# Patient Record
Sex: MALE | Race: WHITE | NOT HISPANIC OR LATINO | Employment: OTHER | URBAN - METROPOLITAN AREA
[De-identification: names, ages, dates, MRNs, and addresses within clinical notes are randomized per-mention and may not be internally consistent; named-entity substitution may affect disease eponyms.]

---

## 2017-02-07 ENCOUNTER — OFFICE VISIT (OUTPATIENT)
Dept: FAMILY MEDICINE | Facility: OTHER | Age: 77
End: 2017-02-07
Payer: COMMERCIAL

## 2017-02-07 VITALS
HEART RATE: 70 BPM | TEMPERATURE: 96.6 F | HEIGHT: 67 IN | WEIGHT: 193 LBS | RESPIRATION RATE: 16 BRPM | BODY MASS INDEX: 30.29 KG/M2 | SYSTOLIC BLOOD PRESSURE: 122 MMHG | DIASTOLIC BLOOD PRESSURE: 68 MMHG

## 2017-02-07 DIAGNOSIS — J06.9 VIRAL URI WITH COUGH: Primary | ICD-10-CM

## 2017-02-07 PROCEDURE — 99213 OFFICE O/P EST LOW 20 MIN: CPT | Performed by: FAMILY MEDICINE

## 2017-02-07 ASSESSMENT — PAIN SCALES - GENERAL: PAINLEVEL: NO PAIN (0)

## 2017-02-07 NOTE — MR AVS SNAPSHOT
After Visit Summary   2/7/2017    Medardo Gautam    MRN: [id number]           Patient Information     Date Of Birth          1940        Visit Information        Provider Department      2/7/2017 8:40 AM Verna Osborne MD [facility]         Follow-ups after your visit        Follow-up notes from your care team     Return if symptoms worsen or fail to improve.      Your next 10 appointments already scheduled     Mar 15, 2017 10:00 AM   (Arrive by 9:45 AM)   Return Prostate Cancer with Norma Goodwin MD   [facility] ([facility])    [address] [place] [address]  4th Floor  [facility] [address]   [phone]            Apr 24, 2017  9:45 AM   [facility] with  [facility]   [facility] ([facility])    [address] [place] [address]  2nd [facility] [id number] [phone]            Apr 24, 2017 10:30 AM   (Arrive by 10:15 AM)   Return Visit with Ramon Daily MD   [facility] ([facility])    [address] [place] [address]  2nd [facility] [id number] [phone]              Who to contact     If you have questions or need follow up information about today's clinic visit or your schedule please contact [facility] directly at [phone].  Normal or non-critical lab and imaging results will be communicated to you by MyChart, letter or phone within 4 business days after the clinic has received the results. If you do not hear from us within 7 days, please contact the clinic through MyChart or phone. If you have a critical or abnormal lab result, we will notify you by phone as soon as possible.  Submit refill requests through [employer] or call your pharmacy and they will forward the refill request to us. Please allow 3 business days for your refill to be completed.          Additional Information About  "Your Visit        MyChart Information     [employer] gives you secure access to your electronic health record. If you see a primary care provider, you can also send messages to your care team and make appointments. If you have questions, please call your primary care clinic.  If you do not have a primary care provider, please call [phone] and they will assist you.        Care EveryWhere ID     This is your Care EveryWhere ID. This could be used by other organizations to access your [facility] medical records  [id number]        Your Vitals Were     Pulse Temperature Respirations Height BMI (Body Mass Index)       70 96.6  F (35.9  C) (Temporal) 16 5' 6.77\" (1.696 m) 30.44 kg/m2        Blood Pressure from Last 3 Encounters:   02/07/17 122/68   12/12/16 138/70   12/07/16 163/82    Weight from Last 3 Encounters:   02/07/17 193 lb (87.544 kg)   12/12/16 198 lb (89.812 kg)   12/07/16 191 lb (86.637 kg)              Today, you had the following     No orders found for display       Primary Care Provider Office Phone # Fax #    Verna Osborne [phone]       [address]    [facility] [address]        Thank you!     Thank you for choosing [facility]  for your care. Our goal is always to provide you with excellent care. Hearing back from our patients is one way we can continue to improve our services. Please take a few minutes to complete the written survey that you may receive in the mail after your visit with us. Thank you!             Your Updated Medication List - Protect others around you: Learn how to safely use, store and throw away your medicines at www.disposemymeds.org.          This list is accurate as of: 2/7/17  9:00 AM.  Always use your most recent med list.                   Brand Name Dispense Instructions for use    ADVIL PO      Take  by mouth.       allopurinol 100 MG tablet    ZYLOPRIM    30 tablet    Take 1 tablet (100 mg) by mouth " daily       aspirin 81 MG tablet     30 tablet    Take 1 tablet (81 mg) by mouth daily       bicalutamide 50 MG tablet    CASODEX    30 tablet    Take 1 tablet (50 mg) by mouth daily       calcium-vitamin D 600-400 MG-UNIT per tablet    CALTRATE     Take 1 tablet by mouth daily       CALTRATE 600 PLUS-VIT D OR      Take  by mouth. 1 tablet daily       CLARITIN PO      Take  by mouth. As needed       clonazePAM 1 MG tablet    klonoPIN    30 tablet    Take 1 tablet (1 mg) by mouth 2 times daily as needed for anxiety       folic acid-vit B6-vit B12 0.8-10-0.115 MG Tabs per tablet    FOLGARD     Take 1 tablet by mouth daily       leuprolide 45 MG kit    LUPRON DEPOT    45 mg    Inject 45 mg into the muscle every 6 months       lisinopril 10 MG tablet    PRINIVIL/ZESTRIL    90 tablet    Take 1 tablet (10 mg) by mouth daily       methylPREDNISolone 4 MG tablet    MEDROL DOSEPAK    21 tablet    Follow package instructions       NAPROXEN PO      Take 500 mg by mouth 2 times daily as needed for moderate pain       OMEGA-3 FISH OIL PO      Take 500 mg by mouth daily       sildenafil 100 MG cap/tab    VIAGRA    10 tablet    1/2 tab three times a week       zolpidem 5 MG tablet    AMBIEN    30 tablet    TAKE ONE TABLET BY MOUTH IN THE EVENING AS NEEDED FOR SLEEP

## 2017-02-07 NOTE — PROGRESS NOTES
SUBJECTIVE:                                                    Medardo Gautam is a 77 year old male who presents to clinic today for the following health issues:      HPI    Acute Illness   Acute illness concerns: sore throat, horse voice   Onset: little over 2 weeks      Fever: no    Chills/Sweats: no     Headache (location?): no    Sinus Pressure:no    Conjunctivitis:  YES: both    Ear Pain: YES: right    Rhinorrhea: YES    Congestion: YES    Sore Throat: YES     Cough: YES-productive of clear sputum    Wheeze: no    Decreased Appetite: no    Nausea: no    Vomiting: no    Diarrhea:  no    Dysuria/Freq.: no    Fatigue/Achiness: no    Sick/Strep Exposure: no     Therapies Tried and outcome: recola drops, one advil at night      Problem list and histories reviewed & adjusted, as indicated.  Additional history: as documented      Patient Active Problem List   Diagnosis     Disturbance of skin sensation     Hemorrhoids     Gout     Advanced directives, counseling/discussion     Dupuytren's contracture of hand- left 5th finger, right 5th finger     Bunions- both feet     Skin lesion- R side of face and behind L ear     Insomnia     Prostatic nodule- posterior right edge with rectal exam     Prostate cancer- Jeancarlos 9 (5+4) dx Feb 2012     Elevated liver enzymes     Hyperbilirubinemia     Essential hypertension with goal blood pressure less than 140/90     Shoulder pain, left     Contracture of finger joint     Hyperlipidemia LDL goal <130     Shoulder pain, right     Malignant neoplasm of prostate (H)     Anxiety     Colon cancer (H)     Past Surgical History   Procedure Laterality Date     Colonoscopy  10/25/07     Snare polypectomy     Colonoscopy  6/25/2009     with snare polypectomy     Colonoscopy  9/30/2009     C lengthen,tendon,hand/finger  ca 2007     right fifth     Colonoscopy  1/5/2011     COLONOS[facility]EE at  [facility]     Appendectomy       Davinci prostatectomy  8/6/2012      Procedure: DAVINCI PROSTATECTOMY;  Davinci Assisted Radical Prostatectomy with Bilateral Lymphadenectomy ;  Surgeon: Norma Goodwin MD;  Location: [facility] OR     C hand/finger surgery unlisted       C stomach surgery procedure unlisted       Colonoscopy with co2 insufflation N/A 1/16/2015     Procedure: COLONOSCOPY WITH CO2 INSUFFLATION;  Surgeon: Sarah Beth Pisano MD;  Location: [facility] OR     Colonoscopy N/A 1/16/2015     Procedure: COMBINED COLONOSCOPY, SINGLE OR MULTIPLE BIOPSY/POLYPECTOMY BY BIOPSY;  Surgeon: Sarah Beth Pisano MD;  Location: [facility] OR     Biopsy  01/16/15     Colon surgery  2/11/2015     Lap assisted R hemicolectomy     Laparoscopic assisted colectomy N/A 2/11/2015     Procedure: LAPAROSCOPIC ASSISTED COLECTOMY;  Surgeon: Oren Breen MD;  Location: [facility] OR       Social History   Substance Use Topics     Smoking status: Former Smoker -- 1 years     Types: Cigarettes, Pipe, Cigars     Start date: 10/01/1961     Quit date: 01/01/1962     Smokeless tobacco: Never Used      Comment: No smokers in home     Alcohol Use: 0.0 oz/week     0 Standard drinks or equivalent per week      Comment: daily glass of wine and whiskey     Family History   Problem Relation Age of Onset     C.A.D. No family hx of      Breast Cancer No family hx of      Cancer - colorectal No family hx of      Prostate Cancer No family hx of      Alzheimer Disease No family hx of      Blood Disease No family hx of      Cardiovascular No family hx of      Circulatory No family hx of      Eye Disorder No family hx of      GASTROINTESTINAL DISEASE No family hx of      Genitourinary Problems No family hx of      Lipids No family hx of      Neurologic Disorder No family hx of      Respiratory No family hx of      Asthma No family hx of      HEART DISEASE No family hx of      DIABETES No family hx of      Hypertension No family hx of      CEREBROVASCULAR DISEASE Father      CANCER Mother 90     Patient believes vaginal cancer - hysterectomy,       CANCER Sister      Alzheimer Disease Mother      Arthritis No family hx of      Thyroid Disease No family hx of      Musculoskeletal Disorder No family hx of          Current Outpatient Prescriptions   Medication Sig Dispense Refill     bicalutamide (CASODEX) 50 MG tablet Take 1 tablet (50 mg) by mouth daily 30 tablet 3     allopurinol (ZYLOPRIM) 100 MG tablet Take 1 tablet (100 mg) by mouth daily 30 tablet 1     NAPROXEN PO Take 500 mg by mouth 2 times daily as needed for moderate pain       zolpidem (AMBIEN) 5 MG tablet TAKE ONE TABLET BY MOUTH IN THE EVENING AS NEEDED FOR SLEEP 30 tablet 2     lisinopril (PRINIVIL,ZESTRIL) 10 MG tablet Take 1 tablet (10 mg) by mouth daily 90 tablet 3     Omega-3 Fatty Acids (OMEGA-3 FISH OIL PO) Take 500 mg by mouth daily       calcium-vitamin D (CALTRATE) 600-400 MG-UNIT per tablet Take 1 tablet by mouth daily       folic acid-vit B6-vit B12 (FOLGARD) 0.8-10-0.115 MG TABS Take 1 tablet by mouth daily       aspirin 81 MG tablet Take 1 tablet (81 mg) by mouth daily 30 tablet      sildenafil (VIAGRA) 100 MG tablet 1/2 tab three times a week 10 tablet 12     Ibuprofen (ADVIL PO) Take  by mouth.       Loratadine (CLARITIN PO) Take  by mouth. As needed        Calcium-Vitamin D (CALTRATE 600 PLUS-VIT D OR) Take  by mouth. 1 tablet daily       clonazePAM (KLONOPIN) 1 MG tablet Take 1 tablet (1 mg) by mouth 2 times daily as needed for anxiety 30 tablet 2     methylPREDNISolone (MEDROL DOSEPAK) 4 MG tablet Follow package instructions 21 tablet 0     leuprolide (LUPRON DEPOT) 45 MG injection Inject 45 mg into the muscle every 6 months 45 mg 0     No Known Allergies  BP Readings from Last 3 Encounters:   17 122/68   16 138/70   16 163/82    Wt Readings from Last 3 Encounters:   17 193 lb (87.544 kg)   16 198 lb (89.812 kg)   16 191 lb (86.637 kg)                  Labs reviewed in EPIC  Problem list, Medication list, Allergies, and  "Medical/Social/Surgical histories reviewed in [facility] and updated as appropriate.    ROS:  Constitutional, HEENT, cardiovascular, pulmonary, gi and gu systems are negative, except as otherwise noted.    OBJECTIVE:                                                    /68 mmHg  Pulse 70  Temp(Src) 96.6  F (35.9  C) (Temporal)  Resp 16  Ht 5' 6.77\" (1.696 m)  Wt 193 lb (87.544 kg)  BMI 30.44 kg/m2  Body mass index is 30.44 kg/(m^2).  Physical Exam   Constitutional: He appears well-developed and well-nourished.   HENT:   Head: Normocephalic and atraumatic.   Right Ear: External ear normal.   Left Ear: External ear normal.   Mouth/Throat: Oropharynx is clear and moist.   Eyes: EOM are normal.   Neck: Neck supple.   Cardiovascular: Normal rate and regular rhythm.    Pulmonary/Chest: Effort normal and breath sounds normal.         Diagnostic Test Results:  none      ASSESSMENT/PLAN:                                                      Problem List Items Addressed This Visit     None      Visit Diagnoses     Viral URI with cough    -  Primary        advise conservative a=management  Discussed home care  Reportable signs and symptoms discussed  RTC if symptoms persist or fail to improve    Verna Osborne MD  [facility]  "

## 2017-02-07 NOTE — NURSING NOTE
"Chief Complaint   Patient presents with     URI       Initial /68 mmHg  Pulse 70  Temp(Src) 96.6  F (35.9  C) (Temporal)  Resp 16  Ht 5' 6.77\" (1.696 m)  Wt 193 lb (87.544 kg)  BMI 30.44 kg/m2 Estimated body mass index is 30.44 kg/(m^2) as calculated from the following:    Height as of this encounter: 5' 6.77\" (1.696 m).    Weight as of this encounter: 193 lb (87.544 kg).  Medication Reconciliation: complete  Brittany Lynn CMA    "

## 2017-02-10 DIAGNOSIS — M1A.9XX0 CHRONIC GOUT WITHOUT TOPHUS, UNSPECIFIED CAUSE, UNSPECIFIED SITE: Primary | ICD-10-CM

## 2017-02-10 RX ORDER — NAPROXEN 500 MG/1
500 TABLET ORAL 2 TIMES DAILY PRN
Qty: 60 TABLET | Refills: 0 | Status: SHIPPED | OUTPATIENT
Start: 2017-02-10 | End: 2017-03-15

## 2017-02-10 NOTE — TELEPHONE ENCOUNTER
Naproxen    Last Written Prescription Date:  6/5/15  Discont. 10/9/15  Last Fill Quantity: 90,   # refills: 3  Last Office Visit with [facility], [facility] or [facility] prescribing provider: 2/7/17  Future Office visit:       Routing refill request to provider for review/approval because:  Drug not active on patient's medication list

## 2017-02-10 NOTE — TELEPHONE ENCOUNTER
Reason for call:  Medication  Reason for Call:  Medication or medication refill:    Do you use a [facility]?  Name of the pharmacy and phone number for the current request:  [facility] - [phone]    Name of the medication requested: naproxen    Other request: will need refill. Requested a few days ago    Can we leave a detailed message on this number? YES    Phone number patient can be reached at: Home number on file [phone] (home)    Best Time: any    Call taken on 2/10/2017 at 10:21 AM by Sharmin Mckeon

## 2017-02-16 NOTE — TELEPHONE ENCOUNTER
Naproxen 500 mg pended for provider to review and advise on this medication request. Corrina Waldrop RN

## 2017-02-17 RX ORDER — NAPROXEN 500 MG/1
500 TABLET ORAL 2 TIMES DAILY PRN
Qty: 60 TABLET | Refills: 0 | Status: SHIPPED | OUTPATIENT
Start: 2017-02-17 | End: 2017-10-12

## 2017-02-17 NOTE — TELEPHONE ENCOUNTER
This is an [facility] patient that I had approved when she was out of office, she is now back and will refer it back to her.

## 2017-02-28 DIAGNOSIS — G47.00 PERSISTENT DISORDER OF INITIATING OR MAINTAINING SLEEP: ICD-10-CM

## 2017-02-28 RX ORDER — ZOLPIDEM TARTRATE 5 MG/1
TABLET ORAL
Qty: 30 TABLET | Refills: 0 | Status: CANCELLED | OUTPATIENT
Start: 2017-02-28

## 2017-03-01 DIAGNOSIS — G47.00 PERSISTENT DISORDER OF INITIATING OR MAINTAINING SLEEP: ICD-10-CM

## 2017-03-01 NOTE — TELEPHONE ENCOUNTER
Ambien      Last Written Prescription Date:  11/15/16  Last Fill Quantity: 30,   # refills: 2  Last Office Visit with [facility] provider: 02/07/2017  Future Office visit:       Routing refill request to provider for review/approval because:  Drug not on the [facility] refill protocol or controlled substance

## 2017-03-01 NOTE — TELEPHONE ENCOUNTER
[facility] phone call message- patient requests medication or medication refill:    If this is a refill request, has the caller requested the refill from the pharmacy already? No  Name of the pharmacy and phone number for the current request:  [facility] [phone]    Name of the medication requested: Zolpodem    Other request: patient is going out of town on Friday, can this be done today?    OK to leave the result message on voice mail or with a family member? NO    Call taken on 3/1/2017 at 10:51 AM by Iman Stephen

## 2017-03-02 RX ORDER — ZOLPIDEM TARTRATE 5 MG/1
TABLET ORAL
Qty: 30 TABLET | Refills: 5 | Status: SHIPPED | OUTPATIENT
Start: 2017-03-02 | End: 2017-08-27

## 2017-03-02 NOTE — PROGRESS NOTES
SUBJECTIVE:                                                    Medardo Gautam is a 77 year old male who presents to clinic today for the following health issues:      HPI    CHEST PAIN     Onset: 1 week ago    Description:   Location:  Lower middle chest  Character: sharp  Radiation: none  Duration: 2 hours and constant     Intensity: severe    Progression of Symptoms:  improving    Accompanying Signs & Symptoms:  Shortness of breath: no  Sweating: no  Nausea/vomiting: no  Lightheadedness: no  Palpitations: no  Fever/Chills: no  Cough: YES  Heartburn: no    History:   Family history of heart disease no  Tobacco use: no    Precipitating factors:   Worse with exertion: no  Worse with deep breaths :  no  Related to food: no    Alleviating factors:  none       Therapies Tried and outcome: none      Problem list and histories reviewed & adjusted, as indicated.  Additional history: as documented      Patient Active Problem List   Diagnosis     Disturbance of skin sensation     Hemorrhoids     Gout     Advanced directives, counseling/discussion     Dupuytren's contracture of hand- left 5th finger, right 5th finger     Bunions- both feet     Skin lesion- R side of face and behind L ear     Insomnia     Prostatic nodule- posterior right edge with rectal exam     Prostate cancer- Jeancarlos 9 (5+4) dx Feb 2012     Elevated liver enzymes     Hyperbilirubinemia     Essential hypertension with goal blood pressure less than 140/90     Shoulder pain, left     Contracture of finger joint     Hyperlipidemia LDL goal <130     Shoulder pain, right     Malignant neoplasm of prostate (H)     Anxiety     Colon cancer (H)     Past Surgical History   Procedure Laterality Date     Colonoscopy  10/25/07     Snare polypectomy     Colonoscopy  6/25/2009     with snare polypectomy     Colonoscopy  9/30/2009     C lengthen,tendon,hand/finger  ca 2007     right fifth     Colonoscopy  1/5/2011     [facility] at [facility]     Appendectomy       Davinci prostatectomy  2012     Procedure: DAVINCI PROSTATECTOMY;  Davinci Assisted Radical Prostatectomy with Bilateral Lymphadenectomy ;  Surgeon: Norma Goodwin MD;  Location: [facility] OR     C hand/finger surgery unlisted       C stomach surgery procedure unlisted       Colonoscopy with co2 insufflation N/A 2015     Procedure: COLONOSCOPY WITH CO2 INSUFFLATION;  Surgeon: Sarah Beth Pisano MD;  Location: [facility] OR     Colonoscopy N/A 2015     Procedure: COMBINED COLONOSCOPY, SINGLE OR MULTIPLE BIOPSY/POLYPECTOMY BY BIOPSY;  Surgeon: Sarah Beth Pisano MD;  Location: [facility] OR     Biopsy  01/16/15     Colon surgery  2015     Lap assisted R hemicolectomy     Laparoscopic assisted colectomy N/A 2015     Procedure: LAPAROSCOPIC ASSISTED COLECTOMY;  Surgeon: Oren Breen MD;  Location: [facility] OR       Social History   Substance Use Topics     Smoking status: Former Smoker     Years: 1.00     Types: Cigarettes, Pipe, Cigars     Start date: 10/1/1961     Quit date: 1962     Smokeless tobacco: Never Used      Comment: No smokers in home     Alcohol use 0.0 oz/week     0 Standard drinks or equivalent per week      Comment: daily glass of wine and whiskey     Family History   Problem Relation Age of Onset     CEREBROVASCULAR DISEASE Father      CANCER Mother 90     Patient believes vaginal cancer - hysterectomy,      Alzheimer Disease Mother      CANCER Sister      C.A.D. No family hx of      Breast Cancer No family hx of      Cancer - colorectal No family hx of      Prostate Cancer No family hx of      Blood Disease No family hx of      Cardiovascular No family hx of      Circulatory No family hx of      Eye Disorder No family hx of      GASTROINTESTINAL DISEASE No family hx of      Genitourinary Problems No family hx of      Lipids No family hx of      Neurologic Disorder No family hx of      Respiratory No family hx of      Asthma No family hx of      HEART DISEASE  No family hx of      DIABETES No family hx of      Hypertension No family hx of      Arthritis No family hx of      Thyroid Disease No family hx of      Musculoskeletal Disorder No family hx of          Current Outpatient Prescriptions   Medication Sig Dispense Refill     zolpidem (AMBIEN) 5 MG tablet TAKE ONE TABLET BY MOUTH IN THE EVENING AS NEEDED FOR SLEEP(1 month supply) 30 tablet 5     naproxen (NAPROSYN) 500 MG tablet Take 1 tablet (500 mg) by mouth 2 times daily as needed for moderate pain 60 tablet 0     naproxen 500 MG TBEC Take 500 mg by mouth 2 times daily as needed 60 tablet 0     bicalutamide (CASODEX) 50 MG tablet Take 1 tablet (50 mg) by mouth daily 30 tablet 3     clonazePAM (KLONOPIN) 1 MG tablet Take 1 tablet (1 mg) by mouth 2 times daily as needed for anxiety 30 tablet 2     lisinopril (PRINIVIL,ZESTRIL) 10 MG tablet Take 1 tablet (10 mg) by mouth daily 90 tablet 3     leuprolide (LUPRON DEPOT) 45 MG injection Inject 45 mg into the muscle every 6 months 45 mg 0     Omega-3 Fatty Acids (OMEGA-3 FISH OIL PO) Take 500 mg by mouth daily       calcium-vitamin D (CALTRATE) 600-400 MG-UNIT per tablet Take 1 tablet by mouth daily       folic acid-vit B6-vit B12 (FOLGARD) 0.8-10-0.115 MG TABS Take 1 tablet by mouth daily       aspirin 81 MG tablet Take 1 tablet (81 mg) by mouth daily 30 tablet      Loratadine (CLARITIN PO) Take  by mouth. As needed        Calcium-Vitamin D (CALTRATE 600 PLUS-VIT D OR) Take  by mouth. 1 tablet daily       allopurinol (ZYLOPRIM) 100 MG tablet Take 1 tablet (100 mg) by mouth daily (Patient not taking: Reported on 3/9/2017) 30 tablet 1     sildenafil (VIAGRA) 100 MG tablet 1/2 tab three times a week (Patient not taking: Reported on 3/9/2017) 10 tablet 12     Ibuprofen (ADVIL PO) Reported on 3/9/2017       Recent Labs   Lab Test  11/16/16   0851  10/25/16   1228  04/06/16   0854  10/26/15   0956   10/30/14   1043  07/28/14   0848   01/04/10   0924   A1C   --    --    --    --     --   6.0   --    --   5.3   LDL  134*   --    --   127   --    --   133*   < >   --    HDL  91   --    --   87   --    --   85   < >   --    TRIG  102   --    --   94   --    --   122   < >   --    ALT   --   42  82*  56   < >   --   57   < >   --    CR   --   0.83  0.86  0.77   < >   --   0.92   < >   --    GFRESTIMATED   --   >90  Non  GFR Calc    87  >90  Non  GFR Calc     < >   --   81   < >   --    GFRESTBLACK   --   >90   GFR Calc    >90   GFR Calc    >90   GFR Calc     < >   --   >90   < >   --    POTASSIUM   --   4.3  4.7  4.4   < >   --   4.1   < >   --     < > = values in this interval not displayed.      BP Readings from Last 3 Encounters:   03/09/17 120/64   02/07/17 122/68   12/12/16 138/70    Wt Readings from Last 3 Encounters:   03/09/17 194 lb (88 kg)   02/07/17 193 lb (87.5 kg)   12/12/16 198 lb (89.8 kg)                  Labs reviewed in EPIC    ROS:  Constitutional, HEENT, cardiovascular, pulmonary, gi and gu systems are negative, except as otherwise noted.    OBJECTIVE:                                                    /64  Pulse 64  Temp 96.4  F (35.8  C) (Temporal)  Resp 16  Wt 194 lb (88 kg)  BMI 30.59 kg/m2  Body mass index is 30.59 kg/(m^2).  Physical Exam   Constitutional: He is oriented to person, place, and time. He appears well-developed and well-nourished.   HENT:   Head: Normocephalic and atraumatic.   Cardiovascular: Normal rate and regular rhythm.    Pulmonary/Chest: Effort normal and breath sounds normal.   Neurological: He is alert and oriented to person, place, and time.   Psychiatric: He has a normal mood and affect.         Diagnostic Test Results:  none      ASSESSMENT/PLAN:                                                      Problem List Items Addressed This Visit     Hyperlipidemia LDL goal <130     The 10-year ASCVD risk score (Jorge L GOODWIN Jr., et al., 2013) is: 22.8%    Values used to  calculate the score:      Age: 77 years      Sex: Male      Is Non- : No      Diabetic: No      Tobacco smoker: No      Systolic Blood Pressure: 120 mmHg      Is Prescribed Antihypertensives: Yes      HDL Cholesterol: 91 mg/dL      Total Cholesterol: 245 mg/dL                1. Hyperlipidemia LDL goal <130      2. Atypical chest pain  Symptoms are likely consistent with anxiety attack vs esophageal spasm but based on his risk factors and ASCVD risk score of 22% , I did recommend gettting a stress test  Pt would like to think about it . If sympotms reccure he  Will consider stress test  Advise PPI for symptomatic relief  Advised limiting Use of NSAID  Continue low dose aspirin  Discussed home care  Reportable signs and symptoms discussed  RTC if symptoms persist or fail to improve        Verna Osborne MD  [facility]

## 2017-03-02 NOTE — TELEPHONE ENCOUNTER
Got a call from  pt is here and waiting in the waiting room until prescription it is filled. I will send a message to [facility] about this. Laura Collins CMA (MA)  Will rote to [facility] as well.

## 2017-03-08 DIAGNOSIS — C61 MALIGNANT NEOPLASM OF PROSTATE (H): ICD-10-CM

## 2017-03-08 LAB — PSA SERPL-MCNC: NORMAL UG/L (ref 0–4)

## 2017-03-08 PROCEDURE — 84403 ASSAY OF TOTAL TESTOSTERONE: CPT | Performed by: UROLOGY

## 2017-03-08 PROCEDURE — 84153 ASSAY OF PSA TOTAL: CPT | Performed by: UROLOGY

## 2017-03-08 PROCEDURE — 36415 COLL VENOUS BLD VENIPUNCTURE: CPT | Performed by: FAMILY MEDICINE

## 2017-03-09 ENCOUNTER — OFFICE VISIT (OUTPATIENT)
Dept: FAMILY MEDICINE | Facility: OTHER | Age: 77
End: 2017-03-09
Payer: COMMERCIAL

## 2017-03-09 VITALS
SYSTOLIC BLOOD PRESSURE: 120 MMHG | HEART RATE: 64 BPM | BODY MASS INDEX: 30.59 KG/M2 | DIASTOLIC BLOOD PRESSURE: 64 MMHG | WEIGHT: 194 LBS | TEMPERATURE: 96.4 F | RESPIRATION RATE: 16 BRPM

## 2017-03-09 DIAGNOSIS — R07.89 ATYPICAL CHEST PAIN: Primary | ICD-10-CM

## 2017-03-09 DIAGNOSIS — E78.5 HYPERLIPIDEMIA LDL GOAL <130: ICD-10-CM

## 2017-03-09 PROCEDURE — 99214 OFFICE O/P EST MOD 30 MIN: CPT | Performed by: FAMILY MEDICINE

## 2017-03-09 ASSESSMENT — PAIN SCALES - GENERAL: PAINLEVEL: NO PAIN (0)

## 2017-03-09 ASSESSMENT — ANXIETY QUESTIONNAIRES
GAD7 TOTAL SCORE: 6
GAD7 TOTAL SCORE: 6
7. FEELING AFRAID AS IF SOMETHING AWFUL MIGHT HAPPEN: 1 = SEVERAL DAYS

## 2017-03-09 NOTE — MR AVS SNAPSHOT
After Visit Summary   3/9/2017    Medardo Gautam    MRN: [id number]           Patient Information     Date Of Birth          1940        Visit Information        Provider Department      3/9/2017 9:40 AM Verna Osborne MD [facility]        Today's Diagnoses     Hyperlipidemia LDL goal <130           Follow-ups after your visit        Your next 10 appointments already scheduled     Mar 15, 2017 10:00 AM CDT   (Arrive by 9:45 AM)   Return Prostate Cancer with Norma Goodwin MD   [facility] for Prostate and Urologic Cancers ([facility])    [address] [place] [address]  4th Floor  [facility] [address]   [phone]            Apr 24, 2017  9:45 AM CDT   [facility] Lab Draw with  M[facility] LAB DRAW   [facility] Lab Draw ([facility])    [address] [place] [address]  2nd [facility] [address]   [phone]            Apr 24, 2017 10:30 AM CDT   (Arrive by 10:15 AM)   Return Visit with Ramon Daily MD   [facility] ([facility])    [address] [place] [address]   [phone]              Who to contact     If you have questions or need follow up information about today's clinic visit or your schedule please contact [facility] directly at [phone].  Normal or non-critical lab and imaging results will be communicated to you by MyChart, letter or phone within 4 business days after the clinic has received the results. If you do not hear from us within 7 days, please contact the clinic through MyChart or phone. If you have a critical or abnormal lab result, we will notify you by phone as soon as possible.  Submit refill requests through Circle or call your pharmacy and they will forward the refill request to us. Please allow 3 business days for your refill to be completed.          Additional Information About Your Visit         [employer] Information     [employer] gives you secure access to your electronic health record. If you see a primary care provider, you can also send messages to your care team and make appointments. If you have questions, please call your primary care clinic.  If you do not have a primary care provider, please call [phone] and they will assist you.        Care EveryWhere ID     This is your Care EveryWhere ID. This could be used by other organizations to access your [place] medical records  [id number]        Your Vitals Were     Pulse Temperature Respirations BMI (Body Mass Index)          64 96.4  F (35.8  C) (Temporal) 16 30.59 kg/m2         Blood Pressure from Last 3 Encounters:   03/09/17 120/64   02/07/17 122/68   12/12/16 138/70    Weight from Last 3 Encounters:   03/09/17 194 lb (88 kg)   02/07/17 193 lb (87.5 kg)   12/12/16 198 lb (89.8 kg)              Today, you had the following     No orders found for display       Primary Care Provider Office Phone # Fax #    Verna Osborne [phone]       [facility] [address] [facility] [address]    [facility] [address]        Thank you!     Thank you for choosing [facility]  for your care. Our goal is always to provide you with excellent care. Hearing back from our patients is one way we can continue to improve our services. Please take a few minutes to complete the written survey that you may receive in the mail after your visit with us. Thank you!             Your Updated Medication List - Protect others around you: Learn how to safely use, store and throw away your medicines at www.disposemymeds.org.          This list is accurate as of: 3/9/17 10:51 AM.  Always use your most recent med list.                   Brand Name Dispense Instructions for use    ADVIL PO      Reported on 3/9/2017       allopurinol 100 MG tablet    ZYLOPRIM    30 tablet    Take 1 tablet (100 mg) by mouth daily       aspirin 81 MG tablet     30  tablet    Take 1 tablet (81 mg) by mouth daily       bicalutamide 50 MG tablet    CASODEX    30 tablet    Take 1 tablet (50 mg) by mouth daily       calcium-vitamin D 600-400 MG-UNIT per tablet    CALTRATE     Take 1 tablet by mouth daily       CALTRATE 600 PLUS-VIT D OR      Take  by mouth. 1 tablet daily       CLARITIN PO      Take  by mouth. As needed       clonazePAM 1 MG tablet    klonoPIN    30 tablet    Take 1 tablet (1 mg) by mouth 2 times daily as needed for anxiety       folic acid-vit B6-vit B12 0.8-10-0.115 MG Tabs per tablet    FOLGARD     Take 1 tablet by mouth daily       leuprolide 45 MG kit    LUPRON DEPOT    45 mg    Inject 45 mg into the muscle every 6 months       lisinopril 10 MG tablet    PRINIVIL/ZESTRIL    90 tablet    Take 1 tablet (10 mg) by mouth daily       * naproxen 500 MG Tbec     60 tablet    Take 500 mg by mouth 2 times daily as needed       * naproxen 500 MG tablet    NAPROSYN    60 tablet    Take 1 tablet (500 mg) by mouth 2 times daily as needed for moderate pain       OMEGA-3 FISH OIL PO      Take 500 mg by mouth daily       sildenafil 100 MG cap/tab    VIAGRA    10 tablet    1/2 tab three times a week       zolpidem 5 MG tablet    AMBIEN    30 tablet    TAKE ONE TABLET BY MOUTH IN THE EVENING AS NEEDED FOR SLEEP(1 month supply)       * Notice:  This list has 2 medication(s) that are the same as other medications prescribed for you. Read the directions carefully, and ask your doctor or other care provider to review them with you.

## 2017-03-09 NOTE — ASSESSMENT & PLAN NOTE
The 10-year ASCVD risk score (Jorge L GOODWIN Jr., et al., 2013) is: 22.8%    Values used to calculate the score:      Age: 77 years      Sex: Male      Is Non- : No      Diabetic: No      Tobacco smoker: No      Systolic Blood Pressure: 120 mmHg      Is Prescribed Antihypertensives: Yes      HDL Cholesterol: 91 mg/dL      Total Cholesterol: 245 mg/dL

## 2017-03-10 ENCOUNTER — PRE VISIT (OUTPATIENT)
Dept: UROLOGY | Facility: CLINIC | Age: 77
End: 2017-03-10

## 2017-03-10 LAB — TESTOST SERPL-MCNC: 17 NG/DL (ref 240–950)

## 2017-03-10 ASSESSMENT — ANXIETY QUESTIONNAIRES: GAD7 TOTAL SCORE: 6

## 2017-03-10 NOTE — TELEPHONE ENCOUNTER
Patient with prostate cancer coming in for follow up. Chart reviewed and PSA and Testosterone have been processed.   Orders Only on 03/08/2017   Component Date Value Ref Range Status     PSA 03/08/2017   0 - 4 ug/L Final                    Value:<0.01  Assay Method:  Chemiluminescence using Siemens Vista analyzer       Testosterone Total 03/08/2017 17* 240 - 950 ng/dL Final    Comment: This test was developed and its performance characteristics determined by the   [facility],  Special Chemistry Laboratory. It has   not been cleared or approved by the FDA. The laboratory is regulated under   CLIA   as qualified to perform high-complexity testing. This test is used for   clinical   purposes. It should not be regarded as investigational or for research.       No need for a call.

## 2017-03-15 ENCOUNTER — OFFICE VISIT (OUTPATIENT)
Dept: UROLOGY | Facility: CLINIC | Age: 77
End: 2017-03-15

## 2017-03-15 VITALS
WEIGHT: 194.6 LBS | BODY MASS INDEX: 31.27 KG/M2 | DIASTOLIC BLOOD PRESSURE: 85 MMHG | HEIGHT: 66 IN | SYSTOLIC BLOOD PRESSURE: 136 MMHG | HEART RATE: 64 BPM

## 2017-03-15 DIAGNOSIS — C61 MALIGNANT NEOPLASM OF PROSTATE (H): Primary | ICD-10-CM

## 2017-03-15 RX ORDER — BICALUTAMIDE 50 MG/1
50 TABLET, FILM COATED ORAL DAILY
Qty: 90 TABLET | Refills: 3 | Status: SHIPPED | OUTPATIENT
Start: 2017-03-15 | End: 2018-01-16

## 2017-03-15 ASSESSMENT — PAIN SCALES - GENERAL: PAINLEVEL: NO PAIN (0)

## 2017-03-15 NOTE — NURSING NOTE
"Chief Complaint   Patient presents with     RECHECK     Prostate cancer follow up with PSA review       Blood pressure 136/85, pulse 64, height 1.676 m (5' 6\"), weight 88.3 kg (194 lb 9.6 oz). Body mass index is 31.41 kg/(m^2).    Patient Active Problem List   Diagnosis     Disturbance of skin sensation     Hemorrhoids     Gout     Advanced directives, counseling/discussion     Dupuytren's contracture of hand- left 5th finger, right 5th finger     Bunions- both feet     Skin lesion- R side of face and behind L ear     Insomnia     Prostatic nodule- posterior right edge with rectal exam     Prostate cancer- Jeancarlos 9 (5+4) dx Feb 2012     Elevated liver enzymes     Hyperbilirubinemia     Essential hypertension with goal blood pressure less than 140/90     Shoulder pain, left     Contracture of finger joint     Hyperlipidemia LDL goal <130     Shoulder pain, right     Malignant neoplasm of prostate (H)     Anxiety     Colon cancer (H)       No Known Allergies    Current Outpatient Prescriptions   Medication Sig Dispense Refill     zolpidem (AMBIEN) 5 MG tablet TAKE ONE TABLET BY MOUTH IN THE EVENING AS NEEDED FOR SLEEP(1 month supply) 30 tablet 5     naproxen (NAPROSYN) 500 MG tablet Take 1 tablet (500 mg) by mouth 2 times daily as needed for moderate pain 60 tablet 0     bicalutamide (CASODEX) 50 MG tablet Take 1 tablet (50 mg) by mouth daily 30 tablet 3     clonazePAM (KLONOPIN) 1 MG tablet Take 1 tablet (1 mg) by mouth 2 times daily as needed for anxiety 30 tablet 2     lisinopril (PRINIVIL,ZESTRIL) 10 MG tablet Take 1 tablet (10 mg) by mouth daily 90 tablet 3     leuprolide (LUPRON DEPOT) 45 MG injection Inject 45 mg into the muscle every 6 months 45 mg 0     Omega-3 Fatty Acids (OMEGA-3 FISH OIL PO) Take 500 mg by mouth daily       calcium-vitamin D (CALTRATE) 600-400 MG-UNIT per tablet Take 1 tablet by mouth daily       folic acid-vit B6-vit B12 (FOLGARD) 0.8-10-0.115 MG TABS Take 1 tablet by mouth daily       " aspirin 81 MG tablet Take 1 tablet (81 mg) by mouth daily 30 tablet      sildenafil (VIAGRA) 100 MG tablet 1/2 tab three times a week 10 tablet 12     Ibuprofen (ADVIL PO) Reported on 3/9/2017       Loratadine (CLARITIN PO) Take  by mouth. As needed          Social History   Substance Use Topics     Smoking status: Former Smoker     Years: 1.00     Types: Cigarettes, Pipe, Cigars     Start date: 10/1/1961     Quit date: 1/1/1962     Smokeless tobacco: Never Used      Comment: No smokers in home     Alcohol use 0.0 oz/week     0 Standard drinks or equivalent per week      Comment: daily glass of wine and whiskey       PEGGY Matute  3/15/2017  10:01 AM

## 2017-03-15 NOTE — PATIENT INSTRUCTIONS
Return in three months with a testosterone and a PSA drawn a few days in advance.    It was a pleasure meeting with you today.  Thank you for allowing me and my team the privilege of caring for you today.  YOU are the reason we are here, and I truly hope we provided you with the excellent service you deserve.  Please let us know if there is anything else we can do for you so that we can be sure you are leaving completely satisfied with your care experience.

## 2017-03-15 NOTE — MR AVS SNAPSHOT
After Visit Summary   3/15/2017    Medardo Gautam    MRN: [id number]           Patient Information     Date Of Birth          1940        Visit Information        Provider Department      3/15/2017 10:00 AM Norma Goodwin MD [facility] and [facility] for Prostate and Urologic Cancers        Care Instructions    Return in three months with a testosterone and a PSA drawn a few days in advance.    It was a pleasure meeting with you today.  Thank you for allowing me and my team the privilege of caring for you today.  YOU are the reason we are here, and I truly hope we provided you with the excellent service you deserve.  Please let us know if there is anything else we can do for you so that we can be sure you are leaving completely satisfied with your care experience.                Follow-ups after your visit        Your next 10 appointments already scheduled     Apr 24, 2017  9:45 AM CDT   [facility] Lab Draw with  MASONIC LAB DRAW   [facility] Lab Draw ([facility])    [address] [place] [address]  2nd [facility] [address] [phone]            Apr 24, 2017 10:30 AM CDT   (Arrive by 10:15 AM)   Return Visit with Ramon Daily MD   [facility] ([facility])    [address] [place] [address]  2nd [facility] [id number] [phone]            Jul 12, 2017 10:00 AM CDT   (Arrive by 9:45 AM)   Return Prostate Cancer with Norma Goodwin MD   [facility] and [facility] for Prostate and Urologic Cancers ([facility])    [address] [place] [address]  4th [facility] [id number] [phone]              Who to contact     Please call your clinic at [phone] to:    Ask questions about your health    Make or cancel appointments    Discuss your medicines    Learn about your test results    Speak to your doctor   If you have compliments or concerns about an experience at your clinic, or if you  "wish to file a complaint, please contact [facility] Patient Relations at [phone] or email us at [email or web address]         Additional Information About Your Visit        [employer] Information     [employer] gives you secure access to your electronic health record. If you see a primary care provider, you can also send messages to your care team and make appointments. If you have questions, please call your primary care clinic.  If you do not have a primary care provider, please call [phone] and they will assist you.      [employer] is an electronic gateway that provides easy, online access to your medical records. With [employer], you can request a clinic appointment, read your test results, renew a prescription or communicate with your care team.     To access your existing account, please contact your [facility] or call [phone] for assistance.        Care EveryWhere ID     This is your Care EveryWhere ID. This could be used by other organizations to access your [place] medical records  [id number]        Your Vitals Were     Pulse Height BMI (Body Mass Index)             64 1.676 m (5' 6\") 31.41 kg/m2          Blood Pressure from Last 3 Encounters:   03/15/17 136/85   03/09/17 120/64   02/07/17 122/68    Weight from Last 3 Encounters:   03/15/17 88.3 kg (194 lb 9.6 oz)   03/09/17 88 kg (194 lb)   02/07/17 87.5 kg (193 lb)              Today, you had the following     No orders found for display         Today's Medication Changes          These changes are accurate as of: 3/15/17 10:44 AM.  If you have any questions, ask your nurse or doctor.               Stop taking these medicines if you haven't already. Please contact your care team if you have questions.     allopurinol 100 MG tablet   Commonly known as:  ZYLOPRIM   Stopped by:  Norma Goodwin MD                    Primary Care Provider Office Phone # Fax #    Verna Osborne MD " [phone]       [facility] [address] [facility] [address]    [facility] [address]        Thank you!     Thank you for choosing [facility] AND [facility] FOR PROSTATE AND UROLOGIC CANCERS  for your care. Our goal is always to provide you with excellent care. Hearing back from our patients is one way we can continue to improve our services. Please take a few minutes to complete the written survey that you may receive in the mail after your visit with us. Thank you!             Your Updated Medication List - Protect others around you: Learn how to safely use, store and throw away your medicines at www.disposemymeds.org.          This list is accurate as of: 3/15/17 10:44 AM.  Always use your most recent med list.                   Brand Name Dispense Instructions for use    ADVIL PO      Reported on 3/9/2017       aspirin 81 MG tablet     30 tablet    Take 1 tablet (81 mg) by mouth daily       bicalutamide 50 MG tablet    CASODEX    30 tablet    Take 1 tablet (50 mg) by mouth daily       calcium-vitamin D 600-400 MG-UNIT per tablet    CALTRATE     Take 1 tablet by mouth daily       CLARITIN PO      Take  by mouth. As needed       clonazePAM 1 MG tablet    klonoPIN    30 tablet    Take 1 tablet (1 mg) by mouth 2 times daily as needed for anxiety       folic acid-vit B6-vit B12 0.8-10-0.115 MG Tabs per tablet    FOLGARD     Take 1 tablet by mouth daily       leuprolide 45 MG kit    LUPRON DEPOT    45 mg    Inject 45 mg into the muscle every 6 months       lisinopril 10 MG tablet    PRINIVIL/ZESTRIL    90 tablet    Take 1 tablet (10 mg) by mouth daily       naproxen 500 MG tablet    NAPROSYN    60 tablet    Take 1 tablet (500 mg) by mouth 2 times daily as needed for moderate pain       OMEGA-3 FISH OIL PO      Take 500 mg by mouth daily       sildenafil 100 MG cap/tab    VIAGRA    10 tablet    1/2 tab three times a week       zolpidem 5 MG tablet    AMBIEN    30 tablet    TAKE ONE TABLET BY MOUTH IN  THE EVENING AS NEEDED FOR SLEEP(1 month supply)

## 2017-03-15 NOTE — PROGRESS NOTES
"Urology Clinic Note    Date: 3/15/2017  Time: 10:03 AM  Patient: Medardo Gautam  MRN: [id number]    HPI/Subjective: Medardo Gautam is a 77 year old man with a history of prostate cancer (Gl 4+5, pT2cN0, Pre-tx PSA 5.2) s/p RALP and BLPLND (8/2012) with subsequent  BCR in 2013 managed with salvage XRT and ADT.  In the setting of undetectable PSA, intermittent therapy was initiated in November 2015.  While PSA remained undetectable, he was again started on Lupron in June 2016 due to rising testosterone (52).  Testosterone was found to have decreased to 11 at follow up, however PSA was again detectable (0.05) and patient was started on bicalutamide in December 2016.  Mr Gautam returns to clinic today for follow up.     Since starting bicalutamide he denies painful breast enlargement.     With respect to continence, patient is currently using zero pads per day.   Regarding erectile function, patient reports inadequate erections.  He previously trialed viagra with some increased erection function although erections remained inadequate for intercourse.  Today he notes that he would like to again trial.        Objective:  /85  Pulse 64  Ht 1.676 m (5' 6\")  Wt 88.3 kg (194 lb 9.6 oz)  BMI 31.41 kg/m2  Gen: In NAD, conversant  Resp: Breathing non-labored  Abd: Soft, non-distended, non-tender.  Ext: Warm and well perfused    PSA Ultra Sensitive:  <0.01    Testosterone  17    Assessment & Plan: 77 year old man with a history of prostate cancer s/p RALP with BCR s/p salvage XRT. He is currently on bicalutamide and Lupron with last Lupron injection in June 2016.  PSA remains undetectable today with stable testosterone.    #Prostate cancer   - RTC in 3 months with repeat PSA ultra-sensitive and testosterone   -Continue bicalutamide    #Urinary incontinence: Patient is currently using 0 pads per day.   -No further intervention indicated at this team      This patient was seen & discussed with Dr. Goodwin.    --  Patient seen " and examined with the resident.  Visit time 15 minutes and >50% spent in counseling.  I agree with the resident's note and plan of care.       Norma Goodwin MD  Urology Staff

## 2017-03-15 NOTE — LETTER
"3/15/2017       RE: Medardo Gautam  [address] [facility] [address]     Dear Colleague,    Thank you for referring your patient, Medardo Gautam, to the [facility] UROLOGY AND INST FOR PROSTATE AND [facility]OLOGIC CANC[facility]. Please see a copy of my visit note below.    Urology Clinic Note    Date: 3/15/2017  Time: 10:03 AM  Patient: Medardo Gautam  MRN: [id number]    HPI/Subjective: Medardo Gautam is a 77 year old man with a history of prostate cancer (Gl 4+5, pT2cN0, Pre-tx PSA 5.2) s/p RALP and BLPLND (8/2012) with subsequent  BCR in 2013 managed with salvage XRT and ADT.  In the setting of undetectable PSA, intermittent therapy was initiated in November 2015.  While PSA remained undetectable, he was again started on Lupron in June 2016 due to rising testosterone (52).  Testosterone was found to have decreased to 11 at follow up, however PSA was again detectable (0.05) and patient was started on bicalutamide in December 2016.  Mr Gautam returns to clinic today for follow up.     Since starting bicalutamide he denies painful breast enlargement.     With respect to continence, patient is currently using zero pads per day.   Regarding erectile function, patient reports inadequate erections.  He previously trialed viagra with some increased erection function although erections remained inadequate for intercourse.  Today he notes that he would like to again trial.        Objective:  /85  Pulse 64  Ht 1.676 m (5' 6\")  Wt 88.3 kg (194 lb 9.6 oz)  BMI 31.41 kg/m2  Gen: In NAD, conversant  Resp: Breathing non-labored  Abd: Soft, non-distended, non-tender.  Ext: Warm and well perfused    PSA Ultra Sensitive:  <0.01    Testosterone  17    Assessment & Plan: 77 year old man with a history of prostate cancer s/p RALP with BCR s/p salvage XRT. He is currently on bicalutamide and Lupron with last Lupron injection in June 2016.  PSA remains undetectable today with " stable testosterone.    #Prostate cancer   - RTC in 3 months with repeat PSA ultra-sensitive and testosterone   -Continue bicalutamide    #Urinary incontinence: Patient is currently using 0 pads per day.   -No further intervention indicated at this team      This patient was seen & discussed with Dr. Goodwin.    --      Again, thank you for allowing me to participate in the care of your patient.      Sincerely,    Norma Goodwin MD

## 2017-03-18 ENCOUNTER — HOSPITAL ENCOUNTER (EMERGENCY)
Facility: CLINIC | Age: 77
Discharge: HOME | End: 2017-03-18
Attending: EMERGENCY MEDICINE | Admitting: EMERGENCY MEDICINE
Payer: COMMERCIAL

## 2017-03-18 ENCOUNTER — APPOINTMENT (OUTPATIENT)
Dept: CT IMAGING | Facility: CLINIC | Age: 77
End: 2017-03-18
Attending: EMERGENCY MEDICINE
Payer: COMMERCIAL

## 2017-03-18 VITALS
RESPIRATION RATE: 18 BRPM | HEART RATE: 61 BPM | SYSTOLIC BLOOD PRESSURE: 164 MMHG | OXYGEN SATURATION: 99 % | DIASTOLIC BLOOD PRESSURE: 97 MMHG | TEMPERATURE: 96.2 F | HEIGHT: 68 IN

## 2017-03-18 DIAGNOSIS — R10.84 ABDOMINAL PAIN, GENERALIZED: ICD-10-CM

## 2017-03-18 LAB
ALBUMIN SERPL-MCNC: 4.1 G/DL (ref 3.4–5)
ALP SERPL-CCNC: 62 U/L (ref 40–150)
ALT SERPL W P-5'-P-CCNC: 53 U/L (ref 0–70)
ANION GAP SERPL CALCULATED.3IONS-SCNC: 8 MMOL/L (ref 3–14)
AST SERPL W P-5'-P-CCNC: 42 U/L (ref 0–45)
BASOPHILS # BLD AUTO: 0 10E9/L (ref 0–0.2)
BASOPHILS NFR BLD AUTO: 0.4 %
BILIRUB DIRECT SERPL-MCNC: 0.1 MG/DL (ref 0–0.2)
BILIRUB SERPL-MCNC: 1.3 MG/DL (ref 0.2–1.3)
BUN SERPL-MCNC: 15 MG/DL (ref 7–30)
CALCIUM SERPL-MCNC: 9.3 MG/DL (ref 8.5–10.1)
CHLORIDE SERPL-SCNC: 106 MMOL/L (ref 94–109)
CO2 SERPL-SCNC: 27 MMOL/L (ref 20–32)
CREAT SERPL-MCNC: 0.93 MG/DL (ref 0.66–1.25)
DIFFERENTIAL METHOD BLD: ABNORMAL
EOSINOPHIL # BLD AUTO: 0.1 10E9/L (ref 0–0.7)
EOSINOPHIL NFR BLD AUTO: 1.4 %
ERYTHROCYTE [DISTWIDTH] IN BLOOD BY AUTOMATED COUNT: 12.9 % (ref 10–15)
GFR SERPL CREATININE-BSD FRML MDRD: 80 ML/MIN/1.7M2
GLUCOSE SERPL-MCNC: 115 MG/DL (ref 70–99)
HCT VFR BLD AUTO: 41.5 % (ref 40–53)
HGB BLD-MCNC: 14.3 G/DL (ref 13.3–17.7)
IMM GRANULOCYTES # BLD: 0 10E9/L (ref 0–0.4)
IMM GRANULOCYTES NFR BLD: 0.2 %
LIPASE SERPL-CCNC: 331 U/L (ref 73–393)
LYMPHOCYTES # BLD AUTO: 1.2 10E9/L (ref 0.8–5.3)
LYMPHOCYTES NFR BLD AUTO: 23.8 %
MCH RBC QN AUTO: 33.4 PG (ref 26.5–33)
MCHC RBC AUTO-ENTMCNC: 34.5 G/DL (ref 31.5–36.5)
MCV RBC AUTO: 97 FL (ref 78–100)
MONOCYTES # BLD AUTO: 0.4 10E9/L (ref 0–1.3)
MONOCYTES NFR BLD AUTO: 8.3 %
NEUTROPHILS # BLD AUTO: 3.4 10E9/L (ref 1.6–8.3)
NEUTROPHILS NFR BLD AUTO: 65.9 %
NRBC # BLD AUTO: 0 10*3/UL
NRBC BLD AUTO-RTO: 0 /100
PLATELET # BLD AUTO: 143 10E9/L (ref 150–450)
POTASSIUM SERPL-SCNC: 4.2 MMOL/L (ref 3.4–5.3)
PROT SERPL-MCNC: 7.3 G/DL (ref 6.8–8.8)
RBC # BLD AUTO: 4.28 10E12/L (ref 4.4–5.9)
SODIUM SERPL-SCNC: 141 MMOL/L (ref 133–144)
TROPONIN I SERPL-MCNC: NORMAL UG/L (ref 0–0.04)
WBC # BLD AUTO: 5.2 10E9/L (ref 4–11)

## 2017-03-18 PROCEDURE — 93005 ELECTROCARDIOGRAM TRACING: CPT | Performed by: EMERGENCY MEDICINE

## 2017-03-18 PROCEDURE — 99285 EMERGENCY DEPT VISIT HI MDM: CPT | Mod: 25 | Performed by: EMERGENCY MEDICINE

## 2017-03-18 PROCEDURE — 93010 ELECTROCARDIOGRAM REPORT: CPT | Mod: Z6 | Performed by: EMERGENCY MEDICINE

## 2017-03-18 PROCEDURE — 85025 COMPLETE CBC W/AUTO DIFF WBC: CPT | Performed by: EMERGENCY MEDICINE

## 2017-03-18 PROCEDURE — 80076 HEPATIC FUNCTION PANEL: CPT | Performed by: EMERGENCY MEDICINE

## 2017-03-18 PROCEDURE — 74177 CT ABD & PELVIS W/CONTRAST: CPT

## 2017-03-18 PROCEDURE — 83690 ASSAY OF LIPASE: CPT | Performed by: EMERGENCY MEDICINE

## 2017-03-18 PROCEDURE — 25500064 ZZH RX 255 OP 636: Performed by: EMERGENCY MEDICINE

## 2017-03-18 PROCEDURE — 76705 ECHO EXAM OF ABDOMEN: CPT | Mod: 26 | Performed by: EMERGENCY MEDICINE

## 2017-03-18 PROCEDURE — 25000128 H RX IP 250 OP 636: Performed by: EMERGENCY MEDICINE

## 2017-03-18 PROCEDURE — 76705 ECHO EXAM OF ABDOMEN: CPT | Performed by: EMERGENCY MEDICINE

## 2017-03-18 PROCEDURE — 84484 ASSAY OF TROPONIN QUANT: CPT | Performed by: EMERGENCY MEDICINE

## 2017-03-18 PROCEDURE — 96360 HYDRATION IV INFUSION INIT: CPT | Performed by: EMERGENCY MEDICINE

## 2017-03-18 PROCEDURE — 96361 HYDRATE IV INFUSION ADD-ON: CPT | Performed by: EMERGENCY MEDICINE

## 2017-03-18 PROCEDURE — 80048 BASIC METABOLIC PNL TOTAL CA: CPT | Performed by: EMERGENCY MEDICINE

## 2017-03-18 RX ORDER — SODIUM CHLORIDE 9 MG/ML
1000 INJECTION, SOLUTION INTRAVENOUS CONTINUOUS
Status: DISCONTINUED | OUTPATIENT
Start: 2017-03-18 | End: 2017-03-18 | Stop reason: HOSPADM

## 2017-03-18 RX ORDER — IOPAMIDOL 755 MG/ML
119 INJECTION, SOLUTION INTRAVASCULAR ONCE
Status: COMPLETED | OUTPATIENT
Start: 2017-03-18 | End: 2017-03-18

## 2017-03-18 RX ADMIN — IOPAMIDOL: 755 INJECTION, SOLUTION INTRAVENOUS at 19:56

## 2017-03-18 RX ADMIN — SODIUM CHLORIDE 1000 ML: 9 INJECTION, SOLUTION INTRAVENOUS at 17:50

## 2017-03-18 ASSESSMENT — ENCOUNTER SYMPTOMS
ABDOMINAL DISTENTION: 1
NAUSEA: 0
CONSTIPATION: 0
COLOR CHANGE: 0
ABDOMINAL PAIN: 1
DIARRHEA: 0
EYE REDNESS: 0
SHORTNESS OF BREATH: 0
NECK STIFFNESS: 0
VOMITING: 0
ARTHRALGIAS: 0
DIFFICULTY URINATING: 0
HEADACHES: 0
APPETITE CHANGE: 1
FEVER: 0
CONFUSION: 0

## 2017-03-18 NOTE — ED PROVIDER NOTES
[place] EMERGENCY DEPARTMENT ([facility])  March 18, 2017  Yessica JUNG 5:04 PM   History     Chief Complaint   Patient presents with     Abdominal Injury     The history is provided by the patient and medical records.     Medardo Gautam is a 77 year old male who presents with abdominal pain that woke him from sleep last night. He has a history of colon cancer s/p laparoscopic right krzysztof colectomy in 2015, prostate cancer status post radiation and resection. He is on bicalutamide and Lupron with PSA monitoring. He was at usual state of health yesterday. He woke up today with abdominal discomfort and bloating,  like I m running out of room.  His abdomen is constantly sore though not exactly painful. The discomfort does not wax or wane, just stays at a consistent level. He has decreased appetite with this. He did eat 2 slices of toast and cup of coffee but states this was out of routine, not because he was actually hungry. He decided to come in because the pain wasn't improving, became concerned given his previous medical history. He has burped a few times this morning and had had 2 loose bowel movements today. He states that he has had loose stools after he had the partial colectomy. No cough, fevers, or any difficulty with stooling or urination. No active chemotherapy or radiation. No history of appendectomy or cholecystectomy.    I have reviewed the Medications, Allergies, Past Medical and Surgical History, and Social History in the [employer] system.  PAST MEDICAL HISTORY:   Past Medical History   Diagnosis Date     Anxiety      Colon cancer (H) 01/2015     Contracture of finger joint ca 2005     left and right fifth fingers     Dupuytren's contracture of left hand      Gout      HEMORRHOIDS NOS 6/4/2007     Hypertension      Insomnia      Personal history of colonic polyps 7 years ago?     Prostate cancer (H) Feb 2012     Seborrheic dermatitis      Skin lesion- R side of face and behind L ear 12/15/2011      SKIN SENSATION DISTURB 2006     allergic reaction to strawberries     SKIN SENSATION DISTURB 2006       PAST SURGICAL HISTORY:   Past Surgical History   Procedure Laterality Date     Colonoscopy  10/25/07     Snare polypectomy     Colonoscopy  2009     with snare polypectomy     Colonoscopy  2009     C lengthen,tendon,hand/finger  ca      right fifth     Colonoscopy  2011     COLONOSCOPY performed by JUD MARIEE at  GI     Appendectomy       Davinci prostatectomy  2012     Procedure: DAVINCI PROSTATECTOMY;  Davinci Assisted Radical Prostatectomy with Bilateral Lymphadenectomy ;  Surgeon: Norma Goodwin MD;  Location: [facility] OR     C hand/finger surgery unlisted       C stomach surgery procedure unlisted       Colonoscopy with co2 insufflation N/A 2015     Procedure: COLONOSCOPY WITH CO2 INSUFFLATION;  Surgeon: Sarah Beth Pisano MD;  Location: [facility] OR     Colonoscopy N/A 2015     Procedure: COMBINED COLONOSCOPY, SINGLE OR MULTIPLE BIOPSY/POLYPECTOMY BY BIOPSY;  Surgeon: Sarah Beth Pisano MD;  Location: [facility] OR     Biopsy  01/16/15     Colon surgery  2015     Lap assisted R hemicolectomy     Laparoscopic assisted colectomy N/A 2015     Procedure: LAPAROSCOPIC ASSISTED COLECTOMY;  Surgeon: Oren Breen MD;  Location: [facility] OR       FAMILY HISTORY:   Family History   Problem Relation Age of Onset     CEREBROVASCULAR DISEASE Father      CANCER Mother 90     Patient believes vaginal cancer - hysterectomy,      Alzheimer Disease Mother      CANCER Sister      C.A.D. No family hx of      Breast Cancer No family hx of      Cancer - colorectal No family hx of      Prostate Cancer No family hx of      Blood Disease No family hx of      Cardiovascular No family hx of      Circulatory No family hx of      Eye Disorder No family hx of      GASTROINTESTINAL DISEASE No family hx of      Genitourinary Problems No family hx of      Lipids No family hx of       Neurologic Disorder No family hx of      Respiratory No family hx of      Asthma No family hx of      HEART DISEASE No family hx of      DIABETES No family hx of      Hypertension No family hx of      Arthritis No family hx of      Thyroid Disease No family hx of      Musculoskeletal Disorder No family hx of        SOCIAL HISTORY:   Social History   Substance Use Topics     Smoking status: Former Smoker     Years: 1.00     Types: Cigarettes, Pipe, Cigars     Start date: 10/1/1961     Quit date: 1/1/1962     Smokeless tobacco: Never Used      Comment: No smokers in home     Alcohol use 0.0 oz/week     0 Standard drinks or equivalent per week      Comment: daily glass of wine and whiskey       Discharge Medication List as of 3/18/2017  9:15 PM      CONTINUE these medications which have NOT CHANGED    Details   !! bicalutamide (CASODEX) 50 MG tablet Take 1 tablet (50 mg) by mouth daily, Disp-90 tablet, R-3, E-Prescribe      zolpidem (AMBIEN) 5 MG tablet TAKE ONE TABLET BY MOUTH IN THE EVENING AS NEEDED FOR SLEEP(1 month supply), Disp-30 tablet, R-5, Local Print      naproxen (NAPROSYN) 500 MG tablet Take 1 tablet (500 mg) by mouth 2 times daily as needed for moderate pain, Disp-60 tablet, R-0, E-Prescribe      !! bicalutamide (CASODEX) 50 MG tablet Take 1 tablet (50 mg) by mouth daily, Disp-30 tablet, R-3, E-Prescribe      clonazePAM (KLONOPIN) 1 MG tablet Take 1 tablet (1 mg) by mouth 2 times daily as needed for anxiety, Disp-30 tablet, R-2, Local Print      lisinopril (PRINIVIL,ZESTRIL) 10 MG tablet Take 1 tablet (10 mg) by mouth daily, Disp-90 tablet, R-3, E-Prescribe      leuprolide (LUPRON DEPOT) 45 MG injection Inject 45 mg into the muscle every 6 months, Disp-45 mg, R-0, No Print Out      Omega-3 Fatty Acids (OMEGA-3 FISH OIL PO) Take 500 mg by mouth daily, Historical      calcium-vitamin D (CALTRATE) 600-400 MG-UNIT per tablet Take 1 tablet by mouth daily, Historical      folic acid-vit B6-vit B12 (FOLGARD)  "0.8-10-0.115 MG TABS Take 1 tablet by mouth daily, Historical      aspirin 81 MG tablet Take 1 tablet (81 mg) by mouth daily, Disp-30 tablet, No Print Out      sildenafil (VIAGRA) 100 MG tablet 1/2 tab three times a week, Disp-10 tablet, R-12, E-Prescribe      Ibuprofen (ADVIL PO) Reported on 3/9/2017, Historical      Loratadine (CLARITIN PO) Take  by mouth. As needed , Historical       !! - Potential duplicate medications found. Please discuss with provider.           No Known Allergies   Review of Systems   Constitutional: Positive for appetite change (poor). Negative for fever.   HENT: Negative for congestion.    Eyes: Negative for redness.   Respiratory: Negative for shortness of breath.    Cardiovascular: Negative for chest pain.   Gastrointestinal: Positive for abdominal distention (bloating) and abdominal pain. Negative for constipation, diarrhea, nausea and vomiting.   Genitourinary: Negative for difficulty urinating.   Musculoskeletal: Negative for arthralgias and neck stiffness.   Skin: Negative for color change.   Neurological: Negative for headaches.   Psychiatric/Behavioral: Negative for confusion.       Physical Exam   BP: 153/88  Pulse: 68  Heart Rate: 68  Temp: 97.8  F (36.6  C)  Resp: 18  Height: 172.7 cm (5' 8\")  SpO2: 99 %  Physical Exam   Constitutional: He is oriented to person, place, and time. He appears well-developed and well-nourished. No distress.   HENT:   Head: Normocephalic and atraumatic.   Mouth/Throat: Oropharynx is clear and moist. No oropharyngeal exudate.   Eyes: Conjunctivae and EOM are normal. Pupils are equal, round, and reactive to light.   Neck: Normal range of motion. Neck supple.   Cardiovascular: Normal rate, regular rhythm, normal heart sounds and intact distal pulses.    No murmur heard.  Pulmonary/Chest: Effort normal and breath sounds normal. No respiratory distress. He has no wheezes. He has no rales.   Abdominal: Soft. Bowel sounds are normal. He exhibits distension. " There is no tenderness. There is no rebound and no guarding.   Musculoskeletal: Normal range of motion. He exhibits no edema or tenderness.   Neurological: He is alert and oriented to person, place, and time. He exhibits normal muscle tone.   Skin: Skin is warm and dry. He is not diaphoretic.   Psychiatric: He has a normal mood and affect. His behavior is normal. Judgment and thought content normal.   Nursing note and vitals reviewed.      ED Course     ED Course     Procedures  Results for orders placed during the hospital encounter of 03/18/17   POC US ABDOMEN LIMITED    Impression Limited Bedside Gallbladder Ultrasound, performed and interpreted by me.  Indication: Abdominal Pain  The gall bladder (GB) was evaluated along the short and long axis in real time.   Findings  The gall bladder is not dilated  The anterior GB wall measures <4mm  GB stones are not seen and there is no GB sludge.  The common bile duct was visualized and measures less than 6 mm in luminal diameter   Sonographic queen sign is Absent    IMPRESSION: No evidence of cholesystitis, GB stones, or biliary colic                EKG Interpretation:      Interpreted by Kyrie Churchill  Time reviewed: 1715  Symptoms at time of EKG: abdominal discomfort  Rhythm: normal sinus   Rate: normal  Axis: normal  Ectopy: none  Conduction: normal  ST Segments/ T Waves: No ST-T wave changes  Q Waves: none  Comparison to prior: Unchanged from 2015    Clinical Impression: normal EKG          Critical Care time:             Labs Ordered and Resulted from Time of ED Arrival Up to the Time of Departure from the ED   CBC WITH PLATELETS DIFFERENTIAL - Abnormal; Notable for the following:        Result Value    RBC Count 4.28 (*)     MCH 33.4 (*)     Platelet Count 143 (*)     All other components within normal limits   BASIC METABOLIC PANEL - Abnormal; Notable for the following:     Glucose 115 (*)     All other components within normal limits   HEPATIC PANEL   LIPASE    TROPONIN I   PERIPHERAL IV CATHETER       Assessments & Plan (with Medical Decision Making)   77 year old male with history as noted above who presents to the Emergency Department with vague abdominal pain lasting all day. Abdomen is soft and nontender. Vital signs are within normal limits. Atypical acute coronary syndrome considered but I doubt this. He has had hours of constant symptoms of pain and discomfort with no ischemic EKG changes and normal troponin.  I doubt pancreatitis as his lipase is normal. I doubt any hepatobiliary pathology as well. His LFTs are within normal limits. Bedside ultrasound performed by  me shows normal appearing gallbladder with no gall stones.  CT scan was reviewed and preliminary read shows no evidence of bowel obstruction or bowel pathology, normal postsurgical changes, possible small stones vs sludge in his gallbladder. I went and spoke with patient. He says his discomfort is actually improved and getting better. I have a low suspicion for acute cholecystitis, his gallbladder normal on my ultrasound examination. He also has no fever or leukocytosis and his pain is improving.  Abd soft/NT on reassessment.  I discussed results with the patient and wife, who will follow up with his primary-care provider. He return in 24 hours if pain is not improved. He will return earlier if he has new or worsening symptoms.     This part of the document was transcribed by Maria Guadalupe Andersen, Medical Scribe.      ddx- as above, appendectomy, ACS, constipation    I have reviewed the nursing notes.    I have reviewed the findings, diagnosis, plan and need for follow up with the patient.    Discharge Medication List as of 3/18/2017  9:15 PM          Final diagnoses:   Abdominal pain, generalized     I, Maria Guadalupe Andersen, am serving as a trained medical scribe to document services personally performed by Kyrie Churchill MD, based on the provider's statements to me on March 18, 2017.  This document has been  checked and approved by the attending provider.    I, Kyrie Churchill MD, was physically present and have reviewed and verified the accuracy of this note documented by Maria Guadalupe Andersen, medical scribe.     3/18/2017   [facility], [place], EMERGENCY DEPARTMENT     Kyrie Churchill MD  03/19/17 7208

## 2017-03-18 NOTE — ED AVS SNAPSHOT
[facility], Emergency Department    [address] [address]    Phone:  [phone]                                       Medardo Gautam   MRN: [id number]    Department:  [facility], Emergency Department   Date of Visit:  3/18/2017           Patient Information     Date Of Birth          1940        Your diagnoses for this visit were:     Abdominal pain, generalized        You were seen by Kyrie Churchill MD.      Follow-up Information     Schedule an appointment as soon as possible for a visit with Verna Osborne MD.    Specialty:  Family Practice    Contact information:    [address] [address]  [phone]          Follow up with [facility], Emergency Department In 1 day.    Specialty:  EMERGENCY MEDICINE    Why:  If symptoms worsen    Contact information:    [address] [place] [id number] [phone]    Additional information:    The [facility] is located on the corner of [address] and [address] on the [facility]. It is easily accessible from virtually any point in the [facility] [place] area, via [employer] and [employer].        Discharge Instructions       Call your primary care doctor in 1 day to let them know you were seen in the ED.  See them soon in the office to follow up with this problem.       take acetaminophen (tylenol) every 6 hours as needed for pain.     Continue to take prilosec daily    Modify your diet over the next day.  Eat bland, simple foods over the next day until you feel better.      Return to the ED if your symptoms are not better after 24 hours.      Return to the ED if you have new or worsening pain, vomiting, or other symptoms.    Discharge References/Attachments     ABDOMINAL PAIN, ADULT (ENGLISH)      Future Appointments        Provider Department Dept Phone Center    4/24/2017 9:45 AM [facility]  [facility] [phone] [facility]     4/24/2017 10:30 AM Ramon Daily MD [facility] [phone] [facility]    7/12/2017 10:00 AM Norma Goodwin MD [facility] [phone] [facility]      24 Hour Appointment Hotline       To make an appointment at any [facility], call [phone] ([phone]). If you don't have a family doctor or clinic, we will help you find one. [facility] are conveniently located to serve the needs of you and your family.             Review of your medicines      Our records show that you are taking the medicines listed below. If these are incorrect, please call your family doctor or clinic.        Dose / Directions Last dose taken    ADVIL PO        Reported on 3/9/2017   Refills:  0        aspirin 81 MG tablet   Dose:  81 mg   Quantity:  30 tablet        Take 1 tablet (81 mg) by mouth daily   Refills:  0        * bicalutamide 50 MG tablet   Commonly known as:  CASODEX   Dose:  50 mg   Quantity:  30 tablet        Take 1 tablet (50 mg) by mouth daily   Refills:  3        * bicalutamide 50 MG tablet   Commonly known as:  CASODEX   Dose:  50 mg   Quantity:  90 tablet        Take 1 tablet (50 mg) by mouth daily   Refills:  3        calcium-vitamin D 600-400 MG-UNIT per tablet   Commonly known as:  CALTRATE   Dose:  1 tablet        Take 1 tablet by mouth daily   Refills:  0        CLARITIN PO        Take  by mouth. As needed   Refills:  0        clonazePAM 1 MG tablet   Commonly known as:  klonoPIN   Dose:  1 mg   Quantity:  30 tablet        Take 1 tablet (1 mg) by mouth 2 times daily as needed for anxiety   Refills:  2        folic acid-vit B6-vit B12 0.8-10-0.115 MG Tabs per tablet   Commonly known as:  FOLGARD   Dose:  1 tablet        Take 1 tablet by mouth daily   Refills:  0        leuprolide 45 MG kit   Commonly known as:  LUPRON DEPOT   Dose:  45 mg   Quantity:  45 mg        Inject 45 mg into the muscle every 6 months   Refills:  0        lisinopril  10 MG tablet   Commonly known as:  PRINIVIL/ZESTRIL   Dose:  10 mg   Quantity:  90 tablet        Take 1 tablet (10 mg) by mouth daily   Refills:  3        naproxen 500 MG tablet   Commonly known as:  NAPROSYN   Dose:  500 mg   Quantity:  60 tablet        Take 1 tablet (500 mg) by mouth 2 times daily as needed for moderate pain   Refills:  0        OMEGA-3 FISH OIL PO   Dose:  500 mg        Take 500 mg by mouth daily   Refills:  0        sildenafil 100 MG cap/tab   Commonly known as:  VIAGRA   Quantity:  10 tablet        1/2 tab three times a week   Refills:  12        zolpidem 5 MG tablet   Commonly known as:  AMBIEN   Quantity:  30 tablet        TAKE ONE TABLET BY MOUTH IN THE EVENING AS NEEDED FOR SLEEP(1 month supply)   Refills:  5        * Notice:  This list has 2 medication(s) that are the same as other medications prescribed for you. Read the directions carefully, and ask your doctor or other care provider to review them with you.            Procedures and tests performed during your visit     Basic metabolic panel    CBC with platelets differential    CT Abdomen Pelvis w Contrast    EKG 12-lead, tracing only    Hepatic panel    Lipase    POC US ABDOMEN LIMITED    Peripheral IV: Standard    Troponin I      Orders Needing Specimen Collection     None      Pending Results     Date and Time Order Name Status Description    3/18/2017 1720 CT Abdomen Pelvis w Contrast Preliminary     3/18/2017 1708 EKG 12-lead, tracing only Preliminary             Pending Culture Results     No orders found from 3/16/2017 to 3/19/2017.            Thank you for choosing [facility]       Thank you for choosing [facility] for your care. Our goal is always to provide you with excellent care. Hearing back from our patients is one way we can continue to improve our services. Please take a few minutes to complete the written survey that you may receive in the mail after you visit with us. Thank you!        MyChart Information     MyChart  gives you secure access to your electronic health record. If you see a primary care provider, you can also send messages to your care team and make appointments. If you have questions, please call your primary care clinic.  If you do not have a primary care provider, please call [phone] and they will assist you.        Care EveryWhere ID     This is your Care EveryWhere ID. This could be used by other organizations to access your [place] medical records  [id number]        After Visit Summary       This is your record. Keep this with you and show to your community pharmacist(s) and doctor(s) at your next visit.

## 2017-03-18 NOTE — ED NOTES
Pain in upper central abdomen, history of colon and prostate cancer. First noted pain this AM at 6am.

## 2017-03-18 NOTE — ED AVS SNAPSHOT
[facility], [place], Emergency Department    [address] [address]    Phone:  [phone]                                       Medardo Gautam   MRN: [id number]    Department:  [facility], Emergency Department   Date of Visit:  3/18/2017           After Visit Summary Signature Page     I have received my discharge instructions, and my questions have been answered. I have discussed any challenges I see with this plan with the nurse or doctor.    ..........................................................................................................................................  Patient/Patient Representative Signature      ..........................................................................................................................................  Patient Representative Print Name and Relationship to Patient    ..................................................               ................................................  Date                                            Time    ..........................................................................................................................................  Reviewed by Signature/Title    ...................................................              ..............................................  Date                                                            Time

## 2017-03-19 LAB — INTERPRETATION ECG - MUSE: NORMAL

## 2017-03-19 NOTE — DISCHARGE INSTRUCTIONS
Call your primary care doctor in 1 day to let them know you were seen in the ED.  See them soon in the office to follow up with this problem.       take acetaminophen (tylenol) every 6 hours as needed for pain.     Continue to take prilosec daily    Modify your diet over the next day.  Eat bland, simple foods over the next day until you feel better.      Return to the ED if your symptoms are not better after 24 hours.      Return to the ED if you have new or worsening pain, vomiting, or other symptoms.

## 2017-03-23 NOTE — PROGRESS NOTES
SUBJECTIVE:                                                    Medardo Gautam is a 77 year old male who presents to clinic today for the following health issues:      HPI    ED/UC Followup:    Facility:  [facility]  Date of visit: 3/18/17  Reason for visit: Abdominal Pain  Current Status: Pain went away       Problem list and histories reviewed & adjusted, as indicated.  Additional history: as documented      Patient Active Problem List   Diagnosis     Disturbance of skin sensation     Hemorrhoids     Gout     Advanced directives, counseling/discussion     Dupuytren's contracture of hand- left 5th finger, right 5th finger     Bunions- both feet     Skin lesion- R side of face and behind L ear     Insomnia     Prostatic nodule- posterior right edge with rectal exam     Prostate cancer- Jeancarlos 9 (5+4) dx Feb 2012     Elevated liver enzymes     Hyperbilirubinemia     Essential hypertension with goal blood pressure less than 140/90     Shoulder pain, left     Contracture of finger joint     Hyperlipidemia LDL goal <130     Shoulder pain, right     Malignant neoplasm of prostate (H)     Anxiety     Colon cancer (H)     Abdominal pain, epigastric     Past Surgical History:   Procedure Laterality Date     APPENDECTOMY       BIOPSY  01/16/15     C HAND/FINGER SURGERY UNLISTED       C LENGTHEN,TENDON,HAND/FINGER  ca 2007    right fifth     C STOMACH SURGERY PROCEDURE UNLISTED       COLON SURGERY  2/11/2015    Lap assisted R hemicolectomy     COLONOSCOPY  10/25/07    Snare polypectomy     COLONOSCOPY  6/25/2009    with snare polypectomy     COLONOSCOPY  9/30/2009     COLONOSCOPY  1/5/2011    COLONOSCOPY performed by JUD MARIEE at  GI     COLONOSCOPY N/A 1/16/2015    Procedure: COMBINED COLONOSCOPY, SINGLE OR MULTIPLE BIOPSY/POLYPECTOMY BY BIOPSY;  Surgeon: Sarah Beth Pisano MD;  Location: [facility] OR     COLONOSCOPY WITH CO2 INSUFFLATION N/A 1/16/2015    Procedure: COLONOSCOPY WITH CO2 INSUFFLATION;   Surgeon: Sarah Beth Pisano MD;  Location:  OR     DAVINCI PROSTATECTOMY  2012    Procedure: DAVINCI PROSTATECTOMY;  Davinci Assisted Radical Prostatectomy with Bilateral Lymphadenectomy ;  Surgeon: Norma Goodwin MD;  Location: [facility]     LAPAROSCOPIC ASSISTED COLECTOMY N/A 2015    Procedure: LAPAROSCOPIC ASSISTED COLECTOMY;  Surgeon: Oren Breen MD;  Location: [facility]       Social History   Substance Use Topics     Smoking status: Former Smoker     Years: 1.00     Types: Cigarettes, Pipe, Cigars     Start date: 10/1/1961     Quit date: 1962     Smokeless tobacco: Never Used      Comment: No smokers in home     Alcohol use 0.0 oz/week     0 Standard drinks or equivalent per week      Comment: daily glass of wine and whiskey     Family History   Problem Relation Age of Onset     CEREBROVASCULAR DISEASE Father      CANCER Mother 90     Patient believes vaginal cancer - hysterectomy,      Alzheimer Disease Mother      CANCER Sister      C.A.D. No family hx of      Breast Cancer No family hx of      Cancer - colorectal No family hx of      Prostate Cancer No family hx of      Blood Disease No family hx of      Cardiovascular No family hx of      Circulatory No family hx of      Eye Disorder No family hx of      GASTROINTESTINAL DISEASE No family hx of      Genitourinary Problems No family hx of      Lipids No family hx of      Neurologic Disorder No family hx of      Respiratory No family hx of      Asthma No family hx of      HEART DISEASE No family hx of      DIABETES No family hx of      Hypertension No family hx of      Arthritis No family hx of      Thyroid Disease No family hx of      Musculoskeletal Disorder No family hx of          Current Outpatient Prescriptions   Medication Sig Dispense Refill     bicalutamide (CASODEX) 50 MG tablet Take 1 tablet (50 mg) by mouth daily 90 tablet 3     zolpidem (AMBIEN) 5 MG tablet TAKE ONE TABLET BY MOUTH IN THE EVENING AS NEEDED FOR SLEEP(1  "month supply) 30 tablet 5     naproxen (NAPROSYN) 500 MG tablet Take 1 tablet (500 mg) by mouth 2 times daily as needed for moderate pain 60 tablet 0     bicalutamide (CASODEX) 50 MG tablet Take 1 tablet (50 mg) by mouth daily 30 tablet 3     clonazePAM (KLONOPIN) 1 MG tablet Take 1 tablet (1 mg) by mouth 2 times daily as needed for anxiety 30 tablet 2     lisinopril (PRINIVIL,ZESTRIL) 10 MG tablet Take 1 tablet (10 mg) by mouth daily 90 tablet 3     leuprolide (LUPRON DEPOT) 45 MG injection Inject 45 mg into the muscle every 6 months 45 mg 0     Omega-3 Fatty Acids (OMEGA-3 FISH OIL PO) Take 500 mg by mouth daily       calcium-vitamin D (CALTRATE) 600-400 MG-UNIT per tablet Take 1 tablet by mouth daily       folic acid-vit B6-vit B12 (FOLGARD) 0.8-10-0.115 MG TABS Take 1 tablet by mouth daily       aspirin 81 MG tablet Take 1 tablet (81 mg) by mouth daily 30 tablet      sildenafil (VIAGRA) 100 MG tablet 1/2 tab three times a week 10 tablet 12     Ibuprofen (ADVIL PO) Reported on 3/9/2017       Loratadine (CLARITIN PO) Take  by mouth. As needed        No Known Allergies  BP Readings from Last 3 Encounters:   03/24/17 130/82   03/18/17 (!) 164/97   03/15/17 136/85    Wt Readings from Last 3 Encounters:   03/24/17 193 lb (87.5 kg)   03/15/17 194 lb 9.6 oz (88.3 kg)   03/09/17 194 lb (88 kg)                  Labs reviewed in EPIC    ROS:  Constitutional, HEENT, cardiovascular, pulmonary, gi and gu systems are negative, except as otherwise noted.    OBJECTIVE:                                                    /82 (BP Location: Right arm, Patient Position: Chair, Cuff Size: Adult Regular)  Pulse 70  Temp 98.2  F (36.8  C) (Oral)  Resp 14  Ht 5' 8\" (1.727 m)  Wt 193 lb (87.5 kg)  SpO2 98%  BMI 29.35 kg/m2  Body mass index is 29.35 kg/(m^2).  Physical Exam   Constitutional: He is oriented to person, place, and time. He appears well-developed and well-nourished.   HENT:   Head: Normocephalic and atraumatic. "   Cardiovascular: Normal rate and regular rhythm.    Pulmonary/Chest: Effort normal and breath sounds normal.   Abdominal: Soft. Bowel sounds are normal.   Neurological: He is alert and oriented to person, place, and time.   Psychiatric: He has a normal mood and affect.         Diagnostic Test Results:  none      ASSESSMENT/PLAN:                                                      Problem List Items Addressed This Visit     Abdominal pain, epigastric - Primary     Medardo is a very pleasant 77-year-old with a history of anxiety, insomnia, colon cancer and prostate cancerwho is here for a post ED follow-up. He was seen in the ED a few days ago for acute onset epigastric pain which she was concerned might be a heart attack. I reviewedlabs and imaging Which were essentially normal. He was noted to have a small cyst on his left kidney which I will monitor with an ultrasound in 3 months given his history of Colon cancer. I also encouraged him to discuss this with his oncologist during his visit with him next month. He has had significant improvement in symptoms with using Prilosec. Symptoms could be secondary to esophageal spasm versus gallbladder pathology. He would like to wait before a surgical evaluation for his gallbladder. I discussed reportable signs and symptoms.  Continue Prilosec daily  Recheck in 3 months or sooner if symptomsrecur                Verna Osborne MD  [facility]

## 2017-03-24 ENCOUNTER — OFFICE VISIT (OUTPATIENT)
Dept: FAMILY MEDICINE | Facility: OTHER | Age: 77
End: 2017-03-24
Payer: COMMERCIAL

## 2017-03-24 VITALS
SYSTOLIC BLOOD PRESSURE: 130 MMHG | HEIGHT: 68 IN | OXYGEN SATURATION: 98 % | HEART RATE: 70 BPM | DIASTOLIC BLOOD PRESSURE: 82 MMHG | WEIGHT: 193 LBS | RESPIRATION RATE: 14 BRPM | BODY MASS INDEX: 29.25 KG/M2 | TEMPERATURE: 98.2 F

## 2017-03-24 DIAGNOSIS — R10.13 ABDOMINAL PAIN, EPIGASTRIC: Primary | ICD-10-CM

## 2017-03-24 PROCEDURE — 99214 OFFICE O/P EST MOD 30 MIN: CPT | Performed by: FAMILY MEDICINE

## 2017-03-24 ASSESSMENT — PAIN SCALES - GENERAL: PAINLEVEL: NO PAIN (0)

## 2017-03-24 NOTE — MR AVS SNAPSHOT
After Visit Summary   3/24/2017    Medardo Gautam    MRN: [id number]           Patient Information     Date Of Birth          1940        Visit Information        Provider Department      3/24/2017 10:20 AM Verna Osborne MD [facility]         Follow-ups after your visit        Your next 10 appointments already scheduled     Apr 24, 2017  9:45 AM CDT   [facility] with  [facility]   [facility] ([facility])    [address] [place] [id number]   [phone]            Apr 24, 2017 10:30 AM CDT   (Arrive by 10:15 AM)   Return Visit with Ramon Daily MD   [facility] ([facility]    [address] [place] [id number]   [phone]            Jul 12, 2017 10:00 AM CDT   (Arrive by 9:45 AM)   Return Prostate Cancer with Norma Goodwin MD   [facility] ([facility])    [address] [place] [id number]   [phone]              Who to contact     If you have questions or need follow up information about today's clinic visit or your schedule please contact [facility] directly at [phone].  Normal or non-critical lab and imaging results will be communicated to you by MyChart, letter or phone within 4 business days after the clinic has received the results. If you do not hear from us within 7 days, please contact the clinic through MyChart or phone. If you have a critical or abnormal lab result, we will notify you by phone as soon as possible.  Submit refill requests through [employer] or call your pharmacy and they will forward the refill request to us. Please allow 3 business days for your refill to be completed.          Additional Information About Your Visit        [employer] Information     [employer] gives you secure access  "to your electronic health record. If you see a primary care provider, you can also send messages to your care team and make appointments. If you have questions, please call your primary care clinic.  If you do not have a primary care provider, please call [phone] and they will assist you.        Care EveryWhere ID     This is your Care EveryWhere ID. This could be used by other organizations to access your [facility] medical records  [id number]        Your Vitals Were     Pulse Temperature Respirations Height Pulse Oximetry BMI (Body Mass Index)    70 98.2  F (36.8  C) (Oral) 14 5' 8\" (1.727 m) 98% 29.35 kg/m2       Blood Pressure from Last 3 Encounters:   03/24/17 130/82   03/18/17 (!) 164/97   03/15/17 136/85    Weight from Last 3 Encounters:   03/24/17 193 lb (87.5 kg)   03/15/17 194 lb 9.6 oz (88.3 kg)   03/09/17 194 lb (88 kg)              Today, you had the following     No orders found for display       Primary Care Provider Office Phone # Fax #    Verna Osborne [phone]       [facility] [address]    [facility] [address]        Thank you!     Thank you for choosing [facility]  for your care. Our goal is always to provide you with excellent care. Hearing back from our patients is one way we can continue to improve our services. Please take a few minutes to complete the written survey that you may receive in the mail after your visit with us. Thank you!             Your Updated Medication List - Protect others around you: Learn how to safely use, store and throw away your medicines at www.disposemymeds.org.          This list is accurate as of: 3/24/17 10:44 AM.  Always use your most recent med list.                   Brand Name Dispense Instructions for use    ADVIL PO      Reported on 3/9/2017       aspirin 81 MG tablet     30 tablet    Take 1 tablet (81 mg) by mouth daily       * bicalutamide 50 MG tablet    CASODEX    30 tablet    Take 1 " tablet (50 mg) by mouth daily       * bicalutamide 50 MG tablet    CASODEX    90 tablet    Take 1 tablet (50 mg) by mouth daily       calcium-vitamin D 600-400 MG-UNIT per tablet    CALTRATE     Take 1 tablet by mouth daily       CLARITIN PO      Take  by mouth. As needed       clonazePAM 1 MG tablet    klonoPIN    30 tablet    Take 1 tablet (1 mg) by mouth 2 times daily as needed for anxiety       folic acid-vit B6-vit B12 0.8-10-0.115 MG Tabs per tablet    FOLGARD     Take 1 tablet by mouth daily       leuprolide 45 MG kit    LUPRON DEPOT    45 mg    Inject 45 mg into the muscle every 6 months       lisinopril 10 MG tablet    PRINIVIL/ZESTRIL    90 tablet    Take 1 tablet (10 mg) by mouth daily       naproxen 500 MG tablet    NAPROSYN    60 tablet    Take 1 tablet (500 mg) by mouth 2 times daily as needed for moderate pain       OMEGA-3 FISH OIL PO      Take 500 mg by mouth daily       sildenafil 100 MG cap/tab    VIAGRA    10 tablet    1/2 tab three times a week       zolpidem 5 MG tablet    AMBIEN    30 tablet    TAKE ONE TABLET BY MOUTH IN THE EVENING AS NEEDED FOR SLEEP(1 month supply)       * Notice:  This list has 2 medication(s) that are the same as other medications prescribed for you. Read the directions carefully, and ask your doctor or other care provider to review them with you.

## 2017-03-24 NOTE — NURSING NOTE
"Chief Complaint   Patient presents with     Abdominal Pain     Panel Management     honoring choices       Initial /82 (BP Location: Right arm, Patient Position: Chair, Cuff Size: Adult Regular)  Pulse 70  Temp 98.2  F (36.8  C) (Oral)  Resp 14  Ht 5' 8\" (1.727 m)  Wt 193 lb (87.5 kg)  SpO2 98%  BMI 29.35 kg/m2 Estimated body mass index is 29.35 kg/(m^2) as calculated from the following:    Height as of this encounter: 5' 8\" (1.727 m).    Weight as of this encounter: 193 lb (87.5 kg).  Medication Reconciliation: complete   Sarah Beth Marie CMA (AAMA)      "

## 2017-03-24 NOTE — ASSESSMENT & PLAN NOTE
Medardo is a very pleasant 77-year-old with a history of anxiety, insomnia, colon cancer and prostate cancerwho is here for a post ED follow-up. He was seen in the ED a few days ago for acute onset epigastric pain which she was concerned might be a heart attack. I reviewedlabs and imaging Which were essentially normal. He was noted to have a small cyst on his left kidney which I will monitor with an ultrasound in 3 months given his history of Colon cancer. I also encouraged him to discuss this with his oncologist during his visit with him next month. He has had significant improvement in symptoms with using Prilosec. Symptoms could be secondary to esophageal spasm versus gallbladder pathology. He would like to wait before a surgical evaluation for his gallbladder. I discussed reportable signs and symptoms.  Continue Prilosec daily  Recheck in 3 months or sooner if symptomsrecur

## 2017-04-19 ASSESSMENT — ENCOUNTER SYMPTOMS
BLOATING: 0
CONSTIPATION: 0
BLOOD IN STOOL: 0
VOMITING: 0
RECTAL PAIN: 0
NAUSEA: 0
ABDOMINAL PAIN: 1
RECTAL BLEEDING: 0
DIARRHEA: 0
BOWEL INCONTINENCE: 0
JAUNDICE: 0
HEARTBURN: 0

## 2017-04-24 ENCOUNTER — APPOINTMENT (OUTPATIENT)
Dept: LAB | Facility: CLINIC | Age: 77
End: 2017-04-24
Attending: INTERNAL MEDICINE
Payer: COMMERCIAL

## 2017-04-24 ENCOUNTER — ONCOLOGY VISIT (OUTPATIENT)
Dept: ONCOLOGY | Facility: CLINIC | Age: 77
End: 2017-04-24
Attending: INTERNAL MEDICINE
Payer: COMMERCIAL

## 2017-04-24 VITALS
TEMPERATURE: 98.7 F | DIASTOLIC BLOOD PRESSURE: 75 MMHG | SYSTOLIC BLOOD PRESSURE: 134 MMHG | OXYGEN SATURATION: 96 % | BODY MASS INDEX: 29.63 KG/M2 | HEIGHT: 68 IN | WEIGHT: 195.5 LBS | HEART RATE: 67 BPM | RESPIRATION RATE: 18 BRPM

## 2017-04-24 DIAGNOSIS — C61 MALIGNANT NEOPLASM OF PROSTATE (H): ICD-10-CM

## 2017-04-24 DIAGNOSIS — C18.9 MALIGNANT NEOPLASM OF COLON, UNSPECIFIED PART OF COLON (H): Primary | ICD-10-CM

## 2017-04-24 LAB
BASOPHILS # BLD AUTO: 0 10E9/L (ref 0–0.2)
BASOPHILS NFR BLD AUTO: 0.9 %
DIFFERENTIAL METHOD BLD: ABNORMAL
EOSINOPHIL # BLD AUTO: 0.2 10E9/L (ref 0–0.7)
EOSINOPHIL NFR BLD AUTO: 3.5 %
ERYTHROCYTE [DISTWIDTH] IN BLOOD BY AUTOMATED COUNT: 12.3 % (ref 10–15)
HCT VFR BLD AUTO: 41.1 % (ref 40–53)
HGB BLD-MCNC: 14.2 G/DL (ref 13.3–17.7)
IMM GRANULOCYTES # BLD: 0 10E9/L (ref 0–0.4)
IMM GRANULOCYTES NFR BLD: 0.7 %
LYMPHOCYTES # BLD AUTO: 1.4 10E9/L (ref 0.8–5.3)
LYMPHOCYTES NFR BLD AUTO: 31.5 %
MCH RBC QN AUTO: 33.9 PG (ref 26.5–33)
MCHC RBC AUTO-ENTMCNC: 34.5 G/DL (ref 31.5–36.5)
MCV RBC AUTO: 98 FL (ref 78–100)
MONOCYTES # BLD AUTO: 0.5 10E9/L (ref 0–1.3)
MONOCYTES NFR BLD AUTO: 11 %
NEUTROPHILS # BLD AUTO: 2.3 10E9/L (ref 1.6–8.3)
NEUTROPHILS NFR BLD AUTO: 52.4 %
NRBC # BLD AUTO: 0 10*3/UL
NRBC BLD AUTO-RTO: 0 /100
PLATELET # BLD AUTO: 160 10E9/L (ref 150–450)
RBC # BLD AUTO: 4.19 10E12/L (ref 4.4–5.9)
WBC # BLD AUTO: 4.3 10E9/L (ref 4–11)

## 2017-04-24 PROCEDURE — 36415 COLL VENOUS BLD VENIPUNCTURE: CPT

## 2017-04-24 PROCEDURE — 85025 COMPLETE CBC W/AUTO DIFF WBC: CPT | Performed by: INTERNAL MEDICINE

## 2017-04-24 PROCEDURE — 99214 OFFICE O/P EST MOD 30 MIN: CPT | Mod: ZP | Performed by: INTERNAL MEDICINE

## 2017-04-24 PROCEDURE — 84153 ASSAY OF PSA TOTAL: CPT | Performed by: UROLOGY

## 2017-04-24 PROCEDURE — 99212 OFFICE O/P EST SF 10 MIN: CPT | Mod: ZF

## 2017-04-24 PROCEDURE — 82378 CARCINOEMBRYONIC ANTIGEN: CPT | Performed by: INTERNAL MEDICINE

## 2017-04-24 PROCEDURE — 84403 ASSAY OF TOTAL TESTOSTERONE: CPT | Performed by: UROLOGY

## 2017-04-24 PROCEDURE — 82378 CARCINOEMBRYONIC ANTIGEN: CPT | Performed by: UROLOGY

## 2017-04-24 ASSESSMENT — PAIN SCALES - GENERAL: PAINLEVEL: NO PAIN (0)

## 2017-04-24 NOTE — PROGRESS NOTES
MEDICAL ONCOLOGY FOLLOW-UP VISIT NOTE     2017    Medardo Gautam  MRN: [id number]  : 1940    cancer diagnosis: Pathologic T1 N0 M0 (Stage I) well-differentiated adenocarcinoma of the colon, surgically resected with right hemicolectomy 2015.      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     REFERRING PHYSICIAN: Dr. Oren Breen.     PRIMARY CARE PROVIDER:     MANI Chaudhari, at [facility], [address] [place] [address].      Other providers: Dr. Sarah Beth Pisano MD, at  [facility], [address] [place] [address].      PATIENT ID:  Pathologic T1 N0 M0 (Stage I) well-differentiated adenocarcinoma of the colon.  Also history of Jeancarlos 4+5 pT2 cN0 adenocarcinoma of the prostate.      HISTORY OF PRESENT ILLNESS/ONCOLOGY HISTORY:  Mr. Gautam is a 77-year-old gentleman of [place] background.  He has a history of Jeancarlos 4+5, pathologic T2 cN0 adenocarcinoma of the prostate.  He received neoadjuvant therapy with Lupron and Taxotere in a CALGB trial.  He is on currently every-3-month Lupron and followed by Dr. Goodwin from the Urology Team.      In regards to his colon health, he had previously had 4-5 prior colonoscopies for a history of polyps.  On 2015, he had a surveillance colonoscopy, and an ascending colon mass was detected.  On biopsy, this was moderately well-differentiated adenocarcinoma with intact mismatch repair proteins MLH1, MSH2 and 6, and PMS2; thus, no evidence of Mcguire syndrome.  A CEA was 1.8.      A CT scan of chest, abdomen and pelvis on 2015 showed no evidence of distant metastasis.  He was seen in consultation with Dr. Breen from our Surgical Service on 2015.  The recommendation was for a laparoscopic right hemicolectomy.  After preoperative evaluation and clearance, the patient was taken to the operating room by Dr. Breen on 2015 for laparoscopic radical  right hemicolectomy.  The surgery itself was unremarkable, and a full resection was performed with the carcinoma measuring 0.8 cm in greatest diameter.  It was invasive and well to moderately differentiated, considered low-grade.  There were no poor prognostic features, including 0 of 13 lymph node involvement, and no evidence of lymphovascular invasion.  Pathologically, this was staged as a pT1 pN0 M0 malignancy.      The patient followed up with Dr. Breen on 03/31, and was kindly referred here for further Medical Oncology evaluation and counseling.    4/9/15 - - medical oncology consultation for colon cancer, Dr. Daily. Recommendation: surveillance colonoscopy one year after surgery.   2/25/16 - - colonoscopy for surveillance: no concerning findings, recommendation to repeat colonoscopy in three years.  FINAL DIAGNOSIS: Colon, rectum, biopsy:    - Fragment of colonic mucosa with chronic inflammation    - Negative for dysplasia or malignancy   4/6/16 - - CT chest/abdomen/pelvis - - IMPRESSION: Postsurgical changes of right hemicolectomy without evidence of metastatic disease in the chest, abdomen, or pelvis as above.     4/25/16 - - oncology follow-up, Dr. Daily.  4/24/17 - - oncology follow-up, Dr. Daily.        Results for REESE GAUTAM (MRN [id number]) as of 4/24/2017 07:47   Ref. Range 1/21/2015 09:15 10/8/2015 13:56 4/6/2016 08:54 10/25/2016 12:28   CEA Latest Ref Range: 0 - 2.5 ug/L 1.8 2.4 3.0 (H) 3.0 (H)     INTERNAL HISTORY:  Mr. Reese Gautam presents alone today for followup of his stage I well differentiated adenocarcinoma of the colon.  He had a right hemicolectomy by Dr. Oren Breen on 02/11/2015.  He was last evaluated by me about a year ago.  He remains doing well.      He had an episode in mid-March in which he ate heavily spiced food including chicken dee, and had what ultimately sounds most like acid reflux-like symptoms.  He was seen in the Emergency Room to evaluate this epigastric pain, and  the ER team ordered CT scans of the abdomen and pelvis which showed no acute process.  I printed the radiology report and fully reviewed the results with Mr. Gautam today at his request.      In sum, there was no evidence of small bowel obstruction.  He thought there might be some issues with nephrolithiasis or gallstones.  Some gallbladder sludge was noted, but otherwise nothing acute was indicated on the CT scan.  He has followup also per his primary care physician at [facility].  His last colonoscopy for surveillance was done 2016 by Dr. Breen.  There was no evidence of polyps.  The recommendation was to follow up in 3 years for the next colonoscopy following that date.  He is otherwise doing well.  He denied any pain or any other issues at this time.             PAST MEDICAL HISTORY:     1.  Pathologic T1 N0 M0 ascending colon adenocarcinoma, per HPI.    2. A second malignancy with a Richmond 4+5 pT2 cN0 adenocarcinoma of the prostate.  Per written notes from Dr. Goodwin, the patient received neoadjuvant therapy with Lupron and Taxotere on a CALGB trial complicated by hepatic toxicity.  Preoperatively, PSA was 5.2, postoperatively 1.28.  There was salvage radiotherapy, and androgen deprivation therapy with Lupron began in 2013.  The patient continues on that every 3 months under Dr. Goodwin's care.   3.  History of gout.   4.  Hypertension.   5.  Hyperlipidemia.   6.  Hyperbilirubinemia of unclear significance or etiology.   7.  Dupuytren's contracture of the left fifth finger.   8.  Hemorrhoids.      PAST SURGICAL HISTORY:  See above.    1.  Laparoscopic right hemicolectomy by Dr. Oren Breen for stage I colon cancer 2015.   2.  Prior appendectomy in .     3.  Repair of Dupuytren's contractures.      FAMILY HISTORY:  Mother had possible uterine cancer at age 80.  She was treated with hysterectomy, but  at age 92 of unclear cause.  Sister, age 69, recently diagnosed with breast cancer  "in early 2015.      SOCIAL HISTORY:  The patient is a native of [place].  He lives currently in [place], [place].  He is very well traveled, having received education as an , and also traveled and work through [place], [employer], [facility], [place] and [place] throughout his life.  He has been  for 45 years.  He has a son who is living in [place], age 37.  A daughter in [place], age 39.  He has 4 grandchildren.      Occupation:  Retired since 2001 as a .  He has worked at several places in the world.      Tobacco:  Brief smoking during college education in the 1960s, but none since.  Alcohol:  Two kayla per day, 1 glass of scotch per day.  Drugs:  Denies illicit drug use.      MEDICATIONS:  Fully Reviewed in Epic.   Current Outpatient Prescriptions   Medication     bicalutamide (CASODEX) 50 MG tablet     zolpidem (AMBIEN) 5 MG tablet     naproxen (NAPROSYN) 500 MG tablet     bicalutamide (CASODEX) 50 MG tablet     clonazePAM (KLONOPIN) 1 MG tablet     lisinopril (PRINIVIL,ZESTRIL) 10 MG tablet     leuprolide (LUPRON DEPOT) 45 MG injection     Omega-3 Fatty Acids (OMEGA-3 FISH OIL PO)     calcium-vitamin D (CALTRATE) 600-400 MG-UNIT per tablet     folic acid-vit B6-vit B12 (FOLGARD) 0.8-10-0.115 MG TABS     aspirin 81 MG tablet     sildenafil (VIAGRA) 100 MG tablet     Ibuprofen (ADVIL PO)     Loratadine (CLARITIN PO)     No current facility-administered medications for this visit.           ALLERGIES:  No known drug allergies.      REVIEW OF SYSTEMS:  Full 10-point review of systems was performed.  Pertinent symptoms are reviewed above per HPI.      PHYSICAL EXAM:   /75 (BP Location: Right arm, Patient Position: Right side, Cuff Size: Adult Regular)  Pulse 67  Temp 98.7  F (37.1  C) (Oral)  Resp 18  Ht 1.727 m (5' 7.99\")  Wt 88.7 kg (195 lb 8 oz)  SpO2 96%  BMI 29.73 kg/m2    KPS:   90%.   GENERAL:  Very pleasant gentleman, appears younger than his stated age, " NAD, A and O x3.   HEENT:  PERRLA, oropharynx clear with no mucositis or thrush.   LYMPH NODES:  No palpable pre/post-auricular, cervical, axillary, or inguinal lymphadenopathy appreciated.   CV:  RRR, normal S1 S2.  No murmurs, gallops, or rubs.   LUNGS:  Clear to auscultation bilaterally.  No dullness to percussion.   ABDOMEN:  Obese, soft, nontender and nondistended. No apparent hepatosplenomegaly.  Well healed laparoscopic scars.    EXTREMITIES:  No clubbing, cyanosis, or edema.            Lab Results   Component Value Date    WBC 5.2 03/18/2017     Lab Results   Component Value Date    RBC 4.28 03/18/2017     Lab Results   Component Value Date    HGB 14.3 03/18/2017     Lab Results   Component Value Date    HCT 41.5 03/18/2017     No components found for: MCT  Lab Results   Component Value Date    MCV 97 03/18/2017     Lab Results   Component Value Date    MCH 33.4 03/18/2017     Lab Results   Component Value Date    MCHC 34.5 03/18/2017     Lab Results   Component Value Date    RDW 12.9 03/18/2017     Lab Results   Component Value Date     03/18/2017     Last Basic Metabolic Panel:  Lab Results   Component Value Date     03/18/2017      Lab Results   Component Value Date    POTASSIUM 4.2 03/18/2017     Lab Results   Component Value Date    CHLORIDE 106 03/18/2017     Lab Results   Component Value Date    GAVIN 9.3 03/18/2017     Lab Results   Component Value Date    CO2 27 03/18/2017     Lab Results   Component Value Date    BUN 15 03/18/2017     Lab Results   Component Value Date    CR 0.93 03/18/2017     Lab Results   Component Value Date     03/18/2017     Liver Function Studies -   Recent Labs   Lab Test  03/18/17   1639   PROTTOTAL  7.3   ALBUMIN  4.1   BILITOTAL  1.3   ALKPHOS  62   AST  42   ALT  53       Results for REESE MART (MRN [id number]) as of 4/24/2017 07:47   Ref. Range 1/21/2015 09:15 10/8/2015 13:56 4/6/2016 08:54 10/25/2016 12:28   CEA Latest Ref Range: 0 - 2.5 ug/L  1.8 2.4 3.0 (H) 3.0 (H)        RADIOLOGY:  CT scan from 3/18 (ordered by urology)    IMPRESSION:  1. No small bowel obstruction.  2. Postsurgical changes of a right hemicolectomy. Patent anastomosis.  3. Post surgical changes of a prostatectomy.  4. Small stones versus gallbladder sludge. No evidence of acute  cholecystitis.     IMPRESSION/PLAN:    A 77-year-old gentleman with a pT1 N0 M0 well to moderately differentiated adenocarcinoma of the ascending colon, unremarkable.  On histopathology, there was no evidence of poor prognostic features, and no evidence of distant metastatic disease seen on CT scans.      He is doing well 2 years + 3 months out from his curative right hemicolectomy, done to treat stage I colon adenocarcinoma.  At this point, having surpassed 2 years past his definitive surgery, and also being a 2-time cancer survivor (for both colon cancer and prostate cancer); I think he would be an excellent candidate for transition of care to the Cancer Survivorship Clinic. In 1 years' time, he will come back to see Magali Andrews from our Cancer Survivorship Clinic to establish care, and he will have labs including CBC, CMP, and CEA checked 1 hour in advance.      He will follow up with his urologist regarding prostate cancer surveillance, and his primary care physician at his outside [facility] for primary care issues.  His last colonoscopy for surveillance was done 02/25/2016. As it showed no evidence of polyps, the next endoscopy would not be indicated until approximately 02/2019.  No radiologic surveillance is required or recommended for stage I colon adenocarcinoma, so no further CT scans need to be ordered at this time.           I spent 30 minutes in consultation, including H&P and Discussion. >50% of time was spent in counseling and in coordination of care.    Ramon Daily MD, PhD

## 2017-04-24 NOTE — NURSING NOTE
"Medardo Gautam is a 77 year old male who presents for:  Chief Complaint   Patient presents with     Oncology Clinic Visit     Colon Ca F/U        Initial Vitals:  /75 (BP Location: Right arm, Patient Position: Right side, Cuff Size: Adult Regular)  Pulse 67  Temp 98.7  F (37.1  C) (Oral)  Resp 18  Ht 1.727 m (5' 7.99\")  Wt 88.7 kg (195 lb 8 oz)  SpO2 96%  BMI 29.73 kg/m2 Estimated body mass index is 29.73 kg/(m^2) as calculated from the following:    Height as of this encounter: 1.727 m (5' 7.99\").    Weight as of this encounter: 88.7 kg (195 lb 8 oz).. Body surface area is 2.06 meters squared. BP completed using cuff size: NA (Not Taken)  No Pain (0) No LMP for male patient. Allergies and medications reviewed.     Medications: Medication refills not needed today.  Pharmacy name entered into Synchrony:    [id number] - [place], [place] - [address] [facility] [place] [address] [facility]    Comments: Vitals completed in lab.    7 minutes for nursing intake (face to face time)   Jessenia Gibson LPN        "

## 2017-04-24 NOTE — MR AVS SNAPSHOT
After Visit Summary   4/24/2017    Medardo Gautam    MRN: [id number]           Patient Information     Date Of Birth          1940        Visit Information        Provider Department      4/24/2017 10:30 AM Ramon Daily MD [facility]        Today's Diagnoses     Malignant neoplasm of colon, unspecified part of colon (H)    -  1    Malignant neoplasm of prostate (H)           Follow-ups after your visit        Additional Services     ONC/HEME ADULT REFERRAL       Your provider has referred you to: [facility]: Cancer Survivor Program [phone]   [email or web address]    Please be aware that coverage of these services is subject to the terms and limitations of your health insurance plan.  Call member services at your health plan with any benefit or coverage questions.      Please bring the following with you to your appointment:    (1) Any X-Rays, CTs or MRIs which have been performed.  Contact the facility where they were done to arrange for  prior to your scheduled appointment.   (2) List of current medications  (3) This referral request   (4) Any documents/labs given to you for this referral                  Follow-up notes from your care team     Return in about 1 year (around 4/24/2018).      Your next 10 appointments already scheduled     Jul 26, 2017 12:00 PM CDT   (Arrive by 11:45 AM)   Return Prostate Cancer with Norma Goodwin MD   [facility] ([facility])    [address] [place] [id number] [phone]              Future tests that were ordered for you today     Open Future Orders        Priority Expected Expires Ordered    ONC/HEME ADULT REFERRAL Routine 3/24/2018 4/5/2019 4/24/2017    CEA Routine 4/3/2018 4/20/2018 4/24/2017            Who to contact     If you have questions or need follow up information about  "today's clinic visit or your schedule please contact [facility] directly at [phone].  Normal or non-critical lab and imaging results will be communicated to you by [employer], letter or phone within 4 business days after the clinic has received the results. If you do not hear from us within 7 days, please contact the clinic through [employer] or phone. If you have a critical or abnormal lab result, we will notify you by phone as soon as possible.  Submit refill requests through [email or web address] or call your pharmacy and they will forward the refill request to us. Please allow 3 business days for your refill to be completed.          Additional Information About Your Visit        [employer] Information     [email or web address] gives you secure access to your electronic health record. If you see a primary care provider, you can also send messages to your care team and make appointments. If you have questions, please call your primary care clinic.  If you do not have a primary care provider, please call [phone] and they will assist you.        Care EveryWhere ID     This is your Care EveryWhere ID. This could be used by other organizations to access your [place] medical records  [id number]        Your Vitals Were     Pulse Temperature Respirations Height Pulse Oximetry BMI (Body Mass Index)    67 98.7  F (37.1  C) (Oral) 18 1.727 m (5' 7.99\") 96% 29.73 kg/m2       Blood Pressure from Last 3 Encounters:   04/24/17 134/75   03/24/17 130/82   03/18/17 (!) 164/97    Weight from Last 3 Encounters:   04/24/17 88.7 kg (195 lb 8 oz)   03/24/17 87.5 kg (193 lb)   03/15/17 88.3 kg (194 lb 9.6 oz)              We Performed the Following     CBC with platelets differential     CEA     PSA ultra sensitive     Testosterone total          Today's Medication Changes          These changes are accurate as of: 4/24/17 11:26 AM.  If you have any questions, ask your nurse or doctor.               Stop taking these medicines if you " haven't already. Please contact your care team if you have questions.     leuprolide 45 MG kit   Commonly known as:  LUPRON DEPOT   Stopped by:  Ramon Daily MD                    Primary Care Provider Office Phone # Fax #    Verna Osborne [phone]       [place]  [facility] [address]    [facility] [address]        Thank you!     Thank you for choosing [facility]  for your care. Our goal is always to provide you with excellent care. Hearing back from our patients is one way we can continue to improve our services. Please take a few minutes to complete the written survey that you may receive in the mail after your visit with us. Thank you!             Your Updated Medication List - Protect others around you: Learn how to safely use, store and throw away your medicines at www.disposemymeds.org.          This list is accurate as of: 4/24/17 11:26 AM.  Always use your most recent med list.                   Brand Name Dispense Instructions for use    ADVIL PO      Take by mouth as needed Reported on 3/9/2017       aspirin 81 MG tablet     30 tablet    Take 1 tablet (81 mg) by mouth daily       * bicalutamide 50 MG tablet    CASODEX    30 tablet    Take 1 tablet (50 mg) by mouth daily       * bicalutamide 50 MG tablet    CASODEX    90 tablet    Take 1 tablet (50 mg) by mouth daily       calcium-vitamin D 600-400 MG-UNIT per tablet    CALTRATE     Take 1 tablet by mouth daily       CLARITIN PO      Take  by mouth. As needed       clonazePAM 1 MG tablet    klonoPIN    30 tablet    Take 1 tablet (1 mg) by mouth 2 times daily as needed for anxiety       folic acid-vit B6-vit B12 0.8-10-0.115 MG Tabs per tablet    FOLGARD     Take 1 tablet by mouth daily       lisinopril 10 MG tablet    PRINIVIL/ZESTRIL    90 tablet    Take 1 tablet (10 mg) by mouth daily       naproxen 500 MG tablet    NAPROSYN    60 tablet    Take 1 tablet (500 mg) by mouth 2 times daily as needed  for moderate pain       OMEGA-3 FISH OIL PO      Take 500 mg by mouth daily       sildenafil 100 MG cap/tab    VIAGRA    10 tablet    1/2 tab three times a week       zolpidem 5 MG tablet    AMBIEN    30 tablet    TAKE ONE TABLET BY MOUTH IN THE EVENING AS NEEDED FOR SLEEP(1 month supply)       * Notice:  This list has 2 medication(s) that are the same as other medications prescribed for you. Read the directions carefully, and ask your doctor or other care provider to review them with you.

## 2017-04-24 NOTE — LETTER
2017       RE: Medardo Gautam  [id number] [facility] [id number]     Dear Colleague,    Thank you for referring your patient, Medardo Gautam, to the [facility]. Please see a copy of my visit note below.    MEDICAL ONCOLOGY FOLLOW-UP VISIT NOTE     2017    Medardo Gautam  MRN: [id number]  : 1940    cancer diagnosis: Pathologic T1 N0 M0 (Stage I) well-differentiated adenocarcinoma of the colon, surgically resected with right hemicolectomy 2015.      Also history of Jeancarlos 4+5 pT2 cN0 adenocarcinoma of the prostate.     REFERRING PHYSICIAN: Dr. Oren Breen.     PRIMARY CARE PROVIDER:     MANI Chaudhari, at [facility], [address] [place] [address].      Other providers: Dr. Sarah Beth Pisano MD, at  [facility], [address] [place] [address].      PATIENT ID:  Pathologic T1 N0 M0 (Stage I) well-differentiated adenocarcinoma of the colon.  Also history of Jeancarlos 4+5 pT2 cN0 adenocarcinoma of the prostate.      HISTORY OF PRESENT ILLNESS/ONCOLOGY HISTORY:  Mr. Gautam is a 77-year-old gentleman of [place] background.  He has a history of [place] 4+5, pathologic T2 cN0 adenocarcinoma of the prostate.  He received neoadjuvant therapy with Lupron and Taxotere in a CALGB trial.  He is on currently every-3-month Lupron and followed by Dr. Goodwin from the Urology Team.      In regards to his colon health, he had previously had 4-5 prior colonoscopies for a history of polyps.  On 2015, he had a surveillance colonoscopy, and an ascending colon mass was detected.  On biopsy, this was moderately well-differentiated adenocarcinoma with intact mismatch repair proteins MLH1, MSH2 and 6, and PMS2; thus, no evidence of Mcguire syndrome.  A CEA was 1.8.      A CT scan of chest, abdomen and pelvis on 2015 showed no evidence of distant metastasis.  He was seen in consultation with Dr. Breen  from our Surgical Service on 01/27/2015.  The recommendation was for a laparoscopic right hemicolectomy.  After preoperative evaluation and clearance, the patient was taken to the operating room by Dr. Breen on 02/11/2015 for laparoscopic radical right hemicolectomy.  The surgery itself was unremarkable, and a full resection was performed with the carcinoma measuring 0.8 cm in greatest diameter.  It was invasive and well to moderately differentiated, considered low-grade.  There were no poor prognostic features, including 0 of 13 lymph node involvement, and no evidence of lymphovascular invasion.  Pathologically, this was staged as a pT1 pN0 M0 malignancy.      The patient followed up with Dr. Breen on 03/31, and was kindly referred here for further Medical Oncology evaluation and counseling.    4/9/15 - - medical oncology consultation for colon cancer, Dr. Daily. Recommendation: surveillance colonoscopy one year after surgery.   2/25/16 - - colonoscopy for surveillance: no concerning findings, recommendation to repeat colonoscopy in three years.  FINAL DIAGNOSIS: Colon, rectum, biopsy:    - Fragment of colonic mucosa with chronic inflammation    - Negative for dysplasia or malignancy   4/6/16 - - CT chest/abdomen/pelvis - - IMPRESSION: Postsurgical changes of right hemicolectomy without evidence of metastatic disease in the chest, abdomen, or pelvis as above.     4/25/16 - - oncology follow-up, Dr. Daily.  4/24/17 - - oncology follow-up, Dr. Daily.        Results for REESE GAUTAM (MRN [id number]) as of 4/24/2017 07:47   Ref. Range 1/21/2015 09:15 10/8/2015 13:56 4/6/2016 08:54 10/25/2016 12:28   CEA Latest Ref Range: 0 - 2.5 ug/L 1.8 2.4 3.0 (H) 3.0 (H)     INTERNAL HISTORY:  Mr. Reese Gautam presents alone today for followup of his stage I well differentiated adenocarcinoma of the colon.  He had a right hemicolectomy by Dr. Oren Breen on 02/11/2015.  He was last evaluated by me about a year ago.  He remains  doing well.      He had an episode in mid-March in which he ate heavily spiced food including chicken dee, and had what ultimately sounds most like acid reflux-like symptoms.  He was seen in the Emergency Room to evaluate this epigastric pain, and the ER team ordered CT scans of the abdomen and pelvis which showed no acute process.  I printed the radiology report and fully reviewed the results with Mr. Gautam today at his request.      In sum, there was no evidence of small bowel obstruction.  He thought there might be some issues with nephrolithiasis or gallstones.  Some gallbladder sludge was noted, but otherwise nothing acute was indicated on the CT scan.  He has followup also per his primary care physician at [facility].  His last colonoscopy for surveillance was done 02/25/2016 by Dr. Breen.  There was no evidence of polyps.  The recommendation was to follow up in 3 years for the next colonoscopy following that date.  He is otherwise doing well.  He denied any pain or any other issues at this time.             PAST MEDICAL HISTORY:     1.  Pathologic T1 N0 M0 ascending colon adenocarcinoma, per HPI.    2. 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  Per written notes from Dr. Goodwin, the patient received neoadjuvant therapy with Lupron and Taxotere on a CALGB trial complicated by hepatic toxicity.  Preoperatively, PSA was 5.2, postoperatively 1.28.  There was salvage radiotherapy, and androgen deprivation therapy with Lupron began in 06/2013.  The patient continues on that every 3 months under Dr. Goodwin's care.   3.  History of gout.   4.  Hypertension.   5.  Hyperlipidemia.   6.  Hyperbilirubinemia of unclear significance or etiology.   7.  Dupuytren's contracture of the left fifth finger.   8.  Hemorrhoids.      PAST SURGICAL HISTORY:  See above.    1.  Laparoscopic right hemicolectomy by Dr. Oren Breen for stage I colon cancer 02/11/2015.   2.  Prior appendectomy  in .     3.  Repair of Dupuytren's contractures.      FAMILY HISTORY:  Mother had possible uterine cancer at age 80.  She was treated with hysterectomy, but  at age 92 of unclear cause.  Sister, age 69, recently diagnosed with breast cancer in early 2015.      SOCIAL HISTORY:  The patient is a native of [place].  He lives currently in [place], [place].  He is very well traveled, having received education as an , and also traveled and work through [place], [employer] [facility], [facility], [employer] and [employer] throughout his life.  He has been  for 45 years.  He has a son who is living in [place], age 37.  A daughter in [place], age 39.  He has 4 grandchildren.      Occupation:  Retired since  as a .  He has worked at several places in the world.      Tobacco:  Brief smoking during college education in the 1960s, but none since.  Alcohol:  Two kayla per day, 1 glass of scotch per day.  Drugs:  Denies illicit drug use.      MEDICATIONS:  Fully Reviewed in Epic.   Current Outpatient Prescriptions   Medication     bicalutamide (CASODEX) 50 MG tablet     zolpidem (AMBIEN) 5 MG tablet     naproxen (NAPROSYN) 500 MG tablet     bicalutamide (CASODEX) 50 MG tablet     clonazePAM (KLONOPIN) 1 MG tablet     lisinopril (PRINIVIL,ZESTRIL) 10 MG tablet     leuprolide (LUPRON DEPOT) 45 MG injection     Omega-3 Fatty Acids (OMEGA-3 FISH OIL PO)     calcium-vitamin D (CALTRATE) 600-400 MG-UNIT per tablet     folic acid-vit B6-vit B12 (FOLGARD) 0.8-10-0.115 MG TABS     aspirin 81 MG tablet     sildenafil (VIAGRA) 100 MG tablet     Ibuprofen (ADVIL PO)     Loratadine (CLARITIN PO)     No current facility-administered medications for this visit.           ALLERGIES:  No known drug allergies.      REVIEW OF SYSTEMS:  Full 10-point review of systems was performed.  Pertinent symptoms are reviewed above per HPI.      PHYSICAL EXAM:   /75 (BP Location: Right arm, Patient Position: Right side,  "Cuff Size: Adult Regular)  Pulse 67  Temp 98.7  F (37.1  C) (Oral)  Resp 18  Ht 1.727 m (5' 7.99\")  Wt 88.7 kg (195 lb 8 oz)  SpO2 96%  BMI 29.73 kg/m2    KPS:   90%.   GENERAL:  Very pleasant gentleman, appears younger than his stated age, NAD, A and O x3.   HEENT:  PERRLA, oropharynx clear with no mucositis or thrush.   LYMPH NODES:  No palpable pre/post-auricular, cervical, axillary, or inguinal lymphadenopathy appreciated.   CV:  RRR, normal S1 S2.  No murmurs, gallops, or rubs.   LUNGS:  Clear to auscultation bilaterally.  No dullness to percussion.   ABDOMEN:  Obese, soft, nontender and nondistended. No apparent hepatosplenomegaly.  Well healed laparoscopic scars.    EXTREMITIES:  No clubbing, cyanosis, or edema.            Lab Results   Component Value Date    WBC 5.2 03/18/2017     Lab Results   Component Value Date    RBC 4.28 03/18/2017     Lab Results   Component Value Date    HGB 14.3 03/18/2017     Lab Results   Component Value Date    HCT 41.5 03/18/2017     No components found for: MCT  Lab Results   Component Value Date    MCV 97 03/18/2017     Lab Results   Component Value Date    MCH 33.4 03/18/2017     Lab Results   Component Value Date    MCHC 34.5 03/18/2017     Lab Results   Component Value Date    RDW 12.9 03/18/2017     Lab Results   Component Value Date     03/18/2017     Last Basic Metabolic Panel:  Lab Results   Component Value Date     03/18/2017      Lab Results   Component Value Date    POTASSIUM 4.2 03/18/2017     Lab Results   Component Value Date    CHLORIDE 106 03/18/2017     Lab Results   Component Value Date    GAVIN 9.3 03/18/2017     Lab Results   Component Value Date    CO2 27 03/18/2017     Lab Results   Component Value Date    BUN 15 03/18/2017     Lab Results   Component Value Date    CR 0.93 03/18/2017     Lab Results   Component Value Date     03/18/2017     Liver Function Studies -   Recent Labs   Lab Test  03/18/17   1639   PROTTOTAL  7.3 "   ALBUMIN  4.1   BILITOTAL  1.3   ALKPHOS  62   AST  42   ALT  53       Results for REESE MART (MRN [id number]) as of 4/24/2017 07:47   Ref. Range 1/21/2015 09:15 10/8/2015 13:56 4/6/2016 08:54 10/25/2016 12:28   CEA Latest Ref Range: 0 - 2.5 ug/L 1.8 2.4 3.0 (H) 3.0 (H)        RADIOLOGY:  CT scan from 3/18 (ordered by urology)    IMPRESSION:  1. No small bowel obstruction.  2. Postsurgical changes of a right hemicolectomy. Patent anastomosis.  3. Post surgical changes of a prostatectomy.  4. Small stones versus gallbladder sludge. No evidence of acute  cholecystitis.     IMPRESSION/PLAN:    A 77-year-old gentleman with a pT1 N0 M0 well to moderately differentiated adenocarcinoma of the ascending colon, unremarkable.  On histopathology, there was no evidence of poor prognostic features, and no evidence of distant metastatic disease seen on CT scans.      He is doing well 2 years + 3 months out from his curative right hemicolectomy, done to treat stage I colon adenocarcinoma.  At this point, having surpassed 2 years past his definitive surgery, and also being a 2-time cancer survivor (for both colon cancer and prostate cancer); I think he would be an excellent candidate for transition of care to the Cancer Survivorship Clinic. In 1 years' time, he will come back to see Magali Andrews from our Cancer Survivorship Clinic to establish care, and he will have labs including CBC, CMP, and CEA checked 1 hour in advance.      He will follow up with his urologist regarding prostate cancer surveillance, and his primary care physician at his outside [facility] for primary care issues.  His last colonoscopy for surveillance was done 02/25/2016. As it showed no evidence of polyps, the next endoscopy would not be indicated until approximately 02/2019.  No radiologic surveillance is required or recommended for stage I colon adenocarcinoma, so no further CT scans need to be ordered at this time.           I spent 30  minutes in consultation, including H&P and Discussion. >50% of time was spent in counseling and in coordination of care.    Ramon Daily MD, PhD

## 2017-04-25 LAB
CEA SERPL-MCNC: 3.4 UG/L (ref 0–2.5)
PSA SERPL DL<=0.01 NG/ML-MCNC: 0.01 UG/L
TESTOST SERPL-MCNC: 52 NG/DL (ref 240–950)

## 2017-05-02 ENCOUNTER — TELEPHONE (OUTPATIENT)
Dept: FAMILY MEDICINE | Facility: OTHER | Age: 77
End: 2017-05-02

## 2017-05-02 NOTE — TELEPHONE ENCOUNTER
Reason for call:  Form  Reason for Call:  Form, our goal is to have forms completed with 72 hours, however, some forms may require a visit or additional information.    Type of letter, form or note:  medical-medication    Who is the form from?: Patient    Where did the form come from: Patient or family brought in       What clinic location was the form placed at?: [facility] - [phone]    Where the form was placed: Dr's [facility]    What number is listed as a contact on the form?: NA       Additional comments: pt had came and dropped off a paper for an approval of a medication. Please advice.    Call taken on 5/2/2017 at 11:12 AM by Anju Fajardo

## 2017-05-08 NOTE — TELEPHONE ENCOUNTER
Prior authorization was not needed that this is available to you at the amount listed in your plan documents and your don't need an authorization.   Lm informing patient of this and that I will mail this letter to him so he has a copy.

## 2017-05-17 DIAGNOSIS — F41.9 ANXIETY: ICD-10-CM

## 2017-05-17 NOTE — TELEPHONE ENCOUNTER
Clonazepam      Last Written Prescription Date:  11/15/16  Last Fill Quantity: 30,   # refills: 2  Last Office Visit with [facility], [facility] or  Health prescribing provider: 3/24/17  Future Office visit:       Routing refill request to provider for review/approval because:  Drug not on the [facility], [facility] or [employer] refill protocol or controlled substance

## 2017-05-18 RX ORDER — CLONAZEPAM 1 MG/1
TABLET ORAL
Qty: 30 TABLET | Refills: 2 | Status: SHIPPED | OUTPATIENT
Start: 2017-05-18 | End: 2017-10-04

## 2017-05-19 NOTE — TELEPHONE ENCOUNTER
Called and spoke with patient regarding refill request.  Informed patient that [facility] refilled prescription and prescription could be picked up or faxed to a pharmacy of choice.  Patient would like to stop and pick it up today.  Informed patient I would leave at the  and he could stop by today when he gets a chance.  Patient verbalized understanding and would call back with any other concerns or questions.  Sarah Beth Marie CMA ([facility])

## 2017-06-16 NOTE — PROGRESS NOTES
SUBJECTIVE:                                                    Medardo Gautam is a 77 year old male who presents to clinic today for the following health issues:    HPI    Concern - Follow up for Gallstones     Onset: x 3 months    Description:   Monday 6/19/17 had pain in the lower chest that radiated to the mid back.    Intensity: moderate, severe    Progression of Symptoms:  same    Accompanying Signs & Symptoms:         Previous history of similar problem:   No    Precipitating factors:   Worsened by:     Alleviating factors:  Improved by:        Therapies Tried and outcome: Advil      Problem list and histories reviewed & adjusted, as indicated.  Additional history: as documented        Patient Active Problem List   Diagnosis     Disturbance of skin sensation     Hemorrhoids     Gout     Advanced directives, counseling/discussion     Dupuytren's contracture of hand- left 5th finger, right 5th finger     Bunions- both feet     Skin lesion- R side of face and behind L ear     Insomnia     Prostatic nodule- posterior right edge with rectal exam     Prostate cancer- [place] 9 (5+4) dx Feb 2012     Elevated liver enzymes     Hyperbilirubinemia     Essential hypertension with goal blood pressure less than 140/90     Shoulder pain, left     Contracture of finger joint     Hyperlipidemia LDL goal <130     Shoulder pain, right     Malignant neoplasm of prostate (H)     Anxiety     Colon cancer (H)     Abdominal pain, epigastric     Renal cyst     Past Surgical History:   Procedure Laterality Date     APPENDECTOMY       BIOPSY  01/16/15     C HAND/FINGER SURGERY UNLISTED       C LENGTHEN,TENDON,HAND/FINGER  ca 2007    right fifth     C STOMACH SURGERY PROCEDURE UNLISTED       COLON SURGERY  2/11/2015    Lap assisted R hemicolectomy     COLONOSCOPY  10/25/07    Snare polypectomy     COLONOSCOPY  6/25/2009    with snare polypectomy     COLONOSCOPY  9/30/2009     COLONOSCOPY  1/5/2011    COLONOSCOPY performed by  JUD MARIEE at  GI     COLONOSCOPY N/A 2015    Procedure: COMBINED COLONOSCOPY, SINGLE OR MULTIPLE BIOPSY/POLYPECTOMY BY BIOPSY;  Surgeon: Sarah Beth Pisano MD;  Location: [facility] OR     COLONOSCOPY WITH CO2 INSUFFLATION N/A 2015    Procedure: COLONOSCOPY WITH CO2 INSUFFLATION;  Surgeon: Sarah Beth Pisano MD;  Location: [facility] OR     DAVINCI PROSTATECTOMY  2012    Procedure: DAVINCI PROSTATECTOMY;  Davinci Assisted Radical Prostatectomy with Bilateral Lymphadenectomy ;  Surgeon: Norma Goodwin MD;  Location: [facility] OR     LAPAROSCOPIC ASSISTED COLECTOMY N/A 2015    Procedure: LAPAROSCOPIC ASSISTED COLECTOMY;  Surgeon: Oren Breen MD;  Location: [facility] OR       Social History   Substance Use Topics     Smoking status: Former Smoker     Years: 1.00     Types: Cigarettes, Pipe, Cigars     Start date: 10/1/1961     Quit date: 1962     Smokeless tobacco: Never Used      Comment: No smokers in home     Alcohol use 0.0 oz/week     0 Standard drinks or equivalent per week      Comment: daily glass of wine and whiskey     Family History   Problem Relation Age of Onset     CEREBROVASCULAR DISEASE Father      CANCER Mother 90     Patient believes vaginal cancer - hysterectomy,      Alzheimer Disease Mother      CANCER Sister      C.A.D. No family hx of      Breast Cancer No family hx of      Cancer - colorectal No family hx of      Prostate Cancer No family hx of      Blood Disease No family hx of      Cardiovascular No family hx of      Circulatory No family hx of      Eye Disorder No family hx of      GASTROINTESTINAL DISEASE No family hx of      Genitourinary Problems No family hx of      Lipids No family hx of      Neurologic Disorder No family hx of      Respiratory No family hx of      Asthma No family hx of      HEART DISEASE No family hx of      DIABETES No family hx of      Hypertension No family hx of      Arthritis No family hx of      Thyroid Disease No family hx of       Musculoskeletal Disorder No family hx of          Current Outpatient Prescriptions   Medication Sig Dispense Refill     clonazePAM (KLONOPIN) 1 MG tablet TAKE ONE TABLET BY MOUTH TWICE DAILY AS NEEDED FOR ANXIETY 30 tablet 2     bicalutamide (CASODEX) 50 MG tablet Take 1 tablet (50 mg) by mouth daily 90 tablet 3     zolpidem (AMBIEN) 5 MG tablet TAKE ONE TABLET BY MOUTH IN THE EVENING AS NEEDED FOR SLEEP(1 month supply) 30 tablet 5     naproxen (NAPROSYN) 500 MG tablet Take 1 tablet (500 mg) by mouth 2 times daily as needed for moderate pain 60 tablet 0     lisinopril (PRINIVIL,ZESTRIL) 10 MG tablet Take 1 tablet (10 mg) by mouth daily 90 tablet 3     Omega-3 Fatty Acids (OMEGA-3 FISH OIL PO) Take 500 mg by mouth daily       calcium-vitamin D (CALTRATE) 600-400 MG-UNIT per tablet Take 1 tablet by mouth daily       folic acid-vit B6-vit B12 (FOLGARD) 0.8-10-0.115 MG TABS Take 1 tablet by mouth daily       aspirin 81 MG tablet Take 1 tablet (81 mg) by mouth daily 30 tablet      sildenafil (VIAGRA) 100 MG tablet 1/2 tab three times a week 10 tablet 12     Ibuprofen (ADVIL PO) Take by mouth as needed Reported on 3/9/2017       Loratadine (CLARITIN PO) Take  by mouth. As needed        [DISCONTINUED] bicalutamide (CASODEX) 50 MG tablet Take 1 tablet (50 mg) by mouth daily 30 tablet 3     No Known Allergies  BP Readings from Last 3 Encounters:   06/22/17 130/80   04/24/17 134/75   03/24/17 130/82    Wt Readings from Last 3 Encounters:   06/22/17 195 lb (88.5 kg)   04/24/17 195 lb 8 oz (88.7 kg)   03/24/17 193 lb (87.5 kg)                  Labs reviewed in EPIC    ROS:  Constitutional, HEENT, cardiovascular, pulmonary, GI, , musculoskeletal, neuro, skin, endocrine and psych systems are negative, except as otherwise noted.    OBJECTIVE:                                                    /80 (BP Location: Left arm, Patient Position: Chair, Cuff Size: Adult Regular)  Pulse 65  Temp 97.5  F (36.4  C) (Oral)  Resp 16  " Ht 5' 7.9\" (1.725 m)  Wt 195 lb (88.5 kg)  SpO2 96%  BMI 29.74 kg/m2  Body mass index is 29.74 kg/(m^2).  Physical Exam   Constitutional: He is oriented to person, place, and time. He appears well-developed and well-nourished.   HENT:   Head: Normocephalic and atraumatic.   Eyes: EOM are normal.   Neck: Neck supple.   Cardiovascular: Normal rate and regular rhythm.    Pulmonary/Chest: Effort normal and breath sounds normal.   Abdominal: Soft. Bowel sounds are normal. He exhibits no distension and no mass. There is no tenderness. There is no rebound and no guarding.   Neurological: He is alert and oriented to person, place, and time.   Psychiatric: He has a normal mood and affect.         Diagnostic Test Results:  none      ASSESSMENT/PLAN:                                                      Problem List Items Addressed This Visit     Abdominal pain, epigastric     Patient with history of biliary colic and cholelithiasis presents with an episode of right upper quadrant abdominal pain that started this Monday after his father's day celebration. He agrees that he has not been eating right. Symptoms lasted for the in the completely resolved during the visit today. I gave him the option of a surgical consultation versus getting a hepatobiliary scan to evaluate ejection fraction of her gallbladder versus watchful waiting. He is very concerned about getting radiation in any form because of his history of cancer. He will get back to me if he wants to proceed with any given options.  Discussed dietary modifications  Discussed home care  Reportable signs and symptoms discussed  RTC if symptoms persist or fail to improve           Renal cyst     Patient has a history of prostate cancer and is being followed by urology. He was incidentally noted to have a renal cyst on his left kidney on a CT scan done in March 2017. The plan was to repeat an ultrasound in 3 months for follow-up. The patient however wants to discuss this " with his urologist before investigating it further. He does not have any new symptoms. He has an upcoming appointment with next month. He will get back to me on this.           Other Visit Diagnoses     Abdominal pain, right upper quadrant    -  Primary    Benign essential hypertension               Verna Osborne MD  [facility]

## 2017-06-22 ENCOUNTER — OFFICE VISIT (OUTPATIENT)
Dept: FAMILY MEDICINE | Facility: OTHER | Age: 77
End: 2017-06-22
Payer: COMMERCIAL

## 2017-06-22 VITALS
HEART RATE: 65 BPM | TEMPERATURE: 97.5 F | HEIGHT: 68 IN | BODY MASS INDEX: 29.55 KG/M2 | OXYGEN SATURATION: 96 % | WEIGHT: 195 LBS | DIASTOLIC BLOOD PRESSURE: 80 MMHG | RESPIRATION RATE: 16 BRPM | SYSTOLIC BLOOD PRESSURE: 130 MMHG

## 2017-06-22 DIAGNOSIS — R10.11 ABDOMINAL PAIN, RIGHT UPPER QUADRANT: Primary | ICD-10-CM

## 2017-06-22 DIAGNOSIS — R10.13 ABDOMINAL PAIN, EPIGASTRIC: ICD-10-CM

## 2017-06-22 DIAGNOSIS — I10 BENIGN ESSENTIAL HYPERTENSION: ICD-10-CM

## 2017-06-22 DIAGNOSIS — N28.1 RENAL CYST: ICD-10-CM

## 2017-06-22 PROCEDURE — 99214 OFFICE O/P EST MOD 30 MIN: CPT | Performed by: FAMILY MEDICINE

## 2017-06-22 ASSESSMENT — PAIN SCALES - GENERAL: PAINLEVEL: NO PAIN (0)

## 2017-06-22 NOTE — MR AVS SNAPSHOT
After Visit Summary   6/22/2017    Medardo Gautam    MRN: [id number]           Patient Information     Date Of Birth          1940        Visit Information        Provider Department      6/22/2017 8:40 AM Verna Osborne MD [facility]        Today's Diagnoses     Benign essential hypertension          Care Instructions      Cholecystectomy     Clips close off the duct connecting the gallbladder to the bile duct. The gallbladder is then removed.     You ve had painful attacks caused by gallstones. To treat the problem, your healthcare provider wants to remove your gallbladder. This surgery is called cholecystectomy. Removing the gallbladder can relieve pain. It will also prevent future attacks. You can live a healthy life without your gallbladder. You may also be able to go back to eating foods you enjoyed before your gallbladder problems started.  Before your surgery  Be prepared:    Tell your provider what medicines you take. Include those bought over the counter. Also include herbs or supplements. Be sure to mention if you take prescription blood thinners. This includes warfarin, clopidogrel, and aspirin.    Have any tests your provider asks for, such as blood tests.    Don t eat or drink after midnight, the night before your surgery. This includes water, coffee, and mints. However, you may need to take some medicine with sips of water--talk with your healthcare provider.  The day of surgery  When you arrive, you will prepare for surgery:    An IV line will be put into a vein in your arm or hand. This gives you fluids and medicine.    An anesthesiologist will talk with you about anesthesia. This is medicine used to prevent pain. You will receive general anesthesia. This puts you into a state like deep sleep through the procedure.  During surgery  There are 2 methods for removing the gallbladder. Your healthcare provider will choose which method is best for you:    Laparoscopic  cholecystectomy. This is most common. During surgery, 2 to 4 small incisions are made. A thin tube with a camera is used. This is called a laparoscope. The scope is put through one of the incisions. It sends images to a video screen. Surgical tools are put through other incisions. The gallbladder is removed using the scope and these tools.    Open cholecystectomy. One larger incision is made. The surgeon sees and works through this incision. Open surgery is most often used when scarring or other factors make it a better choice for you.  In some cases, safety requires a change from laparoscopic to open surgery during the procedure.  After surgery  You will be sent to a room to wake up from the anesthesia. You will likely go home the same day. In some cases, an overnight stay is needed. If you had open cholecystectomy, you may need to stay in the hospital for a few days. When you are released to go home, have a family member or friend ready to drive you.  Risks and possible complications of gallbladder surgery  All surgeries have risks. The risks of gallbladder surgery include:    Bleeding    Infection    Injury to the common bile duct or nearby organs    Blood clots in the legs    Bile leaks    Hernia at incision site    Pnemonia   Date Last Reviewed: 7/1/2016 2000-2017 [employer]. [address] [place] [address]. All rights reserved. This information is not intended as a substitute for professional medical care. Always follow your healthcare professional's instructions.        Having Laparoscopic Cholecystectomy  Small incisions are made in your belly (abdomen). The scope is put through one of the incisions. Surgical tools are put through other incisions.  Small clips are used to close off the connection between the gallbladder and the bile duct. The gallbladder is then detached from the liver.  The gallbladder is removed through one of the incisions. Bile still flows from the liver to  the small intestine.  When the surgery is done, all tools are removed. Incisions are closed with stitches (sutures) or staples. Sometimes, a laparoscopic surgery may need to be changed to an open surgery. This method uses one large incision. This change may happen because of scar tissue, unusual anatomy, or for some other reason.     Possible incision sites.   You ve had painful attacks caused by gallstones. Because of this, you are having surgery to remove your gallbladder. This is called cholecystectomy. A method called laparoscopy will be used. This allows surgery to be done through a few small cuts (incisions).  Before your surgery    Tell your provider what medicines you take. This includes prescription medicines, over-the-counter medicines, street drugs, herbs, vitamins, and other supplements. Be sure to mention if you take prescription blood thinners. This includes warfarin, clopidogrel, ibuprofen, and aspirin.    If you drink alcohol, tell your provider how much you drink. This is very important if you are a heavy drinker or have had alcohol withdrawal symptoms in the past. Alcohol withdrawal can be dangerous. But the symptoms can be safely managed if your healthcare provider knows your alcohol history.    Have any tests your provider asks for, such as blood tests.    Don t eat or drink after midnight the night before your surgery. This includes water, coffee, and mints.  The day of surgery    Your provider may have you take your normal medicine with a sip of water. Check with your provider.   When you arrive, you will prepare for surgery:    An IV (intravenous) line will be put into a vein in your arm or hand. This gives you fluids and medicine.    An anesthesiologist will talk with you about anesthesia. This is medicine used to prevent pain. You will receive general anesthesia. This puts you into a deep sleep-like state through the procedure.  During laparoscopic surgery  For this surgery, a thin tube with  a tiny camera is used. This is called a laparoscope. The scope sends images from inside your body to a video screen. This lets the surgeon view and work on your gallbladder:    Small incisions are made in your belly (abdomen). The scope is put through one of the incisions. Surgical tools are put through other incisions.    Small clips are used to close off the connection between the gallbladder and the bile duct. The gallbladder is then detached from the liver.    The gallbladder is removed through one of the incisions. Bile still flows from the liver to the small intestine.    When the surgery is done, all tools are removed. Incisions are closed with stitches (sutures) or staples. Sometimes, a laparoscopic surgery may need to be changed to an open surgery. This method uses one large incision. This change may happen because of scar tissue, unusual anatomy, or for some other reason.  After surgery  You will be sent to a room to wake up from the anesthesia. You will likely go home the same day. In some cases, an overnight stay is needed. When you are released to go home, have a family member or friend ready to drive you.  Risks and possible complications of gallbladder surgery  All surgeries have risks. The risks of gallbladder surgery include:    Bleeding    Infection    Injury to the common bile duct or nearby organs    Blood clots in the legs    Bile leaks    Hernia at incision site    Pneumonia   Date Last Reviewed: 7/1/2016 2000-2017 [employer]. [address] [place] [address]. All rights reserved. This information is not intended as a substitute for professional medical care. Always follow your healthcare professional's instructions.                Follow-ups after your visit        Your next 10 appointments already scheduled     Jul 26, 2017 12:00 PM CDT   (Arrive by 11:45 AM)   Return Prostate Cancer with Norma Goodwin MD   [facility] ([facility])    [address]  4th Floor  [facility] [address]   [phone]            Apr 24, 2018 11:00 AM CDT   [facility] with  [facility]   [facility] ([facility])    [address]  [address] [facility] [address]   [phone]            Apr 24, 2018 11:45 AM CDT   (Arrive by 11:30 AM)   LONG TERM RETURN with Magali Andrews PA-C   [facility] ([facility])    [address] [place] [address]  2nd Floor  [facility] [address]   [phone]              Who to contact     If you have questions or need follow up information about today's clinic visit or your schedule please contact [facility] directly at [phone].  Normal or non-critical lab and imaging results will be communicated to you by MyChart, letter or phone within 4 business days after the clinic has received the results. If you do not hear from us within 7 days, please contact the clinic through [employer] or phone. If you have a critical or abnormal lab result, we will notify you by phone as soon as possible.  Submit refill requests through [employer] or call your pharmacy and they will forward the refill request to us. Please allow 3 business days for your refill to be completed.          Additional Information About Your Visit        [employer] Information     [employer] gives you secure access to your electronic health record. If you see a primary care provider, you can also send messages to your care team and make appointments. If you have questions, please call your primary care clinic.  If you do not have a primary care provider, please call [phone] and they will assist you.        Care EveryWhere ID     This is your Care EveryWhere ID. This could be used by other organizations to access your [facility] medical records  [id number]        Your Vitals Were     Pulse Temperature  "Respirations Height Pulse Oximetry BMI (Body Mass Index)    65 97.5  F (36.4  C) (Oral) 16 5' 7.9\" (1.725 m) 96% 29.74 kg/m2       Blood Pressure from Last 3 Encounters:   06/22/17 130/80   04/24/17 134/75   03/24/17 130/82    Weight from Last 3 Encounters:   06/22/17 195 lb (88.5 kg)   04/24/17 195 lb 8 oz (88.7 kg)   03/24/17 193 lb (87.5 kg)              Today, you had the following     No orders found for display         Today's Medication Changes          These changes are accurate as of: 6/22/17  9:28 AM.  If you have any questions, ask your nurse or doctor.               These medicines have changed or have updated prescriptions.        Dose/Directions    bicalutamide 50 MG tablet   Commonly known as:  CASODEX   This may have changed:  Another medication with the same name was removed. Continue taking this medication, and follow the directions you see here.   Used for:  Malignant neoplasm of prostate (H)   Changed by:  Norma Goodwin MD        Dose:  50 mg   Take 1 tablet (50 mg) by mouth daily   Quantity:  90 tablet   Refills:  3                Primary Care Provider Office Phone # Fax #    Verna Osborne [phone]       [address] [address]        Equal Access to Services     RENEE CORONA AH: Sheba harding Soshamika, waaxda luqadaha, qaybta kaalmada evelia, randy spivey. So [facility] [phone].    ATENCIÓN: Si habla español, tiene a sanford disposición servicios gratuitos de asistencia lingüística. Lukas al [phone].    We comply with applicable federal civil rights laws and [place] laws. We do not discriminate on the basis of race, color, national origin, age, disability sex, sexual orientation or gender identity.            Thank you!     Thank you for choosing [facility]  for your care. Our goal is always to provide you with excellent care. Hearing back from our patients is one " way we can continue to improve our services. Please take a few minutes to complete the written survey that you may receive in the mail after your visit with us. Thank you!             Your Updated Medication List - Protect others around you: Learn how to safely use, store and throw away your medicines at www.disposemymeds.org.          This list is accurate as of: 6/22/17  9:28 AM.  Always use your most recent med list.                   Brand Name Dispense Instructions for use Diagnosis    ADVIL PO      Take by mouth as needed Reported on 3/9/2017        aspirin 81 MG tablet     30 tablet    Take 1 tablet (81 mg) by mouth daily    Hyperlipidemia LDL goal <130, Malignant neoplasm of colon, unspecified part of colon (H)       bicalutamide 50 MG tablet    CASODEX    90 tablet    Take 1 tablet (50 mg) by mouth daily    Malignant neoplasm of prostate (H)       calcium-vitamin D 600-400 MG-UNIT per tablet    CALTRATE     Take 1 tablet by mouth daily    Hyperlipidemia LDL goal <130, Malignant neoplasm of colon, unspecified part of colon (H)       CLARITIN PO      Take  by mouth. As needed        clonazePAM 1 MG tablet    klonoPIN    30 tablet    TAKE ONE TABLET BY MOUTH TWICE DAILY AS NEEDED FOR ANXIETY    Anxiety       folic acid-vit B6-vit B12 0.8-10-0.115 MG Tabs per tablet    FOLGARD     Take 1 tablet by mouth daily    Hyperlipidemia LDL goal <130, Malignant neoplasm of colon, unspecified part of colon (H)       lisinopril 10 MG tablet    PRINIVIL/ZESTRIL    90 tablet    Take 1 tablet (10 mg) by mouth daily    Benign essential hypertension       naproxen 500 MG tablet    NAPROSYN    60 tablet    Take 1 tablet (500 mg) by mouth 2 times daily as needed for moderate pain    Chronic gout without tophus, unspecified cause, unspecified site       OMEGA-3 FISH OIL PO      Take 500 mg by mouth daily    Hyperlipidemia LDL goal <130, Malignant neoplasm of colon, unspecified part of colon (H)       sildenafil 100 MG cap/tab     VIAGRA    10 tablet    1/2 tab three times a week    Malignant neoplasm of prostate (H)       zolpidem 5 MG tablet    AMBIEN    30 tablet    TAKE ONE TABLET BY MOUTH IN THE EVENING AS NEEDED FOR SLEEP(1 month supply)    Persistent disorder of initiating or maintaining sleep

## 2017-06-22 NOTE — ASSESSMENT & PLAN NOTE
Patient has a history of prostate cancer and is being followed by urology. He was incidentally noted to have a renal cyst on his left kidney on a CT scan done in March 2017. The plan was to repeat an ultrasound in 3 months for follow-up. The patient however wants to discuss this with his urologist before investigating it further. He does not have any new symptoms. He has an upcoming appointment with next month. He will get back to me on this.

## 2017-06-22 NOTE — ASSESSMENT & PLAN NOTE
Patient with history of biliary colic and cholelithiasis presents with an episode of right upper quadrant abdominal pain that started this Monday after his father's day celebration. He agrees that he has not been eating right. Symptoms lasted for the in the completely resolved during the visit today. I gave him the option of a surgical consultation versus getting a hepatobiliary scan to evaluate ejection fraction of her gallbladder versus watchful waiting. He is very concerned about getting radiation in any form because of his history of cancer. He will get back to me if he wants to proceed with any given options.  Discussed dietary modifications  Discussed home care  Reportable signs and symptoms discussed  RTC if symptoms persist or fail to improve

## 2017-06-22 NOTE — PATIENT INSTRUCTIONS
Cholecystectomy     Clips close off the duct connecting the gallbladder to the bile duct. The gallbladder is then removed.     You ve had painful attacks caused by gallstones. To treat the problem, your healthcare provider wants to remove your gallbladder. This surgery is called cholecystectomy. Removing the gallbladder can relieve pain. It will also prevent future attacks. You can live a healthy life without your gallbladder. You may also be able to go back to eating foods you enjoyed before your gallbladder problems started.  Before your surgery  Be prepared:    Tell your provider what medicines you take. Include those bought over the counter. Also include herbs or supplements. Be sure to mention if you take prescription blood thinners. This includes warfarin, clopidogrel, and aspirin.    Have any tests your provider asks for, such as blood tests.    Don t eat or drink after midnight, the night before your surgery. This includes water, coffee, and mints. However, you may need to take some medicine with sips of water--talk with your healthcare provider.  The day of surgery  When you arrive, you will prepare for surgery:    An IV line will be put into a vein in your arm or hand. This gives you fluids and medicine.    An anesthesiologist will talk with you about anesthesia. This is medicine used to prevent pain. You will receive general anesthesia. This puts you into a state like deep sleep through the procedure.  During surgery  There are 2 methods for removing the gallbladder. Your healthcare provider will choose which method is best for you:    Laparoscopic cholecystectomy. This is most common. During surgery, 2 to 4 small incisions are made. A thin tube with a camera is used. This is called a laparoscope. The scope is put through one of the incisions. It sends images to a video screen. Surgical tools are put through other incisions. The gallbladder is removed using the scope and these tools.    Open  cholecystectomy. One larger incision is made. The surgeon sees and works through this incision. Open surgery is most often used when scarring or other factors make it a better choice for you.  In some cases, safety requires a change from laparoscopic to open surgery during the procedure.  After surgery  You will be sent to a room to wake up from the anesthesia. You will likely go home the same day. In some cases, an overnight stay is needed. If you had open cholecystectomy, you may need to stay in the hospital for a few days. When you are released to go home, have a family member or friend ready to drive you.  Risks and possible complications of gallbladder surgery  All surgeries have risks. The risks of gallbladder surgery include:    Bleeding    Infection    Injury to the common bile duct or nearby organs    Blood clots in the legs    Bile leaks    Hernia at incision site    Pnemonia   Date Last Reviewed: 7/1/2016 2000-2017 [employer]. [address] [place] [address]. All rights reserved. This information is not intended as a substitute for professional medical care. Always follow your healthcare professional's instructions.        Having Laparoscopic Cholecystectomy  Small incisions are made in your belly (abdomen). The scope is put through one of the incisions. Surgical tools are put through other incisions.  Small clips are used to close off the connection between the gallbladder and the bile duct. The gallbladder is then detached from the liver.  The gallbladder is removed through one of the incisions. Bile still flows from the liver to the small intestine.  When the surgery is done, all tools are removed. Incisions are closed with stitches (sutures) or staples. Sometimes, a laparoscopic surgery may need to be changed to an open surgery. This method uses one large incision. This change may happen because of scar tissue, unusual anatomy, or for some other reason.     Possible  incision sites.   You ve had painful attacks caused by gallstones. Because of this, you are having surgery to remove your gallbladder. This is called cholecystectomy. A method called laparoscopy will be used. This allows surgery to be done through a few small cuts (incisions).  Before your surgery    Tell your provider what medicines you take. This includes prescription medicines, over-the-counter medicines, street drugs, herbs, vitamins, and other supplements. Be sure to mention if you take prescription blood thinners. This includes warfarin, clopidogrel, ibuprofen, and aspirin.    If you drink alcohol, tell your provider how much you drink. This is very important if you are a heavy drinker or have had alcohol withdrawal symptoms in the past. Alcohol withdrawal can be dangerous. But the symptoms can be safely managed if your healthcare provider knows your alcohol history.    Have any tests your provider asks for, such as blood tests.    Don t eat or drink after midnight the night before your surgery. This includes water, coffee, and mints.  The day of surgery    Your provider may have you take your normal medicine with a sip of water. Check with your provider.   When you arrive, you will prepare for surgery:    An IV (intravenous) line will be put into a vein in your arm or hand. This gives you fluids and medicine.    An anesthesiologist will talk with you about anesthesia. This is medicine used to prevent pain. You will receive general anesthesia. This puts you into a deep sleep-like state through the procedure.  During laparoscopic surgery  For this surgery, a thin tube with a tiny camera is used. This is called a laparoscope. The scope sends images from inside your body to a video screen. This lets the surgeon view and work on your gallbladder:    Small incisions are made in your belly (abdomen). The scope is put through one of the incisions. Surgical tools are put through other incisions.    Small clips are  used to close off the connection between the gallbladder and the bile duct. The gallbladder is then detached from the liver.    The gallbladder is removed through one of the incisions. Bile still flows from the liver to the small intestine.    When the surgery is done, all tools are removed. Incisions are closed with stitches (sutures) or staples. Sometimes, a laparoscopic surgery may need to be changed to an open surgery. This method uses one large incision. This change may happen because of scar tissue, unusual anatomy, or for some other reason.  After surgery  You will be sent to a room to wake up from the anesthesia. You will likely go home the same day. In some cases, an overnight stay is needed. When you are released to go home, have a family member or friend ready to drive you.  Risks and possible complications of gallbladder surgery  All surgeries have risks. The risks of gallbladder surgery include:    Bleeding    Infection    Injury to the common bile duct or nearby organs    Blood clots in the legs    Bile leaks    Hernia at incision site    Pneumonia   Date Last Reviewed: 7/1/2016 2000-2017 [employer]. [address] [place] [address]. All rights reserved. This information is not intended as a substitute for professional medical care. Always follow your healthcare professional's instructions.

## 2017-06-22 NOTE — NURSING NOTE
"Chief Complaint   Patient presents with     Abdominal Pain     folow gallbladder stones      Panel Management     Honoring SAEID tai       Initial /80 (BP Location: Left arm, Patient Position: Chair, Cuff Size: Adult Regular)  Pulse 65  Temp 97.5  F (36.4  C) (Oral)  Resp 16  Ht 5' 7.9\" (1.725 m)  Wt 195 lb (88.5 kg)  SpO2 96%  BMI 29.74 kg/m2 Estimated body mass index is 29.74 kg/(m^2) as calculated from the following:    Height as of this encounter: 5' 7.9\" (1.725 m).    Weight as of this encounter: 195 lb (88.5 kg).  Medication Reconciliation: complete   Sarah Beth Marie CMA (AAMA)      "

## 2017-07-19 DIAGNOSIS — C61 MALIGNANT NEOPLASM OF PROSTATE (H): ICD-10-CM

## 2017-07-19 LAB — PSA SERPL-MCNC: NORMAL UG/L (ref 0–4)

## 2017-07-19 PROCEDURE — 84153 ASSAY OF PSA TOTAL: CPT | Performed by: UROLOGY

## 2017-07-19 PROCEDURE — 36415 COLL VENOUS BLD VENIPUNCTURE: CPT | Performed by: UROLOGY

## 2017-07-19 PROCEDURE — 84403 ASSAY OF TOTAL TESTOSTERONE: CPT | Performed by: UROLOGY

## 2017-07-21 ENCOUNTER — PRE VISIT (OUTPATIENT)
Dept: UROLOGY | Facility: CLINIC | Age: 77
End: 2017-07-21

## 2017-07-21 LAB — TESTOST SERPL-MCNC: 75 NG/DL (ref 240–950)

## 2017-07-21 NOTE — TELEPHONE ENCOUNTER
Patient with a history of prostate cancer and prostatectomy coming in for follow up. Chart reviewed and all labs ordered. No need for a call.

## 2017-07-26 ENCOUNTER — OFFICE VISIT (OUTPATIENT)
Dept: UROLOGY | Facility: CLINIC | Age: 77
End: 2017-07-26

## 2017-07-26 VITALS
HEART RATE: 62 BPM | HEIGHT: 70 IN | SYSTOLIC BLOOD PRESSURE: 138 MMHG | WEIGHT: 195 LBS | DIASTOLIC BLOOD PRESSURE: 74 MMHG | BODY MASS INDEX: 27.92 KG/M2

## 2017-07-26 DIAGNOSIS — C61 MALIGNANT NEOPLASM OF PROSTATE (H): Primary | ICD-10-CM

## 2017-07-26 ASSESSMENT — PAIN SCALES - GENERAL: PAINLEVEL: NO PAIN (0)

## 2017-07-26 NOTE — PROGRESS NOTES
Patient with pT2cNo Mx Cap [place] 9 post RP followed by salvage XRT and ADT with biochemical recurrence  Now on intermittent ADT  T = 75  PSA <0.01    No C/o  AF Vitals normal  AUA syptom score = 3  Abdomen benign.  No hernias  Plan - continue Intermittent ADT.  Lupron today as T elevated 6 month depot             RTC 3 months for PSA and T

## 2017-07-26 NOTE — NURSING NOTE
The following medication was given:     MEDICATION: Lupron Depot 45 mg  ROUTE: IM  SITE: [facility]  DOSE: 45 mg  LOT #: [id number]  :  [employer]  EXPIRATION DATE:  9/6/2018  NDC#: 04-B831-R5     Pt tolerated the injection well and will be due for the next one anytime after January 10, 2018.

## 2017-07-26 NOTE — MR AVS SNAPSHOT
After Visit Summary   7/26/2017    Medardo Gautam    MRN: [id number]           Patient Information     Date Of Birth          1940        Visit Information        Provider Department      7/26/2017 12:00 PM Norma Goodwin MD [facility] and [facility]        Today's Diagnoses     Malignant neoplasm of prostate (H)    -  1      Care Instructions    Return in three months with PSA and testosterone.    It was a pleasure meeting with you today.  Thank you for allowing me and my team the privilege of caring for you today.  YOU are the reason we are here, and I truly hope we provided you with the excellent service you deserve.  Please let us know if there is anything else we can do for you so that we can be sure you are leaving completely satisfied with your care experience.                  Follow-ups after your visit        Follow-up notes from your care team     Return in about 4 months (around 11/26/2017).      Your next 10 appointments already scheduled     Nov 01, 2017 11:40 AM CDT   (Arrive by 11:25 AM)   Return Prostate Cancer with Norma Goodwin MD   [facility] and [facility] ([facility] [place])    [address] [place] [address]  4th Floor  [facility] [id number] [phone]            Apr 24, 2018 11:45 AM CDT   (Arrive by 11:30 AM)   LONG TERM RETURN with Magali Andrews PA-C   [facility] ([facility] [place])    [address] [place] [address]  [address] [facility] [id number] [phone]              Future tests that were ordered for you today     Open Future Orders        Priority Expected Expires Ordered    PSA tumor marker Routine 10/24/2017 7/26/2018 7/26/2017    Testosterone total Routine 10/24/2017 7/26/2018 7/26/2017            Who to contact     Please call your clinic at [phone] to:    Ask questions about your health    Make or cancel  "appointments    Discuss your medicines    Learn about your test results    Speak to your doctor   If you have compliments or concerns about an experience at your clinic, or if you wish to file a complaint, please contact [facility] Patient Relations at [phone] or email us at [email or web address]         Additional Information About Your Visit        MyChart Information     [employer] gives you secure access to your electronic health record. If you see a primary care provider, you can also send messages to your care team and make appointments. If you have questions, please call your primary care clinic.  If you do not have a primary care provider, please call [phone] and they will assist you.      Re[employer] is an electronic gateway that provides easy, online access to your medical records. With [employer], you can request a clinic appointment, read your test results, renew a prescription or communicate with your care team.     To access your existing account, please contact your [facility] Clinic or call [phone] for assistance.        Care EveryWhere ID     This is your Care EveryWhere ID. This could be used by other organizations to access your [facility] medical records  [id number]        Your Vitals Were     Pulse Height BMI (Body Mass Index)             62 1.778 m (5' 10\") 27.98 kg/m2          Blood Pressure from Last 3 Encounters:   07/26/17 138/74   06/22/17 130/80   04/24/17 134/75    Weight from Last 3 Encounters:   07/26/17 88.5 kg (195 lb)   06/22/17 88.5 kg (195 lb)   04/24/17 88.7 kg (195 lb 8 oz)               Primary Care Provider Office Phone # Fax #    Verna Osborne [phone]       [facility] [address] [facility] [address]    [facility] [id number]        Equal Access to Services     RENEE CORONA AH: Sheba Hitchcock, quinn tenorio, randy thompson " laelizabeth spivey. [facility] [phone].    ATENCIÓN: Si habla leyda, tiene a sanford disposición servicios gratuitos de asistencia lingüística. Lukas moran [phone].    We comply with applicable federal civil rights laws and [place] laws. We do not discriminate on the basis of race, color, national origin, age, disability sex, sexual orientation or gender identity.            Thank you!     Thank you for choosing [facility] UROLOGY AND [facility] FOR PROSTATE AND UROLOGIC CANCERS  for your care. Our goal is always to provide you with excellent care. Hearing back from our patients is one way we can continue to improve our services. Please take a few minutes to complete the written survey that you may receive in the mail after your visit with us. Thank you!             Your Updated Medication List - Protect others around you: Learn how to safely use, store and throw away your medicines at www.disposemymeds.org.          This list is accurate as of: 7/26/17 12:15 PM.  Always use your most recent med list.                   Brand Name Dispense Instructions for use Diagnosis    ADVIL PO      Take by mouth as needed Reported on 3/9/2017        aspirin 81 MG tablet     30 tablet    Take 1 tablet (81 mg) by mouth daily    Hyperlipidemia LDL goal <130, Malignant neoplasm of colon, unspecified part of colon (H)       bicalutamide 50 MG tablet    CASODEX    90 tablet    Take 1 tablet (50 mg) by mouth daily    Malignant neoplasm of prostate (H)       calcium-vitamin D 600-400 MG-UNIT per tablet    CALTRATE     Take 1 tablet by mouth daily    Hyperlipidemia LDL goal <130, Malignant neoplasm of colon, unspecified part of colon (H)       CLARITIN PO      Take  by mouth. As needed        clonazePAM 1 MG tablet    klonoPIN    30 tablet    TAKE ONE TABLET BY MOUTH TWICE DAILY AS NEEDED FOR ANXIETY    Anxiety       folic acid-vit B6-vit B12 0.8-10-0.115 MG Tabs per tablet    FOLGARD     Take 1 tablet by mouth daily    Hyperlipidemia LDL goal <130,  Malignant neoplasm of colon, unspecified part of colon (H)       lisinopril 10 MG tablet    PRINIVIL/ZESTRIL    90 tablet    Take 1 tablet (10 mg) by mouth daily    Benign essential hypertension       naproxen 500 MG tablet    NAPROSYN    60 tablet    Take 1 tablet (500 mg) by mouth 2 times daily as needed for moderate pain    Chronic gout without tophus, unspecified cause, unspecified site       OMEGA-3 FISH OIL PO      Take 500 mg by mouth daily    Hyperlipidemia LDL goal <130, Malignant neoplasm of colon, unspecified part of colon (H)       sildenafil 100 MG cap/tab    VIAGRA    10 tablet    1/2 tab three times a week    Malignant neoplasm of prostate (H)       zolpidem 5 MG tablet    AMBIEN    30 tablet    TAKE ONE TABLET BY MOUTH IN THE EVENING AS NEEDED FOR SLEEP(1 month supply)    Persistent disorder of initiating or maintaining sleep

## 2017-07-26 NOTE — PATIENT INSTRUCTIONS
Return in three months with PSA and testosterone.    It was a pleasure meeting with you today.  Thank you for allowing me and my team the privilege of caring for you today.  YOU are the reason we are here, and I truly hope we provided you with the excellent service you deserve.  Please let us know if there is anything else we can do for you so that we can be sure you are leaving completely satisfied with your care experience.

## 2017-07-26 NOTE — NURSING NOTE
Chief Complaint   Patient presents with     RECHECK     prostate cancer and prostatectomy coming in for follow up       Zoe Rhodes MA

## 2017-07-26 NOTE — LETTER
7/26/2017       RE: Medardo Gautam  [id number] [facility] [id number]     Dear Colleague,    Thank you for referring your patient, Medardo Gautam, to the [facility] UROLOGY AND INST FOR PROSTATE AND UROLOGIC CANCERS at [facility]. Please see a copy of my visit note below.    Patient with pT2cNo Mx Cap [place] 9 post RP followed by salvage XRT and ADT with biochemical recurrence  Now on intermittent ADT  T = 75  PSA <0.01    No C/o  AF Vitals normal  AUA syptom score = 3  Abdomen benign.  No hernias  Plan - continue Intermittent ADT.  Lupron today as T elevated 6 month depot             RTC 3 months for PSA and T    Again, thank you for allowing me to participate in the care of your patient.      Sincerely,    Norma Goodwin MD

## 2017-07-27 DIAGNOSIS — C61 MALIGNANT NEOPLASM OF PROSTATE (H): Primary | ICD-10-CM

## 2017-08-25 ENCOUNTER — TELEPHONE (OUTPATIENT)
Dept: FAMILY MEDICINE | Facility: OTHER | Age: 77
End: 2017-08-25

## 2017-08-25 NOTE — TELEPHONE ENCOUNTER
Medardo Gautam is a 77 year old male who calls with left hip pain.    NURSING ASSESSMENT:  Description:  Pt reports he has some pain in left hip that radiates into left leg.    Onset/duration:  yesterday  Precip. factors:  Driving in car for 4 hours.  Associated symptoms:  Left hip pain, left leg pain.  Denies weakness or numbness in left leg, loss of bowel or bladder.  Pt is able to walk without complications.  Improves/worsens symptoms:  none  Pain scale (0-10)   2/10    Allergies: No Known Allergies    RECOMMENDED DISPOSITION:  Home care advice - avoid prolonged sitting, apply ice to low back for 20 minutes a few times a day, light stretching and walking.  Will comply with recommendation: Yes  If further questions/concerns or if symptoms do not improve, worsen or new symptoms develop, call your PCP or [facility] as soon as possible.      Guideline used: Back pain  Telephone Triage Protocols for Nurses, Fifth Edition, Charlotte Gonsales RN

## 2017-08-27 DIAGNOSIS — G47.00 PERSISTENT DISORDER OF INITIATING OR MAINTAINING SLEEP: ICD-10-CM

## 2017-08-28 ENCOUNTER — TELEPHONE (OUTPATIENT)
Dept: FAMILY MEDICINE | Facility: OTHER | Age: 77
End: 2017-08-28

## 2017-08-28 DIAGNOSIS — I10 BENIGN ESSENTIAL HYPERTENSION: ICD-10-CM

## 2017-08-28 NOTE — TELEPHONE ENCOUNTER
lisinopril (PRINIVIL,ZESTRIL) 10 MG tablet      Last Written Prescription Date: 9/8/16  Last Fill Quantity: 90, # refills: 3  Last Office Visit with G, P or [facility] prescribing provider: 6/22/17 KALIDINDI       Potassium   Date Value Ref Range Status   03/18/2017 4.2 3.4 - 5.3 mmol/L Final     Creatinine   Date Value Ref Range Status   03/18/2017 0.93 0.66 - 1.25 mg/dL Final     BP Readings from Last 3 Encounters:   07/26/17 138/74   06/22/17 130/80   04/24/17 134/75

## 2017-08-28 NOTE — TELEPHONE ENCOUNTER
zolpidem (AMBIEN) 5 MG tablet      Last Written Prescription Date:  3/2/17  Last Fill Quantity: 3/2/17,   # refills: 5  Last Office Visit with [facility], [facility] or [facility] prescribing provider: 6/22/17  Future Office visit:       Routing refill request to provider for review/approval because:  Drug not on the [facility], [facility] or [facility] refill protocol or controlled substance

## 2017-08-29 NOTE — TELEPHONE ENCOUNTER
Pt called to check on the status of this request.  He asked that this be addressed today as he is going out of town tomorrow and needs this before he leaves.  He also needs the ambien filled as well.

## 2017-08-30 RX ORDER — ZOLPIDEM TARTRATE 5 MG/1
TABLET ORAL
Qty: 30 TABLET | Refills: 0 | Status: SHIPPED | OUTPATIENT
Start: 2017-08-30 | End: 2017-09-27

## 2017-08-30 RX ORDER — LISINOPRIL 10 MG/1
TABLET ORAL
Qty: 90 TABLET | Refills: 0 | Status: SHIPPED | OUTPATIENT
Start: 2017-08-30 | End: 2017-12-04

## 2017-08-30 NOTE — TELEPHONE ENCOUNTER
"Routing to RK to review and order the repeat US. Per LOV 06/22/2017, \"Patient has a history of prostate cancer and is being followed by urology. He was incidentally noted to have a renal cyst on his left kidney on a CT scan done in March 2017. The plan was to repeat an ultrasound in 3 months for follow-up. The patient however wants to discuss this with his urologist before investigating it further. He does not have any new symptoms. He has an upcoming appointment with next month. He will get back to me on this.\"    Corrina Waldrop, PETRA, BSN     "

## 2017-08-30 NOTE — TELEPHONE ENCOUNTER
I called and scheduled pt for his physical for 9/15/17.  There is no US order, can you order this so I can schedule him? Otherwise please forward to provider to order.

## 2017-08-30 NOTE — TELEPHONE ENCOUNTER
Next 5 appointments (look out 90 days)     Sep 07, 2017  9:00 AM CDT   [facility]   [facility]    [address] [id number] [phone]                LM for the patient, OKAY to discuss repeat scans at physical. Corrina Waldrop RN, BSN

## 2017-08-30 NOTE — TELEPHONE ENCOUNTER
Reason for call:  Pt had called back and we scheduled his PE for an earlier date ( 9/7/17 ) to discuss everything. He stated that he did speak with his urologist and they didn't think it was neccessary for a repeat ultrasound. Please advise.

## 2017-08-30 NOTE — TELEPHONE ENCOUNTER
Spoke with pt, he would like to  today, can someone approve for RK?  He is scheduled for a physical now for 9/15/17.

## 2017-08-30 NOTE — TELEPHONE ENCOUNTER
lisinopril (PRINIVIL,ZESTRIL) 10 MG tablet  Medication is being filled for 1 time refill only due to:  Patient needs to be seen because will be due for a repeat US around 09/2017 per LOV and due for physical around 09/08/2017.     Please assist with scheduling physical.   Corrina Waldrop RN, BSN

## 2017-09-01 NOTE — PROGRESS NOTES
SUBJECTIVE:   CC: Medardo Gautam is an 77 year old male who presents for preventative health visit.     Physical   Annual:     Getting at least 3 servings of Calcium per day::  Yes    Bi-annual eye exam::  NO    Dental care twice a year::  Yes    Sleep apnea or symptoms of sleep apnea::  None    Diet::  Regular (no restrictions)    Frequency of exercise::  None    Taking medications regularly::  Yes    Medication side effects::  Muscle aches    Additional concerns today::  YES          Order for PT- left leg pain     Today's PHQ-2 Score:   PHQ-2 ( 1999 Pf[employer]) 11/15/2016   Q1: Little interest or pleasure in doing things 0   Q2: Feeling down, depressed or hopeless 0   PHQ-2 Score 0       Abuse: Current or Past(Physical, Sexual or Emotional)- No  Do you feel safe in your environment - Yes    Social History   Substance Use Topics     Smoking status: Former Smoker     Years: 1.00     Types: Cigarettes, Pipe, Cigars     Start date: 10/1/1961     Quit date: 1/1/1962     Smokeless tobacco: Never Used      Comment: No smokers in home     Alcohol use 0.0 oz/week     0 Standard drinks or equivalent per week      Comment: daily glass of wine and whiskey     The patient does not drink >3 drinks per day nor >7 drinks per week.    Last PSA:   PSA   Date Value Ref Range Status   07/19/2017  0 - 4 ug/L Final    <0.01  Assay Method:  Chemiluminescence using Siemens Vista analyzer         Reviewed orders with patient. Reviewed health maintenance and updated orders accordingly - Yes  Labs reviewed in EPIC  BP Readings from Last 3 Encounters:   09/07/17 130/80   09/05/17 130/80   07/26/17 138/74    Wt Readings from Last 3 Encounters:   09/07/17 193 lb (87.5 kg)   09/05/17 196 lb (88.9 kg)   07/26/17 195 lb (88.5 kg)                  Patient Active Problem List   Diagnosis     Disturbance of skin sensation     Hemorrhoids     Gout     Advanced directives, counseling/discussion     Dupuytren's contracture of hand- left 5th finger, right  5th finger     Bunions- both feet     Skin lesion- R side of face and behind L ear     Insomnia     Prostatic nodule- posterior right edge with rectal exam     Prostate cancer- [place] 9 (5+4) dx Feb 2012     Elevated liver enzymes     Hyperbilirubinemia     Essential hypertension with goal blood pressure less than 140/90     Shoulder pain, left     Contracture of finger joint     Hyperlipidemia LDL goal <130     Shoulder pain, right     Malignant neoplasm of prostate (H)     Anxiety     Colon cancer (H)     Abdominal pain, epigastric     Renal cyst     Past Surgical History:   Procedure Laterality Date     APPENDECTOMY       BIOPSY  01/16/15     C HAND/FINGER SURGERY UNLISTED       C LENGTHEN,TENDON,HAND/FINGER  ca 2007    right fifth     C STOMACH SURGERY PROCEDURE UNLISTED       COLON SURGERY  2/11/2015    Lap assisted R hemicolectomy     COLONOSCOPY  10/25/07    Snare polypectomy     COLONOSCOPY  6/25/2009    with snare polypectomy     COLONOSCOPY  9/30/2009     COLONOSCOPY  1/5/2011    COL[facility]COPY performed by [facility] at [facility]     COLONOSCOPY N/A 1/16/2015    Procedure: COMBINED COLONOSCOPY, SINGLE OR MULTIPLE BIOPSY/POLYPECTOMY BY BIOPSY;  Surgeon: Sarah Beth Pisano MD;  Location: [facility] OR     COLONOSCOPY WITH CO2 INSUFFLATION N/A 1/16/2015    Procedure: COLONOSCOPY WITH CO2 INSUFFLATION;  Surgeon: Sarah Beth Pisano MD;  Location: [facility] OR     DAVINCI PROSTATECTOMY  8/6/2012    Procedure: DAVINCI PROSTATECTOMY;  Davinci Assisted Radical Prostatectomy with Bilateral Lymphadenectomy ;  Surgeon: Norma Goodwin MD;  [facility]     LAPAROSCOPIC ASSISTED COLECTOMY N/A 2/11/2015    Procedure: LAPAROSCOPIC ASSISTED COLECTOMY;  Surgeon: Oren Breen MD;  [facility]       Social History   Substance Use Topics     Smoking status: Former Smoker     Years: 1.00     Types: Cigarettes, Cigars, Pipe     Start date: 10/1/1961     Quit date: 1/1/1962     Smokeless tobacco: Never Used      Comment: No  smokers in home     Alcohol use 0.0 oz/week      Comment: daily glass of wine and whiskey     Family History   Problem Relation Age of Onset     CEREBROVASCULAR DISEASE Father      CANCER Mother 90     Patient believes vaginal cancer - hysterectomy,      Alzheimer Disease Mother      CANCER Sister      Breast Cancer Sister      C.A.D. No family hx of      Breast Cancer No family hx of      Cancer - colorectal No family hx of      Prostate Cancer No family hx of      Blood Disease No family hx of      Cardiovascular No family hx of      Circulatory No family hx of      Eye Disorder No family hx of      GASTROINTESTINAL DISEASE No family hx of      Genitourinary Problems No family hx of      Lipids No family hx of      Neurologic Disorder No family hx of      Respiratory No family hx of      Asthma No family hx of      HEART DISEASE No family hx of      DIABETES No family hx of      Hypertension No family hx of      Arthritis No family hx of      Thyroid Disease No family hx of      Musculoskeletal Disorder No family hx of          Current Outpatient Prescriptions   Medication Sig Dispense Refill     zolpidem (AMBIEN) 5 MG tablet TAKE ONE TABLET BY MOUTH ONCE DAILY IN THE EVENING AS NEEDED FOR SLEEP 30 tablet 0     lisinopril (PRINIVIL/ZESTRIL) 10 MG tablet TAKE ONE TABLET BY MOUTH ONCE DAILY 90 tablet 0     clonazePAM (KLONOPIN) 1 MG tablet TAKE ONE TABLET BY MOUTH TWICE DAILY AS NEEDED FOR ANXIETY 30 tablet 2     bicalutamide (CASODEX) 50 MG tablet Take 1 tablet (50 mg) by mouth daily 90 tablet 3     naproxen (NAPROSYN) 500 MG tablet Take 1 tablet (500 mg) by mouth 2 times daily as needed for moderate pain 60 tablet 0     Omega-3 Fatty Acids (OMEGA-3 FISH OIL PO) Take 500 mg by mouth daily       calcium-vitamin D (CALTRATE) 600-400 MG-UNIT per tablet Take 1 tablet by mouth daily       folic acid-vit B6-vit B12 (FOLGARD) 0.8-10-0.115 MG TABS Take 1 tablet by mouth daily       aspirin 81 MG tablet Take 1  tablet (81 mg) by mouth daily 30 tablet      sildenafil (VIAGRA) 100 MG tablet 1/2 tab three times a week 10 tablet 12     Loratadine (CLARITIN PO) Take  by mouth. As needed        HYDROcodone-acetaminophen (NORCO) 5-325 MG per tablet Take 1 tablet by mouth nightly as needed for moderate to severe pain maximum 1  tablet(s) per day (Patient not taking: Reported on 9/7/2017) 20 tablet 0     No Known Allergies        Reviewed and updated as needed this visit by clinical staff         Reviewed and updated as needed this visit by Provider          Past Medical History:   Diagnosis Date     Anxiety      Colon cancer (H) 01/2015     Contracture of finger joint ca 2005    left and right fifth fingers     Dupuytren's contracture of left hand      Gout      HEMORRHOIDS NOS 6/4/2007     Hypertension      Insomnia      Personal history of colonic polyps 7 years ago?     Prostate cancer (H) Feb 2012     Renal cyst 6/22/2017     Seborrheic dermatitis      Skin lesion- R side of face and behind L ear 12/15/2011     SKIN SENSATION DISTURB 12/29/2006    allergic reaction to strawberries     SKIN SENSATION DISTURB 12/29/2006      Past Surgical History:   Procedure Laterality Date     APPENDECTOMY       BIOPSY  01/16/15     C HAND/FINGER SURGERY UNLISTED       C LENGTHEN,TENDON,HAND/FINGER  ca 2007    right fifth     C STOMACH SURGERY PROCEDURE UNLISTED       COLON SURGERY  2/11/2015    Lap assisted R hemicolectomy     COLONOSCOPY  10/25/07    Snare polypectomy     COLONOSCOPY  6/25/2009    with snare polypectomy     COLONOSCOPY  9/30/2009     COLONOSCOPY  1/5/2011    [facility]     COLONOSCOPY N/A 1/16/2015    Procedure: COMBINED COLONOSCOPY, SINGLE OR MULTIPLE BIOPSY/POLYPECTOMY BY BIOPSY;  Surgeon: Sarah Beth Pisano MD;  [facility]: [facility]     COLONOSCOPY WITH CO2 INSUFFLATION N/A 1/16/2015    Procedure: COLONOSCOPY WITH CO2 INSUFFLATION;  Surgeon: Sarah Beth Pisano MD;  Location:  OR      "DAVINCI PROSTATECTOMY  8/6/2012    Procedure: DAVINCI PROSTATECTOMY;  Davinci Assisted Radical Prostatectomy with Bilateral Lymphadenectomy ;  Surgeon: Norma Goodwin MD;  [facility]     LAPAROSCOPIC ASSISTED COLECTOMY N/A 2/11/2015    Procedure: LAPAROSCOPIC ASSISTED COLECTOMY;  Surgeon: Oren Breen MD;  [facility]              ROS:  C: NEGATIVE for fever, chills, change in weight  I: NEGATIVE for worrisome rashes, moles or lesions  E: NEGATIVE for vision changes or irritation  ENT: NEGATIVE for ear, mouth and throat problems  R: NEGATIVE for significant cough or SOB  CV: NEGATIVE for chest pain, palpitations or peripheral edema  GI: NEGATIVE for nausea, abdominal pain, heartburn, or change in bowel habits   male: negative for dysuria, hematuria, decreased urinary stream, erectile dysfunction, urethral discharge  M: NEGATIVE for significant arthralgias or myalgia  N: NEGATIVE for weakness, dizziness or paresthesias  P: NEGATIVE for changes in mood or affect    OBJECTIVE:   /80 (BP Location: Left arm, Patient Position: Sitting, Cuff Size: Adult Regular)  Pulse 60  Temp 97.6  F (36.4  C) (Oral)  Ht 5' 7\" (1.702 m)  Wt 193 lb (87.5 kg)  SpO2 100%  BMI 30.23 kg/m2    EXAM:  Physical Exam   Constitutional: He is oriented to person, place, and time. He appears well-developed and well-nourished.   HENT:   Head: Normocephalic and atraumatic.   Right Ear: External ear normal.   Left Ear: External ear normal.   Mouth/Throat: Oropharynx is clear and moist.   Eyes: EOM are normal.   Neck: Neck supple.   Cardiovascular: Normal rate and regular rhythm.    Pulmonary/Chest: Effort normal and breath sounds normal.   Abdominal: Soft. Bowel sounds are normal.   Musculoskeletal: Normal range of motion.   Neurological: He is alert and oriented to person, place, and time.   Psychiatric: He has a normal mood and affect.         ASSESSMENT/PLAN:     Problem List Items Addressed This Visit     Elevated " "liver enzymes    Relevant Orders    Comprehensive metabolic panel (BMP + Alb, Alk Phos, ALT, AST, Total. Bili, TP) (Completed)    Colon cancer (H)     Last saw Oncologist Dr. Daily in 4/16. Due for follow up [facility] in 1 yr from last visit  Needs colonoscopy in 2/19             Other Visit Diagnoses     Need for prophylactic vaccination and inoculation against influenza    -  Primary    Chronic left-sided low back pain with left-sided sciatica        Relevant Orders    PHYSICAL THERAPY REFERRAL    Benign essential hypertension        Relevant Orders    Comprehensive metabolic panel (BMP + Alb, Alk Phos, ALT, AST, Total. Bili, TP) (Completed)    Hyperglycemia        Relevant Orders    Hemoglobin A1c (Completed)            COUNSELING:   Reviewed preventive health counseling, as reflected in patient instructions       Regular exercise       Healthy diet/nutrition       Vision screening       Hearing screening           reports that he quit smoking about 55 years ago. His smoking use included Cigarettes, Cigars, and Pipe. He started smoking about 55 years ago. He quit after 1.00 year of use. He has never used smokeless tobacco.      Estimated body mass index is 30.23 kg/(m^2) as calculated from the following:    Height as of this encounter: 5' 7\" (1.702 m).    Weight as of this encounter: 193 lb (87.5 kg).         Counseling Resources:  ATP IV Guidelines  Pooled Cohorts Equation Calculator  FRAX Risk Assessment  ICSI Preventive Guidelines  Dietary Guidelines for Americans, 2010  USDA's MyPlate  ASA Prophylaxis  Lung CA Screening    Verna Osborne MD  [facility] for HPI/ROS submitted by the patient on 9/4/2017   PHQ-2 Score: 0    "

## 2017-09-04 ENCOUNTER — MYC MEDICAL ADVICE (OUTPATIENT)
Dept: UROLOGY | Facility: CLINIC | Age: 77
End: 2017-09-04

## 2017-09-05 ENCOUNTER — OFFICE VISIT (OUTPATIENT)
Dept: FAMILY MEDICINE | Facility: OTHER | Age: 77
End: 2017-09-05
Payer: COMMERCIAL

## 2017-09-05 VITALS
HEART RATE: 54 BPM | RESPIRATION RATE: 16 BRPM | OXYGEN SATURATION: 99 % | DIASTOLIC BLOOD PRESSURE: 80 MMHG | TEMPERATURE: 97.4 F | SYSTOLIC BLOOD PRESSURE: 130 MMHG | WEIGHT: 196 LBS | BODY MASS INDEX: 28.12 KG/M2

## 2017-09-05 DIAGNOSIS — M25.552 HIP PAIN, LEFT: Primary | ICD-10-CM

## 2017-09-05 PROCEDURE — 99213 OFFICE O/P EST LOW 20 MIN: CPT | Performed by: FAMILY MEDICINE

## 2017-09-05 RX ORDER — HYDROCODONE BITARTRATE AND ACETAMINOPHEN 5; 325 MG/1; MG/1
1 TABLET ORAL
Qty: 20 TABLET | Refills: 0 | Status: SHIPPED | OUTPATIENT
Start: 2017-09-05 | End: 2017-11-29

## 2017-09-05 RX ORDER — HYDROCODONE BITARTRATE AND ACETAMINOPHEN 5; 325 MG/1; MG/1
1 TABLET ORAL
Qty: 20 TABLET | Refills: 0 | Status: SHIPPED | OUTPATIENT
Start: 2017-09-05 | End: 2017-09-05

## 2017-09-05 ASSESSMENT — PAIN SCALES - GENERAL: PAINLEVEL: SEVERE PAIN (6)

## 2017-09-05 NOTE — PROGRESS NOTES
SUBJECTIVE:                                                    Medardo Gautam is a 77 year old male who presents to clinic today for the following health issues:      HPI    Joint Pain    Onset: 2 weeks    Description:   Location: left leg from foot to hip  Character: Sharp    Intensity: moderate, severe    Progression of Symptoms: same    Accompanying Signs & Symptoms:  Other symptoms: none    History:   Previous similar pain: no       Precipitating factors:   Trauma or overuse: YES- Patient was driving from [place] to home when the pain started    Alleviating factors:  Improved by: rest/inactivity    Therapies Tried and outcome: Rest, ibuprofen          Problem list and histories reviewed & adjusted, as indicated.  Additional history: as documented        Patient Active Problem List   Diagnosis     Disturbance of skin sensation     Hemorrhoids     Gout     Advanced directives, counseling/discussion     Dupuytren's contracture of hand- left 5th finger, right 5th finger     Bunions- both feet     Skin lesion- R side of face and behind L ear     Insomnia     Prostatic nodule- posterior right edge with rectal exam     Prostate cancer- [place] 9 (5+4) dx Feb 2012     Elevated liver enzymes     Hyperbilirubinemia     Essential hypertension with goal blood pressure less than 140/90     Shoulder pain, left     Contracture of finger joint     Hyperlipidemia LDL goal <130     Shoulder pain, right     Malignant neoplasm of prostate (H)     Anxiety     Colon cancer (H)     Abdominal pain, epigastric     Renal cyst     Past Surgical History:   Procedure Laterality Date     APPENDECTOMY       BIOPSY  01/16/15     C HAND/FINGER SURGERY UNLISTED       C LENGTHEN,TENDON,HAND/FINGER  ca 2007    right fifth     C STOMACH SURGERY PROCEDURE UNLISTED       COLON SURGERY  2/11/2015    Lap assisted R hemicolectomy     COLONOSCOPY  10/25/07    Snare polypectomy     COLONOSCOPY  6/25/2009    with snare polypectomy     COLONOSCOPY   2009     COLONOSCOPY  2011    [facility]     COLONOSCOPY N/A 2015    Procedure: COMBINED COLONOSCOPY, SINGLE OR MULTIPLE BIOPSY/POLYPECTOMY BY BIOPSY;  Surgeon: Sarah Beth Pisano MD;  Location: [facility]     COLONOSCOPY WITH CO2 INSUFFLATION N/A 2015    Procedure: COLONOSCOPY WITH CO2 INSUFFLATION;  Surgeon: Sarah Beth Pisano MD;  [facility]: [facility]     DAVINCI PROSTATECTOMY  2012    Procedure: DAVINCI PROSTATECTOMY;  Davinci Assisted Radical Prostatectomy with Bilateral Lymphadenectomy ;  Surgeon: Norma Goodwin MD;  [facility]     LAPAROSCOPIC ASSISTED COLECTOMY N/A 2015    Procedure: LAPAROSCOPIC ASSISTED COLECTOMY;  Surgeon: Oren Breen MD;  Location:  OR       Social History   Substance Use Topics     Smoking status: Former Smoker     Years: 1.00     Types: Cigarettes, Cigars, Pipe     Start date: 10/1/1961     Quit date: 1962     Smokeless tobacco: Never Used      Comment: No smokers in home     Alcohol use 0.0 oz/week      Comment: daily glass of wine and whiskey     Family History   Problem Relation Age of Onset     CEREBROVASCULAR DISEASE Father      CANCER Mother 90     Patient believes vaginal cancer - hysterectomy,      Alzheimer Disease Mother      CANCER Sister      Breast Cancer Sister      C.A.D. No family hx of      Breast Cancer No family hx of      Cancer - colorectal No family hx of      Prostate Cancer No family hx of      Blood Disease No family hx of      Cardiovascular No family hx of      Circulatory No family hx of      Eye Disorder No family hx of      GASTROINTESTINAL DISEASE No family hx of      Genitourinary Problems No family hx of      Lipids No family hx of      Neurologic Disorder No family hx of      Respiratory No family hx of      Asthma No family hx of      HEART DISEASE No family hx of      DIABETES No family hx of      Hypertension No family hx of      Arthritis No family hx of       Thyroid Disease No family hx of      Musculoskeletal Disorder No family hx of          Current Outpatient Prescriptions   Medication Sig Dispense Refill     HYDROcodone-acetaminophen (NORCO) 5-325 MG per tablet Take 1 tablet by mouth nightly as needed for moderate to severe pain maximum 1  tablet(s) per day (Patient not taking: Reported on 9/7/2017) 20 tablet 0     naproxen (NAPROSYN) 500 MG tablet Take 1 tablet (500 mg) by mouth 2 times daily as needed for moderate pain 60 tablet 0     zolpidem (AMBIEN) 5 MG tablet TAKE ONE TABLET BY MOUTH ONCE DAILY IN THE EVENING AS NEEDED FOR SLEEP 30 tablet 0     lisinopril (PRINIVIL/ZESTRIL) 10 MG tablet TAKE ONE TABLET BY MOUTH ONCE DAILY 90 tablet 0     clonazePAM (KLONOPIN) 1 MG tablet TAKE ONE TABLET BY MOUTH TWICE DAILY AS NEEDED FOR ANXIETY 30 tablet 2     bicalutamide (CASODEX) 50 MG tablet Take 1 tablet (50 mg) by mouth daily 90 tablet 3     Omega-3 Fatty Acids (OMEGA-3 FISH OIL PO) Take 500 mg by mouth daily       calcium-vitamin D (CALTRATE) 600-400 MG-UNIT per tablet Take 1 tablet by mouth daily       folic acid-vit B6-vit B12 (FOLGARD) 0.8-10-0.115 MG TABS Take 1 tablet by mouth daily       aspirin 81 MG tablet Take 1 tablet (81 mg) by mouth daily 30 tablet      sildenafil (VIAGRA) 100 MG tablet 1/2 tab three times a week 10 tablet 12     Loratadine (CLARITIN PO) Take  by mouth. As needed        No Known Allergies  BP Readings from Last 3 Encounters:   09/07/17 130/80   09/05/17 130/80   07/26/17 138/74    Wt Readings from Last 3 Encounters:   09/07/17 193 lb (87.5 kg)   09/05/17 196 lb (88.9 kg)   07/26/17 195 lb (88.5 kg)                  Labs reviewed in EPIC        ROS:  Constitutional, HEENT, cardiovascular, pulmonary, GI, , musculoskeletal, neuro, skin, endocrine and psych systems are negative, except as otherwise noted.      OBJECTIVE:   /80 (Cuff Size: Adult Large)  Pulse 54  Temp 97.4  F (36.3  C) (Temporal)  Resp 16  Wt 196 lb (88.9 kg)  SpO2  99%  BMI 28.12 kg/m2  Body mass index is 28.12 kg/(m^2).   Physical Exam   Constitutional: He is oriented to person, place, and time. He appears well-developed and well-nourished.   HENT:   Head: Normocephalic and atraumatic.   Cardiovascular: Normal rate, regular rhythm and normal heart sounds.    Pulmonary/Chest: Effort normal and breath sounds normal.   Musculoskeletal:   Pain with internal and external rotation of the hip   Neurological: He is alert and oriented to person, place, and time.   Psychiatric: He has a normal mood and affect.         Diagnostic Test Results:  none     ASSESSMENT/PLAN:     Problem List Items Addressed This Visit     None      Visit Diagnoses     Hip pain, left    -  Primary    Relevant Medications    HYDROcodone-acetaminophen (NORCO) 5-325 MG per tablet         Discussed hip exercises  Hydrocodone prn fpr severe pain  Discussed home care  Reportable signs and symptoms discussed  RTC if symptoms persist or fail to improve    Verna Osborne MD  [facility]

## 2017-09-05 NOTE — MR AVS SNAPSHOT
After Visit Summary   9/5/2017    Medardo Gautam    MRN: [id number]           Patient Information     Date Of Birth          1940        Visit Information        Provider Department      9/5/2017 2:20 PM Verna Osborne MD [facility]        Today's Diagnoses     Hip pain, left    -  1       Follow-ups after your visit        Your next 10 appointments already scheduled     Sep 07, 2017  9:00 AM CDT   [facility]ICAL with Verna Osborne MD   [facility]    [address]  [facility] [address]   [phone]            Nov 01, 2017 11:40 AM CDT   (Arrive by 11:25 AM)   Return Prostate Cancer with Norma Goodwin MD   [facility] Prostate and Urologic Cancers ([facility])    [address] [place] [address]  4th Floor  [facility] [id number] [phone]            Apr 24, 2018 11:45 AM CDT   (Arrive by 11:30 AM)   LONG TERM RETURN with Magali Andrews PA-C   [facility] ([facility])    [address]  2nd Floor  [facility] [id number] [phone]              Who to contact     If you have questions or need follow up information about today's clinic visit or your schedule please contact [facility] directly at [phone].  Normal or non-critical lab and imaging results will be communicated to you by MyChart, letter or phone within 4 business days after the clinic has received the results. If you do not hear from us within 7 days, please contact the clinic through MyChart or phone. If you have a critical or abnormal lab result, we will notify you by phone as soon as possible.  Submit refill requests through [employer] or call your pharmacy and they will forward the refill request to us. Please allow 3 business days for your refill to be completed.          Additional Information About Your Visit        MyChart Information      [employer] gives you secure access to your electronic health record. If you see a primary care provider, you can also send messages to your care team and make appointments. If you have questions, please call your primary care clinic.  If you do not have a primary care provider, please call [phone] and they will assist you.        Care EveryWhere ID     This is your Care EveryWhere ID. This could be used by other organizations to access your [facility] medical records  [id number]        Your Vitals Were     Pulse Temperature Respirations Pulse Oximetry BMI (Body Mass Index)       54 97.4  F (36.3  C) (Temporal) 16 99% 28.12 kg/m2        Blood Pressure from Last 3 Encounters:   09/05/17 130/80   07/26/17 138/74   06/22/17 130/80    Weight from Last 3 Encounters:   09/05/17 196 lb (88.9 kg)   07/26/17 195 lb (88.5 kg)   06/22/17 195 lb (88.5 kg)              Today, you had the following     No orders found for display         Today's Medication Changes          These changes are accurate as of: 9/5/17  2:53 PM.  If you have any questions, ask your nurse or doctor.               Start taking these medicines.        Dose/Directions    HYDROcodone-acetaminophen 5-325 MG per tablet   Commonly known as:  NORCO   Used for:  Hip pain, left   Started by:  Verna Osborne MD        Dose:  1 tablet   Take 1 tablet by mouth nightly as needed for moderate to severe pain maximum 1  tablet(s) per day   Quantity:  20 tablet   Refills:  0         Stop taking these medicines if you haven't already. Please contact your care team if you have questions.     ADVIL PO   Stopped by:  Verna Osborne MD                Where to get your medicines      Some of these will need a paper prescription and others can be bought over the counter.  Ask your nurse if you have questions.     Bring a paper prescription for each of these medications     HYDROcodone-acetaminophen 5-325 MG per tablet                Primary Care Provider Office Phone  # Fax #    Verna Osborne -[phone]       [address] [place] [address] [address]  [facility] [address]        Equal Access to Services     RENEE CORONA : Sheba Hitchcock, quinn tenorio, stacinieves zabalasamantharaghavendra langleyletitiaraghavendra, randy lucasmitraen spivey. So [place] [phone].    ATENCIÓN: Si habla español, tiene a sanford disposición servicios gratuitos de asistencia lingüística. Llame al [phone].    We comply with applicable federal civil rights laws and [place] laws. We do not discriminate on the basis of race, color, national origin, age, disability sex, sexual orientation or gender identity.            Thank you!     Thank you for choosing [facility]  for your care. Our goal is always to provide you with excellent care. Hearing back from our patients is one way we can continue to improve our services. Please take a few minutes to complete the written survey that you may receive in the mail after your visit with us. Thank you!             Your Updated Medication List - Protect others around you: Learn how to safely use, store and throw away your medicines at www.disposemymeds.org.          This list is accurate as of: 9/5/17  2:53 PM.  Always use your most recent med list.                   Brand Name Dispense Instructions for use Diagnosis    aspirin 81 MG tablet     30 tablet    Take 1 tablet (81 mg) by mouth daily    Hyperlipidemia LDL goal <130, Malignant neoplasm of colon, unspecified part of colon (H)       bicalutamide 50 MG tablet    CASODEX    90 tablet    Take 1 tablet (50 mg) by mouth daily    Malignant neoplasm of prostate (H)       calcium-vitamin D 600-400 MG-UNIT per tablet    CALTRATE     Take 1 tablet by mouth daily    Hyperlipidemia LDL goal <130, Malignant neoplasm of colon, unspecified part of colon (H)       CLARITIN PO      Take  by mouth. As needed        clonazePAM 1 MG tablet    klonoPIN    30 tablet    TAKE ONE TABLET BY MOUTH TWICE DAILY AS NEEDED  FOR ANXIETY    Anxiety       folic acid-vit B6-vit B12 0.8-10-0.115 MG Tabs per tablet    FOLGARD     Take 1 tablet by mouth daily    Hyperlipidemia LDL goal <130, Malignant neoplasm of colon, unspecified part of colon (H)       HYDROcodone-acetaminophen 5-325 MG per tablet    NORCO    20 tablet    Take 1 tablet by mouth nightly as needed for moderate to severe pain maximum 1  tablet(s) per day    Hip pain, left       lisinopril 10 MG tablet    PRINIVIL/ZESTRIL    90 tablet    TAKE ONE TABLET BY MOUTH ONCE DAILY    Benign essential hypertension       naproxen 500 MG tablet    NAPROSYN    60 tablet    Take 1 tablet (500 mg) by mouth 2 times daily as needed for moderate pain    Chronic gout without tophus, unspecified cause, unspecified site       OMEGA-3 FISH OIL PO      Take 500 mg by mouth daily    Hyperlipidemia LDL goal <130, Malignant neoplasm of colon, unspecified part of colon (H)       sildenafil 100 MG cap/tab    VIAGRA    10 tablet    1/2 tab three times a week    Malignant neoplasm of prostate (H)       zolpidem 5 MG tablet    AMBIEN    30 tablet    TAKE ONE TABLET BY MOUTH ONCE DAILY IN THE EVENING AS NEEDED FOR SLEEP    Persistent disorder of initiating or maintaining sleep

## 2017-09-05 NOTE — NURSING NOTE
"Chief Complaint   Patient presents with     Musculoskeletal Problem     Panel Management     Honoring Choices, Flu       Initial /80 (Cuff Size: Adult Large)  Pulse 54  Temp 97.4  F (36.3  C) (Temporal)  Resp 16  Wt 196 lb (88.9 kg)  SpO2 99%  BMI 28.12 kg/m2 Estimated body mass index is 28.12 kg/(m^2) as calculated from the following:    Height as of 7/26/17: 5' 10\" (1.778 m).    Weight as of this encounter: 196 lb (88.9 kg).  Medication Reconciliation: complete   Marixa Mccord CMA (AAMA)    "

## 2017-09-07 ENCOUNTER — OFFICE VISIT (OUTPATIENT)
Dept: FAMILY MEDICINE | Facility: OTHER | Age: 77
End: 2017-09-07
Payer: COMMERCIAL

## 2017-09-07 VITALS
SYSTOLIC BLOOD PRESSURE: 130 MMHG | TEMPERATURE: 97.6 F | DIASTOLIC BLOOD PRESSURE: 80 MMHG | HEART RATE: 60 BPM | WEIGHT: 193 LBS | OXYGEN SATURATION: 100 % | BODY MASS INDEX: 30.29 KG/M2 | HEIGHT: 67 IN

## 2017-09-07 DIAGNOSIS — G89.29 CHRONIC LEFT-SIDED LOW BACK PAIN WITH LEFT-SIDED SCIATICA: ICD-10-CM

## 2017-09-07 DIAGNOSIS — I10 BENIGN ESSENTIAL HYPERTENSION: ICD-10-CM

## 2017-09-07 DIAGNOSIS — M54.42 CHRONIC LEFT-SIDED LOW BACK PAIN WITH LEFT-SIDED SCIATICA: ICD-10-CM

## 2017-09-07 DIAGNOSIS — R73.9 HYPERGLYCEMIA: ICD-10-CM

## 2017-09-07 DIAGNOSIS — Z23 NEED FOR PROPHYLACTIC VACCINATION AND INOCULATION AGAINST INFLUENZA: ICD-10-CM

## 2017-09-07 DIAGNOSIS — Z00.00 ENCOUNTER FOR ROUTINE ADULT HEALTH EXAMINATION WITHOUT ABNORMAL FINDINGS: Primary | ICD-10-CM

## 2017-09-07 DIAGNOSIS — R74.8 ELEVATED LIVER ENZYMES: ICD-10-CM

## 2017-09-07 DIAGNOSIS — C18.9 MALIGNANT NEOPLASM OF COLON, UNSPECIFIED PART OF COLON (H): ICD-10-CM

## 2017-09-07 LAB
ALBUMIN SERPL-MCNC: 4.2 G/DL (ref 3.4–5)
ALP SERPL-CCNC: 63 U/L (ref 40–150)
ALT SERPL W P-5'-P-CCNC: 60 U/L (ref 0–70)
ANION GAP SERPL CALCULATED.3IONS-SCNC: 7 MMOL/L (ref 3–14)
AST SERPL W P-5'-P-CCNC: 54 U/L (ref 0–45)
BILIRUB SERPL-MCNC: 0.8 MG/DL (ref 0.2–1.3)
BUN SERPL-MCNC: 15 MG/DL (ref 7–30)
CALCIUM SERPL-MCNC: 9.4 MG/DL (ref 8.5–10.1)
CHLORIDE SERPL-SCNC: 107 MMOL/L (ref 94–109)
CO2 SERPL-SCNC: 29 MMOL/L (ref 20–32)
CREAT SERPL-MCNC: 1.02 MG/DL (ref 0.66–1.25)
GFR SERPL CREATININE-BSD FRML MDRD: 71 ML/MIN/1.7M2
GLUCOSE SERPL-MCNC: 120 MG/DL (ref 70–99)
HBA1C MFR BLD: 5.7 % (ref 4.3–6)
POTASSIUM SERPL-SCNC: 4.6 MMOL/L (ref 3.4–5.3)
PROT SERPL-MCNC: 7.2 G/DL (ref 6.8–8.8)
SODIUM SERPL-SCNC: 143 MMOL/L (ref 133–144)

## 2017-09-07 PROCEDURE — 36415 COLL VENOUS BLD VENIPUNCTURE: CPT | Performed by: FAMILY MEDICINE

## 2017-09-07 PROCEDURE — 80053 COMPREHEN METABOLIC PANEL: CPT | Performed by: FAMILY MEDICINE

## 2017-09-07 PROCEDURE — 99397 PER PM REEVAL EST PAT 65+ YR: CPT | Performed by: FAMILY MEDICINE

## 2017-09-07 PROCEDURE — 83036 HEMOGLOBIN GLYCOSYLATED A1C: CPT | Performed by: FAMILY MEDICINE

## 2017-09-07 ASSESSMENT — PAIN SCALES - GENERAL: PAINLEVEL: MODERATE PAIN (4)

## 2017-09-07 NOTE — MR AVS SNAPSHOT
After Visit Summary   9/7/2017    Medardo Gautam    MRN: [id number]           Patient Information     Date Of Birth          1940        Visit Information        Provider Department      9/7/2017 9:00 AM Verna Osborne MD [facility]        Today's Diagnoses     Need for prophylactic vaccination and inoculation against influenza    -  1    Chronic left-sided low back pain with left-sided sciatica        Benign essential hypertension        Elevated liver enzymes        Hyperglycemia        Malignant neoplasm of colon, unspecified part of colon (H)          Care Instructions      Preventive Health Recommendations:   Male Ages 65 and over    Yearly exam:             See your health care provider every year in order to  o   Review health changes.   o   Discuss preventive care.    o   Review your medicines if your doctor has prescribed any.    Talk with your health care provider about whether you should have a test to screen for prostate cancer (PSA).    Every 3 years, have a diabetes test (fasting glucose). If you are at risk for diabetes, you should have this test more often.    Every 5 years, have a cholesterol test. Have this test more often if you are at risk for high cholesterol or heart disease.     Every 10 years, have a colonoscopy. Or, have a yearly FIT test (stool test). These exams will check for colon cancer.    Talk to with your health care provider about screening for Abdominal Aortic Aneurysm if you have a family history of AAA or have a history of smoking.    Shots:     Get a flu shot each year.     Get a tetanus shot every 10 years.     Talk to your doctor about your pneumonia vaccines. There are now two you should receive - Pneumovax (PPSV 23) and Prevnar (PCV 13).     Talk to your doctor about a shingles vaccine.     Talk to your doctor about the hepatitis B vaccine.  Nutrition:     Eat at least 5 servings of fruits and vegetables each day.     Eat whole-grain  bread, whole-wheat pasta and brown rice instead of white grains and rice.     Talk to your provider about Calcium and Vitamin D.   Lifestyle    Exercise for at least 150 minutes a week (30 minutes a day, 5 days a week). This will help you control your weight and prevent disease.     Limit alcohol to one drink per day.     No smoking.     Wear sunscreen to prevent skin cancer.     See your dentist every six months for an exam and cleaning.     See your eye doctor every 1 to 2 years to screen for conditions such as glaucoma, macular degeneration, cataracts, etc           Follow-ups after your visit        Additional Services     PHYSICAL THERAPY REFERRAL       Refer to chiropractic care in [facility]-                  Follow-up notes from your care team     Return in about 1 month (around 10/7/2017).      Your next 10 appointments already scheduled     Nov 01, 2017 11:40 AM CDT   (Arrive by 11:25 AM)   Return Prostate Cancer with Norma Goodwin MD   [facility] for Prostate and Urologic Cancers ([facility])    [address] [place] [address]  4th Floor  [facility] [id number] [phone]            Apr 24, 2018 11:45 AM CDT   (Arrive by 11:30 AM)   LONG TERM RETURN with Magali Andrews PA-C   [facility] ([facility])    [address] [place] [address]  [address] [facility] [id number] [phone]              Who to contact     If you have questions or need follow up information about today's clinic visit or your schedule please contact [facility] directly at [phone].  Normal or non-critical lab and imaging results will be communicated to you by MyChart, letter or phone within 4 business days after the clinic has received the results. If you do not hear from us within 7 days, please contact the clinic through MyChart or phone. If you have a critical or abnormal lab result, we will notify you by phone as  "soon as possible.  Submit refill requests through [employer] or call your pharmacy and they will forward the refill request to us. Please allow 3 business days for your refill to be completed.          Additional Information About Your Visit        [employer] Information     [employer] gives you secure access to your electronic health record. If you see a primary care provider, you can also send messages to your care team and make appointments. If you have questions, please call your primary care clinic.  If you do not have a primary care provider, please call [phone] and they will assist you.        Care EveryWhere ID     This is your Care EveryWhere ID. This could be used by other organizations to access your [facility] medical records  [id number]        Your Vitals Were     Pulse Temperature Height Pulse Oximetry BMI (Body Mass Index)       60 97.6  F (36.4  C) (Oral) 5' 7\" (1.702 m) 100% 30.23 kg/m2        Blood Pressure from Last 3 Encounters:   09/07/17 130/80   09/05/17 130/80   07/26/17 138/74    Weight from Last 3 Encounters:   09/07/17 193 lb (87.5 kg)   09/05/17 196 lb (88.9 kg)   07/26/17 195 lb (88.5 kg)              We Performed the Following     Comprehensive metabolic panel (BMP + Alb, Alk Phos, ALT, AST, Total. Bili, TP)     Hemoglobin A1c     PHYSICAL THERAPY REFERRAL        Primary Care Provider Office Phone # Fax #    Verna Osborne [phone]       [address]  [facility] [address]        Equal Access to Services     [facility] AH: Hadii aad ku hadasho Soomaali, waaxda luqadaha, qaybta kaalmada adeegyada, waxrosalee amada salamanca . So [facility] [phone].    ATENCIÓN: Si habla español, tiene a sanford disposición servicios gratuitos de asistencia lingüística. Llame al [phone].    We comply with applicable federal civil rights laws and [place] laws. We do not discriminate on the basis of race, color, national origin, age, disability sex, sexual orientation " or gender identity.            Thank you!     Thank you for choosing [facility]  for your care. Our goal is always to provide you with excellent care. Hearing back from our patients is one way we can continue to improve our services. Please take a few minutes to complete the written survey that you may receive in the mail after your visit with us. Thank you!             Your Updated Medication List - Protect others around you: Learn how to safely use, store and throw away your medicines at www.disposemymeds.org.          This list is accurate as of: 9/7/17  9:51 AM.  Always use your most recent med list.                   Brand Name Dispense Instructions for use Diagnosis    aspirin 81 MG tablet     30 tablet    Take 1 tablet (81 mg) by mouth daily    Hyperlipidemia LDL goal <130, Malignant neoplasm of colon, unspecified part of colon (H)       bicalutamide 50 MG tablet    CASODEX    90 tablet    Take 1 tablet (50 mg) by mouth daily    Malignant neoplasm of prostate (H)       calcium-vitamin D 600-400 MG-UNIT per tablet    CALTRATE     Take 1 tablet by mouth daily    Hyperlipidemia LDL goal <130, Malignant neoplasm of colon, unspecified part of colon (H)       CLARITIN PO      Take  by mouth. As needed        clonazePAM 1 MG tablet    klonoPIN    30 tablet    TAKE ONE TABLET BY MOUTH TWICE DAILY AS NEEDED FOR ANXIETY    Anxiety       folic acid-vit B6-vit B12 0.8-10-0.115 MG Tabs per tablet    FOLGARD     Take 1 tablet by mouth daily    Hyperlipidemia LDL goal <130, Malignant neoplasm of colon, unspecified part of colon (H)       HYDROcodone-acetaminophen 5-325 MG per tablet    NORCO    20 tablet    Take 1 tablet by mouth nightly as needed for moderate to severe pain maximum 1  tablet(s) per day    Hip pain, left       lisinopril 10 MG tablet    PRINIVIL/ZESTRIL    90 tablet    TAKE ONE TABLET BY MOUTH ONCE DAILY    Benign essential hypertension       naproxen 500 MG tablet    NAPROSYN    60 tablet     Take 1 tablet (500 mg) by mouth 2 times daily as needed for moderate pain    Chronic gout without tophus, unspecified cause, unspecified site       OMEGA-3 FISH OIL PO      Take 500 mg by mouth daily    Hyperlipidemia LDL goal <130, Malignant neoplasm of colon, unspecified part of colon (H)       sildenafil 100 MG cap/tab    VIAGRA    10 tablet    1/2 tab three times a week    Malignant neoplasm of prostate (H)       zolpidem 5 MG tablet    AMBIEN    30 tablet    TAKE ONE TABLET BY MOUTH ONCE DAILY IN THE EVENING AS NEEDED FOR SLEEP    Persistent disorder of initiating or maintaining sleep

## 2017-09-07 NOTE — ASSESSMENT & PLAN NOTE
Last saw Oncologist Dr. Daily in 4/16. [facility] up [facility] in 1 yr from last visit  Needs colonoscopy in 2/19

## 2017-09-07 NOTE — NURSING NOTE
"Chief Complaint   Patient presents with     Physical     Panel Management     flu, honoring choices       Initial There were no vitals taken for this visit. Estimated body mass index is 28.12 kg/(m^2) as calculated from the following:    Height as of 7/26/17: 5' 10\" (1.778 m).    Weight as of 9/5/17: 196 lb (88.9 kg).  Medication Reconciliation: complete   River Posadas MA  September 7, 2017      "

## 2017-09-20 NOTE — PROGRESS NOTES
SUBJECTIVE:                                                    Medardo Gautam is a 77 year old male who presents to clinic today for the following health issues:      HPI    Joint Pain    Onset: x3 weeks    Description:   Location: left hip down to ankle  Character: Sharp    Intensity: moderate, severe    Progression of Symptoms: same    Accompanying Signs & Symptoms:  Other symptoms: none    History:   Previous similar pain: no       Precipitating factors:   Trauma or overuse: no     Alleviating factors:  Improved by: nothing    Therapies Tried and outcome: chiropractor, Ibuprofen          Problem list and histories reviewed & adjusted, as indicated.  Additional history: as documented        Patient Active Problem List   Diagnosis     Disturbance of skin sensation     Hemorrhoids     Gout     Advanced directives, counseling/discussion     Dupuytren's contracture of hand- left 5th finger, right 5th finger     Bunions- both feet     Skin lesion- R side of face and behind L ear     Insomnia     Prostatic nodule- posterior right edge with rectal exam     Prostate cancer- [place] 9 (5+4) dx Feb 2012     Elevated liver enzymes     Hyperbilirubinemia     Essential hypertension with goal blood pressure less than 140/90     Shoulder pain, left     Contracture of finger joint     Hyperlipidemia LDL goal <130     Shoulder pain, right     Malignant neoplasm of prostate (H)     Anxiety     Colon cancer (H)     Abdominal pain, epigastric     Renal cyst     Past Surgical History:   Procedure Laterality Date     APPENDECTOMY       BIOPSY  01/16/15     C HAND/FINGER SURGERY UNLISTED       C LENGTHEN,TENDON,HAND/FINGER  ca 2007    right fifth     C STOMACH SURGERY PROCEDURE UNLISTED       COLON SURGERY  2/11/2015    Lap assisted R hemicolectomy     COLONOSCOPY  10/25/07    Snare polypectomy     COLONOSCOPY  6/25/2009    with snare polypectomy     COLONOSCOPY  9/30/2009     COLONOSCOPY  1/5/2011    COLONOSCOPY performed by  JUD MARIEE at  GI     COLONOSCOPY N/A 2015    Procedure: COMBINED COLONOSCOPY, SINGLE OR MULTIPLE BIOPSY/POLYPECTOMY BY BIOPSY;  Surgeon: Sarah Beth Pisano MD;  Location: [facility] OR     COLONOSCOPY WITH CO2 INSUFFLATION N/A 2015    Procedure: COLONOSCOPY WITH CO2 INSUFFLATION;  Surgeon: Sarah Beth Pisano MD;  Location: [facility] OR     DAVINCI PROSTATECTOMY  2012    Procedure: DAVINCI PROSTATECTOMY;  Davinci Assisted Radical Prostatectomy with Bilateral Lymphadenectomy ;  Surgeon: Norma Goodwin MD;  Location: [facility]     LAPAROSCOPIC ASSISTED COLECTOMY N/A 2015    Procedure: LAPAROSCOPIC ASSISTED COLECTOMY;  Surgeon: Oren Breen MD;  Location: [facility] OR       Social History   Substance Use Topics     Smoking status: Former Smoker     Years: 1.00     Types: Cigarettes, Cigars, Pipe     Start date: 10/1/1961     Quit date: 1962     Smokeless tobacco: Never Used      Comment: No smokers in home     Alcohol use 0.0 oz/week      Comment: daily glass of wine and whiskey     Family History   Problem Relation Age of Onset     CEREBROVASCULAR DISEASE Father      CANCER Mother 90     Patient believes vaginal cancer - hysterectomy,      Alzheimer Disease Mother      CANCER Sister      Breast Cancer Sister      C.A.D. No family hx of      Breast Cancer No family hx of      Cancer - colorectal No family hx of      Prostate Cancer No family hx of      Blood Disease No family hx of      Cardiovascular No family hx of      Circulatory No family hx of      Eye Disorder No family hx of      GASTROINTESTINAL DISEASE No family hx of      Genitourinary Problems No family hx of      Lipids No family hx of      Neurologic Disorder No family hx of      Respiratory No family hx of      Asthma No family hx of      HEART DISEASE No family hx of      DIABETES No family hx of      Hypertension No family hx of      Arthritis No family hx of      Thyroid Disease No family hx of      Musculoskeletal Disorder No  family hx of          Current Outpatient Prescriptions   Medication Sig Dispense Refill     HYDROcodone-acetaminophen (NORCO) 5-325 MG per tablet Take 1 tablet by mouth nightly as needed for moderate to severe pain maximum 1  tablet(s) per day 20 tablet 0     zolpidem (AMBIEN) 5 MG tablet TAKE ONE TABLET BY MOUTH ONCE DAILY IN THE EVENING AS NEEDED FOR SLEEP 30 tablet 0     lisinopril (PRINIVIL/ZESTRIL) 10 MG tablet TAKE ONE TABLET BY MOUTH ONCE DAILY 90 tablet 0     clonazePAM (KLONOPIN) 1 MG tablet TAKE ONE TABLET BY MOUTH TWICE DAILY AS NEEDED FOR ANXIETY 30 tablet 2     bicalutamide (CASODEX) 50 MG tablet Take 1 tablet (50 mg) by mouth daily 90 tablet 3     naproxen (NAPROSYN) 500 MG tablet Take 1 tablet (500 mg) by mouth 2 times daily as needed for moderate pain 60 tablet 0     Omega-3 Fatty Acids (OMEGA-3 FISH OIL PO) Take 500 mg by mouth daily       calcium-vitamin D (CALTRATE) 600-400 MG-UNIT per tablet Take 1 tablet by mouth daily       folic acid-vit B6-vit B12 (FOLGARD) 0.8-10-0.115 MG TABS Take 1 tablet by mouth daily       aspirin 81 MG tablet Take 1 tablet (81 mg) by mouth daily 30 tablet      sildenafil (VIAGRA) 100 MG tablet 1/2 tab three times a week 10 tablet 12     Loratadine (CLARITIN PO) Take  by mouth. As needed        No Known Allergies  Recent Labs   Lab Test  09/07/17   0951  03/18/17   1639  11/16/16   0851  10/25/16   1228   10/26/15   0956   10/30/14   1043  07/28/14   0848   01/04/10   0924   A1C  5.7   --    --    --    --    --    --   6.0   --    --   5.3   LDL   --    --   134*   --    --   127   --    --   133*   < >   --    HDL   --    --   91   --    --   87   --    --   85   < >   --    TRIG   --    --   102   --    --   94   --    --   122   < >   --    ALT  60  53   --   42   < >  56   < >   --   57   < >   --    CR  1.02  0.93   --   0.83   < >  0.77   < >   --   0.92   < >   --    GFRESTIMATED  71  80   --   >90  Non  GFR Calc     < >  >90  Non   American GFR Calc     < >   --   81   < >   --    GFRESTBLACK  86  >90   GFR Calc     --   >90   GFR Calc     < >  >90   GFR Calc     < >   --   >90   < >   --    POTASSIUM  4.6  4.2   --   4.3   < >  4.4   < >   --   4.1   < >   --     < > = values in this interval not displayed.      BP Readings from Last 3 Encounters:   09/21/17 134/90   09/07/17 130/80   09/05/17 130/80    Wt Readings from Last 3 Encounters:   09/21/17 196 lb (88.9 kg)   09/07/17 193 lb (87.5 kg)   09/05/17 196 lb (88.9 kg)                  Labs reviewed in EPIC          ROS:  Constitutional, HEENT, cardiovascular, pulmonary, GI, , musculoskeletal, neuro, skin, endocrine and psych systems are negative, except as otherwise noted.      OBJECTIVE:   /90  Pulse 64  Temp 97.4  F (36.3  C) (Oral)  Resp 16  Wt 196 lb (88.9 kg)  BMI 30.7 kg/m2  Body mass index is 30.7 kg/(m^2).   Physical Exam   Constitutional: He appears well-developed and well-nourished.   HENT:   Head: Normocephalic and atraumatic.   Cardiovascular: Normal rate, regular rhythm and normal heart sounds.    Pulmonary/Chest: Effort normal and breath sounds normal.   Musculoskeletal: Normal range of motion. He exhibits tenderness. He exhibits no edema or deformity.   TTP along the trochanteric bursa on the left . Radiculopathy -left         Diagnostic Test Results:  none     ASSESSMENT/PLAN:     Problem List Items Addressed This Visit     None      Visit Diagnoses     Need for prophylactic vaccination and inoculation against influenza    -  Primary    Relevant Orders    FLU VACCINE, INCREASED ANTIGEN, PRESV FREE, AGE 65+ [90616] (Completed)    Vaccine Administration, Initial [19161] (Completed)    Lumbar radiculopathy        Relevant Orders    MR Lumbar Spine w/o Contrast    Trochanteric bursitis of left hip        Relevant Orders    ORTHO  REFERRAL           1. Need for prophylactic vaccination and inoculation against  influenza  - FLU VACCINE, INCREASED ANTIGEN, PRESV FREE, AGE 65+ [95785]  - Vaccine Administration, Initial [98727]    2. Lumbar radiculopathy  Get MRI for further evaluation as pt has not been responsive to conservative management  Discussed home care  Reportable signs and symptoms discussed  RTC if symptoms persist or fail to improve    - MR Lumbar Spine w/o Contrast; Future    3. Trochanteric bursitis of left hip  Refer to sports medicine for steroid injection  - ORTHO  REFERRAL    Verna Osborne MD  [facility]

## 2017-09-21 ENCOUNTER — OFFICE VISIT (OUTPATIENT)
Dept: FAMILY MEDICINE | Facility: OTHER | Age: 77
End: 2017-09-21
Payer: COMMERCIAL

## 2017-09-21 VITALS
RESPIRATION RATE: 16 BRPM | WEIGHT: 196 LBS | HEART RATE: 64 BPM | TEMPERATURE: 97.4 F | DIASTOLIC BLOOD PRESSURE: 90 MMHG | SYSTOLIC BLOOD PRESSURE: 134 MMHG | BODY MASS INDEX: 30.7 KG/M2

## 2017-09-21 DIAGNOSIS — Z23 NEED FOR PROPHYLACTIC VACCINATION AND INOCULATION AGAINST INFLUENZA: Primary | ICD-10-CM

## 2017-09-21 DIAGNOSIS — M54.16 LUMBAR RADICULOPATHY: ICD-10-CM

## 2017-09-21 DIAGNOSIS — M70.62 TROCHANTERIC BURSITIS OF LEFT HIP: ICD-10-CM

## 2017-09-21 PROCEDURE — G0008 ADMIN INFLUENZA VIRUS VAC: HCPCS | Performed by: FAMILY MEDICINE

## 2017-09-21 PROCEDURE — 99213 OFFICE O/P EST LOW 20 MIN: CPT | Mod: 25 | Performed by: FAMILY MEDICINE

## 2017-09-21 PROCEDURE — 90662 IIV NO PRSV INCREASED AG IM: CPT | Performed by: FAMILY MEDICINE

## 2017-09-21 ASSESSMENT — PAIN SCALES - GENERAL: PAINLEVEL: SEVERE PAIN (6)

## 2017-09-21 NOTE — NURSING NOTE
"Chief Complaint   Patient presents with     Musculoskeletal Problem     recheck pain in leg.      Panel Management     flu, honoring choices       Initial /90  Pulse 64  Temp 97.4  F (36.3  C) (Oral)  Resp 16  Wt 196 lb (88.9 kg)  BMI 30.7 kg/m2 Estimated body mass index is 30.7 kg/(m^2) as calculated from the following:    Height as of 9/7/17: 5' 7\" (1.702 m).    Weight as of this encounter: 196 lb (88.9 kg).  Medication Reconciliation: complete  Yary Boyer MA    "

## 2017-09-21 NOTE — MR AVS SNAPSHOT
After Visit Summary   9/21/2017    Medardo Gautam    MRN: [id number]           Patient Information     Date Of Birth          1940        Visit Information        Provider Department      9/21/2017 11:20 AM Verna Osborne MD [facility]        Today's Diagnoses     Need for prophylactic vaccination and inoculation against influenza    -  1    Lumbar radiculopathy        Trochanteric bursitis of left hip           Follow-ups after your visit        Additional Services     ORTHO  REFERRAL       [facility] is referring you to the Orthopedic  Services at [facility].       The  Representative will assist you in the coordination of your Orthopedic and Musculoskeletal Care as prescribed by your physician.    The  Representative will call you within 1 business day to help schedule your appointment, or you may contact the  Representative at:    All areas ~ [phone]     Type of Referral : Non Surgical Sports medicine       Timeframe requested: 1 - 2 days    Coverage of these services is subject to the terms and limitations of your health insurance plan.  Please call member services at your health plan with any benefit or coverage questions.      If X-rays, CT or MRI's have been performed, please contact the facility where they were done to arrange for , prior to your scheduled appointment.  Please bring this referral request to your appointment and present it to your specialist.                  Your next 10 appointments already scheduled     Sep 24, 2017  4:45 PM CDT   (Arrive by 4:30 PM)   MR LUMBAR SPINE W/O CONTRAST with [facility] ([facility])    [address] [address] [phone]           Take your medicines as usual, unless your doctor tells you not to. Bring a list of your current medicines to your  exam (including vitamins, minerals and over-the-counter drugs). Also bring the results of similar scans you may have had.  Please remove any body piercings and hair extensions before you arrive.  Follow your doctor s orders. If you do not, we may have to postpone your exam.  You will not have contrast for this exam. You do not need to do anything special to prepare.  The MRI machine uses a strong magnet. Please wear clothes without metal (snaps, zippers). A sweatsuit works well, or we may give you a hospital gown.   **IMPORTANT** THE INSTRUCTIONS BELOW ARE ONLY FOR THOSE PATIENTS WHO HAVE BEEN TOLD THEY WILL RECEIVE SEDATION OR GENERAL ANESTHESIA DURING THEIR MRI PROCEDURE:  IF YOU WILL RECEIVE SEDATION (take medicine to help you relax during your exam):   You must get the medicine from your doctor before you arrive. Bring the medicine to the exam. Do not take it at home.   Arrive one hour early. Bring someone who can take you home after the test. Your medicine will make you sleepy. After the exam, you may not drive, take a bus or take a taxi by yourself.   No eating 8 hours before your exam. You may have clear liquids up until 4 hours before your exam. (Clear liquids include water, clear tea, black coffee and fruit juice without pulp.)  IF YOU WILL RECEIVE ANESTHESIA (be asleep for your exam):   Arrive 1 1/2 hours early. Bring someone who can take you home after the test. You may not drive, take a bus or take a taxi by yourself.   No eating 8 hours before your exam. You may have clear liquids up until 4 hours before your exam. (Clear liquids include water, clear tea, black coffee and fruit juice without pulp.)   You will spend four to five hours in the recovery room.  Please call the Imaging Department at your exam site with any questions.            Nov 01, 2017 11:40 AM CDT   (Arrive by 11:25 AM)   Return Prostate Cancer with Norma Goodwin MD   [facility] ([facility])    [address]  4th [address]  [facility] [address]   [phone]            Apr 24, 2018 11:45 AM CDT   (Arrive by 11:30 AM)   LONG TERM RETURN with IRENE Montemayor [facility] ([facility] [place])    [address]  [address]  [facility] [address]   [phone]              Future tests that were ordered for you today     Open Future Orders        Priority Expected Expires Ordered    MR Lumbar Spine w/o Contrast Routine  9/21/2018 9/21/2017            Who to contact     If you have questions or need follow up information about today's clinic visit or your schedule please contact [facility] [place] directly at [phone].  Normal or non-critical lab and imaging results will be communicated to you by MyChart, letter or phone within 4 business days after the clinic has received the results. If you do not hear from us within 7 days, please contact the clinic through [employer] or phone. If you have a critical or abnormal lab result, we will notify you by phone as soon as possible.  Submit refill requests through [employer] or call your pharmacy and they will forward the refill request to us. Please allow 3 business days for your refill to be completed.          Additional Information About Your Visit        [employer] Information     [employer] gives you secure access to your electronic health record. If you see a primary care provider, you can also send messages to your care team and make appointments. If you have questions, please call your primary care clinic.  If you do not have a primary care provider, please call [phone] and they will assist you.        Care EveryWhere ID     This is your Care EveryWhere ID. This could be used by other organizations to access your [place] medical records  [id number]        Your Vitals Were     Pulse Temperature Respirations BMI (Body Mass Index)          64 97.4  F (36.3   C) (Oral) 16 30.7 kg/m2         Blood Pressure from Last 3 Encounters:   09/21/17 134/90   09/07/17 130/80   09/05/17 130/80    Weight from Last 3 Encounters:   09/21/17 196 lb (88.9 kg)   09/07/17 193 lb (87.5 kg)   09/05/17 196 lb (88.9 kg)              We Performed the Following     ORTHO  REFERRAL        Primary Care Provider Office Phone # Fax #    Verna Osborne [phone]       [address]  [facility] [address]        Equal Access to Services     [facility]: Hadii torrey Hitchcock, wajose franciscoda jona, nhan kaalmada evelia, randy salamanca . So [facility] [phone].    ATENCIÓN: Si habla español, tiene a sanford disposición servicios gratuitos de asistencia lingüística. [facility] [phone].    We comply with applicable federal civil rights laws and [place] laws. We do not discriminate on the basis of race, color, national origin, age, disability sex, sexual orientation or gender identity.            Thank you!     Thank you for choosing [facility]  for your care. Our goal is always to provide you with excellent care. Hearing back from our patients is one way we can continue to improve our services. Please take a few minutes to complete the written survey that you may receive in the mail after your visit with us. Thank you!             Your Updated Medication List - Protect others around you: Learn how to safely use, store and throw away your medicines at www.disposemymeds.org.          This list is accurate as of: 9/21/17 12:04 PM.  Always use your most recent med list.                   Brand Name Dispense Instructions for use Diagnosis    aspirin 81 MG tablet     30 tablet    Take 1 tablet (81 mg) by mouth daily    Hyperlipidemia LDL goal <130, Malignant neoplasm of colon, unspecified part of colon (H)       bicalutamide 50 MG tablet    CASODEX    90 tablet    Take 1 tablet (50 mg) by mouth daily    Malignant neoplasm of  prostate (H)       calcium-vitamin D 600-400 MG-UNIT per tablet    CALTRATE     Take 1 tablet by mouth daily    Hyperlipidemia LDL goal <130, Malignant neoplasm of colon, unspecified part of colon (H)       CLARITIN PO      Take  by mouth. As needed        clonazePAM 1 MG tablet    klonoPIN    30 tablet    TAKE ONE TABLET BY MOUTH TWICE DAILY AS NEEDED FOR ANXIETY    Anxiety       folic acid-vit B6-vit B12 0.8-10-0.115 MG Tabs per tablet    FOLGARD     Take 1 tablet by mouth daily    Hyperlipidemia LDL goal <130, Malignant neoplasm of colon, unspecified part of colon (H)       HYDROcodone-acetaminophen 5-325 MG per tablet    NORCO    20 tablet    Take 1 tablet by mouth nightly as needed for moderate to severe pain maximum 1  tablet(s) per day    Hip pain, left       lisinopril 10 MG tablet    PRINIVIL/ZESTRIL    90 tablet    TAKE ONE TABLET BY MOUTH ONCE DAILY    Benign essential hypertension       naproxen 500 MG tablet    NAPROSYN    60 tablet    Take 1 tablet (500 mg) by mouth 2 times daily as needed for moderate pain    Chronic gout without tophus, unspecified cause, unspecified site       OMEGA-3 FISH OIL PO      Take 500 mg by mouth daily    Hyperlipidemia LDL goal <130, Malignant neoplasm of colon, unspecified part of colon (H)       sildenafil 100 MG tablet    VIAGRA    10 tablet    1/2 tab three times a week    Malignant neoplasm of prostate (H)       zolpidem 5 MG tablet    AMBIEN    30 tablet    TAKE ONE TABLET BY MOUTH ONCE DAILY IN THE EVENING AS NEEDED FOR SLEEP    Persistent disorder of initiating or maintaining sleep

## 2017-09-21 NOTE — PROGRESS NOTES
Injectable Influenza Immunization Documentation    1.  Is the person to be vaccinated sick today?   No    2. Does the person to be vaccinated have an allergy to a component   of the vaccine?   No    3. Has the person to be vaccinated ever had a serious reaction   to influenza vaccine in the past?   No    4. Has the person to be vaccinated ever had Guillain-Barré syndrome?   No    Form completed by Yary Boyer MA

## 2017-09-25 ENCOUNTER — TELEPHONE (OUTPATIENT)
Dept: FAMILY MEDICINE | Facility: OTHER | Age: 77
End: 2017-09-25

## 2017-09-25 ENCOUNTER — OFFICE VISIT (OUTPATIENT)
Dept: ORTHOPEDICS | Facility: OTHER | Age: 77
End: 2017-09-25
Payer: COMMERCIAL

## 2017-09-25 VITALS
BODY MASS INDEX: 30.76 KG/M2 | DIASTOLIC BLOOD PRESSURE: 88 MMHG | SYSTOLIC BLOOD PRESSURE: 124 MMHG | HEIGHT: 67 IN | WEIGHT: 196 LBS

## 2017-09-25 DIAGNOSIS — M51.369 LUMBAR DEGENERATIVE DISC DISEASE: ICD-10-CM

## 2017-09-25 DIAGNOSIS — M54.16 LEFT LUMBAR RADICULOPATHY: Primary | ICD-10-CM

## 2017-09-25 DIAGNOSIS — M51.26 PROTRUSION OF LUMBAR INTERVERTEBRAL DISC: ICD-10-CM

## 2017-09-25 PROCEDURE — 99203 OFFICE O/P NEW LOW 30 MIN: CPT | Performed by: PHYSICAL MEDICINE & REHABILITATION

## 2017-09-25 NOTE — PROGRESS NOTES
Sports Medicine Clinic Visit    PCP: Verna Osborne    CC: Patient presents with:  Hip left  Back Pain      HPI:  Medardo Gautam is a 77 year old male who is seen in consultation at the request of Dr Osborne.  He notes left posterior hip with some radiation to the lateral leg and ankle pain that began 3 weeks ago with insidious onset.   He rates the pain at a 7/10 at its worst and a 2/10 currently.  Symptoms are relieved with ibuprofen and  chiropractic slightly and worsened by walking for prolonged periods of time and at night. He endorses numbness in the toes and denies swelling, bruising, popping, grinding, catching, locking, instability, tingling, weakness, pain in other joints and fever, chills.  Other treatment has included chiropractic treatments (traction) with some relief.       Review of Systems:  Musculoskeletal: as above  Remainder of review of systems is negative including constitutional, eyes, ENT, CV, pulmonary, GI, , endocrine, skin, hematologic, and neurologic except as noted in HPI or medical history.    History reviewed. No pertinent past surgical/medical/family/social history other than as mentioned in HPI.    Past Medical History:   Diagnosis Date     Anxiety      Colon cancer (H) 01/2015     Contracture of finger joint ca 2005    left and right fifth fingers     Dupuytren's contracture of left hand      Gout      HEMORRHOIDS NOS 6/4/2007     Hypertension      Insomnia      Personal history of colonic polyps 7 years ago?     Prostate cancer (H) Feb 2012     Renal cyst 6/22/2017     Seborrheic dermatitis      Skin lesion- R side of face and behind L ear 12/15/2011     SKIN SENSATION DISTURB 12/29/2006    allergic reaction to strawberries     SKIN SENSATION DISTURB 12/29/2006     Past Surgical History:   Procedure Laterality Date     APPENDECTOMY       BIOPSY  01/16/15     C HAND/FINGER SURGERY UNLISTED       C LENGTHEN,TENDON,HAND/FINGER  ca 2007    right fifth     C STOMACH SURGERY  PROCEDURE UNLISTED       COLON SURGERY  2015    Lap assisted R hemicolectomy     COLONOSCOPY  10/25/07    Snare polypectomy     COLONOSCOPY  2009    with snare polypectomy     COLONOSCOPY  2009     COLONOSCOPY  2011    COLONOSCOPY performed by JUD MARIEE at  GI     COLONOSCOPY N/A 2015    Procedure: COMBINED COLONOSCOPY, SINGLE OR MULTIPLE BIOPSY/POLYPECTOMY BY BIOPSY;  Surgeon: Sarah Beth Pisano MD;  Location: [facility] OR     COLONOSCOPY WITH CO2 INSUFFLATION N/A 2015    Procedure: COLONOSCOPY WITH CO2 INSUFFLATION;  Surgeon: Sarah Beth Pisano MD;  Location: [facility] OR     DAVINCI PROSTATECTOMY  2012    Procedure: DAVINCI PROSTATECTOMY;  Davinci Assisted Radical Prostatectomy with Bilateral Lymphadenectomy ;  Surgeon: Norma Goodwin MD;  Location: [facility]     LAPAROSCOPIC ASSISTED COLECTOMY N/A 2015    Procedure: LAPAROSCOPIC ASSISTED COLECTOMY;  Surgeon: Oren Breen MD;  Location: [facility] OR     Family History   Problem Relation Age of Onset     CEREBROVASCULAR DISEASE Father      CANCER Mother 90     Patient believes vaginal cancer - hysterectomy,      Alzheimer Disease Mother      CANCER Sister      Breast Cancer Sister      C.A.D. No family hx of      Breast Cancer No family hx of      Cancer - colorectal No family hx of      Prostate Cancer No family hx of      Blood Disease No family hx of      Cardiovascular No family hx of      Circulatory No family hx of      Eye Disorder No family hx of      GASTROINTESTINAL DISEASE No family hx of      Genitourinary Problems No family hx of      Lipids No family hx of      Neurologic Disorder No family hx of      Respiratory No family hx of      Asthma No family hx of      HEART DISEASE No family hx of      DIABETES No family hx of      Hypertension No family hx of      Arthritis No family hx of      Thyroid Disease No family hx of      Musculoskeletal Disorder No family hx of      Social History     Social History      "Marital status:      Spouse name: Joyce     Number of children: 2     Years of education: N/A     Occupational History     retired       Retired     Social History Main Topics     Smoking status: Former Smoker     Years: 1.00     Types: Cigarettes, Cigars, Pipe     Start date: 10/1/1961     Quit date: 1/1/1962     Smokeless tobacco: Never Used      Comment: No smokers in home     Alcohol use 0.0 oz/week      Comment: daily glass of wine and whiskey     Drug use: No     Sexual activity: Not Currently     Partners: Female     Birth control/ protection: None      Comment: not currently active     Other Topics Concern      Service No     Blood Transfusions No     Caffeine Concern No     Occupational Exposure No     Hobby Hazards No     Sleep Concern Yes     Stress Concern No     Weight Concern Yes     Special Diet No     Back Care No     Exercise No     Bike Helmet No     N/A     Seat Belt Yes     Self-Exams Yes     Parent/Sibling W/ Cabg, Mi Or Angioplasty Before 65f 55m? Yes     Social History Narrative       He is retired.     Current Outpatient Prescriptions   Medication     HYDROcodone-acetaminophen (NORCO) 5-325 MG per tablet     zolpidem (AMBIEN) 5 MG tablet     lisinopril (PRINIVIL/ZESTRIL) 10 MG tablet     clonazePAM (KLONOPIN) 1 MG tablet     bicalutamide (CASODEX) 50 MG tablet     naproxen (NAPROSYN) 500 MG tablet     Omega-3 Fatty Acids (OMEGA-3 FISH OIL PO)     calcium-vitamin D (CALTRATE) 600-400 MG-UNIT per tablet     folic acid-vit B6-vit B12 (FOLGARD) 0.8-10-0.115 MG TABS     aspirin 81 MG tablet     sildenafil (VIAGRA) 100 MG tablet     Loratadine (CLARITIN PO)     No current facility-administered medications for this visit.      No Known Allergies      Objective:  /88  Ht 5' 7\" (1.702 m)  Wt 196 lb (88.9 kg)  BMI 30.7 kg/m2    General: Alert and in no distress    Head: Normocephalic, atraumatic  Eyes: no scleral icterus or conjunctival erythema   Oropharynx:  Mucous membranes " moist  Skin: no erythema, ecchymosis, petechiae, or jaundice  CV: regular rhythm by palpation, 2+ distal pulses  Resp: normal respiratory effort without conversational dyspnea   Psych: normal mood and affect    Gait: Non-antalgic, appropriate coordination and balance   Neuro: Motor strength and sensation as noted below    Musculoskeletal:    Low back exam:    Inspection:     no visible deformity in the low back       normal skin       normal vascular       normal lymphatic       no asymmetry    Palpation:  Mild tenderness over the left greater trochanter.    ROM: Lumbar flexion, extension, and left lateral rotation are painful.  Left hip internal rotation is decreased and painful.    Strength:  5/5 hip flexion/abduction/adduction, knee flexion/extension, ankle dorsiflexion/plantarflexion, great toe extension, toe flexion    Sensation:    grossly intact throughout lower extremities    Radiology:  Independent visualization of images performed.    Recent Results (from the past 744 hour(s))   MR Lumbar Spine w/o Contrast    Narrative    MR LUMBAR SPINE W/O CONTRAST 9/24/2017 4:43 PM    History: Moderate to severe sharp pain from left hip down to ankle  Comparison: None    Technique: Sagittal STIR, diffusion weighted, T1-weighted, T2-weighted  and axial reconstructed T2-weighted images of the lumbar spine were  obtained without intravenous contrast.     Findings: There are 5 lumbar-type vertebrae assumed for the purposes  of this dictation.  The tip of the conus medullaris is at  L1.  The  lumbar vertebral column appears normally aligned. Straightening of the  lumbar spine. Advanced disc height narrowing at L3-4 with associated  degenerative and Modic type II endplate changes in the opposing  endplates. Mild disc height narrowing at L4-5 with Modic type I  degeneration in the opposing endplates. Bone marrow signal intensity  is within normal limits and no abnormal signal is visible.     On a level by level  basis:    L1-2: Left facet joint hypertrophy with minimal increased joint fluid.  No significant spinal canal or foraminal narrowing.    L2-3: Disc bulge, bilateral mild to moderate degenerative facet joint  arthropathy with minimal increased fluid signal in the left facet.  Minimal thickening of the ligamentum flavum. Findings are resulting in  mild left foraminal narrowing. No spinal canal stenosis or right  foraminal narrowing.    L3-4: Advanced disc height loss. Disc bulge slightly asymmetric to the  right. Degenerative hypertrophic endplate changes and bilateral mild  degenerative facet joint arthropathy. Findings are resulting in  moderate stenosis of the right neural foramen and mild narrowing of  the left neural foramen. No spinal canal narrowing.    L4-5: Disc bulge and left subarticular disc extrusion with cranial  migration of the disc material along the left posterior margin of L4.  Bilateral moderate degenerative facet joint arthropathy with slightly  increased joint fluid signal in the left facet. Moderate to severe  narrowing of the left neural foramen. The extruded disc impinges the  left descending L4 right before it exits the neural foramen. Moderate  narrowing of the right neural foramen.  In addition, there is mild  prominent posterior epidural fat which contributes in mild spinal  canal narrowing.    L5-S1: Disc bulge and tiny central disc protrusion. In addition mild  degenerative facet joint arthropathy. No significant canal stenosis or  foraminal narrowing.      Impression    Impression:   1. Left subarticular disc extrusion at L4-L5, which migrates cranially  along the left posterior margin of L4, impinging the left descending  L4 just before the nerve exits through the neural foramen.  2. Moderate right foraminal stenosis at L3-4. Milder neural foraminal  narrowing at other lumbar levels.  3. 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    I have personally  reviewed the examination and initial interpretation  and I agree with the findings.    KRISTI LAM MD       Assessment:  1. Left lumbar radiculopathy    2. Lumbar degenerative disc disease    3. Protrusion of lumbar intervertebral disc        Plan:  Discussed the assessment with the patient and developed a plan together:  -Medardo has a disc extrusion at L4-L5 which impinges the left descending L4 nerve root.  This could be causing the pain that is radiating from his back to his left leg.  -Ibuprofen (Advil) maximum of 800mg every 4-6 hours with food as needed for pain. Or Naproxen (Aleve) maximum of 440mg two times a day with  -Acetaminophen (Tylenol) 1000mg every 4-6 hours as needed (Maximum of 3000mg/day)  -Continue chiropractic care as desired  -Physical therapy. Please do 5-6 days of exercises per week between formal sessions and the home exercises they provide.  -Norco as needed for severe pain. Do not take prior to driving  -We discussed that an epidural steroid injection may be helpful, but he would like to try the above interventions first.      Follow Up: 4-6 weeks or sooner if symptoms fail to improve or worsen. Call with any questions or concerns.     Ariana Devine MD, CAQ  [facility]

## 2017-09-25 NOTE — PATIENT INSTRUCTIONS
Today's Plan of Care:  -Ibuprofen (Advil) maximum of 800mg every 4-6 hours with food as needed for pain. Or Naproxen (Aleve) maximum of 440mg two times a day with  -Acetaminophen (Tylenol) 1000mg every 4-6 hours as needed (Maximum of 3000mg/day)  -Continue chiropractic care as desired  -Physical therapy. Please do 5-6 days of exercises per week between formal sessions and the home exercises they provide.  -Norco as needed for severe pain. Do not take prior to driving    -We also discussed other future treatment options:  Epidural steroid injection    Follow Up: 4-6 weeks or sooner if symptoms fail to improve or worsen. Call with any questions or concerns.

## 2017-09-25 NOTE — MR AVS SNAPSHOT
"              After Visit Summary   9/25/2017    Medardo Gautam    MRN: [id number]           Patient Information     Date Of Birth          1940        Visit Information        Provider Department      9/25/2017 2:40 PM Adriana Devine MD [facility]        Today's Diagnoses     Radiculopathy of lumbar region    -  1      Care Instructions    Today's Plan of Care:  -Ibuprofen (Advil) maximum of 800mg every 4-6 hours with food as needed for pain. Or Naproxen (Aleve) maximum of 440mg two times a day with  -Acetaminophen (Tylenol) 1000mg every 4-6 hours as needed (Maximum of 3000mg/day)  -Continue chiropractic care as desired  -Physical therapy. Please do 5-6 days of exercises per week between formal sessions and the home exercises they provide.  -Norco as needed for severe pain. Do not take prior to driving    -We also discussed other future treatment options:  Epidural steroid injection of spine    Follow Up: 4-6 weeks or sooner if symptoms fail to improve or worsen. Call with any questions or concerns.               Follow-ups after your visit        Additional Services     PHYSICAL THERAPY REFERRAL       Treatment: Evaluation & Treatment  Special Instructions/Modalities: Physical therapy as patients desired location  Special Programs: None    Please be aware that coverage of these services is subject to the terms and limitations of your health insurance plan.  Call member services at your health plan with any benefit or coverage questions.      **Note to Provider:  If you are referring outside of [place] for the therapy appointment, please list the name of the location in the \"special instructions\" above, print the referral and give to the patient to schedule the appointment.                  Your next 10 appointments already scheduled     Nov 01, 2017 11:40 AM CDT   (Arrive by 11:25 AM)   Return Prostate Cancer with Norma Goodwin MD   [facility] for Prostate and " "Urologic Cancers ([facility])    [address]  4th Floor  [place] [id number] [phone]            Apr 24, 2018 11:45 AM CDT   (Arrive by 11:30 AM)   LONG TERM RETURN with Magali Andrews PA-C   [facility] ([facility])    [address]  [address]nd Floor  [place] [id number] [phone]              Who to contact     If you have questions or need follow up information about today's clinic visit or your schedule please contact [facility] directly at [phone].  Normal or non-critical lab and imaging results will be communicated to you by MyChart, letter or phone within 4 business days after the clinic has received the results. If you do not hear from us within 7 days, please contact the clinic through [email or web address] or phone. If you have a critical or abnormal lab result, we will notify you by phone as soon as possible.  Submit refill requests through [employer] or call your pharmacy and they will forward the refill request to us. Please allow 3 business days for your refill to be completed.          Additional Information About Your Visit        MyChart Information     [employer]go gives you secure access to your electronic health record. If you see a primary care provider, you can also send messages to your care team and make appointments. If you have questions, please call your primary care clinic.  If you do not have a primary care provider, please call [phone] and they will assist you.        Care EveryWhere ID     This is your Care EveryWhere ID. This could be used by other organizations to access your [place] medical records  [id number]        Your Vitals Were     Height BMI (Body Mass Index)                5' 7\" (1.702 m) 30.7 kg/m2           Blood Pressure from Last 3 Encounters:   09/25/17 124/88   09/21/17 134/90   09/07/17 130/80    Weight from Last 3 Encounters:   09/25/17 196 lb (88.9 kg) "   09/21/17 196 lb (88.9 kg)   09/07/17 193 lb (87.5 kg)              We Performed the Following     PHYSICAL THERAPY REFERRAL        Primary Care Provider Office Phone # Fax #    Verna Osborne [phone]       [address]  [facility] [address]        Equal Access to Services     [facility] Hospital[facility]AN[facility] : Hadii aad ku hadasho Soomaali, waaxda luqadaha, qaybta kaalmada adeegyada, randy ybarra haysheban shivam lucasmitraen salamanca . So [facility] [phone].    ATENCIÓN: Si habla espjoaquin, tiene a sanford disposición servicios gratuitos de asistencia lingüística. Llame al [phone].    We comply with applicable federal civil rights laws and [place] laws. We do not discriminate on the basis of race, color, national origin, age, disability sex, sexual orientation or gender identity.            Thank you!     Thank you for choosing [facility]  for your care. Our goal is always to provide you with excellent care. Hearing back from our patients is one way we can continue to improve our services. Please take a few minutes to complete the written survey that you may receive in the mail after your visit with us. Thank you!             Your Updated Medication List - Protect others around you: Learn how to safely use, store and throw away your medicines at www.disposemymeds.org.          This list is accurate as of: 9/25/17  3:40 PM.  Always use your most recent med list.                   Brand Name Dispense Instructions for use Diagnosis    aspirin 81 MG tablet     30 tablet    Take 1 tablet (81 mg) by mouth daily    Hyperlipidemia LDL goal <130, Malignant neoplasm of colon, unspecified part of colon (H)       bicalutamide 50 MG tablet    CASODEX    90 tablet    Take 1 tablet (50 mg) by mouth daily    Malignant neoplasm of prostate (H)       calcium-vitamin D 600-400 MG-UNIT per tablet    CALTRATE     Take 1 tablet by mouth daily    Hyperlipidemia LDL goal <130, Malignant neoplasm of colon, unspecified part  of colon (H)       CLARITIN PO      Take  by mouth. As needed        clonazePAM 1 MG tablet    klonoPIN    30 tablet    TAKE ONE TABLET BY MOUTH TWICE DAILY AS NEEDED FOR ANXIETY    Anxiety       folic acid-vit B6-vit B12 0.8-10-0.115 MG Tabs per tablet    FOLGARD     Take 1 tablet by mouth daily    Hyperlipidemia LDL goal <130, Malignant neoplasm of colon, unspecified part of colon (H)       HYDROcodone-acetaminophen 5-325 MG per tablet    NORCO    20 tablet    Take 1 tablet by mouth nightly as needed for moderate to severe pain maximum 1  tablet(s) per day    Hip pain, left       lisinopril 10 MG tablet    PRINIVIL/ZESTRIL    90 tablet    TAKE ONE TABLET BY MOUTH ONCE DAILY    Benign essential hypertension       naproxen 500 MG tablet    NAPROSYN    60 tablet    Take 1 tablet (500 mg) by mouth 2 times daily as needed for moderate pain    Chronic gout without tophus, unspecified cause, unspecified site       OMEGA-3 FISH OIL PO      Take 500 mg by mouth daily    Hyperlipidemia LDL goal <130, Malignant neoplasm of colon, unspecified part of colon (H)       sildenafil 100 MG tablet    VIAGRA    10 tablet    1/2 tab three times a week    Malignant neoplasm of prostate (H)       zolpidem 5 MG tablet    AMBIEN    30 tablet    TAKE ONE TABLET BY MOUTH ONCE DAILY IN THE EVENING AS NEEDED FOR SLEEP    Persistent disorder of initiating or maintaining sleep

## 2017-09-25 NOTE — TELEPHONE ENCOUNTER
Reason for Call:  Request for results:    Name of test or procedure:  MRI    Date of test of procedure: 09-04-17    Location of the test or procedure: [facility]    OK to leave the result message on voice mail or with a family member? Not Applicable    Phone number Patient can be reached at:  Cell number on file:    Telephone Information:   Mobile [phone]       Additional comments: patient received his results from Dr Devine, but he would like them released to him thru [facility].  Thank you    Call taken on 9/25/2017 at 3:47 PM by Margaux Mott

## 2017-09-27 ENCOUNTER — TELEPHONE (OUTPATIENT)
Dept: FAMILY MEDICINE | Facility: OTHER | Age: 77
End: 2017-09-27

## 2017-09-27 DIAGNOSIS — G47.00 PERSISTENT DISORDER OF INITIATING OR MAINTAINING SLEEP: ICD-10-CM

## 2017-09-27 NOTE — TELEPHONE ENCOUNTER
Reason for Call: Request for an order or referral:    Order or referral being requested: referral to spine dr    Date needed: as soon as possible    Has the patient been seen by the PCP for this problem? YES    Additional comments: pt needs to see dr schwartz at the [facility].  [address].   [phone].  They will not schedule him until the referral is placed.  Pt would like this done asap please     Phone number Patient can be reached at:  Home number on file [phone] (home)or cell [phone]    Best Time:  asap    Can we leave a detailed message on this number?  YES    Call taken on 9/27/2017 at 3:18 PM by Iman Cohn

## 2017-09-27 NOTE — TELEPHONE ENCOUNTER
Pending Prescriptions:                       Disp   Refills    zolpidem (AMBIEN) 5 MG tablet [Pharmacy Me*30 tab*0        Sig: TAKE ONE TABLET BY MOUTH ONCE DAILY IN THE EVENING AS           NEEDED FOR SLEEP    Ambien      Last Written Prescription Date:  8/30/2017  Last Fill Quantity: 30,   # refills: 0  Last Office Visit with [facility], [facility] or [facility] prescribing provider: 9/21/2017  Future Office visit: none      Routing refill request to provider for review/approval because:  Drug not on the [facility], [facility] or [facility] refill protocol or controlled substance    Pao Jiang MA

## 2017-09-28 ENCOUNTER — MYC MEDICAL ADVICE (OUTPATIENT)
Dept: FAMILY MEDICINE | Facility: OTHER | Age: 77
End: 2017-09-28

## 2017-09-29 ENCOUNTER — OFFICE VISIT (OUTPATIENT)
Dept: FAMILY MEDICINE | Facility: OTHER | Age: 77
End: 2017-09-29
Payer: COMMERCIAL

## 2017-09-29 VITALS
TEMPERATURE: 97.2 F | BODY MASS INDEX: 29.98 KG/M2 | RESPIRATION RATE: 16 BRPM | WEIGHT: 191 LBS | HEART RATE: 60 BPM | SYSTOLIC BLOOD PRESSURE: 124 MMHG | HEIGHT: 67 IN | DIASTOLIC BLOOD PRESSURE: 82 MMHG

## 2017-09-29 DIAGNOSIS — Z23 NEED FOR PROPHYLACTIC VACCINATION AND INOCULATION AGAINST INFLUENZA: Primary | ICD-10-CM

## 2017-09-29 DIAGNOSIS — M54.50 LOW BACK PAIN POTENTIALLY ASSOCIATED WITH RADICULOPATHY: ICD-10-CM

## 2017-09-29 PROCEDURE — 99213 OFFICE O/P EST LOW 20 MIN: CPT | Performed by: FAMILY MEDICINE

## 2017-09-29 RX ORDER — ZOLPIDEM TARTRATE 5 MG/1
TABLET ORAL
Qty: 30 TABLET | Refills: 0 | Status: SHIPPED | OUTPATIENT
Start: 2017-09-29 | End: 2017-10-27

## 2017-09-29 RX ORDER — PREDNISONE 20 MG/1
40 TABLET ORAL DAILY
Qty: 10 TABLET | Refills: 0 | Status: SHIPPED | OUTPATIENT
Start: 2017-09-29 | End: 2017-10-04

## 2017-09-29 ASSESSMENT — PAIN SCALES - GENERAL: PAINLEVEL: MILD PAIN (2)

## 2017-09-29 NOTE — TELEPHONE ENCOUNTER
Rx printed/signed by VICENTE. Patient has an appointment today. Faxed to [facility].  Radha Bergman [facility]

## 2017-09-29 NOTE — PROGRESS NOTES
SUBJECTIVE:                                                    Medardo Gautam is a 77 year old male who presents to clinic today for the following health issues:    HPI    Back Pain follow up    Duration: x 3 weeks, Patient states he also wants a spine referral.        Specific cause: none    Description:   Location of pain: low back left  Character of pain: sharp, dull ache   Pain radiation:radiates into the left leg  New numbness or weakness in legs, not attributed to pain:  YES- tingling in toes     Intensity: Currently 2/10    History:   Pain interferes with job: Not applicable  History of back problems: no prior back problems  Any previous MRI or X-rays: Yes- at [facility].  Date 9/24/2017  Sees a specialist for back pain:  No  Therapies tried without relief: nothing    Alleviating factors:   Improved by: NSAIDs and opioids - these help for about 4 hours or so.    Precipitating factors:  Worsened by: Walking    Functional and Psychosocial Screen (Antonio STarT Back):      Not performed today          Accompanying Signs & Symptoms:  Risk of Fracture:  None  Risk of Cauda Equina:  None  Risk of Infection:  None  Risk of Cancer:  None  Risk of Ankylosing Spondylitis:  Onset at age <35, male, AND morning back stiffness. no     Problem list and histories reviewed & adjusted, as indicated.  Additional history: as documented        Patient Active Problem List   Diagnosis     Disturbance of skin sensation     Hemorrhoids     Gout     Advanced directives, counseling/discussion     Dupuytren's contracture of hand- left 5th finger, right 5th finger     Bunions- both feet     Skin lesion- R side of face and behind L ear     Insomnia     Prostatic nodule- posterior right edge with rectal exam     Prostate cancer- [address]arl[address] 9 (5+4) dx Feb 2012     Elevated liver enzymes     Hyperbilirubinemia     Essential hypertension with goal blood pressure less than 140/90     Shoulder pain, left     Contracture of finger joint      Hyperlipidemia LDL goal <130     Shoulder pain, right     Malignant neoplasm of prostate (H)     Anxiety     Colon cancer (H)     Abdominal pain, epigastric     Renal cyst     Past Surgical History:   Procedure Laterality Date     APPENDECTOMY       BIOPSY  01/16/15     C HAND/FINGER SURGERY UNLISTED       C LENGTHEN,TENDON,HAND/FINGER  ca     right fifth     C STOMACH SURGERY PROCEDURE UNLISTED       COLON SURGERY  2015    Lap assisted R hemicolectomy     COLONOSCOPY  10/25/07    Snare polypectomy     COLONOSCOPY  2009    with snare polypectomy     COLONOSCOPY  2009     COLONOSCOPY  2011    [facility]     COLONOSCOPY N/A 2015    Procedure: COMBINED COLONOSCOPY, SINGLE OR MULTIPLE BIOPSY/POLYPECTOMY BY BIOPSY;  Surgeon: Sarah Beth Pisano MD;  Location: [facility]     COLONOSCOPY WITH CO2 INSUFFLATION N/A 2015    Procedure: COLONOSCOPY WITH CO2 INSUFFLATION;  Surgeon: Sarah Beth Pisano MD;  Location: [facility]     DAVINCI PROSTATECTOMY  2012    Procedure: DAVINCI PROSTATECTOMY;  Davinci Assisted Radical Prostatectomy with Bilateral Lymphadenectomy ;  Surgeon: Norma Goodwin MD;  Location: [facility]     LAPAROSCOPIC ASSISTED COLECTOMY N/A 2015    Procedure: LAPAROSCOPIC ASSISTED COLECTOMY;  Surgeon: Oren Breen MD;  Location: [facility] OR       Social History   Substance Use Topics     Smoking status: Former Smoker     Years: 1.00     Types: Cigarettes, Cigars, Pipe     Start date: 10/1/1961     Quit date: 1962     Smokeless tobacco: Never Used      Comment: No smokers in home     Alcohol use 0.0 oz/week      Comment: daily glass of wine and whiskey     Family History   Problem Relation Age of Onset     CEREBROVASCULAR DISEASE Father      CANCER Mother 90     Patient believes vaginal cancer - hysterectomy,      Alzheimer Disease Mother      CANCER Sister      Breast Cancer Sister      C.A.D. No family hx of      Breast Cancer No  family hx of      Cancer - colorectal No family hx of      Prostate Cancer No family hx of      Blood Disease No family hx of      Cardiovascular No family hx of      Circulatory No family hx of      Eye Disorder No family hx of      GASTROINTESTINAL DISEASE No family hx of      Genitourinary Problems No family hx of      Lipids No family hx of      Neurologic Disorder No family hx of      Respiratory No family hx of      Asthma No family hx of      HEART DISEASE No family hx of      DIABETES No family hx of      Hypertension No family hx of      Arthritis No family hx of      Thyroid Disease No family hx of      Musculoskeletal Disorder No family hx of          Current Outpatient Prescriptions   Medication Sig Dispense Refill     zolpidem (AMBIEN) 5 MG tablet TAKE ONE TABLET BY MOUTH ONCE DAILY IN THE EVENING AS NEEDED FOR SLEEP 30 tablet 0     predniSONE (DELTASONE) 20 MG tablet Take 2 tablets (40 mg) by mouth daily for 5 days 10 tablet 0     HYDROcodone-acetaminophen (NORCO) 5-325 MG per tablet Take 1 tablet by mouth nightly as needed for moderate to severe pain maximum 1  tablet(s) per day 20 tablet 0     lisinopril (PRINIVIL/ZESTRIL) 10 MG tablet TAKE ONE TABLET BY MOUTH ONCE DAILY 90 tablet 0     clonazePAM (KLONOPIN) 1 MG tablet TAKE ONE TABLET BY MOUTH TWICE DAILY AS NEEDED FOR ANXIETY 30 tablet 2     bicalutamide (CASODEX) 50 MG tablet Take 1 tablet (50 mg) by mouth daily 90 tablet 3     naproxen (NAPROSYN) 500 MG tablet Take 1 tablet (500 mg) by mouth 2 times daily as needed for moderate pain 60 tablet 0     Omega-3 Fatty Acids (OMEGA-3 FISH OIL PO) Take 500 mg by mouth daily       calcium-vitamin D (CALTRATE) 600-400 MG-UNIT per tablet Take 1 tablet by mouth daily       folic acid-vit B6-vit B12 (FOLGARD) 0.8-10-0.115 MG TABS Take 1 tablet by mouth daily       aspirin 81 MG tablet Take 1 tablet (81 mg) by mouth daily 30 tablet      sildenafil (VIAGRA) 100 MG tablet 1/2 tab three times a week (Patient not  "taking: Reported on 9/29/2017) 10 tablet 12     Loratadine (CLARITIN PO) Take  by mouth. As needed        No Known Allergies  Recent Labs   Lab Test  09/07/17   0951  03/18/17   1639  11/16/16   0851  10/25/16   1228   10/26/15   0956   10/30/14   1043  07/28/14   0848   01/04/10   0924   A1C  5.7   --    --    --    --    --    --   6.0   --    --   5.3   LDL   --    --   134*   --    --   127   --    --   133*   < >   --    HDL   --    --   91   --    --   87   --    --   85   < >   --    TRIG   --    --   102   --    --   94   --    --   122   < >   --    ALT  60  53   --   42   < >  56   < >   --   57   < >   --    CR  1.02  0.93   --   0.83   < >  0.77   < >   --   0.92   < >   --    GFRESTIMATED  71  80   --   >90  Non  GFR Calc     < >  >90  Non  GFR Calc     < >   --   81   < >   --    GFRESTBLACK  86  >90   GFR Calc     --   >90   GFR Calc     < >  >90   GFR Calc     < >   --   >90   < >   --    POTASSIUM  4.6  4.2   --   4.3   < >  4.4   < >   --   4.1   < >   --     < > = values in this interval not displayed.      BP Readings from Last 3 Encounters:   09/29/17 124/82   09/25/17 124/88   09/21/17 134/90    Wt Readings from Last 3 Encounters:   09/29/17 191 lb (86.6 kg)   09/25/17 196 lb (88.9 kg)   09/21/17 196 lb (88.9 kg)                  Labs reviewed in EPIC          ROS:  Constitutional, HEENT, cardiovascular, pulmonary, GI, , musculoskeletal, neuro, skin, endocrine and psych systems are negative, except as otherwise noted.      OBJECTIVE:   /82  Pulse 60  Temp 97.2  F (36.2  C) (Temporal)  Resp 16  Ht 5' 6.75\" (1.695 m)  Wt 191 lb (86.6 kg)  BMI 30.14 kg/m2  Body mass index is 30.14 kg/(m^2).   Physical Exam   Constitutional: He is oriented to person, place, and time. He appears well-developed and well-nourished.   HENT:   Head: Normocephalic and atraumatic.   Musculoskeletal: He exhibits tenderness. He " exhibits no deformity.   Radiculopathy present   Neurological: He is alert and oriented to person, place, and time. He displays normal reflexes. No cranial nerve deficit. He exhibits normal muscle tone. Coordination normal.   Psychiatric: He has a normal mood and affect.         Diagnostic Test Results:  none     ASSESSMENT/PLAN:     Problem List Items Addressed This Visit     None      Visit Diagnoses     Need for prophylactic vaccination and inoculation against influenza    -  Primary    Low back pain potentially associated with radiculopathy        Relevant Medications    predniSONE (DELTASONE) 20 MG tablet    Other Relevant Orders    ORTHO  REFERRAL         Pt having minimal improvement with PT.   Would like to persue interventions   Referral placed to see ortho spine  Discussed home care  Reportable signs and symptoms discussed  RTC if symptoms persist or fail to improve      Verna Osborne MD  [facility]

## 2017-09-29 NOTE — NURSING NOTE
"Chief Complaint   Patient presents with     Musculoskeletal Problem     Panel Management     Advanced directive        Initial /82  Pulse 60  Temp 97.2  F (36.2  C) (Temporal)  Resp 16  Ht 5' 6.75\" (1.695 m)  Wt 191 lb (86.6 kg)  BMI 30.14 kg/m2 Estimated body mass index is 30.14 kg/(m^2) as calculated from the following:    Height as of this encounter: 5' 6.75\" (1.695 m).    Weight as of this encounter: 191 lb (86.6 kg).  Medication Reconciliation: complete    "

## 2017-10-02 ENCOUNTER — PRE VISIT (OUTPATIENT)
Dept: ORTHOPEDICS | Facility: CLINIC | Age: 77
End: 2017-10-02

## 2017-10-04 DIAGNOSIS — F41.9 ANXIETY: ICD-10-CM

## 2017-10-04 NOTE — TELEPHONE ENCOUNTER
Pending Prescriptions:                       Disp   Refills    clonazePAM (KLONOPIN) 1 MG tablet [Pharma*30 tab*2        Last Written Prescription Date:  5/18/2017      Sig: TAKE ONE TABLET BY MOUTH TWICE DAILY AS NEEDED           FOR ANXIETY  Last Office Visit with [facility] or [facility] prescribing provider: 9/29/2017  Future Office visit:       Routing refill request to provider for review/approval because:  Drug not on the [facility] or [facility] refill protocol or controlled substance

## 2017-10-05 RX ORDER — CLONAZEPAM 1 MG/1
TABLET ORAL
Qty: 30 TABLET | Refills: 2 | Status: SHIPPED | OUTPATIENT
Start: 2017-10-05 | End: 2018-01-04

## 2017-10-05 ASSESSMENT — ENCOUNTER SYMPTOMS
ARTHRALGIAS: 1
STIFFNESS: 0
MUSCLE WEAKNESS: 0
BACK PAIN: 1
MYALGIAS: 1
MUSCLE CRAMPS: 0
NECK PAIN: 0
JOINT SWELLING: 0

## 2017-10-06 DIAGNOSIS — M54.5 LOW BACK PAIN, UNSPECIFIED BACK PAIN LATERALITY, UNSPECIFIED CHRONICITY, WITH SCIATICA PRESENCE UNSPECIFIED: Primary | ICD-10-CM

## 2017-10-10 ENCOUNTER — OFFICE VISIT (OUTPATIENT)
Dept: ORTHOPEDICS | Facility: CLINIC | Age: 77
End: 2017-10-10

## 2017-10-10 VITALS — WEIGHT: 196.1 LBS | HEIGHT: 67 IN | BODY MASS INDEX: 30.78 KG/M2

## 2017-10-10 DIAGNOSIS — M54.16 LUMBAR RADICULOPATHY: Primary | ICD-10-CM

## 2017-10-10 NOTE — LETTER
"10/10/2017       RE: Medardo Gautam  [address] [address] [id number]     Dear Colleague,    Thank you for referring your patient, Medardo Gautam, to the [facility] at [facility]. Please see a copy of my visit note below.    Spine Surgery Consultation    REFERRING PHYSICIAN: Verna Osborne   PRIMARY CARE PHYSICIAN: Verna Osborne           Chief Complaint:   Back Pain (left lower extremity pain from hip radiating to foot)      History of Present Illness:  Symptom Profile Including: location of symptoms, onset, severity, exacerbating/alleviating factors, previous treatments:        Medardo Gautam is a 77 year old male who complains of left leg pain for the past 3 weeks. He was mowing his lawn and did not notice any sharp pain but woke up the next morning with his left leg symptoms which include shooting pain from his left buttock down to his lateral left foot with occasional tingling. He does not complain of any back pain or subjective weakness in his LLE. He says that hip flexion helps his symptoms. He is bothered by walking >5 minutes and needs to sit down to rest because of his leg pain. He tried chiropractic traction with no benefit. His PCP gave him 4 days of oral prednisone which he describes as a \"night and day\" difference. It lasted for about 10 days. He has not tried injections. He has been taking advil at night but avoiding oxycodone (prescribed by PCP) because he is worried about the addiction potential.          Past Medical History:     Past Medical History:   Diagnosis Date     Anxiety      Colon cancer (H) 01/2015     Contracture of finger joint ca 2005    left and right fifth fingers     Dupuytren's contracture of left hand      Gout      HEMORRHOIDS NOS 6/4/2007     Hypertension      Insomnia      Personal history of colonic polyps 7 years ago?     Prostate cancer (H) Feb 2012     Renal cyst 6/22/2017     Seborrheic dermatitis  "     Skin lesion- R side of face and behind L ear 12/15/2011     SKIN SENSATION DISTURB 2006    allergic reaction to strawberries     SKIN SENSATION DISTURB 2006            Past Surgical History:     Past Surgical History:   Procedure Laterality Date     APPENDECTOMY       BIOPSY  01/16/15     C HAND/FINGER SURGERY UNLISTED       C LENGTHEN,TENDON,HAND/FINGER  ca 2007    right fifth     C STOMACH SURGERY PROCEDURE UNLISTED       COLON SURGERY  2015    Lap assisted R hemicolectomy     COLONOSCOPY  10/25/07    Snare polypectomy     COLONOSCOPY  2009    with snare polypectomy     COLONOSCOPY  2009     COLONOSCOPY  2011    COLONOS[facility]EE at  [facility]     COLONOSCOPY N/A 2015    Procedure: COMBINED COLONOSCOPY, SINGLE OR MULTIPLE BIOPSY/POLYPECTOMY BY BIOPSY;  Surgeon: Sarah Beth Pisano MD;  Location: [facility]     COLONOSCOPY WITH CO2 INSUFFLATION N/A 2015    Procedure: COLONOSCOPY WITH CO2 INSUFFLATION;  Surgeon: Sarah Beth Pisano MD;  Location: [facility]     DAVINCI PROSTATECTOMY  2012    Procedure: DAVINCI PROSTATECTOMY;  Davinci Assisted Radical Prostatectomy with Bilateral Lymphadenectomy ;  Surgeon: Norma Goodwin MD;  Location: [facility]     LAPAROSCOPIC ASSISTED COLECTOMY N/A 2015    Procedure: LAPAROSCOPIC ASSISTED COLECTOMY;  Surgeon: Oren Breen MD;  Location: [facility] OR            Social History:     Social History   Substance Use Topics     Smoking status: Former Smoker     Years: 1.00     Types: Cigarettes, Cigars, Pipe     Start date: 10/1/1961     Quit date: 1962     Smokeless tobacco: Never Used      Comment: No smokers in home     Alcohol use 0.0 oz/week      Comment: daily glass of wine and whiskey            Family History:     Family History   Problem Relation Age of Onset     CEREBROVASCULAR DISEASE Father      CANCER Mother 90     Patient believes vaginal cancer - hysterectomy,      Alzheimer Disease Mother       "CANCER Sister      Breast Cancer Sister      C.A.D. No family hx of      Breast Cancer No family hx of      Cancer - colorectal No family hx of      Prostate Cancer No family hx of      Blood Disease No family hx of      Cardiovascular No family hx of      Circulatory No family hx of      Eye Disorder No family hx of      GASTROINTESTINAL DISEASE No family hx of      Genitourinary Problems No family hx of      Lipids No family hx of      Neurologic Disorder No family hx of      Respiratory No family hx of      Asthma No family hx of      HEART DISEASE No family hx of      DIABETES No family hx of      Hypertension No family hx of      Arthritis No family hx of      Thyroid Disease No family hx of      Musculoskeletal Disorder No family hx of             Allergies:   No Known Allergies         Medications:     Current Outpatient Prescriptions   Medication     IBUPROFEN PO     clonazePAM (KLONOPIN) 1 MG tablet     zolpidem (AMBIEN) 5 MG tablet     HYDROcodone-acetaminophen (NORCO) 5-325 MG per tablet     lisinopril (PRINIVIL/ZESTRIL) 10 MG tablet     bicalutamide (CASODEX) 50 MG tablet     naproxen (NAPROSYN) 500 MG tablet     Omega-3 Fatty Acids (OMEGA-3 FISH OIL PO)     calcium-vitamin D (CALTRATE) 600-400 MG-UNIT per tablet     folic acid-vit B6-vit B12 (FOLGARD) 0.8-10-0.115 MG TABS     aspirin 81 MG tablet     sildenafil (VIAGRA) 100 MG tablet     Loratadine (CLARITIN PO)     No current facility-administered medications for this visit.              Review of Systems:     A 10 point ROS was performed and reviewed. Specific responses to these questions are noted at the end of the document.         Physical Exam:   Vitals: Ht 1.702 m (5' 7\")  Wt 89 kg (196 lb 1.6 oz)  BMI 30.71 kg/m2  Constitutional: awake, alert, cooperative, no apparent distress, appears stated age.    Eyes: The sclera are white.  Ears, Nose, Throat: The trachea is midline.  Psychiatric: The patient has a normal affect.  Respiratory: breathing " non-labored  Cardiovascular: The extremities are warm and perfused.  Skin: no obvious rashes or lesions.  Musculoskeletal, Neurologic, and Spine:   LLE: 5/5 strength with hip flexion and knee extension. 4/5 TA and peroneal strength. 3/5 EHL. Sensation intact and normal in L3-S1 distributions.   RLE: 5/5 strength with hip flexion, knee extension, TA, peroneals. 4/5 strength EHL. Sensation intact and normal in L3-S1 distributions.          Imaging:   We ordered and independently reviewed new radiographs at this clinic visit. The results were discussed with the patient.  Findings include:    September 24, 2017 lumbar MRI shows diffuse spondylosis, with a left-sided disc herniation at L4 5 causing moderate to severe lateral recess stenosis and impacting the L4 root.    New AP lateral lumbar flexion and extension radiographs were ordered and reviewed today. These show diffuse spondylosis with relative loss of lumbar lordosis. Near autofusion of L3 on L4 with a large anterior osteophyte. No dynamic instability.             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 We discussed Mr. Gautam's MRI findings and diagnosis of lumbar disc herniation. Given his improvement with oral prednisone, he could benefit from an epidural steroid injection to control his symptoms temporarily. If his symptoms persist for over 4-6 weeks he could return to us to discuss surgical options which would be a discectomy. He should continue using anti-inflammatories and avoiding narcotics to help his pain.   2. Referral sent for pain clinic to offer Left L4-5 transforaminal epidural steroid injection  3. We would like to see him back in clinic 1 week after he receives his MAYKEL to see how it helped.       Respectfully,  Eddy Powell MD  Spine Surgery  [facility]

## 2017-10-10 NOTE — MR AVS SNAPSHOT
After Visit Summary   10/10/2017    Medardo Gautam    MRN: [id number]           Patient Information     Date Of Birth          1940        Visit Information        Provider Department      10/10/2017 2:00 PM Eddy Powell MD [facility]        Today's Diagnoses     Lumbar radiculopathy    -  1       Follow-ups after your visit        Additional Services     [facility]       Please call [phone] to make an appointment. Clinic is located: [facility] [address] [place] [address] [id number] [place] [address]      Please complete the following questions:    Procedure/Referral: Procedure Only -  Procedure or injection only - please call the [facility] at [phone] to schedule: Epidural Steroid (transforaminal approach): Lumbar Left L4-5     What is your diagnosis for the patient's pain? Herniated disk    What are your specific questions for the pain specialist? N/A    Are there any red flags that may impact the assessment or management of the patient? None                  Your next 10 appointments already scheduled     Nov 21, 2017  2:30 PM CST   (Arrive by 2:15 PM)   Return Prostate Cancer with MANI Ireland   [facility] ([facility])    [address] [place] [address]  [address] [facility] [id number] [phone]            Apr 24, 2018 11:45 AM CDT   (Arrive by 11:30 AM)   LONG TERM RETURN with Magali Andrews PA-C   [facility] ([facility])    [address] [place] [address]  [address] [facility] [id number] [phone]              Who to contact     Please call your clinic at [phone] to:    Ask questions about your health    Make or cancel appointments    Discuss your medicines    Learn about your test results    Speak to your doctor   If you have compliments or concerns about an  "experience at your clinic, or if you wish to file a complaint, please contact [facility] Patient Relations at [phone] or email us at [email or web address]         Additional Information About Your Visit        Echographhart Information     Linquet gives you secure access to your electronic health record. If you see a primary care provider, you can also send messages to your care team and make appointments. If you have questions, please call your primary care clinic.  If you do not have a primary care provider, please call [phone] and they will assist you.      Linquet is an electronic gateway that provides easy, online access to your medical records. With Linquet, you can request a clinic appointment, read your test results, renew a prescription or communicate with your care team.     To access your existing account, please contact your [facility] or call [phone] for assistance.        Care EveryWhere ID     This is your Care EveryWhere ID. This could be used by other organizations to access your [facility] medical records  [id number]        Your Vitals Were     Height BMI (Body Mass Index)                1.702 m (5' 7\") 30.71 kg/m2           Blood Pressure from Last 3 Encounters:   09/29/17 124/82   09/25/17 124/88   09/21/17 134/90    Weight from Last 3 Encounters:   10/10/17 89 kg (196 lb 1.6 oz)   09/29/17 86.6 kg (191 lb)   09/25/17 88.9 kg (196 lb)              We Performed the Following     [facility] REFERRAL        Primary Care Provider Office Phone # Fax #    Verna Osborne [phone]       [address] [facility] [address]  [facility] [address]        Equal Access to Services     RENEE CORONA : Sheba Hitchcock, qunin tenorio, randy thompson. So [facility] [phone].    ATENCIÓN: Si habla español, tiene a sanford disposición servicios " jeremy de asistencia lingüística. Lukas moran [phone].    We comply with applicable federal civil rights laws and [place] laws. We do not discriminate on the basis of race, color, national origin, age, disability, sex, sexual orientation, or gender identity.            Thank you!     Thank you for choosing [facility]  for your care. Our goal is always to provide you with excellent care. Hearing back from our patients is one way we can continue to improve our services. Please take a few minutes to complete the written survey that you may receive in the mail after your visit with us. Thank you!             Your Updated Medication List - Protect others around you: Learn how to safely use, store and throw away your medicines at www.disposemymeds.org.          This list is accurate as of: 10/10/17  3:13 PM.  Always use your most recent med list.                   Brand Name Dispense Instructions for use Diagnosis    aspirin 81 MG tablet     30 tablet    Take 1 tablet (81 mg) by mouth daily    Hyperlipidemia LDL goal <130, Malignant neoplasm of colon, unspecified part of colon (H)       bicalutamide 50 MG tablet    CASODEX    90 tablet    Take 1 tablet (50 mg) by mouth daily    Malignant neoplasm of prostate (H)       calcium-vitamin D 600-400 MG-UNIT per tablet    CALTRATE     Take 1 tablet by mouth daily    Hyperlipidemia LDL goal <130, Malignant neoplasm of colon, unspecified part of colon (H)       CLARITIN PO      Take  by mouth. As needed        clonazePAM 1 MG tablet    klonoPIN    30 tablet    TAKE ONE TABLET BY MOUTH TWICE DAILY AS NEEDED FOR ANXIETY    Anxiety       folic acid-vit B6-vit B12 0.8-10-0.115 MG Tabs per tablet    FOLGARD     Take 1 tablet by mouth daily    Hyperlipidemia LDL goal <130, Malignant neoplasm of colon, unspecified part of colon (H)       HYDROcodone-acetaminophen 5-325 MG per tablet    NORCO    20 tablet    Take 1 tablet by mouth nightly as needed for moderate to  severe pain maximum 1  tablet(s) per day    Hip pain, left       IBUPROFEN PO      Take 400 mg by mouth every 8 hours as needed for moderate pain        lisinopril 10 MG tablet    PRINIVIL/ZESTRIL    90 tablet    TAKE ONE TABLET BY MOUTH ONCE DAILY    Benign essential hypertension       naproxen 500 MG tablet    NAPROSYN    60 tablet    Take 1 tablet (500 mg) by mouth 2 times daily as needed for moderate pain    Chronic gout without tophus, unspecified cause, unspecified site       OMEGA-3 FISH OIL PO      Take 500 mg by mouth daily    Hyperlipidemia LDL goal <130, Malignant neoplasm of colon, unspecified part of colon (H)       sildenafil 100 MG tablet    VIAGRA    10 tablet    1/2 tab three times a week    Malignant neoplasm of prostate (H)       zolpidem 5 MG tablet    AMBIEN    30 tablet    TAKE ONE TABLET BY MOUTH ONCE DAILY IN THE EVENING AS NEEDED FOR SLEEP    Persistent disorder of initiating or maintaining sleep

## 2017-10-10 NOTE — NURSING NOTE
"Reason For Visit:   Chief Complaint   Patient presents with     Back Pain     left lower extremity pain from hip radiating to foot       Primary MD: Verna Osborne  Ref. MD: same    ?  No  Currently working? No.  Work status?  Retired.   Date of injury: none  Date of surgery: none  Smoker: No      Ht 1.702 m (5' 7\")  Wt 89 kg (196 lb 1.6 oz)  BMI 30.71 kg/m2    Pain Assessment  Patient Currently in Pain: Yes  0-10 Pain Scale: 4  Primary Pain Location: Leg  Pain Orientation: Left  Pain Descriptors: Sharp  Alleviating Factors: Rest, NSAIDS  Aggravating Factors: Standing, Walking    Oswestry (ELIUD) Questionnaire    OSWESTRY DISABILITY INDEX 10/5/2017   Section 1 - Pain intensity 0   Section 2 - Personal care (washing, dressing, etc.)  0   Section 3 - Lifting 1   Section 4 - Walking 1   Section 5 - Sitting 0   Section 6 - Standing 0   Section 7 - Sleeping 1   Section 8 - Sex life (if applicable) -1   Section 9 - Social life 0   Section 10 - Traveling 0   Count 9   Sum 3   Oswestry Score (%) 6.67   SECTION 1-PAIN INTENSITY I have no pain at the moment.   SECTION 2-PERSONAL CARE (WASHING,DRESSING,ETC.) I can look after myself normally without causing additional pain.   SECTION 3-LIFTING I can lift heavy weights but it gives me additional pain.   SECTION 4-WALKING Pain prevents me from walking more than one mile.   SECTION 5-SITTING I can sit in any chair as long as I like.   SECTION 6-STANDING I can stand as long as I want without additional pain.   SECTION 7-SLEEPING My sleep is occasionally interrupted by pain.   SECTION 8-SEX LIFE (IF APPLICABLE) Not answered/NA   SECTION 9-SOCIAL LIFE My social life is normal and causes me no additional pain.   SECTION 10-TRAVELING I can travel anywhere without pain.   Oswestry Disability Index: Count 9   ELIUD: Total Score = SUM (total for answered questions) 3   Computed Oswestry Score 6.66 (%)   Some recent data might be hidden          Visual Analog " Pain Scale  Back Pain Scale 0-10: 0  Right leg pain: 0  Left leg pain: 6.5    Promis 10 Assessment    PROMIS 10 10/5/2017   In general, would you say your health is: Fair   In general, would you say your quality of life is: Good   In general, how would you rate your physical health? Good   In general, how would you rate your mental health, including your mood and your ability to think? Good   In general, how would you rate your satisfaction with your social activities and relationships? Good   In general, please rate how well you carry out your usual social activities and roles Good   To what extent are you able to carry out your everyday physical activities such as walking, climbing stairs, carrying groceries, or moving a chair? Mostly   How often have you been bothered by emotional problems such as feeling anxious, depressed or irritable? Never   How would you rate your fatigue on average? Mild   How would you rate your pain on average?   0 = No Pain  to  10 = Worst Imaginable Pain 8   In general, would you say your health is: 2   In general, would you say your quality of life is: 3   In general, how would you rate your physical health? 3   In general, how would you rate your mental health, including your mood and your ability to think? 3   In general, how would you rate your satisfaction with your social activities and relationships? 3   In general, please rate how well you carry out your usual social activities and roles. (This includes activities at home, at work and in your community, and responsibilities as a parent, child, spouse, employee, friend, etc.) 3   To what extent are you able to carry out your everyday physical activities such as walking, climbing stairs, carrying groceries, or moving a chair? 4   In the past 7 days, how often have you been bothered by emotional problems such as feeling anxious, depressed, or irritable? 1   In the past 7 days, how would you rate your fatigue on average? 2   In the  past 7 days, how would you rate your pain on average, where 0 means no pain, and 10 means worst imaginable pain? 8   Global Mental Health Score 14   Global Physical Health Score 13   PROMIS TOTAL - SUBSCORES 27   Some recent data might be hidden          La Prabhakar LPN

## 2017-10-10 NOTE — PROGRESS NOTES
"Spine Surgery Consultation    REFERRING PHYSICIAN: Verna Osborne   PRIMARY CARE PHYSICIAN: Verna Osborne           Chief Complaint:   Back Pain (left lower extremity pain from hip radiating to foot)      History of Present Illness:  Symptom Profile Including: location of symptoms, onset, severity, exacerbating/alleviating factors, previous treatments:        Medardo Gautam is a 77 year old male who complains of left leg pain for the past 3 weeks. He was mowing his lawn and did not notice any sharp pain but woke up the next morning with his left leg symptoms which include shooting pain from his left buttock down to his lateral left foot with occasional tingling. He does not complain of any back pain or subjective weakness in his LLE. He says that hip flexion helps his symptoms. He is bothered by walking >5 minutes and needs to sit down to rest because of his leg pain. He tried chiropractic traction with no benefit. His PCP gave him 4 days of oral prednisone which he describes as a \"night and day\" difference. It lasted for about 10 days. He has not tried injections. He has been taking advil at night but avoiding oxycodone (prescribed by PCP) because he is worried about the addiction potential.          Past Medical History:     Past Medical History:   Diagnosis Date     Anxiety      Colon cancer (H) 01/2015     Contracture of finger joint ca 2005    left and right fifth fingers     Dupuytren's contracture of left hand      Gout      HEMORRHOIDS NOS 6/4/2007     Hypertension      Insomnia      Personal history of colonic polyps 7 years ago?     Prostate cancer (H) Feb 2012     Renal cyst 6/22/2017     Seborrheic dermatitis      Skin lesion- R side of face and behind L ear 12/15/2011     SKIN SENSATION DISTURB 12/29/2006    allergic reaction to strawberries     SKIN SENSATION DISTURB 12/29/2006            Past Surgical History:     Past Surgical History:   Procedure Laterality Date     APPENDECTOMY       BIOPSY  " 01/16/15     C HAND/FINGER SURGERY UNLISTED       C LENGTHEN,TENDON,HAND/FINGER  ca     right fifth     C STOMACH SURGERY PROCEDURE UNLISTED       COLON SURGERY  2015    Lap assisted R hemicolectomy     COLONOSCOPY  10/25/07    Snare polypectomy     COLONOSCOPY  2009    with snare polypectomy     COLONOSCOPY  2009     COLONOSCOPY  2011    COLONOSCOPY performed by JUD MARIEE at  GI     COLONOSCOPY N/A 2015    Procedure: COMBINED COLONOSCOPY, SINGLE OR MULTIPLE BIOPSY/POLYPECTOMY BY BIOPSY;  Surgeon: Sarah Beth Pisano MD;  Location: [facility] OR     COLONOSCOPY WITH CO2 INSUFFLATION N/A 2015    Procedure: COLONOSCOPY WITH CO2 INSUFFLATION;  Surgeon: Sarah Beth Pisano MD;  Location: [facility] OR     DAVINCI PROSTATECTOMY  2012    Procedure: DAVINCI PROSTATECTOMY;  Davinci Assisted Radical Prostatectomy with Bilateral Lymphadenectomy ;  Surgeon: Norma Goodwin MD;  Location: [facility] OR     LAPAROSCOPIC ASSISTED COLECTOMY N/A 2015    Procedure: LAPAROSCOPIC ASSISTED COLECTOMY;  Surgeon: Oren Breen MD;  Location: [facility] OR            Social History:     Social History   Substance Use Topics     Smoking status: Former Smoker     Years: 1.00     Types: Cigarettes, Cigars, Pipe     Start date: 10/1/1961     Quit date: 1962     Smokeless tobacco: Never Used      Comment: No smokers in home     Alcohol use 0.0 oz/week      Comment: daily glass of wine and whiskey            Family History:     Family History   Problem Relation Age of Onset     CEREBROVASCULAR DISEASE Father      CANCER Mother 90     Patient believes vaginal cancer - hysterectomy,      Alzheimer Disease Mother      CANCER Sister      Breast Cancer Sister      C.A.D. No family hx of      Breast Cancer No family hx of      Cancer - colorectal No family hx of      Prostate Cancer No family hx of      Blood Disease No family hx of      Cardiovascular No family hx of      Circulatory No family hx of      Eye  "Disorder No family hx of      GASTROINTESTINAL DISEASE No family hx of      Genitourinary Problems No family hx of      Lipids No family hx of      Neurologic Disorder No family hx of      Respiratory No family hx of      Asthma No family hx of      HEART DISEASE No family hx of      DIABETES No family hx of      Hypertension No family hx of      Arthritis No family hx of      Thyroid Disease No family hx of      Musculoskeletal Disorder No family hx of             Allergies:   No Known Allergies         Medications:     Current Outpatient Prescriptions   Medication     IBUPROFEN PO     clonazePAM (KLONOPIN) 1 MG tablet     zolpidem (AMBIEN) 5 MG tablet     HYDROcodone-acetaminophen (NORCO) 5-325 MG per tablet     lisinopril (PRINIVIL/ZESTRIL) 10 MG tablet     bicalutamide (CASODEX) 50 MG tablet     naproxen (NAPROSYN) 500 MG tablet     Omega-3 Fatty Acids (OMEGA-3 FISH OIL PO)     calcium-vitamin D (CALTRATE) 600-400 MG-UNIT per tablet     folic acid-vit B6-vit B12 (FOLGARD) 0.8-10-0.115 MG TABS     aspirin 81 MG tablet     sildenafil (VIAGRA) 100 MG tablet     Loratadine (CLARITIN PO)     No current facility-administered medications for this visit.              Review of Systems:     A 10 point ROS was performed and reviewed. Specific responses to these questions are noted at the end of the document.         Physical Exam:   Vitals: Ht 1.702 m (5' 7\")  Wt 89 kg (196 lb 1.6 oz)  BMI 30.71 kg/m2  Constitutional: awake, alert, cooperative, no apparent distress, appears stated age.    Eyes: The sclera are white.  Ears, Nose, Throat: The trachea is midline.  Psychiatric: The patient has a normal affect.  Respiratory: breathing non-labored  Cardiovascular: The extremities are warm and perfused.  Skin: no obvious rashes or lesions.  Musculoskeletal, Neurologic, and Spine:   LLE: 5/5 strength with hip flexion and knee extension. 4/5 TA and peroneal strength. 3/5 EHL. Sensation intact and normal in L3-S1 distributions. "   RLE: 5/5 strength with hip flexion, knee extension, TA, peroneals. 4/5 strength EHL. Sensation intact and normal in L3-S1 distributions.          Imaging:   We ordered and independently reviewed new radiographs at this clinic visit. The results were discussed with the patient.  Findings include:    September 24, 2017 lumbar MRI shows diffuse spondylosis, with a left-sided disc herniation at L4 5 causing moderate to severe lateral recess stenosis and impacting the L4 root.    New AP lateral lumbar flexion and extension radiographs were ordered and reviewed today. These show diffuse spondylosis with relative loss of lumbar lordosis. Near autofusion of L3 on L4 with a large anterior osteophyte. No dynamic instability.             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 We discussed Mr. Gautam's MRI findings and diagnosis of lumbar disc herniation. Given his improvement with oral prednisone, he could benefit from an epidural steroid injection to control his symptoms temporarily. If his symptoms persist for over 4-6 weeks he could return to us to discuss surgical options which would be a discectomy. He should continue using anti-inflammatories and avoiding narcotics to help his pain.   2. Referral sent for pain clinic to offer Left L4-5 transforaminal epidural steroid injection  3. We would like to see him back in clinic 1 week after he receives his MAYKEL to see how it helped.       Respectfully,  Eddy Powell MD  Spine Surgery  [facility]      Answers for HPI/ROS submitted by the patient on 10/5/2017   General Symptoms: No  Skin Symptoms: No  HENT Symptoms: No  EYE SYMPTOMS: No  HEART SYMPTOMS: No  LUNG SYMPTOMS: No  INTESTINAL SYMPTOMS: No  URINARY SYMPTOMS: No  REPRODUCTIVE SYMPTOMS: No  SKELETAL SYMPTOMS: Yes  BLOOD SYMPTOMS: No  NERVOUS SYSTEM SYMPTOMS: No  MENTAL HEALTH SYMPTOMS: No  Back pain: Yes  Muscle aches:  Yes  Neck pain: No  Swollen joints: No  Joint pain: Yes  Bone pain: Yes  Muscle cramps: No  Muscle weakness: No  Joint stiffness: No  Bone fracture: No

## 2017-10-11 ENCOUNTER — TELEPHONE (OUTPATIENT)
Dept: FAMILY MEDICINE | Facility: OTHER | Age: 77
End: 2017-10-11

## 2017-10-11 DIAGNOSIS — M54.50 LOW BACK PAIN POTENTIALLY ASSOCIATED WITH RADICULOPATHY: Primary | ICD-10-CM

## 2017-10-11 NOTE — TELEPHONE ENCOUNTER
Routing to  to review and advise: patient is requesting Prednisone refill as his left leg pain returned when he completed this medication. Patient saw Ortho 10/10/2017. RN reviewed allergies, medications, and pharmacy is selected. Please review and advise.    Medardo Gautam is a 77 year old male who calls with left leg pain.    NURSING ASSESSMENT:  Description:  Left leg pain has returned after stopped the Prednisone. Patient is requesting Prednisone refill as this decreased his pain in his left leg. Pain is 8-10/10 in the left leg and sitting down helps decrease the pain. Stated he still has Oxycodone but would like to avoid using this. Having leg pain from the hip down the leg and into the foot. Able to move around. Ibuprofen has not been very affective. Denies fevers, chills, swelling, deformity.   Onset/duration:  Since stopped prednisone   Pain scale (0-10)   8-10/10 left leg pain  Last exam/Treatment:  09/29/2017  and saw Ortho 10/10/2017  Allergies: No Known Allergies    NURSING PLAN: Routed to provider Yes    RECOMMENDED DISPOSITION:  TBD  Will comply with recommendation: Yes  If further questions/concerns or if symptoms do not improve, worsen or new symptoms develop, call your PCP or [facility] as soon as possible.    NOTES:  Disposition was determined by the first positive assessment question, therefore all previous assessment questions were negative    Guideline used:  Telephone Triage Protocols for Nurses, Fifth Edition, Charlotte Whitten  Leg Pain/Swelling  Nursing Judgment  Routing to   Corrina Waldrop RN, BSN

## 2017-10-11 NOTE — TELEPHONE ENCOUNTER
Reason for Call:  Medication or medication refill:    Do you use a [facility]?  Name of the pharmacy and phone number for the current request:  [facility] - [phone]    Name of the medication requested: Prednisone     Other request: pt states wondering if Dylon can refill Prednisone due to leg pain . Please advise    Can we leave a detailed message on this number? YES    Phone number patient can be reached at: Cell number on file:    Telephone Information:   Mobile [phone]       Best Time: ANY    Call taken on 10/11/2017 at 5:01 PM by Dali Hyde

## 2017-10-12 DIAGNOSIS — M1A.9XX0 CHRONIC GOUT WITHOUT TOPHUS, UNSPECIFIED CAUSE, UNSPECIFIED SITE: ICD-10-CM

## 2017-10-12 NOTE — TELEPHONE ENCOUNTER
naproxen (NAPROSYN) 500 MG tablet      Last Written Prescription Date: 2/17/2017  Last Quantity: 60, # refills: 0  Last Office Visit with G, P or [facility] prescribing provider: 9/29/2017       Creatinine   Date Value Ref Range Status   09/07/2017 1.02 0.66 - 1.25 mg/dL Final     Lab Results   Component Value Date    AST 54 09/07/2017     Lab Results   Component Value Date    ALT 60 09/07/2017     BP Readings from Last 3 Encounters:   09/29/17 124/82   09/25/17 124/88   09/21/17 134/90     Lisa Contreras CMA  October 12, 2017

## 2017-10-13 RX ORDER — NAPROXEN 500 MG/1
TABLET ORAL
Qty: 60 TABLET | Refills: 1 | Status: SHIPPED | OUTPATIENT
Start: 2017-10-13 | End: 2018-04-18

## 2017-10-13 RX ORDER — PREDNISONE 10 MG/1
TABLET ORAL
Qty: 18 TABLET | Refills: 0 | Status: SHIPPED | OUTPATIENT
Start: 2017-10-13 | End: 2017-11-29

## 2017-10-13 NOTE — TELEPHONE ENCOUNTER
"Spoke with pt. He is planning on getting the steroid injection but hasn't gotten it scheduled yet because no one had called him to finish setting it up. He plans to call them again this afternoon.     He is wondering if you would reconsider a little prednisone and naproxen to carry him over to the injection because \"it was like night and day\" once he started the medications  "

## 2017-10-13 NOTE — TELEPHONE ENCOUNTER
APPT INFORMATION    Date & Time: 10/16/17 9:20AM   Reason for Appt: Lumbar radiculopathy, discuss injection treatments   Referring Name/Clinic: [facility]     Yes / No COMMENT / NOTES DATE & ACTION   Patient Contacted? No Pt was referred.  10/13/17     RECORDS CLINIC NAME  (use N/A if no records ) DATE & ACTION RECEIVED RECS & IMG? Y/N   (may include other helpful notes)   External Clinics: N/A            Internal Clinics: [facility] (Referring)   Recs & referral in Epic / Images in PACS     [facility]   Recs are in Epic     [facility]   Recs are in Epic / Images in PACS

## 2017-10-13 NOTE — TELEPHONE ENCOUNTER
Prescription approved per [facility] Refill Protocol.  Laura Blevins, RN, BSN

## 2017-10-13 NOTE — TELEPHONE ENCOUNTER
Doing oral steroid for a prolonged period can have significant side effects. If he benefitted from oral steroids, he is definitely going to get better with the steroid shot that the spine surgeon ordered. I would have this scheduled at the earliest instead of another dose of oral steroid

## 2017-10-13 NOTE — TELEPHONE ENCOUNTER
I did send in a taper down dose of prednisone. Please help him schedule that appt for the steroid  Injection in his back

## 2017-10-14 ENCOUNTER — MYC MEDICAL ADVICE (OUTPATIENT)
Dept: FAMILY MEDICINE | Facility: OTHER | Age: 77
End: 2017-10-14

## 2017-10-15 ASSESSMENT — ENCOUNTER SYMPTOMS
BACK PAIN: 1
JOINT SWELLING: 0
MYALGIAS: 1
MUSCLE WEAKNESS: 0
ARTHRALGIAS: 1
NECK PAIN: 0
STIFFNESS: 0
MUSCLE CRAMPS: 0

## 2017-10-15 ASSESSMENT — ANXIETY QUESTIONNAIRES
4. TROUBLE RELAXING: NOT AT ALL
3. WORRYING TOO MUCH ABOUT DIFFERENT THINGS: NOT AT ALL
7. FEELING AFRAID AS IF SOMETHING AWFUL MIGHT HAPPEN: NOT AT ALL
GAD7 TOTAL SCORE: 0
7. FEELING AFRAID AS IF SOMETHING AWFUL MIGHT HAPPEN: NOT AT ALL
6. BECOMING EASILY ANNOYED OR IRRITABLE: NOT AT ALL
GAD7 TOTAL SCORE: 0
5. BEING SO RESTLESS THAT IT IS HARD TO SIT STILL: NOT AT ALL
1. FEELING NERVOUS, ANXIOUS, OR ON EDGE: NOT AT ALL
GAD7 TOTAL SCORE: 0
2. NOT BEING ABLE TO STOP OR CONTROL WORRYING: NOT AT ALL

## 2017-10-16 ENCOUNTER — OFFICE VISIT (OUTPATIENT)
Dept: ANESTHESIOLOGY | Facility: CLINIC | Age: 77
End: 2017-10-16

## 2017-10-16 ENCOUNTER — PRE VISIT (OUTPATIENT)
Dept: ANESTHESIOLOGY | Facility: CLINIC | Age: 77
End: 2017-10-16

## 2017-10-16 ENCOUNTER — CARE COORDINATION (OUTPATIENT)
Dept: ANESTHESIOLOGY | Facility: CLINIC | Age: 77
End: 2017-10-16

## 2017-10-16 VITALS
BODY MASS INDEX: 30.76 KG/M2 | HEART RATE: 61 BPM | OXYGEN SATURATION: 97 % | DIASTOLIC BLOOD PRESSURE: 89 MMHG | SYSTOLIC BLOOD PRESSURE: 150 MMHG | WEIGHT: 196 LBS | HEIGHT: 67 IN

## 2017-10-16 DIAGNOSIS — M54.16 LUMBAR RADICULOPATHY: Primary | ICD-10-CM

## 2017-10-16 ASSESSMENT — PAIN SCALES - GENERAL: PAINLEVEL: MODERATE PAIN (4)

## 2017-10-16 ASSESSMENT — ANXIETY QUESTIONNAIRES: GAD7 TOTAL SCORE: 0

## 2017-10-16 NOTE — NURSING NOTE
Procedure Consent signed.   LPN reviewed AVS with Pt includin.Schedule your procedure.     Please call Bhargavi at [phone] to schedule your procedure. She is available Monday - Friday from 9-5:30pm, if she is unavailable to take your call, please leave her a voice message with your name, birth date, and phone number and she will call you back.    Your procedure: Left Transforaminal Lumbar Epidural Steroid Injection      Pt verbalized an understanding of information, and was asked to contact clinic with questions.\  LPN read through the Pre-procedure instructions with pt.   Pt verbalized understanding to holding appropriate medication per protocol, and was agreeable to NPO policy and needing a .    Amy Rodriguez LPN

## 2017-10-16 NOTE — MR AVS SNAPSHOT
After Visit Summary   10/16/2017    Medardo Gautam    MRN: [id number]           Patient Information     Date Of Birth          1940        Visit Information        Provider Department      10/16/2017 9:20 AM Yazmin Gonzalez APRN [facility]        Today's Diagnoses     Lumbar radiculopathy    -  1      Care Instructions    1.Schedule your procedure.     Please call Bhargavi at [phone] to schedule your procedure. She is available Monday - Friday from 9-5:30pm, if she is unavailable to take your call, please leave her a voice message with your name, birth date, and phone number and she will call you back.    Your procedure: Left Transforaminal Lumbar Epidural Steroid Injection    On the day of the procedure  1. Arrive 1 hour earlier than your scheduled time, to the [facility]  Address: [address] [place] [id number]  2. Check in on the 5th floor for your procedure    A nurse will call you 2 weeks after your procedure to follow up.    If you must reschedule your procedure more than two times, you must follow up in clinic before rescheduling again.      Patient Pre-Procedure Instructions    CAUTION - FAILURE TO FOLLOW THESE PRE-PROCEDURE INSTRUCTIONS WILL RESULT IN YOUR PROCEDURE BEING RESCHEDULED.            You MUST have a  TO TAKE YOU HOME after your procedure. Transportation by Taxi or Para-transit must have a responsible adult accompany you home (other than the ). Travel by bus or light rail is not acceptable transportation. You must provide your 's full name and contact number at time of check in.   Fasting Protocol You may have NOTHING SOLID TO EAT FOR 8 HOURS prior to arrival at the procedure area.   Broth and candy are considered solid food and require an eight hour fast.   You may have CLEAR LIQUIDS UP TO 2 HOURS prior to arrival at the clinic.   Clear liquids include water, clear fruit  juice (no pulp) carbonated beverages, ice, black coffee, black tea (no milk or cream), chewing gum (un-swallowed), and/or clear jello (no fruit or milk). No alcohol containing beverages.   Medications If you take any medications,  DO NOT STOP. Take your medications as usual the morning of your procedure with a sip of water AT LEAST 2 HOURS PRIOR TO ARRIVAL.    Antibiotics If you are currently taking antibiotics, you must complete the entire dose 7 days prior to your scheduled procedure. You must be clear of any signs or symptoms of infection. If you begin antibiotics, please contact our clinic for instructions.   Fever, Chills, or Rash If you experience a fever of higher than 100 degrees, chills, rash, or open wounds during the one week prior to your procedure, please call the clinic.         Medication Hold List  **Patients under Cardiology/Neurology care should consult their provider prior to the pain procedure to verify pre-procedure medication instructions. The information below contains general guidelines.**    Blood Thinners If you are taking daily ASPIRIN, PLAVIX, OR OTHER BLOOD THINNERS SUCH AS COUMADIN/WARFARIN, we will need your prescribing doctor to sign a release permitting you to stop these medications. Once approved by your prescribing doctor - STOP ALL BLOOD THINNERS BASED ON THE TIME TABLE BELOW PRIOR TO YOUR PROCEDURE. If you have been instructed to stop WARFARIN(COUMADIN), you must have an INR lab drawn the day before your procedure. . Your INR must be within normal limits before we can perform your injection. MEDICATIONS CAN BE RESTARTED AFTER YOUR PROCEDURE.    14 DAY HOLD  Ticlid (ticlopidine)    10 DAY HOLD  Effient (Prasugel)    3 DAY HOLD  Xarelto (rivaroxaban) 7 DAY HOLD  Anacin, Bufferin, Ecotrin, Excedrin, Aggrenox (Aspirin)  Brilinta (ticagrelor)  Coumadin (Warfarin)  Pradexa (Dabigatran)  Elmiron (Pentosan)  Plavix (Clopidogrel Bisulfate)  Pletal (Cilostazol)    24 DAY HOLD  Lovenox  (enoxaparin)  Agrylin (Anagrelide)        Non-steroidal Anti-inflammatories (NSAIDs) DO NOT TAKE any non-steroidal anti-inflammatory medications (NSAIDs) listed on the table below. MEDICATIONS CAN BE RESTARTED AFTER YOUR PROCEDURE. Celebrex is OK to take and does not need to be discontinued.     Medications to stop:  3 DAY HOLD  Advil, Motrin (Ibuprofen)  Arthrotec (diciofenac sodium/misoprostol)  Clinoril (Sulindac)  Indocin (Indomethacin)  Lodine (Etodolac)  Toradol (Ketorolac)  Vicoprofen (Hydrocodone and Ibuprofen)  Voltaren (Diclotenac)    14 DAY HOLD  Daypro (Oxaprozin)  Feldene (Piroxicam)   7 DAY HOLD  Aleve (Naproxen sodium)  Darvon compound (contains aspirin)  Naprosyn (Naproxen)  Norgesic Forte (contains aspirin)  Mobic (Meloxicam)  Oruvall (Ketoprofen)  Percodan (contains aspirin)  Relafen (Nabumetone)  Salsalate  Trilisate  Vitamin E (more than 400 mg per day)  Any medication containing aspirin                To speak with a nurse, schedule/reschedule/cancel a clinic appointment, or request a medication refill call: [phone]     You can also reach us by [employer]: [email or web address]      For refills, please call on Monday, 1 week before your medication runs out. The doctors are not always in clinic, so this gives us time to get your prescriptions ready.  Please let us know the name of the medication you are requesting a refill of.                                     Follow-ups after your visit        Your next 10 appointments already scheduled     Nov 21, 2017  2:30 PM CST   (Arrive by 2:15 PM)   Return Prostate Cancer with MANI Ireland   [facility] ([facility])    [address] [place] [id number] [phone]            Apr 24, 2018 11:45 AM CDT   (Arrive by 11:30 AM)   LONG TERM RETURN with Magali Andrews PA-C   [facility] ([facility]    [address] [facility]  [address] [facility] [address] [phone]              Who to contact     Please call your clinic at [phone] to:    Ask questions about your health    Make or cancel appointments    Discuss your medicines    Learn about your test results    Speak to your doctor   If you have compliments or concerns about an experience at your clinic, or if you wish to file a complaint, please contact [facility] Patient Relations at [phone] or email us at [email or web address]         Additional Information About Your Visit        [employer] Information     [employer] gives you secure access to your electronic health record. If you see a primary care provider, you can also send messages to your care team and make appointments. If you have questions, please call your primary care clinic.  If you do not have a primary care provider, please call [phone] and they will assist you.      [employer] is an electronic gateway that provides easy, online access to your medical records. With [employer], you can request a clinic appointment, read your test results, renew a prescription or communicate with your care team.     To access your existing account, please contact your [facility] or call [phone] for assistance.        Care EveryWhere ID     This is your Care EveryWhere ID. This could be used by other organizations to access your [facility] medical records  [id number]        Your Vitals Were     Pulse Height Pulse Oximetry BMI (Body Mass Index)          61 1.702 m (5' 7\") 97% 30.7 kg/m2         Blood Pressure from Last 3 Encounters:   10/16/17 150/89   09/29/17 124/82   09/25/17 124/88    Weight from Last 3 Encounters:   10/16/17 88.9 kg (196 lb)   10/10/17 89 kg (196 lb 1.6 oz)   09/29/17 86.6 kg (191 lb)              We Performed the Following     [facility]-Operative Worksheet - Lumbar Epidural Steroid Injection        " Primary Care Provider Office Phone # Fax #    Verna Osborne [phone]       [address] [facility] [address]  [facility] [address]        Equal Access to Services     RENEE CORONA : Sheba vo mehdi Hitchcock, wajose franciscoda luqadaha, qaybta kasamanthada evelia, randy carroll lashawnvielka zamanen spivey. So [facility] [phone].    ATENCIÓN: Si habla español, tiene a sanford disposición servicios gratuitos de asistencia lingüística. Llame al [phone].    We comply with applicable federal civil rights laws and [place] laws. We do not discriminate on the basis of race, color, national origin, age, disability, sex, sexual orientation, or gender identity.            Thank you!     Thank you for choosing [facility]  for your care. Our goal is always to provide you with excellent care. Hearing back from our patients is one way we can continue to improve our services. Please take a few minutes to complete the written survey that you may receive in the mail after your visit with us. Thank you!             Your Updated Medication List - Protect others around you: Learn how to safely use, store and throw away your medicines at www.disposemymeds.org.          This list is accurate as of: 10/16/17 10:11 AM.  Always use your most recent med list.                   Brand Name Dispense Instructions for use Diagnosis    aspirin 81 MG tablet     30 tablet    Take 1 tablet (81 mg) by mouth daily    Hyperlipidemia LDL goal <130, Malignant neoplasm of colon, unspecified part of colon (H)       bicalutamide 50 MG tablet    CASODEX    90 tablet    Take 1 tablet (50 mg) by mouth daily    Malignant neoplasm of prostate (H)       calcium-vitamin D 600-400 MG-UNIT per tablet    CALTRATE     Take 1 tablet by mouth daily    Hyperlipidemia LDL goal <130, Malignant neoplasm of colon, unspecified part of colon (H)       CLARITIN PO      Take  by mouth. As needed        clonazePAM 1 MG tablet    klonoPIN     30 tablet    TAKE ONE TABLET BY MOUTH TWICE DAILY AS NEEDED FOR ANXIETY    Anxiety       folic acid-vit B6-vit B12 0.8-10-0.115 MG Tabs per tablet    FOLGARD     Take 1 tablet by mouth daily    Hyperlipidemia LDL goal <130, Malignant neoplasm of colon, unspecified part of colon (H)       HYDROcodone-acetaminophen 5-325 MG per tablet    NORCO    20 tablet    Take 1 tablet by mouth nightly as needed for moderate to severe pain maximum 1  tablet(s) per day    Hip pain, left       IBUPROFEN PO      Take 400 mg by mouth every 8 hours as needed for moderate pain        lisinopril 10 MG tablet    PRINIVIL/ZESTRIL    90 tablet    TAKE ONE TABLET BY MOUTH ONCE DAILY    Benign essential hypertension       naproxen 500 MG tablet    NAPROSYN    60 tablet    TAKE ONE TABLET BY MOUTH TWICE DAILY AS NEEDED FOR  MODERATE  PAIN    Chronic gout without tophus, unspecified cause, unspecified site       OMEGA-3 FISH OIL PO      Take 500 mg by mouth daily    Hyperlipidemia LDL goal <130, Malignant neoplasm of colon, unspecified part of colon (H)       predniSONE 10 MG tablet    DELTASONE    18 tablet    Take 3 pills x 3 days , take 2 pills for 3 days, then 1 pill for 3 days and stop    Low back pain potentially associated with radiculopathy       sildenafil 100 MG tablet    VIAGRA    10 tablet    1/2 tab three times a week    Malignant neoplasm of prostate (H)       zolpidem 5 MG tablet    AMBIEN    30 tablet    TAKE ONE TABLET BY MOUTH ONCE DAILY IN THE EVENING AS NEEDED FOR SLEEP    Persistent disorder of initiating or maintaining sleep

## 2017-10-16 NOTE — LETTER
"10/16/2017       RE: Medardo Gautam  [address] [facility] [address]     Dear Colleague,    Thank you for referring your patient, Medardo Gautam, to the [facility] at [facility]. Please see a copy of my visit note below.    I had the pleasure of meeting Mr. Medardo Gautam on 10/16/2017 in the outpatient pain clinic in consult for Dr. Powell with regards to his lumbar radiculopathy.   HPI:  Medardo Gautam is a 77 year old male who complains of left leg pain; onset approximately four weeks ago after mowing his lawn. He did not note any pain during activity but woke up the next morning with his left leg symptoms, described as shooting pain from his left buttock down to his lateral left foot with occasional tingling. He does not complain of any back pain or subjective weakness in his LLE. Denies incontinence of bowel/bladder. Pain is aggravated by walking >5 minutes and lying down flat on back; alleviated by sitting. He tried chiropractic traction with no benefit. His PCP gave him 4 days of oral prednisone which he describes as a \"night and day\" difference, lasting for approximately 10 days. He is currently on his second course of a prednisone taper. He has been utilizing naproxen, ibuprofen, and acetaminophen as needed with mild-to-moderate alleviation of symptoms. He has avoided prescribed oxycodone (by PCP) due to concern with addiction potential. No history of injections. He was referred today for consideration of left transforaminal L4-5 epidural steroid injection; if he does not obtain adequate dissipation of symptoms, he was offered to return to orthopedics for possible discectomy.          Location: Left buttocks, left lateral hip, left lateral thigh and calf with cessation at ankle  Quality: Shooting, ache  Severity: 8/10; reduction to 2-3/10 with prednisone   Timing: Constant  Duration: 4 weeks  Aggravating Factors: " Walking >5 minutes, lying flat on back  Relieving Factors: Sitting     He has tried the following therapies for his pain:  Medications:   Current:  -naproxen 500 mg, once PO twice daily prn   -ibuprofen 400 mg TID prn  Past:  -Prednisone   -Norco 5/325 mg, once daily prn  Physical Therapy:  No  Injections/Interventions:  No  Pain Psychology:No    Other Modalities:  Chiropractic care:Yes - not effective  Acupuncture:No  TENS:No  Water Based Therapy:No  Meditation:No  Yoga:No         ?  Past Medical History:   Diagnosis Date     Anxiety      Colon cancer (H) 01/2015     Contracture of finger joint ca 2005    left and right fifth fingers     Dupuytren's contracture of left hand      Gout      HEMORRHOIDS NOS 6/4/2007     Hypertension      Insomnia      Personal history of colonic polyps 7 years ago?     Prostate cancer (H) Feb 2012     Renal cyst 6/22/2017     Seborrheic dermatitis      Skin lesion- R side of face and behind L ear 12/15/2011     SKIN SENSATION DISTURB 12/29/2006    allergic reaction to strawberries     SKIN SENSATION DISTURB 12/29/2006    Past medical history reviewed with patient.   Past Surgical History:   Procedure Laterality Date     APPENDECTOMY       BIOPSY  01/16/15     C HAND/FINGER SURGERY UNLISTED       C LENGTHEN,TENDON,HAND/FINGER  ca 2007    right fifth     C STOMACH SURGERY PROCEDURE UNLISTED       COLON SURGERY  2/11/2015    Lap assisted R hemicolectomy     COLONOSCOPY  10/25/07    Snare polypectomy     COLONOSCOPY  6/25/2009    with snare polypectomy     COLONOSCOPY  9/30/2009     COLONOSCOPY  1/5/2011    COLONOSCOPY performed by JUD MARIEE at  GI     COLONOSCOPY N/A 1/16/2015    Procedure: COMBINED COLONOSCOPY, SINGLE OR MULTIPLE BIOPSY/POLYPECTOMY BY BIOPSY;  Surgeon: Sarah Beth Pisano MD;  Location:  OR     COLONOSCOPY WITH CO2 INSUFFLATION N/A 1/16/2015    Procedure: COLONOSCOPY WITH CO2 INSUFFLATION;  Surgeon: Sarah Beth Pisano MD;  Location:  OR     [facility]  PROSTATECTOMY  8/6/2012    Procedure: DAVINCI PROSTATECTOMY;  Davinci Assisted Radical Prostatectomy with Bilateral Lymphadenectomy ;  Surgeon: Norma Goodwin MD;  Location: [facility] OR     LAPAROSCOPIC ASSISTED COLECTOMY N/A 2/11/2015    Procedure: LAPAROSCOPIC ASSISTED COLECTOMY;  Surgeon: Oren Breen MD;  Location: [facility] OR   Past surgical history reviewed with patient.   Medications:  Current Outpatient Prescriptions   Medication     naproxen (NAPROSYN) 500 MG tablet     predniSONE (DELTASONE) 10 MG tablet     IBUPROFEN PO     clonazePAM (KLONOPIN) 1 MG tablet     zolpidem (AMBIEN) 5 MG tablet     HYDROcodone-acetaminophen (NORCO) 5-325 MG per tablet     lisinopril (PRINIVIL/ZESTRIL) 10 MG tablet     bicalutamide (CASODEX) 50 MG tablet     Omega-3 Fatty Acids (OMEGA-3 FISH OIL PO)     calcium-vitamin D (CALTRATE) 600-400 MG-UNIT per tablet     folic acid-vit B6-vit B12 (FOLGARD) 0.8-10-0.115 MG TABS     aspirin 81 MG tablet     sildenafil (VIAGRA) 100 MG tablet     Loratadine (CLARITIN PO)     No current facility-administered medications for this visit.      A multi-state Prescription Monitoring Program reviewed and no aberrancies noted.     Allergies:   No Known Allergies  Family History:  family history includes Alzheimer Disease in his mother; Breast Cancer in his sister; CANCER in his sister; CANCER (age of onset: 90) in his mother; CEREBROVASCULAR DISEASE in his father. There is no history of C.A.D., Breast Cancer, Cancer - colorectal, Prostate Cancer, Blood Disease, Cardiovascular, Circulatory, Eye Disorder, GASTROINTESTINAL DISEASE, Genitourinary Problems, Lipids, Neurologic Disorder, Respiratory, Asthma, HEART DISEASE, DIABETES, Hypertension, Arthritis, Thyroid Disease, or Musculoskeletal Disorder.  Social history: he lives in [place] with his wife. He is currently retired as a . He was born in [place], but grew up in [place].    Smoking: Former smoker x one year; ceased in 1962.  "Alcohol: Daily, wine and whiskey. Street drugs: Denies.     ROS:  A 14 point review of systems was completed; results are listed at end of note.     Objective:   /89  Pulse 61  Ht 1.702 m (5' 7\")  Wt 88.9 kg (196 lb)  SpO2 97%  BMI 30.7 kg/m2  Body mass index is 30.7 kg/(m^2).  General: In no apparent distress, well-developed; well-groomed  Mental status: Normal mood and affect, pleasant. Appropriate insigth.   Neuro: Alert, oriented. No sensory deficits.   Head: Atraumatic, normocephalic  Eyes: Extra-ocular movements grossly intact, no scleral icterus  Neck: Supple, Range of motion full  Respiratory: Non-labored breathing; No respiratory distress  Skin: No rashes or lesions noted on exposed areas of skin  Msk:   Lumbar Spine:    No spinous process tenderness with palpation of L3, L4, L5 vertebrae. No facet joint line or paraspinal muscle tenderness. No SI joint or trochanteric bursa tenderness.  Able to complete heel/toe walk without difficulty.   FROM with flexion, extension, and lateral bending without pain.  Strength 5/5 bilaterally: dorsiflexion, plantarflexion, hamstrings, quadriceps.  +2 dorsalis pedis and posterior tibial pulses; CMS intact.   Patellar and Achilles reflexes 2+ bilaterally.   Negative straight leg raise bilaterally.   Gait is slow; antalgic, but symmetrical.    Imaging: (per radiology report)  MR LUMBAR SPINE W/O CONTRAST 9/24/2017 4:43 PM     History: Moderate to severe sharp pain from left hip down to ankle  Comparison: None       Impression:   1. Left subarticular disc extrusion at L4-L5, which migrates cranially  along the left posterior margin of L4, impinging the left descending  L4 just before the nerve exits through the neural foramen.  2. Moderate right foraminal stenosis at L3-4. Milder neural foraminal  narrowing at other lumbar levels.  3. 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    Assessment:  1. Lumbar spondylosis with radiculopathy, " likely L4 dermatomal involvement     Plan:   1. Patient education: I went over the above diagnoses and treatment plan with him and answered all of his questions. A spine model was utilized as visual aide.   2. Lumbar MR Imaging review: I reviewed the radiology imaging report with him, in conjunction with the concordance of his pain.   3. Interventions:   -Although patient's physical exam was relatively benign, based on his symptom  presentation, as well as imaging studies, I am in agreement with recommendation for a  left L4-5 transforaminal epidural steroid injection for his L4 radicular symptoms.  Procedure was reviewed, inclusive of potential risk; all questions and concerns were  addressed and patient was consented today. He was advised to hold his Asprin 81 mg  for five days prior to procedure.   4. Medications  -None prescribed. Advised patient to continue with prednisone taper as prescribed by PCP. Alternate ibuprofen OR naproxen with acetaminophen to remain 'on-top-of' the pain.  5. Follow up: Return to clinic as needed.    Total time spent was  25  minutes, and more than 50% of face to face time was spent in counseling and/or coordination of care regarding the above plan.    Thank you for the consult.   JAISON Bang, [facility]  Department of Anesthesiology

## 2017-10-16 NOTE — PROGRESS NOTES
"I had the pleasure of meeting Mr. Medardo Gautam on 10/16/2017 in the outpatient pain clinic in consult for Dr. Powell with regards to his lumbar radiculopathy.   HPI:  Medardo Gautam is a 77 year old male who complains of left leg pain; onset approximately four weeks ago after mowing his lawn. He did not note any pain during activity but woke up the next morning with his left leg symptoms, described as shooting pain from his left buttock down to his lateral left foot with occasional tingling. He does not complain of any back pain or subjective weakness in his LLE. Denies incontinence of bowel/bladder. Pain is aggravated by walking >5 minutes and lying down flat on back; alleviated by sitting. He tried chiropractic traction with no benefit. His PCP gave him 4 days of oral prednisone which he describes as a \"night and day\" difference, lasting for approximately 10 days. He is currently on his second course of a prednisone taper. He has been utilizing naproxen, ibuprofen, and acetaminophen as needed with mild-to-moderate alleviation of symptoms. He has avoided prescribed oxycodone (by PCP) due to concern with addiction potential. No history of injections. He was referred today for consideration of left transforaminal L4-5 epidural steroid injection; if he does not obtain adequate dissipation of symptoms, he was offered to return to orthopedics for possible discectomy.          Location: Left buttocks, left lateral hip, left lateral thigh and calf with cessation at ankle  Quality: Shooting, ache  Severity: 8/10; reduction to 2-3/10 with prednisone   Timing: Constant  Duration: 4 weeks  Aggravating Factors: Walking >5 minutes, lying flat on back  Relieving Factors: Sitting     He has tried the following therapies for his pain:  Medications:   Current:  -naproxen 500 mg, once PO twice daily prn   -ibuprofen 400 mg TID prn  Past:  -Prednisone   -Norco 5/325 mg, once daily prn  Physical " Therapy:  No  Injections/Interventions:  No  Pain Psychology:No    Other Modalities:  Chiropractic care:Yes - not effective  Acupuncture:No  TENS:No  Water Based Therapy:No  Meditation:No  Yoga:No         ?  Past Medical History:   Diagnosis Date     Anxiety      Colon cancer (H) 01/2015     Contracture of finger joint ca 2005    left and right fifth fingers     Dupuytren's contracture of left hand      Gout      HEMORRHOIDS NOS 6/4/2007     Hypertension      Insomnia      Personal history of colonic polyps 7 years ago?     Prostate cancer (H) Feb 2012     Renal cyst 6/22/2017     Seborrheic dermatitis      Skin lesion- R side of face and behind L ear 12/15/2011     SKIN SENSATION DISTURB 12/29/2006    allergic reaction to strawberries     SKIN SENSATION DISTURB 12/29/2006    Past medical history reviewed with patient.   Past Surgical History:   Procedure Laterality Date     APPENDECTOMY       BIOPSY  01/16/15     C HAND/FINGER SURGERY UNLISTED       C LENGTHEN,TENDON,HAND/FINGER  ca 2007    right fifth     C STOMACH SURGERY PROCEDURE UNLISTED       COLON SURGERY  2/11/2015    Lap assisted R hemicolectomy     COLONOSCOPY  10/25/07    Snare polypectomy     COLONOSCOPY  6/25/2009    with snare polypectomy     COLONOSCOPY  9/30/2009     COLONOSCOPY  1/5/2011    COLONOSCOPY performed by JUD MARIEE at  GI     COLONOSCOPY N/A 1/16/2015    Procedure: COMBINED COLONOSCOPY, SINGLE OR MULTIPLE BIOPSY/POLYPECTOMY BY BIOPSY;  Surgeon: Sarah Beth Pisano MD;  Location: [facility] OR     COLONOSCOPY WITH CO2 INSUFFLATION N/A 1/16/2015    Procedure: COLONOSCOPY WITH CO2 INSUFFLATION;  Surgeon: Sarah Beth Pisano MD;  Location: [facility] OR     DAVINCI PROSTATECTOMY  8/6/2012    Procedure: DAVINCI PROSTATECTOMY;  Davinci Assisted Radical Prostatectomy with Bilateral Lymphadenectomy ;  Surgeon: Norma Goodwin MD;  Location: [facility]     LAPAROSCOPIC ASSISTED COLECTOMY N/A 2/11/2015    Procedure: LAPAROSCOPIC ASSISTED COLECTOMY;   "Surgeon: Oren Breen MD;  [facility]   Past surgical history reviewed with patient.   Medications:  Current Outpatient Prescriptions   Medication     naproxen (NAPROSYN) 500 MG tablet     predniSONE (DELTASONE) 10 MG tablet     IBUPROFEN PO     clonazePAM (KLONOPIN) 1 MG tablet     zolpidem (AMBIEN) 5 MG tablet     HYDROcodone-acetaminophen (NORCO) 5-325 MG per tablet     lisinopril (PRINIVIL/ZESTRIL) 10 MG tablet     bicalutamide (CASODEX) 50 MG tablet     Omega-3 Fatty Acids (OMEGA-3 FISH OIL PO)     calcium-vitamin D (CALTRATE) 600-400 MG-UNIT per tablet     folic acid-vit B6-vit B12 (FOLGARD) 0.8-10-0.115 MG TABS     aspirin 81 MG tablet     sildenafil (VIAGRA) 100 MG tablet     Loratadine (CLARITIN PO)     No current facility-administered medications for this visit.      A multi-state Prescription Monitoring Program reviewed and no aberrancies noted.     Allergies:   No Known Allergies  Family History:  family history includes Alzheimer Disease in his mother; Breast Cancer in his sister; CANCER in his sister; CANCER (age of onset: 90) in his mother; CEREBROVASCULAR DISEASE in his father. There is no history of C.A.D., Breast Cancer, Cancer - colorectal, Prostate Cancer, Blood Disease, Cardiovascular, Circulatory, Eye Disorder, GASTROINTESTINAL DISEASE, Genitourinary Problems, Lipids, Neurologic Disorder, Respiratory, Asthma, HEART DISEASE, DIABETES, Hypertension, Arthritis, Thyroid Disease, or Musculoskeletal Disorder.  Social history: he lives in [place] with his wife. He is currently retired as a . He was born in [place], but grew up in [place].    Smoking: Former smoker x one year; ceased in 1962. Alcohol: Daily, wine and whiskey. Street drugs: Denies.     ROS:  A 14 point review of systems was completed; results are listed at end of note.     Objective:   /89  Pulse 61  Ht 1.702 m (5' 7\")  Wt 88.9 kg (196 lb)  SpO2 97%  BMI 30.7 kg/m2  Body mass index is 30.7 " kg/(m^2).  General: In no apparent distress, well-developed; well-groomed  Mental status: Normal mood and affect, pleasant. Appropriate insigth.   Neuro: Alert, oriented. No sensory deficits.   Head: Atraumatic, normocephalic  Eyes: Extra-ocular movements grossly intact, no scleral icterus  Neck: Supple, Range of motion full  Respiratory: Non-labored breathing; No respiratory distress  Skin: No rashes or lesions noted on exposed areas of skin  Msk:   Lumbar Spine:    No spinous process tenderness with palpation of L3, L4, L5 vertebrae. No facet joint line or paraspinal muscle tenderness. No SI joint or trochanteric bursa tenderness.  Able to complete heel/toe walk without difficulty.   FROM with flexion, extension, and lateral bending without pain.  Strength 5/5 bilaterally: dorsiflexion, plantarflexion, hamstrings, quadriceps.  +2 dorsalis pedis and posterior tibial pulses; CMS intact.   Patellar and Achilles reflexes 2+ bilaterally.   Negative straight leg raise bilaterally.   Gait is slow; antalgic, but symmetrical.    Imaging: (per radiology report)  MR LUMBAR SPINE W/O CONTRAST 9/24/2017 4:43 PM     History: Moderate to severe sharp pain from left hip down to ankle  Comparison: None       Impression:   1. Left subarticular disc extrusion at L4-L5, which migrates cranially  along the left posterior margin of L4, impinging the left descending  L4 just before the nerve exits through the neural foramen.  2. Moderate right foraminal stenosis at L3-4. Milder neural foraminal  narrowing at other lumbar levels.  3. 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    Assessment:  1. Lumbar spondylosis with radiculopathy, likely L4 dermatomal involvement     Plan:   1. Patient education: I went over the above diagnoses and treatment plan with him and answered all of his questions. A spine model was utilized as visual aide.   2. Lumbar MR Imaging review: I reviewed the radiology imaging report  with him, in conjunction with the concordance of his pain.   3. Interventions:   -Although patient's physical exam was relatively benign, based on his symptom  presentation, as well as imaging studies, I am in agreement with recommendation for a  left L4-5 transforaminal epidural steroid injection for his L4 radicular symptoms.  Procedure was reviewed, inclusive of potential risk; all questions and concerns were  addressed and patient was consented today. He was advised to hold his Asprin 81 mg  for five days prior to procedure.   4. Medications  -None prescribed. Advised patient to continue with prednisone taper as prescribed by PCP. Alternate ibuprofen OR naproxen with acetaminophen to remain 'on-top-of' the pain.  5. Follow up: Return to clinic as needed.    Total time spent was  25  minutes, and more than 50% of face to face time was spent in counseling and/or coordination of care regarding the above plan.    Thank you for the consult.   JAISON Bang, GREGG  [facility]  Department of Anesthesiology           Answers for HPI/ROS submitted by the patient on 10/15/2017   SAEID 7 TOTAL SCORE: 0  General Symptoms: No  Skin Symptoms: No  HENT Symptoms: No  EYE SYMPTOMS: No  HEART SYMPTOMS: No  LUNG SYMPTOMS: No  INTESTINAL SYMPTOMS: No  URINARY SYMPTOMS: No  REPRODUCTIVE SYMPTOMS: No  SKELETAL SYMPTOMS: Yes  BLOOD SYMPTOMS: No  NERVOUS SYSTEM SYMPTOMS: No  MENTAL HEALTH SYMPTOMS: No  Back pain: Yes  Muscle aches: Yes  Neck pain: No  Swollen joints: No  Joint pain: Yes  Bone pain: Yes  Muscle cramps: No  Muscle weakness: No  Joint stiffness: No  Bone fracture: No  PHQ-2 Score: 0

## 2017-10-16 NOTE — PATIENT INSTRUCTIONS
1.Schedule your procedure.     Please call Bhargavi at [phone] to schedule your procedure. She is available Monday - Friday from 9-5:30pm, if she is unavailable to take your call, please leave her a voice message with your name, birth date, and phone number and she will call you back.    Your procedure: Left Transforaminal Lumbar Epidural Steroid Injection    On the day of the procedure  1. Arrive 1 hour earlier than your scheduled time, to the [facility]  Address: [address] [place] [id number]  2. Check in on the 5th floor for your procedure    A nurse will call you 2 weeks after your procedure to follow up.    If you must reschedule your procedure more than two times, you must follow up in clinic before rescheduling again.      Patient Pre-Procedure Instructions    CAUTION - FAILURE TO FOLLOW THESE PRE-PROCEDURE INSTRUCTIONS WILL RESULT IN YOUR PROCEDURE BEING RESCHEDULED.            You MUST have a  TO TAKE YOU HOME after your procedure. Transportation by Taxi or Para-transit must have a responsible adult accompany you home (other than the ). Travel by bus or light rail is not acceptable transportation. You must provide your 's full name and contact number at time of check in.   Fasting Protocol You may have NOTHING SOLID TO EAT FOR 8 HOURS prior to arrival at the procedure area.   Broth and candy are considered solid food and require an eight hour fast.   You may have CLEAR LIQUIDS UP TO 2 HOURS prior to arrival at the clinic.   Clear liquids include water, clear fruit juice (no pulp) carbonated beverages, ice, black coffee, black tea (no milk or cream), chewing gum (un-swallowed), and/or clear jello (no fruit or milk). No alcohol containing beverages.   Medications If you take any medications,  DO NOT STOP. Take your medications as usual the morning of your procedure with a sip of water AT LEAST 2 HOURS PRIOR TO ARRIVAL.    Antibiotics If you are  currently taking antibiotics, you must complete the entire dose 7 days prior to your scheduled procedure. You must be clear of any signs or symptoms of infection. If you begin antibiotics, please contact our clinic for instructions.   Fever, Chills, or Rash If you experience a fever of higher than 100 degrees, chills, rash, or open wounds during the one week prior to your procedure, please call the clinic.         Medication Hold List  **Patients under Cardiology/Neurology care should consult their provider prior to the pain procedure to verify pre-procedure medication instructions. The information below contains general guidelines.**    Blood Thinners If you are taking daily ASPIRIN, PLAVIX, OR OTHER BLOOD THINNERS SUCH AS COUMADIN/WARFARIN, we will need your prescribing doctor to sign a release permitting you to stop these medications. Once approved by your prescribing doctor - STOP ALL BLOOD THINNERS BASED ON THE TIME TABLE BELOW PRIOR TO YOUR PROCEDURE. If you have been instructed to stop WARFARIN(COUMADIN), you must have an INR lab drawn the day before your procedure. . Your INR must be within normal limits before we can perform your injection. MEDICATIONS CAN BE RESTARTED AFTER YOUR PROCEDURE.    14 DAY HOLD  Ticlid (ticlopidine)    10 DAY HOLD  Effient (Prasugel)    3 DAY HOLD  Xarelto (rivaroxaban) 7 DAY HOLD  Anacin, Bufferin, Ecotrin, Excedrin, Aggrenox (Aspirin)  Brilinta (ticagrelor)  Coumadin (Warfarin)  Pradexa (Dabigatran)  Elmiron (Pentosan)  Plavix (Clopidogrel Bisulfate)  Pletal (Cilostazol)    24 DAY HOLD  Lovenox (enoxaparin)  Agrylin (Anagrelide)        Non-steroidal Anti-inflammatories (NSAIDs) DO NOT TAKE any non-steroidal anti-inflammatory medications (NSAIDs) listed on the table below. MEDICATIONS CAN BE RESTARTED AFTER YOUR PROCEDURE. Celebrex is OK to take and does not need to be discontinued.     Medications to stop:  3 DAY HOLD  Advil, Motrin (Ibuprofen)  Arthrotec (diciofenac  sodium/misoprostol)  Clinoril (Sulindac)  Indocin (Indomethacin)  Lodine (Etodolac)  Toradol (Ketorolac)  Vicoprofen (Hydrocodone and Ibuprofen)  Voltaren (Diclotenac)    14 DAY HOLD  Daypro (Oxaprozin)  Feldene (Piroxicam)   7 DAY HOLD  Aleve (Naproxen sodium)  Darvon compound (contains aspirin)  Naprosyn (Naproxen)  Norgesic Forte (contains aspirin)  Mobic (Meloxicam)  Oruvall (Ketoprofen)  Percodan (contains aspirin)  Relafen (Nabumetone)  Salsalate  Trilisate  Vitamin E (more than 400 mg per day)  Any medication containing aspirin                To speak with a nurse, schedule/reschedule/cancel a clinic appointment, or request a medication refill call: [phone]     You can also reach us by Towi: [email or web address]      For refills, please call on Monday, 1 week before your medication runs out. The doctors are not always in clinic, so this gives us time to get your prescriptions ready.  Please let us know the name of the medication you are requesting a refill of.

## 2017-10-24 ENCOUNTER — TELEPHONE (OUTPATIENT)
Dept: ANESTHESIOLOGY | Facility: CLINIC | Age: 77
End: 2017-10-24

## 2017-10-24 NOTE — TELEPHONE ENCOUNTER
Call back to pt.  Pt asking if he needs to hold tylenol before procedure.  Pt advised that he may continue to take tylenol as directed.  Pt encouraged to call back clinic if needed.     Estlea Tafoya RN, BSN

## 2017-10-24 NOTE — TELEPHONE ENCOUNTER
----- Message from Estela Tafoya RN sent at 10/23/2017 10:42 AM CDT -----  Regarding: FW: Returning Estela's Call Re: Procedure 10/30/2017  Contact: [phone]      ----- Message -----     From: Jayjay Gould     Sent: 10/23/2017  10:14 AM       To: Adult Chronic Pain Nurs[facility]  Subject: Returning Estela's Call Re: Procedure 10/30/20#    Mr. Gautam was returning your call in regard to his procedure scheduled next week.  He would prefer that you call him on his home line today after noon: [phone]        Thank you

## 2017-10-27 ENCOUNTER — TELEPHONE (OUTPATIENT)
Dept: ANESTHESIOLOGY | Facility: CLINIC | Age: 77
End: 2017-10-27

## 2017-10-27 DIAGNOSIS — G47.00 PERSISTENT DISORDER OF INITIATING OR MAINTAINING SLEEP: ICD-10-CM

## 2017-10-27 NOTE — TELEPHONE ENCOUNTER
Call back to pt.  Pt reported that he spoke with his insurance and his procedure PA is approved.  Pt requested procedure 10/30/17 not be cancelled.  RNCC verified pt still on procedure schedule.     Estela Tafoya RN, BSN

## 2017-10-27 NOTE — TELEPHONE ENCOUNTER
Ambien      Last Written Prescription Date:  9/29/2017  Last Fill Quantity: 30,   # refills: 0  Future Office visit: none       Routing refill request to provider for review/approval because:  Drug not on the [facility], [facility] or [facility] refill protocol or controlled substance    Pao Jinag MA

## 2017-10-27 NOTE — TELEPHONE ENCOUNTER
Phone call to pt regarding PA status for procedure scheduled 10/30/17.  Pt informed that insurance requires 3-4 days to process PA.  Pt advised that procedure can be rescheduled or he can follow through with it on 10/30/17 with the understanding that he may assume financial responsibility.  Pt agreeable to reschedule.  RNCC will have  contact pt to reschedule.     Estela Tafoya RN, BSN

## 2017-10-27 NOTE — TELEPHONE ENCOUNTER
----- Message from Estela Tafoya RN sent at 10/27/2017  9:55 AM CDT -----  Regarding: FW: Pre Auth for Surgery  Contact: [phone]      ----- Message -----     From: Celina Bedolla     Sent: 10/27/2017   9:41 AM       To: Estela Tafoya RN, Adult [facility]  Subject: Pre Auth for Surgery                             PT talked to a rep at [facility] radha Arizmendi about getting his Pre-auth done today and will be calling him and/or you guys back to give the OK.  Please call PT at [phone] with an update as he doesn't want to lose his place for surgery on Monday.  Also, if there is anything else he can do to push this through, please let him know.    Thank You,  Celina

## 2017-10-30 ENCOUNTER — HOSPITAL ENCOUNTER (OUTPATIENT)
Facility: AMBULATORY SURGERY CENTER | Age: 77
End: 2017-10-30

## 2017-10-30 ENCOUNTER — SURGERY (OUTPATIENT)
Age: 77
End: 2017-10-30

## 2017-10-30 VITALS
TEMPERATURE: 97.8 F | WEIGHT: 195 LBS | HEIGHT: 67 IN | SYSTOLIC BLOOD PRESSURE: 146 MMHG | DIASTOLIC BLOOD PRESSURE: 83 MMHG | OXYGEN SATURATION: 98 % | BODY MASS INDEX: 30.61 KG/M2 | RESPIRATION RATE: 15 BRPM

## 2017-10-30 RX ORDER — ZOLPIDEM TARTRATE 5 MG/1
TABLET ORAL
Qty: 30 TABLET | Refills: 0 | Status: SHIPPED | OUTPATIENT
Start: 2017-10-30 | End: 2017-11-26

## 2017-10-30 RX ORDER — LIDOCAINE HYDROCHLORIDE 10 MG/ML
INJECTION, SOLUTION EPIDURAL; INFILTRATION; INTRACAUDAL; PERINEURAL PRN
Status: DISCONTINUED | OUTPATIENT
Start: 2017-10-30 | End: 2017-10-30 | Stop reason: HOSPADM

## 2017-10-30 RX ORDER — DEXAMETHASONE SODIUM PHOSPHATE 4 MG/ML
INJECTION, SOLUTION INTRA-ARTICULAR; INTRALESIONAL; INTRAMUSCULAR; INTRAVENOUS; SOFT TISSUE PRN
Status: DISCONTINUED | OUTPATIENT
Start: 2017-10-30 | End: 2017-10-30 | Stop reason: HOSPADM

## 2017-10-30 RX ORDER — IOPAMIDOL 408 MG/ML
INJECTION, SOLUTION INTRATHECAL PRN
Status: DISCONTINUED | OUTPATIENT
Start: 2017-10-30 | End: 2017-10-30 | Stop reason: HOSPADM

## 2017-10-30 RX ADMIN — IOPAMIDOL 1 ML: 408 INJECTION, SOLUTION INTRATHECAL at 12:41

## 2017-10-30 RX ADMIN — LIDOCAINE HYDROCHLORIDE 4 ML: 10 INJECTION, SOLUTION EPIDURAL; INFILTRATION; INTRACAUDAL; PERINEURAL at 12:39

## 2017-10-30 RX ADMIN — DEXAMETHASONE SODIUM PHOSPHATE 8 MG: 4 INJECTION, SOLUTION INTRA-ARTICULAR; INTRALESIONAL; INTRAMUSCULAR; INTRAVENOUS; SOFT TISSUE at 12:42

## 2017-10-30 NOTE — OP NOTE
Patient: Medardo Gautam Age: 77 year old   MRN: [id number] Attending: Dr. Reina     Date of Visit: October 30, 2017      PAIN MEDICINE CLINIC PROCEDURE NOTE      PREPROCEDURE DIAGNOSES:  1.  Lumbosacral radiculopathy      POSTPROCEDURE DIAGNOSES:  1.  Lumbosacral radiculopathy      PROCEDURE(S) PERFORMED:  1. Left L4-L5 transforaminal epidural steroid injection.  2.  Fluoroscopic guidance for the above-named procedure(s).      ANESTHESIA:  Local. See nursing notes for pre and post procedure vitals    BLOOD LOSS:  Minimal.    DRAINS AND SPECIMENS:  None.    COMPLICATIONS:  None.    INDICATIONS:  Medardo Gautam is a 77 year old male with a history of low back pain with radicular symptoms to the lower extremity.  The patient stated that the patient was in their usual state of health and denied recent anticoagulant use or recent infections.  Therefore, the plan is to perform above mentioned procedure.     Procedure Details:  The patient was met in the procedure room, where the patient was identified by name, medical record number and date of birth.  All of the patient s last minute questions were answered. Written informed consent was obtained and saved in the electronic medical record, after the risks, benefits, and alternatives were discussed with the patient.      A formal time-out procedure was performed, as per protocol, including patient name, title of procedure, and site of procedure, and all in the room concurred.  Routine monitors were applied.      The patient was placed in the prone position on the procedure room table.  All pressure points were checked and comfortably padded.  Routine monitors were placed.  Vital signs were stable.    A chlorhexidine prep was completed followed by sterile draping per standard procedure.     The AP fluoroscopic view was optimized for approach at left L4-5 neural foramina. Targeting the 6 o'clock position of the pedicle, skin, and subcutaneous tissue overlying the region  was anesthetized with 1% lidocaine using a 25-gauge 1-1/2 inch needle.  Then a 22-gauge 3-1/2 inch spinal needle was advanced through the anesthetized tract. Under serial fluoroscopic images, the needle are intermittently advanced until osseous contact was made at the 6 o'clock position of the pedicle.  The needle was then walked off in an inferior direction.  Using lateral fluoroscopic imaging, needle tip position was confirmed at the superior portion of the neural foramen.  Then after negative aspiration, 0.5 mL of the Omnipaque contrast was injected, confirming appropriate epidural and nerve root spread.  After negative aspiration, 2 mL of treatment solution containing 5 mL of 1 mL dexamethasone 10mg  per mL and 4 mg normal saline preservative-free was injected.  Needles were removed.    Light pressure was held at the puncture site(s) to prevent ecchymosis and oozing.  The patient's skin was cleansed, and hemostasis was confirmed.  Band-aids were applied to the needle injection site(s).      Condition:    The patient remained awake and alert throughout the procedure.  The patient tolerated the procedure well and was monitored for approximately 15 minutes afterward in the post procedure area.  There were no immediate post procedure complications noted.  The patient was then discharged to home as per protocol.  The patient will follow up in the outpatient clinic in 4 week(s), unless otherwise clinically indicated.

## 2017-10-30 NOTE — DISCHARGE INSTRUCTIONS
Home Care Instructions after an Epidural Steroid Pain Injection    A lumbar epidural steroid injection delivers steroid medication directly into the area that may be causing your lower back pain and/or leg pain. A cervical or thoracic epidural steroid injection delivers steroids into the epidural space surrounding spinal nerve roots to help alleviate pain in the upper spine/neck.    Activity  -Rest today  -Do not work today  -Resume normal activity tomorrow  -DO NOT shower for 24 hours  -DO NOT remove bandaid for 24 hours    Pain  -You may experience soreness at the injection site for one or two days  -You may use an ice pack for 20 minutes every 2 hours for the first 24 hours  -You may use a heating pad after the first 24 hours  -You may use Tylenol (acetaminophen) every 4 hours or other pain medicines as     directed by your physician    You may experience numbness radiating into your legs or arms (depending on the procedure location). This numbness may last several hours. Until sensation returns to normal; please use caution in walking, climbing stairs, and stepping out of your vehicle, etc.    DID YOU RECEIVE SEDATION TODAY?  No    Safety  Sedation medicine, if given, may remain active for many hours. It is important for the next 24 hours that you do not:  -Drive a car  -Operate machines or power tools  -Consume alcohol, including beer  -Sign any important papers or legal documents      Common side effects of steroids:  Not everyone will experience corticosteroid side effects. If side effects are experienced, they will gradually subside in the 7-10 day period following an injection. Most common side effects include:  -Flushed face and/or chest  -Feeling of warmth, particularly in the face but could be an overall feeling of warmth  -Increased blood sugar in diabetic patients  -Menstrual irregularities my occur. If taking hormone-based birth control an alternate method of birth control is recommended  -Sleep  disturbances and/or mood swings are possible  -Leg cramps    Please contact us if you have:  -Severe pain  -Fever more than 101.5 degrees Fahrenheit  -Signs of infection at the injection site (redness, swelling, or drainage)    If you have questions, please contact our office at [phone] between the hours of 7:00 am and 3:00 pm Monday through Friday. After office hours you can contact the on call provider by dialing [phone]. If you need immediate attention, we recommend that you go to a hospital emergency room or dial [phone].

## 2017-10-30 NOTE — IP AVS SNAPSHOT
[facility]    [address] [place] [id number]    Phone:  [phone]    Fax:  [phone]                                       After Visit Summary   10/30/2017    Medardo Gautam    MRN: [id number]           After Visit Summary Signature Page     I have received my discharge instructions, and my questions have been answered. I have discussed any challenges I see with this plan with the nurse or doctor.    ..........................................................................................................................................  Patient/Patient Representative Signature      ..........................................................................................................................................  Patient Representative Print Name and Relationship to Patient    ..................................................               ................................................  Date                                            Time    ..........................................................................................................................................  Reviewed by Signature/Title    ...................................................              ..............................................  Date                                                            Time

## 2017-10-30 NOTE — IP AVS SNAPSHOT
MRN:[id number]                      After Visit Summary   10/30/2017    Medardo Gautam    MRN: [id number]           Thank you!     Thank you for choosing [facility] for your care. Our goal is always to provide you with excellent care. Hearing back from our patients is one way we can continue to improve our services. Please take a few minutes to complete the written survey that you may receive in the mail after you visit with us. Thank you!        Patient Information     Date Of Birth          1940        About your hospital stay     You were admitted on:  October 30, 2017 You last received care in the:  [facility]    You were discharged on:  October 30, 2017       Who to Call     For medical emergencies, please call 911.  For non-urgent questions about your medical care, please call your primary care provider or clinic, [phone]  For questions related to your surgery, please call your surgery clinic         Primary Care Provider Office Phone # Fax #    Verna Osborne [phone]      Your next 10 appointments already scheduled     Nov 21, 2017  2:30 PM CST   (Arrive by 2:15 PM)   Return Prostate Cancer with MANI Ireland   [facility] ([facility] [place])    [address] [place] [address]  4th Floor  [facility] [id number] [phone]            Apr 24, 2018 11:45 AM CDT   (Arrive by 11:30 AM)   LONG TERM RETURN with Magali Andrews PA-C   [facility] ([facility] [place])    [address] [place] [address]  2nd Floor  [facility] [id number] [phone]              Further instructions from your care team       Home Care Instructions after an Epidural Steroid Pain Injection    A lumbar epidural steroid injection delivers steroid medication directly into the area that may be causing your lower back pain and/or leg pain. A cervical or thoracic  epidural steroid injection delivers steroids into the epidural space surrounding spinal nerve roots to help alleviate pain in the upper spine/neck.    Activity  -Rest today  -Do not work today  -Resume normal activity tomorrow  -DO NOT shower for 24 hours  -DO NOT remove bandaid for 24 hours    Pain  -You may experience soreness at the injection site for one or two days  -You may use an ice pack for 20 minutes every 2 hours for the first 24 hours  -You may use a heating pad after the first 24 hours  -You may use Tylenol (acetaminophen) every 4 hours or other pain medicines as     directed by your physician    You may experience numbness radiating into your legs or arms (depending on the procedure location). This numbness may last several hours. Until sensation returns to normal; please use caution in walking, climbing stairs, and stepping out of your vehicle, etc.    DID YOU RECEIVE SEDATION TODAY?  No    Safety  Sedation medicine, if given, may remain active for many hours. It is important for the next 24 hours that you do not:  -Drive a car  -Operate machines or power tools  -Consume alcohol, including beer  -Sign any important papers or legal documents      Common side effects of steroids:  Not everyone will experience corticosteroid side effects. If side effects are experienced, they will gradually subside in the 7-10 day period following an injection. Most common side effects include:  -Flushed face and/or chest  -Feeling of warmth, particularly in the face but could be an overall feeling of warmth  -Increased blood sugar in diabetic patients  -Menstrual irregularities my occur. If taking hormone-based birth control an alternate method of birth control is recommended  -Sleep disturbances and/or mood swings are possible  -Leg cramps    Please contact us if you have:  -Severe pain  -Fever more than 101.5 degrees Fahrenheit  -Signs of infection at the injection site (redness, swelling, or drainage)    If you have  "questions, please contact our office at [phone] between the hours of 7:00 am and 3:00 pm Monday through Friday. After office hours you can contact the on call provider by dialing [phone]. If you need immediate attention, we recommend that you go to a hospital emergency room or dial 911.      Pending Results     Date and Time Order Name Status Description    10/30/2017 1220 XR SURGERY MARCO FLUORO LESS THAN 5 MIN W STILLS In process             Admission Information     Date & Time Department Dept. Phone    10/30/2017 [facility] [phone]      Your Vitals Were     Blood Pressure Temperature Respirations Height Weight Pulse Oximetry    153/85 96.9  F (36.1  C) (Temporal) 18 1.702 m (5' 7\") 88.5 kg (195 lb) 96%    BMI (Body Mass Index)                   30.54 kg/m2           Lima Information     Lima gives you secure access to your electronic health record. If you see a primary care provider, you can also send messages to your care team and make appointments. If you have questions, please call your primary care clinic.  If you do not have a primary care provider, please call [phone] and they will assist you.      [facility]ima is an electronic gateway that provides easy, online access to your medical records. With [facility], you can request a clinic appointment, read your test results, renew a prescription or communicate with your care team.     To access your existing account, please contact your [facility] or call [phone] for assistance.        Care EveryWhere ID     This is your Care EveryWhere ID. This could be used by other organizations to access your [place] medical records  [id number]        Equal Access to Services     RENEE CORONA : Sheba Hitchcock, quinn tenorio, randy thompson. So [facility] [phone].    ATENCIÓN: Si habla español, tiene a sanford disposición " servicios gratuitos de asistencia lingüística. Lukas moran [phone].    We comply with applicable federal civil rights laws and [place] laws. We do not discriminate on the basis of race, color, national origin, age, disability, sex, sexual orientation, or gender identity.               Review of your medicines      UNREVIEWED medicines. Ask your doctor about these medicines        Dose / Directions    aspirin 81 MG tablet   Used for:  Hyperlipidemia LDL goal <130, Malignant neoplasm of colon, unspecified part of colon (H)        Dose:  81 mg   Take 1 tablet (81 mg) by mouth daily   Quantity:  30 tablet   Refills:  0       bicalutamide 50 MG tablet   Commonly known as:  CASODEX   Used for:  Malignant neoplasm of prostate (H)        Dose:  50 mg   Take 1 tablet (50 mg) by mouth daily   Quantity:  90 tablet   Refills:  3       calcium-vitamin D 600-400 MG-UNIT per tablet   Commonly known as:  CALTRATE   Used for:  Hyperlipidemia LDL goal <130, Malignant neoplasm of colon, unspecified part of colon (H)        Dose:  1 tablet   Take 1 tablet by mouth daily   Refills:  0       CLARITIN PO        Take  by mouth. As needed   Refills:  0       clonazePAM 1 MG tablet   Commonly known as:  klonoPIN   Used for:  Anxiety        TAKE ONE TABLET BY MOUTH TWICE DAILY AS NEEDED FOR ANXIETY   Quantity:  30 tablet   Refills:  2       folic acid-vit B6-vit B12 0.8-10-0.115 MG Tabs per tablet   Commonly known as:  FOLGARD   Used for:  Hyperlipidemia LDL goal <130, Malignant neoplasm of colon, unspecified part of colon (H)        Dose:  1 tablet   Take 1 tablet by mouth daily   Refills:  0       HYDROcodone-acetaminophen 5-325 MG per tablet   Commonly known as:  NORCO   Used for:  Hip pain, left        Dose:  1 tablet   Take 1 tablet by mouth nightly as needed for moderate to severe pain maximum 1  tablet(s) per day   Quantity:  20 tablet   Refills:  0       IBUPROFEN PO        Dose:  400 mg   Take 400 mg by mouth every 8 hours as  needed for moderate pain   Refills:  0       lisinopril 10 MG tablet   Commonly known as:  PRINIVIL/ZESTRIL   Used for:  Benign essential hypertension        TAKE ONE TABLET BY MOUTH ONCE DAILY   Quantity:  90 tablet   Refills:  0       naproxen 500 MG tablet   Commonly known as:  NAPROSYN   Used for:  Chronic gout without tophus, unspecified cause, unspecified site        TAKE ONE TABLET BY MOUTH TWICE DAILY AS NEEDED FOR  MODERATE  PAIN   Quantity:  60 tablet   Refills:  1       OMEGA-3 FISH OIL PO   Used for:  Hyperlipidemia LDL goal <130, Malignant neoplasm of colon, unspecified part of colon (H)        Dose:  500 mg   Take 500 mg by mouth daily   Refills:  0       predniSONE 10 MG tablet   Commonly known as:  DELTASONE   Used for:  Low back pain potentially associated with radiculopathy        Take 3 pills x 3 days , take 2 pills for 3 days, then 1 pill for 3 days and stop   Quantity:  18 tablet   Refills:  0       sildenafil 100 MG tablet   Commonly known as:  VIAGRA   Used for:  Malignant neoplasm of prostate (H)        1/2 tab three times a week   Quantity:  10 tablet   Refills:  12       zolpidem 5 MG tablet   Commonly known as:  AMBIEN   Used for:  Persistent disorder of initiating or maintaining sleep        TAKE ONE TABLET BY MOUTH ONCE DAILY IN THE EVENING AS NEEDED FOR SLEEP   Quantity:  30 tablet   Refills:  0                Protect others around you: Learn how to safely use, store and throw away your medicines at www.disposemymeds.org.             Medication List: This is a list of all your medications and when to take them. Check marks below indicate your daily home schedule. Keep this list as a reference.      Medications           Morning Afternoon Evening Bedtime As Needed    aspirin 81 MG tablet   Take 1 tablet (81 mg) by mouth daily                                bicalutamide 50 MG tablet   Commonly known as:  CASODEX   Take 1 tablet (50 mg) by mouth daily                                 calcium-vitamin D 600-400 MG-UNIT per tablet   Commonly known as:  CALTRATE   Take 1 tablet by mouth daily                                CLARITIN PO   Take  by mouth. As needed                                clonazePAM 1 MG tablet   Commonly known as:  klonoPIN   TAKE ONE TABLET BY MOUTH TWICE DAILY AS NEEDED FOR ANXIETY                                folic acid-vit B6-vit B12 0.8-10-0.115 MG Tabs per tablet   Commonly known as:  FOLGARD   Take 1 tablet by mouth daily                                HYDROcodone-acetaminophen 5-325 MG per tablet   Commonly known as:  NORCO   Take 1 tablet by mouth nightly as needed for moderate to severe pain maximum 1  tablet(s) per day                                IBUPROFEN PO   Take 400 mg by mouth every 8 hours as needed for moderate pain                                lisinopril 10 MG tablet   Commonly known as:  PRINIVIL/ZESTRIL   TAKE ONE TABLET BY MOUTH ONCE DAILY                                naproxen 500 MG tablet   Commonly known as:  NAPROSYN   TAKE ONE TABLET BY MOUTH TWICE DAILY AS NEEDED FOR  MODERATE  PAIN                                OMEGA-3 FISH OIL PO   Take 500 mg by mouth daily                                predniSONE 10 MG tablet   Commonly known as:  DELTASONE   Take 3 pills x 3 days , take 2 pills for 3 days, then 1 pill for 3 days and stop                                sildenafil 100 MG tablet   Commonly known as:  VIAGRA   1/2 tab three times a week                                zolpidem 5 MG tablet   Commonly known as:  AMBIEN   TAKE ONE TABLET BY MOUTH ONCE DAILY IN THE EVENING AS NEEDED FOR SLEEP

## 2017-10-30 NOTE — TELEPHONE ENCOUNTER
Agree with refill approve script. It should have printed at my desk. Please have a colleague sign for me .

## 2017-11-13 ENCOUNTER — PRE VISIT (OUTPATIENT)
Dept: UROLOGY | Facility: CLINIC | Age: 77
End: 2017-11-13

## 2017-11-14 DIAGNOSIS — C61 MALIGNANT NEOPLASM OF PROSTATE (H): ICD-10-CM

## 2017-11-14 LAB — PSA SERPL-MCNC: 0.01 UG/L (ref 0–4)

## 2017-11-14 PROCEDURE — 36415 COLL VENOUS BLD VENIPUNCTURE: CPT | Performed by: UROLOGY

## 2017-11-14 PROCEDURE — 84153 ASSAY OF PSA TOTAL: CPT | Performed by: UROLOGY

## 2017-11-14 PROCEDURE — 84403 ASSAY OF TOTAL TESTOSTERONE: CPT | Performed by: UROLOGY

## 2017-11-16 LAB — TESTOST SERPL-MCNC: 11 NG/DL (ref 240–950)

## 2017-11-20 ENCOUNTER — OFFICE VISIT (OUTPATIENT)
Dept: FAMILY MEDICINE | Facility: OTHER | Age: 77
End: 2017-11-20
Payer: COMMERCIAL

## 2017-11-20 VITALS
DIASTOLIC BLOOD PRESSURE: 78 MMHG | HEIGHT: 67 IN | BODY MASS INDEX: 31.08 KG/M2 | HEART RATE: 55 BPM | OXYGEN SATURATION: 96 % | RESPIRATION RATE: 16 BRPM | SYSTOLIC BLOOD PRESSURE: 142 MMHG | WEIGHT: 198 LBS | TEMPERATURE: 98 F

## 2017-11-20 DIAGNOSIS — R07.89 CHEST WALL TENDERNESS: Primary | ICD-10-CM

## 2017-11-20 DIAGNOSIS — M25.562 LEFT KNEE PAIN, UNSPECIFIED CHRONICITY: ICD-10-CM

## 2017-11-20 PROCEDURE — 99213 OFFICE O/P EST LOW 20 MIN: CPT | Performed by: FAMILY MEDICINE

## 2017-11-20 ASSESSMENT — PAIN SCALES - GENERAL: PAINLEVEL: NO PAIN (0)

## 2017-11-20 NOTE — PROGRESS NOTES
SUBJECTIVE:                                                    Medardo Guatam is a 77 year old male who presents to clinic today for the following health issues:      HPI    Joint Pain    Onset: x4 days    Description:   Location: left rib cage/back of left knee  Character: Sharp- ping and then goes away    Intensity: mild    Progression of Symptoms: better    Accompanying Signs & Symptoms:  Other symptoms: none    History:   Previous similar pain: no       Precipitating factors:   Trauma or overuse: no     Alleviating factors:  Improved by: Advil    Therapies Tried and outcome: Advil          Problem list and histories reviewed & adjusted, as indicated.  Additional history: as documented        Patient Active Problem List   Diagnosis     Disturbance of skin sensation     Hemorrhoids     Gout     Advanced directives, counseling/discussion     Dupuytren's contracture of hand- left 5th finger, right 5th finger     Bunions- both feet     Skin lesion- R side of face and behind L ear     Insomnia     Prostatic nodule- posterior right edge with rectal exam     Prostate cancer- [place] 9 (5+4) dx Feb 2012     Elevated liver enzymes     Hyperbilirubinemia     Essential hypertension with goal blood pressure less than 140/90     Shoulder pain, left     Contracture of finger joint     Hyperlipidemia LDL goal <130     Shoulder pain, right     Malignant neoplasm of prostate (H)     Anxiety     Colon cancer (H)     Abdominal pain, epigastric     Renal cyst     Lumbar radiculopathy     Past Surgical History:   Procedure Laterality Date     APPENDECTOMY       BIOPSY  01/16/15     C HAND/FINGER SURGERY UNLISTED       C LENGTHEN,TENDON,HAND/FINGER  ca 2007    right fifth     C STOMACH SURGERY PROCEDURE UNLISTED       COLON SURGERY  2/11/2015    Lap assisted R hemicolectomy     COLONOSCOPY  10/25/07    Snare polypectomy     COLONOSCOPY  6/25/2009    with snare polypectomy     COLONOSCOPY  9/30/2009     COLONOSCOPY  1/5/2011     COLONOSCOPY performed by JUD MARIEE at  GI     COLONOSCOPY N/A 2015    Procedure: COMBINED COLONOSCOPY, SINGLE OR MULTIPLE BIOPSY/POLYPECTOMY BY BIOPSY;  Surgeon: Sarah Beth Pisano MD;  Location: [facility] OR     COLONOSCOPY WITH CO2 INSUFFLATION N/A 2015    Procedure: COLONOSCOPY WITH CO2 INSUFFLATION;  Surgeon: Sarah Beth Pisano MD;  Location: [facility] OR     DAVINCI PROSTATECTOMY  2012    Procedure: DAVINCI PROSTATECTOMY;  Davinci Assisted Radical Prostatectomy with Bilateral Lymphadenectomy ;  Surgeon: Norma Goodwin MD;  Location: [facility] OR     INJECT EPIDURAL LUMBAR / SACRAL SINGLE Left 10/30/2017    Procedure: INJECT EPIDURAL LUMBAR / SACRAL SINGLE;  Left Transforaminal Lumbar 4-Lumbar 5 Epidural Steroid Injection;  Surgeon: Nuvia Reina MD;  Location: [facility] OR     LAPAROSCOPIC ASSISTED COLECTOMY N/A 2015    Procedure: LAPAROSCOPIC ASSISTED COLECTOMY;  Surgeon: Oren Breen MD;  Location: [facility] OR       Social History   Substance Use Topics     Smoking status: Former Smoker     Years: 1.00     Types: Cigarettes, Cigars, Pipe     Start date: 10/1/1961     Quit date: 1962     Smokeless tobacco: Never Used      Comment: No smokers in home     Alcohol use 0.0 oz/week      Comment: daily glass of wine and whiskey     Family History   Problem Relation Age of Onset     CEREBROVASCULAR DISEASE Father      CANCER Mother 90     Patient believes vaginal cancer - hysterectomy,      Alzheimer Disease Mother      CANCER Sister      Breast Cancer Sister      C.A.D. No family hx of      Breast Cancer No family hx of      Cancer - colorectal No family hx of      Prostate Cancer No family hx of      Blood Disease No family hx of      Cardiovascular No family hx of      Circulatory No family hx of      Eye Disorder No family hx of      GASTROINTESTINAL DISEASE No family hx of      Genitourinary Problems No family hx of      Lipids No family hx of      Neurologic Disorder No family hx of       Respiratory No family hx of      Asthma No family hx of      HEART DISEASE No family hx of      DIABETES No family hx of      Hypertension No family hx of      Arthritis No family hx of      Thyroid Disease No family hx of      Musculoskeletal Disorder No family hx of          Current Outpatient Prescriptions   Medication Sig Dispense Refill     zolpidem (AMBIEN) 5 MG tablet TAKE ONE TABLET BY MOUTH ONCE DAILY IN THE EVENING AS NEEDED FOR SLEEP 30 tablet 0     naproxen (NAPROSYN) 500 MG tablet TAKE ONE TABLET BY MOUTH TWICE DAILY AS NEEDED FOR  MODERATE  PAIN 60 tablet 1     clonazePAM (KLONOPIN) 1 MG tablet TAKE ONE TABLET BY MOUTH TWICE DAILY AS NEEDED FOR ANXIETY 30 tablet 2     lisinopril (PRINIVIL/ZESTRIL) 10 MG tablet TAKE ONE TABLET BY MOUTH ONCE DAILY 90 tablet 0     bicalutamide (CASODEX) 50 MG tablet Take 1 tablet (50 mg) by mouth daily 90 tablet 3     Omega-3 Fatty Acids (OMEGA-3 FISH OIL PO) Take 500 mg by mouth daily       calcium-vitamin D (CALTRATE) 600-400 MG-UNIT per tablet Take 1 tablet by mouth daily       folic acid-vit B6-vit B12 (FOLGARD) 0.8-10-0.115 MG TABS Take 1 tablet by mouth daily       aspirin 81 MG tablet Take 1 tablet (81 mg) by mouth daily 30 tablet      sildenafil (VIAGRA) 100 MG tablet 1/2 tab three times a week 10 tablet 12     Loratadine (CLARITIN PO) Take  by mouth. As needed        predniSONE (DELTASONE) 10 MG tablet Take 3 pills x 3 days , take 2 pills for 3 days, then 1 pill for 3 days and stop (Patient not taking: Reported on 11/20/2017) 18 tablet 0     IBUPROFEN PO Take 400 mg by mouth every 8 hours as needed for moderate pain       HYDROcodone-acetaminophen (NORCO) 5-325 MG per tablet Take 1 tablet by mouth nightly as needed for moderate to severe pain maximum 1  tablet(s) per day (Patient not taking: Reported on 11/20/2017) 20 tablet 0     No Known Allergies  Recent Labs   Lab Test  09/07/17   0951  03/18/17   1639  11/16/16   0851  10/25/16   1228   10/26/15   0956   " 10/30/14   1043  07/28/14   0848   01/04/10   0924   A1C  5.7   --    --    --    --    --    --   6.0   --    --   5.3   LDL   --    --   134*   --    --   127   --    --   133*   < >   --    HDL   --    --   91   --    --   87   --    --   85   < >   --    TRIG   --    --   102   --    --   94   --    --   122   < >   --    ALT  60  53   --   42   < >  56   < >   --   57   < >   --    CR  1.02  0.93   --   0.83   < >  0.77   < >   --   0.92   < >   --    GFRESTIMATED  71  80   --   >90  Non  GFR Calc     < >  >90  Non  GFR Calc     < >   --   81   < >   --    GFRESTBLACK  86  >90   GFR Calc     --   >90   GFR Calc     < >  >90   GFR Calc     < >   --   >90   < >   --    POTASSIUM  4.6  4.2   --   4.3   < >  4.4   < >   --   4.1   < >   --     < > = values in this interval not displayed.      BP Readings from Last 3 Encounters:   11/20/17 142/78   10/30/17 146/83   10/16/17 150/89    Wt Readings from Last 3 Encounters:   11/20/17 198 lb (89.8 kg)   10/30/17 195 lb (88.5 kg)   10/16/17 196 lb (88.9 kg)                  Labs reviewed in EPIC          ROS:  Constitutional, HEENT, cardiovascular, pulmonary, GI, , musculoskeletal, neuro, skin, endocrine and psych systems are negative, except as otherwise noted.      OBJECTIVE:   /78 (BP Location: Left arm, Patient Position: Chair, Cuff Size: Adult Large)  Pulse 55  Temp 98  F (36.7  C) (Oral)  Resp 16  Ht 5' 7\" (1.702 m)  Wt 198 lb (89.8 kg)  SpO2 96%  BMI 31.01 kg/m2  Body mass index is 31.01 kg/(m^2).   Physical Exam   Constitutional: He is oriented to person, place, and time. He appears well-developed and well-nourished.   HENT:   Head: Normocephalic and atraumatic.   Pulmonary/Chest: Effort normal and breath sounds normal. No respiratory distress. He has no wheezes. He has no rales. He exhibits tenderness.   Tenderness to palpation along the 10th rib on the left posterior " chest wall.   Musculoskeletal: He exhibits no edema, tenderness or deformity.   Left knee exam is completely normal.   Neurological: He is alert and oriented to person, place, and time.   Psychiatric: He has a normal mood and affect.         Diagnostic Test Results:  none     ASSESSMENT/PLAN:     Problem List Items Addressed This Visit     None      Visit Diagnoses     Chest wall tenderness    -  Primary    Left knee pain, unspecified chronicity             Symptoms likely secondary to musculoskeletal pathology(strain)  No concern for chest pain that is cardiac in nature.  Reassured patient   Discussed home care  Reportable signs and symptoms discussed  RTC if symptoms persist or fail to improve    Verna Osborne MD  [facility]

## 2017-11-20 NOTE — MR AVS SNAPSHOT
After Visit Summary   11/20/2017    Medardo Gautam    MRN: [id number]           Patient Information     Date Of Birth          1940        Visit Information        Provider Department      11/20/2017 1:40 PM Verna Osborne MD [facility]        Today's Diagnoses     At risk for falling    -  1       Follow-ups after your visit        Your next 10 appointments already scheduled     Nov 28, 2017 10:30 AM CST   (Arrive by 10:15 AM)   Return Visit with  Prostate Cancer Ctr Nurse   [facility] ([facility] [place])    [address]  4th Floor  [facility] [id number] [phone]            Nov 29, 2017  1:40 PM CST   New Visit with Shawn Venegas OD   [facility] ([facility])    [address] [place] [id number] [phone]            Apr 24, 2018 11:45 AM CDT   (Arrive by 11:30 AM)   LONG TERM RETURN with Magali Andrews PA-C   [facility] ([facility] [place])    [address]  [address] [facility] [id number] [phone]              Who to contact     If you have questions or need follow up information about today's clinic visit or your schedule please contact [facility] directly at [phone].  Normal or non-critical lab and imaging results will be communicated to you by MyChart, letter or phone within 4 business days after the clinic has received the results. If you do not hear from us within 7 days, please contact the clinic through MyChart or phone. If you have a critical or abnormal lab result, we will notify you by phone as soon as possible.  Submit refill requests through [employer] or call your pharmacy and they will forward the refill request to us. Please allow 3 business days for your refill to be completed.          Additional Information About Your Visit        [facility]  "Information     [facility] gives you secure access to your electronic health record. If you see a primary care provider, you can also send messages to your care team and make appointments. If you have questions, please call your primary care clinic.  If you do not have a primary care provider, please call [phone] and they will assist you.        Care EveryWhere ID     This is your Care EveryWhere ID. This could be used by other organizations to access your [facility] medical records  [id number]        Your Vitals Were     Pulse Temperature Respirations Height Pulse Oximetry BMI (Body Mass Index)    55 98  F (36.7  C) (Oral) 16 5' 7\" (1.702 m) 96% 31.01 kg/m2       Blood Pressure from Last 3 Encounters:   11/20/17 142/78   10/30/17 146/83   10/16/17 150/89    Weight from Last 3 Encounters:   11/20/17 198 lb (89.8 kg)   10/30/17 195 lb (88.5 kg)   10/16/17 196 lb (88.9 kg)              Today, you had the following     No orders found for display       Primary Care Provider Office Phone # Fax #    Verna Osborne [phone]       [address]  [facility] [address]        Equal Access to Services     VALERIA CORONA : Hadii aad ku hadasho Soomaali, waaxda luqadaha, qaybta kaalmada adeegyada, waxay lyricin carly salamanca . So [facility] [phone].    ATENCIÓN: Si habla español, tiene a sanford disposición servicios gratuitos de asistencia lingüística. Llame al [phone].    We comply with applicable federal civil rights laws and [place] laws. We do not discriminate on the basis of race, color, national origin, age, disability, sex, sexual orientation, or gender identity.            Thank you!     Thank you for choosing [facility]  for your care. Our goal is always to provide you with excellent care. Hearing back from our patients is one way we can continue to improve our services. Please take a few minutes to complete the written survey that you may receive in the mail " after your visit with us. Thank you!             Your Updated Medication List - Protect others around you: Learn how to safely use, store and throw away your medicines at www.disposemymeds.org.          This list is accurate as of: 11/20/17  2:06 PM.  Always use your most recent med list.                   Brand Name Dispense Instructions for use Diagnosis    aspirin 81 MG tablet     30 tablet    Take 1 tablet (81 mg) by mouth daily    Hyperlipidemia LDL goal <130, Malignant neoplasm of colon, unspecified part of colon (H)       bicalutamide 50 MG tablet    CASODEX    90 tablet    Take 1 tablet (50 mg) by mouth daily    Malignant neoplasm of prostate (H)       calcium-vitamin D 600-400 MG-UNIT per tablet    CALTRATE     Take 1 tablet by mouth daily    Hyperlipidemia LDL goal <130, Malignant neoplasm of colon, unspecified part of colon (H)       CLARITIN PO      Take  by mouth. As needed        clonazePAM 1 MG tablet    klonoPIN    30 tablet    TAKE ONE TABLET BY MOUTH TWICE DAILY AS NEEDED FOR ANXIETY    Anxiety       folic acid-vit B6-vit B12 0.8-10-0.115 MG Tabs per tablet    FOLGARD     Take 1 tablet by mouth daily    Hyperlipidemia LDL goal <130, Malignant neoplasm of colon, unspecified part of colon (H)       HYDROcodone-acetaminophen 5-325 MG per tablet    NORCO    20 tablet    Take 1 tablet by mouth nightly as needed for moderate to severe pain maximum 1  tablet(s) per day    Hip pain, left       IBUPROFEN PO      Take 400 mg by mouth every 8 hours as needed for moderate pain        lisinopril 10 MG tablet    PRINIVIL/ZESTRIL    90 tablet    TAKE ONE TABLET BY MOUTH ONCE DAILY    Benign essential hypertension       naproxen 500 MG tablet    NAPROSYN    60 tablet    TAKE ONE TABLET BY MOUTH TWICE DAILY AS NEEDED FOR  MODERATE  PAIN    Chronic gout without tophus, unspecified cause, unspecified site       OMEGA-3 FISH OIL PO      Take 500 mg by mouth daily    Hyperlipidemia LDL goal <130, Malignant neoplasm of  colon, unspecified part of colon (H)       predniSONE 10 MG tablet    DELTASONE    18 tablet    Take 3 pills x 3 days , take 2 pills for 3 days, then 1 pill for 3 days and stop    Low back pain potentially associated with radiculopathy       sildenafil 100 MG tablet    VIAGRA    10 tablet    1/2 tab three times a week    Malignant neoplasm of prostate (H)       zolpidem 5 MG tablet    AMBIEN    30 tablet    TAKE ONE TABLET BY MOUTH ONCE DAILY IN THE EVENING AS NEEDED FOR SLEEP    Persistent disorder of initiating or maintaining sleep

## 2017-11-20 NOTE — NURSING NOTE
"Chief Complaint   Patient presents with     Knee Pain     Health Maintenance       Initial /78 (BP Location: Left arm, Patient Position: Chair, Cuff Size: Adult Large)  Pulse 55  Temp 98  F (36.7  C) (Oral)  Resp 16  Ht 5' 7\" (1.702 m)  Wt 198 lb (89.8 kg)  SpO2 96%  BMI 31.01 kg/m2 Estimated body mass index is 31.01 kg/(m^2) as calculated from the following:    Height as of this encounter: 5' 7\" (1.702 m).    Weight as of this encounter: 198 lb (89.8 kg).  Medication Reconciliation: complete   Sarah Beth Marie CMA (AAMA)      "

## 2017-11-26 DIAGNOSIS — G47.00 PERSISTENT DISORDER OF INITIATING OR MAINTAINING SLEEP: ICD-10-CM

## 2017-11-27 RX ORDER — ZOLPIDEM TARTRATE 5 MG/1
TABLET ORAL
Qty: 30 TABLET | Refills: 0 | Status: SHIPPED | OUTPATIENT
Start: 2017-11-27 | End: 2017-12-26

## 2017-11-27 ASSESSMENT — ENCOUNTER SYMPTOMS
EXERCISE INTOLERANCE: 0
SLEEP DISTURBANCES DUE TO BREATHING: 0
PALPITATIONS: 0
SYNCOPE: 0
ORTHOPNEA: 0
HYPERTENSION: 1
HYPOTENSION: 0
LEG PAIN: 1
LIGHT-HEADEDNESS: 0

## 2017-11-27 NOTE — TELEPHONE ENCOUNTER
Routing refill request to provider for review/approval because:  Drug not on the [facility] refill protocol     Iman Gonsales RN

## 2017-11-28 ENCOUNTER — OFFICE VISIT (OUTPATIENT)
Dept: ORTHOPEDICS | Facility: CLINIC | Age: 77
End: 2017-11-28

## 2017-11-28 VITALS — WEIGHT: 197.6 LBS | RESPIRATION RATE: 16 BRPM | HEIGHT: 67 IN | BODY MASS INDEX: 31.01 KG/M2

## 2017-11-28 DIAGNOSIS — M54.16 LUMBAR RADICULOPATHY: Primary | ICD-10-CM

## 2017-11-28 NOTE — PROGRESS NOTES
Spine Surgery Return Clinic Visit      Chief Complaint:   No chief complaint on file.      Interval HPI:  Symptom Profile Including: location of symptoms, onset, severity, exacerbating/alleviating factors, previous treatments:        Medardo Gautam is a 77 year old male who I last saw October 10, 2017 with left leg radiculopathy. His imaging showed an L4 5 disc herniation. At the time I saw him he had only had symptoms for 3 weeks. I recommended oral anti-inflammatories and I sent him for an epidural steroid injection. He was also started on a home exercise program. His primary care doctor had given him oral prednisone and he has also tried chiropractic care and bracing. Lastly, his primary care doctor had given him some oxycodone but he has not been taking very much of it.    He received an MAYKEL on 10/30/17, which provided nearly complete relief of his left lower extremity pain. He continues to have significant relief and is satisfied with the outcomes of the injection. Denies numbness, tingling or weakness of the BLE. He is off all pain medicines and doing well. He has tried PT in the past with limited benefit and is not interested in pursuing this further.          Past Medical History:     Past Medical History:   Diagnosis Date     Anxiety      Colon cancer (H) 01/2015     Contracture of finger joint ca 2005    left and right fifth fingers     Dupuytren's contracture of left hand      Gout      HEMORRHOIDS NOS 6/4/2007     Hypertension      Insomnia      Personal history of colonic polyps 7 years ago?     Prostate cancer (H) Feb 2012     Renal cyst 6/22/2017     Seborrheic dermatitis      Skin lesion- R side of face and behind L ear 12/15/2011     SKIN SENSATION DISTURB 12/29/2006    allergic reaction to strawberries     SKIN SENSATION DISTURB 12/29/2006            Past Surgical History:     Past Surgical History:   Procedure Laterality Date     APPENDECTOMY       BIOPSY  01/16/15     C HAND/FINGER SURGERY  UNLISTED       C LENGTHEN,TENDON,HAND/FINGER  ca     right fifth     C STOMACH SURGERY PROCEDURE UNLISTED       COLON SURGERY  2015    Lap assisted R hemicolectomy     COLONOSCOPY  10/25/07    Snare polypectomy     COLONOSCOPY  2009    with snare polypectomy     COLONOSCOPY  2009     COLONOSCOPY  2011    COLONOSCOPY performed by JUD MARIEE at  [facility]     COLONOSCOPY N/A 2015    Procedure: COMBINED COLONOSCOPY, SINGLE OR MULTIPLE BIOPSY/POLYPECTOMY BY BIOPSY;  Surgeon: Sarah Beth Pisano MD;  Location: [facility] OR     COLONOSCOPY WITH CO2 INSUFFLATION N/A 2015    Procedure: COLONOSCOPY WITH CO2 INSUFFLATION;  Surgeon: Sarah Beth Pisano MD;  Location: [facility] OR     DAVINCI PROSTATECTOMY  2012    Procedure: DAVINCI PROSTATECTOMY;  Davinci Assisted Radical Prostatectomy with Bilateral Lymphadenectomy ;  Surgeon: Norma Goodwin MD;  Location: [facility] OR     INJECT EPIDURAL LUMBAR / SACRAL SINGLE Left 10/30/2017    Procedure: INJECT EPIDURAL LUMBAR / SACRAL SINGLE;  Left Transforaminal Lumbar 4-Lumbar 5 Epidural Steroid Injection;  Surgeon: Nuvia Reina MD;  Location: [facility] OR     LAPAROSCOPIC ASSISTED COLECTOMY N/A 2015    Procedure: LAPAROSCOPIC ASSISTED COLECTOMY;  Surgeon: Oren Breen MD;  Location: [facility] OR            Social History:     Social History   Substance Use Topics     Smoking status: Former Smoker     Years: 1.00     Types: Cigarettes, Cigars, Pipe     Start date: 10/1/1961     Quit date: 1962     Smokeless tobacco: Never Used      Comment: No smokers in home     Alcohol use 0.0 oz/week      Comment: daily glass of wine and whiskey            Family History:     Family History   Problem Relation Age of Onset     CEREBROVASCULAR DISEASE Father      CANCER Mother 90     Patient believes vaginal cancer - hysterectomy,      Alzheimer Disease Mother      CANCER Sister      Breast Cancer Sister      C.A.D. No family hx of      Breast Cancer No family  hx of      Cancer - colorectal No family hx of      Prostate Cancer No family hx of      Blood Disease No family hx of      Cardiovascular No family hx of      Circulatory No family hx of      Eye Disorder No family hx of      GASTROINTESTINAL DISEASE No family hx of      Genitourinary Problems No family hx of      Lipids No family hx of      Neurologic Disorder No family hx of      Respiratory No family hx of      Asthma No family hx of      HEART DISEASE No family hx of      DIABETES No family hx of      Hypertension No family hx of      Arthritis No family hx of      Thyroid Disease No family hx of      Musculoskeletal Disorder No family hx of             Allergies:   No Known Allergies         Medications:     Current Outpatient Prescriptions   Medication     zolpidem (AMBIEN) 5 MG tablet     naproxen (NAPROSYN) 500 MG tablet     predniSONE (DELTASONE) 10 MG tablet     IBUPROFEN PO     clonazePAM (KLONOPIN) 1 MG tablet     HYDROcodone-acetaminophen (NORCO) 5-325 MG per tablet     lisinopril (PRINIVIL/ZESTRIL) 10 MG tablet     bicalutamide (CASODEX) 50 MG tablet     Omega-3 Fatty Acids (OMEGA-3 FISH OIL PO)     calcium-vitamin D (CALTRATE) 600-400 MG-UNIT per tablet     folic acid-vit B6-vit B12 (FOLGARD) 0.8-10-0.115 MG TABS     aspirin 81 MG tablet     sildenafil (VIAGRA) 100 MG tablet     Loratadine (CLARITIN PO)     No current facility-administered medications for this visit.              Review of Systems:   A focused musculoskeletal and neurologic ROS was performed with pertinent positives and negatives noted in the HPI.  Additional systems were also reviewed and are documented at the bottom of the note.         Physical Exam:   Vitals: There were no vitals taken for this visit.  Musculoskeletal, Neurologic, and Spine:   BLE: SILT L3-S1 dermatomes. 4/5 strength EHL, 5/5 strength ankle dorsiflexion/plantarflexion, peroneals, knee flexion/extension, hip flexion. 1+ patella, achilles reflexes. Feet wwp.          Imaging:   We ordered and independently reviewed new radiographs at this clinic visit. The results were discussed with the patient. Findings include:     September 24, 2017 lumbar MRI shows diffuse spondylosis, with a left-sided disc herniation at L4 5 causing moderate to severe lateral recess stenosis and impacting the L4 root.     10/10/17 AP lateral lumbar flexion and extension radiographs were reviewed today. These show diffus spondylosis with relative loss of lumbar lordosis. Near autofusion of L3 on L4 with a large anterior osteophyte. No dynamic instability.       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 His MRI shows an extruded disc herniation which tracks along the left side at the L4 pedicle. He received nearly full relief with MAYKEL on 10/30/17. We discussed continuing conservative management with medication, physical therapy, and possibly repeat steroid injection every 3-4 months at most. He is satisfied monitoring his symptoms at this point given his significant symptom relief with MAYKEL. He is not interested in physical therapy at this time since he previously did not do well with it.    We discussed a possible central decompression at L4 5 with a left-sided L4 5 foraminotomies. I think we would have to do a complete hemilaminectomy on the left side in order to make sure we got all the way up to the L4 pedicle and were able to remove all of that disc. He is satisfied to continue to watch symptoms, but was happy to know that there is a surgical option for him should his symptoms return or worsen.    We discussed repeat injection if his symptoms return.. He does not need to return to clinic for this, he can simply call and we can provide a script for him. He will follow up in 4-6 weeks for repeat evaluation without new imaging.      Neptali Campos MD  Orthopaedic Surgery, PGY-1    Attending MD (Dr. Eddy Powell) :  I reviewed and verified the  history and physical exam of the patient and discussed the patient's management with the other clinical providers involved in this patient's care including any involved residents or physicians assistants. I reviewed the above note and agree with the documented findings and plan of care, which were communicated to the patient.      Eddy Powell MD      Respectfully,  Eddy Powell MD  Spine Surgery  [facility]    Answers for HPI/ROS submitted by the patient on 11/27/2017   General Symptoms: No  Skin Symptoms: No  HENT Symptoms: No  EYE SYMPTOMS: Yes  HEART SYMPTOMS: Yes  LUNG SYMPTOMS: No  INTESTINAL SYMPTOMS: No  URINARY SYMPTOMS: No  REPRODUCTIVE SYMPTOMS: Yes  SKELETAL SYMPTOMS: No  BLOOD SYMPTOMS: No  NERVOUS SYSTEM SYMPTOMS: No  MENTAL HEALTH SYMPTOMS: No  Chest pain or pressure: No  Fast or irregular heartbeat: No  Pain in legs with walking: Yes  Trouble breathing while lying down: No  Fingers or toes appear blue: No  High blood pressure: Yes  Low blood pressure: No  Fainting: No  Murmurs: No  Pacemaker: No  Varicose veins: No  Edema or swelling: No  Wake up at night with shortness of breath: No  Light-headedness: No  Exercise intolerance: No  Scrotal pain or swelling: No  Erectile dysfunction: Yes  Penile discharge: No  Genital ulcers: No  Reduced libido: Yes

## 2017-11-28 NOTE — LETTER
11/28/2017       RE: Medardo Gautam  [address] [facility] [id number]     Dear Colleague,    Thank you for referring your patient, Medardo Gautam, to the [facility] at [facility] Please see a copy of my visit note below.    Spine Surgery Return Clinic Visit      Chief Complaint:   No chief complaint on file.      Interval HPI:  Symptom Profile Including: location of symptoms, onset, severity, exacerbating/alleviating factors, previous treatments:        Meadrdo Gautam is a 77 year old male who I last saw October 10, 2017 with left leg radiculopathy. His imaging showed an L4 5 disc herniation. At the time I saw him he had only had symptoms for 3 weeks. I recommended oral anti-inflammatories and I sent him for an epidural steroid injection. He was also started on a home exercise program. His primary care doctor had given him oral prednisone and he has also tried chiropractic care and bracing. Lastly, his primary care doctor had given him some oxycodone but he has not been taking very much of it.    He received an MAYKEL on 10/30/17, which provided nearly complete relief of his left lower extremity pain. He continues to have significant relief and is satisfied with the outcomes of the injection. Denies numbness, tingling or weakness of the BLE. He is off all pain medicines and doing well. He has tried PT in the past with limited benefit and is not interested in pursuing this further.          Past Medical History:     Past Medical History:   Diagnosis Date     Anxiety      Colon cancer (H) 01/2015     Contracture of finger joint ca 2005    left and right fifth fingers     Dupuytren's contracture of left hand      Gout      HEMORRHOIDS NOS 6/4/2007     Hypertension      Insomnia      Personal history of colonic polyps 7 years ago?     Prostate cancer (H) Feb 2012     Renal cyst 6/22/2017     Seborrheic dermatitis      Skin lesion- R side of face and  behind L ear 12/15/2011     SKIN SENSATION DISTURB 12/29/2006    allergic reaction to strawberries     SKIN SENSATION DISTURB 12/29/2006            Past Surgical History:     Past Surgical History:   Procedure Laterality Date     APPENDECTOMY       BIOPSY  01/16/15     C HAND/FINGER SURGERY UNLISTED       C LENGTHEN,TENDON,HAND/FINGER  ca 2007    right fifth     C STOMACH SURGERY PROCEDURE UNLISTED       COLON SURGERY  2/11/2015    Lap assisted R hemicolectomy     COLONOSCOPY  10/25/07    Snare polypectomy     COLONOSCOPY  6/25/2009    with snare polypectomy     COLONOSCOPY  9/30/2009     COLONOSCOPY  1/5/2011    COLONOSCOPY performed by JUD MARIEE at  [facility]     COLONOSCOPY N/A 1/16/2015    Procedure: COMBINED COLONOSCOPY, SINGLE OR MULTIPLE BIOPSY/POLYPECTOMY BY BIOPSY;  Surgeon: Sarah Beth Pisano MD;  Location: [facility] OR     COLONOSCOPY WITH CO2 INSUFFLATION N/A 1/16/2015    Procedure: COLONOSCOPY WITH CO2 INSUFFLATION;  Surgeon: Sarah Beth Pisano MD;  Location: [facility] OR     DAVINCI PROSTATECTOMY  8/6/2012    Procedure: DAVINCI PROSTATECTOMY;  Davinci Assisted Radical Prostatectomy with Bilateral Lymphadenectomy ;  Surgeon: Norma Goodwin MD;  Location: [facility] OR     INJECT EPIDURAL LUMBAR / SACRAL SINGLE Left 10/30/2017    Procedure: INJECT EPIDURAL LUMBAR / SACRAL SINGLE;  Left Transforaminal Lumbar 4-Lumbar 5 Epidural Steroid Injection;  Surgeon: Nuvia Reina MD;  Location: [facility] OR     LAPAROSCOPIC ASSISTED COLECTOMY N/A 2/11/2015    Procedure: LAPAROSCOPIC ASSISTED COLECTOMY;  Surgeon: Oren Breen MD;  Location: [facility] OR            Social History:     Social History   Substance Use Topics     Smoking status: Former Smoker     Years: 1.00     Types: Cigarettes, Cigars, Pipe     Start date: 10/1/1961     Quit date: 1/1/1962     Smokeless tobacco: Never Used      Comment: No smokers in home     Alcohol use 0.0 oz/week      Comment: daily glass of wine and whiskey            Family History:      Family History   Problem Relation Age of Onset     CEREBROVASCULAR DISEASE Father      CANCER Mother 90     Patient believes vaginal cancer - hysterectomy,      Alzheimer Disease Mother      CANCER Sister      Breast Cancer Sister      C.A.D. No family hx of      Breast Cancer No family hx of      Cancer - colorectal No family hx of      Prostate Cancer No family hx of      Blood Disease No family hx of      Cardiovascular No family hx of      Circulatory No family hx of      Eye Disorder No family hx of      GASTROINTESTINAL DISEASE No family hx of      Genitourinary Problems No family hx of      Lipids No family hx of      Neurologic Disorder No family hx of      Respiratory No family hx of      Asthma No family hx of      HEART DISEASE No family hx of      DIABETES No family hx of      Hypertension No family hx of      Arthritis No family hx of      Thyroid Disease No family hx of      Musculoskeletal Disorder No family hx of             Allergies:   No Known Allergies         Medications:     Current Outpatient Prescriptions   Medication     zolpidem (AMBIEN) 5 MG tablet     naproxen (NAPROSYN) 500 MG tablet     predniSONE (DELTASONE) 10 MG tablet     IBUPROFEN PO     clonazePAM (KLONOPIN) 1 MG tablet     HYDROcodone-acetaminophen (NORCO) 5-325 MG per tablet     lisinopril (PRINIVIL/ZESTRIL) 10 MG tablet     bicalutamide (CASODEX) 50 MG tablet     Omega-3 Fatty Acids (OMEGA-3 FISH OIL PO)     calcium-vitamin D (CALTRATE) 600-400 MG-UNIT per tablet     folic acid-vit B6-vit B12 (FOLGARD) 0.8-10-0.115 MG TABS     aspirin 81 MG tablet     sildenafil (VIAGRA) 100 MG tablet     Loratadine (CLARITIN PO)     No current facility-administered medications for this visit.              Review of Systems:   A focused musculoskeletal and neurologic ROS was performed with pertinent positives and negatives noted in the HPI.  Additional systems were also reviewed and are documented at the bottom of the note.          Physical Exam:   Vitals: There were no vitals taken for this visit.  Musculoskeletal, Neurologic, and Spine:   BLE: SILT L3-S1 dermatomes. 4/5 strength EHL, 5/5 strength ankle dorsiflexion/plantarflexion, peroneals, knee flexion/extension, hip flexion. 1+ patella, achilles reflexes. Feet wwp.         Imaging:   We ordered and independently reviewed new radiographs at this clinic visit. The results were discussed with the patient. Findings include:     September 24, 2017 lumbar MRI shows diffuse spondylosis, with a left-sided disc herniation at L4 5 causing moderate to severe lateral recess stenosis and impacting the L4 root.     10/10/17 AP lateral lumbar flexion and extension radiographs were reviewed today. These show diffus spondylosis with relative loss of lumbar lordosis. Near autofusion of L3 on L4 with a large anterior osteophyte. No dynamic instability.       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 His MRI shows an extruded disc herniation which tracks along the left side at the L4 pedicle. He received nearly full relief with MAYKEL on 10/30/17. We discussed continuing conservative management with medication, physical therapy, and possibly repeat steroid injection every 3-4 months at most. He is satisfied monitoring his symptoms at this point given his significant symptom relief with MAYKEL. He is not interested in physical therapy at this time since he previously did not do well with it.    We discussed a possible central decompression at L4 5 with a left-sided L4 5 foraminotomies. I think we would have to do a complete hemilaminectomy on the left side in order to make sure we got all the way up to the L4 pedicle and were able to remove all of that disc. He is satisfied to continue to watch symptoms, but was happy to know that there is a surgical option for him should his symptoms return or worsen.    We discussed repeat injection if his symptoms return.. He  does not need to return to clinic for this, he can simply call and we can provide a script for him. He will follow up in 4-6 weeks for repeat evaluation without new imaging.      Neptali Campos MD  Orthopaedic Surgery, PGY-1    Attending MD (Dr. Eddy Powell) :  I reviewed and verified the history and physical exam of the patient and discussed the patient's management with the other clinical providers involved in this patient's care including any involved residents or physicians assistants. I reviewed the above note and agree with the documented findings and plan of care, which were communicated to the patient.    Respectfully,  Eddy Powell MD  Spine Surgery  [facility]

## 2017-11-28 NOTE — MR AVS SNAPSHOT
After Visit Summary   11/28/2017    Medardo Gautam    MRN: [id number]           Patient Information     Date Of Birth          1940        Visit Information        Provider Department      11/28/2017 9:50 AM Eddy Powell MD [facility]         Follow-ups after your visit        Your next 10 appointments already scheduled     Nov 29, 2017  1:40 PM CST   New Visit with Shawn Venegas OD   [facility]    [address] [place] [id number] [phone]            Jan 12, 2018 10:50 AM CST   (Arrive by 10:20 AM)   RETURN SPINE with Eddy Powell MD   [facility] ([facility])    [address] [place] [id number] [phone]            Jan 16, 2018 12:30 PM CST   (Arrive by 12:15 PM)   Return Visit with MANI Ireland   [facility] ([facility])    [address] [place] [id number] [phone]            Apr 24, 2018 11:45 AM CDT   (Arrive by 11:30 AM)   LONG TERM RETURN with Magali Andrews PA-C   [facility] ([facility])    [address] [place] [id number] [phone]              Who to contact     Please call your clinic at [phone] to:    Ask questions about your health    Make or cancel appointments    Discuss your medicines    Learn about your test results    Speak to your doctor   If you have compliments or concerns about an experience at your clinic, or if you wish to file a complaint, please contact [facility] Patient Relations at [phone] or email us at [email or web address]         Additional Information About Your Visit        MyChart Information     MyChart gives you secure access to your  "electronic health record. If you see a primary care provider, you can also send messages to your care team and make appointments. If you have questions, please call your primary care clinic.  If you do not have a primary care provider, please call [phone] and they will assist you.      [employer] is an electronic gateway that provides easy, online access to your medical records. With [employer], you can request a clinic appointment, read your test results, renew a prescription or communicate with your care team.     To access your existing account, please contact your [facility] or call [phone] for assistance.        Care EveryWhere ID     This is your Care EveryWhere ID. This could be used by other organizations to access your [facility] medical records  [id number]        Your Vitals Were     Respirations Height BMI (Body Mass Index)             16 1.702 m (5' 7\") 30.95 kg/m2          Blood Pressure from Last 3 Encounters:   11/20/17 142/78   10/30/17 146/83   10/16/17 150/89    Weight from Last 3 Encounters:   11/28/17 89.6 kg (197 lb 9.6 oz)   11/20/17 89.8 kg (198 lb)   10/30/17 88.5 kg (195 lb)              Today, you had the following     No orders found for display       Primary Care Provider Office Phone # Fax #    Verna Osborne [phone]       [address] [facility] [address]  [facility] [address]        Equal Access to Services     [facility] HospitalJAN[facility] : Hadii torrey hano Naldo, waaxda luqadaha, qaybta kaalmada evelia, randy spivey. So [facility] [phone].    ATENCIÓN: Si habla español, tiene a sanford disposición servicios gratuitos de asistencia lingüística. Llame al [phone].    We comply with applicable federal civil rights laws and [place] laws. We do not discriminate on the basis of race, color, national origin, age, disability, sex, sexual orientation, or gender identity.            Thank you!     Thank you for choosing [facility] " [facility]  for your care. Our goal is always to provide you with excellent care. Hearing back from our patients is one way we can continue to improve our services. Please take a few minutes to complete the written survey that you may receive in the mail after your visit with us. Thank you!             Your Updated Medication List - Protect others around you: Learn how to safely use, store and throw away your medicines at www.disposemymeds.org.          This list is accurate as of: 11/28/17 10:00 AM.  Always use your most recent med list.                   Brand Name Dispense Instructions for use Diagnosis    aspirin 81 MG tablet     30 tablet    Take 1 tablet (81 mg) by mouth daily    Hyperlipidemia LDL goal <130, Malignant neoplasm of colon, unspecified part of colon (H)       bicalutamide 50 MG tablet    CASODEX    90 tablet    Take 1 tablet (50 mg) by mouth daily    Malignant neoplasm of prostate (H)       calcium-vitamin D 600-400 MG-UNIT per tablet    CALTRATE     Take 1 tablet by mouth daily    Hyperlipidemia LDL goal <130, Malignant neoplasm of colon, unspecified part of colon (H)       CLARITIN PO      Take  by mouth. As needed        clonazePAM 1 MG tablet    klonoPIN    30 tablet    TAKE ONE TABLET BY MOUTH TWICE DAILY AS NEEDED FOR ANXIETY    Anxiety       folic acid-vit B6-vit B12 0.8-10-0.115 MG Tabs per tablet    FOLGARD     Take 1 tablet by mouth daily    Hyperlipidemia LDL goal <130, Malignant neoplasm of colon, unspecified part of colon (H)       HYDROcodone-acetaminophen 5-325 MG per tablet    NORCO    20 tablet    Take 1 tablet by mouth nightly as needed for moderate to severe pain maximum 1  tablet(s) per day    Hip pain, left       IBUPROFEN PO      Take 400 mg by mouth every 8 hours as needed for moderate pain        lisinopril 10 MG tablet    PRINIVIL/ZESTRIL    90 tablet    TAKE ONE TABLET BY MOUTH ONCE DAILY    Benign essential hypertension       naproxen 500 MG tablet     NAPROSYN    60 tablet    TAKE ONE TABLET BY MOUTH TWICE DAILY AS NEEDED FOR  MODERATE  PAIN    Chronic gout without tophus, unspecified cause, unspecified site       OMEGA-3 FISH OIL PO      Take 500 mg by mouth daily    Hyperlipidemia LDL goal <130, Malignant neoplasm of colon, unspecified part of colon (H)       predniSONE 10 MG tablet    DELTASONE    18 tablet    Take 3 pills x 3 days , take 2 pills for 3 days, then 1 pill for 3 days and stop    Low back pain potentially associated with radiculopathy       sildenafil 100 MG tablet    VIAGRA    10 tablet    1/2 tab three times a week    Malignant neoplasm of prostate (H)       zolpidem 5 MG tablet    AMBIEN    30 tablet    TAKE ONE TABLET BY MOUTH AT BEDTIME AS NEEDED FOR SLEEP    Persistent disorder of initiating or maintaining sleep

## 2017-11-29 ENCOUNTER — OFFICE VISIT (OUTPATIENT)
Dept: OPTOMETRY | Facility: CLINIC | Age: 77
End: 2017-11-29
Payer: COMMERCIAL

## 2017-11-29 DIAGNOSIS — H25.813 COMBINED FORMS OF AGE-RELATED CATARACT OF BOTH EYES: Primary | ICD-10-CM

## 2017-11-29 DIAGNOSIS — H52.223 REGULAR ASTIGMATISM OF BOTH EYES: ICD-10-CM

## 2017-11-29 DIAGNOSIS — H02.839 DERMATOCHALASIS OF EYELID: ICD-10-CM

## 2017-11-29 DIAGNOSIS — H02.889 MEIBOMIAN GLAND DYSFUNCTION: ICD-10-CM

## 2017-11-29 DIAGNOSIS — H52.03 HYPERMETROPIA OF BOTH EYES: ICD-10-CM

## 2017-11-29 DIAGNOSIS — H10.13 ALLERGIC CONJUNCTIVITIS, BILATERAL: ICD-10-CM

## 2017-11-29 DIAGNOSIS — H35.30 MACULAR DEGENERATION: ICD-10-CM

## 2017-11-29 DIAGNOSIS — H52.4 PRESBYOPIA: ICD-10-CM

## 2017-11-29 PROCEDURE — 92004 COMPRE OPH EXAM NEW PT 1/>: CPT | Performed by: OPTOMETRIST

## 2017-11-29 PROCEDURE — 92015 DETERMINE REFRACTIVE STATE: CPT | Performed by: OPTOMETRIST

## 2017-11-29 RX ORDER — AZELASTINE HYDROCHLORIDE 0.5 MG/ML
1 SOLUTION/ DROPS OPHTHALMIC 2 TIMES DAILY
Qty: 1 BOTTLE | Refills: 6 | Status: SHIPPED | OUTPATIENT
Start: 2017-11-29 | End: 2019-09-23

## 2017-11-29 ASSESSMENT — REFRACTION_WEARINGRX
OD_ADD: +2.50
OS_CYLINDER: +2.50
OD_AXIS: 001
OS_AXIS: 170
SPECS_TYPE: AUTO/PAL
OS_SPHERE: PLANO
OS_ADD: +2.50
OD_CYLINDER: +2.00
OD_SPHERE: PLANO

## 2017-11-29 ASSESSMENT — REFRACTION_MANIFEST
OD_AXIS: 001
OS_ADD: +2.50
OS_SPHERE: -0.75
OS_AXIS: 173
OS_CYLINDER: +2.75
OD_ADD: +2.50
OD_CYLINDER: +3.00
OD_SPHERE: -1.75

## 2017-11-29 ASSESSMENT — TONOMETRY
OS_IOP_MMHG: 17
IOP_METHOD: TONOPEN
OD_IOP_MMHG: 21

## 2017-11-29 ASSESSMENT — VISUAL ACUITY
CORRECTION_TYPE: GLASSES
OS_SC: 20/150
OD_CC: 20/100
OD_CC: 20/50
OS_CC: 20/40
OS_CC+: -1
METHOD: SNELLEN - LINEAR
OD_CC+: -2
OS_CC: 20/200
OD_SC: 20/60

## 2017-11-29 ASSESSMENT — CUP TO DISC RATIO
OS_RATIO: 0.2
OD_RATIO: 0.2

## 2017-11-29 ASSESSMENT — CONF VISUAL FIELD
OD_NORMAL: 1
OS_NORMAL: 1

## 2017-11-29 ASSESSMENT — SLIT LAMP EXAM - LIDS
COMMENTS: UPPER LID DERMATOCHALASIS, MEIBOMIAN GLAND DYSFUNCTION
COMMENTS: UPPER LID DERMATOCHALASIS, MEIBOMIAN GLAND DYSFUNCTION

## 2017-11-29 ASSESSMENT — EXTERNAL EXAM - LEFT EYE: OS_EXAM: NORMAL

## 2017-11-29 ASSESSMENT — EXTERNAL EXAM - RIGHT EYE: OD_EXAM: NORMAL

## 2017-11-29 NOTE — PROGRESS NOTES
Chief Complaint   Patient presents with     COMPREHENSIVE EYE EXAM         Last Eye Exam: 3-4 years ago  Dilated Previously: Yes    What are you currently using to see?  glasses       Distance Vision Acuity: Satisfied with vision    Near Vision Acuity: Not satisfied     Eye Comfort: good and dry sometimes in am,sometimes itchy with hay fever season  Do you use eye drops? : Yes: naproxen A  Occupation or Hobbies: retired    [employer]          Medical, surgical and family histories reviewed and updated 11/29/2017.       OBJECTIVE: See Ophthalmology exam    ASSESSMENT:    ICD-10-CM    1. Combined forms of age-related cataract of both eyes H25.813 EYE EXAM (SIMPLE-NONBILLABLE)     OPHTHALMOLOGY ADULT REFERRAL   2. Allergic conjunctivitis, bilateral H10.13 EYE EXAM (SIMPLE-NONBILLABLE)     azelastine (OPTIVAR) 0.05 % SOLN ophthalmic solution   3. Meibomian gland dysfunction H02.89 EYE EXAM (SIMPLE-NONBILLABLE)   4. Macular degeneration H35.30    5. Hypermetropia of both eyes H52.03 REFRACTION   6. Regular astigmatism of both eyes H52.223 REFRACTION   7. Presbyopia H52.4 REFRACTION      PLAN:     Patient Instructions   Referral to Dr. Orellana at [facility] for cataract evaluation and baseline OCT of macula.    Optivar- 1 drop both eyes 2 x day as needed for itchy eyes.  Warm packs.  Systane Balance- 1 drop both eyes 4 x day.    You have mild macular degeneration.  I recommend taking supplements for the eyes, PreserVision AREDS 2 formula daily as recommended dosage on the bottle.  Check with your pharmacist first to make sure there are not any interactions with your medications.    Return in 1 year for a complete eye exam or sooner if needed.    Shawn Venegas, OD

## 2017-11-29 NOTE — PATIENT INSTRUCTIONS
Referral to Dr. Orellana at [facility] for cataract evaluation and baseline OCT of macula.    Optivar- 1 drop both eyes 2 x day as needed for itchy eyes.  Warm packs.  Systane Balance- 1 drop both eyes 4 x day.    You have mild macular degeneration.  I recommend taking supplements for the eyes, PreserVision AREDS 2 formula daily as recommended dosage on the bottle.  Check with your pharmacist first to make sure there are not any interactions with your medications.    Return in 1 year for a complete eye exam or sooner if needed.    Shawn Venegas, OD    The affects of the dilating drops last for 4- 6 hours.  You will be more sensitive to light and vision will be blurry up close.  Mydriatic sunglasses were given if needed.      Optometry Providers       Clinic Locations                                 Telephone Number   Dr. Yamini Venegas   [facility]  [phone]     [facility] Hours:                [facility] Hours:       [facility] Hours:  [address]   [address] [facility]     [address] [place] [address]   [place] [address]    [place] [address]  Phone: [phone]                    Phone [phone]                      Phone: [phone]                          Monday 8:00-7:00                          Monday 8:00-7:00                          Monday 8:00-7:00              Tuesday 8:00-6:00                          Tuesday 8:00-7:00                          Tuesday 8:00-7:00              Wednesday 8:00-6:00                  Wednesday 8:00-7:00                   Wednesday 8:00-7:00      Thursday 8:00-6:00                        Thursday 8:00-7:00                         Thursday 8:00-7:00            Friday 8:00-5:00                              Friday 8:00-5:00                              Friday 8:00-5:00    Please log on to [email or web address] to  order your contact lenses.  The link is found on the [facility] page.  As always, Thank you for trusting us with your health care needs!      Meibomian Gland Blockage  Small glands are located inside the upper and lower eyelids. These are called meibomian glands. They secrete oils that work with tears. The oils keep the tears from evaporating too quickly and prevent dry eyes.  If your meibomian glands become blocked by thickened oils, your eyes will become dry. Your eyes may feel irritated.  A blocked oil gland is prone to infection, which causes redness and swelling of the eyelid. This condition is called blepharitis. Blepharitis may take 6 to 12 months to clear up completely. Use the following home treatments to improve your condition.  Home care    Apply warm water on a washcloth (a warm compress) to the eyelids for 10 to 20 minutes at least twice a day. This softens the oils in the glands and may relieve the blockage. After this treatment, wipe away scales from the lids and apply any prescribed medicine.    Use lubricant eye drops (available without a prescription) if your eyes feel dry or burn.    If the eyelids are swollen or red (inflamed), don t wear eye makeup until the redness and swelling goes away. Use only hypoallergenic makeup in the future.    Unless told otherwise, stop wearing contact lenses until your condition improves.    Wash your hands regularly, to reduce the chance of dirt and bacteria coming in contact with your eyelid.  Follow-up care  Follow up with your healthcare provider, or as advised.  When to seek medical advice  Call your healthcare provider right away if any of the following occur:    Redness of the white part of the eye    Red or swollen eyelids    Eye pain or discharge    Increased light sensitivity    Change in your vision    Fever of 100.4 F (38 C) or higher, or as directed by your healthcare provider  Date Last Reviewed: 8/1/2017 2000-2017 [employer] [facility]  "[employer]. [address] [place] [address]. All rights reserved. This information is not intended as a substitute for professional medical care. Always follow your healthcare professional's instructions.        What Is Age-Related Macular Degeneration (AMD)?    Age-related macular degeneration (AMD) is an eye disease. AMD is the leading cause of vision loss in adults over age 50. One or both eyes may be affected. The macula (the part of the eye that controls your central, detailed vision) becomes damaged. Central vision becomes limited. However, side vision remains clear. There are two types of macular degeneration: \"dry\" and \"wet.\"     Dry macular degeneration       Wet macular degeneration      Dry macular degeneration  Dry is the most common type of macular degeneration. In the early stages, changes in vision may be hard to notice. Over time, your central vision may slowly worsen. You may notice wavy lines and blank spots in the center of your vision. Colors may look dim. There is no way to restore vision lost from dry macular degeneration. But you need to monitor it because it can turn into wet macular degeneration.  Wet macular degeneration  Wet macular degeneration is less common but more serious. Vision loss may be faster and more noticeable. You may suddenly see dark spots, blank spots, wavy lines, and dim colors in the center of your vision. If wet macular degeneration is caught early, certain treatments, such as injections, photodynamic therapy, or laser surgery, may help to slow further vision loss.  Date Last Reviewed: 6/8/2015 2000-2017 [employer]. [address] [place], [place] [id number]. All rights reserved. This information is not intended as a substitute for professional medical care. Always follow your healthcare professional's instructions.        Using the Amsler Grid  If you are at risk for vision loss, you may be told to check your eyesight regularly using the " Amsler grid. Below is the grid and instructions for using it.        The Amsler grid helps you track changes in your central vision.   How to use the Amsler grid  1. Use the grid in a well-lighted area.  2. Wear glasses or contact lenses if you usually wear them.  3. Hold the grid at your normal reading distance (about 16 inches).  4. Cover your left eye.  5. With your right eye, look at the dot in the center of the grid.  6. While looking at the dot, notice if any of the lines look wavy, if any lines disappear, or if the boxes change shape.  7. Write down on a piece of paper any vision changes from the last time you used the grid.  8. Now repeat the exercise, this time covering your right eye.  9. Call your healthcare provider right away if you notice any vision changes.  How often should I check my vision?  Use the Amsler grid as often as your eye healthcare provider suggests. Keep the grid where you ll remember to use it. Call your eye healthcare provider right away if you notice any changes with your eyesight. This includes if your vision improves.  Date Last Reviewed: 6/1/2016 2000-2017 [employer]. [address] [place] [address]. All rights reserved. This information is not intended as a substitute for professional medical care. Always follow your healthcare professional's instructions.        What Are Cataracts?    A clear lens in the eye focuses light. This lets the eye see images sharply. With age, the lens slowly becomes cloudy. The cloudy lens is a cataract. A cataract scatters light and makes it hard for the eye to focus. Cataracts often form in both eyes. But one lens may cloud faster than the other.  The aging of your lens  Your lens may cloud so slowly that you don`t notice any vision changes at first. But as the cataract gets worse, the eye has a harder time focusing. In early stages, glasses may help you see better. As the lens gets more cloudy, your doctor may recommend  surgery to restore your vision.  A clear lens allows your eye to bring objects sharply into focus.  A mild cataract may slightly blur your vision.  A dense cataract can severely blur your vision.  Date Last Reviewed: 6/14/2015 2000-2017 [employer]. [address] [place] [id number]. All rights reserved. This information is not intended as a substitute for professional medical care. Always follow your healthcare professional's instructions.

## 2017-11-29 NOTE — MR AVS SNAPSHOT
After Visit Summary   11/29/2017    Medardo Gautam    MRN: [id number]           Patient Information     Date Of Birth          1940        Visit Information        Provider Department      11/29/2017 1:40 PM Shawn Venegas, ALEXEY [facility]        Today's Diagnoses     Combined forms of age-related cataract of both eyes    -  1    Allergic conjunctivitis, bilateral        Meibomian gland dysfunction        Macular degeneration        Hypermetropia of both eyes        Regular astigmatism of both eyes        Presbyopia        Dermatochalasis of eyelid          Care Instructions    Referral to Dr. Orellana at [facility] for cataract evaluation and baseline OCT of macula.    Optivar- 1 drop both eyes 2 x day as needed for itchy eyes.  Warm packs.  Systane Balance- 1 drop both eyes 4 x day.    You have mild macular degeneration.  I recommend taking supplements for the eyes, PreserVision AREDS 2 formula daily as recommended dosage on the bottle.  Check with your pharmacist first to make sure there are not any interactions with your medications.    Return in 1 year for a complete eye exam or sooner if needed.    Shawn Venegas, ALEXEY    The affects of the dilating drops last for 4- 6 hours.  You will be more sensitive to light and vision will be blurry up close.  Mydriatic sunglasses were given if needed.      Optometry Providers       Clinic Locations                                 Telephone Number   Dr. Yamini Venegas   [facility]  [phone]     [facility] Hours:                [facility] Hours:       [facility] Hours:  [address]   [address]     [address] [place] [address]   [place] [address]    [place] [address]  Phone: [phone]                    Phone [phone]                      Phone: [phone]                           Monday 8:00-7:00                          Monday 8:00-7:00                          Monday 8:00-7:00              Tuesday 8:00-6:00                          Tuesday 8:00-7:00                          Tuesday 8:00-7:00              Wednesday 8:00-6:00                  Wednesday 8:00-7:00                   Wednesday 8:00-7:00      Thursday 8:00-6:00                        Thursday 8:00-7:00                         Thursday 8:00-7:00 Friday 8:00-5:00                              Friday 8:00-5:00 Friday 8:00-5:00    Please log on to [facility] to order your contact lenses.  The link is found on the Eye Care and Vision Services page.  As always, Thank you for trusting us with your health care needs!      Meibomian Gland Blockage  Small glands are located inside the upper and lower eyelids. These are called meibomian glands. They secrete oils that work with tears. The oils keep the tears from evaporating too quickly and prevent dry eyes.  If your meibomian glands become blocked by thickened oils, your eyes will become dry. Your eyes may feel irritated.  A blocked oil gland is prone to infection, which causes redness and swelling of the eyelid. This condition is called blepharitis. Blepharitis may take 6 to 12 months to clear up completely. Use the following home treatments to improve your condition.  Home care    Apply warm water on a washcloth (a warm compress) to the eyelids for 10 to 20 minutes at least twice a day. This softens the oils in the glands and may relieve the blockage. After this treatment, wipe away scales from the lids and apply any prescribed medicine.    Use lubricant eye drops (available without a prescription) if your eyes feel dry or burn.    If the eyelids are swollen or red (inflamed), don t wear eye makeup until the redness and swelling goes away. Use only hypoallergenic makeup in the future.    Unless told otherwise, stop wearing  "contact lenses until your condition improves.    Wash your hands regularly, to reduce the chance of dirt and bacteria coming in contact with your eyelid.  Follow-up care  Follow up with your healthcare provider, or as advised.  When to seek medical advice  Call your healthcare provider right away if any of the following occur:    Redness of the white part of the eye    Red or swollen eyelids    Eye pain or discharge    Increased light sensitivity    Change in your vision    Fever of 100.4 F (38 C) or higher, or as directed by your healthcare provider  Date Last Reviewed: 8/1/2017 2000-2017 [employer]. [address] [place] [address]. All rights reserved. This information is not intended as a substitute for professional medical care. Always follow your healthcare professional's instructions.        What Is Age-Related Macular Degeneration (AMD)?    Age-related macular degeneration (AMD) is an eye disease. AMD is the leading cause of vision loss in adults over age 50. One or both eyes may be affected. The macula (the part of the eye that controls your central, detailed vision) becomes damaged. Central vision becomes limited. However, side vision remains clear. There are two types of macular degeneration: \"dry\" and \"wet.\"     Dry macular degeneration       Wet macular degeneration      Dry macular degeneration  Dry is the most common type of macular degeneration. In the early stages, changes in vision may be hard to notice. Over time, your central vision may slowly worsen. You may notice wavy lines and blank spots in the center of your vision. Colors may look dim. There is no way to restore vision lost from dry macular degeneration. But you need to monitor it because it can turn into wet macular degeneration.  Wet macular degeneration  Wet macular degeneration is less common but more serious. Vision loss may be faster and more noticeable. You may suddenly see dark spots, blank spots, wavy " lines, and dim colors in the center of your vision. If wet macular degeneration is caught early, certain treatments, such as injections, photodynamic therapy, or laser surgery, may help to slow further vision loss.  Date Last Reviewed: 6/8/2015 2000-2017 [employer]. [address] [place] [address]. All rights reserved. This information is not intended as a substitute for professional medical care. Always follow your healthcare professional's instructions.        Using the Amsler Grid  If you are at risk for vision loss, you may be told to check your eyesight regularly using the Amsler grid. Below is the grid and instructions for using it.        The Amsler grid helps you track changes in your central vision.   How to use the Amsler grid  1. Use the grid in a well-lighted area.  2. Wear glasses or contact lenses if you usually wear them.  3. Hold the grid at your normal reading distance (about 16 inches).  4. Cover your left eye.  5. With your right eye, look at the dot in the center of the grid.  6. While looking at the dot, notice if any of the lines look wavy, if any lines disappear, or if the boxes change shape.  7. Write down on a piece of paper any vision changes from the last time you used the grid.  8. Now repeat the exercise, this time covering your right eye.  9. Call your healthcare provider right away if you notice any vision changes.  How often should I check my vision?  Use the Amsler grid as often as your eye healthcare provider suggests. Keep the grid where you ll remember to use it. Call your eye healthcare provider right away if you notice any changes with your eyesight. This includes if your vision improves.  Date Last Reviewed: 6/1/2016 2000-2017 [employer]. [address] [place] [address]. All rights reserved. This information is not intended as a substitute for professional medical care. Always follow your healthcare professional's  instructions.        What Are Cataracts?    A clear lens in the eye focuses light. This lets the eye see images sharply. With age, the lens slowly becomes cloudy. The cloudy lens is a cataract. A cataract scatters light and makes it hard for the eye to focus. Cataracts often form in both eyes. But one lens may cloud faster than the other.  The aging of your lens  Your lens may cloud so slowly that you don`t notice any vision changes at first. But as the cataract gets worse, the eye has a harder time focusing. In early stages, glasses may help you see better. As the lens gets more cloudy, your doctor may recommend surgery to restore your vision.  A clear lens allows your eye to bring objects sharply into focus.  A mild cataract may slightly blur your vision.  A dense cataract can severely blur your vision.  Date Last Reviewed: 6/14/2015 2000-2017 [employer] [address] [place] [address]. All rights reserved. This information is not intended as a substitute for professional medical care. Always follow your healthcare professional's instructions.                Follow-ups after your visit        Additional Services     OPHTHALMOLOGY ADULT REFERRAL       Your provider has referred you to:  [facility]: [facility] [place] [phone]   [email or web address]      Please be aware that coverage of these services is subject to the terms and limitations of your health insurance plan.  Call member services at your health plan with any benefit or coverage questions.      Please bring the following to your appointment:  >>   Any x-rays, CTs or MRIs which have been performed.  Contact the facility where they were done to arrange for  prior to your scheduled appointment.  Any new CT, MRI or other procedures ordered by your specialist must be performed at a [facility] facility or coordinated by your clinic's referral office.     >>   List of current medications   >>   This referral request   >>   Any documents/labs given to you for this referral                  Follow-up notes from your care team     Return in about 1 year (around 11/29/2018) for Annual Visit.      Your next 10 appointments already scheduled     Jan 12, 2018 10:50 AM CST   (Arrive by 10:20 AM)   RETURN SPINE with Eddy Powell MD   [facility] ([facility])    [address] [place] [address]  [facility] [id number] [phone]            Jan 16, 2018 12:30 PM CST   (Arrive by 12:15 PM)   Return Visit with MANI Ireland   [facility] ([facility])    [address] [place] [id number] [phone]            Apr 24, 2018 11:45 AM CDT   (Arrive by 11:30 AM)   LONG TERM RETURN with Magali Andrews PA-C   [facility] ([facility])    [address] [place] [id number] [phone]              Who to contact     If you have questions or need follow up information about today's clinic visit or your schedule please contact [facility] directly at [phone].  Normal or non-critical lab and imaging results will be communicated to you by MyChart, letter or phone within 4 business days after the clinic has received the results. If you do not hear from us within 7 days, please contact the clinic through MyChart or phone. If you have a critical or abnormal lab result, we will notify you by phone as soon as possible.  Submit refill requests through [employer] or call your pharmacy and they will forward the refill request to us. Please allow 3 business days for your refill to be completed.          Additional Information About Your Visit        [employer] Information     [employer] gives you secure access to your electronic health record. If  you see a primary care provider, you can also send messages to your care team and make appointments. If you have questions, please call your primary care clinic.  If you do not have a primary care provider, please call [phone] and they will assist you.      [employer] is an electronic gateway that provides easy, online access to your medical records. With [employer], you can request a clinic appointment, read your test results, renew a prescription or communicate with your care team.     To access your existing account, please contact your [facility] or call [phone] for assistance.        Care EveryWhere ID     This is your Care EveryWhere ID. This could be used by other organizations to access your [facility] medical records  [id number]         Blood Pressure from Last 3 Encounters:   11/20/17 142/78   10/30/17 146/83   10/16/17 150/89    Weight from Last 3 Encounters:   11/28/17 89.6 kg (197 lb 9.6 oz)   11/20/17 89.8 kg (198 lb)   10/30/17 88.5 kg (195 lb)              We Performed the Following     EYE EXAM (SIMPLE-NONBILLABLE)     OPHTHALMOLOGY ADULT REFERRAL     REFRACTION          Today's Medication Changes          These changes are accurate as of: 11/29/17  3:16 PM.  If you have any questions, ask your nurse or doctor.               Start taking these medicines.        Dose/Directions    azelastine 0.05 % Soln ophthalmic solution   Commonly known as:  OPTIVAR   Used for:  Allergic conjunctivitis, bilateral   Started by:  Shawn Venegas, OD        Dose:  1 drop   Apply 1 drop to eye 2 times daily   Quantity:  1 Bottle   Refills:  6            Where to get your medicines      These medications were sent to [facility] [address] [place] [address] [facility]  [address] [facility] [address]     Phone:  [phone]     azelastine 0.05 % Soln ophthalmic solution                Primary Care Provider Office Phone # Fax #    Verna Osborne [phone]  [phone]       [address] [place] [address]  [facility] [address]        Equal Access to Services     RENEE CORONA : Hadii aad ku hadgeorgebarak Soshamika, waaxda luqadaha, qaybta kaalmada lashawnjaclyn, randy ybarra enrriquecasey hardinvielka shayymitraen spivey. So [facility] [phone].    ATENCIÓN: Si habla español, tiene a sanford disposición servicios gratuitos de asistencia lingüística. Jamaame al [phone].    We comply with applicable federal civil rights laws and [place] laws. We do not discriminate on the basis of race, color, national origin, age, disability, sex, sexual orientation, or gender identity.            Thank you!     Thank you for choosing [facility]  for your care. Our goal is always to provide you with excellent care. Hearing back from our patients is one way we can continue to improve our services. Please take a few minutes to complete the written survey that you may receive in the mail after your visit with us. Thank you!             Your Updated Medication List - Protect others around you: Learn how to safely use, store and throw away your medicines at www.disposemymeds.org.          This list is accurate as of: 11/29/17  3:16 PM.  Always use your most recent med list.                   Brand Name Dispense Instructions for use Diagnosis    aspirin 81 MG tablet     30 tablet    Take 1 tablet (81 mg) by mouth daily    Hyperlipidemia LDL goal <130, Malignant neoplasm of colon, unspecified part of colon (H)       azelastine 0.05 % Soln ophthalmic solution    OPTIVAR    1 Bottle    Apply 1 drop to eye 2 times daily    Allergic conjunctivitis, bilateral       bicalutamide 50 MG tablet    CASODEX    90 tablet    Take 1 tablet (50 mg) by mouth daily    Malignant neoplasm of prostate (H)       calcium-vitamin D 600-400 MG-UNIT per tablet    CALTRATE     Take 1 tablet by mouth daily    Hyperlipidemia LDL goal <130, Malignant neoplasm of colon, unspecified part of colon (H)       CLARITIN PO      Take  by mouth. As  needed        clonazePAM 1 MG tablet    klonoPIN    30 tablet    TAKE ONE TABLET BY MOUTH TWICE DAILY AS NEEDED FOR ANXIETY    Anxiety       folic acid-vit B6-vit B12 0.8-10-0.115 MG Tabs per tablet    FOLGARD     Take 1 tablet by mouth daily    Hyperlipidemia LDL goal <130, Malignant neoplasm of colon, unspecified part of colon (H)       IBUPROFEN PO      Take 400 mg by mouth every 8 hours as needed for moderate pain        lisinopril 10 MG tablet    PRINIVIL/ZESTRIL    90 tablet    TAKE ONE TABLET BY MOUTH ONCE DAILY    Benign essential hypertension       naproxen 500 MG tablet    NAPROSYN    60 tablet    TAKE ONE TABLET BY MOUTH TWICE DAILY AS NEEDED FOR  MODERATE  PAIN    Chronic gout without tophus, unspecified cause, unspecified site       OMEGA-3 FISH OIL PO      Take 500 mg by mouth daily    Hyperlipidemia LDL goal <130, Malignant neoplasm of colon, unspecified part of colon (H)       sildenafil 100 MG tablet    VIAGRA    10 tablet    1/2 tab three times a week    Malignant neoplasm of prostate (H)       zolpidem 5 MG tablet    AMBIEN    30 tablet    TAKE ONE TABLET BY MOUTH AT BEDTIME AS NEEDED FOR SLEEP    Persistent disorder of initiating or maintaining sleep

## 2017-12-04 DIAGNOSIS — I10 BENIGN ESSENTIAL HYPERTENSION: ICD-10-CM

## 2017-12-06 RX ORDER — LISINOPRIL 10 MG/1
10 TABLET ORAL DAILY
Qty: 30 TABLET | Refills: 0 | Status: SHIPPED | OUTPATIENT
Start: 2017-12-06 | End: 2018-01-02

## 2017-12-06 NOTE — TELEPHONE ENCOUNTER
Patient called to check on the status of the medication from below. Can this be filled ASAP  [facility] in [place]

## 2017-12-06 NOTE — TELEPHONE ENCOUNTER
Requested Prescriptions   Pending Prescriptions Disp Refills     lisinopril (PRINIVIL/ZESTRIL) 10 MG tablet 90 tablet 0     Sig: Take 1 tablet (10 mg) by mouth daily    ACE Inhibitors (Including Combos) Protocol Failed    12/5/2017  4:43 PM       Failed - Blood pressure under 140/90    BP Readings from Last 3 Encounters:   11/20/17 142/78   10/30/17 146/83   10/16/17 150/89                Passed - Recent or future visit with authorizing provider's specialty    Patient had office visit in the last year or has a visit in the next 30 days with authorizing provider.  See chart review.              Passed - Patient is age 18 or older       Passed - Normal serum creatinine on file in past 12 months    Recent Labs   Lab Test  09/07/17   0951   CR  1.02            Passed - Normal serum potassium on file in past 12 months    Recent Labs   Lab Test  09/07/17   0951   POTASSIUM  4.6             ]  Routing refill request to provider for review/approval because:  Labs out of range:  Blood pressure.    Iman Gonsales RN

## 2017-12-06 NOTE — TELEPHONE ENCOUNTER
BP has been uncontrolled per goal on problem list and no notes on chart to be clear of plan.  Refilled 1 month.  Advised f/u.  FYI to PCP in case she had other plans.  Lisa Zuniga MD

## 2017-12-11 ENCOUNTER — TELEPHONE (OUTPATIENT)
Dept: OPHTHALMOLOGY | Facility: CLINIC | Age: 77
End: 2017-12-11

## 2017-12-11 ENCOUNTER — OFFICE VISIT (OUTPATIENT)
Dept: OPHTHALMOLOGY | Facility: CLINIC | Age: 77
End: 2017-12-11
Payer: COMMERCIAL

## 2017-12-11 DIAGNOSIS — H25.13 AGE-RELATED NUCLEAR CATARACT OF BOTH EYES: ICD-10-CM

## 2017-12-11 DIAGNOSIS — H35.30 MACULAR DEGENERATION OF BOTH EYES: Primary | ICD-10-CM

## 2017-12-11 PROCEDURE — 92134 CPTRZ OPH DX IMG PST SGM RTA: CPT | Performed by: OPHTHALMOLOGY

## 2017-12-11 PROCEDURE — 92002 INTRM OPH EXAM NEW PATIENT: CPT | Performed by: OPHTHALMOLOGY

## 2017-12-11 PROCEDURE — 92136 OPHTHALMIC BIOMETRY: CPT | Performed by: OPHTHALMOLOGY

## 2017-12-11 ASSESSMENT — CUP TO DISC RATIO
OD_RATIO: 0.2
OS_RATIO: 0.2

## 2017-12-11 ASSESSMENT — TONOMETRY
OD_IOP_MMHG: 24
OS_IOP_MMHG: 23
IOP_METHOD: TONOPEN
OS_IOP_MMHG: 23
IOP_METHOD: TONOPEN
OD_IOP_MMHG: 23

## 2017-12-11 ASSESSMENT — VISUAL ACUITY
OS_CC+: -1
METHOD: SNELLEN - LINEAR
OD_CC: 20/40
CORRECTION_TYPE: GLASSES
OS_CC: 20/40
OD_CC+: -1

## 2017-12-11 ASSESSMENT — REFRACTION_WEARINGRX
OS_SPHERE: PLANO
OD_ADD: +2.50
OD_SPHERE: PLANO
OD_CYLINDER: +2.00
OS_ADD: +2.50
OS_AXIS: 170
OS_CYLINDER: +2.50
SPECS_TYPE: AUTO/PAL
OD_AXIS: 001

## 2017-12-11 ASSESSMENT — EXTERNAL EXAM - LEFT EYE: OS_EXAM: NORMAL

## 2017-12-11 ASSESSMENT — EXTERNAL EXAM - RIGHT EYE: OD_EXAM: NORMAL

## 2017-12-11 NOTE — TELEPHONE ENCOUNTER
"Procedure: left Cataract  Facility: [facility]  Length: 0.5 hour(s)  Surgeon:Donato Orellana MD  Anesthesia: Local with MAC  Diagnosis: Cataract  Out Patient or AM admit:  (Same day)  BMI:Estimated body mass index is 30.95 kg/(m^2) as calculated from the following:    Height as of 11/28/17: 1.702 m (5' 7\").    Weight as of 11/28/17: 89.6 kg (197 lb 9.6 oz). (If over 43 for general or 45 for MAC cannot be scheduled at [facility])   Pre-op Appointments needed: History & Physical within 30 days of surgery  Post-op appointments needed: Cataract 1 day 1 week   needed:No   Surgery packet/instructions given to patient?:  No  Pre op teaching done:  No  Lens Orders Needed: Yes: ZCB00 20.5, with incision at  90    Referring provider: Dr. Venegas  Special Considerations:     Procedure: right Cataract  Facility: [facility]  Length: 0.5 hour(s)  Surgeon:Donato Orellana MD  Anesthesia: Local with MAC  Diagnosis: Cataract  Out Patient or AM admit:  (Same day)  BMI:Estimated body mass index is 30.95 kg/(m^2) as calculated from the following:    Height as of 11/28/17: 1.702 m (5' 7\").    Weight as of 11/28/17: 89.6 kg (197 lb 9.6 oz). (If over 43 for general or 45 for MAC cannot be scheduled at [facility])   Pre-op Appointments needed: History & Physical within 30 days of surgery  Post-op appointments needed: Cataract 1 day 1 week 4 week   needed:No   Surgery packet/instructions given to patient?:  No  Pre op teaching done:  No  Lens Orders Needed: Yes: ZCB00 20.5, with incision at  90    Referring provider: Dr. Venegas  Special Considerations:               NO longer wants to have surgery the same day as the wife  "

## 2017-12-11 NOTE — TELEPHONE ENCOUNTER
Surgery orders sent to Erika in Dr. Orellana's [place] office.  Sera Salgado, Surgery Scheduling Coordinator

## 2017-12-11 NOTE — NURSING NOTE
Patient presents with:  Cataract Evaluation: Referred by Dr. Venegas      [place]  [address] [place] [address]    HPI    Last Eye Exam:  11/29/17   Additional Referring Providers:  Dr. Venegas   Informant(s):  EMR   Affected eye(s):  Both   Symptoms:     Decreased vision   Difficulty with reading      Duration:  1 year   Frequency:  Daily       Do you have eye pain now?:  No

## 2017-12-11 NOTE — PROGRESS NOTES
Assessment & Plan   Medardo Gautam is a 77 year old male who presents with:   Review of systems for the eyes was negative other than the pertinent positives and negatives noted in the HPI.  History is obtained from the patient and wife.     Macular degeneration of both eyes  - Recommend daily AREDS and yearly exams  - SD-OCT Macula Optovue OU (both eyes)    Age-related nuclear cataract of both eyes  - visually significant left eye greater than right eye.  - A's and K's today - results reviewed and appropriate lenses chosen, see scanned documentation.  - Schedule surgery - at [facility] or [facility]   Discussed lens options. Standard lens monofocal vs monovision. Multifocal vs Toric     Risks, benefits, an alternatives of intended procedure discussed. He wishes to proceed.    Pt would like to proceed with surgery starting with the left eye using a standard lens setting the  both eyes for distance.    He understands that He may still need to wear glasses after surgery.    - OPHTHALMIC BIOMETY W IOL POWER CALC    Discussed TORIC IOL's. Not interested. Plan standard IOL with distance target both eyes. Left eye 1st. He understands that he will have significant residual astigmatism that will be corrected with glasses.    Return for surgery    Documentation for today's encounter was performed by Janet Martins COA. OSC. Acting as a scribe in my presence. I have reviewed and verified that it is an accurate recording of today's encounter.    Attending Physician Attestation:  Complete documentation of historical and exam elements from today's encounter can be found in the full encounter summary report (not reduplicated in this progress note).  I personally obtained the chief complaint(s) and history of present illness.  I confirmed and edited as necessary the review of systems, past medical/surgical history, family history, social history, and examination findings as documented by others; and I  examined the patient myself.  I personally reviewed the relevant tests, images, and reports as documented above.  I formulated and edited as necessary the assessment and plan and discussed the findings and management plan with the patient and family. - Brandon Orellana MD

## 2017-12-11 NOTE — MR AVS SNAPSHOT
After Visit Summary   12/11/2017    Medardo Gautam    MRN: [id number]           Patient Information     Date Of Birth          1940        Visit Information        Provider Department      12/11/2017 10:00 AM Brandon Orellana MD [facility]        Today's Diagnoses     Macular degeneration of both eyes    -  1    Age-related nuclear cataract of both eyes           Follow-ups after your visit        Follow-up notes from your care team     Return for Surgery.      Your next 10 appointments already scheduled     Jan 12, 2018 10:50 AM CST   (Arrive by 10:20 AM)   RETURN SPINE with Eddy Powell MD   [facility] ([facility] [place])    [address]  4th Floor  [facility] [address] [phone]            Jan 16, 2018 12:30 PM CST   (Arrive by 12:15 PM)   Return Visit with MANI Ireland   [facility] ([facility])    [address] [place] [address]  4th Floor  [facility] [address] [phone]            Apr 24, 2018 11:45 AM CDT   (Arrive by 11:30 AM)   LONG TERM RETURN with Magali Andrews PA-C   [facility] ([facility] [place])    [address]  2nd Floor  [facility] [address] [phone]              Who to contact     If you have questions or need follow up information about today's clinic visit or your schedule please contact [facility] directly at [phone].  Normal or non-critical lab and imaging results will be communicated to you by MyChart, letter or phone within 4 business days after the clinic has received the results. If you do not hear from us within 7 days, please contact the clinic through MyChart or phone. If you have a critical or abnormal lab result, we will notify you by phone as soon as possible.  Submit refill requests through TapDoghart or call your  pharmacy and they will forward the refill request to us. Please allow 3 business days for your refill to be completed.          Additional Information About Your Visit        Dragon Armyhar[employer] Squid Information     TAKO gives you secure access to your electronic health record. If you see a primary care provider, you can also send messages to your care team and make appointments. If you have questions, please call your primary care clinic.  If you do not have a primary care provider, please call [phone] and they will assist you.      TAKO is an electronic gateway that provides easy, online access to your medical records. With TAKO, you can request a clinic appointment, read your test results, renew a prescription or communicate with your care team.     To access your existing account, please contact your [facility] or call [phone] for assistance.        Care EveryWhere ID     This is your Care EveryWhere ID. This could be used by other organizations to access your [place] medical records  [id number]         Blood Pressure from Last 3 Encounters:   11/20/17 142/78   10/30/17 146/83   10/16/17 150/89    Weight from Last 3 Encounters:   11/28/17 89.6 kg (197 lb 9.6 oz)   11/20/17 89.8 kg (198 lb)   10/30/17 88.5 kg (195 lb)              We Performed the Following     EYE EXAM, NEW PATIENT,COMPREHESV     OPHTHALMIC BIOMETY W IOL POWER CALC     SD-OCT Macula Optovue OU (both eyes)        Primary Care Provider Office Phone # Fax #    Verna Osborne [phone]       [address] [place] [address]        Equal Access to Services     [facility]: Hadii aad ku hadasho Soomaali, waaxda luqadaha, qaybta kaalmada evelia, randy salamanca . So [facility] [phone].    ATENCIÓN: Si habla español, tiene a sanford disposición servicios gratuitos de asistencia lingüística. Llame al [phone].    We comply with applicable federal civil rights  laws and [place] laws. We do not discriminate on the basis of race, color, national origin, age, disability, sex, sexual orientation, or gender identity.            Thank you!     Thank you for choosing [facility]  for your care. Our goal is always to provide you with excellent care. Hearing back from our patients is one way we can continue to improve our services. Please take a few minutes to complete the written survey that you may receive in the mail after your visit with us. Thank you!             Your Updated Medication List - Protect others around you: Learn how to safely use, store and throw away your medicines at www.disposemymeds.org.          This list is accurate as of: 12/11/17 10:43 AM.  Always use your most recent med list.                   Brand Name Dispense Instructions for use Diagnosis    aspirin 81 MG tablet     30 tablet    Take 1 tablet (81 mg) by mouth daily    Hyperlipidemia LDL goal <130, Malignant neoplasm of colon, unspecified part of colon (H)       azelastine 0.05 % Soln ophthalmic solution    OPTIVAR    1 Bottle    Apply 1 drop to eye 2 times daily    Allergic conjunctivitis, bilateral       bicalutamide 50 MG tablet    CASODEX    90 tablet    Take 1 tablet (50 mg) by mouth daily    Malignant neoplasm of prostate (H)       calcium-vitamin D 600-400 MG-UNIT per tablet    CALTRATE     Take 1 tablet by mouth daily    Hyperlipidemia LDL goal <130, Malignant neoplasm of colon, unspecified part of colon (H)       CLARITIN PO      Take  by mouth. As needed        clonazePAM 1 MG tablet    klonoPIN    30 tablet    TAKE ONE TABLET BY MOUTH TWICE DAILY AS NEEDED FOR ANXIETY    Anxiety       folic acid-vit B6-vit B12 0.8-10-0.115 MG Tabs per tablet    FOLGARD     Take 1 tablet by mouth daily    Hyperlipidemia LDL goal <130, Malignant neoplasm of colon, unspecified part of colon (H)       IBUPROFEN PO      Take 400 mg by mouth every 8 hours as needed for moderate pain         lisinopril 10 MG tablet    PRINIVIL/ZESTRIL    30 tablet    Take 1 tablet (10 mg) by mouth daily DUE for followup    Benign essential hypertension       naproxen 500 MG tablet    NAPROSYN    60 tablet    TAKE ONE TABLET BY MOUTH TWICE DAILY AS NEEDED FOR  MODERATE  PAIN    Chronic gout without tophus, unspecified cause, unspecified site       OMEGA-3 FISH OIL PO      Take 500 mg by mouth daily    Hyperlipidemia LDL goal <130, Malignant neoplasm of colon, unspecified part of colon (H)       sildenafil 100 MG tablet    VIAGRA    10 tablet    1/2 tab three times a week    Malignant neoplasm of prostate (H)       zolpidem 5 MG tablet    AMBIEN    30 tablet    TAKE ONE TABLET BY MOUTH AT BEDTIME AS NEEDED FOR SLEEP    Persistent disorder of initiating or maintaining sleep

## 2017-12-18 NOTE — TELEPHONE ENCOUNTER
Date Scheduled: OD 4/19/18 OS 3/15/18  Facility: Other [facility]  Surgeon: Reyna   On google calendar?: Not Applicable  Post-op appointment scheduled:      scheduled?: Not Applicable  Surgery packet/instructions confirmed received?  mailed by [place] office  Special Considerations:   Janet GAMA OSC

## 2017-12-26 DIAGNOSIS — G47.00 PERSISTENT DISORDER OF INITIATING OR MAINTAINING SLEEP: ICD-10-CM

## 2017-12-26 RX ORDER — ZOLPIDEM TARTRATE 5 MG/1
TABLET ORAL
Qty: 30 TABLET | Refills: 0 | Status: SHIPPED | OUTPATIENT
Start: 2017-12-26 | End: 2018-01-09

## 2018-01-01 NOTE — NURSING NOTE
"Chief Complaint   Patient presents with     Chest Pain     Panel Management     honoring choices, keshav,       Initial /64  Pulse 64  Temp 96.4  F (35.8  C) (Temporal)  Resp 16  Wt 194 lb (88 kg)  BMI 30.59 kg/m2 Estimated body mass index is 30.59 kg/(m^2) as calculated from the following:    Height as of 2/7/17: 5' 6.77\" (1.696 m).    Weight as of this encounter: 194 lb (88 kg).  Medication Reconciliation: complete   Ankita Jimenez CMA    " within normal limits

## 2018-01-02 ENCOUNTER — DOCUMENTATION ONLY (OUTPATIENT)
Dept: ANESTHESIOLOGY | Facility: CLINIC | Age: 78
End: 2018-01-02

## 2018-01-02 DIAGNOSIS — I10 BENIGN ESSENTIAL HYPERTENSION: ICD-10-CM

## 2018-01-02 NOTE — PROGRESS NOTES
Purpose of call: Follow up after L lumbar TFESI   Date of service: 10/30/2017  Spoke with: Medardo    Location of pain: low back  Percentage of pain relief after procedure: 90%    Follow up: Pt states satisfaction with treatment. Will call clinic if needing further treatment.

## 2018-01-03 NOTE — TELEPHONE ENCOUNTER
Routing refill request to provider for review/approval because:  Ling given x1 and patient did not follow up, please advise.  Per 12/4 medication refill note from Dr. uZniga: BP has been uncontrolled per goal on problem list and no notes on chart to be clear of plan.  Refilled 1 month.  Advised f/u.  FYI to PCP in case she had other plans.    Iman Gonsales RN

## 2018-01-04 DIAGNOSIS — F41.9 ANXIETY: ICD-10-CM

## 2018-01-04 RX ORDER — LISINOPRIL 10 MG/1
TABLET ORAL
Qty: 30 TABLET | Refills: 0 | Status: SHIPPED | OUTPATIENT
Start: 2018-01-04 | End: 2018-01-09

## 2018-01-04 RX ORDER — CLONAZEPAM 1 MG/1
TABLET ORAL
Qty: 30 TABLET | Refills: 2 | Status: SHIPPED | OUTPATIENT
Start: 2018-01-04 | End: 2018-01-09

## 2018-01-04 NOTE — TELEPHONE ENCOUNTER
clonazepam      Last Written Prescription Date: 10/5/17  Last Fill Quantity: 30,  # refills: 2   Last Office Visit with G, P or [facility] prescribing provider: 11/20/17                                         Next 5 appointments (look out 90 days)     Jan 09, 2018  2:00 PM CST   Office Visit with Verna Osborne MD   [facility]    [address] [place] [address]   [phone]            Mar 16, 2018 11:20 AM CDT   Return Visit with Shawn Venegas OD   [facility]    [address] [place] [address] [phone]            Mar 21, 2018 11:20 AM CDT   Return Visit with Shawn Venegas OD   [facility] ([facility])    [address] [place] [address] [phone]

## 2018-01-04 NOTE — PROGRESS NOTES
SUBJECTIVE:                                                    Medardo Gautam is a 78 year old male who presents to clinic today for the following health issues:      History of Present Illness     Hypertension:     Outpatient blood pressures:  Are being checked    Blood pressures checked at:  Home    Dietary sodium intake::  No added salt diet          Problem list and histories reviewed & adjusted, as indicated.  Additional history: as documented        Patient Active Problem List   Diagnosis     Disturbance of skin sensation     Hemorrhoids     Gout     Advanced directives, counseling/discussion     Dupuytren's contracture of hand- left 5th finger, right 5th finger     Bunions- both feet     Skin lesion- R side of face and behind L ear     Insomnia     Prostatic nodule- posterior right edge with rectal exam     Prostate cancer- [place] 9 (5+4) dx Feb 2012     Elevated liver enzymes     Hyperbilirubinemia     Essential hypertension with goal blood pressure less than 140/90     Shoulder pain, left     Contracture of finger joint     Hyperlipidemia LDL goal <130     Shoulder pain, right     Malignant neoplasm of prostate (H)     Anxiety     Colon cancer (H)     Abdominal pain, epigastric     Renal cyst     Lumbar radiculopathy     Meibomian gland dysfunction     Past Surgical History:   Procedure Laterality Date     APPENDECTOMY       BIOPSY  01/16/15     C HAND/FINGER SURGERY UNLISTED       C LENGTHEN,TENDON,HAND/FINGER  ca 2007    right fifth     C STOMACH SURGERY PROCEDURE UNLISTED       COLON SURGERY  2/11/2015    Lap assisted R hemicolectomy     COLONOSCOPY  10/25/07    Snare polypectomy     COLONOSCOPY  6/25/2009    with snare polypectomy     COLONOSCOPY  9/30/2009     COLONOSCOPY  1/5/2011    COLONOSCOPY performed by JUD MARIEE at  GI     COLONOSCOPY N/A 1/16/2015    Procedure: COMBINED COLONOSCOPY, SINGLE OR MULTIPLE BIOPSY/POLYPECTOMY BY BIOPSY;  Surgeon: Sarah Beth Pisano MD;  Location:   OR     COLONOSCOPY WITH CO2 INSUFFLATION N/A 2015    Procedure: COLONOSCOPY WITH CO2 INSUFFLATION;  Surgeon: Sarah Beth Pisano MD;  Location: [facility] OR     DAVINCI PROSTATECTOMY  2012    Procedure: DAVINCI PROSTATECTOMY;  Davinci Assisted Radical Prostatectomy with Bilateral Lymphadenectomy ;  Surgeon: Norma Goodwin MD;  Location: [facility] OR     INJECT EPIDURAL LUMBAR / SACRAL SINGLE Left 10/30/2017    Procedure: INJECT EPIDURAL LUMBAR / SACRAL SINGLE;  Left Transforaminal Lumbar 4-Lumbar 5 Epidural Steroid Injection;  Surgeon: Nuvia Reina MD;  Location: [facility] OR     LAPAROSCOPIC ASSISTED COLECTOMY N/A 2015    Procedure: LAPAROSCOPIC ASSISTED COLECTOMY;  Surgeon: Oren Breen MD;  Location: [facility] OR       Social History   Substance Use Topics     Smoking status: Former Smoker     Years: 1.00     Types: Cigarettes, Cigars, Pipe     Start date: 10/1/1961     Quit date: 1962     Smokeless tobacco: Never Used      Comment: No smokers in home     Alcohol use 0.0 oz/week      Comment: daily glass of wine and whiskey     Family History   Problem Relation Age of Onset     CEREBROVASCULAR DISEASE Father      CANCER Mother 90     Patient believes vaginal cancer - hysterectomy,      Alzheimer Disease Mother      CANCER Sister      Breast Cancer Sister      C.A.D. No family hx of      Breast Cancer No family hx of      Cancer - colorectal No family hx of      Prostate Cancer No family hx of      Blood Disease No family hx of      Cardiovascular No family hx of      Circulatory No family hx of      Eye Disorder No family hx of      GASTROINTESTINAL DISEASE No family hx of      Genitourinary Problems No family hx of      Lipids No family hx of      Neurologic Disorder No family hx of      Respiratory No family hx of      Asthma No family hx of      HEART DISEASE No family hx of      DIABETES No family hx of      Hypertension No family hx of      Arthritis No family hx of      Thyroid Disease No  family hx of      Musculoskeletal Disorder No family hx of          Current Outpatient Prescriptions   Medication Sig Dispense Refill     lisinopril (PRINIVIL/ZESTRIL) 10 MG tablet Take 1 tablet (10 mg) by mouth daily 90 tablet 3     [START ON 2/9/2018] clonazePAM (KLONOPIN) 1 MG tablet Take 1 tablet (1 mg) by mouth nightly as needed for anxiety 30 tablet 5     [START ON 2/9/2018] zolpidem (AMBIEN) 5 MG tablet Take 1 tablet (5 mg) by mouth nightly as needed for sleep 30 tablet 5     azelastine (OPTIVAR) 0.05 % SOLN ophthalmic solution Apply 1 drop to eye 2 times daily 1 Bottle 6     naproxen (NAPROSYN) 500 MG tablet TAKE ONE TABLET BY MOUTH TWICE DAILY AS NEEDED FOR  MODERATE  PAIN 60 tablet 1     IBUPROFEN PO Take 400 mg by mouth every 8 hours as needed for moderate pain       bicalutamide (CASODEX) 50 MG tablet Take 1 tablet (50 mg) by mouth daily 90 tablet 3     Omega-3 Fatty Acids (OMEGA-3 FISH OIL PO) Take 500 mg by mouth daily       calcium-vitamin D (CALTRATE) 600-400 MG-UNIT per tablet Take 1 tablet by mouth daily       folic acid-vit B6-vit B12 (FOLGARD) 0.8-10-0.115 MG TABS Take 1 tablet by mouth daily       aspirin 81 MG tablet Take 1 tablet (81 mg) by mouth daily 30 tablet      sildenafil (VIAGRA) 100 MG tablet 1/2 tab three times a week 10 tablet 12     Loratadine (CLARITIN PO) Take  by mouth. As needed        triamcinolone (KENALOG) 0.1 % cream        [DISCONTINUED] lisinopril (PRINIVIL/ZESTRIL) 10 MG tablet TAKE ONE TABLET BY MOUTH ONCE DAILY **DUE  FOR  FOLLOW  UP 30 tablet 0     [DISCONTINUED] clonazePAM (KLONOPIN) 1 MG tablet TAKE ONE TABLET BY MOUTH TWICE DAILY AS NEEDED FOR ANXIETY 30 tablet 2     No Known Allergies  BP Readings from Last 3 Encounters:   01/09/18 122/80   11/20/17 142/78   10/30/17 146/83    Wt Readings from Last 3 Encounters:   01/09/18 199 lb (90.3 kg)   11/28/17 197 lb 9.6 oz (89.6 kg)   11/20/17 198 lb (89.8 kg)                  Labs reviewed in  "EPIC        ROS:  Constitutional, HEENT, cardiovascular, pulmonary, GI, , musculoskeletal, neuro, skin, endocrine and psych systems are negative, except as otherwise noted.      OBJECTIVE:   /80 (BP Location: Right arm, Patient Position: Chair, Cuff Size: Adult Large)  Pulse 62  Temp 97.6  F (36.4  C) (Oral)  Resp 14  Ht 5' 7.25\" (1.708 m)  Wt 199 lb (90.3 kg)  SpO2 96%  BMI 30.94 kg/m2  Body mass index is 30.94 kg/(m^2).   Physical Exam   Constitutional: He is oriented to person, place, and time. He appears well-developed and well-nourished.   HENT:   Head: Normocephalic and atraumatic.   Right Ear: External ear normal.   Left Ear: External ear normal.   Eyes: EOM are normal.   Neck: Neck supple.   Cardiovascular: Normal rate, regular rhythm, normal heart sounds and intact distal pulses.  Exam reveals no gallop.    No murmur heard.  Pulmonary/Chest: Effort normal and breath sounds normal.   Abdominal: Soft. Bowel sounds are normal.   Musculoskeletal: Normal range of motion.   Neurological: He is alert and oriented to person, place, and time.   Psychiatric: He has a normal mood and affect.           Diagnostic Test Results:  none     ASSESSMENT/PLAN:     Problem List Items Addressed This Visit     Essential hypertension with goal blood pressure less than 140/90     Well-controlled blood pressures on lisinopril  Update lipid panel and chemistries.  Refill medication. Needs recheck in a year.         Relevant Medications    lisinopril (PRINIVIL/ZESTRIL) 10 MG tablet    Anxiety     Patient has had issues with anxiety and insomnia for which she was using Valium and Ambien before he establish care with me at which point I changed his Valium to clonazepam to help his anxiety better. He's been on the stable dose of Klonopin ever since  I did discuss about the new guidelines and concerns about dependence and risks of increased somnolence and falls with increasing age. He is aware of this but states that this " regimen has worked for him and would like to continue it without any changes.  I did refill the medications and advise caution           Relevant Medications    clonazePAM (KLONOPIN) 1 MG tablet (Start on 2/9/2018)      Other Visit Diagnoses     At risk for falling    -  Primary    Benign essential hypertension        Relevant Medications    lisinopril (PRINIVIL/ZESTRIL) 10 MG tablet    Other Relevant Orders    Lipid panel reflex to direct LDL Fasting    Comprehensive metabolic panel (BMP + Alb, Alk Phos, ALT, AST, Total. Bili, TP)    Persistent disorder of initiating or maintaining sleep        Relevant Medications    zolpidem (AMBIEN) 5 MG tablet (Start on 2/9/2018)         Verna Osborne MD  [facility]

## 2018-01-04 NOTE — TELEPHONE ENCOUNTER
Patient checking on status of refill, I informed him he is due for follow up. Patient is scheduled for follow up on Tues 01-09-18, he is going out of town tomorrow and needs meds to take with him.  Please advise.  Thank you

## 2018-01-04 NOTE — TELEPHONE ENCOUNTER
Patient checking on status of refill.  He is going out of town tomorrow.  Please advise.  Thank you

## 2018-01-08 ENCOUNTER — TELEPHONE (OUTPATIENT)
Dept: FAMILY MEDICINE | Facility: OTHER | Age: 78
End: 2018-01-08

## 2018-01-08 DIAGNOSIS — F41.9 ANXIETY: ICD-10-CM

## 2018-01-08 RX ORDER — CLONAZEPAM 1 MG/1
TABLET ORAL
Qty: 30 TABLET | Refills: 2 | Status: CANCELLED | OUTPATIENT
Start: 2018-01-08

## 2018-01-08 NOTE — TELEPHONE ENCOUNTER
Pt states [facility] did not have the refilled called there1/4.  Please resend to the [facility] [place] asap.  thanks

## 2018-01-08 NOTE — TELEPHONE ENCOUNTER
clonazePAM (KLONOPIN) 1 MG tablet  Rx was sent 01/04/2018 for 30 tabs and 2 refills. This was locally printed. RN called it into the pharmacy today. Patient was at the pharmacy and informed. Corrina Waldrop RN, BSN

## 2018-01-09 ENCOUNTER — MYC MEDICAL ADVICE (OUTPATIENT)
Dept: UROLOGY | Facility: CLINIC | Age: 78
End: 2018-01-09

## 2018-01-09 ENCOUNTER — HOSPITAL ENCOUNTER (OUTPATIENT)
Dept: LAB | Facility: CLINIC | Age: 78
Discharge: HOME | End: 2018-01-09
Attending: FAMILY MEDICINE | Admitting: FAMILY MEDICINE
Payer: COMMERCIAL

## 2018-01-09 ENCOUNTER — OFFICE VISIT (OUTPATIENT)
Dept: FAMILY MEDICINE | Facility: OTHER | Age: 78
End: 2018-01-09
Payer: COMMERCIAL

## 2018-01-09 ENCOUNTER — TELEPHONE (OUTPATIENT)
Dept: LAB | Facility: OTHER | Age: 78
End: 2018-01-09

## 2018-01-09 VITALS
OXYGEN SATURATION: 96 % | RESPIRATION RATE: 14 BRPM | SYSTOLIC BLOOD PRESSURE: 122 MMHG | HEIGHT: 67 IN | DIASTOLIC BLOOD PRESSURE: 80 MMHG | BODY MASS INDEX: 31.23 KG/M2 | WEIGHT: 199 LBS | HEART RATE: 62 BPM | TEMPERATURE: 97.6 F

## 2018-01-09 DIAGNOSIS — Z91.81 AT RISK FOR FALLING: Primary | ICD-10-CM

## 2018-01-09 DIAGNOSIS — I10 BENIGN ESSENTIAL HYPERTENSION: ICD-10-CM

## 2018-01-09 DIAGNOSIS — F41.9 ANXIETY: ICD-10-CM

## 2018-01-09 DIAGNOSIS — C61 MALIGNANT NEOPLASM OF PROSTATE (H): ICD-10-CM

## 2018-01-09 DIAGNOSIS — I10 ESSENTIAL HYPERTENSION WITH GOAL BLOOD PRESSURE LESS THAN 140/90: ICD-10-CM

## 2018-01-09 DIAGNOSIS — G47.00 PERSISTENT DISORDER OF INITIATING OR MAINTAINING SLEEP: ICD-10-CM

## 2018-01-09 PROCEDURE — 84153 ASSAY OF PSA TOTAL: CPT | Performed by: FAMILY MEDICINE

## 2018-01-09 PROCEDURE — 80053 COMPREHEN METABOLIC PANEL: CPT | Performed by: FAMILY MEDICINE

## 2018-01-09 PROCEDURE — 99214 OFFICE O/P EST MOD 30 MIN: CPT | Performed by: FAMILY MEDICINE

## 2018-01-09 PROCEDURE — 80061 LIPID PANEL: CPT | Performed by: FAMILY MEDICINE

## 2018-01-09 PROCEDURE — 36415 COLL VENOUS BLD VENIPUNCTURE: CPT | Performed by: FAMILY MEDICINE

## 2018-01-09 PROCEDURE — 84403 ASSAY OF TOTAL TESTOSTERONE: CPT | Performed by: FAMILY MEDICINE

## 2018-01-09 RX ORDER — ZOLPIDEM TARTRATE 5 MG/1
5 TABLET ORAL
Qty: 30 TABLET | Refills: 5 | Status: SHIPPED | OUTPATIENT
Start: 2018-02-09 | End: 2018-07-25

## 2018-01-09 RX ORDER — LISINOPRIL 10 MG/1
10 TABLET ORAL DAILY
Qty: 90 TABLET | Refills: 3 | Status: SHIPPED | OUTPATIENT
Start: 2018-01-09 | End: 2018-10-09

## 2018-01-09 RX ORDER — TRIAMCINOLONE ACETONIDE 1 MG/G
CREAM TOPICAL
COMMUNITY
Start: 2017-12-26 | End: 2023-12-12

## 2018-01-09 RX ORDER — CLONAZEPAM 1 MG/1
1 TABLET ORAL
Qty: 30 TABLET | Refills: 5 | Status: SHIPPED | OUTPATIENT
Start: 2018-02-09 | End: 2018-04-06

## 2018-01-09 NOTE — NURSING NOTE
"Chief Complaint   Patient presents with     Hypertension     Panel Management     height, fall risk, honoring choices, lipid       Initial /80 (BP Location: Right arm, Patient Position: Chair, Cuff Size: Adult Large)  Pulse 62  Temp 97.6  F (36.4  C) (Oral)  Resp 14  Ht 5' 7.25\" (1.708 m)  Wt 199 lb (90.3 kg)  SpO2 96%  BMI 30.94 kg/m2 Estimated body mass index is 30.94 kg/(m^2) as calculated from the following:    Height as of this encounter: 5' 7.25\" (1.708 m).    Weight as of this encounter: 199 lb (90.3 kg).  Medication Reconciliation: complete  Jessica Huerta, LEXII    "

## 2018-01-09 NOTE — MR AVS SNAPSHOT
After Visit Summary   1/9/2018    Medardo Gautam    MRN: [id number]           Patient Information     Date Of Birth          1940        Visit Information        Provider Department      1/9/2018 2:00 PM Verna Osborne MD [facility]        Today's Diagnoses     At risk for falling    -  1    Benign essential hypertension        Anxiety        Persistent disorder of initiating or maintaining sleep           Follow-ups after your visit        Follow-up notes from your care team     Return in about 6 months (around 7/9/2018).      Your next 10 appointments already scheduled     Jan 12, 2018 11:00 AM CST   (Arrive by 10:30 AM)   RETURN SPINE with Eddy Powell MD   [facility] ([facility])    [address] [place] [address]   [phone]            Jan 16, 2018 12:30 PM CST   (Arrive by 12:15 PM)   Return Visit with MANI Ireland   [facility] ([facility])    [address] [place] [address]   [phone]            Mar 15, 2018   Procedure with Brandon Orellana MD   [facility] (--)    [address]  [facility] [address]   [phone]            Mar 16, 2018 11:20 AM CDT   Return Visit with Shawn Venegas OD   [facility]    [address] [place] [address]   [phone]            Mar 21, 2018 11:20 AM CDT   Return Visit with Shawn Veneags OD   [facility] ([facility]    [address] [facility] [address]   [phone]            Apr 19, 2018   Procedure with Brandon Orellana MD   [facility] (--)    [address]  [facility] [address]   [phone]            Apr 20, 2018 11:00 AM CDT   Return  Visit with Shawn Venegas OD   [facility]    [address] [place] [address]   [phone]            Apr 25, 2018 11:00 AM CDT   Return Visit with Shawn Venegas OD   [facility] ([facility])    [address] [place] [address]   [phone]            Apr 30, 2018 11:00 AM CDT   (Arrive by 10:45 AM)   Survivorship Visit with Magali Andrews PA-C   [facility] ([facility])    [address] [place] [address]  [address] [place] [address] [phone]            May 11, 2018 12:20 PM CDT   Return Visit with Shawn Venegas OD   [facility] ([facility])    [address] [place] [address] [phone]              Future tests that were ordered for you today     Open Future Orders        Priority Expected Expires Ordered    Comprehensive metabolic panel (BMP + Alb, Alk Phos, ALT, AST, Total. Bili, TP) Routine  1/9/2019 1/9/2018    Lipid panel reflex to direct LDL Fasting Routine  1/9/2019 1/9/2018            Who to contact     If you have questions or need follow up information about today's clinic visit or your schedule please contact [facility] directly at [phone].  Normal or non-critical lab and imaging results will be communicated to you by MyChart, letter or phone within 4 business days after the clinic has received the results. If you do not hear from us within 7 days, please contact the clinic through [employer] or phone. If you have a critical or abnormal lab result, we will notify you by phone as soon as possible.  Submit refill requests through [employer] or call your pharmacy and they will forward the refill request to us. Please allow 3 business days for your refill to be completed.          Additional Information About Your Visit        [employer] Information     [employer] gives you secure  "access to your electronic health record. If you see a primary care provider, you can also send messages to your care team and make appointments. If you have questions, please call your primary care clinic.  If you do not have a primary care provider, please call [phone] and they will assist you.        Care EveryWhere ID     This is your Care EveryWhere ID. This could be used by other organizations to access your [facility] medical records  [id number]        Your Vitals Were     Pulse Temperature Respirations Height Pulse Oximetry BMI (Body Mass Index)    62 97.6  F (36.4  C) (Oral) 14 5' 7.25\" (1.708 m) 96% 30.94 kg/m2       Blood Pressure from Last 3 Encounters:   01/09/18 122/80   11/20/17 142/78   10/30/17 146/83    Weight from Last 3 Encounters:   01/09/18 199 lb (90.3 kg)   11/28/17 197 lb 9.6 oz (89.6 kg)   11/20/17 198 lb (89.8 kg)                 Today's Medication Changes          These changes are accurate as of: 1/9/18  2:56 PM.  If you have any questions, ask your nurse or doctor.               These medicines have changed or have updated prescriptions.        Dose/Directions    clonazePAM 1 MG tablet   Commonly known as:  klonoPIN   This may have changed:  See the new instructions.   Used for:  Anxiety   Changed by:  Verna Osborne MD        Dose:  1 mg   Start taking on:  2/9/2018   Take 1 tablet (1 mg) by mouth nightly as needed for anxiety   Quantity:  30 tablet   Refills:  5       lisinopril 10 MG tablet   Commonly known as:  PRINIVIL/ZESTRIL   This may have changed:  See the new instructions.   Used for:  Benign essential hypertension   Changed by:  Verna Osborne MD        Dose:  10 mg   Take 1 tablet (10 mg) by mouth daily   Quantity:  90 tablet   Refills:  3       zolpidem 5 MG tablet   Commonly known as:  AMBIEN   This may have changed:  See the new instructions.   Used for:  Persistent disorder of initiating or maintaining sleep   Changed by:  Verna Osborne MD        Dose:  5 " mg   Start taking on:  2/9/2018   Take 1 tablet (5 mg) by mouth nightly as needed for sleep   Quantity:  30 tablet   Refills:  5            Where to get your medicines      These medications were sent to [facility] [address] [place], [place] - [address] [facility]  [address] [facility] [address]     Phone:  [phone]     lisinopril 10 MG tablet         Some of these will need a paper prescription and others can be bought over the counter.  Ask your nurse if you have questions.     Bring a paper prescription for each of these medications     clonazePAM 1 MG tablet    zolpidem 5 MG tablet                Primary Care Provider Office Phone # Fax #    Verna Osborne [phone]       [address] [facility] [address]  [facility] [address]        Equal Access to Services     RENEE CORONA : Sheba harding Soshamika, waaxda luqadaha, qaybta kaalmada shivamyaraghavendra, randy salamanca . So [facility] [phone].    ATENCIÓN: Si habla español, tiene a sanford disposición servicios gratuitos de asistencia lingüística. Llame al [phone].    We comply with applicable federal civil rights laws and [place] laws. We do not discriminate on the basis of race, color, national origin, age, disability, sex, sexual orientation, or gender identity.            Thank you!     Thank you for choosing [facility]  for your care. Our goal is always to provide you with excellent care. Hearing back from our patients is one way we can continue to improve our services. Please take a few minutes to complete the written survey that you may receive in the mail after your visit with us. Thank you!             Your Updated Medication List - Protect others around you: Learn how to safely use, store and throw away your medicines at www.disposemymeds.org.          This list is accurate as of: 1/9/18  2:56 PM.  Always use your most recent med list.                   Brand Name Dispense Instructions for use  Diagnosis    aspirin 81 MG tablet     30 tablet    Take 1 tablet (81 mg) by mouth daily    Hyperlipidemia LDL goal <130, Malignant neoplasm of colon, unspecified part of colon (H)       azelastine 0.05 % Soln ophthalmic solution    OPTIVAR    1 Bottle    Apply 1 drop to eye 2 times daily    Allergic conjunctivitis, bilateral       bicalutamide 50 MG tablet    CASODEX    90 tablet    Take 1 tablet (50 mg) by mouth daily    Malignant neoplasm of prostate (H)       calcium-vitamin D 600-400 MG-UNIT per tablet    CALTRATE     Take 1 tablet by mouth daily    Hyperlipidemia LDL goal <130, Malignant neoplasm of colon, unspecified part of colon (H)       CLARITIN PO      Take  by mouth. As needed        clonazePAM 1 MG tablet   Start taking on:  2/9/2018    klonoPIN    30 tablet    Take 1 tablet (1 mg) by mouth nightly as needed for anxiety    Anxiety       folic acid-vit B6-vit B12 0.8-10-0.115 MG Tabs per tablet    FOLGARD     Take 1 tablet by mouth daily    Hyperlipidemia LDL goal <130, Malignant neoplasm of colon, unspecified part of colon (H)       IBUPROFEN PO      Take 400 mg by mouth every 8 hours as needed for moderate pain        lisinopril 10 MG tablet    PRINIVIL/ZESTRIL    90 tablet    Take 1 tablet (10 mg) by mouth daily    Benign essential hypertension       naproxen 500 MG tablet    NAPROSYN    60 tablet    TAKE ONE TABLET BY MOUTH TWICE DAILY AS NEEDED FOR  MODERATE  PAIN    Chronic gout without tophus, unspecified cause, unspecified site       OMEGA-3 FISH OIL PO      Take 500 mg by mouth daily    Hyperlipidemia LDL goal <130, Malignant neoplasm of colon, unspecified part of colon (H)       sildenafil 100 MG tablet    VIAGRA    10 tablet    1/2 tab three times a week    Malignant neoplasm of prostate (H)       triamcinolone 0.1 % cream    KENALOG          zolpidem 5 MG tablet   Start taking on:  2/9/2018    AMBIEN    30 tablet    Take 1 tablet (5 mg) by mouth nightly as needed for sleep    Persistent  disorder of initiating or maintaining sleep

## 2018-01-10 DIAGNOSIS — C61 MALIGNANT NEOPLASM OF PROSTATE (H): Primary | ICD-10-CM

## 2018-01-10 LAB
ALBUMIN SERPL-MCNC: 3.8 G/DL (ref 3.4–5)
ALP SERPL-CCNC: 76 U/L (ref 40–150)
ALT SERPL W P-5'-P-CCNC: 81 U/L (ref 0–70)
ANION GAP SERPL CALCULATED.3IONS-SCNC: 9 MMOL/L (ref 3–14)
AST SERPL W P-5'-P-CCNC: 60 U/L (ref 0–45)
BILIRUB SERPL-MCNC: 0.5 MG/DL (ref 0.2–1.3)
BUN SERPL-MCNC: 14 MG/DL (ref 7–30)
CALCIUM SERPL-MCNC: 9 MG/DL (ref 8.5–10.1)
CHLORIDE SERPL-SCNC: 109 MMOL/L (ref 94–109)
CHOLEST SERPL-MCNC: 233 MG/DL
CO2 SERPL-SCNC: 21 MMOL/L (ref 20–32)
CREAT SERPL-MCNC: 0.78 MG/DL (ref 0.66–1.25)
GFR SERPL CREATININE-BSD FRML MDRD: >90 ML/MIN/1.7M2
GLUCOSE SERPL-MCNC: 135 MG/DL (ref 70–99)
HDLC SERPL-MCNC: 83 MG/DL
LDLC SERPL CALC-MCNC: 115 MG/DL
NONHDLC SERPL-MCNC: 150 MG/DL
POTASSIUM SERPL-SCNC: 5.2 MMOL/L (ref 3.4–5.3)
PROT SERPL-MCNC: 6.7 G/DL (ref 6.8–8.8)
PSA SERPL-MCNC: <0.01 UG/L (ref 0–4)
SODIUM SERPL-SCNC: 139 MMOL/L (ref 133–144)
TRIGL SERPL-MCNC: 176 MG/DL

## 2018-01-10 NOTE — ASSESSMENT & PLAN NOTE
Well-controlled blood pressures on lisinopril  Update lipid panel and chemistries.  Refill medication. Needs recheck in a year.

## 2018-01-10 NOTE — ASSESSMENT & PLAN NOTE
Patient has had issues with anxiety and insomnia for which she was using Valium and Ambien before he establish care with me at which point I changed his Valium to clonazepam to help his anxiety better. He's been on the stable dose of Klonopin ever since  I did discuss about the new guidelines and concerns about dependence and risks of increased somnolence and falls with increasing age. He is aware of this but states that this regimen has worked for him and would like to continue it without any changes.  I did refill the medications and advise caution

## 2018-01-11 ENCOUNTER — PRE VISIT (OUTPATIENT)
Dept: UROLOGY | Facility: CLINIC | Age: 78
End: 2018-01-11

## 2018-01-11 ENCOUNTER — TELEPHONE (OUTPATIENT)
Dept: FAMILY MEDICINE | Facility: OTHER | Age: 78
End: 2018-01-11

## 2018-01-11 NOTE — TELEPHONE ENCOUNTER
Prostate cancer follow up with PSA/Lupron.  Records available in [facility].

## 2018-01-11 NOTE — TELEPHONE ENCOUNTER
----- Message from Verna Osborne MD sent at 1/11/2018  4:28 PM CST -----  Please inform patient that the cholesterol levels are slightly improved compared to last year.  His blood chemistries however does show mildly elevated liver enzyme the cause of this is not clear..  I would recommend rechecking in 3 months for close follow-up.  If they continue to go up I will discuss further evaluation then

## 2018-01-11 NOTE — TELEPHONE ENCOUNTER
Patient informed - he was confused because he said he only went in for labs for Dr. Goodwin. Informed patient the labs were in from Dr. Osborne so they cara for both doctors.    He did say that he wasn't fasting - will forward to RK to review.  Radha Bergman, CMA

## 2018-01-12 ENCOUNTER — OFFICE VISIT (OUTPATIENT)
Dept: ORTHOPEDICS | Facility: CLINIC | Age: 78
End: 2018-01-12
Payer: COMMERCIAL

## 2018-01-12 VITALS — WEIGHT: 200.4 LBS | BODY MASS INDEX: 31.45 KG/M2 | HEIGHT: 67 IN

## 2018-01-12 DIAGNOSIS — M54.16 LUMBAR RADICULOPATHY: Primary | ICD-10-CM

## 2018-01-12 LAB — TESTOST SERPL-MCNC: 9 NG/DL (ref 240–950)

## 2018-01-12 NOTE — NURSING NOTE
"Reason For Visit:   Chief Complaint   Patient presents with     Back Pain     pt states that doctor wanted him to come in pt states that he is doing well.       Primary MD: Verna Osborne  Ref. MD:     ?  No  Occupation None.  Currently working? No.  Work status?  Retired.  Smoker: No  Request smoking cessation information: No    Ht 1.702 m (5' 7\")  Wt 90.9 kg (200 lb 6.4 oz)  BMI 31.39 kg/m2    Pain Assessment  Patient Currently in Pain: No    Oswestry (ELIUD) Questionnaire    OSWESTRY DISABILITY INDEX 1/6/2018   Section 1 - Pain intensity 0   Section 2 - Personal care (washing, dressing, etc.)  0   Section 3 - Lifting 0   Section 4 - Walking -1   Section 5 - Sitting 0   Section 6 - Standing -1   Section 7 - Sleeping 0   Section 8 - Sex life (if applicable) -1   Section 9 - Social life 0   Section 10 - Traveling 0   Count 7   Sum 0   Oswestry Score (%) 0   SECTION 1-PAIN INTENSITY I have no pain at the moment.   SECTION 2-PERSONAL CARE (WASHING,DRESSING,ETC.) I can look after myself normally without causing additional pain.   SECTION 3-LIFTING I can lift heavy weights without additional pain.   SECTION 4-WALKING Not answered   SECTION 5-SITTING I can sit in any chair as long as I like.   SECTION 6-STANDING Not answered   SECTION 7-SLEEPING My sleep is never interrupted by pain.   SECTION 8-SEX LIFE (IF APPLICABLE) Not answered/NA   SECTION 9-SOCIAL LIFE My social life is normal and causes me no additional pain.   SECTION 10-TRAVELING I can travel anywhere without pain.   Oswestry Disability Index: Count 7   ELIUD: Total Score = SUM (total for answered questions) 0   Computed Oswestry Score 0 (%)   Some recent data might be hidden            Visual Analog Pain Scale  Back Pain Scale 0-10: 0  Right leg pain: 0  Left leg pain: 0    Promis 10 Assessment    PROMIS 10 1/6/2018   In general, would you say your health is: Fair   In general, would you say your quality of life is: Very good   In " general, how would you rate your physical health? Very good   In general, how would you rate your mental health, including your mood and your ability to think? Excellent   In general, how would you rate your satisfaction with your social activities and relationships? Excellent   In general, please rate how well you carry out your usual social activities and roles Excellent   To what extent are you able to carry out your everyday physical activities such as walking, climbing stairs, carrying groceries, or moving a chair? Completely   How often have you been bothered by emotional problems such as feeling anxious, depressed or irritable? Sometimes   How would you rate your fatigue on average? None   How would you rate your pain on average?   0 = No Pain  to  10 = Worst Imaginable Pain 0   In general, would you say your health is: 2   In general, would you say your quality of life is: 4   In general, how would you rate your physical health? 4   In general, how would you rate your mental health, including your mood and your ability to think? 5   In general, how would you rate your satisfaction with your social activities and relationships? 5   In general, please rate how well you carry out your usual social activities and roles. (This includes activities at home, at work and in your community, and responsibilities as a parent, child, spouse, employee, friend, etc.) 5   To what extent are you able to carry out your everyday physical activities such as walking, climbing stairs, carrying groceries, or moving a chair? 5   In the past 7 days, how often have you been bothered by emotional problems such as feeling anxious, depressed, or irritable? 3   In the past 7 days, how would you rate your fatigue on average? 1   In the past 7 days, how would you rate your pain on average, where 0 means no pain, and 10 means worst imaginable pain? 0   Global Mental Health Score 17   Global Physical Health Score 19   PROMIS TOTAL -  SUBSCORES 36   Some recent data might be hidden                Oj Ga, LEXII

## 2018-01-12 NOTE — PROGRESS NOTES
"Chief Complaint   Patient presents with     Back Pain     pt states that doctor wanted him to come in pt states that he is doing well.         Summary of Clinic Encounter:       Mr. Gautam  was last seen on 10-. At that time he was to L4 radiculopathy due to an L4 5 disc herniation.  It was recommended that he undergo L4 5 left-sided transforaminal epidural steroid injection. This was done on 10- at [facility]. Within 2 days of the injection he had complete relief of his left-sided leg pain. He has not had any pain since. He denies numbness or tingling. No weakness. No bowel or bladder changes.    He is retired and just works around the house at home. He has been active and shoveling snow.    Objective:  Ht 1.702 m (5' 7\")  Wt 90.9 kg (200 lb 6.4 oz)  BMI 31.39 kg/m2  General - well developed and groomed. No acute distress  CV- regular rate  Pulm- No audible stridor or wheazing, nonlabored  MSK -     Examination spine shows no obvious deformity. He is nontender. He has 5 out of 5 strength in hip flexion and knee extension dorsi and plantarflexion of the ankles as well as EHL and FHL. Sensation is intact L2 through S1 dermatomes. His reflexes are 2+ and symmetric at the patella and Achilles. Toes are warm well perfused.    Imaging:     No new imaging        Patient Active Problem List   Diagnosis     Disturbance of skin sensation     Hemorrhoids     Gout     Advanced directives, counseling/discussion     Dupuytren's contracture of hand- left 5th finger, right 5th finger     Bunions- both feet     Skin lesion- R side of face and behind L ear     Insomnia     Prostatic nodule- posterior right edge with rectal exam     Prostate cancer- [place] 9 (5+4) dx Feb 2012     Elevated liver enzymes     Hyperbilirubinemia     Essential hypertension with goal blood pressure less than 140/90     Shoulder pain, left     Contracture of finger joint     Hyperlipidemia LDL goal <130     Shoulder pain, right     Malignant " neoplasm of prostate (H)     Anxiety     Colon cancer (H)     Abdominal pain, epigastric     Renal cyst     Lumbar radiculopathy     Meibomian gland dysfunction         Assessment:  78, male with resolved L4 radiculopathy status post post L4 5 epidural steroid injection. Discussed that he may have long-term relief of his pain or the pain may recur.     Plan:  -  Follow up p.r.n.    Seen and discussed with Dr. Powell.      Dagoberto Vargas  1/12/2018  11:34 AM  Dagoberto Rush  PGY4  [phone]    Answers for HPI/ROS submitted by the patient on 1/6/2018   General Symptoms: No  Skin Symptoms: No  HENT Symptoms: No  EYE SYMPTOMS: No  HEART SYMPTOMS: No  LUNG SYMPTOMS: No  INTESTINAL SYMPTOMS: No  URINARY SYMPTOMS: No  REPRODUCTIVE SYMPTOMS: No  SKELETAL SYMPTOMS: No  BLOOD SYMPTOMS: No  NERVOUS SYSTEM SYMPTOMS: No  MENTAL HEALTH SYMPTOMS: No

## 2018-01-12 NOTE — PROGRESS NOTES
Spine Surgery Return Clinic Visit      Chief Complaint:   Back Pain (pt states that doctor wanted him to come in pt states that he is doing well.)      Interval HPI:  Symptom Profile Including: location of symptoms, onset, severity, exacerbating/alleviating factors, previous treatments:        Medardo Gautam is a 77 year old male who I last saw 11/28/17  with left leg radiculopathy. His imaging showed an L4 5 disc herniation. At the time I saw him he had only had symptoms for 3 weeks. I recommended oral anti-inflammatories and I sent him for an epidural steroid injection. He was also started on a home exercise program. His primary care doctor had given him oral prednisone and he has also tried chiropractic care and bracing. Lastly, his primary care doctor had given him some oxycodone but he has not been taking very much of it.     He received an MAYKEL on 10/30/17, which provided nearly complete relief of his left lower extremity pain.  Since that injection he has had complete relief and it has not returned.  He has been out working in the snow.  He is not having any symptoms currently.  He is off all pain medicines.  He is done physical therapy in the past but it was not much benefit.              Past Medical History:     Past Medical History:   Diagnosis Date     Anxiety      Colon cancer (H) 01/2015     Contracture of finger joint ca 2005    left and right fifth fingers     Dupuytren's contracture of left hand      Gout      HEMORRHOIDS NOS 6/4/2007     Hypertension      Insomnia      Nonsenile cataract      Personal history of colonic polyps 7 years ago?     Prostate cancer (H) Feb 2012     Renal cyst 6/22/2017     Seborrheic dermatitis      Skin lesion- R side of face and behind L ear 12/15/2011     SKIN SENSATION DISTURB 12/29/2006    allergic reaction to strawberries     SKIN SENSATION DISTURB 12/29/2006            Past Surgical History:     Past Surgical History:   Procedure Laterality Date     APPENDECTOMY        BIOPSY  01/16/15     C HAND/FINGER SURGERY UNLISTED       C LENGTHEN,TENDON,HAND/FINGER  ca 2007    right fifth     C STOMACH SURGERY PROCEDURE UNLISTED       COLON SURGERY  2015    Lap assisted R hemicolectomy     COLONOSCOPY  10/25/07    Snare polypectomy     COLONOSCOPY  2009    with snare polypectomy     COLONOSCOPY  2009     COLONOSCOPY  2011    [facility]     COLONOSCOPY N/A 2015    Procedure: COMBINED COLONOSCOPY, SINGLE OR MULTIPLE BIOPSY/POLYPECTOMY BY BIOPSY;  Surgeon: Sarah Beth Pisano MD;  Location: [facility]     COLONOSCOPY WITH CO2 INSUFFLATION N/A 2015    Procedure: COLONOSCOPY WITH CO2 INSUFFLATION;  Surgeon: Sarah Beth Pisano MD;  Location: [facility]     DAVINCI PROSTATECTOMY  2012    Procedure: DAVINCI PROSTATECTOMY;  Davinci Assisted Radical Prostatectomy with Bilateral Lymphadenectomy ;  Surgeon: Norma Goodwin MD;  Location: [facility]     INJECT EPIDURAL LUMBAR / SACRAL SINGLE Left 10/30/2017    Procedure: INJECT EPIDURAL LUMBAR / SACRAL SINGLE;  Left Transforaminal Lumbar 4-Lumbar 5 Epidural Steroid Injection;  Surgeon: Nuvia Reina MD;  Location: [facility]     LAPAROSCOPIC ASSISTED COLECTOMY N/A 2015    Procedure: LAPAROSCOPIC ASSISTED COLECTOMY;  Surgeon: Oren Breen MD;  Location: [facility]            Social History:     Social History   Substance Use Topics     Smoking status: Former Smoker     Years: 1.00     Types: Cigarettes, Cigars, Pipe     Start date: 10/1/1961     Quit date: 1962     Smokeless tobacco: Never Used      Comment: No smokers in home     Alcohol use 0.0 oz/week      Comment: daily glass of wine and whiskey            Family History:     Family History   Problem Relation Age of Onset     CEREBROVASCULAR DISEASE Father      CANCER Mother 90     Patient believes vaginal cancer - hysterectomy,      Alzheimer Disease Mother      CANCER Sister      Breast Cancer Sister       "C.A.D. No family hx of      Breast Cancer No family hx of      Cancer - colorectal No family hx of      Prostate Cancer No family hx of      Blood Disease No family hx of      Cardiovascular No family hx of      Circulatory No family hx of      Eye Disorder No family hx of      GASTROINTESTINAL DISEASE No family hx of      Genitourinary Problems No family hx of      Lipids No family hx of      Neurologic Disorder No family hx of      Respiratory No family hx of      Asthma No family hx of      HEART DISEASE No family hx of      DIABETES No family hx of      Hypertension No family hx of      Arthritis No family hx of      Thyroid Disease No family hx of      Musculoskeletal Disorder No family hx of             Allergies:   No Known Allergies         Medications:     Current Outpatient Prescriptions   Medication     triamcinolone (KENALOG) 0.1 % cream     lisinopril (PRINIVIL/ZESTRIL) 10 MG tablet     [START ON 2/9/2018] clonazePAM (KLONOPIN) 1 MG tablet     [START ON 2/9/2018] zolpidem (AMBIEN) 5 MG tablet     azelastine (OPTIVAR) 0.05 % SOLN ophthalmic solution     naproxen (NAPROSYN) 500 MG tablet     IBUPROFEN PO     bicalutamide (CASODEX) 50 MG tablet     Omega-3 Fatty Acids (OMEGA-3 FISH OIL PO)     calcium-vitamin D (CALTRATE) 600-400 MG-UNIT per tablet     folic acid-vit B6-vit B12 (FOLGARD) 0.8-10-0.115 MG TABS     aspirin 81 MG tablet     sildenafil (VIAGRA) 100 MG tablet     Loratadine (CLARITIN PO)     No current facility-administered medications for this visit.              Review of Systems:   A focused musculoskeletal and neurologic ROS was performed with pertinent positives and negatives noted in the HPI.  Additional systems were also reviewed and are documented at the bottom of the note.         Physical Exam:   Vitals: Ht 1.702 m (5' 7\")  Wt 90.9 kg (200 lb 6.4 oz)  BMI 31.39 kg/m2  Musculoskeletal, Neurologic, and Spine:   BLE: SILT L3-S1 dermatomes. 4/5 strength EHL, 5/5 strength ankle " dorsiflexion/plantarflexion, peroneals, knee flexion/extension, hip flexion. 1+ patella, achilles reflexes. Feet wwp.         Imaging:   We ordered and independently reviewed new radiographs at this clinic visit. The results were discussed with the patient. Findings include:     September 24, 2017 lumbar MRI shows diffuse spondylosis, with a left-sided disc herniation at L4 5 causing moderate to severe lateral recess stenosis and impacting the L4 root.      10/10/17 AP lateral lumbar flexion and extension radiographs were reviewed today. These show diffus spondylosis with relative loss of lumbar lordosis. Near autofusion of L3 on L4 with a large anterior osteophyte. No dynamic instability.       Assessment and Plan:     78 year old male with L4-5 disc herniation and left leg radiculopathy which resolved after an epidural steroid injection.  He has had sustained relief for 3 months.    At this point he is doing very well.  I think he can continue gradually advancing his activities.  I did ask him to be careful with his back and observe normal back precautions.  He can follow-up on an as-needed basis.  If his symptoms were to return I told him we could consider a repeat injection or possibly surgery and he will call our office with any concerns.  I am happy to see him back any time.     Respectfully,  Eddy Powell MD  Spine Surgery  [facility]    Answers for HPI/ROS submitted by the patient on 1/6/2018   General Symptoms: No  Skin Symptoms: No  HENT Symptoms: No  EYE SYMPTOMS: No  HEART SYMPTOMS: No  LUNG SYMPTOMS: No  INTESTINAL SYMPTOMS: No  URINARY SYMPTOMS: No  REPRODUCTIVE SYMPTOMS: No  SKELETAL SYMPTOMS: No  BLOOD SYMPTOMS: No  NERVOUS SYSTEM SYMPTOMS: No  MENTAL HEALTH SYMPTOMS: No

## 2018-01-12 NOTE — TELEPHONE ENCOUNTER
The liver functions tests do not alter on whether it was a fasting sample or not.  So still recommend testing them in 3 months

## 2018-01-12 NOTE — LETTER
1/12/2018       RE: Medardo Gautam  [address] [address] [facility] [id number]     Dear Colleague,    Thank you for referring your patient, Medardo Gautam, to the [facility] at [facility]. Please see a copy of my visit note below.    Spine Surgery Return Clinic Visit      Chief Complaint:   Back Pain (pt states that doctor wanted him to come in pt states that he is doing well.)      Interval HPI:  Symptom Profile Including: location of symptoms, onset, severity, exacerbating/alleviating factors, previous treatments:        Medardo Gautam is a 77 year old male who I last saw 11/28/17  with left leg radiculopathy. His imaging showed an L4 5 disc herniation. At the time I saw him he had only had symptoms for 3 weeks. I recommended oral anti-inflammatories and I sent him for an epidural steroid injection. He was also started on a home exercise program. His primary care doctor had given him oral prednisone and he has also tried chiropractic care and bracing. Lastly, his primary care doctor had given him some oxycodone but he has not been taking very much of it.     He received an MAYKEL on 10/30/17, which provided nearly complete relief of his left lower extremity pain.  Since that injection he has had complete relief and it has not returned.  He has been out working in the snow.  He is not having any symptoms currently.  He is off all pain medicines.  He is done physical therapy in the past but it was not much benefit.              Past Medical History:     Past Medical History:   Diagnosis Date     Anxiety      Colon cancer (H) 01/2015     Contracture of finger joint ca 2005    left and right fifth fingers     Dupuytren's contracture of left hand      Gout      HEMORRHOIDS NOS 6/4/2007     Hypertension      Insomnia      Nonsenile cataract      Personal history of colonic polyps 7 years ago?     Prostate cancer (H) Feb 2012     Renal cyst 6/22/2017      Seborrheic dermatitis      Skin lesion- R side of face and behind L ear 12/15/2011     SKIN SENSATION DISTURB 12/29/2006    allergic reaction to strawberries     SKIN SENSATION DISTURB 12/29/2006            Past Surgical History:     Past Surgical History:   Procedure Laterality Date     APPENDECTOMY       BIOPSY  01/16/15     C HAND/FINGER SURGERY UNLISTED       C LENGTHEN,TENDON,HAND/FINGER  ca 2007    right fifth     C STOMACH SURGERY PROCEDURE UNLISTED       COLON SURGERY  2/11/2015    Lap assisted R hemicolectomy     COLONOSCOPY  10/25/07    Snare polypectomy     COLONOSCOPY  6/25/2009    with snare polypectomy     COLONOSCOPY  9/30/2009     COLONOSCOPY  1/5/2011    [facility]     COLONOSCOPY N/A 1/16/2015    Procedure: COMBINED COLONOSCOPY, SINGLE OR MULTIPLE BIOPSY/POLYPECTOMY BY BIOPSY;  Surgeon: Sarah Beth Pisano MD;  Location: [facility]     COLONOSCOPY WITH CO2 INSUFFLATION N/A 1/16/2015    Procedure: COLONOSCOPY WITH CO2 INSUFFLATION;  Surgeon: Sarah Beth Pisano MD;  Location: [facility]     DAVINCI PROSTATECTOMY  8/6/2012    Procedure: DAVINCI PROSTATECTOMY;  Davinci Assisted Radical Prostatectomy with Bilateral Lymphadenectomy ;  Surgeon: Norma Goodwin MD;  Location: [facility]     INJECT EPIDURAL LUMBAR / SACRAL SINGLE Left 10/30/2017    Procedure: INJECT EPIDURAL LUMBAR / SACRAL SINGLE;  Left Transforaminal Lumbar 4-Lumbar 5 Epidural Steroid Injection;  Surgeon: Nuvia Reina MD;  [facility]     LAPAROSCOPIC ASSISTED COLECTOMY N/A 2/11/2015    Procedure: LAPAROSCOPIC ASSISTED COLECTOMY;  Surgeon: Oren Breen MD;  Location: [facility]            Social History:     Social History   Substance Use Topics     Smoking status: Former Smoker     Years: 1.00     Types: Cigarettes, Cigars, Pipe     Start date: 10/1/1961     Quit date: 1/1/1962     Smokeless tobacco: Never Used      Comment: No smokers in home     Alcohol use 0.0 oz/week      Comment: daily  glass of wine and whiskey            Family History:     Family History   Problem Relation Age of Onset     CEREBROVASCULAR DISEASE Father      CANCER Mother 90     Patient believes vaginal cancer - hysterectomy,      Alzheimer Disease Mother      CANCER Sister      Breast Cancer Sister      C.A.D. No family hx of      Breast Cancer No family hx of      Cancer - colorectal No family hx of      Prostate Cancer No family hx of      Blood Disease No family hx of      Cardiovascular No family hx of      Circulatory No family hx of      Eye Disorder No family hx of      GASTROINTESTINAL DISEASE No family hx of      Genitourinary Problems No family hx of      Lipids No family hx of      Neurologic Disorder No family hx of      Respiratory No family hx of      Asthma No family hx of      HEART DISEASE No family hx of      DIABETES No family hx of      Hypertension No family hx of      Arthritis No family hx of      Thyroid Disease No family hx of      Musculoskeletal Disorder No family hx of             Allergies:   No Known Allergies         Medications:     Current Outpatient Prescriptions   Medication     triamcinolone (KENALOG) 0.1 % cream     lisinopril (PRINIVIL/ZESTRIL) 10 MG tablet     [START ON 2018] clonazePAM (KLONOPIN) 1 MG tablet     [START ON 2018] zolpidem (AMBIEN) 5 MG tablet     azelastine (OPTIVAR) 0.05 % SOLN ophthalmic solution     naproxen (NAPROSYN) 500 MG tablet     IBUPROFEN PO     bicalutamide (CASODEX) 50 MG tablet     Omega-3 Fatty Acids (OMEGA-3 FISH OIL PO)     calcium-vitamin D (CALTRATE) 600-400 MG-UNIT per tablet     folic acid-vit B6-vit B12 (FOLGARD) 0.8-10-0.115 MG TABS     aspirin 81 MG tablet     sildenafil (VIAGRA) 100 MG tablet     Loratadine (CLARITIN PO)     No current facility-administered medications for this visit.              Review of Systems:   A focused musculoskeletal and neurologic ROS was performed with pertinent positives and negatives noted in the HPI.   "Additional systems were also reviewed and are documented at the bottom of the note.         Physical Exam:   Vitals: Ht 1.702 m (5' 7\")  Wt 90.9 kg (200 lb 6.4 oz)  BMI 31.39 kg/m2  Musculoskeletal, Neurologic, and Spine:   BLE: SILT L3-S1 dermatomes. 4/5 strength EHL, 5/5 strength ankle dorsiflexion/plantarflexion, peroneals, knee flexion/extension, hip flexion. 1+ patella, achilles reflexes. Feet wwp.         Imaging:   We ordered and independently reviewed new radiographs at this clinic visit. The results were discussed with the patient. Findings include:     September 24, 2017 lumbar MRI shows diffuse spondylosis, with a left-sided disc herniation at L4 5 causing moderate to severe lateral recess stenosis and impacting the L4 root.      10/10/17 AP lateral lumbar flexion and extension radiographs were reviewed today. These show diffus spondylosis with relative loss of lumbar lordosis. Near autofusion of L3 on L4 with a large anterior osteophyte. No dynamic instability.       Assessment and Plan:     78 year old male with L4-5 disc herniation and left leg radiculopathy which resolved after an epidural steroid injection.  He has had sustained relief for 3 months.    At this point he is doing very well.  I think he can continue gradually advancing his activities.  I did ask him to be careful with his back and observe normal back precautions.  He can follow-up on an as-needed basis.  If his symptoms were to return I told him we could consider a repeat injection or possibly surgery and he will call our office with any concerns.  I am happy to see him back any time.    Again, thank you for allowing me to participate in the care of your patient.      Sincerely,    Eddy Powell MD      "

## 2018-01-16 ENCOUNTER — OFFICE VISIT (OUTPATIENT)
Dept: UROLOGY | Facility: CLINIC | Age: 78
End: 2018-01-16
Payer: COMMERCIAL

## 2018-01-16 VITALS
BODY MASS INDEX: 31.17 KG/M2 | DIASTOLIC BLOOD PRESSURE: 91 MMHG | HEIGHT: 67 IN | WEIGHT: 198.6 LBS | SYSTOLIC BLOOD PRESSURE: 146 MMHG | HEART RATE: 57 BPM

## 2018-01-16 DIAGNOSIS — C61 MALIGNANT NEOPLASM OF PROSTATE (H): ICD-10-CM

## 2018-01-16 RX ORDER — BICALUTAMIDE 50 MG/1
50 TABLET, FILM COATED ORAL DAILY
Qty: 90 TABLET | Refills: 3 | Status: SHIPPED | OUTPATIENT
Start: 2018-01-16 | End: 2019-03-16

## 2018-01-16 ASSESSMENT — PAIN SCALES - GENERAL: PAINLEVEL: NO PAIN (0)

## 2018-01-16 NOTE — NURSING NOTE
"Chief Complaint   Patient presents with     RECHECK     Prostate cancer follow up       Initial BP (!) 146/91  Pulse 57  Ht 1.702 m (5' 7\")  Wt 90.1 kg (198 lb 9.6 oz)  BMI 31.11 kg/m2 Estimated body mass index is 31.11 kg/(m^2) as calculated from the following:    Height as of this encounter: 1.702 m (5' 7\").    Weight as of this encounter: 90.1 kg (198 lb 9.6 oz).  Medication Reconciliation: complete     PEGGY Valdes    "

## 2018-01-16 NOTE — PROGRESS NOTES
HPI: Mr. Medardo Gautam is a 78 year old year old male presenting today for evaluation of chief complaint(s): RECHECK (Prostate cancer follow up)    Mr. Gautam is a 78M with PMH significant for HTN and pT2cN0 prostate cancer, Jeancarlos 9 post radical prostatectomy followed by salvage XRT and ADT with biochemical recurrence.  He is currently on intermittent ADT and was last seen on 7/26/17 by Dr. Goodwin with an undetectable PSA <0.01ng/dL but an elevated testosterone (75), thus Lupron 45mg was administered.  He is also on daily casodex.  Today, PSA remains undetectable and testosterone is 9.  Has been getting injections every 6 months.  Has hot flashes but is tolerating these okay. A little weight gain after the holidays.  No weight loss. No swelling.  Did have some back pain radiating to left lateral thigh for which he got a recent epidural - pain has resolved. A lumbar MRI on 9/24/17 showed left subarticular disc extrusion at L4-5 which impinged upon the left descending L4. There was also moderate foraminal stenosis at L3-4.  No malignancy.     Today he presents for Lupron (due 1/10/18) but insurance approval has not be obtained yet. He will be here on the 24th for his wife's appointment, so could return for Lupron at that time.   Urinating normally.  No hematuria, dysuria.     REVIEW OF DIAGNOSTICS:  1/9/18 - PSA <0.01ng/dL  1/9/18 - Testosterone 9  11/14/17 - PSA 0.01ng/dL  11/14/17 - Testosterone 11  7/19/17 - PSA <0.01ng/dL  7/19/17 - Testosterone 75    Current Outpatient Prescriptions   Medication Sig Dispense Refill     triamcinolone (KENALOG) 0.1 % cream        lisinopril (PRINIVIL/ZESTRIL) 10 MG tablet Take 1 tablet (10 mg) by mouth daily 90 tablet 3     [START ON 2/9/2018] clonazePAM (KLONOPIN) 1 MG tablet Take 1 tablet (1 mg) by mouth nightly as needed for anxiety 30 tablet 5     [START ON 2/9/2018] zolpidem (AMBIEN) 5 MG tablet Take 1 tablet (5 mg) by mouth nightly as needed for sleep 30 tablet 5      "azelastine (OPTIVAR) 0.05 % SOLN ophthalmic solution Apply 1 drop to eye 2 times daily 1 Bottle 6     naproxen (NAPROSYN) 500 MG tablet TAKE ONE TABLET BY MOUTH TWICE DAILY AS NEEDED FOR  MODERATE  PAIN 60 tablet 1     IBUPROFEN PO Take 400 mg by mouth every 8 hours as needed for moderate pain       bicalutamide (CASODEX) 50 MG tablet Take 1 tablet (50 mg) by mouth daily 90 tablet 3     Omega-3 Fatty Acids (OMEGA-3 FISH OIL PO) Take 500 mg by mouth daily       calcium-vitamin D (CALTRATE) 600-400 MG-UNIT per tablet Take 1 tablet by mouth daily       folic acid-vit B6-vit B12 (FOLGARD) 0.8-10-0.115 MG TABS Take 1 tablet by mouth daily       aspirin 81 MG tablet Take 1 tablet (81 mg) by mouth daily 30 tablet      Loratadine (CLARITIN PO) Take  by mouth. As needed        sildenafil (VIAGRA) 100 MG tablet 1/2 tab three times a week (Patient not taking: Reported on 1/16/2018) 10 tablet 12       ALLERGIES: Review of patient's allergies indicates no known allergies.      REVIEW OF SYSTEMS:  As above in HPI    GENERAL PHYSICAL EXAM:   Vitals: BP (!) 146/91  Pulse 57  Ht 1.702 m (5' 7\")  Wt 90.1 kg (198 lb 9.6 oz)  BMI 31.11 kg/m2  Body mass index is 31.11 kg/(m^2).    GENERAL: Well groomed, well developed, well nourished male in NAD.  NECK: Neck supple. No adenopathy.   GI: Soft, NT, ND, no palpable masses. + midline incision well healed (previous colon Sx) and multiple lap incisions well healed.  No hernias  No bony spinal tenderness or CVAT bilaterally.  MS: Gait normal, normal muscle tone  SKIN: Warm to touch, dry.  No visible rashes or lesions on examined areas.  HEMATOLOGIC/LYMPHATIC/IMMUNOLOGIC: normal ant/post cervical, supraclavicular and inguinal nodes. No LE edema.  NEURO: Alert and oriented x 3.  PSYCH: Normal mood and affect, pleasant and agreeable during interview and exam.    LABS: The last test results for Mr. Medardo Gautam were reviewed:  PSA -   Lab Results   Component Value Date    PSA <0.01 " 01/09/2018    PSA 0.01 11/14/2017    PSA  07/19/2017     <0.01  Assay Method:  Chemiluminescence using Siemens Vista analyzer      PSA  03/08/2017     <0.01  Assay Method:  Chemiluminescence using Siemens Vista analyzer      PSA <0.01 05/23/2016    PSA <0.01 08/19/2015    PSA <0.07 07/28/2014    PSA <0.07 01/10/2014    PSA 0.12 09/25/2013    PSA 1.28 06/19/2013     BMP -   Recent Labs   Lab Test  01/09/18   1345  09/07/17   0951  03/18/17   1639   02/21/15   0745  02/19/15   1106  02/18/15   0748  02/17/15   0848   02/15/15   1226   02/12/15   0655   NA  139  143  141   < >   --   137   --   138   < >  135   --   137   POTASSIUM  5.2  4.6  4.2   < >  3.9  3.4  4.2  4.2   < >  3.8   < >  4.0   CHLORIDE  109  107  106   < >   --   105   --   109   < >  108   --   108   CO2  21  29  27   < >   --   24   --   25   < >  20   --   25   BUN  14  15  15   < >   --   31*   --   22   < >  21   --   16   CR  0.78  1.02  0.93   < >   --   0.93   --   0.93   < >  0.82   --   0.84   GLC  135*  120*  115*   < >   --   165*   --   128*   < >  132*   --   113*   GAVIN  9.0  9.4  9.3   < >   --   9.0   --   8.8   < >  8.8   --   8.3*   MAG   --    --    --    --   1.7  2.2  2.1  2.2   < >  1.7   < >  1.6   PHOS   --    --    --    --    --    --    --   3.2   --   2.4*   --   2.7    < > = values in this interval not displayed.       CBC -   Recent Labs   Lab Test  04/24/17   1041  03/18/17   1639  10/25/16   1228   WBC  4.3  5.2  4.3   HGB  14.2  14.3  14.1   PLT  160  143*  173       ASSESSMENT:   1) prostate cancer -biochemical recurrence.     PLAN:   - We will ask Jasmina Barroso RN ([phone]) to call you when the Lupron is approved through insurance. Hopefully you can get that injection on the 24th.    - Continue casodex - refilled today x 1 year  - I will speak with Dr. Goodwin about your test results and followup schedule    Myah Kaplan PA-C  Department of Urologic Surgery

## 2018-01-16 NOTE — LETTER
1/16/2018      RE: Medardo Gautam  [address] [facility] [address]       HPI: Mr. Medardo Gautam is a 78 year old year old male presenting today for evaluation of chief complaint(s): RECHECK (Prostate cancer follow up)    Mr. Gautam is a 78M with PMH significant for HTN and pT2cN0 prostate cancer, Tamiment 9 post radical prostatectomy followed by salvage XRT and ADT with biochemical recurrence.  He is currently on intermittent ADT and was last seen on 7/26/17 by Dr. Goodwin with an undetectable PSA <0.01ng/dL but an elevated testosterone (75), thus Lupron 45mg was administered.  He is also on daily casodex.  Today, PSA remains undetectable and testosterone is 9.  Has been getting injections every 6 months.  Has hot flashes but is tolerating these okay. A little weight gain after the holidays.  No weight loss. No swelling.  Did have some back pain radiating to left lateral thigh for which he got a recent epidural - pain has resolved. A lumbar MRI on 9/24/17 showed left subarticular disc extrusion at L4-5 which impinged upon the left descending L4. There was also moderate foraminal stenosis at L3-4.  No malignancy.     Today he presents for Lupron (due 1/10/18) but insurance approval has not be obtained yet. He will be here on the 24th for his wife's appointment, so could return for Lupron at that time.   Urinating normally.  No hematuria, dysuria.     REVIEW OF DIAGNOSTICS:  1/9/18 - PSA <0.01ng/dL  1/9/18 - Testosterone 9  11/14/17 - PSA 0.01ng/dL  11/14/17 - Testosterone 11  7/19/17 - PSA <0.01ng/dL  7/19/17 - Testosterone 75    Current Outpatient Prescriptions   Medication Sig Dispense Refill     triamcinolone (KENALOG) 0.1 % cream        lisinopril (PRINIVIL/ZESTRIL) 10 MG tablet Take 1 tablet (10 mg) by mouth daily 90 tablet 3     [START ON 2/9/2018] clonazePAM (KLONOPIN) 1 MG tablet Take 1 tablet (1 mg) by mouth nightly as needed for anxiety 30 tablet 5     [START ON 2/9/2018] zolpidem (AMBIEN) 5  "MG tablet Take 1 tablet (5 mg) by mouth nightly as needed for sleep 30 tablet 5     azelastine (OPTIVAR) 0.05 % SOLN ophthalmic solution Apply 1 drop to eye 2 times daily 1 Bottle 6     naproxen (NAPROSYN) 500 MG tablet TAKE ONE TABLET BY MOUTH TWICE DAILY AS NEEDED FOR  MODERATE  PAIN 60 tablet 1     IBUPROFEN PO Take 400 mg by mouth every 8 hours as needed for moderate pain       bicalutamide (CASODEX) 50 MG tablet Take 1 tablet (50 mg) by mouth daily 90 tablet 3     Omega-3 Fatty Acids (OMEGA-3 FISH OIL PO) Take 500 mg by mouth daily       calcium-vitamin D (CALTRATE) 600-400 MG-UNIT per tablet Take 1 tablet by mouth daily       folic acid-vit B6-vit B12 (FOLGARD) 0.8-10-0.115 MG TABS Take 1 tablet by mouth daily       aspirin 81 MG tablet Take 1 tablet (81 mg) by mouth daily 30 tablet      Loratadine (CLARITIN PO) Take  by mouth. As needed        sildenafil (VIAGRA) 100 MG tablet 1/2 tab three times a week (Patient not taking: Reported on 1/16/2018) 10 tablet 12       ALLERGIES: Review of patient's allergies indicates no known allergies.      REVIEW OF SYSTEMS:  As above in HPI    GENERAL PHYSICAL EXAM:   Vitals: BP (!) 146/91  Pulse 57  Ht 1.702 m (5' 7\")  Wt 90.1 kg (198 lb 9.6 oz)  BMI 31.11 kg/m2  Body mass index is 31.11 kg/(m^2).    GENERAL: Well groomed, well developed, well nourished male in NAD.  NECK: Neck supple. No adenopathy.   GI: Soft, NT, ND, no palpable masses. + midline incision well healed (previous colon Sx) and multiple lap incisions well healed.  No hernias  No bony spinal tenderness or CVAT bilaterally.  MS: Gait normal, normal muscle tone  SKIN: Warm to touch, dry.  No visible rashes or lesions on examined areas.  HEMATOLOGIC/LYMPHATIC/IMMUNOLOGIC: normal ant/post cervical, supraclavicular and inguinal nodes. No LE edema.  NEURO: Alert and oriented x 3.  PSYCH: Normal mood and affect, pleasant and agreeable during interview and exam.    LABS: The last test results for Mr. Medardo OLIVAS" Lotus were reviewed:  PSA -   Lab Results   Component Value Date    PSA <0.01 01/09/2018    PSA 0.01 11/14/2017    PSA  07/19/2017     <0.01  Assay Method:  Chemiluminescence using Siemens Vista analyzer      PSA  03/08/2017     <0.01  Assay Method:  Chemiluminescence using Siemens Vista analyzer      PSA <0.01 05/23/2016    PSA <0.01 08/19/2015    PSA <0.07 07/28/2014    PSA <0.07 01/10/2014    PSA 0.12 09/25/2013    PSA 1.28 06/19/2013     BMP -   Recent Labs   Lab Test  01/09/18   1345  09/07/17   0951  03/18/17   1639   02/21/15   0745  02/19/15   1106  02/18/15   0748  02/17/15   0848   02/15/15   1226   02/12/15   0655   NA  139  143  141   < >   --   137   --   138   < >  135   --   137   POTASSIUM  5.2  4.6  4.2   < >  3.9  3.4  4.2  4.2   < >  3.8   < >  4.0   CHLORIDE  109  107  106   < >   --   105   --   109   < >  108   --   108   CO2  21  29  27   < >   --   24   --   25   < >  20   --   25   BUN  14  15  15   < >   --   31*   --   22   < >  21   --   16   CR  0.78  1.02  0.93   < >   --   0.93   --   0.93   < >  0.82   --   0.84   GLC  135*  120*  115*   < >   --   165*   --   128*   < >  132*   --   113*   GAVIN  9.0  9.4  9.3   < >   --   9.0   --   8.8   < >  8.8   --   8.3*   MAG   --    --    --    --   1.7  2.2  2.1  2.2   < >  1.7   < >  1.6   PHOS   --    --    --    --    --    --    --   3.2   --   2.4*   --   2.7    < > = values in this interval not displayed.       CBC -   Recent Labs   Lab Test  04/24/17   1041  03/18/17   1639  10/25/16   1228   WBC  4.3  5.2  4.3   HGB  14.2  14.3  14.1   PLT  160  143*  173       ASSESSMENT:   1) prostate cancer -biochemical recurrence.     PLAN:   - We will ask Jasmina Barroso RN ([phone]) to call you when the Lupron is approved through insurance. Hopefully you can get that injection on the 24th.    - Continue casodex - refilled today x 1 year  - I will speak with Dr. Goodwin about your test results and followup schedule    Myah Kaplan,  IRENE  Department of Urologic Surgery

## 2018-01-16 NOTE — PATIENT INSTRUCTIONS
- We will ask Jasmina Barroso RN ([phone]) to call you when the Lupron is approved through insurance. Hopefully you can get that injection on the 24th.    - Continue casodex  - I will speak with Dr. Goodwin about your test results and followup schedule    Myah Kaplan PA-C

## 2018-01-16 NOTE — MR AVS SNAPSHOT
After Visit Summary   1/16/2018    Medardo Gautam    MRN: [id number]           Patient Information     Date Of Birth          1940        Visit Information        Provider Department      1/16/2018 12:30 PM Bev Kaplan PA [facility] Urology and [facility]        Today's Diagnoses     Malignant neoplasm of prostate (H)          Care Instructions    - We will ask Jasmina Barroso RN ([phone]) to call you when the Lupron is approved through insurance. Hopefully you can get that injection on the 24th.    - Continue casodex  - I will speak with Dr. Goodiwn about your test results and followup schedule    Myah Kaplan PA-C          Follow-ups after your visit        Your next 10 appointments already scheduled     Mar 15, 2018   Procedure with Brandon Orellana MD   [facility] PeriOP Services (--)    [address]  [facility] [address]   [phone]            Mar 16, 2018 11:20 AM CDT   Return Visit with Shawn Venegas OD   [facility])    [address] [facility] [address]   [phone]            Mar 21, 2018 11:20 AM CDT   Return Visit with Shawn Venegas OD   [facility])    [address] [facility] [address]   [phone]            Apr 19, 2018   Procedure with Brandon Orellana MD   [facility] PeriOP Services (--)    [address]  [facility] [address]   [phone]            Apr 20, 2018 11:00 AM CDT   Return Visit with Shawn Venegas OD   [facility])    [address] [facility] [address]   [phone]            Apr 25, 2018 11:00 AM CDT   Return Visit with Shawn Venegas OD   [facility])    [address] [facility] [address]   [phone]            Apr 30, 2018  "11:00 AM CDT   (Arrive by 10:45 AM)   Survivorship Visit with Magali Andrews PA-C   [facility] ([facility])    [address] [facility]  [address] [place] [address] [phone]            May 11, 2018 12:20 PM CDT   Return Visit with Shawn Venegas OD   [facility] ([facility])    [address] [place] [address] [phone]              Who to contact     Please call your clinic at [phone] to:    Ask questions about your health    Make or cancel appointments    Discuss your medicines    Learn about your test results    Speak to your doctor   If you have compliments or concerns about an experience at your clinic, or if you wish to file a complaint, please contact [facility] Patient Relations at [phone] or email us at [email or web address]         Additional Information About Your Visit        [employer] Information     [employer] gives you secure access to your electronic health record. If you see a primary care provider, you can also send messages to your care team and make appointments. If you have questions, please call your primary care clinic.  If you do not have a primary care provider, please call [phone] and they will assist you.      [employer] is an electronic gateway that provides easy, online access to your medical records. With [employer], you can request a clinic appointment, read your test results, renew a prescription or communicate with your care team.     To access your existing account, please contact your [facility] or call [phone] for assistance.        Care EveryWhere ID     This is your Care EveryWhere ID. This could be used by other organizations to access your [place] medical records  [id number]        Your Vitals Were     Pulse Height BMI (Body Mass Index)             57 1.702 m (5' 7\") 31.11 kg/m2          " Blood Pressure from Last 3 Encounters:   01/16/18 (!) 146/91   01/09/18 122/80   11/20/17 142/78    Weight from Last 3 Encounters:   01/16/18 90.1 kg (198 lb 9.6 oz)   01/12/18 90.9 kg (200 lb 6.4 oz)   01/09/18 90.3 kg (199 lb)              Today, you had the following     No orders found for display         Where to get your medicines      These medications were sent to [facility] [address] [facility]  [address] [facility] [address]     Phone:  [phone]     bicalutamide 50 MG tablet          Primary Care Provider Office Phone # Fax #    Verna Osborne [phone]       [address] [place] [address]  [facility] [address]        Equal Access to Services     [facility]: Hadii aad ku hadasho Soshamika, waaxda luqadaha, qaybta kaalmada adevielkayaraghavendra, randy salamanca . So [facility] [phone].    ATENCIÓN: Si habla español, tiene a sanford disposición servicios gratuitos de asistencia lingüística. Llame al [phone].    We comply with applicable federal civil rights laws and [place] laws. We do not discriminate on the basis of race, color, national origin, age, disability, sex, sexual orientation, or gender identity.            Thank you!     Thank you for choosing [facility] UROLOGY AND [facility] FOR PROSTATE AND UROLOGIC CANCERS  for your care. Our goal is always to provide you with excellent care. Hearing back from our patients is one way we can continue to improve our services. Please take a few minutes to complete the written survey that you may receive in the mail after your visit with us. Thank you!             Your Updated Medication List - Protect others around you: Learn how to safely use, store and throw away your medicines at www.disposemymeds.org.          This list is accurate as of: 1/16/18  1:03 PM.  Always use your most recent med list.                   Brand Name Dispense Instructions for use Diagnosis    aspirin 81 MG tablet     30 tablet     Take 1 tablet (81 mg) by mouth daily    Hyperlipidemia LDL goal <130, Malignant neoplasm of colon, unspecified part of colon (H)       azelastine 0.05 % Soln ophthalmic solution    OPTIVAR    1 Bottle    Apply 1 drop to eye 2 times daily    Allergic conjunctivitis, bilateral       bicalutamide 50 MG tablet    CASODEX    90 tablet    Take 1 tablet (50 mg) by mouth daily    Malignant neoplasm of prostate (H)       calcium-vitamin D 600-400 MG-UNIT per tablet    CALTRATE     Take 1 tablet by mouth daily    Hyperlipidemia LDL goal <130, Malignant neoplasm of colon, unspecified part of colon (H)       CLARITIN PO      Take  by mouth. As needed        clonazePAM 1 MG tablet   Start taking on:  2/9/2018    klonoPIN    30 tablet    Take 1 tablet (1 mg) by mouth nightly as needed for anxiety    Anxiety       folic acid-vit B6-vit B12 0.8-10-0.115 MG Tabs per tablet    FOLGARD     Take 1 tablet by mouth daily    Hyperlipidemia LDL goal <130, Malignant neoplasm of colon, unspecified part of colon (H)       IBUPROFEN PO      Take 400 mg by mouth every 8 hours as needed for moderate pain        lisinopril 10 MG tablet    PRINIVIL/ZESTRIL    90 tablet    Take 1 tablet (10 mg) by mouth daily    Benign essential hypertension       naproxen 500 MG tablet    NAPROSYN    60 tablet    TAKE ONE TABLET BY MOUTH TWICE DAILY AS NEEDED FOR  MODERATE  PAIN    Chronic gout without tophus, unspecified cause, unspecified site       OMEGA-3 FISH OIL PO      Take 500 mg by mouth daily    Hyperlipidemia LDL goal <130, Malignant neoplasm of colon, unspecified part of colon (H)       sildenafil 100 MG tablet    VIAGRA    10 tablet    1/2 tab three times a week    Malignant neoplasm of prostate (H)       triamcinolone 0.1 % cream    KENALOG          zolpidem 5 MG tablet   Start taking on:  2/9/2018    AMBIEN    30 tablet    Take 1 tablet (5 mg) by mouth nightly as needed for sleep    Persistent disorder of initiating or maintaining sleep

## 2018-01-16 NOTE — NURSING NOTE
"No chief complaint on file.      Initial Ht 1.702 m (5' 7\")  Wt 90.1 kg (198 lb 9.6 oz)  BMI 31.11 kg/m2 Estimated body mass index is 31.11 kg/(m^2) as calculated from the following:    Height as of this encounter: 1.702 m (5' 7\").    Weight as of this encounter: 90.1 kg (198 lb 9.6 oz).  Medication Reconciliation: complete     PEGGY Valdes    "

## 2018-01-26 ENCOUNTER — ALLIED HEALTH/NURSE VISIT (OUTPATIENT)
Dept: UROLOGY | Facility: CLINIC | Age: 78
End: 2018-01-26
Payer: COMMERCIAL

## 2018-01-26 DIAGNOSIS — C61 MALIGNANT NEOPLASM OF PROSTATE (H): Primary | ICD-10-CM

## 2018-01-26 NOTE — MR AVS SNAPSHOT
After Visit Summary   1/26/2018    Medardo Gautam    MRN: [id number]           Patient Information     Date Of Birth          1940        Visit Information        Provider Department      1/26/2018 10:20 AM Nurse, [facility] Urology and Inst for Prostate and Urologic Cancers        Today's Diagnoses     Malignant neoplasm of prostate (H)    -  1       Follow-ups after your visit        Your next 10 appointments already scheduled     Feb 15, 2018   Procedure with Brandon Orellana MD   [facility] PeriOP Services (--)    [address]  [facility] [address]   [phone]            Mar 01, 2018   Procedure with Brandon Orellana MD   [facility] PeriOP Services (--)    [address]  [facility] [address]   [phone]            Mar 16, 2018 11:20 AM CDT   Return Visit with Shawn Venegas OD   [facility]    [address] [place] [address]   [phone]            Mar 21, 2018 11:20 AM CDT   Return Visit with Shawn Venegas OD   [facility])    [address] [facility] [address]   [phone]            Apr 20, 2018 11:00 AM CDT   Return Visit with Shawn Venegas OD   [facility])    [address] [facility] [address]   [phone]            Apr 25, 2018 11:00 AM CDT   Return Visit with Shawn Venegas OD   [facility] ([facility])    [address] [facility] [address]   [phone]            Apr 30, 2018 11:00 AM CDT   (Arrive by 10:45 AM)   Survivorship Visit with Magali Andrews PA-C   [facility] ([facility])    [address] [place] [address]  [address] [place] [address] [phone]            May 11, 2018 12:20 PM CDT   Return Visit with Shawn MONTES  ALEXEY Venegas   [facility])    [address] [place] [address] [phone]              Who to contact     Please call your clinic at [phone] to:    Ask questions about your health    Make or cancel appointments    Discuss your medicines    Learn about your test results    Speak to your doctor   If you have compliments or concerns about an experience at your clinic, or if you wish to file a complaint, please contact [facility] Patient Relations at [phone] or email us at [email or web address]         Additional Information About Your Visit        [email or web address] Information     [employer] gives you secure access to your electronic health record. If you see a primary care provider, you can also send messages to your care team and make appointments. If you have questions, please call your primary care clinic.  If you do not have a primary care provider, please call [phone] and they will assist you.      [employer] is an electronic gateway that provides easy, online access to your medical records. With [employer], you can request a clinic appointment, read your test results, renew a prescription or communicate with your care team.     To access your existing account, please contact your [facility] or call [phone] for assistance.        Care EveryWhere ID     This is your Care EveryWhere ID. This could be used by other organizations to access your [place] medical records  [id number]         Blood Pressure from Last 3 Encounters:   01/16/18 (!) 146/91   01/09/18 122/80   11/20/17 142/78    Weight from Last 3 Encounters:   01/16/18 90.1 kg (198 lb 9.6 oz)   01/12/18 90.9 kg (200 lb 6.4 oz)   01/09/18 90.3 kg (199 lb)              Today, you had the following     No orders found for display       Primary Care Provider Office Phone # Fax #    Verna Osborne [phone]        [address] [facility] [address]  [facility] [address]        Equal Access to Services     RENEE CORONA : Hadii aad ku hadgeorgebarak Socarolinaali, wajose franciscoda luqadaha, qaybta kasamanthada shivamletitiaraghavendra, randy lucasmitraen spivey. So [facility] [phone].    ATENCIÓN: Si habla español, tiene a sanford disposición servicios gratuitos de asistencia lingüística. [facility] [phone].    We comply with applicable federal civil rights laws and [place] laws. We do not discriminate on the basis of race, color, national origin, age, disability, sex, sexual orientation, or gender identity.            Thank you!     Thank you for choosing [facility] UROLOGY AND [facility] FOR PROSTATE AND UROLOGIC CANCERS  for your care. Our goal is always to provide you with excellent care. Hearing back from our patients is one way we can continue to improve our services. Please take a few minutes to complete the written survey that you may receive in the mail after your visit with us. Thank you!             Your Updated Medication List - Protect others around you: Learn how to safely use, store and throw away your medicines at www.disposemymeds.org.          This list is accurate as of 1/26/18 10:26 AM.  Always use your most recent med list.                   Brand Name Dispense Instructions for use Diagnosis    aspirin 81 MG tablet     30 tablet    Take 1 tablet (81 mg) by mouth daily    Hyperlipidemia LDL goal <130, Malignant neoplasm of colon, unspecified part of colon (H)       azelastine 0.05 % Soln ophthalmic solution    OPTIVAR    1 Bottle    Apply 1 drop to eye 2 times daily    Allergic conjunctivitis, bilateral       bicalutamide 50 MG tablet    CASODEX    90 tablet    Take 1 tablet (50 mg) by mouth daily    Malignant neoplasm of prostate (H)       calcium-vitamin D 600-400 MG-UNIT per tablet    CALTRATE     Take 1 tablet by mouth daily    Hyperlipidemia LDL goal <130, Malignant neoplasm of colon, unspecified part of colon (H)       CLARITIN PO      Take  by  mouth. As needed        clonazePAM 1 MG tablet   Start taking on:  2/9/2018    klonoPIN    30 tablet    Take 1 tablet (1 mg) by mouth nightly as needed for anxiety    Anxiety       folic acid-vit B6-vit B12 0.8-10-0.115 MG Tabs per tablet    FOLGARD     Take 1 tablet by mouth daily    Hyperlipidemia LDL goal <130, Malignant neoplasm of colon, unspecified part of colon (H)       IBUPROFEN PO      Take 400 mg by mouth every 8 hours as needed for moderate pain        lisinopril 10 MG tablet    PRINIVIL/ZESTRIL    90 tablet    Take 1 tablet (10 mg) by mouth daily    Benign essential hypertension       naproxen 500 MG tablet    NAPROSYN    60 tablet    TAKE ONE TABLET BY MOUTH TWICE DAILY AS NEEDED FOR  MODERATE  PAIN    Chronic gout without tophus, unspecified cause, unspecified site       OMEGA-3 FISH OIL PO      Take 500 mg by mouth daily    Hyperlipidemia LDL goal <130, Malignant neoplasm of colon, unspecified part of colon (H)       sildenafil 100 MG tablet    VIAGRA    10 tablet    1/2 tab three times a week    Malignant neoplasm of prostate (H)       triamcinolone 0.1 % cream    KENALOG          zolpidem 5 MG tablet   Start taking on:  2/9/2018    AMBIEN    30 tablet    Take 1 tablet (5 mg) by mouth nightly as needed for sleep    Persistent disorder of initiating or maintaining sleep

## 2018-01-26 NOTE — NURSING NOTE
The following medication was given:     MEDICATION: Lupron Depot 45 mg for 6-month  ROUTE: IM  SITE: [facility]  DOSE: 45 mg  LOT #: [id number]  :  [employer]  EXPIRATION DATE:  2/17/2019  NDC#: 2324-4662-76    Patient tolerated well.

## 2018-02-02 NOTE — PROGRESS NOTES
[address]  [facility] [address]  [phone]  Dept: [phone]    PRE-OP EVALUATION:  Today's date: 2018    Medardo Gautam (: 1940) presents for pre-operative evaluation assessment as requested by Dr. Orellana.  He requires evaluation and anesthesia risk assessment prior to undergoing surgery/procedure for treatment of Cataracts .  Proposed procedure: LEFT EYE CATARACT EXTRACTION WITH STANDARD INTRAOCULAR LENS IMPLANT (MACTOP)    Date of Surgery/ Procedure: 2/15/18- left and 3/1/18- right  Time of Surgery/ Procedure: 9:30AM  Hospital/Surgical Facility: [facility]  [id number]  Primary Physician: Verna Osborne  Type of Anesthesia Anticipated: Topical and Mac    Patient has a Health Care Directive or Living Will:  YES     Preop Questions 2018   1.  Do you have a history of heart attack, stroke, stent, bypass or surgery on an artery in the head, neck, heart or legs? No   2.  Do you ever have any pain or discomfort in your chest? No   3.  Do you have a history of  Heart Failure? No   4.   Are you troubled by shortness of breath when:  walking on a level surface, or up a slight hill, or at night? No   5.  Do you currently have a cold, bronchitis or other respiratory infection? No   6.  Do you have a cough, shortness of breath, or wheezing? No   7.  Do you sometimes get pains in the calves of your legs when you walk? No   8. Do you or anyone in your family have previous history of blood clots? No   9.  Do you or does anyone in your family have a serious bleeding problem such as prolonged bleeding following surgeries or cuts? No   10. Have you ever had problems with anemia or been told to take iron pills? No   11. Have you had any abnormal blood loss such as black, tarry or bloody stools? No   12. Have you ever had a blood transfusion? No   13. Have you or any of your relatives ever had problems with anesthesia? No   14. Do you have sleep apnea,  excessive snoring or daytime drowsiness? No   15. Do you have any prosthetic heart valves? No   16. Do you have prosthetic joints? No         HPI:                                                      Brief HPI related to upcoming procedure: Patient is a very pleasant 78-year-old with history of hypertension, hyperlipidemia, gout, prostate cancer who is here for a preop evaluation for his scheduled cataract surgery.      See problem list for active medical problems.  Problems all longstanding and stable, except as noted/documented.  See ROS for pertinent symptoms related to these conditions.                                                                                                  .  HYPERTENSION - Patient has longstanding history of mod-severe HTN , currently denies any symptoms referable to elevated blood pressure. Specifically denies chest pain, palpitations, dyspnea, orthopnea, PND or peripheral edema. Blood pressure readings have been in normal range. Current medication regimen is as listed below. Patient denies any side effects of medication.                                                                                                                                                                                          .  HYPERLIPIDEMIA - Patient has a long history of significant Hyperlipidemia requiring medication for treatment with recent good control. Patient reports no problems or side effects with the medication.                                                                                                                                                       .    MEDICAL HISTORY:                                                      Patient Active Problem List    Diagnosis Date Noted     Meibomian gland dysfunction 11/29/2017     Priority: Medium     Lumbar radiculopathy 10/10/2017     Priority: Medium     Renal cyst 06/22/2017     Priority: Medium     Abdominal pain, epigastric 03/24/2017      Priority: Medium     Colon cancer (H) 02/09/2015     Priority: Medium     Anxiety 07/29/2014     Priority: Medium     Patient is followed by KIKA ZAVALA for ongoing prescription of benzodiazepines.  All refills should be approved by this provider, or covering partner.    Medication(s): Klonopin  .   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     Priority: Medium     Hyperlipidemia LDL goal <130 12/07/2012     Priority: Medium     Shoulder pain, right 12/07/2012     Priority: Medium     Essential hypertension with goal blood pressure less than 140/90 07/31/2012     Priority: Medium     Elevated liver enzymes 05/01/2012     Priority: Medium     Hyperbilirubinemia 05/01/2012     Priority: Medium     Prostate cancer- Jeancarlos 9 (5+4) dx Feb 2012 03/03/2012     Priority: Medium     Advanced directives, counseling/discussion 12/15/2011     Priority: Medium     Discussed advance care planning with patient; information given to patient to review.   Health care directive given to patient.  Recommended that he makes a copy for his medical record.        Dupuytren's contracture of hand- left 5th finger, right 5th finger 12/15/2011     Priority: Medium     Bunions- both feet 12/15/2011     Priority: Medium     Problem list name updated by automated process. Provider to review and confirm       Skin lesion- R side of face and behind L ear 12/15/2011     Priority: Medium     Insomnia 12/15/2011     Priority: Medium     Prostatic nodule- posterior right edge with rectal exam 12/15/2011     Priority: Medium     Gout 12/17/2007      Priority: Medium     Problem list name updated by automated process. Provider to review       Hemorrhoids 06/04/2007     Priority: Medium     Problem list name updated by automated process. Provider to review       Disturbance of skin sensation 12/29/2006     Priority: Medium      Past Medical History:   Diagnosis Date     Anxiety      Colon cancer (H) 01/2015     Contracture of finger joint ca 2005    left and right fifth fingers     Dupuytren's contracture of left hand      Gout      HEMORRHOIDS NOS 6/4/2007     Hypertension      Insomnia      Nonsenile cataract      Personal history of colonic polyps 7 years ago?     Prostate cancer (H) Feb 2012     Renal cyst 6/22/2017     Seborrheic dermatitis      Skin lesion- R side of face and behind L ear 12/15/2011     SKIN SENSATION DISTURB 12/29/2006    allergic reaction to strawberries     SKIN SENSATION DISTURB 12/29/2006     Past Surgical History:   Procedure Laterality Date     APPENDECTOMY       BIOPSY  01/16/15     C HAND/FINGER SURGERY UNLISTED       C LENGTHEN,TENDON,HAND/FINGER  ca 2007    right fifth     C STOMACH SURGERY PROCEDURE UNLISTED       COLON SURGERY  2/11/2015    Lap assisted R hemicolectomy     COLONOSCOPY  10/25/07    Snare polypectomy     COLONOSCOPY  6/25/2009    with snare polypectomy     COLONOSCOPY  9/30/2009     COLONOSCOPY  1/5/2011    COLONOSCOPY performed by JUD MARIEE at  GI     COLONOSCOPY N/A 1/16/2015    Procedure: COMBINED COLONOSCOPY, SINGLE OR MULTIPLE BIOPSY/POLYPECTOMY BY BIOPSY;  Surgeon: Sarah Beth Pisano MD;  Location: [facility] OR     COLONOSCOPY WITH CO2 INSUFFLATION N/A 1/16/2015    Procedure: COLONOSCOPY WITH CO2 INSUFFLATION;  Surgeon: Sarah Beth Pisano MD;  Location: [facility] OR     DAVINCI PROSTATECTOMY  8/6/2012    Procedure: DAVINCI PROSTATECTOMY;  Davinci Assisted Radical Prostatectomy with Bilateral Lymphadenectomy ;  Surgeon: Norma Goodwin MD;  Location: [facility] OR     INJECT EPIDURAL LUMBAR / SACRAL SINGLE Left  10/30/2017    Procedure: INJECT EPIDURAL LUMBAR / SACRAL SINGLE;  Left Transforaminal Lumbar 4-Lumbar 5 Epidural Steroid Injection;  Surgeon: Nuvia Reina MD;  Location: [facility] OR     LAPAROSCOPIC ASSISTED COLECTOMY N/A 2/11/2015    Procedure: LAPAROSCOPIC ASSISTED COLECTOMY;  Surgeon: Oren Breen MD;  Location: [facility] OR     Current Outpatient Prescriptions   Medication Sig Dispense Refill     bicalutamide (CASODEX) 50 MG tablet Take 1 tablet (50 mg) by mouth daily 90 tablet 3     triamcinolone (KENALOG) 0.1 % cream        lisinopril (PRINIVIL/ZESTRIL) 10 MG tablet Take 1 tablet (10 mg) by mouth daily 90 tablet 3     clonazePAM (KLONOPIN) 1 MG tablet Take 1 tablet (1 mg) by mouth nightly as needed for anxiety 30 tablet 5     zolpidem (AMBIEN) 5 MG tablet Take 1 tablet (5 mg) by mouth nightly as needed for sleep 30 tablet 5     azelastine (OPTIVAR) 0.05 % SOLN ophthalmic solution Apply 1 drop to eye 2 times daily 1 Bottle 6     naproxen (NAPROSYN) 500 MG tablet TAKE ONE TABLET BY MOUTH TWICE DAILY AS NEEDED FOR  MODERATE  PAIN 60 tablet 1     IBUPROFEN PO Take 400 mg by mouth every 8 hours as needed for moderate pain       Omega-3 Fatty Acids (OMEGA-3 FISH OIL PO) Take 500 mg by mouth daily       calcium-vitamin D (CALTRATE) 600-400 MG-UNIT per tablet Take 1 tablet by mouth daily       folic acid-vit B6-vit B12 (FOLGARD) 0.8-10-0.115 MG TABS Take 1 tablet by mouth daily       aspirin 81 MG tablet Take 1 tablet (81 mg) by mouth daily 30 tablet      Loratadine (CLARITIN PO) Take  by mouth. As needed        sildenafil (VIAGRA) 100 MG tablet 1/2 tab three times a week (Patient not taking: Reported on 2/12/2018) 10 tablet 12     OTC products: None, except as noted above    No Known Allergies   Latex Allergy: NO    Social History   Substance Use Topics     Smoking status: Former Smoker     Years: 1.00     Types: Cigarettes, Cigars, Pipe     Start date: 10/1/1961     Quit date: 1/1/1962     Smokeless tobacco: Never  "Used      Comment: No smokers in home     Alcohol use 0.0 oz/week      Comment: daily glass of wine and whiskey     History   Drug Use No       REVIEW OF SYSTEMS:                                                    Constitutional, neuro, ENT, endocrine, pulmonary, cardiac, gastrointestinal, genitourinary, musculoskeletal, integument and psychiatric systems are negative, except as otherwise noted.    EXAM:                                                    /82 (BP Location: Right arm, Patient Position: Chair, Cuff Size: Adult Regular)  Pulse 68  Temp 97.8  F (36.6  C) (Oral)  Resp 16  Ht 5' 7\" (1.702 m)  Wt 200 lb (90.7 kg)  SpO2 98%  BMI 31.32 kg/m2    GENERAL APPEARANCE: healthy, alert and no distress     EYES: EOMI,  PERRL     HENT: ear canals and TM's normal and nose and mouth without ulcers or lesions     NECK: no adenopathy, no asymmetry, masses, or scars and thyroid normal to palpation     RESP: lungs clear to auscultation - no rales, rhonchi or wheezes     CV: regular rates and rhythm, normal S1 S2, no S3 or S4 and no murmur, click or rub     ABDOMEN:  soft, nontender, no HSM or masses and bowel sounds normal     MS: extremities normal- no gross deformities noted, no evidence of inflammation in joints, FROM in all extremities.     SKIN: no suspicious lesions or rashes     NEURO: Normal strength and tone, sensory exam grossly normal, mentation intact and speech normal     PSYCH: mentation appears normal. and affect normal/bright     LYMPHATICS: No axillary, cervical, or supraclavicular nodes    DIAGNOSTICS:                                                    No labs or EKG required for low risk surgery (cataract, skin procedure, breast biopsy, etc)    Recent Labs   Lab Test  01/09/18   1345  09/07/17   0951  04/24/17   1041  03/18/17   1639   01/27/15   1514   10/30/14   1043   05/04/12   1139   HGB   --    --   14.2  14.3   < >   --    < >   --    < >  12.9*   PLT   --    --   160  143*   < >   --  "   < >   --    < >  402   INR   --    --    --    --    --   1.02   --    --    --   0.95   NA  139  143   --   141   < >   --    < >   --    < >   --    POTASSIUM  5.2  4.6   --   4.2   < >   --    < >   --    < >   --    CR  0.78  1.02   --   0.93   < >   --    < >   --    < >   --    A1C   --   5.7   --    --    --    --    --   6.0   --    --     < > = values in this interval not displayed.        IMPRESSION:                                                    Reason for surgery/procedure: Cataract   Diagnosis/reason for consult: Preop evaluation    The proposed surgical procedure is considered LOW risk.    REVISED CARDIAC RISK INDEX  The patient has the following serious cardiovascular risks for perioperative complications such as (MI, PE, VFib and 3  AV Block):  No serious cardiac risks  INTERPRETATION: 0 risks: Class I (very low risk - 0.4% complication rate)    The patient has the following additional risks for perioperative complications:  The 10-year ASCVD risk score (Porter CHRISTA Jr, et al., 2013) is: 26.6%    Values used to calculate the score:      Age: 78 years      Sex: Male      Is Non- : No      Diabetic: No      Tobacco smoker: No      Systolic Blood Pressure: 126 mmHg      Is BP treated: Yes      HDL Cholesterol: 83 mg/dL      Total Cholesterol: 233 mg/dL      ICD-10-CM    1. Preop general physical exam Z01.818        RECOMMENDATIONS:                                                          --Patient is to take all scheduled medications on the day of surgery EXCEPT for modifications listed below.    Anticoagulant or Antiplatelet Medication Use  ASPIRIN: Discontinue ASA 7-10 days prior to procedure to reduce bleeding risk.  It should be resumed post-operatively.  NSAIDS: 1 week prior to the procedure        ACE Inhibitor or Angiotensin Receptor Blocker (ARB) Use  Ace inhibitor or Angiotensin Receptor Blocker (ARB) and should HOLD this medication for the 24 hours prior to  surgery.      APPROVAL GIVEN to proceed with proposed procedure, without further diagnostic evaluation       Signed Electronically by: Verna Osborne MD    Copy of this evaluation report is provided to requesting physician.    [place] Preop Guidelines

## 2018-02-06 ENCOUNTER — TELEPHONE (OUTPATIENT)
Dept: UROLOGY | Facility: CLINIC | Age: 78
End: 2018-02-06

## 2018-02-06 DIAGNOSIS — C61 MALIGNANT NEOPLASM OF PROSTATE (H): Primary | ICD-10-CM

## 2018-02-06 NOTE — TELEPHONE ENCOUNTER
Spoke with Dr. Goodwin regarding Mr. Gautam's treatment plan.  The PSA in January remains undetectable on Casodex and Lupron.  Patient received a 6 month Lupron 45mg on 1/26/18.  Due again in 6 months. Will have him follow up with Dr. Goodwin at the end of July with a PSA and testosterone.     Myah Kaplan

## 2018-02-12 ENCOUNTER — OFFICE VISIT (OUTPATIENT)
Dept: FAMILY MEDICINE | Facility: OTHER | Age: 78
End: 2018-02-12
Payer: COMMERCIAL

## 2018-02-12 VITALS
DIASTOLIC BLOOD PRESSURE: 82 MMHG | HEIGHT: 67 IN | SYSTOLIC BLOOD PRESSURE: 126 MMHG | HEART RATE: 68 BPM | WEIGHT: 200 LBS | OXYGEN SATURATION: 98 % | TEMPERATURE: 97.8 F | BODY MASS INDEX: 31.39 KG/M2 | RESPIRATION RATE: 16 BRPM

## 2018-02-12 DIAGNOSIS — Z01.818 PREOP GENERAL PHYSICAL EXAM: Primary | ICD-10-CM

## 2018-02-12 PROCEDURE — 99214 OFFICE O/P EST MOD 30 MIN: CPT | Performed by: FAMILY MEDICINE

## 2018-02-12 ASSESSMENT — PAIN SCALES - GENERAL: PAINLEVEL: NO PAIN (0)

## 2018-02-12 NOTE — MR AVS SNAPSHOT
After Visit Summary   2/12/2018    Medardo Gautam    MRN: [id number]           Patient Information     Date Of Birth          1940        Visit Information        Provider Department      2/12/2018 3:00 PM Verna Osborne MD [facility]        Today's Diagnoses     Preop general physical exam    -  1      Care Instructions      Before Your Surgery      Call your surgeon if there is any change in your health. This includes signs of a cold or flu (such as a sore throat, runny nose, cough, rash or fever).    Do not smoke, drink alcohol or take over the counter medicine (unless your surgeon or primary care doctor tells you to) for the 24 hours before and after surgery.    If you take prescribed drugs: Follow your doctor s orders about which medicines to take and which to stop until after surgery.    Eating and drinking prior to surgery: follow the instructions from your surgeon    Take a shower or bath the night before surgery. Use the soap your surgeon gave you to gently clean your skin. If you do not have soap from your surgeon, use your regular soap. Do not shave or scrub the surgery site.  Wear clean pajamas and have clean sheets on your bed.           Follow-ups after your visit        Your next 10 appointments already scheduled     Feb 15, 2018   Procedure with Brandon Orellana MD   [facility] (--)    [address]  [facility] [address]   [phone]            Feb 21, 2018 10:20 AM CST   Return Visit with Shawn Venegas OD   [facility])    [address] [place] [address] [phone]            Mar 01, 2018   Procedure with Brandon Orellana MD   [facility] (--)    [address]  [facility] [address]   [phone]            Mar 02, 2018 10:20 AM CST   Return Visit with Shawn Venegas OD   [facility]    [address] [place] [address]   [phone]            Mar 07, 2018 11:00 AM CST   [facility] with [facility]as OD   [facility]    [address] [place] [address]   [phone]            Apr 30, 2018 11:00 AM CDT   (Arrive by 10:45 AM)   Survivorship Visit with Magali Andrews PA-C   [facility] ([facility])    [address] [place] [address]  [address]  [facility] [address]   [phone]            May 11, 2018 12:20 PM CDT   Return Visit with Shawn Venegas OD   [facility]    [address] [place] [address]   [phone]            Jul 18, 2018 12:00 PM CDT   (Arrive by 11:45 AM)   Return Prostate Cancer with Norma Goodwin MD   [facility] ([facility])    [address] [place] [address]  4th Floor  [facility] [address] [phone]              Who to contact     If you have questions or need follow up information about today's clinic visit or your schedule please contact [facility] directly at [phone].  Normal or non-critical lab and imaging results will be communicated to you by MyChart, letter or phone within 4 business days after the clinic has received the results. If you do not hear from us within 7 days, please contact the clinic through MyChart or phone. If you have a critical or abnormal lab result, we will notify you by phone as soon as possible.  Submit refill requests through [employer] or call your pharmacy and they will forward the refill request to us. Please allow 3 business days for your refill to be completed.          Additional Information About Your Visit        MyChart Information     [employer] gives you secure access to your electronic health record. If you see a primary care provider, you  "can also send messages to your care team and make appointments. If you have questions, please call your primary care clinic.  If you do not have a primary care provider, please call [phone] and they will assist you.        Care EveryWhere ID     This is your Care EveryWhere ID. This could be used by other organizations to access your [facility] medical records  [id number]        Your Vitals Were     Pulse Temperature Respirations Height Pulse Oximetry BMI (Body Mass Index)    68 97.8  F (36.6  C) (Oral) 16 5' 7\" (1.702 m) 98% 31.32 kg/m2       Blood Pressure from Last 3 Encounters:   02/12/18 126/82   01/16/18 (!) 146/91   01/09/18 122/80    Weight from Last 3 Encounters:   02/12/18 200 lb (90.7 kg)   01/16/18 198 lb 9.6 oz (90.1 kg)   01/12/18 200 lb 6.4 oz (90.9 kg)              Today, you had the following     No orders found for display       Primary Care Provider Office Phone # Fax #    Verna Osborne [phone]       [address] [facility] [address]  [facility] [address]        Equal Access to Services     VALERIA CORONA : Hadrufus harding Soshamika, wajose franciscoda lugreggadaha, qaybta kaalmada adevielkayada, randy salamanca . So [facility] [phone].    ATENCIÓN: Si habla español, tiene a sanford disposición servicios gratuitos de asistencia lingüística. Llame al [phone].    We comply with applicable federal civil rights laws and [place] laws. We do not discriminate on the basis of race, color, national origin, age, disability, sex, sexual orientation, or gender identity.            Thank you!     Thank you for choosing [facility]  for your care. Our goal is always to provide you with excellent care. Hearing back from our patients is one way we can continue to improve our services. Please take a few minutes to complete the written survey that you may receive in the mail after your visit with us. Thank you!             Your Updated Medication List - Protect others around " you: Learn how to safely use, store and throw away your medicines at www.disposemymeds.org.          This list is accurate as of 2/12/18  3:30 PM.  Always use your most recent med list.                   Brand Name Dispense Instructions for use Diagnosis    aspirin 81 MG tablet     30 tablet    Take 1 tablet (81 mg) by mouth daily    Hyperlipidemia LDL goal <130, Malignant neoplasm of colon, unspecified part of colon (H)       azelastine 0.05 % Soln ophthalmic solution    OPTIVAR    1 Bottle    Apply 1 drop to eye 2 times daily    Allergic conjunctivitis, bilateral       bicalutamide 50 MG tablet    CASODEX    90 tablet    Take 1 tablet (50 mg) by mouth daily    Malignant neoplasm of prostate (H)       calcium-vitamin D 600-400 MG-UNIT per tablet    CALTRATE     Take 1 tablet by mouth daily    Hyperlipidemia LDL goal <130, Malignant neoplasm of colon, unspecified part of colon (H)       CLARITIN PO      Take  by mouth. As needed        clonazePAM 1 MG tablet    klonoPIN    30 tablet    Take 1 tablet (1 mg) by mouth nightly as needed for anxiety    Anxiety       folic acid-vit B6-vit B12 0.8-10-0.115 MG Tabs per tablet    FOLGARD     Take 1 tablet by mouth daily    Hyperlipidemia LDL goal <130, Malignant neoplasm of colon, unspecified part of colon (H)       IBUPROFEN PO      Take 400 mg by mouth every 8 hours as needed for moderate pain        lisinopril 10 MG tablet    PRINIVIL/ZESTRIL    90 tablet    Take 1 tablet (10 mg) by mouth daily    Benign essential hypertension       naproxen 500 MG tablet    NAPROSYN    60 tablet    TAKE ONE TABLET BY MOUTH TWICE DAILY AS NEEDED FOR  MODERATE  PAIN    Chronic gout without tophus, unspecified cause, unspecified site       OMEGA-3 FISH OIL PO      Take 500 mg by mouth daily    Hyperlipidemia LDL goal <130, Malignant neoplasm of colon, unspecified part of colon (H)       sildenafil 100 MG tablet    VIAGRA    10 tablet    1/2 tab three times a week    Malignant neoplasm of  prostate (H)       triamcinolone 0.1 % cream    KENALOG          zolpidem 5 MG tablet    AMBIEN    30 tablet    Take 1 tablet (5 mg) by mouth nightly as needed for sleep    Persistent disorder of initiating or maintaining sleep

## 2018-02-12 NOTE — NURSING NOTE
"Chief Complaint   Patient presents with     Pre-Op Exam     Panel Management     honoring choices       Initial /82 (BP Location: Right arm, Patient Position: Chair, Cuff Size: Adult Regular)  Pulse 68  Temp 97.8  F (36.6  C) (Oral)  Resp 16  Ht 5' 7\" (1.702 m)  Wt 200 lb (90.7 kg)  SpO2 98%  BMI 31.32 kg/m2 Estimated body mass index is 31.32 kg/(m^2) as calculated from the following:    Height as of this encounter: 5' 7\" (1.702 m).    Weight as of this encounter: 200 lb (90.7 kg).  Medication Reconciliation: complete   Sarah Beth Marie CMA (AAMA)      "

## 2018-02-13 NOTE — H&P (VIEW-ONLY)
[address]  [facility] [address]  [phone]  Dept: [phone]    PRE-OP EVALUATION:  Today's date: 2018    Medardo Gautam (: 1940) presents for pre-operative evaluation assessment as requested by Dr. Orellana.  He requires evaluation and anesthesia risk assessment prior to undergoing surgery/procedure for treatment of Cataracts .  Proposed procedure: LEFT EYE CATARACT EXTRACTION WITH STANDARD INTRAOCULAR LENS IMPLANT (MACTOP)    Date of Surgery/ Procedure: 2/15/18- left and 3/1/18- right  Time of Surgery/ Procedure: 9:30AM  Hospital/Surgical Facility: [facility]  [id number]  Primary Physician: Verna Osborne  Type of Anesthesia Anticipated: Topical and Mac    Patient has a Health Care Directive or Living Will:  YES     Preop Questions 2018   1.  Do you have a history of heart attack, stroke, stent, bypass or surgery on an artery in the head, neck, heart or legs? No   2.  Do you ever have any pain or discomfort in your chest? No   3.  Do you have a history of  Heart Failure? No   4.   Are you troubled by shortness of breath when:  walking on a level surface, or up a slight hill, or at night? No   5.  Do you currently have a cold, bronchitis or other respiratory infection? No   6.  Do you have a cough, shortness of breath, or wheezing? No   7.  Do you sometimes get pains in the calves of your legs when you walk? No   8. Do you or anyone in your family have previous history of blood clots? No   9.  Do you or does anyone in your family have a serious bleeding problem such as prolonged bleeding following surgeries or cuts? No   10. Have you ever had problems with anemia or been told to take iron pills? No   11. Have you had any abnormal blood loss such as black, tarry or bloody stools? No   12. Have you ever had a blood transfusion? No   13. Have you or any of your relatives ever had problems with anesthesia? No   14. Do you have sleep apnea,  excessive snoring or daytime drowsiness? No   15. Do you have any prosthetic heart valves? No   16. Do you have prosthetic joints? No         HPI:                                                      Brief HPI related to upcoming procedure: Patient is a very pleasant 78-year-old with history of hypertension, hyperlipidemia, gout, prostate cancer who is here for a preop evaluation for his scheduled cataract surgery.      See problem list for active medical problems.  Problems all longstanding and stable, except as noted/documented.  See ROS for pertinent symptoms related to these conditions.                                                                                                  .  HYPERTENSION - Patient has longstanding history of mod-severe HTN , currently denies any symptoms referable to elevated blood pressure. Specifically denies chest pain, palpitations, dyspnea, orthopnea, PND or peripheral edema. Blood pressure readings have been in normal range. Current medication regimen is as listed below. Patient denies any side effects of medication.                                                                                                                                                                                          .  HYPERLIPIDEMIA - Patient has a long history of significant Hyperlipidemia requiring medication for treatment with recent good control. Patient reports no problems or side effects with the medication.                                                                                                                                                       .    MEDICAL HISTORY:                                                      Patient Active Problem List    Diagnosis Date Noted     Meibomian gland dysfunction 11/29/2017     Priority: Medium     Lumbar radiculopathy 10/10/2017     Priority: Medium     Renal cyst 06/22/2017     Priority: Medium     Abdominal pain, epigastric 03/24/2017      Priority: Medium     Colon cancer (H) 02/09/2015     Priority: Medium     Anxiety 07/29/2014     Priority: Medium     Patient is followed by KIKA ZAVALA for ongoing prescription of benzodiazepines.  All refills should be approved by this provider, or covering partner.    Medication(s): Klonopin  .   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     Priority: Medium     Hyperlipidemia LDL goal <130 12/07/2012     Priority: Medium     Shoulder pain, right 12/07/2012     Priority: Medium     Essential hypertension with goal blood pressure less than 140/90 07/31/2012     Priority: Medium     Elevated liver enzymes 05/01/2012     Priority: Medium     Hyperbilirubinemia 05/01/2012     Priority: Medium     Prostate cancer- Jeancarlos 9 (5+4) dx Feb 2012 03/03/2012     Priority: Medium     Advanced directives, counseling/discussion 12/15/2011     Priority: Medium     Discussed advance care planning with patient; information given to patient to review.   Health care directive given to patient.  Recommended that he makes a copy for his medical record.        Dupuytren's contracture of hand- left 5th finger, right 5th finger 12/15/2011     Priority: Medium     Bunions- both feet 12/15/2011     Priority: Medium     Problem list name updated by automated process. Provider to review and confirm       Skin lesion- R side of face and behind L ear 12/15/2011     Priority: Medium     Insomnia 12/15/2011     Priority: Medium     Prostatic nodule- posterior right edge with rectal exam 12/15/2011     Priority: Medium     Gout 12/17/2007      Priority: Medium     Problem list name updated by automated process. Provider to review       Hemorrhoids 06/04/2007     Priority: Medium     Problem list name updated by automated process. Provider to review       Disturbance of skin sensation 12/29/2006     Priority: Medium      Past Medical History:   Diagnosis Date     Anxiety      Colon cancer (H) 01/2015     Contracture of finger joint ca 2005    left and right fifth fingers     Dupuytren's contracture of left hand      Gout      HEMORRHOIDS NOS 6/4/2007     Hypertension      Insomnia      Nonsenile cataract      Personal history of colonic polyps 7 years ago?     Prostate cancer (H) Feb 2012     Renal cyst 6/22/2017     Seborrheic dermatitis      Skin lesion- R side of face and behind L ear 12/15/2011     SKIN SENSATION DISTURB 12/29/2006    allergic reaction to strawberries     SKIN SENSATION DISTURB 12/29/2006     Past Surgical History:   Procedure Laterality Date     APPENDECTOMY       BIOPSY  01/16/15     C HAND/FINGER SURGERY UNLISTED       C LENGTHEN,TENDON,HAND/FINGER  ca 2007    right fifth     C STOMACH SURGERY PROCEDURE UNLISTED       COLON SURGERY  2/11/2015    Lap assisted R hemicolectomy     COLONOSCOPY  10/25/07    Snare polypectomy     COLONOSCOPY  6/25/2009    with snare polypectomy     COLONOSCOPY  9/30/2009     COLONOSCOPY  1/5/2011    COLONOSCOPY performed by JUD MARIEE at  GI     COLONOSCOPY N/A 1/16/2015    Procedure: COMBINED COLONOSCOPY, SINGLE OR MULTIPLE BIOPSY/POLYPECTOMY BY BIOPSY;  Surgeon: Sarah Beth Pisano MD;  Location: [facility] OR     COLONOSCOPY WITH CO2 INSUFFLATION N/A 1/16/2015    Procedure: COLONOSCOPY WITH CO2 INSUFFLATION;  Surgeon: Sarah Beth Pisano MD;  Location: [facility] OR     DAVINCI PROSTATECTOMY  8/6/2012    Procedure: DAVINCI PROSTATECTOMY;  Davinci Assisted Radical Prostatectomy with Bilateral Lymphadenectomy ;  Surgeon: Norma Goodwin MD;  Location: [facility] OR     INJECT EPIDURAL LUMBAR / SACRAL SINGLE Left  10/30/2017    Procedure: INJECT EPIDURAL LUMBAR / SACRAL SINGLE;  Left Transforaminal Lumbar 4-Lumbar 5 Epidural Steroid Injection;  Surgeon: Nuvia Reina MD;  Location: [facility] OR     LAPAROSCOPIC ASSISTED COLECTOMY N/A 2/11/2015    Procedure: LAPAROSCOPIC ASSISTED COLECTOMY;  Surgeon: Oren Breen MD;  Location: [facility] OR     Current Outpatient Prescriptions   Medication Sig Dispense Refill     bicalutamide (CASODEX) 50 MG tablet Take 1 tablet (50 mg) by mouth daily 90 tablet 3     triamcinolone (KENALOG) 0.1 % cream        lisinopril (PRINIVIL/ZESTRIL) 10 MG tablet Take 1 tablet (10 mg) by mouth daily 90 tablet 3     clonazePAM (KLONOPIN) 1 MG tablet Take 1 tablet (1 mg) by mouth nightly as needed for anxiety 30 tablet 5     zolpidem (AMBIEN) 5 MG tablet Take 1 tablet (5 mg) by mouth nightly as needed for sleep 30 tablet 5     azelastine (OPTIVAR) 0.05 % SOLN ophthalmic solution Apply 1 drop to eye 2 times daily 1 Bottle 6     naproxen (NAPROSYN) 500 MG tablet TAKE ONE TABLET BY MOUTH TWICE DAILY AS NEEDED FOR  MODERATE  PAIN 60 tablet 1     IBUPROFEN PO Take 400 mg by mouth every 8 hours as needed for moderate pain       Omega-3 Fatty Acids (OMEGA-3 FISH OIL PO) Take 500 mg by mouth daily       calcium-vitamin D (CALTRATE) 600-400 MG-UNIT per tablet Take 1 tablet by mouth daily       folic acid-vit B6-vit B12 (FOLGARD) 0.8-10-0.115 MG TABS Take 1 tablet by mouth daily       aspirin 81 MG tablet Take 1 tablet (81 mg) by mouth daily 30 tablet      Loratadine (CLARITIN PO) Take  by mouth. As needed        sildenafil (VIAGRA) 100 MG tablet 1/2 tab three times a week (Patient not taking: Reported on 2/12/2018) 10 tablet 12     OTC products: None, except as noted above    No Known Allergies   Latex Allergy: NO    Social History   Substance Use Topics     Smoking status: Former Smoker     Years: 1.00     Types: Cigarettes, Cigars, Pipe     Start date: 10/1/1961     Quit date: 1/1/1962     Smokeless tobacco: Never  "Used      Comment: No smokers in home     Alcohol use 0.0 oz/week      Comment: daily glass of wine and whiskey     History   Drug Use No       REVIEW OF SYSTEMS:                                                    Constitutional, neuro, ENT, endocrine, pulmonary, cardiac, gastrointestinal, genitourinary, musculoskeletal, integument and psychiatric systems are negative, except as otherwise noted.    EXAM:                                                    /82 (BP Location: Right arm, Patient Position: Chair, Cuff Size: Adult Regular)  Pulse 68  Temp 97.8  F (36.6  C) (Oral)  Resp 16  Ht 5' 7\" (1.702 m)  Wt 200 lb (90.7 kg)  SpO2 98%  BMI 31.32 kg/m2    GENERAL APPEARANCE: healthy, alert and no distress     EYES: EOMI,  PERRL     HENT: ear canals and TM's normal and nose and mouth without ulcers or lesions     NECK: no adenopathy, no asymmetry, masses, or scars and thyroid normal to palpation     RESP: lungs clear to auscultation - no rales, rhonchi or wheezes     CV: regular rates and rhythm, normal S1 S2, no S3 or S4 and no murmur, click or rub     ABDOMEN:  soft, nontender, no HSM or masses and bowel sounds normal     MS: extremities normal- no gross deformities noted, no evidence of inflammation in joints, FROM in all extremities.     SKIN: no suspicious lesions or rashes     NEURO: Normal strength and tone, sensory exam grossly normal, mentation intact and speech normal     PSYCH: mentation appears normal. and affect normal/bright     LYMPHATICS: No axillary, cervical, or supraclavicular nodes    DIAGNOSTICS:                                                    No labs or EKG required for low risk surgery (cataract, skin procedure, breast biopsy, etc)    Recent Labs   Lab Test  01/09/18   1345  09/07/17   0951  04/24/17   1041  03/18/17   1639   01/27/15   1514   10/30/14   1043   05/04/12   1139   HGB   --    --   14.2  14.3   < >   --    < >   --    < >  12.9*   PLT   --    --   160  143*   < >   --  "   < >   --    < >  402   INR   --    --    --    --    --   1.02   --    --    --   0.95   NA  139  143   --   141   < >   --    < >   --    < >   --    POTASSIUM  5.2  4.6   --   4.2   < >   --    < >   --    < >   --    CR  0.78  1.02   --   0.93   < >   --    < >   --    < >   --    A1C   --   5.7   --    --    --    --    --   6.0   --    --     < > = values in this interval not displayed.        IMPRESSION:                                                    Reason for surgery/procedure: Cataract   Diagnosis/reason for consult: Preop evaluation    The proposed surgical procedure is considered LOW risk.    REVISED CARDIAC RISK INDEX  The patient has the following serious cardiovascular risks for perioperative complications such as (MI, PE, VFib and 3  AV Block):  No serious cardiac risks  INTERPRETATION: 0 risks: Class I (very low risk - 0.4% complication rate)    The patient has the following additional risks for perioperative complications:  The 10-year ASCVD risk score (Waldron CHRISTA Jr, et al., 2013) is: 26.6%    Values used to calculate the score:      Age: 78 years      Sex: Male      Is Non- : No      Diabetic: No      Tobacco smoker: No      Systolic Blood Pressure: 126 mmHg      Is BP treated: Yes      HDL Cholesterol: 83 mg/dL      Total Cholesterol: 233 mg/dL      ICD-10-CM    1. Preop general physical exam Z01.818        RECOMMENDATIONS:                                                          --Patient is to take all scheduled medications on the day of surgery EXCEPT for modifications listed below.    Anticoagulant or Antiplatelet Medication Use  ASPIRIN: Discontinue ASA 7-10 days prior to procedure to reduce bleeding risk.  It should be resumed post-operatively.  NSAIDS: 1 week prior to the procedure        ACE Inhibitor or Angiotensin Receptor Blocker (ARB) Use  Ace inhibitor or Angiotensin Receptor Blocker (ARB) and should HOLD this medication for the 24 hours prior to  surgery.      APPROVAL GIVEN to proceed with proposed procedure, without further diagnostic evaluation       Signed Electronically by: Verna Osborne MD    Copy of this evaluation report is provided to requesting physician.    [facility] Preop Guidelines

## 2018-02-15 ENCOUNTER — ANESTHESIA EVENT (OUTPATIENT)
Dept: SURGERY | Facility: CLINIC | Age: 78
End: 2018-02-15
Payer: COMMERCIAL

## 2018-02-15 ENCOUNTER — ANESTHESIA (OUTPATIENT)
Dept: SURGERY | Facility: CLINIC | Age: 78
End: 2018-02-15
Payer: COMMERCIAL

## 2018-02-15 ENCOUNTER — HOSPITAL ENCOUNTER (OUTPATIENT)
Facility: CLINIC | Age: 78
Discharge: HOME | End: 2018-02-15
Attending: OPHTHALMOLOGY | Admitting: OPHTHALMOLOGY
Payer: COMMERCIAL

## 2018-02-15 VITALS
WEIGHT: 200 LBS | SYSTOLIC BLOOD PRESSURE: 131 MMHG | BODY MASS INDEX: 31.39 KG/M2 | DIASTOLIC BLOOD PRESSURE: 80 MMHG | TEMPERATURE: 96.9 F | OXYGEN SATURATION: 97 % | RESPIRATION RATE: 16 BRPM | HEIGHT: 67 IN

## 2018-02-15 PROCEDURE — 36000101 ZZH EYE SURGERY LEVEL 3 1ST 30 MIN: Performed by: OPHTHALMOLOGY

## 2018-02-15 PROCEDURE — 27210794 ZZH OR GENERAL SUPPLY STERILE: Performed by: OPHTHALMOLOGY

## 2018-02-15 PROCEDURE — 40000170 ZZH STATISTIC PRE-PROCEDURE ASSESSMENT II: Performed by: OPHTHALMOLOGY

## 2018-02-15 PROCEDURE — 25000125 ZZHC RX 250: Performed by: SURGERY

## 2018-02-15 PROCEDURE — 25000125 ZZHC RX 250: Performed by: OPHTHALMOLOGY

## 2018-02-15 PROCEDURE — 37000008 ZZH ANESTHESIA TECHNICAL FEE, 1ST 30 MIN: Performed by: OPHTHALMOLOGY

## 2018-02-15 PROCEDURE — 25000128 H RX IP 250 OP 636: Performed by: SURGERY

## 2018-02-15 PROCEDURE — 71000028 ZZH EYE RECOVERY PHASE 2 EACH 15 MINS: Performed by: OPHTHALMOLOGY

## 2018-02-15 PROCEDURE — 25000125 ZZHC RX 250: Performed by: NURSE ANESTHETIST, CERTIFIED REGISTERED

## 2018-02-15 PROCEDURE — V2632 POST CHMBR INTRAOCULAR LENS: HCPCS | Performed by: OPHTHALMOLOGY

## 2018-02-15 PROCEDURE — 25000128 H RX IP 250 OP 636: Performed by: NURSE ANESTHETIST, CERTIFIED REGISTERED

## 2018-02-15 PROCEDURE — 25000128 H RX IP 250 OP 636: Performed by: OPHTHALMOLOGY

## 2018-02-15 DEVICE — EYE IMP IOL AMO PCL TECNIS ZCB00 20.5: Type: IMPLANTABLE DEVICE | Site: EYE | Status: FUNCTIONAL

## 2018-02-15 RX ORDER — TROPICAMIDE 10 MG/ML
1 SOLUTION/ DROPS OPHTHALMIC
Status: COMPLETED | OUTPATIENT
Start: 2018-02-15 | End: 2018-02-15

## 2018-02-15 RX ORDER — PHENYLEPHRINE HYDROCHLORIDE 25 MG/ML
1 SOLUTION/ DROPS OPHTHALMIC
Status: COMPLETED | OUTPATIENT
Start: 2018-02-15 | End: 2018-02-15

## 2018-02-15 RX ORDER — ONDANSETRON 2 MG/ML
4 INJECTION INTRAMUSCULAR; INTRAVENOUS EVERY 30 MIN PRN
Status: DISCONTINUED | OUTPATIENT
Start: 2018-02-15 | End: 2018-02-15 | Stop reason: HOSPADM

## 2018-02-15 RX ORDER — PROPARACAINE HYDROCHLORIDE 5 MG/ML
1 SOLUTION/ DROPS OPHTHALMIC ONCE
Status: COMPLETED | OUTPATIENT
Start: 2018-02-15 | End: 2018-02-15

## 2018-02-15 RX ORDER — LIDOCAINE HYDROCHLORIDE 10 MG/ML
INJECTION, SOLUTION EPIDURAL; INFILTRATION; INTRACAUDAL; PERINEURAL PRN
Status: DISCONTINUED | OUTPATIENT
Start: 2018-02-15 | End: 2018-02-15 | Stop reason: HOSPADM

## 2018-02-15 RX ORDER — SODIUM CHLORIDE, SODIUM LACTATE, POTASSIUM CHLORIDE, CALCIUM CHLORIDE 600; 310; 30; 20 MG/100ML; MG/100ML; MG/100ML; MG/100ML
INJECTION, SOLUTION INTRAVENOUS CONTINUOUS
Status: DISCONTINUED | OUTPATIENT
Start: 2018-02-15 | End: 2018-02-15 | Stop reason: HOSPADM

## 2018-02-15 RX ORDER — MEPERIDINE HYDROCHLORIDE 25 MG/ML
12.5 INJECTION INTRAMUSCULAR; INTRAVENOUS; SUBCUTANEOUS
Status: DISCONTINUED | OUTPATIENT
Start: 2018-02-15 | End: 2018-02-15 | Stop reason: HOSPADM

## 2018-02-15 RX ORDER — ONDANSETRON 2 MG/ML
INJECTION INTRAMUSCULAR; INTRAVENOUS PRN
Status: DISCONTINUED | OUTPATIENT
Start: 2018-02-15 | End: 2018-02-15

## 2018-02-15 RX ORDER — ONDANSETRON 4 MG/1
4 TABLET, ORALLY DISINTEGRATING ORAL EVERY 30 MIN PRN
Status: DISCONTINUED | OUTPATIENT
Start: 2018-02-15 | End: 2018-02-15 | Stop reason: HOSPADM

## 2018-02-15 RX ORDER — SODIUM CHLORIDE, SODIUM LACTATE, POTASSIUM CHLORIDE, CALCIUM CHLORIDE 600; 310; 30; 20 MG/100ML; MG/100ML; MG/100ML; MG/100ML
500 INJECTION, SOLUTION INTRAVENOUS CONTINUOUS
Status: DISCONTINUED | OUTPATIENT
Start: 2018-02-15 | End: 2018-02-15 | Stop reason: HOSPADM

## 2018-02-15 RX ORDER — BALANCED SALT SOLUTION 6.4; .75; .48; .3; 3.9; 1.7 MG/ML; MG/ML; MG/ML; MG/ML; MG/ML; MG/ML
SOLUTION OPHTHALMIC PRN
Status: DISCONTINUED | OUTPATIENT
Start: 2018-02-15 | End: 2018-02-15 | Stop reason: HOSPADM

## 2018-02-15 RX ORDER — NALOXONE HYDROCHLORIDE 0.4 MG/ML
.1-.4 INJECTION, SOLUTION INTRAMUSCULAR; INTRAVENOUS; SUBCUTANEOUS
Status: DISCONTINUED | OUTPATIENT
Start: 2018-02-15 | End: 2018-02-15 | Stop reason: HOSPADM

## 2018-02-15 RX ORDER — TETRACAINE HYDROCHLORIDE 5 MG/ML
SOLUTION OPHTHALMIC PRN
Status: DISCONTINUED | OUTPATIENT
Start: 2018-02-15 | End: 2018-02-15 | Stop reason: HOSPADM

## 2018-02-15 RX ORDER — MOXIFLOXACIN 5 MG/ML
1 SOLUTION/ DROPS OPHTHALMIC
Status: COMPLETED | OUTPATIENT
Start: 2018-02-15 | End: 2018-02-15

## 2018-02-15 RX ORDER — DICLOFENAC SODIUM 1 MG/ML
1 SOLUTION/ DROPS OPHTHALMIC
Status: COMPLETED | OUTPATIENT
Start: 2018-02-15 | End: 2018-02-15

## 2018-02-15 RX ADMIN — TROPICAMIDE 1 DROP: 10 SOLUTION/ DROPS OPHTHALMIC at 08:30

## 2018-02-15 RX ADMIN — SODIUM CHLORIDE, POTASSIUM CHLORIDE, SODIUM LACTATE AND CALCIUM CHLORIDE 500 ML: 600; 310; 30; 20 INJECTION, SOLUTION INTRAVENOUS at 08:41

## 2018-02-15 RX ADMIN — LIDOCAINE HYDROCHLORIDE 0.5 ML: 10 INJECTION, SOLUTION EPIDURAL; INFILTRATION; INTRACAUDAL; PERINEURAL at 08:41

## 2018-02-15 RX ADMIN — MOXIFLOXACIN 1 DROP: 5 SOLUTION/ DROPS OPHTHALMIC at 08:23

## 2018-02-15 RX ADMIN — DEXMEDETOMIDINE HYDROCHLORIDE 8 MCG: 100 INJECTION, SOLUTION INTRAVENOUS at 09:15

## 2018-02-15 RX ADMIN — MOXIFLOXACIN 1 DROP: 5 SOLUTION/ DROPS OPHTHALMIC at 08:35

## 2018-02-15 RX ADMIN — MIDAZOLAM 1 MG: 1 INJECTION INTRAMUSCULAR; INTRAVENOUS at 09:14

## 2018-02-15 RX ADMIN — DICLOFENAC SODIUM 1 DROP: 1 SOLUTION/ DROPS OPHTHALMIC at 08:23

## 2018-02-15 RX ADMIN — MIDAZOLAM 1 MG: 1 INJECTION INTRAMUSCULAR; INTRAVENOUS at 09:10

## 2018-02-15 RX ADMIN — DICLOFENAC SODIUM 1 DROP: 1 SOLUTION/ DROPS OPHTHALMIC at 08:35

## 2018-02-15 RX ADMIN — PHENYLEPHRINE HYDROCHLORIDE 1 DROP: 2.5 SOLUTION/ DROPS OPHTHALMIC at 08:23

## 2018-02-15 RX ADMIN — ONDANSETRON 4 MG: 2 INJECTION INTRAMUSCULAR; INTRAVENOUS at 09:18

## 2018-02-15 RX ADMIN — DEXMEDETOMIDINE HYDROCHLORIDE 8 MCG: 100 INJECTION, SOLUTION INTRAVENOUS at 09:12

## 2018-02-15 RX ADMIN — PHENYLEPHRINE HYDROCHLORIDE 1 DROP: 2.5 SOLUTION/ DROPS OPHTHALMIC at 08:30

## 2018-02-15 RX ADMIN — DICLOFENAC SODIUM 1 DROP: 1 SOLUTION/ DROPS OPHTHALMIC at 08:30

## 2018-02-15 RX ADMIN — TROPICAMIDE 1 DROP: 10 SOLUTION/ DROPS OPHTHALMIC at 08:35

## 2018-02-15 RX ADMIN — PHENYLEPHRINE HYDROCHLORIDE 1 DROP: 2.5 SOLUTION/ DROPS OPHTHALMIC at 08:35

## 2018-02-15 RX ADMIN — MOXIFLOXACIN 1 DROP: 5 SOLUTION/ DROPS OPHTHALMIC at 08:30

## 2018-02-15 RX ADMIN — TROPICAMIDE 1 DROP: 10 SOLUTION/ DROPS OPHTHALMIC at 08:23

## 2018-02-15 RX ADMIN — PROPARACAINE HYDROCHLORIDE 1 DROP: 5 SOLUTION/ DROPS OPHTHALMIC at 08:23

## 2018-02-15 NOTE — ANESTHESIA CARE TRANSFER NOTE
Patient: Medardo Gautam    Procedure(s):  LEFT EYE CATARACT EXTRACTION WITH STANDARD INTRAOCULAR LENS IMPLANT  - Wound Class: I-Clean    Diagnosis: LEFT EYE CATARACT  Diagnosis Additional Information: No value filed.    Anesthesia Type:   MAC     Note:  Airway :Room Air  Patient transferred to:Phase II  Comments: [facility] in recliner with armrests up, spontaneous respirations, O2 saturation maintained greater than 95% with oxygen via room air. All monitors and alarms on and functioning, clinically stable vital signs. Report given to recovery RN and questions answered. Patient alert and following verbal directions.Handoff Report: Identifed the Patient, Identified the Reponsible Provider, Reviewed the pertinent medical history, Discussed the surgical course, Reviewed Intra-OP anesthesia mangement and issues during anesthesia, Set expectations for post-procedure period and Allowed opportunity for questions and acknowledgement of understanding      Vitals: (Last set prior to Anesthesia Care Transfer)    CRNA VITALS  2/15/2018 0900 - 2/15/2018 0933      2/15/2018             NIBP: 137/70    NIBP Mean: 93                Electronically Signed By: JAISON Mclaughlin CRNA  February 15, 2018  9:33 AM

## 2018-02-15 NOTE — ANESTHESIA POSTPROCEDURE EVALUATION
Patient: Medardo Gautam    Procedure(s):  LEFT EYE CATARACT EXTRACTION WITH STANDARD INTRAOCULAR LENS IMPLANT  - Wound Class: I-Clean    Diagnosis:LEFT EYE CATARACT  Diagnosis Additional Information: No value filed.    Anesthesia Type:  MAC    Note:  Anesthesia Post Evaluation    Patient location during evaluation: PACU  Patient participation: Able to fully participate in evaluation  Level of consciousness: awake and alert  Pain management: adequate  Airway patency: patent  Cardiovascular status: acceptable and hemodynamically stable  Respiratory status: acceptable and unassisted  Hydration status: acceptable  PONV: none     Anesthetic complications: None          Last vitals:  Vitals:    02/15/18 0831 02/15/18 0935 02/15/18 0940   BP: 143/84 119/88 131/80   Resp: 16 16 16   Temp: 36.1  C (96.9  F)     SpO2: 96% 97% 97%         Electronically Signed By: Stevo Syed MD  February 15, 2018  3:41 PM

## 2018-02-15 NOTE — DISCHARGE INSTRUCTIONS
"[facility] Discharge  Instructions  Anesthesia ([facility])   Adult Discharge Instructions    For 24 hours after surgery    1. Get plenty of rest.  Make arrangements to have a responsible adult stay with you for at least 6 hours after you leave the hospital.  2. Do not drive or use heavy equipment for 24 hours.    3. Do not drink alcohol for 24 hours.  4. Do not sign legal documents or make important decisions for 24 hours.  5. Avoid strenuous or risky activities. You may feel lightheaded.  If so, sit for a few minutes before standing.  Have someone help you get up.   6. Conscious sedation patients may resume a regular diet..  7. Any questions of medical nature, call your physician.    Cataract Surgery Postoperative Instructions  Dr. Brandon Orellana      Postoperative Medications: After surgery, you will use eye drop medications. In most cases, you will start these eye drops 2 days before the day of surgery.   Follow the directions provided to you from the pharmacy or from the doctor's office.    The drops might sting a little when they are instilled, and that is normal.    It doesn't matter what order you put the drops into your eyes, but you should wait at least one minute between drops.    Recently we started using a compounded drop that contains all three prescriptions in one bottle. If you have this drop you only need to use one drop 4 time per day. Many people choose to do the drops at breakfast, lunch, dinner, and bedtime.    Artificial Tears- Are lubricating drops used to moisturize the eye.  You can use these as much as you want.  Particularly if your eyes feel watery, gritty, or uncomfortable.  Chilling these drops in the refrigerator results in a more soothing feeling.  There are several brands of artificial tears available including, but not limited to, Optive, Refresh, Systane, Blink, Genteal, Soothe, and others.  You should not use drops that are \"gets the red " "out.\"  You do not need a prescription for these medications.     Please continue any glaucoma, dry eye, or other medications you were using prior to the surgery.      Please allow 24 to 48 hours when requesting refills, and call BEFORE you run out of drops.    Restriction on Activities-  It is extremely important that you DO NOT RUB THE TREATED EYE.    - You will be given a clear plastic shield to wear as protection over your eye the night after surgery.    - Refrain from many activities that may put your eye at risk of injury, as well as areas containing a high volume of chemicals, dust, and debris.    - Do Not wear any eye makeup or moisturizer around the eye for 1 week after surgery.    - Do Not swim or go into a hot-tub, jacuzzi, or sauna for 1 week following surgery.  You can take showers as normal, but avoid getting shampoo or soap into your eyes.     - Avoid strenuous activity, including lifting more than 10 pounds, for 1 week after surgery.       - It is fine to bathe, read, watch TV, and use the computer.    Symptoms requiring medical attention:     - Sudden onset of increased discharge from the eye.  - Persistent or increasing pain in the eye.  - Sudden decrease in vision.  - Persistent nausea or vomiting.      If you have any questions or concerns before or after your surgery, please contact Dr. Orellana's office at [phone].         Revised 3/27/2017  "

## 2018-02-15 NOTE — IP AVS SNAPSHOT
[facility]    [address]    [place] [address]    Phone:  [phone]    Fax:  [phone]                                       After Visit Summary   2/15/2018    Medardo Gautam    MRN: [id number]           After Visit Summary Signature Page     I have received my discharge instructions, and my questions have been answered. I have discussed any challenges I see with this plan with the nurse or doctor.    ..........................................................................................................................................  Patient/Patient Representative Signature      ..........................................................................................................................................  Patient Representative Print Name and Relationship to Patient    ..................................................               ................................................  Date                                            Time    ..........................................................................................................................................  Reviewed by Signature/Title    ...................................................              ..............................................  Date                                                            Time

## 2018-02-15 NOTE — IP AVS SNAPSHOT
MRN:[id number]                      After Visit Summary   2/15/2018    Medardo Gautam    MRN: [id number]           Thank you!     Thank you for choosing [facility] for your care. Our goal is always to provide you with excellent care. Hearing back from our patients is one way we can continue to improve our services. Please take a few minutes to complete the written survey that you may receive in the mail after you visit with us. Thank you!        Patient Information     Date Of Birth          1940        About your hospital stay     You were admitted on:  February 15, 2018 You last received care in the:  [facility]    You were discharged on:  February 15, 2018       Who to Call     For medical emergencies, please call 911.  For non-urgent questions about your medical care, please call your primary care provider or clinic, [phone]  For questions related to your surgery, please call your surgery clinic        Attending Provider     Provider Specialty    Brandon Orellana MD Ophthalmology       Primary Care Provider Office Phone # Fax #    Verna Osborne [phone]      Your next 10 appointments already scheduled     Feb 16, 2018  3:20 PM CST   Return Visit with Yamini Ruffin OD   [facility]    [address] [place] [id number] [phone]            Feb 21, 2018 10:20 AM CST   [facility] Visit with Shawn Venegas OD   [facility]    [address] [place] [id number] [phone]            Mar 01, 2018   Procedure with Brandon Orellana MD   [facility] (--)    [address]  [facility] [address]   [phone]            Mar 02, 2018 10:20 AM CST   Return Visit with Shawn Venegas OD   [facility]    [address] [place] [address]  [facility] [address]   [phone]            Mar 07, 2018 11:00 AM CST   Return Visit with Shawn Venegas OD   [facility]    [address] [place] [address]   [phone]            Apr 30, 2018 11:00 AM CDT   (Arrive by 10:45 AM)   Survivorship Visit with Magali Andrews PA-C   [facility] ([facility])    [address] [facility]  [address]  [facility] [address]   [phone]            May 11, 2018 12:20 PM CDT   Return Visit with Shawn Venegas OD   [facility] ([facility])    [address] [place] [address]   [phone]            Jul 18, 2018 12:00 PM CDT   (Arrive by 11:45 AM)   Return Prostate Cancer with Norma Goodwin MD   [facility] ([facility])    [address] [place] [address]  [facility] [address]   [phone]              Further instructions from your care team       [facility] Discharge  Instructions  Anesthesia ([facility])   Adult Discharge Instructions    For 24 hours after surgery    1. Get plenty of rest.  Make arrangements to have a responsible adult stay with you for at least 6 hours after you leave the hospital.  2. Do not drive or use heavy equipment for 24 hours.    3. Do not drink alcohol for 24 hours.  4. Do not sign legal documents or make important decisions for 24 hours.  5. Avoid strenuous or risky activities. You may feel lightheaded.  If so, sit for a few minutes before standing.  Have someone help you get up.   6. Conscious sedation patients may resume a regular diet..  7. Any questions of medical nature, call your physician.    Cataract Surgery Postoperative Instructions  Dr. Brandon Orellana      Postoperative Medications: After surgery, you will use eye drop medications. In most  "cases, you will start these eye drops 2 days before the day of surgery.   Follow the directions provided to you from the pharmacy or from the doctor's office.    The drops might sting a little when they are instilled, and that is normal.    It doesn't matter what order you put the drops into your eyes, but you should wait at least one minute between drops.    Recently we started using a compounded drop that contains all three prescriptions in one bottle. If you have this drop you only need to use one drop 4 time per day. Many people choose to do the drops at breakfast, lunch, dinner, and bedtime.    Artificial Tears- Are lubricating drops used to moisturize the eye.  You can use these as much as you want.  Particularly if your eyes feel watery, gritty, or uncomfortable.  Chilling these drops in the refrigerator results in a more soothing feeling.  There are several brands of artificial tears available including, but not limited to, Optive, Refresh, Systane, Blink, Genteal, Soothe, and others.  You should not use drops that are \"gets the red out.\"  You do not need a prescription for these medications.     Please continue any glaucoma, dry eye, or other medications you were using prior to the surgery.      Please allow 24 to 48 hours when requesting refills, and call BEFORE you run out of drops.    Restriction on Activities-  It is extremely important that you DO NOT RUB THE TREATED EYE.    - You will be given a clear plastic shield to wear as protection over your eye the night after surgery.    - Refrain from many activities that may put your eye at risk of injury, as well as areas containing a high volume of chemicals, dust, and debris.    - Do Not wear any eye makeup or moisturizer around the eye for 1 week after surgery.    - Do Not swim or go into a hot-tub, jacuzzi, or sauna for 1 week following surgery.  You can take showers as normal, but avoid getting shampoo or soap into your eyes.     - Avoid strenuous " "activity, including lifting more than 10 pounds, for 1 week after surgery.       - It is fine to bathe, read, watch TV, and use the computer.    Symptoms requiring medical attention:     - Sudden onset of increased discharge from the eye.  - Persistent or increasing pain in the eye.  - Sudden decrease in vision.  - Persistent nausea or vomiting.      If you have any questions or concerns before or after your surgery, please contact Dr. Orellana's office at [phone].         Revised 3/27/2017    Pending Results     No orders found from 2/13/2018 to 2/16/2018.            Admission Information     Date & Time Provider Department Dept. Phone    2/15/2018 Brandon Orellana MD [facility] [phone]      Your Vitals Were     Blood Pressure Temperature Respirations Height Weight Pulse Oximetry    131/80 96.9  F (36.1  C) (Temporal) 16 1.702 m (5' 7\") 90.7 kg (200 lb) 97%    BMI (Body Mass Index)                   31.32 kg/m2           [employer] Information     [employer] gives you secure access to your electronic health record. If you see a primary care provider, you can also send messages to your care team and make appointments. If you have questions, please call your primary care clinic.  If you do not have a primary care provider, please call [phone] and they will assist you.        Care EveryWhere ID     This is your Care EveryWhere ID. This could be used by other organizations to access your [facility] medical records  [id number]        Equal Access to Services     RENEE CORONA : Hadii torrey hano Socarolinaali, waaxda luqadaha, qaybta kaalmada evelia, randy salamanca . So [facility] [phone].    ATENCIÓN: Si habla español, tiene a sanford disposición servicios gratuitos de asistencia lingüística. Llame al [phone].    We comply with applicable federal civil rights laws and [place] laws. We do not discriminate on the basis of race, color, national origin, age, " disability, sex, sexual orientation, or gender identity.               Review of your medicines      UNREVIEWED medicines. Ask your doctor about these medicines        Dose / Directions    aspirin 81 MG tablet   Used for:  Hyperlipidemia LDL goal <130, Malignant neoplasm of colon, unspecified part of colon (H)        Dose:  81 mg   Take 1 tablet (81 mg) by mouth daily   Quantity:  30 tablet   Refills:  0       azelastine 0.05 % Soln ophthalmic solution   Commonly known as:  OPTIVAR   Used for:  Allergic conjunctivitis, bilateral        Dose:  1 drop   Apply 1 drop to eye 2 times daily   Quantity:  1 Bottle   Refills:  6       bicalutamide 50 MG tablet   Commonly known as:  CASODEX   Used for:  Malignant neoplasm of prostate (H)        Dose:  50 mg   Take 1 tablet (50 mg) by mouth daily   Quantity:  90 tablet   Refills:  3       calcium-vitamin D 600-400 MG-UNIT per tablet   Commonly known as:  CALTRATE   Used for:  Hyperlipidemia LDL goal <130, Malignant neoplasm of colon, unspecified part of colon (H)        Dose:  1 tablet   Take 1 tablet by mouth daily   Refills:  0       CLARITIN PO        Take  by mouth. As needed   Refills:  0       clonazePAM 1 MG tablet   Commonly known as:  klonoPIN   Used for:  Anxiety        Dose:  1 mg   Take 1 tablet (1 mg) by mouth nightly as needed for anxiety   Quantity:  30 tablet   Refills:  5       folic acid-vit B6-vit B12 0.8-10-0.115 MG Tabs per tablet   Commonly known as:  FOLGARD   Used for:  Hyperlipidemia LDL goal <130, Malignant neoplasm of colon, unspecified part of colon (H)        Dose:  1 tablet   Take 1 tablet by mouth daily   Refills:  0       IBUPROFEN PO        Dose:  400 mg   Take 400 mg by mouth every 8 hours as needed for moderate pain   Refills:  0       lisinopril 10 MG tablet   Commonly known as:  PRINIVIL/ZESTRIL   Used for:  Benign essential hypertension        Dose:  10 mg   Take 1 tablet (10 mg) by mouth daily   Quantity:  90 tablet   Refills:  3        naproxen 500 MG tablet   Commonly known as:  NAPROSYN   Used for:  Chronic gout without tophus, unspecified cause, unspecified site        TAKE ONE TABLET BY MOUTH TWICE DAILY AS NEEDED FOR  MODERATE  PAIN   Quantity:  60 tablet   Refills:  1       OMEGA-3 FISH OIL PO   Used for:  Hyperlipidemia LDL goal <130, Malignant neoplasm of colon, unspecified part of colon (H)        Dose:  500 mg   Take 500 mg by mouth daily   Refills:  0       sildenafil 100 MG tablet   Commonly known as:  VIAGRA   Used for:  Malignant neoplasm of prostate (H)        1/2 tab three times a week   Quantity:  10 tablet   Refills:  12       triamcinolone 0.1 % cream   Commonly known as:  KENALOG        Refills:  0       zolpidem 5 MG tablet   Commonly known as:  AMBIEN   Used for:  Persistent disorder of initiating or maintaining sleep        Dose:  5 mg   Take 1 tablet (5 mg) by mouth nightly as needed for sleep   Quantity:  30 tablet   Refills:  5                Protect others around you: Learn how to safely use, store and throw away your medicines at www.disposemymeds.org.             Medication List: This is a list of all your medications and when to take them. Check marks below indicate your daily home schedule. Keep this list as a reference.      Medications           Morning Afternoon Evening Bedtime As Needed    aspirin 81 MG tablet   Take 1 tablet (81 mg) by mouth daily                                azelastine 0.05 % Soln ophthalmic solution   Commonly known as:  OPTIVAR   Apply 1 drop to eye 2 times daily                                bicalutamide 50 MG tablet   Commonly known as:  CASODEX   Take 1 tablet (50 mg) by mouth daily                                calcium-vitamin D 600-400 MG-UNIT per tablet   Commonly known as:  CALTRATE   Take 1 tablet by mouth daily                                CLARITIN PO   Take  by mouth. As needed                                clonazePAM 1 MG tablet   Commonly known as:  klonoPIN   Take 1 tablet  (1 mg) by mouth nightly as needed for anxiety                                folic acid-vit B6-vit B12 0.8-10-0.115 MG Tabs per tablet   Commonly known as:  FOLGARD   Take 1 tablet by mouth daily                                IBUPROFEN PO   Take 400 mg by mouth every 8 hours as needed for moderate pain                                lisinopril 10 MG tablet   Commonly known as:  PRINIVIL/ZESTRIL   Take 1 tablet (10 mg) by mouth daily                                naproxen 500 MG tablet   Commonly known as:  NAPROSYN   TAKE ONE TABLET BY MOUTH TWICE DAILY AS NEEDED FOR  MODERATE  PAIN                                OMEGA-3 FISH OIL PO   Take 500 mg by mouth daily                                sildenafil 100 MG tablet   Commonly known as:  VIAGRA   1/2 tab three times a week                                triamcinolone 0.1 % cream   Commonly known as:  KENALOG                                zolpidem 5 MG tablet   Commonly known as:  AMBIEN   Take 1 tablet (5 mg) by mouth nightly as needed for sleep

## 2018-02-15 NOTE — OP NOTE
PATIENT NAME:  Medardo Gautam    :  1940    PATIENT NUMBER:  [id number]    DATE OF SURGERY:  2/15/2018    SURGEON:  Brandon Orellana M.D.    PREOPERATIVE DIAGNOSIS: Cataract left eye.    POSTOPERATIVE DIAGNOSIS:  Same    PROCEDURE PERFORMED:    1. Phacoemulsification with posterior chamber intraocular lens left eye.    ANESTHESIA:  Topical/MAC    COMPLICATIONS:  None    PROCEDURE: Following adequate preoperative dilation the patient was given topical anesthesia consisting of Proparacaine.  The patient was brought to the operative suite where the eye was prepped and draped in the usual sterile fashion.  A lid speculum was applied. A super sharp blade was used to create a paracentesis, through which 1% preservative free Lidocaine was injected.  Visoelastic was then used to inflate the anterior chamber.  A biplanar incision at the clear cornea limbus was created with a keratome.  A continuous curvilinear capsulorrhexis was started with a cystitome and completed using Utrata forceps.  The lens was hydrodissected and hydro delineated using BSS on a cannula.  The lens nucleus was removed using phacoemulsification.  Remaining cortex was removed using irrigation and aspiration.  Viscoelastic was injected to inflate the capsular bag and a 20.5 D ZCB00 IOL was inserted into the capsular bag without difficulty.  Residual viscoelastic and provisc material was removed with irrigation and aspiration.  BSS was used to hydrate the corneal incision and paracentesis sites which were checked and noted to be watertight.  A drop of Vigamox was applied to the eye and a clear plastic shield was placed.  The patient tolerated the procedure well and left the operative suite in stable condition.    Brandon Orellana M.D.

## 2018-02-15 NOTE — ANESTHESIA PREPROCEDURE EVALUATION
Procedure: Procedure(s):  PHACOEMULSIFICATION CLEAR CORNEA WITH STANDARD INTRAOCULAR LENS IMPLANT  Preop diagnosis: LEFT EYE CATARACT    No Known Allergies  Past Medical History:   Diagnosis Date     Anxiety      Colon cancer (H) 01/2015     Contracture of finger joint ca 2005    left and right fifth fingers     Dupuytren's contracture of left hand      Gout      HEMORRHOIDS NOS 6/4/2007     Hypertension      Insomnia      Nonsenile cataract      Personal history of colonic polyps 7 years ago?     Prostate cancer (H) Feb 2012     Renal cyst 6/22/2017     Seborrheic dermatitis      Skin lesion- R side of face and behind L ear 12/15/2011     SKIN SENSATION DISTURB 12/29/2006    allergic reaction to strawberries     SKIN SENSATION DISTURB 12/29/2006     Past Surgical History:   Procedure Laterality Date     APPENDECTOMY       BIOPSY  01/16/15     C HAND/FINGER SURGERY UNLISTED       C LENGTHEN,TENDON,HAND/FINGER  ca 2007    right fifth     C STOMACH SURGERY PROCEDURE UNLISTED       COLON SURGERY  2/11/2015    Lap assisted R hemicolectomy     COLONOSCOPY  10/25/07    Snare polypectomy     COLONOSCOPY  6/25/2009    with snare polypectomy     COLONOSCOPY  9/30/2009     COLONOSCOPY  1/5/2011    COLONOSCOPY performed by JUD MARIEE at  GI     COLONOSCOPY N/A 1/16/2015    Procedure: COMBINED COLONOSCOPY, SINGLE OR MULTIPLE BIOPSY/POLYPECTOMY BY BIOPSY;  Surgeon: Sarah Beth Pisano MD;  Location:  OR     COLONOSCOPY WITH CO2 INSUFFLATION N/A 1/16/2015    Procedure: COLONOSCOPY WITH CO2 INSUFFLATION;  Surgeon: Sarah Beth Pisano MD;  Location:  OR     DAVINCI PROSTATECTOMY  8/6/2012    Procedure: DAVINCI PROSTATECTOMY;  Davinci Assisted Radical Prostatectomy with Bilateral Lymphadenectomy ;  Surgeon: Norma Goodwin MD;  Location: [facility] OR     GENITOURINARY SURGERY      prostate surgery     INJECT EPIDURAL LUMBAR / SACRAL SINGLE Left 10/30/2017    Procedure: INJECT EPIDURAL LUMBAR / SACRAL SINGLE;  Left  Transforaminal Lumbar 4-Lumbar 5 Epidural Steroid Injection;  Surgeon: Nuvia Reina MD;  Location: [facility] OR     LAPAROSCOPIC ASSISTED COLECTOMY N/A 2/11/2015    Procedure: LAPAROSCOPIC ASSISTED COLECTOMY;  Surgeon: Oren Breen MD;  Location:  OR     Social History   Substance Use Topics     Smoking status: Former Smoker     Years: 1.00     Types: Cigarettes, Cigars, Pipe     Start date: 10/1/1961     Quit date: 1/1/1962     Smokeless tobacco: Never Used      Comment: No smokers in home     Alcohol use 0.0 oz/week      Comment: daily glass of wine and whiskey     Prior to Admission medications    Medication Sig Start Date End Date Taking? Authorizing Provider   bicalutamide (CASODEX) 50 MG tablet Take 1 tablet (50 mg) by mouth daily 1/16/18  Yes Bev Kaplan PA   triamcinolone (KENALOG) 0.1 % cream  12/26/17  Yes Reported, Patient   lisinopril (PRINIVIL/ZESTRIL) 10 MG tablet Take 1 tablet (10 mg) by mouth daily 1/9/18  Yes Verna Osborne MD   clonazePAM (KLONOPIN) 1 MG tablet Take 1 tablet (1 mg) by mouth nightly as needed for anxiety 2/9/18  Yes Verna Osborne MD   zolpidem (AMBIEN) 5 MG tablet Take 1 tablet (5 mg) by mouth nightly as needed for sleep 2/9/18  Yes Verna Osborne MD   azelastine (OPTIVAR) 0.05 % SOLN ophthalmic solution Apply 1 drop to eye 2 times daily 11/29/17  Yes Shawn Venegas, ALEXEY   Omega-3 Fatty Acids (OMEGA-3 FISH OIL PO) Take 500 mg by mouth daily   Yes Reported, Patient   naproxen (NAPROSYN) 500 MG tablet TAKE ONE TABLET BY MOUTH TWICE DAILY AS NEEDED FOR  MODERATE  PAIN 10/13/17   Verna Osborne MD   IBUPROFEN PO Take 400 mg by mouth every 8 hours as needed for moderate pain    Reported, Patient   calcium-vitamin D (CALTRATE) 600-400 MG-UNIT per tablet Take 1 tablet by mouth daily    Reported, Patient   folic acid-vit B6-vit B12 (FOLGARD) 0.8-10-0.115 MG TABS Take 1 tablet by mouth daily    Reported, Patient   aspirin 81 MG tablet Take 1 tablet (81 mg) by  mouth daily 4/25/16   Job Hermosillo MD   sildenafil (VIAGRA) 100 MG tablet 1/2 tab three times a week  Patient not taking: Reported on 2/12/2018 10/23/13   Norma Goodwin MD   Loratadine (CLARITIN PO) Take  by mouth. As needed     Reported, Patient     Current Facility-Administered Medications Ordered in Epic   Medication Dose Route Frequency Last Rate Last Dose     phenylephrine (MYDFRIN /SARTHAK-SYNEPHRINE) 2.5 % ophthalmic solution 1 drop  1 drop Ophthalmic q5 Min Prior to Surgery   1 drop at 02/15/18 0823     tropicamide (MYDRIACYL) 1 % ophthalmic solution 1 drop  1 drop Ophthalmic q5 Min Prior to Surgery   1 drop at 02/15/18 0823     moxifloxacin (VIGAMOX) 0.5 % ophthalmic solution 1 drop  1 drop Ophthalmic q5 Min Prior to Surgery   1 drop at 02/15/18 0823     diclofenac (VOLTAREN) 0.1 % ophthalmic solution 1 drop  1 drop Ophthalmic q5 Min Prior to Surgery   1 drop at 02/15/18 0823     lidocaine 1 % 1 mL  1 mL Other Q1H PRN         lactated ringers infusion  500 mL Intravenous Continuous         No current [facility]-ordered outpatient prescriptions on file.       lactated ringers       Wt Readings from Last 1 Encounters:   02/12/18 90.7 kg (200 lb)     Temp Readings from Last 1 Encounters:   02/12/18 36.6  C (97.8  F) (Oral)     BP Readings from Last 6 Encounters:   02/12/18 126/82   01/16/18 (!) 146/91   01/09/18 122/80   11/20/17 142/78   10/30/17 146/83   10/16/17 150/89     Pulse Readings from Last 4 Encounters:   02/12/18 68   01/16/18 57   01/09/18 62   11/20/17 55     Resp Readings from Last 1 Encounters:   02/12/18 16     SpO2 Readings from Last 1 Encounters:   02/12/18 98%     Recent Labs   Lab Test  01/09/18   1345  09/07/17   0951   NA  139  143   POTASSIUM  5.2  4.6   CHLORIDE  109  107   CO2  21  29   ANIONGAP  9  7   GLC  135*  120*   BUN  14  15   CR  0.78  1.02   GAVIN  9.0  9.4     Recent Labs   Lab Test  01/09/18   1345  09/07/17   0951  03/18/17   1639   AST  60*  54*  42   ALT  81*  60  53   ALKPHOS   76  63  62   BILITOTAL  0.5  0.8  1.3   LIPASE   --    --   331     Recent Labs   Lab Test  04/24/17   1041  03/18/17   1639   WBC  4.3  5.2   HGB  14.2  14.3   PLT  160  143*     Recent Labs   Lab Test  02/04/15   1029   ABO  A   RH   Neg     Recent Labs   Lab Test  01/27/15   1514  05/04/12   1139   01/04/10   0924   INR  1.02  0.95   < >  1.04   PTT   --    --    --   27    < > = values in this interval not displayed.      Recent Labs   Lab Test  03/18/17   1639   TROPI  <0.015  The 99th percentile for upper reference range is 0.045 ug/L.  Troponin values in   the range of 0.045 - 0.120 ug/L may be associated with risks of adverse   clinical events.       No results for input(s): PH, PCO2, PO2, HCO3 in the last 87998 hours.  No results for input(s): HCG in the last 12118 hours.  No results found for this or any previous visit (from the past 744 hour(s)).    RECENT LABS:   ECG:   ECHO:       Anesthesia Evaluation     .             ROS/MED HX    ENT/Pulmonary:       Neurologic:     (+)other neuro lumbar DDD, radiculopathy    Cardiovascular:     (+) Dyslipidemia, hypertension--CAD, --. : . . . :. .       METS/Exercise Tolerance:     Hematologic:         Musculoskeletal:   (+) arthritis, , , other musculoskeletal- gout      GI/Hepatic:     (+) Other GI/Hepatic colon CA      Renal/Genitourinary:     (+) chronic renal disease, Other Renal/ Genitourinary, renal cyst, prostate CA      Endo:         Psychiatric:     (+) psychiatric history anxiety and other (comment) (insomnia)      Infectious Disease:         Malignancy:         Other:                     Physical Exam  Normal systems: dental    Airway   Mallampati: II  TM distance: >3 FB  Neck ROM: full    Dental     Cardiovascular   Rhythm and rate: regular and normal      Pulmonary    breath sounds clear to auscultation                    Anesthesia Plan      History & Physical Review  History and physical reviewed and following examination; no interval change.    ASA  Status:  3 .    NPO Status:  > 8 hours    Plan for MAC Reason for MAC:  Deep or markedly invasive procedure (G8)  PONV prophylaxis:  Ondansetron (or other 5HT-3)       Postoperative Care  Postoperative pain management:  IV analgesics.      Consents  Anesthetic plan, risks, benefits and alternatives discussed with:  Patient..                          .

## 2018-02-16 ENCOUNTER — OFFICE VISIT (OUTPATIENT)
Dept: OPTOMETRY | Facility: CLINIC | Age: 78
End: 2018-02-16
Payer: COMMERCIAL

## 2018-02-16 DIAGNOSIS — Z96.1 PSEUDOPHAKIA: Primary | ICD-10-CM

## 2018-02-16 PROCEDURE — 99024 POSTOP FOLLOW-UP VISIT: CPT | Performed by: OPTOMETRIST

## 2018-02-16 ASSESSMENT — VISUAL ACUITY
OS_PH_SC: 20/50
OS_SC: 20/150
OD_SC+: +2
METHOD: SNELLEN - LINEAR
OD_SC: 20/70

## 2018-02-16 ASSESSMENT — SLIT LAMP EXAM - LIDS
COMMENTS: NORMAL
COMMENTS: NORMAL

## 2018-02-16 ASSESSMENT — EXTERNAL EXAM - LEFT EYE: OS_EXAM: NORMAL

## 2018-02-16 ASSESSMENT — EXTERNAL EXAM - RIGHT EYE: OD_EXAM: NORMAL

## 2018-02-16 ASSESSMENT — TONOMETRY: OS_IOP_MMHG: 17

## 2018-02-16 NOTE — PATIENT INSTRUCTIONS
Continue prescription drops as directed and keep all follow up eye appointments. Follow the directions on your bottles regarding how many drops to use daily.      Continue to wear eye shield every night for the first week after surgery.      Yamini Ruffin O.D.  [facility]  [address] [place] [address]  Tel: [phone]

## 2018-02-16 NOTE — MR AVS SNAPSHOT
After Visit Summary   2/16/2018    Medardo Gautam    MRN: [id number]           Patient Information     Date Of Birth          1940        Visit Information        Provider Department      2/16/2018 3:20 PM Yamini Ruffin OD [facility]        Today's Diagnoses     Pseudophakia    -  1      Care Instructions    Continue prescription drops as directed and keep all follow up eye appointments. Follow the directions on your bottles regarding how many drops to use daily.      Continue to wear eye shield every night for the first week after surgery.      Yamini Ruffin O.D.  [facility]  [address] [place] [address]  Tel: [phone]            Follow-ups after your visit        Your next 10 appointments already scheduled     Feb 21, 2018 10:20 AM CST   Return Visit with Shawn Venegas OD   [facility])    [address] [place] [address]   [phone]            Mar 01, 2018   Procedure with Brandon Orellana MD   [facility] (--)    [address] [address]   [phone]            Mar 02, 2018 10:20 AM CST   Return Visit with Shawn Venegas OD   [facility])    [address] [place] [address]   [phone]            Mar 07, 2018 11:00 AM CST   Return Visit with Shawn Venegas OD   [email or web address] ([email or web address]    [address] [place] [address]   [phone]            Apr 30, 2018 11:00 AM CDT   (Arrive by 10:45 AM)   Survivorship Visit with Magali Andrews PA-C   [facility] ([facility])    [address] [place] [address]  [address]  [facility] [address] [phone]            May 11, 2018 12:20 PM CDT   Return Visit with Shawn Venegas OD   [facility]  [facility])    [address] [place] [address] [phone]            Jul 18, 2018 12:00 PM CDT   (Arrive by 11:45 AM)   Return Prostate Cancer with Norma Goodwin MD   [facility]rolog[facility] ([facility])    [address] [facility]  4th [facility] [address] [phone]              Who to contact     If you have questions or need follow up information about today's clinic visit or your schedule please contact [facility] directly at [phone].  Normal or non-critical lab and imaging results will be communicated to you by MyChart, letter or phone within 4 business days after the clinic has received the results. If you do not hear from us within 7 days, please contact the clinic through [employer] or phone. If you have a critical or abnormal lab result, we will notify you by phone as soon as possible.  Submit refill requests through [employer] or call your pharmacy and they will forward the refill request to us. Please allow 3 business days for your refill to be completed.          Additional Information About Your Visit        MyChart Information     [employer] gives you secure access to your electronic health record. If you see a primary care provider, you can also send messages to your care team and make appointments. If you have questions, please call your primary care clinic.  If you do not have a primary care provider, please call [phone] and they will assist you.        Care EveryWhere ID     This is your Care EveryWhere ID. This could be used by other organizations to access your [place] medical records  [id number]         Blood Pressure from Last 3 Encounters:   02/15/18 131/80   02/12/18 126/82   01/16/18 (!) 146/91    Weight from Last 3 Encounters:   02/15/18 90.7 kg (200 lb)   02/12/18 90.7 kg (200 lb)   01/16/18 90.1 kg (198 lb 9.6 oz)              Today, you had the following      No orders found for display       Primary Care Provider Office Phone # Fax #    Verna Osborne [phone]       [address] [facility] [address]  [facility] [address]        Equal Access to Services     RENEE COORNA : Hadii aad ku hadgeorgebarak Soshamika, waaxda luqadaha, qaybta kaalmada adevielkayada, randy lyricin hayaacasey lucasmitraen spivey. So [facility] [phone].    ATENCIÓN: Si habla español, tiene a sanford disposición servicios gratuitos de asistencia lingüística. [facility] [phone].    We comply with applicable federal civil rights laws and [place] laws. We do not discriminate on the basis of race, color, national origin, age, disability, sex, sexual orientation, or gender identity.            Thank you!     Thank you for choosing [facility]  for your care. Our goal is always to provide you with excellent care. Hearing back from our patients is one way we can continue to improve our services. Please take a few minutes to complete the written survey that you may receive in the mail after your visit with us. Thank you!             Your Updated Medication List - Protect others around you: Learn how to safely use, store and throw away your medicines at www.disposemymeds.org.          This list is accurate as of 2/16/18  3:35 PM.  Always use your most recent med list.                   Brand Name Dispense Instructions for use Diagnosis    aspirin 81 MG tablet     30 tablet    Take 1 tablet (81 mg) by mouth daily    Hyperlipidemia LDL goal <130, Malignant neoplasm of colon, unspecified part of colon (H)       azelastine 0.05 % Soln ophthalmic solution    OPTIVAR    1 Bottle    Apply 1 drop to eye 2 times daily    Allergic conjunctivitis, bilateral       bicalutamide 50 MG tablet    CASODEX    90 tablet    Take 1 tablet (50 mg) by mouth daily    Malignant neoplasm of prostate (H)       calcium-vitamin D 600-400 MG-UNIT per tablet    CALTRATE     Take 1 tablet by mouth daily    Hyperlipidemia LDL  goal <130, Malignant neoplasm of colon, unspecified part of colon (H)       CLARITIN PO      Take  by mouth. As needed        clonazePAM 1 MG tablet    klonoPIN    30 tablet    Take 1 tablet (1 mg) by mouth nightly as needed for anxiety    Anxiety       folic acid-vit B6-vit B12 0.8-10-0.115 MG Tabs per tablet    FOLGARD     Take 1 tablet by mouth daily    Hyperlipidemia LDL goal <130, Malignant neoplasm of colon, unspecified part of colon (H)       IBUPROFEN PO      Take 400 mg by mouth every 8 hours as needed for moderate pain        lisinopril 10 MG tablet    PRINIVIL/ZESTRIL    90 tablet    Take 1 tablet (10 mg) by mouth daily    Benign essential hypertension       naproxen 500 MG tablet    NAPROSYN    60 tablet    TAKE ONE TABLET BY MOUTH TWICE DAILY AS NEEDED FOR  MODERATE  PAIN    Chronic gout without tophus, unspecified cause, unspecified site       OMEGA-3 FISH OIL PO      Take 500 mg by mouth daily    Hyperlipidemia LDL goal <130, Malignant neoplasm of colon, unspecified part of colon (H)       sildenafil 100 MG tablet    VIAGRA    10 tablet    1/2 tab three times a week    Malignant neoplasm of prostate (H)       triamcinolone 0.1 % cream    KENALOG          zolpidem 5 MG tablet    AMBIEN    30 tablet    Take 1 tablet (5 mg) by mouth nightly as needed for sleep    Persistent disorder of initiating or maintaining sleep

## 2018-02-16 NOTE — LETTER
2/16/2018         RE: Medardo Gautam  [address] [facility] [address]        Dear Colleague,    Thank you for referring your patient, Medardo Gautam, to the [facility]. Please see a copy of my visit note below.    CHIEF COMPLAINT:   Chief Complaint   Patient presents with     Surgical Followup     1 day OS CAT     Type of Surgery:  LEFT EYE CATARACT EXTRACTION WITH STANDARD INTRAOCULAR LENS IMPLANT   Date of Surgery:  OS - 2/15/2018; OD scheduled for 3/1/2018    Pt is using drops.  Burns a little after he uses his drops.  No concerns.        Drops reviewed.    OBJECTIVE:     See ophthalmology exam    ASSESSMENT:         ICD-10-CM    1. Pseudophakia Z96.1      PLAN:      Patient Instructions   Continue prescription drops as directed and keep all follow up eye appointments. Follow the directions on your bottles regarding how many drops to use daily.      Continue to wear eye shield every night for the first week after surgery.      Yamini Ruffin O.D.  [facility]  [address] [place] [address]  Tel: [phone]        Again, thank you for allowing me to participate in the care of your patient.        Sincerely,        Yamini Ruffin, OD

## 2018-02-16 NOTE — PROGRESS NOTES
CHIEF COMPLAINT:   Chief Complaint   Patient presents with     Surgical Followup     1 day OS CAT     Type of Surgery:  LEFT EYE CATARACT EXTRACTION WITH STANDARD INTRAOCULAR LENS IMPLANT   Date of Surgery:  OS - 2/15/2018; OD scheduled for 3/1/2018    Pt is using drops.  Burns a little after he uses his drops.  No concerns.        Drops reviewed.    OBJECTIVE:     See ophthalmology exam    ASSESSMENT:         ICD-10-CM    1. Pseudophakia Z96.1      PLAN:      Patient Instructions   Continue prescription drops as directed and keep all follow up eye appointments. Follow the directions on your bottles regarding how many drops to use daily.      Continue to wear eye shield every night for the first week after surgery.      Yamini Ruffin O.D.  [facility]  [address] [place] [address]  Tel: [phone]

## 2018-02-21 ENCOUNTER — OFFICE VISIT (OUTPATIENT)
Dept: OPTOMETRY | Facility: CLINIC | Age: 78
End: 2018-02-21
Payer: COMMERCIAL

## 2018-02-21 DIAGNOSIS — Z96.1 PSEUDOPHAKIA OF LEFT EYE: Primary | ICD-10-CM

## 2018-02-21 PROCEDURE — 99024 POSTOP FOLLOW-UP VISIT: CPT | Performed by: OPTOMETRIST

## 2018-02-21 ASSESSMENT — SLIT LAMP EXAM - LIDS
COMMENTS: NORMAL
COMMENTS: NORMAL

## 2018-02-21 ASSESSMENT — VISUAL ACUITY
OS_SC: 20/150
OS_PH_SC: 20/40-2
OD_PH_SC: 20/40
OD_SC: 20/150
METHOD: SNELLEN - LINEAR

## 2018-02-21 ASSESSMENT — TONOMETRY
OS_IOP_MMHG: 21
IOP_METHOD: TONOPEN

## 2018-02-21 ASSESSMENT — EXTERNAL EXAM - LEFT EYE: OS_EXAM: NORMAL

## 2018-02-21 ASSESSMENT — EXTERNAL EXAM - RIGHT EYE: OD_EXAM: NORMAL

## 2018-02-21 NOTE — LETTER
2/21/2018         RE: Medardo Gautam  [address] [facility] [address]        Dear Colleague,    Thank you for referring your patient, Medardo Gautam, to the [facility]. Please see a copy of my visit note below.    CHIEF COMPLAINT:   Chief Complaint   Patient presents with     Surgical Followup     1 week postop cataract os eye       Type of surgery cataract    Date of surgery 2- os eye    Taylor Teran, Optometric Assistant, A.B.O.C.     Drops reviewed.    OBJECTIVE:     See ophthalmology exam    ASSESSMENT:         ICD-10-CM    1. Pseudophakia of left eye Z96.1 POST-OP FOLLOW-UP VISIT     PLAN:      Patient Instructions   Continue drops as directed.    Ok to discontinue shield while sleeping.  All restrictions are lifted.    Vision will be blurry due to uncorrected astigmatism.    Keep follow up appointments as scheduled.       Please continue any glaucoma, dry eye, or other medications you were using prior to the surgery.        Shawn Venegas OD  [facility]  [address] [place] [address]  Tel- [phone]        Again, thank you for allowing me to participate in the care of your patient.        Sincerely,        Shawn Venegas OD

## 2018-02-21 NOTE — MR AVS SNAPSHOT
After Visit Summary   2/21/2018    Medardo Gautam    MRN: [id number]           Patient Information     Date Of Birth          1940        Visit Information        Provider Department      2/21/2018 10:20 AM Shawn Venegas OD [facility]        Today's Diagnoses     Pseudophakia of left eye    -  1      Care Instructions    Continue drops as directed.    Ok to discontinue shield while sleeping.  All restrictions are lifted.    Keep follow up appointments as scheduled.       Please continue any glaucoma, dry eye, or other medications you were using prior to the surgery.        Shawn Venegas OD  [facility]  [address]  [place] [address]  Tel- [phone]            Follow-ups after your visit        Your next 10 appointments already scheduled     Mar 01, 2018   Procedure with Brandon Orellana MD   [facility] (--)    [address] [address]   [phone]            Mar 02, 2018 10:20 AM CST   Return Visit with Shanw Venegas OD   [facility])    [address] [place] [address]   [phone]            Mar 07, 2018 11:00 AM CST   Return Visit with Shawn Venegas OD   [facility] ([facility])    [address] [facility] [address] [phone]            Apr 30, 2018 11:00 AM CDT   (Arrive by 10:45 AM)   Survivorship Visit with Magali Andrews PA-C   [facility] ([facility])    [address] [place] [address]  [address] [place] [address]   [phone]            May 11, 2018 12:20 PM CDT   Return Visit with Shawn Venegas OD   [facility])    [address] [place] [address]   [phone]            Jul 18, 2018 12:00 PM CDT   (Arrive by 11:45 AM)   Return Prostate Cancer with Norma GONZALES  MD Christa   [facility] for Prostate and Urologic Cancers ([facility])    [address] [facility]  [address] [facility] [address] [phone]              Who to contact     If you have questions or need follow up information about today's clinic visit or your schedule please contact [facility] directly at [phone].  Normal or non-critical lab and imaging results will be communicated to you by MyChart, letter or phone within 4 business days after the clinic has received the results. If you do not hear from us within 7 days, please contact the clinic through [employer] or phone. If you have a critical or abnormal lab result, we will notify you by phone as soon as possible.  Submit refill requests through [employer] or call your pharmacy and they will forward the refill request to us. Please allow 3 business days for your refill to be completed.          Additional Information About Your Visit        [employer] Information     [employer] gives you secure access to your electronic health record. If you see a primary care provider, you can also send messages to your care team and make appointments. If you have questions, please call your primary care clinic.  If you do not have a primary care provider, please call [phone] and they will assist you.      S[employer]full is an electronic gateway that provides easy, online access to your medical records. With [employer], you can request a clinic appointment, read your test results, renew a prescription or communicate with your care team.     To access your existing account, please contact your [facility] or call [phone] for assistance.        Care EveryWhere ID     This is your Care EveryWhere ID. This could be used by other organizations to access your [place] medical records  [id number]         Blood Pressure from Last 3 Encounters:   02/15/18 131/80   02/12/18 126/82   01/16/18 (!)  146/91    Weight from Last 3 Encounters:   02/15/18 90.7 kg (200 lb)   02/12/18 90.7 kg (200 lb)   01/16/18 90.1 kg (198 lb 9.6 oz)              We Performed the Following     POST-OP FOLLOW-UP VISIT        Primary Care Provider Office Phone # Fax #    Verna Osborne [phone]       [address] [facility] [address]  [facility] [address]        Equal Access to Services     RENEE CORONA : Hadii aad ku hadasho Soomaali, waaxda luqadaha, qaybta kaalmada adeegyada, waxay idiin hayaan shivam salamanca . So [place] [facility] [phone].    ATENCIÓN: Si habla español, tiene a sanford disposición servicios gratuitos de asistencia lingüística. [facility] [phone].    We comply with applicable federal civil rights laws and [place] laws. We do not discriminate on the basis of race, color, national origin, age, disability, sex, sexual orientation, or gender identity.            Thank you!     Thank you for choosing [facility]  for your care. Our goal is always to provide you with excellent care. Hearing back from our patients is one way we can continue to improve our services. Please take a few minutes to complete the written survey that you may receive in the mail after your visit with us. Thank you!             Your Updated Medication List - Protect others around you: Learn how to safely use, store and throw away your medicines at www.disposemymeds.org.          This list is accurate as of 2/21/18 10:24 AM.  Always use your most recent med list.                   Brand Name Dispense Instructions for use Diagnosis    aspirin 81 MG tablet     30 tablet    Take 1 tablet (81 mg) by mouth daily    Hyperlipidemia LDL goal <130, Malignant neoplasm of colon, unspecified part of colon (H)       azelastine 0.05 % Soln ophthalmic solution    OPTIVAR    1 Bottle    Apply 1 drop to eye 2 times daily    Allergic conjunctivitis, bilateral       bicalutamide 50 MG tablet    CASODEX    90 tablet    Take 1 tablet (50 mg) by  mouth daily    Malignant neoplasm of prostate (H)       calcium-vitamin D 600-400 MG-UNIT per tablet    CALTRATE     Take 1 tablet by mouth daily    Hyperlipidemia LDL goal <130, Malignant neoplasm of colon, unspecified part of colon (H)       CLARITIN PO      Take  by mouth. As needed        clonazePAM 1 MG tablet    klonoPIN    30 tablet    Take 1 tablet (1 mg) by mouth nightly as needed for anxiety    Anxiety       folic acid-vit B6-vit B12 0.8-10-0.115 MG Tabs per tablet    FOLGARD     Take 1 tablet by mouth daily    Hyperlipidemia LDL goal <130, Malignant neoplasm of colon, unspecified part of colon (H)       IBUPROFEN PO      Take 400 mg by mouth every 8 hours as needed for moderate pain        lisinopril 10 MG tablet    PRINIVIL/ZESTRIL    90 tablet    Take 1 tablet (10 mg) by mouth daily    Benign essential hypertension       naproxen 500 MG tablet    NAPROSYN    60 tablet    TAKE ONE TABLET BY MOUTH TWICE DAILY AS NEEDED FOR  MODERATE  PAIN    Chronic gout without tophus, unspecified cause, unspecified site       OMEGA-3 FISH OIL PO      Take 500 mg by mouth daily    Hyperlipidemia LDL goal <130, Malignant neoplasm of colon, unspecified part of colon (H)       sildenafil 100 MG tablet    VIAGRA    10 tablet    1/2 tab three times a week    Malignant neoplasm of prostate (H)       triamcinolone 0.1 % cream    KENALOG          zolpidem 5 MG tablet    AMBIEN    30 tablet    Take 1 tablet (5 mg) by mouth nightly as needed for sleep    Persistent disorder of initiating or maintaining sleep

## 2018-02-21 NOTE — PATIENT INSTRUCTIONS
Continue drops as directed.    Ok to discontinue shield while sleeping.  All restrictions are lifted.    Vision will be blurry due to uncorrected astigmatism.    Keep follow up appointments as scheduled.       Please continue any glaucoma, dry eye, or other medications you were using prior to the surgery.        Shawn Venegas, OD  [facility]  [address] [place] [address]  Tel- [phone]

## 2018-02-21 NOTE — PROGRESS NOTES
CHIEF COMPLAINT:   Chief Complaint   Patient presents with     Surgical Followup     1 week postop cataract os eye       Type of surgery cataract    Date of surgery 2- os eye    Taylor Teran, Optometric Assistant, A.B.O.C.     Drops reviewed.    OBJECTIVE:     See ophthalmology exam    ASSESSMENT:         ICD-10-CM    1. Pseudophakia of left eye Z96.1 POST-OP FOLLOW-UP VISIT     PLAN:      Patient Instructions   Continue drops as directed.    Ok to discontinue shield while sleeping.  All restrictions are lifted.    Vision will be blurry due to uncorrected astigmatism.    Keep follow up appointments as scheduled.       Please continue any glaucoma, dry eye, or other medications you were using prior to the surgery.        Shawn Venegas, OD  [facility]  [address] [place] [address]  Tel- [phone]

## 2018-02-28 NOTE — H&P (VIEW-ONLY)
[address]  [facility] [address]  [phone]  Dept: [phone]    PRE-OP EVALUATION:  Today's date: 2018    Medardo Gautam (: 1940) presents for pre-operative evaluation assessment as requested by Dr. Orellana.  He requires evaluation and anesthesia risk assessment prior to undergoing surgery/procedure for treatment of Cataracts .  Proposed procedure: LEFT EYE CATARACT EXTRACTION WITH STANDARD INTRAOCULAR LENS IMPLANT (MACTOP)    Date of Surgery/ Procedure: 2/15/18- left and 3/1/18- right  Time of Surgery/ Procedure: 9:30AM  Hospital/Surgical Facility: [facility]  [id number]  Primary Physician: Verna Osborne  Type of Anesthesia Anticipated: Topical and Mac    Patient has a Health Care Directive or Living Will:  YES     Preop Questions 2018   1.  Do you have a history of heart attack, stroke, stent, bypass or surgery on an artery in the head, neck, heart or legs? No   2.  Do you ever have any pain or discomfort in your chest? No   3.  Do you have a history of  Heart Failure? No   4.   Are you troubled by shortness of breath when:  walking on a level surface, or up a slight hill, or at night? No   5.  Do you currently have a cold, bronchitis or other respiratory infection? No   6.  Do you have a cough, shortness of breath, or wheezing? No   7.  Do you sometimes get pains in the calves of your legs when you walk? No   8. Do you or anyone in your family have previous history of blood clots? No   9.  Do you or does anyone in your family have a serious bleeding problem such as prolonged bleeding following surgeries or cuts? No   10. Have you ever had problems with anemia or been told to take iron pills? No   11. Have you had any abnormal blood loss such as black, tarry or bloody stools? No   12. Have you ever had a blood transfusion? No   13. Have you or any of your relatives ever had problems with anesthesia? No   14. Do you have sleep apnea,  excessive snoring or daytime drowsiness? No   15. Do you have any prosthetic heart valves? No   16. Do you have prosthetic joints? No         HPI:                                                      Brief HPI related to upcoming procedure: Patient is a very pleasant 78-year-old with history of hypertension, hyperlipidemia, gout, prostate cancer who is here for a preop evaluation for his scheduled cataract surgery.      See problem list for active medical problems.  Problems all longstanding and stable, except as noted/documented.  See ROS for pertinent symptoms related to these conditions.                                                                                                  .  HYPERTENSION - Patient has longstanding history of mod-severe HTN , currently denies any symptoms referable to elevated blood pressure. Specifically denies chest pain, palpitations, dyspnea, orthopnea, PND or peripheral edema. Blood pressure readings have been in normal range. Current medication regimen is as listed below. Patient denies any side effects of medication.                                                                                                                                                                                          .  HYPERLIPIDEMIA - Patient has a long history of significant Hyperlipidemia requiring medication for treatment with recent good control. Patient reports no problems or side effects with the medication.                                                                                                                                                       .    MEDICAL HISTORY:                                                      Patient Active Problem List    Diagnosis Date Noted     Meibomian gland dysfunction 11/29/2017     Priority: Medium     Lumbar radiculopathy 10/10/2017     Priority: Medium     Renal cyst 06/22/2017     Priority: Medium     Abdominal pain, epigastric 03/24/2017      Priority: Medium     Colon cancer (H) 02/09/2015     Priority: Medium     Anxiety 07/29/2014     Priority: Medium     Patient is followed by KIKA ZAVALA for ongoing prescription of benzodiazepines.  All refills should be approved by this provider, or covering partner.    Medication(s): Klonopin  .   Maximum quantity per month: 30  Clinic visit frequency required: Q 3 months     Controlled substance agreement on file: Yes       Date(s): 9/8/2016  Benzodiazepine use reviewed by psychiatry:  No    Last [facility] website verification:  none   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     Priority: Medium     Hyperlipidemia LDL goal <130 12/07/2012     Priority: Medium     Shoulder pain, right 12/07/2012     Priority: Medium     Essential hypertension with goal blood pressure less than 140/90 07/31/2012     Priority: Medium     Elevated liver enzymes 05/01/2012     Priority: Medium     Hyperbilirubinemia 05/01/2012     Priority: Medium     Prostate cancer- Jeancarlos 9 (5+4) dx Feb 2012 03/03/2012     Priority: Medium     Advanced directives, counseling/discussion 12/15/2011     Priority: Medium     Discussed advance care planning with patient; information given to patient to review.   Health care directive given to patient.  Recommended that he makes a copy for his medical record.        Dupuytren's contracture of hand- left 5th finger, right 5th finger 12/15/2011     Priority: Medium     Bunions- both feet 12/15/2011     Priority: Medium     Problem list name updated by automated process. Provider to review and confirm       Skin lesion- R side of face and behind L ear 12/15/2011     Priority: Medium     Insomnia 12/15/2011     Priority: Medium     Prostatic nodule- posterior right edge with rectal exam 12/15/2011     Priority: Medium     Gout 12/17/2007      Priority: Medium     Problem list name updated by automated process. Provider to review       Hemorrhoids 06/04/2007     Priority: Medium     Problem list name updated by automated process. Provider to review       Disturbance of skin sensation 12/29/2006     Priority: Medium      Past Medical History:   Diagnosis Date     Anxiety      Colon cancer (H) 01/2015     Contracture of finger joint ca 2005    left and right fifth fingers     Dupuytren's contracture of left hand      Gout      HEMORRHOIDS NOS 6/4/2007     Hypertension      Insomnia      Nonsenile cataract      Personal history of colonic polyps 7 years ago?     Prostate cancer (H) Feb 2012     Renal cyst 6/22/2017     Seborrheic dermatitis      Skin lesion- R side of face and behind L ear 12/15/2011     SKIN SENSATION DISTURB 12/29/2006    allergic reaction to strawberries     SKIN SENSATION DISTURB 12/29/2006     Past Surgical History:   Procedure Laterality Date     APPENDECTOMY       BIOPSY  01/16/15     C HAND/FINGER SURGERY UNLISTED       C LENGTHEN,TENDON,HAND/FINGER  ca 2007    right fifth     C STOMACH SURGERY PROCEDURE UNLISTED       COLON SURGERY  2/11/2015    Lap assisted R hemicolectomy     COLONOSCOPY  10/25/07    Snare polypectomy     COLONOSCOPY  6/25/2009    with snare polypectomy     COLONOSCOPY  9/30/2009     COLONOSCOPY  1/5/2011    COLONOSCOPY performed by JUD MARIEE at  GI     COLONOSCOPY N/A 1/16/2015    Procedure: COMBINED COLONOSCOPY, SINGLE OR MULTIPLE BIOPSY/POLYPECTOMY BY BIOPSY;  Surgeon: Sarah Beth Pisano MD;  Location: [facility] OR     COLONOSCOPY WITH CO2 INSUFFLATION N/A 1/16/2015    Procedure: COLONOSCOPY WITH CO2 INSUFFLATION;  Surgeon: Sarah Beth Pisano MD;  Location: [facility] OR     DAVINCI PROSTATECTOMY  8/6/2012    Procedure: DAVINCI PROSTATECTOMY;  Davinci Assisted Radical Prostatectomy with Bilateral Lymphadenectomy ;  Surgeon: Norma Goodwin MD;  Location: [facility] OR     INJECT EPIDURAL LUMBAR / SACRAL SINGLE Left  10/30/2017    Procedure: INJECT EPIDURAL LUMBAR / SACRAL SINGLE;  Left Transforaminal Lumbar 4-Lumbar 5 Epidural Steroid Injection;  Surgeon: Nuvia Reina MD;  Location: [facility] OR     LAPAROSCOPIC ASSISTED COLECTOMY N/A 2/11/2015    Procedure: LAPAROSCOPIC ASSISTED COLECTOMY;  Surgeon: Oren Breen MD;  Location: [facility] OR     Current Outpatient Prescriptions   Medication Sig Dispense Refill     bicalutamide (CASODEX) 50 MG tablet Take 1 tablet (50 mg) by mouth daily 90 tablet 3     triamcinolone (KENALOG) 0.1 % cream        lisinopril (PRINIVIL/ZESTRIL) 10 MG tablet Take 1 tablet (10 mg) by mouth daily 90 tablet 3     clonazePAM (KLONOPIN) 1 MG tablet Take 1 tablet (1 mg) by mouth nightly as needed for anxiety 30 tablet 5     zolpidem (AMBIEN) 5 MG tablet Take 1 tablet (5 mg) by mouth nightly as needed for sleep 30 tablet 5     azelastine (OPTIVAR) 0.05 % SOLN ophthalmic solution Apply 1 drop to eye 2 times daily 1 Bottle 6     naproxen (NAPROSYN) 500 MG tablet TAKE ONE TABLET BY MOUTH TWICE DAILY AS NEEDED FOR  MODERATE  PAIN 60 tablet 1     IBUPROFEN PO Take 400 mg by mouth every 8 hours as needed for moderate pain       Omega-3 Fatty Acids (OMEGA-3 FISH OIL PO) Take 500 mg by mouth daily       calcium-vitamin D (CALTRATE) 600-400 MG-UNIT per tablet Take 1 tablet by mouth daily       folic acid-vit B6-vit B12 (FOLGARD) 0.8-10-0.115 MG TABS Take 1 tablet by mouth daily       aspirin 81 MG tablet Take 1 tablet (81 mg) by mouth daily 30 tablet      Loratadine (CLARITIN PO) Take  by mouth. As needed        sildenafil (VIAGRA) 100 MG tablet 1/2 tab three times a week (Patient not taking: Reported on 2/12/2018) 10 tablet 12     OTC products: None, except as noted above    No Known Allergies   Latex Allergy: NO    Social History   Substance Use Topics     Smoking status: Former Smoker     Years: 1.00     Types: Cigarettes, Cigars, Pipe     Start date: 10/1/1961     Quit date: 1/1/1962     Smokeless tobacco: Never  "Used      Comment: No smokers in home     Alcohol use 0.0 oz/week      Comment: daily glass of wine and whiskey     History   Drug Use No       REVIEW OF SYSTEMS:                                                    Constitutional, neuro, ENT, endocrine, pulmonary, cardiac, gastrointestinal, genitourinary, musculoskeletal, integument and psychiatric systems are negative, except as otherwise noted.    EXAM:                                                    /82 (BP Location: Right arm, Patient Position: Chair, Cuff Size: Adult Regular)  Pulse 68  Temp 97.8  F (36.6  C) (Oral)  Resp 16  Ht 5' 7\" (1.702 m)  Wt 200 lb (90.7 kg)  SpO2 98%  BMI 31.32 kg/m2    GENERAL APPEARANCE: healthy, alert and no distress     EYES: EOMI,  PERRL     HENT: ear canals and TM's normal and nose and mouth without ulcers or lesions     NECK: no adenopathy, no asymmetry, masses, or scars and thyroid normal to palpation     RESP: lungs clear to auscultation - no rales, rhonchi or wheezes     CV: regular rates and rhythm, normal S1 S2, no S3 or S4 and no murmur, click or rub     ABDOMEN:  soft, nontender, no HSM or masses and bowel sounds normal     MS: extremities normal- no gross deformities noted, no evidence of inflammation in joints, FROM in all extremities.     SKIN: no suspicious lesions or rashes     NEURO: Normal strength and tone, sensory exam grossly normal, mentation intact and speech normal     PSYCH: mentation appears normal. and affect normal/bright     LYMPHATICS: No axillary, cervical, or supraclavicular nodes    DIAGNOSTICS:                                                    No labs or EKG required for low risk surgery (cataract, skin procedure, breast biopsy, etc)    Recent Labs   Lab Test  01/09/18   1345  09/07/17   0951  04/24/17   1041  03/18/17   1639   01/27/15   1514   10/30/14   1043   05/04/12   1139   HGB   --    --   14.2  14.3   < >   --    < >   --    < >  12.9*   PLT   --    --   160  143*   < >   --  "   < >   --    < >  402   INR   --    --    --    --    --   1.02   --    --    --   0.95   NA  139  143   --   141   < >   --    < >   --    < >   --    POTASSIUM  5.2  4.6   --   4.2   < >   --    < >   --    < >   --    CR  0.78  1.02   --   0.93   < >   --    < >   --    < >   --    A1C   --   5.7   --    --    --    --    --   6.0   --    --     < > = values in this interval not displayed.        IMPRESSION:                                                    Reason for surgery/procedure: Cataract   Diagnosis/reason for consult: Preop evaluation    The proposed surgical procedure is considered LOW risk.    REVISED CARDIAC RISK INDEX  The patient has the following serious cardiovascular risks for perioperative complications such as (MI, PE, VFib and 3  AV Block):  No serious cardiac risks  INTERPRETATION: 0 risks: Class I (very low risk - 0.4% complication rate)    The patient has the following additional risks for perioperative complications:  The 10-year ASCVD risk score ([place] CHRISTA Jr, et al., 2013) is: 26.6%    Values used to calculate the score:      Age: 78 years      Sex: Male      Is Non- : No      Diabetic: No      Tobacco smoker: No      Systolic Blood Pressure: 126 mmHg      Is BP treated: Yes      HDL Cholesterol: 83 mg/dL      Total Cholesterol: 233 mg/dL      ICD-10-CM    1. Preop general physical exam Z01.818        RECOMMENDATIONS:                                                          --Patient is to take all scheduled medications on the day of surgery EXCEPT for modifications listed below.    Anticoagulant or Antiplatelet Medication Use  ASPIRIN: Discontinue ASA 7-10 days prior to procedure to reduce bleeding risk.  It should be resumed post-operatively.  NSAIDS: 1 week prior to the procedure        ACE Inhibitor or Angiotensin Receptor Blocker (ARB) Use  Ace inhibitor or Angiotensin Receptor Blocker (ARB) and should HOLD this medication for the 24 hours prior to  surgery.      APPROVAL GIVEN to proceed with proposed procedure, without further diagnostic evaluation       Signed Electronically by: Verna Osborne MD    Copy of this evaluation report is provided to requesting physician.    [facility] Preop Guidelines

## 2018-03-01 ENCOUNTER — ANESTHESIA EVENT (OUTPATIENT)
Dept: SURGERY | Facility: CLINIC | Age: 78
End: 2018-03-01
Payer: COMMERCIAL

## 2018-03-01 ENCOUNTER — HOSPITAL ENCOUNTER (OUTPATIENT)
Facility: CLINIC | Age: 78
Discharge: HOME | End: 2018-03-01
Attending: OPHTHALMOLOGY | Admitting: OPHTHALMOLOGY
Payer: COMMERCIAL

## 2018-03-01 ENCOUNTER — ANESTHESIA (OUTPATIENT)
Dept: SURGERY | Facility: CLINIC | Age: 78
End: 2018-03-01
Payer: COMMERCIAL

## 2018-03-01 VITALS
OXYGEN SATURATION: 97 % | RESPIRATION RATE: 14 BRPM | DIASTOLIC BLOOD PRESSURE: 82 MMHG | SYSTOLIC BLOOD PRESSURE: 141 MMHG | TEMPERATURE: 96.7 F

## 2018-03-01 PROCEDURE — 25000128 H RX IP 250 OP 636: Performed by: OPHTHALMOLOGY

## 2018-03-01 PROCEDURE — 71000028 ZZH EYE RECOVERY PHASE 2 EACH 15 MINS: Performed by: OPHTHALMOLOGY

## 2018-03-01 PROCEDURE — 25000125 ZZHC RX 250: Performed by: ANESTHESIOLOGY

## 2018-03-01 PROCEDURE — V2632 POST CHMBR INTRAOCULAR LENS: HCPCS | Performed by: OPHTHALMOLOGY

## 2018-03-01 PROCEDURE — 37000008 ZZH ANESTHESIA TECHNICAL FEE, 1ST 30 MIN: Performed by: OPHTHALMOLOGY

## 2018-03-01 PROCEDURE — 25000125 ZZHC RX 250: Performed by: OPHTHALMOLOGY

## 2018-03-01 PROCEDURE — 36000101 ZZH EYE SURGERY LEVEL 3 1ST 30 MIN: Performed by: OPHTHALMOLOGY

## 2018-03-01 PROCEDURE — 40000170 ZZH STATISTIC PRE-PROCEDURE ASSESSMENT II: Performed by: OPHTHALMOLOGY

## 2018-03-01 PROCEDURE — 27210794 ZZH OR GENERAL SUPPLY STERILE: Performed by: OPHTHALMOLOGY

## 2018-03-01 PROCEDURE — 25000128 H RX IP 250 OP 636: Performed by: NURSE ANESTHETIST, CERTIFIED REGISTERED

## 2018-03-01 PROCEDURE — 25000128 H RX IP 250 OP 636: Performed by: ANESTHESIOLOGY

## 2018-03-01 DEVICE — EYE IMP IOL AMO PCL TECNIS ZCB00 20.5: Type: IMPLANTABLE DEVICE | Site: EYE | Status: FUNCTIONAL

## 2018-03-01 RX ORDER — LIDOCAINE HYDROCHLORIDE 10 MG/ML
INJECTION, SOLUTION EPIDURAL; INFILTRATION; INTRACAUDAL; PERINEURAL PRN
Status: DISCONTINUED | OUTPATIENT
Start: 2018-03-01 | End: 2018-03-01 | Stop reason: HOSPADM

## 2018-03-01 RX ORDER — ONDANSETRON 2 MG/ML
INJECTION INTRAMUSCULAR; INTRAVENOUS PRN
Status: DISCONTINUED | OUTPATIENT
Start: 2018-03-01 | End: 2018-03-01

## 2018-03-01 RX ORDER — DICLOFENAC SODIUM 1 MG/ML
1 SOLUTION/ DROPS OPHTHALMIC
Status: COMPLETED | OUTPATIENT
Start: 2018-03-01 | End: 2018-03-01

## 2018-03-01 RX ORDER — PHENYLEPHRINE HYDROCHLORIDE 25 MG/ML
1 SOLUTION/ DROPS OPHTHALMIC
Status: COMPLETED | OUTPATIENT
Start: 2018-03-01 | End: 2018-03-01

## 2018-03-01 RX ORDER — MOXIFLOXACIN 5 MG/ML
1 SOLUTION/ DROPS OPHTHALMIC
Status: COMPLETED | OUTPATIENT
Start: 2018-03-01 | End: 2018-03-01

## 2018-03-01 RX ORDER — BALANCED SALT SOLUTION 6.4; .75; .48; .3; 3.9; 1.7 MG/ML; MG/ML; MG/ML; MG/ML; MG/ML; MG/ML
SOLUTION OPHTHALMIC PRN
Status: DISCONTINUED | OUTPATIENT
Start: 2018-03-01 | End: 2018-03-01 | Stop reason: HOSPADM

## 2018-03-01 RX ORDER — PROPARACAINE HYDROCHLORIDE 5 MG/ML
1 SOLUTION/ DROPS OPHTHALMIC ONCE
Status: COMPLETED | OUTPATIENT
Start: 2018-03-01 | End: 2018-03-01

## 2018-03-01 RX ORDER — TETRACAINE HYDROCHLORIDE 5 MG/ML
SOLUTION OPHTHALMIC PRN
Status: DISCONTINUED | OUTPATIENT
Start: 2018-03-01 | End: 2018-03-01 | Stop reason: HOSPADM

## 2018-03-01 RX ORDER — SODIUM CHLORIDE, SODIUM LACTATE, POTASSIUM CHLORIDE, CALCIUM CHLORIDE 600; 310; 30; 20 MG/100ML; MG/100ML; MG/100ML; MG/100ML
INJECTION, SOLUTION INTRAVENOUS CONTINUOUS
Status: DISCONTINUED | OUTPATIENT
Start: 2018-03-01 | End: 2018-03-01 | Stop reason: HOSPADM

## 2018-03-01 RX ORDER — TROPICAMIDE 10 MG/ML
1 SOLUTION/ DROPS OPHTHALMIC
Status: COMPLETED | OUTPATIENT
Start: 2018-03-01 | End: 2018-03-01

## 2018-03-01 RX ADMIN — PHENYLEPHRINE HYDROCHLORIDE 1 DROP: 2.5 SOLUTION/ DROPS OPHTHALMIC at 11:22

## 2018-03-01 RX ADMIN — DICLOFENAC SODIUM 1 DROP: 1 SOLUTION/ DROPS OPHTHALMIC at 11:23

## 2018-03-01 RX ADMIN — MOXIFLOXACIN 1 DROP: 5 SOLUTION/ DROPS OPHTHALMIC at 11:13

## 2018-03-01 RX ADMIN — SODIUM CHLORIDE, POTASSIUM CHLORIDE, SODIUM LACTATE AND CALCIUM CHLORIDE: 600; 310; 30; 20 INJECTION, SOLUTION INTRAVENOUS at 11:36

## 2018-03-01 RX ADMIN — MIDAZOLAM 1 MG: 1 INJECTION INTRAMUSCULAR; INTRAVENOUS at 12:46

## 2018-03-01 RX ADMIN — MOXIFLOXACIN 1 DROP: 5 SOLUTION/ DROPS OPHTHALMIC at 11:31

## 2018-03-01 RX ADMIN — TROPICAMIDE 1 DROP: 10 SOLUTION/ DROPS OPHTHALMIC at 11:13

## 2018-03-01 RX ADMIN — ONDANSETRON 4 MG: 2 INJECTION INTRAMUSCULAR; INTRAVENOUS at 12:42

## 2018-03-01 RX ADMIN — PHENYLEPHRINE HYDROCHLORIDE 1 DROP: 2.5 SOLUTION/ DROPS OPHTHALMIC at 11:13

## 2018-03-01 RX ADMIN — MIDAZOLAM 2 MG: 1 INJECTION INTRAMUSCULAR; INTRAVENOUS at 12:39

## 2018-03-01 RX ADMIN — DICLOFENAC SODIUM 1 DROP: 1 SOLUTION/ DROPS OPHTHALMIC at 11:34

## 2018-03-01 RX ADMIN — LIDOCAINE HYDROCHLORIDE 1 ML: 10 INJECTION, SOLUTION EPIDURAL; INFILTRATION; INTRACAUDAL; PERINEURAL at 11:35

## 2018-03-01 RX ADMIN — DICLOFENAC SODIUM 1 DROP: 1 SOLUTION/ DROPS OPHTHALMIC at 11:14

## 2018-03-01 RX ADMIN — MOXIFLOXACIN 1 DROP: 5 SOLUTION/ DROPS OPHTHALMIC at 11:22

## 2018-03-01 RX ADMIN — TROPICAMIDE 1 DROP: 10 SOLUTION/ DROPS OPHTHALMIC at 11:22

## 2018-03-01 RX ADMIN — PHENYLEPHRINE HYDROCHLORIDE 1 DROP: 2.5 SOLUTION/ DROPS OPHTHALMIC at 11:31

## 2018-03-01 RX ADMIN — PROPARACAINE HYDROCHLORIDE 1 DROP: 5 SOLUTION/ DROPS OPHTHALMIC at 11:14

## 2018-03-01 RX ADMIN — TROPICAMIDE 1 DROP: 10 SOLUTION/ DROPS OPHTHALMIC at 11:31

## 2018-03-01 NOTE — ANESTHESIA POSTPROCEDURE EVALUATION
Patient: Medardo Gautam    Procedure(s):  RIGHT EYE CATARACT EXTRACTION WITH STANDARD INTRAOCULAR LENS IMPLANT  - Wound Class: I-Clean    Diagnosis:RIGHT EYE CATARACT  Diagnosis Additional Information: No value filed.    Anesthesia Type:  MAC    Note:  Anesthesia Post Evaluation    Patient location during evaluation: PACU  Patient participation: Able to fully participate in evaluation  Level of consciousness: awake and awake and alert  Pain management: adequate  Airway patency: patent  Cardiovascular status: acceptable  Respiratory status: acceptable  Hydration status: acceptable  PONV: none     Anesthetic complications: None          Last vitals:  Vitals:    03/01/18 1120 03/01/18 1300 03/01/18 1307   BP: 130/82 141/84 141/82   Resp: 16 14 14   Temp: 35.9  C (96.7  F)     SpO2: 97% 98% 97%         Electronically Signed By: Danae Parekh  March 1, 2018  3:04 PM

## 2018-03-01 NOTE — DISCHARGE INSTRUCTIONS
"[facility] Discharge  Instructions  Anesthesia ([facility])   Adult Discharge Instructions    For 24 hours after surgery    1. Get plenty of rest.  Make arrangements to have a responsible adult stay with you for at least 6 hours after you leave the hospital.  2. Do not drive or use heavy equipment for 24 hours.    3. Do not drink alcohol for 24 hours.  4. Do not sign legal documents or make important decisions for 24 hours.  5. Avoid strenuous or risky activities. You may feel lightheaded.  If so, sit for a few minutes before standing.  Have someone help you get up.   6. Conscious sedation patients may resume a regular diet..  7. Any questions of medical nature, call your physician.    Cataract Surgery Postoperative Instructions  Dr. Brandon Orellana      Postoperative Medications: After surgery, you will use eye drop medications. In most cases, you will start these eye drops 2 days before the day of surgery.   Follow the directions provided to you from the pharmacy or from the doctor's office.    The drops might sting a little when they are instilled, and that is normal.    It doesn't matter what order you put the drops into your eyes, but you should wait at least one minute between drops.    Recently we started using a compounded drop that contains all three prescriptions in one bottle. If you have this drop you only need to use one drop 4 time per day. Many people choose to do the drops at breakfast, lunch, dinner, and bedtime.    Artificial Tears- Are lubricating drops used to moisturize the eye.  You can use these as much as you want.  Particularly if your eyes feel watery, gritty, or uncomfortable.  Chilling these drops in the refrigerator results in a more soothing feeling.  There are several brands of artificial tears available including, but not limited to, Optive, Refresh, Systane, Blink, Genteal, Soothe, and others.  You should not use drops that are \"gets the red " "out.\"  You do not need a prescription for these medications.     Please continue any glaucoma, dry eye, or other medications you were using prior to the surgery.      Please allow 24 to 48 hours when requesting refills, and call BEFORE you run out of drops.    Restriction on Activities-  It is extremely important that you DO NOT RUB THE TREATED EYE.    - You will be given a clear plastic shield to wear as protection over your eye the night after surgery.    - Refrain from many activities that may put your eye at risk of injury, as well as areas containing a high volume of chemicals, dust, and debris.    - Do Not wear any eye makeup or moisturizer around the eye for 1 week after surgery.    - Do Not swim or go into a hot-tub, jacuzzi, or sauna for 1 week following surgery.  You can take showers as normal, but avoid getting shampoo or soap into your eyes.     - Avoid strenuous activity, including lifting more than 10 pounds, for 1 week after surgery.       - It is fine to bathe, read, watch TV, and use the computer.    Symptoms requiring medical attention:     - Sudden onset of increased discharge from the eye.  - Persistent or increasing pain in the eye.  - Sudden decrease in vision.  - Persistent nausea or vomiting.      If you have any questions or concerns before or after your surgery, please contact Dr. Orellana's office at [phone].         Revised 3/27/2017  "

## 2018-03-01 NOTE — IP AVS SNAPSHOT
[facility]    [address]    [place] [address]    Phone:  [phone]    Fax:  [phone]                                       After Visit Summary   3/1/2018    Medardo Gautam    MRN: [id number]           After Visit Summary Signature Page     I have received my discharge instructions, and my questions have been answered. I have discussed any challenges I see with this plan with the nurse or doctor.    ..........................................................................................................................................  Patient/Patient Representative Signature      ..........................................................................................................................................  Patient Representative Print Name and Relationship to Patient    ..................................................               ................................................  Date                                            Time    ..........................................................................................................................................  Reviewed by Signature/Title    ...................................................              ..............................................  Date                                                            Time

## 2018-03-01 NOTE — IP AVS SNAPSHOT
MRN:[id number]                      After Visit Summary   3/1/2018    Medardo Gautam    MRN: [id number]           Thank you!     Thank you for choosing [facility] for your care. Our goal is always to provide you with excellent care. Hearing back from our patients is one way we can continue to improve our services. Please take a few minutes to complete the written survey that you may receive in the mail after you visit with us. Thank you!        Patient Information     Date Of Birth          1940        About your hospital stay     You were admitted on:  March 1, 2018 You last received care in the:  [facility]    You were discharged on:  March 1, 2018       Who to Call     For medical emergencies, please call 911.  For non-urgent questions about your medical care, please call your primary care provider or clinic, [phone]  For questions related to your surgery, please call your surgery clinic        Attending Provider     Provider Specialty    Brandon Orellana MD Ophthalmology       Primary Care Provider Office Phone # Fax #    Verna Osborne [phone]      Your next 10 appointments already scheduled     Mar 02, 2018 10:20 AM CST   [facility] Shawn [facility]as OD   [facility]    [address] [place] [address]   [phone]            Mar 07, 2018 11:00 AM CST   Return Visit with Shawn Venegas OD   [facility] ([facility]    [address] [place] [address]   [phone]            Apr 30, 2018 11:00 AM CDT   (Arrive by 10:45 AM)   Survivorship Visit with Magali Andrews PA-C   [facility] ([facility])    [address]  [address]  [facility] [address] [phone]            May 11, 2018 12:20 PM CDT   Return Visit with Shawn Venegas OD   [facility] (  [facility])    [address] [place] [address]   [phone]            Jul 18, 2018 12:00 PM CDT   (Arrive by 11:45 AM)   Return Prostate Cancer with Norma Goodwin MD   [facility] ([facility])    [address]  [address] [facility] [place] [address] [phone]              Further instructions from your care team       [facility] Discharge  Instructions  Anesthesia ([facility])   Adult Discharge Instructions    For 24 hours after surgery    1. Get plenty of rest.  Make arrangements to have a responsible adult stay with you for at least 6 hours after you leave the hospital.  2. Do not drive or use heavy equipment for 24 hours.    3. Do not drink alcohol for 24 hours.  4. Do not sign legal documents or make important decisions for 24 hours.  5. Avoid strenuous or risky activities. You may feel lightheaded.  If so, sit for a few minutes before standing.  Have someone help you get up.   6. Conscious sedation patients may resume a regular diet..  7. Any questions of medical nature, call your physician.    Cataract Surgery Postoperative Instructions  Dr. Brandon Orellana      Postoperative Medications: After surgery, you will use eye drop medications. In most cases, you will start these eye drops 2 days before the day of surgery.   Follow the directions provided to you from the pharmacy or from the doctor's office.    The drops might sting a little when they are instilled, and that is normal.    It doesn't matter what order you put the drops into your eyes, but you should wait at least one minute between drops.    Recently we started using a compounded drop that contains all three prescriptions in one bottle. If you have this drop you only need to use one drop 4 time per day. Many people choose to do the drops at breakfast, lunch, dinner, and  "bedtime.    Artificial Tears- Are lubricating drops used to moisturize the eye.  You can use these as much as you want.  Particularly if your eyes feel watery, gritty, or uncomfortable.  Chilling these drops in the refrigerator results in a more soothing feeling.  There are several brands of artificial tears available including, but not limited to, Optive, Refresh, Systane, Blink, Genteal, Soothe, and others.  You should not use drops that are \"gets the red out.\"  You do not need a prescription for these medications.     Please continue any glaucoma, dry eye, or other medications you were using prior to the surgery.      Please allow 24 to 48 hours when requesting refills, and call BEFORE you run out of drops.    Restriction on Activities-  It is extremely important that you DO NOT RUB THE TREATED EYE.    - You will be given a clear plastic shield to wear as protection over your eye the night after surgery.    - Refrain from many activities that may put your eye at risk of injury, as well as areas containing a high volume of chemicals, dust, and debris.    - Do Not wear any eye makeup or moisturizer around the eye for 1 week after surgery.    - Do Not swim or go into a hot-tub, jacuzzi, or sauna for 1 week following surgery.  You can take showers as normal, but avoid getting shampoo or soap into your eyes.     - Avoid strenuous activity, including lifting more than 10 pounds, for 1 week after surgery.       - It is fine to bathe, read, watch TV, and use the computer.    Symptoms requiring medical attention:     - Sudden onset of increased discharge from the eye.  - Persistent or increasing pain in the eye.  - Sudden decrease in vision.  - Persistent nausea or vomiting.      If you have any questions or concerns before or after your surgery, please contact Dr. Orellana's office at [phone].         Revised 3/27/2017    Pending Results     No orders found from 2/27/2018 to 3/2/2018.            Admission " Information     Date & Time Provider Department Dept. Phone    3/1/2018 Brandon Orellana MD [facility] [place] [phone]      Your Vitals Were     Blood Pressure Temperature Respirations Pulse Oximetry          141/82 96.7  F (35.9  C) (Temporal) 14 97%        MyChart Information     [facility] gives you secure access to your electronic health record. If you see a primary care provider, you can also send messages to your care team and make appointments. If you have questions, please call your primary care clinic.  If you do not have a primary care provider, please call [phone] and they will assist you.        Care EveryWhere ID     This is your Care EveryWhere ID. This could be used by other organizations to access your [facility] medical records  [id number]        Equal Access to Services     RENEE CORONA : Sheba Hitchcock, wajose armando etnorio, nhan kaalmey altamirano, randy spivey. So [facility] [phone].    ATENCIÓN: Si habla español, tiene a sanford disposición servicios gratuitos de asistencia lingüística. Llame al [phone].    We comply with applicable federal civil rights laws and [place] laws. We do not discriminate on the basis of race, color, national origin, age, disability, sex, sexual orientation, or gender identity.               Review of your medicines      UNREVIEWED medicines. Ask your doctor about these medicines        Dose / Directions    aspirin 81 MG tablet   Used for:  Hyperlipidemia LDL goal <130, Malignant neoplasm of colon, unspecified part of colon (H)        Dose:  81 mg   Take 1 tablet (81 mg) by mouth daily   Quantity:  30 tablet   Refills:  0       azelastine 0.05 % Soln ophthalmic solution   Commonly known as:  OPTIVAR   Used for:  Allergic conjunctivitis, bilateral        Dose:  1 drop   Apply 1 drop to eye 2 times daily   Quantity:  1 Bottle   Refills:  6       bicalutamide 50 MG tablet   Commonly known as:  CASODEX    Used for:  Malignant neoplasm of prostate (H)        Dose:  50 mg   Take 1 tablet (50 mg) by mouth daily   Quantity:  90 tablet   Refills:  3       calcium-vitamin D 600-400 MG-UNIT per tablet   Commonly known as:  CALTRATE   Used for:  Hyperlipidemia LDL goal <130, Malignant neoplasm of colon, unspecified part of colon (H)        Dose:  1 tablet   Take 1 tablet by mouth daily   Refills:  0       CLARITIN PO        Take  by mouth. As needed   Refills:  0       clonazePAM 1 MG tablet   Commonly known as:  klonoPIN   Used for:  Anxiety        Dose:  1 mg   Take 1 tablet (1 mg) by mouth nightly as needed for anxiety   Quantity:  30 tablet   Refills:  5       folic acid-vit B6-vit B12 0.8-10-0.115 MG Tabs per tablet   Commonly known as:  FOLGARD   Used for:  Hyperlipidemia LDL goal <130, Malignant neoplasm of colon, unspecified part of colon (H)        Dose:  1 tablet   Take 1 tablet by mouth daily   Refills:  0       IBUPROFEN PO        Dose:  400 mg   Take 400 mg by mouth every 8 hours as needed for moderate pain   Refills:  0       lisinopril 10 MG tablet   Commonly known as:  PRINIVIL/ZESTRIL   Used for:  Benign essential hypertension        Dose:  10 mg   Take 1 tablet (10 mg) by mouth daily   Quantity:  90 tablet   Refills:  3       naproxen 500 MG tablet   Commonly known as:  NAPROSYN   Used for:  Chronic gout without tophus, unspecified cause, unspecified site        TAKE ONE TABLET BY MOUTH TWICE DAILY AS NEEDED FOR  MODERATE  PAIN   Quantity:  60 tablet   Refills:  1       OMEGA-3 FISH OIL PO   Used for:  Hyperlipidemia LDL goal <130, Malignant neoplasm of colon, unspecified part of colon (H)        Dose:  500 mg   Take 500 mg by mouth daily   Refills:  0       sildenafil 100 MG tablet   Commonly known as:  VIAGRA   Used for:  Malignant neoplasm of prostate (H)        1/2 tab three times a week   Quantity:  10 tablet   Refills:  12       triamcinolone 0.1 % cream   Commonly known as:  KENALOG        Refills:   0       zolpidem 5 MG tablet   Commonly known as:  AMBIEN   Used for:  Persistent disorder of initiating or maintaining sleep        Dose:  5 mg   Take 1 tablet (5 mg) by mouth nightly as needed for sleep   Quantity:  30 tablet   Refills:  5                Protect others around you: Learn how to safely use, store and throw away your medicines at www.disposemymeds.org.             Medication List: This is a list of all your medications and when to take them. Check marks below indicate your daily home schedule. Keep this list as a reference.      Medications           Morning Afternoon Evening Bedtime As Needed    aspirin 81 MG tablet   Take 1 tablet (81 mg) by mouth daily                                azelastine 0.05 % Soln ophthalmic solution   Commonly known as:  OPTIVAR   Apply 1 drop to eye 2 times daily                                bicalutamide 50 MG tablet   Commonly known as:  CASODEX   Take 1 tablet (50 mg) by mouth daily                                calcium-vitamin D 600-400 MG-UNIT per tablet   Commonly known as:  CALTRATE   Take 1 tablet by mouth daily                                CLARITIN PO   Take  by mouth. As needed                                clonazePAM 1 MG tablet   Commonly known as:  klonoPIN   Take 1 tablet (1 mg) by mouth nightly as needed for anxiety                                folic acid-vit B6-vit B12 0.8-10-0.115 MG Tabs per tablet   Commonly known as:  FOLGARD   Take 1 tablet by mouth daily                                IBUPROFEN PO   Take 400 mg by mouth every 8 hours as needed for moderate pain                                lisinopril 10 MG tablet   Commonly known as:  PRINIVIL/ZESTRIL   Take 1 tablet (10 mg) by mouth daily                                naproxen 500 MG tablet   Commonly known as:  NAPROSYN   TAKE ONE TABLET BY MOUTH TWICE DAILY AS NEEDED FOR  MODERATE  PAIN                                OMEGA-3 FISH OIL PO   Take 500 mg by mouth daily                                 sildenafil 100 MG tablet   Commonly known as:  VIAGRA   1/2 tab three times a week                                triamcinolone 0.1 % cream   Commonly known as:  KENALOG                                zolpidem 5 MG tablet   Commonly known as:  AMBIEN   Take 1 tablet (5 mg) by mouth nightly as needed for sleep

## 2018-03-01 NOTE — OP NOTE
PATIENT NAME:  Medardo Gautam    :  1940    PATIENT NUMBER:  [id number]    DATE OF SURGERY:  3/1/2018    SURGEON:  Brandon Orellana M.D.    PREOPERATIVE DIAGNOSIS: Cataract right eye.    POSTOPERATIVE DIAGNOSIS:  Same    PROCEDURE PERFORMED:    1. Phacoemulsification with posterior chamber intraocular lens right eye.    ANESTHESIA:  Topical/MAC    COMPLICATIONS:  None    PROCEDURE: Following adequate preoperative dilation the patient was given topical anesthesia consisting of Proparacaine.  The patient was brought to the operative suite where the eye was prepped and draped in the usual sterile fashion.  A lid speculum was applied. A super sharp blade was used to create a paracentesis, through which 1% preservative free Lidocaine was injected.  Visoelastic was then used to inflate the anterior chamber.  A biplanar incision at the clear cornea limbus was created with a keratome.  A continuous curvilinear capsulorrhexis was started with a cystitome and completed using Utrata forceps.  The lens was hydrodissected and hydro delineated using BSS on a cannula.  The lens nucleus was removed using phacoemulsification.  Remaining cortex was removed using irrigation and aspiration.  Viscoelastic was injected to inflate the capsular bag and a 20.5 D ZCB00 IOL was inserted into the capsular bag without difficulty.  Residual viscoelastic and provisc material was removed with irrigation and aspiration.  BSS was used to hydrate the corneal incision and paracentesis sites which were checked and noted to be watertight.  A drop of Vigamox was applied to the eye and a clear plastic shield was placed.  The patient tolerated the procedure well and left the operative suite in stable condition.    Brandon Orellana M.D.

## 2018-03-01 NOTE — ANESTHESIA CARE TRANSFER NOTE
Patient: Medardo Gautam    Procedure(s):  RIGHT EYE CATARACT EXTRACTION WITH STANDARD INTRAOCULAR LENS IMPLANT  - Wound Class: I-Clean    Diagnosis: RIGHT EYE CATARACT  Diagnosis Additional Information: No value filed.    Anesthesia Type:   MAC     Note:  Airway :Room Air  Patient transferred to:Phase II  Comments: [facility] to [facility] room in recliner with armrests up, spontaneous respirations, O2 saturation maintained greater than 95% with oxygen via room air. All monitors and alarms on and functioning, clinically stable vital signs. Report given to recovery RN and questions answered. Patient alert and following verbal directions.Handoff Report: Identifed the Patient, Identified the Reponsible Provider, Reviewed the pertinent medical history, Discussed the surgical course, Reviewed Intra-OP anesthesia mangement and issues during anesthesia, Set expectations for post-procedure period and Allowed opportunity for questions and acknowledgement of understanding      Vitals: (Last set prior to Anesthesia Care Transfer)    CRNA VITALS  3/1/2018 1226 - 3/1/2018 1300      3/1/2018             Pulse: 57    Ht Rate: 56    SpO2: 97 %    Resp Rate (set):                 Electronically Signed By: JAISON Mclaughlin CRNA  March 1, 2018  1:00 PM

## 2018-03-01 NOTE — ANESTHESIA PREPROCEDURE EVALUATION
Procedure: Procedure(s):  PHACOEMULSIFICATION CLEAR CORNEA WITH STANDARD INTRAOCULAR LENS IMPLANT  Preop diagnosis: RIGHT EYE CATARACT    No Known Allergies  Past Medical History:   Diagnosis Date     Anxiety      Colon cancer (H) 01/2015     Contracture of finger joint ca 2005    left and right fifth fingers     Dupuytren's contracture of left hand      Gout      HEMORRHOIDS NOS 6/4/2007     Hypertension      Insomnia      Nonsenile cataract      Personal history of colonic polyps 7 years ago?     Prostate cancer (H) Feb 2012     Renal cyst 6/22/2017     Seborrheic dermatitis      Skin lesion- R side of face and behind L ear 12/15/2011     SKIN SENSATION DISTURB 12/29/2006    allergic reaction to strawberries     SKIN SENSATION DISTURB 12/29/2006     Past Surgical History:   Procedure Laterality Date     APPENDECTOMY       BIOPSY  01/16/15     C HAND/FINGER SURGERY UNLISTED       C LENGTHEN,TENDON,HAND/FINGER  ca 2007    right fifth     C STOMACH SURGERY PROCEDURE UNLISTED       CATARACT IOL, RT/LT Left 02/15/2018     COLON SURGERY  2/11/2015    Lap assisted R hemicolectomy     COLONOSCOPY  10/25/07    Snare polypectomy     COLONOSCOPY  6/25/2009    with snare polypectomy     COLONOSCOPY  9/30/2009     COLONOSCOPY  1/5/2011    COLONOSCOPY performed by JUD MARIEE at  GI     COLONOSCOPY N/A 1/16/2015    Procedure: COMBINED COLONOSCOPY, SINGLE OR MULTIPLE BIOPSY/POLYPECTOMY BY BIOPSY;  Surgeon: Sarah Beth Pisano MD;  Location:  OR     COLONOSCOPY WITH CO2 INSUFFLATION N/A 1/16/2015    Procedure: COLONOSCOPY WITH CO2 INSUFFLATION;  Surgeon: Sarah Beth Pisano MD;  Location:  OR     DAVINCI PROSTATECTOMY  8/6/2012    Procedure: DAVINCI PROSTATECTOMY;  Davinci Assisted Radical Prostatectomy with Bilateral Lymphadenectomy ;  Surgeon: Norma Goodwin MD;  Location: [facility] OR     GENITOURINARY SURGERY      prostate surgery     INJECT EPIDURAL LUMBAR / SACRAL SINGLE Left 10/30/2017    Procedure: INJECT  EPIDURAL LUMBAR / SACRAL SINGLE;  Left Transforaminal Lumbar 4-Lumbar 5 Epidural Steroid Injection;  Surgeon: Nuvia Reina MD;  Location: [facility] OR     LAPAROSCOPIC ASSISTED COLECTOMY N/A 2/11/2015    Procedure: LAPAROSCOPIC ASSISTED COLECTOMY;  Surgeon: Oren Breen MD;  Location: [facility] OR     PHACOEMULSIFICATION CLEAR CORNEA WITH STANDARD INTRAOCULAR LENS IMPLANT Left 2/15/2018    Procedure: PHACOEMULSIFICATION CLEAR CORNEA WITH STANDARD INTRAOCULAR LENS IMPLANT;  LEFT EYE CATARACT EXTRACTION WITH STANDARD INTRAOCULAR LENS IMPLANT ;  Surgeon: Brandon Orellana MD;  Location: [facility]     Social History   Substance Use Topics     Smoking status: Former Smoker     Years: 1.00     Types: Cigarettes, Cigars, Pipe     Start date: 10/1/1961     Quit date: 1/1/1962     Smokeless tobacco: Never Used      Comment: No smokers in home     Alcohol use 0.0 oz/week      Comment: daily glass of wine and whiskey     Prior to Admission medications    Medication Sig Start Date End Date Taking? Authorizing Provider   bicalutamide (CASODEX) 50 MG tablet Take 1 tablet (50 mg) by mouth daily 1/16/18   Bev Kaplan PA   triamcinolone (KENALOG) 0.1 % cream  12/26/17   Reported, Patient   lisinopril (PRINIVIL/ZESTRIL) 10 MG tablet Take 1 tablet (10 mg) by mouth daily 1/9/18   Verna Osborne MD   clonazePAM (KLONOPIN) 1 MG tablet Take 1 tablet (1 mg) by mouth nightly as needed for anxiety 2/9/18   Verna Osborne MD   zolpidem (AMBIEN) 5 MG tablet Take 1 tablet (5 mg) by mouth nightly as needed for sleep 2/9/18   Verna Osborne MD   azelastine (OPTIVAR) 0.05 % SOLN ophthalmic solution Apply 1 drop to eye 2 times daily 11/29/17   Shawn Venegas, OD   naproxen (NAPROSYN) 500 MG tablet TAKE ONE TABLET BY MOUTH TWICE DAILY AS NEEDED FOR  MODERATE  PAIN 10/13/17   Verna Osborne MD   IBUPROFEN PO Take 400 mg by mouth every 8 hours as needed for moderate pain    Reported, Patient   Omega-3 Fatty Acids (OMEGA-3 FISH  OIL PO) Take 500 mg by mouth daily    Reported, Patient   calcium-vitamin D (CALTRATE) 600-400 MG-UNIT per tablet Take 1 tablet by mouth daily    Reported, Patient   folic acid-vit B6-vit B12 (FOLGARD) 0.8-10-0.115 MG TABS Take 1 tablet by mouth daily    Reported, Patient   aspirin 81 MG tablet Take 1 tablet (81 mg) by mouth daily 4/25/16   Job Hermosillo MD   sildenafil (VIAGRA) 100 MG tablet 1/2 tab three times a week 10/23/13   Norma Goodwin MD   Loratadine (CLARITIN PO) Take  by mouth. As needed     Reported, Patient     Current Facility-Administered Medications Ordered in Epic   Medication Dose Route Frequency Last Rate Last Dose     phenylephrine (MYDFRIN /SARTHAK-SYNEPHRINE) 2.5 % ophthalmic solution 1 drop  1 drop Ophthalmic q5 Min Prior to Surgery   1 drop at 03/01/18 1113     tropicamide (MYDRIACYL) 1 % ophthalmic solution 1 drop  1 drop Ophthalmic q5 Min Prior to Surgery   1 drop at 03/01/18 1113     moxifloxacin (VIGAMOX) 0.5 % ophthalmic solution 1 drop  1 drop Ophthalmic q5 Min Prior to Surgery   1 drop at 03/01/18 1113     diclofenac (VOLTAREN) 0.1 % ophthalmic solution 1 drop  1 drop Ophthalmic q5 Min Prior to Surgery   1 drop at 03/01/18 1114     lidocaine 1 % 1 mL  1 mL Other Q1H PRN         lactated ringers infusion   Intravenous Continuous         No current [facility]-ordered outpatient prescriptions on file.       lactated ringers       Wt Readings from Last 1 Encounters:   02/15/18 90.7 kg (200 lb)     Temp Readings from Last 1 Encounters:   02/15/18 36.1  C (96.9  F) (Temporal)     BP Readings from Last 6 Encounters:   02/15/18 131/80   02/12/18 126/82   01/16/18 (!) 146/91   01/09/18 122/80   11/20/17 142/78   10/30/17 146/83     Pulse Readings from Last 4 Encounters:   02/12/18 68   01/16/18 57   01/09/18 62   11/20/17 55     Resp Readings from Last 1 Encounters:   02/15/18 16     SpO2 Readings from Last 1 Encounters:   02/15/18 97%     Recent Labs   Lab Test  01/09/18   1345  09/07/17   0951   NA   139  143   POTASSIUM  5.2  4.6   CHLORIDE  109  107   CO2  21  29   ANIONGAP  9  7   GLC  135*  120*   BUN  14  15   CR  0.78  1.02   GAVIN  9.0  9.4     Recent Labs   Lab Test  01/09/18   1345  09/07/17   0951  03/18/17   1639   AST  60*  54*  42   ALT  81*  60  53   ALKPHOS  76  63  62   BILITOTAL  0.5  0.8  1.3   LIPASE   --    --   331     Recent Labs   Lab Test  04/24/17   1041  03/18/17   1639   WBC  4.3  5.2   HGB  14.2  14.3   PLT  160  143*     Recent Labs   Lab Test  02/04/15   1029   ABO  A   RH   Neg     Recent Labs   Lab Test  01/27/15   1514  05/04/12   1139   01/04/10   0924   INR  1.02  0.95   < >  1.04   PTT   --    --    --   27    < > = values in this interval not displayed.      Recent Labs   Lab Test  03/18/17   1639   TROPI  <0.015  The 99th percentile for upper reference range is 0.045 ug/L.  Troponin values in   the range of 0.045 - 0.120 ug/L may be associated with risks of adverse   clinical events.             Anesthesia Evaluation     .             ROS/MED HX    ENT/Pulmonary:       Neurologic:     (+)other neuro lumbar DDD, radiculopathy    Cardiovascular:     (+) Dyslipidemia, hypertension--CAD, --. : . . . :. .       METS/Exercise Tolerance:     Hematologic:         Musculoskeletal:   (+) arthritis, , , other musculoskeletal- gout      GI/Hepatic:     (+) Other GI/Hepatic colon CA      Renal/Genitourinary:     (+) chronic renal disease, Other Renal/ Genitourinary, renal cyst, prostate CA      Endo:         Psychiatric:     (+) psychiatric history anxiety and other (comment) (insomnia)      Infectious Disease:         Malignancy:         Other:                     Physical Exam  Normal systems: dental    Airway   Mallampati: II  TM distance: >3 FB  Neck ROM: full    Dental     Cardiovascular   Rhythm and rate: regular and normal      Pulmonary    breath sounds clear to auscultation                        Anesthesia Plan      History & Physical Review  History and physical reviewed and  following examination; no interval change.    ASA Status:  3 .    NPO Status:  > 8 hours    Plan for MAC Reason for MAC:  Deep or markedly invasive procedure (G8)  PONV prophylaxis:  Ondansetron (or other 5HT-3)  Did well with first cataract       Postoperative Care  Postoperative pain management:  IV analgesics.      Consents  Anesthetic plan, risks, benefits and alternatives discussed with:  Patient..                          .

## 2018-03-02 ENCOUNTER — OFFICE VISIT (OUTPATIENT)
Dept: OPTOMETRY | Facility: CLINIC | Age: 78
End: 2018-03-02
Payer: COMMERCIAL

## 2018-03-02 DIAGNOSIS — Z96.1 PSEUDOPHAKIA OF RIGHT EYE: Primary | ICD-10-CM

## 2018-03-02 PROCEDURE — 99024 POSTOP FOLLOW-UP VISIT: CPT | Performed by: OPTOMETRIST

## 2018-03-02 ASSESSMENT — REFRACTION_WEARINGRX
OD_SPHERE: -1.75
OS_CYLINDER: +2.75
OS_AXIS: 173
OS_SPHERE: -0.75
OD_CYLINDER: +3.00
OD_AXIS: 001
OD_ADD: +2.50
OS_ADD: +2.50

## 2018-03-02 ASSESSMENT — EXTERNAL EXAM - RIGHT EYE: OD_EXAM: NORMAL

## 2018-03-02 ASSESSMENT — VISUAL ACUITY
OS_SC: 20/60
OS_SC+: -2
OS_PH_SC: 20/40-1
OD_SC: 20/60
OD_PH_SC: 20/40
OD_SC+: -2
METHOD: SNELLEN - LINEAR

## 2018-03-02 ASSESSMENT — TONOMETRY
IOP_METHOD: TONOPEN
OD_IOP_MMHG: 22

## 2018-03-02 ASSESSMENT — SLIT LAMP EXAM - LIDS: COMMENTS: NORMAL

## 2018-03-02 NOTE — LETTER
3/2/2018         RE: Medardo Gautam  [address] [address]        Dear Colleague,    Thank you for referring your patient, Medardo Gautam, to the [facility]. Please see a copy of my visit note below.    CHIEF COMPLAINT:   Chief Complaint   Patient presents with     Surgical Followup     1 day postop cataract od eye       Type of surgery cataract   Date of surgery 3-1-2018 od eye                            2- os eye    Taylor Teran, Optometric Assistant, A.B.O.C.     Drops reviewed.    OBJECTIVE:     See ophthalmology exam    ASSESSMENT:         ICD-10-CM    1. Pseudophakia of right eye Z96.1 POST-OP FOLLOW-UP VISIT     PLAN:      Patient Instructions   Continue with drops and scheduled follow up appointments.  Follow directions on your bottles as far as how many drops to use daily.    Wear shield while sleeping.       Please continue any glaucoma, dry eye, or other medications you were using prior to the surgery.        Shawn Venegas OD  [facility]  [address] [place] [address]  Tel- [phone]  [phone]        Again, thank you for allowing me to participate in the care of your patient.        Sincerely,        Shawn Venegas OD

## 2018-03-02 NOTE — PATIENT INSTRUCTIONS
Continue with drops and scheduled follow up appointments.  Follow directions on your bottles as far as how many drops to use daily.    Wear shield while sleeping.       Please continue any glaucoma, dry eye, or other medications you were using prior to the surgery.        Shawn Venegas, OD  [facility]  [address] [place] [address]  Tel- [phone]  Qtl-[phone]

## 2018-03-02 NOTE — PROGRESS NOTES
CHIEF COMPLAINT:   Chief Complaint   Patient presents with     Surgical Followup     1 day postop cataract od eye       Type of surgery cataract   Date of surgery 3-1-2018 od eye                            2- os eye    Taylor Teran, Optometric Assistant, A.B.O.C.     Drops reviewed.    OBJECTIVE:     See ophthalmology exam    ASSESSMENT:         ICD-10-CM    1. Pseudophakia of right eye Z96.1 POST-OP FOLLOW-UP VISIT     PLAN:      Patient Instructions   Continue with drops and scheduled follow up appointments.  Follow directions on your bottles as far as how many drops to use daily.    Wear shield while sleeping.       Please continue any glaucoma, dry eye, or other medications you were using prior to the surgery.        Shawn Venegas, OD  [facility]  [address] [place] [address]  Tel- [phone]  Bmc-[phone]

## 2018-03-02 NOTE — MR AVS SNAPSHOT
After Visit Summary   3/2/2018    Medardo Gautam    MRN: [id number]           Patient Information     Date Of Birth          1940        Visit Information        Provider Department      3/2/2018 10:20 AM Shawn Venegas OD [facility]        Care Instructions    Continue with drops and scheduled follow up appointments.  Follow directions on your bottles as far as how many drops to use daily.    Wear shield while sleeping.       Please continue any glaucoma, dry eye, or other medications you were using prior to the surgery.        Shawn Venegas OD  [facility]  [address] [place] [address]  Tel- [phone]  Chr-[phone]            Follow-ups after your visit        Your next 10 appointments already scheduled     Mar 07, 2018 11:00 AM CST   Return Visit with Shawn Venegas OD   [facility]    [address] [place] [id number] [phone]            Mar 28, 2018 11:00 AM CDT   Return Visit with Shawn Venegas OD   [facility] ([facility])    [address] [place] [phone]            Apr 30, 2018 11:00 AM CDT   (Arrive by 10:45 AM)   Survivorship Visit with Magali Andrews PA-C   [facility] ([facility])    [address]  [address] [place] [phone] [phone]            Jul 18, 2018 12:00 PM CDT   (Arrive by 11:45 AM)   Return Prostate Cancer with Norma Goodwin MD   [facility] Prostate and Urologic Cancers ([facility])    [address]  4th Floor  [facility] [id number] [phone]              Who to contact     If you have questions or need follow up information about today's clinic visit or your schedule please contact [facility] directly at [phone].  Normal or  non-critical lab and imaging results will be communicated to you by [employer], letter or phone within 4 business days after the clinic has received the results. If you do not hear from us within 7 days, please contact the clinic through [employer] or phone. If you have a critical or abnormal lab result, we will notify you by phone as soon as possible.  Submit refill requests through [employer] or call your pharmacy and they will forward the refill request to us. Please allow 3 business days for your refill to be completed.          Additional Information About Your Visit        [employer] Information     [employer] gives you secure access to your electronic health record. If you see a primary care provider, you can also send messages to your care team and make appointments. If you have questions, please call your primary care clinic.  If you do not have a primary care provider, please call [phone] and they will assist you.      [employer] is an electronic gateway that provides easy, online access to your medical records. With [employer], you can request a clinic appointment, read your test results, renew a prescription or communicate with your care team.     To access your existing account, please contact your [facility] or call [phone] for assistance.        Care EveryWhere ID     This is your Care EveryWhere ID. This could be used by other organizations to access your [place] medical records  [id number]         Blood Pressure from Last 3 Encounters:   03/01/18 141/82   02/15/18 131/80   02/12/18 126/82    Weight from Last 3 Encounters:   02/15/18 90.7 kg (200 lb)   02/12/18 90.7 kg (200 lb)   01/16/18 90.1 kg (198 lb 9.6 oz)              Today, you had the following     No orders found for display       Primary Care Provider Office Phone # Fax #    Verna Osborne [phone]       [address] [facility] [address]  [facility] [address]        Equal Access to Services     RENEE CORONA  AH: Hadii torrey natarajangeorgebarak Socarolinaali, waaxda luqadaha, qaybta kaalmada evelia, randy amada enrriquecasey lucasmitraen spivey. So [facility] [phone].    ATENCIÓN: Si josuela leyda, tiene a sanford disposición servicios gratuitos de asistencia lingüística. Llame al [phone].    We comply with applicable federal civil rights laws and [place] laws. We do not discriminate on the basis of race, color, national origin, age, disability, sex, sexual orientation, or gender identity.            Thank you!     Thank you for choosing [facility]  for your care. Our goal is always to provide you with excellent care. Hearing back from our patients is one way we can continue to improve our services. Please take a few minutes to complete the written survey that you may receive in the mail after your visit with us. Thank you!             Your Updated Medication List - Protect others around you: Learn how to safely use, store and throw away your medicines at www.disposemymeds.org.          This list is accurate as of 3/2/18 10:55 AM.  Always use your most recent med list.                   Brand Name Dispense Instructions for use Diagnosis    aspirin 81 MG tablet     30 tablet    Take 1 tablet (81 mg) by mouth daily    Hyperlipidemia LDL goal <130, Malignant neoplasm of colon, unspecified part of colon (H)       azelastine 0.05 % Soln ophthalmic solution    OPTIVAR    1 Bottle    Apply 1 drop to eye 2 times daily    Allergic conjunctivitis, bilateral       bicalutamide 50 MG tablet    CASODEX    90 tablet    Take 1 tablet (50 mg) by mouth daily    Malignant neoplasm of prostate (H)       calcium-vitamin D 600-400 MG-UNIT per tablet    CALTRATE     Take 1 tablet by mouth daily    Hyperlipidemia LDL goal <130, Malignant neoplasm of colon, unspecified part of colon (H)       CLARITIN PO      Take  by mouth. As needed        clonazePAM 1 MG tablet    klonoPIN    30 tablet    Take 1 tablet (1 mg) by mouth nightly as needed for  anxiety    Anxiety       folic acid-vit B6-vit B12 0.8-10-0.115 MG Tabs per tablet    FOLGARD     Take 1 tablet by mouth daily    Hyperlipidemia LDL goal <130, Malignant neoplasm of colon, unspecified part of colon (H)       IBUPROFEN PO      Take 400 mg by mouth every 8 hours as needed for moderate pain        lisinopril 10 MG tablet    PRINIVIL/ZESTRIL    90 tablet    Take 1 tablet (10 mg) by mouth daily    Benign essential hypertension       naproxen 500 MG tablet    NAPROSYN    60 tablet    TAKE ONE TABLET BY MOUTH TWICE DAILY AS NEEDED FOR  MODERATE  PAIN    Chronic gout without tophus, unspecified cause, unspecified site       OMEGA-3 FISH OIL PO      Take 500 mg by mouth daily    Hyperlipidemia LDL goal <130, Malignant neoplasm of colon, unspecified part of colon (H)       sildenafil 100 MG tablet    VIAGRA    10 tablet    1/2 tab three times a week    Malignant neoplasm of prostate (H)       triamcinolone 0.1 % cream    KENALOG          zolpidem 5 MG tablet    AMBIEN    30 tablet    Take 1 tablet (5 mg) by mouth nightly as needed for sleep    Persistent disorder of initiating or maintaining sleep

## 2018-03-07 ENCOUNTER — OFFICE VISIT (OUTPATIENT)
Dept: OPTOMETRY | Facility: CLINIC | Age: 78
End: 2018-03-07
Payer: COMMERCIAL

## 2018-03-07 DIAGNOSIS — Z96.1 PSEUDOPHAKIA OF RIGHT EYE: Primary | ICD-10-CM

## 2018-03-07 PROCEDURE — 99024 POSTOP FOLLOW-UP VISIT: CPT | Performed by: OPTOMETRIST

## 2018-03-07 ASSESSMENT — VISUAL ACUITY
OD_PH_SC: 20/25-3
OD_SC: 20/70 FUZZY
METHOD: SNELLEN - LINEAR
OS_PH_SC: 20/30-1

## 2018-03-07 ASSESSMENT — REFRACTION_WEARINGRX
OD_SPHERE: -1.75
OD_ADD: +2.50
OD_AXIS: 001
OD_CYLINDER: +3.00
OS_ADD: +2.50
OS_SPHERE: -0.75
OS_AXIS: 173
OS_CYLINDER: +2.75

## 2018-03-07 ASSESSMENT — TONOMETRY
OD_IOP_MMHG: 18
IOP_METHOD: TONOPEN

## 2018-03-07 ASSESSMENT — SLIT LAMP EXAM - LIDS: COMMENTS: NORMAL

## 2018-03-07 ASSESSMENT — EXTERNAL EXAM - RIGHT EYE: OD_EXAM: NORMAL

## 2018-03-07 NOTE — MR AVS SNAPSHOT
After Visit Summary   3/7/2018    Medardo Gautam    MRN: [id number]           Patient Information     Date Of Birth          1940        Visit Information        Provider Department      3/7/2018 11:00 AM Shawn Venegas, ALEXEY [facility]        Today's Diagnoses     Pseudophakia of both eyes    -  1      Care Instructions    Discontinue ofloxacin.  Continue prednisolone acetate and ketorolac for the full 21 days.    Ok to discontinue shield while sleeping.  All restrictions are lifted.    Keep follow up appointments as scheduled.       Please continue any glaucoma, dry eye, or other medications you were using prior to the surgery.        Shawn Venegas OD  [facility]  [address] [place] [address]  Tel- [phone]            Follow-ups after your visit        Your next 10 appointments already scheduled     Mar 28, 2018 11:00 AM CDT   Return Visit with Shawn Venegas OD   [facility] ([facility])    [address] [facility] [address] [phone]            Apr 30, 2018 11:00 AM CDT   (Arrive by 10:45 AM)   Survivorship Visit with Magali Andrews PA-C   [facility] ([facility])    [address] [place] [address] [phone]            Jul 18, 2018 12:00 PM CDT   (Arrive by 11:45 AM)   Return Prostate Cancer with Norma Goodwin MD   [facility] ([facility])    [address] [place] [address]  4th Floor  [facility] [address] [phone]              Who to contact     If you have questions or need follow up information about today's clinic visit or your schedule please contact [facility] directly at [phone].  Normal or non-critical lab and imaging results will be communicated to you by MyChart, letter or phone within 4 business days after  the clinic has received the results. If you do not hear from us within 7 days, please contact the clinic through [employer] or phone. If you have a critical or abnormal lab result, we will notify you by phone as soon as possible.  Submit refill requests through [employer] or call your pharmacy and they will forward the refill request to us. Please allow 3 business days for your refill to be completed.          Additional Information About Your Visit        [employer] Information     [employer] gives you secure access to your electronic health record. If you see a primary care provider, you can also send messages to your care team and make appointments. If you have questions, please call your primary care clinic.  If you do not have a primary care provider, please call [phone] and they will assist you.      [employer] is an electronic gateway that provides easy, online access to your medical records. With [employer], you can request a clinic appointment, read your test results, renew a prescription or communicate with your care team.     To access your existing account, please contact your [facility] or call [phone] for assistance.        Care EveryWhere ID     This is your Care EveryWhere ID. This could be used by other organizations to access your [place] medical records  [id number]         Blood Pressure from Last 3 Encounters:   03/01/18 141/82   02/15/18 131/80   02/12/18 126/82    Weight from Last 3 Encounters:   02/15/18 90.7 kg (200 lb)   02/12/18 90.7 kg (200 lb)   01/16/18 90.1 kg (198 lb 9.6 oz)              We Performed the Following     POST-OP FOLLOW-UP VISIT        Primary Care Provider Office Phone # Fax #    Verna Osborne [phone]       [address] [facility] [address]  [facility] [address]        Equal Access to Services     RENEE CORONA : Sheba Hitchcock, quinn tenorio, randy thompson. So [facility]  [phone].    ATENCIÓN: Si gricel crabtree, tiene a sanford disposición servicios gratuitos de asistencia lingüística. Lukas moran [phone].    We comply with applicable federal civil rights laws and [place] laws. We do not discriminate on the basis of race, color, national origin, age, disability, sex, sexual orientation, or gender identity.            Thank you!     Thank you for choosing [facility]  for your care. Our goal is always to provide you with excellent care. Hearing back from our patients is one way we can continue to improve our services. Please take a few minutes to complete the written survey that you may receive in the mail after your visit with us. Thank you!             Your Updated Medication List - Protect others around you: Learn how to safely use, store and throw away your medicines at www.disposemymeds.org.          This list is accurate as of 3/7/18 11:22 AM.  Always use your most recent med list.                   Brand Name Dispense Instructions for use Diagnosis    aspirin 81 MG tablet     30 tablet    Take 1 tablet (81 mg) by mouth daily    Hyperlipidemia LDL goal <130, Malignant neoplasm of colon, unspecified part of colon (H)       azelastine 0.05 % Soln ophthalmic solution    OPTIVAR    1 Bottle    Apply 1 drop to eye 2 times daily    Allergic conjunctivitis, bilateral       bicalutamide 50 MG tablet    CASODEX    90 tablet    Take 1 tablet (50 mg) by mouth daily    Malignant neoplasm of prostate (H)       calcium-vitamin D 600-400 MG-UNIT per tablet    CALTRATE     Take 1 tablet by mouth daily    Hyperlipidemia LDL goal <130, Malignant neoplasm of colon, unspecified part of colon (H)       CLARITIN PO      Take  by mouth. As needed        clonazePAM 1 MG tablet    klonoPIN    30 tablet    Take 1 tablet (1 mg) by mouth nightly as needed for anxiety    Anxiety       folic acid-vit B6-vit B12 0.8-10-0.115 MG Tabs per tablet    FOLGARD     Take 1 tablet by mouth daily     Hyperlipidemia LDL goal <130, Malignant neoplasm of colon, unspecified part of colon (H)       IBUPROFEN PO      Take 400 mg by mouth every 8 hours as needed for moderate pain        lisinopril 10 MG tablet    PRINIVIL/ZESTRIL    90 tablet    Take 1 tablet (10 mg) by mouth daily    Benign essential hypertension       naproxen 500 MG tablet    NAPROSYN    60 tablet    TAKE ONE TABLET BY MOUTH TWICE DAILY AS NEEDED FOR  MODERATE  PAIN    Chronic gout without tophus, unspecified cause, unspecified site       OMEGA-3 FISH OIL PO      Take 500 mg by mouth daily    Hyperlipidemia LDL goal <130, Malignant neoplasm of colon, unspecified part of colon (H)       sildenafil 100 MG tablet    VIAGRA    10 tablet    1/2 tab three times a week    Malignant neoplasm of prostate (H)       triamcinolone 0.1 % cream    KENALOG          zolpidem 5 MG tablet    AMBIEN    30 tablet    Take 1 tablet (5 mg) by mouth nightly as needed for sleep    Persistent disorder of initiating or maintaining sleep

## 2018-03-07 NOTE — PATIENT INSTRUCTIONS
Discontinue ofloxacin.  Continue prednisolone acetate and ketorolac for the full 21 days.    Ok to discontinue shield while sleeping.  All restrictions are lifted.    Keep follow up appointments as scheduled.       Please continue any glaucoma, dry eye, or other medications you were using prior to the surgery.        Shawn Venegas, OD  [facility]  [address] [place] [address]  Tel- [phone]

## 2018-03-07 NOTE — PROGRESS NOTES
CHIEF COMPLAINT:   Chief Complaint   Patient presents with     Surgical Followup     1 week post cataract od eye       Type of surgery cataract   Date of surgery 3-1-2018 od eye                            2- os eye    Taylor Teran, Optometric Assistant, A.B.O.C.     Drops reviewed.    OBJECTIVE:     See ophthalmology exam    ASSESSMENT:         ICD-10-CM    1. Pseudophakia of right eye Z96.1      PLAN:      Patient Instructions   Discontinue ofloxacin.  Continue prednisolone acetate and ketorolac for the full 21 days.    Ok to discontinue shield while sleeping.  All restrictions are lifted.    Keep follow up appointments as scheduled.       Please continue any glaucoma, dry eye, or other medications you were using prior to the surgery.        Shawn Venegas, OD  [facility]  [address] [place] [address]  Tel- [phone]

## 2018-03-07 NOTE — LETTER
3/7/2018         RE: Medardo Gautam  [address] [address] [address]        Dear Colleague,    Thank you for referring your patient, Medardo Gautam, to the [facility]. Please see a copy of my visit note below.    CHIEF COMPLAINT:   Chief Complaint   Patient presents with     Surgical Followup     1 week post cataract od eye       Type of surgery cataract   Date of surgery 3-1-2018 od eye                            2- os eye    Taylor Teran, Optometric Assistant, A.B.O.C.     Drops reviewed.    OBJECTIVE:     See ophthalmology exam    ASSESSMENT:         ICD-10-CM    1. Pseudophakia of right eye Z96.1      PLAN:      Patient Instructions   Discontinue ofloxacin.  Continue prednisolone acetate and ketorolac for the full 21 days.    Ok to discontinue shield while sleeping.  All restrictions are lifted.    Keep follow up appointments as scheduled.       Please continue any glaucoma, dry eye, or other medications you were using prior to the surgery.        Shawn Venegas, ALEXEY  [facility]  [address] [place] [address]  Tel- [phone]        Again, thank you for allowing me to participate in the care of your patient.        Sincerely,        Shawn Venegas, OD

## 2018-03-28 ENCOUNTER — OFFICE VISIT (OUTPATIENT)
Dept: OPTOMETRY | Facility: CLINIC | Age: 78
End: 2018-03-28
Payer: COMMERCIAL

## 2018-03-28 DIAGNOSIS — H02.839 DERMATOCHALASIS OF EYELID: ICD-10-CM

## 2018-03-28 DIAGNOSIS — H35.30 MACULAR DEGENERATION: ICD-10-CM

## 2018-03-28 DIAGNOSIS — Z96.1 PSEUDOPHAKIA OF BOTH EYES: Primary | ICD-10-CM

## 2018-03-28 PROCEDURE — 99024 POSTOP FOLLOW-UP VISIT: CPT | Performed by: OPTOMETRIST

## 2018-03-28 ASSESSMENT — REFRACTION_WEARINGRX
OS_AXIS: 173
OD_SPHERE: -1.75
OS_ADD: +2.50
OD_AXIS: 001
OD_CYLINDER: +3.00
OS_SPHERE: -0.75
OD_ADD: +2.50
OS_CYLINDER: +2.75

## 2018-03-28 ASSESSMENT — TONOMETRY
IOP_METHOD: TONOPEN
OS_IOP_MMHG: 18
OD_IOP_MMHG: 18

## 2018-03-28 ASSESSMENT — REFRACTION_MANIFEST
OD_CYLINDER: +2.50
OS_ADD: +2.50
OD_AXIS: 180
OS_CYLINDER: +2.50
OD_SPHERE: -1.25
OS_SPHERE: -1.75
OS_AXIS: 175
OD_ADD: +2.50

## 2018-03-28 ASSESSMENT — CUP TO DISC RATIO
OS_RATIO: 0.2
OD_RATIO: 0.2

## 2018-03-28 ASSESSMENT — VISUAL ACUITY
METHOD: SNELLEN - LINEAR
OS_SC: 20/100
OS_PH_SC: 20/30-2
OD_SC+: -2
OD_SC: 20/30
OS_SC+: -1

## 2018-03-28 ASSESSMENT — EXTERNAL EXAM - LEFT EYE: OS_EXAM: NORMAL

## 2018-03-28 ASSESSMENT — SLIT LAMP EXAM - LIDS
COMMENTS: NORMAL
COMMENTS: NORMAL

## 2018-03-28 ASSESSMENT — EXTERNAL EXAM - RIGHT EYE: OD_EXAM: NORMAL

## 2018-03-28 NOTE — PATIENT INSTRUCTIONS
Eyeglass prescription given.    Your vision is limited by macular degeneration, left eye > right eye.    You have mild macular degeneration.  I recommend taking supplements for the eyes, PreserVision AREDS 2 formula daily as recommended dosage on the bottle.  Check with your pharmacist first to make sure there are not any interactions with your medications.    Check amsler grid regularly.  Return if any changes.    Referral to Dr. Cornelio Chavira at the [facility] for lid evaluation if interested- [phone].    Return in 1 year for a complete eye exam or sooner if needed.    Shawn Venegas, OD    The affects of the dilating drops last for 4- 6 hours.  You will be more sensitive to light and vision will be blurry up close.  Mydriatic sunglasses were given if needed.      Optometry Providers       Clinic Locations                                 Telephone Number   Dr. Yamini Charles   [facility]   [facility] [place]   [place] [phone]     [place] [facility] Hours:                [facility] Hours:       [facility] Hours:   [address] [address]   [address] [facility] [address]     [address] [place] [address]   [place] [address]    [place] [address]  Phone: [phone]                    Phone: [phone]     Phone: [phone]                      Monday 8:00-7:00                          Monday 8:00-7:00                          Monday 8:00-7:00              Tuesday 8:00-6:00                          Tuesday 8:00-7:00                          Tuesday 8:00-7:00              Wednesday 8:00-6:00                  Wednesday 8:00-7:00                   Wednesday 8:00-7:00      Thursday 8:00-6:00                        Thursday 8:00-7:00                         Thursday 8:00-7:00            Friday 8:00-5:00                              Friday 8:00-5:00                              Friday  8:00-5:00    [facility] Hours:   [address] [place] [address]  [phone]    Monday 8:00-7:00  Tuesday 8:00-7:00  Wednesday 8:00-7:00  Thursday 8:00-7:00  Friday 8:00-5:00  Please log on to [email or web address] to order your contact lenses.  The link is found on the [facility] page.  As always, Thank you for trusting us with your health care needs!

## 2018-03-28 NOTE — PROGRESS NOTES
CHIEF COMPLAINT:   Chief Complaint   Patient presents with     Surgical Followup     final refraction       Type of surgery cataract   Date of surgery 3-1-2018 od eye                             2- os eye    Taylor Teran, Optometric Assistant, A.B.O.C.     Drops reviewed.    OBJECTIVE:     See ophthalmology exam    ASSESSMENT:         ICD-10-CM    1. Pseudophakia of both eyes Z96.1 POST-OP FOLLOW-UP VISIT   2. Macular degeneration H35.30    3. 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    Your vision is limited by macular degeneration, left eye > right eye.    You have mild macular degeneration.  I recommend taking supplements for the eyes, PreserVision AREDS 2 formula daily as recommended dosage on the bottle.  Check with your pharmacist first to make sure there are not any interactions with your medications.    Check amsler grid regularly.  Return if any changes.    Referral to Dr. Cornelio Chaviar at the [facility] for lid evaluation if interested- [phone].    Return in 1 year for a complete eye exam or sooner if needed.    Shawn Venegas, OD

## 2018-03-28 NOTE — MR AVS SNAPSHOT
After Visit Summary   3/28/2018    Medardo Gautam    MRN: [id number]           Patient Information     Date Of Birth          1940        Visit Information        Provider Department      3/28/2018 11:00 AM Shawn Venegas, ALEXEY [facility]        Today's Diagnoses     Pseudophakia of both eyes    -  1    Macular degeneration        Dermatochalasis of eyelid          Care Instructions    Eyeglass prescription given.    Your vision is limited by macular degeneration, left eye > right eye.    You have mild macular degeneration.  I recommend taking supplements for the eyes, PreserVision AREDS 2 formula daily as recommended dosage on the bottle.  Check with your pharmacist first to make sure there are not any interactions with your medications.    Check amsler grid regularly.  Return if any changes.    Referral to Dr. Cornelio Chavira at the [facility] for lid evaluation if interested- [phone].    Return in 1 year for a complete eye exam or sooner if needed.    Shawn Venegas, ALEXEY    The affects of the dilating drops last for 4- 6 hours.  You will be more sensitive to light and vision will be blurry up close.  Mydriatic sunglasses were given if needed.      Optometry Providers       Clinic Locations                                 Telephone Number   Dr. Yamini Lynn [place]   [place]    [place]   [place]   [place] [phone]     [place] [facility] Hours:                [facility] Hours:       [place] [facility] Hours:   [address]   [address]     [address] [place] [address]   [place] [address]    [place] [address]  Phone: [phone]                    Phone: [phone]     Phone: [phone]                      Monday 8:00-7:00                          Monday 8:00-7:00                          Monday 8:00-7:00              Tuesday 8:00-6:00                           Tuesday 8:00-7:00                          Tuesday 8:00-7:00              Wednesday 8:00-6:00                  Wednesday 8:00-7:00                   Wednesday 8:00-7:00      Thursday 8:00-6:00                        Thursday 8:00-7:00                         Thursday 8:00-7:00            Friday 8:00-5:00                              Friday 8:00-5:00                              Friday 8:00-5:00    [place] [facility] Hours:   [address] [place] [address]  [phone]    Monday 8:00-7:00  Tuesday 8:00-7:00  Wednesday 8:00-7:00  Thursday 8:00-7:00  Friday 8:00-5:00  Please log on to [email or web address] to order your contact lenses.  The link is found on the [facility] page.  As always, Thank you for trusting us with your health care needs!                        Follow-ups after your visit        Follow-up notes from your care team     Return in about 1 year (around 3/28/2019) for Annual Visit.      Your next 10 appointments already scheduled     Apr 30, 2018 11:00 AM CDT   (Arrive by 10:45 AM)   Survivorship Visit with Magali Andrews PA-C   [facility] ([facility])    [address]  [address]  [facility] [address] [phone]            Jul 18, 2018 12:00 PM CDT   (Arrive by 11:45 AM)   Return Prostate Cancer with Norma Goodwin MD   [facility] for Prostate and Urologic Cancers ([facility])    [address] [place] [address]  4th Floor  [facility] [address] [phone]              Who to contact     If you have questions or need follow up information about today's clinic visit or your schedule please contact [facility] directly at [phone].  Normal or non-critical lab and imaging results will be communicated to you by MyChart, letter or phone within 4 business days after the clinic has received the results. If you do not hear from us within 7  days, please contact the clinic through [employer] or phone. If you have a critical or abnormal lab result, we will notify you by phone as soon as possible.  Submit refill requests through [employer] or call your pharmacy and they will forward the refill request to us. Please allow 3 business days for your refill to be completed.          Additional Information About Your Visit        Ex[employer] Information     [employer] gives you secure access to your electronic health record. If you see a primary care provider, you can also send messages to your care team and make appointments. If you have questions, please call your primary care clinic.  If you do not have a primary care provider, please call [phone] and they will assist you.      [employer]ly is an electronic gateway that provides easy, online access to your medical records. With [employer], you can request a clinic appointment, read your test results, renew a prescription or communicate with your care team.     To access your existing account, please contact your [facility] or call [phone] for assistance.        Care EveryWhere ID     This is your Care EveryWhere ID. This could be used by other organizations to access your [place] medical records  [id number]         Blood Pressure from Last 3 Encounters:   03/01/18 141/82   02/15/18 131/80   02/12/18 126/82    Weight from Last 3 Encounters:   02/15/18 90.7 kg (200 lb)   02/12/18 90.7 kg (200 lb)   01/16/18 90.1 kg (198 lb 9.6 oz)              We Performed the Following     POST-OP FOLLOW-UP VISIT        Primary Care Provider Office Phone # Fax #    Verna Osborne [phone]       [address]  [facility] [address]        Equal Access to Services     [facility]: Hadii torrey Hitchcock, quinn tenorio, qarandy yeh . So [facility] [phone].    ATENCIÓN: Si habla español, tiene a sanford disposición  servicios gratuitos de asistencia lingüística. Lukas moran [phone].    We comply with applicable federal civil rights laws and [place] laws. We do not discriminate on the basis of race, color, national origin, age, disability, sex, sexual orientation, or gender identity.            Thank you!     Thank you for choosing [facility]  for your care. Our goal is always to provide you with excellent care. Hearing back from our patients is one way we can continue to improve our services. Please take a few minutes to complete the written survey that you may receive in the mail after your visit with us. Thank you!             Your Updated Medication List - Protect others around you: Learn how to safely use, store and throw away your medicines at www.disposemymeds.org.          This list is accurate as of 3/28/18 11:46 AM.  Always use your most recent med list.                   Brand Name Dispense Instructions for use Diagnosis    aspirin 81 MG tablet     30 tablet    Take 1 tablet (81 mg) by mouth daily    Hyperlipidemia LDL goal <130, Malignant neoplasm of colon, unspecified part of colon (H)       azelastine 0.05 % Soln ophthalmic solution    OPTIVAR    1 Bottle    Apply 1 drop to eye 2 times daily    Allergic conjunctivitis, bilateral       bicalutamide 50 MG tablet    CASODEX    90 tablet    Take 1 tablet (50 mg) by mouth daily    Malignant neoplasm of prostate (H)       calcium-vitamin D 600-400 MG-UNIT per tablet    CALTRATE     Take 1 tablet by mouth daily    Hyperlipidemia LDL goal <130, Malignant neoplasm of colon, unspecified part of colon (H)       CLARITIN PO      Take  by mouth. As needed        clonazePAM 1 MG tablet    klonoPIN    30 tablet    Take 1 tablet (1 mg) by mouth nightly as needed for anxiety    Anxiety       folic acid-vit B6-vit B12 0.8-10-0.115 MG Tabs per tablet    FOLGARD     Take 1 tablet by mouth daily    Hyperlipidemia LDL goal <130, Malignant neoplasm of colon,  unspecified part of colon (H)       IBUPROFEN PO      Take 400 mg by mouth every 8 hours as needed for moderate pain        lisinopril 10 MG tablet    PRINIVIL/ZESTRIL    90 tablet    Take 1 tablet (10 mg) by mouth daily    Benign essential hypertension       naproxen 500 MG tablet    NAPROSYN    60 tablet    TAKE ONE TABLET BY MOUTH TWICE DAILY AS NEEDED FOR  MODERATE  PAIN    Chronic gout without tophus, unspecified cause, unspecified site       OMEGA-3 FISH OIL PO      Take 500 mg by mouth daily    Hyperlipidemia LDL goal <130, Malignant neoplasm of colon, unspecified part of colon (H)       sildenafil 100 MG tablet    VIAGRA    10 tablet    1/2 tab three times a week    Malignant neoplasm of prostate (H)       triamcinolone 0.1 % cream    KENALOG          zolpidem 5 MG tablet    AMBIEN    30 tablet    Take 1 tablet (5 mg) by mouth nightly as needed for sleep    Persistent disorder of initiating or maintaining sleep

## 2018-03-28 NOTE — LETTER
3/28/2018         RE: Medardo Gautam  [address] [address] [facility] [address]        Dear Colleague,    Thank you for referring your patient, Medardo Gautam, to the [facility]. Please see a copy of my visit note below.    CHIEF COMPLAINT:   Chief Complaint   Patient presents with     Surgical Followup     final refraction       Type of surgery cataract   Date of surgery 3-1-2018 od eye                             2- os eye    Taylor Teran, Optometric Assistant, A.B.O.C.     Drops reviewed.    OBJECTIVE:     See ophthalmology exam    ASSESSMENT:         ICD-10-CM    1. Pseudophakia of both eyes Z96.1 POST-OP FOLLOW-UP VISIT   2. Macular degeneration H35.30    3. 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    Your vision is limited by macular degeneration, left eye > right eye.    You have mild macular degeneration.  I recommend taking supplements for the eyes, PreserVision AREDS 2 formula daily as recommended dosage on the bottle.  Check with your pharmacist first to make sure there are not any interactions with your medications.    Check amsler grid regularly.  Return if any changes.    Referral to Dr. Cornelio Chavira at the [facility] for lid evaluation if interested- [phone].    Return in 1 year for a complete eye exam or sooner if needed.    Shawn Venegas, ALEXEY                  Again, thank you for allowing me to participate in the care of your patient.        Sincerely,        Shawn Venegas OD

## 2018-04-03 DIAGNOSIS — F41.9 ANXIETY: ICD-10-CM

## 2018-04-05 RX ORDER — CLONAZEPAM 1 MG/1
TABLET ORAL
Qty: 30 TABLET | Refills: 2 | OUTPATIENT
Start: 2018-04-05

## 2018-04-05 NOTE — TELEPHONE ENCOUNTER
Clonazepam:  Sent 2/9/18 with 6 month supply. Refill not appropriate at this time.     Laura Blevins, RN, BSN

## 2018-04-06 RX ORDER — CLONAZEPAM 1 MG/1
1 TABLET ORAL
Qty: 30 TABLET | Refills: 5 | Status: SHIPPED | OUTPATIENT
Start: 2018-04-06 | End: 2018-10-09

## 2018-04-06 NOTE — TELEPHONE ENCOUNTER
Last rx was was a print from 2/09/18, called patient to see if he brought the printed rx to pharmacy. He states he never recieved a printed rx.   Called pharmacy, they state the last time they filled was in Jan and it was a verbal order. They did not receive the rx in Feb. Please advise.  Ela Ochoa, CMA

## 2018-04-06 NOTE — TELEPHONE ENCOUNTER
Patient said he just checked with [facility] and they do not have anything. Can you please help him take care of this?

## 2018-04-09 NOTE — TELEPHONE ENCOUNTER
Unable to locate Rx.  Called [facility] and they said they did receive this on 4/6/18 already so they are good.

## 2018-04-11 ENCOUNTER — OFFICE VISIT (OUTPATIENT)
Dept: OPTOMETRY | Facility: CLINIC | Age: 78
End: 2018-04-11
Payer: COMMERCIAL

## 2018-04-11 DIAGNOSIS — H10.11 ALLERGIC CONJUNCTIVITIS OF RIGHT EYE: Primary | ICD-10-CM

## 2018-04-11 PROCEDURE — 99213 OFFICE O/P EST LOW 20 MIN: CPT | Performed by: OPTOMETRIST

## 2018-04-11 RX ORDER — AZELASTINE HYDROCHLORIDE 0.5 MG/ML
1 SOLUTION/ DROPS OPHTHALMIC 2 TIMES DAILY
Qty: 1 BOTTLE | Refills: 11 | Status: SHIPPED | OUTPATIENT
Start: 2018-04-11 | End: 2018-04-30

## 2018-04-11 ASSESSMENT — REFRACTION_WEARINGRX
OS_AXIS: 175
OS_CYLINDER: +2.50
OD_SPHERE: -1.25
OS_SPHERE: -1.75
OD_CYLINDER: +2.50
OD_AXIS: 180
OS_ADD: +2.50
OD_ADD: +2.50

## 2018-04-11 ASSESSMENT — TONOMETRY
OD_IOP_MMHG: 15
OS_IOP_MMHG: 18
IOP_METHOD: TONOPEN

## 2018-04-11 ASSESSMENT — EXTERNAL EXAM - RIGHT EYE: OD_EXAM: NORMAL

## 2018-04-11 ASSESSMENT — VISUAL ACUITY
OD_SC+: -1
OD_SC: 20/20
METHOD: SNELLEN - LINEAR
CORRECTION_TYPE: GLASSES
OS_SC: 20/150
OS_PH_SC: 20/30-2

## 2018-04-11 ASSESSMENT — SLIT LAMP EXAM - LIDS
COMMENTS: NORMAL
COMMENTS: MILD SWELLING

## 2018-04-11 ASSESSMENT — EXTERNAL EXAM - LEFT EYE: OS_EXAM: NORMAL

## 2018-04-11 NOTE — LETTER
4/11/2018         RE: Medardo Gautam  [address] [address] [facility] [address]        Dear Colleague,    Thank you for referring your patient, Medardo Gautam, to the [facility]. Please see a copy of my visit note below.    Chief Complaint   Patient presents with     Eye Problem     od eye red and burning x 1 week       HPI    Affected eye(s):  Right   Symptoms:     Redness   Itching   Burning      Duration:  1 week   Frequency:  Constant       Do you have eye pain now?:  No      Comments:  od eye is burning and radha right now           History of recent cataract surgery  Date of surgery 3-1-2018 od eye                             2- os eye    No longer using any drops    Medical, surgical and family histories reviewed and updated 4/11/2018.       OBJECTIVE: See Ophthalmology exam    ASSESSMENT:    ICD-10-CM    1. Allergic conjunctivitis of right eye H10.11 azelastine (OPTIVAR) 0.05 % SOLN ophthalmic solution      PLAN:     Patient Instructions   Optivar- 1 drop both eyes 2 x day for 1 week then as needed.    Cold compresses.    Oral antihistamine daily for 1 week.    Systane or Refresh- 1 drop both eyes 4 x day.    Return if signs or symptoms worsen.    Shawn Venegas, ALEXEY             Again, thank you for allowing me to participate in the care of your patient.        Sincerely,        Shawn Venegas, OD

## 2018-04-11 NOTE — PROGRESS NOTES
Chief Complaint   Patient presents with     Eye Problem     od eye red and burning x 1 week       HPI    Affected eye(s):  Right   Symptoms:     Redness   Itching   Burning      Duration:  1 week   Frequency:  Constant       Do you have eye pain now?:  No      Comments:  od eye is burning and radha right now           History of recent cataract surgery  Date of surgery 3-1-2018 od eye                             2- os eye    No longer using any drops    Medical, surgical and family histories reviewed and updated 4/11/2018.       OBJECTIVE: See Ophthalmology exam    ASSESSMENT:    ICD-10-CM    1. Allergic conjunctivitis of right eye H10.11 azelastine (OPTIVAR) 0.05 % SOLN ophthalmic solution      PLAN:     Patient Instructions   Optivar- 1 drop both eyes 2 x day for 1 week then as needed.    Cold compresses.    Oral antihistamine daily for 1 week.    Systane or Refresh- 1 drop both eyes 4 x day.    Return if signs or symptoms worsen.    Shawn Venegas, OD

## 2018-04-11 NOTE — PATIENT INSTRUCTIONS
Optivar- 1 drop both eyes 2 x day for 1 week then as needed.    Cold compresses.    Oral antihistamine daily for 1 week.    Systane or Refresh- 1 drop both eyes 4 x day.    Return if signs or symptoms worsen.    Shawn Venegas, OD

## 2018-04-11 NOTE — MR AVS SNAPSHOT
After Visit Summary   4/11/2018    Medardo Gautam    MRN: [id number]           Patient Information     Date Of Birth          1940        Visit Information        Provider Department      4/11/2018 3:20 PM Shawn Venegas OD [facility]        Today's Diagnoses     Allergic conjunctivitis of right eye    -  1      Care Instructions    Optivar- 1 drop both eyes 2 x day for 1 week then as needed.    Cold compresses.    Oral antihistamine daily for 1 week.    Systane or Refresh- 1 drop both eyes 4 x day.    Return if signs or symptoms worsen.    Shawn Venegas OD              Follow-ups after your visit        Follow-up notes from your care team     Return if symptoms worsen or fail to improve.      Your next 10 appointments already scheduled     Apr 30, 2018 11:00 AM CDT   (Arrive by 10:45 AM)   Survivorship Visit with Magali Andrews PA-C   [facility] ([email or web address] [employer])    [address]  [address]  [facility] [address] [phone]            Jul 18, 2018 12:00 PM CDT   (Arrive by 11:45 AM)   Return Prostate Cancer with Norma Goodwin MD   [facility] for Prostate and Urologic Cancers ([facility])    [address]  4th Floor  [facility] [address] [phone]              Who to contact     If you have questions or need follow up information about today's clinic visit or your schedule please contact [facility] directly at [phone].  Normal or non-critical lab and imaging results will be communicated to you by MyChart, letter or phone within 4 business days after the clinic has received the results. If you do not hear from us within 7 days, please contact the clinic through MyChart or phone. If you have a critical or abnormal lab result, we will notify you by phone as soon as possible.  Submit refill requests through wunderloop or call your pharmacy and  they will forward the refill request to us. Please allow 3 business days for your refill to be completed.          Additional Information About Your Visit        Customer [employer] Information     SetJam gives you secure access to your electronic health record. If you see a primary care provider, you can also send messages to your care team and make appointments. If you have questions, please call your primary care clinic.  If you do not have a primary care provider, please call [phone] and they will assist you.      SetJam is an electronic gateway that provides easy, online access to your medical records. With SetJam, you can request a clinic appointment, read your test results, renew a prescription or communicate with your care team.     To access your existing account, please contact your [facility] or call [phone] for assistance.        Care EveryWhere ID     This is your Care EveryWhere ID. This could be used by other organizations to access your [place] medical records  [id number]         Blood Pressure from Last 3 Encounters:   03/01/18 141/82   02/15/18 131/80   02/12/18 126/82    Weight from Last 3 Encounters:   02/15/18 90.7 kg (200 lb)   02/12/18 90.7 kg (200 lb)   01/16/18 90.1 kg (198 lb 9.6 oz)              Today, you had the following     No orders found for display         Today's Medication Changes          These changes are accurate as of 4/11/18  5:10 PM.  If you have any questions, ask your nurse or doctor.               These medicines have changed or have updated prescriptions.        Dose/Directions    * azelastine 0.05 % Soln ophthalmic solution   Commonly known as:  OPTIVAR   This may have changed:  Another medication with the same name was added. Make sure you understand how and when to take each.   Used for:  Allergic conjunctivitis, bilateral        Dose:  1 drop   Apply 1 drop to eye 2 times daily   Quantity:  1 Bottle   Refills:  6       * azelastine  0.05 % Soln ophthalmic solution   Commonly known as:  OPTIVAR   This may have changed:  You were already taking a medication with the same name, and this prescription was added. Make sure you understand how and when to take each.   Used for:  Allergic conjunctivitis of right eye        Dose:  1 drop   Apply 1 drop to eye 2 times daily   Quantity:  1 Bottle   Refills:  11       * Notice:  This list has 2 medication(s) that are the same as other medications prescribed for you. Read the directions carefully, and ask your doctor or other care provider to review them with you.         Where to get your medicines      These medications were sent to [facility] [address]  [address] [facility] [address]     Phone:  [phone]     azelastine 0.05 % Soln ophthalmic solution                Primary Care Provider Office Phone # Fax #    Verna Osborne [phone]       [address]  [facility] [address]        Equal Access to Services     [facility]: Hadii torrey vo hadasho Soomaali, waaxda luqadaha, qaybta kaalmada adeegyada, waxay lyricin carly salamanca . So [facility] [phone].    ATENCIÓN: Si habla español, tiene a sanford disposición servicios gratuitos de asistencia lingüística. [facility] [phone].    We comply with applicable federal civil rights laws and [place] laws. We do not discriminate on the basis of race, color, national origin, age, disability, sex, sexual orientation, or gender identity.            Thank you!     Thank you for choosing [facility]  for your care. Our goal is always to provide you with excellent care. Hearing back from our patients is one way we can continue to improve our services. Please take a few minutes to complete the written survey that you may receive in the mail after your visit with us. Thank you!             Your Updated Medication List - Protect others around you: Learn how to safely  use, store and throw away your medicines at www.disposemymeds.org.          This list is accurate as of 4/11/18  5:10 PM.  Always use your most recent med list.                   Brand Name Dispense Instructions for use Diagnosis    aspirin 81 MG tablet     30 tablet    Take 1 tablet (81 mg) by mouth daily    Hyperlipidemia LDL goal <130, Malignant neoplasm of colon, unspecified part of colon (H)       * azelastine 0.05 % Soln ophthalmic solution    OPTIVAR    1 Bottle    Apply 1 drop to eye 2 times daily    Allergic conjunctivitis, bilateral       * azelastine 0.05 % Soln ophthalmic solution    OPTIVAR    1 Bottle    Apply 1 drop to eye 2 times daily    Allergic conjunctivitis of right eye       bicalutamide 50 MG tablet    CASODEX    90 tablet    Take 1 tablet (50 mg) by mouth daily    Malignant neoplasm of prostate (H)       calcium-vitamin D 600-400 MG-UNIT per tablet    CALTRATE     Take 1 tablet by mouth daily    Hyperlipidemia LDL goal <130, Malignant neoplasm of colon, unspecified part of colon (H)       CLARITIN PO      Take  by mouth. As needed        clonazePAM 1 MG tablet    klonoPIN    30 tablet    Take 1 tablet (1 mg) by mouth nightly as needed for anxiety    Anxiety       folic acid-vit B6-vit B12 0.8-10-0.115 MG Tabs per tablet    FOLGARD     Take 1 tablet by mouth daily    Hyperlipidemia LDL goal <130, Malignant neoplasm of colon, unspecified part of colon (H)       IBUPROFEN PO      Take 400 mg by mouth every 8 hours as needed for moderate pain        lisinopril 10 MG tablet    PRINIVIL/ZESTRIL    90 tablet    Take 1 tablet (10 mg) by mouth daily    Benign essential hypertension       naproxen 500 MG tablet    NAPROSYN    60 tablet    TAKE ONE TABLET BY MOUTH TWICE DAILY AS NEEDED FOR  MODERATE  PAIN    Chronic gout without tophus, unspecified cause, unspecified site       OMEGA-3 FISH OIL PO      Take 500 mg by mouth daily    Hyperlipidemia LDL goal <130, Malignant neoplasm of colon, unspecified  part of colon (H)       sildenafil 100 MG tablet    VIAGRA    10 tablet    1/2 tab three times a week    Malignant neoplasm of prostate (H)       triamcinolone 0.1 % cream    KENALOG          zolpidem 5 MG tablet    AMBIEN    30 tablet    Take 1 tablet (5 mg) by mouth nightly as needed for sleep    Persistent disorder of initiating or maintaining sleep       * Notice:  This list has 2 medication(s) that are the same as other medications prescribed for you. Read the directions carefully, and ask your doctor or other care provider to review them with you.

## 2018-04-17 ENCOUNTER — OFFICE VISIT (OUTPATIENT)
Dept: FAMILY MEDICINE | Facility: OTHER | Age: 78
End: 2018-04-17
Payer: COMMERCIAL

## 2018-04-17 ENCOUNTER — RADIANT APPOINTMENT (OUTPATIENT)
Dept: GENERAL RADIOLOGY | Facility: OTHER | Age: 78
End: 2018-04-17
Attending: NURSE PRACTITIONER
Payer: COMMERCIAL

## 2018-04-17 VITALS
RESPIRATION RATE: 16 BRPM | TEMPERATURE: 97.6 F | HEART RATE: 78 BPM | DIASTOLIC BLOOD PRESSURE: 80 MMHG | SYSTOLIC BLOOD PRESSURE: 132 MMHG

## 2018-04-17 DIAGNOSIS — M25.531 RIGHT WRIST PAIN: Primary | ICD-10-CM

## 2018-04-17 DIAGNOSIS — M25.531 RIGHT WRIST PAIN: ICD-10-CM

## 2018-04-17 PROCEDURE — 99214 OFFICE O/P EST MOD 30 MIN: CPT | Performed by: NURSE PRACTITIONER

## 2018-04-17 PROCEDURE — 73110 X-RAY EXAM OF WRIST: CPT | Mod: RT

## 2018-04-17 ASSESSMENT — PAIN SCALES - GENERAL: PAINLEVEL: EXTREME PAIN (8)

## 2018-04-17 NOTE — NURSING NOTE
"Chief Complaint   Patient presents with     Fall       Initial /80 (BP Location: Left arm, Patient Position: Chair, Cuff Size: Adult Large)  Pulse 78  Temp 97.6  F (36.4  C) (Oral)  Resp 16 Estimated body mass index is 31.32 kg/(m^2) as calculated from the following:    Height as of 2/15/18: 5' 7\" (1.702 m).    Weight as of 2/15/18: 200 lb (90.7 kg).  Medication Reconciliation: complete    "

## 2018-04-17 NOTE — PROGRESS NOTES
SUBJECTIVE:   Medardo Gautam is a 78 year old male who presents to clinic today for the following health issues:      HPI     Concern - Wrist pain  Onset: yesterday     Description:   Fell shoveling yesterday and is having right wrist pain. Pain, lack of strength, swelling and some bruising     Intensity: moderate    Progression of Symptoms:  worsening    Therapies Tried and outcome: ice packs , ibuprofen, naproxen     Fracture long history with ligament injury possible scaphoid fracture   History of trigger finger repair right hand      Problem list and histories reviewed & adjusted, as indicated.  Additional history: as documented    Patient Active Problem List   Diagnosis     Disturbance of skin sensation     Hemorrhoids     Gout     Advanced directives, counseling/discussion     Dupuytren's contracture of hand- left 5th finger, right 5th finger     Bunions- both feet     Skin lesion- R side of face and behind L ear     Insomnia     Prostatic nodule- posterior right edge with rectal exam     Prostate cancer- Jeancarlos 9 (5+4) dx Feb 2012     Elevated liver enzymes     Hyperbilirubinemia     Essential hypertension with goal blood pressure less than 140/90     Shoulder pain, left     Contracture of finger joint     Hyperlipidemia LDL goal <130     Shoulder pain, right     Malignant neoplasm of prostate (H)     Anxiety     Colon cancer (H)     Abdominal pain, epigastric     Renal cyst     Lumbar radiculopathy     Meibomian gland dysfunction     Pseudophakia of right eye     Macular degeneration     Dermatochalasis of eyelid     Past Surgical History:   Procedure Laterality Date     APPENDECTOMY       BIOPSY  01/16/15     C HAND/FINGER SURGERY UNLISTED       C LENGTHEN,TENDON,HAND/FINGER  ca 2007    right fifth     C STOMACH SURGERY PROCEDURE UNLISTED       CATARACT IOL, RT/LT Left 02/15/2018     COLON SURGERY  2/11/2015    Lap assisted R hemicolectomy     COLONOSCOPY  10/25/07    Snare polypectomy     COLONOSCOPY   6/25/2009    with snare polypectomy     COLONOSCOPY  9/30/2009     COLONOSCOPY  1/5/2011    COLONOSCOPY performed by JUD MARIEE at  [facility]     COLONOSCOPY N/A 1/16/2015    Procedure: COMBINED COLONOSCOPY, SINGLE OR MULTIPLE BIOPSY/POLYPECTOMY BY BIOPSY;  Surgeon: Sarah Beth Pisano MD;  Location: [facility] OR     COLONOSCOPY WITH CO2 INSUFFLATION N/A 1/16/2015    Procedure: COLONOSCOPY WITH CO2 INSUFFLATION;  Surgeon: Sarah Beth Pisano MD;  Location: [facility] OR     DAVINCI PROSTATECTOMY  8/6/2012    Procedure: DAVINCI PROSTATECTOMY;  Davinci Assisted Radical Prostatectomy with Bilateral Lymphadenectomy ;  Surgeon: Norma Goodwin MD;  Location: [facility] OR     GENITOURINARY SURGERY      prostate surgery     INJECT EPIDURAL LUMBAR / SACRAL SINGLE Left 10/30/2017    Procedure: INJECT EPIDURAL LUMBAR / SACRAL SINGLE;  Left Transforaminal Lumbar 4-Lumbar 5 Epidural Steroid Injection;  Surgeon: Nuvia Reina MD;  Location: [facility]     LAPAROSCOPIC ASSISTED COLECTOMY N/A 2/11/2015    Procedure: LAPAROSCOPIC ASSISTED COLECTOMY;  Surgeon: Oren Breen MD;  Location: [facility] OR     PHACOEMULSIFICATION CLEAR CORNEA WITH STANDARD INTRAOCULAR LENS IMPLANT Left 2/15/2018    Procedure: PHACOEMULSIFICATION CLEAR CORNEA WITH STANDARD INTRAOCULAR LENS IMPLANT;  LEFT EYE CATARACT EXTRACTION WITH STANDARD INTRAOCULAR LENS IMPLANT ;  Surgeon: Brandon Orellana MD;  Location: [facility]     PHACOEMULSIFICATION CLEAR CORNEA WITH STANDARD INTRAOCULAR LENS IMPLANT Right 3/1/2018    Procedure: PHACOEMULSIFICATION CLEAR CORNEA WITH STANDARD INTRAOCULAR LENS IMPLANT;  RIGHT EYE CATARACT EXTRACTION WITH STANDARD INTRAOCULAR LENS IMPLANT ;  Surgeon: Brandon Orellana MD;  Location: [facility]       Social History   Substance Use Topics     Smoking status: Former Smoker     Years: 1.00     Types: Cigarettes, Cigars, Pipe     Start date: 10/1/1961     Quit date: 1/1/1962     Smokeless tobacco: Never Used      Comment: No smokers in home      Alcohol use 0.0 oz/week      Comment: daily glass of wine and whiskey     Family History   Problem Relation Age of Onset     CEREBROVASCULAR DISEASE Father      CANCER Mother 90     Patient believes vaginal cancer - hysterectomy,      Alzheimer Disease Mother      CANCER Sister      Breast Cancer Sister      C.A.D. No family hx of      Cancer - colorectal No family hx of      Prostate Cancer No family hx of      Blood Disease No family hx of      Cardiovascular No family hx of      Circulatory No family hx of      Eye Disorder No family hx of      GASTROINTESTINAL DISEASE No family hx of      Genitourinary Problems No family hx of      Lipids No family hx of      Neurologic Disorder No family hx of      Respiratory No family hx of      Asthma No family hx of      HEART DISEASE No family hx of      DIABETES No family hx of      Hypertension No family hx of      Arthritis No family hx of      Thyroid Disease No family hx of      Musculoskeletal Disorder No family hx of      Glaucoma No family hx of      Macular Degeneration No family hx of          Current Outpatient Prescriptions   Medication Sig Dispense Refill     azelastine (OPTIVAR) 0.05 % SOLN ophthalmic solution Apply 1 drop to eye 2 times daily 1 Bottle 11     clonazePAM (KLONOPIN) 1 MG tablet Take 1 tablet (1 mg) by mouth nightly as needed for anxiety 30 tablet 5     bicalutamide (CASODEX) 50 MG tablet Take 1 tablet (50 mg) by mouth daily 90 tablet 3     triamcinolone (KENALOG) 0.1 % cream        lisinopril (PRINIVIL/ZESTRIL) 10 MG tablet Take 1 tablet (10 mg) by mouth daily 90 tablet 3     zolpidem (AMBIEN) 5 MG tablet Take 1 tablet (5 mg) by mouth nightly as needed for sleep 30 tablet 5     azelastine (OPTIVAR) 0.05 % SOLN ophthalmic solution Apply 1 drop to eye 2 times daily 1 Bottle 6     naproxen (NAPROSYN) 500 MG tablet TAKE ONE TABLET BY MOUTH TWICE DAILY AS NEEDED FOR  MODERATE  PAIN 60 tablet 1     IBUPROFEN PO Take 400 mg by mouth every 8  hours as needed for moderate pain       Omega-3 Fatty Acids (OMEGA-3 FISH OIL PO) Take 500 mg by mouth daily       calcium-vitamin D (CALTRATE) 600-400 MG-UNIT per tablet Take 1 tablet by mouth daily       folic acid-vit B6-vit B12 (FOLGARD) 0.8-10-0.115 MG TABS Take 1 tablet by mouth daily       aspirin 81 MG tablet Take 1 tablet (81 mg) by mouth daily 30 tablet      sildenafil (VIAGRA) 100 MG tablet 1/2 tab three times a week 10 tablet 12     Loratadine (CLARITIN PO) Take  by mouth. As needed        No Known Allergies  BP Readings from Last 3 Encounters:   04/17/18 132/80   03/01/18 141/82   02/15/18 131/80    Wt Readings from Last 3 Encounters:   02/15/18 200 lb (90.7 kg)   02/12/18 200 lb (90.7 kg)   01/16/18 198 lb 9.6 oz (90.1 kg)                  Labs reviewed in EPIC    ROS:  Constitutional, HEENT, cardiovascular, pulmonary, gi and gu systems are negative, except as otherwise noted.    OBJECTIVE:     /80 (BP Location: Left arm, Patient Position: Chair, Cuff Size: Adult Large)  Pulse 78  Temp 97.6  F (36.4  C) (Oral)  Resp 16  There is no height or weight on file to calculate BMI.  GENERAL: healthy, alert and no distress  RESP: lungs clear to auscultation - no rales, rhonchi or wheezes  CV: regular rate and rhythm, normal S1 S2, no S3 or S4, no murmur, click or rub, no peripheral edema and peripheral pulses strong  MS: right anterior hand inflammation, tenderness to palpation over first digit and wrist near scaphoid. Pain with grasp and digit extension. CMS intact.         ASSESSMENT/PLAN:     1. Right wrist pain  Discussed result of xray with radiologist can not rule out scaphoid fx also additional fracture seen of unknown age. Recommend soft splint, ice, elevation and nsaids. Follow up with ortho hand specialty for further clinical evaluation and treatment plan.   - XR Wrist Right G/E 3 Views; Future  - ORTHO  REFERRAL    Patient Instructions   Please wear wrist splint on right levate with  ice 15-20 minutes 3 times per day. May use tylenol xs 2 tabs 3 times daily with naproxen take both with food.   Possible Wrist Fracture  Follow up with your healthcare provider in one week, or as advised. This is to be sure the bone is healing properly.  If X-rays were taken, you will be told of any new findings that may affect your care.  You are very sore over a bone in your wrist called the navicular, or scaphoid, bone. This could be a sign of a hairline fracture, or break, even though no fracture was seen on the X-ray. Therefore, a splint or cast will be applied until repeat X-rays are taken in about 1 to 2 weeks. If you have a hairline fracture, it will show up on the second X-ray and you will have to keep wearing a cast for about 6 to 20 weeks, depending on the location of the fracture. If no fracture is seen on the second X-ray, this means you only have a wrist sprain. The splint or cast can be removed.     Home care    Keep your arm raised to reduce pain and swelling. When sitting or lying down, raise your arm above the level of your heart. You can do this by placing your arm on a pillow that rests on your chest or on a pillow at your side. This is most important during the first 48 hours after injury.    Apply an ice pack over the injured area for no more than 15 to 20 minutes. Do this every 1 to 2 hours for the first 24 to 48 hours. To make an ice pack, put ice cubes in a plastic bag that seals at the top. Wrap the bag in a clean, thin towel or cloth. Never put ice or an ice pack directly on the skin. As the ice melts, be careful that the cast or splint doesn t get wet. You can place the ice pack inside the sling and directly over the splint or cast. Keep using ice packs as needed to ease pain and swelling.    Keep the cast or splint completely dry at all times. Bathe with your cast or splint out of the water. Protect it with 2 large plastic bags. Place 1 bag around the other. Tape each bag with duct  tape at the top end. If a fiberglass cast or splint gets wet, you can dry it with a hair dryer on a cool setting.    You may use over-the-counter pain medicine to control pain, unless another pain medicine was prescribed. If you have chronic liver or kidney disease or ever had a stomach ulcer or GI (gastrointestinal) bleeding, talk with your provider before using these medicines.    If you smoke, try to quit. Tobacco use can interfere with the healing of this fracture. It can also increase the risk of a complication needing surgery.  Follow-up care  Follow up with your healthcare provider in 1 week, or as advised. This is to be sure the bone is healing properly.  If X-rays were taken, you will be told of any new findings that may affect your care.  When to seek medical advice  Call your healthcare provider right away if any of the following occur:    The plaster cast or splint becomes wet or soft    The plaster cast or splint becomes loose    The fiberglass cast or splint remains wet for more than 24 hours    Increased tightness or pain occurs under the cast or splint    Fingers become swollen, cold, blue, numb, or tingly  Date Last Reviewed: 12/3/2015    3479-4113 [employer]. [address] [place] [address]. All rights reserved. This information is not intended as a substitute for professional medical care. Always follow your healthcare professional's instructions.      Thank you  Gayle Garner [facility] (formerly [facility])[3]

## 2018-04-17 NOTE — PROGRESS NOTES
Medardo Gautam is a 78 year old male who is seen in consultation at the request of Gayle Garner, APRN CNP  .  History of Present illness:  Medardo presents for evaluation of:  1.) rt wrist pain   Onset: 4/16/18  Symptoms brought on by fall shoveling snow.   Character:  sharp, dull ache and swelling.    Progression of symptoms:  worse.    Previous similar pain: no .   Pain Level:  4/10.   Previous treatments:  ice, support wrap and NSAID - naproxen advil .  Currently on Blood thinners? no  Diagnosis of Diabetes? no

## 2018-04-17 NOTE — MR AVS SNAPSHOT
After Visit Summary   4/17/2018    Medardo Gautam    MRN: [id number]           Patient Information     Date Of Birth          1940        Visit Information        Provider Department      4/17/2018 1:20 PM Gayle Garner APRN [facility]        Today's Diagnoses     Right wrist pain    -  1      Care Instructions    Please wear wrist splint on right levate with ice 15-20 minutes 3 times per day. May use tylenol xs 2 tabs 3 times daily with naproxen take both with food.   Possible Wrist Fracture  Follow up with your healthcare provider in one week, or as advised. This is to be sure the bone is healing properly.  If X-rays were taken, you will be told of any new findings that may affect your care.  You are very sore over a bone in your wrist called the navicular, or scaphoid, bone. This could be a sign of a hairline fracture, or break, even though no fracture was seen on the X-ray. Therefore, a splint or cast will be applied until repeat X-rays are taken in about 1 to 2 weeks. If you have a hairline fracture, it will show up on the second X-ray and you will have to keep wearing a cast for about 6 to 20 weeks, depending on the location of the fracture. If no fracture is seen on the second X-ray, this means you only have a wrist sprain. The splint or cast can be removed.     Home care    Keep your arm raised to reduce pain and swelling. When sitting or lying down, raise your arm above the level of your heart. You can do this by placing your arm on a pillow that rests on your chest or on a pillow at your side. This is most important during the first 48 hours after injury.    Apply an ice pack over the injured area for no more than 15 to 20 minutes. Do this every 1 to 2 hours for the first 24 to 48 hours. To make an ice pack, put ice cubes in a plastic bag that seals at the top. Wrap the bag in a clean, thin towel or cloth. Never put ice or an ice pack directly on the  skin. As the ice melts, be careful that the cast or splint doesn t get wet. You can place the ice pack inside the sling and directly over the splint or cast. Keep using ice packs as needed to ease pain and swelling.    Keep the cast or splint completely dry at all times. Bathe with your cast or splint out of the water. Protect it with 2 large plastic bags. Place 1 bag around the other. Tape each bag with duct tape at the top end. If a fiberglass cast or splint gets wet, you can dry it with a hair dryer on a cool setting.    You may use over-the-counter pain medicine to control pain, unless another pain medicine was prescribed. If you have chronic liver or kidney disease or ever had a stomach ulcer or GI (gastrointestinal) bleeding, talk with your provider before using these medicines.    If you smoke, try to quit. Tobacco use can interfere with the healing of this fracture. It can also increase the risk of a complication needing surgery.  Follow-up care  Follow up with your healthcare provider in 1 week, or as advised. This is to be sure the bone is healing properly.  If X-rays were taken, you will be told of any new findings that may affect your care.  When to seek medical advice  Call your healthcare provider right away if any of the following occur:    The plaster cast or splint becomes wet or soft    The plaster cast or splint becomes loose    The fiberglass cast or splint remains wet for more than 24 hours    Increased tightness or pain occurs under the cast or splint    Fingers become swollen, cold, blue, numb, or tingly  Date Last Reviewed: 12/3/2015    6184-0765 [employer]. [address] [place] [address], [place] [address]. All rights reserved. This information is not intended as a substitute for professional medical care. Always follow your healthcare professional's instructions.      Thank you  Gayle Garner CNP                Follow-ups after your visit        Additional Services     ORTHO   REFERRAL       [facility] is referring you to the Orthopedic  Services at [facility].       The [facility] Representative will assist you in the coordination of your Orthopedic and Musculoskeletal Care as prescribed by your physician.    The  Representative will call you within 1 business day to help schedule your appointment, or you may contact the  Representative at:    All areas ~ [phone]     Type of Referral : Surgical / Specialist       Timeframe requested: 1 - 2 days    Coverage of these services is subject to the terms and limitations of your health insurance plan.  Please call member services at your health plan with any benefit or coverage questions.      If X-rays, CT or MRI's have been performed, please contact the facility where they were done to arrange for , prior to your scheduled appointment.  Please bring this referral request to your appointment and present it to your specialist.    HAND SPECIALTY                  Your next 10 appointments already scheduled     Apr 17, 2018  1:20 PM CDT   Office Visit with JAISON Barreto CNP   [facility] ([facility]    [address] [place] [id number]   [phone]           Bring a current list of meds and any records pertaining to this visit. For Physicals, please bring immunization records and any forms needing to be filled out. Please arrive 10 minutes early to complete paperwork.            Apr 30, 2018 11:00 AM CDT   (Arrive by 10:45 AM)   Survivorship Visit with Magali Andrews PA-C   [facility] ([facility])    [address]  [address]  [facility] [address] [phone]            Jul 18, 2018 12:00 PM CDT   (Arrive by 11:45 AM)   Return Prostate Cancer with Norma Goodwin MD   [facility] ([facility])    [address]  4th Floor  [facility] [address] [phone]              Who to contact     If you have questions or need follow up information about today's clinic visit or your schedule please contact [facility] directly at [phone].  Normal or non-critical lab and imaging results will be communicated to you by MyChart, letter or phone within 4 business days after the clinic has received the results. If you do not hear from us within 7 days, please contact the clinic through MyChart or phone. If you have a critical or abnormal lab result, we will notify you by phone as soon as possible.  Submit refill requests through [employer] or call your pharmacy and they will forward the refill request to us. Please allow 3 business days for your refill to be completed.          Additional Information About Your Visit        MyChart Information     [employer] gives you secure access to your electronic health record. If you see a primary care provider, you can also send messages to your care team and make appointments. If you have questions, please call your primary care clinic.  If you do not have a primary care provider, please call [phone] and they will assist you.        Care EveryWhere ID     This is your Care EveryWhere ID. This could be used by other organizations to access your [facility] medical records  [id number]        Your Vitals Were     Pulse Temperature Respirations             78 97.6  F (36.4  C) (Oral) 16          Blood Pressure from Last 3 Encounters:   04/17/18 132/80   03/01/18 141/82   02/15/18 131/80    Weight from Last 3 Encounters:   02/15/18 200 lb (90.7 kg)   02/12/18 200 lb (90.7 kg)   01/16/18 198 lb 9.6 oz (90.1 kg)              We Performed the Following     ORTHO  REFERRAL        Primary Care Provider Office Phone # Fax #    Verna Osborne [phone]       [address]  [facility]  [address]        Equal Access to Services     [facility]: Hadii torrey vo mehdi Hitchcock, waaxda luqadaha, qaybta kaalmada evelia, randy spivey. So [facility] [phone].    ATENCIÓN: Si habla español, tiene a sanford disposición servicios gratuitos de asistencia lingüística. Jamaame al [phone].    We comply with applicable federal civil rights laws and [place] laws. We do not discriminate on the basis of race, color, national origin, age, disability, sex, sexual orientation, or gender identity.            Thank you!     Thank you for choosing [facility]  for your care. Our goal is always to provide you with excellent care. Hearing back from our patients is one way we can continue to improve our services. Please take a few minutes to complete the written survey that you may receive in the mail after your visit with us. Thank you!             Your Updated Medication List - Protect others around you: Learn how to safely use, store and throw away your medicines at www.disposemymeds.org.          This list is accurate as of 4/17/18 11:18 AM.  Always use your most recent med list.                   Brand Name Dispense Instructions for use Diagnosis    aspirin 81 MG tablet     30 tablet    Take 1 tablet (81 mg) by mouth daily    Hyperlipidemia LDL goal <130, Malignant neoplasm of colon, unspecified part of colon (H)       * azelastine 0.05 % Soln ophthalmic solution    OPTIVAR    1 Bottle    Apply 1 drop to eye 2 times daily    Allergic conjunctivitis, bilateral       * azelastine 0.05 % Soln ophthalmic solution    OPTIVAR    1 Bottle    Apply 1 drop to eye 2 times daily    Allergic conjunctivitis of right eye       bicalutamide 50 MG tablet    CASODEX    90 tablet    Take 1 tablet (50 mg) by mouth daily    Malignant neoplasm of prostate (H)       calcium-vitamin D 600-400 MG-UNIT per tablet    CALTRATE     Take 1 tablet by mouth daily    Hyperlipidemia LDL goal <130, Malignant  neoplasm of colon, unspecified part of colon (H)       CLARITIN PO      Take  by mouth. As needed        clonazePAM 1 MG tablet    klonoPIN    30 tablet    Take 1 tablet (1 mg) by mouth nightly as needed for anxiety    Anxiety       folic acid-vit B6-vit B12 0.8-10-0.115 MG Tabs per tablet    FOLGARD     Take 1 tablet by mouth daily    Hyperlipidemia LDL goal <130, Malignant neoplasm of colon, unspecified part of colon (H)       IBUPROFEN PO      Take 400 mg by mouth every 8 hours as needed for moderate pain        lisinopril 10 MG tablet    PRINIVIL/ZESTRIL    90 tablet    Take 1 tablet (10 mg) by mouth daily    Benign essential hypertension       naproxen 500 MG tablet    NAPROSYN    60 tablet    TAKE ONE TABLET BY MOUTH TWICE DAILY AS NEEDED FOR  MODERATE  PAIN    Chronic gout without tophus, unspecified cause, unspecified site       OMEGA-3 FISH OIL PO      Take 500 mg by mouth daily    Hyperlipidemia LDL goal <130, Malignant neoplasm of colon, unspecified part of colon (H)       sildenafil 100 MG tablet    VIAGRA    10 tablet    1/2 tab three times a week    Malignant neoplasm of prostate (H)       triamcinolone 0.1 % cream    KENALOG          zolpidem 5 MG tablet    AMBIEN    30 tablet    Take 1 tablet (5 mg) by mouth nightly as needed for sleep    Persistent disorder of initiating or maintaining sleep       * Notice:  This list has 2 medication(s) that are the same as other medications prescribed for you. Read the directions carefully, and ask your doctor or other care provider to review them with you.

## 2018-04-17 NOTE — PROGRESS NOTES
Results discussed with patient in clinic. States understanding of these results.    Gayle Garner CNP

## 2018-04-18 ENCOUNTER — OFFICE VISIT (OUTPATIENT)
Dept: ORTHOPEDICS | Facility: OTHER | Age: 78
End: 2018-04-18
Payer: COMMERCIAL

## 2018-04-18 VITALS
DIASTOLIC BLOOD PRESSURE: 82 MMHG | SYSTOLIC BLOOD PRESSURE: 126 MMHG | HEART RATE: 77 BPM | WEIGHT: 202 LBS | BODY MASS INDEX: 31.64 KG/M2

## 2018-04-18 DIAGNOSIS — S63.501A WRIST SPRAIN, RIGHT, INITIAL ENCOUNTER: Primary | ICD-10-CM

## 2018-04-18 DIAGNOSIS — M1A.9XX0 CHRONIC GOUT WITHOUT TOPHUS, UNSPECIFIED CAUSE, UNSPECIFIED SITE: ICD-10-CM

## 2018-04-18 PROCEDURE — 99214 OFFICE O/P EST MOD 30 MIN: CPT | Performed by: ORTHOPAEDIC SURGERY

## 2018-04-18 RX ORDER — NAPROXEN 500 MG/1
TABLET ORAL
Qty: 60 TABLET | Refills: 1 | Status: SHIPPED | OUTPATIENT
Start: 2018-04-18 | End: 2019-01-09

## 2018-04-18 NOTE — MR AVS SNAPSHOT
After Visit Summary   4/18/2018    Medardo Gautam    MRN: [id number]           Patient Information     Date Of Birth          1940        Visit Information        Provider Department      4/18/2018 8:20 AM Amy Ramírez MD [facility]        Today's Diagnoses     Wrist sprain, right, initial encounter    -  1      Care Instructions    Encounter Diagnosis   Name Primary?     Wrist sprain, right, initial encounter Yes     Rest, ice and elevate above heart level as needed for pain control  1.  On your x-ray we do feel there is nothing new or  an acute fracture.  The radiologist had read some possibilities of fracturing or ligament injury but was not able to examine you like we have.  2.  You have some wrist stiffness but that is most likely secondary to the injury.  3.  We would recommend wearing the wrist brace for the next 1-2 weeks and then gradually wean out of it.  4.  I have exercises below for you to start.  You can do this 2-3 times per day if you can.  5. Listen to your body and let the pain guide your activity, as in slow down if you are having a lot of pain, but increase activity gradually as the pain decreases.   6.  Gradual increase of weightbearing as you can tolerate.  Do not push it too fast.  7. Medication: continue with the naproxen you are taking.  8. Might take 4-6 months to get fully better.  9. Follow up with Amy Ramírez MD and/or Cem Aragon PA-C in 4 weeks, or if you are doing well, you can cancel the appointment and follow up as needed.   [employer] and [employer] may offer reliable information regarding your diagnosis and treatment plan.    THANK YOU for coming in today. If you receive a survey via [facility] or mail please let us know if there was anything you especially appreciated today or if there is any way we can improve our clinic. We appreciate your input.    GENERAL INFORMATION:  Our hours are:  Monday :     Clinic 7:30 AM-430 PM  ([facility])  Tuesday:      Operating Room All Day ([facility])  Wednesday: Clinic 7:30 AM - 11:15 AM ([facility])             Clinic 1:00 PM - 4:00PM ([facility])  Thursday:     Administrative Day  Friday:          Clinic 7:30 AM - 11:15 AM ([facility])            Clinic 1:00 PM - 4:00 PM ([facility])      [facility] for any issues or concerns: [phone]      We are not in the office Thursdays. Therefore non- urgent calls and medical messages received on Thursday will be addressed when we are back in the office on Wednesday. Urgent matters will be reviewed and addressed by one of our partners in the office as needed.    If lab work was done today as part of your evaluation you will generally be contacted via [employer], mail, or phone with the results within 1-5 days. If there is an alarming result we will contact you by phone. Lab results come back at varying times, I generally wait until all labs are resulted before making comments on results. Please note labs are automatically released to [employer] (if you have signed up for it) once available-at times you may see these prior to my having a chance to review them as well.    If you need refills please contact your pharmacist. They will send a refill request to me to review. Please allow 3 business days for us to process all refill requests. All narcotic refills should be handled in the clinic at the time of your visit.     Hand and Wrist Exercises: Wrist Flexion    This exercise is designed to stretch your hands and wrists. Before beginning, read through all the instructions. While exercising, breathe normally. If you feel any pain, stop the exercise. If pain persists, inform your healthcare provider.    Hold your _____ hand in front of you with your palm down and elbow bent.    Grasp the back of that hand with your other hand. Pull back so your  fingers point down as you straighten your arm. Feel a stretch in your forearm and wrist. Hold for _____ seconds. Then relax.    Repeat _____ times. Do _____ sets a day.  Date Last Reviewed: 8/16/2015 2000-2017 [employer]. [address] [place] [address]. All rights reserved. This information is not intended as a substitute for professional medical care. Always follow your healthcare professional's instructions.        Wrist Extension (Flexibility)    1. Stand or sit and hold your arms straight out in front of you.  2. Bend your right hand back at the wrist. Gently pull back on your right hand with your left hand. Don t bend your fingertips backward. Feel the stretch in your right wrist.  3. Hold for 30 to 60 seconds, then relax. Repeat 2 times.  4. Switch arms and repeat.  Date Last Reviewed: 3/10/2016    3153-3619 [employer]. [address] [place] [address]. All rights reserved. This information is not intended as a substitute for professional medical care. Always follow your healthcare professional's instructions.          Wrist Supination (Flexibility)    This exercise is for your right hand and wrist. Switch sides for your left hand and wrist.  5. Sit with your right arm against your body and elbow bent. Support your right elbow with your left hand.  6. Hold your hand straight ahead, thumb up. Turn your hand to the right so your palm is up. Hold for 5 seconds. Then turn your hand back to thumb up and relax.  7. Repeat 10 times, or as instructed.  Date Last Reviewed: 3/10/2016    4933-3384 [employer]. [address] [place] [address]. All rights reserved. This information is not intended as a substitute for professional medical care. Always follow your healthcare professional's instructions.        Wrist Pronation (Flexibility)    8. Sit with your right arm against your body and elbow bent. Support your right elbow with your left  hand.  9. Hold your hand straight ahead, thumb up. Turn your hand to the left so your palm is down. Hold for 5 seconds. Then turn your hand back to thumb up and relax.  10. Repeat 10 times, or as instructed.  Date Last Reviewed: 3/10/2016    5919-0618 [employer]. [address] [place] [address], [place] [address]. All rights reserved. This information is not intended as a substitute for professional medical care. Always follow your healthcare professional's instructions.      Wrist Sprain  A sprain is an injury to the ligaments or capsule that holds a joint together. There are no broken bones. Most sprains take about 3 to 6 weeks to heal. If it a severe sprain where the ligament is completely torn, it can take months to recover.    Most wrist sprains are treated with a splint, wrist brace, or elastic wrap for support. Severe sprains may require surgery.  Home care    Keep your arm elevated to reduce pain and swelling. This is very important during the first 48 hours.    Apply an ice pack over the injured area for 15 to 20 minutes every 3 to 6 hours. You should do this for the first 24 to 48 hours. You can make an ice pack by filling a plastic bag that seals at the top with ice cubes and then wrapping it with a thin towel. Continue to use ice packs for relief of pain and swelling as needed. As the ice melts, be careful to avoid getting your wrap, splint, or cast wet. After 48 hours, apply heat (warm shower or warm bath) for 15 to 20 minutes several times a day, or alternate ice and heat.     You may use over-the-counter pain medicine to control pain, unless another pain medicine was prescribed. If you have chronic liver or kidney disease or ever had a stomach ulcer or GI bleeding, talk with your doctor before using these medicines.    If you were given a splint or brace, wear it for the time advised by your doctor.  Follow-up care  Follow up with your healthcare provider as advised. Any X-rays you had today  don t show any broken bones, breaks, or fractures. Sometimes fractures don t show up on the first X-ray. Bruises and sprains can sometimes hurt as much as a fracture. These injuries can take time to heal completely. If your symptoms don t improve or they get worse, talk with your doctor. You may need a repeat X-ray. If X-rays were taken, you will be told of any new findings that may affect your care.  When to seek medical advice  Call your healthcare provider right away if any of these occur:    Pain or swelling increases    Fingers or hand becomes cold, blue, numb, or tingly    0304-0395 [employer]. [address] [place] [address]. All rights reserved. This information is not intended as a substitute for professional medical care. Always follow your healthcare professional's instructions.            Follow-ups after your visit        Follow-up notes from your care team     Return in about 4 weeks (around 5/16/2018), or if symptoms worsen or fail to improve, for Routine Visit.      Your next 10 appointments already scheduled     Apr 30, 2018 11:00 AM CDT   (Arrive by 10:45 AM)   Survivorship Visit with Magali Andrews PA-C   [facility] ([facility])    [address]  [address]  [facility] [address] [phone]            Jul 18, 2018 12:00 PM CDT   (Arrive by 11:45 AM)   Return Prostate Cancer with Norma Goodwin MD   [facility] ([facility])    [address]  [address]th [address]  [facility] [id number] [phone]              Who to contact     If you have questions or need follow up information about today's clinic visit or your schedule please contact [facility] directly at [phone].  Normal or non-critical lab and imaging results will be communicated to you by MyChart, letter or phone within 4 business days after the clinic has  received the results. If you do not hear from us within 7 days, please contact the clinic through [employer]Page or phone. If you have a critical or abnormal lab result, we will notify you by phone as soon as possible.  Submit refill requests through NextPage or call your pharmacy and they will forward the refill request to us. Please allow 3 business days for your refill to be completed.          Additional Information About Your Visit        [employer] Information     NextPage gives you secure access to your electronic health record. If you see a primary care provider, you can also send messages to your care team and make appointments. If you have questions, please call your primary care clinic.  If you do not have a primary care provider, please call [phone] and they will assist you.        Care EveryWhere ID     This is your Care EveryWhere ID. This could be used by other organizations to access your [facility] medical records  [id number]        Your Vitals Were     Pulse BMI (Body Mass Index)                77 31.64 kg/m2           Blood Pressure from Last 3 Encounters:   04/18/18 126/82   04/17/18 132/80   03/01/18 141/82    Weight from Last 3 Encounters:   04/18/18 91.6 kg (202 lb)   02/15/18 90.7 kg (200 lb)   02/12/18 90.7 kg (200 lb)              Today, you had the following     No orders found for display       Primary Care Provider Office Phone # Fax #    Verna Osbrone [phone]       [address]  [facility] [address]        Equal Access to Services     [facility] Hospital[facility] : Hadii aad ku hadasho Soshamika, waaxda luqadaha, qaybta kaalmada evelia, randy salamanca . So [facility] [phone].    ATENCIÓN: Si habla español, tiene a sanford disposición servicios gratuitos de asistencia lingüística. Llame al [phone].    We comply with applicable federal civil rights laws and [place] laws. We do not discriminate on the basis of race, color, national origin, age,  disability, sex, sexual orientation, or gender identity.            Thank you!     Thank you for choosing [facility] [place]  for your care. Our goal is always to provide you with excellent care. Hearing back from our patients is one way we can continue to improve our services. Please take a few minutes to complete the written survey that you may receive in the mail after your visit with us. Thank you!             Your Updated Medication List - Protect others around you: Learn how to safely use, store and throw away your medicines at www.disposemymeds.org.          This list is accurate as of 4/18/18  9:08 AM.  Always use your most recent med list.                   Brand Name Dispense Instructions for use Diagnosis    aspirin 81 MG tablet     30 tablet    Take 1 tablet (81 mg) by mouth daily    Hyperlipidemia LDL goal <130, Malignant neoplasm of colon, unspecified part of colon (H)       * azelastine 0.05 % Soln ophthalmic solution    OPTIVAR    1 Bottle    Apply 1 drop to eye 2 times daily    Allergic conjunctivitis, bilateral       * azelastine 0.05 % Soln ophthalmic solution    OPTIVAR    1 Bottle    Apply 1 drop to eye 2 times daily    Allergic conjunctivitis of right eye       bicalutamide 50 MG tablet    CASODEX    90 tablet    Take 1 tablet (50 mg) by mouth daily    Malignant neoplasm of prostate (H)       calcium-vitamin D 600-400 MG-UNIT per tablet    CALTRATE     Take 1 tablet by mouth daily    Hyperlipidemia LDL goal <130, Malignant neoplasm of colon, unspecified part of colon (H)       CLARITIN PO      Take  by mouth. As needed        clonazePAM 1 MG tablet    klonoPIN    30 tablet    Take 1 tablet (1 mg) by mouth nightly as needed for anxiety    Anxiety       folic acid-vit B6-vit B12 0.8-10-0.115 MG Tabs per tablet    FOLGARD     Take 1 tablet by mouth daily    Hyperlipidemia LDL goal <130, Malignant neoplasm of colon, unspecified part of colon (H)       IBUPROFEN PO      Take 400 mg by mouth  every 8 hours as needed for moderate pain        lisinopril 10 MG tablet    PRINIVIL/ZESTRIL    90 tablet    Take 1 tablet (10 mg) by mouth daily    Benign essential hypertension       naproxen 500 MG tablet    NAPROSYN    60 tablet    TAKE ONE TABLET BY MOUTH TWICE DAILY AS NEEDED FOR  MODERATE  PAIN    Chronic gout without tophus, unspecified cause, unspecified site       OMEGA-3 FISH OIL PO      Take 500 mg by mouth daily    Hyperlipidemia LDL goal <130, Malignant neoplasm of colon, unspecified part of colon (H)       sildenafil 100 MG tablet    VIAGRA    10 tablet    1/2 tab three times a week    Malignant neoplasm of prostate (H)       triamcinolone 0.1 % cream    KENALOG          zolpidem 5 MG tablet    AMBIEN    30 tablet    Take 1 tablet (5 mg) by mouth nightly as needed for sleep    Persistent disorder of initiating or maintaining sleep       * Notice:  This list has 2 medication(s) that are the same as other medications prescribed for you. Read the directions carefully, and ask your doctor or other care provider to review them with you.

## 2018-04-18 NOTE — NURSING NOTE
"Chief Complaint   Patient presents with     Consult     rt wrist pain per Gayle Garner APRN CNP       Initial /82  Pulse 77  Wt 91.6 kg (202 lb)  BMI 31.64 kg/m2 Estimated body mass index is 31.64 kg/(m^2) as calculated from the following:    Height as of 2/15/18: 1.702 m (5' 7\").    Weight as of this encounter: 91.6 kg (202 lb).  Medication Reconciliation: complete    BP completed using cuff size: conchis Gonzalez MA      "

## 2018-04-18 NOTE — LETTER
4/18/2018         RE: Medardo Gautam  [address] [facility] [address]        Dear Colleague,    Thank you for referring your patient, Medardo Gautam, to the [facility]. Please see a copy of my visit note below.    Medardo Gautam is a 78 year old male who is seen in consultation at the request of Gayle Garner APRN CNP  .  History of Present illness:  Medardo presents for evaluation of:  1.) rt wrist pain   Onset: 4/16/18  Symptoms brought on by fall shoveling snow.   Character:  sharp, dull ache and swelling.    Progression of symptoms:  worse.    Previous similar pain: no .   Pain Level:  4/10.   Previous treatments:  ice, support wrap and NSAID - naproxen advil .  Currently on Blood thinners? no  Diagnosis of Diabetes? no            ORTHOPEDIC CONSULT      Chief Complaint: Medardo Gautam is a 78 year old ambidextrous male who is retired but used to work as a  at [employer] and traveled to [place] etc.  Enjoys playing tennis and golf.      He is being seen for   Chief Complaints and History of Present Illnesses   Patient presents with     Consult     rt wrist pain per Gayle Garner APRN CNP         History of Present Illness:   Medardo Gautam is a 78 year old male who is seen in consultation at the request of Gayle Garner APRN CNP  .  History of Present illness:  Medardo presents for evaluation of:  1.) rt wrist pain   Onset: 4/16/18  Symptoms brought on by fall shoveling snow.   Character:  sharp, dull ache and swelling.    Progression of symptoms:  getting better  Previous similar pain: no,  He denies any surgery or major injury to his wrist other than may be in his childhood.  Patient did have surgery on his hand for Dupuytren's on his small digit approximately 8 years ago.  Pain Level:  4/10.   Previous treatments:  ice, support wrap and NSAID - naproxen advil.  All these treatments have helped slightly.  Patient has not had any  injections or any formal therapy.  Patient does have a removable brace.  Currently on Blood thinners? no  Diagnosis of Diabetes? no  Additional History: Patient denies any numbness or tingling.  Patient also states there is no pain unless he moves his wrist.  He has some pain with wrist flexion.    Patient's past medical, surgical, social and family histories reviewed.     Past Medical History:   Diagnosis Date     Anxiety      Colon cancer (H) 01/2015     Contracture of finger joint ca 2005    left and right fifth fingers     Dupuytren's contracture of left hand      Gout      HEMORRHOIDS NOS 6/4/2007     Hypertension      Insomnia      Nonsenile cataract      Personal history of colonic polyps 7 years ago?     Prostate cancer (H) Feb 2012     Renal cyst 6/22/2017     Seborrheic dermatitis      Skin lesion- R side of face and behind L ear 12/15/2011     SKIN SENSATION DISTURB 12/29/2006    allergic reaction to strawberries     SKIN SENSATION DISTURB 12/29/2006         Past Surgical History:   Procedure Laterality Date     APPENDECTOMY       BIOPSY  01/16/15     C HAND/FINGER SURGERY UNLISTED       C LENGTHEN,TENDON,HAND/FINGER  ca 2007    right fifth     C STOMACH SURGERY PROCEDURE UNLISTED       CATARACT IOL, RT/LT Left 02/15/2018     COLON SURGERY  2/11/2015    Lap assisted R hemicolectomy     COLONOSCOPY  10/25/07    Snare polypectomy     COLONOSCOPY  6/25/2009    with snare polypectomy     COLONOSCOPY  9/30/2009     COLONOSCOPY  1/5/2011    COLONOSCOPY performed by JUD MARIEE at  GI     COLONOSCOPY N/A 1/16/2015    Procedure: COMBINED COLONOSCOPY, SINGLE OR MULTIPLE BIOPSY/POLYPECTOMY BY BIOPSY;  Surgeon: Sarah Beth Pisano MD;  Location: [facility] OR     COLONOSCOPY WITH CO2 INSUFFLATION N/A 1/16/2015    Procedure: COLONOSCOPY WITH CO2 INSUFFLATION;  Surgeon: Sarah Beth Pisano MD;  Location: [facility] OR     DAVINCI PROSTATECTOMY  8/6/2012    Procedure: DAVINCI PROSTATECTOMY;  Davinci Assisted Radical  Prostatectomy with Bilateral Lymphadenectomy ;  Surgeon: Norma Goodwin MD;  Location: [facility] OR     GENITOURINARY SURGERY      prostate surgery     INJECT EPIDURAL LUMBAR / SACRAL SINGLE Left 10/30/2017    Procedure: INJECT EPIDURAL LUMBAR / SACRAL SINGLE;  Left Transforaminal Lumbar 4-Lumbar 5 Epidural Steroid Injection;  Surgeon: Nuvia Reina MD;  Location: [facility] OR     LAPAROSCOPIC ASSISTED COLECTOMY N/A 2/11/2015    Procedure: LAPAROSCOPIC ASSISTED COLECTOMY;  Surgeon: Oren Breen MD;  Location: [facility] OR     PHACOEMULSIFICATION CLEAR CORNEA WITH STANDARD INTRAOCULAR LENS IMPLANT Left 2/15/2018    Procedure: PHACOEMULSIFICATION CLEAR CORNEA WITH STANDARD INTRAOCULAR LENS IMPLANT;  LEFT EYE CATARACT EXTRACTION WITH STANDARD INTRAOCULAR LENS IMPLANT ;  Surgeon: Brandon Orellana MD;  Location: [facility]     PHACOEMULSIFICATION CLEAR CORNEA WITH STANDARD INTRAOCULAR LENS IMPLANT Right 3/1/2018    Procedure: PHACOEMULSIFICATION CLEAR CORNEA WITH STANDARD INTRAOCULAR LENS IMPLANT;  RIGHT EYE CATARACT EXTRACTION WITH STANDARD INTRAOCULAR LENS IMPLANT ;  Surgeon: Brandon Orellana MD;  Location: [facility]       Medications:    Current Outpatient Prescriptions on File Prior to Visit:  aspirin 81 MG tablet Take 1 tablet (81 mg) by mouth daily   azelastine (OPTIVAR) 0.05 % SOLN ophthalmic solution Apply 1 drop to eye 2 times daily   azelastine (OPTIVAR) 0.05 % SOLN ophthalmic solution Apply 1 drop to eye 2 times daily   bicalutamide (CASODEX) 50 MG tablet Take 1 tablet (50 mg) by mouth daily   calcium-vitamin D (CALTRATE) 600-400 MG-UNIT per tablet Take 1 tablet by mouth daily   clonazePAM (KLONOPIN) 1 MG tablet Take 1 tablet (1 mg) by mouth nightly as needed for anxiety   folic acid-vit B6-vit B12 (FOLGARD) 0.8-10-0.115 MG TABS Take 1 tablet by mouth daily   IBUPROFEN PO Take 400 mg by mouth every 8 hours as needed for moderate pain   lisinopril (PRINIVIL/ZESTRIL) 10 MG tablet Take 1 tablet (10 mg) by  mouth daily   Loratadine (CLARITIN PO) Take  by mouth. As needed    naproxen (NAPROSYN) 500 MG tablet TAKE ONE TABLET BY MOUTH TWICE DAILY AS NEEDED FOR  MODERATE  PAIN   Omega-3 Fatty Acids (OMEGA-3 FISH OIL PO) Take 500 mg by mouth daily   sildenafil (VIAGRA) 100 MG tablet 1/2 tab three times a week   triamcinolone (KENALOG) 0.1 % cream    zolpidem (AMBIEN) 5 MG tablet Take 1 tablet (5 mg) by mouth nightly as needed for sleep     No current facility-administered medications on file prior to visit.     No Known Allergies    Social History     Occupational History     retired       Retired     Social History Main Topics     Smoking status: Former Smoker     Years: 1.00     Types: Cigarettes, Cigars, Pipe     Start date: 10/1/1961     Quit date: 1962     Smokeless tobacco: Never Used      Comment: No smokers in home     Alcohol use 0.0 oz/week      Comment: daily glass of wine and whiskey     Drug use: Yes     Special: Benzodiazepines     Sexual activity: Not Currently     Partners: Female     Birth control/ protection: None      Comment: not currently active       Family History   Problem Relation Age of Onset     CEREBROVASCULAR DISEASE Father      CANCER Mother 90     Patient believes vaginal cancer - hysterectomy,      Alzheimer Disease Mother      CANCER Sister      Breast Cancer Sister      C.A.D. No family hx of      Cancer - colorectal No family hx of      Prostate Cancer No family hx of      Blood Disease No family hx of      Cardiovascular No family hx of      Circulatory No family hx of      Eye Disorder No family hx of      GASTROINTESTINAL DISEASE No family hx of      Genitourinary Problems No family hx of      Lipids No family hx of      Neurologic Disorder No family hx of      Respiratory No family hx of      Asthma No family hx of      HEART DISEASE No family hx of      DIABETES No family hx of      Hypertension No family hx of      Arthritis No family hx of      Thyroid Disease No family  hx of      Musculoskeletal Disorder No family hx of      Glaucoma No family hx of      Macular Degeneration No family hx of      REVIEW OF SYSTEMS  10 point review systems performed otherwise negative as noted as per history of present illness.    Physical Exam:  Vitals: /82  Pulse 77  Wt 91.6 kg (202 lb)  BMI 31.64 kg/m2  BMI= Body mass index is 31.64 kg/(m^2).    Constitutional: healthy, alert and no acute distress   Psychiatric: mentation appears normal and affect normal/bright  NEURO: no focal deficits, CMS intact right upper extremity  RESP: Normal with easy respirations and no use of accessory muscles to breathe, no audible wheezing or retractions  CV: +2 radial pulse and his hand is warm to plapation.   SKIN: No erythema, rashes, excoriation, or breakdown. No evidence of infection.   MUSCULOSKELETAL:    INSPECTION of right wrist: Very slight swelling when compared to the contralateral side.  No gross deformities, erythema, edema, ecchymosis, atrophy or fasciculations.     PALPATION: Very slight amount of tenderness to palpation on the volar side of the distal radius.  No other tenderness to palpation of the radius or ulna or hand digits or forearm or elbow.  No increased warmth.  ROM: flexion approximately 45  compared to the contralateral side that flexes to approximately 65 . , extension approximately 45  compared to the contralateral side that extends to approximately 90 . , supination approximately 90  , pronation approximately 90  . The range of motion is without catching locking or pain.        STRENGTH: 5 out of 5  and thumb strength and interosseous strength without pain.    SPECIAL TEST: Negative snuffbox tenderness.  GAIT: non-antalgic  Lymph: no palpable lymph nodes    Diagnostic Modalities:  Recent Results (from the past 744 hour(s))   XR Wrist Right G/E 3 Views    Narrative    WRIST RIGHT THREE OR MORE VIEWS April 17, 2018 10:47 AM     HISTORY: Right wrist pain.    COMPARISON: None.       FINDINGS: There is irregularity of the ulnar aspect of the distal  radius including the articular surface most consistent with  intra-articular distal radial epiphyseal and metaphyseal fracture.  This appears subacute or chronic but is not completely healed. There  is buckling of the radial and ulnar cortices of the scaphoid waist  which could represent a not significantly displaced scaphoid fracture  in the appropriate clinical setting although this could also be  secondary to chronic changes. There is widening of the scapholunate  space indicating scapholunate ligament tear. No other fractures are  identified. Joint space loss is seen at the second and third  metacarpophalangeal joints with associated mild osteophytosis. Mild  STT joint space loss of the wrist is noted. Remaining joint spaces  appear grossly maintained.      Impression    IMPRESSION:    1. Intra-articular, epiphyseal and metaphyseal fracture of the ulnar  aspect of the distal radius of indeterminate age but may be subacute  or chronic. An acute extension is not excluded.  2. Scapholunate ligament injury given the widening of scapholunate  space.  3. Irregularity of the cortices of the scaphoid waist could represent  a subtle not significantly displaced fracture in the appropriate  setting.  4. Diffuse osteopenia.  5. Degenerative changes of the STT and second and third  metacarpophalangeal joints.    I discussed these findings with Gayle Garner on 4/17/2018 at 11:09  AM.    SHELLEY GARCIA MD     We feel that the reading above is good however on exam the patient does not seem to have the tenderness that we would expect with the injuries above.  Independent visualization of the images was performed.    Impression: 1.  2 days status post Right wrist sprain     Plan:  All of the above pertinent physical exam and imaging modalities findings was reviewed with Medardo.                                          CONSERVATIVE CARE:    Patient  Instructions:  1.  On your x-ray we do feel there is nothing new or  an acute fracture.  The radiologist had read some possibilities of fracturing or ligament injury but was not able to examine you like we have.  2.  You have some wrist stiffness but that is most likely secondary to the injury.  3.  We would recommend wearing the wrist brace for the next 1-2 weeks and then gradually wean out of it.  4.  I have exercises below for you to start.  You can do this 2-3 times per day if you can.  5. Listen to your body and let the pain guide your activity, as in slow down if you are having a lot of pain, but increase activity gradually as the pain decreases.   6.  Gradual increase of weightbearing as you can tolerate.  Do not push it too fast.  7. Medication: continue with the naproxen you are taking.  8. Might take 4-6 months to get fully better.  9. Follow up with Amy Ramírez MD and/or Cem Aragon PA-C in 4 weeks, or if you are doing well, you can cancel the appointment and follow up as needed.   Re-x-ray on return: Yes, if the patient has more pain when he comes back then we would get x-rays of the right wrist AP lateral and oblique outside of the splint.    BP Readings from Last 1 Encounters:   04/18/18 126/82       BP noted to be well controlled today in office.      Patient does not use Tobacco products.    Scribed by Cem Aragon PA-C on 4/18/2018 at 9:07 AM, based on Dr. Amy Ramírez's statements to me.    This note was dictated with [employer].    IRENE Doss MD      Again, thank you for allowing me to participate in the care of your patient.        Sincerely,        Amy Ramírez MD

## 2018-04-18 NOTE — PATIENT INSTRUCTIONS
Encounter Diagnosis   Name Primary?     Wrist sprain, right, initial encounter Yes     Rest, ice and elevate above heart level as needed for pain control  1.  On your x-ray we do feel there is nothing new or  an acute fracture.  The radiologist had read some possibilities of fracturing or ligament injury but was not able to examine you like we have.  2.  You have some wrist stiffness but that is most likely secondary to the injury.  3.  We would recommend wearing the wrist brace for the next 1-2 weeks and then gradually wean out of it.  4.  I have exercises below for you to start.  You can do this 2-3 times per day if you can.  5. Listen to your body and let the pain guide your activity, as in slow down if you are having a lot of pain, but increase activity gradually as the pain decreases.   6.  Gradual increase of weightbearing as you can tolerate.  Do not push it too fast.  7. Medication: continue with the naproxen you are taking.  8. Might take 4-6 months to get fully better.  9. Follow up with Amy Ramírez MD and/or Cem Aragon PA-C in 4 weeks, or if you are doing well, you can cancel the appointment and follow up as needed.   [employer] and [employer] may offer reliable information regarding your diagnosis and treatment plan.    THANK YOU for coming in today. If you receive a survey via [employer] or mail please let us know if there was anything you especially appreciated today or if there is any way we can improve our clinic. We appreciate your input.    GENERAL INFORMATION:  Our hours are:  Monday :     Clinic 7:30 AM-430 PM ([facility])  Tuesday:      Operating Room All Day ([facility])  Wednesday: Clinic 7:30 AM - 11:15 AM ([facility])             Clinic 1:00 PM - 4:00PM ([facility])  Thursday:     Administrative Day  Friday:          Clinic 7:30 AM - 11:15 AM ([facility])            Clinic 1:00 PM - 4:00 PM ([place]  [facility])      [facility] for any issues or concerns: [phone]      We are not in the office Thursdays. Therefore non- urgent calls and medical messages received on Thursday will be addressed when we are back in the office on Wednesday. Urgent matters will be reviewed and addressed by one of our partners in the office as needed.    If lab work was done today as part of your evaluation you will generally be contacted via [employer], mail, or phone with the results within 1-5 days. If there is an alarming result we will contact you by phone. Lab results come back at varying times, I generally wait until all labs are resulted before making comments on results. Please note labs are automatically released to [employer] (if you have signed up for it) once available-at times you may see these prior to my having a chance to review them as well.    If you need refills please contact your pharmacist. They will send a refill request to me to review. Please allow 3 business days for us to process all refill requests. All narcotic refills should be handled in the clinic at the time of your visit.     Hand and Wrist Exercises: Wrist Flexion    This exercise is designed to stretch your hands and wrists. Before beginning, read through all the instructions. While exercising, breathe normally. If you feel any pain, stop the exercise. If pain persists, inform your healthcare provider.    Hold your _____ hand in front of you with your palm down and elbow bent.    Grasp the back of that hand with your other hand. Pull back so your fingers point down as you straighten your arm. Feel a stretch in your forearm and wrist. Hold for _____ seconds. Then relax.    Repeat _____ times. Do _____ sets a day.  Date Last Reviewed: 8/16/2015 2000-2017 [employer]. [address] [place] [address], [place] [address]. All rights reserved. This information is not intended as a substitute for professional medical care.  Always follow your healthcare professional's instructions.        Wrist Extension (Flexibility)    1. Stand or sit and hold your arms straight out in front of you.  2. Bend your right hand back at the wrist. Gently pull back on your right hand with your left hand. Don t bend your fingertips backward. Feel the stretch in your right wrist.  3. Hold for 30 to 60 seconds, then relax. Repeat 2 times.  4. Switch arms and repeat.  Date Last Reviewed: 3/10/2016    4128-0854 [employer]. [address] [place] [address]. All rights reserved. This information is not intended as a substitute for professional medical care. Always follow your healthcare professional's instructions.          Wrist Supination (Flexibility)    This exercise is for your right hand and wrist. Switch sides for your left hand and wrist.  5. Sit with your right arm against your body and elbow bent. Support your right elbow with your left hand.  6. Hold your hand straight ahead, thumb up. Turn your hand to the right so your palm is up. Hold for 5 seconds. Then turn your hand back to thumb up and relax.  7. Repeat 10 times, or as instructed.  Date Last Reviewed: 3/10/2016    8177-8461 [employer]. [address] [place] [address]. All rights reserved. This information is not intended as a substitute for professional medical care. Always follow your healthcare professional's instructions.        Wrist Pronation (Flexibility)    8. Sit with your right arm against your body and elbow bent. Support your right elbow with your left hand.  9. Hold your hand straight ahead, thumb up. Turn your hand to the left so your palm is down. Hold for 5 seconds. Then turn your hand back to thumb up and relax.  10. Repeat 10 times, or as instructed.  Date Last Reviewed: 3/10/2016    6205-0904 [employer]. [address] [place] [address]. All rights reserved. This information is not intended as a substitute  for professional medical care. Always follow your healthcare professional's instructions.      Wrist Sprain  A sprain is an injury to the ligaments or capsule that holds a joint together. There are no broken bones. Most sprains take about 3 to 6 weeks to heal. If it a severe sprain where the ligament is completely torn, it can take months to recover.    Most wrist sprains are treated with a splint, wrist brace, or elastic wrap for support. Severe sprains may require surgery.  Home care    Keep your arm elevated to reduce pain and swelling. This is very important during the first 48 hours.    Apply an ice pack over the injured area for 15 to 20 minutes every 3 to 6 hours. You should do this for the first 24 to 48 hours. You can make an ice pack by filling a plastic bag that seals at the top with ice cubes and then wrapping it with a thin towel. Continue to use ice packs for relief of pain and swelling as needed. As the ice melts, be careful to avoid getting your wrap, splint, or cast wet. After 48 hours, apply heat (warm shower or warm bath) for 15 to 20 minutes several times a day, or alternate ice and heat.     You may use over-the-counter pain medicine to control pain, unless another pain medicine was prescribed. If you have chronic liver or kidney disease or ever had a stomach ulcer or GI bleeding, talk with your doctor before using these medicines.    If you were given a splint or brace, wear it for the time advised by your doctor.  Follow-up care  Follow up with your healthcare provider as advised. Any X-rays you had today don t show any broken bones, breaks, or fractures. Sometimes fractures don t show up on the first X-ray. Bruises and sprains can sometimes hurt as much as a fracture. These injuries can take time to heal completely. If your symptoms don t improve or they get worse, talk with your doctor. You may need a repeat X-ray. If X-rays were taken, you will be told of any new findings that may affect  your care.  When to seek medical advice  Call your healthcare provider right away if any of these occur:    Pain or swelling increases    Fingers or hand becomes cold, blue, numb, or tingly    2415-8831 [employer]. [address] [place] [address], [place] [address]. All rights reserved. This information is not intended as a substitute for professional medical care. Always follow your healthcare professional's instructions.

## 2018-04-30 ENCOUNTER — TELEPHONE (OUTPATIENT)
Dept: GASTROENTEROLOGY | Facility: CLINIC | Age: 78
End: 2018-04-30

## 2018-04-30 ENCOUNTER — ONCOLOGY SURVIVORSHIP VISIT (OUTPATIENT)
Dept: ONCOLOGY | Facility: CLINIC | Age: 78
End: 2018-04-30
Attending: PHYSICIAN ASSISTANT
Payer: COMMERCIAL

## 2018-04-30 VITALS
HEART RATE: 71 BPM | TEMPERATURE: 97.8 F | OXYGEN SATURATION: 96 % | RESPIRATION RATE: 16 BRPM | DIASTOLIC BLOOD PRESSURE: 90 MMHG | WEIGHT: 202.2 LBS | BODY MASS INDEX: 31.74 KG/M2 | HEIGHT: 67 IN | SYSTOLIC BLOOD PRESSURE: 147 MMHG

## 2018-04-30 DIAGNOSIS — C61 PROSTATE CANCER (H): Primary | ICD-10-CM

## 2018-04-30 DIAGNOSIS — C18.9 MALIGNANT NEOPLASM OF COLON, UNSPECIFIED PART OF COLON (H): ICD-10-CM

## 2018-04-30 PROCEDURE — 99215 OFFICE O/P EST HI 40 MIN: CPT | Mod: ZP | Performed by: PHYSICIAN ASSISTANT

## 2018-04-30 PROCEDURE — G0463 HOSPITAL OUTPT CLINIC VISIT: HCPCS | Mod: ZF

## 2018-04-30 ASSESSMENT — PAIN SCALES - GENERAL: PAINLEVEL: NO PAIN (0)

## 2018-04-30 NOTE — NURSING NOTE
"Oncology Rooming Note    April 30, 2018 11:01 AM   Medardo Gautam is a 78 year old male who presents for:    Chief Complaint   Patient presents with     Oncology Clinic Visit     Return: History of  Colon ca      Initial Vitals: /90 (BP Location: Right arm, Patient Position: Chair, Cuff Size: Adult Regular)  Pulse 71  Temp 97.8  F (36.6  C) (Oral)  Resp 16  Ht 1.702 m (5' 7.01\")  Wt 91.7 kg (202 lb 3.2 oz)  SpO2 96%  BMI 31.66 kg/m2 Estimated body mass index is 31.66 kg/(m^2) as calculated from the following:    Height as of this encounter: 1.702 m (5' 7.01\").    Weight as of this encounter: 91.7 kg (202 lb 3.2 oz). Body surface area is 2.08 meters squared.  No Pain (0) Comment: Data Unavailable   No LMP for male patient.  Allergies reviewed: YES  Medications reviewed: YES    Medications: Medication refills not needed today.  Pharmacy name entered into [facility]:    [facility] [place], [place] - [address] [facility] [place], [place] - [address] [facility]    Clinical concerns: no new concerns.  Pt received flu shot elsewhere. See Immunizations     6 minutes for nursing intake (face to face time)     Jodie Griffith CMA                "

## 2018-04-30 NOTE — LETTER
4/30/2018      RE: Medardo Gautam  [address] [facility] [address]       REASON FOR VISIT:  I am seeing Mr. Gautam in the Cancer Survivorship Program for his diagnosis of prostate cancer and colon cancer.      Mr. Gautam had an increased PSA and prostate nodule in 2012.  Biopsy of the prostate on 02/09/2012 showed an adenocarcinoma, Jeancarlos 9 (5+4).  He was diagnosed with a T2 (high-risk) prostate cancer.  He began CALGB 78017 and was randomized to arm 1.  He received one dose of Taxotere along with Lupron on 03/27/2012.  He developed liver toxicity and was taken off the clinical trial.  He has remained on the Lupron under the direction of Urology.  He underwent a robotic radical prostatectomy with bilateral pelvic lymph node removal on 08/06/2012.  This revealed adenocarcinoma, [place] 4+5 with a total score of 9.  Zero out of 16 lymph nodes were positive.  He was noted to have a rising PSA and completed pelvic radiation with 4500 cGy to the pelvis and the prostate bed received 7020 cGy, completed on 10/18/2013.  He began Casodex in 12/2016 and remains on intermittent Lupron.  He is followed by Urology.       Mr. Gautam had a colonoscopy on 01/16/2015.  Colon biopsy in the mid ascending colon revealed a moderately differentiated adenocarcinoma.  Laparoscopic right hemicolectomy on 02/11/2015 showed moderately differentiated adenocarcinoma, low grade, 0.8 cm.  Zero out of 13 lymph nodes were positive.  He was diagnosed with a stage I (T1 N0 M0) colon cancer.       Mr. Gautam continues to do well at this time.  He has noted some bilateral nipple tenderness since starting the Casodex.  He does self-breast exams and has not noted any masses.  He will discuss this with Urology.  He is otherwise eating and drinking okay.  He denies any chest pain, shortness of breath, cough, nausea, vomiting, abdominal pain, change in bowel habits, blood in his stool or new bone pains.     CANCER TREATMENT SUMMARY:  Prostate  cancer  *Increased PSA and prostate nodule in 2012.    *Biopsy of the prostate on 02/09/2012 showed an adenocarcinoma, Jeancarlos 9 (5+4).    *CALGB 14119 and was randomized to arm 1.  He received one dose of Taxotere along with Lupron on 03/27/2012.  He developed liver toxicity and was taken off the clinical trial.  He has remained on the Lupron under the direction of Urology.    *Robotic radical prostatectomy with bilateral pelvic lymph node removal on 08/06/2012.  This revealed adenocarcinoma, Jeancarlos 4+5 with a total score of 9.  Zero out of 16 lymph nodes were positive.    *He was noted to have a rising PSA and completed pelvic radiation with 4500 cGy to the pelvis and the prostate bed received 7020 cGy, completed on 10/18/2013.    *He began Casodex in 12/2016 and remains on intermittent Lupron.    Colon cancer  *Colonoscopy on 01/16/2015.  Colon biopsy in the mid ascending colon revealed a moderately differentiated adenocarcinoma.    *Laparoscopic right hemicolectomy on 02/11/2015 showed moderately differentiated adenocarcinoma, low grade, 0.8 cm.  Zero out of 13 lymph nodes were positive.    MEDICATIONS:   Current Outpatient Prescriptions   Medication Sig Dispense Refill     aspirin 81 MG tablet Take 1 tablet (81 mg) by mouth daily 30 tablet      azelastine (OPTIVAR) 0.05 % SOLN ophthalmic solution Apply 1 drop to eye 2 times daily 1 Bottle 6     bicalutamide (CASODEX) 50 MG tablet Take 1 tablet (50 mg) by mouth daily 90 tablet 3     calcium-vitamin D (CALTRATE) 600-400 MG-UNIT per tablet Take 1 tablet by mouth daily       clonazePAM (KLONOPIN) 1 MG tablet Take 1 tablet (1 mg) by mouth nightly as needed for anxiety 30 tablet 5     folic acid-vit B6-vit B12 (FOLGARD) 0.8-10-0.115 MG TABS Take 1 tablet by mouth daily       IBUPROFEN PO Take 400 mg by mouth every 8 hours as needed for moderate pain       lisinopril (PRINIVIL/ZESTRIL) 10 MG tablet Take 1 tablet (10 mg) by mouth daily 90 tablet 3     Loratadine  "(CLARITIN PO) Take  by mouth. As needed        naproxen (NAPROSYN) 500 MG tablet TAKE ONE TABLET BY MOUTH TWICE DAILY AS NEEDED FOR  MODERATE  PAIN 60 tablet 1     Omega-3 Fatty Acids (OMEGA-3 FISH OIL PO) Take 500 mg by mouth daily       sildenafil (VIAGRA) 100 MG tablet 1/2 tab three times a week 10 tablet 12     zolpidem (AMBIEN) 5 MG tablet Take 1 tablet (5 mg) by mouth nightly as needed for sleep 30 tablet 5     triamcinolone (KENALOG) 0.1 % cream          ALLERGIES: No Known Allergies    FAMILY HISTORY:  His mother is  at age 93 from natural causes.  His father is  at age 91 having had a stroke.  Cancer history includes a sister with breast cancer.     PHYSICAL EXAM:  Vitals: /90 (BP Location: Right arm, Patient Position: Chair, Cuff Size: Adult Regular)  Pulse 71  Temp 97.8  F (36.6  C) (Oral)  Resp 16  Ht 1.702 m (5' 7.01\")  Wt 91.7 kg (202 lb 3.2 oz)  SpO2 96%  BMI 31.66 kg/m2  GENERAL:  A pleasant person in no acute distress.   HEENT:  Sclerae are nonicteric.    NECK:  Supple.   LYMPH NODES:  No peripheral lymphadenopathy noted in the axillary, supraclavicular, or cervical regions.   LUNGS:  Clear to auscultation bilaterally.   HEART:  Regular rate and rhythm, with no murmur appreciated.   ABDOMEN:  Bowel sounds are active.  Soft and nontender.  No hepatosplenomegaly or other masses appreciated.  LOWER EXTREMITIES:  Without pitting edema to the knees bilaterally.   NEUROLOGICAL:  Alert/orientated/able to answer all questions.  CN grossly intact.  PSYCH:  Normal affect.  SKIN:  No suspicious lesions on areas of exposed skin.    ASSESSMENT/PLAN:  I had the pleasure of seeing Mr. Gautam in the Cancer Survivorship Program for his diagnosis of prostate cancer and colon cancer.  Given his diagnosis and treatment, I gave him the following recommendations.    1.  Original T2 (high-risk) prostate cancer, treated as above with chemotherapy, surgery, radiation and ADT.  Mr. Gautam will " continue to follow with Urology and has a visit with Dr. Goodwin in 07/2018.  Labs and visits are per Urology.    2.  Stage I (T1 N0 M0) colon carcinoma, treated with surgery.  Mr. Gautam continues to do well at this time.  He is not having any signs or symptoms that would suggest recurrence of his colon carcinoma.  I discussed further followup in Medical Oncology.  He is now 3 years out from surgery.  He feels comfortable seeing me once a year until he is 5 years out, at which time he will be released to his primary care provider.  He does need to continue to undergo colonoscopies, next due in 02/2019.  I sent a referral.  He wishes to have the colonoscopy in [place].  We will plan to obtain one more CEA and then no labs or imaging will be obtained unless he is symptomatic.   3.  Coping.  Overall, Mr. Gautam is coping well with the cancer diagnosis without any concerns.   4.  Energy level.  He is at baseline.  He is very active but does not have a formal exercise program.  I recommend 150 minutes of cardiovascular exercise per week.  He is retired from work.    5.  Lymphedema.  Given the pelvic radiation and lymph node dissection, he is at risk for lymphedema.  Should he note any lymphedema in his lower extremities, he should contact the clinic and we will discuss a referral to the lymphedema specialists.   6.  Surgical side effects.  He is at risk for bowel obstructions, adhesions and hernias.  He knows symptoms for which to seek medical  attention if there is any concern for bowel obstruction.  Given the prostate cancer surgery, he continues to have problems with erectile dysfunction.  He had Viagra and a vacuum in the past.  He will discuss whether he wants to restart this with Urology.  He denies any difficulty with urinary incontinence.    7.  Bone health.  Given the Casodex and Lupron, he is at risk for loss of bone mass.  He had a DEXA scan in 07/2014 which was considered normal bone density.  I will leave  further bone densities up to Urology.  He should consume 3631-5103 mg of calcium along with vitamin D daily.  He should remain active with weightbearing exercises 2-3 days a week.    8.  Peripheral neuropathy.  He only had one dose of the Taxotere and denied any peripheral neuropathy and should not see this as a late effect.    9.  Cancer screening.  He does need to continue to undergo routine cancer screening for men of his age group.  He should limit his sun exposure and use sunscreens.  He should continue to see Dermatology every 6 months.  He will report any testicular masses.    10.  Healthy lifestyle.  He should refrain from tobacco.  He should maintain a healthy weight with a BMI between 20 and 25.  He should have an exercise program of 150 minutes of cardiovascular exercise per week.  Diet should include low fats.  He will need to continue to see his primary care provider for his general health maintenance.  He should see his eye doctor and dentist routinely.  He should receive the annual influenza vaccine.  He has already received the pneumococcal vaccine.   11.  Radiation.  Bones are at risk for fractures in the area of the radiation.  Ms. Gautam will watch for new skin lesions in the area of radiation and if present, will have them evaluated.     If there are no interval concerns, the patient will continue to follow with Urology for the prostate cancer.  I will see him in a year for the colon cancer.       I spent 40 minutes with this patient, over 50% in counseling.        Magali Andrews PA-C

## 2018-04-30 NOTE — LETTER
May 2, 2018      TO: Medardo Gautam  [address] [facility] [address]         Dear Mr. Medardo Gautam,    It was my pleasure seeing you in the Cancer Survivorship Clinic.   Due to your cancer treatment, here are considerations/recommendations based on your treatment:    Follow up for your cancer.  You will continue to see Urology for your prostate cancer.  You will be seen in the Cancer Survivor Clinic yearly for the colon cancer.    Lymphedema risk. With any lymph node removal and radiation, you are at risk for lymphedema. Please contact the clinic if you notice edema (swelling) for a referral to the lymphedema specialists.    Peripheral neuropathy.  You did not have numbness and/or tingling in your hands and/or feet from the chemotherapy and you will not see this as a late effect.    Bone health.  I recommend 1200 mg of calcium daily along with Vitamin D and weight bearing exercises 2-3 days a week.  You may need a bone density scan, please discuss this with your Urology team.    You are at risk for adhesions, hernias, and bowel obstructions.  Please talk to Urology about the difficulty with erections.    Radiation effects.  If there are new skin lesions in the area of radiation, please have them evaluated.  Bones in the area of radiation are at risk for fractures.     Cancer screening.  You should undergo routine screening for your age group.  You should have testicular masses evaluated.  You should limit your sun exposure and use sunscreens.      Healthy Lifestyle.  Do not use tobacco in any form. Maintain a healthy weight with a BMI 20-25.  Diet should include lots of fruits and vegetables, low fat.  Your exercise program should be 150 minutes of cardiovascular exercise per week.  You should have regular check-ups with your primary care provider for general health maintenance.  You should receive the annual influenza vaccine, unless contraindicated.  You have received the pneumococcal vaccine.  You should see your eye doctor and dentist routinely.  If you have any questions, please feel free to contact me at [phone].      Sincerely,            Magali Andrews PA-C

## 2018-04-30 NOTE — MR AVS SNAPSHOT
After Visit Summary   4/30/2018    Medardo Gautam    MRN: [id number]           Patient Information     Date Of Birth          1940        Visit Information        Provider Department      4/30/2018 11:00 AM Magali Andrews PA-C [facility]        Today's Diagnoses     Prostate cancer- Jeancarlos 9 (5+4) dx Feb 2012 - 1    Malignant neoplasm of colon, unspecified part of colon (H)           Follow-ups after your visit        Additional Services     GASTROENTEROLOGY ADULT REF PROCEDURE ONLY [facility] [phone]; Dr. VITALIY Pisano (colonoscopy only) (He is due in February 2019.)       Last Lab Result: Creatinine (mg/dL)       Date                     Value                 01/09/2018               0.78             ----------  Body mass index is 31.66 kg/(m^2).     Needed:  No  Language:  English    Patient will be contacted to schedule procedure.     Please be aware that coverage of these services is subject to the terms and limitations of your health insurance plan.  Call member services at your health plan with any benefit or coverage questions.  Any procedures must be performed at a [place] facility OR coordinated by your clinic's referral office.    Please bring the following with you to your appointment:    (1) Any X-Rays, CTs or MRIs which have been performed.  Contact the facility where they were done to arrange for  prior to your scheduled appointment.    (2) List of current medications   (3) This referral request   (4) Any documents/labs given to you for this referral                  Your next 10 appointments already scheduled     Jul 18, 2018 12:00 PM CDT   (Arrive by 11:45 AM)   Return Prostate Cancer with Norma Goodwin MD   [facility] ([facility])    [address] [place] [address]  4th [facility] [address] [phone]            Apr 29, 2019 10:15 AM CDT  "  (Arrive by 10:00 AM)   Survivorship Visit with Magali Andrews PA-C   [facility] ([facility])    [address]  [address]  [facility] [address] [phone]              Who to contact     If you have questions or need follow up information about today's clinic visit or your schedule please contact [facility] directly at [phone].  Normal or non-critical lab and imaging results will be communicated to you by [email or web address], letter or phone within 4 business days after the clinic has received the results. If you do not hear from us within 7 days, please contact the clinic through [employer] or phone. If you have a critical or abnormal lab result, we will notify you by phone as soon as possible.  Submit refill requests through [employer] or call your pharmacy and they will forward the refill request to us. Please allow 3 business days for your refill to be completed.          Additional Information About Your Visit        [employer] Information     [employer] gives you secure access to your electronic health record. If you see a primary care provider, you can also send messages to your care team and make appointments. If you have questions, please call your primary care clinic.  If you do not have a primary care provider, please call [phone] and they will assist you.        Care EveryWhere ID     This is your Care EveryWhere ID. This could be used by other organizations to access your [place] medical records  [id number]        Your Vitals Were     Pulse Temperature Respirations Height Pulse Oximetry BMI (Body Mass Index)    71 97.8  F (36.6  C) (Oral) 16 1.702 m (5' 7.01\") 96% 31.66 kg/m2       Blood Pressure from Last 3 Encounters:   04/30/18 147/90   04/18/18 126/82   04/17/18 132/80    Weight from Last 3 Encounters:   04/30/18 91.7 kg (202 lb 3.2 oz)   04/18/18 91.6 kg (202 lb)   02/15/18 90.7 kg (200 lb)              We Performed the " Following     GASTROENTEROLOGY ADULT REF PROCEDURE ONLY [facility] [phone]; Dr. VITALIY Pisano (colonoscopy only) (He is due in February 2019.)        Primary Care Provider Office Phone # Fax #    Verna Osborne [phone]       [address]  [facility] [address]        Equal Access to Services     [facility]: Hadii aad ku hadasho Soomaali, waaxda luqadaha, qaybta kaalmada adeegyada, waxay idiin hayaan adeeg kharash la'aan ah. So [facility] [phone].    ATENCIÓN: Si habla español, tiene a sanford disposición servicios gratuitos de asistencia lingüística. [facility] [phone].    We comply with applicable federal civil rights laws and [place] laws. We do not discriminate on the basis of race, color, national origin, age, disability, sex, sexual orientation, or gender identity.            Thank you!     Thank you for choosing [facility]  for your care. Our goal is always to provide you with excellent care. Hearing back from our patients is one way we can continue to improve our services. Please take a few minutes to complete the written survey that you may receive in the mail after your visit with us. Thank you!             Your Updated Medication List - Protect others around you: Learn how to safely use, store and throw away your medicines at www.disposemymeds.org.          This list is accurate as of 4/30/18 11:59 PM.  Always use your most recent med list.                   Brand Name Dispense Instructions for use Diagnosis    aspirin 81 MG tablet     30 tablet    Take 1 tablet (81 mg) by mouth daily    Hyperlipidemia LDL goal <130, Malignant neoplasm of colon, unspecified part of colon (H)       azelastine 0.05 % Soln ophthalmic solution    OPTIVAR    1 Bottle    Apply 1 drop to eye 2 times daily    Allergic conjunctivitis, bilateral       bicalutamide 50 MG tablet    CASODEX    90 tablet    Take 1 tablet (50 mg) by mouth daily    Malignant neoplasm of  prostate (H)       calcium-vitamin D 600-400 MG-UNIT per tablet    CALTRATE     Take 1 tablet by mouth daily    Hyperlipidemia LDL goal <130, Malignant neoplasm of colon, unspecified part of colon (H)       CLARITIN PO      Take  by mouth. As needed        clonazePAM 1 MG tablet    klonoPIN    30 tablet    Take 1 tablet (1 mg) by mouth nightly as needed for anxiety    Anxiety       folic acid-vit B6-vit B12 0.8-10-0.115 MG Tabs per tablet    FOLGARD     Take 1 tablet by mouth daily    Hyperlipidemia LDL goal <130, Malignant neoplasm of colon, unspecified part of colon (H)       IBUPROFEN PO      Take 400 mg by mouth every 8 hours as needed for moderate pain        lisinopril 10 MG tablet    PRINIVIL/ZESTRIL    90 tablet    Take 1 tablet (10 mg) by mouth daily    Benign essential hypertension       naproxen 500 MG tablet    NAPROSYN    60 tablet    TAKE ONE TABLET BY MOUTH TWICE DAILY AS NEEDED FOR  MODERATE  PAIN    Wrist sprain, right, initial encounter       OMEGA-3 FISH OIL PO      Take 500 mg by mouth daily    Hyperlipidemia LDL goal <130, Malignant neoplasm of colon, unspecified part of colon (H)       sildenafil 100 MG tablet    VIAGRA    10 tablet    1/2 tab three times a week    Malignant neoplasm of prostate (H)       triamcinolone 0.1 % cream    KENALOG          zolpidem 5 MG tablet    AMBIEN    30 tablet    Take 1 tablet (5 mg) by mouth nightly as needed for sleep    Persistent disorder of initiating or maintaining sleep

## 2018-04-30 NOTE — TELEPHONE ENCOUNTER
Pt not due until February. Will place into Recall and also provided pt with our scheduling number in case.  He will schedule when it is closer.    Kaycee Troy RN

## 2018-04-30 NOTE — PROGRESS NOTES
REASON FOR VISIT:  I am seeing Mr. Gautam in the Cancer Survivorship Program for his diagnosis of prostate cancer and colon cancer.      Mr. Gautam had an increased PSA and prostate nodule in 2012.  Biopsy of the prostate on 02/09/2012 showed an adenocarcinoma, [place] 9 (5+4).  He was diagnosed with a T2 (high-risk) prostate cancer.  He began CALGB 18852 and was randomized to arm 1.  He received one dose of Taxotere along with Lupron on 03/27/2012.  He developed liver toxicity and was taken off the clinical trial.  He has remained on the Lupron under the direction of Urology.  He underwent a robotic radical prostatectomy with bilateral pelvic lymph node removal on 08/06/2012.  This revealed adenocarcinoma, [place] 4+5 with a total score of 9.  Zero out of 16 lymph nodes were positive.  He was noted to have a rising PSA and completed pelvic radiation with 4500 cGy to the pelvis and the prostate bed received 7020 cGy, completed on 10/18/2013.  He began Casodex in 12/2016 and remains on intermittent Lupron.  He is followed by Urology.       Mr. Gautam had a colonoscopy on 01/16/2015.  Colon biopsy in the mid ascending colon revealed a moderately differentiated adenocarcinoma.  Laparoscopic right hemicolectomy on 02/11/2015 showed moderately differentiated adenocarcinoma, low grade, 0.8 cm.  Zero out of 13 lymph nodes were positive.  He was diagnosed with a stage I (T1 N0 M0) colon cancer.       Mr. Gautam continues to do well at this time.  He has noted some bilateral nipple tenderness since starting the Casodex.  He does self-breast exams and has not noted any masses.  He will discuss this with Urology.  He is otherwise eating and drinking okay.  He denies any chest pain, shortness of breath, cough, nausea, vomiting, abdominal pain, change in bowel habits, blood in his stool or new bone pains.     CANCER TREATMENT SUMMARY:  Prostate cancer  *Increased PSA and prostate nodule in 2012.    *Biopsy of the prostate on  02/09/2012 showed an adenocarcinoma, [place] 9 (5+4).    *CALGB 42433 and was randomized to arm 1.  He received one dose of Taxotere along with Lupron on 03/27/2012.  He developed liver toxicity and was taken off the clinical trial.  He has remained on the Lupron under the direction of Urology.    *Robotic radical prostatectomy with bilateral pelvic lymph node removal on 08/06/2012.  This revealed adenocarcinoma, [place] 4+5 with a total score of 9.  Zero out of 16 lymph nodes were positive.    *He was noted to have a rising PSA and completed pelvic radiation with 4500 cGy to the pelvis and the prostate bed received 7020 cGy, completed on 10/18/2013.    *He began Casodex in 12/2016 and remains on intermittent Lupron.    Colon cancer  *Colonoscopy on 01/16/2015.  Colon biopsy in the mid ascending colon revealed a moderately differentiated adenocarcinoma.    *Laparoscopic right hemicolectomy on 02/11/2015 showed moderately differentiated adenocarcinoma, low grade, 0.8 cm.  Zero out of 13 lymph nodes were positive.    MEDICATIONS:   Current Outpatient Prescriptions   Medication Sig Dispense Refill     aspirin 81 MG tablet Take 1 tablet (81 mg) by mouth daily 30 tablet      azelastine (OPTIVAR) 0.05 % SOLN ophthalmic solution Apply 1 drop to eye 2 times daily 1 Bottle 6     bicalutamide (CASODEX) 50 MG tablet Take 1 tablet (50 mg) by mouth daily 90 tablet 3     calcium-vitamin D (CALTRATE) 600-400 MG-UNIT per tablet Take 1 tablet by mouth daily       clonazePAM (KLONOPIN) 1 MG tablet Take 1 tablet (1 mg) by mouth nightly as needed for anxiety 30 tablet 5     folic acid-vit B6-vit B12 (FOLGARD) 0.8-10-0.115 MG TABS Take 1 tablet by mouth daily       IBUPROFEN PO Take 400 mg by mouth every 8 hours as needed for moderate pain       lisinopril (PRINIVIL/ZESTRIL) 10 MG tablet Take 1 tablet (10 mg) by mouth daily 90 tablet 3     Loratadine (CLARITIN PO) Take  by mouth. As needed        naproxen (NAPROSYN) 500 MG tablet TAKE  "ONE TABLET BY MOUTH TWICE DAILY AS NEEDED FOR  MODERATE  PAIN 60 tablet 1     Omega-3 Fatty Acids (OMEGA-3 FISH OIL PO) Take 500 mg by mouth daily       sildenafil (VIAGRA) 100 MG tablet 1/2 tab three times a week 10 tablet 12     zolpidem (AMBIEN) 5 MG tablet Take 1 tablet (5 mg) by mouth nightly as needed for sleep 30 tablet 5     triamcinolone (KENALOG) 0.1 % cream          ALLERGIES: No Known Allergies    FAMILY HISTORY:  His mother is  at age 93 from natural causes.  His father is  at age 91 having had a stroke.  Cancer history includes a sister with breast cancer.     PHYSICAL EXAM:  Vitals: /90 (BP Location: Right arm, Patient Position: Chair, Cuff Size: Adult Regular)  Pulse 71  Temp 97.8  F (36.6  C) (Oral)  Resp 16  Ht 1.702 m (5' 7.01\")  Wt 91.7 kg (202 lb 3.2 oz)  SpO2 96%  BMI 31.66 kg/m2  GENERAL:  A pleasant person in no acute distress.   HEENT:  Sclerae are nonicteric.    NECK:  Supple.   LYMPH NODES:  No peripheral lymphadenopathy noted in the axillary, supraclavicular, or cervical regions.   LUNGS:  Clear to auscultation bilaterally.   HEART:  Regular rate and rhythm, with no murmur appreciated.   ABDOMEN:  Bowel sounds are active.  Soft and nontender.  No hepatosplenomegaly or other masses appreciated.  LOWER EXTREMITIES:  Without pitting edema to the knees bilaterally.   NEUROLOGICAL:  Alert/orientated/able to answer all questions.  CN grossly intact.  PSYCH:  Normal affect.  SKIN:  No suspicious lesions on areas of exposed skin.    ASSESSMENT/PLAN:  I had the pleasure of seeing Mr. Gautam in the Cancer Survivorship Program for his diagnosis of prostate cancer and colon cancer.  Given his diagnosis and treatment, I gave him the following recommendations.    1.  Original T2 (high-risk) prostate cancer, treated as above with chemotherapy, surgery, radiation and ADT.  Mr. Gautam will continue to follow with Urology and has a visit with Dr. Goodwin in 2018.  Labs and " visits are per Urology.    2.  Stage I (T1 N0 M0) colon carcinoma, treated with surgery.  Mr. Gautam continues to do well at this time.  He is not having any signs or symptoms that would suggest recurrence of his colon carcinoma.  I discussed further followup in Medical Oncology.  He is now 3 years out from surgery.  He feels comfortable seeing me once a year until he is 5 years out, at which time he will be released to his primary care provider.  He does need to continue to undergo colonoscopies, next due in 02/2019.  I sent a referral.  He wishes to have the colonoscopy in [place].  We will plan to obtain one more CEA and then no labs or imaging will be obtained unless he is symptomatic.   3.  Coping.  Overall, Mr. Gautam is coping well with the cancer diagnosis without any concerns.   4.  Energy level.  He is at baseline.  He is very active but does not have a formal exercise program.  I recommend 150 minutes of cardiovascular exercise per week.  He is retired from work.    5.  Lymphedema.  Given the pelvic radiation and lymph node dissection, he is at risk for lymphedema.  Should he note any lymphedema in his lower extremities, he should contact the clinic and we will discuss a referral to the lymphedema specialists.   6.  Surgical side effects.  He is at risk for bowel obstructions, adhesions and hernias.  He knows symptoms for which to seek medical  attention if there is any concern for bowel obstruction.  Given the prostate cancer surgery, he continues to have problems with erectile dysfunction.  He had Viagra and a vacuum in the past.  He will discuss whether he wants to restart this with Urology.  He denies any difficulty with urinary incontinence.    7.  Bone health.  Given the Casodex and Lupron, he is at risk for loss of bone mass.  He had a DEXA scan in 07/2014 which was considered normal bone density.  I will leave further bone densities up to Urology.  He should consume 6837-7979 mg of calcium along  with vitamin D daily.  He should remain active with weightbearing exercises 2-3 days a week.    8.  Peripheral neuropathy.  He only had one dose of the Taxotere and denied any peripheral neuropathy and should not see this as a late effect.    9.  Cancer screening.  He does need to continue to undergo routine cancer screening for men of his age group.  He should limit his sun exposure and use sunscreens.  He should continue to see Dermatology every 6 months.  He will report any testicular masses.    10.  Healthy lifestyle.  He should refrain from tobacco.  He should maintain a healthy weight with a BMI between 20 and 25.  He should have an exercise program of 150 minutes of cardiovascular exercise per week.  Diet should include low fats.  He will need to continue to see his primary care provider for his general health maintenance.  He should see his eye doctor and dentist routinely.  He should receive the annual influenza vaccine.  He has already received the pneumococcal vaccine.   11.  Radiation.  Bones are at risk for fractures in the area of the radiation.  Ms. Gautam will watch for new skin lesions in the area of radiation and if present, will have them evaluated.     If there are no interval concerns, the patient will continue to follow with Urology for the prostate cancer.  I will see him in a year for the colon cancer.       I spent 40 minutes with this patient, over 50% in counseling.

## 2018-05-03 ENCOUNTER — TELEPHONE (OUTPATIENT)
Dept: GASTROENTEROLOGY | Facility: CLINIC | Age: 78
End: 2018-05-03

## 2018-05-21 NOTE — PROGRESS NOTES
SUBJECTIVE:   Medardo Gautam is a 78 year old male who presents to clinic today for the following health issues:      HPI     Concern - Lump on right Elbow  Onset: February 2018- had a fall    Description:   Large lump on right elbow    Intensity: mild    Progression of Symptoms:  Improving- appears to getting smaller    Accompanying Signs & Symptoms:  None    Previous history of similar problem:   No  Therapies Tried and outcome:   Problem list and histories reviewed & adjusted, as indicated.  Additional history: as documented        Patient Active Problem List   Diagnosis     Disturbance of skin sensation     Hemorrhoids     Gout     Advanced directives, counseling/discussion     Dupuytren's contracture of hand- left 5th finger, right 5th finger     Bunions- both feet     Skin lesion- R side of face and behind L ear     Insomnia     Prostatic nodule- posterior right edge with rectal exam     Prostate cancer- [place] 9 (5+4) dx Feb 2012     Elevated liver enzymes     Hyperbilirubinemia     Essential hypertension with goal blood pressure less than 140/90     Shoulder pain, left     Contracture of finger joint     Hyperlipidemia LDL goal <130     Shoulder pain, right     Malignant neoplasm of prostate (H)     Anxiety     Colon cancer (H)     Abdominal pain, epigastric     Renal cyst     Lumbar radiculopathy     Meibomian gland dysfunction     Pseudophakia of right eye     Macular degeneration     Dermatochalasis of eyelid     Olecranon bursitis of right elbow     Past Surgical History:   Procedure Laterality Date     APPENDECTOMY       BIOPSY  01/16/15     C HAND/FINGER SURGERY UNLISTED       C LENGTHEN,TENDON,HAND/FINGER  ca 2007    right fifth     C STOMACH SURGERY PROCEDURE UNLISTED       CATARACT IOL, RT/LT Left 02/15/2018     COLON SURGERY  2/11/2015    Lap assisted R hemicolectomy     COLONOSCOPY  10/25/07    Snare polypectomy     COLONOSCOPY  6/25/2009    with snare polypectomy     COLONOSCOPY  9/30/2009      COLONOSCOPY  1/5/2011    COLONOSCOPY performed by JUD MARIEE at  [facility]     COLONOSCOPY N/A 1/16/2015    Procedure: COMBINED COLONOSCOPY, SINGLE OR MULTIPLE BIOPSY/POLYPECTOMY BY BIOPSY;  Surgeon: Sarah Beth Pisano MD;  Location: [facility] OR     COLONOSCOPY WITH CO2 INSUFFLATION N/A 1/16/2015    Procedure: COLONOSCOPY WITH CO2 INSUFFLATION;  Surgeon: Sarah Beth Pisano MD;  Location: [facility] OR     DAVINCI PROSTATECTOMY  8/6/2012    Procedure: DAVINCI PROSTATECTOMY;  Davinci Assisted Radical Prostatectomy with Bilateral Lymphadenectomy ;  Surgeon: Norma Goodwin MD;  Location: [facility] OR     GENITOURINARY SURGERY      prostate surgery     INJECT EPIDURAL LUMBAR / SACRAL SINGLE Left 10/30/2017    Procedure: INJECT EPIDURAL LUMBAR / SACRAL SINGLE;  Left Transforaminal Lumbar 4-Lumbar 5 Epidural Steroid Injection;  Surgeon: Nuvia Reina MD;  Location: [facility] OR     LAPAROSCOPIC ASSISTED COLECTOMY N/A 2/11/2015    Procedure: LAPAROSCOPIC ASSISTED COLECTOMY;  Surgeon: Oren Breen MD;  Location: [facility] OR     PHACOEMULSIFICATION CLEAR CORNEA WITH STANDARD INTRAOCULAR LENS IMPLANT Left 2/15/2018    Procedure: PHACOEMULSIFICATION CLEAR CORNEA WITH STANDARD INTRAOCULAR LENS IMPLANT;  LEFT EYE CATARACT EXTRACTION WITH STANDARD INTRAOCULAR LENS IMPLANT ;  Surgeon: Brandon Orellana MD;  Location: [facility]     PHACOEMULSIFICATION CLEAR CORNEA WITH STANDARD INTRAOCULAR LENS IMPLANT Right 3/1/2018    Procedure: PHACOEMULSIFICATION CLEAR CORNEA WITH STANDARD INTRAOCULAR LENS IMPLANT;  RIGHT EYE CATARACT EXTRACTION WITH STANDARD INTRAOCULAR LENS IMPLANT ;  Surgeon: Brandon Orellana MD;  Location: [facility]       Social History   Substance Use Topics     Smoking status: Former Smoker     Years: 1.00     Types: Cigarettes, Cigars, Pipe     Start date: 10/1/1961     Quit date: 1/1/1962     Smokeless tobacco: Never Used      Comment: No smokers in home     Alcohol use 0.0 oz/week      Comment: daily glass of wine and  elif     Family History   Problem Relation Age of Onset     CEREBROVASCULAR DISEASE Father      CANCER Mother 90     Patient believes vaginal cancer - hysterectomy,      Alzheimer Disease Mother      CANCER Sister      Breast Cancer Sister      C.A.D. No family hx of      Cancer - colorectal No family hx of      Prostate Cancer No family hx of      Blood Disease No family hx of      Cardiovascular No family hx of      Circulatory No family hx of      Eye Disorder No family hx of      GASTROINTESTINAL DISEASE No family hx of      Genitourinary Problems No family hx of      Lipids No family hx of      Neurologic Disorder No family hx of      Respiratory No family hx of      Asthma No family hx of      HEART DISEASE No family hx of      DIABETES No family hx of      Hypertension No family hx of      Arthritis No family hx of      Thyroid Disease No family hx of      Musculoskeletal Disorder No family hx of      Glaucoma No family hx of      Macular Degeneration No family hx of          Current Outpatient Prescriptions   Medication Sig Dispense Refill     aspirin 81 MG tablet Take 1 tablet (81 mg) by mouth daily 30 tablet      azelastine (OPTIVAR) 0.05 % SOLN ophthalmic solution Apply 1 drop to eye 2 times daily 1 Bottle 6     bicalutamide (CASODEX) 50 MG tablet Take 1 tablet (50 mg) by mouth daily 90 tablet 3     calcium-vitamin D (CALTRATE) 600-400 MG-UNIT per tablet Take 1 tablet by mouth daily       clonazePAM (KLONOPIN) 1 MG tablet Take 1 tablet (1 mg) by mouth nightly as needed for anxiety 30 tablet 5     folic acid-vit B6-vit B12 (FOLGARD) 0.8-10-0.115 MG TABS Take 1 tablet by mouth daily       IBUPROFEN PO Take 400 mg by mouth every 8 hours as needed for moderate pain       lisinopril (PRINIVIL/ZESTRIL) 10 MG tablet Take 1 tablet (10 mg) by mouth daily 90 tablet 3     Loratadine (CLARITIN PO) Take  by mouth. As needed        naproxen (NAPROSYN) 500 MG tablet TAKE ONE TABLET BY MOUTH TWICE DAILY AS  "NEEDED FOR  MODERATE  PAIN 60 tablet 1     Omega-3 Fatty Acids (OMEGA-3 FISH OIL PO) Take 500 mg by mouth daily       sildenafil (VIAGRA) 100 MG tablet 1/2 tab three times a week 10 tablet 12     triamcinolone (KENALOG) 0.1 % cream        zolpidem (AMBIEN) 5 MG tablet Take 1 tablet (5 mg) by mouth nightly as needed for sleep 30 tablet 5     No Known Allergies  Recent Labs   Lab Test  01/09/18   1345  09/07/17   0951  03/18/17   1639  11/16/16   0851   10/26/15   0956   10/30/14   1043   A1C   --   5.7   --    --    --    --    --   6.0   LDL  115*   --    --   134*   --   127   --    --    HDL  83   --    --   91   --   87   --    --    TRIG  176*   --    --   102   --   94   --    --    ALT  81*  60  53   --    < >  56   < >   --    CR  0.78  1.02  0.93   --    < >  0.77   < >   --    GFRESTIMATED  >90  71  80   --    < >  >90  Non  GFR Calc     < >   --    GFRESTBLACK  >90  86  >90   GFR Calc     --    < >  >90   GFR Calc     < >   --    POTASSIUM  5.2  4.6  4.2   --    < >  4.4   < >   --     < > = values in this interval not displayed.      BP Readings from Last 3 Encounters:   05/25/18 130/82   04/30/18 147/90   04/18/18 126/82    Wt Readings from Last 3 Encounters:   05/25/18 199 lb (90.3 kg)   04/30/18 202 lb 3.2 oz (91.7 kg)   04/18/18 202 lb (91.6 kg)                  Labs reviewed in EPIC    ROS:  Constitutional, HEENT, cardiovascular, pulmonary, GI, , musculoskeletal, neuro, skin, endocrine and psych systems are negative, except as otherwise noted.    OBJECTIVE:     /82 (BP Location: Left arm, Patient Position: Chair, Cuff Size: Adult Regular)  Pulse 71  Temp 97.1  F (36.2  C) (Temporal)  Resp 16  Ht 5' 7\" (1.702 m)  Wt 199 lb (90.3 kg)  SpO2 98%  BMI 31.17 kg/m2  Body mass index is 31.17 kg/(m^2).   Physical Exam   Constitutional: He is oriented to person, place, and time. He appears well-developed and well-nourished.   HENT:   Head: " Normocephalic and atraumatic.   Cardiovascular: Normal rate, regular rhythm and normal heart sounds.    Musculoskeletal: He exhibits no edema, tenderness or deformity.   Bursitis   Neurological: He is alert and oriented to person, place, and time.   Psychiatric: He has a normal mood and affect.         Diagnostic Test Results:  none     ASSESSMENT/PLAN:     Problem List Items Addressed This Visit     Olecranon bursitis of right elbow - Primary     No signs of active inflammation or infection.  Discussed ice, NSAID use, rest, avoiding kneeling on the elbow.  Discussed home care  Reportable signs and symptoms discussed  RTC if symptoms persist or fail to improve                    Verna Osborne MD  [facility]

## 2018-05-25 ENCOUNTER — OFFICE VISIT (OUTPATIENT)
Dept: FAMILY MEDICINE | Facility: OTHER | Age: 78
End: 2018-05-25
Payer: COMMERCIAL

## 2018-05-25 VITALS
BODY MASS INDEX: 31.23 KG/M2 | SYSTOLIC BLOOD PRESSURE: 130 MMHG | HEIGHT: 67 IN | OXYGEN SATURATION: 98 % | HEART RATE: 71 BPM | WEIGHT: 199 LBS | RESPIRATION RATE: 16 BRPM | TEMPERATURE: 97.1 F | DIASTOLIC BLOOD PRESSURE: 82 MMHG

## 2018-05-25 DIAGNOSIS — M70.21 OLECRANON BURSITIS OF RIGHT ELBOW: Primary | ICD-10-CM

## 2018-05-25 PROCEDURE — 99213 OFFICE O/P EST LOW 20 MIN: CPT | Performed by: FAMILY MEDICINE

## 2018-05-25 ASSESSMENT — PAIN SCALES - GENERAL: PAINLEVEL: NO PAIN (0)

## 2018-05-25 NOTE — PATIENT INSTRUCTIONS
Bursitis of the Elbow (Olecranon)  Your elbow joint contains a small fluid-filled sac called a bursa. The bursa helps the muscles and tendons move smoothly over the bone. It also cushions and protects your elbow. Bursitis is when the bursa is inflamed or swollen. This is most often due to overuse of or injury to the elbow. Symptoms include swelling and pain. If the elbow is red and feels warm to the touch, the bursa itself may be infected.  In most cases, elbow bursitis resolves with medicine and self-care at home. It may take several weeks for the bursa to heal and the swelling to go away. In some cases, your healthcare provider may drain excess fluid from the bursa. Or, he or she may inject medicine directly into the bursa to help relieve symptoms. In severe cases, you may need surgery to remove the bursa may. If there is concern that the bursa is infected, your healthcare provider may prescribe antibiotics to treat the infection.    Home care  Your healthcare provider may prescribe medicine to help relieve pain and swelling. This may be an over-the-counter pain reliever or prescription pain medicine. Take all medicines as directed. To help treat or prevent infection, your provider may prescribe antibiotics. If these are prescribed, take them as directed until they are gone.  The following are general care guidelines:    Apply an ice pack or bag of frozen peas wrapped in a thin towel to your elbow for 15 to 20 minutes at a time. Do this 3 to 4 times a day until pain and swelling improve.    Keep your elbow raised above the level of your heart whenever possible. This helps reduce swelling. When sitting or lying down, place your arm on a pillow that rests on your chest or on a pillow at your side.    Use an elastic wrap around the elbow joint to compress the area while it is healing. Make the wrap snug but not tight to the point of causing pain.    Rest your elbow to give it time to heal. You may need to wear an  elbow pad to help protect and limit the movement of your elbow. During and after healing, avoid leaning on your elbows.  Follow-up care  Follow up with your healthcare provider, or as advised. If you have been referred to a specialist, make that appointment promptly.  When to seek medical advice  Call your healthcare provider right away if any of these occur:    Fever of 100.4 F (38 C) or higher, or as advised    Chills    Increased pain, swelling, warmth, redness, or drainage from the joint    Trouble moving the elbow joint    Numbness or tingling in the hand    Severe pain or swelling in forearm or hand    Loss of pink color and slow return of color after squeezing fingertip or hand  Date Last Reviewed: 6/1/2016 2000-2017 [employer]. [address] [place] [address], [place] [address]. All rights reserved. This information is not intended as a substitute for professional medical care. Always follow your healthcare professional's instructions.

## 2018-05-25 NOTE — MR AVS SNAPSHOT
After Visit Summary   5/25/2018    Medardo Gautam    MRN: [id number]           Patient Information     Date Of Birth          1940        Visit Information        Provider Department      5/25/2018 10:20 AM Verna Osborne MD [facility] Instructions      Bursitis of the Elbow (Olecranon)  Your elbow joint contains a small fluid-filled sac called a bursa. The bursa helps the muscles and tendons move smoothly over the bone. It also cushions and protects your elbow. Bursitis is when the bursa is inflamed or swollen. This is most often due to overuse of or injury to the elbow. Symptoms include swelling and pain. If the elbow is red and feels warm to the touch, the bursa itself may be infected.  In most cases, elbow bursitis resolves with medicine and self-care at home. It may take several weeks for the bursa to heal and the swelling to go away. In some cases, your healthcare provider may drain excess fluid from the bursa. Or, he or she may inject medicine directly into the bursa to help relieve symptoms. In severe cases, you may need surgery to remove the bursa may. If there is concern that the bursa is infected, your healthcare provider may prescribe antibiotics to treat the infection.    Home care  Your healthcare provider may prescribe medicine to help relieve pain and swelling. This may be an over-the-counter pain reliever or prescription pain medicine. Take all medicines as directed. To help treat or prevent infection, your provider may prescribe antibiotics. If these are prescribed, take them as directed until they are gone.  The following are general care guidelines:    Apply an ice pack or bag of frozen peas wrapped in a thin towel to your elbow for 15 to 20 minutes at a time. Do this 3 to 4 times a day until pain and swelling improve.    Keep your elbow raised above the level of your heart whenever possible. This helps reduce swelling. When sitting or lying down,  place your arm on a pillow that rests on your chest or on a pillow at your side.    Use an elastic wrap around the elbow joint to compress the area while it is healing. Make the wrap snug but not tight to the point of causing pain.    Rest your elbow to give it time to heal. You may need to wear an elbow pad to help protect and limit the movement of your elbow. During and after healing, avoid leaning on your elbows.  Follow-up care  Follow up with your healthcare provider, or as advised. If you have been referred to a specialist, make that appointment promptly.  When to seek medical advice  Call your healthcare provider right away if any of these occur:    Fever of 100.4 F (38 C) or higher, or as advised    Chills    Increased pain, swelling, warmth, redness, or drainage from the joint    Trouble moving the elbow joint    Numbness or tingling in the hand    Severe pain or swelling in forearm or hand    Loss of pink color and slow return of color after squeezing fingertip or hand  Date Last Reviewed: 6/1/2016 2000-2017 [employer]. [address] [place] [address]. All rights reserved. This information is not intended as a substitute for professional medical care. Always follow your healthcare professional's instructions.                Follow-ups after your visit        Your next 10 appointments already scheduled     Jul 18, 2018 12:00 PM CDT   (Arrive by 11:45 AM)   Return Prostate Cancer with Norma Goodwin MD   [facility]rology and [facility] for Prostate and Urologic Cancers ([facility])    [address]  4th Floor  [facility] [id number] [phone]            Apr 29, 2019 10:15 AM CDT   (Arrive by 10:00 AM)   Survivorship Visit with Magali Andrews PA-C   [facility] ([facility] [place])    [address]  [address]  [facility] [id number] [phone]              Who to contact     If you have  "questions or need follow up information about today's clinic visit or your schedule please contact [facility] directly at [phone].  Normal or non-critical lab and imaging results will be communicated to you by MyChart, letter or phone within 4 business days after the clinic has received the results. If you do not hear from us within 7 days, please contact the clinic through [employer] or phone. If you have a critical or abnormal lab result, we will notify you by phone as soon as possible.  Submit refill requests through [employer] or call your pharmacy and they will forward the refill request to us. Please allow 3 business days for your refill to be completed.          Additional Information About Your Visit        [employer] Information     [employer] gives you secure access to your electronic health record. If you see a primary care provider, you can also send messages to your care team and make appointments. If you have questions, please call your primary care clinic.  If you do not have a primary care provider, please call [phone] and they will assist you.        Care EveryWhere ID     This is your Care EveryWhere ID. This could be used by other organizations to access your [place] medical records  [id number]        Your Vitals Were     Pulse Temperature Respirations Height Pulse Oximetry BMI (Body Mass Index)    71 97.1  F (36.2  C) (Temporal) 16 5' 7\" (1.702 m) 98% 31.17 kg/m2       Blood Pressure from Last 3 Encounters:   05/25/18 130/82   04/30/18 147/90   04/18/18 126/82    Weight from Last 3 Encounters:   05/25/18 199 lb (90.3 kg)   04/30/18 202 lb 3.2 oz (91.7 kg)   04/18/18 202 lb (91.6 kg)              Today, you had the following     No orders found for display       Primary Care Provider Office Phone # Fax #    Verna Osborne [phone]       [address]  [facility] [address]        Equal Access to Services     RENEE CORONA AH: Sheba Hitchcock, " waaxda luqadaha, qaybta kaalmada evelia, randy langley shayytaj laLindaaacasey ah. So [facility] [phone].    ATENCIÓN: Si gricel crabtree, tiene a sanford disposición servicios gratuitos de asistencia lingüística. Lukas al [phone].    We comply with applicable federal civil rights laws and [place] laws. We do not discriminate on the basis of race, color, national origin, age, disability, sex, sexual orientation, or gender identity.            Thank you!     Thank you for choosing [facility]  for your care. Our goal is always to provide you with excellent care. Hearing back from our patients is one way we can continue to improve our services. Please take a few minutes to complete the written survey that you may receive in the mail after your visit with us. Thank you!             Your Updated Medication List - Protect others around you: Learn how to safely use, store and throw away your medicines at www.disposemymeds.org.          This list is accurate as of 5/25/18 10:50 AM.  Always use your most recent med list.                   Brand Name Dispense Instructions for use Diagnosis    aspirin 81 MG tablet     30 tablet    Take 1 tablet (81 mg) by mouth daily    Hyperlipidemia LDL goal <130, Malignant neoplasm of colon, unspecified part of colon (H)       azelastine 0.05 % Soln ophthalmic solution    OPTIVAR    1 Bottle    Apply 1 drop to eye 2 times daily    Allergic conjunctivitis, bilateral       bicalutamide 50 MG tablet    CASODEX    90 tablet    Take 1 tablet (50 mg) by mouth daily    Malignant neoplasm of prostate (H)       calcium-vitamin D 600-400 MG-UNIT per tablet    CALTRATE     Take 1 tablet by mouth daily    Hyperlipidemia LDL goal <130, Malignant neoplasm of colon, unspecified part of colon (H)       CLARITIN PO      Take  by mouth. As needed        clonazePAM 1 MG tablet    klonoPIN    30 tablet    Take 1 tablet (1 mg) by mouth nightly as needed for anxiety    Anxiety       folic  acid-vit B6-vit B12 0.8-10-0.115 MG Tabs per tablet    FOLGARD     Take 1 tablet by mouth daily    Hyperlipidemia LDL goal <130, Malignant neoplasm of colon, unspecified part of colon (H)       IBUPROFEN PO      Take 400 mg by mouth every 8 hours as needed for moderate pain        lisinopril 10 MG tablet    PRINIVIL/ZESTRIL    90 tablet    Take 1 tablet (10 mg) by mouth daily    Benign essential hypertension       naproxen 500 MG tablet    NAPROSYN    60 tablet    TAKE ONE TABLET BY MOUTH TWICE DAILY AS NEEDED FOR  MODERATE  PAIN    Wrist sprain, right, initial encounter       OMEGA-3 FISH OIL PO      Take 500 mg by mouth daily    Hyperlipidemia LDL goal <130, Malignant neoplasm of colon, unspecified part of colon (H)       sildenafil 100 MG tablet    VIAGRA    10 tablet    1/2 tab three times a week    Malignant neoplasm of prostate (H)       triamcinolone 0.1 % cream    KENALOG          zolpidem 5 MG tablet    AMBIEN    30 tablet    Take 1 tablet (5 mg) by mouth nightly as needed for sleep    Persistent disorder of initiating or maintaining sleep

## 2018-05-25 NOTE — ASSESSMENT & PLAN NOTE
No signs of active inflammation or infection.  Discussed ice, NSAID use, rest, avoiding kneeling on the elbow.  Discussed home care  Reportable signs and symptoms discussed  RTC if symptoms persist or fail to improve

## 2018-07-10 DIAGNOSIS — C61 MALIGNANT NEOPLASM OF PROSTATE (H): ICD-10-CM

## 2018-07-10 PROCEDURE — 84153 ASSAY OF PSA TOTAL: CPT | Mod: 90 | Performed by: PHYSICIAN ASSISTANT

## 2018-07-10 PROCEDURE — 36415 COLL VENOUS BLD VENIPUNCTURE: CPT | Performed by: PHYSICIAN ASSISTANT

## 2018-07-10 PROCEDURE — 84403 ASSAY OF TOTAL TESTOSTERONE: CPT | Performed by: PHYSICIAN ASSISTANT

## 2018-07-10 PROCEDURE — 99000 SPECIMEN HANDLING OFFICE-LAB: CPT | Performed by: PHYSICIAN ASSISTANT

## 2018-07-11 LAB — PSA SERPL DL<=0.01 NG/ML-MCNC: 0.01 NG/ML (ref 0–4)

## 2018-07-12 LAB — TESTOST SERPL-MCNC: 14 NG/DL (ref 240–950)

## 2018-07-16 ENCOUNTER — PRE VISIT (OUTPATIENT)
Dept: UROLOGY | Facility: CLINIC | Age: 78
End: 2018-07-16

## 2018-07-16 NOTE — TELEPHONE ENCOUNTER
Reason for visit: Prostate cancer follow up     Relevant information: Pt's last Lupron injection was 1/26/18, prior auth for Lupron is needed.    Records/imaging/labs: PSA and testosterone available    Pt called: No need for a call    Rooming: Regular

## 2018-07-18 ENCOUNTER — OFFICE VISIT (OUTPATIENT)
Dept: UROLOGY | Facility: CLINIC | Age: 78
End: 2018-07-18
Payer: COMMERCIAL

## 2018-07-18 VITALS
DIASTOLIC BLOOD PRESSURE: 89 MMHG | HEART RATE: 65 BPM | BODY MASS INDEX: 28.56 KG/M2 | HEIGHT: 70 IN | SYSTOLIC BLOOD PRESSURE: 150 MMHG | WEIGHT: 199.5 LBS

## 2018-07-18 DIAGNOSIS — C61 MALIGNANT NEOPLASM OF PROSTATE (H): Primary | ICD-10-CM

## 2018-07-18 ASSESSMENT — PAIN SCALES - GENERAL: PAINLEVEL: NO PAIN (0)

## 2018-07-18 NOTE — PROGRESS NOTES
"Urology Clinic Note    Date: 7/18/2018  Time: 11:40 AM  Patient: Medardo Gautam  MRN: [id number]    HPI/Subjective: Medardo Gautam is a 78 year old MALE with urologic hx of [place] 9 prostate cancer s/p RP with salvage XRT and ADT with biochemical recurrence. Now on ADT, getting lupron about every 6 months. PSA has been undetectable for years. Most recent PSA 0.01 (7/10/2018), most recent testosterone 14. Feels well. Denies fever, chills, weight loss, bone pain.    Objective:  /89 (BP Location: Right arm, Patient Position: Sitting, Cuff Size: Adult Regular)  Pulse 65  Ht 1.778 m (5' 10\")  Wt 90.5 kg (199 lb 8 oz)  BMI 28.63 kg/m2  Gen: In NAD, conversant.  Resp: Breathing non-labored  Abd: Soft, non-distended, non-tender.  Ext: Warm and well perfused    PSA Ultra Sensitive:  7/10 0.01    Assessment & Plan: Medardo Gautam is a 78 year old MALE with history of [place] 9 cancer s/p RP, salvage XRT, ADT with BCR. On lupron.     - lupron today  - RTC 6 months with PSA and T prior    This patient was seen & discussed with Dr. Goodwin.    --    Lizzy Bonilla MD  Urology Resident        Patient seen and examined with the resident.  Visit time 15 minutes and >50% spent in counseling.  I agree with the resident's note and plan of care.       Norma Goodwin MD  Urology Staff    "

## 2018-07-18 NOTE — MR AVS SNAPSHOT
After Visit Summary   7/18/2018    Medardo Gautam    MRN: [id number]           Patient Information     Date Of Birth          1940        Visit Information        Provider Department      7/18/2018 12:00 PM Norma Goodwin MD [facility] Urology and [facility] for Prostate and Urologic Cancers        Today's Diagnoses     Malignant neoplasm of prostate (H)    -  1      Care Instructions    Schedule nurse in 1-2 weeks for Lupron injection.      Schedule 6 month follow up with PSA and testosterone prior to appointment      It was a pleasure meeting with you today.  Thank you for allowing me and my team the privilege of caring for you today.  YOU are the reason we are here, and I truly hope we provided you with the excellent service you deserve.  Please let us know if there is anything else we can do for you so that we can be sure you are leaving completely satisfied with your care experience.      Chase Beck MA           Follow-ups after your visit        Your next 10 appointments already scheduled     Jul 18, 2018 12:00 PM CDT   (Arrive by 11:45 AM)   Return Prostate Cancer with Norma Goodwin MD   [facility] Urology and [facility] for Prostate and Urologic Cancers ([facility])    [address]  4th Floor  [facility] [id number] [phone]            Apr 29, 2019 10:15 AM CDT   (Arrive by 10:00 AM)   Survivorship Visit with Magali Andrews PA-C   [facility] ([facility])    [address]  [address]  [facility] [id number] [phone]              Future tests that were ordered for you today     Open Future Orders        Priority Expected Expires Ordered    PSA tumor marker Routine  7/18/2019 7/18/2018    Testosterone total Routine  7/18/2019 7/18/2018            Who to contact     Please call your clinic at [phone] to:    Ask questions about your health    Make or cancel  "appointments    Discuss your medicines    Learn about your test results    Speak to your doctor            Additional Information About Your Visit        [employer] Information     [employer] gives you secure access to your electronic health record. If you see a primary care provider, you can also send messages to your care team and make appointments. If you have questions, please call your primary care clinic.  If you do not have a primary care provider, please call [phone] and they will assist you.      [employer] is an electronic gateway that provides easy, online access to your medical records. With [employer], you can request a clinic appointment, read your test results, renew a prescription or communicate with your care team.     To access your existing account, please contact your [facility] or call [phone] for assistance.        Care EveryWhere ID     This is your Care EveryWhere ID. This could be used by other organizations to access your [place] medical records  [id number]        Your Vitals Were     Pulse Height BMI (Body Mass Index)             65 1.778 m (5' 10\") 28.63 kg/m2          Blood Pressure from Last 3 Encounters:   07/18/18 150/89   05/25/18 130/82   04/30/18 147/90    Weight from Last 3 Encounters:   07/18/18 90.5 kg (199 lb 8 oz)   05/25/18 90.3 kg (199 lb)   04/30/18 91.7 kg (202 lb 3.2 oz)                 Today's Medication Changes          These changes are accurate as of 7/18/18 11:40 AM.  If you have any questions, ask your nurse or doctor.               Start taking these medicines.        Dose/Directions    leuprolide 45 MG kit   Commonly known as:  LUPRON DEPOT   Used for:  Malignant neoplasm of prostate (H)   Started by:  Norma Goodwin MD        Dose:  45 mg   Inject 45 mg into the muscle every 6 months   Quantity:  1 each   Refills:  0            Where to get your medicines      These medications were sent to [id number] - [place], " [place] - [address] [address]  [address] [facility] [address]     Phone:  [phone]     leuprolide 45 MG kit                Primary Care Provider Office Phone # Fax #    Verna Osborne [phone]       [address] [place] [address]  [facility] [address]        Equal Access to Services     RENEE CORONA : Hadii aad ku hadasho Soomaali, waaxda luqadaha, qaybta kaalmada adeegyada, waxrosalee gunterin hayaan adevielka lucasmitraen salamanca . So [facility] [phone].    ATENCIÓN: Si habla español, tiene a sanford disposición servicios gratuitos de asistencia lingüística. Llame al [phone].    We comply with applicable federal civil rights laws and [place] laws. We do not discriminate on the basis of race, color, national origin, age, disability, sex, sexual orientation, or gender identity.            Thank you!     Thank you for choosing [facility] UROLOGY AND [facility] FOR PROSTATE AND UROLOGIC CANCERS  for your care. Our goal is always to provide you with excellent care. Hearing back from our patients is one way we can continue to improve our services. Please take a few minutes to complete the written survey that you may receive in the mail after your visit with us. Thank you!             Your Updated Medication List - Protect others around you: Learn how to safely use, store and throw away your medicines at www.disposemymeds.org.          This list is accurate as of 7/18/18 11:40 AM.  Always use your most recent med list.                   Brand Name Dispense Instructions for use Diagnosis    aspirin 81 MG tablet     30 tablet    Take 1 tablet (81 mg) by mouth daily    Hyperlipidemia LDL goal <130, Malignant neoplasm of colon, unspecified part of colon (H)       azelastine 0.05 % Soln ophthalmic solution    OPTIVAR    1 Bottle    Apply 1 drop to eye 2 times daily    Allergic conjunctivitis, bilateral       bicalutamide 50 MG tablet    CASODEX    90 tablet    Take 1 tablet (50 mg) by mouth daily    Malignant neoplasm of prostate (H)        calcium-vitamin D 600-400 MG-UNIT per tablet    CALTRATE     Take 1 tablet by mouth daily    Hyperlipidemia LDL goal <130, Malignant neoplasm of colon, unspecified part of colon (H)       CLARITIN PO      Take  by mouth. As needed        clonazePAM 1 MG tablet    klonoPIN    30 tablet    Take 1 tablet (1 mg) by mouth nightly as needed for anxiety    Anxiety       folic acid-vit B6-vit B12 0.8-10-0.115 MG Tabs per tablet    FOLGARD     Take 1 tablet by mouth daily    Hyperlipidemia LDL goal <130, Malignant neoplasm of colon, unspecified part of colon (H)       IBUPROFEN PO      Take 400 mg by mouth every 8 hours as needed for moderate pain        leuprolide 45 MG kit    LUPRON DEPOT    1 each    Inject 45 mg into the muscle every 6 months    Malignant neoplasm of prostate (H)       lisinopril 10 MG tablet    PRINIVIL/ZESTRIL    90 tablet    Take 1 tablet (10 mg) by mouth daily    Benign essential hypertension       naproxen 500 MG tablet    NAPROSYN    60 tablet    TAKE ONE TABLET BY MOUTH TWICE DAILY AS NEEDED FOR  MODERATE  PAIN    Wrist sprain, right, initial encounter       OMEGA-3 FISH OIL PO      Take 500 mg by mouth daily    Hyperlipidemia LDL goal <130, Malignant neoplasm of colon, unspecified part of colon (H)       sildenafil 100 MG tablet    VIAGRA    10 tablet    1/2 tab three times a week    Malignant neoplasm of prostate (H)       triamcinolone 0.1 % cream    KENALOG          zolpidem 5 MG tablet    AMBIEN    30 tablet    Take 1 tablet (5 mg) by mouth nightly as needed for sleep    Persistent disorder of initiating or maintaining sleep

## 2018-07-18 NOTE — PATIENT INSTRUCTIONS
Schedule nurse in 1-2 weeks for Lupron injection.      Schedule 6 month follow up with PSA and testosterone prior to appointment      It was a pleasure meeting with you today.  Thank you for allowing me and my team the privilege of caring for you today.  YOU are the reason we are here, and I truly hope we provided you with the excellent service you deserve.  Please let us know if there is anything else we can do for you so that we can be sure you are leaving completely satisfied with your care experience.      Chase Beck MA

## 2018-07-18 NOTE — LETTER
"7/18/2018       RE: Medardo Gautam  [address] [address] [address]     Dear Colleague,    Thank you for referring your patient, Medardo Gautam, to the [facility] UROLOGY AND INST FOR PROSTATE AND UROLOGIC CANCERS at [facility]. Please see a copy of my visit note below.    Urology Clinic Note    Date: 7/18/2018  Time: 11:40 AM  Patient: Medardo Gautam  MRN: [id number]    HPI/Subjective: Medardo Gautam is a 78 year old MALE with urologic hx of [place] 9 prostate cancer s/p RP with salvage XRT and ADT with biochemical recurrence. Now on ADT, getting lupron about every 6 months. PSA has been undetectable for years. Most recent PSA 0.01 (7/10/2018), most recent testosterone 14. Feels well. Denies fever, chills, weight loss, bone pain.    Objective:  /89 (BP Location: Right arm, Patient Position: Sitting, Cuff Size: Adult Regular)  Pulse 65  Ht 1.778 m (5' 10\")  Wt 90.5 kg (199 lb 8 oz)  BMI 28.63 kg/m2  Gen: In NAD, conversant.  Resp: Breathing non-labored  Abd: Soft, non-distended, non-tender.  Ext: Warm and well perfused    PSA Ultra Sensitive:  7/10 0.01    Assessment & Plan: Medardo Gautam is a 78 year old MALE with history of [place] 9 cancer s/p RP, salvage XRT, ADT with BCR. On lupron.     - lupron today  - RTC 6 months with PSA and T prior    This patient was seen & discussed with Dr. Goodwin.    --    Lizzy Bonilla MD  Urology Resident    Patient seen and examined with the resident.  Visit time 15 minutes and >50% spent in counseling.  I agree with the resident's note and plan of care.         Again, thank you for allowing me to participate in the care of your patient.      Sincerely,    Norma Goodwin MD      "

## 2018-07-25 DIAGNOSIS — G47.00 PERSISTENT DISORDER OF INITIATING OR MAINTAINING SLEEP: ICD-10-CM

## 2018-07-25 RX ORDER — ZOLPIDEM TARTRATE 5 MG/1
TABLET ORAL
Qty: 30 TABLET | Refills: 0 | Status: SHIPPED | OUTPATIENT
Start: 2018-07-25 | End: 2018-08-23

## 2018-07-25 NOTE — TELEPHONE ENCOUNTER
Last script was actually written 1/9/18.     [place]:  reviewed- no concerns. Last filled #30 6/24/18, 5/24/18, 4/26/18 by RK.     LOV 5/25/18 with RK.     Patient is going out of town and would like to  today - routing to provider pool.     Laura Blevins, RN, BSN

## 2018-07-25 NOTE — TELEPHONE ENCOUNTER
Ambien      Last Written Prescription Date:  2/9/2018  Last Fill Quantity: 30,   # refills: 5  Last Office Visit: 4/17/2018  Future Office visit:       Routing refill request to provider for review/approval because:  Drug not on the [facility], UMP or  Health refill protocol or controlled substance    Kristy PETRA Hidalgo, BSN

## 2018-07-31 ENCOUNTER — ALLIED HEALTH/NURSE VISIT (OUTPATIENT)
Dept: UROLOGY | Facility: CLINIC | Age: 78
End: 2018-07-31
Payer: COMMERCIAL

## 2018-07-31 DIAGNOSIS — C61 PROSTATE CANCER (H): ICD-10-CM

## 2018-07-31 DIAGNOSIS — C61 MALIGNANT NEOPLASM OF PROSTATE (H): Primary | ICD-10-CM

## 2018-07-31 NOTE — MR AVS SNAPSHOT
After Visit Summary   7/31/2018    Medardo Gautam    MRN: [id number]           Patient Information     Date Of Birth          1940        Visit Information        Provider Department      7/31/2018 10:20 AM Nurse, [facility] for Prostate and Urologic Cancers        Today's Diagnoses     Malignant neoplasm of prostate (H)    -  1    Prostate cancer- [place] 9 (5+4) dx Feb 2012          Care Instructions    You can have your next Lupron 45 mg injection anytime after 1/15/19.              Follow-ups after your visit        Your next 10 appointments already scheduled     Jan 16, 2019 10:20 AM CST   (Arrive by 10:05 AM)   Return Prostate Cancer with Norma Goodwin MD   [facility] Prostate and Urologic Cancers ([facility])    [address] [place] [address]  4th Floor  [facility] [id number] [phone]            Apr 29, 2019 10:15 AM CDT   (Arrive by 10:00 AM)   Survivorship Visit with Magali Andrews PA-C   [facility] ([facility])    [address] [place] [address]  [address]  [facility] [address] [phone]              Who to contact     Please call your clinic at [phone] to:    Ask questions about your health    Make or cancel appointments    Discuss your medicines    Learn about your test results    Speak to your doctor            Additional Information About Your Visit        MyChart Information     [employer] gives you secure access to your electronic health record. If you see a primary care provider, you can also send messages to your care team and make appointments. If you have questions, please call your primary care clinic.  If you do not have a primary care provider, please call [phone] and they will assist you.      [employer] is an electronic gateway that provides easy, online access to your medical records. With [employer], you can request a clinic appointment,  read your test results, renew a prescription or communicate with your care team.     To access your existing account, please contact your [facility] or call [phone] for assistance.        Care EveryWhere ID     This is your Care EveryWhere ID. This could be used by other organizations to access your [place] medical records  [id number]         Blood Pressure from Last 3 Encounters:   07/18/18 150/89   05/25/18 130/82   04/30/18 147/90    Weight from Last 3 Encounters:   07/18/18 90.5 kg (199 lb 8 oz)   05/25/18 90.3 kg (199 lb)   04/30/18 91.7 kg (202 lb 3.2 oz)              We Performed the Following     CHEMO ADMIN SQ/IM        Primary Care Provider Office Phone # Fax #    Verna Osborne [phone]       [address] [facility] [address]  [facility] [address]        Equal Access to Services     VALERIA [facility] : Hadii aad ku hadasho Soshamika, waaxda luqadaha, qaybta kaalmada adevielkayada, randy salamanca . So [facility] [phone].    ATENCIÓN: Si habla español, tiene a sanford disposición servicios gratuitos de asistencia lingüística. Lukas al [phone].    We comply with applicable federal civil rights laws and [place] laws. We do not discriminate on the basis of race, color, national origin, age, disability, sex, sexual orientation, or gender identity.            Thank you!     Thank you for choosing [facility] AND [facility] FOR PROSTATE AND UROLOGIC CANCERS  for your care. Our goal is always to provide you with excellent care. Hearing back from our patients is one way we can continue to improve our services. Please take a few minutes to complete the written survey that you may receive in the mail after your visit with us. Thank you!             Your Updated Medication List - Protect others around you: Learn how to safely use, store and throw away your medicines at www.disposemymeds.org.          This list is accurate as of 7/31/18 11:30 AM.  Always  use your most recent med list.                   Brand Name Dispense Instructions for use Diagnosis    aspirin 81 MG tablet     30 tablet    Take 1 tablet (81 mg) by mouth daily    Hyperlipidemia LDL goal <130, Malignant neoplasm of colon, unspecified part of colon (H)       azelastine 0.05 % Soln ophthalmic solution    OPTIVAR    1 Bottle    Apply 1 drop to eye 2 times daily    Allergic conjunctivitis, bilateral       bicalutamide 50 MG tablet    CASODEX    90 tablet    Take 1 tablet (50 mg) by mouth daily    Malignant neoplasm of prostate (H)       calcium-vitamin D 600-400 MG-UNIT per tablet    CALTRATE     Take 1 tablet by mouth daily    Hyperlipidemia LDL goal <130, Malignant neoplasm of colon, unspecified part of colon (H)       CLARITIN PO      Take  by mouth. As needed        clonazePAM 1 MG tablet    klonoPIN    30 tablet    Take 1 tablet (1 mg) by mouth nightly as needed for anxiety    Anxiety       folic acid-vit B6-vit B12 0.8-10-0.115 MG Tabs per tablet    FOLGARD     Take 1 tablet by mouth daily    Hyperlipidemia LDL goal <130, Malignant neoplasm of colon, unspecified part of colon (H)       IBUPROFEN PO      Take 400 mg by mouth every 8 hours as needed for moderate pain        leuprolide 45 MG kit    LUPRON DEPOT    1 each    Inject 45 mg into the muscle every 6 months    Malignant neoplasm of prostate (H)       lisinopril 10 MG tablet    PRINIVIL/ZESTRIL    90 tablet    Take 1 tablet (10 mg) by mouth daily    Benign essential hypertension       naproxen 500 MG tablet    NAPROSYN    60 tablet    TAKE ONE TABLET BY MOUTH TWICE DAILY AS NEEDED FOR  MODERATE  PAIN    Wrist sprain, right, initial encounter       OMEGA-3 FISH OIL PO      Take 500 mg by mouth daily    Hyperlipidemia LDL goal <130, Malignant neoplasm of colon, unspecified part of colon (H)       sildenafil 100 MG tablet    VIAGRA    10 tablet    1/2 tab three times a week    Malignant neoplasm of prostate (H)       triamcinolone 0.1 % cream     KENALOG          zolpidem 5 MG tablet    AMBIEN    30 tablet    TAKE ONE TABLET BY MOUTH AT BEDTIME AS NEEDED FOR SLEEP    Persistent disorder of initiating or maintaining sleep

## 2018-07-31 NOTE — PROGRESS NOTES
Medardo Gautam comes into clinic today at the request of Dr. Goodwin Ordering Provider for a 6 month Lupron injeciton.    The following medication was given:     MEDICATION: Lupron Depot 45 mg  ROUTE: IM  SITE: LUQ - Gluteus  DOSE: 45 mg  LOT #:   :  [employer]  EXPIRATION DATE:    NDC#:      This service provided today was under the supervising provider of the day Dr. Casanova, who was available if needed.    Packaging was disposed of before I was able to document Lot#, NDC, and Exp.     Chioma Ortiz

## 2018-08-23 ENCOUNTER — TELEPHONE (OUTPATIENT)
Dept: FAMILY MEDICINE | Facility: OTHER | Age: 78
End: 2018-08-23

## 2018-08-23 DIAGNOSIS — G47.00 PERSISTENT DISORDER OF INITIATING OR MAINTAINING SLEEP: ICD-10-CM

## 2018-08-23 RX ORDER — ZOLPIDEM TARTRATE 5 MG/1
5 TABLET ORAL
Qty: 30 TABLET | Refills: 3 | Status: SHIPPED | OUTPATIENT
Start: 2018-08-23 | End: 2019-01-09 | Stop reason: DRUGHIGH

## 2018-08-23 NOTE — TELEPHONE ENCOUNTER
Reason for Call:  Other prescription    Detailed comments: Patient is looking at having his Ambien refilled a little sooner only due to he will be leaving out of town ( unknown when) due to he will be having a grand baby born. He is hoping for real soon since his  daughter is 40 wks now     Phone Number Patient can be reached at: Cell number on file:    Telephone Information:   Mobile [phone]       Best Time: any    Can we leave a detailed message on this number? YES    Call taken on 8/23/2018 at 10:27 AM by Dora Patel

## 2018-08-23 NOTE — TELEPHONE ENCOUNTER
Ambien  Routing refill request to provider for review/approval because:  Drug not on the [facility] refill protocol     Laura Blevins, RN, BSN

## 2018-09-07 ENCOUNTER — SURGERY (OUTPATIENT)
Age: 78
End: 2018-09-07

## 2018-09-07 ENCOUNTER — HOSPITAL ENCOUNTER (OUTPATIENT)
Facility: AMBULATORY SURGERY CENTER | Age: 78
Discharge: HOME | End: 2018-09-07
Attending: FAMILY MEDICINE | Admitting: FAMILY MEDICINE
Payer: COMMERCIAL

## 2018-09-07 VITALS
SYSTOLIC BLOOD PRESSURE: 147 MMHG | BODY MASS INDEX: 28.56 KG/M2 | TEMPERATURE: 96.6 F | DIASTOLIC BLOOD PRESSURE: 66 MMHG | WEIGHT: 199.52 LBS | HEIGHT: 70 IN | OXYGEN SATURATION: 96 % | RESPIRATION RATE: 18 BRPM

## 2018-09-07 LAB — COLONOSCOPY: NORMAL

## 2018-09-07 PROCEDURE — G0121 COLON CA SCRN NOT HI RSK IND: HCPCS

## 2018-09-07 PROCEDURE — G8907 PT DOC NO EVENTS ON DISCHARG: HCPCS

## 2018-09-07 PROCEDURE — G0105 COLORECTAL SCRN; HI RISK IND: HCPCS | Performed by: FAMILY MEDICINE

## 2018-09-07 PROCEDURE — 99152 MOD SED SAME PHYS/QHP 5/>YRS: CPT | Mod: 59 | Performed by: FAMILY MEDICINE

## 2018-09-07 PROCEDURE — G8918 PT W/O PREOP ORDER IV AB PRO: HCPCS

## 2018-09-07 RX ORDER — FENTANYL CITRATE 50 UG/ML
INJECTION, SOLUTION INTRAMUSCULAR; INTRAVENOUS PRN
Status: DISCONTINUED | OUTPATIENT
Start: 2018-09-07 | End: 2018-09-07 | Stop reason: HOSPADM

## 2018-09-07 RX ORDER — LIDOCAINE 40 MG/G
CREAM TOPICAL
Status: DISCONTINUED | OUTPATIENT
Start: 2018-09-07 | End: 2018-09-08 | Stop reason: HOSPADM

## 2018-09-07 RX ORDER — ONDANSETRON 2 MG/ML
4 INJECTION INTRAMUSCULAR; INTRAVENOUS
Status: DISCONTINUED | OUTPATIENT
Start: 2018-09-07 | End: 2018-09-08 | Stop reason: HOSPADM

## 2018-09-07 RX ADMIN — FENTANYL CITRATE 100 MCG: 50 INJECTION, SOLUTION INTRAMUSCULAR; INTRAVENOUS at 09:36

## 2018-10-08 NOTE — PROGRESS NOTES
SUBJECTIVE:   Medardo Gautam is a 78 year old male who presents for Preventive Visit.      Are you in the first 12 months of your Medicare coverage?  No    Physical   Annual:     Getting at least 3 servings of Calcium per day:  Yes    Bi-annual eye exam:  Yes    Dental care twice a year:  Yes    Sleep apnea or symptoms of sleep apnea:  None    Diet:  Regular (no restrictions)    Frequency of exercise:  None    Taking medications regularly:  Yes    Medication side effects:  Not applicable    Additional concerns today:  No    Ability to successfully perform activities of daily living: no assistance needed    Home Safety:  No safety concerns identified    Hearing Impairment: no hearing concerns         COGNITIVE SCREEN  1) Repeat 3 items (Leader, Season, Table)    2) Clock draw: NORMAL  3) 3 item recall: Recalls 3 objects  Results: 3 items recalled: COGNITIVE IMPAIRMENT LESS LIKELY    Mini-CogTM Copyright S [employer]. Licensed by the author for use in [facility]; reprinted with permission (charlie@[facility]). All rights reserved.        Reviewed and updated as needed this visit by clinical staff         Reviewed and updated as needed this visit by Provider        Social History   Substance Use Topics     Smoking status: Former Smoker     Years: 1.00     Types: Cigarettes, Cigars, Pipe     Start date: 10/1/1961     Quit date: 1/1/1962     Smokeless tobacco: Never Used      Comment: No smokers in home     Alcohol use 0.0 oz/week      Comment: daily glass of wine and whiskey       No flowsheet data found.            Today's PHQ-2 Score:   PHQ-2 ( 1999 [employer]) 4/30/2018   Q1: Little interest or pleasure in doing things 0   Q2: Feeling down, depressed or hopeless 0   PHQ-2 Score 0   Q1: Little interest or pleasure in doing things -   Q2: Feeling down, depressed or hopeless -   PHQ-2 Score -       Do you feel safe in your environment - Yes    Do you have a Health Care Directive?: Yes: Patient states has Advance  Directive and will bring in a copy to clinic.    Current providers sharing in care for this patient include:   Patient Care Team:  Verna Osborne MD as PCP - General (Family Practice)  Charlotte Castellanos MD as MD (Internal Medicine)  Melba Rousseau, RN as Clinic Care Coordinator (Colon and Rectal Surgery)  Ramon Daily MD as MD (Oncology)  Iesha Keller, RN as Nurse Coordinator (Oncology)    The following health maintenance items are reviewed in Epic and correct as of today:  Health Maintenance   Topic Date Due     ADVANCE DIRECTIVE PLANNING Q5 YRS  12/15/2016     INFLUENZA VACCINE (1) 09/01/2018     BMP Q1 YR  01/09/2019     LIPID MONITORING Q1 YEAR  01/09/2019     FALL RISK ASSESSMENT  04/30/2019     PHQ-2 Q1 YR  04/30/2019     TETANUS IMMUNIZATION (SYSTEM ASSIGNED)  10/27/2019     COLONOSCOPY Q3 YR  09/07/2021     PNEUMOCOCCAL  Completed     Labs reviewed in EPIC  BP Readings from Last 3 Encounters:   10/09/18 144/72   09/07/18 147/66   07/18/18 150/89    Wt Readings from Last 3 Encounters:   10/09/18 203 lb (92.1 kg)   08/31/18 199 lb 8.3 oz (90.5 kg)   07/18/18 199 lb 8 oz (90.5 kg)                  Patient Active Problem List   Diagnosis     Disturbance of skin sensation     Hemorrhoids     Gout     Advanced directives, counseling/discussion     Dupuytren's contracture of hand- left 5th finger, right 5th finger     Bunions- both feet     Skin lesion- R side of face and behind L ear     Insomnia     Prostatic nodule- posterior right edge with rectal exam     Prostate cancer- [place] 9 (5+4) dx Feb 2012     Elevated liver enzymes     Hyperbilirubinemia     Essential hypertension with goal blood pressure less than 140/90     Contracture of finger joint     Hyperlipidemia LDL goal <130     Malignant neoplasm of prostate (H)     Anxiety     Colon cancer (H)     Abdominal pain, epigastric     Renal cyst     Lumbar radiculopathy     Meibomian gland dysfunction     Pseudophakia of right eye     Macular  degeneration     Dermatochalasis of eyelid     Olecranon bursitis of right elbow     Past Surgical History:   Procedure Laterality Date     APPENDECTOMY       BIOPSY  01/16/15     C HAND/FINGER SURGERY UNLISTED       C LENGTHEN,TENDON,HAND/FINGER  ca 2007    right fifth     C STOMACH SURGERY PROCEDURE UNLISTED       CATARACT IOL, RT/LT Left 02/15/2018     COLON SURGERY  2/11/2015    Lap assisted R hemicolectomy     COLONOSCOPY  10/25/07    Snare polypectomy     COLONOSCOPY  6/25/2009    with snare polypectomy     COLONOSCOPY  9/30/2009     COLONOSCOPY  1/5/2011    COLONOSCOPY performed by JUD MARIEE at  [facility]     COLONOSCOPY N/A 1/16/2015    Procedure: COMBINED COLONOSCOPY, SINGLE OR MULTIPLE BIOPSY/POLYPECTOMY BY BIOPSY;  Surgeon: Sarah Beth Pisano MD;  Location: [facility] OR     COLONOSCOPY WITH CO2 INSUFFLATION N/A 1/16/2015    Procedure: COLONOSCOPY WITH CO2 INSUFFLATION;  Surgeon: Sarah Beth Pisano MD;  Location: [facility] OR     COLONOSCOPY WITH CO2 INSUFFLATION N/A 9/7/2018    Procedure: COLONOSCOPY WITH CO2 INSUFFLATION;  C18.9 (ICD-10-CM) - Malignant neoplasm of colon, unspecified part of colon (H)  [facility] fax# [phone]  BMI 26.29  [employer]  Referred by dr thomas;  Surgeon: Sarah Beth Pisano MD;  Location: [facility] OR     DAVINCI PROSTATECTOMY  8/6/2012    Procedure: DAVINCI PROSTATECTOMY;  Davinci Assisted Radical Prostatectomy with Bilateral Lymphadenectomy ;  Surgeon: Norma Goodwin MD;  Location: [facility] OR     GENITOURINARY SURGERY      prostate surgery     INJECT EPIDURAL LUMBAR / SACRAL SINGLE Left 10/30/2017    Procedure: INJECT EPIDURAL LUMBAR / SACRAL SINGLE;  Left Transforaminal Lumbar 4-Lumbar 5 Epidural Steroid Injection;  Surgeon: Nuvia Reina MD;  [facility]: [facility] OR     LAPAROSCOPIC ASSISTED COLECTOMY N/A 2/11/2015    Procedure: LAPAROSCOPIC ASSISTED COLECTOMY;  Surgeon: Oren Breen MD;  Location: [facility] OR     PHACOEMULSIFICATION CLEAR CORNEA WITH STANDARD INTRAOCULAR  LENS IMPLANT Left 2/15/2018    Procedure: PHACOEMULSIFICATION CLEAR CORNEA WITH STANDARD INTRAOCULAR LENS IMPLANT;  LEFT EYE CATARACT EXTRACTION WITH STANDARD INTRAOCULAR LENS IMPLANT ;  Surgeon: Brandon Orellana MD;  Location: [facility]     PHACOEMULSIFICATION CLEAR CORNEA WITH STANDARD INTRAOCULAR LENS IMPLANT Right 3/1/2018    Procedure: PHACOEMULSIFICATION CLEAR CORNEA WITH STANDARD INTRAOCULAR LENS IMPLANT;  RIGHT EYE CATARACT EXTRACTION WITH STANDARD INTRAOCULAR LENS IMPLANT ;  Surgeon: Brandon Orellana MD;  Location: [facility]       Social History   Substance Use Topics     Smoking status: Former Smoker     Years: 1.00     Types: Cigarettes, Cigars, Pipe     Start date: 10/1/1961     Quit date: 1962     Smokeless tobacco: Never Used      Comment: No smokers in home     Alcohol use 0.0 oz/week      Comment: daily glass of wine and whiskey     Family History   Problem Relation Age of Onset     Cerebrovascular Disease Father      Cancer Mother 90     Patient believes vaginal cancer - hysterectomy,      Alzheimer Disease Mother      Cancer Sister      Breast Cancer Sister      C.A.D. No family hx of      Cancer - colorectal No family hx of      Prostate Cancer No family hx of      Blood Disease No family hx of      Cardiovascular No family hx of      Circulatory No family hx of      Eye Disorder No family hx of      GASTROINTESTINAL DISEASE No family hx of      Genitourinary Problems No family hx of      Lipids No family hx of      Neurologic Disorder No family hx of      Respiratory No family hx of      Asthma No family hx of      HEART DISEASE No family hx of      Diabetes No family hx of      Hypertension No family hx of      Arthritis No family hx of      Thyroid Disease No family hx of      Musculoskeletal Disorder No family hx of      Glaucoma No family hx of      Macular Degeneration No family hx of          Current Outpatient Prescriptions   Medication Sig Dispense Refill     aspirin  81 MG tablet Take 1 tablet (81 mg) by mouth daily 30 tablet      azelastine (OPTIVAR) 0.05 % SOLN ophthalmic solution Apply 1 drop to eye 2 times daily 1 Bottle 6     bicalutamide (CASODEX) 50 MG tablet Take 1 tablet (50 mg) by mouth daily 90 tablet 3     calcium-vitamin D (CALTRATE) 600-400 MG-UNIT per tablet Take 1 tablet by mouth daily       clonazePAM (KLONOPIN) 1 MG tablet Take 1 tablet (1 mg) by mouth nightly as needed for anxiety 30 tablet 5     folic acid-vit B6-vit B12 (FOLGARD) 0.8-10-0.115 MG TABS Take 1 tablet by mouth daily       IBUPROFEN PO Take 400 mg by mouth every 8 hours as needed for moderate pain       leuprolide (LUPRON DEPOT) 45 MG kit Inject 45 mg into the muscle every 6 months 1 each 0     lisinopril (PRINIVIL,ZESTRIL) 30 MG tablet Take 1 tablet (30 mg) by mouth daily 90 tablet 0     Loratadine (CLARITIN PO) Take  by mouth. As needed        naproxen (NAPROSYN) 500 MG tablet TAKE ONE TABLET BY MOUTH TWICE DAILY AS NEEDED FOR  MODERATE  PAIN 60 tablet 1     Omega-3 Fatty Acids (OMEGA-3 FISH OIL PO) Take 500 mg by mouth daily       sildenafil (VIAGRA) 100 MG tablet 1/2 tab three times a week 10 tablet 12     triamcinolone (KENALOG) 0.1 % cream        zolpidem (AMBIEN) 5 MG tablet Take 1 tablet (5 mg) by mouth nightly as needed for sleep 30 tablet 3     No Known Allergies  Recent Labs   Lab Test  01/09/18   1345  09/07/17   0951  03/18/17   1639  11/16/16   0851   10/26/15   0956   10/30/14   1043   A1C   --   5.7   --    --    --    --    --   6.0   LDL  115*   --    --   134*   --   127   --    --    HDL  83   --    --   91   --   87   --    --    TRIG  176*   --    --   102   --   94   --    --    ALT  81*  60  53   --    < >  56   < >   --    CR  0.78  1.02  0.93   --    < >  0.77   < >   --    GFRESTIMATED  >90  71  80   --    < >  >90  Non  GFR Calc     < >   --    GFRESTBLACK  >90  86  >90   GFR Calc     --    < >  >90   GFR Calc     < >    "--    POTASSIUM  5.2  4.6  4.2   --    < >  4.4   < >   --     < > = values in this interval not displayed.            Review of Systems   Constitutional: Negative for chills.   HENT: Negative for congestion and ear pain.    Eyes: Negative for pain.   Respiratory: Negative for cough.    Cardiovascular: Negative for chest pain.   Gastrointestinal: Negative for abdominal pain, constipation, diarrhea and hematochezia.   Genitourinary: Negative for hematuria.   Neurological: Negative for dizziness.     Constitutional, HEENT, cardiovascular, pulmonary, GI, , musculoskeletal, neuro, skin, endocrine and psych systems are negative, except as otherwise noted.    OBJECTIVE:   There were no vitals taken for this visit. Estimated body mass index is 28.63 kg/(m^2) as calculated from the following:    Height as of 8/31/18: 5' 10\" (1.778 m).    Weight as of 8/31/18: 199 lb 8.3 oz (90.5 kg).  Physical Exam   Constitutional: He is oriented to person, place, and time. He appears well-developed and well-nourished. No distress.   HENT:   Right Ear: Tympanic membrane and external ear normal.   Left Ear: Tympanic membrane and external ear normal.   Nose: Nose normal.   Mouth/Throat: Oropharynx is clear and moist. No oral lesions. No oropharyngeal exudate.   Eyes: Conjunctivae are normal. Pupils are equal, round, and reactive to light. Right eye exhibits no discharge. Left eye exhibits no discharge.   Neck: Neck supple. No tracheal deviation present. No thyromegaly present.   Cardiovascular: Normal rate, regular rhythm, S1 normal, S2 normal, normal heart sounds and normal pulses.  Exam reveals no S3 and no S4.    No murmur heard.  Pulmonary/Chest: Effort normal and breath sounds normal. No respiratory distress. He has no wheezes. He has no rales.   Abdominal: Soft. Bowel sounds are normal. He exhibits no mass. There is no hepatosplenomegaly. There is no tenderness.   Musculoskeletal: Normal range of motion. He exhibits no edema or " deformity.   Lymphadenopathy:     He has no cervical adenopathy.   Neurological: He is alert and oriented to person, place, and time. He has normal strength and normal reflexes. He exhibits normal muscle tone.   Skin: Skin is warm and dry. No lesion and no rash noted.   Psychiatric: He has a normal mood and affect. His speech is normal. Judgment and thought content normal. Cognition and memory are normal.       ASSESSMENT / PLAN:     Problem List Items Addressed This Visit     Gout     No recent symptoms . Check uric acid. Not on meds currently         Relevant Orders    Uric acid    Insomnia     On ambien for sleep. Well controlled  Refill meds as needed         Essential hypertension with goal blood pressure less than 140/90     Worsening blood pressures  Increase dose of lisinopril to 30mg   Recheck in 3 months. He will come in 1 month to have BP checked at the pharmacy         Relevant Medications    lisinopril (PRINIVIL,ZESTRIL) 30 MG tablet    Hyperlipidemia LDL goal <130     Elevated ASCVD  But pt wishes to try diet and lifestyle as he has not tolerated medications in the past. Recheck in January. If still elevated , will consider low intensity statin then         Malignant neoplasm of prostate (H)     Follows . On lupron   every 6 months and and casodex every day          Anxiety     Well controlled on the current dose of klonopin  Refill meds         Relevant Medications    clonazePAM (KLONOPIN) 1 MG tablet    Colon cancer (H)     Repeat colonoscopy was done on October 2018. Clear. Needs repeat in 3 yrs         Macular degeneration     Dr. Venegas in [facility]           Other Visit Diagnoses     Encounter for routine adult health examination without abnormal findings    -  Primary    Need for prophylactic vaccination and inoculation against influenza        Relevant Orders    FLU VACCINE, INCREASED ANTIGEN, PRESV FREE, AGE 65+ [88871] (Completed)    Vaccine Administration, Initial [60857] (Completed)  "   Benign essential hypertension        Relevant Medications    lisinopril (PRINIVIL,ZESTRIL) 30 MG tablet    Persistent disorder of initiating or maintaining sleep        Transaminitis        Relevant Orders    Comprehensive metabolic panel (BMP + Alb, Alk Phos, ALT, AST, Total. Bili, TP)            End of Life Planning:  Patient currently has an advanced directive: No.  I have verified the patient's ablity to prepare an advanced directive/make health care decisions.  Literature was provided to assist patient in preparing an advanced directive.    COUNSELING:  Reviewed preventive health counseling, as reflected in patient instructions       Regular exercise       Healthy diet/nutrition       Vision screening    BP Readings from Last 1 Encounters:   09/07/18 147/66     Estimated body mass index is 28.63 kg/(m^2) as calculated from the following:    Height as of 8/31/18: 5' 10\" (1.778 m).    Weight as of 8/31/18: 199 lb 8.3 oz (90.5 kg).           reports that he quit smoking about 56 years ago. His smoking use included Cigarettes, Cigars, and Pipe. He started smoking about 57 years ago. He quit after 1.00 year of use. He has never used smokeless tobacco.      Appropriate preventive services were discussed with this patient, including applicable screening as appropriate for cardiovascular disease, diabetes, osteopenia/osteoporosis, and glaucoma.  As appropriate for age/gender, discussed screening for colorectal cancer, prostate cancer, breast cancer, and cervical cancer. Checklist reviewing preventive services available has been given to the patient.    Reviewed patients plan of care and provided an AVS. The Basic Care Plan (routine screening as documented in Health Maintenance) for Medardo meets the Care Plan requirement. This Care Plan has been established and reviewed with the Patient.    Counseling Resources:  ATP IV Guidelines  Pooled Cohorts Equation Calculator  Breast Cancer Risk Calculator  FRAX Risk " Assessment  ICSI Preventive Guidelines  Dietary Guidelines for Americans, 2010  USDA's MyPlate  ASA Prophylaxis  Lung CA Screening    Verna Osborne MD  [facility]  Answers for HPI/ROS submitted by the patient on 10/9/2018   PHQ-2 Score: 0

## 2018-10-09 ENCOUNTER — OFFICE VISIT (OUTPATIENT)
Dept: FAMILY MEDICINE | Facility: OTHER | Age: 78
End: 2018-10-09
Payer: COMMERCIAL

## 2018-10-09 VITALS
HEART RATE: 74 BPM | RESPIRATION RATE: 16 BRPM | BODY MASS INDEX: 29.06 KG/M2 | SYSTOLIC BLOOD PRESSURE: 144 MMHG | DIASTOLIC BLOOD PRESSURE: 72 MMHG | WEIGHT: 203 LBS | OXYGEN SATURATION: 97 % | TEMPERATURE: 97.5 F | HEIGHT: 70 IN

## 2018-10-09 DIAGNOSIS — Z00.00 ENCOUNTER FOR ROUTINE ADULT HEALTH EXAMINATION WITHOUT ABNORMAL FINDINGS: Primary | ICD-10-CM

## 2018-10-09 DIAGNOSIS — C61 MALIGNANT NEOPLASM OF PROSTATE (H): ICD-10-CM

## 2018-10-09 DIAGNOSIS — M1A.9XX0 CHRONIC GOUT WITHOUT TOPHUS, UNSPECIFIED CAUSE, UNSPECIFIED SITE: ICD-10-CM

## 2018-10-09 DIAGNOSIS — I10 BENIGN ESSENTIAL HYPERTENSION: ICD-10-CM

## 2018-10-09 DIAGNOSIS — C18.9 MALIGNANT NEOPLASM OF COLON, UNSPECIFIED PART OF COLON (H): ICD-10-CM

## 2018-10-09 DIAGNOSIS — I10 ESSENTIAL HYPERTENSION WITH GOAL BLOOD PRESSURE LESS THAN 140/90: ICD-10-CM

## 2018-10-09 DIAGNOSIS — G47.00 INSOMNIA, UNSPECIFIED TYPE: ICD-10-CM

## 2018-10-09 DIAGNOSIS — E78.5 HYPERLIPIDEMIA LDL GOAL <130: ICD-10-CM

## 2018-10-09 DIAGNOSIS — Z23 NEED FOR PROPHYLACTIC VACCINATION AND INOCULATION AGAINST INFLUENZA: ICD-10-CM

## 2018-10-09 DIAGNOSIS — G47.00 PERSISTENT DISORDER OF INITIATING OR MAINTAINING SLEEP: ICD-10-CM

## 2018-10-09 DIAGNOSIS — R74.01 TRANSAMINITIS: ICD-10-CM

## 2018-10-09 DIAGNOSIS — F41.9 ANXIETY: ICD-10-CM

## 2018-10-09 DIAGNOSIS — H35.30 MACULAR DEGENERATION, UNSPECIFIED LATERALITY, UNSPECIFIED TYPE: ICD-10-CM

## 2018-10-09 PROCEDURE — 99397 PER PM REEVAL EST PAT 65+ YR: CPT | Mod: 25 | Performed by: FAMILY MEDICINE

## 2018-10-09 PROCEDURE — G0008 ADMIN INFLUENZA VIRUS VAC: HCPCS | Performed by: FAMILY MEDICINE

## 2018-10-09 PROCEDURE — 90662 IIV NO PRSV INCREASED AG IM: CPT | Performed by: FAMILY MEDICINE

## 2018-10-09 RX ORDER — CLONAZEPAM 1 MG/1
1 TABLET ORAL
Qty: 30 TABLET | Refills: 5 | Status: SHIPPED | OUTPATIENT
Start: 2018-10-09 | End: 2019-05-06

## 2018-10-09 RX ORDER — LISINOPRIL 30 MG/1
30 TABLET ORAL DAILY
Qty: 90 TABLET | Refills: 0 | Status: SHIPPED | OUTPATIENT
Start: 2018-10-09 | End: 2019-01-13

## 2018-10-09 ASSESSMENT — ACTIVITIES OF DAILY LIVING (ADL)
I_NEED_ASSISTANCE_FOR_THE_FOLLOWING_DAILY_ACTIVITIES:: NO ASSISTANCE IS NEEDED
CURRENT_FUNCTION: NO ASSISTANCE NEEDED

## 2018-10-09 ASSESSMENT — ENCOUNTER SYMPTOMS
DIZZINESS: 0
EYE PAIN: 0
HEMATOCHEZIA: 0
COUGH: 0
ABDOMINAL PAIN: 0
CONSTIPATION: 0
CHILLS: 0
DIARRHEA: 0
HEMATURIA: 0

## 2018-10-09 ASSESSMENT — PAIN SCALES - GENERAL: PAINLEVEL: NO PAIN (0)

## 2018-10-09 NOTE — PROGRESS NOTES
Injectable Influenza Immunization Documentation    1.  Is the person to be vaccinated sick today?   No    2. Does the person to be vaccinated have an allergy to a component   of the vaccine?   No  Egg Allergy Algorithm Link    3. Has the person to be vaccinated ever had a serious reaction   to influenza vaccine in the past?   No    4. Has the person to be vaccinated ever had Guillain-Barré syndrome?   No    Form completed by Neva Go MA    Prior to injection verified patient identity using patient's name and date of birth.  Due to injection administration, patient instructed to remain in clinic for 15 minutes  afterwards, and to report any adverse reaction to me immediately.

## 2018-10-09 NOTE — MR AVS SNAPSHOT
After Visit Summary   10/9/2018    Medardo Gautam    MRN: [id number]           Patient Information     Date Of Birth          1940        Visit Information        Provider Department      10/9/2018 11:20 AM Verna Osborne MD [facility]        Today's Diagnoses     Need for prophylactic vaccination and inoculation against influenza    -  1    Benign essential hypertension        Persistent disorder of initiating or maintaining sleep        Insomnia, unspecified type        Malignant neoplasm of prostate (H)        Essential hypertension with goal blood pressure less than 140/90        Hyperlipidemia LDL goal <130        Chronic gout without tophus, unspecified cause, unspecified site        Macular degeneration, unspecified laterality, unspecified type        Malignant neoplasm of colon, unspecified part of colon (H)        Transaminitis        Anxiety           Follow-ups after your visit        Follow-up notes from your care team     Return in about 3 months (around 1/9/2019) for BP Recheck.      Your next 10 appointments already scheduled     Nov 06, 2018 10:00 AM CST   LAB with NL LAB [facility]   [facility] ([facility])    [address] [facility] [address]   [phone]           Please do not eat 10-12 hours before your appointment if you are coming in fasting for labs on lipids, cholesterol, or glucose (sugar). This does not apply to pregnant women. Water, hot tea and black coffee (with nothing added) are okay. Do not drink other fluids, diet soda or chew gum.            Jan 09, 2019 10:00 AM CST   Office Visit with Verna Osborne MD   [facility] ([facility])    [address] [address]   [phone]           Bring a current list of meds and any records pertaining to this visit. For Physicals, please bring immunization records and any forms needing to be filled out. Please arrive 10  minutes early to complete paperwork.            Jan 16, 2019 10:20 AM CST   (Arrive by 10:05 AM)   Return Prostate Cancer with Norma Goodwin MD   [facility] and Urologic Cancers ([facility])    [address]  4th [address]  [facility] [id number] [phone]            Apr 29, 2019 10:15 AM CDT   (Arrive by 10:00 AM)   Survivorship Visit with Magali Andrews PA-C   [facility] ([facility])    [address]  [address]  [facility] [id number] [phone]              Future tests that were ordered for you today     Open Future Orders        Priority Expected Expires Ordered    Uric acid Routine  10/9/2019 10/9/2018    Comprehensive metabolic panel (BMP + Alb, Alk Phos, ALT, AST, Total. Bili, TP) Routine  10/9/2019 10/9/2018            Who to contact     If you have questions or need follow up information about today's clinic visit or your schedule please contact [facility] directly at [phone].  Normal or non-critical lab and imaging results will be communicated to you by MyChart, letter or phone within 4 business days after the clinic has received the results. If you do not hear from us within 7 days, please contact the clinic through HemaSourcehart or phone. If you have a critical or abnormal lab result, we will notify you by phone as soon as possible.  Submit refill requests through WriteReader [employer] or call your pharmacy and they will forward the refill request to us. Please allow 3 business days for your refill to be completed.          Additional Information About Your Visit        Hema[employer] Information     [employer] gives you secure access to your electronic health record. If you see a primary care provider, you can also send messages to your care team and make appointments. If you have questions, please call your primary care clinic.  If you do not have a primary care provider, please  "call [phone] and they will assist you.        Care EveryWhere ID     This is your Care EveryWhere ID. This could be used by other organizations to access your [place] medical records  [id number]        Your Vitals Were     Pulse Temperature Respirations Height Pulse Oximetry BMI (Body Mass Index)    74 97.5  F (36.4  C) 16 5' 10\" (1.778 m) 97% 29.13 kg/m2       Blood Pressure from Last 3 Encounters:   10/09/18 144/72   09/07/18 147/66   07/18/18 150/89    Weight from Last 3 Encounters:   10/09/18 203 lb (92.1 kg)   08/31/18 199 lb 8.3 oz (90.5 kg)   07/18/18 199 lb 8 oz (90.5 kg)              We Performed the Following     FLU VACCINE, INCREASED ANTIGEN, PRESV FREE, AGE 65+ [40139]     Vaccine Administration, Initial [50459]          Today's Medication Changes          These changes are accurate as of 10/9/18 12:08 PM.  If you have any questions, ask your nurse or doctor.               These medicines have changed or have updated prescriptions.        Dose/Directions    lisinopril 30 MG tablet   Commonly known as:  PRINIVIL,ZESTRIL   This may have changed:    - medication strength  - how much to take   Used for:  Benign essential hypertension   Changed by:  Verna Osborne MD        Dose:  30 mg   Take 1 tablet (30 mg) by mouth daily   Quantity:  90 tablet   Refills:  0            Where to get your medicines      These medications were sent to [facility] [address] [place] [address] [facility]  [address] [facility] [address]     Phone:  [phone]     lisinopril 30 MG tablet         Some of these will need a paper prescription and others can be bought over the counter.  Ask your nurse if you have questions.     Bring a paper prescription for each of these medications     clonazePAM 1 MG tablet                Primary Care Provider Office Phone # Fax #    Verna Osborne [phone]       [address] [facility] [address]  [facility] [address]        Equal Access to Services     [facility] " GAAR : Hadii aad ku hadmary Socarolinaali, waaxda luqadaha, qaybta kaalmada adejaclyn, randy amada enrriquecasey lucasmitraen salamanca . So [facility] [phone].    ATENCIÓN: Si habla espjoaquin, tiene a sanford disposición servicios gratuitos de asistencia lingüística. Llame al [phone].    We comply with applicable federal civil rights laws and [place] laws. We do not discriminate on the basis of race, color, national origin, age, disability, sex, sexual orientation, or gender identity.            Thank you!     Thank you for choosing [facility]  for your care. Our goal is always to provide you with excellent care. Hearing back from our patients is one way we can continue to improve our services. Please take a few minutes to complete the written survey that you may receive in the mail after your visit with us. Thank you!             Your Updated Medication List - Protect others around you: Learn how to safely use, store and throw away your medicines at www.disposemymeds.org.          This list is accurate as of 10/9/18 12:08 PM.  Always use your most recent med list.                   Brand Name Dispense Instructions for use Diagnosis    aspirin 81 MG tablet     30 tablet    Take 1 tablet (81 mg) by mouth daily    Hyperlipidemia LDL goal <130, Malignant neoplasm of colon, unspecified part of colon (H)       azelastine 0.05 % ophthalmic solution    OPTIVAR    1 Bottle    Apply 1 drop to eye 2 times daily    Allergic conjunctivitis, bilateral       bicalutamide 50 MG tablet    CASODEX    90 tablet    Take 1 tablet (50 mg) by mouth daily    Malignant neoplasm of prostate (H)       calcium carbonate 600 mg-vitamin D 400 units 600-400 MG-UNIT per tablet    CALTRATE     Take 1 tablet by mouth daily    Hyperlipidemia LDL goal <130, Malignant neoplasm of colon, unspecified part of colon (H)       CLARITIN PO      Take  by mouth. As needed        clonazePAM 1 MG tablet    klonoPIN    30 tablet    Take 1 tablet (1 mg) by  mouth nightly as needed for anxiety    Anxiety       folic acid-vit B6-vit B12 0.8-10-0.115 MG Tabs per tablet    FOLGARD     Take 1 tablet by mouth daily    Hyperlipidemia LDL goal <130, Malignant neoplasm of colon, unspecified part of colon (H)       IBUPROFEN PO      Take 400 mg by mouth every 8 hours as needed for moderate pain        leuprolide 45 MG kit    LUPRON DEPOT    1 each    Inject 45 mg into the muscle every 6 months    Malignant neoplasm of prostate (H)       lisinopril 30 MG tablet    PRINIVIL,ZESTRIL    90 tablet    Take 1 tablet (30 mg) by mouth daily    Benign essential hypertension       naproxen 500 MG tablet    NAPROSYN    60 tablet    TAKE ONE TABLET BY MOUTH TWICE DAILY AS NEEDED FOR  MODERATE  PAIN    Wrist sprain, right, initial encounter       OMEGA-3 FISH OIL PO      Take 500 mg by mouth daily    Hyperlipidemia LDL goal <130, Malignant neoplasm of colon, unspecified part of colon (H)       sildenafil 100 MG tablet    VIAGRA    10 tablet    1/2 tab three times a week    Malignant neoplasm of prostate (H)       triamcinolone 0.1 % cream    KENALOG          zolpidem 5 MG tablet    AMBIEN    30 tablet    Take 1 tablet (5 mg) by mouth nightly as needed for sleep    Persistent disorder of initiating or maintaining sleep

## 2018-10-09 NOTE — ASSESSMENT & PLAN NOTE
Elevated ASCVD  But pt wishes to try diet and lifestyle as he has not tolerated medications in the past. Recheck in January. If still elevated , will consider low intensity statin then

## 2018-10-09 NOTE — ASSESSMENT & PLAN NOTE
Worsening blood pressures  Increase dose of lisinopril to 30mg   Recheck in 3 months. He will come in 1 month to have BP checked at the pharmacy

## 2018-10-14 ASSESSMENT — ENCOUNTER SYMPTOMS
HEMATOCHEZIA: 0
COUGH: 0
DIZZINESS: 0
CONSTIPATION: 0
CHILLS: 0
DIARRHEA: 0
EYE PAIN: 0
ABDOMINAL PAIN: 0
HEMATURIA: 0

## 2018-10-14 ASSESSMENT — ACTIVITIES OF DAILY LIVING (ADL): CURRENT_FUNCTION: NO ASSISTANCE NEEDED

## 2018-11-01 ENCOUNTER — ALLIED HEALTH/NURSE VISIT (OUTPATIENT)
Dept: FAMILY MEDICINE | Facility: OTHER | Age: 78
End: 2018-11-01
Payer: COMMERCIAL

## 2018-11-01 VITALS — DIASTOLIC BLOOD PRESSURE: 76 MMHG | SYSTOLIC BLOOD PRESSURE: 132 MMHG

## 2018-11-01 DIAGNOSIS — I10 ESSENTIAL HYPERTENSION WITH GOAL BLOOD PRESSURE LESS THAN 140/90: Primary | ICD-10-CM

## 2018-11-01 PROCEDURE — 99207 ZZC NO CHARGE NURSE ONLY: CPT | Performed by: FAMILY MEDICINE

## 2018-11-01 NOTE — PROGRESS NOTES
Medardo Gautam is enrolled/participating in the retail pharmacy Blood Pressure Goals Achievement Program (BPGAP).  Medardo Gautam was evaluated at [facility] on November 1, 2018 at which time his blood pressure was:    BP Readings from Last 3 Encounters:   11/01/18 132/76   10/09/18 144/72   09/07/18 147/66     Medardo Gautam is not experiencing symptoms.    Follow-Up: BP is at goal of < 140/90mmHg (patient 18+ years of age with or without diabetes).  Recommended follow-up at pharmacy in 6 months.     Recommendation to Provider:  none            This note completed by:     Brooks Driscoll, [place]  [facility]   [phone]

## 2018-11-01 NOTE — MR AVS SNAPSHOT
After Visit Summary   11/1/2018    Medardo Gautam    MRN: [id number]           Patient Information     Date Of Birth          1940        Visit Information        Provider Department      11/1/2018 4:18 PM Verna Osborne MD [facility]        Today's Diagnoses     Essential hypertension with goal blood pressure less than 140/90    -  1       Follow-ups after your visit        Your next 10 appointments already scheduled     Nov 06, 2018 10:00 AM CST   LAB with NL LAB [facility]   [facility] ([facility])    [address] [facility] [address]   [phone]           Please do not eat 10-12 hours before your appointment if you are coming in fasting for labs on lipids, cholesterol, or glucose (sugar). This does not apply to pregnant women. Water, hot tea and black coffee (with nothing added) are okay. Do not drink other fluids, diet soda or chew gum.            Nov 06, 2018 10:20 AM CST   Office Visit with Verna Osborne MD   [facility] ([facility]    [address] [place] [address]   [phone]           Bring a current list of meds and any records pertaining to this visit. For Physicals, please bring immunization records and any forms needing to be filled out. Please arrive 10 minutes early to complete paperwork.            Jan 09, 2019 10:00 AM CST   Office Visit with Verna Osborne MD   [facility] ([facility])    [address] [place] [address]   [phone]           Bring a current list of meds and any records pertaining to this visit. For Physicals, please bring immunization records and any forms needing to be filled out. Please arrive 10 minutes early to complete paperwork.            Jan 16, 2019 10:20 AM CST   (Arrive by 10:05 AM)   Return Prostate Cancer with Norma Goodwin MD   [facility] Prostate and Urologic  Cancers ([facility])    [address]  4th Floor  [facility] [address] [phone]            Apr 29, 2019 10:15 AM CDT   (Arrive by 10:00 AM)   Survivorship Visit with Magali Andrews PA-C   [facility] ([facility])    [address]  [address]  [facility] [address] [phone]              Who to contact     If you have questions or need follow up information about today's clinic visit or your schedule please contact [facility] directly at [phone].  Normal or non-critical lab and imaging results will be communicated to you by MyChart, letter or phone within 4 business days after the clinic has received the results. If you do not hear from us within 7 days, please contact the clinic through SUPENTAhart or phone. If you have a critical or abnormal lab result, we will notify you by phone as soon as possible.  Submit refill requests through uVore or call your pharmacy and they will forward the refill request to us. Please allow 3 business days for your refill to be completed.          Additional Information About Your Visit        [employer] Information     [employer] gives you secure access to your electronic health record. If you see a primary care provider, you can also send messages to your care team and make appointments. If you have questions, please call your primary care clinic.  If you do not have a primary care provider, please call [phone] and they will assist you.        Care EveryWhere ID     This is your Care EveryWhere ID. This could be used by other organizations to access your [facility] medical records  [id number]         Blood Pressure from Last 3 Encounters:   11/01/18 132/76   10/09/18 144/72   09/07/18 147/66    Weight from Last 3 Encounters:   10/09/18 203 lb (92.1 kg)   08/31/18 199 lb 8.3 oz (90.5 kg)   07/18/18 199 lb 8 oz (90.5 kg)              Today, you had the following      No orders found for display       Primary Care Provider Office Phone # Fax #    Verna Osborne [phone]       [address] [facility] [address]  [facility] [address]        Equal Access to Services     RENEE CORONA : Hadii aad ku hadgeorgebarak Naldo, wajose franciscoda luqadaha, qaybta kaalmada adejaclyn, randy amada enrriquecasey lucasmitraen spivey. So [facility] [phone].    ATENCIÓN: Si habla español, tiene a sanford disposición servicios gratuitos de asistencia lingüística. Llame al [phone].    We comply with applicable federal civil rights laws and [place] laws. We do not discriminate on the basis of race, color, national origin, age, disability, sex, sexual orientation, or gender identity.            Thank you!     Thank you for choosing [facility]  for your care. Our goal is always to provide you with excellent care. Hearing back from our patients is one way we can continue to improve our services. Please take a few minutes to complete the written survey that you may receive in the mail after your visit with us. Thank you!             Your Updated Medication List - Protect others around you: Learn how to safely use, store and throw away your medicines at www.disposemymeds.org.          This list is accurate as of 11/1/18  4:20 PM.  Always use your most recent med list.                   Brand Name Dispense Instructions for use Diagnosis    aspirin 81 MG tablet     30 tablet    Take 1 tablet (81 mg) by mouth daily    Hyperlipidemia LDL goal <130, Malignant neoplasm of colon, unspecified part of colon (H)       azelastine 0.05 % ophthalmic solution    OPTIVAR    1 Bottle    Apply 1 drop to eye 2 times daily    Allergic conjunctivitis, bilateral       bicalutamide 50 MG tablet    CASODEX    90 tablet    Take 1 tablet (50 mg) by mouth daily    Malignant neoplasm of prostate (H)       calcium carbonate 600 mg-vitamin D 400 units 600-400 MG-UNIT per tablet    CALTRATE     Take 1 tablet by mouth daily     Hyperlipidemia LDL goal <130, Malignant neoplasm of colon, unspecified part of colon (H)       CLARITIN PO      Take  by mouth. As needed        clonazePAM 1 MG tablet    klonoPIN    30 tablet    Take 1 tablet (1 mg) by mouth nightly as needed for anxiety    Anxiety       folic acid-vit B6-vit B12 0.8-10-0.115 MG Tabs per tablet    FOLGARD     Take 1 tablet by mouth daily    Hyperlipidemia LDL goal <130, Malignant neoplasm of colon, unspecified part of colon (H)       IBUPROFEN PO      Take 400 mg by mouth every 8 hours as needed for moderate pain        leuprolide 45 MG kit    LUPRON DEPOT    1 each    Inject 45 mg into the muscle every 6 months    Malignant neoplasm of prostate (H)       lisinopril 30 MG tablet    PRINIVIL,ZESTRIL    90 tablet    Take 1 tablet (30 mg) by mouth daily    Benign essential hypertension       naproxen 500 MG tablet    NAPROSYN    60 tablet    TAKE ONE TABLET BY MOUTH TWICE DAILY AS NEEDED FOR  MODERATE  PAIN    Wrist sprain, right, initial encounter       OMEGA-3 FISH OIL PO      Take 500 mg by mouth daily    Hyperlipidemia LDL goal <130, Malignant neoplasm of colon, unspecified part of colon (H)       sildenafil 100 MG tablet    VIAGRA    10 tablet    1/2 tab three times a week    Malignant neoplasm of prostate (H)       triamcinolone 0.1 % cream    KENALOG          zolpidem 5 MG tablet    AMBIEN    30 tablet    Take 1 tablet (5 mg) by mouth nightly as needed for sleep    Persistent disorder of initiating or maintaining sleep

## 2018-11-01 NOTE — PROGRESS NOTES
SUBJECTIVE:   Medardo Gautam is a 78 year old male who presents to clinic today for the following health issues:      HPI     Joint Pain    Onset: x 3 weeks    Description:   Location: right knee  Character: Sharp and Dull ache    Intensity: moderate, severe- at night time    Progression of Symptoms: same    Accompanying Signs & Symptoms:  Other symptoms: radiation of pain up back of leg    History:   Previous similar pain: no       Precipitating factors:   Trauma or overuse: no     Alleviating factors:  Improved by: nothing    Therapies Tried and outcome: Ice, Heat, Icy Hot      Problem list and histories reviewed & adjusted, as indicated.  Additional history: as documented        Patient Active Problem List   Diagnosis     Disturbance of skin sensation     Hemorrhoids     Gout     Advanced directives, counseling/discussion     Dupuytren's contracture of hand- left 5th finger, right 5th finger     Bunions- both feet     Skin lesion- R side of face and behind L ear     Insomnia     Prostatic nodule- posterior right edge with rectal exam     Prostate cancer- Jeancarlos 9 (5+4) dx Feb 2012     Elevated liver enzymes     Hyperbilirubinemia     Essential hypertension with goal blood pressure less than 140/90     Contracture of finger joint     Hyperlipidemia LDL goal <130     Malignant neoplasm of prostate (H)     Anxiety     Colon cancer (H)     Abdominal pain, epigastric     Renal cyst     Lumbar radiculopathy     Meibomian gland dysfunction     Pseudophakia of right eye     Macular degeneration     Dermatochalasis of eyelid     Olecranon bursitis of right elbow     Right knee pain     Past Surgical History:   Procedure Laterality Date     APPENDECTOMY       BIOPSY  01/16/15     C HAND/FINGER SURGERY UNLISTED       C LENGTHEN,TENDON,HAND/FINGER  ca 2007    right fifth     C STOMACH SURGERY PROCEDURE UNLISTED       CATARACT IOL, RT/LT Left 02/15/2018     COLON SURGERY  2/11/2015    Lap assisted R hemicolectomy      COLONOSCOPY  10/25/07    Snare polypectomy     COLONOSCOPY  6/25/2009    with snare polypectomy     COLONOSCOPY  9/30/2009     COLONOSCOPY  1/5/2011    COLONOSCOPY performed by JUD MARIEE at  GI     COLONOSCOPY N/A 1/16/2015    Procedure: COMBINED COLONOSCOPY, SINGLE OR MULTIPLE BIOPSY/POLYPECTOMY BY BIOPSY;  Surgeon: Sarah Beth Pisano MD;  Location: [facility] OR     COLONOSCOPY WITH CO2 INSUFFLATION N/A 1/16/2015    Procedure: COLONOSCOPY WITH CO2 INSUFFLATION;  Surgeon: Sarah Beth Pisano MD;  Location: [facility] OR     COLONOSCOPY WITH CO2 INSUFFLATION N/A 9/7/2018    Procedure: COLONOSCOPY WITH CO2 INSUFFLATION;  C18.9 (ICD-10-CM) - Malignant neoplasm of colon, unspecified part of colon (H)  [facility] fax# [phone]  BMI 26.29  [employer]  Referred by dr thomas;  Surgeon: Sarah Beth Pisano MD;  Location: [facility] OR     DAVINCI PROSTATECTOMY  8/6/2012    Procedure: DAVINCI PROSTATECTOMY;  Davinci Assisted Radical Prostatectomy with Bilateral Lymphadenectomy ;  Surgeon: Norma Goodwin MD;  Location: [facility]     GENITOURINARY SURGERY      prostate surgery     INJECT EPIDURAL LUMBAR / SACRAL SINGLE Left 10/30/2017    Procedure: INJECT EPIDURAL LUMBAR / SACRAL SINGLE;  Left Transforaminal Lumbar 4-Lumbar 5 Epidural Steroid Injection;  Surgeon: Nuvia Reina MD;  [facility]     LAPAROSCOPIC ASSISTED COLECTOMY N/A 2/11/2015    Procedure: LAPAROSCOPIC ASSISTED COLECTOMY;  Surgeon: Oren Breen MD;  Location: [facility]     PHACOEMULSIFICATION CLEAR CORNEA WITH STANDARD INTRAOCULAR LENS IMPLANT Left 2/15/2018    Procedure: PHACOEMULSIFICATION CLEAR CORNEA WITH STANDARD INTRAOCULAR LENS IMPLANT;  LEFT EYE CATARACT EXTRACTION WITH STANDARD INTRAOCULAR LENS IMPLANT ;  Surgeon: Brandon Orellana MD;  Location:  [facility]     PHACOEMULSIFICATION CLEAR CORNEA WITH STANDARD INTRAOCULAR LENS IMPLANT Right 3/1/2018    Procedure: PHACOEMULSIFICATION CLEAR CORNEA WITH STANDARD INTRAOCULAR LENS  IMPLANT;  RIGHT EYE CATARACT EXTRACTION WITH STANDARD INTRAOCULAR LENS IMPLANT ;  Surgeon: Brandon Orellana MD;  Location: [facility]       Social History   Substance Use Topics     Smoking status: Former Smoker     Years: 1.00     Types: Cigarettes, Cigars, Pipe     Start date: 10/1/1961     Quit date: 1962     Smokeless tobacco: Never Used      Comment: No smokers in home     Alcohol use 0.0 oz/week      Comment: daily glass of wine and whiskey     Family History   Problem Relation Age of Onset     Cerebrovascular Disease Father      Cancer Mother 90     Patient believes vaginal cancer - hysterectomy,      Alzheimer Disease Mother      Cancer Sister      Breast Cancer Sister      C.A.D. No family hx of      Cancer - colorectal No family hx of      Prostate Cancer No family hx of      Blood Disease No family hx of      Cardiovascular No family hx of      Circulatory No family hx of      Eye Disorder No family hx of      GASTROINTESTINAL DISEASE No family hx of      Genitourinary Problems No family hx of      Lipids No family hx of      Neurologic Disorder No family hx of      Respiratory No family hx of      Asthma No family hx of      HEART DISEASE No family hx of      Diabetes No family hx of      Hypertension No family hx of      Arthritis No family hx of      Thyroid Disease No family hx of      Musculoskeletal Disorder No family hx of      Glaucoma No family hx of      Macular Degeneration No family hx of          Current Outpatient Prescriptions   Medication Sig Dispense Refill     aspirin 81 MG tablet Take 1 tablet (81 mg) by mouth daily 30 tablet      azelastine (OPTIVAR) 0.05 % SOLN ophthalmic solution Apply 1 drop to eye 2 times daily 1 Bottle 6     bicalutamide (CASODEX) 50 MG tablet Take 1 tablet (50 mg) by mouth daily 90 tablet 3     calcium-vitamin D (CALTRATE) 600-400 MG-UNIT per tablet Take 1 tablet by mouth daily       clonazePAM (KLONOPIN) 1 MG tablet Take 1 tablet (1 mg) by mouth  nightly as needed for anxiety 30 tablet 5     folic acid-vit B6-vit B12 (FOLGARD) 0.8-10-0.115 MG TABS Take 1 tablet by mouth daily       IBUPROFEN PO Take 400 mg by mouth every 8 hours as needed for moderate pain       leuprolide (LUPRON DEPOT) 45 MG kit Inject 45 mg into the muscle every 6 months 1 each 0     lisinopril (PRINIVIL,ZESTRIL) 30 MG tablet Take 1 tablet (30 mg) by mouth daily 90 tablet 0     Loratadine (CLARITIN PO) Take  by mouth. As needed        naproxen (NAPROSYN) 500 MG tablet TAKE ONE TABLET BY MOUTH TWICE DAILY AS NEEDED FOR  MODERATE  PAIN 60 tablet 1     Omega-3 Fatty Acids (OMEGA-3 FISH OIL PO) Take 500 mg by mouth daily       sildenafil (VIAGRA) 100 MG tablet 1/2 tab three times a week 10 tablet 12     traZODone (DESYREL) 50 MG tablet Take 1-2 tablets ( mg) by mouth nightly as needed for sleep 30 tablet 1     triamcinolone (KENALOG) 0.1 % cream        zolpidem (AMBIEN) 5 MG tablet Take 1 tablet (5 mg) by mouth nightly as needed for sleep 30 tablet 3     No Known Allergies  Recent Labs   Lab Test  01/09/18   1345  09/07/17   0951  03/18/17   1639  11/16/16   0851   10/26/15   0956   10/30/14   1043   A1C   --   5.7   --    --    --    --    --   6.0   LDL  115*   --    --   134*   --   127   --    --    HDL  83   --    --   91   --   87   --    --    TRIG  176*   --    --   102   --   94   --    --    ALT  81*  60  53   --    < >  56   < >   --    CR  0.78  1.02  0.93   --    < >  0.77   < >   --    GFRESTIMATED  >90  71  80   --    < >  >90  Non  GFR Calc     < >   --    GFRESTBLACK  >90  86  >90   GFR Calc     --    < >  >90   GFR Calc     < >   --    POTASSIUM  5.2  4.6  4.2   --    < >  4.4   < >   --     < > = values in this interval not displayed.      BP Readings from Last 3 Encounters:   11/06/18 128/74   11/01/18 132/76   10/09/18 144/72    Wt Readings from Last 3 Encounters:   11/06/18 198 lb (89.8 kg)   10/09/18 203 lb (92.1 kg)    08/31/18 199 lb 8.3 oz (90.5 kg)                  Labs reviewed in EPIC    ROS:  Constitutional, HEENT, cardiovascular, pulmonary, gi and gu systems are negative, except as otherwise noted.    OBJECTIVE:     /74 (BP Location: Right arm, Patient Position: Chair, Cuff Size: Adult Regular)  Pulse 65  Temp 98  F (36.7  C) (Temporal)  Resp 16  Wt 198 lb (89.8 kg)  SpO2 98%  BMI 28.41 kg/m2  Body mass index is 28.41 kg/(m^2).   Physical Exam   Constitutional: He is oriented to person, place, and time. He appears well-developed.   HENT:   Head: Normocephalic and atraumatic.   Cardiovascular: Normal rate, regular rhythm, normal heart sounds and intact distal pulses.  Exam reveals no gallop.    No murmur heard.  Pulmonary/Chest: Effort normal and breath sounds normal.   Musculoskeletal: He exhibits tenderness. He exhibits no edema or deformity.   Neurological: He is alert and oriented to person, place, and time.   Psychiatric: He has a normal mood and affect.         Diagnostic Test Results:  none     ASSESSMENT/PLAN:     Problem List Items Addressed This Visit     Insomnia     Patient has been on Ambien for his sleep for years and has recently received a letter from his insurance company that this would not be covered starting January 2019 and was advised to try alternatives including trazodone or Belsomra.  I will send a small prescription of trazodone to see if this is helpful.  Follow-up in January         Relevant Medications    traZODone (DESYREL) 50 MG tablet    Right knee pain     Patient presents with right knee pain which started after he tried pressure washing his deck.  He has mild tenderness along the medial collateral ligament of the right knee.  No evident swelling .  Normal range of motion of the right knee.  Reassured patient.  Advised range of motion exercises, Tylenol /ibuprofen as needed for pain and swelling.  Discussed home care  Reportable signs and symptoms discussed  RTC if symptoms  persist or fail to improve                  Verna Osborne MD  [facility]

## 2018-11-06 ENCOUNTER — OFFICE VISIT (OUTPATIENT)
Dept: FAMILY MEDICINE | Facility: OTHER | Age: 78
End: 2018-11-06
Payer: COMMERCIAL

## 2018-11-06 ENCOUNTER — TELEPHONE (OUTPATIENT)
Dept: UROLOGY | Facility: CLINIC | Age: 78
End: 2018-11-06

## 2018-11-06 VITALS
RESPIRATION RATE: 16 BRPM | OXYGEN SATURATION: 98 % | HEART RATE: 65 BPM | DIASTOLIC BLOOD PRESSURE: 74 MMHG | TEMPERATURE: 98 F | WEIGHT: 198 LBS | BODY MASS INDEX: 28.41 KG/M2 | SYSTOLIC BLOOD PRESSURE: 128 MMHG

## 2018-11-06 DIAGNOSIS — M25.561 ACUTE PAIN OF RIGHT KNEE: ICD-10-CM

## 2018-11-06 DIAGNOSIS — M1A.9XX0 CHRONIC GOUT WITHOUT TOPHUS, UNSPECIFIED CAUSE, UNSPECIFIED SITE: ICD-10-CM

## 2018-11-06 DIAGNOSIS — R74.01 TRANSAMINITIS: ICD-10-CM

## 2018-11-06 DIAGNOSIS — G47.00 INSOMNIA, UNSPECIFIED TYPE: ICD-10-CM

## 2018-11-06 LAB
ALBUMIN SERPL-MCNC: 4.2 G/DL (ref 3.4–5)
ALP SERPL-CCNC: 69 U/L (ref 40–150)
ALT SERPL W P-5'-P-CCNC: 46 U/L (ref 0–70)
ANION GAP SERPL CALCULATED.3IONS-SCNC: 11 MMOL/L (ref 3–14)
AST SERPL W P-5'-P-CCNC: 42 U/L (ref 0–45)
BILIRUB SERPL-MCNC: 0.8 MG/DL (ref 0.2–1.3)
BUN SERPL-MCNC: 16 MG/DL (ref 7–30)
CALCIUM SERPL-MCNC: 9.2 MG/DL (ref 8.5–10.1)
CHLORIDE SERPL-SCNC: 109 MMOL/L (ref 94–109)
CO2 SERPL-SCNC: 23 MMOL/L (ref 20–32)
CREAT SERPL-MCNC: 0.89 MG/DL (ref 0.66–1.25)
GFR SERPL CREATININE-BSD FRML MDRD: 83 ML/MIN/1.7M2
GLUCOSE SERPL-MCNC: 127 MG/DL (ref 70–99)
POTASSIUM SERPL-SCNC: 4.1 MMOL/L (ref 3.4–5.3)
PROT SERPL-MCNC: 7.5 G/DL (ref 6.8–8.8)
SODIUM SERPL-SCNC: 143 MMOL/L (ref 133–144)
URATE SERPL-MCNC: 7.6 MG/DL (ref 3.5–7.2)

## 2018-11-06 PROCEDURE — 84550 ASSAY OF BLOOD/URIC ACID: CPT | Performed by: FAMILY MEDICINE

## 2018-11-06 PROCEDURE — 36415 COLL VENOUS BLD VENIPUNCTURE: CPT | Performed by: FAMILY MEDICINE

## 2018-11-06 PROCEDURE — 80053 COMPREHEN METABOLIC PANEL: CPT | Performed by: FAMILY MEDICINE

## 2018-11-06 PROCEDURE — 99214 OFFICE O/P EST MOD 30 MIN: CPT | Performed by: FAMILY MEDICINE

## 2018-11-06 RX ORDER — ALLOPURINOL 100 MG/1
100 TABLET ORAL DAILY
Qty: 90 TABLET | Refills: 1 | Status: SHIPPED | OUTPATIENT
Start: 2018-11-06 | End: 2019-09-23

## 2018-11-06 RX ORDER — TRAZODONE HYDROCHLORIDE 50 MG/1
50-100 TABLET, FILM COATED ORAL
Qty: 30 TABLET | Refills: 1 | Status: SHIPPED | OUTPATIENT
Start: 2018-11-06 | End: 2018-12-13

## 2018-11-06 ASSESSMENT — PAIN SCALES - GENERAL: PAINLEVEL: NO PAIN (0)

## 2018-11-06 NOTE — ASSESSMENT & PLAN NOTE
Patient has been on Ambien for his sleep for years and has recently received a letter from his insurance company that this would not be covered starting January 2019 and was advised to try alternatives including trazodone or Belsomra.  I will send a small prescription of trazodone to see if this is helpful.  Follow-up in January

## 2018-11-06 NOTE — MR AVS SNAPSHOT
After Visit Summary   11/6/2018    Medardo Gautam    MRN: [id number]           Patient Information     Date Of Birth          1940        Visit Information        Provider Department      11/6/2018 10:20 AM Verna Osborne MD [facility]         Follow-ups after your visit        Follow-up notes from your care team     Return in about 3 months (around 2/6/2019).      Your next 10 appointments already scheduled     Jan 09, 2019 10:00 AM CST   Office Visit with Verna Osborne MD   [facility] ([facility]    [address]  [facility] [id number] [phone]           Bring a current list of meds and any records pertaining to this visit. For Physicals, please bring immunization records and any forms needing to be filled out. Please arrive 10 minutes early to complete paperwork.            Jan 16, 2019 10:20 AM CST   (Arrive by 10:05 AM)   Return Prostate Cancer with Norma Goodwin MD   [facility] ([facility])    [address]  4th [address]  [place] [facility] [id number] [phone]            Apr 29, 2019 10:15 AM CDT   (Arrive by 10:00 AM)   Survivorship Visit with Magali Andrews PA-C   [facility] ([facility])    [address]  [address]  [place] [facility] [id number] [phone]              Who to contact     If you have questions or need follow up information about today's clinic visit or your schedule please contact [facility] directly at [phone].  Normal or non-critical lab and imaging results will be communicated to you by MyChart, letter or phone within 4 business days after the clinic has received the results. If you do not hear from us within 7 days, please contact the clinic through MyChart or phone. If you have a critical or abnormal lab result, we will notify you by phone  as soon as possible.  Submit refill requests through [email or web address] or call your pharmacy and they will forward the refill request to us. Please allow 3 business days for your refill to be completed.          Additional Information About Your Visit        [facility] Information     [email or web address] gives you secure access to your electronic health record. If you see a primary care provider, you can also send messages to your care team and make appointments. If you have questions, please call your primary care clinic.  If you do not have a primary care provider, please call [phone] and they will assist you.        Care EveryWhere ID     This is your Care EveryWhere ID. This could be used by other organizations to access your [place] medical records  [id number]        Your Vitals Were     Pulse Temperature Respirations Pulse Oximetry BMI (Body Mass Index)       65 98  F (36.7  C) (Temporal) 16 98% 28.41 kg/m2        Blood Pressure from Last 3 Encounters:   11/06/18 128/74   11/01/18 132/76   10/09/18 144/72    Weight from Last 3 Encounters:   11/06/18 198 lb (89.8 kg)   10/09/18 203 lb (92.1 kg)   08/31/18 199 lb 8.3 oz (90.5 kg)              Today, you had the following     No orders found for display       Primary Care Provider Office Phone # Fax #    Verna Osborne [phone]       [address] [place] [address]  [facility] [address]        Equal Access to Services     [facility]: Hadii torrey vo hadasho Soshamika, waaxda luqadaha, qaybta kaalmada shivamyada, randy salamanca . So [facility] [phone].    ATENCIÓN: Si habla español, tiene a sanford disposición servicios gratuitos de asistencia lingüística. Lukas al [phone].    We comply with applicable federal civil rights laws and [place] laws. We do not discriminate on the basis of race, color, national origin, age, disability, sex, sexual orientation, or gender identity.            Thank you!     Thank you for choosing [facility]  for your care. Our goal is always to provide you with excellent care. Hearing back from our patients is one way we can continue to improve our services. Please take a few minutes to complete the written survey that you may receive in the mail after your visit with us. Thank you!             Your Updated Medication List - Protect others around you: Learn how to safely use, store and throw away your medicines at www.disposemymeds.org.          This list is accurate as of 11/6/18 10:52 AM.  Always use your most recent med list.                   Brand Name Dispense Instructions for use Diagnosis    aspirin 81 MG tablet     30 tablet    Take 1 tablet (81 mg) by mouth daily    Hyperlipidemia LDL goal <130, Malignant neoplasm of colon, unspecified part of colon (H)       azelastine 0.05 % ophthalmic solution    OPTIVAR    1 Bottle    Apply 1 drop to eye 2 times daily    Allergic conjunctivitis, bilateral       bicalutamide 50 MG tablet    CASODEX    90 tablet    Take 1 tablet (50 mg) by mouth daily    Malignant neoplasm of prostate (H)       calcium carbonate 600 mg-vitamin D 400 units 600-400 MG-UNIT per tablet    CALTRATE     Take 1 tablet by mouth daily    Hyperlipidemia LDL goal <130, Malignant neoplasm of colon, unspecified part of colon (H)       CLARITIN PO      Take  by mouth. As needed        clonazePAM 1 MG tablet    klonoPIN    30 tablet    Take 1 tablet (1 mg) by mouth nightly as needed for anxiety    Anxiety       folic acid-vit B6-vit B12 0.8-10-0.115 MG Tabs per tablet    FOLGARD     Take 1 tablet by mouth daily    Hyperlipidemia LDL goal <130, Malignant neoplasm of colon, unspecified part of colon (H)       IBUPROFEN PO      Take 400 mg by mouth every 8 hours as needed for moderate pain        leuprolide 45 MG kit    LUPRON DEPOT    1 each    Inject 45 mg into the muscle every 6 months    Malignant neoplasm of prostate (H)       lisinopril 30 MG tablet    PRINIVIL,ZESTRIL    90 tablet    Take 1  tablet (30 mg) by mouth daily    Benign essential hypertension       naproxen 500 MG tablet    NAPROSYN    60 tablet    TAKE ONE TABLET BY MOUTH TWICE DAILY AS NEEDED FOR  MODERATE  PAIN    Wrist sprain, right, initial encounter       OMEGA-3 FISH OIL PO      Take 500 mg by mouth daily    Hyperlipidemia LDL goal <130, Malignant neoplasm of colon, unspecified part of colon (H)       sildenafil 100 MG tablet    VIAGRA    10 tablet    1/2 tab three times a week    Malignant neoplasm of prostate (H)       triamcinolone 0.1 % cream    KENALOG          zolpidem 5 MG tablet    AMBIEN    30 tablet    Take 1 tablet (5 mg) by mouth nightly as needed for sleep    Persistent disorder of initiating or maintaining sleep

## 2018-11-06 NOTE — ASSESSMENT & PLAN NOTE
Patient presents with right knee pain which started after he tried pressure washing his deck.  He has mild tenderness along the medial collateral ligament of the right knee.  No evident swelling .  Normal range of motion of the right knee.  Reassured patient.  Advised range of motion exercises, Tylenol /ibuprofen as needed for pain and swelling.  Discussed home care  Reportable signs and symptoms discussed  RTC if symptoms persist or fail to improve

## 2018-12-12 ENCOUNTER — TELEPHONE (OUTPATIENT)
Dept: FAMILY MEDICINE | Facility: OTHER | Age: 78
End: 2018-12-12

## 2018-12-12 DIAGNOSIS — G47.00 INSOMNIA, UNSPECIFIED TYPE: Primary | ICD-10-CM

## 2018-12-12 DIAGNOSIS — G47.00 PERSISTENT DISORDER OF INITIATING OR MAINTAINING SLEEP: ICD-10-CM

## 2018-12-12 RX ORDER — ZOLPIDEM TARTRATE 5 MG/1
5 TABLET ORAL
Qty: 30 TABLET | Refills: 3 | Status: CANCELLED | OUTPATIENT
Start: 2018-12-12

## 2018-12-12 NOTE — TELEPHONE ENCOUNTER
Reason for Call:  Form, our goal is to have forms completed with 72 hours, however, some forms may require a visit or additional information.    Type of letter, form or note:  insurance prescription authorization form    Who is the form from?: Patient    Where did the form come from: Patient or family brought in       What clinic location was the form placed at?: [facility] - [phone]    Where the form was placed: 's Box    What number is listed as a contact on the form?: See contact info       Additional comments: NA    Call taken on 12/12/2018 at 11:30 AM by Clotilde Mercer

## 2018-12-12 NOTE — TELEPHONE ENCOUNTER
I spoke with patient to clarify.   The trazodone is not working for him, so he stopped it and went back to the Ambien.   He will need a refill next week, please advise.   He was informed on the PA information below.     Laura Blevins, RN, BSN

## 2018-12-12 NOTE — TELEPHONE ENCOUNTER
Will receive a refill encounter from his pharmacy to do a PA when it comes to be 2019. The PA will not work right now, due that it's still covered end of the year.

## 2018-12-12 NOTE — TELEPHONE ENCOUNTER
"Letter attached to form states:   \"Dr. Osborne,   I spoke today with [employer] requiring their change for the prescription drug Zolpidem Tartrate.  Basically, the change for new prescriptions should only be for 30 days at a time instead of 90 days. [You have always prescribed 30 days]. So I would appreciate if you would call [phone] to get [employer] authorization, so they will cover cost (partial) of the prescription.   The Belsomra tablet is not working for me, I don't sleep well, feel dizzy and tired in morning.   I will need a renewal prescription for Zolpidem this month. Thank you.\"    Will flag for RN to triage the symptoms, if appropriate.  Will also have TC document about the form/PA.  Radha Bergman CMA    "

## 2018-12-13 RX ORDER — ZOLPIDEM TARTRATE 5 MG/1
5 TABLET ORAL
Qty: 30 TABLET | Refills: 5 | Status: SHIPPED | OUTPATIENT
Start: 2018-12-13 | End: 2019-06-24

## 2018-12-19 ENCOUNTER — TRANSFERRED RECORDS (OUTPATIENT)
Dept: HEALTH INFORMATION MANAGEMENT | Facility: CLINIC | Age: 78
End: 2018-12-19

## 2019-01-09 ENCOUNTER — OFFICE VISIT (OUTPATIENT)
Dept: FAMILY MEDICINE | Facility: OTHER | Age: 79
End: 2019-01-09
Payer: COMMERCIAL

## 2019-01-09 VITALS
WEIGHT: 200 LBS | OXYGEN SATURATION: 100 % | HEART RATE: 72 BPM | RESPIRATION RATE: 16 BRPM | HEIGHT: 70 IN | DIASTOLIC BLOOD PRESSURE: 80 MMHG | SYSTOLIC BLOOD PRESSURE: 138 MMHG | TEMPERATURE: 97.6 F | BODY MASS INDEX: 28.63 KG/M2

## 2019-01-09 DIAGNOSIS — E78.5 HYPERLIPIDEMIA LDL GOAL <100: ICD-10-CM

## 2019-01-09 DIAGNOSIS — M19.90 ARTHRITIS: ICD-10-CM

## 2019-01-09 DIAGNOSIS — C18.9 MALIGNANT NEOPLASM OF COLON, UNSPECIFIED PART OF COLON (H): Primary | ICD-10-CM

## 2019-01-09 DIAGNOSIS — M25.561 ACUTE PAIN OF RIGHT KNEE: ICD-10-CM

## 2019-01-09 PROCEDURE — 99213 OFFICE O/P EST LOW 20 MIN: CPT | Performed by: FAMILY MEDICINE

## 2019-01-09 RX ORDER — NAPROXEN 500 MG/1
500 TABLET ORAL DAILY PRN
Qty: 30 TABLET | Refills: 0 | Status: SHIPPED | OUTPATIENT
Start: 2019-01-09 | End: 2020-06-02

## 2019-01-09 RX ORDER — NAPROXEN 500 MG/1
500 TABLET ORAL DAILY PRN
COMMUNITY
Start: 2018-10-15 | End: 2019-01-09

## 2019-01-09 ASSESSMENT — ANXIETY QUESTIONNAIRES
1. FEELING NERVOUS, ANXIOUS, OR ON EDGE: SEVERAL DAYS
GAD7 TOTAL SCORE: 4
GAD7 TOTAL SCORE: 4
6. BECOMING EASILY ANNOYED OR IRRITABLE: NOT AT ALL
7. FEELING AFRAID AS IF SOMETHING AWFUL MIGHT HAPPEN: NOT AT ALL
GAD7 TOTAL SCORE: 4
7. FEELING AFRAID AS IF SOMETHING AWFUL MIGHT HAPPEN: NOT AT ALL
3. WORRYING TOO MUCH ABOUT DIFFERENT THINGS: SEVERAL DAYS
5. BEING SO RESTLESS THAT IT IS HARD TO SIT STILL: SEVERAL DAYS
4. TROUBLE RELAXING: NOT AT ALL
2. NOT BEING ABLE TO STOP OR CONTROL WORRYING: SEVERAL DAYS

## 2019-01-09 ASSESSMENT — PATIENT HEALTH QUESTIONNAIRE - PHQ9
SUM OF ALL RESPONSES TO PHQ QUESTIONS 1-9: 0
10. IF YOU CHECKED OFF ANY PROBLEMS, HOW DIFFICULT HAVE THESE PROBLEMS MADE IT FOR YOU TO DO YOUR WORK, TAKE CARE OF THINGS AT HOME, OR GET ALONG WITH OTHER PEOPLE: NOT DIFFICULT AT ALL
SUM OF ALL RESPONSES TO PHQ QUESTIONS 1-9: 0

## 2019-01-09 ASSESSMENT — PAIN SCALES - GENERAL: PAINLEVEL: NO PAIN (0)

## 2019-01-09 ASSESSMENT — MIFFLIN-ST. JEOR: SCORE: 1638.44

## 2019-01-09 NOTE — ASSESSMENT & PLAN NOTE
Offered x-rays but he reports he does not have symptoms right now and will try conservative management   Advised to return to clinic if symptoms persist

## 2019-01-10 ASSESSMENT — PATIENT HEALTH QUESTIONNAIRE - PHQ9: SUM OF ALL RESPONSES TO PHQ QUESTIONS 1-9: 0

## 2019-01-10 ASSESSMENT — ANXIETY QUESTIONNAIRES: GAD7 TOTAL SCORE: 4

## 2019-01-13 DIAGNOSIS — I10 BENIGN ESSENTIAL HYPERTENSION: ICD-10-CM

## 2019-01-14 RX ORDER — LISINOPRIL 30 MG/1
TABLET ORAL
Qty: 90 TABLET | Refills: 1 | Status: SHIPPED | OUTPATIENT
Start: 2019-01-14 | End: 2019-07-02

## 2019-01-14 NOTE — TELEPHONE ENCOUNTER
Lisinopril    Prescription approved per [facility] Refill Protocol.    Kristy Hidalgo, RN, BSN

## 2019-01-15 DIAGNOSIS — E78.5 HYPERLIPIDEMIA LDL GOAL <100: ICD-10-CM

## 2019-01-15 LAB
CHOLEST SERPL-MCNC: 264 MG/DL
HDLC SERPL-MCNC: 87 MG/DL
LDLC SERPL CALC-MCNC: 153 MG/DL
NONHDLC SERPL-MCNC: 177 MG/DL
TRIGL SERPL-MCNC: 119 MG/DL

## 2019-01-15 PROCEDURE — 80061 LIPID PANEL: CPT | Performed by: FAMILY MEDICINE

## 2019-01-15 PROCEDURE — 36415 COLL VENOUS BLD VENIPUNCTURE: CPT | Performed by: FAMILY MEDICINE

## 2019-01-21 ENCOUNTER — PRE VISIT (OUTPATIENT)
Dept: UROLOGY | Facility: CLINIC | Age: 79
End: 2019-01-21

## 2019-01-23 ENCOUNTER — OFFICE VISIT (OUTPATIENT)
Dept: UROLOGY | Facility: CLINIC | Age: 79
End: 2019-01-23
Payer: COMMERCIAL

## 2019-01-23 VITALS
DIASTOLIC BLOOD PRESSURE: 79 MMHG | HEART RATE: 70 BPM | HEIGHT: 70 IN | SYSTOLIC BLOOD PRESSURE: 151 MMHG | WEIGHT: 200 LBS | BODY MASS INDEX: 28.63 KG/M2

## 2019-01-23 DIAGNOSIS — C61 MALIGNANT NEOPLASM OF PROSTATE (H): ICD-10-CM

## 2019-01-23 DIAGNOSIS — C61 PROSTATE CANCER (H): ICD-10-CM

## 2019-01-23 DIAGNOSIS — C61 PROSTATE CANCER (H): Primary | ICD-10-CM

## 2019-01-23 LAB — PSA SERPL-MCNC: 0.04 UG/L (ref 0–4)

## 2019-01-23 PROCEDURE — 84403 ASSAY OF TOTAL TESTOSTERONE: CPT | Performed by: UROLOGY

## 2019-01-23 RX ORDER — SILDENAFIL 100 MG/1
100 TABLET, FILM COATED ORAL DAILY PRN
Qty: 30 TABLET | Refills: 0 | Status: SHIPPED | OUTPATIENT
Start: 2019-01-23 | End: 2019-01-24

## 2019-01-23 ASSESSMENT — MIFFLIN-ST. JEOR: SCORE: 1638.44

## 2019-01-23 ASSESSMENT — PAIN SCALES - GENERAL: PAINLEVEL: NO PAIN (0)

## 2019-01-23 NOTE — NURSING NOTE
The following medication was given:     MEDICATION:  Lupron Depot 45 mg  ROUTE: IM  SITE: RUQ - Gluteus  DOSE: 45 mg  LOT #: [id number]  : [employer]  EXPIRATION DATE: 9/15/21  NDC#: 6474-1886-20   Was there drug waste? No    Prior to injection, verified patient identity using patient's name and date of birth.  Due to injection administration, patient instructed to remain in clinic for 15 minutes  afterwards, and to report any adverse reaction to me immediately.    Lupron    Drug Amount Wasted:  None.  Vial/Syringe: Single dose vial    Chioma Ortiz CMA  January 23, 2019       Pt tolerated the injection well, and is due for his next Lupron anytime after 7/10/19.

## 2019-01-23 NOTE — NURSING NOTE
Chief Complaint   Patient presents with     RECHECK     6 mo F/U with PSA prior        Zoe Rhodes MA

## 2019-01-23 NOTE — LETTER
RE: Medardo Gautam  [address] [facility] [address] [facility] [address]     Dear Colleague,    Thank you for referring your patient, Medardo Gautam, to the [facility] UROLOGY AND INST FOR PROSTATE AND UROLOGIC CANCERS at [facility]. Please see a copy of my visit note below.    Urology Clinic Note    Date: 7/18/2018  Time: 11:40 AM  Patient: Medardo Gautam  MRN: [id number]    HPI/Subjective: Medardo Gautam is a 78 year old MALE with urologic hx of [place] 9 prostate cancer s/p RP in 2012 with salvage XRT and ADT with subsequent BCR. He is now receiving ADT with bicalutamide and lupron every 6 months. PSA has been undetectable for years. PSA and T are still in process today. He overall feels well. He has some hot flashes but is not overly bothered by this and is not interested in any medication. Denies fever, chills, weight loss, bone pain. Would like refill for viagra.     Objective:  There were no vitals taken for this visit.  Gen: In NAD, conversant.  Resp: Breathing non-labored  Abd: Soft, non-distended, non-tender.  Ext: Warm and well perfused    Medication  Lupron  Bicalutamide  Viagra    PSA Ultra Sensitive:  7/10 0.01    Assessment & Plan: Medardo Gautam is a 78 year old MALE with history of [place] 9 cancer s/p RP in 2012, salvage XRT + ADT, and subsequent BCR managed w/ lupron and bicalutamide.     - Lupron today  - RTC 6 months with PSA and T prior  - Refill viagra    This patient was seen & discussed with Dr. Goodwin.    --    Marvin Nice MD  Urology Resident  Patient seen and examined with the resident.  Visit time 15 minutes and >50% spent in counseling.  I agree with the resident's note and plan of care.       Norma Goodwin MD  Urology Staff

## 2019-01-23 NOTE — PROGRESS NOTES
Urology Clinic Note    Date: 7/18/2018  Time: 11:40 AM  Patient: Medardo Gautam  MRN: [id number]    HPI/Subjective: Medardo Gautam is a 78 year old MALE with urologic hx of Kuldeep 9 prostate cancer s/p RP in 2012 with salvage XRT and ADT with subsequent BCR. He is now receiving ADT with bicalutamide and lupron every 6 months. PSA has been undetectable for years. PSA and T are still in process today. He overall feels well. He has some hot flashes but is not overly bothered by this and is not interested in any medication. Denies fever, chills, weight loss, bone pain. Would like refill for viagra.     Objective:  There were no vitals taken for this visit.  Gen: In NAD, conversant.  Resp: Breathing non-labored  Abd: Soft, non-distended, non-tender.  Ext: Warm and well perfused    Medication  Lupron  Bicalutamide  Viagra    PSA Ultra Sensitive:  7/10 0.01    Assessment & Plan: Medardo Gautam is a 78 year old MALE with history of Kuldeep 9 cancer s/p RP in 2012, salvage XRT + ADT, and subsequent BCR managed w/ lupron and bicalutamide.     - Lupron today  - RTC 6 months with PSA and T prior  - Refill viagra    This patient was seen & discussed with Dr. Goodwin.    --    Marvin Nice MD  Urology Resident  Patient seen and examined with the resident.  Visit time 15 minutes and >50% spent in counseling.  I agree with the resident's note and plan of care.       Norma Goowdin MD  Urology Staff

## 2019-01-23 NOTE — PATIENT INSTRUCTIONS
You had a six month dose of Lupron today.    Return to see Myah Kaplan PA-C in six months after July 10th.     Have your PSA and testosterone drawn two days in advance.    It was a pleasure meeting with you today.  Thank you for allowing me and my team the privilege of caring for you today.  YOU are the reason we are here, and I truly hope we provided you with the excellent service you deserve.  Please let us know if there is anything else we can do for you so that we can be sure you are leaving completely satisfied with your care experience.

## 2019-01-24 DIAGNOSIS — C61 PROSTATE CANCER (H): ICD-10-CM

## 2019-01-24 RX ORDER — SILDENAFIL 100 MG/1
100 TABLET, FILM COATED ORAL DAILY PRN
Qty: 30 TABLET | Refills: 0 | COMMUNITY
Start: 2019-01-24 | End: 2019-08-12

## 2019-01-24 NOTE — TELEPHONE ENCOUNTER
[facility] pharmacy was informed that patient sig is take 1 1/2 tablet 3 times weekly sildenafil (Viagra). Pharmacist agreed with the plan.      Chase Beck MA

## 2019-01-25 LAB — TESTOST SERPL-MCNC: 11 NG/DL (ref 240–950)

## 2019-02-21 ENCOUNTER — TELEPHONE (OUTPATIENT)
Dept: FAMILY MEDICINE | Facility: OTHER | Age: 79
End: 2019-02-21

## 2019-02-21 NOTE — TELEPHONE ENCOUNTER
Prior Authorization Retail Medication Request    Medication/Dose: zolpidem  ICD code (if different than what is on RX):    Previously Tried and Failed:    Rationale:      Insurance Name:    Insurance ID:        Pharmacy Information (if different than what is on RX)  Name:    Phone:

## 2019-02-28 NOTE — TELEPHONE ENCOUNTER
Central Prior Authorization Team   Phone: [phone]      PA Initiation    Medication: zolpidem-Initiated  Insurance Company: [employer] - Phone [phone] Fax [phone]  Pharmacy Filling the Rx: [facility] [address] [place] [id number] [facility]  Filling Pharmacy Phone: [phone]  Filling Pharmacy Fax:    Start Date: 2/28/2019    Called and completed PA via the phone with Bertin at [facility].  Ref # [id number].

## 2019-03-02 NOTE — TELEPHONE ENCOUNTER
Prior Authorization Approval    Authorization Effective Date: 3/1/2019  Authorization Expiration Date: 2/28/2021  Medication: zolpidem-APPROVED  Approved Dose/Quantity:   Reference #:     Insurance Company: [employer] - Phone [phone] Fax [phone]  Expected CoPay:       CoPay Card Available:      Foundation Assistance Needed:    Which Pharmacy is filling the prescription (Not needed for infusion/clinic administered): [facility] [address] [place] [id number] [facility]  Pharmacy Notified: Yes  Patient Notified: No    Pharmacy will notify patient when medication is ready.

## 2019-03-16 DIAGNOSIS — C61 MALIGNANT NEOPLASM OF PROSTATE (H): ICD-10-CM

## 2019-03-18 NOTE — TELEPHONE ENCOUNTER
[facility] Call Center    Phone Message    May a detailed message be left on voicemail: yes    Reason for Call: Other: Pt called to say that he ran out of his bicalutamide (CASODEX) medication today and needs his refill as soon as possible. Please give pt a call back once that has been signed.      Action Taken: Message routed to:  [facility] ([facility] Urology

## 2019-03-19 ENCOUNTER — TELEPHONE (OUTPATIENT)
Dept: UROLOGY | Facility: CLINIC | Age: 79
End: 2019-03-19

## 2019-03-19 RX ORDER — BICALUTAMIDE 50 MG/1
TABLET, FILM COATED ORAL
Qty: 90 TABLET | Refills: 3 | Status: SHIPPED | OUTPATIENT
Start: 2019-03-19 | End: 2019-09-23

## 2019-03-19 NOTE — TELEPHONE ENCOUNTER
----- Message from MANI Ireland sent at 3/19/2019  1:06 PM CDT -----  Regarding: followup  Angel Pfeiffer:  This is a patient of Dr. Goodwin with metastatic prostate cancer on Lupron and casodex.  I just cosigned a script for him to continue his casodex (put through by Jax). He has never seen me.  It appears that he last saw Dr. Goodwin in 1/2019 but has an upcoming appointment with me in July.     I would prefer that this patient follow with one of our prostate cancer specialists (Dr. Van, Ferdinand, etc.) rather than me.  Could you please change the appointment for me?    Thanks!  Myah

## 2019-03-19 NOTE — TELEPHONE ENCOUNTER
bicalutamide (CASODEX) 50 MG tablet      Last Written Prescription Date:  1-16-18  Last Fill Quantity: 90,   # refills: 3  Last Office Visit : 1-12-19  Future Office visit:  7-23-19    Routing refill request to provider for review/approval because:  Not on protocol

## 2019-03-19 NOTE — TELEPHONE ENCOUNTER
Hi Ladies,    Please call patient to reschedule his appointment from Bev Kaplan PA-C to Dr. Eddy Van. Please cancel his appointment on 7/23/2019 with Myah.      Thanks, Chase Beck MA

## 2019-04-14 NOTE — MR AVS SNAPSHOT
After Visit Summary   9/29/2017    Medardo Gautam    MRN: [id number]           Patient Information     Date Of Birth          1940        Visit Information        Provider Department      9/29/2017 11:40 AM Verna Osborne MD [facility]        Today's Diagnoses     Need for prophylactic vaccination and inoculation against influenza    -  1    Low back pain potentially associated with radiculopathy           Follow-ups after your visit        Additional Services     ORTHO  REFERRAL       [facility] is referring you to the Orthopedic  Services at [facility].       The  Representative will assist you in the coordination of your Orthopedic and Musculoskeletal Care as prescribed by your physician.    The  Representative will call you within 1 business day to help schedule your appointment, or you may contact the  Representative at:    All areas ~ [phone]     Type of Referral : Surgical / Specialist - Spine specialist- Dr. Neptali Estrada with [facility]      Timeframe requested: 3 - 5 days    Coverage of these services is subject to the terms and limitations of your health insurance plan.  Please call member services at your health plan with any benefit or coverage questions.      If X-rays, CT or MRI's have been performed, please contact the facility where they were done to arrange for , prior to your scheduled appointment.  Please bring this referral request to your appointment and present it to your specialist.                  Your next 10 appointments already scheduled     Oct 02, 2017  1:50 PM CDT   (Arrive by 1:35 PM)   [facility] Spine with Jared Ceja PT   [facility] ([facility]  )    [address]  [facility] [address]   [phone]            Oct 09, 2017  1:50 PM CDT   [facility] Spine with Jared Ceja PT   [facility] ([facility]  )    [address]  [facility] [address]   [phone]            Oct 17, 2017 12:50 PM CDT   [facility] Spine with Jared Ceja PT   [facility] ([facility]  )    [address]  [facility] [address]   [phone]            Nov 01, 2017 11:40 AM CDT   (Arrive by 11:25 AM)   Return Prostate Cancer with Norma Goodwin MD   [facility] ([facility])    [address]  4th Floor  [facility] [id number] [phone]            Apr 24, 2018 11:45 AM CDT   (Arrive by 11:30 AM)   LONG TERM RETURN with Magali Andrews PA-C   [facility] ([facility])    [address]  2nd Floor  [facility] [id number] [phone]              Who to contact     If you have questions or need follow up information about today's clinic visit or your schedule please contact [facility] directly at [phone].  Normal or non-critical lab and imaging results will be communicated to you by MyChart, letter or phone within 4 business days after the clinic has received the results. If you do not hear from us within 7 days, please contact the clinic through MyChart or phone. If you have a critical or abnormal lab result, we will notify you by phone as soon as possible.  Submit refill requests through [email or web address] or call your pharmacy and they will forward the refill request to us. Please allow 3 business days for your refill to be completed.          Additional Information About Your Visit        [email or web address] Information     [email or web address] gives you secure access to your electronic health record. If you see a primary care provider, you can also send messages to your care team and make appointments. If you have questions, please call your primary care clinic.  If you do not have  "a primary care provider, please call [phone] and they will assist you.        Care EveryWhere ID     This is your Care EveryWhere ID. This could be used by other organizations to access your [facility] medical records  [id number]        Your Vitals Were     Pulse Temperature Respirations Height BMI (Body Mass Index)       60 97.2  F (36.2  C) (Temporal) 16 5' 6.75\" (1.695 m) 30.14 kg/m2        Blood Pressure from Last 3 Encounters:   09/29/17 124/82   09/25/17 124/88   09/21/17 134/90    Weight from Last 3 Encounters:   09/29/17 191 lb (86.6 kg)   09/25/17 196 lb (88.9 kg)   09/21/17 196 lb (88.9 kg)              We Performed the Following     ORTHO  REFERRAL          Today's Medication Changes          These changes are accurate as of: 9/29/17 12:16 PM.  If you have any questions, ask your nurse or doctor.               Start taking these medicines.        Dose/Directions    predniSONE 20 MG tablet   Commonly known as:  DELTASONE   Used for:  Low back pain potentially associated with radiculopathy   Started by:  Verna Osborne MD        Dose:  40 mg   Take 2 tablets (40 mg) by mouth daily for 5 days   Quantity:  10 tablet   Refills:  0            Where to get your medicines      These medications were sent to [facility] [address] [place] [address] [address] [address]     Phone:  [phone]     predniSONE 20 MG tablet                Primary Care Provider Office Phone # Fax #    Verna Osborne [phone]       [address] [place] [address]        Equal Access to Services     [facility] AH: Hadrufus Hitchcock, quinn tenorio, randy thompson. So [facility] [phone].    ATENCIÓN: Si habla español, tiene a sanford disposición servicios gratuitos de asistencia lingüística. Llame al [phone].    We comply with applicable federal civil rights laws and [place] laws. We do " not discriminate on the basis of race, color, national origin, age, disability, sex, sexual orientation, or gender identity.            Thank you!     Thank you for choosing [facility]  for your care. Our goal is always to provide you with excellent care. Hearing back from our patients is one way we can continue to improve our services. Please take a few minutes to complete the written survey that you may receive in the mail after your visit with us. Thank you!             Your Updated Medication List - Protect others around you: Learn how to safely use, store and throw away your medicines at www.disposemymeds.org.          This list is accurate as of: 9/29/17 12:16 PM.  Always use your most recent med list.                   Brand Name Dispense Instructions for use Diagnosis    aspirin 81 MG tablet     30 tablet    Take 1 tablet (81 mg) by mouth daily    Hyperlipidemia LDL goal <130, Malignant neoplasm of colon, unspecified part of colon (H)       bicalutamide 50 MG tablet    CASODEX    90 tablet    Take 1 tablet (50 mg) by mouth daily    Malignant neoplasm of prostate (H)       calcium-vitamin D 600-400 MG-UNIT per tablet    CALTRATE     Take 1 tablet by mouth daily    Hyperlipidemia LDL goal <130, Malignant neoplasm of colon, unspecified part of colon (H)       CLARITIN PO      Take  by mouth. As needed        clonazePAM 1 MG tablet    klonoPIN    30 tablet    TAKE ONE TABLET BY MOUTH TWICE DAILY AS NEEDED FOR ANXIETY    Anxiety       folic acid-vit B6-vit B12 0.8-10-0.115 MG Tabs per tablet    FOLGARD     Take 1 tablet by mouth daily    Hyperlipidemia LDL goal <130, Malignant neoplasm of colon, unspecified part of colon (H)       HYDROcodone-acetaminophen 5-325 MG per tablet    NORCO    20 tablet    Take 1 tablet by mouth nightly as needed for moderate to severe pain maximum 1  tablet(s) per day    Hip pain, left       lisinopril 10 MG tablet    PRINIVIL/ZESTRIL    90 tablet    TAKE ONE TABLET BY  MOUTH ONCE DAILY    Benign essential hypertension       naproxen 500 MG tablet    NAPROSYN    60 tablet    Take 1 tablet (500 mg) by mouth 2 times daily as needed for moderate pain    Chronic gout without tophus, unspecified cause, unspecified site       OMEGA-3 FISH OIL PO      Take 500 mg by mouth daily    Hyperlipidemia LDL goal <130, Malignant neoplasm of colon, unspecified part of colon (H)       predniSONE 20 MG tablet    DELTASONE    10 tablet    Take 2 tablets (40 mg) by mouth daily for 5 days    Low back pain potentially associated with radiculopathy       sildenafil 100 MG tablet    VIAGRA    10 tablet    1/2 tab three times a week    Malignant neoplasm of prostate (H)       zolpidem 5 MG tablet    AMBIEN    30 tablet    TAKE ONE TABLET BY MOUTH ONCE DAILY IN THE EVENING AS NEEDED FOR SLEEP    Persistent disorder of initiating or maintaining sleep          Additional Safety/Bands:

## 2019-04-29 ENCOUNTER — ONCOLOGY SURVIVORSHIP VISIT (OUTPATIENT)
Dept: ONCOLOGY | Facility: CLINIC | Age: 79
End: 2019-04-29
Attending: INTERNAL MEDICINE
Payer: COMMERCIAL

## 2019-04-29 VITALS
SYSTOLIC BLOOD PRESSURE: 148 MMHG | OXYGEN SATURATION: 98 % | DIASTOLIC BLOOD PRESSURE: 86 MMHG | RESPIRATION RATE: 16 BRPM | TEMPERATURE: 97 F | WEIGHT: 199.52 LBS | BODY MASS INDEX: 28.63 KG/M2 | HEART RATE: 65 BPM

## 2019-04-29 DIAGNOSIS — C18.9 MALIGNANT NEOPLASM OF COLON, UNSPECIFIED PART OF COLON (H): Primary | ICD-10-CM

## 2019-04-29 DIAGNOSIS — C61 PROSTATE CANCER (H): ICD-10-CM

## 2019-04-29 PROCEDURE — 99213 OFFICE O/P EST LOW 20 MIN: CPT | Mod: GC | Performed by: INTERNAL MEDICINE

## 2019-04-29 PROCEDURE — G0463 HOSPITAL OUTPT CLINIC VISIT: HCPCS | Mod: ZF

## 2019-04-29 ASSESSMENT — PAIN SCALES - GENERAL: PAINLEVEL: NO PAIN (0)

## 2019-04-29 NOTE — NURSING NOTE
"Oncology Rooming Note    April 29, 2019 2:50 PM   Medardo Gautam is a 79 year old male who presents for:    Chief Complaint   Patient presents with     Oncology Clinic Visit     Survivorship , [place]      Initial Vitals: /86   Pulse 65   Temp 97  F (36.1  C) (Oral)   Resp 16   Wt 90.5 kg (199 lb 8.3 oz)   SpO2 98%   BMI 28.63 kg/m   Estimated body mass index is 28.63 kg/m  as calculated from the following:    Height as of 1/23/19: 1.778 m (5' 10\").    Weight as of this encounter: 90.5 kg (199 lb 8.3 oz). Body surface area is 2.11 meters squared.  No Pain (0) Comment: Data Unavailable   No LMP for male patient.  Allergies reviewed: Yes  Medications reviewed: Yes    Medications: Medication refills not needed today.  Pharmacy name entered into [facility]: [facility] [place] [address] [facility]    Clinical concerns: all goes well  Blaes was notified.      Janet Clinton MA              "

## 2019-04-29 NOTE — PROGRESS NOTES
Oncology Follow-up Visit    PATIENT NAME: Medardo Gautam  MRN: [id number]   : 1940     DISEASES UNDER MANAGEMENT:    pT2c N0 Mx prostatic adenocarcinoma, Keystone grade 4+5 = 9, PSA 5.2    pT1 N0 M0 G2 colonic adenocarcinoma    RADIATION HISTORY: Prostate fossa radiation (completed: 10/18/2013)  1. Phase 1: 4500 cGy (180 cGy per fraction) to the pelvis  2. Phase 2: 2520 cGy boost to the prostate bed     [id number]    Leuprolide 22.5 mg IM (3/27/2012, 2012)    Docetaxel 75 mg/m  (3/27/2012)- discontinued on study given the development of treatment related hepatotoxicity    Salvage ADT    Leuprolide (2013-2019)    HPI:   Medardo Gautam is a 79 year old male with history of prostate cancer status post neoadjuvant chemohormonal therapy, prostatectomy with adjuvant radiotherapy (completed:10/18/2013) and stage I colon cancer status post hemicolectomy with no adjuvant therapy (completed: 2015).    Briefly, Mr. Gautam initially came to clinical detection in regards to his prostate cancer after serial PSA monitoring revealed an elevated PSA of 5. (previously 4.22 in 2011).  He underwent a biopsy which showed Keystone grade 5+4 = 9 adenocarcinoma with 90% tumor involvement 8/8 cores, (+) PNI.  As his staging revealed organ confined disease, he underwent treatment on the [facility] 73672 study consisting of neoadjuvant Lupron (cycle 1: 3/27/2012) and Taxotere 75 mg/m  (3/27/2012).  Neoadjuvant therapy on trial was discontinued given the development of hepatotoxicity.    He then underwent a robotic assisted retropubic prostatectomy with bilateral pelvic lymphadenectomy and unilateral left-sided nerve sparing (2012).  Pathology revealed Keystone grade 4+ 5 = adenocarcinoma, (+) LVSI, (+) PNI, 25% of the excised prostate bilaterally excised to negative margins.  In regards to lymph node dissection, 0/16 lymph nodes were positive for metastatic adenocarcinoma.   Postoperatively his PSA was undetectable until 02/2013 at which time it walker to 0.2 and continued to rise to 0.5 (4/3/2013) shortly thereafter, he underwent radiographic restaging (4/18/2013) which showed no evidence of metastatic disease.  After short interval follow-up, his next PSA on 6/19/2013 which showed a value of 1.28.  He then started salvage ADT on 6/25/2013.   He was recommended to undergo salvage radiotherapy.  He received a total dose of 7020 cGy delivered to the prostate fossa completed on 10/18/2013.  He currently continues on ADT.    In regards to his colon cancer, he underwent a routine screening colonoscopy on 1/16/2015.  Pathology from this procedure demonstrated a moderately differentiated adenocarcinoma at the mid a sending colon.  He underwent definitive management with laparoscopic right hemicolectomy (2/11/2015) which characterized a 0.8 cm adenocarcinoma with 0/13 associated lymph nodes.      SUBJECTIVE:   On interview today, reports he is in his usual state of health.  He reports he remains active and does all the household chores.  He denies fevers, chills, nausea, vomiting, chest pain, or abdominal pain.  His bowel function is normal for him which involves several small bowel movements per day without blood.  He denies any blood in his urine.  He has no difficulty with urination. He is not incontinent and does not require pads.  He is no longer sexually active.  He has an active prescription for sildenafil but has not taken any. He reports no libido and thus this does not distress him.  He reports he has daily hot flashes at night from Lupron. He denies any muscular weakness, numbness, tingling, gait disturbance, or any other neurologic complaints. He denies any numbness or tingling in the upper or lower extremities. The remainder of his ROS were negative.     FAMILY HISTORY:      Sister diagnosed with breast cancer at age 70     SOCIAL HISTORY:      The patient is a native of [place].  He  is very well traveled, having received education as an , and also traveled and work through [place], [facility], [facility], [place] and [place] throughout his life.  He has been  for 45 years.  He has a son who is living in [place], age 37.  A daughter in [place], age 39.  He has 4 grandchildren.      Occupation:  Retired since 2001 as a .  He has worked at several places in the world.     Tobacco:  Brief smoking during college education in the 1960s, but none since.      Alcohol:   Social alcohol use, no history of alcohol abuse    Drugs:   No history of illicit drug use.     PAST MEDICAL /SURGICAL HISTORY:  Past Medical History:   Diagnosis Date     Anxiety      Colon cancer (H) 01/2015     Contracture of finger joint ca 2005    left and right fifth fingers     Dupuytren's contracture of left hand      Gout      HEMORRHOIDS NOS 6/4/2007     Hypertension      Insomnia      Nonsenile cataract      Personal history of colonic polyps 7 years ago?     Prostate cancer (H) Feb 2012     Renal cyst 6/22/2017     Seborrheic dermatitis      Skin lesion- R side of face and behind L ear 12/15/2011     SKIN SENSATION DISTURB 12/29/2006    allergic reaction to strawberries     SKIN SENSATION DISTURB 12/29/2006     Past Surgical History:   Procedure Laterality Date     APPENDECTOMY       BIOPSY  01/16/15     C HAND/FINGER SURGERY UNLISTED       C LENGTHEN,TENDON,HAND/FINGER  ca 2007    right fifth     C STOMACH SURGERY PROCEDURE UNLISTED       CATARACT IOL, RT/LT Left 02/15/2018     COLON SURGERY  2/11/2015    Lap assisted R hemicolectomy     COLONOSCOPY  10/25/07    Snare polypectomy     COLONOSCOPY  6/25/2009    with snare polypectomy     COLONOSCOPY  9/30/2009     COLONOSCOPY  1/5/2011    COLONOSCOPY performed by JUD MARIEE at  [facility]     COLONOSCOPY N/A 1/16/2015    Procedure: COMBINED COLONOSCOPY, SINGLE OR MULTIPLE BIOPSY/POLYPECTOMY BY BIOPSY;  Surgeon: Sarah Beth Pisano MD;   Location: [facility] OR     COLONOSCOPY WITH CO2 INSUFFLATION N/A 1/16/2015    Procedure: COLONOSCOPY WITH CO2 INSUFFLATION;  Surgeon: Sarah Beth Pisano MD;  Location: [facility] OR     COLONOSCOPY WITH CO2 INSUFFLATION N/A 9/7/2018    Procedure: COLONOSCOPY WITH CO2 INSUFFLATION;  C18.9 (ICD-10-CM) - Malignant neoplasm of colon, unspecified part of colon (H)  [facility] fax# [phone]  BMI 26.29  [employer]  Referred by dr thomas;  Surgeon: Sarah Beth Pisano MD;  Location: [facility] OR     DAVINCI PROSTATECTOMY  8/6/2012    Procedure: DAVINCI PROSTATECTOMY;  Davinci Assisted Radical Prostatectomy with Bilateral Lymphadenectomy ;  Surgeon: Norma Goodwin MD;  Location: [facility] OR     GENITOURINARY SURGERY      prostate surgery     INJECT EPIDURAL LUMBAR / SACRAL SINGLE Left 10/30/2017    Procedure: INJECT EPIDURAL LUMBAR / SACRAL SINGLE;  Left Transforaminal Lumbar 4-Lumbar 5 Epidural Steroid Injection;  Surgeon: Nuvia Reina MD;  Location: [facility]     LAPAROSCOPIC ASSISTED COLECTOMY N/A 2/11/2015    Procedure: LAPAROSCOPIC ASSISTED COLECTOMY;  Surgeon: Oren Breen MD;  Location: [facility] OR     PHACOEMULSIFICATION CLEAR CORNEA WITH STANDARD INTRAOCULAR LENS IMPLANT Left 2/15/2018    Procedure: PHACOEMULSIFICATION CLEAR CORNEA WITH STANDARD INTRAOCULAR LENS IMPLANT;  LEFT EYE CATARACT EXTRACTION WITH STANDARD INTRAOCULAR LENS IMPLANT ;  Surgeon: Brandon Orellana MD;  Location: [facility]     PHACOEMULSIFICATION CLEAR CORNEA WITH STANDARD INTRAOCULAR LENS IMPLANT Right 3/1/2018    Procedure: PHACOEMULSIFICATION CLEAR CORNEA WITH STANDARD INTRAOCULAR LENS IMPLANT;  RIGHT EYE CATARACT EXTRACTION WITH STANDARD INTRAOCULAR LENS IMPLANT ;  Surgeon: Brandon Orellana MD;  Location: [facility]       PHYSICAL EXAM:  Vital Signs: /86   Pulse 65   Temp 97  F (36.1  C) (Oral)   Resp 16   Wt 90.5 kg (199 lb 8.3 oz)   SpO2 98%   BMI 28.63 kg/m    Gen: NAD  Eyes: EOMI, sclera anicteric  HENT      Head: NC/AT      Ears: No external auricular lesions     Nose/sinus: No rhinorrhea or epistaxis     Oral Cavity/Oropharynx: MMM  Pulm: Breathing comfortably on room air, CTABL  CV: No visible cyanosis, RRR  Abd: Soft distended abdomen.  Central abdominal scar terminating at the umbilicus.   Skin: Normal color and turgor of the visualized skin  Neurologic/MSK/psych: A&Ox3, Gross motor movements against gravity noted in the upper and lower extremities bilaterally, denies neuropathy of the upper or lower extremities.     MEDICATIONS:  Current Outpatient Medications   Medication Sig Dispense Refill     allopurinol (ZYLOPRIM) 100 MG tablet Take 1 tablet (100 mg) by mouth daily 90 tablet 1     aspirin 81 MG tablet Take 1 tablet (81 mg) by mouth daily 30 tablet      azelastine (OPTIVAR) 0.05 % SOLN ophthalmic solution Apply 1 drop to eye 2 times daily 1 Bottle 6     bicalutamide (CASODEX) 50 MG tablet TAKE ONE TABLET BY MOUTH ONCE DAILY 90 tablet 3     calcium-vitamin D (CALTRATE) 600-400 MG-UNIT per tablet Take 1 tablet by mouth daily       clonazePAM (KLONOPIN) 1 MG tablet Take 1 tablet (1 mg) by mouth nightly as needed for anxiety 30 tablet 5     folic acid-vit B6-vit B12 (FOLGARD) 0.8-10-0.115 MG TABS Take 1 tablet by mouth daily       IBUPROFEN PO Take 400 mg by mouth every 8 hours as needed for moderate pain       leuprolide (LUPRON DEPOT) 45 MG kit Inject 45 mg into the muscle every 6 months 1 each 0     lisinopril (PRINIVIL/ZESTRIL) 30 MG tablet TAKE 1 TABLET BY MOUTH ONCE DAILY 90 tablet 1     Loratadine (CLARITIN PO) Take  by mouth. As needed        naproxen (NAPROSYN) 500 MG tablet Take 1 tablet (500 mg) by mouth daily as needed for moderate pain 30 tablet 0     Omega-3 Fatty Acids (OMEGA-3 FISH OIL PO) Take 500 mg by mouth daily       sildenafil (VIAGRA) 100 MG tablet Take 1 tablet (100 mg) by mouth daily as needed (erectile dysfunction) 30 tablet 0     sildenafil (VIAGRA) 100 MG tablet 1/2 tab three times a week 10 tablet 12      triamcinolone (KENALOG) 0.1 % cream        zolpidem (AMBIEN) 5 MG tablet Take 1 tablet (5 mg) by mouth nightly as needed for sleep 30 tablet 5        LABS: Reviewed.    PSA:     1/9/18: < 0.01    1/23/19: 0.04    CEA:    1/21/2015 09:15 10/8/2015 13:56 4/6/2016 08:54 10/25/2016 12:28 4/24/2017 10:07   CEA 1.8 2.4 3.0 (H) 3.0 (H) 3.4 (H)     IMAGING: No recent imaging within the last year    IMPRESSION:   Medardo Gautam is a 79 year old male with history of prostate cancer status post neoadjuvant chemo therapy, prostatectomy with adjuvant radiotherapy (completed:10/18/2013) and stage I colon cancer status post hemicolectomy with no adjuvant therapy (completed: 2/11/2015).  He currently remains JAGJIT in regards to his colon cancer and has slight evidence of biochemical progression on most recent PSA laboratory testing (0.04 on 1/23/2019) whilst on maintenance ADT with single agent leuprolide.      At this time, he is an appropriate candidate for continued oncologic surveillance as well as survivorship management.  His last colonoscopy was on 9/7/2018 and is due for his next in 3 years.  Given that his last testing showed a slightly detectable PSA 0.04, he should continue on close PSA monitoring with future consideration of intensification or second line systemic therapy to maintain biochemical remission should his PSA continue to trend upwards.  He has a very minor history of smoking and does not meet candidacy for lung cancer screening. He does not require genetic testing given our assessment of his relatively low risk of a genetic syndrome associated cancer.     PLAN:   1. Continued biochemical surveillance for prostate cancer with next appointment with Dr. Streeter in 07/2019  2. Continued colonoscopic surveillance for colon cancer (next due in 2021)  3. CEA monitoring for colon cancer diagnosis at next lab draw. We made plans to call Mr. Gautam with his results.   4. Low threshold for bladder cancer investigation  should symptoms develop.  Mr. Gautam was counseled as to symptoms of bladder cancer.  5. He should continue routine follow-up with his PCP  6. Return to cancer survivorship clinic PRN.        Addendum:  Pt was seen and evaluated by me with Dr Barahona.  I edited the above note to reflect my evaluation.  No signs of recurrence or late effects.  We reviewed LTFU guidelines.    Assuming CEA is normal at next visit, pt will return to the survivorship clinic prn and will follow up with urology for psa monitoring.    Nola Navas MD    CC  Patient Care Team:  Verna Osborne MD as PCP - General (Family Practice)  Charlotte Castellanos MD as MD (Internal Medicine)  Melba Rousseau, RN as Clinic Care Coordinator (Colon and Rectal Surgery)  Ramon Daily MD as MD (Oncology)  Iesha Keller, RN as Nurse Coordinator (Oncology)  Verna Osborne MD as Assigned PCP

## 2019-05-06 DIAGNOSIS — F41.9 ANXIETY: ICD-10-CM

## 2019-05-06 RX ORDER — CLONAZEPAM 1 MG/1
TABLET ORAL
Qty: 30 TABLET | Refills: 0 | Status: SHIPPED | OUTPATIENT
Start: 2019-05-06 | End: 2019-06-04

## 2019-05-06 NOTE — TELEPHONE ENCOUNTER
Klonopin      Last Written Prescription Date:  10/9/2018  Last Fill Quantity: 30 tablets,   # refills: 5  Last Office Visit: 1/9/2019  Future Office visit:       Routing refill request to provider for review/approval because:  Drug not on the [facility], [facility] or [facility] refill protocol or controlled substance    Mary Alice Galo RN, BSN

## 2019-05-07 NOTE — TELEPHONE ENCOUNTER
LM for patient to return phone call to clinic about message below.  Faxed script to [facility] [place].  Sarah Beth Marie CMA ([facility])

## 2019-05-07 NOTE — TELEPHONE ENCOUNTER
1 month refill placed in bin but due for f/u before further refills since she has not addressed this controlled medication in 6 months.    Chip Marquis PA-C

## 2019-06-04 DIAGNOSIS — F41.9 ANXIETY: ICD-10-CM

## 2019-06-04 RX ORDER — CLONAZEPAM 1 MG/1
TABLET ORAL
Qty: 15 TABLET | Refills: 0 | Status: SHIPPED | OUTPATIENT
Start: 2019-06-04 | End: 2019-06-19

## 2019-06-04 NOTE — TELEPHONE ENCOUNTER
Faxed scripts to [facility].  Will await patient's call- needs an appointment prior to any further refills.  Sarah Beth Marie CMA ([facility])

## 2019-06-04 NOTE — TELEPHONE ENCOUNTER
Pending Prescriptions:                       Disp   Refills    clonazePAM (KLONOPIN) 1 MG tablet [Pharma*30 tab*0            Sig: TAKE 1 TABLET BY MOUTH IN THE EVENING AS NEEDED           FOR ANXIETY        Last Written Prescription Date:  5/6/2019  Last Fill Quantity: 30,   # refills: 0  Last Office Visit: 1/9/2019  Future Office visit:    Next 5 appointments (look out 90 days)    Jun 26, 2019 10:40 AM CDT  [facility] [address] [place] [id number]  [phone]           Routing refill request to provider for review/approval because:  Drug not on the [facility], [facility] or  Health refill protocol or controlled substance    Kristy Hidalgo, RN, BSN

## 2019-06-11 DIAGNOSIS — I10 BENIGN ESSENTIAL HYPERTENSION: ICD-10-CM

## 2019-06-11 RX ORDER — LISINOPRIL 30 MG/1
TABLET ORAL
Qty: 90 TABLET | Refills: 1 | OUTPATIENT
Start: 2019-06-11

## 2019-06-11 NOTE — TELEPHONE ENCOUNTER
Lisinopril  Sent 1/14/19 with 6 month supply. Refill not appropriate at this time.     Next 5 appointments (look out 90 days)    Jun 26, 2019 10:40 AM CDT  Office Visit with Verna Osborne MD  [facility] ([facility]) [address] [place] [address] [address]  [facility] [address]  [phone]        Laura Blevins RN, BSN

## 2019-06-19 DIAGNOSIS — F41.9 ANXIETY: ICD-10-CM

## 2019-06-19 RX ORDER — CLONAZEPAM 1 MG/1
TABLET ORAL
Qty: 10 TABLET | Refills: 0 | Status: SHIPPED | OUTPATIENT
Start: 2019-06-19 | End: 2019-07-02

## 2019-06-19 NOTE — TELEPHONE ENCOUNTER
Pending Prescriptions:                       Disp   Refills    clonazePAM (KLONOPIN) 1 MG tablet         15 tab*0             Last Written Prescription Date:  6/4/19  Last Fill Quantity: 15,   # refills: 0  Last Office Visit: 1/9/19  Future Office visit:    Next 5 appointments (look out 90 days)    Jun 26, 2019 10:40 AM CDT  Office Visit with Verna Osborne MD  [facility] ([facility]) [address] [place] [id number]  [phone]         Routing refill request to provider for review/approval because:  Drug not on the [facility], [facility] or  Health refill protocol or controlled substance    Stephanie Goetz, RN, BSN

## 2019-06-24 DIAGNOSIS — G47.00 INSOMNIA, UNSPECIFIED TYPE: ICD-10-CM

## 2019-06-24 NOTE — TELEPHONE ENCOUNTER
Pt came in today requesting the ambien to be refilled. He is down to his last pill. He would like a refill sent asap. He was scheduled with RK until she had to r.s please advise.

## 2019-06-25 RX ORDER — ZOLPIDEM TARTRATE 5 MG/1
TABLET ORAL
Qty: 30 TABLET | Refills: 0 | Status: SHIPPED | OUTPATIENT
Start: 2019-06-25 | End: 2019-07-22

## 2019-06-25 NOTE — TELEPHONE ENCOUNTER
Patient came in to the clinic, patient states that he took his last pill and is in need of more medication.

## 2019-06-25 NOTE — TELEPHONE ENCOUNTER
Pending Prescriptions:                       Disp   Refills    zolpidem (AMBIEN) 5 MG tablet [Pharmacy Me*30 tab*5        Sig: TAKE 1 TABLET BY MOUTH NIGHTLY AS NEEDED FOR SLEEP    Routing refill request to provider for review/approval because:  Drug not on the [facility] refill protocol

## 2019-06-25 NOTE — TELEPHONE ENCOUNTER
RK patient. Chart reviewed. Last seen for this 1/9/19. RK had to reschedule on him.   Rescheduled with her for 7/2/19 ]    OK for refill. Please notify patient. Rx signed and placed in MA task.     Herbert Martini PA-C  [facility]

## 2019-07-02 ENCOUNTER — TELEPHONE (OUTPATIENT)
Dept: FAMILY MEDICINE | Facility: OTHER | Age: 79
End: 2019-07-02

## 2019-07-02 ENCOUNTER — OFFICE VISIT (OUTPATIENT)
Dept: FAMILY MEDICINE | Facility: OTHER | Age: 79
End: 2019-07-02
Payer: COMMERCIAL

## 2019-07-02 VITALS
TEMPERATURE: 97.9 F | HEIGHT: 70 IN | OXYGEN SATURATION: 100 % | RESPIRATION RATE: 16 BRPM | HEART RATE: 70 BPM | WEIGHT: 195.5 LBS | BODY MASS INDEX: 27.99 KG/M2 | SYSTOLIC BLOOD PRESSURE: 134 MMHG | DIASTOLIC BLOOD PRESSURE: 78 MMHG

## 2019-07-02 DIAGNOSIS — R73.9 HYPERGLYCEMIA: Primary | ICD-10-CM

## 2019-07-02 DIAGNOSIS — F41.9 ANXIETY: ICD-10-CM

## 2019-07-02 DIAGNOSIS — I10 BENIGN ESSENTIAL HYPERTENSION: ICD-10-CM

## 2019-07-02 DIAGNOSIS — I10 ESSENTIAL HYPERTENSION WITH GOAL BLOOD PRESSURE LESS THAN 140/90: ICD-10-CM

## 2019-07-02 LAB
ANION GAP SERPL CALCULATED.3IONS-SCNC: 4 MMOL/L (ref 3–14)
BUN SERPL-MCNC: 13 MG/DL (ref 7–30)
CALCIUM SERPL-MCNC: 9.2 MG/DL (ref 8.5–10.1)
CHLORIDE SERPL-SCNC: 110 MMOL/L (ref 94–109)
CO2 SERPL-SCNC: 29 MMOL/L (ref 20–32)
CREAT SERPL-MCNC: 0.87 MG/DL (ref 0.66–1.25)
GFR SERPL CREATININE-BSD FRML MDRD: 83 ML/MIN/{1.73_M2}
GLUCOSE SERPL-MCNC: 170 MG/DL (ref 70–99)
POTASSIUM SERPL-SCNC: 4.5 MMOL/L (ref 3.4–5.3)
SODIUM SERPL-SCNC: 143 MMOL/L (ref 133–144)

## 2019-07-02 PROCEDURE — 99214 OFFICE O/P EST MOD 30 MIN: CPT | Performed by: FAMILY MEDICINE

## 2019-07-02 PROCEDURE — 36415 COLL VENOUS BLD VENIPUNCTURE: CPT | Performed by: FAMILY MEDICINE

## 2019-07-02 PROCEDURE — 99207 C PAF COMPLETED  NO CHARGE: CPT | Performed by: FAMILY MEDICINE

## 2019-07-02 PROCEDURE — 80048 BASIC METABOLIC PNL TOTAL CA: CPT | Performed by: FAMILY MEDICINE

## 2019-07-02 RX ORDER — LISINOPRIL 40 MG/1
40 TABLET ORAL DAILY
Qty: 90 TABLET | Refills: 1 | Status: SHIPPED | OUTPATIENT
Start: 2019-07-02 | End: 2019-10-25

## 2019-07-02 RX ORDER — CLONAZEPAM 1 MG/1
TABLET ORAL
Qty: 30 TABLET | Refills: 5 | Status: SHIPPED | OUTPATIENT
Start: 2019-07-02 | End: 2019-12-19

## 2019-07-02 ASSESSMENT — MIFFLIN-ST. JEOR: SCORE: 1618.03

## 2019-07-02 ASSESSMENT — ANXIETY QUESTIONNAIRES
2. NOT BEING ABLE TO STOP OR CONTROL WORRYING: NOT AT ALL
7. FEELING AFRAID AS IF SOMETHING AWFUL MIGHT HAPPEN: NOT AT ALL
6. BECOMING EASILY ANNOYED OR IRRITABLE: NOT AT ALL
GAD7 TOTAL SCORE: 4
3. WORRYING TOO MUCH ABOUT DIFFERENT THINGS: SEVERAL DAYS
IF YOU CHECKED OFF ANY PROBLEMS ON THIS QUESTIONNAIRE, HOW DIFFICULT HAVE THESE PROBLEMS MADE IT FOR YOU TO DO YOUR WORK, TAKE CARE OF THINGS AT HOME, OR GET ALONG WITH OTHER PEOPLE: NOT DIFFICULT AT ALL
1. FEELING NERVOUS, ANXIOUS, OR ON EDGE: SEVERAL DAYS
5. BEING SO RESTLESS THAT IT IS HARD TO SIT STILL: SEVERAL DAYS

## 2019-07-02 ASSESSMENT — PAIN SCALES - GENERAL: PAINLEVEL: NO PAIN (0)

## 2019-07-02 ASSESSMENT — PATIENT HEALTH QUESTIONNAIRE - PHQ9: 5. POOR APPETITE OR OVEREATING: SEVERAL DAYS

## 2019-07-02 NOTE — TELEPHONE ENCOUNTER
Pt marielena called back and was given the message. He will come in when he gets back from out of town.

## 2019-07-02 NOTE — TELEPHONE ENCOUNTER
----- Message from Verna Osborne MD sent at 7/2/2019  2:31 PM CDT -----  Please inform patient that the blood chemistries show normal kidney function except for elevated blood glucose levels raising concerns for prediabetes.  He would need a diagnostic test to rule out diabetes.  Orders placed.  Please have him come to the test on a fasting blood sample.

## 2019-07-02 NOTE — ASSESSMENT & PLAN NOTE
Blood pressures are moderately controlled on the current dose of lisinopril.  Will increase the dose to 40 mg daily.  Refill medications.  Recheck in 3 months for follow-up.

## 2019-07-03 ASSESSMENT — ANXIETY QUESTIONNAIRES: GAD7 TOTAL SCORE: 4

## 2019-07-10 ENCOUNTER — ALLIED HEALTH/NURSE VISIT (OUTPATIENT)
Dept: FAMILY MEDICINE | Facility: OTHER | Age: 79
End: 2019-07-10

## 2019-07-10 VITALS — SYSTOLIC BLOOD PRESSURE: 136 MMHG | DIASTOLIC BLOOD PRESSURE: 74 MMHG

## 2019-07-10 DIAGNOSIS — R73.9 HYPERGLYCEMIA: ICD-10-CM

## 2019-07-10 DIAGNOSIS — I10 ESSENTIAL HYPERTENSION WITH GOAL BLOOD PRESSURE LESS THAN 140/90: Primary | ICD-10-CM

## 2019-07-10 DIAGNOSIS — C61 PROSTATE CANCER (H): ICD-10-CM

## 2019-07-10 LAB
HBA1C MFR BLD: 6.2 % (ref 0–5.6)
PSA SERPL-MCNC: 0.07 UG/L (ref 0–4)

## 2019-07-10 PROCEDURE — 36415 COLL VENOUS BLD VENIPUNCTURE: CPT | Performed by: FAMILY MEDICINE

## 2019-07-10 PROCEDURE — 84153 ASSAY OF PSA TOTAL: CPT | Performed by: FAMILY MEDICINE

## 2019-07-10 PROCEDURE — 84403 ASSAY OF TOTAL TESTOSTERONE: CPT | Performed by: FAMILY MEDICINE

## 2019-07-10 PROCEDURE — 83036 HEMOGLOBIN GLYCOSYLATED A1C: CPT | Performed by: FAMILY MEDICINE

## 2019-07-10 PROCEDURE — 99207 ZZC NO CHARGE NURSE ONLY: CPT | Performed by: FAMILY MEDICINE

## 2019-07-10 NOTE — PROGRESS NOTES
Medardo Gautam was evaluated at [facility] on July 10, 2019 at which time his blood pressure was:    BP Readings from Last 3 Encounters:   07/10/19 136/74   07/02/19 134/78   04/29/19 148/86     Pulse Readings from Last 3 Encounters:   07/02/19 70   04/29/19 65   01/23/19 70       Reviewed lifestyle modifications for blood pressure control and reduction: including making healthy food choices, managing weight, getting regular exercise, smoking cessation, reducing alcohol consumption, monitoring blood pressure regularly.     Symptoms: None    BP Goal:< 140/90 mmHg    BP Assessment:  BP at goal    Potential Reasons for BP too high: NA - Not applicable    BP Follow-Up Plan: Recheck BP in 6 months at pharmacy    Recommendation to Provider: none    Note completed by:  Mignon Walls, Pharm.D., [facility], [phone]

## 2019-07-12 LAB — TESTOST SERPL-MCNC: 12 NG/DL (ref 240–950)

## 2019-07-18 ENCOUNTER — TELEPHONE (OUTPATIENT)
Dept: UROLOGY | Facility: CLINIC | Age: 79
End: 2019-07-18

## 2019-07-18 NOTE — TELEPHONE ENCOUNTER
[facility] Call Center    Phone Message    May a detailed message be left on voicemail: yes    Reason for Call: Other: Pt had cancelled his appt with Dr. Streeter on 7/22, as he had a conflict at this time. He called back to ask for the 7/22 spot again, as he moved around his schedule and was hoping to see provider. No openings left on 7/22; pt had been set for 10:45. Next available with provider is 8/29. Pt is very anxious to see Dr. Streeter, as he is undergoing cancer treatment. Please advise if pt can be put back on the schedule for 7/22.    Action Taken: Message routed to:  [facility] ([facility] U[facility]

## 2019-07-18 NOTE — TELEPHONE ENCOUNTER
Patient called and wants to get in soon please.  Spoke to Tess and scheduled for July 29th at 8am Beth Salinas LPN Staff Nurse

## 2019-07-22 DIAGNOSIS — G47.00 INSOMNIA, UNSPECIFIED TYPE: ICD-10-CM

## 2019-07-22 NOTE — TELEPHONE ENCOUNTER
Pending Prescriptions:                       Disp   Refills    zolpidem (AMBIEN) 5 MG tablet             30 tab*0            Sig: Take 1 tablet (5 mg) by mouth nightly as needed        Last Written Prescription Date:  6/25/19  Last Fill Quantity: 30,   # refills: 0  Last Office Visit: 7/2/19  Future Office visit:       Routing refill request to provider for review/approval because:  Drug not on the [facility], [facility] or [facility] refill protocol or controlled substance    Stephanie Goetz, RN, BSN

## 2019-07-23 ENCOUNTER — PRE VISIT (OUTPATIENT)
Dept: UROLOGY | Facility: CLINIC | Age: 79
End: 2019-07-23

## 2019-07-23 RX ORDER — ZOLPIDEM TARTRATE 5 MG/1
5 TABLET ORAL
Qty: 30 TABLET | Refills: 0 | Status: SHIPPED | OUTPATIENT
Start: 2019-07-23 | End: 2019-08-20

## 2019-07-23 NOTE — TELEPHONE ENCOUNTER
Chief Complaint : 6 month PSA and testosterone     Records/Orders: labs in epic     Pt Contacted: no    At Rooming: normal

## 2019-07-25 DIAGNOSIS — C61 MALIGNANT NEOPLASM OF PROSTATE (H): ICD-10-CM

## 2019-07-25 DIAGNOSIS — C61 PROSTATE CANCER (H): ICD-10-CM

## 2019-07-29 ENCOUNTER — OFFICE VISIT (OUTPATIENT)
Dept: UROLOGY | Facility: CLINIC | Age: 79
End: 2019-07-29
Payer: COMMERCIAL

## 2019-07-29 VITALS
HEIGHT: 70 IN | BODY MASS INDEX: 27.54 KG/M2 | HEART RATE: 64 BPM | SYSTOLIC BLOOD PRESSURE: 144 MMHG | WEIGHT: 192.4 LBS | DIASTOLIC BLOOD PRESSURE: 84 MMHG

## 2019-07-29 DIAGNOSIS — C61 PROSTATE CANCER (H): ICD-10-CM

## 2019-07-29 DIAGNOSIS — C61 MALIGNANT NEOPLASM OF PROSTATE (H): Primary | ICD-10-CM

## 2019-07-29 PROBLEM — K80.70: Status: ACTIVE | Noted: 2017-03-18

## 2019-07-29 PROBLEM — M25.562 LATERAL KNEE PAIN, LEFT: Status: ACTIVE | Noted: 2019-07-29

## 2019-07-29 ASSESSMENT — PAIN SCALES - GENERAL: PAINLEVEL: NO PAIN (0)

## 2019-07-29 ASSESSMENT — MIFFLIN-ST. JEOR: SCORE: 1603.97

## 2019-07-29 NOTE — PROGRESS NOTES
Urology Clinic    Eddy Streeter MD  Date of Service: 2019     Name: Medardo Gautam  MRN: [id number]  Age: 79 year old  : 1940  Referring provider: Verna Osborne     Assessment and Plan:    #Prostate Cancer: [place] 9 cancer s/p RP in , salvage XRT + ADT, and subsequent BCR managed w/ lupron and bicalutamide. PSA now slightly detectable over the last 7 months, from 0.04 to 0.07 in January and July respectively. Testosterone levels still stable. Plan to refer to medical oncology for further management.   --Lupron today  --Refer to medical oncology for further management, follow up with us prn     Attestation:  This patient was seen and evaluated by me, with a scribe taking notes.  I have reviewed the note above and agree.  The physical exam and or any procedures were performed by me and the pertinant details are outlined below.       Eddy Streeter MD  Department of Urology  [facility]    ______________________________________________________________________    HPI  Medardo Gautam is a 79 year old male with a urologic historyof [place] 9 prostate cancer s/p RP in  with salvage XRT and ADT with subsequent BCR. He is now receiving ADT with bicalutamide and lupron every 6 months. PSA has been undetectable for years.    Review of Systems:   Pertinent items are noted in HPI or as below, remainder of complete ROS is negative.      Physical Exam:   Patient is a 79 year old  male   Vitals: There were no vitals taken for this visit.  General Appearance Adult: Alert, no acute distress, oriented  HENT: throat/mouth:normal, good dentition  Lungs: no respiratory distress, or pursed lip breathing  Heart: No obvious jugular venous distension present  Abdomen: There is no height or weight on file to calculate BMI.  Musculoskeltal: extremities normal  Skin: no visible suspicious lesions or rashes  Neuro: Alert, oriented, speech and mentation normal  Psych: affect and mood  normal  Gait: Normal  : deferred    Laboratory:   I reviewed all applicable laboratory and pathology data and went over findings with patient  Significant for     PSA   Date Value Ref Range Status   07/10/2019 0.07 0 - 4 ug/L Final     Comment:     Assay Method:  Chemiluminescence using Siemens Vista analyzer   01/23/2019 0.04 0 - 4 ug/L Final     Comment:     Assay Method:  Chemiluminescence using Siemens Vista analyzer   01/09/2018 <0.01 0 - 4 ug/L Final     Comment:     Assay Method:  Chemiluminescence using Siemens Vista analyzer   11/14/2017 0.01 0 - 4 ug/L Final     Comment:     Assay Method:  Chemiluminescence using Siemens Vista analyzer   07/19/2017  0 - 4 ug/L Final    <0.01  Assay Method:  Chemiluminescence using Siemens Vista analyzer     03/08/2017  0 - 4 ug/L Final    <0.01  Assay Method:  Chemiluminescence using Siemens Vista analyzer     05/23/2016 <0.01 0 - 4 ug/L Final   08/19/2015 <0.01 0 - 4 ug/L Final   07/28/2014 <0.07 0 - 4 ug/L Final   01/10/2014 <0.07 0 - 4 ug/L Final     Testosterone Total: 12 (low)    Imaging:   I personally reviewed all applicable imaging and went over the below findings with patient.    Results for orders placed or performed in visit on 04/17/18   XR Wrist Right G/E 3 Views    Narrative    WRIST RIGHT THREE OR MORE VIEWS April 17, 2018 10:47 AM     HISTORY: Right wrist pain.    COMPARISON: None.      FINDINGS: There is irregularity of the ulnar aspect of the distal  radius including the articular surface most consistent with  intra-articular distal radial epiphyseal and metaphyseal fracture.  This appears subacute or chronic but is not completely healed. There  is buckling of the radial and ulnar cortices of the scaphoid waist  which could represent a not significantly displaced scaphoid fracture  in the appropriate clinical setting although this could also be  secondary to chronic changes. There is widening of the scapholunate  space indicating scapholunate ligament  tear. No other fractures are  identified. Joint space loss is seen at the second and third  metacarpophalangeal joints with associated mild osteophytosis. Mild  STT joint space loss of the wrist is noted. Remaining joint spaces  appear grossly maintained.      Impression    IMPRESSION:    1. Intra-articular, epiphyseal and metaphyseal fracture of the ulnar  aspect of the distal radius of indeterminate age but may be subacute  or chronic. An acute extension is not excluded.  2. Scapholunate ligament injury given the widening of scapholunate  space.  3. Irregularity of the cortices of the scaphoid waist could represent  a subtle not significantly displaced fracture in the appropriate  setting.  4. Diffuse osteopenia.  5. Degenerative changes of the STT and second and third  metacarpophalangeal joints.    I discussed these findings with Gayle Garner on 4/17/2018 at 11:09  AM.    SHELLEY GARCIA MD       Scribe Disclosure:  ISydni, am serving as a scribe to document services personally performed by Eddy Streeter MD at this visit, based upon the provider's statements to me. All documentation has been reviewed by the aforementioned provider prior to being entered into the official medical record.

## 2019-07-29 NOTE — NURSING NOTE
"Chief Complaint   Patient presents with     RECHECK     prostate cancer follow up       Blood pressure 144/84, pulse 64, height 1.778 m (5' 10\"), weight 87.3 kg (192 lb 6.4 oz). Body mass index is 27.61 kg/m .    Patient Active Problem List   Diagnosis     Disturbance of skin sensation     Hemorrhoids     Gout     Advanced directives, counseling/discussion     Dupuytren's contracture of hand- left 5th finger, right 5th finger     Bunions- both feet     Skin lesion- R side of face and behind L ear     Insomnia     Prostatic nodule- posterior right edge with rectal exam     Prostate cancer- [place] 9 (5+4) dx Feb 2012     Elevated liver enzymes     Hyperbilirubinemia     Essential hypertension with goal blood pressure less than 140/90     Contracture of finger joint     Hyperlipidemia LDL goal <130     Malignant neoplasm of prostate (H)     Anxiety     Colon cancer (H)     Abdominal pain, epigastric     Renal cyst     Lumbar radiculopathy     Meibomian gland dysfunction     Pseudophakia of right eye     Macular degeneration     Dermatochalasis of eyelid     Olecranon bursitis of right elbow     Right knee pain       No Known Allergies    Current Outpatient Medications   Medication Sig Dispense Refill     allopurinol (ZYLOPRIM) 100 MG tablet Take 1 tablet (100 mg) by mouth daily 90 tablet 1     aspirin 81 MG tablet Take 1 tablet (81 mg) by mouth daily 30 tablet      azelastine (OPTIVAR) 0.05 % SOLN ophthalmic solution Apply 1 drop to eye 2 times daily 1 Bottle 6     bicalutamide (CASODEX) 50 MG tablet TAKE ONE TABLET BY MOUTH ONCE DAILY 90 tablet 3     calcium-vitamin D (CALTRATE) 600-400 MG-UNIT per tablet Take 1 tablet by mouth daily       clonazePAM (KLONOPIN) 1 MG tablet TAKE 1 TABLET BY MOUTH IN THE EVENING AS NEEDED FOR ANXIETY 30 tablet 5     folic acid-vit B6-vit B12 (FOLGARD) 0.8-10-0.115 MG TABS Take 1 tablet by mouth daily       IBUPROFEN PO Take 400 mg by mouth every 8 hours as needed for moderate pain   "     leuprolide (LUPRON DEPOT) 45 MG kit Inject 45 mg into the muscle every 6 months 1 each 0     lisinopril (PRINIVIL/ZESTRIL) 40 MG tablet Take 1 tablet (40 mg) by mouth daily 90 tablet 1     Loratadine (CLARITIN PO) Take  by mouth. As needed        naproxen (NAPROSYN) 500 MG tablet Take 1 tablet (500 mg) by mouth daily as needed for moderate pain 30 tablet 0     Omega-3 Fatty Acids (OMEGA-3 FISH OIL PO) Take 500 mg by mouth daily       sildenafil (VIAGRA) 100 MG tablet Take 1 tablet (100 mg) by mouth daily as needed (erectile dysfunction) 30 tablet 0     sildenafil (VIAGRA) 100 MG tablet 1/2 tab three times a week 10 tablet 12     triamcinolone (KENALOG) 0.1 % cream        zolpidem (AMBIEN) 5 MG tablet Take 1 tablet (5 mg) by mouth nightly as needed 30 tablet 0       Social History     Tobacco Use     Smoking status: Former Smoker     Years: 1.00     Types: Cigarettes, Cigars, Pipe     Start date: 10/1/1961     Last attempt to quit: 1962     Years since quittin.6     Smokeless tobacco: Never Used     Tobacco comment: No smokers in home   Substance Use Topics     Alcohol use: Yes     Alcohol/week: 0.0 oz     Comment: daily glass of wine and whiskey     Drug use: Yes     Types: Benzodiazepines       PEGGY Matute  2019  8:25 AM         The following medication was given:     MEDICATION:  Lupron Depot 45 mg  ROUTE: IM  SITE: Ventrogluteal - Left  DOSE: 45 mg  LOT #: [id number]  : [employer]  EXPIRATION DATE: 2021  NDC#: [address]-17   Was there drug waste? No    Prior to injection, verified patient identity using patient's name and date of birth.  Due to injection administration, patient instructed to remain in clinic for 15 minutes  afterwards, and to report any adverse reaction to me immediately.    Drug Amount Wasted:  None.  Vial/Syringe: Syringe    Naty Coello LPN  2019

## 2019-07-29 NOTE — PATIENT INSTRUCTIONS
Please make a appointment with      It was a pleasure meeting with you today.  Thank you for allowing me and my team the privilege of caring for you today.  YOU are the reason we are here, and I truly hope we provided you with the excellent service you deserve.  Please let us know if there is anything else we can do for you so that we can be sure you are leaving completely satisfied with your care experience.

## 2019-07-29 NOTE — LETTER
2019       RE: Medardo Gautam  [id number] [address] [id number]     Dear Colleague,    Thank you for referring your patient, Medardo Guatam, to the [facility] UROLOGY AND INST FOR PROSTATE AND UROLOGIC CANCERS at [facility]. Please see a copy of my visit note below.      Urology Clinic    Eddy Streeter MD  Date of Service: 2019     Name: Medardo Gautam  MRN: [id number]  Age: 79 year old  : 1940  Referring provider: Verna Osborne     Assessment and Plan:    #Prostate Cancer: [place] 9 cancer s/p RP in , salvage XRT + ADT, and subsequent BCR managed w/ lupron and bicalutamide.  PSA now slightly detectable over the last 7 months, from 0.04 to 0.07 in January and July respectively. Testosterone levels still stable. Plan to refer to medical oncology for further management.   --Lupron today  --Refer to medical oncology for further management, follow up with us prn     Attestation:  This patient was seen and evaluated by me, with a scribe taking notes.  I have reviewed the note above and agree.  The physical exam and or any procedures were performed by me and the pertinant details are outlined below.       Eddy Streeter MD  Department of Urology  [facility]    ______________________________________________________________________    HPI  eMdardo Gautam is a 79 year old male with a urologic historyof [place] 9 prostate cancer s/p RP in  with salvage XRT and ADT with subsequent BCR. He is now receiving ADT with bicalutamide and lupron every 6 months. PSA has been undetectable for years.    Review of Systems:   Pertinent items are noted in HPI or as below, remainder of complete ROS is negative.      Physical Exam:   Patient is a 79 year old  male   Vitals: There were no vitals taken for this visit.  General Appearance Adult: Alert, no acute distress, oriented  HENT: throat/mouth:normal, good dentition  Lungs: no  respiratory distress, or pursed lip breathing  Heart: No obvious jugular venous distension present  Abdomen: There is no height or weight on file to calculate BMI.  Musculoskeltal: extremities normal  Skin: no visible suspicious lesions or rashes  Neuro: Alert, oriented, speech and mentation normal  Psych: affect and mood normal  Gait: Normal  : deferred    Laboratory:   I reviewed all applicable laboratory and pathology data and went over findings with patient  Significant for     PSA   Date Value Ref Range Status   07/10/2019 0.07 0 - 4 ug/L Final     Comment:     Assay Method:  Chemiluminescence using Siemens Vista analyzer   01/23/2019 0.04 0 - 4 ug/L Final     Comment:     Assay Method:  Chemiluminescence using Siemens Vista analyzer   01/09/2018 <0.01 0 - 4 ug/L Final     Comment:     Assay Method:  Chemiluminescence using Siemens Vista analyzer   11/14/2017 0.01 0 - 4 ug/L Final     Comment:     Assay Method:  Chemiluminescence using Siemens Vista analyzer   07/19/2017  0 - 4 ug/L Final    <0.01  Assay Method:  Chemiluminescence using Siemens Vista analyzer     03/08/2017  0 - 4 ug/L Final    <0.01  Assay Method:  Chemiluminescence using Siemens Vista analyzer     05/23/2016 <0.01 0 - 4 ug/L Final   08/19/2015 <0.01 0 - 4 ug/L Final   07/28/2014 <0.07 0 - 4 ug/L Final   01/10/2014 <0.07 0 - 4 ug/L Final     Testosterone Total: 12 (low)    Imaging:   I personally reviewed all applicable imaging and went over the below findings with patient.    Results for orders placed or performed in visit on 04/17/18   XR Wrist Right G/E 3 Views    Narrative    WRIST RIGHT THREE OR MORE VIEWS April 17, 2018 10:47 AM     HISTORY: Right wrist pain.    COMPARISON: None.      FINDINGS: There is irregularity of the ulnar aspect of the distal  radius including the articular surface most consistent with  intra-articular distal radial epiphyseal and metaphyseal fracture.  This appears subacute or chronic but is not completely  healed. There  is buckling of the radial and ulnar cortices of the scaphoid waist  which could represent a not significantly displaced scaphoid fracture  in the appropriate clinical setting although this could also be  secondary to chronic changes. There is widening of the scapholunate  space indicating scapholunate ligament tear. No other fractures are  identified. Joint space loss is seen at the second and third  metacarpophalangeal joints with associated mild osteophytosis. Mild  STT joint space loss of the wrist is noted. Remaining joint spaces  appear grossly maintained.      Impression    IMPRESSION:    1. Intra-articular, epiphyseal and metaphyseal fracture of the ulnar  aspect of the distal radius of indeterminate age but may be subacute  or chronic. An acute extension is not excluded.  2. Scapholunate ligament injury given the widening of scapholunate  space.  3. Irregularity of the cortices of the scaphoid waist could represent  a subtle not significantly displaced fracture in the appropriate  setting.  4. Diffuse osteopenia.  5. Degenerative changes of the STT and second and third  metacarpophalangeal joints.    I discussed these findings with Gayle Garner on 4/17/2018 at 11:09  AM.    SHELLEY GARCIA MD       Scribe Disclosure:  I, Sydni Silva, am serving as a scribe to document services personally performed by Eddy Streeter MD at this visit, based upon the provider's statements to me. All documentation has been reviewed by the aforementioned provider prior to being entered into the official medical record.       Again, thank you for allowing me to participate in the care of your patient.      Sincerely,    Eddy Streeter MD

## 2019-07-30 ENCOUNTER — TELEPHONE (OUTPATIENT)
Dept: ONCOLOGY | Facility: CLINIC | Age: 79
End: 2019-07-30

## 2019-07-30 ENCOUNTER — TELEPHONE (OUTPATIENT)
Dept: UROLOGY | Facility: CLINIC | Age: 79
End: 2019-07-30

## 2019-07-30 NOTE — TELEPHONE ENCOUNTER
[facility] Call Center    Phone Message    May a detailed message be left on voicemail: yes    Reason for Call: Requesting Results   Name/type of test: PSA/ Testosterone Total was released but not PSA   Date of test: 7/10/2019  Was test done at a location other than [facility] (Please fill in the location if not [facility])?: No      Action Taken: Message routed to:  [facility] ([facility] URO

## 2019-07-30 NOTE — TELEPHONE ENCOUNTER
I called Medardo to schedule an appt with Dr Medardo Ingram as requested by Dr Goodwin via [employer]. Pt did not answer, so I left a voicemail asking for a return call to schedule. Dr Ingram has appts on 8/5/19 at 4:15pm or 8/12/19 at 10:30am currently available.

## 2019-07-31 NOTE — TELEPHONE ENCOUNTER
ONCOLOGY INTAKE: Records Information      APPT INFORMATION:  Referring provider:  Dr. Streeter  Referring provider s clinic:  [facility]  Reason for visit/diagnosis:  Prostate Cancer  Has patient been notified of appointment date and time?: Yes    RECORDS INFORMATION:  Were the records received with the referral (via [employer])?No    Has patient been seen for any external appt for this diagnosis? No    If yes, where? n/a  Has patient had any imaging or procedures outside of [place] for this condition?No      If Yes, where? n/a  ADDITIONAL INFORMATION:  None

## 2019-08-12 ENCOUNTER — PRE VISIT (OUTPATIENT)
Dept: ONCOLOGY | Facility: CLINIC | Age: 79
End: 2019-08-12

## 2019-08-12 ENCOUNTER — ONCOLOGY VISIT (OUTPATIENT)
Dept: ONCOLOGY | Facility: CLINIC | Age: 79
End: 2019-08-12
Attending: INTERNAL MEDICINE
Payer: COMMERCIAL

## 2019-08-12 VITALS
DIASTOLIC BLOOD PRESSURE: 84 MMHG | OXYGEN SATURATION: 100 % | HEIGHT: 67 IN | TEMPERATURE: 97.9 F | SYSTOLIC BLOOD PRESSURE: 146 MMHG | HEART RATE: 60 BPM | BODY MASS INDEX: 30.13 KG/M2 | RESPIRATION RATE: 14 BRPM | WEIGHT: 192 LBS

## 2019-08-12 DIAGNOSIS — C61 MALIGNANT NEOPLASM OF PROSTATE (H): ICD-10-CM

## 2019-08-12 PROCEDURE — G0463 HOSPITAL OUTPT CLINIC VISIT: HCPCS | Mod: ZF

## 2019-08-12 PROCEDURE — 99205 OFFICE O/P NEW HI 60 MIN: CPT | Mod: ZP | Performed by: INTERNAL MEDICINE

## 2019-08-12 ASSESSMENT — PAIN SCALES - GENERAL: PAINLEVEL: NO PAIN (0)

## 2019-08-12 ASSESSMENT — MIFFLIN-ST. JEOR: SCORE: 1546.6

## 2019-08-12 NOTE — NURSING NOTE
"Oncology Rooming Note    August 12, 2019 10:42 AM   Medardo Gautam is a 79 year old male who presents for:    Chief Complaint   Patient presents with     Oncology Clinic Visit     New; Prostate Ca     Initial Vitals: BP (!) 146/84   Pulse 60   Temp 97.9  F (36.6  C) (Oral)   Resp 14   Ht 1.689 m (5' 6.5\")   Wt 87.1 kg (192 lb)   SpO2 100%   BMI 30.53 kg/m   Estimated body mass index is 30.53 kg/m  as calculated from the following:    Height as of this encounter: 1.689 m (5' 6.5\").    Weight as of this encounter: 87.1 kg (192 lb). Body surface area is 2.02 meters squared.  No Pain (0) Comment: Data Unavailable   No LMP for male patient.  Allergies reviewed: Yes  Medications reviewed: Yes    Medications: Medication refills not needed today.  Pharmacy [place] entered into [employer]: [facility] [place] [address]    Clinical concerns: PSA elevated with treatment of Prostate Ca       Holli Wynn, [facility]              "

## 2019-08-12 NOTE — LETTER
2019       RE: Medardo Gautam  [address] [facility] [id number]     Dear Colleague,    Thank you for referring your patient, Medardo Gautam, to the [facility]. Please see a copy of my visit note below.    Oncology NEW PATIENT Visit        PATIENT NAME: Medardo Gautam  MRN: [id number]   : 1940   The patient is a 79-year-old gentleman with a history of high-grade prostate cancer as well as a grade 2 colon cancer here for evaluation and management of a rising PSA.  He has most recently seen Dr. dennis in urology.    DISEASES UNDER MANAGEMENT:    2012    pT2c N0 Mx prostatic adenocarcinoma, [place] grade 4+5 = 9, PSA 5.2    pT1 N0 M0 G2 colonic adenocarcinoma    RADIATION HISTORY: Prostate fossa radiation (completed: 10/18/2013)  1. Phase 1: 4500 cGy (180 cGy per fraction) to the pelvis  2. Phase 2: 2520 cGy boost to the prostate bed     CHEMOTHERAPY HISTORY:     CALGB 92018    Leuprolide 22.5 mg IM (3/27/2012, 2012)    Docetaxel 75 mg/m  (3/27/2012)- discontinued on study given the development of treatment related hepatotoxicity    Salvage ADT    Leuprolide (2013-2019)    HPI:   Medardo Gautam is a 79 year old male with history of prostate cancer status post neoadjuvant chemohormonal therapy pre Ramon Abimbola here at [facility], complicated by elevated LFTs and on Lupron and Bicalutamide since that time.  He has not been documented to have metastatic disease however he was originally treated with a radical prostatectomy which followed the brief course of androgen deprivation therapy and 1 cycle of docetaxel which was administered as per the above on study CALGB 63408.  Prostatectomy was followed with adjuvant radiotherapy (completed:10/18/2013).    He initially came to clinical detection in regards to his prostate cancer after serial PSA monitoring revealed an elevated PSA of 5. (previously 4.22 in 2011).  He underwent a biopsy which showed [place]  grade 5+4 = 9 adenocarcinoma with 90% tumor involvement 8/8 cores, (+) PNI.  As his staging revealed organ confined disease, he underwent treatment on the CALGB 63390 study consisting of neoadjuvant Lupron (cycle 1: 3/27/2012) and Taxotere 75 mg/m  (3/27/2012).  Neoadjuvant therapy on trial was discontinued given the development of hepatotoxicity.    He then underwent a robotic assisted retropubic prostatectomy with bilateral pelvic lymphadenectomy and unilateral left-sided nerve sparing (8/6/2012).  Pathology revealed Jeancarlos grade 4+ 5 = adenocarcinoma, (+) LVSI, (+) PNI, 25% of the excised prostate bilaterally excised to negative margins.  In regards to lymph node dissection, 0/16 lymph nodes were positive for metastatic adenocarcinoma.  Postoperatively his PSA was undetectable until 02/2013 at which time it walker to 0.2 and continued to rise to 0.5 (4/3/2013) shortly thereafter, he underwent radiographic restaging (4/18/2013) which showed no evidence of metastatic disease.  After short interval follow-up, his next PSA on 6/19/2013 which showed a value of 1.28.  He then started salvage ADT on 6/25/2013.   He was recommended to undergo salvage radiotherapy.  He received a total dose of 7020 cGy delivered to the prostate fossa completed on 10/18/2013.  He currently continues on ADT.    In regards to his colon cancer, he underwent a routine screening colonoscopy on 1/16/2015.  Pathology from this procedure demonstrated a moderately differentiated adenocarcinoma at the mid a sending colon.  He underwent definitive management with laparoscopic right hemicolectomy (2/11/2015) which characterized a 0.8 cm adenocarcinoma with 0/13 associated lymph nodes.      SUBJECTIVE:   He has no complaints today.  He continues to take his bicalutamide religiously.  He has no urologic complaints.  He is mildly concerned about the rise in the PSA.    FAMILY HISTORY:      Sister diagnosed with breast cancer at age 70     SOCIAL HISTORY:       The patient is a native of [place].  He is very well traveled, having received education as an , and also traveled and work through [place], [facility], [place] and [place] throughout his life.  He has been  for 45 years.  He has a son who is living in [place], age 37.  A daughter in [place], age 39.  He has 4 grandchildren.      Occupation:  Retired since 2001 as a .  He has worked at several places in the world.     Tobacco:  Brief smoking during college education in the 1960s, but none since.      Alcohol:   Social alcohol use, no history of alcohol abuse    Drugs:   No history of illicit drug use.     PAST MEDICAL /SURGICAL HISTORY:  Past Medical History:   Diagnosis Date     Anxiety      Colon cancer (H) 01/2015     Contracture of finger joint ca 2005    left and right fifth fingers     Dupuytren's contracture of left hand      Gout      HEMORRHOIDS NOS 6/4/2007     Hypertension      Insomnia      Nonsenile cataract      Personal history of colonic polyps 7 years ago?     Prostate cancer (H) Feb 2012     Renal cyst 6/22/2017     Seborrheic dermatitis      Skin lesion- R side of face and behind L ear 12/15/2011     SKIN SENSATION DISTURB 12/29/2006    allergic reaction to strawberries     SKIN SENSATION DISTURB 12/29/2006     Past Surgical History:   Procedure Laterality Date     APPENDECTOMY       BIOPSY  01/16/15     C HAND/FINGER SURGERY UNLISTED       C LENGTHEN,TENDON,HAND/FINGER  ca 2007    right fifth     C STOMACH SURGERY PROCEDURE UNLISTED       CATARACT IOL, RT/LT Left 02/15/2018     COLON SURGERY  2/11/2015    Lap assisted R hemicolectomy     COLONOSCOPY  10/25/07    Snare polypectomy     COLONOSCOPY  6/25/2009    with snare polypectomy     COLONOSCOPY  9/30/2009     COLONOSCOPY  1/5/2011    COLONOSCOPY performed by JUD MARIEE at  GI     COLONOSCOPY N/A 1/16/2015    Procedure: COMBINED COLONOSCOPY, SINGLE OR MULTIPLE BIOPSY/POLYPECTOMY BY  "BIOPSY;  Surgeon: Sarah Beth Pisano MD;  Location: [facility] OR     COLONOSCOPY WITH CO2 INSUFFLATION N/A 1/16/2015    Procedure: COLONOSCOPY WITH CO2 INSUFFLATION;  Surgeon: Sarah Beth Pisano MD;  Location: [facility] OR     COLONOSCOPY WITH CO2 INSUFFLATION N/A 9/7/2018    Procedure: COLONOSCOPY WITH CO2 INSUFFLATION;  C18.9 (ICD-10-CM) - Malignant neoplasm of colon, unspecified part of colon (H)  [facility] pharm fax# [phone]  BMI 26.29  [employer]  Referred by dr thomas;  Surgeon: Sarah Beth Pisano MD;  Location: [facility] OR     DAVINCI PROSTATECTOMY  8/6/2012    Procedure: DAVINCI PROSTATECTOMY;  Davinci Assisted Radical Prostatectomy with Bilateral Lymphadenectomy ;  Surgeon: Norma Goodwin MD;  Location: [facility] OR     GENITOURINARY SURGERY      prostate surgery     INJECT EPIDURAL LUMBAR / SACRAL SINGLE Left 10/30/2017    Procedure: INJECT EPIDURAL LUMBAR / SACRAL SINGLE;  Left Transforaminal Lumbar 4-Lumbar 5 Epidural Steroid Injection;  Surgeon: Nuvia Reina MD;  Location: [facility] OR     LAPAROSCOPIC ASSISTED COLECTOMY N/A 2/11/2015    Procedure: LAPAROSCOPIC ASSISTED COLECTOMY;  Surgeon: Oren Breen MD;  Location: [facility] OR     PHACOEMULSIFICATION CLEAR CORNEA WITH STANDARD INTRAOCULAR LENS IMPLANT Left 2/15/2018    Procedure: PHACOEMULSIFICATION CLEAR CORNEA WITH STANDARD INTRAOCULAR LENS IMPLANT;  LEFT EYE CATARACT EXTRACTION WITH STANDARD INTRAOCULAR LENS IMPLANT ;  Surgeon: Brandon Orellana MD;  Location: [facility]     PHACOEMULSIFICATION CLEAR CORNEA WITH STANDARD INTRAOCULAR LENS IMPLANT Right 3/1/2018    Procedure: PHACOEMULSIFICATION CLEAR CORNEA WITH STANDARD INTRAOCULAR LENS IMPLANT;  RIGHT EYE CATARACT EXTRACTION WITH STANDARD INTRAOCULAR LENS IMPLANT ;  Surgeon: Brandon Orellana MD;  Location: [facility]       PHYSICAL EXAM:  Vital Signs: BP (!) 146/84   Pulse 60   Temp 97.9  F (36.6  C) (Oral)   Resp 14   Ht 1.689 m (5' 6.5\")   Wt 87.1 kg (192 lb)   SpO2 100%   BMI 30.53 kg/m  "    Gen: NAD  Eyes: EOMI, sclera anicteric  HENT      Head: NC/AT     Ears: No external auricular lesions     Nose/sinus: No rhinorrhea or epistaxis     Oral Cavity/Oropharynx: MMM  Pulm: Breathing comfortably on room air, CTABL  CV: No visible cyanosis, RRR  Abd: Soft distended abdomen.  Central abdominal scar terminating at the umbilicus.   Skin: Normal color and turgor of the visualized skin  Neurologic/MSK/psych: A&Ox3, Gross motor movements against gravity noted in the upper and lower extremities bilaterally, denies neuropathy of the upper or lower extremities.     MEDICATIONS:  Current Outpatient Medications   Medication Sig Dispense Refill     allopurinol (ZYLOPRIM) 100 MG tablet Take 1 tablet (100 mg) by mouth daily 90 tablet 1     aspirin 81 MG tablet Take 1 tablet (81 mg) by mouth daily 30 tablet      azelastine (OPTIVAR) 0.05 % SOLN ophthalmic solution Apply 1 drop to eye 2 times daily 1 Bottle 6     bicalutamide (CASODEX) 50 MG tablet TAKE ONE TABLET BY MOUTH ONCE DAILY 90 tablet 3     calcium-vitamin D (CALTRATE) 600-400 MG-UNIT per tablet Take 1 tablet by mouth daily       clonazePAM (KLONOPIN) 1 MG tablet TAKE 1 TABLET BY MOUTH IN THE EVENING AS NEEDED FOR ANXIETY 30 tablet 5     folic acid-vit B6-vit B12 (FOLGARD) 0.8-10-0.115 MG TABS Take 1 tablet by mouth daily       IBUPROFEN PO Take 400 mg by mouth every 8 hours as needed for moderate pain       leuprolide (LUPRON DEPOT) 45 MG kit Inject 45 mg into the muscle every 6 months 1 each 0     lisinopril (PRINIVIL/ZESTRIL) 40 MG tablet Take 1 tablet (40 mg) by mouth daily 90 tablet 1     Loratadine (CLARITIN PO) Take  by mouth. As needed        naproxen (NAPROSYN) 500 MG tablet Take 1 tablet (500 mg) by mouth daily as needed for moderate pain 30 tablet 0     Omega-3 Fatty Acids (OMEGA-3 FISH OIL PO) Take 500 mg by mouth daily       sildenafil (VIAGRA) 100 MG tablet 1/2 tab three times a week 10 tablet 12     triamcinolone (KENALOG) 0.1 % cream         zolpidem (AMBIEN) 5 MG tablet Take 1 tablet (5 mg) by mouth nightly as needed 30 tablet 0        8/6/12    SPECIMEN(S):  A: Left pelvic lymph nodes  B: Right pelvic lymph nodes  C: Prostate and bilateral seminal vesicles    FINAL DIAGNOSIS:  A) Lymph nodes, left pelvic, dissection       - Nine lymph nodes with no evidence of malignancy (0/9)    B) Lymph nodes, right pelvic, dissection       - Seven lymph nodes with no evidence of malignancy (0/7)    C) Prostate, radical prostatectomy       - Adenocarcinoma, Jeancarlos grade 4+5, score 9  - Tumor involves approximately 25% of sampled prostate, and is present  in the right and left apex, mid-prostate, and base; tumor present in 20  of 38 prostate slides  - All margins are free of tumor  - Lymphovascular and perineural invasion present  - Right and left seminal vesicles free of tumor  - See microscopic for checklist / staging    LABS: Reviewed.    PSA:     1/9/18: < 0.01    1/23/19:  0.04    7/10/19:  0.07         IMAGING: No recent imaging within the last year    IMPRESSION:   Medardo Gautam is a 79 year old male with history of prostate cancer status post neoadjuvant chemo therapy, prostatectomy with adjuvant radiotherapy (completed:10/18/2013) and stage I colon cancer status post hemicolectomy with no adjuvant therapy (completed: 2/11/2015).  He is maintained on androgen deprivation therapy in the form of Lupron. He just got Lupron on 7/29/19, and is due every six months.     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  I do not feel that imaging is necessary at this time as his likelihood of metastatic disease is extremely low.    PLAN:   1. Discontinue Bicalutamide  2. Discussed AAWD phenomenon, and a 25% likelihood of reduction in PSA simply with discontinuation of the bicalutamide.   3. RTC in 6-8 weeks with new PSA    4. Discuss the Movember GAP4 study on the return visit.  5. I discussed with him that  in the likelihood that the PSA rises following the discontinuation of bicalutamide that we would continue to observe and eventually a CT scan or a PET scan would be performed.  There are number of treatments available for castration resistant prostate cancer however all of them have typically been initiated in patients with less indolent more highly advanced disease.  In some instances, such as that with Sipuleucel-T or with Abiraterone, there is evidence to suggest that earlier intervention may be more favorable.  Nevertheless I generally try to avoid or delay adding systemic therapy in the context of a PSA of less than 0.1.  In such instances I think that the quality of life is best off treatment rather than on it.  We will get a sense as to the pace of his disease over time and initiate therapy as it becomes appropriate.      Medardo Ingram MD    CC  Patient Care Team:  Verna Osborne MD as PCP - General (Family Practice)  Charlotte Castellanos MD as MD (Internal Medicine)  Melba Rousseau RN as Clinic Care Coordinator (Colon and Rectal Surgery)  Ramon Daily MD as MD (Oncology)  Iesha Keller RN as Nurse Coordinator (Oncology)  Verna Osborne MD as Assigned PCP  Ferdinand, Eddy Coleman MD as MD (Urology)

## 2019-08-12 NOTE — PROGRESS NOTES
Oncology NEW PATIENT Visit        PATIENT NAME: Medardo Gautam  MRN: [id number]   : 1940   The patient is a 79-year-old gentleman with a history of high-grade prostate cancer as well as a grade 2 colon cancer here for evaluation and management of a rising PSA.  He has most recently seen Dr. dennis in urology.    DISEASES UNDER MANAGEMENT:    2012    pT2c N0 Mx prostatic adenocarcinoma, Jeancarlos grade 4+5 = 9, PSA 5.2    pT1 N0 M0 G2 colonic adenocarcinoma    RADIATION HISTORY: Prostate fossa radiation (completed: 10/18/2013)  1. Phase 1: 4500 cGy (180 cGy per fraction) to the pelvis  2. Phase 2: 2520 cGy boost to the prostate bed     CHEMOTHERAPY HISTORY:     CALGB 69735    Leuprolide 22.5 mg IM (3/27/2012, 2012)    Docetaxel 75 mg/m  (3/27/2012)- discontinued on study given the development of treatment related hepatotoxicity    Salvage ADT    Leuprolide (2013-2019)    HPI:   Medardo Gautam is a 79 year old male with history of prostate cancer status post neoadjuvant chemohormonal therapy pre Ramon Abimbola here at [facility], complicated by elevated LFTs and on Lupron and Bicalutamide since that time.  He has not been documented to have metastatic disease however he was originally treated with a radical prostatectomy which followed the brief course of androgen deprivation therapy and 1 cycle of docetaxel which was administered as per the above on study CALGB 72462.  Prostatectomy was followed with adjuvant radiotherapy (completed:10/18/2013).    He initially came to clinical detection in regards to his prostate cancer after serial PSA monitoring revealed an elevated PSA of 5. (previously 4.22 in 2011).  He underwent a biopsy which showed [place] grade 5+4 = 9 adenocarcinoma with 90% tumor involvement 8/8 cores, (+) PNI.  As his staging revealed organ confined disease, he underwent treatment on the CALGB 56620 study consisting of neoadjuvant Lupron (cycle 1: 3/27/2012) and Taxotere 75  mg/m  (3/27/2012).  Neoadjuvant therapy on trial was discontinued given the development of hepatotoxicity.    He then underwent a robotic assisted retropubic prostatectomy with bilateral pelvic lymphadenectomy and unilateral left-sided nerve sparing (8/6/2012).  Pathology revealed [place] grade 4+ 5 = adenocarcinoma, (+) LVSI, (+) PNI, 25% of the excised prostate bilaterally excised to negative margins.  In regards to lymph node dissection, 0/16 lymph nodes were positive for metastatic adenocarcinoma.  Postoperatively his PSA was undetectable until 02/2013 at which time it walker to 0.2 and continued to rise to 0.5 (4/3/2013) shortly thereafter, he underwent radiographic restaging (4/18/2013) which showed no evidence of metastatic disease.  After short interval follow-up, his next PSA on 6/19/2013 which showed a value of 1.28.  He then started salvage ADT on 6/25/2013.   He was recommended to undergo salvage radiotherapy.  He received a total dose of 7020 cGy delivered to the prostate fossa completed on 10/18/2013.  He currently continues on ADT.    In regards to his colon cancer, he underwent a routine screening colonoscopy on 1/16/2015.  Pathology from this procedure demonstrated a moderately differentiated adenocarcinoma at the mid a sending colon.  He underwent definitive management with laparoscopic right hemicolectomy (2/11/2015) which characterized a 0.8 cm adenocarcinoma with 0/13 associated lymph nodes.      SUBJECTIVE:   He has no complaints today.  He continues to take his bicalutamide religiously.  He has no urologic complaints.  He is mildly concerned about the rise in the PSA.    FAMILY HISTORY:      Sister diagnosed with breast cancer at age 70     SOCIAL HISTORY:      The patient is a native of [place].  He is very well traveled, having received education as an , and also traveled and work through [employer], [employer], [employer], [employer] and [employer] throughout his life.  He has been  for 45  years.  He has a son who is living in [place], age 37.  A daughter in [place], age 39.  He has 4 grandchildren.      Occupation:  Retired since 2001 as a .  He has worked at several places in the world.     Tobacco:  Brief smoking during college education in the 1960s, but none since.      Alcohol:   Social alcohol use, no history of alcohol abuse    Drugs:   No history of illicit drug use.     PAST MEDICAL /SURGICAL HISTORY:  Past Medical History:   Diagnosis Date     Anxiety      Colon cancer (H) 01/2015     Contracture of finger joint ca 2005    left and right fifth fingers     Dupuytren's contracture of left hand      Gout      HEMORRHOIDS NOS 6/4/2007     Hypertension      Insomnia      Nonsenile cataract      Personal history of colonic polyps 7 years ago?     Prostate cancer (H) Feb 2012     Renal cyst 6/22/2017     Seborrheic dermatitis      Skin lesion- R side of face and behind L ear 12/15/2011     SKIN SENSATION DISTURB 12/29/2006    allergic reaction to strawberries     SKIN SENSATION DISTURB 12/29/2006     Past Surgical History:   Procedure Laterality Date     APPENDECTOMY       BIOPSY  01/16/15     C HAND/FINGER SURGERY UNLISTED       C LENGTHEN,TENDON,HAND/FINGER  ca 2007    right fifth     C STOMACH SURGERY PROCEDURE UNLISTED       CATARACT IOL, RT/LT Left 02/15/2018     COLON SURGERY  2/11/2015    Lap assisted R hemicolectomy     COLONOSCOPY  10/25/07    Snare polypectomy     COLONOSCOPY  6/25/2009    with snare polypectomy     COLONOSCOPY  9/30/2009     COLONOSCOPY  1/5/2011    COLONOSCOPY performed by JUD MARIEE at  [facility]     COLONOSCOPY N/A 1/16/2015    Procedure: COMBINED COLONOSCOPY, SINGLE OR MULTIPLE BIOPSY/POLYPECTOMY BY BIOPSY;  Surgeon: Sarah Beth Pisano MD;  Location: [facility] OR     COLONOSCOPY WITH CO2 INSUFFLATION N/A 1/16/2015    Procedure: COLONOSCOPY WITH CO2 INSUFFLATION;  Surgeon: Sarah Beth Pisano MD;  Location: [facility] OR     COLONOSCOPY WITH CO2  "INSUFFLATION N/A 9/7/2018    Procedure: COLONOSCOPY WITH CO2 INSUFFLATION;  C18.9 (ICD-10-CM) - Malignant neoplasm of colon, unspecified part of colon (H)  [facility] fax# [phone]  BMI 26.29  [employer]  Referred by dr thomas;  Surgeon: Sarah Beth Pisano MD;  Location: [facility] OR     DAVINCI PROSTATECTOMY  8/6/2012    Procedure: DAVINCI PROSTATECTOMY;  Davinci Assisted Radical Prostatectomy with Bilateral Lymphadenectomy ;  Surgeon: Norma Goodwin MD;  Location: [facility] OR     GENITOURINARY SURGERY      prostate surgery     INJECT EPIDURAL LUMBAR / SACRAL SINGLE Left 10/30/2017    Procedure: INJECT EPIDURAL LUMBAR / SACRAL SINGLE;  Left Transforaminal Lumbar 4-Lumbar 5 Epidural Steroid Injection;  Surgeon: Nuvia Reina MD;  Location: [facility] OR     LAPAROSCOPIC ASSISTED COLECTOMY N/A 2/11/2015    Procedure: LAPAROSCOPIC ASSISTED COLECTOMY;  Surgeon: Oren Breen MD;  Location: [facility] OR     PHACOEMULSIFICATION CLEAR CORNEA WITH STANDARD INTRAOCULAR LENS IMPLANT Left 2/15/2018    Procedure: PHACOEMULSIFICATION CLEAR CORNEA WITH STANDARD INTRAOCULAR LENS IMPLANT;  LEFT EYE CATARACT EXTRACTION WITH STANDARD INTRAOCULAR LENS IMPLANT ;  Surgeon: Brandon Orellana MD;  Location: [facility]     PHACOEMULSIFICATION CLEAR CORNEA WITH STANDARD INTRAOCULAR LENS IMPLANT Right 3/1/2018    Procedure: PHACOEMULSIFICATION CLEAR CORNEA WITH STANDARD INTRAOCULAR LENS IMPLANT;  RIGHT EYE CATARACT EXTRACTION WITH STANDARD INTRAOCULAR LENS IMPLANT ;  Surgeon: Brandon Orellana MD;  Location: [facility]       PHYSICAL EXAM:  Vital Signs: BP (!) 146/84   Pulse 60   Temp 97.9  F (36.6  C) (Oral)   Resp 14   Ht 1.689 m (5' 6.5\")   Wt 87.1 kg (192 lb)   SpO2 100%   BMI 30.53 kg/m    Gen: NAD  Eyes: EOMI, sclera anicteric  HENT      Head: NC/AT     Ears: No external auricular lesions     Nose/sinus: No rhinorrhea or epistaxis     Oral Cavity/Oropharynx: MMM  Pulm: Breathing comfortably on room air, CTABL  CV: " No visible cyanosis, RRR  Abd: Soft distended abdomen.  Central abdominal scar terminating at the umbilicus.   Skin: Normal color and turgor of the visualized skin  Neurologic/MSK/psych: A&Ox3, Gross motor movements against gravity noted in the upper and lower extremities bilaterally, denies neuropathy of the upper or lower extremities.     MEDICATIONS:  Current Outpatient Medications   Medication Sig Dispense Refill     allopurinol (ZYLOPRIM) 100 MG tablet Take 1 tablet (100 mg) by mouth daily 90 tablet 1     aspirin 81 MG tablet Take 1 tablet (81 mg) by mouth daily 30 tablet      azelastine (OPTIVAR) 0.05 % SOLN ophthalmic solution Apply 1 drop to eye 2 times daily 1 Bottle 6     bicalutamide (CASODEX) 50 MG tablet TAKE ONE TABLET BY MOUTH ONCE DAILY 90 tablet 3     calcium-vitamin D (CALTRATE) 600-400 MG-UNIT per tablet Take 1 tablet by mouth daily       clonazePAM (KLONOPIN) 1 MG tablet TAKE 1 TABLET BY MOUTH IN THE EVENING AS NEEDED FOR ANXIETY 30 tablet 5     folic acid-vit B6-vit B12 (FOLGARD) 0.8-10-0.115 MG TABS Take 1 tablet by mouth daily       IBUPROFEN PO Take 400 mg by mouth every 8 hours as needed for moderate pain       leuprolide (LUPRON DEPOT) 45 MG kit Inject 45 mg into the muscle every 6 months 1 each 0     lisinopril (PRINIVIL/ZESTRIL) 40 MG tablet Take 1 tablet (40 mg) by mouth daily 90 tablet 1     Loratadine (CLARITIN PO) Take  by mouth. As needed        naproxen (NAPROSYN) 500 MG tablet Take 1 tablet (500 mg) by mouth daily as needed for moderate pain 30 tablet 0     Omega-3 Fatty Acids (OMEGA-3 FISH OIL PO) Take 500 mg by mouth daily       sildenafil (VIAGRA) 100 MG tablet 1/2 tab three times a week 10 tablet 12     triamcinolone (KENALOG) 0.1 % cream        zolpidem (AMBIEN) 5 MG tablet Take 1 tablet (5 mg) by mouth nightly as needed 30 tablet 0        8/6/12    SPECIMEN(S):  A: Left pelvic lymph nodes  B: Right pelvic lymph nodes  C: Prostate and bilateral seminal vesicles    FINAL  DIAGNOSIS:  A) Lymph nodes, left pelvic, dissection       - Nine lymph nodes with no evidence of malignancy (0/9)    B) Lymph nodes, right pelvic, dissection       - Seven lymph nodes with no evidence of malignancy (0/7)    C) Prostate, radical prostatectomy       - Adenocarcinoma, Jeancarlos grade 4+5, score 9  - Tumor involves approximately 25% of sampled prostate, and is present  in the right and left apex, mid-prostate, and base; tumor present in 20  of 38 prostate slides  - All margins are free of tumor  - Lymphovascular and perineural invasion present  - Right and left seminal vesicles free of tumor  - See microscopic for checklist / staging    LABS: Reviewed.    PSA:     1/9/18: < 0.01    1/23/19:  0.04    7/10/19:  0.07         IMAGING: No recent imaging within the last year    IMPRESSION:   Medardo Gautam is a 79 year old male with history of prostate cancer status post neoadjuvant chemo therapy, prostatectomy with adjuvant radiotherapy (completed:10/18/2013) and stage I colon cancer status post hemicolectomy with no adjuvant therapy (completed: 2/11/2015).  He is maintained on androgen deprivation therapy in the form of Lupron. He just got Lupron on 7/29/19, and is due every six months.     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  I do not feel that imaging is necessary at this time as his likelihood of metastatic disease is extremely low.    PLAN:   1. Discontinue Bicalutamide  2. Discussed AAWD phenomenon, and a 25% likelihood of reduction in PSA simply with discontinuation of the bicalutamide.   3. RTC in 6-8 weeks with new PSA    4. Discuss the Movember GAP4 study on the return visit.  5. I discussed with him that in the likelihood that the PSA rises following the discontinuation of bicalutamide that we would continue to observe and eventually a CT scan or a PET scan would be performed.  There are number of treatments available for  castration resistant prostate cancer however all of them have typically been initiated in patients with less indolent more highly advanced disease.  In some instances, such as that with Sipuleucel-T or with Abiraterone, there is evidence to suggest that earlier intervention may be more favorable.  Nevertheless I generally try to avoid or delay adding systemic therapy in the context of a PSA of less than 0.1.  In such instances I think that the quality of life is best off treatment rather than on it.  We will get a sense as to the pace of his disease over time and initiate therapy as it becomes appropriate.      Medardo Ingram MD    CC  Patient Care Team:  Verna Osborne MD as PCP - General (Family Practice)  Charlotte Castellanos MD as MD (Internal Medicine)  Melba Rousseau, RN as Clinic Care Coordinator (Colon and Rectal Surgery)  Ramon Daily MD as MD (Oncology)  Iesha Keller, RN as Nurse Coordinator (Oncology)  Verna Osborne MD as Assigned PCP  Ferdinand, Eddy Coleman MD as MD (Urology)

## 2019-08-13 DIAGNOSIS — C61 MALIGNANT NEOPLASM OF PROSTATE (H): ICD-10-CM

## 2019-08-13 LAB — PSA SERPL-MCNC: 0.09 UG/L (ref 0–4)

## 2019-08-13 PROCEDURE — 84153 ASSAY OF PSA TOTAL: CPT | Performed by: INTERNAL MEDICINE

## 2019-08-13 PROCEDURE — 36415 COLL VENOUS BLD VENIPUNCTURE: CPT | Performed by: INTERNAL MEDICINE

## 2019-08-17 ENCOUNTER — MYC MEDICAL ADVICE (OUTPATIENT)
Dept: ONCOLOGY | Facility: CLINIC | Age: 79
End: 2019-08-17

## 2019-08-20 DIAGNOSIS — G47.00 INSOMNIA, UNSPECIFIED TYPE: ICD-10-CM

## 2019-08-20 RX ORDER — ZOLPIDEM TARTRATE 5 MG/1
TABLET ORAL
Qty: 30 TABLET | Refills: 0 | Status: SHIPPED | OUTPATIENT
Start: 2019-08-20 | End: 2019-09-21

## 2019-08-20 NOTE — TELEPHONE ENCOUNTER
Pending Prescriptions:                       Disp   Refills    zolpidem (AMBIEN) 5 MG tablet [Pharmacy M*30 tab*0            Sig: TAKE 1 TABLET BY MOUTH AT BEDTIME AS NEEDED       Last Written Prescription Date:  7/23/2019  Last Fill Quantity: 30,   # refills: 0  Last Office Visit: 7/2/2019  Future Office visit:       Routing refill request to provider for review/approval because:  Drug not on the [facility], [facility] or [facility] refill protocol or controlled substance    Kristy Hidalgo RN, BSN

## 2019-08-27 ENCOUNTER — MYC MEDICAL ADVICE (OUTPATIENT)
Dept: ONCOLOGY | Facility: CLINIC | Age: 79
End: 2019-08-27

## 2019-09-18 DIAGNOSIS — C61 MALIGNANT NEOPLASM OF PROSTATE (H): ICD-10-CM

## 2019-09-18 LAB — PSA SERPL-MCNC: 0.13 UG/L (ref 0–4)

## 2019-09-18 PROCEDURE — 36415 COLL VENOUS BLD VENIPUNCTURE: CPT | Performed by: INTERNAL MEDICINE

## 2019-09-18 PROCEDURE — 84153 ASSAY OF PSA TOTAL: CPT | Performed by: INTERNAL MEDICINE

## 2019-09-19 ENCOUNTER — TELEPHONE (OUTPATIENT)
Dept: FAMILY MEDICINE | Facility: OTHER | Age: 79
End: 2019-09-19

## 2019-09-19 NOTE — TELEPHONE ENCOUNTER
Reason for Call:  Request for results:    Name of test or procedure: labs    Date of test of procedure: yesterday    Location of the test or procedure: [place]    OK to leave the result message on voice mail or with a family member? NO    Phone number Patient can be reached at:  [phone]    Additional comments:     Call taken on 9/19/2019 at 10:58 AM by Iman Stephen

## 2019-09-21 DIAGNOSIS — G47.00 INSOMNIA, UNSPECIFIED TYPE: ICD-10-CM

## 2019-09-23 ENCOUNTER — ONCOLOGY VISIT (OUTPATIENT)
Dept: ONCOLOGY | Facility: CLINIC | Age: 79
End: 2019-09-23
Attending: INTERNAL MEDICINE
Payer: COMMERCIAL

## 2019-09-23 VITALS
DIASTOLIC BLOOD PRESSURE: 86 MMHG | BODY MASS INDEX: 30.54 KG/M2 | OXYGEN SATURATION: 96 % | TEMPERATURE: 97.6 F | RESPIRATION RATE: 14 BRPM | SYSTOLIC BLOOD PRESSURE: 154 MMHG | HEART RATE: 64 BPM | WEIGHT: 192.1 LBS

## 2019-09-23 DIAGNOSIS — C61 PROSTATE CANCER (H): Primary | ICD-10-CM

## 2019-09-23 PROCEDURE — G0463 HOSPITAL OUTPT CLINIC VISIT: HCPCS | Mod: ZF

## 2019-09-23 PROCEDURE — 99213 OFFICE O/P EST LOW 20 MIN: CPT | Mod: ZP | Performed by: INTERNAL MEDICINE

## 2019-09-23 ASSESSMENT — PAIN SCALES - GENERAL: PAINLEVEL: NO PAIN (0)

## 2019-09-23 NOTE — LETTER
2019       RE: Medardo Gautam  [address] [facility] [id number]     Dear Colleague,    Thank you for referring your patient, Medardo Gautam, to the [facility]. Please see a copy of my visit note below.    Oncology FOLLOWUP PATIENT Visit    PATIENT NAME: Medardo Gautam  MRN: [id number]   : 1940   The patient is a 79-year-old gentleman with a history of high-grade prostate cancer as well as a grade 2 colon cancer here for evaluation and management of a rising PSA.  He has most recently seen Dr. dennis in urology.    DISEASES UNDER MANAGEMENT:    2012    pT2c N0 Mx prostatic adenocarcinoma, Jeancarlos grade 4+5 = 9, PSA 5.2    pT1 N0 M0 G2 colonic adenocarcinoma    RADIATION HISTORY: Prostate fossa radiation (completed: 10/18/2013)  1. Phase 1: 4500 cGy (180 cGy per fraction) to the pelvis  2. Phase 2: 2520 cGy boost to the prostate bed     CHEMOTHERAPY HISTORY:     CALGB 35720    Leuprolide 22.5 mg IM (3/27/2012, 2012)    Docetaxel 75 mg/m  (3/27/2012)- discontinued on study given the development of treatment related hepatotoxicity    Salvage ADT    Leuprolide (2013-2019) - gets Q 6 months     HPI:   Medardo Gautam is a 79 year old male with history of prostate cancer status post neoadjuvant chemohormonal therapy pre Ramon Abimbola here at [facility], complicated by elevated LFTs and on Lupron and Bicalutamide since that time.  He has not been documented to have metastatic disease however he was originally treated with a radical prostatectomy which followed the brief course of androgen deprivation therapy and 1 cycle of docetaxel which was administered as per the above on study CALGB 62516.  Prostatectomy was followed with adjuvant radiotherapy (completed:10/18/2013).    He initially came to clinical detection in regards to his prostate cancer after serial PSA monitoring revealed an elevated PSA of 5. (previously 4.22 in 2011).  He underwent a biopsy  which showed [place] grade 5+4 = 9 adenocarcinoma with 90% tumor involvement 8/8 cores, (+) PNI.  As his staging revealed organ confined disease, he underwent treatment on the CALGB 38619 study consisting of neoadjuvant Lupron (cycle 1: 3/27/2012) and Taxotere 75 mg/m  (3/27/2012).  Neoadjuvant therapy on trial was discontinued given the development of hepatotoxicity.    He then underwent a robotic assisted retropubic prostatectomy with bilateral pelvic lymphadenectomy and unilateral left-sided nerve sparing (8/6/2012).  Pathology revealed Jeancarlos grade 4+ 5 = adenocarcinoma, (+) LVSI, (+) PNI, 25% of the excised prostate bilaterally excised to negative margins.  In regards to lymph node dissection, 0/16 lymph nodes were positive for metastatic adenocarcinoma.  Postoperatively his PSA was undetectable until 02/2013 at which time it walker to 0.2 and continued to rise to 0.5 (4/3/2013) shortly thereafter, he underwent radiographic restaging (4/18/2013) which showed no evidence of metastatic disease.  After short interval follow-up, his next PSA on 6/19/2013 which showed a value of 1.28.  He then started salvage ADT on 6/25/2013.   He was recommended to undergo salvage radiotherapy.  He received a total dose of 7020 cGy delivered to the prostate fossa completed on 10/18/2013.  He currently continues on ADT.    In regards to his colon cancer, he underwent a routine screening colonoscopy on 1/16/2015.  Pathology from this procedure demonstrated a moderately differentiated adenocarcinoma at the mid a sending colon.  He underwent definitive management with laparoscopic right hemicolectomy (2/11/2015) which characterized a 0.8 cm adenocarcinoma with 0/13 associated lymph nodes.      SUBJECTIVE:   He has no complaints today.  He continues to take his bicalutamide religiously.  He has no urologic complaints.  He is mildly concerned about the rise in the PSA.    FAMILY HISTORY:      Sister diagnosed with breast cancer at age  70     SOCIAL HISTORY:      The patient is a native of [place].  He is very well traveled, having received education as an , and also traveled and work through [place], [facility], [facility] [employer], [place] and [place] throughout his life.  He has been  for 45 years.  He has a son who is living in [place], age 37.  A daughter in [place], age 39.  He has 4 grandchildren.      Occupation:  Retired since 2001 as a .  He has worked at several places in the world.     Tobacco:  Brief smoking during college education in the 1960s, but none since.      Alcohol:   Social alcohol use, no history of alcohol abuse    Drugs:   No history of illicit drug use.     PAST MEDICAL /SURGICAL HISTORY:  Past Medical History:   Diagnosis Date     Anxiety      Colon cancer (H) 01/2015     Contracture of finger joint ca 2005    left and right fifth fingers     Dupuytren's contracture of left hand      Gout      HEMORRHOIDS NOS 6/4/2007     Hypertension      Insomnia      Nonsenile cataract      Personal history of colonic polyps 7 years ago?     Prostate cancer (H) Feb 2012     Renal cyst 6/22/2017     Seborrheic dermatitis      Skin lesion- R side of face and behind L ear 12/15/2011     SKIN SENSATION DISTURB 12/29/2006    allergic reaction to strawberries     SKIN SENSATION DISTURB 12/29/2006     Past Surgical History:   Procedure Laterality Date     APPENDECTOMY       BIOPSY  01/16/15     C HAND/FINGER SURGERY UNLISTED       C LENGTHEN,TENDON,HAND/FINGER  ca 2007    right fifth     C STOMACH SURGERY PROCEDURE UNLISTED       CATARACT IOL, RT/LT Left 02/15/2018     COLON SURGERY  2/11/2015    Lap assisted R hemicolectomy     COLONOSCOPY  10/25/07    Snare polypectomy     COLONOSCOPY  6/25/2009    with snare polypectomy     COLONOSCOPY  9/30/2009     COLONOSCOPY  1/5/2011    COLONOSCOPY performed by JUD MARIEE at  [facility]     COLONOSCOPY N/A 1/16/2015    Procedure: COMBINED COLONOSCOPY, SINGLE OR MULTIPLE  BIOPSY/POLYPECTOMY BY BIOPSY;  Surgeon: Sarah Beth Pisano MD;  Location: [facility] OR     COLONOSCOPY WITH CO2 INSUFFLATION N/A 1/16/2015    Procedure: COLONOSCOPY WITH CO2 INSUFFLATION;  Surgeon: Sarah Beth Pisano MD;  Location: [facility] OR     COLONOSCOPY WITH CO2 INSUFFLATION N/A 9/7/2018    Procedure: COLONOSCOPY WITH CO2 INSUFFLATION;  C18.9 (ICD-10-CM) - Malignant neoplasm of colon, unspecified part of colon (H)  [facility] [place] [facility] fax# [phone]  BMI 26.29  [employer]  Referred by dr thomas;  Surgeon: Sarah Beth Pisano MD;  Location: [facility] OR     DAVINCI PROSTATECTOMY  8/6/2012    Procedure: DAVINCI PROSTATECTOMY;  Davinci Assisted Radical Prostatectomy with Bilateral Lymphadenectomy ;  Surgeon: Norma Goodwin MD;  Location: [facility] OR     GENITOURINARY SURGERY      prostate surgery     INJECT EPIDURAL LUMBAR / SACRAL SINGLE Left 10/30/2017    Procedure: INJECT EPIDURAL LUMBAR / SACRAL SINGLE;  Left Transforaminal Lumbar 4-Lumbar 5 Epidural Steroid Injection;  Surgeon: Nuvia Reina MD;  Location: [facility] OR     LAPAROSCOPIC ASSISTED COLECTOMY N/A 2/11/2015    Procedure: LAPAROSCOPIC ASSISTED COLECTOMY;  Surgeon: Oren Breen MD;  Location: [facility] OR     PHACOEMULSIFICATION CLEAR CORNEA WITH STANDARD INTRAOCULAR LENS IMPLANT Left 2/15/2018    Procedure: PHACOEMULSIFICATION CLEAR CORNEA WITH STANDARD INTRAOCULAR LENS IMPLANT;  LEFT EYE CATARACT EXTRACTION WITH STANDARD INTRAOCULAR LENS IMPLANT ;  Surgeon: Brandon Orellana MD;  Location: [facility]     PHACOEMULSIFICATION CLEAR CORNEA WITH STANDARD INTRAOCULAR LENS IMPLANT Right 3/1/2018    Procedure: PHACOEMULSIFICATION CLEAR CORNEA WITH STANDARD INTRAOCULAR LENS IMPLANT;  RIGHT EYE CATARACT EXTRACTION WITH STANDARD INTRAOCULAR LENS IMPLANT ;  Surgeon: Brandon Orellana MD;  Location: [facility]       PHYSICAL EXAM:  Vital Signs: BP (!) 154/86   Pulse 64   Temp 97.6  F (36.4  C) (Oral)   Resp 14   Wt 87.1 kg (192 lb 1.6 oz)   SpO2 96%   BMI 30.54  kg/m     Gen: NAD  Eyes: EOMI, sclera anicteric  HENT      Head: NC/AT     Ears: No external auricular lesions     Nose/sinus: No rhinorrhea or epistaxis     Oral Cavity/Oropharynx: MMM  Pulm: Breathing comfortably on room air, CTABL  CV: No visible cyanosis, RRR  Abd: Soft distended abdomen.  Central abdominal scar terminating at the umbilicus.   Skin: Normal color and turgor of the visualized skin  Neurologic/MSK/psych: A&Ox3, Gross motor movements against gravity noted in the upper and lower extremities bilaterally, denies neuropathy of the upper or lower extremities.     MEDICATIONS:  Current Outpatient Medications   Medication Sig Dispense Refill     aspirin 81 MG tablet Take 1 tablet (81 mg) by mouth daily 30 tablet      calcium-vitamin D (CALTRATE) 600-400 MG-UNIT per tablet Take 1 tablet by mouth daily       clonazePAM (KLONOPIN) 1 MG tablet TAKE 1 TABLET BY MOUTH IN THE EVENING AS NEEDED FOR ANXIETY 30 tablet 5     folic acid-vit B6-vit B12 (FOLGARD) 0.8-10-0.115 MG TABS Take 1 tablet by mouth daily       IBUPROFEN PO Take 400 mg by mouth every 8 hours as needed for moderate pain       leuprolide (LUPRON DEPOT) 45 MG kit Inject 45 mg into the muscle every 6 months 1 each 0     lisinopril (PRINIVIL/ZESTRIL) 40 MG tablet Take 1 tablet (40 mg) by mouth daily 90 tablet 1     Loratadine (CLARITIN PO) Take  by mouth. As needed        naproxen (NAPROSYN) 500 MG tablet Take 1 tablet (500 mg) by mouth daily as needed for moderate pain 30 tablet 0     Omega-3 Fatty Acids (OMEGA-3 FISH OIL PO) Take 500 mg by mouth daily       sildenafil (VIAGRA) 100 MG tablet 1/2 tab three times a week 10 tablet 12     triamcinolone (KENALOG) 0.1 % cream        zolpidem (AMBIEN) 5 MG tablet TAKE 1 TABLET BY MOUTH AT BEDTIME AS NEEDED 30 tablet 0        8/6/12    SPECIMEN(S):  A: Left pelvic lymph nodes  B: Right pelvic lymph nodes  C: Prostate and bilateral seminal vesicles    FINAL DIAGNOSIS:  A) Lymph nodes, left pelvic,  dissection       - Nine lymph nodes with no evidence of malignancy (0/9)    B) Lymph nodes, right pelvic, dissection       - Seven lymph nodes with no evidence of malignancy (0/7)    C) Prostate, radical prostatectomy       - Adenocarcinoma, [place] grade 4+5, score 9  - Tumor involves approximately 25% of sampled prostate, and is present  in the right and left apex, mid-prostate, and base; tumor present in 20  of 38 prostate slides  - All margins are free of tumor  - Lymphovascular and perineural invasion present  - Right and left seminal vesicles free of tumor  - See microscopic for checklist / staging    LABS: Reviewed.    PSA:   1/9/18:  PSA  < 0.01  1/23/19:  PSA  = 0.04  7/10/19:  PSA  = 0.07  8/13/19 PSA  =0.09 discontinue Bicalutamide  9/18/19 PSA  =0.13    IMAGING: No recent imaging within the last year    IMPRESSION:   Medardo Gautam is a 79 year old male with history of prostate cancer status post neoadjuvant chemo therapy, prostatectomy with adjuvant radiotherapy (completed:10/18/2013) and stage I colon cancer status post hemicolectomy with no adjuvant therapy (completed: 2/11/2015).  He is maintained on androgen deprivation therapy in the form of Lupron. He just got Lupron on 7/29/19, and is due every six months.     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  I do not feel that imaging is necessary at this time as his likelihood of metastatic disease is extremely low.    PLAN:   1. Continue Lupron  2. Continue to stay off the Bicalutamide  3. Return in three months (December) with new PSA  4. In December, if the PSA Is >1, consider scans  5. At that time could consider therapy with Darolutamide, Enzalutamide or Abiraterone.   6. May consider exercise study at that time as well.     Medardo Ingram MD

## 2019-09-23 NOTE — TELEPHONE ENCOUNTER
Ambien  Routing refill request to provider for review/approval because:  Drug not on the [facility] refill protocol     Last Written Prescription Date:  8/20/19  Last Fill Quantity: 30,  # refills: 0   Last office visit: 7/2/2019 with prescribing provider:    Future Office Visit:      Laura Blevins, RN, BSN

## 2019-09-23 NOTE — NURSING NOTE
"Oncology Rooming Note    September 23, 2019 10:18 AM   Medardo Gautam is a 79 year old male who presents for:    Chief Complaint   Patient presents with     Oncology Clinic Visit     Return; Prostate Ca     Initial Vitals: BP (!) 154/86   Pulse 64   Temp 97.6  F (36.4  C) (Oral)   Resp 14   Wt 87.1 kg (192 lb 1.6 oz)   SpO2 96%   BMI 30.54 kg/m   Estimated body mass index is 30.54 kg/m  as calculated from the following:    Height as of 8/12/19: 1.689 m (5' 6.5\").    Weight as of this encounter: 87.1 kg (192 lb 1.6 oz). Body surface area is 2.02 meters squared.  No Pain (0) Comment: Data Unavailable   No LMP for male patient.  Allergies reviewed: Yes  Medications reviewed: Yes    Medications: Medication refills not needed today.  Pharmacy name entered into [facility]: [facility] [address] [place] [id number] [facility]    Clinical concern: No new concerns.        Carla Persaud CMA              "

## 2019-09-23 NOTE — PROGRESS NOTES
Oncology FOLLOWUP PATIENT Visit        PATIENT NAME: Medardo Gautam  MRN: [id number]   : 1940   The patient is a 79-year-old gentleman with a history of high-grade prostate cancer as well as a grade 2 colon cancer here for evaluation and management of a rising PSA.  He has most recently seen Dr. dennis in urology.    DISEASES UNDER MANAGEMENT:    2012    pT2c N0 Mx prostatic adenocarcinoma, Kayenta grade 4+5 = 9, PSA 5.2    pT1 N0 M0 G2 colonic adenocarcinoma    RADIATION HISTORY: Prostate fossa radiation (completed: 10/18/2013)  1. Phase 1: 4500 cGy (180 cGy per fraction) to the pelvis  2. Phase 2: 2520 cGy boost to the prostate bed     CHEMOTHERAPY HISTORY:     CALGB     Leuprolide 22.5 mg IM (3/27/2012, 2012)    Docetaxel 75 mg/m  (3/27/2012)- discontinued on study given the development of treatment related hepatotoxicity    Salvage ADT    Leuprolide (2013-2019) - gets Q 6 months     HPI:   Medardo Gautam is a 79 year old male with history of prostate cancer status post neoadjuvant chemohormonal therapy pre Ramon Abimbola here at [facility], complicated by elevated LFTs and on Lupron and Bicalutamide since that time.  He has not been documented to have metastatic disease however he was originally treated with a radical prostatectomy which followed the brief course of androgen deprivation therapy and 1 cycle of docetaxel which was administered as per the above on study CALGB 28439.  Prostatectomy was followed with adjuvant radiotherapy (completed:10/18/2013).    He initially came to clinical detection in regards to his prostate cancer after serial PSA monitoring revealed an elevated PSA of 5. (previously 4.22 in 2011).  He underwent a biopsy which showed Kayenta grade 5+4 = 9 adenocarcinoma with 90% tumor involvement 8/8 cores, (+) PNI.  As his staging revealed organ confined disease, he underwent treatment on the CALGB 11024 study consisting of neoadjuvant Lupron (cycle 1:  3/27/2012) and Taxotere 75 mg/m  (3/27/2012).  Neoadjuvant therapy on trial was discontinued given the development of hepatotoxicity.    He then underwent a robotic assisted retropubic prostatectomy with bilateral pelvic lymphadenectomy and unilateral left-sided nerve sparing (8/6/2012).  Pathology revealed [place] grade 4+ 5 = adenocarcinoma, (+) LVSI, (+) PNI, 25% of the excised prostate bilaterally excised to negative margins.  In regards to lymph node dissection, 0/16 lymph nodes were positive for metastatic adenocarcinoma.  Postoperatively his PSA was undetectable until 02/2013 at which time it walker to 0.2 and continued to rise to 0.5 (4/3/2013) shortly thereafter, he underwent radiographic restaging (4/18/2013) which showed no evidence of metastatic disease.  After short interval follow-up, his next PSA on 6/19/2013 which showed a value of 1.28.  He then started salvage ADT on 6/25/2013.   He was recommended to undergo salvage radiotherapy.  He received a total dose of 7020 cGy delivered to the prostate fossa completed on 10/18/2013.  He currently continues on ADT.    In regards to his colon cancer, he underwent a routine screening colonoscopy on 1/16/2015.  Pathology from this procedure demonstrated a moderately differentiated adenocarcinoma at the mid a sending colon.  He underwent definitive management with laparoscopic right hemicolectomy (2/11/2015) which characterized a 0.8 cm adenocarcinoma with 0/13 associated lymph nodes.      SUBJECTIVE:   He has no complaints today.  He continues to take his bicalutamide religiously.  He has no urologic complaints.  He is mildly concerned about the rise in the PSA.    FAMILY HISTORY:      Sister diagnosed with breast cancer at age 70     SOCIAL HISTORY:      The patient is a native of [place].  He is very well traveled, having received education as an , and also traveled and work through [employer], [employer], [employer], [employer] and [employer] throughout his life.  He  has been  for 45 years.  He has a son who is living in [place], age 37.  A daughter in [place], age 39.  He has 4 grandchildren.      Occupation:  Retired since 2001 as a .  He has worked at several places in the world.     Tobacco:  Brief smoking during college education in the 1960s, but none since.      Alcohol:   Social alcohol use, no history of alcohol abuse    Drugs:   No history of illicit drug use.     PAST MEDICAL /SURGICAL HISTORY:  Past Medical History:   Diagnosis Date     Anxiety      Colon cancer (H) 01/2015     Contracture of finger joint ca 2005    left and right fifth fingers     Dupuytren's contracture of left hand      Gout      HEMORRHOIDS NOS 6/4/2007     Hypertension      Insomnia      Nonsenile cataract      Personal history of colonic polyps 7 years ago?     Prostate cancer (H) Feb 2012     Renal cyst 6/22/2017     Seborrheic dermatitis      Skin lesion- R side of face and behind L ear 12/15/2011     SKIN SENSATION DISTURB 12/29/2006    allergic reaction to strawberries     SKIN SENSATION DISTURB 12/29/2006     Past Surgical History:   Procedure Laterality Date     APPENDECTOMY       BIOPSY  01/16/15     C HAND/FINGER SURGERY UNLISTED       C LENGTHEN,TENDON,HAND/FINGER  ca 2007    right fifth     C STOMACH SURGERY PROCEDURE UNLISTED       CATARACT IOL, RT/LT Left 02/15/2018     COLON SURGERY  2/11/2015    Lap assisted R hemicolectomy     COLONOSCOPY  10/25/07    Snare polypectomy     COLONOSCOPY  6/25/2009    with snare polypectomy     COLONOSCOPY  9/30/2009     COLONOSCOPY  1/5/2011    COLONOSCOPY performed by [facility] MARIEE at  [facility]     COLONOSCOPY N/A 1/16/2015    Procedure: COMBINED COLONOSCOPY, SINGLE OR MULTIPLE BIOPSY/POLYPECTOMY BY BIOPSY;  Surgeon: Sarah Beth Pisano MD;  Location: [facility] OR     COLONOSCOPY WITH CO2 INSUFFLATION N/A 1/16/2015    Procedure: COLONOSCOPY WITH CO2 INSUFFLATION;  Surgeon: Sarah Beth Pisano MD;  Location:  OR      COLONOSCOPY WITH CO2 INSUFFLATION N/A 9/7/2018    Procedure: COLONOSCOPY WITH CO2 INSUFFLATION;  C18.9 (ICD-10-CM) - Malignant neoplasm of colon, unspecified part of colon (H)  [facility] fax# [phone]  BMI 26.29  [employer]  Referred by dr thomas;  Surgeon: Sarah Beth Pisano MD;  Location: [facility] OR     DAVINCI PROSTATECTOMY  8/6/2012    Procedure: DAVINCI PROSTATECTOMY;  Davinci Assisted Radical Prostatectomy with Bilateral Lymphadenectomy ;  Surgeon: Norma Goodwin MD;  Location: [facility] OR     GENITOURINARY SURGERY      prostate surgery     INJECT EPIDURAL LUMBAR / SACRAL SINGLE Left 10/30/2017    Procedure: INJECT EPIDURAL LUMBAR / SACRAL SINGLE;  Left Transforaminal Lumbar 4-Lumbar 5 Epidural Steroid Injection;  Surgeon: Nuvia Reina MD;  Location: [facility] OR     LAPAROSCOPIC ASSISTED COLECTOMY N/A 2/11/2015    Procedure: LAPAROSCOPIC ASSISTED COLECTOMY;  Surgeon: Oren Breen MD;  Location: [facility] OR     PHACOEMULSIFICATION CLEAR CORNEA WITH STANDARD INTRAOCULAR LENS IMPLANT Left 2/15/2018    Procedure: PHACOEMULSIFICATION CLEAR CORNEA WITH STANDARD INTRAOCULAR LENS IMPLANT;  LEFT EYE CATARACT EXTRACTION WITH STANDARD INTRAOCULAR LENS IMPLANT ;  Surgeon: Brandon Orellana MD;  Location: [facility]     PHACOEMULSIFICATION CLEAR CORNEA WITH STANDARD INTRAOCULAR LENS IMPLANT Right 3/1/2018    Procedure: PHACOEMULSIFICATION CLEAR CORNEA WITH STANDARD INTRAOCULAR LENS IMPLANT;  RIGHT EYE CATARACT EXTRACTION WITH STANDARD INTRAOCULAR LENS IMPLANT ;  Surgeon: Brandon Orellana MD;  Location: [facility]       PHYSICAL EXAM:  Vital Signs: BP (!) 154/86   Pulse 64   Temp 97.6  F (36.4  C) (Oral)   Resp 14   Wt 87.1 kg (192 lb 1.6 oz)   SpO2 96%   BMI 30.54 kg/m    Gen: NAD  Eyes: EOMI, sclera anicteric  HENT      Head: NC/AT     Ears: No external auricular lesions     Nose/sinus: No rhinorrhea or epistaxis     Oral Cavity/Oropharynx: MMM  Pulm: Breathing comfortably on room air,  CTABL  CV: No visible cyanosis, RRR  Abd: Soft distended abdomen.  Central abdominal scar terminating at the umbilicus.   Skin: Normal color and turgor of the visualized skin  Neurologic/MSK/psych: A&Ox3, Gross motor movements against gravity noted in the upper and lower extremities bilaterally, denies neuropathy of the upper or lower extremities.     MEDICATIONS:  Current Outpatient Medications   Medication Sig Dispense Refill     aspirin 81 MG tablet Take 1 tablet (81 mg) by mouth daily 30 tablet      calcium-vitamin D (CALTRATE) 600-400 MG-UNIT per tablet Take 1 tablet by mouth daily       clonazePAM (KLONOPIN) 1 MG tablet TAKE 1 TABLET BY MOUTH IN THE EVENING AS NEEDED FOR ANXIETY 30 tablet 5     folic acid-vit B6-vit B12 (FOLGARD) 0.8-10-0.115 MG TABS Take 1 tablet by mouth daily       IBUPROFEN PO Take 400 mg by mouth every 8 hours as needed for moderate pain       leuprolide (LUPRON DEPOT) 45 MG kit Inject 45 mg into the muscle every 6 months 1 each 0     lisinopril (PRINIVIL/ZESTRIL) 40 MG tablet Take 1 tablet (40 mg) by mouth daily 90 tablet 1     Loratadine (CLARITIN PO) Take  by mouth. As needed        naproxen (NAPROSYN) 500 MG tablet Take 1 tablet (500 mg) by mouth daily as needed for moderate pain 30 tablet 0     Omega-3 Fatty Acids (OMEGA-3 FISH OIL PO) Take 500 mg by mouth daily       sildenafil (VIAGRA) 100 MG tablet 1/2 tab three times a week 10 tablet 12     triamcinolone (KENALOG) 0.1 % cream        zolpidem (AMBIEN) 5 MG tablet TAKE 1 TABLET BY MOUTH AT BEDTIME AS NEEDED 30 tablet 0        8/6/12    SPECIMEN(S):  A: Left pelvic lymph nodes  B: Right pelvic lymph nodes  C: Prostate and bilateral seminal vesicles    FINAL DIAGNOSIS:  A) Lymph nodes, left pelvic, dissection       - Nine lymph nodes with no evidence of malignancy (0/9)    B) Lymph nodes, right pelvic, dissection       - Seven lymph nodes with no evidence of malignancy (0/7)    C) Prostate, radical prostatectomy       -  Adenocarcinoma, Jeancarlos grade 4+5, score 9  - Tumor involves approximately 25% of sampled prostate, and is present  in the right and left apex, mid-prostate, and base; tumor present in 20  of 38 prostate slides  - All margins are free of tumor  - Lymphovascular and perineural invasion present  - Right and left seminal vesicles free of tumor  - See microscopic for checklist / staging    LABS: Reviewed.    PSA:   1/9/18:  PSA  < 0.01  1/23/19:  PSA  = 0.04  7/10/19:  PSA  = 0.07  8/13/19 PSA  =0.09 discontinue Bicalutamide  9/18/19 PSA  =0.13      IMAGING: No recent imaging within the last year    IMPRESSION:   Medardo Gautam is a 79 year old male with history of prostate cancer status post neoadjuvant chemo therapy, prostatectomy with adjuvant radiotherapy (completed:10/18/2013) and stage I colon cancer status post hemicolectomy with no adjuvant therapy (completed: 2/11/2015).  He is maintained on androgen deprivation therapy in the form of Lupron. He just got Lupron on 7/29/19, and is due every six months.     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  I do not feel that imaging is necessary at this time as his likelihood of metastatic disease is extremely low.    PLAN:   1. Continue Lupron  2. Continue to stay off the Bicalutamide  3. Return in three months (December) with new PSA  4. In December, if the PSA Is >1, consider scans  5. At that time could consider therapy with Darolutamide, Enzalutamide or Abiraterone.   6. May consider exercise study at that time as well.       Medardo Ingram MD

## 2019-09-23 NOTE — PATIENT INSTRUCTIONS
1. Continue Lupron  2. Continue to stay off the Bicalutamide  3. Return in three months (December) with new PSA  4. In December, if the PSA Is >1, consider scans  5. At that time could consider therapy with Darolutamide, Enzalutamide or Abiraterone.   6. May consider exercise study at that time as well.

## 2019-09-24 RX ORDER — ZOLPIDEM TARTRATE 5 MG/1
TABLET ORAL
Qty: 30 TABLET | Refills: 0 | Status: SHIPPED | OUTPATIENT
Start: 2019-09-24 | End: 2019-10-22

## 2019-10-17 NOTE — PROGRESS NOTES
"SUBJECTIVE:   Medardo Gautam is a 79 year old male who presents for Preventive Visit.    Are you in the first 12 months of your Medicare coverage?  No    Healthy Habits:     In general, how would you rate your overall health?  Fair    Frequency of exercise:  None    Do you usually eat at least 4 servings of fruit and vegetables a day, include whole grains    & fiber and avoid regularly eating high fat or \"junk\" foods?  Yes    Taking medications regularly:  Yes    Barriers to taking medications:  None    Medication side effects:  None    Ability to successfully perform activities of daily living:  No assistance needed    Home Safety:  No safety concerns identified    Hearing Impairment:  No hearing concerns    In the past 6 months, have you been bothered by leaking of urine?  No    In general, how would you rate your overall mental or emotional health?  Good      PHQ-2 Total Score: 0    Additional concerns today:  Yes (Leg Pain & Medication)    Do you feel safe in your environment? Yes    Do you have a Health Care Directive? Yes: Patient states has Advance Directive and will bring in a copy to clinic.      Fall risk  Fallen 2 or more times in the past year?: No  Any fall with injury in the past year?: No    Cognitive Screening   1) Repeat 3 items (Leader, Season, Table)    2) Clock draw: NORMAL  3) 3 item recall: Recalls 3 objects  Results: 3 items recalled: COGNITIVE IMPAIRMENT LESS LIKELY    Mini-CogTM Copyright ANNY Infante. Licensed by the author for use in [facility]; reprinted with permission (charlie@.[facility]). All rights reserved.      Do you have sleep apnea, excessive snoring or daytime drowsiness?: no    Reviewed and updated as needed this visit by clinical staff  Tobacco  Allergies  Meds  Med Hx  Surg Hx  Fam Hx  Soc Hx        Reviewed and updated as needed this visit by Provider        Social History     Tobacco Use     Smoking status: Former Smoker     Packs/day: 0.00     Years: 1.00     " Pack years: 0.00     Types: Cigarettes, Cigars, Pipe     Start date: 10/1/1961     Last attempt to quit: 1962     Years since quittin.8     Smokeless tobacco: Never Used     Tobacco comment: No smokers in home   Substance Use Topics     Alcohol use: Yes     Alcohol/week: 0.0 standard drinks     Comment: daily glass of wine and whiskey     If you drink alcohol do you typically have >3 drinks per day or >7 drinks per week? Yes        AUDIT - Alcohol Use Disorders Identification Test - Reproduced from the World Health Organization Audit 2001 (Second Edition) 10/19/2019   1.  How often do you have a drink containing alcohol? 4 or more times a week   2.  How many drinks containing alcohol do you have on a typical day when you are drinking? 3 or 4   3.  How often do you have five or more drinks on one occasion? Less than monthly   4.  How often during the last year have you found that you were not able to stop drinking once you had started? Never   5.  How often during the last year have you failed to do what was normally expected of you because of drinking? Never   6.  How often during the last year have you needed a first drink in the morning to get yourself going after a heavy drinking session? Never   7.  How often during the last year have you had a feeling of guilt or remorse after drinking? Never   8.  How often during the last year have you been unable to remember what happened the night before because of your drinking? Less than monthly   9.  Have you or someone else been injured because of your drinking? No   10. Has a relative, friend, doctor or other health care worker been concerned about your drinking or suggested you cut down? No   TOTAL SCORE 7       Current providers sharing in care for this patient include:   Patient Care Team:  Verna Osborne MD as PCP - General (Family Practice)  Charlotte Castellanos MD as MD (Internal Medicine)  Melba Rousseau RN as Clinic Care Coordinator ([place]  and Rectal Surgery)  Ramon Daily MD as MD (Oncology)  Iesha Kleler, RN as Nurse Coordinator (Oncology)  Verna Osborne MD as Assigned PCP  Weight, Eddy Coleman MD as MD (Urology)  Medardo Ingram MD as MD (Internal Medicine - Medical Oncology)  Taylor Nelson, RN as Specialty Care Coordinator (Hematology & Oncology)    The following health maintenance items are reviewed in Epic and correct as of today:  Health Maintenance   Topic Date Due     ZOSTER IMMUNIZATION (1 of 2) 01/01/1992     ADVANCE CARE PLANNING  12/15/2016     MEDICARE ANNUAL WELLNESS VISIT  10/09/2019     LIPID  01/15/2020     BMP  07/02/2020     FALL RISK ASSESSMENT  10/25/2020     COLONOSCOPY  09/07/2021     DTAP/TDAP/TD IMMUNIZATION (3 - Td) 10/25/2029     PHQ-2  Completed     INFLUENZA VACCINE  Completed     PNEUMOCOCCAL IMMUNIZATION 65+ HIGH/HIGHEST RISK  Completed     IPV IMMUNIZATION  Aged Out     MENINGITIS IMMUNIZATION  Aged Out     Lab work is in process  Labs reviewed in EPIC  BP Readings from Last 3 Encounters:   10/25/19 (!) 140/84   09/23/19 (!) 154/86   08/12/19 (!) 146/84    Wt Readings from Last 3 Encounters:   10/25/19 86.9 kg (191 lb 8 oz)   09/23/19 87.1 kg (192 lb 1.6 oz)   08/12/19 87.1 kg (192 lb)                  Patient Active Problem List   Diagnosis     Disturbance of skin sensation     Gout     Advanced directives, counseling/discussion     Dupuytren's contracture of hand- left 5th finger, right 5th finger     Bunions- both feet     Skin lesion- R side of face and behind L ear     Insomnia     Prostatic nodule- posterior right edge with rectal exam     Prostate cancer- Jeancarlos 9 (5+4) dx Feb 2012     Essential hypertension with goal blood pressure less than 140/90     Hyperlipidemia LDL goal <130     Malignant neoplasm of prostate (H)     Anxiety     Colon cancer (H)     Renal cyst     Lumbar radiculopathy     Meibomian gland dysfunction     Pseudophakia of right eye     Macular degeneration     Dermatochalasis  of eyelid     Olecranon bursitis of right elbow     Right knee pain     Calculus of common bile duct and gallbladder     Lateral knee pain, left     Past Surgical History:   Procedure Laterality Date     APPENDECTOMY       BIOPSY  01/16/15     C HAND/FINGER SURGERY UNLISTED       C LENGTHEN,TENDON,HAND/FINGER  ca 2007    right fifth     C STOMACH SURGERY PROCEDURE UNLISTED       CATARACT IOL, RT/LT Left 02/15/2018     COLON SURGERY  2/11/2015    Lap assisted R hemicolectomy     COLONOSCOPY  10/25/07    Snare polypectomy     COLONOSCOPY  6/25/2009    with snare polypectomy     COLONOSCOPY  9/30/2009     COLONOSCOPY  1/5/2011    COLONOSCOPY performed by [facility]     COLONOSCOPY N/A 1/16/2015    Procedure: COMBINED COLONOSCOPY, SINGLE OR MULTIPLE BIOPSY/POLYPECTOMY BY BIOPSY;  Surgeon: Sarah Beth Pisano MD;  Location: [facility] OR     COLONOSCOPY WITH CO2 INSUFFLATION N/A 1/16/2015    Procedure: COLONOSCOPY WITH CO2 INSUFFLATION;  Surgeon: Sarah Beth Pisano MD;  Location: [facility] OR     COLONOSCOPY WITH CO2 INSUFFLATION N/A 9/7/2018    Procedure: COLONOSCOPY WITH CO2 INSUFFLATION;  C18.9 (ICD-10-CM) - Malignant neoplasm of colon, unspecified part of colon (H)  [facility] fax# [phone]  BMI 26.29  [employer]  Referred by dr thomas;  Surgeon: Sarah Beth Pisano MD;  Location: [facility] OR     DAVINCI PROSTATECTOMY  8/6/2012    Procedure: DAVINCI PROSTATECTOMY;  Davinci Assisted Radical Prostatectomy with Bilateral Lymphadenectomy ;  Surgeon: Norma Goodwin MD;  [facility]     GENITOURINARY SURGERY      prostate surgery     INJECT EPIDURAL LUMBAR / SACRAL SINGLE Left 10/30/2017    Procedure: INJECT EPIDURAL LUMBAR / SACRAL SINGLE;  Left Transforaminal Lumbar 4-Lumbar 5 Epidural Steroid Injection;  Surgeon: Nuvia Reina MD;  Location: [facility] OR     LAPAROSCOPIC ASSISTED COLECTOMY N/A 2/11/2015    Procedure: LAPAROSCOPIC ASSISTED COLECTOMY;  Surgeon: Oren Breen MD;  Location:   OR     PHACOEMULSIFICATION CLEAR CORNEA WITH STANDARD INTRAOCULAR LENS IMPLANT Left 2/15/2018    Procedure: PHACOEMULSIFICATION CLEAR CORNEA WITH STANDARD INTRAOCULAR LENS IMPLANT;  LEFT EYE CATARACT EXTRACTION WITH STANDARD INTRAOCULAR LENS IMPLANT ;  Surgeon: Brandon Orellana MD;  Location: [facility]     PHACOEMULSIFICATION CLEAR CORNEA WITH STANDARD INTRAOCULAR LENS IMPLANT Right 3/1/2018    Procedure: PHACOEMULSIFICATION CLEAR CORNEA WITH STANDARD INTRAOCULAR LENS IMPLANT;  RIGHT EYE CATARACT EXTRACTION WITH STANDARD INTRAOCULAR LENS IMPLANT ;  Surgeon: Brandon Orellana MD;  Location: [facility]       Social History     Tobacco Use     Smoking status: Former Smoker     Packs/day: 0.00     Years: 1.00     Pack years: 0.00     Types: Cigarettes, Cigars, Pipe     Start date: 10/1/1961     Last attempt to quit: 1962     Years since quittin.8     Smokeless tobacco: Never Used     Tobacco comment: No smokers in home   Substance Use Topics     Alcohol use: Yes     Alcohol/week: 0.0 standard drinks     Comment: daily glass of wine and whiskey     Family History   Problem Relation Age of Onset     Cerebrovascular Disease Father      Cancer Mother 90        Patient believes vaginal cancer - hysterectomy,      Alzheimer Disease Mother      Cancer Sister      Breast Cancer Sister      C.A.D. No family hx of      Cancer - colorectal No family hx of      Prostate Cancer No family hx of      Blood Disease No family hx of      Cardiovascular No family hx of      Circulatory No family hx of      Eye Disorder No family hx of      Gastrointestinal Disease No family hx of      Genitourinary Problems No family hx of      Lipids No family hx of      Neurologic Disorder No family hx of      Respiratory No family hx of      Asthma No family hx of      Heart Disease No family hx of      Diabetes No family hx of      Hypertension No family hx of      Arthritis No family hx of      Thyroid Disease No family hx of       Musculoskeletal Disorder No family hx of      Glaucoma No family hx of      Macular Degeneration No family hx of          Current Outpatient Medications   Medication Sig Dispense Refill     allopurinol (ZYLOPRIM) 100 MG tablet Take 1 tablet (100 mg) by mouth daily 90 tablet 3     aspirin 81 MG tablet Take 1 tablet (81 mg) by mouth daily 30 tablet      calcium-vitamin D (CALTRATE) 600-400 MG-UNIT per tablet Take 1 tablet by mouth daily       clonazePAM (KLONOPIN) 1 MG tablet TAKE 1 TABLET BY MOUTH IN THE EVENING AS NEEDED FOR ANXIETY 30 tablet 5     folic acid-vit B6-vit B12 (FOLGARD) 0.8-10-0.115 MG TABS Take 1 tablet by mouth daily       IBUPROFEN PO Take 400 mg by mouth every 8 hours as needed for moderate pain       leuprolide (LUPRON DEPOT) 45 MG kit Inject 45 mg into the muscle every 6 months 1 each 0     Loratadine (CLARITIN PO) Take  by mouth. As needed        losartan-hydrochlorothiazide (HYZAAR) 100-12.5 MG tablet Take 1 tablet by mouth daily 30 tablet 1     naproxen (NAPROSYN) 500 MG tablet Take 1 tablet (500 mg) by mouth daily as needed for moderate pain 30 tablet 0     Omega-3 Fatty Acids (OMEGA-3 FISH OIL PO) Take 500 mg by mouth daily       sildenafil (VIAGRA) 100 MG tablet 1/2 tab three times a week 10 tablet 12     triamcinolone (KENALOG) 0.1 % cream        zolpidem (AMBIEN) 5 MG tablet TAKE 1 TABLET BY MOUTH AT BEDTIME AS NEEDED 30 tablet 0     No Known Allergies  Recent Labs   Lab Test 07/10/19  1029 07/02/19  0922 01/15/19  0932 11/06/18  1000 01/09/18  1345 09/07/17  0951  11/16/16  0851  10/30/14  1043   A1C 6.2*  --   --   --   --  5.7  --   --   --  6.0   LDL  --   --  153*  --  115*  --   --  134*   < >  --    HDL  --   --  87  --  83  --   --  91   < >  --    TRIG  --   --  119  --  176*  --   --  102   < >  --    ALT  --   --   --  46 81* 60   < >  --    < >  --    CR  --  0.87  --  0.89 0.78 1.02   < >  --    < >  --    GFRESTIMATED  --  83  --  83 >90 71   < >  --    < >  --   "  GFRESTBLACK  --  >90  --  >90 >90 86   < >  --    < >  --    POTASSIUM  --  4.5  --  4.1 5.2 4.6   < >  --    < >  --     < > = values in this interval not displayed.          Review of Systems   Constitutional: Negative.    HENT: Negative.    Eyes: Positive for itching.   Respiratory: Negative.    Cardiovascular: Negative.    Gastrointestinal: Negative.    Endocrine: Negative.    Genitourinary: Negative.    Musculoskeletal: Negative.    Skin: Negative.    Allergic/Immunologic: Negative.    Neurological: Negative.    Hematological: Negative.    Psychiatric/Behavioral: The patient is nervous/anxious.    All other systems reviewed and are negative.        OBJECTIVE:   BP (!) 140/84 (BP Location: Left arm, Patient Position: Chair, Cuff Size: Adult Regular)   Pulse 60   Temp 97.6  F (36.4  C) (Temporal)   Resp 16   Ht 1.689 m (5' 6.5\")   Wt 86.9 kg (191 lb 8 oz)   SpO2 100%   BMI 30.45 kg/m   Estimated body mass index is 30.45 kg/m  as calculated from the following:    Height as of this encounter: 1.689 m (5' 6.5\").    Weight as of this encounter: 86.9 kg (191 lb 8 oz).  Physical Exam  Constitutional:       Appearance: He is well-developed.   HENT:      Head: Normocephalic and atraumatic.      Right Ear: External ear normal.      Left Ear: External ear normal.   Neck:      Musculoskeletal: Neck supple.   Cardiovascular:      Rate and Rhythm: Normal rate and regular rhythm.   Pulmonary:      Effort: Pulmonary effort is normal.      Breath sounds: Normal breath sounds.   Abdominal:      General: Bowel sounds are normal.      Palpations: Abdomen is soft.   Musculoskeletal: Normal range of motion.   Neurological:      Mental Status: He is alert and oriented to person, place, and time.         Diagnostic Test Results:  Labs reviewed in Epic    ASSESSMENT / PLAN:     Problem List Items Addressed This Visit     Gout     No recent symptoms . Check uric acid. Not on meds currently         Relevant Medications    " allopurinol (ZYLOPRIM) 100 MG tablet    Other Relevant Orders    Uric acid    CBC with platelets and differential    Insomnia     Back on ambien as he did no have good response to other alternatives. Refill meds. Recheck in 6 months         Essential hypertension with goal blood pressure less than 140/90     marginallly controlled  Blood pressures on the current dose of meds. Anxiety could be making symptoms worse. Recheck in 1 month follow up         Relevant Medications    losartan-hydrochlorothiazide (HYZAAR) 100-12.5 MG tablet    Hyperlipidemia LDL goal <130     Elevated ASCVD  But pt wishes to try diet and lifestyle as he has not tolerated medications in the past.          Malignant neoplasm of prostate (H)     On lupron   every 6 months . Casodex discontinued .         Anxiety     Well-controlled on the current dose of Klonopin.  Refill medications.           Other Visit Diagnoses     Encounter for routine adult medical exam with abnormal findings    -  Primary    Need for prophylactic vaccination and inoculation against influenza        Relevant Orders    INFLUENZA (HIGH DOSE) 3 VALENT VACCINE [72352] (Completed)    Hyperlipidemia LDL goal <100        Relevant Orders    Lipid panel reflex to direct LDL Fasting    Comprehensive metabolic panel    Prediabetes        Relevant Orders    Hemoglobin A1c    Benign essential hypertension        Relevant Medications    losartan-hydrochlorothiazide (HYZAAR) 100-12.5 MG tablet    Need for vaccination        Relevant Orders    TDAP VACCINE (ADACEL) [67787.002] (Completed)    Encounter for Medicare annual wellness exam              End of Life Planning:  Patient currently has an advanced directive: No.  I have verified the patient's ablity to prepare an advanced directive/make health care decisions.  Literature was provided to assist patient in preparing an advanced directive.    COUNSELING:  Reviewed preventive health counseling, as reflected in patient instructions       " Regular exercise       Healthy diet/nutrition    Estimated body mass index is 30.45 kg/m  as calculated from the following:    Height as of this encounter: 1.689 m (5' 6.5\").    Weight as of this encounter: 86.9 kg (191 lb 8 oz).         reports that he quit smoking about 57 years ago. His smoking use included cigarettes, cigars, and pipe. He started smoking about 58 years ago. He smoked 0.00 packs per day for 1.00 year. He has never used smokeless tobacco.      Appropriate preventive services were discussed with this patient, including applicable screening as appropriate for cardiovascular disease, diabetes, osteopenia/osteoporosis, and glaucoma.  As appropriate for age/gender, discussed screening for colorectal cancer, prostate cancer, breast cancer, and cervical cancer. Checklist reviewing preventive services available has been given to the patient.    Reviewed patients plan of care and provided an AVS. The Basic Care Plan (routine screening as documented in Health Maintenance) for Medardo meets the Care Plan requirement. This Care Plan has been established and reviewed with the Patient.    Counseling Resources:  ATP IV Guidelines  Pooled Cohorts Equation Calculator  Breast Cancer Risk Calculator  FRAX Risk Assessment  ICSI Preventive Guidelines  Dietary Guidelines for Americans, 2010  USDA's MyPlate  ASA Prophylaxis  Lung CA Screening    Verna Osborne MD  [facility]    Identified Health Risks:    The patient was provided with suggestions to help him develop a healthy physical lifestyle.  He is at risk for lack of exercise and has been provided with information to increase physical activity for the benefit of his well-being.  "

## 2019-10-22 DIAGNOSIS — G47.00 INSOMNIA, UNSPECIFIED TYPE: ICD-10-CM

## 2019-10-23 RX ORDER — ZOLPIDEM TARTRATE 5 MG/1
TABLET ORAL
Qty: 30 TABLET | Refills: 0 | Status: SHIPPED | OUTPATIENT
Start: 2019-10-23 | End: 2019-11-22

## 2019-10-23 NOTE — TELEPHONE ENCOUNTER
Pending Prescriptions:                       Disp   Refills    zolpidem (AMBIEN) 5 MG tablet [Pharmacy M*30 tab*0            Sig: TAKE 1 TABLET BY MOUTH AT BEDTIME AS NEEDED        Last Written Prescription Date:  9/24/19  Last Fill Quantity: 30,   # refills: 0  Last Office Visit: 7/2/19  Future Office visit:    Next 5 appointments (look out 90 days)    Oct 25, 2019 10:40 AM CDT  PHYSICAL with Verna Osborne MD  [facility] [address] [place] [id number]  [phone]           Routing refill request to provider for review/approval because:  Drug not on the [facility], [facility] or  Health refill protocol or controlled substance    Stephanie Goetz, RN, BSN

## 2019-10-25 ENCOUNTER — OFFICE VISIT (OUTPATIENT)
Dept: FAMILY MEDICINE | Facility: OTHER | Age: 79
End: 2019-10-25
Payer: COMMERCIAL

## 2019-10-25 VITALS
TEMPERATURE: 97.6 F | DIASTOLIC BLOOD PRESSURE: 84 MMHG | HEIGHT: 67 IN | RESPIRATION RATE: 16 BRPM | WEIGHT: 191.5 LBS | SYSTOLIC BLOOD PRESSURE: 140 MMHG | HEART RATE: 60 BPM | BODY MASS INDEX: 30.06 KG/M2 | OXYGEN SATURATION: 100 %

## 2019-10-25 DIAGNOSIS — C61 MALIGNANT NEOPLASM OF PROSTATE (H): ICD-10-CM

## 2019-10-25 DIAGNOSIS — I10 ESSENTIAL HYPERTENSION WITH GOAL BLOOD PRESSURE LESS THAN 140/90: ICD-10-CM

## 2019-10-25 DIAGNOSIS — M1A.9XX0 CHRONIC GOUT WITHOUT TOPHUS, UNSPECIFIED CAUSE, UNSPECIFIED SITE: ICD-10-CM

## 2019-10-25 DIAGNOSIS — R73.03 PREDIABETES: ICD-10-CM

## 2019-10-25 DIAGNOSIS — F41.9 ANXIETY: ICD-10-CM

## 2019-10-25 DIAGNOSIS — E78.5 HYPERLIPIDEMIA LDL GOAL <130: ICD-10-CM

## 2019-10-25 DIAGNOSIS — Z23 NEED FOR PROPHYLACTIC VACCINATION AND INOCULATION AGAINST INFLUENZA: ICD-10-CM

## 2019-10-25 DIAGNOSIS — Z00.01 ENCOUNTER FOR ROUTINE ADULT MEDICAL EXAM WITH ABNORMAL FINDINGS: Primary | ICD-10-CM

## 2019-10-25 DIAGNOSIS — Z23 NEED FOR VACCINATION: ICD-10-CM

## 2019-10-25 DIAGNOSIS — E78.5 HYPERLIPIDEMIA LDL GOAL <100: ICD-10-CM

## 2019-10-25 DIAGNOSIS — Z00.00 ENCOUNTER FOR MEDICARE ANNUAL WELLNESS EXAM: ICD-10-CM

## 2019-10-25 DIAGNOSIS — G47.00 INSOMNIA, UNSPECIFIED TYPE: ICD-10-CM

## 2019-10-25 DIAGNOSIS — I10 BENIGN ESSENTIAL HYPERTENSION: ICD-10-CM

## 2019-10-25 PROCEDURE — 99397 PER PM REEVAL EST PAT 65+ YR: CPT | Mod: 25 | Performed by: FAMILY MEDICINE

## 2019-10-25 PROCEDURE — 90715 TDAP VACCINE 7 YRS/> IM: CPT | Performed by: FAMILY MEDICINE

## 2019-10-25 PROCEDURE — G0008 ADMIN INFLUENZA VIRUS VAC: HCPCS | Performed by: FAMILY MEDICINE

## 2019-10-25 PROCEDURE — 90472 IMMUNIZATION ADMIN EACH ADD: CPT | Performed by: FAMILY MEDICINE

## 2019-10-25 PROCEDURE — 90662 IIV NO PRSV INCREASED AG IM: CPT | Performed by: FAMILY MEDICINE

## 2019-10-25 RX ORDER — LOSARTAN POTASSIUM AND HYDROCHLOROTHIAZIDE 12.5; 1 MG/1; MG/1
1 TABLET ORAL DAILY
Qty: 30 TABLET | Refills: 1 | Status: SHIPPED | OUTPATIENT
Start: 2019-10-25 | End: 2020-01-03

## 2019-10-25 RX ORDER — ALLOPURINOL 100 MG/1
100 TABLET ORAL DAILY
Qty: 90 TABLET | Refills: 3 | Status: SHIPPED | OUTPATIENT
Start: 2019-10-25 | End: 2019-11-15

## 2019-10-25 ASSESSMENT — ENCOUNTER SYMPTOMS
GASTROINTESTINAL NEGATIVE: 1
RESPIRATORY NEGATIVE: 1
MUSCULOSKELETAL NEGATIVE: 1
NERVOUS/ANXIOUS: 1
HEMATOLOGIC/LYMPHATIC NEGATIVE: 1
CONSTITUTIONAL NEGATIVE: 1
NEUROLOGICAL NEGATIVE: 1
EYE ITCHING: 1
CARDIOVASCULAR NEGATIVE: 1
ENDOCRINE NEGATIVE: 1
ALLERGIC/IMMUNOLOGIC NEGATIVE: 1

## 2019-10-25 ASSESSMENT — PAIN SCALES - GENERAL: PAINLEVEL: NO PAIN (0)

## 2019-10-25 ASSESSMENT — ACTIVITIES OF DAILY LIVING (ADL): CURRENT_FUNCTION: NO ASSISTANCE NEEDED

## 2019-10-25 ASSESSMENT — MIFFLIN-ST. JEOR: SCORE: 1544.33

## 2019-11-01 PROBLEM — R10.13 ABDOMINAL PAIN, EPIGASTRIC: Status: RESOLVED | Noted: 2017-03-24 | Resolved: 2019-11-01

## 2019-11-01 NOTE — ASSESSMENT & PLAN NOTE
marginallly controlled  Blood pressures on the current dose of meds. Anxiety could be making symptoms worse. Recheck in 1 month follow up

## 2019-11-01 NOTE — ASSESSMENT & PLAN NOTE
Elevated ASCVD  But pt wishes to try diet and lifestyle as he has not tolerated medications in the past.

## 2019-11-01 NOTE — PATIENT INSTRUCTIONS
Patient Education   Personalized Prevention Plan  You are due for the preventive services outlined below.  Your care team is available to assist you in scheduling these services.  If you have already completed any of these items, please share that information with your care team to update in your medical record.  Health Maintenance Due   Topic Date Due     Zoster (Shingles) Vaccine (1 of 2) 01/01/1992     Discuss Advance Care Planning  12/15/2016     Annual Wellness Visit  10/09/2019     Your Health Risk Assessment indicates you feel you are not in good health    A healthy lifestyle helps keep the body fit and the mind alert. It helps protect you from disease, helps you fight disease, and helps prevent chronic disease (disease that doesn't go away) from getting worse. This is important as you get older and begin to notice twinges in muscles and joints and a decline in the strength and stamina you once took for granted. A healthy lifestyle includes good healthcare, good nutrition, weight control, recreation, and regular exercise. Avoid harmful substances and do what you can to keep safe. Another part of a healthy lifestyle is stay mentally active and socially involved.    Good healthcare     Have a wellness visit every year.     If you have new symptoms, let us know right away. Don't wait until the next checkup.     Take medicines exactly as prescribed and keep your medicines in a safe place. Tell us if your medicine causes problems.   Healthy diet and weight control     Eat 3 or 4 small, nutritious, low-fat, high-fiber meals a day. Include a variety of fruits, vegetables, and whole-grain foods.     Make sure you get enough calcium in your diet. Calcium, vitamin D, and exercise help prevent osteoporosis (bone thinning).     If you live alone, try eating with others when you can. That way you get a good meal and have company while you eat it.     Try to keep a healthy weight. If you eat more calories than your body  uses for energy, it will be stored as fat and you will gain weight.     Recreation   Recreation is not limited to sports and team events. It includes any activity that provides relaxation, interest, enjoyment, and exercise. Recreation provides an outlet for physical, mental, and social energy. It can give a sense of worth and achievement. It can help you stay healthy.    Mental Exercise and Social Involvement  Mental and emotional health is as important as physical health. Keep in touch with friends and family. Stay as active as possible. Continue to learn and challenge yourself.   Things you can do to stay mentally active are:    Learn something new, like a foreign language or musical instrument.     Play SCRABBLE or do crossword puzzles. If you cannot find people to play these games with you at home, you can play them with others on your computer through the Internet.     Join a games club--anything from card games to chess or checkers or lawn bowling.     Start a new hobby.     Go back to school.     Volunteer.     Read.   Keep up with world events.    Exercise for a Healthier Heart     Exercise with a friend. When activity is fun, you're more likely to stick with it.   You may wonder how you can improve the health of your heart. If you re thinking about exercise, you re on the right track. You don t need to become an athlete, but you do need a certain amount of brisk exercise to help strengthen your heart. If you have been diagnosed with a heart condition, your doctor may recommend exercise to help stabilize your condition. To help make exercise a habit, choose safe, fun activities.  Be sure to check with your healthcare provider before starting an exercise program.   Why exercise?  Exercising regularly offers many healthy rewards. It can help you do all of the following:    Improve your blood cholesterol level to help prevent further heart trouble    Lower your blood pressure to help prevent a stroke or heart  attack    Control diabetes, or reduce your risk of getting this disease    Improve your heart and lung function    Reach and maintain a healthy weight    Make your muscles stronger and more limber so you can stay active    Prevent falls and fractures by slowing the loss of bone mass (osteoporosis)    Manage stress better    Reduce your blood pressure    Improve your sense of self and your body image  Exercise tips  Ease into your routine. Set small goals. Then build on them.  Exercise on most days. Aim for a total of 150 or more minutes of moderate to  vigorous intensity activity each week. Consider 40 minutes, 3 to 4 times a week. For best results, activity should last for 40 minutes on average. It is OK to work up to the 40 minute period over time. Examples of moderate-intensity activity is walking 1 mile in 15 minutes or 30 to 45 minutes of yard work.  Step up your daily activity level. Along with your exercise program, try being more active throughout the day. Walk instead of drive. Do more household tasks or yard work.  Choose one or more activities you enjoy. Walking is one of the easiest things you can do. You can also try swimming, riding a bike, dancing, or taking an exercise class.  Stop exercising and call your doctor if you:    Have chest pain or feel dizzy or lightheaded    Feel burning, tightness, pressure, or heaviness in your chest, neck, shoulders, back, or arms    Have unusual shortness of breath    Have increased joint or muscle pain    Have palpitations or an irregular heartbeat   Date Last Reviewed: 5/1/2016 2000-2018 [employer]. [address] [place] [id number]. All rights reserved. This information is not intended as a substitute for professional medical care. Always follow your healthcare professional's instructions.

## 2019-11-01 NOTE — ASSESSMENT & PLAN NOTE
Back on ambien as he did no have good response to other alternatives. Refill meds. Recheck in 6 months

## 2019-11-07 ENCOUNTER — HEALTH MAINTENANCE LETTER (OUTPATIENT)
Age: 79
End: 2019-11-07

## 2019-11-14 NOTE — PROGRESS NOTES
Subjective     Medardo Gautam is a 79 year old male who presents to clinic today for the following health issues:    HPI     Joint Pain    Onset: x 2 days    Description:   Location: right foot  Character: Dull ache    Intensity: mild, 0/10    Progression of Symptoms: better    Accompanying Signs & Symptoms:  Other symptoms: none    History:   Previous similar pain: no       Precipitating factors:   Trauma or overuse: no     Alleviating factors:  Improved by: Naproxen, Rest  Therapies Tried and outcome: Naproxen, Rest      -------------------------------------    Patient Active Problem List   Diagnosis     Disturbance of skin sensation     Gout     Advanced directives, counseling/discussion     Dupuytren's contracture of hand- left 5th finger, right 5th finger     Bunions- both feet     Skin lesion- R side of face and behind L ear     Insomnia     Prostatic nodule- posterior right edge with rectal exam     Prostate cancer- [place] 9 (5+4) dx Feb 2012     Essential hypertension with goal blood pressure less than 140/90     Hyperlipidemia LDL goal <130     Malignant neoplasm of prostate (H)     Anxiety     Colon cancer (H)     Renal cyst     Lumbar radiculopathy     Meibomian gland dysfunction     Pseudophakia of right eye     Macular degeneration     Dermatochalasis of eyelid     Olecranon bursitis of right elbow     Right knee pain     Calculus of common bile duct and gallbladder     Lateral knee pain, left     Past Surgical History:   Procedure Laterality Date     APPENDECTOMY       BIOPSY  01/16/15     C HAND/FINGER SURGERY UNLISTED       C LENGTHEN,TENDON,HAND/FINGER  ca 2007    right fifth     C STOMACH SURGERY PROCEDURE UNLISTED       CATARACT IOL, RT/LT Left 02/15/2018     COLON SURGERY  2/11/2015    Lap assisted R hemicolectomy     COLONOSCOPY  10/25/07    Snare polypectomy     COLONOSCOPY  6/25/2009    with snare polypectomy     COLONOSCOPY  9/30/2009     COLONOSCOPY  1/5/2011    COLONOSCOPY performed by  [facility]     COLONOSCOPY N/A 1/16/2015    Procedure: COMBINED COLONOSCOPY, SINGLE OR MULTIPLE BIOPSY/POLYPECTOMY BY BIOPSY;  Surgeon: Sarah Beth Pisano MD;  Location: [facility] OR     COLONOSCOPY WITH CO2 INSUFFLATION N/A 1/16/2015    Procedure: COLONOSCOPY WITH CO2 INSUFFLATION;  Surgeon: Sarha Beth Pisano MD;  Location: [facility] OR     COLONOSCOPY WITH CO2 INSUFFLATION N/A 9/7/2018    Procedure: COLONOSCOPY WITH CO2 INSUFFLATION;  C18.9 (ICD-10-CM) - Malignant neoplasm of colon, unspecified part of colon (H)  [facility] fax# [phone]  BMI 26.29  [employer]  Referred by dr thomas;  Surgeon: Sarah Beth Pisano MD;  Location: [facility] OR     DAVINCI PROSTATECTOMY  8/6/2012    Procedure: DAVINCI PROSTATECTOMY;  Davinci Assisted Radical Prostatectomy with Bilateral Lymphadenectomy ;  Surgeon: Norma Goodwin MD;  Location: [facility] OR     GENITOURINARY SURGERY      prostate surgery     INJECT EPIDURAL LUMBAR / SACRAL SINGLE Left 10/30/2017    Procedure: INJECT EPIDURAL LUMBAR / SACRAL SINGLE;  Left Transforaminal Lumbar 4-Lumbar 5 Epidural Steroid Injection;  Surgeon: Nuvia Reina MD;  Location: [facility]     LAPAROSCOPIC ASSISTED COLECTOMY N/A 2/11/2015    Procedure: LAPAROSCOPIC ASSISTED COLECTOMY;  Surgeon: Oren Breen MD;  Location: [facility] OR     PHACOEMULSIFICATION CLEAR CORNEA WITH STANDARD INTRAOCULAR LENS IMPLANT Left 2/15/2018    Procedure: PHACOEMULSIFICATION CLEAR CORNEA WITH STANDARD INTRAOCULAR LENS IMPLANT;  LEFT EYE CATARACT EXTRACTION WITH STANDARD INTRAOCULAR LENS IMPLANT ;  Surgeon: Brandon Orellana MD;  Location: [facility]     PHACOEMULSIFICATION CLEAR CORNEA WITH STANDARD INTRAOCULAR LENS IMPLANT Right 3/1/2018    Procedure: PHACOEMULSIFICATION CLEAR CORNEA WITH STANDARD INTRAOCULAR LENS IMPLANT;  RIGHT EYE CATARACT EXTRACTION WITH STANDARD INTRAOCULAR LENS IMPLANT ;  Surgeon: Brandon Orellana MD;  Location: [facility]       Social History     Tobacco Use      Smoking status: Former Smoker     Packs/day: 0.00     Years: 1.00     Pack years: 0.00     Types: Cigarettes, Cigars, Pipe     Start date: 10/1/1961     Last attempt to quit: 1962     Years since quittin.9     Smokeless tobacco: Never Used     Tobacco comment: No smokers in home   Substance Use Topics     Alcohol use: Yes     Alcohol/week: 0.0 standard drinks     Comment: daily glass of wine and whiskey     Family History   Problem Relation Age of Onset     Cerebrovascular Disease Father      Cancer Mother 90        Patient believes vaginal cancer - hysterectomy,      Alzheimer Disease Mother      Cancer Sister      Breast Cancer Sister      C.A.D. No family hx of      Cancer - colorectal No family hx of      Prostate Cancer No family hx of      Blood Disease No family hx of      Cardiovascular No family hx of      Circulatory No family hx of      Eye Disorder No family hx of      Gastrointestinal Disease No family hx of      Genitourinary Problems No family hx of      Lipids No family hx of      Neurologic Disorder No family hx of      Respiratory No family hx of      Asthma No family hx of      Heart Disease No family hx of      Diabetes No family hx of      Hypertension No family hx of      Arthritis No family hx of      Thyroid Disease No family hx of      Musculoskeletal Disorder No family hx of      Glaucoma No family hx of      Macular Degeneration No family hx of          Current Outpatient Medications   Medication Sig Dispense Refill     allopurinol (ZYLOPRIM) 100 MG tablet Take 2 tablets (200 mg) by mouth daily 90 tablet 3     aspirin 81 MG tablet Take 1 tablet (81 mg) by mouth daily 30 tablet      calcium-vitamin D (CALTRATE) 600-400 MG-UNIT per tablet Take 1 tablet by mouth daily       clonazePAM (KLONOPIN) 1 MG tablet TAKE 1 TABLET BY MOUTH IN THE EVENING AS NEEDED FOR ANXIETY 30 tablet 5     folic acid-vit B6-vit B12 (FOLGARD) 0.8-10-0.115 MG TABS Take 1 tablet by mouth daily        "IBUPROFEN PO Take 400 mg by mouth every 8 hours as needed for moderate pain       leuprolide (LUPRON DEPOT) 45 MG kit Inject 45 mg into the muscle every 6 months 1 each 0     Loratadine (CLARITIN PO) Take  by mouth. As needed        losartan-hydrochlorothiazide (HYZAAR) 100-12.5 MG tablet Take 1 tablet by mouth daily 30 tablet 1     naproxen (NAPROSYN) 500 MG tablet Take 1 tablet (500 mg) by mouth daily as needed for moderate pain 30 tablet 0     Omega-3 Fatty Acids (OMEGA-3 FISH OIL PO) Take 500 mg by mouth daily       predniSONE (DELTASONE) 20 MG tablet Take 2 tablets (40 mg) by mouth daily 10 tablet 0     sildenafil (VIAGRA) 100 MG tablet 1/2 tab three times a week 10 tablet 12     triamcinolone (KENALOG) 0.1 % cream        zolpidem (AMBIEN) 5 MG tablet TAKE 1 TABLET BY MOUTH AT BEDTIME AS NEEDED 30 tablet 0     BP Readings from Last 3 Encounters:   11/15/19 132/78   10/25/19 (!) 140/84   09/23/19 (!) 154/86    Wt Readings from Last 3 Encounters:   11/15/19 86.2 kg (190 lb)   10/25/19 86.9 kg (191 lb 8 oz)   09/23/19 87.1 kg (192 lb 1.6 oz)                    Reviewed and updated as needed this visit by Provider         Review of Systems   ROS COMP: Constitutional, HEENT, cardiovascular, pulmonary, GI, , musculoskeletal, neuro, skin, endocrine and psych systems are negative, except as otherwise noted.      Objective    /78 (BP Location: Right arm, Patient Position: Chair, Cuff Size: Adult Regular)   Pulse 60   Temp 97.8  F (36.6  C) (Temporal)   Resp 16   Ht 1.689 m (5' 6.5\")   Wt 86.2 kg (190 lb)   SpO2 100%   BMI 30.21 kg/m    Body mass index is 30.21 kg/m .  Physical Exam  Constitutional:       Appearance: Normal appearance.   HENT:      Head: Normocephalic and atraumatic.   Neck:      Musculoskeletal: Normal range of motion and neck supple.   Cardiovascular:      Rate and Rhythm: Normal rate and regular rhythm.      Pulses: Normal pulses.      Heart sounds: Normal heart sounds. No murmur. No " gallop.    Pulmonary:      Effort: Pulmonary effort is normal.      Breath sounds: Normal breath sounds.   Skin:     Comments: Redness on the foot consistent with Gout flare up   Neurological:      Mental Status: He is alert.   Psychiatric:         Mood and Affect: Mood normal.         Behavior: Behavior normal.         Thought Content: Thought content normal.         Judgment: Judgment normal.            Diagnostic Test Results:  Labs reviewed in Epic        Problem List Items Addressed This Visit     Gout - Primary    Relevant Medications    predniSONE (DELTASONE) 20 MG tablet    allopurinol (ZYLOPRIM) 100 MG tablet      trial prednisone. Restart allopurinol after the acute flare  Discussed home care  Reportable signs and symptoms discussed  RTC if symptoms persist or fail to improve

## 2019-11-15 ENCOUNTER — OFFICE VISIT (OUTPATIENT)
Dept: FAMILY MEDICINE | Facility: OTHER | Age: 79
End: 2019-11-15
Payer: COMMERCIAL

## 2019-11-15 VITALS
HEIGHT: 67 IN | TEMPERATURE: 97.8 F | RESPIRATION RATE: 16 BRPM | OXYGEN SATURATION: 100 % | BODY MASS INDEX: 29.82 KG/M2 | SYSTOLIC BLOOD PRESSURE: 132 MMHG | HEART RATE: 60 BPM | WEIGHT: 190 LBS | DIASTOLIC BLOOD PRESSURE: 78 MMHG

## 2019-11-15 DIAGNOSIS — M10.9 ACUTE GOUT, UNSPECIFIED CAUSE, UNSPECIFIED SITE: Primary | ICD-10-CM

## 2019-11-15 DIAGNOSIS — M1A.9XX0 CHRONIC GOUT WITHOUT TOPHUS, UNSPECIFIED CAUSE, UNSPECIFIED SITE: ICD-10-CM

## 2019-11-15 PROCEDURE — 99213 OFFICE O/P EST LOW 20 MIN: CPT | Performed by: FAMILY MEDICINE

## 2019-11-15 RX ORDER — PREDNISONE 20 MG/1
40 TABLET ORAL DAILY
Qty: 10 TABLET | Refills: 0 | Status: SHIPPED | OUTPATIENT
Start: 2019-11-15 | End: 2020-03-16

## 2019-11-15 RX ORDER — ALLOPURINOL 100 MG/1
200 TABLET ORAL DAILY
Qty: 90 TABLET | Refills: 3
Start: 2019-11-15 | End: 2019-12-19

## 2019-11-15 ASSESSMENT — PAIN SCALES - GENERAL: PAINLEVEL: NO PAIN (0)

## 2019-11-15 ASSESSMENT — MIFFLIN-ST. JEOR: SCORE: 1537.52

## 2019-11-22 DIAGNOSIS — G47.00 INSOMNIA, UNSPECIFIED TYPE: ICD-10-CM

## 2019-11-22 RX ORDER — ZOLPIDEM TARTRATE 5 MG/1
TABLET ORAL
Qty: 30 TABLET | Refills: 0 | Status: SHIPPED | OUTPATIENT
Start: 2019-11-22 | End: 2019-12-19

## 2019-11-22 NOTE — TELEPHONE ENCOUNTER
Reason for Call:  Other prescription    Detailed comments: pt will be out by the weekend and he will need to get a refill today please advise.     Phone Number Patient can be reached at: Cell number on file:    Telephone Information:   Mobile [phone]       Best Time: anytime    Can we leave a detailed message on this number? YES    Call taken on 11/22/2019 at 10:36 AM by Anju Fajardo

## 2019-11-22 NOTE — TELEPHONE ENCOUNTER
Pending Prescriptions:                       Disp   Refills    zolpidem (AMBIEN) 5 MG tablet [Pharmacy Me*30 tab*0        Sig: TAKE 1 TABLET BY MOUTH AT BEDTIME AS NEEDED    Last Written Prescription Date:  10/23/2019  Last Fill Quantity: 30,  # refills: 0   Last office visit: No previous visit found with prescribing provider:     Future Office Visit:      Routing refill request to provider for review/approval because:  Drug not on the [facility] refill protocol     Kristy Hidalgo RN, BSN

## 2019-12-11 DIAGNOSIS — M1A.9XX0 CHRONIC GOUT WITHOUT TOPHUS, UNSPECIFIED CAUSE, UNSPECIFIED SITE: ICD-10-CM

## 2019-12-11 DIAGNOSIS — R73.03 PREDIABETES: ICD-10-CM

## 2019-12-11 DIAGNOSIS — E78.5 HYPERLIPIDEMIA LDL GOAL <100: ICD-10-CM

## 2019-12-11 DIAGNOSIS — C61 MALIGNANT NEOPLASM OF PROSTATE (H): ICD-10-CM

## 2019-12-11 LAB
ALBUMIN SERPL-MCNC: 3.8 G/DL (ref 3.4–5)
ALP SERPL-CCNC: 71 U/L (ref 40–150)
ALT SERPL W P-5'-P-CCNC: 38 U/L (ref 0–70)
ANION GAP SERPL CALCULATED.3IONS-SCNC: 6 MMOL/L (ref 3–14)
AST SERPL W P-5'-P-CCNC: 42 U/L (ref 0–45)
BASOPHILS # BLD AUTO: 0 10E9/L (ref 0–0.2)
BASOPHILS NFR BLD AUTO: 0.5 %
BILIRUB SERPL-MCNC: 0.7 MG/DL (ref 0.2–1.3)
BUN SERPL-MCNC: 12 MG/DL (ref 7–30)
CALCIUM SERPL-MCNC: 8.9 MG/DL (ref 8.5–10.1)
CHLORIDE SERPL-SCNC: 106 MMOL/L (ref 94–109)
CHOLEST SERPL-MCNC: 251 MG/DL
CO2 SERPL-SCNC: 29 MMOL/L (ref 20–32)
CREAT SERPL-MCNC: 0.92 MG/DL (ref 0.66–1.25)
DIFFERENTIAL METHOD BLD: ABNORMAL
EOSINOPHIL # BLD AUTO: 0.1 10E9/L (ref 0–0.7)
EOSINOPHIL NFR BLD AUTO: 3.2 %
ERYTHROCYTE [DISTWIDTH] IN BLOOD BY AUTOMATED COUNT: 12.1 % (ref 10–15)
GFR SERPL CREATININE-BSD FRML MDRD: 80 ML/MIN/{1.73_M2}
GLUCOSE SERPL-MCNC: 113 MG/DL (ref 70–99)
HBA1C MFR BLD: 5.6 % (ref 0–5.6)
HCT VFR BLD AUTO: 40 % (ref 40–53)
HDLC SERPL-MCNC: 80 MG/DL
HGB BLD-MCNC: 13.7 G/DL (ref 13.3–17.7)
LDLC SERPL CALC-MCNC: 143 MG/DL
LYMPHOCYTES # BLD AUTO: 1.9 10E9/L (ref 0.8–5.3)
LYMPHOCYTES NFR BLD AUTO: 42.9 %
MCH RBC QN AUTO: 34.2 PG (ref 26.5–33)
MCHC RBC AUTO-ENTMCNC: 34.3 G/DL (ref 31.5–36.5)
MCV RBC AUTO: 100 FL (ref 78–100)
MONOCYTES # BLD AUTO: 0.4 10E9/L (ref 0–1.3)
MONOCYTES NFR BLD AUTO: 10.1 %
NEUTROPHILS # BLD AUTO: 1.9 10E9/L (ref 1.6–8.3)
NEUTROPHILS NFR BLD AUTO: 43.3 %
NONHDLC SERPL-MCNC: 171 MG/DL
PLATELET # BLD AUTO: 151 10E9/L (ref 150–450)
POTASSIUM SERPL-SCNC: 3.7 MMOL/L (ref 3.4–5.3)
PROT SERPL-MCNC: 6.9 G/DL (ref 6.8–8.8)
PSA SERPL-MCNC: 0.27 UG/L (ref 0–4)
RBC # BLD AUTO: 4.01 10E12/L (ref 4.4–5.9)
SODIUM SERPL-SCNC: 141 MMOL/L (ref 133–144)
TRIGL SERPL-MCNC: 140 MG/DL
URATE SERPL-MCNC: 5.5 MG/DL (ref 3.5–7.2)
WBC # BLD AUTO: 4.3 10E9/L (ref 4–11)

## 2019-12-11 PROCEDURE — 84153 ASSAY OF PSA TOTAL: CPT | Performed by: FAMILY MEDICINE

## 2019-12-11 PROCEDURE — 83036 HEMOGLOBIN GLYCOSYLATED A1C: CPT | Performed by: FAMILY MEDICINE

## 2019-12-11 PROCEDURE — 80061 LIPID PANEL: CPT | Performed by: FAMILY MEDICINE

## 2019-12-11 PROCEDURE — 84550 ASSAY OF BLOOD/URIC ACID: CPT | Performed by: FAMILY MEDICINE

## 2019-12-11 PROCEDURE — 36415 COLL VENOUS BLD VENIPUNCTURE: CPT | Performed by: FAMILY MEDICINE

## 2019-12-11 PROCEDURE — 80053 COMPREHEN METABOLIC PANEL: CPT | Performed by: FAMILY MEDICINE

## 2019-12-11 PROCEDURE — 85025 COMPLETE CBC W/AUTO DIFF WBC: CPT | Performed by: FAMILY MEDICINE

## 2019-12-16 ENCOUNTER — ONCOLOGY VISIT (OUTPATIENT)
Dept: ONCOLOGY | Facility: CLINIC | Age: 79
End: 2019-12-16
Attending: INTERNAL MEDICINE
Payer: COMMERCIAL

## 2019-12-16 VITALS
BODY MASS INDEX: 30.17 KG/M2 | HEIGHT: 67 IN | RESPIRATION RATE: 14 BRPM | OXYGEN SATURATION: 97 % | WEIGHT: 192.2 LBS | TEMPERATURE: 98 F | HEART RATE: 61 BPM | SYSTOLIC BLOOD PRESSURE: 144 MMHG | DIASTOLIC BLOOD PRESSURE: 86 MMHG

## 2019-12-16 DIAGNOSIS — C18.9 MALIGNANT NEOPLASM OF COLON, UNSPECIFIED PART OF COLON (H): Primary | ICD-10-CM

## 2019-12-16 DIAGNOSIS — D40.0 NEOPLASM OF UNCERTAIN BEHAVIOR OF PROSTATE: ICD-10-CM

## 2019-12-16 DIAGNOSIS — M19.90 ARTHRITIS: ICD-10-CM

## 2019-12-16 PROCEDURE — G0463 HOSPITAL OUTPT CLINIC VISIT: HCPCS | Mod: ZF

## 2019-12-16 PROCEDURE — 99213 OFFICE O/P EST LOW 20 MIN: CPT | Mod: ZP | Performed by: INTERNAL MEDICINE

## 2019-12-16 RX ORDER — NAPROXEN 500 MG/1
500 TABLET ORAL DAILY PRN
Qty: 30 TABLET | Refills: 0 | OUTPATIENT
Start: 2019-12-16

## 2019-12-16 ASSESSMENT — MIFFLIN-ST. JEOR: SCORE: 1547.5

## 2019-12-16 ASSESSMENT — PAIN SCALES - GENERAL: PAINLEVEL: NO PAIN (0)

## 2019-12-16 NOTE — NURSING NOTE
"Oncology Rooming Note    December 16, 2019 11:27 AM   Medardo Gautam is a 79 year old male who presents for:    No chief complaint on file.    Initial Vitals: Pulse 61   Temp 98  F (36.7  C) (Oral)   Resp 14   Ht 1.689 m (5' 6.5\")   Wt 87.2 kg (192 lb 3.2 oz)   SpO2 97%   BMI 30.56 kg/m   Estimated body mass index is 30.56 kg/m  as calculated from the following:    Height as of this encounter: 1.689 m (5' 6.5\").    Weight as of this encounter: 87.2 kg (192 lb 3.2 oz). Body surface area is 2.02 meters squared.  No Pain (0) Comment: Data Unavailable   No LMP for male patient.  Allergies reviewed: Yes  Medications reviewed: Yes    Medications: Medication refills not needed today.  Pharmacy name entered into [facility]: [facility] [address] [place] [id number] [facility]    Clinical concerns: Patient has no new concerns.         Doc Waldrop, EMT                "

## 2019-12-16 NOTE — TELEPHONE ENCOUNTER
"FRax received from [place], [place] requesting a refill of naproxen (NAPROSYN) 500 MG tablet on behalf of Medardo Gautam \"Medardo\".  Last refill: 1/19/19  # 30 with 0 refills at [place], [place].  Last office visit:  4/18/18  Next office visit:  None scheduled    This is an appropriate refill, and has been pended.     Charlotte Salgado, [facility]            "

## 2019-12-16 NOTE — LETTER
2019       RE: Medardo Gautam  [address] [facility] [address]     Dear Colleague,    Thank you for referring your patient, Medardo Gautam, to the [facility]. Please see a copy of my visit note below.    Oncology FOLLOWUP PATIENT Visit    PATIENT NAME: Medardo Gautam  MRN: [id number]   : 1940   DOS: 19  The patient is a 79-year-old gentleman with a history of high-grade prostate cancer as well as a grade 2 colon cancer here for evaluation and management of a rising PSA.  He has most recently seen Dr. dennis in urology.    DISEASES UNDER MANAGEMENT:    2012    pT2c N0 Mx prostatic adenocarcinoma, [place] grade 4+5 = 9, PSA 5.2    pT1 N0 M0 G2 colonic adenocarcinoma    RADIATION HISTORY: Prostate fossa radiation (completed: 10/18/2013)  1. Phase 1: 4500 cGy (180 cGy per fraction) to the pelvis  2. Phase 2: 2520 cGy boost to the prostate bed     CHEMOTHERAPY HISTORY:     CALGB 22927    Leuprolide 22.5 mg IM (3/27/2012, 2012)    Docetaxel 75 mg/m  (3/27/2012)- discontinued on study given the development of treatment related hepatotoxicity    Salvage ADT    Leuprolide (2013-2019) - gets Q 6 months     HPI:   Medardo Gautam is a 79 year old male with history of prostate cancer status post neoadjuvant chemohormonal therapy pre Ramon Abimbola here at [facility], complicated by elevated LFTs and on Lupron and Bicalutamide since that time.  He has not been documented to have metastatic disease however he was originally treated with a radical prostatectomy which followed the brief course of androgen deprivation therapy and 1 cycle of docetaxel which was administered as per the above on study CALGB 95885.  Prostatectomy was followed with adjuvant radiotherapy (completed:10/18/2013).    He initially came to clinical detection in regards to his prostate cancer after serial PSA monitoring revealed an elevated PSA of 5. (previously 4.22 in 2011).  He  underwent a biopsy which showed Jeancarlos grade 5+4 = 9 adenocarcinoma with 90% tumor involvement 8/8 cores, (+) PNI.  As his staging revealed organ confined disease, he underwent treatment on the CALGB 29781 study consisting of neoadjuvant Lupron (cycle 1: 3/27/2012) and Taxotere 75 mg/m  (3/27/2012).  Neoadjuvant therapy on trial was discontinued given the development of hepatotoxicity.    He then underwent a robotic assisted retropubic prostatectomy with bilateral pelvic lymphadenectomy and unilateral left-sided nerve sparing (8/6/2012).  Pathology revealed Jeancarlos grade 4+ 5 = adenocarcinoma, (+) LVSI, (+) PNI, 25% of the excised prostate bilaterally excised to negative margins.  In regards to lymph node dissection, 0/16 lymph nodes were positive for metastatic adenocarcinoma.  Postoperatively his PSA was undetectable until 02/2013 at which time it walker to 0.2 and continued to rise to 0.5 (4/3/2013) shortly thereafter, he underwent radiographic restaging (4/18/2013) which showed no evidence of metastatic disease.  After short interval follow-up, his next PSA on 6/19/2013 which showed a value of 1.28.  He then started salvage ADT on 6/25/2013.   He was recommended to undergo salvage radiotherapy.  He received a total dose of 7020 cGy delivered to the prostate fossa completed on 10/18/2013.  He currently continues on ADT.    In regards to his colon cancer, he underwent a routine screening colonoscopy on 1/16/2015.  Pathology from this procedure demonstrated a moderately differentiated adenocarcinoma at the mid a sending colon.  He underwent definitive management with laparoscopic right hemicolectomy (2/11/2015) which characterized a 0.8 cm adenocarcinoma with 0/13 associated lymph nodes.      SUBJECTIVE:     The patient is here for follow-up.  He denies bone pain, difficulty in urination.  He is eating well.  He reports mild hot flashes on Lupron.  He is not taking bicalutamide.      FAMILY HISTORY:      Sister  diagnosed with breast cancer at age 70     SOCIAL HISTORY:      The patient is a native of [place].  He is very well traveled, having received education as an , and also traveled and work through [place], [employer], [employer], [place] and [employer] throughout his life.  He has been  for 45 years.  He has a son who is living in [place], age 37.  A daughter in [place], age 39.  He has 4 grandchildren.      Occupation:  Retired since 2001 as a .  He has worked at several places in the world.     Tobacco:  Brief smoking during college education in the 1960s, but none since.      Alcohol:   Social alcohol use, no history of alcohol abuse    Drugs:   No history of illicit drug use.     PAST MEDICAL /SURGICAL HISTORY:  Past Medical History:   Diagnosis Date     Anxiety      Colon cancer (H) 01/2015     Contracture of finger joint ca 2005    left and right fifth fingers     Contracture of finger joint 12/7/2012     Dupuytren's contracture of left hand      Gout      Hemorrhoids 6/4/2007     Problem list name updated by automated process. Provider to review     HEMORRHOIDS NOS 6/4/2007     Hyperbilirubinemia 5/1/2012     Hypertension      Insomnia      Nonsenile cataract      Personal history of colonic polyps 7 years ago?     Prostate cancer (H) Feb 2012     Renal cyst 6/22/2017     Seborrheic dermatitis      Skin lesion- R side of face and behind L ear 12/15/2011     SKIN SENSATION DISTURB 12/29/2006    allergic reaction to strawberries     SKIN SENSATION DISTURB 12/29/2006     Past Surgical History:   Procedure Laterality Date     APPENDECTOMY       BIOPSY  01/16/15     C HAND/FINGER SURGERY UNLISTED       C LENGTHEN,TENDON,HAND/FINGER  ca 2007    right fifth     C STOMACH SURGERY PROCEDURE UNLISTED       CATARACT IOL, RT/LT Left 02/15/2018     COLON SURGERY  2/11/2015    Lap assisted R hemicolectomy     COLONOSCOPY  10/25/07    Snare polypectomy     COLONOSCOPY  6/25/2009    with snare  polypectomy     COLONOSCOPY  9/30/2009     COLONOSCOPY  1/5/2011    COLONOSCOPY performed by [facility]IEE at  [facility]     COLONOSCOPY N/A 1/16/2015    Procedure: COMBINED COLONOSCOPY, SINGLE OR MULTIPLE BIOPSY/POLYPECTOMY BY BIOPSY;  Surgeon: Sarah Beth Pisano MD;  Location: [facility] OR     COLONOSCOPY WITH CO2 INSUFFLATION N/A 1/16/2015    Procedure: COLONOSCOPY WITH CO2 INSUFFLATION;  Surgeon: Sarah Beth Pisano MD;  Location: [facility]     COLONOSCOPY WITH CO2 INSUFFLATION N/A 9/7/2018    Procedure: COLONOSCOPY WITH CO2 INSUFFLATION;  C18.9 (ICD-10-CM) - Malignant neoplasm of colon, unspecified part of colon (H)  [facility] fax# [phone]  BMI 26.29  [employer]  Referred by dr thomas;  Surgeon: Sarah Beth Pisano MD;  Location: [facility] OR     DAVINCI PROSTATECTOMY  8/6/2012    Procedure: DAVINCI PROSTATECTOMY;  Davinci Assisted Radical Prostatectomy with Bilateral Lymphadenectomy ;  Surgeon: Norma Goodwin MD;  [facility]     GENITOURINARY SURGERY      prostate surgery     INJECT EPIDURAL LUMBAR / SACRAL SINGLE Left 10/30/2017    Procedure: INJECT EPIDURAL LUMBAR / SACRAL SINGLE;  Left Transforaminal Lumbar 4-Lumbar 5 Epidural Steroid Injection;  Surgeon: Nuvia Reina MD;  [facility]     LAPAROSCOPIC ASSISTED COLECTOMY N/A 2/11/2015    Procedure: LAPAROSCOPIC ASSISTED COLECTOMY;  Surgeon: Oren Breen MD;  [facility]: [facility]     PHACOEMULSIFICATION CLEAR CORNEA WITH STANDARD INTRAOCULAR LENS IMPLANT Left 2/15/2018    Procedure: PHACOEMULSIFICATION CLEAR CORNEA WITH STANDARD INTRAOCULAR LENS IMPLANT;  LEFT EYE CATARACT EXTRACTION WITH STANDARD INTRAOCULAR LENS IMPLANT ;  Surgeon: Brandon Orellana MD;  Location:  [facility]     PHACOEMULSIFICATION CLEAR CORNEA WITH STANDARD INTRAOCULAR LENS IMPLANT Right 3/1/2018    Procedure: PHACOEMULSIFICATION CLEAR CORNEA WITH STANDARD INTRAOCULAR LENS IMPLANT;  RIGHT EYE CATARACT EXTRACTION WITH STANDARD INTRAOCULAR LENS IMPLANT ;  Surgeon:  "Reyna, Brandon Eagle MD;  Location: [facility]       PHYSICAL EXAM:  Vital Signs: BP (!) 144/86   Pulse 61   Temp 98  F (36.7  C) (Oral)   Resp 14   Ht 1.689 m (5' 6.5\")   Wt 87.2 kg (192 lb 3.2 oz)   SpO2 97%   BMI 30.56 kg/m   Gen: NAD  Eyes: EOMI, sclera anicteric  HENT      Head: NC/AT     Ears: No external auricular lesions     Nose/sinus: No rhinorrhea or epistaxis     Oral Cavity/Oropharynx: MMM  Pulm: Breathing comfortably on room air, CTABL  CV: No visible cyanosis, RRR  Abd: Soft distended abdomen.  Central abdominal scar terminating at the umbilicus.   Skin: Normal color and turgor of the visualized skin  Neurologic/MSK/psych: A&Ox3, Gross motor movements against gravity noted in the upper and lower extremities bilaterally, denies neuropathy of the upper or lower extremities.     MEDICATIONS:  Current Outpatient Medications   Medication Sig Dispense Refill     allopurinol (ZYLOPRIM) 100 MG tablet Take 2 tablets (200 mg) by mouth daily 90 tablet 3     aspirin 81 MG tablet Take 1 tablet (81 mg) by mouth daily 30 tablet      calcium-vitamin D (CALTRATE) 600-400 MG-UNIT per tablet Take 1 tablet by mouth daily       clonazePAM (KLONOPIN) 1 MG tablet TAKE 1 TABLET BY MOUTH IN THE EVENING AS NEEDED FOR ANXIETY 30 tablet 5     folic acid-vit B6-vit B12 (FOLGARD) 0.8-10-0.115 MG TABS Take 1 tablet by mouth daily       IBUPROFEN PO Take 400 mg by mouth every 8 hours as needed for moderate pain       leuprolide (LUPRON DEPOT) 45 MG kit Inject 45 mg into the muscle every 6 months 1 each 0     Loratadine (CLARITIN PO) Take  by mouth. As needed        losartan-hydrochlorothiazide (HYZAAR) 100-12.5 MG tablet Take 1 tablet by mouth daily 30 tablet 1     naproxen (NAPROSYN) 500 MG tablet Take 1 tablet (500 mg) by mouth daily as needed for moderate pain 30 tablet 0     Omega-3 Fatty Acids (OMEGA-3 FISH OIL PO) Take 500 mg by mouth daily       predniSONE (DELTASONE) 20 MG tablet Take 2 tablets (40 mg) by mouth daily " 10 tablet 0     sildenafil (VIAGRA) 100 MG tablet 1/2 tab three times a week 10 tablet 12     triamcinolone (KENALOG) 0.1 % cream        zolpidem (AMBIEN) 5 MG tablet TAKE 1 TABLET BY MOUTH AT BEDTIME AS NEEDED 30 tablet 0       8/6/12    SPECIMEN(S):  A: Left pelvic lymph nodes  B: Right pelvic lymph nodes  C: Prostate and bilateral seminal vesicles    FINAL DIAGNOSIS:  A) Lymph nodes, left pelvic, dissection       - Nine lymph nodes with no evidence of malignancy (0/9)    B) Lymph nodes, right pelvic, dissection       - Seven lymph nodes with no evidence of malignancy (0/7)    C) Prostate, radical prostatectomy       - Adenocarcinoma, Jeancarlos grade 4+5, score 9  - Tumor involves approximately 25% of sampled prostate, and is present  in the right and left apex, mid-prostate, and base; tumor present in 20  of 38 prostate slides  - All margins are free of tumor  - Lymphovascular and perineural invasion present  - Right and left seminal vesicles free of tumor  - See microscopic for checklist / staging    LABS: Reviewed.    PSA:   1/9/18:  PSA  < 0.01  1/23/19:  PSA  = 0.04  7/10/19:  PSA  = 0.07  8/13/19 PSA  =0.09 discontinue Bicalutamide  9/18/19 PSA  =0.13  12/11/19           PSA:  0.27    IMAGING: No recent imaging within the last year    IMPRESSION:   Medardo Gautam is a 79 year old male with history of prostate cancer status post neoadjuvant chemo therapy, prostatectomy with adjuvant radiotherapy (completed:10/18/2013) and stage I colon cancer status post hemicolectomy with no adjuvant therapy (completed: 2/11/2015).  He is maintained on androgen deprivation therapy in the form of Lupron.    --Discussed recent PSA results with pt .    PLAN:   1. Continue Lupron. Next due in 1/2020 with Urology   2. Continue to stay off the Bicalutamide  3. Return in three months with  PSA, testosterone level, Cbc and CMP  4. In December, if the PSA Is >1, consider scans  5.  If PSA progresses on next visit, may consider therapy  with Darolutamide, Enzalutamide or Abiraterone.   6. May consider exercise study at that time as well.     Lina Harmon MD

## 2019-12-16 NOTE — PROGRESS NOTES
Oncology FOLLOWUP PATIENT Visit        PATIENT NAME: Medardo Gautam  MRN: [id number]   : 1940   DOS: 19  The patient is a 79-year-old gentleman with a history of high-grade prostate cancer as well as a grade 2 colon cancer here for evaluation and management of a rising PSA.  He has most recently seen Dr. dennis in urology.    DISEASES UNDER MANAGEMENT:    2012    pT2c N0 Mx prostatic adenocarcinoma, [place] grade 4+5 = 9, PSA 5.2    pT1 N0 M0 G2 colonic adenocarcinoma    RADIATION HISTORY: Prostate fossa radiation (completed: 10/18/2013)  1. Phase 1: 4500 cGy (180 cGy per fraction) to the pelvis  2. Phase 2: 2520 cGy boost to the prostate bed     CHEMOTHERAPY HISTORY:     [facility]     Leuprolide 22.5 mg IM (3/27/2012, 2012)    Docetaxel 75 mg/m  (3/27/2012)- discontinued on study given the development of treatment related hepatotoxicity    Salvage ADT    Leuprolide (2013-2019) - gets Q 6 months     HPI:   Medardo Gautam is a 79 year old male with history of prostate cancer status post neoadjuvant chemohormonal therapy pre Ramon Abimbola here at [facility], complicated by elevated LFTs and on Lupron and Bicalutamide since that time.  He has not been documented to have metastatic disease however he was originally treated with a radical prostatectomy which followed the brief course of androgen deprivation therapy and 1 cycle of docetaxel which was administered as per the above on study CALGB 10861.  Prostatectomy was followed with adjuvant radiotherapy (completed:10/18/2013).    He initially came to clinical detection in regards to his prostate cancer after serial PSA monitoring revealed an elevated PSA of 5. (previously 4.22 in 2011).  He underwent a biopsy which showed Jeancarlos grade 5+4 = 9 adenocarcinoma with 90% tumor involvement 8/8 cores, (+) PNI.  As his staging revealed organ confined disease, he underwent treatment on the CALGB 66096 study consisting of neoadjuvant Lupron  (cycle 1: 3/27/2012) and Taxotere 75 mg/m  (3/27/2012).  Neoadjuvant therapy on trial was discontinued given the development of hepatotoxicity.    He then underwent a robotic assisted retropubic prostatectomy with bilateral pelvic lymphadenectomy and unilateral left-sided nerve sparing (8/6/2012).  Pathology revealed [place] grade 4+ 5 = adenocarcinoma, (+) LVSI, (+) PNI, 25% of the excised prostate bilaterally excised to negative margins.  In regards to lymph node dissection, 0/16 lymph nodes were positive for metastatic adenocarcinoma.  Postoperatively his PSA was undetectable until 02/2013 at which time it walker to 0.2 and continued to rise to 0.5 (4/3/2013) shortly thereafter, he underwent radiographic restaging (4/18/2013) which showed no evidence of metastatic disease.  After short interval follow-up, his next PSA on 6/19/2013 which showed a value of 1.28.  He then started salvage ADT on 6/25/2013.   He was recommended to undergo salvage radiotherapy.  He received a total dose of 7020 cGy delivered to the prostate fossa completed on 10/18/2013.  He currently continues on ADT.    In regards to his colon cancer, he underwent a routine screening colonoscopy on 1/16/2015.  Pathology from this procedure demonstrated a moderately differentiated adenocarcinoma at the mid a sending colon.  He underwent definitive management with laparoscopic right hemicolectomy (2/11/2015) which characterized a 0.8 cm adenocarcinoma with 0/13 associated lymph nodes.      SUBJECTIVE:     The patient is here for follow-up.  He denies bone pain, difficulty in urination.  He is eating well.  He reports mild hot flashes on Lupron.  He is not taking bicalutamide.      FAMILY HISTORY:      Sister diagnosed with breast cancer at age 70     SOCIAL HISTORY:      The patient is a native of [place].  He is very well traveled, having received education as an , and also traveled and work through [place], [employer] and [employer]  throughout his life.  He has been  for 45 years.  He has a son who is living in [place], age 37.  A daughter in [place], age 39.  He has 4 grandchildren.      Occupation:  Retired since 2001 as a .  He has worked at several places in the world.     Tobacco:  Brief smoking during college education in the 1960s, but none since.      Alcohol:   Social alcohol use, no history of alcohol abuse    Drugs:   No history of illicit drug use.     PAST MEDICAL /SURGICAL HISTORY:  Past Medical History:   Diagnosis Date     Anxiety      Colon cancer (H) 01/2015     Contracture of finger joint ca 2005    left and right fifth fingers     Contracture of finger joint 12/7/2012     Dupuytren's contracture of left hand      Gout      Hemorrhoids 6/4/2007     Problem list name updated by automated process. Provider to review     HEMORRHOIDS NOS 6/4/2007     Hyperbilirubinemia 5/1/2012     Hypertension      Insomnia      Nonsenile cataract      Personal history of colonic polyps 7 years ago?     Prostate cancer (H) Feb 2012     Renal cyst 6/22/2017     Seborrheic dermatitis      Skin lesion- R side of face and behind L ear 12/15/2011     SKIN SENSATION DISTURB 12/29/2006    allergic reaction to strawberries     SKIN SENSATION DISTURB 12/29/2006     Past Surgical History:   Procedure Laterality Date     APPENDECTOMY       BIOPSY  01/16/15     C HAND/FINGER SURGERY UNLISTED       C LENGTHEN,TENDON,HAND/FINGER  ca 2007    right fifth     C STOMACH SURGERY PROCEDURE UNLISTED       CATARACT IOL, RT/LT Left 02/15/2018     COLON SURGERY  2/11/2015    Lap assisted R hemicolectomy     COLONOSCOPY  10/25/07    Snare polypectomy     COLONOSCOPY  6/25/2009    with snare polypectomy     COLONOSCOPY  9/30/2009     COLONOSCOPY  1/5/2011    COLONOSCOPY performed by JUD MARIEE at  [facility]     COLONOSCOPY N/A 1/16/2015    Procedure: COMBINED COLONOSCOPY, SINGLE OR MULTIPLE BIOPSY/POLYPECTOMY BY BIOPSY;  Surgeon:  "Sarah Beth Pisano MD;  Location: [facility]     COLONOSCOPY WITH CO2 INSUFFLATION N/A 1/16/2015    Procedure: COLONOSCOPY WITH CO2 INSUFFLATION;  Surgeon: Sarah Beth Pisano MD;  [facility]: [facility]     COLONOSCOPY WITH CO2 INSUFFLATION N/A 9/7/2018    Procedure: COLONOSCOPY WITH CO2 INSUFFLATION;  C18.9 (ICD-10-CM) - Malignant neoplasm of colon, unspecified part of colon (H)  [facility] fax# [phone]  BMI 26.29  [employer]  Referred by dr thomas;  Surgeon: Sarah Beth Pisano MD;  [facility]: [facility]     DAVINCI PROSTATECTOMY  8/6/2012    Procedure: DAVINCI PROSTATECTOMY;  Davinci Assisted Radical Prostatectomy with Bilateral Lymphadenectomy ;  Surgeon: Norma Goodwin MD;  [facility]     GENITOURINARY SURGERY      prostate surgery     INJECT EPIDURAL LUMBAR / SACRAL SINGLE Left 10/30/2017    Procedure: INJECT EPIDURAL LUMBAR / SACRAL SINGLE;  Left Transforaminal Lumbar 4-Lumbar 5 Epidural Steroid Injection;  Surgeon: Nuvia Reina MD;  [facility]     LAPAROSCOPIC ASSISTED COLECTOMY N/A 2/11/2015    Procedure: LAPAROSCOPIC ASSISTED COLECTOMY;  Surgeon: Oren Breen MD;  [facility]     PHACOEMULSIFICATION CLEAR CORNEA WITH STANDARD INTRAOCULAR LENS IMPLANT Left 2/15/2018    Procedure: PHACOEMULSIFICATION CLEAR CORNEA WITH STANDARD INTRAOCULAR LENS IMPLANT;  LEFT EYE CATARACT EXTRACTION WITH STANDARD INTRAOCULAR LENS IMPLANT ;  Surgeon: Brandon Orellana MD;  Location: [facility]     PHACOEMULSIFICATION CLEAR CORNEA WITH STANDARD INTRAOCULAR LENS IMPLANT Right 3/1/2018    Procedure: PHACOEMULSIFICATION CLEAR CORNEA WITH STANDARD INTRAOCULAR LENS IMPLANT;  RIGHT EYE CATARACT EXTRACTION WITH STANDARD INTRAOCULAR LENS IMPLANT ;  Surgeon: Brandon Orellana MD;  Location: [facility]       PHYSICAL EXAM:  Vital Signs: BP (!) 144/86   Pulse 61   Temp 98  F (36.7  C) (Oral)   Resp 14   Ht 1.689 m (5' 6.5\")   Wt 87.2 kg (192 lb 3.2 oz)   SpO2 97%   BMI 30.56 kg/m  Gen: " NAD  Eyes: EOMI, sclera anicteric  HENT      Head: NC/AT     Ears: No external auricular lesions     Nose/sinus: No rhinorrhea or epistaxis     Oral Cavity/Oropharynx: MMM  Pulm: Breathing comfortably on room air, CTABL  CV: No visible cyanosis, RRR  Abd: Soft distended abdomen.  Central abdominal scar terminating at the umbilicus.   Skin: Normal color and turgor of the visualized skin  Neurologic/MSK/psych: A&Ox3, Gross motor movements against gravity noted in the upper and lower extremities bilaterally, denies neuropathy of the upper or lower extremities.     MEDICATIONS:  Current Outpatient Medications   Medication Sig Dispense Refill     allopurinol (ZYLOPRIM) 100 MG tablet Take 2 tablets (200 mg) by mouth daily 90 tablet 3     aspirin 81 MG tablet Take 1 tablet (81 mg) by mouth daily 30 tablet      calcium-vitamin D (CALTRATE) 600-400 MG-UNIT per tablet Take 1 tablet by mouth daily       clonazePAM (KLONOPIN) 1 MG tablet TAKE 1 TABLET BY MOUTH IN THE EVENING AS NEEDED FOR ANXIETY 30 tablet 5     folic acid-vit B6-vit B12 (FOLGARD) 0.8-10-0.115 MG TABS Take 1 tablet by mouth daily       IBUPROFEN PO Take 400 mg by mouth every 8 hours as needed for moderate pain       leuprolide (LUPRON DEPOT) 45 MG kit Inject 45 mg into the muscle every 6 months 1 each 0     Loratadine (CLARITIN PO) Take  by mouth. As needed        losartan-hydrochlorothiazide (HYZAAR) 100-12.5 MG tablet Take 1 tablet by mouth daily 30 tablet 1     naproxen (NAPROSYN) 500 MG tablet Take 1 tablet (500 mg) by mouth daily as needed for moderate pain 30 tablet 0     Omega-3 Fatty Acids (OMEGA-3 FISH OIL PO) Take 500 mg by mouth daily       predniSONE (DELTASONE) 20 MG tablet Take 2 tablets (40 mg) by mouth daily 10 tablet 0     sildenafil (VIAGRA) 100 MG tablet 1/2 tab three times a week 10 tablet 12     triamcinolone (KENALOG) 0.1 % cream        zolpidem (AMBIEN) 5 MG tablet TAKE 1 TABLET BY MOUTH AT BEDTIME AS NEEDED 30 tablet 0        8/6/12    SPECIMEN(S):  A: Left pelvic lymph nodes  B: Right pelvic lymph nodes  C: Prostate and bilateral seminal vesicles    FINAL DIAGNOSIS:  A) Lymph nodes, left pelvic, dissection       - Nine lymph nodes with no evidence of malignancy (0/9)    B) Lymph nodes, right pelvic, dissection       - Seven lymph nodes with no evidence of malignancy (0/7)    C) Prostate, radical prostatectomy       - Adenocarcinoma, Jeancarlos grade 4+5, score 9  - Tumor involves approximately 25% of sampled prostate, and is present  in the right and left apex, mid-prostate, and base; tumor present in 20  of 38 prostate slides  - All margins are free of tumor  - Lymphovascular and perineural invasion present  - Right and left seminal vesicles free of tumor  - See microscopic for checklist / staging    LABS: Reviewed.    PSA:   1/9/18:  PSA  < 0.01  1/23/19:  PSA  = 0.04  7/10/19:  PSA  = 0.07  8/13/19 PSA  =0.09 discontinue Bicalutamide  9/18/19 PSA  =0.13  12/11/19           PSA:  0.27    IMAGING: No recent imaging within the last year    IMPRESSION:   Medardo Gautam is a 79 year old male with history of prostate cancer status post neoadjuvant chemo therapy, prostatectomy with adjuvant radiotherapy (completed:10/18/2013) and stage I colon cancer status post hemicolectomy with no adjuvant therapy (completed: 2/11/2015).  He is maintained on androgen deprivation therapy in the form of Lupron.    --Discussed recent PSA results with pt .    PLAN:   1. Continue Lupron. Next due in 1/2020 with Urology   2. Continue to stay off the Bicalutamide  3. Return in three months with  PSA, testosterone level, Cbc and CMP  4. In December, if the PSA Is >1, consider scans  5.  If PSA progresses on next visit, may consider therapy with Darolutamide, Enzalutamide or Abiraterone.   6. May consider exercise study at that time as well.     Lina Harmon MD

## 2019-12-17 NOTE — TELEPHONE ENCOUNTER
We have not seen this patient in over a year.  Patient would have to be seen again before we would prescribe another medication.

## 2019-12-19 ENCOUNTER — TELEPHONE (OUTPATIENT)
Dept: FAMILY MEDICINE | Facility: OTHER | Age: 79
End: 2019-12-19

## 2019-12-19 DIAGNOSIS — G47.00 INSOMNIA, UNSPECIFIED TYPE: ICD-10-CM

## 2019-12-19 DIAGNOSIS — F41.9 ANXIETY: ICD-10-CM

## 2019-12-19 DIAGNOSIS — M1A.9XX0 CHRONIC GOUT WITHOUT TOPHUS, UNSPECIFIED CAUSE, UNSPECIFIED SITE: ICD-10-CM

## 2019-12-19 RX ORDER — CLONAZEPAM 1 MG/1
TABLET ORAL
Qty: 30 TABLET | Refills: 0 | Status: SHIPPED | OUTPATIENT
Start: 2019-12-19 | End: 2020-01-28

## 2019-12-19 RX ORDER — ZOLPIDEM TARTRATE 5 MG/1
5 TABLET ORAL
Qty: 30 TABLET | Refills: 0 | Status: SHIPPED | OUTPATIENT
Start: 2019-12-19 | End: 2020-01-22

## 2019-12-19 RX ORDER — ALLOPURINOL 100 MG/1
200 TABLET ORAL DAILY
Qty: 90 TABLET | Refills: 1 | Status: SHIPPED | OUTPATIENT
Start: 2019-12-19 | End: 2020-05-15

## 2019-12-19 NOTE — TELEPHONE ENCOUNTER
[facility] phone call message- patient requests medication or medication refill:    If this is a refill request, has the caller requested the refill from the pharmacy already? No  Name of the pharmacy and phone number for the current request:  [facility] [phone]    Name of the medication requested: Clonazepam and Zolpidem    Other request: patient is leaving on vacation for 2 weeks. He would like this ASAP    OK to leave the result message on voice mail or with a family member? NO    Call taken on 12/19/2019 at 9:49 AM by Iman Stephen

## 2019-12-19 NOTE — TELEPHONE ENCOUNTER
Patient informed. He didn't need the Allopurinol - he needed Zolpidem. Pended. No need to call him once this is done (unless it is denied.)

## 2019-12-31 ENCOUNTER — TELEPHONE (OUTPATIENT)
Dept: FAMILY MEDICINE | Facility: OTHER | Age: 79
End: 2019-12-31

## 2019-12-31 DIAGNOSIS — I10 BENIGN ESSENTIAL HYPERTENSION: ICD-10-CM

## 2020-01-03 ENCOUNTER — ALLIED HEALTH/NURSE VISIT (OUTPATIENT)
Dept: FAMILY MEDICINE | Facility: OTHER | Age: 80
End: 2020-01-03
Payer: COMMERCIAL

## 2020-01-03 VITALS — HEART RATE: 76 BPM | DIASTOLIC BLOOD PRESSURE: 75 MMHG | SYSTOLIC BLOOD PRESSURE: 119 MMHG

## 2020-01-03 DIAGNOSIS — Z01.30 BP CHECK: Primary | ICD-10-CM

## 2020-01-03 PROCEDURE — 99207 ZZC NO CHARGE NURSE ONLY: CPT

## 2020-01-03 RX ORDER — LOSARTAN POTASSIUM AND HYDROCHLOROTHIAZIDE 12.5; 1 MG/1; MG/1
TABLET ORAL
Qty: 90 TABLET | Refills: 0 | Status: SHIPPED | OUTPATIENT
Start: 2020-01-03 | End: 2020-03-19

## 2020-01-03 NOTE — TELEPHONE ENCOUNTER
Please call and inform patient to come to the clinic for a blood pressure recheck.  Prescription sent to the pharmacy.

## 2020-01-03 NOTE — NURSING NOTE
Medardo Gautam is a 80 year old patient who comes in today for a Blood Pressure check.  Initial BP:  /75   Pulse 76      76  Disposition: follow-up as previously indicated by provider

## 2020-01-03 NOTE — TELEPHONE ENCOUNTER
Pending Prescriptions:                       Disp   Refills    losartan-hydrochlorothiazide (HYZAAR) 100-*       0        Sig: TAKE 1 TABLET BY MOUTH ONCE DAILY    Routing refill request to provider for review/approval because:  BP Readings from Last 3 Encounters:   12/16/19 (!) 144/86   11/15/19 132/78   10/25/19 (!) 140/84

## 2020-01-20 DIAGNOSIS — G47.00 INSOMNIA, UNSPECIFIED TYPE: ICD-10-CM

## 2020-01-22 RX ORDER — ZOLPIDEM TARTRATE 5 MG/1
TABLET ORAL
Qty: 30 TABLET | Refills: 0 | Status: SHIPPED | OUTPATIENT
Start: 2020-01-22 | End: 2020-02-20

## 2020-01-22 NOTE — TELEPHONE ENCOUNTER
Pending Prescriptions:                       Disp   Refills    zolpidem (AMBIEN) 5 MG tablet [Pharmacy Me*       0        Sig: TAKE 1 TABLET BY MOUTH NIGHTLY AS NEEDED FOR SLEEP    Last Written Prescription Date:  12/19/2019  Last Fill Quantity: 30,  # refills: 0   Last office visit: 1/3/2020 with prescribing provider:     Future Office Visit:      Routing refill request to provider for review/approval because:  Drug not on the [facility] refill protocol     Kristy Hidalgo, MSN, RN

## 2020-01-23 ENCOUNTER — TELEPHONE (OUTPATIENT)
Dept: CARDIOLOGY | Facility: CLINIC | Age: 80
End: 2020-01-23

## 2020-01-23 DIAGNOSIS — C61 PROSTATE CANCER (H): Primary | ICD-10-CM

## 2020-01-23 NOTE — TELEPHONE ENCOUNTER
Angel Freedman,      Is it ok to place these orders? Or should patient have his medical oncologist place the orders? Dr. Eddy Streeter's note just say follow up as needed. Please advise      Thanks, Chase Beck MA

## 2020-01-23 NOTE — TELEPHONE ENCOUNTER
[facility] Call Center    Phone Message    May a detailed message be left on voicemail: yes    Reason for Call: Order(s): Other:   Reason for requested: PSA and Testosterone Labs prior to Medardo's 2/5/20 appointment with Dr. Goodwin. Medardo would like to have these sent to the [facility] in [place] to have them done there.  Date needed: 1/23/20  Provider name: Dr. Goodwin      Action Taken: Message routed to:  [facility]

## 2020-01-28 DIAGNOSIS — F41.9 ANXIETY: ICD-10-CM

## 2020-01-28 RX ORDER — CLONAZEPAM 1 MG/1
TABLET ORAL
Qty: 30 TABLET | Refills: 0 | Status: SHIPPED | OUTPATIENT
Start: 2020-01-28 | End: 2020-02-25

## 2020-01-28 NOTE — TELEPHONE ENCOUNTER
[facility] Call Center    Phone Message    May a detailed message be left on voicemail: yes    Reason for Call: Other: Per call from PT is following up on the status of the orders. PT is requesting a call back to discuss it.      Action Taken: Message routed to:  [facility] Urology

## 2020-01-28 NOTE — TELEPHONE ENCOUNTER
Pending Prescriptions:                       Disp   Refills    clonazePAM (KLONOPIN) 1 MG tablet          30 tab*0        Sig: TAKE 1 TABLET BY MOUTH IN THE EVENING AS NEEDED FOR           ANXIETY    Last Written Prescription Date:  12/19/2019  Last Fill Quantity: 30,  # refills: 0   Last office visit: 1/3/2020 with prescribing provider:     Future Office Visit:      Routing refill request to provider for review/approval because:  Drug not on the [facility] refill protocol   Kristy Hidalgo, MSN, RN

## 2020-01-28 NOTE — TELEPHONE ENCOUNTER
Reason for Call:  Medication or medication refill:    Do you use a [facility]?  Name of the pharmacy and phone number for the current request:  [facility] - [phone]    Name of the medication requested: clonazepam     Other request:  [facility] request a week ago.  He has one tablet left.    Can we leave a detailed message on this number? YES    Phone number patient can be reached at: Cell number on file:    Telephone Information:   Mobile [phone]       Best Time: any    Call taken on 1/28/2020 at 10:11 AM by Margaux Mott

## 2020-01-29 DIAGNOSIS — C61 PROSTATE CANCER (H): ICD-10-CM

## 2020-01-29 LAB — PSA SERPL-MCNC: 0.42 UG/L (ref 0–4)

## 2020-01-29 PROCEDURE — 84270 ASSAY OF SEX HORMONE GLOBUL: CPT | Performed by: INTERNAL MEDICINE

## 2020-01-29 PROCEDURE — 84403 ASSAY OF TOTAL TESTOSTERONE: CPT | Performed by: INTERNAL MEDICINE

## 2020-01-29 PROCEDURE — 84153 ASSAY OF PSA TOTAL: CPT | Performed by: INTERNAL MEDICINE

## 2020-01-29 PROCEDURE — 36415 COLL VENOUS BLD VENIPUNCTURE: CPT | Performed by: INTERNAL MEDICINE

## 2020-01-30 ENCOUNTER — TELEPHONE (OUTPATIENT)
Dept: UROLOGY | Facility: CLINIC | Age: 80
End: 2020-01-30

## 2020-01-30 ENCOUNTER — PRE VISIT (OUTPATIENT)
Dept: UROLOGY | Facility: CLINIC | Age: 80
End: 2020-01-30

## 2020-01-30 NOTE — TELEPHONE ENCOUNTER
Reason for visit: prostate cancer follow up     Relevant information: pt sees Dr. Harmon    Records/imaging/labs/orders: all records available, labs in process    Pt called: message sent to scheduling team to set up same day Lupron    At Rooming: regular

## 2020-01-30 NOTE — TELEPHONE ENCOUNTER
Patient notified that his PSA is 0.42 and his testosterone is still pending. Patient notified that he would discuss these results on 2/5/2020 with his follow up with Dr. Norma Goodwin. Patient agreed with the plan.        Chase Beck MA

## 2020-01-30 NOTE — TELEPHONE ENCOUNTER
[facility] Call Center    Phone Message    May a detailed message be left on voicemail: no    Reason for Call: Requesting Results   Name/type of test: PSA, Testosterone  Date of test: 1/29/20  Was test done at a location other than [facility] (Please fill in the location if not [facility])?: No  Comments: Pt wants the results posted to his [facility]      Action Taken: Message routed to:  [facility]

## 2020-01-30 NOTE — TELEPHONE ENCOUNTER
[facility] Call Center    Phone Message    May a detailed message be left on voicemail: no    Reason for Call: Other: Pt returned Radha's call to set up Lupron on 2/5. Please call Pt back.     Action Taken: Message routed to:  [facility]

## 2020-01-31 LAB
SHBG SERPL-SCNC: 63 NMOL/L (ref 11–80)
TESTOST FREE SERPL-MCNC: 0.14 NG/DL (ref 4.7–24.4)
TESTOST SERPL-MCNC: 13 NG/DL (ref 240–950)

## 2020-02-04 NOTE — PROGRESS NOTES
"Urology Clinic Note      Date: 2/5/2020  Patient: Medardo Gautam  MRN: [id number]    Reason for Visit: Follow up, prostate cancer    HPI/Subjective: Medardo Gautam is a 80 year old male with urologic hx of Jeancarlos 4+5 = 9 prostate cancer s/p RRP in 2012 with salvage XRT and ADT with subsequent biochemical recurrence managed with lupron (no longer taking bicalutamide - stopped 08/2019). PSA has been rising over the past year, from 0.04 (1/23/19) to 0.42 most recently on 1/29/20. Testosterone levels stable (13). He has been referred to medical oncology and has been following with Felipe Ingram and [facility], last seen on 12/16/19. They are considering adding a second agent at next follow up.    Overall he is feeling well. He is still getting hot flashes, not too bothersome. He is due for next lupron shot today. Denies fevers, chilll, N/V, new bone aches/pains.    Objective:  BP (!) 140/89 (BP Location: Right arm, Patient Position: Sitting)   Pulse 61   Ht 1.689 m (5' 6.5\")   Wt 87.1 kg (192 lb)   BMI 30.53 kg/m    Gen: In NAD, conversant.  Resp: Breathing non-labored on room air.  CV: Warm and well perfused, RRR on peripheral pulse.  Abd: Soft, non-distended, non-tender.  Ext: Moving all 4, no BLE edema noted  Neuro: No focal deficits noted    Current Meds: Reviewed. Lupron.    Labs/Imaging:     Component Value Date    PSA 0.42 01/29/2020    PSA 0.27 12/11/2019    PSA 0.13 09/18/2019    PSA 0.09 08/13/2019    PSA 0.07 07/10/2019    PSA 0.04 01/23/2019    PSA <0.01 01/09/2018    PSA 0.01 11/14/2017    PSA  07/19/2017     <0.01  Assay Method:  Chemiluminescence using Siemens Vista analyzer      PSA  03/08/2017     <0.01  Assay Method:  Chemiluminescence using Siemens Vista analyzer       1/29/20  Testosterone 13    12/11/19  CBC - Hgb 13.7, hematocrit 40  LFTs - AST 42, ALT 38, Alk phos 71    Assessment & Plan: Medardo Gautam is a 80 year old male w/ hx of Jeancarlos 4+5 = 9 prostate cancer s/p RRP in 2012 with salvage XRT " and ADT with subsequent biochemical recurrence managed with lupron, rising PSA at 0.42 (from 0.27 in 12/2019). He is due today for Lupron today.     - Lupron today  - RTC in 6 months with PSA and for next Lupron    - Follow up with Medical Oncology scheduled on 3/16/20 - will message Dr. Ingram for consideration on starting second agent earlier now that PSA has risen     Patient was seen and examined with staff surgeon, Dr. Goodwin, who developed the above treatment plan.    Ganesh Kaba MD  Urology, PGY-2    Patient seen and examined with the resident.  Visit time 15 minutes and >50% spent in counseling.  I agree with the resident's note and plan of care.       Norma Goodwin MD  Urology Staff

## 2020-02-05 ENCOUNTER — ALLIED HEALTH/NURSE VISIT (OUTPATIENT)
Dept: ONCOLOGY | Facility: CLINIC | Age: 80
End: 2020-02-05
Attending: UROLOGY
Payer: COMMERCIAL

## 2020-02-05 ENCOUNTER — OFFICE VISIT (OUTPATIENT)
Dept: UROLOGY | Facility: CLINIC | Age: 80
End: 2020-02-05
Payer: COMMERCIAL

## 2020-02-05 VITALS — RESPIRATION RATE: 18 BRPM | OXYGEN SATURATION: 95 % | TEMPERATURE: 98.6 F | HEART RATE: 65 BPM

## 2020-02-05 VITALS
HEART RATE: 61 BPM | BODY MASS INDEX: 30.13 KG/M2 | HEIGHT: 67 IN | DIASTOLIC BLOOD PRESSURE: 89 MMHG | WEIGHT: 192 LBS | SYSTOLIC BLOOD PRESSURE: 140 MMHG

## 2020-02-05 DIAGNOSIS — C61 MALIGNANT NEOPLASM OF PROSTATE (H): Primary | ICD-10-CM

## 2020-02-05 DIAGNOSIS — C61 PROSTATE CANCER (H): ICD-10-CM

## 2020-02-05 PROCEDURE — 25000128 H RX IP 250 OP 636: Mod: ZF | Performed by: UROLOGY

## 2020-02-05 PROCEDURE — 96402 CHEMO HORMON ANTINEOPL SQ/IM: CPT

## 2020-02-05 RX ADMIN — LEUPROLIDE ACETATE 45 MG: KIT at 12:01

## 2020-02-05 ASSESSMENT — PAIN SCALES - GENERAL: PAINLEVEL: NO PAIN (0)

## 2020-02-05 ASSESSMENT — MIFFLIN-ST. JEOR: SCORE: 1541.6

## 2020-02-05 NOTE — LETTER
"2/5/2020     RE: Medardo Gautam  [id number] [id number]     Dear Colleague,    Thank you for referring your patient, Medardo Gautam, to the [facility] UROLOGY AND INST FOR PROSTATE AND UROLOGIC CANCERS at [facility]. Please see a copy of my visit note below.    Urology Clinic Note      Date: 2/5/2020  Patient: Medardo Gautam  MRN: [id number]    Reason for Visit: Follow up, prostate cancer    HPI/Subjective: Medardo Gautam is a 80 year old male with urologic hx of [place] 4+5 = 9 prostate cancer s/p RRP in 2012 with salvage XRT and ADT with subsequent biochemical recurrence managed with lupron (no longer taking bicalutamide - stopped 08/2019).  PSA has been rising over the past year, from 0.04 (1/23/19) to 0.42 most recently on 1/29/20. Testosterone levels stable (13). He has been referred to medical oncology  and has been following with Felipe Ingram and [facility], last seen on 12/16/19. They are considering adding a second agent at next follow up.    Overall he is feeling well. He is still getting hot flashes, not too bothersome. He is due for next lupron shot today. Denies fevers, chilll, N/V, new bone aches/pains.    Objective:  BP (!) 140/89 (BP Location: Right arm, Patient Position: Sitting)   Pulse 61   Ht 1.689 m (5' 6.5\")   Wt 87.1 kg (192 lb)   BMI 30.53 kg/m     Gen: In NAD, conversant.  Resp: Breathing non-labored on room air.  CV: Warm and well perfused, RRR on peripheral pulse.  Abd: Soft, non-distended, non-tender.  Ext: Moving all 4, no BLE edema noted  Neuro: No focal deficits noted    Current Meds: Reviewed. Lupron.    Labs/Imaging:     Component Value Date    PSA 0.42 01/29/2020    PSA 0.27 12/11/2019    PSA 0.13 09/18/2019    PSA 0.09 08/13/2019    PSA 0.07 07/10/2019    PSA 0.04 01/23/2019    PSA <0.01 01/09/2018    PSA 0.01 11/14/2017    PSA  07/19/2017     <0.01  Assay Method:  Chemiluminescence using Siemens Vista analyzer      PSA  " 03/08/2017     <0.01  Assay Method:  Chemiluminescence using Siemens Vista analyzer       1/29/20  Testosterone 13    12/11/19  CBC - Hgb 13.7, hematocrit 40  LFTs - AST 42, ALT 38, Alk phos 71    Assessment & Plan: Medardo Gautam is a 80 year old male w/ hx of Jeancarlos 4+5 = 9 prostate cancer s/p RRP in 2012 with salvage XRT and ADT with subsequent biochemical recurrence managed with lupron, rising PSA at 0.42 (from 0.27 in 12/2019). He is due today for Lupron today.     - Lupron today  - RTC in 6 months with PSA and for next Lupron    - Follow up with Medical Oncology scheduled on 3/16/20 - will message Dr. Ingram for consideration on starting second agent earlier now that PSA has risen     Patient was seen and examined with staff surgeon, Dr. Goodwin, who developed the above treatment plan.    Ganesh Kaba MD  Urology, PGY-2    Patient seen and examined with the resident.  Visit time 15 minutes and >50% spent in counseling.  I agree with the resident's note and plan of care.       Norma Goodwin MD  Urology Staff     12-Oct-2019 19:08

## 2020-02-05 NOTE — PATIENT INSTRUCTIONS
Please follow up with Dr. Goodwin in 6 months.     It was a pleasure meeting with you today.  Thank you for allowing me and my team the privilege of caring for you today.  YOU are the reason we are here, and I truly hope we provided you with the excellent service you deserve.  Please let us know if there is anything else we can do for you so that we can be sure you are leaving completely satisfied with your care experience.        Cedrick Estrella, EMT

## 2020-02-05 NOTE — PROGRESS NOTES
Chief Complaint   Patient presents with     Imm/Inj     patient with Malignant neoplasm of prostate - here for a Lupron injection     Patient arrived to clinic for a Lupron injection today and has no specific complaints and has been feeling well.  Patient declined to speak with an RN today.   No results needed for treatment today.  Lupron injection given to Left Ventrogluteal without incident and patient tolerated procedure well.  AVS printed for patient, patient will return in 6 months for next Lupron injection.

## 2020-02-05 NOTE — NURSING NOTE
"Chief Complaint   Patient presents with     Follow Up     Prostate cancer       Blood pressure (!) 140/89, pulse 61, height 1.689 m (5' 6.5\"), weight 87.1 kg (192 lb). Body mass index is 30.53 kg/m .    Patient Active Problem List   Diagnosis     Disturbance of skin sensation     Gout     Advanced directives, counseling/discussion     Dupuytren's contracture of hand- left 5th finger, right 5th finger     Bunions- both feet     Skin lesion- R side of face and behind L ear     Insomnia     Prostatic nodule- posterior right edge with rectal exam     Prostate cancer- [place] 9 (5+4) dx Feb 2012     Essential hypertension with goal blood pressure less than 140/90     Hyperlipidemia LDL goal <130     Malignant neoplasm of prostate (H)     Anxiety     Colon cancer (H)     Renal cyst     Lumbar radiculopathy     Meibomian gland dysfunction     Pseudophakia of right eye     Macular degeneration     Dermatochalasis of eyelid     Olecranon bursitis of right elbow     Right knee pain     Calculus of common bile duct and gallbladder     Lateral knee pain, left       No Known Allergies    Current Outpatient Medications   Medication Sig Dispense Refill     allopurinol (ZYLOPRIM) 100 MG tablet Take 2 tablets (200 mg) by mouth daily 90 tablet 1     aspirin 81 MG tablet Take 1 tablet (81 mg) by mouth daily 30 tablet      calcium-vitamin D (CALTRATE) 600-400 MG-UNIT per tablet Take 1 tablet by mouth daily       clonazePAM (KLONOPIN) 1 MG tablet TAKE 1 TABLET BY MOUTH IN THE EVENING AS NEEDED FOR ANXIETY 30 tablet 0     folic acid-vit B6-vit B12 (FOLGARD) 0.8-10-0.115 MG TABS Take 1 tablet by mouth daily       IBUPROFEN PO Take 400 mg by mouth every 8 hours as needed for moderate pain       leuprolide (LUPRON DEPOT) 45 MG kit Inject 45 mg into the muscle every 6 months 1 each 0     Loratadine (CLARITIN PO) Take  by mouth. As needed        losartan-hydrochlorothiazide (HYZAAR) 100-12.5 MG tablet TAKE 1 TABLET BY MOUTH ONCE DAILY 90 " tablet 0     Omega-3 Fatty Acids (OMEGA-3 FISH OIL PO) Take 500 mg by mouth daily       triamcinolone (KENALOG) 0.1 % cream        zolpidem (AMBIEN) 5 MG tablet TAKE 1 TABLET BY MOUTH NIGHTLY AS NEEDED FOR SLEEP 30 tablet 0     naproxen (NAPROSYN) 500 MG tablet Take 1 tablet (500 mg) by mouth daily as needed for moderate pain (Patient not taking: Reported on 2020) 30 tablet 0     predniSONE (DELTASONE) 20 MG tablet Take 2 tablets (40 mg) by mouth daily 10 tablet 0     sildenafil (VIAGRA) 100 MG tablet 1/2 tab three times a week (Patient not taking: Reported on 2020) 10 tablet 12       Social History     Tobacco Use     Smoking status: Former Smoker     Packs/day: 0.00     Years: 1.00     Pack years: 0.00     Types: Cigarettes, Cigars, Pipe     Start date: 10/1/1961     Last attempt to quit: 1962     Years since quittin.1     Smokeless tobacco: Never Used     Tobacco comment: No smokers in home   Substance Use Topics     Alcohol use: Yes     Alcohol/week: 0.0 standard drinks     Comment: daily glass of wine and whiskey     Drug use: Yes     Types: Benzodiazepines       Cedrick Estrella, EMT  2020  11:18 AM

## 2020-02-06 ENCOUNTER — APPOINTMENT (OUTPATIENT)
Age: 80
Setting detail: DERMATOLOGY
End: 2020-02-06

## 2020-02-06 DIAGNOSIS — L57.8 OTHER SKIN CHANGES DUE TO CHRONIC EXPOSURE TO NONIONIZING RADIATION: ICD-10-CM

## 2020-02-06 DIAGNOSIS — Z85.828 PERSONAL HISTORY OF OTHER MALIGNANT NEOPLASM OF SKIN: ICD-10-CM

## 2020-02-06 DIAGNOSIS — L82.1 OTHER SEBORRHEIC KERATOSIS: ICD-10-CM

## 2020-02-06 DIAGNOSIS — L82.0 INFLAMED SEBORRHEIC KERATOSIS: ICD-10-CM

## 2020-02-06 PROCEDURE — 17111 DESTRUCT LESION 15 OR MORE: CPT

## 2020-02-06 PROCEDURE — OTHER COUNSELING: OTHER

## 2020-02-06 PROCEDURE — OTHER LIQUID NITROGEN: OTHER

## 2020-02-06 PROCEDURE — 99213 OFFICE O/P EST LOW 20 MIN: CPT | Mod: 25

## 2020-02-06 ASSESSMENT — LOCATION SIMPLE DESCRIPTION DERM
LOCATION SIMPLE: RIGHT CHEEK
LOCATION SIMPLE: SCALP
LOCATION SIMPLE: LEFT FOREHEAD

## 2020-02-06 ASSESSMENT — LOCATION DETAILED DESCRIPTION DERM
LOCATION DETAILED: RIGHT SUPERIOR CENTRAL MALAR CHEEK
LOCATION DETAILED: RIGHT CENTRAL FRONTAL SCALP
LOCATION DETAILED: LEFT MEDIAL FOREHEAD
LOCATION DETAILED: RIGHT INFERIOR CENTRAL MALAR CHEEK

## 2020-02-06 ASSESSMENT — LOCATION ZONE DERM
LOCATION ZONE: SCALP
LOCATION ZONE: FACE

## 2020-02-06 NOTE — PROCEDURE: LIQUID NITROGEN
Total Number Of Lesions Treated: 15
Duration Of Freeze Thaw-Cycle (Seconds): 0
Include Z78.9 (Other Specified Conditions Influencing Health Status) As An Associated Diagnosis?: No
Render Post Care In The Note?: yes
Detail Level: Zone
Consent: The patient's consent was obtained including but not limited to risks of crusting, scabbing, blistering, scarring, darker or lighter pigmentary change, recurrence, incomplete removal and infection.
Medical Necessity Information: It is in your best interest to select a reason for this procedure from the list below. All of these items fulfill various CMS LCD requirements except the new and changing color options.
Medical Necessity Clause: This procedure was medically necessary because the lesions that were treated were:
Post-Care Instructions: I reviewed with the patient in detail post-care instructions. Patient is to wear sunprotection, and avoid picking at any of the treated lesions. Pt may apply Vaseline to crusted or scabbing areas.

## 2020-02-19 DIAGNOSIS — G47.00 INSOMNIA, UNSPECIFIED TYPE: ICD-10-CM

## 2020-02-20 ENCOUNTER — TELEPHONE (OUTPATIENT)
Dept: ONCOLOGY | Facility: CLINIC | Age: 80
End: 2020-02-20

## 2020-02-20 RX ORDER — ZOLPIDEM TARTRATE 5 MG/1
TABLET ORAL
Qty: 30 TABLET | Refills: 0 | Status: SHIPPED | OUTPATIENT
Start: 2020-02-20 | End: 2020-03-23

## 2020-02-20 NOTE — TELEPHONE ENCOUNTER
Attempted to reach pt with update per below. No answer. No option to leave message. My Chart message sent with an update.     ----- Message from Deya Loera sent at 2/19/2020 10:34 AM CST -----  Regarding: RE: Monday appt  Taylor,  I just wanted to let you know that Dr. Ingram is holding on the COGCAP trial.  If after Dr Ingram sees this patient he feels would be good for the GAP 4 I will look at him then.    Thanks  Muriel  ----- Message -----  From: Taylor Nelson RN  Sent: 2/14/2020  11:34 AM CST  To: Deya Loera  Subject: Monday appt                                      Hi Muriel -   I'm wondering if you've been in contact with this pt as I have an appt on hold for him to see Dr. Ingram on Monday at 0730. Please let me know if I should finalize the hold or plan for an appt at a later date. Thanks!  -Taylor  ----- Message -----  From: Taylor Nelson RN  Sent: 2/13/2020  11:21 AM CST  To: Deya Franklin!    I'm glad I got connected with the right person for both studies! It sounds like, per Dr. Ingram's message, that he would like both studies to be discussed with the pt.     In terms of getting more information, I can refer to the national database if I know how to look them up. Is there a good way of doing this? I rarely search on that website.    -Taylor  ----- Message -----  From: Deya Loera  Sent: 2/12/2020  12:19 PM CST  To: PETRA Bradford,  Yes, I am the CRC on both of these studies.  Did you want me to contact this patient for COGCAP? And what information would you like to know about he GAP 4 (exercise study)?    Thanks  Muriel  ----- Message -----  From: Taylor Nelson RN  Sent: 2/11/2020  10:41 AM CST  To: Deya Llamas!    Magali Landa gave me your name when I asked her about the Cognitive study Dr. Ingram is referencing below. Is this something you can help out with in terms of reaching out to the pt about the study?     Do you, on the off chance, know anything about the exercise study he  is referring to?     Taylor Nelson, RN, OCN  RN Care Coordinator  [facility]    ----- Message -----  From: Medardo Ingram MD  Sent: 2/10/2020  10:37 AM CST  To: Norma Goodwin MD, Taylor Nelson, RN    With the psa that low i'd rather wait for an appointment so that we can discuss the Rx options, side effects and available clinical trials   I have an open Cognitive study and an open Exercise study that he may be eligible for. Taylor can we call him and maybe have him come next week? I could have seen him today as I had a couple of cancellations.   CR  ----- Message -----  From: Norma Goodwin MD  Sent: 2/5/2020  11:31 AM CST  To: Medardo Ingram MD    Tai  This patients PSA has bumped up to 0.42 from 0.29 on lupron.  You stopped the bicalutamide with the idea of starting one of the other agents if the psa is up.  He is anxious to start something even before he sees you in 6 weeks.  Can we start yonathan or apalutamide or one of those things sooner?  While he is waiting to see you?    ara

## 2020-02-20 NOTE — TELEPHONE ENCOUNTER
Pending Prescriptions:                       Disp   Refills    ZOLPIDEM 5 MG PO tablet [Pharmacy Med Name*       0        Sig: TAKE 1 TABLET BY MOUTH AT BEDTIME AS NEEDED FOR SLEEP    Last Written Prescription Date:  1/22/2020  Last Fill Quantity: 30,  # refills: 0   Last office visit: 1/3/2020 with prescribing provider:     Future Office Visit:      Routing refill request to provider for review/approval because:  Drug not on the [facility] refill protocol     Kristy Hidalgo, MSN, RN

## 2020-02-25 DIAGNOSIS — F41.9 ANXIETY: ICD-10-CM

## 2020-02-25 RX ORDER — CLONAZEPAM 1 MG/1
1 TABLET ORAL
Qty: 30 TABLET | Refills: 0 | Status: SHIPPED | OUTPATIENT
Start: 2020-02-25 | End: 2020-03-23

## 2020-02-25 NOTE — TELEPHONE ENCOUNTER
Pending Prescriptions:                       Disp   Refills    clonazePAM (KLONOPIN) 1 MG tablet [Pharmac*       0        Sig: TAKE 1 TABLET BY MOUTH IN THE EVENING AS NEEDED FOR           ANXIETY    Last Written Prescription Date:  1/28/2020  Last Fill Quantity: 30,  # refills: 0   Last office visit: 1/3/2020 with prescribing provider:     Future Office Visit:      Routing refill request to provider for review/approval because:  Drug not on the [facility] refill protocol     Kristy Hidalgo, MSN, RN

## 2020-03-11 DIAGNOSIS — D40.0 NEOPLASM OF UNCERTAIN BEHAVIOR OF PROSTATE: ICD-10-CM

## 2020-03-11 DIAGNOSIS — C18.9 MALIGNANT NEOPLASM OF COLON, UNSPECIFIED PART OF COLON (H): ICD-10-CM

## 2020-03-11 LAB
ALBUMIN SERPL-MCNC: 3.9 G/DL (ref 3.4–5)
ALP SERPL-CCNC: 69 U/L (ref 40–150)
ALT SERPL W P-5'-P-CCNC: 33 U/L (ref 0–70)
ANION GAP SERPL CALCULATED.3IONS-SCNC: 5 MMOL/L (ref 3–14)
AST SERPL W P-5'-P-CCNC: 36 U/L (ref 0–45)
BASOPHILS # BLD AUTO: 0.1 10E9/L (ref 0–0.2)
BASOPHILS NFR BLD AUTO: 1 %
BILIRUB SERPL-MCNC: 0.7 MG/DL (ref 0.2–1.3)
BUN SERPL-MCNC: 12 MG/DL (ref 7–30)
CALCIUM SERPL-MCNC: 9.2 MG/DL (ref 8.5–10.1)
CHLORIDE SERPL-SCNC: 105 MMOL/L (ref 94–109)
CO2 SERPL-SCNC: 29 MMOL/L (ref 20–32)
CREAT SERPL-MCNC: 0.89 MG/DL (ref 0.66–1.25)
DIFFERENTIAL METHOD BLD: ABNORMAL
EOSINOPHIL # BLD AUTO: 0.2 10E9/L (ref 0–0.7)
EOSINOPHIL NFR BLD AUTO: 3.5 %
ERYTHROCYTE [DISTWIDTH] IN BLOOD BY AUTOMATED COUNT: 11.9 % (ref 10–15)
GFR SERPL CREATININE-BSD FRML MDRD: 82 ML/MIN/{1.73_M2}
GLUCOSE SERPL-MCNC: 116 MG/DL (ref 70–99)
HCT VFR BLD AUTO: 41 % (ref 40–53)
HGB BLD-MCNC: 14 G/DL (ref 13.3–17.7)
LYMPHOCYTES # BLD AUTO: 2.1 10E9/L (ref 0.8–5.3)
LYMPHOCYTES NFR BLD AUTO: 39.8 %
MCH RBC QN AUTO: 33.7 PG (ref 26.5–33)
MCHC RBC AUTO-ENTMCNC: 34.1 G/DL (ref 31.5–36.5)
MCV RBC AUTO: 99 FL (ref 78–100)
MONOCYTES # BLD AUTO: 0.5 10E9/L (ref 0–1.3)
MONOCYTES NFR BLD AUTO: 10.1 %
NEUTROPHILS # BLD AUTO: 2.4 10E9/L (ref 1.6–8.3)
NEUTROPHILS NFR BLD AUTO: 45.6 %
PLATELET # BLD AUTO: 164 10E9/L (ref 150–450)
POTASSIUM SERPL-SCNC: 3.7 MMOL/L (ref 3.4–5.3)
PROT SERPL-MCNC: 7.4 G/DL (ref 6.8–8.8)
PSA SERPL-MCNC: 0.63 UG/L (ref 0–4)
RBC # BLD AUTO: 4.16 10E12/L (ref 4.4–5.9)
SODIUM SERPL-SCNC: 139 MMOL/L (ref 133–144)
WBC # BLD AUTO: 5.2 10E9/L (ref 4–11)

## 2020-03-11 PROCEDURE — 80053 COMPREHEN METABOLIC PANEL: CPT | Performed by: INTERNAL MEDICINE

## 2020-03-11 PROCEDURE — 84153 ASSAY OF PSA TOTAL: CPT | Performed by: INTERNAL MEDICINE

## 2020-03-11 PROCEDURE — 85025 COMPLETE CBC W/AUTO DIFF WBC: CPT | Performed by: INTERNAL MEDICINE

## 2020-03-11 PROCEDURE — 36415 COLL VENOUS BLD VENIPUNCTURE: CPT | Performed by: INTERNAL MEDICINE

## 2020-03-11 PROCEDURE — 84403 ASSAY OF TOTAL TESTOSTERONE: CPT | Performed by: INTERNAL MEDICINE

## 2020-03-13 ENCOUNTER — PATIENT OUTREACH (OUTPATIENT)
Dept: ONCOLOGY | Facility: CLINIC | Age: 80
End: 2020-03-13

## 2020-03-13 LAB — TESTOST SERPL-MCNC: 13 NG/DL (ref 240–950)

## 2020-03-13 NOTE — PROGRESS NOTES
TC to pt to offer him a telephone visit on 03/16/2020 rather than come into the clinic d/t coronavirus precautions. Pt stated he is okay with that and will expect a call at 1000 which is the time of his scheduled in-clinic visit. Review pt's most recent BP reading at his visit with Dr. Goodwin on 03/05/2020 of 140/89. Pt stated he will take his BP with his home cuff on Monday morning before his visit.

## 2020-03-16 ENCOUNTER — VIRTUAL VISIT (OUTPATIENT)
Dept: ONCOLOGY | Facility: CLINIC | Age: 80
End: 2020-03-16
Attending: INTERNAL MEDICINE
Payer: COMMERCIAL

## 2020-03-16 DIAGNOSIS — C61 MALIGNANT NEOPLASM OF PROSTATE (H): Primary | ICD-10-CM

## 2020-03-16 PROCEDURE — 99213 OFFICE O/P EST LOW 20 MIN: CPT | Mod: TEL | Performed by: INTERNAL MEDICINE

## 2020-03-16 NOTE — PROGRESS NOTES
"Medardo Gautam is a 80 year old male who is being evaluated via a billable telephone visit.      The patient has been notified of following:     \"This telephone visit will be conducted via a call between you and your physician/provider. We have found that certain health care needs can be provided without the need for a physical exam.  This service lets us provide the care you need with a short phone conversation.  If a prescription is necessary we can send it directly to your pharmacy.  If lab work is needed we can place an order for that and you can then stop by our lab to have the test done at a later time.    If during the course of the call the physician/provider feels a telephone visit is not appropriate, you will not be charged for this service.\"       PATIENT NAME: Medardo Gautam  MRN: [id number]   : 1940   The patient is a 79-year-old gentleman with a history of high-grade prostate cancer as well as a grade 2 colon cancer here for evaluation and management of a rising PSA.  He has most recently seen Dr. dennis in urology.     DISEASES UNDER MANAGEMENT:    2012             pT2c N0 Mx prostatic adenocarcinoma, [place] grade 4+5 = 9, PSA 5.2    pT1 N0 M0 G2 colonic adenocarcinoma     RADIATION HISTORY: Prostate fossa radiation (completed: 10/18/2013)  1. Phase 1: 4500 cGy (180 cGy per fraction) to the pelvis  2. Phase 2: 2520 cGy boost to the prostate bed      CHEMOTHERAPY HISTORY:     CALGB 75643  ? Leuprolide 22.5 mg IM (3/27/2012, 2012)  ? Docetaxel 75 mg/m  (3/27/2012)- discontinued on study given the development of treatment related hepatotoxicity  ? Salvage ADT  ? Leuprolide (2013-2019) - gets Q 6 months   LABS: Reviewed.     PSA:   18:             PSA   < 0.01  19:           PSA   = 0.04  7/10/19:           PSA   = 0.07  19            PSA   = 0.09      discontinue Bicalutamide/Maintain ADT  19            PSA   = 0.13  19          PSA  = " 0.27  1/29/20 PSA  = 0.42  3/11/20 PSA = 0.63        Chief Complaint   Patient presents with     Telephone     Telephone visit: he is doing well and offers no complaints today.  As per previous conversations he has not had any imaging. We are waiting for the PSA to reach 1.   I have reviewed and updated the patient's Past Medical History, Social History, Family History and Medication List.    ALLERGIES  Patient has no known allergies.    Patient would like to know what he can do to slow down the PSA level. 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  Patient is clinically well and has no symptoms of the disease.  Based on prior conversations we have discussed continuing androgen deprivation therapy and reimaging him when the PSA reaches 1.0.  At that point if he remains free of metastatic disease we would be able to move forward with an AR directed therapy such as enzalutamide or darolutamide.     I have asked the patient to get a PSA 1 week in advance of his next appointment in June and we will consider imaging him if the PSA is over 1.    I have reviewed the note as documented above.  This accurately captures the substance of my conversation with the patient.    Phone call contact time  Call Started at 10:33  Call Ended at 10:40    Medardo Ingram MD

## 2020-03-18 DIAGNOSIS — I10 BENIGN ESSENTIAL HYPERTENSION: ICD-10-CM

## 2020-03-19 RX ORDER — LOSARTAN POTASSIUM AND HYDROCHLOROTHIAZIDE 12.5; 1 MG/1; MG/1
TABLET ORAL
Qty: 90 TABLET | Refills: 0 | Status: SHIPPED | OUTPATIENT
Start: 2020-03-19 | End: 2020-06-02

## 2020-03-19 NOTE — TELEPHONE ENCOUNTER
Pending Prescriptions:                       Disp   Refills    losartan-hydrochlorothiazide (HYZAAR) 100-*90 tab*0        Sig: TAKE 1 TABLET BY MOUTH ONCE DAILY    Routing refill request to provider for review/approval because:  BP Readings from Last 3 Encounters:   02/05/20 (!) 140/89   01/03/20 119/75   12/16/19 (!) 144/86

## 2020-03-21 DIAGNOSIS — F41.9 ANXIETY: ICD-10-CM

## 2020-03-21 DIAGNOSIS — G47.00 INSOMNIA, UNSPECIFIED TYPE: ICD-10-CM

## 2020-03-23 RX ORDER — ZOLPIDEM TARTRATE 5 MG/1
TABLET ORAL
Qty: 30 TABLET | Refills: 0 | Status: SHIPPED | OUTPATIENT
Start: 2020-03-23 | End: 2020-04-20

## 2020-03-23 RX ORDER — CLONAZEPAM 1 MG/1
TABLET ORAL
Qty: 30 TABLET | Refills: 0 | Status: SHIPPED | OUTPATIENT
Start: 2020-03-23 | End: 2020-03-25

## 2020-03-23 NOTE — TELEPHONE ENCOUNTER
Patient called clinic. He is on his last pill. Needs refill sent today.  
Pending Prescriptions:                       Disp   Refills    zolpidem (AMBIEN) 5 MG tablet [Pharmacy M*30 tab*0            Sig: TAKE 1 TABLET BY MOUTH AT BEDTIME AS NEEDED FOR           SLEEP    Last Written Prescription Date:  2/20/20  Last Fill Quantity: 30,  # refills: 0   Last office visit: 1/3/2020 with prescribing provider:     Future Office Visit:          clonazePAM (KLONOPIN) 1 MG tablet [Pharma*30 tab*0            Sig: TAKE 1 TABLET BY MOUTH NIGHTLY AS NEEDED FOR           ANXIETY    Last Written Prescription Date:  2/25/20  Last Fill Quantity: 30,  # refills: 0   Last office visit: 1/3/2020 with prescribing provider:     Future Office Visit:    Kristy Hidalgo, MSN, RN      
Pt admitted to HH secondary to SOB and cough. Pt with 2 pillow orthopnea. Pt with a RR on 1/10 secondary to decrease O2 sat and abd pain.

## 2020-03-24 DIAGNOSIS — F41.9 ANXIETY: ICD-10-CM

## 2020-03-24 RX ORDER — CLONAZEPAM 1 MG/1
TABLET ORAL
Qty: 30 TABLET | Refills: 0 | OUTPATIENT
Start: 2020-03-24

## 2020-03-25 ENCOUNTER — MYC MEDICAL ADVICE (OUTPATIENT)
Dept: FAMILY MEDICINE | Facility: OTHER | Age: 80
End: 2020-03-25

## 2020-03-25 DIAGNOSIS — F41.9 ANXIETY: ICD-10-CM

## 2020-03-25 RX ORDER — CLONAZEPAM 1 MG/1
TABLET ORAL
Qty: 30 TABLET | Refills: 0 | OUTPATIENT
Start: 2020-03-25

## 2020-03-25 RX ORDER — CLONAZEPAM 1 MG/1
TABLET ORAL
Qty: 30 TABLET | Refills: 0 | Status: SHIPPED | OUTPATIENT
Start: 2020-03-25 | End: 2020-04-24

## 2020-03-25 NOTE — TELEPHONE ENCOUNTER
See mychart encounter.  Responded to patient via [facility] with an update.  Closing encounter.    Stephanie Goetz, KATTYN, RN, PHN

## 2020-03-25 NOTE — TELEPHONE ENCOUNTER
Reviewed  03/25/20.  No Clonazepam Rx was filled in March.  They have already verified pharmacy has not received Rx for this.  Sent in Refill today.     Ike Tanner PA-C

## 2020-03-25 NOTE — TELEPHONE ENCOUNTER
Reason for Call:  Medication or medication refill:    Do you use a [facility]?  Name of the pharmacy and phone number for the current request:  [facility] - [phone]    Name of the medication requested: clonazepam    Other request: patient is out of this and will need refill asap.     Can we leave a detailed message on this number? YES    Phone number patient can be reached at: Home number on file [phone] (home) [phone]    Best Time: any    Call taken on 3/25/2020 at 10:22 AM by Sharmin Hong

## 2020-03-25 NOTE — TELEPHONE ENCOUNTER
"Pasted from other MyChart: \"     Just a correction to the approval date of Clonazepam which is March 23 and not 20. Sorry for the error in my earlier message     \"  "

## 2020-03-25 NOTE — TELEPHONE ENCOUNTER
Clonazepam 1 mg    Sent 3/23/20 with #30, 0 refill supply.   E-Prescribing Status: Receipt confirmed by pharmacy (3/23/2020 12:48 PM CDT)     Called [facility], spoke to Arabella.   Patient last picked up this medication on 2/26/20, #30.   She does not show they have received the refill sent on 3/23/20. States this will need to be resent by provider.    Pended med.  Routing refill request to provider for review/approval because:  Drug not on the [facility] refill protocol     Stephanie Goetz, BSN, RN, PHN

## 2020-03-25 NOTE — TELEPHONE ENCOUNTER
There is a MyChart encounter from patient regarding this. Please delete medication and close encounter. MA unable to.

## 2020-04-15 ENCOUNTER — MYC MEDICAL ADVICE (OUTPATIENT)
Dept: ONCOLOGY | Facility: CLINIC | Age: 80
End: 2020-04-15

## 2020-04-20 DIAGNOSIS — G47.00 INSOMNIA, UNSPECIFIED TYPE: ICD-10-CM

## 2020-04-20 RX ORDER — ZOLPIDEM TARTRATE 5 MG/1
TABLET ORAL
Qty: 30 TABLET | Refills: 0 | Status: SHIPPED | OUTPATIENT
Start: 2020-04-20 | End: 2020-05-22

## 2020-04-20 NOTE — TELEPHONE ENCOUNTER
Routing refill request to provider for review/approval because:  Drug not on the [facility] refill protocol     Last Written Prescription Date:  3/23/20  Last Fill Quantity: 30,  # refills: 0   Last office visit: 1/3/2020 with prescribing provider:     Future Office Visit:      Laura Blevins, RN, BSN

## 2020-04-20 NOTE — TELEPHONE ENCOUNTER
RK patient. 1 month refill sent. Needs follow-up with [facility] prior to further refills.    Chip Marquis PA-C

## 2020-04-24 DIAGNOSIS — F41.9 ANXIETY: ICD-10-CM

## 2020-04-24 RX ORDER — CLONAZEPAM 1 MG/1
TABLET ORAL
Qty: 30 TABLET | Refills: 0 | Status: SHIPPED | OUTPATIENT
Start: 2020-04-24 | End: 2020-05-26

## 2020-04-24 NOTE — TELEPHONE ENCOUNTER
Pending Prescriptions:                       Disp   Refills    clonazePAM (KLONOPIN) 1 MG tablet [Pharmac*30 tab*0        Sig: TAKE 1 TABLET BY MOUTH NIGHTLY AS NEEDED FOR ANXIETY    Last Written Prescription Date:  3/25/20  Last Fill Quantity: 30,  # refills: 0   Last office visit: 1/3/2020 with prescribing provider:     Future Office Visit:      Routing refill request to provider for review/approval because:  Drug not on the [facility] refill protocol     Kristy Hidalgo, MSN, RN

## 2020-05-06 ENCOUNTER — PATIENT OUTREACH (OUTPATIENT)
Dept: ONCOLOGY | Facility: CLINIC | Age: 80
End: 2020-05-06

## 2020-05-07 NOTE — PROGRESS NOTES
TC to pt regarding upcoming visit in June with Dr. Ingram. Plan made to get labs drawn in [place] per his usual the week prior to his appointment, and to convert his visit to virtual with Dr. Ingram. Ronnie instructions sent to pt via My Chart

## 2020-05-15 DIAGNOSIS — M1A.9XX0 CHRONIC GOUT WITHOUT TOPHUS, UNSPECIFIED CAUSE, UNSPECIFIED SITE: ICD-10-CM

## 2020-05-15 RX ORDER — ALLOPURINOL 100 MG/1
TABLET ORAL
Qty: 90 TABLET | Refills: 1 | Status: SHIPPED | OUTPATIENT
Start: 2020-05-15 | End: 2020-06-02

## 2020-05-15 NOTE — TELEPHONE ENCOUNTER
Prescription approved per [facility] [place].    Laura Blevins, RN, BSN

## 2020-05-22 DIAGNOSIS — G47.00 INSOMNIA, UNSPECIFIED TYPE: ICD-10-CM

## 2020-05-22 RX ORDER — ZOLPIDEM TARTRATE 5 MG/1
TABLET ORAL
Qty: 30 TABLET | Refills: 0 | Status: SHIPPED | OUTPATIENT
Start: 2020-05-22 | End: 2020-06-02

## 2020-05-22 NOTE — TELEPHONE ENCOUNTER
Reason for Call:  Medication or medication refill:    Do you use a [facility]?  Name of the pharmacy and phone number for the current request:  [facility] [place] [facility] - [phone]    Name of the medication requested: zolpidem    Other request: patient took his last one last night. Pharmacy faxed also. Would like refill today.    Can we leave a detailed message on this number? YES    Phone number patient can be reached at: Home number on file [phone] (home) [phone]    Best Time: any    Call taken on 5/22/2020 at 9:34 AM by Sharmin Hong

## 2020-05-22 NOTE — TELEPHONE ENCOUNTER
Pending Prescriptions:                       Disp   Refills    zolpidem (AMBIEN) 5 MG tablet [Pharmacy Me*30 tab*0        Sig: TAKE 1 TABLET BY MOUTH ONCE DAILY AT BEDTIME AS           NEEDED FOR SLEEP    Last Written Prescription Date:  4/20/20  Last Fill Quantity: 30,  # refills: 0   Last office visit: 11/15/2019 with prescribing provider:     Future Office Visit:            Routing refill request to provider for review/approval because:  Drug not on the [facility] refill protocol     Kristy Hidalgo, MSN, RN

## 2020-05-26 DIAGNOSIS — F41.9 ANXIETY: ICD-10-CM

## 2020-05-26 RX ORDER — CLONAZEPAM 1 MG/1
TABLET ORAL
Qty: 30 TABLET | Refills: 0 | Status: SHIPPED | OUTPATIENT
Start: 2020-05-26 | End: 2020-06-25

## 2020-05-26 NOTE — TELEPHONE ENCOUNTER
Routing refill request to provider for review/approval because:  Drug not on the [facility] refill protocol     Last Written Prescription Date:  4/24/20  Last Fill Quantity: 30,  # refills: 0   Last office visit: 11/15/2019 with prescribing provider:     Future Office Visit:      Laura Blevins, RN, BSN

## 2020-05-26 NOTE — TELEPHONE ENCOUNTER
Due for appt. Ling given, please schedule. (Phone, ov, or evisit as appropriate).  Lisa Zuniga MD

## 2020-05-28 NOTE — TELEPHONE ENCOUNTER
Left message for pt.   Please inform/assisting scheduling as noted below.     Thank you  Aden Estevez MA

## 2020-05-29 NOTE — PROGRESS NOTES
"Medardo Gautam is a 80 year old male who is being evaluated via a billable telephone visit.      The patient has been notified of following:     \"This telephone visit will be conducted via a call between you and your physician/provider. We have found that certain health care needs can be provided without the need for a physical exam.  This service lets us provide the care you need with a short phone conversation.  If a prescription is necessary we can send it directly to your pharmacy.  If lab work is needed we can place an order for that and you can then stop by our lab to have the test done at a later time.    Telephone visits are billed at different rates depending on your insurance coverage. During this emergency period, for some insurers they may be billed the same as an in-person visit.  Please reach out to your insurance provider with any questions.    If during the course of the call the physician/provider feels a telephone visit is not appropriate, you will not be charged for this service.\"    Patient has given verbal consent for Telephone visit?  Yes    What phone number would you like to be contacted at? [phone] or [phone]    How would you like to obtain your AVS? Charlieharjessica Sandoval     Medardo Gautam is a 80 year old male who presents via phone visit today for the following health issues:    HPI     Hyperlipidemia Follow-Up    Are you regularly taking any medication or supplement to lower your cholesterol?   Yes- fish oil omega 3    Are you having muscle aches or other side effects that you think could be caused by your cholesterol lowering medication?  No    Hypertension Follow-up    Do you check your blood pressure regularly outside of the clinic? Yes     Are you following a low salt diet? Yes    Are your blood pressures ever more than 140 on the top number (systolic) OR more   than 90 on the bottom number (diastolic), for example 140/90? No    Anxiety Follow-Up    How are you doing with " your anxiety since your last visit? Worsened, with everything going on in the world right now.     Are you having other symptoms that might be associated with anxiety? No    Have you had a significant life event? No     Are you feeling depressed? No    Do you have any concerns with your use of alcohol or other drugs? No    Social History     Tobacco Use     Smoking status: Former Smoker     Packs/day: 0.00     Years: 1.00     Pack years: 0.00     Types: Cigarettes, Cigars, Pipe     Start date: 10/1/1961     Last attempt to quit: 1962     Years since quittin.4     Smokeless tobacco: Never Used     Tobacco comment: No smokers in home   Substance Use Topics     Alcohol use: Yes     Alcohol/week: 0.0 standard drinks     Comment: daily glass of wine and whiskey     Drug use: Yes     Types: Benzodiazepines     SAEID-7 SCORE 10/15/2017 2019 2019   Total Score 0 (minimal anxiety) 4 (minimal anxiety) -   Total Score 0 4 4     PHQ 2019   PHQ-9 Total Score 0   Q9: Thoughts of better off dead/self-harm past 2 weeks Not at all       How many servings of fruits and vegetables do you eat daily?  2-3    On average, how many sweetened beverages do you drink each day (Examples: soda, juice, sweet tea, etc.  Do NOT count diet or artificially sweetened beverages)?   0    How many days per week do you exercise enough to make your heart beat faster? 3 or less    How many minutes a day do you exercise enough to make your heart beat faster? 9 or less    How many days per week do you miss taking your medication? 0    Patient Active Problem List   Diagnosis     Disturbance of skin sensation     Gout     Advanced directives, counseling/discussion     Dupuytren's contracture of hand- left 5th finger, right 5th finger     Bunions- both feet     Skin lesion- R side of face and behind L ear     Insomnia     Prostatic nodule- posterior right edge with rectal exam     Prostate cancer- Jeancarlos 9 (5+4) dx 2012     Essential  hypertension with goal blood pressure less than 140/90     Hyperlipidemia LDL goal <130     Malignant neoplasm of prostate (H)     Anxiety     Colon cancer (H)     Renal cyst     Lumbar radiculopathy     Meibomian gland dysfunction     Pseudophakia of right eye     Macular degeneration     Dermatochalasis of eyelid     Olecranon bursitis of right elbow     Right knee pain     Calculus of common bile duct and gallbladder     Lateral knee pain, left     Past Surgical History:   Procedure Laterality Date     APPENDECTOMY       BIOPSY  01/16/15     C HAND/FINGER SURGERY UNLISTED       C LENGTHEN,TENDON,HAND/FINGER  ca 2007    right fifth     C STOMACH SURGERY PROCEDURE UNLISTED       CATARACT IOL, RT/LT Left 02/15/2018     COLON SURGERY  2/11/2015    Lap assisted R hemicolectomy     COLONOSCOPY  10/25/07    Snare polypectomy     COLONOSCOPY  6/25/2009    with snare polypectomy     COLONOSCOPY  9/30/2009     COLONOSCOPY  1/5/2011    COL[facility] by [facility]EE [facility]     COLONOSCOPY N/A 1/16/2015    Procedure: COMBINED COLONOSCOPY, SINGLE OR MULTIPLE BIOPSY/POLYPECTOMY BY BIOPSY;  Surgeon: Sarah Beth Pisano MD;  Location: [facility] OR     COLONOSCOPY WITH CO2 INSUFFLATION N/A 1/16/2015    Procedure: COLONOSCOPY WITH CO2 INSUFFLATION;  Surgeon: Sarah Beth Pisano MD;  Location: [facility] OR     COLONOSCOPY WITH CO2 INSUFFLATION N/A 9/7/2018    Procedure: COLONOSCOPY WITH CO2 INSUFFLATION;  C18.9 (ICD-10-CM) - Malignant neoplasm of colon, unspecified part of colon (H)  [facility] [phone]  BMI 26.29  [employer]  Referred by dr thomas;  Surgeon: Sarah Beth Pisano MD;  Location: [facility] OR     DAVINCI PROSTATECTOMY  8/6/2012    Procedure: DAVINCI PROSTATECTOMY;  Davinci Assisted Radical Prostatectomy with Bilateral Lymphadenectomy ;  Surgeon: Norma Goodwin MD;  Location: [facility] OR     GENITOURINARY SURGERY      prostate surgery     INJECT EPIDURAL LUMBAR / SACRAL SINGLE Left 10/30/2017     Procedure: INJECT EPIDURAL LUMBAR / SACRAL SINGLE;  Left Transforaminal Lumbar 4-Lumbar 5 Epidural Steroid Injection;  Surgeon: Nuvia Reina MD;  Location: [facility] OR     LAPAROSCOPIC ASSISTED COLECTOMY N/A 2015    Procedure: LAPAROSCOPIC ASSISTED COLECTOMY;  Surgeon: Oren Breen MD;  Location: [facility] OR     PHACOEMULSIFICATION CLEAR CORNEA WITH STANDARD INTRAOCULAR LENS IMPLANT Left 2/15/2018    Procedure: PHACOEMULSIFICATION CLEAR CORNEA WITH STANDARD INTRAOCULAR LENS IMPLANT;  LEFT EYE CATARACT EXTRACTION WITH STANDARD INTRAOCULAR LENS IMPLANT ;  Surgeon: Brandon Orellana MD;  Location: [facility]     PHACOEMULSIFICATION CLEAR CORNEA WITH STANDARD INTRAOCULAR LENS IMPLANT Right 3/1/2018    Procedure: PHACOEMULSIFICATION CLEAR CORNEA WITH STANDARD INTRAOCULAR LENS IMPLANT;  RIGHT EYE CATARACT EXTRACTION WITH STANDARD INTRAOCULAR LENS IMPLANT ;  Surgeon: Brandon Orellana MD;  Location: [facility]       Social History     Tobacco Use     Smoking status: Former Smoker     Packs/day: 0.00     Years: 1.00     Pack years: 0.00     Types: Cigarettes, Cigars, Pipe     Start date: 10/1/1961     Last attempt to quit: 1962     Years since quittin.4     Smokeless tobacco: Never Used     Tobacco comment: No smokers in home   Substance Use Topics     Alcohol use: Yes     Alcohol/week: 0.0 standard drinks     Comment: daily glass of wine and whiskey     Family History   Problem Relation Age of Onset     Cerebrovascular Disease Father      Cancer Mother 90        Patient believes vaginal cancer - hysterectomy,      Alzheimer Disease Mother      Cancer Sister      Breast Cancer Sister      C.A.D. No family hx of      Cancer - colorectal No family hx of      Prostate Cancer No family hx of      Blood Disease No family hx of      Cardiovascular No family hx of      Circulatory No family hx of      Eye Disorder No family hx of      Gastrointestinal Disease No family hx of      Genitourinary Problems  No family hx of      Lipids No family hx of      Neurologic Disorder No family hx of      Respiratory No family hx of      Asthma No family hx of      Heart Disease No family hx of      Diabetes No family hx of      Hypertension No family hx of      Arthritis No family hx of      Thyroid Disease No family hx of      Musculoskeletal Disorder No family hx of      Glaucoma No family hx of      Macular Degeneration No family hx of          Current Outpatient Medications   Medication Sig Dispense Refill     allopurinol (ZYLOPRIM) 100 MG tablet Take 2 tablets (200 mg) by mouth daily 180 tablet 1     aspirin 81 MG tablet Take 1 tablet (81 mg) by mouth daily 30 tablet      calcium-vitamin D (CALTRATE) 600-400 MG-UNIT per tablet Take 1 tablet by mouth daily       clonazePAM (KLONOPIN) 1 MG tablet TAKE 1 TABLET BY MOUTH NIGHTLY AS NEEDED FOR ANXIETY 30 tablet 0     folic acid-vit B6-vit B12 (FOLGARD) 0.8-10-0.115 MG TABS Take 1 tablet by mouth daily       IBUPROFEN PO Take 400 mg by mouth every 8 hours as needed for moderate pain       leuprolide (LUPRON DEPOT) 45 MG kit Inject 45 mg into the muscle every 6 months 1 each 0     Loratadine (CLARITIN PO) Take  by mouth. As needed        losartan-hydrochlorothiazide (HYZAAR) 100-12.5 MG tablet Take 1 tablet by mouth daily 90 tablet 1     Omega-3 Fatty Acids (OMEGA-3 FISH OIL PO) Take 500 mg by mouth daily       sildenafil (VIAGRA) 100 MG tablet 1/2 tab three times a week 10 tablet 12     zolpidem (AMBIEN) 5 MG tablet Take 1 tablet (5 mg) by mouth nightly as needed for sleep 30 tablet 0     triamcinolone (KENALOG) 0.1 % cream        No Known Allergies  BP Readings from Last 3 Encounters:   02/05/20 (!) 140/89   01/03/20 119/75   12/16/19 (!) 144/86    Wt Readings from Last 3 Encounters:   02/05/20 87.1 kg (192 lb)   12/16/19 87.2 kg (192 lb 3.2 oz)   11/15/19 86.2 kg (190 lb)                    Reviewed and updated as needed this visit by Provider         Review of Systems    Constitutional, HEENT, cardiovascular, pulmonary, GI, , musculoskeletal, neuro, skin, endocrine and psych systems are negative, except as otherwise noted.       Objective   Reported vitals:  There were no vitals taken for this visit.   healthy, alert and no distress  PSYCH: Alert and oriented times 3; coherent speech, normal   rate and volume, able to articulate logical thoughts, able   to abstract reason, no tangential thoughts, no hallucinations   or delusions  His affect is normal  RESP: No cough, no audible wheezing, able to talk in full sentences  Remainder of exam unable to be completed due to telephone visits    Diagnostic Test Results:  Labs reviewed in Epic        Assessment/Plan:  1. Benign essential hypertension  Reports well controlled on the current dose. Continue current meds  - losartan-hydrochlorothiazide (HYZAAR) 100-12.5 MG tablet; Take 1 tablet by mouth daily  Dispense: 90 tablet; Refill: 1    2. Insomnia, unspecified type  Has been using ambien and clonazepam for insomnia at night for years. Continue current dose. Discussed side effects including drowsiness and risk for falls. He understands this risk and has not experienced any side effects so far. Will consider taper down.  - zolpidem (AMBIEN) 5 MG tablet; Take 1 tablet (5 mg) by mouth nightly as needed for sleep  Dispense: 30 tablet; Refill: 0    3. Chronic gout without tophus, unspecified cause, unspecified site  -no new symptoms . Continue allopurinol at the same dose  Recheck in 6 months  - allopurinol (ZYLOPRIM) 100 MG tablet; Take 2 tablets (200 mg) by mouth daily  Dispense: 180 tablet; Refill: 1    No follow-ups on file.      Phone call duration:  7 minutes    Verna Osborne MD

## 2020-06-02 ENCOUNTER — VIRTUAL VISIT (OUTPATIENT)
Dept: FAMILY MEDICINE | Facility: OTHER | Age: 80
End: 2020-06-02
Payer: COMMERCIAL

## 2020-06-02 DIAGNOSIS — I10 BENIGN ESSENTIAL HYPERTENSION: ICD-10-CM

## 2020-06-02 DIAGNOSIS — M1A.9XX0 CHRONIC GOUT WITHOUT TOPHUS, UNSPECIFIED CAUSE, UNSPECIFIED SITE: ICD-10-CM

## 2020-06-02 DIAGNOSIS — G47.00 INSOMNIA, UNSPECIFIED TYPE: ICD-10-CM

## 2020-06-02 PROCEDURE — 99214 OFFICE O/P EST MOD 30 MIN: CPT | Mod: TEL | Performed by: FAMILY MEDICINE

## 2020-06-02 RX ORDER — LOSARTAN POTASSIUM AND HYDROCHLOROTHIAZIDE 12.5; 1 MG/1; MG/1
1 TABLET ORAL DAILY
Qty: 90 TABLET | Refills: 1 | Status: SHIPPED | OUTPATIENT
Start: 2020-06-02 | End: 2020-12-15

## 2020-06-02 RX ORDER — ALLOPURINOL 100 MG/1
200 TABLET ORAL DAILY
Qty: 180 TABLET | Refills: 1 | Status: SHIPPED | OUTPATIENT
Start: 2020-06-02 | End: 2020-11-22

## 2020-06-02 RX ORDER — ZOLPIDEM TARTRATE 5 MG/1
5 TABLET ORAL
Qty: 30 TABLET | Refills: 0 | Status: SHIPPED | OUTPATIENT
Start: 2020-06-02 | End: 2020-07-16

## 2020-06-02 ASSESSMENT — ANXIETY QUESTIONNAIRES
5. BEING SO RESTLESS THAT IT IS HARD TO SIT STILL: NOT AT ALL
2. NOT BEING ABLE TO STOP OR CONTROL WORRYING: NOT AT ALL
GAD7 TOTAL SCORE: 2
7. FEELING AFRAID AS IF SOMETHING AWFUL MIGHT HAPPEN: SEVERAL DAYS
1. FEELING NERVOUS, ANXIOUS, OR ON EDGE: NOT AT ALL
IF YOU CHECKED OFF ANY PROBLEMS ON THIS QUESTIONNAIRE, HOW DIFFICULT HAVE THESE PROBLEMS MADE IT FOR YOU TO DO YOUR WORK, TAKE CARE OF THINGS AT HOME, OR GET ALONG WITH OTHER PEOPLE: NOT DIFFICULT AT ALL
3. WORRYING TOO MUCH ABOUT DIFFERENT THINGS: SEVERAL DAYS
6. BECOMING EASILY ANNOYED OR IRRITABLE: NOT AT ALL

## 2020-06-02 ASSESSMENT — PATIENT HEALTH QUESTIONNAIRE - PHQ9
SUM OF ALL RESPONSES TO PHQ QUESTIONS 1-9: 0
5. POOR APPETITE OR OVEREATING: NOT AT ALL

## 2020-06-03 ASSESSMENT — ANXIETY QUESTIONNAIRES: GAD7 TOTAL SCORE: 2

## 2020-06-11 ENCOUNTER — PATIENT OUTREACH (OUTPATIENT)
Dept: ONCOLOGY | Facility: CLINIC | Age: 80
End: 2020-06-11

## 2020-06-11 DIAGNOSIS — C61 PROSTATE CANCER (H): Primary | ICD-10-CM

## 2020-06-11 NOTE — PROGRESS NOTES
TC to pt returning his VM with questions about his appt with Dr. Ingram on 06/22. He stated it appeared that his appt was to be in person and is asking to move it to later in the morning if this is the case so he has time to drive in from [place]. Attempt to reach pt. No answer. LM stating that his appt with be virtual but that it just hasn't formally been converted yet so he cannot see this on My Chart. Advised pt to keep lab appt as planned on 06/16. Lab orders updated. Requested he call clinic with any questions or concerns.

## 2020-06-16 DIAGNOSIS — C61 PROSTATE CANCER (H): ICD-10-CM

## 2020-06-16 LAB
ALBUMIN SERPL-MCNC: 3.9 G/DL (ref 3.4–5)
ALP SERPL-CCNC: 80 U/L (ref 40–150)
ALT SERPL W P-5'-P-CCNC: 33 U/L (ref 0–70)
ANION GAP SERPL CALCULATED.3IONS-SCNC: 10 MMOL/L (ref 3–14)
AST SERPL W P-5'-P-CCNC: 42 U/L (ref 0–45)
BASOPHILS # BLD AUTO: 0 10E9/L (ref 0–0.2)
BASOPHILS NFR BLD AUTO: 0.5 %
BILIRUB SERPL-MCNC: 0.8 MG/DL (ref 0.2–1.3)
BUN SERPL-MCNC: 12 MG/DL (ref 7–30)
CALCIUM SERPL-MCNC: 9.3 MG/DL (ref 8.5–10.1)
CHLORIDE SERPL-SCNC: 99 MMOL/L (ref 94–109)
CO2 SERPL-SCNC: 27 MMOL/L (ref 20–32)
CREAT SERPL-MCNC: 0.82 MG/DL (ref 0.66–1.25)
DIFFERENTIAL METHOD BLD: ABNORMAL
EOSINOPHIL # BLD AUTO: 0.1 10E9/L (ref 0–0.7)
EOSINOPHIL NFR BLD AUTO: 2.3 %
ERYTHROCYTE [DISTWIDTH] IN BLOOD BY AUTOMATED COUNT: 12.3 % (ref 10–15)
GFR SERPL CREATININE-BSD FRML MDRD: 85 ML/MIN/{1.73_M2}
GLUCOSE SERPL-MCNC: 123 MG/DL (ref 70–99)
HCT VFR BLD AUTO: 38.9 % (ref 40–53)
HGB BLD-MCNC: 13.7 G/DL (ref 13.3–17.7)
LYMPHOCYTES # BLD AUTO: 1.6 10E9/L (ref 0.8–5.3)
LYMPHOCYTES NFR BLD AUTO: 37.9 %
MCH RBC QN AUTO: 33.8 PG (ref 26.5–33)
MCHC RBC AUTO-ENTMCNC: 35.2 G/DL (ref 31.5–36.5)
MCV RBC AUTO: 96 FL (ref 78–100)
MONOCYTES # BLD AUTO: 0.6 10E9/L (ref 0–1.3)
MONOCYTES NFR BLD AUTO: 12.9 %
NEUTROPHILS # BLD AUTO: 2 10E9/L (ref 1.6–8.3)
NEUTROPHILS NFR BLD AUTO: 46.4 %
PLATELET # BLD AUTO: 172 10E9/L (ref 150–450)
POTASSIUM SERPL-SCNC: 3.9 MMOL/L (ref 3.4–5.3)
PROT SERPL-MCNC: 7.2 G/DL (ref 6.8–8.8)
PSA SERPL-MCNC: 0.92 UG/L (ref 0–4)
RBC # BLD AUTO: 4.05 10E12/L (ref 4.4–5.9)
SODIUM SERPL-SCNC: 136 MMOL/L (ref 133–144)
WBC # BLD AUTO: 4.3 10E9/L (ref 4–11)

## 2020-06-16 PROCEDURE — 84153 ASSAY OF PSA TOTAL: CPT | Performed by: INTERNAL MEDICINE

## 2020-06-16 PROCEDURE — 85025 COMPLETE CBC W/AUTO DIFF WBC: CPT | Performed by: INTERNAL MEDICINE

## 2020-06-16 PROCEDURE — 36415 COLL VENOUS BLD VENIPUNCTURE: CPT | Performed by: INTERNAL MEDICINE

## 2020-06-16 PROCEDURE — 84403 ASSAY OF TOTAL TESTOSTERONE: CPT | Performed by: INTERNAL MEDICINE

## 2020-06-16 PROCEDURE — 80053 COMPREHEN METABOLIC PANEL: CPT | Performed by: INTERNAL MEDICINE

## 2020-06-18 LAB — TESTOST SERPL-MCNC: 24 NG/DL (ref 240–950)

## 2020-06-22 ENCOUNTER — VIRTUAL VISIT (OUTPATIENT)
Dept: ONCOLOGY | Facility: CLINIC | Age: 80
End: 2020-06-22
Attending: INTERNAL MEDICINE
Payer: COMMERCIAL

## 2020-06-22 VITALS — WEIGHT: 191 LBS | BODY MASS INDEX: 26.74 KG/M2 | HEIGHT: 71 IN

## 2020-06-22 DIAGNOSIS — C61 MALIGNANT NEOPLASM OF PROSTATE (H): Primary | ICD-10-CM

## 2020-06-22 PROCEDURE — 99214 OFFICE O/P EST MOD 30 MIN: CPT | Mod: 95 | Performed by: INTERNAL MEDICINE

## 2020-06-22 PROCEDURE — 40000114 ZZH STATISTIC NO CHARGE CLINIC VISIT

## 2020-06-22 ASSESSMENT — PAIN SCALES - GENERAL: PAINLEVEL: NO PAIN (0)

## 2020-06-22 ASSESSMENT — MIFFLIN-ST. JEOR: SCORE: 1600.56

## 2020-06-22 NOTE — LETTER
"    2020         RE: Medardo Gautam  [address] [address] [address]        Dear Colleague,    Thank you for referring your patient, Medardo Gautam, to the [facility] CANCER CLINIC. Please see a copy of my visit note below.    Medardo Gautam is a 80 year old male who is being evaluated via a billable video visit.      The patient has been notified of following:     \"This video visit will be conducted via a call between you and your physician/provider. We have found that certain health care needs can be provided without the need for an in-person physical exam.  This service lets us provide the care you need with a video conversation.  If a prescription is necessary we can send it directly to your pharmacy.  If lab work is needed we can place an order for that and you can then stop by our lab to have the test done at a later time.    Video visits are billed at different rates depending on your insurance coverage.  Please reach out to your insurance provider with any questions.    If during the course of the call the physician/provider feels a video visit is not appropriate, you will not be charged for this service.\"    Patient has given verbal consent for Video visit? Yes    Will anyone else be joining your video visit? No       I have reviewed and updated the patient's allergies and medication list.    Concerns: No concerns  Refills: No refills needed.          Carla Persadu [facility] Oncology Followup         Medardo Gautam is a 80 year old male who is being evaluated via a video visit     PATIENT NAME: Medardo Gautam  MRN: [id number]   : 1940   The patient is a 79-year-old gentleman with a history of high-grade prostate cancer as well as a grade 2 colon cancer here for evaluation and management of a rising PSA.  He has most recently seen Dr. dennis in urology.     DISEASES UNDER MANAGEMENT:    2012             pT2c N0 Mx prostatic adenocarcinoma, Jeancarlos grade " 4+5 = 9, PSA 5.2    pT1 N0 M0 G2 colonic adenocarcinoma     RADIATION HISTORY: Prostate fossa radiation (completed: 10/18/2013)  1. Phase 1: 4500 cGy (180 cGy per fraction) to the pelvis  2. Phase 2: 2520 cGy boost to the prostate bed      CHEMOTHERAPY HISTORY:     [facility] 57429  ? Leuprolide 22.5 mg IM (3/27/2012, 6/19/2012)  ? Docetaxel 75 mg/m  (3/27/2012)- discontinued on study given the development of treatment related hepatotoxicity  ? Salvage ADT  ? Leuprolide (6/25/2013-1/23/2019) - gets Q 6 months   LABS: Reviewed.     PSA:   1/9/18:             PSA   < 0.01  1/23/19:           PSA   = 0.04  7/10/19:           PSA   = 0.07  8/13/19            PSA   = 0.09      discontinue Bicalutamide/Maintain ADT  9/18/19            PSA   = 0.13  12/11/19          PSA  = 0.27  1/29/20 PSA  = 0.42  3/11/20 PSA = 0.63  6/16/20 PSA     = 0.92 (T=24)        Chief Complaint   Patient presents with     Video Visit     Prostate Ca     Videop  visit: he is doing well and offers no complaints today.  As per previous conversations he has not had any imaging. We are waiting for the PSA to reach 1. Feels well with no complaints  I have reviewed and updated the patient's Past Medical History, Social History, Family History and Medication List.    ALLERGIES  Patient has no known allergies.    Patient would like to know what he can do to slow down the PSA level. 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  Patient is clinically well and has no symptoms of the disease.  Based on prior conversations we have discussed continuing androgen deprivation therapy and reimaging him when the PSA reaches 1.0.  At that point if he remains free of metastatic disease we would be able to move forward with an AR directed therapy such as enzalutamide or darolutamide.   I have ordered the  scans for 3 months from now.  We will repeat a PSA and a testosterone at that time.  I suspect that his PSA will be over 1 at that time and we can consider whether we advance with Provenge or an AR directed therapy.  He may be eligible for 1 of our clinical trials at that time. He would be an excellent candidate for the ARACOG study which should be available in September/October.     This accurately captures the substance of my conversation with the patient.        Medardo Ingram MD     Video-Visit Details    Type of service:  Video Visit    Video Start Time: 8:34  Video End Time: 8:54 AM    Originating Location (pt. Location): Home    Distant Location (provider location):  [facility] CANCER [facility]     Platform used for Video Visit: [employer]    Medardo Ingram MD          Again, thank you for allowing me to participate in the care of your patient.        Sincerely,        Medardo Ingram MD

## 2020-06-22 NOTE — PROGRESS NOTES
"Medardo Gautam is a 80 year old male who is being evaluated via a billable video visit.      The patient has been notified of following:     \"This video visit will be conducted via a call between you and your physician/provider. We have found that certain health care needs can be provided without the need for an in-person physical exam.  This service lets us provide the care you need with a video conversation.  If a prescription is necessary we can send it directly to your pharmacy.  If lab work is needed we can place an order for that and you can then stop by our lab to have the test done at a later time.    Video visits are billed at different rates depending on your insurance coverage.  Please reach out to your insurance provider with any questions.    If during the course of the call the physician/provider feels a video visit is not appropriate, you will not be charged for this service.\"    Patient has given verbal consent for Video visit? Yes    Will anyone else be joining your video visit? No       I have reviewed and updated the patient's allergies and medication list.    Concerns: No concerns  Refills: No refills needed.          Carla Persaud [facility] Oncology Followup         Medardo Gautam is a 80 year old male who is being evaluated via a video visit     PATIENT NAME: Medardo Gautam  MRN: [id number]   : 1940   The patient is a 79-year-old gentleman with a history of high-grade prostate cancer as well as a grade 2 colon cancer here for evaluation and management of a rising PSA.  He has most recently seen Dr. dennis in urology.     DISEASES UNDER MANAGEMENT:    2012             pT2c N0 Mx prostatic adenocarcinoma, Seymour grade 4+5 = 9, PSA 5.2    pT1 N0 M0 G2 colonic adenocarcinoma     RADIATION HISTORY: Prostate fossa radiation (completed: 10/18/2013)  1. Phase 1: 4500 cGy (180 cGy per fraction) to the pelvis  2. Phase 2: 2520 cGy boost to the prostate bed      [facility]  [facility] Leuprolide 22.5 mg IM (3/27/2012, 6/19/2012)  ? Docetaxel 75 mg/m  (3/27/2012)- discontinued on study given the development of treatment related hepatotoxicity  ? Salvage ADT  ? Leuprolide (6/25/2013-1/23/2019) - gets Q 6 months   LABS: Reviewed.     PSA:   1/9/18:             PSA   < 0.01  1/23/19:           PSA   = 0.04  7/10/19:           PSA   = 0.07  8/13/19            PSA   = 0.09      discontinue Bicalutamide/Maintain ADT  9/18/19            PSA   = 0.13  12/11/19          PSA  = 0.27  1/29/20 PSA  = 0.42  3/11/20 PSA = 0.63  6/16/20 PSA     = 0.92 (T=24)        Chief Complaint   Patient presents with     Video Visit     Prostate Ca     Videop  visit: he is doing well and offers no complaints today.  As per previous conversations he has not had any imaging. We are waiting for the PSA to reach 1. Feels well with no complaints  I have reviewed and updated the patient's Past Medical History, Social History, Family History and Medication List.    ALLERGIES  Patient has no known allergies.    Patient would like to know what he can do to slow down the PSA level. 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  Patient is clinically well and has no symptoms of the disease.  Based on prior conversations we have discussed continuing androgen deprivation therapy and reimaging him when the PSA reaches 1.0.  At that point if he remains free of metastatic disease we would be able to move forward with an AR directed therapy such as enzalutamide or darolutamide.   I have ordered the scans for 3 months from now.  We will repeat a PSA and a testosterone at that time.  I suspect that his PSA will be over 1 at that time and we can consider whether we advance with Provenge or an AR directed therapy.  He may be eligible for 1 of our  clinical trials at that time. He would be an excellent candidate for the ARACOG study which should be available in September/October.     This accurately captures the substance of my conversation with the patient.        Medardo Ingram MD     Video-Visit Details    Type of service:  Video Visit    Video Start Time: 8:34  Video End Time: 8:54 AM    Originating Location (pt. Location): Home    Distant Location (provider location):  [facility] CANCER [facility]     Platform used for Video Visit: [facility]    Medardo Ingram MD

## 2020-06-24 ENCOUNTER — TELEPHONE (OUTPATIENT)
Dept: FAMILY MEDICINE | Facility: OTHER | Age: 80
End: 2020-06-24

## 2020-06-24 DIAGNOSIS — F41.9 ANXIETY: ICD-10-CM

## 2020-06-24 NOTE — TELEPHONE ENCOUNTER
Pending Prescriptions:                       Disp   Refills    clonazePAM (KLONOPIN) 1 MG tablet [Pharmac*30 tab*0        Sig: TAKE 1 TABLET BY MOUTH AT BEDTIME AS NEEDED FOR           ANXIETY    Last Written Prescription Date:  5/26/20  Last Fill Quantity: 30,  # refills: 0   Last office visit: 11/15/2019 with prescribing provider:     Future Office Visit:            Routing refill request to provider for review/approval because:  Drug not on the [facility] refill protocol     Kristy Hidalgo, MSN, RN

## 2020-06-25 RX ORDER — CLONAZEPAM 1 MG/1
TABLET ORAL
Qty: 30 TABLET | Refills: 0 | Status: SHIPPED | OUTPATIENT
Start: 2020-06-25 | End: 2020-07-27

## 2020-06-26 DIAGNOSIS — F41.9 ANXIETY: ICD-10-CM

## 2020-06-29 DIAGNOSIS — F41.9 ANXIETY: ICD-10-CM

## 2020-06-29 RX ORDER — CLONAZEPAM 1 MG/1
TABLET ORAL
Qty: 30 TABLET | Refills: 0 | OUTPATIENT
Start: 2020-06-29

## 2020-06-29 NOTE — TELEPHONE ENCOUNTER
Pending Prescriptions:                       Disp   Refills    clonazePAM (KLONOPIN) 1 MG tablet [Pharma*30 tab*0            Sig: TAKE 1 TABLET BY MOUTH AT BEDTIME AS NEEDED FOR           ANXIETY    Sent 6/25/20 with 1 month supply. Refill not appropriate at this time.     Kristy Hidalgo, MSN, RN

## 2020-07-02 ENCOUNTER — MYC MEDICAL ADVICE (OUTPATIENT)
Dept: ONCOLOGY | Facility: CLINIC | Age: 80
End: 2020-07-02

## 2020-07-03 ENCOUNTER — PATIENT OUTREACH (OUTPATIENT)
Dept: ONCOLOGY | Facility: CLINIC | Age: 80
End: 2020-07-03

## 2020-07-03 DIAGNOSIS — C61 MALIGNANT NEOPLASM OF PROSTATE (H): Primary | ICD-10-CM

## 2020-07-16 DIAGNOSIS — G47.00 INSOMNIA, UNSPECIFIED TYPE: ICD-10-CM

## 2020-07-16 RX ORDER — ZOLPIDEM TARTRATE 5 MG/1
TABLET ORAL
Qty: 30 TABLET | Refills: 0 | Status: SHIPPED | OUTPATIENT
Start: 2020-07-16 | End: 2020-08-18

## 2020-07-16 NOTE — TELEPHONE ENCOUNTER
Pending Prescriptions:                       Disp   Refills    zolpidem (AMBIEN) 5 MG tablet [Pharmacy Me*30 tab*0        Sig: TAKE 1 TABLET BY MOUTH NIGHTLY AS NEEDED FOR SLEEP        Last Written Prescription Date:  6/2/20  Last Fill Quantity: 30,   # refills: 0  Last Office Visit: 6/2/20  Future Office visit:       Routing refill request to provider for review/approval because:  Drug not on the [facility], [facility] or [facility] refill protocol or controlled substance    Stephanie Goetz, KATTYN, RN, PHN

## 2020-07-21 ENCOUNTER — PRE VISIT (OUTPATIENT)
Dept: UROLOGY | Facility: CLINIC | Age: 80
End: 2020-07-21

## 2020-07-21 ENCOUNTER — MYC MEDICAL ADVICE (OUTPATIENT)
Dept: ONCOLOGY | Facility: CLINIC | Age: 80
End: 2020-07-21

## 2020-07-21 DIAGNOSIS — C61 MALIGNANT NEOPLASM OF PROSTATE (H): Primary | ICD-10-CM

## 2020-07-21 NOTE — TELEPHONE ENCOUNTER
Visit Type : PSA check     Records/Orders: PSA was done on 6/16    Pt Contacted: no    At Rooming: phone or video

## 2020-07-27 DIAGNOSIS — F41.9 ANXIETY: ICD-10-CM

## 2020-07-27 RX ORDER — CLONAZEPAM 1 MG/1
TABLET ORAL
Qty: 30 TABLET | Refills: 0 | Status: SHIPPED | OUTPATIENT
Start: 2020-07-27 | End: 2020-08-24

## 2020-07-27 NOTE — TELEPHONE ENCOUNTER
Pending Prescriptions:                       Disp   Refills    clonazePAM (KLONOPIN) 1 MG tablet [Pharmac*30 tab*0        Sig: TAKE 1 TABLET BY MOUTH AT BEDTIME AS NEEDED FOR           ANXIETY    Last Written Prescription Date:  6/25/20  Last Fill Quantity: 30,  # refills: 0   Last office visit: 11/15/2019 with prescribing provider:     Future Office Visit:            Routing refill request to provider for review/approval because:  Drug not on the [facility] refill protocol     Kristy Hidalgo, MSN, RN

## 2020-08-10 ENCOUNTER — ALLIED HEALTH/NURSE VISIT (OUTPATIENT)
Dept: FAMILY MEDICINE | Facility: OTHER | Age: 80
End: 2020-08-10
Payer: COMMERCIAL

## 2020-08-10 VITALS — SYSTOLIC BLOOD PRESSURE: 128 MMHG | HEART RATE: 82 BPM | DIASTOLIC BLOOD PRESSURE: 64 MMHG

## 2020-08-10 DIAGNOSIS — Z01.30 BP CHECK: Primary | ICD-10-CM

## 2020-08-10 PROCEDURE — 99207 ZZC NO CHARGE NURSE ONLY: CPT

## 2020-08-10 NOTE — PROGRESS NOTES
Patient consents to receive outdoor care: Yes    Upon arrival, patient instructed to proceed to designated location, place vehicle in park, turn off, and remove keys     If we are unable to safely and ergonomically able to provide care- is the patient able to safely able to get out of car and transfer to a chair? Yes      Patient would like to receive their AVS in person after care is given.    Medardo Gautam is a 80 year old patient who comes in today for a Blood Pressure check.  Initial BP:  /64   Pulse 82      Disposition: follow-up as previously indicated by provider    Neva Go MA

## 2020-08-11 DIAGNOSIS — C61 PROSTATE CANCER (H): ICD-10-CM

## 2020-08-11 LAB
ALBUMIN SERPL-MCNC: 3.9 G/DL (ref 3.4–5)
ALP SERPL-CCNC: 75 U/L (ref 40–150)
ALT SERPL W P-5'-P-CCNC: 32 U/L (ref 0–70)
ANION GAP SERPL CALCULATED.3IONS-SCNC: 7 MMOL/L (ref 3–14)
AST SERPL W P-5'-P-CCNC: 33 U/L (ref 0–45)
BASOPHILS # BLD AUTO: 0 10E9/L (ref 0–0.2)
BASOPHILS NFR BLD AUTO: 0.4 %
BILIRUB SERPL-MCNC: 0.8 MG/DL (ref 0.2–1.3)
BUN SERPL-MCNC: 14 MG/DL (ref 7–30)
CALCIUM SERPL-MCNC: 9.5 MG/DL (ref 8.5–10.1)
CHLORIDE SERPL-SCNC: 107 MMOL/L (ref 94–109)
CO2 SERPL-SCNC: 28 MMOL/L (ref 20–32)
CREAT SERPL-MCNC: 0.92 MG/DL (ref 0.66–1.25)
DIFFERENTIAL METHOD BLD: ABNORMAL
EOSINOPHIL # BLD AUTO: 0.1 10E9/L (ref 0–0.7)
EOSINOPHIL NFR BLD AUTO: 2 %
ERYTHROCYTE [DISTWIDTH] IN BLOOD BY AUTOMATED COUNT: 12.4 % (ref 10–15)
GFR SERPL CREATININE-BSD FRML MDRD: 79 ML/MIN/{1.73_M2}
GLUCOSE SERPL-MCNC: 133 MG/DL (ref 70–99)
HCT VFR BLD AUTO: 40.3 % (ref 40–53)
HGB BLD-MCNC: 13.8 G/DL (ref 13.3–17.7)
LYMPHOCYTES # BLD AUTO: 1.9 10E9/L (ref 0.8–5.3)
LYMPHOCYTES NFR BLD AUTO: 35.1 %
MCH RBC QN AUTO: 34.1 PG (ref 26.5–33)
MCHC RBC AUTO-ENTMCNC: 34.2 G/DL (ref 31.5–36.5)
MCV RBC AUTO: 100 FL (ref 78–100)
MONOCYTES # BLD AUTO: 0.6 10E9/L (ref 0–1.3)
MONOCYTES NFR BLD AUTO: 11.4 %
NEUTROPHILS # BLD AUTO: 2.8 10E9/L (ref 1.6–8.3)
NEUTROPHILS NFR BLD AUTO: 51.1 %
PLATELET # BLD AUTO: 139 10E9/L (ref 150–450)
POTASSIUM SERPL-SCNC: 3.8 MMOL/L (ref 3.4–5.3)
PROT SERPL-MCNC: 7.3 G/DL (ref 6.8–8.8)
PSA SERPL-MCNC: 1.28 UG/L (ref 0–4)
RBC # BLD AUTO: 4.05 10E12/L (ref 4.4–5.9)
SODIUM SERPL-SCNC: 142 MMOL/L (ref 133–144)
WBC # BLD AUTO: 5.4 10E9/L (ref 4–11)

## 2020-08-11 PROCEDURE — 36415 COLL VENOUS BLD VENIPUNCTURE: CPT | Performed by: INTERNAL MEDICINE

## 2020-08-11 PROCEDURE — 84153 ASSAY OF PSA TOTAL: CPT | Performed by: INTERNAL MEDICINE

## 2020-08-11 PROCEDURE — 80053 COMPREHEN METABOLIC PANEL: CPT | Performed by: INTERNAL MEDICINE

## 2020-08-11 PROCEDURE — 85025 COMPLETE CBC W/AUTO DIFF WBC: CPT | Performed by: INTERNAL MEDICINE

## 2020-08-11 PROCEDURE — 84403 ASSAY OF TOTAL TESTOSTERONE: CPT | Performed by: INTERNAL MEDICINE

## 2020-08-13 LAB — TESTOST SERPL-MCNC: 17 NG/DL (ref 240–950)

## 2020-08-17 ENCOUNTER — VIRTUAL VISIT (OUTPATIENT)
Dept: UROLOGY | Facility: CLINIC | Age: 80
End: 2020-08-17
Payer: COMMERCIAL

## 2020-08-17 DIAGNOSIS — G47.00 INSOMNIA, UNSPECIFIED TYPE: ICD-10-CM

## 2020-08-17 DIAGNOSIS — C61 MALIGNANT NEOPLASM OF PROSTATE (H): Primary | ICD-10-CM

## 2020-08-17 NOTE — TELEPHONE ENCOUNTER
Pending Prescriptions:                       Disp   Refills    zolpidem (AMBIEN) 5 MG tablet [Pharmacy Me*30 tab*0        Sig: TAKE 1 TABLET BY MOUTH NIGHTLY AS NEEDED FOR SLEEP    Routing refill request to provider for review/approval because:  Drug not on the [facility] refill protocol     Stephanie Goetz, BSN, RN, PHN

## 2020-08-17 NOTE — PATIENT INSTRUCTIONS
Patient need another 6 month Lupron shot.  He wishes to get it at the [facility].  Please have Jasmina Barroso help arrange for this.  Thanks.

## 2020-08-17 NOTE — LETTER
"8/17/2020       RE: Medardo Gautam  [id number] [address] [id number]     Dear Colleague,    Thank you for referring your patient, Medardo Gautam, to the [facility] UROLOGY AND INST FOR PROSTATE AND UROLOGIC CANCERS at [facility]. Please see a copy of my visit note below.    Medardo Gautam is a 80 year old male who is being evaluated via a billable video visit.      Video Visit Technology for this patient: Asker Video Visit- Patient was left in waiting room      The patient has been notified of following:     \"This video visit will be conducted via a call between you and your physician/provider. We have found that certain health care needs can be provided without the need for an in-person physical exam.  This service lets us provide the care you need with a video conversation.  If a prescription is necessary we can send it directly to your pharmacy.  If lab work is needed we can place an order for that and you can then stop by our lab to have the test done at a later time.    Video visits are billed at different rates depending on your insurance coverage.  Please reach out to your insurance provider with any questions.    If during the course of the call the physician/provider feels a video visit is not appropriate, you will not be charged for this service.\"    Patient has given verbal consent for Video visit? Yes  How would you like to obtain your AVS? MyChart  If you are dropped from the video visit, the video invite should be resent to: Text to cell phone: [phone]  Will anyone else be joining your video visit? No        Video-Visit Details    Type of service:  Video Visit    Video Start Time: 4:10pm    Reason for Visit: Follow up, prostate cancer     HPI/Subjective: Medardo Gautam is a 80 year old male with urologic hx of Jeancarlos 4+5 = 9 prostate cancer s/p RRP in 2012 with salvage XRT and ADT with subsequent biochemical recurrence managed with lupron (no " longer taking bicalutamide - stopped 08/2019). PSA has been rising over the past year, from 0.04 (1/23/19) to 0.42 most recently on 1/29/20. Testosterone levels stable (13). He has been referred to medical oncology and has been following with Felipe Ingram  last seen in June 2020. They are considering adding a second agent at next follow up if PSA rises further.  He also already has a staging eval set up for the fall when he sees Dr. Ingram next.  Overall he is feeling well. He is still getting hot flashes, not too bothersome. He is due for next lupron shot today. Denies fevers, chilll, N/V, new bone aches/pains.    OBJECTIVE:   PSA from 8/11/20 is 1.28 ng/mL  Test 17 from 8/11/20     ASSESSMENT AND PLAN:    Over half of today's 25-minute visit was spent counseling the patient regarding his prostate cancer.  I counseled the patient that his PSA has risen some more and that I agree with restaging and consideration of addition of enzalutamide or another such agent, but will defer to Dr. Ingram for choice of next agent.  We will give the patient another Lupron shot which he requests to be in [place].  I counseled the patient that Dr. Ingram will be spearheading his treatment from now on and that he doesn't need to see me per se unless he develops further difficulty with urination etc.      Video End Time: 4:35pm    Originating Location (pt. Location): Home    Distant Location (provider location):  [facility] UROLOGY AND [facility] FOR PROSTATE AND UROLOGIC CANCERS     Platform used for Video Visit: Ronnie

## 2020-08-17 NOTE — PROGRESS NOTES
"Medardo Gautam is a 80 year old male who is being evaluated via a billable video visit.      Video Visit Technology for this patient: Ronnie Video Visit- Patient was left in waiting room      The patient has been notified of following:     \"This video visit will be conducted via a call between you and your physician/provider. We have found that certain health care needs can be provided without the need for an in-person physical exam.  This service lets us provide the care you need with a video conversation.  If a prescription is necessary we can send it directly to your pharmacy.  If lab work is needed we can place an order for that and you can then stop by our lab to have the test done at a later time.    Video visits are billed at different rates depending on your insurance coverage.  Please reach out to your insurance provider with any questions.    If during the course of the call the physician/provider feels a video visit is not appropriate, you will not be charged for this service.\"    Patient has given verbal consent for Video visit? Yes  How would you like to obtain your AVS? MyChart  If you are dropped from the video visit, the video invite should be resent to: Text to cell phone: [phone]  Will anyone else be joining your video visit? No        Video-Visit Details    Type of service:  Video Visit    Video Start Time: 4:10pm    Reason for Visit: Follow up, prostate cancer     HPI/Subjective: Medardo Gautam is a 80 year old male with urologic hx of Erwinna 4+5 = 9 prostate cancer s/p RRP in 2012 with salvage XRT and ADT with subsequent biochemical recurrence managed with lupron (no longer taking bicalutamide - stopped 08/2019). PSA has been rising over the past year, from 0.04 (1/23/19) to 0.42 most recently on 1/29/20. Testosterone levels stable (13). He has been referred to medical oncology and has been following with Felipe Ingram  last seen in June 2020. They are considering adding a second agent at " next follow up if PSA rises further.  He also already has a staging eval set up for the fall when he sees Dr. Ingram next.  Overall he is feeling well. He is still getting hot flashes, not too bothersome. He is due for next lupron shot today. Denies fevers, chilll, N/V, new bone aches/pains.    OBJECTIVE:   PSA from 8/11/20 is 1.28 ng/mL  Test 17 from 8/11/20     ASSESSMENT AND PLAN:    Over half of today's 25-minute visit was spent counseling the patient regarding his prostate cancer.  I counseled the patient that his PSA has risen some more and that I agree with restaging and consideration of addition of enzalutamide or another such agent, but will defer to Dr. Ingram for choice of next agent.  We will give the patient another Lupron shot which he requests to be in [place].  I counseled the patient that Dr. Ingram will be spearheading his treatment from now on and that he doesn't need to see me per se unless he develops further difficulty with urination etc.      Video End Time: 4:35pm    Originating Location (pt. Location): Home    Distant Location (provider location):  [facility] UROLOGY AND [facility] FOR PROSTATE AND UROLOGIC CANCERS     Platform used for Video Visit: Ronnie

## 2020-08-18 RX ORDER — ZOLPIDEM TARTRATE 5 MG/1
TABLET ORAL
Qty: 30 TABLET | Refills: 0 | Status: SHIPPED | OUTPATIENT
Start: 2020-08-18 | End: 2020-09-17

## 2020-08-19 ENCOUNTER — APPOINTMENT (OUTPATIENT)
Dept: URBAN - METROPOLITAN AREA CLINIC 259 | Age: 80
Setting detail: DERMATOLOGY
End: 2020-08-20

## 2020-08-19 DIAGNOSIS — L82.1 OTHER SEBORRHEIC KERATOSIS: ICD-10-CM

## 2020-08-19 DIAGNOSIS — L57.0 ACTINIC KERATOSIS: ICD-10-CM

## 2020-08-19 PROCEDURE — OTHER COUNSELING: OTHER

## 2020-08-19 PROCEDURE — 99213 OFFICE O/P EST LOW 20 MIN: CPT | Mod: 25

## 2020-08-19 PROCEDURE — 17000 DESTRUCT PREMALG LESION: CPT

## 2020-08-19 PROCEDURE — OTHER LIQUID NITROGEN: OTHER

## 2020-08-19 ASSESSMENT — LOCATION DETAILED DESCRIPTION DERM
LOCATION DETAILED: RIGHT SUPERIOR PARIETAL SCALP
LOCATION DETAILED: LEFT SUPERIOR MEDIAL UPPER BACK

## 2020-08-19 ASSESSMENT — LOCATION SIMPLE DESCRIPTION DERM
LOCATION SIMPLE: LEFT UPPER BACK
LOCATION SIMPLE: SCALP

## 2020-08-19 ASSESSMENT — LOCATION ZONE DERM
LOCATION ZONE: SCALP
LOCATION ZONE: TRUNK

## 2020-08-19 NOTE — PROCEDURE: LIQUID NITROGEN
Detail Level: Simple
Render Note In Bullet Format When Appropriate: No
Post-Care Instructions: I reviewed with the patient in detail post-care instructions. Patient is to wear sunprotection, and avoid picking at any of the treated lesions. Pt may apply Vaseline to crusted or scabbing areas.
Duration Of Freeze Thaw-Cycle (Seconds): 1
Number Of Freeze-Thaw Cycles: 1 freeze-thaw cycle
Consent: The patient's consent was obtained including but not limited to risks of crusting, scabbing, blistering, scarring, darker or lighter pigmentary change, recurrence, incomplete removal and infection.

## 2020-08-20 ENCOUNTER — MYC MEDICAL ADVICE (OUTPATIENT)
Dept: UROLOGY | Facility: CLINIC | Age: 80
End: 2020-08-20

## 2020-08-24 DIAGNOSIS — F41.9 ANXIETY: ICD-10-CM

## 2020-08-24 RX ORDER — CLONAZEPAM 1 MG/1
TABLET ORAL
Qty: 30 TABLET | Refills: 0 | Status: SHIPPED | OUTPATIENT
Start: 2020-08-24 | End: 2020-09-25

## 2020-08-24 NOTE — TELEPHONE ENCOUNTER
Pending Prescriptions:                       Disp   Refills    clonazePAM (KLONOPIN) 1 MG tablet [Pharmac*30 tab*0        Sig: TAKE 1 TABLET BY MOUTH AT BEDTIME AS NEEDED FOR           ANXIETY    Last Written Prescription Date:  7/27/20  Last Fill Quantity: 30,  # refills: 0   Last office visit: 11/15/2019 with prescribing provider:     Future Office Visit:            Routing refill request to provider for review/approval because:  Drug not on the [facility] refill protocol     Kristy Hidalgo, MSN, RN

## 2020-08-25 NOTE — TELEPHONE ENCOUNTER
Pt wanted this scheduled in  like his other appts. I called  oncology and they were reaching out to patient today.

## 2020-08-25 NOTE — TELEPHONE ENCOUNTER
----- Message from Jasmina Barroso RN sent at 8/20/2020  6:26 PM CDT -----  Regarding: FW: Lupron Shot  Please see below and call and schedule accordingly. Thank you  ----- Message -----  From: Cedrick Estrella EMT  Sent: 8/19/2020   2:09 PM CDT  To: Jasmina Barroso RN  Subject: Lupron Shot                                      Hey,     This patient needs you to help him schedule a Lupron injection per Dr. Van's last note (wrap up section). Apparently he'll need to be scheduled in [place].     Thanks,  Cedrick

## 2020-08-31 ENCOUNTER — INFUSION THERAPY VISIT (OUTPATIENT)
Dept: INFUSION THERAPY | Facility: CLINIC | Age: 80
End: 2020-08-31
Payer: COMMERCIAL

## 2020-08-31 VITALS
SYSTOLIC BLOOD PRESSURE: 139 MMHG | WEIGHT: 191.3 LBS | RESPIRATION RATE: 16 BRPM | HEART RATE: 61 BPM | TEMPERATURE: 98 F | OXYGEN SATURATION: 97 % | BODY MASS INDEX: 30.74 KG/M2 | DIASTOLIC BLOOD PRESSURE: 82 MMHG | HEIGHT: 66 IN

## 2020-08-31 DIAGNOSIS — C61 MALIGNANT NEOPLASM OF PROSTATE (H): Primary | ICD-10-CM

## 2020-08-31 DIAGNOSIS — C61 PROSTATE CANCER (H): ICD-10-CM

## 2020-08-31 PROCEDURE — 96402 CHEMO HORMON ANTINEOPL SQ/IM: CPT | Performed by: NURSE PRACTITIONER

## 2020-08-31 PROCEDURE — 99207 ZZC NO CHARGE NURSE ONLY: CPT

## 2020-08-31 ASSESSMENT — PAIN SCALES - GENERAL: PAINLEVEL: NO PAIN (0)

## 2020-08-31 ASSESSMENT — MIFFLIN-ST. JEOR: SCORE: 1538.35

## 2020-08-31 NOTE — PROGRESS NOTES
Infusion Nursing Note:  Medardo Gautam presents today for Lupron injection.    Patient seen by provider today: No   present during visit today: Not Applicable.    Note: Assessment performed by Lola OLIVAS RN prior to injection today. Patient denies symptoms/concerns following previous injection.    Intravenous Access:  No Intravenous access/labs at this visit.    Treatment Conditions:  Not Applicable.      Post Infusion Assessment:  Patient tolerated injection without incident.  Site patent and intact, free from redness, edema or discomfort.       Discharge Plan:   Patient discharged in stable condition accompanied by: self.  Departure Mode: Ambulatory.    Corrine Yeung LPN

## 2020-09-14 ENCOUNTER — MYC MEDICAL ADVICE (OUTPATIENT)
Dept: ONCOLOGY | Facility: CLINIC | Age: 80
End: 2020-09-14

## 2020-09-16 DIAGNOSIS — G47.00 INSOMNIA, UNSPECIFIED TYPE: ICD-10-CM

## 2020-09-16 NOTE — TELEPHONE ENCOUNTER
Pending Prescriptions:                       Disp   Refills    zolpidem (AMBIEN) 5 MG tablet [Pharmacy Me*30 tab*0        Sig: TAKE 1 TABLET BY MOUTH NIGHTLY AS NEEDED FOR SLEEP    Routing refill request to provider for review/approval because:  Drug not on the [facility] refill protocol     Amy Heck RN on 9/16/2020 at 12:37 PM

## 2020-09-17 RX ORDER — ZOLPIDEM TARTRATE 5 MG/1
TABLET ORAL
Qty: 30 TABLET | Refills: 0 | Status: SHIPPED | OUTPATIENT
Start: 2020-09-17 | End: 2020-10-19

## 2020-09-18 ENCOUNTER — ANCILLARY PROCEDURE (OUTPATIENT)
Dept: NUCLEAR MEDICINE | Facility: CLINIC | Age: 80
End: 2020-09-18
Attending: INTERNAL MEDICINE
Payer: COMMERCIAL

## 2020-09-18 ENCOUNTER — ANCILLARY PROCEDURE (OUTPATIENT)
Dept: CT IMAGING | Facility: CLINIC | Age: 80
End: 2020-09-18
Attending: INTERNAL MEDICINE
Payer: COMMERCIAL

## 2020-09-18 DIAGNOSIS — C61 PROSTATE CANCER (H): ICD-10-CM

## 2020-09-18 DIAGNOSIS — C61 MALIGNANT NEOPLASM OF PROSTATE (H): ICD-10-CM

## 2020-09-18 LAB
ALBUMIN SERPL-MCNC: 3.8 G/DL (ref 3.4–5)
ALP SERPL-CCNC: 75 U/L (ref 40–150)
ALT SERPL W P-5'-P-CCNC: 32 U/L (ref 0–70)
ANION GAP SERPL CALCULATED.3IONS-SCNC: 6 MMOL/L (ref 3–14)
AST SERPL W P-5'-P-CCNC: 36 U/L (ref 0–45)
BASOPHILS # BLD AUTO: 0 10E9/L (ref 0–0.2)
BASOPHILS NFR BLD AUTO: 0.7 %
BILIRUB SERPL-MCNC: 0.7 MG/DL (ref 0.2–1.3)
BUN SERPL-MCNC: 13 MG/DL (ref 7–30)
CALCIUM SERPL-MCNC: 9 MG/DL (ref 8.5–10.1)
CHLORIDE SERPL-SCNC: 106 MMOL/L (ref 94–109)
CO2 SERPL-SCNC: 28 MMOL/L (ref 20–32)
CREAT SERPL-MCNC: 0.92 MG/DL (ref 0.66–1.25)
DIFFERENTIAL METHOD BLD: ABNORMAL
EOSINOPHIL # BLD AUTO: 0.1 10E9/L (ref 0–0.7)
EOSINOPHIL NFR BLD AUTO: 1.6 %
ERYTHROCYTE [DISTWIDTH] IN BLOOD BY AUTOMATED COUNT: 12.4 % (ref 10–15)
GFR SERPL CREATININE-BSD FRML MDRD: 79 ML/MIN/{1.73_M2}
GLUCOSE SERPL-MCNC: 125 MG/DL (ref 70–99)
HCT VFR BLD AUTO: 38.8 % (ref 40–53)
HGB BLD-MCNC: 13.4 G/DL (ref 13.3–17.7)
IMM GRANULOCYTES # BLD: 0 10E9/L (ref 0–0.4)
IMM GRANULOCYTES NFR BLD: 0.2 %
LYMPHOCYTES # BLD AUTO: 1.4 10E9/L (ref 0.8–5.3)
LYMPHOCYTES NFR BLD AUTO: 32.2 %
MCH RBC QN AUTO: 33.8 PG (ref 26.5–33)
MCHC RBC AUTO-ENTMCNC: 34.5 G/DL (ref 31.5–36.5)
MCV RBC AUTO: 98 FL (ref 78–100)
MONOCYTES # BLD AUTO: 0.5 10E9/L (ref 0–1.3)
MONOCYTES NFR BLD AUTO: 10.3 %
NEUTROPHILS # BLD AUTO: 2.4 10E9/L (ref 1.6–8.3)
NEUTROPHILS NFR BLD AUTO: 55 %
PLATELET # BLD AUTO: 134 10E9/L (ref 150–450)
POTASSIUM SERPL-SCNC: 3.4 MMOL/L (ref 3.4–5.3)
PROT SERPL-MCNC: 7.3 G/DL (ref 6.8–8.8)
PSA SERPL-MCNC: 0.81 UG/L (ref 0–4)
RBC # BLD AUTO: 3.97 10E12/L (ref 4.4–5.9)
SODIUM SERPL-SCNC: 140 MMOL/L (ref 133–144)
WBC # BLD AUTO: 4.4 10E9/L (ref 4–11)

## 2020-09-18 PROCEDURE — 74177 CT ABD & PELVIS W/CONTRAST: CPT | Performed by: RADIOLOGY

## 2020-09-18 PROCEDURE — 80053 COMPREHEN METABOLIC PANEL: CPT | Performed by: INTERNAL MEDICINE

## 2020-09-18 PROCEDURE — 85025 COMPLETE CBC W/AUTO DIFF WBC: CPT | Performed by: INTERNAL MEDICINE

## 2020-09-18 PROCEDURE — 36415 COLL VENOUS BLD VENIPUNCTURE: CPT | Performed by: INTERNAL MEDICINE

## 2020-09-18 PROCEDURE — 84153 ASSAY OF PSA TOTAL: CPT | Performed by: INTERNAL MEDICINE

## 2020-09-18 PROCEDURE — 84403 ASSAY OF TOTAL TESTOSTERONE: CPT | Performed by: INTERNAL MEDICINE

## 2020-09-18 PROCEDURE — A9503 TC99M MEDRONATE: HCPCS | Performed by: INTERNAL MEDICINE

## 2020-09-18 PROCEDURE — 78306 BONE IMAGING WHOLE BODY: CPT | Performed by: RADIOLOGY

## 2020-09-18 PROCEDURE — 71260 CT THORAX DX C+: CPT | Performed by: RADIOLOGY

## 2020-09-18 RX ORDER — TC 99M MEDRONATE 20 MG/10ML
20-30 INJECTION, POWDER, LYOPHILIZED, FOR SOLUTION INTRAVENOUS ONCE
Status: COMPLETED | OUTPATIENT
Start: 2020-09-18 | End: 2020-09-18

## 2020-09-18 RX ORDER — IOPAMIDOL 755 MG/ML
116 INJECTION, SOLUTION INTRAVASCULAR ONCE
Status: COMPLETED | OUTPATIENT
Start: 2020-09-18 | End: 2020-09-18

## 2020-09-18 RX ADMIN — IOPAMIDOL 116 ML: 755 INJECTION, SOLUTION INTRAVASCULAR at 10:31

## 2020-09-18 RX ADMIN — TC 99M MEDRONATE 25 MCI.: 20 INJECTION, POWDER, LYOPHILIZED, FOR SOLUTION INTRAVENOUS at 10:00

## 2020-09-21 ENCOUNTER — VIRTUAL VISIT (OUTPATIENT)
Dept: ONCOLOGY | Facility: CLINIC | Age: 80
End: 2020-09-21
Attending: INTERNAL MEDICINE
Payer: COMMERCIAL

## 2020-09-21 DIAGNOSIS — C61 PROSTATE CANCER (H): Primary | ICD-10-CM

## 2020-09-21 PROCEDURE — 40001009 ZZH VIDEO/TELEPHONE VISIT; NO CHARGE

## 2020-09-21 PROCEDURE — 99213 OFFICE O/P EST LOW 20 MIN: CPT | Mod: 95 | Performed by: INTERNAL MEDICINE

## 2020-09-21 NOTE — LETTER
"9/21/2020     RE: Medardo Gautam  [address] [facility] [address]    Dear Colleague,    Thank you for referring your patient, Medardo Gautam, to the [facility]. Please see a copy of my visit note below.    Medardo Gautam is a 80 year old male who is being evaluated via a billable video visit.      The patient has been notified of following:     \"This video visit will be conducted via a call between you and your physician/provider. We have found that certain health care needs can be provided without the need for an in-person physical exam.  This service lets us provide the care you need with a video conversation.  If a prescription is necessary we can send it directly to your pharmacy.  If lab work is needed we can place an order for that and you can then stop by our lab to have the test done at a later time.    Video visits are billed at different rates depending on your insurance coverage.  Please reach out to your insurance provider with any questions.    If during the course of the call the physician/provider feels a video visit is not appropriate, you will not be charged for this service.\"    Patient has given verbal consent for Video visit? Yes    How would you like to obtain your AVS? MyChart     If you are dropped from the video visit, the video invite should be resent to: Send to e-mail at: [email or web address]     Will anyone else be joining your video visit? No          I have reviewed and updated the patient's allergies and pt confirmed any changes to their medication list.  Patient was asked to provide any patient recorded vital signs, height and/or weight.  Please see \"Patient Reported Vital Signs\" tab for that information.  Patient instructed to be in the virtual waiting room 10-15 minutes prior to appointment time.      Concerns: Patient has no new concerns.      Refills: None        MEENAKSHI Polk        Video-Visit Details    Type of service:  Video " Visit    Video Start Time: 1215    Video End Time: 1228    Originating Location (pt. Location): Home    Distant Location (provider location):  [facility] CANCER [facility]     Platform used for Video Visit: Catapulter        [facility] Oncology Followup         Medardo Gautam is a 80 year old male who is being evaluated via a video visit     PATIENT NAME: Medardo Gautam  MRN: [id number]   : 1940   The patient is a 80-year-old gentleman with a history of high-grade prostate cancer as well as a grade 2 colon cancer here for evaluation and management of a rising PSA.  He has most recently seen Dr. dennis in urology.     DISEASES UNDER MANAGEMENT:    2012             pT2c N0 Mx prostatic adenocarcinoma, [place] grade 4+5 = 9, PSA 5.2    pT1 N0 M0 G2 colonic adenocarcinoma     RADIATION HISTORY: Prostate fossa radiation (completed: 10/18/2013)  1. Phase 1: 4500 cGy (180 cGy per fraction) to the pelvis  2. Phase 2: 2520 cGy boost to the prostate bed      CHEMOTHERAPY HISTORY:     CALGB 57935  ? Leuprolide 22.5 mg IM (3/27/2012, 2012)  ? Docetaxel 75 mg/m  (3/27/2012)- discontinued on study given the development of treatment related hepatotoxicity  ? Salvage ADT  ? Leuprolide (2013-2019) - gets Q 6 months   ?   LABS: Reviewed from 20.      PSA:   18:             PSA   < 0.01  19:           PSA   = 0.04  7/10/19:           PSA   = 0.07  19            PSA   = 0.09      discontinue Bicalutamide/Maintain ADT  19            PSA   = 0.13  19          PSA  = 0.27  20 PSA  = 0.42  3/11/20 PSA = 0.63  20 PSA     = 0.92 (T=24) took 30 day supply of Bicalutamide ending ~20 PSA      =0.81      No chief complaint on file.    Interval hx  he is doing well and offers no complaints today. Had 30 more days worth of Bicalutamide that he took, finishing a couple days ago on Saturday. Hoping to live long enough to enjoy time with his grandkids. No new pains  or concerns.     I have reviewed and updated the patient's Past Medical History, Social History, Family History and Medication List.    ALLERGIES  Patient has no known allergies.      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  Patient is clinically well and has no symptoms of the disease.      Scans reviewed and he remains free of metastatic disease. PSA actually a bit lower, likely secondary to patient taking Bicalutamide. Will recheck PSA in November; anticipate it will then be over 1.0 and we can further discuss AR therapy, such as enzalutamide or darolutamide. Some brief side effects including fatigue and skin irritation were reviewed today.    He would be an excellent candidate for the ARACOG study but he has historically not been interested with the close follow up required with studies.     Nedra Vick, CNP on 9/21/2020 at 1:30 PM    Physician Attestation   I, Medardo Ingram MD, saw this patient and agree with the findings and plan of care as documented in the note.      Items personally reviewed/procedural attestation: vitals, labs and the plan as part of this shared visit.    Medardo Ingram MD

## 2020-09-21 NOTE — PROGRESS NOTES
"Medardo Gautam is a 80 year old male who is being evaluated via a billable video visit.      The patient has been notified of following:     \"This video visit will be conducted via a call between you and your physician/provider. We have found that certain health care needs can be provided without the need for an in-person physical exam.  This service lets us provide the care you need with a video conversation.  If a prescription is necessary we can send it directly to your pharmacy.  If lab work is needed we can place an order for that and you can then stop by our lab to have the test done at a later time.    Video visits are billed at different rates depending on your insurance coverage.  Please reach out to your insurance provider with any questions.    If during the course of the call the physician/provider feels a video visit is not appropriate, you will not be charged for this service.\"    Patient has given verbal consent for Video visit? Yes    How would you like to obtain your AVS? MyChart     If you are dropped from the video visit, the video invite should be resent to: Send to e-mail at: [email or web address]     Will anyone else be joining your video visit? No          I have reviewed and updated the patient's allergies and pt confirmed any changes to their medication list.  Patient was asked to provide any patient recorded vital signs, height and/or weight.  Please see \"Patient Reported Vital Signs\" tab for that information.  Patient instructed to be in the virtual waiting room 10-15 minutes prior to appointment time.      Concerns: Patient has no new concerns.      Refills: None        MEENAKSHI Polk        Video-Visit Details    Type of service:  Video Visit    Video Start Time: 1215    Video End Time: 1228    Originating Location (pt. Location): Home    Distant Location (provider location):  [facility] CANCER [facility]     Platform used for Video Visit: [facility] Oncology " Followup         Medardo Gautam is a 80 year old male who is being evaluated via a video visit     PATIENT NAME: Medardo Gautam  MRN: [id number]   : 1940   The patient is a 80-year-old gentleman with a history of high-grade prostate cancer as well as a grade 2 colon cancer here for evaluation and management of a rising PSA.  He has most recently seen Dr. dennis in urology.     DISEASES UNDER MANAGEMENT:    2012             pT2c N0 Mx prostatic adenocarcinoma, [place] grade 4+5 = 9, PSA 5.2    pT1 N0 M0 G2 colonic adenocarcinoma     RADIATION HISTORY: Prostate fossa radiation (completed: 10/18/2013)  1. Phase 1: 4500 cGy (180 cGy per fraction) to the pelvis  2. Phase 2: 2520 cGy boost to the prostate bed      CHEMOTHERAPY HISTORY:     CALGB 66835  ? Leuprolide 22.5 mg IM (3/27/2012, 2012)  ? Docetaxel 75 mg/m  (3/27/2012)- discontinued on study given the development of treatment related hepatotoxicity  ? Salvage ADT  ? Leuprolide (2013-2019) - gets Q 6 months   ?   LABS: Reviewed from 20.      PSA:   18:             PSA   < 0.01  19:           PSA   = 0.04  7/10/19:           PSA   = 0.07  19            PSA   = 0.09      discontinue Bicalutamide/Maintain ADT  19            PSA   = 0.13  19          PSA  = 0.27  20 PSA  = 0.42  3/11/20 PSA = 0.63  20 PSA     = 0.92 (T=24) took 30 day supply of Bicalutamide ending ~20 PSA      =0.81      No chief complaint on file.    Interval hx  he is doing well and offers no complaints today. Had 30 more days worth of Bicalutamide that he took, finishing a couple days ago on Saturday. Hoping to live long enough to enjoy time with his grandkids. No new pains or concerns.     I have reviewed and updated the patient's Past Medical History, Social History, Family History and Medication List.    ALLERGIES  Patient has no known allergies.      Assessment/Plan:  Nonmetastatic castration resistant prostate  cancer with a slowly rising PSA.  Patient is clinically well and has no symptoms of the disease.      Scans reviewed and he remains free of metastatic disease. PSA actually a bit lower, likely secondary to patient taking Bicalutamide. Will recheck PSA in November; anticipate it will then be over 1.0 and we can further discuss AR therapy, such as enzalutamide or darolutamide. Some brief side effects including fatigue and skin irritation were reviewed today.    He would be an excellent candidate for the ARACOG study but he has historically not been interested with the close follow up required with studies.     Nedra Vick, CNP on 9/21/2020 at 1:30 PM    Physician Attestation   I, Medardo Ingram MD, saw this patient and agree with the findings and plan of care as documented in the note.      Items personally reviewed/procedural attestation: vitals, labs and the plan as part of this shared visit.    Medardo Ingram MD

## 2020-09-22 LAB — TESTOST SERPL-MCNC: 19 NG/DL (ref 240–950)

## 2020-09-23 ENCOUNTER — MYC MEDICAL ADVICE (OUTPATIENT)
Dept: FAMILY MEDICINE | Facility: OTHER | Age: 80
End: 2020-09-23

## 2020-09-24 DIAGNOSIS — F41.9 ANXIETY: ICD-10-CM

## 2020-09-24 NOTE — TELEPHONE ENCOUNTER
Pending Prescriptions:                       Disp   Refills    clonazePAM (KLONOPIN) 1 MG tablet [Pharmac*30 tab*0        Sig: TAKE 1 TABLET BY MOUTH AT BEDTIME AS NEEDED FOR           ANXIETY      Routing refill request to provider for review/approval because:  Drug not on the [facility] refill protocol     Hollie Diaz RN

## 2020-09-24 NOTE — TELEPHONE ENCOUNTER
Stable labs . Persistently elevated blood sugars concerning for prediabetes. I would recommend  Low carb diet and regular exercise to help improve it. Low testosterone levels which are stable compared to previous test. The rest of the labs are normal.   Imaging did not show any concerns for recurrence of cancer

## 2020-09-25 RX ORDER — CLONAZEPAM 1 MG/1
TABLET ORAL
Qty: 30 TABLET | Refills: 0 | Status: SHIPPED | OUTPATIENT
Start: 2020-09-25 | End: 2020-10-26

## 2020-09-25 NOTE — TELEPHONE ENCOUNTER
Pending Prescriptions:                       Disp   Refills    clonazePAM (KLONOPIN) 1 MG tablet [Pharmac*30 tab*0        Sig: TAKE 1 TABLET BY MOUTH AT BEDTIME AS NEEDED FOR           ANXIETY    Routing refill request to provider for review/approval because:  Drug not on the [facility] refill protocol   clonazePAM (KLONOPIN) 1 MG tablet  30 tablet  0  8/24/2020

## 2020-09-28 ENCOUNTER — MYC MEDICAL ADVICE (OUTPATIENT)
Dept: FAMILY MEDICINE | Facility: OTHER | Age: 80
End: 2020-09-28

## 2020-10-04 ENCOUNTER — MYC MEDICAL ADVICE (OUTPATIENT)
Dept: FAMILY MEDICINE | Facility: OTHER | Age: 80
End: 2020-10-04

## 2020-10-06 NOTE — TELEPHONE ENCOUNTER
I would highly recommend getting the high dose flu shot. Please schedule on MA float to get flu shot

## 2020-10-09 ENCOUNTER — ALLIED HEALTH/NURSE VISIT (OUTPATIENT)
Dept: FAMILY MEDICINE | Facility: OTHER | Age: 80
End: 2020-10-09
Payer: COMMERCIAL

## 2020-10-09 DIAGNOSIS — Z23 NEED FOR PROPHYLACTIC VACCINATION AND INOCULATION AGAINST INFLUENZA: Primary | ICD-10-CM

## 2020-10-09 PROCEDURE — 99207 PR NO CHARGE NURSE ONLY: CPT

## 2020-10-09 PROCEDURE — G0008 ADMIN INFLUENZA VIRUS VAC: HCPCS

## 2020-10-09 PROCEDURE — 90662 IIV NO PRSV INCREASED AG IM: CPT

## 2020-10-09 NOTE — PROGRESS NOTES
Patient consents to receive outdoor care: Yes    Upon arrival, patient instructed to proceed to designated location, place vehicle in park, turn off, and remove keys     If we are unable to safely and ergonomically able to provide care- is the patient able to safely able to get out of car and transfer to a chair? Yes    Patient would like to receive their AVS none.    Jessica Huerta, CMA

## 2020-10-17 DIAGNOSIS — G47.00 INSOMNIA, UNSPECIFIED TYPE: ICD-10-CM

## 2020-10-17 NOTE — TELEPHONE ENCOUNTER
Pending Prescriptions:                       Disp   Refills    zolpidem (AMBIEN) 5 MG tablet [Pharmacy Me*30 tab*0        Sig: TAKE 1 TABLET BY MOUTH NIGHTLY AS NEEDED FOR SLEEP        Routing refill request to provider for review/approval because:  Drug not on the [facility] refill protocol   Last Seen: 06/02/20  Last Refilled: 09/17/20  Next Visit: MERLIN Posadas RN  October 17, 2020

## 2020-10-19 RX ORDER — ZOLPIDEM TARTRATE 5 MG/1
TABLET ORAL
Qty: 30 TABLET | Refills: 0 | Status: SHIPPED | OUTPATIENT
Start: 2020-10-19 | End: 2020-11-19

## 2020-11-16 DIAGNOSIS — C61 PROSTATE CANCER (H): ICD-10-CM

## 2020-11-16 LAB
ALBUMIN SERPL-MCNC: 3.8 G/DL (ref 3.4–5)
ALP SERPL-CCNC: 76 U/L (ref 40–150)
ALT SERPL W P-5'-P-CCNC: 37 U/L (ref 0–70)
ANION GAP SERPL CALCULATED.3IONS-SCNC: 6 MMOL/L (ref 3–14)
AST SERPL W P-5'-P-CCNC: 38 U/L (ref 0–45)
BASOPHILS # BLD AUTO: 0 10E9/L (ref 0–0.2)
BASOPHILS NFR BLD AUTO: 0.5 %
BILIRUB SERPL-MCNC: 0.8 MG/DL (ref 0.2–1.3)
BUN SERPL-MCNC: 12 MG/DL (ref 7–30)
CALCIUM SERPL-MCNC: 9.6 MG/DL (ref 8.5–10.1)
CHLORIDE SERPL-SCNC: 104 MMOL/L (ref 94–109)
CO2 SERPL-SCNC: 30 MMOL/L (ref 20–32)
CREAT SERPL-MCNC: 0.92 MG/DL (ref 0.66–1.25)
DIFFERENTIAL METHOD BLD: ABNORMAL
EOSINOPHIL # BLD AUTO: 0.1 10E9/L (ref 0–0.7)
EOSINOPHIL NFR BLD AUTO: 2 %
ERYTHROCYTE [DISTWIDTH] IN BLOOD BY AUTOMATED COUNT: 11.9 % (ref 10–15)
GFR SERPL CREATININE-BSD FRML MDRD: 79 ML/MIN/{1.73_M2}
GLUCOSE SERPL-MCNC: 157 MG/DL (ref 70–99)
HCT VFR BLD AUTO: 40.7 % (ref 40–53)
HGB BLD-MCNC: 13.9 G/DL (ref 13.3–17.7)
LYMPHOCYTES # BLD AUTO: 1.8 10E9/L (ref 0.8–5.3)
LYMPHOCYTES NFR BLD AUTO: 40.5 %
MCH RBC QN AUTO: 33.8 PG (ref 26.5–33)
MCHC RBC AUTO-ENTMCNC: 34.2 G/DL (ref 31.5–36.5)
MCV RBC AUTO: 99 FL (ref 78–100)
MONOCYTES # BLD AUTO: 0.5 10E9/L (ref 0–1.3)
MONOCYTES NFR BLD AUTO: 10.8 %
NEUTROPHILS # BLD AUTO: 2.1 10E9/L (ref 1.6–8.3)
NEUTROPHILS NFR BLD AUTO: 46.2 %
PLATELET # BLD AUTO: 144 10E9/L (ref 150–450)
POTASSIUM SERPL-SCNC: 3.9 MMOL/L (ref 3.4–5.3)
PROT SERPL-MCNC: 7.5 G/DL (ref 6.8–8.8)
PSA SERPL-MCNC: 1.66 UG/L (ref 0–4)
RBC # BLD AUTO: 4.11 10E12/L (ref 4.4–5.9)
SODIUM SERPL-SCNC: 140 MMOL/L (ref 133–144)
WBC # BLD AUTO: 4.4 10E9/L (ref 4–11)

## 2020-11-16 PROCEDURE — 80053 COMPREHEN METABOLIC PANEL: CPT | Performed by: INTERNAL MEDICINE

## 2020-11-16 PROCEDURE — 84403 ASSAY OF TOTAL TESTOSTERONE: CPT | Mod: 90 | Performed by: INTERNAL MEDICINE

## 2020-11-16 PROCEDURE — 84153 ASSAY OF PSA TOTAL: CPT | Performed by: INTERNAL MEDICINE

## 2020-11-16 PROCEDURE — 85025 COMPLETE CBC W/AUTO DIFF WBC: CPT | Performed by: INTERNAL MEDICINE

## 2020-11-16 PROCEDURE — 99000 SPECIMEN HANDLING OFFICE-LAB: CPT | Performed by: INTERNAL MEDICINE

## 2020-11-18 DIAGNOSIS — F41.9 ANXIETY: ICD-10-CM

## 2020-11-18 DIAGNOSIS — G47.00 INSOMNIA, UNSPECIFIED TYPE: ICD-10-CM

## 2020-11-18 LAB — TESTOST SERPL-MCNC: 18 NG/DL (ref 240–950)

## 2020-11-19 DIAGNOSIS — G47.00 INSOMNIA, UNSPECIFIED TYPE: ICD-10-CM

## 2020-11-19 RX ORDER — CLONAZEPAM 1 MG/1
TABLET ORAL
Qty: 30 TABLET | Refills: 0 | Status: SHIPPED | OUTPATIENT
Start: 2020-11-26 | End: 2020-12-15

## 2020-11-19 RX ORDER — ZOLPIDEM TARTRATE 5 MG/1
5 TABLET ORAL
Qty: 30 TABLET | Refills: 0 | Status: SHIPPED | OUTPATIENT
Start: 2020-11-19 | End: 2020-12-15

## 2020-11-19 RX ORDER — ZOLPIDEM TARTRATE 5 MG/1
TABLET ORAL
Qty: 30 TABLET | Refills: 0 | OUTPATIENT
Start: 2020-11-19

## 2020-11-19 NOTE — TELEPHONE ENCOUNTER
Reason for Call:  Medication or medication refill:    Do you use a [facility]?  Name of the pharmacy and phone number for the current request:  [facility] - [phone]    Name of the medication requested: zolpidem    Other request: patient states he is out of this and will need today. He states he has been waiting 3 days. Was informed of 72 hour notice    Can we leave a detailed message on this number? YES    Phone number patient can be reached at: Home number on file [phone] (home)    Best Time: asap    Call taken on 11/19/2020 at 3:42 PM by Sharmin Weber

## 2020-11-19 NOTE — TELEPHONE ENCOUNTER
Pending Prescriptions:                       Disp   Refills    zolpidem (AMBIEN) 5 MG tablet [Pharmacy Me*30 tab*0        Sig: TAKE 1 TABLET BY MOUTH NIGHTLY AS NEEDED FOR SLEEP    Routing refill request to provider for review/approval because:  Drug not on the [facility] refill protocol

## 2020-11-19 NOTE — TELEPHONE ENCOUNTER
Pending Prescriptions:                       Disp   Refills    zolpidem (AMBIEN) 5 MG tablet [Pharmacy Me*30 tab*0        Sig: TAKE 1 TABLET BY MOUTH NIGHTLY AS NEEDED FOR SLEEP        Routing refill request to provider for review/approval because:  Drug not on the [facility] refill protocol   Last Seen: 06/2020  Last Refilled: 10/19/20 30Tablets/Capsules  Refills: 0  Next Visit: MERLIN oPsadas RN  November 19, 2020

## 2020-11-20 DIAGNOSIS — M1A.9XX0 CHRONIC GOUT WITHOUT TOPHUS, UNSPECIFIED CAUSE, UNSPECIFIED SITE: ICD-10-CM

## 2020-11-20 DIAGNOSIS — F41.9 ANXIETY: ICD-10-CM

## 2020-11-20 RX ORDER — ZOLPIDEM TARTRATE 5 MG/1
TABLET ORAL
Qty: 30 TABLET | Refills: 0 | OUTPATIENT
Start: 2020-11-20

## 2020-11-22 RX ORDER — ALLOPURINOL 100 MG/1
TABLET ORAL
Qty: 180 TABLET | Refills: 0 | Status: SHIPPED | OUTPATIENT
Start: 2020-11-22 | End: 2021-11-19

## 2020-11-22 NOTE — TELEPHONE ENCOUNTER
Pending Prescriptions:                       Disp   Refills    clonazePAM (KLONOPIN) 1 MG tablet [Pharmac*30 tab*0        Sig: TAKE 1 TABLET BY MOUTH AT BEDTIME AS NEEDED FOR           ANXIETY    Signed Prescriptions:                        Disp   Refills    allopurinol (ZYLOPRIM) 100 MG tablet       180 ta*0        Sig: Take 2 tablets by mouth once daily  Authorizing Provider: KIKA ZAVALA  Ordering User: ROSEANNE DIAZ      Routing refill request to provider for review/approval because:  Drug not on the [facility] refill protocol     Roseanne Diaz RN

## 2020-11-23 ENCOUNTER — TELEPHONE (OUTPATIENT)
Dept: ONCOLOGY | Facility: CLINIC | Age: 80
End: 2020-11-23

## 2020-11-23 ENCOUNTER — VIRTUAL VISIT (OUTPATIENT)
Dept: ONCOLOGY | Facility: CLINIC | Age: 80
End: 2020-11-23
Attending: INTERNAL MEDICINE
Payer: COMMERCIAL

## 2020-11-23 DIAGNOSIS — C61 PROSTATE CANCER (H): Primary | ICD-10-CM

## 2020-11-23 PROCEDURE — 99213 OFFICE O/P EST LOW 20 MIN: CPT | Mod: 95 | Performed by: INTERNAL MEDICINE

## 2020-11-23 PROCEDURE — 999N000193 HC STATISTIC VISIT PULM REHAB

## 2020-11-23 PROCEDURE — 999N001193 HC VIDEO/TELEPHONE VISIT; NO CHARGE

## 2020-11-23 RX ORDER — CLONAZEPAM 1 MG/1
TABLET ORAL
Qty: 30 TABLET | Refills: 0 | OUTPATIENT
Start: 2020-11-23

## 2020-11-23 NOTE — TELEPHONE ENCOUNTER
Prior Authorization Approval    Authorization Effective Date: 11/23/2020  Authorization Expiration Date: 11/23/2021  Medication: Nubeqa - APPROVED  Approved Dose/Quantity:   Reference #:     Insurance Company: [employer] - Phone [phone] Fax [phone]  Expected CoPay:       CoPay Card Available:      Foundation Assistance Needed:    Which Pharmacy is filling the prescription (Not needed for infusion/clinic administered):    Pharmacy Notified:    Patient Notified:          Macie Perea CPhT  [facility]  Oncology Pharmacy Liaison  [email or web address]  Phone: [phone]  Fax: [phone]

## 2020-11-23 NOTE — LETTER
"    2020         RE: Medardo Gautam  [address] [facility] [address]        Dear Colleague,    Thank you for referring your patient, Medardo Gautam, to the [facility]. Please see a copy of my visit note below.    Medardo Gautam is a 80 year old male who is being evaluated via a billable video visit.      The patient has been notified of following:     \"This video visit will be conducted via a call between you and your physician/provider. We have found that certain health care needs can be provided without the need for an in-person physical exam.  This service lets us provide the care you need with a video conversation.  If a prescription is necessary we can send it directly to your pharmacy.  If lab work is needed we can place an order for that and you can then stop by our lab to have the test done at a later time.    Video visits are billed at different rates depending on your insurance coverage.  Please reach out to your insurance provider with any questions.    If during the course of the call the physician/provider feels a video visit is not appropriate, you will not be charged for this service.\"    Patient has given verbal consent for Video visit? Yes  How would you like to obtain your AVS? MyChart  If you are dropped from the video visit, the video invite should be resent to: Text to cell phone: [phone]  Will anyone else be joining your video visit? No     PEGGY Mcconnell    _  [facility] Oncology Followup  Visit Date 2020       Medardo Gautam is a 80 year old male who is being evaluated via a video visit     PATIENT NAME: Medardo Gautam  MRN: [id number]   : 1940   The patient is a 80-year-old gentleman with a history of high-grade prostate cancer as well as a grade 2 colon cancer here for evaluation and management of a rising PSA.  He has most recently seen Dr. dennis in urology.     DISEASES UNDER MANAGEMENT:    2012             " pT2c N0 Mx prostatic adenocarcinoma, [place] grade 4+5 = 9, PSA 5.2    pT1 N0 M0 G2 colonic adenocarcinoma     RADIATION HISTORY: Prostate fossa radiation (completed: 10/18/2013)  1. Phase 1: 4500 cGy (180 cGy per fraction) to the pelvis  2. Phase 2: 2520 cGy boost to the prostate bed      CHEMOTHERAPY HISTORY:     [facility] [address]  ? Leuprolide 22.5 mg IM (3/27/2012, 6/19/2012)  ? Docetaxel 75 mg/m  (3/27/2012)- discontinued on study given the development of treatment related hepatotoxicity  ? Salvage ADT  ? Leuprolide (6/25/2013-1/23/2019) - gets Q 6 months   ?   LABS: Reviewed from 9/12/20.      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  Patient is clinically well and has no symptoms of the disease. Had 30 more days worth of Bicalutamide that he took, finishing a couple days ago on Saturday. Hoping to live long enough to enjoy time with his grandkids. No new pains or concerns.         Scans from September again reviewed and he remains free of metastatic disease. Appointment today to review the most recent PSA.       No change in meds, FH or SH      Impression: Slowly rising psa - now with a bit more of a rise than in previous intervals. Pt remains interested in holding off on additional therapy.  For many patients an extended period of time with stable or slowly rising PSAs can result in a treatment free  interval.  The patient is interested in holding off on further treatment right now.  We did discuss briefly that we have the area cognitive study that is coming up and he may be interested in pursuing that.  We will check in again with another PSA in a couple of months and I suspect we will start therapy in 2021 sometime.  His prognosis is good.      This was a 15-minute phone call.    Signed   Medardo Ingram MD

## 2020-11-23 NOTE — PROGRESS NOTES
"Medardo Gautam is a 80 year old male who is being evaluated via a billable video visit.      The patient has been notified of following:     \"This video visit will be conducted via a call between you and your physician/provider. We have found that certain health care needs can be provided without the need for an in-person physical exam.  This service lets us provide the care you need with a video conversation.  If a prescription is necessary we can send it directly to your pharmacy.  If lab work is needed we can place an order for that and you can then stop by our lab to have the test done at a later time.    Video visits are billed at different rates depending on your insurance coverage.  Please reach out to your insurance provider with any questions.    If during the course of the call the physician/provider feels a video visit is not appropriate, you will not be charged for this service.\"    Patient has given verbal consent for Video visit? Yes  How would you like to obtain your AVS? MyChart  If you are dropped from the video visit, the video invite should be resent to: Text to cell phone: [phone]  Will anyone else be joining your video visit? No     PEGGY Mcconnell    _  [facility] Oncology Followup  Visit Date 2020       Medardo Gautam is a 80 year old male who is being evaluated via a video visit     PATIENT NAME: Medardo Gautam  MRN: [id number]   : 1940   The patient is a 80-year-old gentleman with a history of high-grade prostate cancer as well as a grade 2 colon cancer here for evaluation and management of a rising PSA.  He has most recently seen Dr. dennis in urology.     DISEASES UNDER MANAGEMENT:    2012             pT2c N0 Mx prostatic adenocarcinoma, [place] grade 4+5 = 9, PSA 5.2    pT1 N0 M0 G2 colonic adenocarcinoma     RADIATION HISTORY: Prostate fossa radiation (completed: 10/18/2013)  1. Phase 1: 4500 cGy (180 cGy per fraction) to the pelvis  2. Phase 2: 2520 cGy " boost to the prostate bed      CHEMOTHERAPY HISTORY:     CALGB 02001  ? Leuprolide 22.5 mg IM (3/27/2012, 6/19/2012)  ? Docetaxel 75 mg/m  (3/27/2012)- discontinued on study given the development of treatment related hepatotoxicity  ? Salvage ADT  ? Leuprolide (6/25/2013-1/23/2019) - gets Q 6 months   ?   LABS: Reviewed from 9/12/20.      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  Patient is clinically well and has no symptoms of the disease. Had 30 more days worth of Bicalutamide that he took, finishing a couple days ago on Saturday. Hoping to live long enough to enjoy time with his grandkids. No new pains or concerns.         Scans from September again reviewed and he remains free of metastatic disease. Appointment today to review the most recent PSA.       No change in meds, FH or SH      Impression: Slowly rising psa - now with a bit more of a rise than in previous intervals. Pt remains interested in holding off on additional therapy.  For many patients an extended period of time with stable or slowly rising PSAs can result in a treatment free interval.  The patient is interested in holding off on further treatment right now.  We did discuss briefly that we have the area cognitive study that is coming up and he may be interested in pursuing that.  We will check in again with another PSA in a couple of  months and I suspect we will start therapy in 2021 sometime.  His prognosis is good.      This was a 15-minute phone call.    Signed   Medardo Ingram MD

## 2020-11-23 NOTE — TELEPHONE ENCOUNTER
PA Initiation    Medication: Nubeqa - APPROVED  Insurance Company: [employer] - Phone [phone] Fax [phone]  Pharmacy Filling the Rx:    Filling Pharmacy Phone:    Filling Pharmacy Fax:    Start Date: 11/23/2020        Macie Perea CPhT  [facility]  Oncology Pharmacy Liaison  [email or web address]  Phone: [phone]  Fax: [phone]

## 2020-11-27 ENCOUNTER — TELEPHONE (OUTPATIENT)
Dept: ONCOLOGY | Facility: CLINIC | Age: 80
End: 2020-11-27

## 2020-11-27 DIAGNOSIS — C61 MALIGNANT NEOPLASM OF PROSTATE (H): Primary | ICD-10-CM

## 2020-11-27 DIAGNOSIS — Z79.899 ENCOUNTER FOR LONG-TERM (CURRENT) USE OF MEDICATIONS: ICD-10-CM

## 2020-11-27 DIAGNOSIS — C61 PROSTATE CANCER (H): ICD-10-CM

## 2020-11-27 NOTE — TELEPHONE ENCOUNTER
Oral Chemotherapy Monitoring Program     Placed call to Medardo Gautam to discuss initiation of Nubeqa oral chemotherapy and to provide patient education on the medication.     Left a message requesting a call back. No drug names were mentioned in the voicemail.       Madelyn Barbosa, PharmD  Oral Chemotherapy Monitoring Program  [facility]  [phone]  November 27, 2020

## 2020-11-27 NOTE — TELEPHONE ENCOUNTER
"Oral Chemotherapy Monitoring Program    Lab Monitoring Plan  [facility] [id number] x2, then monthly  Labs drawn outside of [employer]: No, if not at the [facility] will have done at [facility]  Subjective/Objective:  Medardo Gautam is a 80 year old male contacted by phone for an initial visit for oral chemotherapy education.     ORAL CHEMOTHERAPY 11/27/2020 11/27/2020   Assessment Type Other New Teach   Diagnosis Code Prostate Cancer Prostate Cancer   Providers Dr. Juan Jose Ingram   [facility]   Drug Name Nubeqa (Darolutamide) Nubeqa (Darolutamide)   Dose 600 mg 600 mg   Current Schedule BID BID   Cycle Details Continuous Continuous   Any new drug interactions? No No   Is the dose as ordered appropriate for the patient? Yes Yes       Last PHQ-2 Score on record:   PHQ-2 ( 1999 [employer]) 2/4/2020 10/19/2019   Q1: Little interest or pleasure in doing things 0 0   Q2: Feeling down, depressed or hopeless 0 0   PHQ-2 Score 0 0   Q1: Little interest or pleasure in doing things Not at all Not at all   Q2: Feeling down, depressed or hopeless Not at all Not at all   PHQ-2 Score 0 0       Vitals:  BP:   BP Readings from Last 1 Encounters:   08/31/20 139/82     Wt Readings from Last 1 Encounters:   08/31/20 86.8 kg (191 lb 4.8 oz)     Estimated body surface area is 2.02 meters squared as calculated from the following:    Height as of 8/31/20: 1.689 m (5' 6.5\").    Weight as of 8/31/20: 86.8 kg (191 lb 4.8 oz).    Labs:  _  Result Component Current Result Ref Range   Sodium 140 (11/16/2020) 133 - 144 mmol/L     _  Result Component Current Result Ref Range   Potassium 3.9 (11/16/2020) 3.4 - 5.3 mmol/L     _  Result Component Current Result Ref Range   Calcium 9.6 (11/16/2020) 8.5 - 10.1 mg/dL     No results found for Mag within last 30 days.     No results found for Phos within last 30 days.     _  Result Component Current Result Ref Range   Albumin 3.8 (11/16/2020) 3.4 - 5.0 g/dL     _  Result Component Current Result Ref " Range   Urea Nitrogen 12 (11/16/2020) 7 - 30 mg/dL     _  Result Component Current Result Ref Range   Creatinine 0.92 (11/16/2020) 0.66 - 1.25 mg/dL     _  Result Component Current Result Ref Range   AST 38 (11/16/2020) 0 - 45 U/L     _  Result Component Current Result Ref Range   ALT 37 (11/16/2020) 0 - 70 U/L     _  Result Component Current Result Ref Range   Bilirubin Total 0.8 (11/16/2020) 0.2 - 1.3 mg/dL     _  Result Component Current Result Ref Range   WBC 4.4 (11/16/2020) 4.0 - 11.0 10e9/L     _  Result Component Current Result Ref Range   Hemoglobin 13.9 (11/16/2020) 13.3 - 17.7 g/dL     _  Result Component Current Result Ref Range   Platelet Count 144 (L) (11/16/2020) 150 - 450 10e9/L     _  Result Component Current Result Ref Range   Absolute Neutrophil 2.1 (11/16/2020) 1.6 - 8.3 10e9/L       Assessment:  Patient is appropriate to start therapy.    Plan:  Basic chemotherapy teaching was reviewed with the patient including indication, start date of therapy, dose, administration, adverse effects, missed doses, food and drug interactions, monitoring, side effect management, office contact information, and safe handling. Written materials were provided and all questions answered.    Follow-Up:  1 week following the start of Nubeqa therapy.        Madelyn Barbosa PharmD  Oral Chemotherapy Monitoring Program  [facility]  [phone]

## 2020-11-30 ENCOUNTER — TELEPHONE (OUTPATIENT)
Dept: ONCOLOGY | Facility: CLINIC | Age: 80
End: 2020-11-30

## 2020-11-30 NOTE — TELEPHONE ENCOUNTER
Free Drug Application Initiated  Medication - Nubeqa  Sponsor - [employer]  Phone #[phone]  Fax #[phone]  [facility] Check: [facility] 12/2/20 2:44 pm        Macie Perea CPhT  [facility]  Oncology Pharmacy Liaison  [email or web address]  Phone: [phone]  Fax: [phone]

## 2020-12-03 NOTE — TELEPHONE ENCOUNTER
Prescription verified by Omid Alegre, PharmD.  Oral Chemotherapy Monitoring Program  [phone]

## 2020-12-06 ENCOUNTER — HEALTH MAINTENANCE LETTER (OUTPATIENT)
Age: 80
End: 2020-12-06

## 2020-12-11 ENCOUNTER — TELEPHONE (OUTPATIENT)
Dept: ONCOLOGY | Facility: CLINIC | Age: 80
End: 2020-12-11

## 2020-12-11 NOTE — ORAL ONC MGMT
"Oral Chemotherapy Monitoring Program    Subjective/Objective:  Medardo Gautam is a 80 year old male contacted by phone for a follow-up visit for oral chemotherapy. Medardo confirms taking darolutamide 600 mg twice daily. He started this on 12/4. He is tolerating very well so far, with no noticeable adverse effects. Denies any recent medication changes or missed doses. He will get labs at the [facility] on 12/18.     ORAL CHEMOTHERAPY 11/27/2020 11/27/2020 12/11/2020   Assessment Type Other New Teach Initial Follow up   Diagnosis Code Prostate Cancer Prostate Cancer Prostate Cancer   Providers Dr. Juan Jose Ingram   [facility]   Drug Name Nubeqa (Darolutamide) Nubeqa (Darolutamide) Nubeqa (Darolutamide)   Dose 600 mg 600 mg 600 mg   Current Schedule BID BID BID   Cycle Details Continuous Continuous Continuous   Start Date of Last Cycle - - 12/4/2020   Planned next cycle start date - - 1/3/2020   Doses missed in last 2 weeks - - 0   Adherence Assessment - - Adherent   Adverse Effects - - No AE identified during assessment   Any new drug interactions? No No No   Is the dose as ordered appropriate for the patient? Yes Yes Yes   Has the patient missed any days of school, work, or other routine activity? - - No       Last PHQ-2 Score on record:   PHQ-2 ( 1999 [facility]) 2/4/2020 10/19/2019   Q1: Little interest or pleasure in doing things 0 0   Q2: Feeling down, depressed or hopeless 0 0   PHQ-2 Score 0 0   Q1: Little interest or pleasure in doing things Not at all Not at all   Q2: Feeling down, depressed or hopeless Not at all Not at all   PHQ-2 Score 0 0       Vitals:  BP:   BP Readings from Last 1 Encounters:   08/31/20 139/82     Wt Readings from Last 1 Encounters:   08/31/20 86.8 kg (191 lb 4.8 oz)     Estimated body surface area is 2.02 meters squared as calculated from the following:    Height as of 8/31/20: 1.689 m (5' 6.5\").    Weight as of 8/31/20: 86.8 kg (191 lb 4.8 " oz).    Labs:  _  Result Component Current Result Ref Range   Sodium 140 (11/16/2020) 133 - 144 mmol/L     _  Result Component Current Result Ref Range   Potassium 3.9 (11/16/2020) 3.4 - 5.3 mmol/L     _  Result Component Current Result Ref Range   Calcium 9.6 (11/16/2020) 8.5 - 10.1 mg/dL     No results found for Mag within last 30 days.     No results found for Phos within last 30 days.     _  Result Component Current Result Ref Range   Albumin 3.8 (11/16/2020) 3.4 - 5.0 g/dL     _  Result Component Current Result Ref Range   Urea Nitrogen 12 (11/16/2020) 7 - 30 mg/dL     _  Result Component Current Result Ref Range   Creatinine 0.92 (11/16/2020) 0.66 - 1.25 mg/dL     _  Result Component Current Result Ref Range   AST 38 (11/16/2020) 0 - 45 U/L     _  Result Component Current Result Ref Range   ALT 37 (11/16/2020) 0 - 70 U/L     _  Result Component Current Result Ref Range   Bilirubin Total 0.8 (11/16/2020) 0.2 - 1.3 mg/dL     _  Result Component Current Result Ref Range   WBC 4.4 (11/16/2020) 4.0 - 11.0 10e9/L     _  Result Component Current Result Ref Range   Hemoglobin 13.9 (11/16/2020) 13.3 - 17.7 g/dL     _  Result Component Current Result Ref Range   Platelet Count 144 (L) (11/16/2020) 150 - 450 10e9/L     _  Result Component Current Result Ref Range   Absolute Neutrophil 2.1 (11/16/2020) 1.6 - 8.3 10e9/L         Assessment/Plan:  Medardo is tolerating darolutamide well. Continue current therapy.     Follow-Up:  12/18: labs at [facility]    Refill Due:  Next cycle starts 1/3    Naty Garcia, PharmD, BCOP  Hematology/Oncology Clinical Pharmacist  [facility]  [facility]  [phone]

## 2020-12-11 NOTE — ORAL ONC MGMT
Oral Chemotherapy Monitoring Program     Placed call to patient in follow up of oral chemotherapy. Left message requesting call back. No drug names were mentioned. Will update when response received.     Naty Garcia, PharmD, BCOP  Hematology/Oncology Clinical Pharmacist  [facility]  [facility]

## 2020-12-12 NOTE — PATIENT INSTRUCTIONS
Patient Education   Personalized Prevention Plan  You are due for the preventive services outlined below.  Your care team is available to assist you in scheduling these services.  If you have already completed any of these items, please share that information with your care team to update in your medical record.  Health Maintenance Due   Topic Date Due     Discuss Advance Care Planning  12/15/2016     Annual Wellness Visit  10/25/2020     FALL RISK ASSESSMENT  10/25/2020     Cholesterol Lab  12/11/2020

## 2020-12-12 NOTE — PROGRESS NOTES
SUBJECTIVE:   Medardo Gautam is a 80 year old male who presents for Preventive Visit.      Patient has been advised of split billing requirements and indicates understanding: Yes   Are you in the first 12 months of your Medicare coverage?  No    HPI  Do you feel safe in your environment? Yes    Have you ever done Advance Care Planning? (For example, a Health Directive, POLST, or a discussion with a medical provider or your loved ones about your wishes): Yes, patient states has an Advance Care Planning document and will bring a copy to the clinic.      Fall risk  Fallen 2 or more times in the past year?: (P) No  Any fall with injury in the past year?: (P) No    Cognitive Screening   1) Repeat 3 items (Leader, Season, Table)    2) Clock draw: NORMAL  3) 3 item recall: Recalls 3 objects  Results: 3 items recalled: COGNITIVE IMPAIRMENT LESS LIKELY    Mini-CogTM Copyright S [employer]. Licensed by the author for use in [facility]; reprinted with permission (charlie@[email or web address]). All rights reserved.      Do you have sleep apnea, excessive snoring or daytime drowsiness?: no    Reviewed and updated as needed this visit by clinical staff  Tobacco  Allergies  Meds  Problems  Med Hx  Surg Hx  Fam Hx  Soc Hx          Reviewed and updated as needed this visit by Provider    Meds  Problems            Social History     Tobacco Use     Smoking status: Former Smoker     Packs/day: 0.00     Years: 1.00     Pack years: 0.00     Types: Cigarettes, Cigars, Pipe     Start date: 10/1/1961     Quit date: 1962     Years since quittin.9     Smokeless tobacco: Never Used     Tobacco comment: No smokers in home   Substance Use Topics     Alcohol use: Yes     Alcohol/week: 0.0 standard drinks     Comment: daily glass of wine and whiskey         Alcohol Use 12/15/2020   Prescreen: >3 drinks/day or >7 drinks/week? Yes   Prescreen: >3 drinks/day or >7 drinks/week? -   AUDIT SCORE  6        -------------------------------------    Current providers sharing in care for this patient include:   Patient Care Team:  Verna Osborne MD as PCP - General (Family Practice)  Charlotte Castellanos MD as MD (Internal Medicine)  Melba Rousseau, RN as Clinic Care Coordinator (Colon and Rectal Surgery)  Ramon Daily MD as MD (Oncology)  Iesha Keller, RN as Nurse Coordinator (Oncology)  Vrena Osborne MD as Assigned PCP  Eddy Streeter MD as MD (Urology)  Medardo Ingram MD as MD (Medical Oncology)  Taylor Nelson, RN as Specialty Care Coordinator (Hematology & Oncology)  Eddy Van MD as Assigned Surgical Provider  Medardo Ingram MD as Assigned Cancer Care Provider    The following health maintenance items are reviewed in Epic and correct as of today:  Health Maintenance   Topic Date Due     FALL RISK ASSESSMENT  10/25/2020     LIPID  12/11/2020     COLORECTAL CANCER SCREENING  09/07/2021     BMP  11/16/2021     MEDICARE ANNUAL WELLNESS VISIT  12/15/2021     ADVANCE CARE PLANNING  12/15/2025     DTAP/TDAP/TD IMMUNIZATION (3 - Td) 10/25/2029     HEPATITIS C SCREENING  Completed     PHQ-2  Completed     INFLUENZA VACCINE  Completed     Pneumococcal Vaccine: 65+ Years  Completed     ZOSTER IMMUNIZATION  Completed     Pneumococcal Vaccine: Pediatrics (0 to 5 Years) and At-Risk Patients (6 to 64 Years)  Aged Out     IPV IMMUNIZATION  Aged Out     MENINGITIS IMMUNIZATION  Aged Out     HEPATITIS B IMMUNIZATION  Aged Out     Labs reviewed in EPIC  BP Readings from Last 3 Encounters:   12/15/20 106/72   08/31/20 139/82   08/10/20 128/64    Wt Readings from Last 3 Encounters:   12/15/20 88 kg (194 lb)   08/31/20 86.8 kg (191 lb 4.8 oz)   06/22/20 86.6 kg (191 lb)                  Patient Active Problem List   Diagnosis     Gout     Dupuytren's contracture of hand- left 5th finger, right 5th finger     Bunions- both feet     Skin lesion- R side of face and behind L ear      Insomnia     Prostatic nodule- posterior right edge with rectal exam     Prostate cancer- Jeancarlos 9 (5+4) dx Feb 2012     Essential hypertension with goal blood pressure less than 140/90     Hyperlipidemia LDL goal <130     Malignant neoplasm of prostate (H)     Anxiety     Malignant neoplasm of ascending colon (H)     Renal cyst     Lumbar radiculopathy     Macular degeneration     Dermatochalasis of eyelid     Calculus of common bile duct and gallbladder     Lateral knee pain, left     Past Surgical History:   Procedure Laterality Date     APPENDECTOMY       BIOPSY  01/16/15     C HAND/FINGER SURGERY UNLISTED       C LENGTHEN,TENDON,HAND/FINGER  ca 2007    right fifth     C STOMACH SURGERY PROCEDURE UNLISTED       CATARACT IOL, RT/LT Left 02/15/2018     COLON SURGERY  2/11/2015    Lap assisted R hemicolectomy     COLONOSCOPY  10/25/07    Snare polypectomy     COLONOSCOPY  6/25/2009    with snare polypectomy     COLONOSCOPY  9/30/2009     COLONOSCOPY  1/5/2011    COLONOSCOPY performed by JUD MARIEE at [facility]     COLONOSCOPY N/A 1/16/2015    Procedure: COMBINED COLONOSCOPY, SINGLE OR MULTIPLE BIOPSY/POLYPECTOMY BY BIOPSY;  Surgeon: Sarah Beth Pisano MD;  Location: [facility]     COLONOSCOPY WITH CO2 INSUFFLATION N/A 1/16/2015    Procedure: COLONOSCOPY WITH CO2 INSUFFLATION;  Surgeon: Sarah Beth Pisano MD;  Location: [facility] OR     COLONOSCOPY WITH CO2 INSUFFLATION N/A 9/7/2018    Procedure: COLONOSCOPY WITH CO2 INSUFFLATION;  C18.9 (ICD-10-CM) - Malignant neoplasm of colon, unspecified part of colon (H)  [facility] fax# [phone]  BMI 26.29  [employer]  Referred by dr thomas;  Surgeon: Sarah Beth Pisano MD;  Location: [facility] OR     DAVINCI PROSTATECTOMY  8/6/2012    Procedure: DAVINCI PROSTATECTOMY;  Davinci Assisted Radical Prostatectomy with Bilateral Lymphadenectomy ;  Surgeon: Norma Goodwin MD;  [facility]     GENITOURINARY SURGERY      prostate surgery     INJECT EPIDURAL LUMBAR /  SACRAL SINGLE Left 10/30/2017    Procedure: INJECT EPIDURAL LUMBAR / SACRAL SINGLE;  Left Transforaminal Lumbar 4-Lumbar 5 Epidural Steroid Injection;  Surgeon: Nuvia Reina MD;  [facility]     LAPAROSCOPIC ASSISTED COLECTOMY N/A 2015    Procedure: LAPAROSCOPIC ASSISTED COLECTOMY;  Surgeon: Oren Breen MD;  Location: [facility] OR     PHACOEMULSIFICATION CLEAR CORNEA WITH STANDARD INTRAOCULAR LENS IMPLANT Left 2/15/2018    Procedure: PHACOEMULSIFICATION CLEAR CORNEA WITH STANDARD INTRAOCULAR LENS IMPLANT;  LEFT EYE CATARACT EXTRACTION WITH STANDARD INTRAOCULAR LENS IMPLANT ;  Surgeon: Brandon Orellana MD;  Location: [facility]     PHACOEMULSIFICATION CLEAR CORNEA WITH STANDARD INTRAOCULAR LENS IMPLANT Right 3/1/2018    Procedure: PHACOEMULSIFICATION CLEAR CORNEA WITH STANDARD INTRAOCULAR LENS IMPLANT;  RIGHT EYE CATARACT EXTRACTION WITH STANDARD INTRAOCULAR LENS IMPLANT ;  Surgeon: Brandon Orellana MD;  Location: [facility]       Social History     Tobacco Use     Smoking status: Former Smoker     Packs/day: 0.00     Years: 1.00     Pack years: 0.00     Types: Cigarettes, Cigars, Pipe     Start date: 10/1/1961     Quit date: 1962     Years since quittin.9     Smokeless tobacco: Never Used     Tobacco comment: No smokers in home   Substance Use Topics     Alcohol use: Yes     Alcohol/week: 0.0 standard drinks     Comment: daily glass of wine and whiskey     Family History   Problem Relation Age of Onset     Cerebrovascular Disease Father      Cancer Mother 90        Patient believes vaginal cancer - hysterectomy,      Alzheimer Disease Mother      Cancer Sister      Breast Cancer Sister      C.A.D. No family hx of      Cancer - colorectal No family hx of      Prostate Cancer No family hx of      Blood Disease No family hx of      Cardiovascular No family hx of      Circulatory No family hx of      Eye Disorder No family hx of      Gastrointestinal Disease No family hx of       Genitourinary Problems No family hx of      Lipids No family hx of      Neurologic Disorder No family hx of      Respiratory No family hx of      Asthma No family hx of      Heart Disease No family hx of      Diabetes No family hx of      Hypertension No family hx of      Arthritis No family hx of      Thyroid Disease No family hx of      Musculoskeletal Disorder No family hx of      Glaucoma No family hx of      Macular Degeneration No family hx of          Current Outpatient Medications   Medication Sig Dispense Refill     allopurinol (ZYLOPRIM) 100 MG tablet Take 2 tablets by mouth once daily 180 tablet 0     aspirin 81 MG tablet Take 1 tablet (81 mg) by mouth daily 30 tablet      atorvastatin (LIPITOR) 40 MG tablet Take 1 tablet (40 mg) by mouth daily 90 tablet 3     calcium-vitamin D (CALTRATE) 600-400 MG-UNIT per tablet Take 1 tablet by mouth daily       [START ON 12/18/2020] clonazePAM (KLONOPIN) 1 MG tablet Take  One pill daily as needed for anxiety 30 tablet 0     darolutamide (NUBEQA) 300 MG tablet Take 2 tablets (600 mg) by mouth 2 times daily . Swallow tablets whole with food. 120 tablet 0     folic acid-vit B6-vit B12 (FOLGARD) 0.8-10-0.115 MG TABS Take 1 tablet by mouth daily       IBUPROFEN PO Take 400 mg by mouth every 8 hours as needed for moderate pain       leuprolide (LUPRON DEPOT) 45 MG kit Inject 45 mg into the muscle every 6 months 1 each 0     Loratadine (CLARITIN PO) Take  by mouth. As needed        losartan-hydrochlorothiazide (HYZAAR) 100-12.5 MG tablet Take 1 tablet by mouth daily 90 tablet 1     Omega-3 Fatty Acids (OMEGA-3 FISH OIL PO) Take 500 mg by mouth daily       triamcinolone (KENALOG) 0.1 % cream        [START ON 12/26/2020] zolpidem (AMBIEN) 5 MG tablet Take 1 tablet (5 mg) by mouth nightly as needed for sleep 30 tablet 0     No Known Allergies      Review of Systems  Constitutional, HEENT, cardiovascular, pulmonary, GI, , musculoskeletal, neuro, skin, endocrine and psych  "systems are negative, except as otherwise noted.    OBJECTIVE:   /72   Pulse 65   Temp 96.4  F (35.8  C) (Temporal)   Resp 12   Ht 1.7 m (5' 6.93\")   Wt 88 kg (194 lb)   SpO2 94%   BMI 30.45 kg/m   Estimated body mass index is 30.45 kg/m  as calculated from the following:    Height as of this encounter: 1.7 m (5' 6.93\").    Weight as of this encounter: 88 kg (194 lb).  Physical Exam  GENERAL: healthy, alert and no distress  EYES: Eyes grossly normal to inspection, PERRL and conjunctivae and sclerae normal  HENT: ear canals and TM's normal, nose and mouth without ulcers or lesions  NECK: no adenopathy, no asymmetry, masses, or scars and thyroid normal to palpation  RESP: lungs clear to auscultation - no rales, rhonchi or wheezes  CV: regular rate and rhythm, normal S1 S2, no S3 or S4, no murmur, click or rub, no peripheral edema and peripheral pulses strong  ABDOMEN: soft, nontender, no hepatosplenomegaly, no masses and bowel sounds normal  MS: no gross musculoskeletal defects noted, no edema  SKIN: no suspicious lesions or rashes  NEURO: Normal strength and tone, mentation intact and speech normal  PSYCH: mentation appears normal, affect normal/bright    Diagnostic Test Results:  Labs reviewed in Epic    ASSESSMENT / PLAN:   1. Encounter for Medicare annual wellness exam      2. Hyperlipidemia  The 10-year ASCVD risk score ([employer] GOODWIN Jr., et al., 2013) is: 23.6%    Values used to calculate the score:      Age: 80 years      Sex: Male      Is Non- : No      Diabetic: No      Tobacco smoker: No      Systolic Blood Pressure: 106 mmHg      Is BP treated: Yes      HDL Cholesterol: 80 mg/dL      Total Cholesterol: 251 mg/dL   Discussed benefits of lipitor. Stopped meds previously due to side effects. Advised a retrial.   Start atorvastatin  - Lipid panel reflex to direct LDL Fasting; Future  - atorvastatin (LIPITOR) 40 MG tablet; Take 1 tablet (40 mg) by mouth daily  Dispense: 90 tablet; " "Refill: 3    3. Malignant neoplasm of colon, unspecified part of colon (H)  Followed by oncology.    4. Insomnia, unspecified type  Stable on the current dose of ambien. Refill meds  - zolpidem (AMBIEN) 5 MG tablet; Take 1 tablet (5 mg) by mouth nightly as needed for sleep  Dispense: 30 tablet; Refill: 0    5. Benign essential hypertension  BP at goal. Refill meds  - losartan-hydrochlorothiazide (HYZAAR) 100-12.5 MG tablet; Take 1 tablet by mouth daily  Dispense: 90 tablet; Refill: 1    6. Anxiety  Stable on the current dose of klonopin.  - clonazePAM (KLONOPIN) 1 MG tablet; Take  One pill daily as needed for anxiety  Dispense: 30 tablet; Refill: 0    7. Chronic gout without tophus, unspecified cause, unspecified site  Stable. Continue current dose  Of allopurinol    8. Hyperglycemia    - Hemoglobin A1c; Future      Patient has been advised of split billing requirements and indicates understanding: Yes  COUNSELING:  Reviewed preventive health counseling, as reflected in patient instructions       Regular exercise       Healthy diet/nutrition    Estimated body mass index is 30.45 kg/m  as calculated from the following:    Height as of this encounter: 1.7 m (5' 6.93\").    Weight as of this encounter: 88 kg (194 lb).        He reports that he quit smoking about 58 years ago. His smoking use included cigarettes, cigars, and pipe. He started smoking about 59 years ago. He smoked 0.00 packs per day for 1.00 year. He has never used smokeless tobacco.      Appropriate preventive services were discussed with this patient, including applicable screening as appropriate for cardiovascular disease, diabetes, osteopenia/osteoporosis, and glaucoma.  As appropriate for age/gender, discussed screening for colorectal cancer, prostate cancer, breast cancer, and cervical cancer. Checklist reviewing preventive services available has been given to the patient.    Reviewed patients plan of care and provided an AVS. The Basic Care Plan " (routine screening as documented in Health Maintenance) for Medardo meets the Care Plan requirement. This Care Plan has been established and reviewed with the Patient.    Counseling Resources:  ATP IV Guidelines  Pooled Cohorts Equation Calculator  Breast Cancer Risk Calculator  Breast Cancer: Medication to Reduce Risk  FRAX Risk Assessment  ICSI Preventive Guidelines  Dietary Guidelines for Americans, 2010  "[employer]s MyPlate  ASA Prophylaxis  Lung CA Screening    Verna Osborne MD  [facility]    Identified Health Risks:  Answers for HPI/ROS submitted by the patient on 12/15/2020   If you checked off any problems, how difficult have these problems made it for you to do your work, take care of things at home, or get along with other people?: Not difficult at all  PHQ9 TOTAL SCORE: 3  SAEID 7 TOTAL SCORE: 2

## 2020-12-15 ENCOUNTER — OFFICE VISIT (OUTPATIENT)
Dept: FAMILY MEDICINE | Facility: OTHER | Age: 80
End: 2020-12-15
Payer: COMMERCIAL

## 2020-12-15 VITALS
OXYGEN SATURATION: 94 % | HEIGHT: 67 IN | TEMPERATURE: 96.4 F | BODY MASS INDEX: 30.45 KG/M2 | SYSTOLIC BLOOD PRESSURE: 106 MMHG | RESPIRATION RATE: 12 BRPM | HEART RATE: 65 BPM | WEIGHT: 194 LBS | DIASTOLIC BLOOD PRESSURE: 72 MMHG

## 2020-12-15 DIAGNOSIS — E78.5 HYPERLIPIDEMIA LDL GOAL <130: ICD-10-CM

## 2020-12-15 DIAGNOSIS — C18.9 MALIGNANT NEOPLASM OF COLON, UNSPECIFIED PART OF COLON (H): ICD-10-CM

## 2020-12-15 DIAGNOSIS — I10 ESSENTIAL HYPERTENSION WITH GOAL BLOOD PRESSURE LESS THAN 140/90: ICD-10-CM

## 2020-12-15 DIAGNOSIS — Z13.220 SCREENING FOR HYPERLIPIDEMIA: ICD-10-CM

## 2020-12-15 DIAGNOSIS — R73.9 HYPERGLYCEMIA: ICD-10-CM

## 2020-12-15 DIAGNOSIS — G47.00 INSOMNIA, UNSPECIFIED TYPE: ICD-10-CM

## 2020-12-15 DIAGNOSIS — F41.9 ANXIETY: ICD-10-CM

## 2020-12-15 DIAGNOSIS — Z00.00 ENCOUNTER FOR MEDICARE ANNUAL WELLNESS EXAM: Primary | ICD-10-CM

## 2020-12-15 DIAGNOSIS — I10 BENIGN ESSENTIAL HYPERTENSION: ICD-10-CM

## 2020-12-15 DIAGNOSIS — M1A.9XX0 CHRONIC GOUT WITHOUT TOPHUS, UNSPECIFIED CAUSE, UNSPECIFIED SITE: ICD-10-CM

## 2020-12-15 PROBLEM — M25.561 RIGHT KNEE PAIN: Status: RESOLVED | Noted: 2018-11-06 | Resolved: 2020-12-15

## 2020-12-15 PROBLEM — Z96.1 PSEUDOPHAKIA OF RIGHT EYE: Status: RESOLVED | Noted: 2018-03-02 | Resolved: 2020-12-15

## 2020-12-15 PROBLEM — H02.889 MEIBOMIAN GLAND DYSFUNCTION: Status: RESOLVED | Noted: 2017-11-29 | Resolved: 2020-12-15

## 2020-12-15 PROBLEM — M70.21 OLECRANON BURSITIS OF RIGHT ELBOW: Status: RESOLVED | Noted: 2018-05-25 | Resolved: 2020-12-15

## 2020-12-15 PROCEDURE — 99397 PER PM REEVAL EST PAT 65+ YR: CPT | Performed by: FAMILY MEDICINE

## 2020-12-15 RX ORDER — LOSARTAN POTASSIUM AND HYDROCHLOROTHIAZIDE 12.5; 1 MG/1; MG/1
1 TABLET ORAL DAILY
Qty: 90 TABLET | Refills: 1 | Status: SHIPPED | OUTPATIENT
Start: 2020-12-15 | End: 2021-06-22

## 2020-12-15 RX ORDER — CLONAZEPAM 1 MG/1
TABLET ORAL
Qty: 30 TABLET | Refills: 0 | Status: SHIPPED | OUTPATIENT
Start: 2020-12-18 | End: 2021-01-22

## 2020-12-15 RX ORDER — ZOSTER VACCINE RECOMBINANT, ADJUVANTED 50 MCG/0.5
KIT INTRAMUSCULAR
COMMUNITY
Start: 2020-01-07 | End: 2020-12-15

## 2020-12-15 RX ORDER — ZOLPIDEM TARTRATE 5 MG/1
5 TABLET ORAL
Qty: 30 TABLET | Refills: 0 | Status: SHIPPED | OUTPATIENT
Start: 2020-12-26 | End: 2021-01-19

## 2020-12-15 RX ORDER — ATORVASTATIN CALCIUM 40 MG/1
40 TABLET, FILM COATED ORAL DAILY
Qty: 90 TABLET | Refills: 3 | Status: SHIPPED | OUTPATIENT
Start: 2020-12-15 | End: 2021-02-04

## 2020-12-15 ASSESSMENT — ENCOUNTER SYMPTOMS
MYALGIAS: 0
SHORTNESS OF BREATH: 0
PALPITATIONS: 0
FEVER: 0
PARESTHESIAS: 0
DIZZINESS: 0
HEMATOCHEZIA: 0
HEMATURIA: 0
EYE PAIN: 0
COUGH: 0
CONSTIPATION: 0
CHILLS: 0
DYSURIA: 0
ARTHRALGIAS: 1
NAUSEA: 0
FREQUENCY: 1
HEARTBURN: 0
HEADACHES: 0
JOINT SWELLING: 0
SORE THROAT: 0
DIARRHEA: 1
WEAKNESS: 0
NERVOUS/ANXIOUS: 1
ABDOMINAL PAIN: 0

## 2020-12-15 ASSESSMENT — ANXIETY QUESTIONNAIRES
2. NOT BEING ABLE TO STOP OR CONTROL WORRYING: NOT AT ALL
GAD7 TOTAL SCORE: 2
6. BECOMING EASILY ANNOYED OR IRRITABLE: NOT AT ALL
5. BEING SO RESTLESS THAT IT IS HARD TO SIT STILL: NOT AT ALL
3. WORRYING TOO MUCH ABOUT DIFFERENT THINGS: NOT AT ALL
GAD7 TOTAL SCORE: 2
7. FEELING AFRAID AS IF SOMETHING AWFUL MIGHT HAPPEN: NOT AT ALL
GAD7 TOTAL SCORE: 2
1. FEELING NERVOUS, ANXIOUS, OR ON EDGE: SEVERAL DAYS
7. FEELING AFRAID AS IF SOMETHING AWFUL MIGHT HAPPEN: NOT AT ALL
4. TROUBLE RELAXING: SEVERAL DAYS

## 2020-12-15 ASSESSMENT — PATIENT HEALTH QUESTIONNAIRE - PHQ9
SUM OF ALL RESPONSES TO PHQ QUESTIONS 1-9: 3
10. IF YOU CHECKED OFF ANY PROBLEMS, HOW DIFFICULT HAVE THESE PROBLEMS MADE IT FOR YOU TO DO YOUR WORK, TAKE CARE OF THINGS AT HOME, OR GET ALONG WITH OTHER PEOPLE: NOT DIFFICULT AT ALL
SUM OF ALL RESPONSES TO PHQ QUESTIONS 1-9: 3

## 2020-12-15 ASSESSMENT — MIFFLIN-ST. JEOR: SCORE: 1557.48

## 2020-12-15 ASSESSMENT — ACTIVITIES OF DAILY LIVING (ADL): CURRENT_FUNCTION: NO ASSISTANCE NEEDED

## 2020-12-15 NOTE — ASSESSMENT & PLAN NOTE
The 10-year ASCVD risk score (Jorge L GOODWIN Jr., et al., 2013) is: 23.6%    Values used to calculate the score:      Age: 80 years      Sex: Male      Is Non- : No      Diabetic: No      Tobacco smoker: No      Systolic Blood Pressure: 106 mmHg      Is BP treated: Yes      HDL Cholesterol: 80 mg/dL      Total Cholesterol: 251 mg/dL

## 2020-12-16 ASSESSMENT — PATIENT HEALTH QUESTIONNAIRE - PHQ9: SUM OF ALL RESPONSES TO PHQ QUESTIONS 1-9: 3

## 2020-12-16 ASSESSMENT — ANXIETY QUESTIONNAIRES: GAD7 TOTAL SCORE: 2

## 2020-12-18 DIAGNOSIS — Z79.899 ENCOUNTER FOR LONG-TERM (CURRENT) USE OF MEDICATIONS: ICD-10-CM

## 2020-12-18 DIAGNOSIS — R73.9 HYPERGLYCEMIA: ICD-10-CM

## 2020-12-18 DIAGNOSIS — C61 PROSTATE CANCER (H): ICD-10-CM

## 2020-12-18 DIAGNOSIS — C61 MALIGNANT NEOPLASM OF PROSTATE (H): ICD-10-CM

## 2020-12-18 DIAGNOSIS — I10 ESSENTIAL HYPERTENSION WITH GOAL BLOOD PRESSURE LESS THAN 140/90: ICD-10-CM

## 2020-12-18 LAB
ALBUMIN SERPL-MCNC: 4 G/DL (ref 3.4–5)
ALP SERPL-CCNC: 78 U/L (ref 40–150)
ALT SERPL W P-5'-P-CCNC: 32 U/L (ref 0–70)
ANION GAP SERPL CALCULATED.3IONS-SCNC: 5 MMOL/L (ref 3–14)
AST SERPL W P-5'-P-CCNC: 33 U/L (ref 0–45)
BASOPHILS # BLD AUTO: 0.1 10E9/L (ref 0–0.2)
BASOPHILS NFR BLD AUTO: 1.1 %
BILIRUB SERPL-MCNC: 1 MG/DL (ref 0.2–1.3)
BUN SERPL-MCNC: 12 MG/DL (ref 7–30)
CALCIUM SERPL-MCNC: 9.4 MG/DL (ref 8.5–10.1)
CHLORIDE SERPL-SCNC: 105 MMOL/L (ref 94–109)
CHOLEST SERPL-MCNC: 243 MG/DL
CO2 SERPL-SCNC: 30 MMOL/L (ref 20–32)
CREAT SERPL-MCNC: 0.87 MG/DL (ref 0.66–1.25)
DIFFERENTIAL METHOD BLD: ABNORMAL
EOSINOPHIL # BLD AUTO: 0.1 10E9/L (ref 0–0.7)
EOSINOPHIL NFR BLD AUTO: 2.2 %
ERYTHROCYTE [DISTWIDTH] IN BLOOD BY AUTOMATED COUNT: 11.8 % (ref 10–15)
GFR SERPL CREATININE-BSD FRML MDRD: 82 ML/MIN/{1.73_M2}
GLUCOSE SERPL-MCNC: 128 MG/DL (ref 70–99)
HBA1C MFR BLD: 5.9 % (ref 0–5.6)
HCT VFR BLD AUTO: 41.6 % (ref 40–53)
HDLC SERPL-MCNC: 84 MG/DL
HGB BLD-MCNC: 14.3 G/DL (ref 13.3–17.7)
LDLC SERPL CALC-MCNC: 131 MG/DL
LYMPHOCYTES # BLD AUTO: 2.2 10E9/L (ref 0.8–5.3)
LYMPHOCYTES NFR BLD AUTO: 40.1 %
MCH RBC QN AUTO: 33.8 PG (ref 26.5–33)
MCHC RBC AUTO-ENTMCNC: 34.4 G/DL (ref 31.5–36.5)
MCV RBC AUTO: 98 FL (ref 78–100)
MONOCYTES # BLD AUTO: 0.5 10E9/L (ref 0–1.3)
MONOCYTES NFR BLD AUTO: 9.1 %
NEUTROPHILS # BLD AUTO: 2.6 10E9/L (ref 1.6–8.3)
NEUTROPHILS NFR BLD AUTO: 47.5 %
NONHDLC SERPL-MCNC: 159 MG/DL
PLATELET # BLD AUTO: 166 10E9/L (ref 150–450)
POTASSIUM SERPL-SCNC: 3.6 MMOL/L (ref 3.4–5.3)
PROT SERPL-MCNC: 7.6 G/DL (ref 6.8–8.8)
PSA SERPL-MCNC: 0.67 UG/L (ref 0–4)
RBC # BLD AUTO: 4.23 10E12/L (ref 4.4–5.9)
SODIUM SERPL-SCNC: 140 MMOL/L (ref 133–144)
TRIGL SERPL-MCNC: 139 MG/DL
WBC # BLD AUTO: 5.5 10E9/L (ref 4–11)

## 2020-12-18 PROCEDURE — 99000 SPECIMEN HANDLING OFFICE-LAB: CPT | Performed by: INTERNAL MEDICINE

## 2020-12-18 PROCEDURE — 83036 HEMOGLOBIN GLYCOSYLATED A1C: CPT | Performed by: INTERNAL MEDICINE

## 2020-12-18 PROCEDURE — 85025 COMPLETE CBC W/AUTO DIFF WBC: CPT | Performed by: INTERNAL MEDICINE

## 2020-12-18 PROCEDURE — 84153 ASSAY OF PSA TOTAL: CPT | Performed by: INTERNAL MEDICINE

## 2020-12-18 PROCEDURE — 80053 COMPREHEN METABOLIC PANEL: CPT | Performed by: INTERNAL MEDICINE

## 2020-12-18 PROCEDURE — 84403 ASSAY OF TOTAL TESTOSTERONE: CPT | Mod: 90 | Performed by: INTERNAL MEDICINE

## 2020-12-18 PROCEDURE — 80061 LIPID PANEL: CPT | Performed by: INTERNAL MEDICINE

## 2020-12-18 PROCEDURE — 36415 COLL VENOUS BLD VENIPUNCTURE: CPT | Performed by: INTERNAL MEDICINE

## 2020-12-19 LAB — TESTOST SERPL-MCNC: 21 NG/DL (ref 240–950)

## 2020-12-21 ENCOUNTER — TELEPHONE (OUTPATIENT)
Dept: FAMILY MEDICINE | Facility: OTHER | Age: 80
End: 2020-12-21

## 2020-12-21 ENCOUNTER — MYC MEDICAL ADVICE (OUTPATIENT)
Dept: FAMILY MEDICINE | Facility: OTHER | Age: 80
End: 2020-12-21

## 2020-12-21 NOTE — TELEPHONE ENCOUNTER
Patient stopped by because he's tried to call back, but could not get through. I read Dr. Osborne's message to the patient. Patient stated that he understood and had no further questions.

## 2020-12-21 NOTE — TELEPHONE ENCOUNTER
Lm for patient to call us back  Verna Osborne MD  P Erc Float Pool             Mildly worsened a1c but still at goal showing well controlled blood sugars. Improved cholesterol levels but they are still elevated. Advise to take atorvastatin consistently to help improve

## 2020-12-29 RX ORDER — DAROLUTAMIDE 300 MG/1
600 TABLET, FILM COATED ORAL 2 TIMES DAILY
Qty: 120 TABLET | Refills: 11 | COMMUNITY
Start: 2020-12-29 | End: 2021-02-09

## 2020-12-29 NOTE — TELEPHONE ENCOUNTER
Free Drug Application Approved  Effective Dates 12/19/2020-12/31/2020  Patient notified? Yes, by phone        Macie Perea CPhT  [facility]  Oncology Pharmacy Liaison  [email or web address]  Phone: [phone]  Fax: [phone]

## 2021-01-05 ENCOUNTER — TELEPHONE (OUTPATIENT)
Dept: FAMILY MEDICINE | Facility: CLINIC | Age: 81
End: 2021-01-05

## 2021-01-05 NOTE — ORAL ONC MGMT
Oral Chemotherapy Monitoring Program    Placed call to patient in follow up of Nubeqa therapy.    Left message to please call back in follow up of therapy. No patient or drug names were mentioned.    Michael Flores  Pharmacy Intern  Oral Chemotherapy Monitoring Program  [facility]  [phone]\

## 2021-01-18 DIAGNOSIS — Z79.899 ENCOUNTER FOR LONG-TERM (CURRENT) USE OF MEDICATIONS: ICD-10-CM

## 2021-01-18 DIAGNOSIS — C61 MALIGNANT NEOPLASM OF PROSTATE (H): ICD-10-CM

## 2021-01-18 DIAGNOSIS — C61 PROSTATE CANCER (H): ICD-10-CM

## 2021-01-18 LAB
ALBUMIN SERPL-MCNC: 3.9 G/DL (ref 3.4–5)
ALP SERPL-CCNC: 77 U/L (ref 40–150)
ALT SERPL W P-5'-P-CCNC: 31 U/L (ref 0–70)
ANION GAP SERPL CALCULATED.3IONS-SCNC: 6 MMOL/L (ref 3–14)
AST SERPL W P-5'-P-CCNC: 41 U/L (ref 0–45)
BASOPHILS # BLD AUTO: 0 10E9/L (ref 0–0.2)
BASOPHILS NFR BLD AUTO: 0.4 %
BILIRUB SERPL-MCNC: 0.8 MG/DL (ref 0.2–1.3)
BUN SERPL-MCNC: 12 MG/DL (ref 7–30)
CALCIUM SERPL-MCNC: 9.3 MG/DL (ref 8.5–10.1)
CHLORIDE SERPL-SCNC: 101 MMOL/L (ref 94–109)
CO2 SERPL-SCNC: 30 MMOL/L (ref 20–32)
CREAT SERPL-MCNC: 0.88 MG/DL (ref 0.66–1.25)
DIFFERENTIAL METHOD BLD: ABNORMAL
EOSINOPHIL # BLD AUTO: 0.1 10E9/L (ref 0–0.7)
EOSINOPHIL NFR BLD AUTO: 2.3 %
ERYTHROCYTE [DISTWIDTH] IN BLOOD BY AUTOMATED COUNT: 12 % (ref 10–15)
GFR SERPL CREATININE-BSD FRML MDRD: 82 ML/MIN/{1.73_M2}
GLUCOSE SERPL-MCNC: 132 MG/DL (ref 70–99)
HCT VFR BLD AUTO: 40 % (ref 40–53)
HGB BLD-MCNC: 13.7 G/DL (ref 13.3–17.7)
LYMPHOCYTES # BLD AUTO: 2 10E9/L (ref 0.8–5.3)
LYMPHOCYTES NFR BLD AUTO: 40.8 %
MCH RBC QN AUTO: 33.5 PG (ref 26.5–33)
MCHC RBC AUTO-ENTMCNC: 34.3 G/DL (ref 31.5–36.5)
MCV RBC AUTO: 98 FL (ref 78–100)
MONOCYTES # BLD AUTO: 0.6 10E9/L (ref 0–1.3)
MONOCYTES NFR BLD AUTO: 11.5 %
NEUTROPHILS # BLD AUTO: 2.2 10E9/L (ref 1.6–8.3)
NEUTROPHILS NFR BLD AUTO: 45 %
PLATELET # BLD AUTO: 143 10E9/L (ref 150–450)
POTASSIUM SERPL-SCNC: 3.6 MMOL/L (ref 3.4–5.3)
PROT SERPL-MCNC: 7.5 G/DL (ref 6.8–8.8)
PSA SERPL-MCNC: 0.26 UG/L (ref 0–4)
RBC # BLD AUTO: 4.09 10E12/L (ref 4.4–5.9)
SODIUM SERPL-SCNC: 137 MMOL/L (ref 133–144)
WBC # BLD AUTO: 4.8 10E9/L (ref 4–11)

## 2021-01-18 PROCEDURE — 36415 COLL VENOUS BLD VENIPUNCTURE: CPT | Performed by: INTERNAL MEDICINE

## 2021-01-18 PROCEDURE — 99000 SPECIMEN HANDLING OFFICE-LAB: CPT | Performed by: INTERNAL MEDICINE

## 2021-01-18 PROCEDURE — 84403 ASSAY OF TOTAL TESTOSTERONE: CPT | Mod: 90 | Performed by: INTERNAL MEDICINE

## 2021-01-18 PROCEDURE — 85025 COMPLETE CBC W/AUTO DIFF WBC: CPT | Performed by: INTERNAL MEDICINE

## 2021-01-18 PROCEDURE — 80053 COMPREHEN METABOLIC PANEL: CPT | Performed by: INTERNAL MEDICINE

## 2021-01-18 PROCEDURE — 84153 ASSAY OF PSA TOTAL: CPT | Performed by: INTERNAL MEDICINE

## 2021-01-19 DIAGNOSIS — G47.00 INSOMNIA, UNSPECIFIED TYPE: ICD-10-CM

## 2021-01-19 RX ORDER — ZOLPIDEM TARTRATE 5 MG/1
TABLET ORAL
Qty: 30 TABLET | Refills: 5 | Status: SHIPPED | OUTPATIENT
Start: 2021-01-19 | End: 2021-06-22

## 2021-01-19 NOTE — TELEPHONE ENCOUNTER
Pending Prescriptions:                       Disp   Refills    zolpidem (AMBIEN) 5 MG tablet [Pharmacy Me*30 tab*0        Sig: TAKE 1 TABLET BY MOUTH NIGHTLY AS NEEDED FOR SLEEP        Routing refill request to provider for review/approval because:  Drug not on the [facility] refill protocol   Last Seen: 12/15/20  Last Filled: 12/26/20  No future visit.   River Posadas RN, BSN  [place] [facility]  January 19, 2021

## 2021-01-20 ENCOUNTER — VIRTUAL VISIT (OUTPATIENT)
Dept: ONCOLOGY | Facility: CLINIC | Age: 81
End: 2021-01-20
Attending: PHYSICIAN ASSISTANT
Payer: COMMERCIAL

## 2021-01-20 DIAGNOSIS — C61 PROSTATE CANCER (H): ICD-10-CM

## 2021-01-20 DIAGNOSIS — C61 MALIGNANT NEOPLASM OF PROSTATE (H): Primary | ICD-10-CM

## 2021-01-20 LAB — TESTOST SERPL-MCNC: 19 NG/DL (ref 240–950)

## 2021-01-20 PROCEDURE — 999N001193 HC VIDEO/TELEPHONE VISIT; NO CHARGE

## 2021-01-20 PROCEDURE — 99214 OFFICE O/P EST MOD 30 MIN: CPT | Mod: 95 | Performed by: PHYSICIAN ASSISTANT

## 2021-01-20 NOTE — PROGRESS NOTES
Medardo is a 81 year old who is being evaluated via a billable video visit.      How would you like to obtain your AVS? [employer]  If the video visit is dropped, the invitation should be resent by: Text to cell phone: [phone]  Will anyone else be joining your video visit? No      Video Start Time: 3:53 PM     Video-Visit Details    Type of service:  Video Visit    Video End Time: 4:02 PM    Originating Location (pt. Location): Home    Distant Location (provider location):  [facility]     Platform used for Video Visit: [employer]

## 2021-01-20 NOTE — LETTER
1/20/2021         RE: Medardo Gautam  [address] [address] [facility] [facility] [address]        Dear Colleague,    Thank you for referring your patient, Medardo Gautam, to the [facility]. Please see a copy of my visit note below.    [facility] Oncology Followup  Visit Date   Jan 20, 2021       Medardo Gautam is a 80 year old male who is being evaluated via a video visit     The patient is a 80-year-old gentleman with a history of high-grade prostate cancer as well as a grade 2 colon cancer here for evaluation and management of a rising PSA.  He has most recently seen Dr. dennis in urology.     DISEASES UNDER MANAGEMENT:    8/6/2012             pT2c N0 Mx prostatic adenocarcinoma, [place] grade 4+5 = 9, PSA 5.2    pT1 N0 M0 G2 colonic adenocarcinoma     RADIATION HISTORY: Prostate fossa radiation (completed: 10/18/2013)  1. Phase 1: 4500 cGy (180 cGy per fraction) to the pelvis  2. Phase 2: 2520 cGy boost to the prostate bed      CHEMOTHERAPY HISTORY:     CALGB 29659  ? Leuprolide 22.5 mg IM (3/27/2012, 6/19/2012)  ? Docetaxel 75 mg/m  (3/27/2012)- discontinued on study given the development of treatment related hepatotoxicity  ? Salvage ADT  ? Leuprolide (6/25/2013-1/23/2019) - gets Q 6 months       PSA:   1/9/18:             PSA   < 0.01  1/23/19:           PSA   = 0.04  7/10/19:           PSA   = 0.07  8/13/19            PSA   = 0.09      discontinue Bicalutamide/Maintain ADT  9/18/19            PSA   = 0.13  12/11/19          PSA  = 0.27  1/29/20 PSA  = 0.42  3/11/20 PSA = 0.63  6/16/20 PSA     = 0.92 (T=24) took 30 day supply of Bicalutamide ending ~8/19/20 8/31/2020 LUPRON 6 MONTHS  9/18/20 PSA     = 0.81  11/16/20 PSA = 1.66      No chief complaint on file.    Interim History:   -having only a little more fatigue after starting the nubeqa, not needing to take a nap   -sleep is variable. Talked with PCP about this. Was started on klonopin   -having more anxiety as well, though  he think it is more due to COVID/world events   -otherwise no other new or different symptoms to report       ROS: 10 point ROS neg other than the symptoms noted above in the HPI.    Physical Exam:    Video physical exam  General: Patient appears well in no acute distress.   Skin: No visualized rash or lesions on visualized skin  Eyes: EOMI, no erythema, sclera icterus or discharge noted  Resp: Appears to be breathing comfortably without accessory muscle usage, speaking in full sentences, no cough  MSK: Appears to have normal range of motion based on visualized movements  Neurologic: No apparent tremors, facial movements symmetric  Psych: affect normal, alert and oriented    The rest of a comprehensive physical examination is deferred due to PHE (public health emergency) video restrictions      Laboratory Data:     1/18/2021 10:03   Sodium 137   Potassium 3.6   Chloride 101   Carbon Dioxide 30   Urea Nitrogen 12   Creatinine 0.88   GFR Estimate 82   GFR Estimate If Black >90   Calcium 9.3   Anion Gap 6   Albumin 3.9   Protein Total 7.5   Bilirubin Total 0.8   Alkaline Phosphatase 77   ALT 31   AST 41   PSA 0.26   Testosterone Total 19 (L)   Glucose 132 (H)   WBC 4.8   Hemoglobin 13.7   Hematocrit 40.0   Platelet Count 143 (L)   RBC Count 4.09 (L)   MCV 98   MCH 33.5 (H)   MCHC 34.3   RDW 12.0   Diff Method Automated Method   % Neutrophils 45.0   % Lymphocytes 40.8   % Monocytes 11.5   % Eosinophils 2.3   % Basophils 0.4   Absolute Neutrophil 2.2   Absolute Lymphocytes 2.0   Absolute Monocytes 0.6   Absolute Eosinophils 0.1   Absolute Basophils 0.0      11/16/2020 10:19 12/18/2020 10:24 1/18/2021 10:03   PSA 1.66 0.67 0.26       Assessment/Plan:    Prostate Cancer, non metastatic   -PSA continued to rise. Decided to pursue darolutamide in addition to ADT. Started on 12/4/20. He has been tolerating overall very well with some minor increase in fatigue   -PSA already responding. Was 1.66 on 11/16 now 0.26 which is  great.   -continue labs with CMP monthly  -will have him follow-up with Nedra Vick NP in 1 month    Continue Lupron every 6 months, due again next month.     Insomnia  -was started on Klonipin by PCP. Continue to be managed by them     Hyperlipidemia  -was supposed to start atorvastatin though he thought it interacted with the Nubeqa. Reassured him that they do not interact and he should start this medication or talk to his PCP about it     Aminah Marquis PA-C  [facility]  [address] [place] [id number] [phone]        Medardo is a 81 year old who is being evaluated via a billable video visit.      How would you like to obtain your AVS? MyChart  If the video visit is dropped, the invitation should be resent by: Text to cell phone: [phone]  Will anyone else be joining your video visit? No      Video Start Time: 3:53 PM     Video-Visit Details    Type of service:  Video Visit    Video End Time: 4:02 PM    Originating Location (pt. Location): Home    Distant Location (provider location):  [facility]     Platform used for Video Visit: [employer]      Again, thank you for allowing me to participate in the care of your patient.        Sincerely,        Aminah Marquis PA-C

## 2021-01-20 NOTE — PROGRESS NOTES
[facility] Oncology Followup  Visit Date   Jan 20, 2021       Medardo Gautam is a 80 year old male who is being evaluated via a video visit     The patient is a 80-year-old gentleman with a history of high-grade prostate cancer as well as a grade 2 colon cancer here for evaluation and management of a rising PSA.  He has most recently seen Dr. dennis in urology.     DISEASES UNDER MANAGEMENT:    8/6/2012             pT2c N0 Mx prostatic adenocarcinoma, [place] grade 4+5 = 9, PSA 5.2    pT1 N0 M0 G2 colonic adenocarcinoma     RADIATION HISTORY: Prostate fossa radiation (completed: 10/18/2013)  1. Phase 1: 4500 cGy (180 cGy per fraction) to the pelvis  2. Phase 2: 2520 cGy boost to the prostate bed      CHEMOTHERAPY HISTORY:     CALGB 72851  ? Leuprolide 22.5 mg IM (3/27/2012, 6/19/2012)  ? Docetaxel 75 mg/m  (3/27/2012)- discontinued on study given the development of treatment related hepatotoxicity  ? Salvage ADT  ? Leuprolide (6/25/2013-1/23/2019) - gets Q 6 months       PSA:   1/9/18:             PSA   < 0.01  1/23/19:           PSA   = 0.04  7/10/19:           PSA   = 0.07  8/13/19            PSA   = 0.09      discontinue Bicalutamide/Maintain ADT  9/18/19            PSA   = 0.13  12/11/19          PSA  = 0.27  1/29/20 PSA  = 0.42  3/11/20 PSA = 0.63  6/16/20 PSA     = 0.92 (T=24) took 30 day supply of Bicalutamide ending ~8/19/20 8/31/2020 LUPRON 6 MONTHS  9/18/20 PSA     = 0.81  11/16/20 PSA = 1.66      No chief complaint on file.    Interim History:   -having only a little more fatigue after starting the nubeqa, not needing to take a nap   -sleep is variable. Talked with PCP about this. Was started on klonopin   -having more anxiety as well, though he think it is more due to COVID/world events   -otherwise no other new or different symptoms to report       ROS: 10 point ROS neg other than the symptoms noted above in the HPI.    Physical Exam:    Video physical exam  General: Patient appears well in no  acute distress.   Skin: No visualized rash or lesions on visualized skin  Eyes: EOMI, no erythema, sclera icterus or discharge noted  Resp: Appears to be breathing comfortably without accessory muscle usage, speaking in full sentences, no cough  MSK: Appears to have normal range of motion based on visualized movements  Neurologic: No apparent tremors, facial movements symmetric  Psych: affect normal, alert and oriented    The rest of a comprehensive physical examination is deferred due to PHE (public health emergency) video restrictions      Laboratory Data:     1/18/2021 10:03   Sodium 137   Potassium 3.6   Chloride 101   Carbon Dioxide 30   Urea Nitrogen 12   Creatinine 0.88   GFR Estimate 82   GFR Estimate If Black >90   Calcium 9.3   Anion Gap 6   Albumin 3.9   Protein Total 7.5   Bilirubin Total 0.8   Alkaline Phosphatase 77   ALT 31   AST 41   PSA 0.26   Testosterone Total 19 (L)   Glucose 132 (H)   WBC 4.8   Hemoglobin 13.7   Hematocrit 40.0   Platelet Count 143 (L)   RBC Count 4.09 (L)   MCV 98   MCH 33.5 (H)   MCHC 34.3   RDW 12.0   Diff Method Automated Method   % Neutrophils 45.0   % Lymphocytes 40.8   % Monocytes 11.5   % Eosinophils 2.3   % Basophils 0.4   Absolute Neutrophil 2.2   Absolute Lymphocytes 2.0   Absolute Monocytes 0.6   Absolute Eosinophils 0.1   Absolute Basophils 0.0      11/16/2020 10:19 12/18/2020 10:24 1/18/2021 10:03   PSA 1.66 0.67 0.26       Assessment/Plan:    Prostate Cancer, non metastatic   -PSA continued to rise. Decided to pursue darolutamide in addition to ADT. Started on 12/4/20. He has been tolerating overall very well with some minor increase in fatigue   -PSA already responding. Was 1.66 on 11/16 now 0.26 which is great.   -continue labs with CMP monthly  -will have him follow-up with Nedra Vick NP in 1 month    Continue Lupron every 6 months, due again next month.     Insomnia  -was started on Klonipin by PCP. Continue to be managed by them     Hyperlipidemia  -was  supposed to start atorvastatin though he thought it interacted with the Nubeqa. Reassured him that they do not interact and he should start this medication or talk to his PCP about it     Aminah Marquis PA-C  [facility]  [address] [place] [id number] [phone]

## 2021-01-22 DIAGNOSIS — F41.9 ANXIETY: ICD-10-CM

## 2021-01-22 RX ORDER — CLONAZEPAM 1 MG/1
TABLET ORAL
Qty: 30 TABLET | Refills: 0 | Status: SHIPPED | OUTPATIENT
Start: 2021-01-22 | End: 2021-02-22

## 2021-01-22 NOTE — TELEPHONE ENCOUNTER
Pending Prescriptions:                       Disp   Refills    clonazePAM (KLONOPIN) 1 MG tablet [Pharmac*30 tab*0        Sig: TAKE 1 TABLET BY MOUTH ONCE DAILY AS NEEDED FOR           ANXIETY        Routing refill request to provider for review/approval because:  Drug not on the [facility] refill protocol   Last Seen: 12/15/20  Last Refilled: 12/18/20   Next Visit: MERLIN Posadas RN, BSN  [place] [facility]  January 22, 2021

## 2021-01-25 DIAGNOSIS — C61 MALIGNANT NEOPLASM OF PROSTATE (H): ICD-10-CM

## 2021-01-25 NOTE — TELEPHONE ENCOUNTER
Patient is coming in on Feb. 17th at the [place] location for Lupron injection. This encounter has been created to watch for the order to come through so we can order injection for the patient.     River Posadas RN, BSN  [place] [facility]  January 25, 2021

## 2021-01-26 ENCOUNTER — TELEPHONE (OUTPATIENT)
Dept: ONCOLOGY | Facility: CLINIC | Age: 81
End: 2021-01-26

## 2021-01-26 NOTE — ORAL ONC MGMT
Oral Chemotherapy Monitoring Program     Incoming call received from Medardo asking if he could get the Covid-19 vaccine while on Nubeqa. I ran a drug interaction check and no issues with Nubeqa and the vaccination. I looked at his most recent labs from 1/18/21 and they did not show any type of immunosuppression. I told him I thought it would be fine for him to get but he should alert Dr. Ingram of this and he stated he would send him a [employer] message. Patient is currently on a waiting list to get the vaccine.    No other questions remained and patient thanked me for answering his questions.    Alayna Arnold, Pharm.D., [facility]  [phone]  01/26/21

## 2021-01-26 NOTE — TELEPHONE ENCOUNTER
Writer will order the following medication when located in [place] tomorrow.     leuprolide (LUPRON DEPOT) 45 MG kit On chart    Dose: 45 mg Inject 45 mg into the muscle every 6 months 7/18/2018  Local Medical Record 1/20/2021                   River Posadas RN, BSN  [place] [facility]  January 26, 2021

## 2021-01-26 NOTE — TELEPHONE ENCOUNTER
"No order has been received or placed. Will route to provider to reorder so the [facility] team can order the correct Lupron dose.     Routing to Aminah Marquis PA-C to place order. Order has been pended.     Please route back to [facility] team pool. : [facility] FLOAT POOL\" ([phone]).     River Posadas RN, BSN  [place] [facility]  January 26, 2021    "

## 2021-01-29 NOTE — TELEPHONE ENCOUNTER
Medication has been ordered.     River Posadas RN, BSN  [place] [facility]  January 29, 2021

## 2021-02-02 ENCOUNTER — MYC MEDICAL ADVICE (OUTPATIENT)
Dept: FAMILY MEDICINE | Facility: OTHER | Age: 81
End: 2021-02-02

## 2021-02-04 ENCOUNTER — TELEPHONE (OUTPATIENT)
Dept: FAMILY MEDICINE | Facility: OTHER | Age: 81
End: 2021-02-04

## 2021-02-04 DIAGNOSIS — C61 MALIGNANT NEOPLASM OF PROSTATE (H): Primary | ICD-10-CM

## 2021-02-04 NOTE — TELEPHONE ENCOUNTER
"Patient is scheduled on   Next 5 appointments (look out 90 days)    Feb 17, 2021 10:30 AM  Nurse Only with NL RN ERC  [facility] ([facility] ) [address]  [place] [facility] [address] [phone]        For RN visit for Lupron injection. From notes \"Continue Lupron every 6 months, due again next month. \" from 01/20/2021 virtual visit with [facility] DAILEY.     CAM order pending.  Please sign.  Medication has been received from [facility].      Once signed route to [facility] TRIAGE NURSE POOL [[phone]] so we can confirm appointment with patient.    Thank you    Ghassan Salazar, KATTYN, RN, PHN  [facility] [place] [facility]  February 4, 2021            " Error - patient not seen.

## 2021-02-04 NOTE — TELEPHONE ENCOUNTER
Order has been signed.   Closing encounter.   River Posadas RN, BSN  [place] [facility]  February 4, 2021

## 2021-02-09 DIAGNOSIS — C61 MALIGNANT NEOPLASM OF PROSTATE (H): Primary | ICD-10-CM

## 2021-02-09 DIAGNOSIS — C61 PROSTATE CANCER (H): ICD-10-CM

## 2021-02-17 ENCOUNTER — ALLIED HEALTH/NURSE VISIT (OUTPATIENT)
Dept: FAMILY MEDICINE | Facility: OTHER | Age: 81
End: 2021-02-17
Payer: COMMERCIAL

## 2021-02-17 DIAGNOSIS — Z79.899 ENCOUNTER FOR LONG-TERM (CURRENT) USE OF MEDICATIONS: ICD-10-CM

## 2021-02-17 DIAGNOSIS — C61 MALIGNANT NEOPLASM OF PROSTATE (H): ICD-10-CM

## 2021-02-17 DIAGNOSIS — C61 PROSTATE CANCER (H): ICD-10-CM

## 2021-02-17 DIAGNOSIS — C61 PROSTATE CANCER (H): Primary | ICD-10-CM

## 2021-02-17 LAB
ALBUMIN SERPL-MCNC: 3.8 G/DL (ref 3.4–5)
ALP SERPL-CCNC: 76 U/L (ref 40–150)
ALT SERPL W P-5'-P-CCNC: 29 U/L (ref 0–70)
ANION GAP SERPL CALCULATED.3IONS-SCNC: 5 MMOL/L (ref 3–14)
AST SERPL W P-5'-P-CCNC: 33 U/L (ref 0–45)
BASOPHILS # BLD AUTO: 0 10E9/L (ref 0–0.2)
BASOPHILS NFR BLD AUTO: 0.6 %
BILIRUB SERPL-MCNC: 0.7 MG/DL (ref 0.2–1.3)
BUN SERPL-MCNC: 13 MG/DL (ref 7–30)
CALCIUM SERPL-MCNC: 9.2 MG/DL (ref 8.5–10.1)
CHLORIDE SERPL-SCNC: 103 MMOL/L (ref 94–109)
CO2 SERPL-SCNC: 29 MMOL/L (ref 20–32)
CREAT SERPL-MCNC: 0.86 MG/DL (ref 0.66–1.25)
DIFFERENTIAL METHOD BLD: ABNORMAL
EOSINOPHIL # BLD AUTO: 0.1 10E9/L (ref 0–0.7)
EOSINOPHIL NFR BLD AUTO: 2.3 %
ERYTHROCYTE [DISTWIDTH] IN BLOOD BY AUTOMATED COUNT: 12.2 % (ref 10–15)
GFR SERPL CREATININE-BSD FRML MDRD: 83 ML/MIN/{1.73_M2}
GLUCOSE SERPL-MCNC: 138 MG/DL (ref 70–99)
HCT VFR BLD AUTO: 39.3 % (ref 40–53)
HGB BLD-MCNC: 13.4 G/DL (ref 13.3–17.7)
LYMPHOCYTES # BLD AUTO: 1.6 10E9/L (ref 0.8–5.3)
LYMPHOCYTES NFR BLD AUTO: 34.2 %
MCH RBC QN AUTO: 33.3 PG (ref 26.5–33)
MCHC RBC AUTO-ENTMCNC: 34.1 G/DL (ref 31.5–36.5)
MCV RBC AUTO: 98 FL (ref 78–100)
MONOCYTES # BLD AUTO: 0.5 10E9/L (ref 0–1.3)
MONOCYTES NFR BLD AUTO: 11.4 %
NEUTROPHILS # BLD AUTO: 2.4 10E9/L (ref 1.6–8.3)
NEUTROPHILS NFR BLD AUTO: 51.5 %
PLATELET # BLD AUTO: 145 10E9/L (ref 150–450)
POTASSIUM SERPL-SCNC: 3.9 MMOL/L (ref 3.4–5.3)
PROT SERPL-MCNC: 7.4 G/DL (ref 6.8–8.8)
PSA SERPL-MCNC: 0.14 UG/L (ref 0–4)
RBC # BLD AUTO: 4.02 10E12/L (ref 4.4–5.9)
SODIUM SERPL-SCNC: 137 MMOL/L (ref 133–144)
WBC # BLD AUTO: 4.7 10E9/L (ref 4–11)

## 2021-02-17 PROCEDURE — 84153 ASSAY OF PSA TOTAL: CPT | Performed by: INTERNAL MEDICINE

## 2021-02-17 PROCEDURE — 80053 COMPREHEN METABOLIC PANEL: CPT | Performed by: INTERNAL MEDICINE

## 2021-02-17 PROCEDURE — 36415 COLL VENOUS BLD VENIPUNCTURE: CPT | Performed by: INTERNAL MEDICINE

## 2021-02-17 PROCEDURE — 99207 PR NO CHARGE NURSE ONLY: CPT

## 2021-02-17 PROCEDURE — 84403 ASSAY OF TOTAL TESTOSTERONE: CPT | Mod: 90 | Performed by: INTERNAL MEDICINE

## 2021-02-17 PROCEDURE — 85025 COMPLETE CBC W/AUTO DIFF WBC: CPT | Performed by: INTERNAL MEDICINE

## 2021-02-17 PROCEDURE — 96372 THER/PROPH/DIAG INJ SC/IM: CPT

## 2021-02-17 PROCEDURE — 99000 SPECIMEN HANDLING OFFICE-LAB: CPT | Performed by: INTERNAL MEDICINE

## 2021-02-17 NOTE — PROGRESS NOTES
Clinic Administered Medication Documentation      Injectable Medication Documentation    Patient was given Lupron Depot. Prior to medication administration, verified patients identity using patient s name and date of birth. Please see MAR and medication order for additional information. Patient instructed to remain in clinic for 15 minutes and report any adverse reaction to staff immediately .      Was entire vial of medication used? Yes  Vial/Syringe: Syringe  Expiration Date:  03/30/2023  Was this medication supplied by the patient? No    Donna Morin RN

## 2021-02-18 ENCOUNTER — PATIENT OUTREACH (OUTPATIENT)
Dept: ONCOLOGY | Facility: CLINIC | Age: 81
End: 2021-02-18

## 2021-02-18 NOTE — PROGRESS NOTES
TC to pt returning his VM with questions regarding the COVID vaccine. Pt stated he's been having trouble getting an appt with [facility] for the vaccine and is wondering if writer is able to help him get in sooner. Pt also stated he's on multiple other waiting lists as well. Told pt that his best course of action is to follow scheduling guidelines set out by [facility], monitor the state website of updates and vaccine opportunities, and to continue on waitlists already established. Pt stated understanding of all and agreed to call clinic with any questions or concerns.

## 2021-02-19 ENCOUNTER — MYC MEDICAL ADVICE (OUTPATIENT)
Dept: ONCOLOGY | Facility: CLINIC | Age: 81
End: 2021-02-19

## 2021-02-19 ENCOUNTER — VIRTUAL VISIT (OUTPATIENT)
Dept: ONCOLOGY | Facility: CLINIC | Age: 81
End: 2021-02-19
Attending: INTERNAL MEDICINE
Payer: COMMERCIAL

## 2021-02-19 DIAGNOSIS — F41.9 ANXIETY: ICD-10-CM

## 2021-02-19 DIAGNOSIS — C61 MALIGNANT NEOPLASM OF PROSTATE (H): Primary | ICD-10-CM

## 2021-02-19 DIAGNOSIS — E78.2 MIXED HYPERLIPIDEMIA: ICD-10-CM

## 2021-02-19 PROCEDURE — 999N001193 HC VIDEO/TELEPHONE VISIT; NO CHARGE

## 2021-02-19 PROCEDURE — 99215 OFFICE O/P EST HI 40 MIN: CPT | Mod: 95 | Performed by: NURSE PRACTITIONER

## 2021-02-19 NOTE — PROGRESS NOTES
"Mdeardo is a 81 year old who is being evaluated via a billable video visit.      How would you like to obtain your AVS? MyChart  If the video visit is dropped, the invitation should be resent by: Send to e-mail at: [email or web address]  Will anyone else be joining your video visit? No    Vitals - Patient Reported  Systolic (Patient Reported): 137  Diastolic (Patient Reported): 86  Weight (Patient Reported): 87.3 kg (192 lb 8 oz)  Height (Patient Reported): 177.8 cm (5' 10\")  BMI (Based on Pt Reported Ht/Wt): 27.62  Pulse (Patient Reported): 70  Pain Score: No Pain (0)    Video-Visit Details    Type of service:  Video Visit      Video Start Time: 11:46 PM    Video End Time:12:05 PM    Originating Location (pt. Location): Home    Distant Location (provider location):  [facility]     Platform used for Video Visit: Ronnie Florence MA      [facility] Oncology Followup  Visit Date   Feb 19, 2021       Medardo Gautam is a 81 year old male who is being evaluated via a video visit     The patient is a 81-year-old gentleman with a history of high-grade prostate cancer as well as a grade 2 colon cancer here for evaluation and management of a rising PSA.  He has most recently seen Dr. dennis in urology.     DISEASES UNDER MANAGEMENT:    8/6/2012             pT2c N0 Mx prostatic adenocarcinoma, [place] grade 4+5 = 9, PSA 5.2    pT1 N0 M0 G2 colonic adenocarcinoma     RADIATION HISTORY: Prostate fossa radiation (completed: 10/18/2013)  1. Phase 1: 4500 cGy (180 cGy per fraction) to the pelvis  2. Phase 2: 2520 cGy boost to the prostate bed      CHEMOTHERAPY HISTORY:     CALGB 08576  ? Leuprolide 22.5 mg IM (3/27/2012, 6/19/2012)  ? Docetaxel 75 mg/m  (3/27/2012)- discontinued on study given the development of treatment related hepatotoxicity  ? Salvage ADT  ? Leuprolide (6/25/2013-1/23/2019) - gets Q 6 months   ? 12/4/20 darolutamide 600 mg PO BID started    PSA:   1/9/18:             " PSA   < 0.01  1/23/19:           PSA   = 0.04  7/10/19:           PSA   = 0.07  8/13/19            PSA   = 0.09      discontinue Bicalutamide/Maintain ADT  9/18/19            PSA   = 0.13  12/11/19          PSA  = 0.27  1/29/20 PSA  = 0.42  3/11/20 PSA = 0.63  6/16/20 PSA     = 0.92 (T=24) took 30 day supply of Bicalutamide ending ~8/19/20 8/31/2020 LUPRON 6 MONTHS  9/18/20 PSA     = 0.81  11/16/20 PSA = 1.66  12/18/20 PSA 0.67  1/18/21 PSA 0.26  2/17/21 LUPRON 6 MONTHS    PSA 0.14        No chief complaint on file.    Interim History:   Fatigue ongoing but no worse, otherwise tolerating nubeqa quite well. Continues to work on anxiety--triggered now by trying to get vaccine. Has colonoscopy coming up in September. Appetite is good. Stools are at baseline.     ROS: 10 point ROS neg other than the symptoms noted above in the HPI.    Physical Exam:    Video physical exam  General: Patient appears well in no acute distress.   Skin: No visualized rash or lesions on visualized skin  Eyes: EOMI, no erythema, sclera icterus or discharge noted  Resp: Appears to be breathing comfortably without accessory muscle usage, speaking in full sentences, no cough  MSK: Appears to have normal range of motion based on visualized movements  Neurologic: No apparent tremors, facial movements symmetric  Psych: affect normal, alert and oriented    The rest of a comprehensive physical examination is deferred due to PHE (public health emergency) video restrictions    Laboratory Data:   2/17/2021 10:27   Sodium 137   Potassium 3.9   Chloride 103   Carbon Dioxide 29   Urea Nitrogen 13   Creatinine 0.86   GFR Estimate 83   GFR Estimate If Black >90   Calcium 9.2   Anion Gap 5   Albumin 3.8   Protein Total 7.4   Bilirubin Total 0.7   Alkaline Phosphatase 76   ALT 29   AST 33   PSA 0.14   Glucose 138 (H)   WBC 4.7   Hemoglobin 13.4   Hematocrit 39.3 (L)   Platelet Count 145 (L)   RBC Count 4.02 (L)   MCV 98   MCH 33.3 (H)   MCHC 34.1   RDW 12.2    Diff Method Automated Method   % Neutrophils 51.5   % Lymphocytes 34.2   % Monocytes 11.4   % Eosinophils 2.3   % Basophils 0.6   Absolute Neutrophil 2.4   Absolute Lymphocytes 1.6   Absolute Monocytes 0.5   Absolute Eosinophils 0.1   Absolute Basophils 0.0       Assessment/Plan:  Prostate Cancer, non metastatic   -PSA continued to rise so decided to pursue darolutamide in addition to ADT. Started on 12/4/20. He has been tolerating overall very well with some minor increase in fatigue, now has stabilized   -PSA responding--0.14  -continue labs with CMP monthly  -follow up with me in 1 month, Dr. Ingram in 2. Given good tolerance, could likely go to q2 or 3 month visits after.      Continue Lupron every 6 months, given 2/17/21    Insomnia  -was started on Klonipin by PCP. Continue to be managed by them     Hyperlipidemia  -was supposed to start atorvastatin though he thought it interacted with the Nubeqa so never started it. He is trying to modify his diet. I will check with oral chemo pharmacy and get back to him. If they recommend against, I asked he let his PCP know so they can discuss other options.      40 minutes spent on the date of the encounter doing chart review, review of test results, interpretation of tests, patient visit and documentation       Nedra Vick, CNP  [facility]  [address] [place], [place] [id number] [phone]

## 2021-02-19 NOTE — LETTER
"    2/19/2021         RE: Medardo Gautam  [id number] [facility] [id number]        Dear Colleague,    Thank you for referring your patient, Medardo Gautam, to the [facility]. Please see a copy of my visit note below.    Medardo is a 81 year old who is being evaluated via a billable video visit.      How would you like to obtain your AVS? MyChart  If the video visit is dropped, the invitation should be resent by: Send to e-mail at: [email or web address]  Will anyone else be joining your video visit? No    Vitals - Patient Reported  Systolic (Patient Reported): 137  Diastolic (Patient Reported): 86  Weight (Patient Reported): 87.3 kg (192 lb 8 oz)  Height (Patient Reported): 177.8 cm (5' 10\")  BMI (Based on Pt Reported Ht/Wt): 27.62  Pulse (Patient Reported): 70  Pain Score: No Pain (0)    Video-Visit Details    Type of service:  Video Visit      Video Start Time: 11:46 PM    Video End Time:12:05 PM    Originating Location (pt. Location): Home    Distant Location (provider location):  [facility]     Platform used for Video Visit: Ronnie Florence MA      [facility] Oncology Followup  Visit Date   Feb 19, 2021       Medardo Gautam is a 81 year old male who is being evaluated via a video visit     The patient is a 81-year-old gentleman with a history of high-grade prostate cancer as well as a grade 2 colon cancer here for evaluation and management of a rising PSA.  He has most recently seen Dr. dennis in urology.     DISEASES UNDER MANAGEMENT:    8/6/2012             pT2c N0 Mx prostatic adenocarcinoma, Jeancarlos grade 4+5 = 9, PSA 5.2    pT1 N0 M0 G2 colonic adenocarcinoma     RADIATION HISTORY: Prostate fossa radiation (completed: 10/18/2013)  1. Phase 1: 4500 cGy (180 cGy per fraction) to the pelvis  2. Phase 2: 2520 cGy boost to the prostate bed      CHEMOTHERAPY HISTORY:     CALGB 36412  ? Leuprolide 22.5 mg IM (3/27/2012, " 6/19/2012)  ? Docetaxel 75 mg/m  (3/27/2012)- discontinued on study given the development of treatment related hepatotoxicity  ? Salvage ADT  ? Leuprolide (6/25/2013-1/23/2019) - gets Q 6 months   ? 12/4/20 darolutamide 600 mg PO BID started    PSA:   1/9/18:             PSA   < 0.01  1/23/19:           PSA   = 0.04  7/10/19:           PSA   = 0.07  8/13/19            PSA   = 0.09      discontinue Bicalutamide/Maintain ADT  9/18/19            PSA   = 0.13  12/11/19          PSA  = 0.27  1/29/20 PSA  = 0.42  3/11/20 PSA = 0.63  6/16/20 PSA     = 0.92 (T=24) took 30 day supply of Bicalutamide ending ~8/19/20 8/31/2020 LUPRON 6 MONTHS  9/18/20 PSA     = 0.81  11/16/20 PSA = 1.66  12/18/20 PSA 0.67  1/18/21 PSA 0.26  2/17/21 LUPRON 6 MONTHS    PSA 0.14        No chief complaint on file.    Interim History:   Fatigue ongoing but no worse, otherwise tolerating nubeqa quite well. Continues to work on anxiety--triggered now by trying to get vaccine. Has colonoscopy coming up in September. Appetite is good. Stools are at baseline.     ROS: 10 point ROS neg other than the symptoms noted above in the HPI.    Physical Exam:    Video physical exam  General: Patient appears well in no acute distress.   Skin: No visualized rash or lesions on visualized skin  Eyes: EOMI, no erythema, sclera icterus or discharge noted  Resp: Appears to be breathing comfortably without accessory muscle usage, speaking in full sentences, no cough  MSK: Appears to have normal range of motion based on visualized movements  Neurologic: No apparent tremors, facial movements symmetric  Psych: affect normal, alert and oriented    The rest of a comprehensive physical examination is deferred due to PHE (public health emergency) video restrictions    Laboratory Data:   2/17/2021 10:27   Sodium 137   Potassium 3.9   Chloride 103   Carbon Dioxide 29   Urea Nitrogen 13   Creatinine 0.86   GFR Estimate 83   GFR Estimate If Black >90   Calcium 9.2   Anion Gap 5    Albumin 3.8   Protein Total 7.4   Bilirubin Total 0.7   Alkaline Phosphatase 76   ALT 29   AST 33   PSA 0.14   Glucose 138 (H)   WBC 4.7   Hemoglobin 13.4   Hematocrit 39.3 (L)   Platelet Count 145 (L)   RBC Count 4.02 (L)   MCV 98   MCH 33.3 (H)   MCHC 34.1   RDW 12.2   Diff Method Automated Method   % Neutrophils 51.5   % Lymphocytes 34.2   % Monocytes 11.4   % Eosinophils 2.3   % Basophils 0.6   Absolute Neutrophil 2.4   Absolute Lymphocytes 1.6   Absolute Monocytes 0.5   Absolute Eosinophils 0.1   Absolute Basophils 0.0       Assessment/Plan:  Prostate Cancer, non metastatic   -PSA continued to rise so decided to pursue darolutamide in addition to ADT. Started on 12/4/20. He has been tolerating overall very well with some minor increase in fatigue, now has stabilized   -PSA responding--0.14  -continue labs with CMP monthly  -follow up with me in 1 month, Dr. Ingram in 2. Given good tolerance, could likely go to q2 or 3 month visits after.      Continue Lupron every 6 months, given 2/17/21    Insomnia  -was started on Klonipin by PCP. Continue to be managed by them     Hyperlipidemia  -was supposed to start atorvastatin though he thought it interacted with the Nubeqa so never started it. He is trying to modify his diet. I will check with oral chemo pharmacy and get back to him. If they recommend against, I asked he let his PCP know so they can discuss other options.      40 minutes spent on the date of the encounter doing chart review, review of test results, interpretation of tests, patient visit and documentation       Nedra Vick, CNP  [facility] [address] [address]  [phone]

## 2021-02-20 ENCOUNTER — TELEPHONE (OUTPATIENT)
Dept: ONCOLOGY | Facility: CLINIC | Age: 81
End: 2021-02-20

## 2021-02-22 ENCOUNTER — TELEPHONE (OUTPATIENT)
Dept: ONCOLOGY | Facility: CLINIC | Age: 81
End: 2021-02-22

## 2021-02-22 DIAGNOSIS — F41.9 ANXIETY: ICD-10-CM

## 2021-02-22 LAB — TESTOST SERPL-MCNC: 15 NG/DL (ref 240–950)

## 2021-02-22 RX ORDER — CLONAZEPAM 1 MG/1
TABLET ORAL
Qty: 30 TABLET | Refills: 2 | Status: SHIPPED | OUTPATIENT
Start: 2021-02-22 | End: 2021-05-25

## 2021-02-22 NOTE — TELEPHONE ENCOUNTER
Pending Prescriptions:                       Disp   Refills    clonazePAM (KLONOPIN) 1 MG tablet [Pharmac*30 tab*0        Sig: TAKE 1 TABLET BY MOUTH ONCE DAILY AS NEEDED FOR           ANXIETY    Last Written Prescription Date:  1/22/21  Last Fill Quantity: 30,  # refills: 0   Last office visit: 12/15/2020 with prescribing provider:  Dylon   Future Office Visit:        Routing refill request to provider for review/approval because:  Drug not on the [facility] refill protocol     Akanksha Treadwell RN on 2/22/2021 at 10:05 AM

## 2021-02-22 NOTE — TELEPHONE ENCOUNTER
Oral Chemotherapy Monitoring Program    Subjective/Objective:  Medardo Gautam is a 81 year old male contacted by phone for a follow-up visit for oral chemotherapy. Medardo calls back and details that things are continuing to go well with the Nubeqa therapy. He denies any questions at this time. He details that he will be needing a refill of his medication for March - detailed our team is aware of this and plan to send it to the pharmacy next week.     ORAL CHEMOTHERAPY 11/27/2020 12/11/2020 1/5/2021 1/26/2021 2/9/2021 2/20/2021 2/22/2021   Assessment Type New Teach Initial Follow up Monthly Follow up Incoming phone call Refill Chart Review Monthly Follow up   Diagnosis Code Prostate Cancer Prostate Cancer Prostate Cancer Prostate Cancer Prostate Cancer Prostate Cancer Prostate Cancer   Providers Dr. Juan Jose Ingram   [facility]   Drug Name Nubeqa (Darolutamide) Nubeqa (Darolutamide) Nubeqa (Darolutamide) Nubeqa (Darolutamide) Nubeqa (Darolutamide) Nubeqa (Darolutamide) Nubeqa (Darolutamide)   Dose 600 mg 600 mg 600 mg 600 mg 600 mg 600 mg 600 mg   Current Schedule BID BID BID BID BID BID BID   Cycle Details Continuous Continuous Continuous Continuous Continuous Continuous Continuous   Start Date of Last Cycle - 12/4/2020 - - - - -   Planned next cycle start date - 1/3/2020 - - - - 3/10/2021   Doses missed in last 2 weeks - 0 - - - - 0   Adherence Assessment - Adherent - - - - Adherent   Adverse Effects - No AE identified during assessment - - - - No AE identified during assessment   Any new drug interactions? No No - - - - No   Is the dose as ordered appropriate for the patient? Yes Yes - - - - Yes   Is the patient currently in pain? - - - - - - No   Has the patient been assessed within the past 6 months for depression? - - - - - - Yes   Has the patient missed any days of school, work, or other routine  "activity? - No - - - - No   Since the last time we talked, have you been hospitalized or used the emergency room? - - - - - - No       Last PHQ-2 Score on record:   PHQ-2 ( 1999 [employer]) 12/15/2020 2/4/2020   Q1: Little interest or pleasure in doing things 0 0   Q2: Feeling down, depressed or hopeless 0 0   PHQ-2 Score 0 0   Q1: Little interest or pleasure in doing things Not at all Not at all   Q2: Feeling down, depressed or hopeless Not at all Not at all   PHQ-2 Score 0 0       Vitals:  BP:   BP Readings from Last 1 Encounters:   12/15/20 106/72     Wt Readings from Last 1 Encounters:   12/15/20 88 kg (194 lb)     Estimated body surface area is 2.04 meters squared as calculated from the following:    Height as of 12/15/20: 1.7 m (5' 6.93\").    Weight as of 12/15/20: 88 kg (194 lb).    Labs:  _  Result Component Current Result Ref Range   Sodium 137 (2/17/2021) 133 - 144 mmol/L     _  Result Component Current Result Ref Range   Potassium 3.9 (2/17/2021) 3.4 - 5.3 mmol/L     _  Result Component Current Result Ref Range   Calcium 9.2 (2/17/2021) 8.5 - 10.1 mg/dL     No results found for Mag within last 30 days.     No results found for Phos within last 30 days.     _  Result Component Current Result Ref Range   Albumin 3.8 (2/17/2021) 3.4 - 5.0 g/dL     _  Result Component Current Result Ref Range   Urea Nitrogen 13 (2/17/2021) 7 - 30 mg/dL     _  Result Component Current Result Ref Range   Creatinine 0.86 (2/17/2021) 0.66 - 1.25 mg/dL     _  Result Component Current Result Ref Range   AST 33 (2/17/2021) 0 - 45 U/L     _  Result Component Current Result Ref Range   ALT 29 (2/17/2021) 0 - 70 U/L     _  Result Component Current Result Ref Range   Bilirubin Total 0.7 (2/17/2021) 0.2 - 1.3 mg/dL     _  Result Component Current Result Ref Range   WBC 4.7 (2/17/2021) 4.0 - 11.0 10e9/L     _  Result Component Current Result Ref Range   Hemoglobin 13.4 (2/17/2021) 13.3 - 17.7 g/dL     _  Result Component Current Result Ref " Range   Platelet Count 145 (L) (2/17/2021) 150 - 450 10e9/L     _  Result Component Current Result Ref Range   Absolute Neutrophil 2.4 (2/17/2021) 1.6 - 8.3 10e9/L       Assessment/Plan:  Medardo continues to tolerate therapy well. Continue Nubeqa therapy as planned.     Follow-Up:  3/18: appt with Nedra    Refill Due:  3/4 for 3/10      Madelyn Barbosa, PharmD, BCACP  Oral Chemotherapy Monitoring Program  [facility]  [phone]

## 2021-02-22 NOTE — TELEPHONE ENCOUNTER
Received letter from  [facility].    Your next colonoscopy is due September 2021 - later this year.  You are correct.  You may reach out closer to that time to schedule.     Please let me know what questions you have.     Sarah Beth Pisano MD

## 2021-02-26 ENCOUNTER — IMMUNIZATION (OUTPATIENT)
Dept: PEDIATRICS | Facility: CLINIC | Age: 81
End: 2021-02-26
Payer: COMMERCIAL

## 2021-02-26 PROCEDURE — 0001A PR COVID VAC PFIZER DIL RECON 30 MCG/0.3 ML IM: CPT

## 2021-02-26 PROCEDURE — 91300 PR COVID VAC PFIZER DIL RECON 30 MCG/0.3 ML IM: CPT

## 2021-03-02 DIAGNOSIS — C61 MALIGNANT NEOPLASM OF PROSTATE (H): ICD-10-CM

## 2021-03-02 DIAGNOSIS — C61 PROSTATE CANCER (H): ICD-10-CM

## 2021-03-02 DIAGNOSIS — C61 MALIGNANT NEOPLASM OF PROSTATE (H): Primary | ICD-10-CM

## 2021-03-09 ENCOUNTER — MYC MEDICAL ADVICE (OUTPATIENT)
Dept: FAMILY MEDICINE | Facility: OTHER | Age: 81
End: 2021-03-09

## 2021-03-16 NOTE — PROGRESS NOTES
"  Medardo is a 81 year old who is being evaluated via a billable video visit.      How would you like to obtain your AVS? MyChart  If the video visit is dropped, the invitation should be resent by: Send to e-mail at: [email or web address]  Will anyone else be joining your video visit? No     Patient needs refills for Sildenafil .     Vitals - Patient Reported  Systolic (Patient Reported): 131  Diastolic (Patient Reported): (!) 90  Weight (Patient Reported): 87.3 kg (192 lb 8 oz)  Height (Patient Reported): 170 cm (5' 6.93\")  BMI (Based on Pt Reported Ht/Wt): 30.21  Pulse (Patient Reported): 66  Pain Score: No Pain (0)    Sofya WOODS        Video-Visit Details    Type of service:  Video Visit    Video Start Time: 2:10 PM    Video End Time:2:27 PM    Originating Location (pt. Location): Home    Distant Location (provider location):  [facility] CANCER [facility]     Platform used for Video Visit: [facility] Oncology Followup  Visit Date   Mar 18, 2021       Medardo Gautam is a 81 year old male who is being evaluated via a video visit     The patient is a 81-year-old gentleman with a history of high-grade prostate cancer as well as a grade 2 colon cancer here for evaluation and management of a rising PSA.  He has most recently seen Dr. dennis in urology.     DISEASES UNDER MANAGEMENT:    8/6/2012             pT2c N0 Mx prostatic adenocarcinoma, Jeancarlos grade 4+5 = 9, PSA 5.2    pT1 N0 M0 G2 colonic adenocarcinoma     RADIATION HISTORY: Prostate fossa radiation (completed: 10/18/2013)  1. Phase 1: 4500 cGy (180 cGy per fraction) to the pelvis  2. Phase 2: 2520 cGy boost to the prostate bed      CHEMOTHERAPY HISTORY:     CALGB 84506  ? Leuprolide 22.5 mg IM (3/27/2012, 6/19/2012)  ? Docetaxel 75 mg/m  (3/27/2012)- discontinued on study given the development of treatment related hepatotoxicity  ? Salvage ADT  ? Leuprolide (6/25/2013-1/23/2019) - gets Q 6 months   ? 12/4/20 darolutamide 600 mg " PO BID started    PSA:   1/9/18:             PSA   < 0.01  1/23/19:           PSA   = 0.04  7/10/19:           PSA   = 0.07  8/13/19            PSA   = 0.09      discontinue Bicalutamide/Maintain ADT  9/18/19            PSA   = 0.13  12/11/19          PSA  = 0.27  1/29/20 PSA  = 0.42  3/11/20 PSA = 0.63  6/16/20 PSA     = 0.92 (T=24) took 30 day supply of Bicalutamide ending ~8/19/20 8/31/2020 LUPRON 6 MONTHS  9/18/20 PSA     = 0.81  11/16/20 PSA = 1.66  12/18/20 PSA 0.67  1/18/21 PSA 0.26  2/17/21 LUPRON 6 MONTHS PSA 0.14  3/17/21 PSA 0.09      Interim History:   Fatigue continues to fluctuates. Noting some irritation in the rectum area for which hemorrhoid cream helps. Passing bowels without difficulty, has baseline looser frequent stools from hx colon surgery. Appetite is good. Getting his second COVID vaccine tomorrow.     ROS: 10 point ROS neg other than the symptoms noted above in the HPI.    Physical Exam:  Video physical exam  General: Patient appears well in no acute distress.   Skin: No visualized rash or lesions on visualized skin  Eyes: EOMI, no erythema, sclera icterus or discharge noted  Resp: Appears to be breathing comfortably without accessory muscle usage, speaking in full sentences, no cough  MSK: Appears to have normal range of motion based on visualized movements  Neurologic: No apparent tremors, facial movements symmetric  Psych: affect normal, alert and oriented    The rest of a comprehensive physical examination is deferred due to PHE (public health emergency) video restrictions    Laboratory Data:   Ref. Range 3/17/2021 10:22   Sodium Latest Ref Range: 133 - 144 mmol/L 136   Potassium Latest Ref Range: 3.4 - 5.3 mmol/L 3.7   Chloride Latest Ref Range: 94 - 109 mmol/L 103   Carbon Dioxide Latest Ref Range: 20 - 32 mmol/L 28   Urea Nitrogen Latest Ref Range: 7 - 30 mg/dL 13   Creatinine Latest Ref Range: 0.66 - 1.25 mg/dL 0.88   GFR Estimate Latest Ref Range: >60 mL/min/1.73_m2 81   GFR  Estimate If Black Latest Ref Range: >60 mL/min/1.73_m2 >90   Calcium Latest Ref Range: 8.5 - 10.1 mg/dL 9.1   Anion Gap Latest Ref Range: 3 - 14 mmol/L 5   Albumin Latest Ref Range: 3.4 - 5.0 g/dL 3.6   Protein Total Latest Ref Range: 6.8 - 8.8 g/dL 7.2   Bilirubin Total Latest Ref Range: 0.2 - 1.3 mg/dL 0.8   Alkaline Phosphatase Latest Ref Range: 40 - 150 U/L 77   ALT Latest Ref Range: 0 - 70 U/L 33   AST Latest Ref Range: 0 - 45 U/L 41   PSA Latest Ref Range: 0 - 4 ug/L 0.09   Glucose Latest Ref Range: 70 - 99 mg/dL 178 (H)   WBC Latest Ref Range: 4.0 - 11.0 10e9/L 4.6   Hemoglobin Latest Ref Range: 13.3 - 17.7 g/dL 13.4   Hematocrit Latest Ref Range: 40.0 - 53.0 % 39.1 (L)   Platelet Count Latest Ref Range: 150 - 450 10e9/L 132 (L)   RBC Count Latest Ref Range: 4.4 - 5.9 10e12/L 3.95 (L)   MCV Latest Ref Range: 78 - 100 fl 99   MCH Latest Ref Range: 26.5 - 33.0 pg 33.9 (H)   MCHC Latest Ref Range: 31.5 - 36.5 g/dL 34.3   RDW Latest Ref Range: 10.0 - 15.0 % 12.5   Diff Method Unknown Automated Method   % Neutrophils Latest Units: % 50.5   % Lymphocytes Latest Units: % 37.2   % Monocytes Latest Units: % 10.2   % Eosinophils Latest Units: % 1.7   % Basophils Latest Units: % 0.4   Absolute Neutrophil Latest Ref Range: 1.6 - 8.3 10e9/L 2.3   Absolute Lymphocytes Latest Ref Range: 0.8 - 5.3 10e9/L 1.7   Absolute Monocytes Latest Ref Range: 0.0 - 1.3 10e9/L 0.5   Absolute Eosinophils Latest Ref Range: 0.0 - 0.7 10e9/L 0.1   Absolute Basophils Latest Ref Range: 0.0 - 0.2 10e9/L 0.0       Assessment/Plan:  Prostate Cancer, non metastatic   -PSA continued to rise so decided to pursue darolutamide in addition to ADT. Started on 12/4/20. He has been tolerating overall very well with some minor increase in fatigue, now has stabilized   -PSA responding nicely--0.09  -continue labs with CMP monthly  -follow up with Dr. Ingram in 2. Given good tolerance, could likely go to q2 or 3 month visits after.      Continue Lupron every 6  months, given 2/17/21    Insomnia  -was started on Klonipin by PCP. Continue to be managed by them     Hyperlipidemia  -was supposed to start atorvastatin though he thought it interacted with the Nubeqa so never started it, I confirmed there is no interaction with oral pharmacy team. He is trying to modify his diet. Defer management to PCP    Erectile dysfunction  -refilled sildenafil today    Rectal tenderness  -not related to nubeqa. No blood in his stool or trouble with bowel movement. Could be hemorrhoids as hemorrhoid cream is helpful. Recommended exam by PCP if continues.     60 minutes spent on the date of the encounter doing chart review, review of test results, interpretation of tests, patient visit, documentation and discussion with other provider(s)     Nedra Vick CNP on 3/18/2021 at 2:34 PM

## 2021-03-17 DIAGNOSIS — C61 MALIGNANT NEOPLASM OF PROSTATE (H): ICD-10-CM

## 2021-03-17 DIAGNOSIS — Z79.899 ENCOUNTER FOR LONG-TERM (CURRENT) USE OF MEDICATIONS: ICD-10-CM

## 2021-03-17 DIAGNOSIS — C61 PROSTATE CANCER (H): ICD-10-CM

## 2021-03-17 LAB
ALBUMIN SERPL-MCNC: 3.6 G/DL (ref 3.4–5)
ALP SERPL-CCNC: 77 U/L (ref 40–150)
ALT SERPL W P-5'-P-CCNC: 33 U/L (ref 0–70)
ANION GAP SERPL CALCULATED.3IONS-SCNC: 5 MMOL/L (ref 3–14)
AST SERPL W P-5'-P-CCNC: 41 U/L (ref 0–45)
BASOPHILS # BLD AUTO: 0 10E9/L (ref 0–0.2)
BASOPHILS NFR BLD AUTO: 0.4 %
BILIRUB SERPL-MCNC: 0.8 MG/DL (ref 0.2–1.3)
BUN SERPL-MCNC: 13 MG/DL (ref 7–30)
CALCIUM SERPL-MCNC: 9.1 MG/DL (ref 8.5–10.1)
CHLORIDE SERPL-SCNC: 103 MMOL/L (ref 94–109)
CO2 SERPL-SCNC: 28 MMOL/L (ref 20–32)
CREAT SERPL-MCNC: 0.88 MG/DL (ref 0.66–1.25)
DIFFERENTIAL METHOD BLD: ABNORMAL
EOSINOPHIL # BLD AUTO: 0.1 10E9/L (ref 0–0.7)
EOSINOPHIL NFR BLD AUTO: 1.7 %
ERYTHROCYTE [DISTWIDTH] IN BLOOD BY AUTOMATED COUNT: 12.5 % (ref 10–15)
GFR SERPL CREATININE-BSD FRML MDRD: 81 ML/MIN/{1.73_M2}
GLUCOSE SERPL-MCNC: 178 MG/DL (ref 70–99)
HCT VFR BLD AUTO: 39.1 % (ref 40–53)
HGB BLD-MCNC: 13.4 G/DL (ref 13.3–17.7)
LYMPHOCYTES # BLD AUTO: 1.7 10E9/L (ref 0.8–5.3)
LYMPHOCYTES NFR BLD AUTO: 37.2 %
MCH RBC QN AUTO: 33.9 PG (ref 26.5–33)
MCHC RBC AUTO-ENTMCNC: 34.3 G/DL (ref 31.5–36.5)
MCV RBC AUTO: 99 FL (ref 78–100)
MONOCYTES # BLD AUTO: 0.5 10E9/L (ref 0–1.3)
MONOCYTES NFR BLD AUTO: 10.2 %
NEUTROPHILS # BLD AUTO: 2.3 10E9/L (ref 1.6–8.3)
NEUTROPHILS NFR BLD AUTO: 50.5 %
PLATELET # BLD AUTO: 132 10E9/L (ref 150–450)
POTASSIUM SERPL-SCNC: 3.7 MMOL/L (ref 3.4–5.3)
PROT SERPL-MCNC: 7.2 G/DL (ref 6.8–8.8)
PSA SERPL-MCNC: 0.09 UG/L (ref 0–4)
RBC # BLD AUTO: 3.95 10E12/L (ref 4.4–5.9)
SODIUM SERPL-SCNC: 136 MMOL/L (ref 133–144)
WBC # BLD AUTO: 4.6 10E9/L (ref 4–11)

## 2021-03-17 PROCEDURE — 99000 SPECIMEN HANDLING OFFICE-LAB: CPT | Performed by: INTERNAL MEDICINE

## 2021-03-17 PROCEDURE — 84153 ASSAY OF PSA TOTAL: CPT | Performed by: INTERNAL MEDICINE

## 2021-03-17 PROCEDURE — 85025 COMPLETE CBC W/AUTO DIFF WBC: CPT | Performed by: INTERNAL MEDICINE

## 2021-03-17 PROCEDURE — 80053 COMPREHEN METABOLIC PANEL: CPT | Performed by: INTERNAL MEDICINE

## 2021-03-17 PROCEDURE — 84403 ASSAY OF TOTAL TESTOSTERONE: CPT | Mod: 90 | Performed by: INTERNAL MEDICINE

## 2021-03-17 PROCEDURE — 36415 COLL VENOUS BLD VENIPUNCTURE: CPT | Performed by: INTERNAL MEDICINE

## 2021-03-18 ENCOUNTER — VIRTUAL VISIT (OUTPATIENT)
Dept: ONCOLOGY | Facility: CLINIC | Age: 81
End: 2021-03-18
Attending: INTERNAL MEDICINE
Payer: COMMERCIAL

## 2021-03-18 DIAGNOSIS — C61 MALIGNANT NEOPLASM OF PROSTATE (H): Primary | ICD-10-CM

## 2021-03-18 PROCEDURE — 999N001193 HC VIDEO/TELEPHONE VISIT; NO CHARGE

## 2021-03-18 PROCEDURE — 99215 OFFICE O/P EST HI 40 MIN: CPT | Mod: 95 | Performed by: NURSE PRACTITIONER

## 2021-03-18 RX ORDER — SILDENAFIL 50 MG/1
TABLET, FILM COATED ORAL
Qty: 12 TABLET | Refills: 3 | Status: SHIPPED | OUTPATIENT
Start: 2021-03-18 | End: 2021-04-26

## 2021-03-18 NOTE — LETTER
"    3/18/2021         RE: Medardo Gautam  [address] [facility] [address]        Dear Colleague,    Thank you for referring your patient, Medardo Gautam, to the [facility]. Please see a copy of my visit note below.      Medardo is a 81 year old who is being evaluated via a billable video visit.      How would you like to obtain your AVS? MyChart  If the video visit is dropped, the invitation should be resent by: Send to e-mail at: [email or web address]  Will anyone else be joining your video visit? No     Patient needs refills for Sildenafil .     Vitals - Patient Reported  Systolic (Patient Reported): 131  Diastolic (Patient Reported): (!) 90  Weight (Patient Reported): 87.3 kg (192 lb 8 oz)  Height (Patient Reported): 170 cm (5' 6.93\")  BMI (Based on Pt Reported Ht/Wt): 30.21  Pulse (Patient Reported): 66  Pain Score: No Pain (0)    Sofya WOODS        Video-Visit Details    Type of service:  Video Visit    Video Start Time: 2:10 PM    Video End Time:2:27 PM    Originating Location (pt. Location): Home    Distant Location (provider location):  [facility] CANCER [facility]     Platform used for Video Visit: [facility] Oncology Followup  Visit Date   Mar 18, 2021       Medardo Gautam is a 81 year old male who is being evaluated via a video visit     The patient is a 81-year-old gentleman with a history of high-grade prostate cancer as well as a grade 2 colon cancer here for evaluation and management of a rising PSA.  He has most recently seen Dr. dennis in urology.     DISEASES UNDER MANAGEMENT:    8/6/2012             pT2c N0 Mx prostatic adenocarcinoma, Jeancarlos grade 4+5 = 9, PSA 5.2    pT1 N0 M0 G2 colonic adenocarcinoma     RADIATION HISTORY: Prostate fossa radiation (completed: 10/18/2013)  1. Phase 1: 4500 cGy (180 cGy per fraction) to the pelvis  2. Phase 2: 2520 cGy boost to the prostate bed      CHEMOTHERAPY HISTORY:     CALGB " 77527  ? Leuprolide 22.5 mg IM (3/27/2012, 6/19/2012)  ? Docetaxel 75 mg/m  (3/27/2012)- discontinued on study given the development of treatment related hepatotoxicity  ? Salvage ADT  ? Leuprolide (6/25/2013-1/23/2019) - gets Q 6 months   ? 12/4/20 darolutamide 600 mg PO BID started    PSA:   1/9/18:             PSA   < 0.01  1/23/19:           PSA   = 0.04  7/10/19:           PSA   = 0.07  8/13/19            PSA   = 0.09      discontinue Bicalutamide/Maintain ADT  9/18/19            PSA   = 0.13  12/11/19          PSA  = 0.27  1/29/20 PSA  = 0.42  3/11/20 PSA = 0.63  6/16/20 PSA     = 0.92 (T=24) took 30 day supply of Bicalutamide ending ~8/19/20 8/31/2020 LUPRON 6 MONTHS  9/18/20 PSA     = 0.81  11/16/20 PSA = 1.66  12/18/20 PSA 0.67  1/18/21 PSA 0.26  2/17/21 LUPRON 6 MONTHS PSA 0.14  3/17/21 PSA 0.09      Interim History:   Fatigue continues to fluctuates. Noting some irritation in the rectum area for which hemorrhoid cream helps. Passing bowels without difficulty, has baseline looser frequent stools from hx colon surgery. Appetite is good. Getting his second COVID vaccine tomorrow.     ROS: 10 point ROS neg other than the symptoms noted above in the HPI.    Physical Exam:  Video physical exam  General: Patient appears well in no acute distress.   Skin: No visualized rash or lesions on visualized skin  Eyes: EOMI, no erythema, sclera icterus or discharge noted  Resp: Appears to be breathing comfortably without accessory muscle usage, speaking in full sentences, no cough  MSK: Appears to have normal range of motion based on visualized movements  Neurologic: No apparent tremors, facial movements symmetric  Psych: affect normal, alert and oriented    The rest of a comprehensive physical examination is deferred due to PHE (public health emergency) video restrictions    Laboratory Data:   Ref. Range 3/17/2021 10:22   Sodium Latest Ref Range: 133 - 144 mmol/L 136   Potassium Latest Ref Range: 3.4 - 5.3 mmol/L 3.7    Chloride Latest Ref Range: 94 - 109 mmol/L 103   Carbon Dioxide Latest Ref Range: 20 - 32 mmol/L 28   Urea Nitrogen Latest Ref Range: 7 - 30 mg/dL 13   Creatinine Latest Ref Range: 0.66 - 1.25 mg/dL 0.88   GFR Estimate Latest Ref Range: >60 mL/min/1.73_m2 81   GFR Estimate If Black Latest Ref Range: >60 mL/min/1.73_m2 >90   Calcium Latest Ref Range: 8.5 - 10.1 mg/dL 9.1   Anion Gap Latest Ref Range: 3 - 14 mmol/L 5   Albumin Latest Ref Range: 3.4 - 5.0 g/dL 3.6   Protein Total Latest Ref Range: 6.8 - 8.8 g/dL 7.2   Bilirubin Total Latest Ref Range: 0.2 - 1.3 mg/dL 0.8   Alkaline Phosphatase Latest Ref Range: 40 - 150 U/L 77   ALT Latest Ref Range: 0 - 70 U/L 33   AST Latest Ref Range: 0 - 45 U/L 41   PSA Latest Ref Range: 0 - 4 ug/L 0.09   Glucose Latest Ref Range: 70 - 99 mg/dL 178 (H)   WBC Latest Ref Range: 4.0 - 11.0 10e9/L 4.6   Hemoglobin Latest Ref Range: 13.3 - 17.7 g/dL 13.4   Hematocrit Latest Ref Range: 40.0 - 53.0 % 39.1 (L)   Platelet Count Latest Ref Range: 150 - 450 10e9/L 132 (L)   RBC Count Latest Ref Range: 4.4 - 5.9 10e12/L 3.95 (L)   MCV Latest Ref Range: 78 - 100 fl 99   MCH Latest Ref Range: 26.5 - 33.0 pg 33.9 (H)   MCHC Latest Ref Range: 31.5 - 36.5 g/dL 34.3   RDW Latest Ref Range: 10.0 - 15.0 % 12.5   Diff Method Unknown Automated Method   % Neutrophils Latest Units: % 50.5   % Lymphocytes Latest Units: % 37.2   % Monocytes Latest Units: % 10.2   % Eosinophils Latest Units: % 1.7   % Basophils Latest Units: % 0.4   Absolute Neutrophil Latest Ref Range: 1.6 - 8.3 10e9/L 2.3   Absolute Lymphocytes Latest Ref Range: 0.8 - 5.3 10e9/L 1.7   Absolute Monocytes Latest Ref Range: 0.0 - 1.3 10e9/L 0.5   Absolute Eosinophils Latest Ref Range: 0.0 - 0.7 10e9/L 0.1   Absolute Basophils Latest Ref Range: 0.0 - 0.2 10e9/L 0.0       Assessment/Plan:  Prostate Cancer, non metastatic   -PSA continued to rise so decided to pursue darolutamide in addition to ADT. Started on 12/4/20. He has been tolerating  overall very well with some minor increase in fatigue, now has stabilized   -PSA responding nicely--0.09  -continue labs with CMP monthly  -follow up with Dr. Ingram in 2. Given good tolerance, could likely go to q2 or 3 month visits after.      Continue Lupron every 6 months, given 2/17/21    Insomnia  -was started on Klonipin by PCP. Continue to be managed by them     Hyperlipidemia  -was supposed to start atorvastatin though he thought it interacted with the Nubeqa so never started it, I confirmed there is no interaction with oral pharmacy team. He is trying to modify his diet. Defer management to PCP    Erectile dysfunction  -refilled sildenafil today    Rectal tenderness  -not related to nubeqa. No blood in his stool or trouble with bowel movement. Could be hemorrhoids as hemorrhoid cream is helpful. Recommended exam by PCP if continues.     60 minutes spent on the date of the encounter doing chart review, review of test results, interpretation of tests, patient visit, documentation and discussion with other provider(s)     Nedra Vick CNP on 3/18/2021 at 2:34 PM          Again, thank you for allowing me to participate in the care of your patient.        Sincerely,        Nedra Vick CNP

## 2021-03-18 NOTE — LETTER
Date:October 16, 2021      Provider requested that no letter be sent. Do not send.       [facility]

## 2021-03-19 ENCOUNTER — IMMUNIZATION (OUTPATIENT)
Dept: PEDIATRICS | Facility: CLINIC | Age: 81
End: 2021-03-19
Attending: INTERNAL MEDICINE
Payer: COMMERCIAL

## 2021-03-19 LAB — TESTOST SERPL-MCNC: 16 NG/DL (ref 240–950)

## 2021-03-19 PROCEDURE — 91300 PR COVID VAC PFIZER DIL RECON 30 MCG/0.3 ML IM: CPT

## 2021-03-19 PROCEDURE — 0002A PR COVID VAC PFIZER DIL RECON 30 MCG/0.3 ML IM: CPT

## 2021-03-20 ENCOUNTER — TELEPHONE (OUTPATIENT)
Dept: ONCOLOGY | Facility: CLINIC | Age: 81
End: 2021-03-20

## 2021-04-23 DIAGNOSIS — C61 PROSTATE CANCER (H): ICD-10-CM

## 2021-04-23 DIAGNOSIS — C61 MALIGNANT NEOPLASM OF PROSTATE (H): ICD-10-CM

## 2021-04-23 DIAGNOSIS — Z79.899 ENCOUNTER FOR LONG-TERM (CURRENT) USE OF MEDICATIONS: ICD-10-CM

## 2021-04-23 LAB
ALBUMIN SERPL-MCNC: 3.6 G/DL (ref 3.4–5)
ALP SERPL-CCNC: 76 U/L (ref 40–150)
ALT SERPL W P-5'-P-CCNC: 29 U/L (ref 0–70)
ANION GAP SERPL CALCULATED.3IONS-SCNC: 5 MMOL/L (ref 3–14)
AST SERPL W P-5'-P-CCNC: 35 U/L (ref 0–45)
BASOPHILS # BLD AUTO: 0 10E9/L (ref 0–0.2)
BASOPHILS NFR BLD AUTO: 0.5 %
BILIRUB SERPL-MCNC: 0.8 MG/DL (ref 0.2–1.3)
BUN SERPL-MCNC: 14 MG/DL (ref 7–30)
CALCIUM SERPL-MCNC: 9.2 MG/DL (ref 8.5–10.1)
CHLORIDE SERPL-SCNC: 106 MMOL/L (ref 94–109)
CO2 SERPL-SCNC: 29 MMOL/L (ref 20–32)
CREAT SERPL-MCNC: 1.02 MG/DL (ref 0.66–1.25)
DIFFERENTIAL METHOD BLD: ABNORMAL
EOSINOPHIL # BLD AUTO: 0.1 10E9/L (ref 0–0.7)
EOSINOPHIL NFR BLD AUTO: 1.4 %
ERYTHROCYTE [DISTWIDTH] IN BLOOD BY AUTOMATED COUNT: 12.1 % (ref 10–15)
GFR SERPL CREATININE-BSD FRML MDRD: 69 ML/MIN/{1.73_M2}
GLUCOSE SERPL-MCNC: 135 MG/DL (ref 70–99)
HCT VFR BLD AUTO: 38.6 % (ref 40–53)
HGB BLD-MCNC: 13.1 G/DL (ref 13.3–17.7)
LYMPHOCYTES # BLD AUTO: 1.5 10E9/L (ref 0.8–5.3)
LYMPHOCYTES NFR BLD AUTO: 25.9 %
MCH RBC QN AUTO: 34 PG (ref 26.5–33)
MCHC RBC AUTO-ENTMCNC: 33.9 G/DL (ref 31.5–36.5)
MCV RBC AUTO: 100 FL (ref 78–100)
MONOCYTES # BLD AUTO: 0.5 10E9/L (ref 0–1.3)
MONOCYTES NFR BLD AUTO: 9.3 %
NEUTROPHILS # BLD AUTO: 3.6 10E9/L (ref 1.6–8.3)
NEUTROPHILS NFR BLD AUTO: 62.9 %
PLATELET # BLD AUTO: 165 10E9/L (ref 150–450)
POTASSIUM SERPL-SCNC: 3.7 MMOL/L (ref 3.4–5.3)
PROT SERPL-MCNC: 7.5 G/DL (ref 6.8–8.8)
PSA SERPL-MCNC: 0.09 UG/L (ref 0–4)
RBC # BLD AUTO: 3.85 10E12/L (ref 4.4–5.9)
SODIUM SERPL-SCNC: 140 MMOL/L (ref 133–144)
WBC # BLD AUTO: 5.7 10E9/L (ref 4–11)

## 2021-04-23 PROCEDURE — 80053 COMPREHEN METABOLIC PANEL: CPT | Performed by: INTERNAL MEDICINE

## 2021-04-23 PROCEDURE — 84153 ASSAY OF PSA TOTAL: CPT | Performed by: INTERNAL MEDICINE

## 2021-04-23 PROCEDURE — 85025 COMPLETE CBC W/AUTO DIFF WBC: CPT | Performed by: INTERNAL MEDICINE

## 2021-04-23 PROCEDURE — 36415 COLL VENOUS BLD VENIPUNCTURE: CPT | Performed by: INTERNAL MEDICINE

## 2021-04-23 PROCEDURE — 84403 ASSAY OF TOTAL TESTOSTERONE: CPT | Mod: 90 | Performed by: INTERNAL MEDICINE

## 2021-04-23 PROCEDURE — 99000 SPECIMEN HANDLING OFFICE-LAB: CPT | Performed by: INTERNAL MEDICINE

## 2021-04-24 LAB — TESTOST SERPL-MCNC: 15 NG/DL (ref 240–950)

## 2021-04-26 ENCOUNTER — VIRTUAL VISIT (OUTPATIENT)
Dept: ONCOLOGY | Facility: CLINIC | Age: 81
End: 2021-04-26
Attending: NURSE PRACTITIONER
Payer: COMMERCIAL

## 2021-04-26 DIAGNOSIS — C61 PROSTATE CANCER (H): ICD-10-CM

## 2021-04-26 DIAGNOSIS — C61 MALIGNANT NEOPLASM OF PROSTATE (H): Primary | ICD-10-CM

## 2021-04-26 PROCEDURE — 99214 OFFICE O/P EST MOD 30 MIN: CPT | Mod: 95 | Performed by: INTERNAL MEDICINE

## 2021-04-26 PROCEDURE — 999N001193 HC VIDEO/TELEPHONE VISIT; NO CHARGE

## 2021-04-26 RX ORDER — SILDENAFIL 50 MG/1
TABLET, FILM COATED ORAL
Qty: 12 TABLET | Refills: 3 | Status: SHIPPED | OUTPATIENT
Start: 2021-04-26 | End: 2023-02-20

## 2021-04-26 NOTE — LETTER
"    4/26/2021         RE: Medardo Gautam  [address] [facility] [address]        Dear Colleague,    Thank you for referring your patient, Medardo Gautam, to the [facility]. Please see a copy of my visit note below.    Medardo is a 81 year old who is being evaluated via a billable video visit.      How would you like to obtain your AVS? MyChart  If the video visit is dropped, the invitation should be resent by: Text to cell phone: [phone]  Will anyone else be joining your video visit? No       I have reviewed and updated the patient's allergies and medication list.    Concerns: none  Refills: none     Vitals - Patient Reported  Systolic (Patient Reported): 131  Diastolic (Patient Reported): 81  Weight (Patient Reported): 87.5 kg (193 lb)  Height (Patient Reported): 177.8 cm (5' 10\")  BMI (Based on Pt Reported Ht/Wt): 27.69  Pulse (Patient Reported): 66  Pain Score: Mild Pain (2)  Pain Loc: Other - see comment(intermittent, random throughout body)    Devi Padilla CMA      Medarod is a 81 year old who is being evaluated via a billable video visit.      How would you like to obtain your AVS? MyChart  If the video visit is dropped, the invitation should be resent by: Send to e-mail at: [email or web address]  Will anyone else be joining your video visit? No           [facility] Oncology Followup  Visit Date   Apr 26, 2021       The patient is a 81-year-old gentleman with a history of high-grade prostate cancer as well as a grade 2 colon cancer here for evaluation and management of a rising PSA.  He has most recently seen Dr. dennis in urology.     DISEASES UNDER MANAGEMENT:    8/6/2012             pT2c N0 Mx prostatic adenocarcinoma, [place] grade 4+5 = 9, PSA 5.2    pT1 N0 M0 G2 colonic adenocarcinoma     RADIATION HISTORY: Prostate fossa radiation (completed: 10/18/2013)  1. Phase 1: 4500 cGy (180 cGy per fraction) to the pelvis  2. Phase 2: 2520 cGy boost to the " prostate bed      [facility]  ? Leuprolide 22.5 mg IM (3/27/2012, 6/19/2012)  ? Docetaxel 75 mg/m  (3/27/2012)- discontinued on study given the development of treatment related hepatotoxicity  ? Salvage ADT  ? Leuprolide (6/25/2013-1/23/2019) - gets Q 6 months   ? 12/4/20 darolutamide 600 mg PO BID started    PSA:   1/9/18:             PSA   < 0.01  1/23/19:           PSA   = 0.04  7/10/19:           PSA   = 0.07  8/13/19            PSA   = 0.09      discontinue Bicalutamide/Maintain ADT  9/18/19            PSA   = 0.13  12/11/19          PSA  = 0.27  1/29/20 PSA  = 0.42  3/11/20 PSA = 0.63  6/16/20 PSA     = 0.92 (T=24) took 30 day supply of Bicalutamide ending ~8/19/20 8/31/2020 LUPRON 6 MONTHS  9/18/20 PSA     = 0.81  11/16/20 PSA = 1.66 -- START DAROLUTAMIDE  12/18/20 PSA  = 0.67  1/18/21 PSA  = 0.26  2/17/21 PSA  = 0.14    LUPRON 6 MONTHS DOSE  3/17/21 PSA  = 0.09  4/23/21 PSA  =0.09, mild fatigue.       Interim History:   Fatigue continues to fluctuates. Noting some irritation in the rectum area for which hemorrhoid cream helps. Passing bowels without difficulty, has baseline looser frequent stools from hx colon surgery. Appetite is good. Getting his second COVID vaccine tomorrow.     ROS: 10 point ROS neg other than the symptoms noted above in the HPI.    Physical Exam:  Telephone visit      Assessment/Plan:  Prostate Cancer, non metastatic   -PSA continued to rise so decided to pursue darolutamide in addition to ADT. Started on 12/4/20. He has been tolerating overall very well with some minor increase in fatigue, now has stabilized   -PSA responding nicely--0.09  -continue labs with CMP monthly  -follow up in 3-4 months with NAILA    Continue Lupron every 6 months, given 2/17/21    Insomnia  -was started on Klonipin by PCP. Continue to be managed by them     Hyperlipidemia  -was supposed to start atorvastatin though he thought it interacted with the Nubeqa so never started it, I  confirmed there is no interaction with oral pharmacy team. He is trying to modify his diet. Defer management to PCP    Erectile dysfunction  -refilled sildenafil today       20 minutes spent on the date of the encounter doing chart review, review of test results, interpretation of tests, patient visit, documentation and discussion with other provider(s)     Medardo Ingram MD        Again, thank you for allowing me to participate in the care of your patient.        Sincerely,        Medardo Ingram MD

## 2021-04-26 NOTE — PROGRESS NOTES
"Medardo is a 81 year old who is being evaluated via a billable video visit.      How would you like to obtain your AVS? MyChart  If the video visit is dropped, the invitation should be resent by: Text to cell phone: [phone]  Will anyone else be joining your video visit? No       I have reviewed and updated the patient's allergies and medication list.    Concerns: none  Refills: none     Vitals - Patient Reported  Systolic (Patient Reported): 131  Diastolic (Patient Reported): 81  Weight (Patient Reported): 87.5 kg (193 lb)  Height (Patient Reported): 177.8 cm (5' 10\")  BMI (Based on Pt Reported Ht/Wt): 27.69  Pulse (Patient Reported): 66  Pain Score: Mild Pain (2)  Pain Loc: Other - see comment(intermittent, random throughout body)    Devi PadillaLEXII is a 81 year old who is being evaluated via a billable video visit.      How would you like to obtain your AVS? MyChart  If the video visit is dropped, the invitation should be resent by: Send to e-mail at: [email or web address]  Will anyone else be joining your video visit? No           [facility] Oncology Followup  Visit Date   Apr 26, 2021       The patient is a 81-year-old gentleman with a history of high-grade prostate cancer as well as a grade 2 colon cancer here for evaluation and management of a rising PSA.  He has most recently seen Dr. dennis in urology.     DISEASES UNDER MANAGEMENT:    8/6/2012             pT2c N0 Mx prostatic adenocarcinoma, Jeancarlos grade 4+5 = 9, PSA 5.2    pT1 N0 M0 G2 colonic adenocarcinoma     RADIATION HISTORY: Prostate fossa radiation (completed: 10/18/2013)  1. Phase 1: 4500 cGy (180 cGy per fraction) to the pelvis  2. Phase 2: 2520 cGy boost to the prostate bed      CHEMOTHERAPY HISTORY:     CALGB 78014  ? Leuprolide 22.5 mg IM (3/27/2012, 6/19/2012)  ? Docetaxel 75 mg/m  (3/27/2012)- discontinued on study given the development of treatment related hepatotoxicity  ? Salvage ADT  ? Leuprolide " (6/25/2013-1/23/2019) - gets Q 6 months   ? 12/4/20 darolutamide 600 mg PO BID started    PSA:   1/9/18:             PSA   < 0.01  1/23/19:           PSA   = 0.04  7/10/19:           PSA   = 0.07  8/13/19            PSA   = 0.09      discontinue Bicalutamide/Maintain ADT  9/18/19            PSA   = 0.13  12/11/19          PSA  = 0.27  1/29/20 PSA  = 0.42  3/11/20 PSA = 0.63  6/16/20 PSA     = 0.92 (T=24) took 30 day supply of Bicalutamide ending ~8/19/20 8/31/2020 LUPRON 6 MONTHS  9/18/20 PSA     = 0.81  11/16/20 PSA = 1.66 -- START DAROLUTAMIDE  12/18/20 PSA  = 0.67  1/18/21 PSA  = 0.26  2/17/21 PSA  = 0.14    LUPRON 6 MONTHS DOSE  3/17/21 PSA  = 0.09  4/23/21 PSA  =0.09, mild fatigue.       Interim History:   Fatigue continues to fluctuates. Noting some irritation in the rectum area for which hemorrhoid cream helps. Passing bowels without difficulty, has baseline looser frequent stools from hx colon surgery. Appetite is good. Getting his second COVID vaccine tomorrow.     ROS: 10 point ROS neg other than the symptoms noted above in the HPI.    Physical Exam:  Telephone visit      Assessment/Plan:  Prostate Cancer, non metastatic   -PSA continued to rise so decided to pursue darolutamide in addition to ADT. Started on 12/4/20. He has been tolerating overall very well with some minor increase in fatigue, now has stabilized   -PSA responding nicely--0.09  -continue labs with CMP monthly  -follow up in 3-4 months with NAILA    Continue Lupron every 6 months, given 2/17/21    Insomnia  -was started on Klonipin by PCP. Continue to be managed by them     Hyperlipidemia  -was supposed to start atorvastatin though he thought it interacted with the Nubeqa so never started it, I confirmed there is no interaction with oral pharmacy team. He is trying to modify his diet. Defer management to PCP    Erectile dysfunction  -refilled sildenafil today       20 minutes spent on the date of the encounter doing chart review, review of  test results, interpretation of tests, patient visit, documentation and discussion with other provider(s)     Medardo Ingram MD

## 2021-05-12 ENCOUNTER — TELEPHONE (OUTPATIENT)
Dept: ONCOLOGY | Facility: CLINIC | Age: 81
End: 2021-05-12

## 2021-05-12 DIAGNOSIS — C61 PROSTATE CANCER (H): ICD-10-CM

## 2021-05-12 DIAGNOSIS — C61 PROSTATE CANCER (H): Primary | ICD-10-CM

## 2021-05-12 DIAGNOSIS — Z79.899 ENCOUNTER FOR LONG-TERM (CURRENT) USE OF MEDICATIONS: ICD-10-CM

## 2021-05-12 DIAGNOSIS — C61 MALIGNANT NEOPLASM OF PROSTATE (H): ICD-10-CM

## 2021-05-12 LAB
ALBUMIN SERPL-MCNC: 3.8 G/DL (ref 3.4–5)
ALP SERPL-CCNC: 79 U/L (ref 40–150)
ALT SERPL W P-5'-P-CCNC: 36 U/L (ref 0–70)
ANION GAP SERPL CALCULATED.3IONS-SCNC: 7 MMOL/L (ref 3–14)
AST SERPL W P-5'-P-CCNC: 50 U/L (ref 0–45)
BASOPHILS # BLD AUTO: 0 10E9/L (ref 0–0.2)
BASOPHILS NFR BLD AUTO: 0.5 %
BILIRUB SERPL-MCNC: 0.9 MG/DL (ref 0.2–1.3)
BUN SERPL-MCNC: 14 MG/DL (ref 7–30)
CALCIUM SERPL-MCNC: 9.1 MG/DL (ref 8.5–10.1)
CHLORIDE SERPL-SCNC: 105 MMOL/L (ref 94–109)
CO2 SERPL-SCNC: 28 MMOL/L (ref 20–32)
CREAT SERPL-MCNC: 0.98 MG/DL (ref 0.66–1.25)
DIFFERENTIAL METHOD BLD: ABNORMAL
EOSINOPHIL # BLD AUTO: 0.1 10E9/L (ref 0–0.7)
EOSINOPHIL NFR BLD AUTO: 2.7 %
ERYTHROCYTE [DISTWIDTH] IN BLOOD BY AUTOMATED COUNT: 12.3 % (ref 10–15)
GFR SERPL CREATININE-BSD FRML MDRD: 73 ML/MIN/{1.73_M2}
GLUCOSE SERPL-MCNC: 133 MG/DL (ref 70–99)
HCT VFR BLD AUTO: 38.5 % (ref 40–53)
HGB BLD-MCNC: 13.6 G/DL (ref 13.3–17.7)
LYMPHOCYTES # BLD AUTO: 1.8 10E9/L (ref 0.8–5.3)
LYMPHOCYTES NFR BLD AUTO: 39.7 %
MCH RBC QN AUTO: 34.8 PG (ref 26.5–33)
MCHC RBC AUTO-ENTMCNC: 35.3 G/DL (ref 31.5–36.5)
MCV RBC AUTO: 99 FL (ref 78–100)
MONOCYTES # BLD AUTO: 0.4 10E9/L (ref 0–1.3)
MONOCYTES NFR BLD AUTO: 9.3 %
NEUTROPHILS # BLD AUTO: 2.1 10E9/L (ref 1.6–8.3)
NEUTROPHILS NFR BLD AUTO: 47.8 %
PLATELET # BLD AUTO: 137 10E9/L (ref 150–450)
POTASSIUM SERPL-SCNC: 3.9 MMOL/L (ref 3.4–5.3)
PROT SERPL-MCNC: 7.5 G/DL (ref 6.8–8.8)
PSA SERPL-MCNC: 0.08 UG/L (ref 0–4)
RBC # BLD AUTO: 3.91 10E12/L (ref 4.4–5.9)
SODIUM SERPL-SCNC: 140 MMOL/L (ref 133–144)
WBC # BLD AUTO: 4.4 10E9/L (ref 4–11)

## 2021-05-12 PROCEDURE — 99000 SPECIMEN HANDLING OFFICE-LAB: CPT | Performed by: INTERNAL MEDICINE

## 2021-05-12 PROCEDURE — 84153 ASSAY OF PSA TOTAL: CPT | Performed by: INTERNAL MEDICINE

## 2021-05-12 PROCEDURE — 80053 COMPREHEN METABOLIC PANEL: CPT | Performed by: INTERNAL MEDICINE

## 2021-05-12 PROCEDURE — 36415 COLL VENOUS BLD VENIPUNCTURE: CPT | Performed by: INTERNAL MEDICINE

## 2021-05-12 PROCEDURE — 84403 ASSAY OF TOTAL TESTOSTERONE: CPT | Mod: 90 | Performed by: INTERNAL MEDICINE

## 2021-05-12 PROCEDURE — 85025 COMPLETE CBC W/AUTO DIFF WBC: CPT | Performed by: INTERNAL MEDICINE

## 2021-05-12 NOTE — TELEPHONE ENCOUNTER
Patient presented to the  for his depo Lupron injection. Unfortunately, the clinic was unaware and did not have this medication on hand. This nurse will order the medication to the clinic and contact the patient to schedule. Ordering has been done.     Patient had one additional question for the provider.  please review below.     1. Patient is wondering why you want him to have the injection every three months vs every 6 months? Tariq advise  Patient is okay with either way. He just would like to know why.     2. , please sign order for injection to be administered into clinic. Please include any refills if needed. This nurse will create an additional phone encounter to follow the patient to make sure he is getting his injections on time and medication is in the clinic.       PLAN:   Please route back to RN below.       River Posadas RN, BSN  [place]  May 12, 2021

## 2021-05-13 NOTE — TELEPHONE ENCOUNTER
This nurse called to speak with 's nurse. Waiting for call back from 's team. Please transfer to the writer of this message. Thank you    Medication was ordered and received to [facility].       River Posadas RN, BSN  [place]  May 13, 2021

## 2021-05-14 LAB — TESTOST SERPL-MCNC: 10 NG/DL (ref 240–950)

## 2021-05-14 RX ORDER — LEUPROLIDE ACETATE 45 MG
KIT INTRAMUSCULAR
Qty: 1 EACH | Refills: 3 | Status: CANCELLED | OUTPATIENT
Start: 2021-05-14

## 2021-05-14 NOTE — TELEPHONE ENCOUNTER
, please review my note below from 05/12/21 and  sign pended orders. Unable to view in [employer] system.     Patient stopped by the [facility] [place]. He mentioned that he would like to come to the [place] location to get his medication. I informed him that is fine and the medication is here. Once clarify the dosing interval this nurse will create a phone encounter and postpone it until next due injection.     River Posadas RN, BSN  [place] [employer] [facility]  May 14, 2021

## 2021-05-14 NOTE — TELEPHONE ENCOUNTER
Spoke with [facility] RN. The order is in [facility] and can be given at [facility] at any time.  If wanted it to be given in [facility] it will need to be signed in Epic and ordered from wholesale.     Review of chart.  Appears there was a mychart message from Dr. Ingram to patient stating that it is preferred to received every 3 months.  RX pending for Dr. Ingram to sign for primary clinic MA/RN team to give.    Sending to Dr. Ingram to approve.    Ghassan Salazar, KATTYN, RN, PHN  [facility] ~ Registered Nurse  Clinic Triage ~ [place] [facility]  May 14, 2021

## 2021-05-14 NOTE — TELEPHONE ENCOUNTER
LM for the patient to return call and inform him of the information below.     Still waiting on provider to respond.     River Posadas RN, BSN  [place] [place]  May 14, 2021

## 2021-05-18 NOTE — TELEPHONE ENCOUNTER
Spoke to Taylor regarding the patient. Waiting for 's response.     River Posadas RN, BSN  [place]  May 18, 2021

## 2021-05-18 NOTE — TELEPHONE ENCOUNTER
Order has been placed.   Pending medication. After received will contact patient.   River Posadas RN, BSN  [place]  May 18, 2021

## 2021-05-20 NOTE — TELEPHONE ENCOUNTER
LM for the patient to return call. Please offer patient to come in today or tomorrow for his injection. The medication has arrived.     River Posadas RN, BSN  [place]  May 20, 2021

## 2021-05-21 ENCOUNTER — ALLIED HEALTH/NURSE VISIT (OUTPATIENT)
Dept: FAMILY MEDICINE | Facility: OTHER | Age: 81
End: 2021-05-21
Payer: COMMERCIAL

## 2021-05-21 ENCOUNTER — TELEPHONE (OUTPATIENT)
Dept: FAMILY MEDICINE | Facility: OTHER | Age: 81
End: 2021-05-21

## 2021-05-21 DIAGNOSIS — C61 PROSTATE CANCER (H): ICD-10-CM

## 2021-05-21 DIAGNOSIS — C61 PROSTATE CANCER (H): Primary | ICD-10-CM

## 2021-05-21 PROCEDURE — 96372 THER/PROPH/DIAG INJ SC/IM: CPT

## 2021-05-21 NOTE — PROGRESS NOTES
Clinic Administered Medication Documentation      Injectable Medication Documentation    Patient was given Lupron Depot. Prior to medication administration, verified patients identity using patient s name and date of birth. Please see MAR and medication order for additional information. Patient instructed to remain in clinic for 15 minutes.    Administered: Left Ventrogluteal    Was entire vial of medication used? Yes  Vial/Syringe: Syringe  Expiration Date:  10/12/2023  Was this medication supplied by the patient? No     River Posadas RN, BSN  [place]  May 21, 2021

## 2021-05-21 NOTE — TELEPHONE ENCOUNTER
"LUPRON DEPOT 22.5 MG INJECTION:   Last Administered: 05/21/21  Date Due: 08/21/21    This encounter has been postponed until July.     ACTION:   RN check to see if order has been placed. Reach out to Oncology team (review under \"Care Team\"). Check Sheri [facility]arez for 22.5 MG Lupron Depot in medication room. If not available, order. Then schedule patient to come in on 08/21/21 or after.     River Posadas RN, BSN  [place]  May 21, 2021    "

## 2021-05-24 ENCOUNTER — TELEPHONE (OUTPATIENT)
Dept: PHARMACY | Facility: CLINIC | Age: 81
End: 2021-05-24

## 2021-05-24 NOTE — TELEPHONE ENCOUNTER
Oral Chemotherapy Monitoring Program    Subjective/Objective:  Medardo Gautam is a 81 year old male contacted by phone for a follow-up visit for oral chemotherapy. Medardo continues his darolutamide therapy and takes 2 tabs (600mg) PO BID. He is adherent to his therapy and reports missing no doses in the past 2 weeks. The patient's main adverse effect is fatigue, which he reports has been present throughout his darolutamide therapy. He tends to get especially tired after periods of exertion and sometimes deals with heavy breathing during these periods. Medardo also reports experiencing infrequent itching, but is unsure if this is due to the changes in weather that come with summer of if this is due to the darolutamide. The itching is not connected to a rash. Medardo reported that he is not very concerned with his mild adverse effects. He eats well, tries to drink water, and also attempts to remain physically active.     ORAL CHEMOTHERAPY 2/9/2021 2/20/2021 2/22/2021 3/2/2021 3/20/2021 5/24/2021 5/24/2021   Assessment Type Refill Chart Review Monthly Follow up Refill Refill Chart Review Monthly Follow up   Diagnosis Code Prostate Cancer Prostate Cancer Prostate Cancer Prostate Cancer Prostate Cancer Prostate Cancer Prostate Cancer   Providers Dr. Juan Jose Ingram   [facility]   Drug Name Nubeqa (Darolutamide) Nubeqa (Darolutamide) Nubeqa (Darolutamide) Nubeqa (Darolutamide) Nubeqa (darolutamide) Nubeqa (darolutamide) Nubeqa (darolutamide)   Dose 600 mg 600 mg 600 mg 600 mg 600 mg 600 mg 600 mg   Current Schedule BID BID BID BID BID BID BID   Cycle Details Continuous Continuous Continuous Continuous Continuous Continuous Continuous   Start Date of Last Cycle - - - - - - -   Planned next cycle start date - - 3/10/2021 - - - -   Doses missed in last 2 weeks - - 0 - - - 0   Adherence Assessment - - Adherent - - -  "Adherent   Adverse Effects - - No AE identified during assessment - - - Fatigue   Fatigue - - - - - - Grade 1   Pharmacist Intervention(fatigue) - - - - - - Yes   Any new drug interactions? - - No - - - -   Is the dose as ordered appropriate for the patient? - - Yes - - - -   Is the patient currently in pain? - - No - - - -   Has the patient been assessed within the past 6 months for depression? - - Yes - - - -   Has the patient missed any days of school, work, or other routine activity? - - No - - - -   Since the last time we talked, have you been hospitalized or used the emergency room? - - No - - - -       Last PHQ-2 Score on record:   PHQ-2 ( 1999 [employer]) 12/15/2020 2/4/2020   Q1: Little interest or pleasure in doing things 0 0   Q2: Feeling down, depressed or hopeless 0 0   PHQ-2 Score 0 0   Q1: Little interest or pleasure in doing things Not at all Not at all   Q2: Feeling down, depressed or hopeless Not at all Not at all   PHQ-2 Score 0 0       Vitals:  BP:   BP Readings from Last 1 Encounters:   12/15/20 106/72     Wt Readings from Last 1 Encounters:   12/15/20 88 kg (194 lb)     Estimated body surface area is 2.04 meters squared as calculated from the following:    Height as of 12/15/20: 1.7 m (5' 6.93\").    Weight as of 12/15/20: 88 kg (194 lb).    Labs:  _  Result Component Current Result Ref Range   Sodium 140 (5/12/2021) 133 - 144 mmol/L     _  Result Component Current Result Ref Range   Potassium 3.9 (5/12/2021) 3.4 - 5.3 mmol/L     _  Result Component Current Result Ref Range   Calcium 9.1 (5/12/2021) 8.5 - 10.1 mg/dL     No results found for Mag within last 30 days.     No results found for Phos within last 30 days.     _  Result Component Current Result Ref Range   Albumin 3.8 (5/12/2021) 3.4 - 5.0 g/dL     _  Result Component Current Result Ref Range   Urea Nitrogen 14 (5/12/2021) 7 - 30 mg/dL     _  Result Component Current Result Ref Range   Creatinine 0.98 (5/12/2021) 0.66 - 1.25 mg/dL "     _  Result Component Current Result Ref Range   AST 50 (H) (5/12/2021) 0 - 45 U/L     _  Result Component Current Result Ref Range   ALT 36 (5/12/2021) 0 - 70 U/L     _  Result Component Current Result Ref Range   Bilirubin Total 0.9 (5/12/2021) 0.2 - 1.3 mg/dL     _  Result Component Current Result Ref Range   WBC 4.4 (5/12/2021) 4.0 - 11.0 10e9/L     _  Result Component Current Result Ref Range   Hemoglobin 13.6 (5/12/2021) 13.3 - 17.7 g/dL     _  Result Component Current Result Ref Range   Platelet Count 137 (L) (5/12/2021) 150 - 450 10e9/L     _  Result Component Current Result Ref Range   Absolute Neutrophil 2.1 (5/12/2021) 1.6 - 8.3 10e9/L         Assessment/Plan:  -The pt is tolerating darolutamide therapy well with manageable adverse effects. Darolutamide therapy should be continued as planned.   -Patient education offered to increase activity in response to reported adverse effect of fatigue. Also informed pt of the recommendation to drink 64oz of water daily.    Follow-Up:  24-Jun labs    Refill Due:  30-Jun     [facility]  Pharmacy Intern  Oral Chemotherapy Monitoring Program  [facility] [place]   [phone]

## 2021-05-24 NOTE — TELEPHONE ENCOUNTER
Oral Chemotherapy Monitoring Program    Placed call to patient in follow up of darolutamide therapy.    Left message to please call back in follow up of therapy. No patient or drug names were mentioned.    Lewis Mullen  Pharmacy Intern  Oral Chemotherapy Monitoring Program  [facility]   [phone]

## 2021-05-24 NOTE — TELEPHONE ENCOUNTER
Oral Chemotherapy Monitoring Program    Placed call to patient in follow up of darolutamide therapy.    Left message to please call back in follow up of therapy. No patient or drug names were mentioned.    Lewis Mullen  Pharmacy Intern  Oral Chemotherapy Monitoring Program  [facility]   [phone]

## 2021-05-25 ENCOUNTER — TELEPHONE (OUTPATIENT)
Dept: ONCOLOGY | Facility: CLINIC | Age: 81
End: 2021-05-25

## 2021-05-25 DIAGNOSIS — F41.9 ANXIETY: ICD-10-CM

## 2021-05-25 RX ORDER — CLONAZEPAM 1 MG/1
TABLET ORAL
Qty: 30 TABLET | Refills: 0 | Status: SHIPPED | OUTPATIENT
Start: 2021-05-25 | End: 2021-06-22

## 2021-05-25 NOTE — TELEPHONE ENCOUNTER
Spoke to patient - scheduled.   Next 5 appointments (look out 90 days)    Jun 22, 2021 10:20 AM  Office Visit with Verna Osborne MD  [facility] ([facility] [place] ) [address] [address]  [phone]        Usha Vega MA

## 2021-05-25 NOTE — TELEPHONE ENCOUNTER
Pending Prescriptions:                       Disp   Refills    clonazePAM (KLONOPIN) 1 MG tablet [Pharmac*30 tab*0        Sig: TAKE 1 TABLET BY MOUTH ONCE DAILY AS NEEDED FOR           ANXIETY (1  MONTH  SUPPLY)    Routing refill request to provider for review/approval because:  Drug not on the [facility] refill protocol

## 2021-05-25 NOTE — TELEPHONE ENCOUNTER
Prior Authorization Specialty Medication Request    Medication/Dose: leuprolide (LUPRON DEPOT) 22.5 MG kit  ICD code (if different than what is on RX):  Prostate cancer (H) (C61)  Previously Tried and Failed:      Important Lab Values:   Rationale:    Insurance Name: [employer]   Insurance ID: [id number]  Insurance Phone Number:     Pharmacy Information (if different than what is on RX)  [place] [facility] [place] [address] [place] [address]  Phone: [phone]

## 2021-06-21 ENCOUNTER — TELEPHONE (OUTPATIENT)
Dept: ONCOLOGY | Facility: CLINIC | Age: 81
End: 2021-06-21

## 2021-06-21 DIAGNOSIS — C61 MALIGNANT NEOPLASM OF PROSTATE (H): ICD-10-CM

## 2021-06-21 DIAGNOSIS — Z79.899 ENCOUNTER FOR LONG-TERM (CURRENT) USE OF MEDICATIONS: ICD-10-CM

## 2021-06-21 DIAGNOSIS — C61 PROSTATE CANCER (H): Primary | ICD-10-CM

## 2021-06-21 DIAGNOSIS — C61 PROSTATE CANCER (H): ICD-10-CM

## 2021-06-21 LAB
ALBUMIN SERPL-MCNC: 3.8 G/DL (ref 3.4–5)
ALP SERPL-CCNC: 79 U/L (ref 40–150)
ALT SERPL W P-5'-P-CCNC: 33 U/L (ref 0–70)
ANION GAP SERPL CALCULATED.3IONS-SCNC: 5 MMOL/L (ref 3–14)
AST SERPL W P-5'-P-CCNC: 38 U/L (ref 0–45)
BASOPHILS # BLD AUTO: 0 10E9/L (ref 0–0.2)
BASOPHILS NFR BLD AUTO: 0.6 %
BILIRUB SERPL-MCNC: 0.8 MG/DL (ref 0.2–1.3)
BUN SERPL-MCNC: 10 MG/DL (ref 7–30)
CALCIUM SERPL-MCNC: 9.3 MG/DL (ref 8.5–10.1)
CHLORIDE SERPL-SCNC: 102 MMOL/L (ref 94–109)
CO2 SERPL-SCNC: 30 MMOL/L (ref 20–32)
CREAT SERPL-MCNC: 0.85 MG/DL (ref 0.66–1.25)
DIFFERENTIAL METHOD BLD: ABNORMAL
EOSINOPHIL # BLD AUTO: 0.1 10E9/L (ref 0–0.7)
EOSINOPHIL NFR BLD AUTO: 1.9 %
ERYTHROCYTE [DISTWIDTH] IN BLOOD BY AUTOMATED COUNT: 11.8 % (ref 10–15)
GFR SERPL CREATININE-BSD FRML MDRD: 82 ML/MIN/{1.73_M2}
GLUCOSE SERPL-MCNC: 158 MG/DL (ref 70–99)
HCT VFR BLD AUTO: 40.6 % (ref 40–53)
HGB BLD-MCNC: 13.9 G/DL (ref 13.3–17.7)
LYMPHOCYTES # BLD AUTO: 1.6 10E9/L (ref 0.8–5.3)
LYMPHOCYTES NFR BLD AUTO: 32.6 %
MCH RBC QN AUTO: 33.8 PG (ref 26.5–33)
MCHC RBC AUTO-ENTMCNC: 34.2 G/DL (ref 31.5–36.5)
MCV RBC AUTO: 99 FL (ref 78–100)
MONOCYTES # BLD AUTO: 0.5 10E9/L (ref 0–1.3)
MONOCYTES NFR BLD AUTO: 11.2 %
NEUTROPHILS # BLD AUTO: 2.6 10E9/L (ref 1.6–8.3)
NEUTROPHILS NFR BLD AUTO: 53.7 %
PLATELET # BLD AUTO: 165 10E9/L (ref 150–450)
POTASSIUM SERPL-SCNC: 3.5 MMOL/L (ref 3.4–5.3)
PROT SERPL-MCNC: 7.3 G/DL (ref 6.8–8.8)
PSA SERPL-MCNC: 0.07 UG/L (ref 0–4)
RBC # BLD AUTO: 4.11 10E12/L (ref 4.4–5.9)
SODIUM SERPL-SCNC: 137 MMOL/L (ref 133–144)
WBC # BLD AUTO: 4.8 10E9/L (ref 4–11)

## 2021-06-21 PROCEDURE — 99000 SPECIMEN HANDLING OFFICE-LAB: CPT | Performed by: INTERNAL MEDICINE

## 2021-06-21 PROCEDURE — 84153 ASSAY OF PSA TOTAL: CPT | Performed by: INTERNAL MEDICINE

## 2021-06-21 PROCEDURE — 84403 ASSAY OF TOTAL TESTOSTERONE: CPT | Mod: 90 | Performed by: INTERNAL MEDICINE

## 2021-06-21 PROCEDURE — 36415 COLL VENOUS BLD VENIPUNCTURE: CPT | Performed by: INTERNAL MEDICINE

## 2021-06-21 PROCEDURE — 85025 COMPLETE CBC W/AUTO DIFF WBC: CPT | Performed by: INTERNAL MEDICINE

## 2021-06-21 PROCEDURE — 80053 COMPREHEN METABOLIC PANEL: CPT | Performed by: INTERNAL MEDICINE

## 2021-06-21 NOTE — PROGRESS NOTES
"Assessment & Plan   1. Anxiety  Update urine drug screen today.  Stable use of clonazepam.  PMDp reviewed.  - clonazePAM (KLONOPIN) 1 MG tablet; TAKE 1 TABLET BY MOUTH ONCE DAILY AS NEEDED FOR ANXIETY (1  MONTH  SUPPLY)  Dispense: 30 tablet; Refill: 5  - Drug Abuse Screen Panel 13, Urine (Care Map)    2. Benign essential hypertension  Blood pressures are well controlled on the current dose of losartan hydrochlorothiazide.  Refill medications.  Recheck in 6 months.  - losartan-hydrochlorothiazide (HYZAAR) 100-12.5 MG tablet; Take 1 tablet by mouth daily  Dispense: 90 tablet; Refill: 1    3. Insomnia, unspecified type  Sleep is well controlled on the current dose of Ambien.  Discussed the risks of taking Ambien and clonazepam together.  He has used this for years and has been stable on this regimen.  Denies any falls.  Continue medication without changes.  - zolpidem (AMBIEN) 5 MG tablet; Take 1 tablet (5 mg) by mouth nightly as needed  Dispense: 30 tablet; Refill: 5  - Drug Abuse Screen Panel 13, Urine (Care Map)    4. Hyperglycemia  Hemoglobin A1c showing signs of prediabetes.  Encourage low-carb diet and weight loss to help improve this.  - Hemoglobin A1c; Future    5. Other long term (current) drug therapy     - Drug Abuse Screen Panel 13, Urine (Care Map)       BMI:   Estimated body mass index is 30.39 kg/m  as calculated from the following:    Height as of this encounter: 1.695 m (5' 6.73\").    Weight as of this encounter: 87.3 kg (192 lb 8 oz).     See Patient Instructions    Return in about 6 months (around 12/22/2021).    Verna Osborne MD  [facility] PARK Batres is a 81 year old who presents for the following health issues     Would like referral for colonoscopy - would like to have completed early.     History of Present Illness       Mental Health Follow-up:  Patient presents to follow-up on Anxiety.    Patient's anxiety since last visit has been:  No change  The " "patient is not having other symptoms associated with anxiety.  Any significant life events: No  Patient is not feeling anxious or having panic attacks.  Patient has no concerns about alcohol or drug use.     Social History  Tobacco Use    Smoking status: Former Smoker      Packs/day: 0.00      Years: 1.00      Pack years: 0      Types: Cigarettes, Cigars, Pipe      Start date: 10/1/1961      Quit date: 1962      Years since quittin.5    Smokeless tobacco: Never Used    Tobacco comment: No smokers in home  Alcohol use: Yes    Alcohol/week: 0.0 standard drinks    Comment: daily glass of wine and whiskey  Drug use: Yes    Types: Benzodiazepines      Today's PHQ-9         PHQ-9 Total Score:         PHQ-9 Q9 Thoughts of better off dead/self-harm past 2 weeks :       Thoughts of suicide or self harm:      Self-harm Plan:        Self-harm Action:          Safety concerns for self or others:           Hyperlipidemia:  He presents for follow up of hyperlipidemia.  He is not taking medication to lower cholesterol. He is not having myalgia or other side effects to statin medications.    Hypertension: He presents for follow up of hypertension.  He does check blood pressure  regularly outside of the clinic. Outpatient blood pressures have not been over 140/90. He follows a low salt diet.     He eats 4 or more servings of fruits and vegetables daily.He consumes 0 sweetened beverage(s) daily.He exercises with enough effort to increase his heart rate 9 or less minutes per day.  He exercises with enough effort to increase his heart rate 3 or less days per week.   He is taking medications regularly.     Answers for HPI/ROS submitted by the patient on 2021   Chronic problems general questions HPI Form  SAEID 7 TOTAL SCORE: 2        Review of Systems         Objective    /88   Pulse 64   Temp 97.2  F (36.2  C) (Temporal)   Resp 16   Ht 1.695 m (5' 6.73\")   Wt 87.3 kg (192 lb 8 oz)   SpO2 98%   BMI 30.39 kg/m  "   Body mass index is 30.39 kg/m .  Physical Exam   GENERAL: healthy, alert and no distress  NECK: no adenopathy, no asymmetry, masses, or scars and thyroid normal to palpation  RESP: lungs clear to auscultation - no rales, rhonchi or wheezes  CV: regular rate and rhythm, normal S1 S2, no S3 or S4, no murmur, click or rub, no peripheral edema and peripheral pulses strong  ABDOMEN: soft, nontender, no hepatosplenomegaly, no masses and bowel sounds normal  MS: no gross musculoskeletal defects noted, no edema

## 2021-06-22 ENCOUNTER — OFFICE VISIT (OUTPATIENT)
Dept: FAMILY MEDICINE | Facility: OTHER | Age: 81
End: 2021-06-22
Payer: COMMERCIAL

## 2021-06-22 VITALS
OXYGEN SATURATION: 98 % | WEIGHT: 192.5 LBS | RESPIRATION RATE: 16 BRPM | HEART RATE: 64 BPM | BODY MASS INDEX: 30.21 KG/M2 | HEIGHT: 67 IN | SYSTOLIC BLOOD PRESSURE: 132 MMHG | TEMPERATURE: 97.2 F | DIASTOLIC BLOOD PRESSURE: 88 MMHG

## 2021-06-22 DIAGNOSIS — G47.00 INSOMNIA, UNSPECIFIED TYPE: ICD-10-CM

## 2021-06-22 DIAGNOSIS — R73.9 HYPERGLYCEMIA: Primary | ICD-10-CM

## 2021-06-22 DIAGNOSIS — Z79.899 OTHER LONG TERM (CURRENT) DRUG THERAPY: ICD-10-CM

## 2021-06-22 DIAGNOSIS — F41.9 ANXIETY: ICD-10-CM

## 2021-06-22 DIAGNOSIS — I10 BENIGN ESSENTIAL HYPERTENSION: ICD-10-CM

## 2021-06-22 LAB
AMPHETAMINES UR QL: NOT DETECTED NG/ML
BARBITURATES UR QL SCN: NOT DETECTED NG/ML
BENZODIAZ UR QL SCN: NOT DETECTED NG/ML
BUPRENORPHINE UR QL: NOT DETECTED NG/ML
CANNABINOIDS UR QL: NOT DETECTED NG/ML
COCAINE UR QL SCN: NOT DETECTED NG/ML
D-METHAMPHET UR QL: NOT DETECTED NG/ML
METHADONE UR QL SCN: NOT DETECTED NG/ML
OPIATES UR QL SCN: NOT DETECTED NG/ML
OXYCODONE UR QL SCN: NOT DETECTED NG/ML
PCP UR QL SCN: NOT DETECTED NG/ML
PROPOXYPH UR QL: NOT DETECTED NG/ML
TESTOST SERPL-MCNC: 48 NG/DL (ref 240–950)
TRICYCLICS UR QL SCN: NOT DETECTED NG/ML

## 2021-06-22 PROCEDURE — 99214 OFFICE O/P EST MOD 30 MIN: CPT | Performed by: FAMILY MEDICINE

## 2021-06-22 PROCEDURE — 80306 DRUG TEST PRSMV INSTRMNT: CPT | Performed by: FAMILY MEDICINE

## 2021-06-22 RX ORDER — LOSARTAN POTASSIUM AND HYDROCHLOROTHIAZIDE 12.5; 1 MG/1; MG/1
1 TABLET ORAL DAILY
Qty: 90 TABLET | Refills: 1 | Status: SHIPPED | OUTPATIENT
Start: 2021-06-22 | End: 2021-12-30

## 2021-06-22 RX ORDER — ZOLPIDEM TARTRATE 5 MG/1
5 TABLET ORAL
Qty: 30 TABLET | Refills: 5 | Status: SHIPPED | OUTPATIENT
Start: 2021-07-20 | End: 2022-01-18

## 2021-06-22 RX ORDER — CLONAZEPAM 1 MG/1
TABLET ORAL
Qty: 30 TABLET | Refills: 5 | Status: SHIPPED | OUTPATIENT
Start: 2021-06-24 | End: 2021-12-21

## 2021-06-22 ASSESSMENT — ANXIETY QUESTIONNAIRES
GAD7 TOTAL SCORE: 2
1. FEELING NERVOUS, ANXIOUS, OR ON EDGE: SEVERAL DAYS
3. WORRYING TOO MUCH ABOUT DIFFERENT THINGS: SEVERAL DAYS
GAD7 TOTAL SCORE: 2
6. BECOMING EASILY ANNOYED OR IRRITABLE: NOT AT ALL
5. BEING SO RESTLESS THAT IT IS HARD TO SIT STILL: NOT AT ALL
7. FEELING AFRAID AS IF SOMETHING AWFUL MIGHT HAPPEN: NOT AT ALL
2. NOT BEING ABLE TO STOP OR CONTROL WORRYING: NOT AT ALL
GAD7 TOTAL SCORE: 2
7. FEELING AFRAID AS IF SOMETHING AWFUL MIGHT HAPPEN: NOT AT ALL
4. TROUBLE RELAXING: NOT AT ALL

## 2021-06-22 ASSESSMENT — MIFFLIN-ST. JEOR: SCORE: 1542.54

## 2021-06-22 ASSESSMENT — PAIN SCALES - GENERAL: PAINLEVEL: NO PAIN (0)

## 2021-06-22 NOTE — RESULT ENCOUNTER NOTE
Mr. Gautam    Your recent test results are attached.  Negative drug screen    If you have any questions or concerns please contact me via My Chart or call the clinic at [phone]     Thank You  Verna Osborne MD.

## 2021-06-22 NOTE — LETTER
[facility] [place]  06/22/21  Patient: Medardo Gautam  YOB: 1940  Medical Record Number: [id number]                                                                                  Non-Opioid Controlled Substance Agreement    This is an agreement between you and your provider regarding safe and appropriate use of controlled substances prescribed by your care team. Controlled substances are?medicines that can cause physical and mental dependence (abuse).     There are strict laws about having and using these medicines. We here at [facility] are  committed to working with you in your efforts to get better. To support you in this work, we'll help you schedule regular office appointments for medicine refills. If we must cancel or change your appointment for any reason, we'll make sure you have enough medicine to last until your next appointment.     As a Provider, I will:     Listen carefully to your concerns while treating you with respect.     Recommend a treatment plan that I believe is in your best interest and may involve therapies other than medicine.      Talk with you often about the possible benefits and the risk of harm of any medicine that we prescribe for you.    Assess the safety of this medicine and check how well it works.      Provide a plan on how to taper (discontinue or go off) using this medicine if the decision is made to stop its use.      ::  As a Patient, I understand controlled substances:       Are prescribed by my care provider to help me function or work and manage my condition(s).?    Are strong medicines and can cause serious side effects.       Need to be taken exactly as prescribed.?Combining controlled substances with certain medicines or chemicals (such as illegal drugs, alcohol, sedatives, sleeping pills, and benzodiazepines) can be dangerous or even fatal.? If I stop taking my medicines suddenly, I may have severe withdrawal symptoms.     The  risks, benefits, and side effects of these medicine(s) were explained to me. I agree that:    1. I will take part in other treatments as advised by my care team. This may be psychiatry or counseling, physical therapy, behavioral therapy, group treatment or a referral to specialist.    2. I will keep all my appointments and understand this is part of the monitoring of controlled substances.?My care team may require an office visit for EVERY controlled substance refill. If I miss appointments or don t follow instructions, my care team may stop my medicine    3. I will take my medicines as prescribed. I will not change the dose or schedule unless my care team tells me to. There will be no refills if I run out early.      4. I may be asked to come to the clinic and complete a urine drug test or complete a pill count. If I don t give a urine sample or participate in a pill count, the care team may stop my medicine.    5. I will only receive controlled substance prescriptions from this clinic. If I am treated by another provider, I will tell them that I am taking controlled substances and that I have a treatment agreement with this provider. I will inform my [facility] care team within one business day if I am given a prescription for any controlled substance by another healthcare provider. My [facility] care team can contact other providers and pharmacists about my use of any medicines.    6. It is up to me to make sure that I don't run out of my medicines on weekends or holidays.?If my care team is willing to refill my prescription without a visit, I must request refills only during office hours. Refills may take up to 3 business days to process. I will use one pharmacy to fill all my controlled substance prescriptions. I will notify the clinic about any changes to my insurance or medicine availability.    7. I am responsible for my prescriptions. If the medicine/prescription is lost, stolen or destroyed,  it will not be replaced.?I also agree not to share controlled substance medicines with anyone.     8. I am aware I should not use any illegal or recreational drugs. I agree not to drink alcohol unless my care team says I can.     9. If I enroll in the [place] Medical Cannabis program, I will tell my care team before my next refill.    10. I will tell my care team right away if I become pregnant, have a new medical problem treated outside of my regular clinic, or have a change in my medicines.     11. I understand that this medicine can affect my thinking, judgment and reaction time.? Alcohol and drugs affect the brain and body, which can affect the safety of my driving. Being under the influence of alcohol or drugs can affect my decision-making, behaviors, personal safety and the safety of others. Driving while impaired (DWI) can occur if a person is driving, operating or in physical control of a car, motorcycle, boat, snowmobile, ATV, motorbike, off-road vehicle or any other motor vehicle ([place] Statute 169A.20). I understand the risk if I choose to drive or operate any vehicle or machinery.    I understand that if I do not follow any of the conditions above, my prescriptions or treatment may be stopped or changed.   I agree that my provider, clinic care team and pharmacy may work with any city, state or federal law enforcement agency that investigates the misuse, sale or other diversion of my controlled medicine. I will allow my provider to discuss my care with, or share a copy of, this agreement with any other treating provider, pharmacy or emergency room where I receive care.     I have read this agreement and have asked questions about anything I did not understand.    ________________________________________________________  Patient Signature - Medardo Gautam     ___________________                   Date     ________________________________________________________  Provider Signature - Verna Osborne MD        ___________________                   Date     ________________________________________________________  Witness Signature (required if provider not present while patient signing)          ___________________                   Date

## 2021-06-22 NOTE — PROGRESS NOTES
"Medardo is a 81 year old who is being evaluated via a billable video visit.      How would you like to obtain your AVS? MyChart     If the video visit is dropped, the invitation should be resent by: Send to e-mail at: [email or web address]     Will anyone else be joining your video visit? No          Vitals - Patient Reported  Systolic (Patient Reported): 128  Diastolic (Patient Reported): 79  Weight (Patient Reported): 87.1 kg (192 lb)  Height (Patient Reported): 169.5 cm (5' 6.73\")  BMI (Based on Pt Reported Ht/Wt): 30.31  Pulse (Patient Reported): 76  Pain Score: No Pain (0)    Gautam Magaña LPN    Video-Visit Details    Type of service:  Video Visit    Video Start Time: 10:12 AM    Video End Time:10:25 AM    Originating Location (pt. Location): Home    Distant Location (provider location):  [facility]     Platform used for Video Visit: [facility] Followup  Visit Date   Jun 24, 2021       The patient is a 81-year-old gentleman with a history of high-grade prostate cancer as well as a grade 2 colon cancer here for evaluation and management of a rising PSA.  He has most recently seen Dr. dennis in urology.     DISEASES UNDER MANAGEMENT:    8/6/2012             pT2c N0 Mx prostatic adenocarcinoma, [place] grade 4+5 = 9, PSA 5.2    pT1 N0 M0 G2 colonic adenocarcinoma     RADIATION HISTORY: Prostate fossa radiation (completed: 10/18/2013)  1. Phase 1: 4500 cGy (180 cGy per fraction) to the pelvis  2. Phase 2: 2520 cGy boost to the prostate bed      CHEMOTHERAPY HISTORY:     CALGB 21546  ? Leuprolide 22.5 mg IM (3/27/2012, 6/19/2012)  ? Docetaxel 75 mg/m  (3/27/2012)- discontinued on study given the development of treatment related hepatotoxicity  ? Salvage ADT  ? Leuprolide (6/25/2013-1/23/2019) - gets Q 6 months   ? 12/4/20 darolutamide 600 mg PO BID started    PSA:   1/9/18:             PSA   < 0.01  1/23/19:           PSA   = 0.04  7/10/19:           PSA   = " 0.07  8/13/19            PSA   = 0.09      discontinue Bicalutamide/Maintain ADT  9/18/19            PSA   = 0.13  12/11/19          PSA  = 0.27  1/29/20 PSA  = 0.42  3/11/20 PSA = 0.63  6/16/20 PSA     = 0.92 (T=24) took 30 day supply of Bicalutamide ending ~8/19/20 8/31/2020 LUPRON 6 MONTHS  9/18/20 PSA     = 0.81  11/16/20 PSA = 1.66 -- START DAROLUTAMIDE  12/18/20 PSA  = 0.67  1/18/21 PSA  = 0.26  2/17/21 PSA  = 0.14    LUPRON 6 MONTHS DOSE  3/17/21 PSA  = 0.09  4/23/21 PSA  =0.09, mild fatigue.   6/21/21 PSA  = 0.07      Interim History:   Feeling good. Going to [place] for delayed Father's day celebration this weekend. Appetite is good. No new pain. Energy stable.     ROS: 10 point ROS neg other than the symptoms noted above in the HPI.    Physical Exam:  Video physical exam  General: Patient appears well in no acute distress.   Skin: No visualized rash or lesions on visualized skin  Eyes: EOMI, no erythema, sclera icterus or discharge noted  Resp: Appears to be breathing comfortably without accessory muscle usage, speaking in full sentences, no cough  MSK: Appears to have normal range of motion based on visualized movements  Neurologic: No apparent tremors, facial movements symmetric  Psych: affect good, alert and oriented    The rest of a comprehensive physical examination is deferred due to PHE (public health emergency) video restrictions    Assessment/Plan:  #Prostate Cancer, non metastatic   -PSA continued to rise so decided to pursue darolutamide in addition to ADT. Started on 12/4/20. He has been tolerating overall very well with some minor increase in fatigue, now has stabilized   -PSA responding nicely--0.07  -continue labs with CMP monthly  -follow up in August with me    Continue Lupron q3 months, Due in Aug.    #Prediabetes and hyperlipidemia  -recently saw pcp, defer management to them    40 minutes spent on the date of the encounter doing chart review, review of test results, interpretation  of tests, patient visit, documentation and discussion with other provider(s)       Nedra Vick CNP on 6/24/2021 at 10:29 AM

## 2021-06-23 DIAGNOSIS — R73.9 HYPERGLYCEMIA: ICD-10-CM

## 2021-06-23 LAB — HBA1C MFR BLD: 6.4 % (ref 0–5.6)

## 2021-06-23 PROCEDURE — 83036 HEMOGLOBIN GLYCOSYLATED A1C: CPT | Performed by: FAMILY MEDICINE

## 2021-06-23 PROCEDURE — 36415 COLL VENOUS BLD VENIPUNCTURE: CPT | Performed by: FAMILY MEDICINE

## 2021-06-23 ASSESSMENT — ANXIETY QUESTIONNAIRES: GAD7 TOTAL SCORE: 2

## 2021-06-24 ENCOUNTER — MYC MEDICAL ADVICE (OUTPATIENT)
Dept: FAMILY MEDICINE | Facility: OTHER | Age: 81
End: 2021-06-24

## 2021-06-24 ENCOUNTER — VIRTUAL VISIT (OUTPATIENT)
Dept: ONCOLOGY | Facility: CLINIC | Age: 81
End: 2021-06-24
Attending: NURSE PRACTITIONER
Payer: COMMERCIAL

## 2021-06-24 ENCOUNTER — TELEPHONE (OUTPATIENT)
Dept: FAMILY MEDICINE | Facility: OTHER | Age: 81
End: 2021-06-24

## 2021-06-24 ENCOUNTER — DOCUMENTATION ONLY (OUTPATIENT)
Dept: PHARMACY | Facility: CLINIC | Age: 81
End: 2021-06-24

## 2021-06-24 DIAGNOSIS — C61 PROSTATE CANCER (H): ICD-10-CM

## 2021-06-24 DIAGNOSIS — C61 MALIGNANT NEOPLASM OF PROSTATE (H): Primary | ICD-10-CM

## 2021-06-24 PROCEDURE — 99214 OFFICE O/P EST MOD 30 MIN: CPT | Mod: 95 | Performed by: NURSE PRACTITIONER

## 2021-06-24 PROCEDURE — 999N001193 HC VIDEO/TELEPHONE VISIT; NO CHARGE

## 2021-06-24 NOTE — TELEPHONE ENCOUNTER
Patient called back and informed of message below. He is wondering what type of foods he should be avoiding if we can send something to his my chart.

## 2021-06-24 NOTE — TELEPHONE ENCOUNTER
----- Message from Verna Osborne MD sent at 6/24/2021  8:49 AM CDT -----  Hemoglobin A1c is at 6.4 showing signs of prediabetes which is gotten worse compared to 6 months ago.  I would recommend low-carb diet and weight loss to help improve this.  I would also have her rechecked in 3 months for close follow-up.  We will consider medications if the A1c goes above 6.5

## 2021-06-24 NOTE — TELEPHONE ENCOUNTER
Attempted to reach the patient with the following information.  Left message for patient to return call to clinic.     Neva Go MA

## 2021-06-24 NOTE — TELEPHONE ENCOUNTER
Minimize  Carb like bread, rice , pasta, eat more vegetables and protein. Exercise for 40-50 minutes a day. I can have a dietician call him if he needs more specific details

## 2021-06-24 NOTE — LETTER
June 24, 2021       TO: Medardo Gautam  [id number] [facility] [address]       DearMr.Lotus,    We are writing to inform you of your test results.    {p results letter list:[id number]}    No results found from the In Basket message.    ***

## 2021-06-30 DIAGNOSIS — C61 PROSTATE CANCER (H): ICD-10-CM

## 2021-06-30 DIAGNOSIS — C61 MALIGNANT NEOPLASM OF PROSTATE (H): Primary | ICD-10-CM

## 2021-07-23 ENCOUNTER — LAB (OUTPATIENT)
Dept: LAB | Facility: OTHER | Age: 81
End: 2021-07-23
Payer: COMMERCIAL

## 2021-07-23 DIAGNOSIS — C61 MALIGNANT NEOPLASM OF PROSTATE (H): ICD-10-CM

## 2021-07-23 DIAGNOSIS — Z79.899 ENCOUNTER FOR LONG-TERM (CURRENT) USE OF MEDICATIONS: ICD-10-CM

## 2021-07-23 DIAGNOSIS — C61 PROSTATE CANCER (H): ICD-10-CM

## 2021-07-23 LAB
ALBUMIN SERPL-MCNC: 4 G/DL (ref 3.4–5)
ALP SERPL-CCNC: 79 U/L (ref 40–150)
ALT SERPL W P-5'-P-CCNC: 35 U/L (ref 0–70)
ANION GAP SERPL CALCULATED.3IONS-SCNC: 7 MMOL/L (ref 3–14)
AST SERPL W P-5'-P-CCNC: 42 U/L (ref 0–45)
BASOPHILS # BLD AUTO: 0 10E3/UL (ref 0–0.2)
BASOPHILS NFR BLD AUTO: 1 %
BILIRUB SERPL-MCNC: 0.8 MG/DL (ref 0.2–1.3)
BUN SERPL-MCNC: 11 MG/DL (ref 7–30)
CALCIUM SERPL-MCNC: 9.3 MG/DL (ref 8.5–10.1)
CHLORIDE BLD-SCNC: 108 MMOL/L (ref 94–109)
CO2 SERPL-SCNC: 27 MMOL/L (ref 20–32)
CREAT SERPL-MCNC: 0.91 MG/DL (ref 0.66–1.25)
EOSINOPHIL # BLD AUTO: 0.1 10E3/UL (ref 0–0.7)
EOSINOPHIL NFR BLD AUTO: 3 %
ERYTHROCYTE [DISTWIDTH] IN BLOOD BY AUTOMATED COUNT: 12.2 % (ref 10–15)
GFR SERPL CREATININE-BSD FRML MDRD: 80 ML/MIN/1.73M2
GLUCOSE BLD-MCNC: 118 MG/DL (ref 70–99)
HCT VFR BLD AUTO: 38.1 % (ref 40–53)
HGB BLD-MCNC: 12.8 G/DL (ref 13.3–17.7)
LYMPHOCYTES # BLD AUTO: 1.7 10E3/UL (ref 0.8–5.3)
LYMPHOCYTES NFR BLD AUTO: 38 %
MCH RBC QN AUTO: 33.1 PG (ref 26.5–33)
MCHC RBC AUTO-ENTMCNC: 33.6 G/DL (ref 31.5–36.5)
MCV RBC AUTO: 98 FL (ref 78–100)
MONOCYTES # BLD AUTO: 0.4 10E3/UL (ref 0–1.3)
MONOCYTES NFR BLD AUTO: 10 %
NEUTROPHILS # BLD AUTO: 2.1 10E3/UL (ref 1.6–8.3)
NEUTROPHILS NFR BLD AUTO: 49 %
PLATELET # BLD AUTO: 140 10E3/UL (ref 150–450)
POTASSIUM BLD-SCNC: 3.8 MMOL/L (ref 3.4–5.3)
PROT SERPL-MCNC: 7.5 G/DL (ref 6.8–8.8)
PSA SERPL-MCNC: 0.07 UG/L (ref 0–4)
RBC # BLD AUTO: 3.87 10E6/UL (ref 4.4–5.9)
SODIUM SERPL-SCNC: 142 MMOL/L (ref 133–144)
WBC # BLD AUTO: 4.4 10E3/UL (ref 4–11)

## 2021-07-23 PROCEDURE — 36415 COLL VENOUS BLD VENIPUNCTURE: CPT

## 2021-07-23 PROCEDURE — 84153 ASSAY OF PSA TOTAL: CPT

## 2021-07-23 PROCEDURE — 85025 COMPLETE CBC W/AUTO DIFF WBC: CPT

## 2021-07-23 PROCEDURE — 80053 COMPREHEN METABOLIC PANEL: CPT

## 2021-07-23 PROCEDURE — 84403 ASSAY OF TOTAL TESTOSTERONE: CPT

## 2021-07-23 NOTE — PROGRESS NOTES
Oral Chemotherapy Monitoring Program    Subjective/Objective:  Medardo Gautam is a 81 year old male contacted by phone for a follow-up visit for oral chemotherapy.  Patient says that he's been taking the Nubeqa OK, and reports unchanged fatigue, but no other side effects.    ORAL CHEMOTHERAPY 3/2/2021 3/20/2021 5/24/2021 5/24/2021 6/24/2021 6/30/2021 7/23/2021   Assessment Type Refill Refill Chart Review Monthly Follow up Chart Review Refill Lab Monitoring;Monthly Follow up   Diagnosis Code Prostate Cancer Prostate Cancer Prostate Cancer Prostate Cancer Prostate Cancer Prostate Cancer Prostate Cancer   Providers Dr. Juan Jose Ingram    [facility]   Drug Name Nubeqa (Darolutamide) Nubeqa (darolutamide) Nubeqa (darolutamide) Nubeqa (darolutamide) Nubeqa (darolutamide) Nubeqa (darolutamide) Nubeqa (darolutamide)   Dose 600 mg 600 mg 600 mg 600 mg 600 mg 600 mg 600 mg   Current Schedule BID BID BID BID BID BID BID   Cycle Details Continuous Continuous Continuous Continuous Continuous Continuous Continuous   Start Date of Last Cycle - - - - - - -   Planned next cycle start date - - - - - - -   Doses missed in last 2 weeks - - - 0 - - 1   Adherence Assessment - - - Adherent - - Adherent   Adverse Effects - - - Fatigue - - Fatigue   Fatigue - - - Grade 1 - - Grade 1   Pharmacist Intervention(fatigue) - - - Yes - - No   Intervention(s) - - - Patient education - - -   Any new drug interactions? - - - No - - -   Is the dose as ordered appropriate for the patient? - - - Yes - - -   Is the patient currently in pain? - - - - - - -   Has the patient been assessed within the past 6 months for depression? - - - - - - -   Has the patient missed any days of school, work, or other routine activity? - - - - - - -   Since the last time we talked, have you been hospitalized or used the emergency room? - - - - - - -       Last  "PHQ-2 Score on record:   PHQ-2 ( 1999 [employer]) 6/22/2021 6/22/2021   Q1: Little interest or pleasure in doing things 0 0   Q2: Feeling down, depressed or hopeless 0 0   PHQ-2 Score 0 0   Q1: Little interest or pleasure in doing things Not at all -   Q2: Feeling down, depressed or hopeless Not at all -   PHQ-2 Score Incomplete 0       Vitals:  BP:   BP Readings from Last 1 Encounters:   06/22/21 132/88     Wt Readings from Last 1 Encounters:   06/22/21 87.3 kg (192 lb 8 oz)     Estimated body surface area is 2.03 meters squared as calculated from the following:    Height as of 6/22/21: 1.695 m (5' 6.73\").    Weight as of 6/22/21: 87.3 kg (192 lb 8 oz).    Labs:  _  Result Component Current Result Ref Range   Sodium 142 (7/23/2021) 133 - 144 mmol/L     _  Result Component Current Result Ref Range   Potassium 3.8 (7/23/2021) 3.4 - 5.3 mmol/L     _  Result Component Current Result Ref Range   Calcium 9.3 (7/23/2021) 8.5 - 10.1 mg/dL     No results found for Mag within last 30 days.     No results found for Phos within last 30 days.     _  Result Component Current Result Ref Range   Albumin 4.0 (7/23/2021) 3.4 - 5.0 g/dL     _  Result Component Current Result Ref Range   Urea Nitrogen 11 (7/23/2021) 7 - 30 mg/dL     _  Result Component Current Result Ref Range   Creatinine 0.91 (7/23/2021) 0.66 - 1.25 mg/dL     _  Result Component Current Result Ref Range   AST 42 (7/23/2021) 0 - 45 U/L     _  Result Component Current Result Ref Range   ALT 35 (7/23/2021) 0 - 70 U/L     _  Result Component Current Result Ref Range   Bilirubin Total 0.8 (7/23/2021) 0.2 - 1.3 mg/dL     _  Result Component Current Result Ref Range   WBC Count 4.4 (7/23/2021) 4.0 - 11.0 10e3/uL     _  Result Component Current Result Ref Range   Hemoglobin 12.8 (L) (7/23/2021) 13.3 - 17.7 g/dL     _  Result Component Current Result Ref Range   Platelet Count 140 (L) (7/23/2021) 150 - 450 10e3/uL     No results found for ANC within last 30 days. "         Assessment/Plan:  Patient is tolerating Nubeqa pretty well.  No concerning abnormalities in the labs.    Alayna Johnson, PharmD, BCPS, BCOP  Oncology Clinical Pharmacy Specialist  [facility]  [phone]

## 2021-07-26 ENCOUNTER — TELEPHONE (OUTPATIENT)
Dept: FAMILY MEDICINE | Facility: OTHER | Age: 81
End: 2021-07-26

## 2021-07-26 DIAGNOSIS — Z12.11 SCREEN FOR COLON CANCER: Primary | ICD-10-CM

## 2021-07-26 NOTE — TELEPHONE ENCOUNTER
Reason for Call: Request for an order or referral:    Order or referral being requested: Colonoscopy    Date needed: as soon as possible    Has the patient been seen by the PCP for this problem? YES    Additional comments: Pt looking to get an order for a colonoscopy asap    Phone number Patient can be reached at:  Home number on file [phone] (home)    Best Time:  any    Can we leave a detailed message on this number?  YES    Call taken on 7/26/2021 at 2:13 PM by Michelle Olguin

## 2021-07-27 LAB — TESTOST SERPL-MCNC: 11 NG/DL (ref 240–950)

## 2021-07-28 ENCOUNTER — TELEPHONE (OUTPATIENT)
Dept: GASTROENTEROLOGY | Facility: CLINIC | Age: 81
End: 2021-07-28

## 2021-07-28 NOTE — TELEPHONE ENCOUNTER
, are you willing to put an order in Epic for this medication so the patient can have his injections in [place]? According to 's last note on 06/24/21 the patient is to have the following medication every 3 months.     leuprolide (LUPRON DEPOT) kit 22.5 mg   [[id number]]  Order Details  Ordered Dose: 22.5 mg Route: Intramuscular Frequency: EVERY 3 MONTHS     Please advise.    TRACIE Gordon, RN  [place]  July 28, 2021

## 2021-07-28 NOTE — TELEPHONE ENCOUNTER
Screening Questions  1. Are you active on mychart? Yes     2. What insurance is in the chart? [employer]     2.  Ordering/Referring Provider: Verna Osborne MD in  FAMILY PRACTICE     3. BMI 30.39    4. Are you on daily home oxygen? No     5. Do you have a history of difficult airway? No     6. Have you had a heart, lung, or liver transplant?  No     7. Are you currently on dialysis?  No     8. Have you had a stroke or Transient ischemic atttack (TIA) within 6 months? No     9. In the past 6 months, have you had any heart related issues including cardiomyopathy or heart attack?         If yes, did it require cardiac stenting or other implantable device? No     10. Do you have any implantable devices in your body (pacemaker, defib, LVAD)? No     11. Do you take nitroglycerin? If yes, how often? No     12. Are you currently taking any blood thinners? Daily Aspirin     13. Are you a diabetic? no    14. (Females) Are you currently pregnant? N/A  If yes, how many weeks?    15. Have you had a procedure in the past that was difficult to tolerate with conscious sedation? Any allergies to Fentanyl or Versed No    16. Are you taking any scheduled prescription narcotics more than once daily?  No     17. Do you have any chemical dependencies such as alcohol, street drugs, or methadone? No     18. Do you have any history of post-traumatic stress syndrome or mental health issues? No     19. Do you transfer independently? Yes     20.  Do you have any issues with constipation? No     21. Preferred Pharmacy for Pre Prescription  [facility]     Scheduling Details    Colonoscopy Prep Sent?: Miralax-MyChart   Procedure Scheduled: Colonoscopy   Provider/Surgeon:  Norris   Date of Procedure: 09/10/21   Location: [facility]   Caller (Please ask for phone number if not scheduled by patient): PATIENT      Sedation Type: c. SEDATION   Conscious Sedation- Needs  for 6 hours after the procedure  MAC/General-Needs  for  24 hours after procedure    Pre-op Required at [facility], [place], [place] and OR for MAC sedation:   (if yes advise patient they will need a pre-op prior to procedure)      Is patient on blood thinners? -NO  (If yes- inform patient to follow up with PCP or provider for follow up instructions)     Informed patient they will need an adult  YES   Cannot take any type of public or medical transportation alone    Informed Patient of COVID Test Requirement YES-SCHEDULED     Confirmed Nurse will call to complete assessment YES     Additional comments:

## 2021-07-29 DIAGNOSIS — Z11.59 ENCOUNTER FOR SCREENING FOR OTHER VIRAL DISEASES: ICD-10-CM

## 2021-07-29 NOTE — TELEPHONE ENCOUNTER
Will route orders to patient's oncology team to place order. Please sign new Lupron orders for the patient in epic.     PAST PLAN:   Typical what the RN team does is get the order from the patient's oncologist. There has to be an [facility] cam order. Then the team order's it to the clinic to administer.     PLAN:   Once order is placed will order medication for the patient.     TRACIE Gordon, RN  [place]  July 29, 2021

## 2021-07-29 NOTE — TELEPHONE ENCOUNTER
This is a cancer drug and has to come from oncology. No oncology available in her clinic in [facility]. He could choose between zayda and prince to follow up

## 2021-07-29 NOTE — TELEPHONE ENCOUNTER
Called patient.  He said he had it here before, last one was on 5/21/21.  The RN's have given his last one.  I will forward to them to be sure we have the injection available and can schedule him from there.

## 2021-08-03 NOTE — TELEPHONE ENCOUNTER
Nedra, will you please sign the pended order? I added two refills for the remainder of this year so we don't keep having to request every 3 months.     RN please order Lupron Injection 22.5mg. ([place] RN)    KATTY GordonN, RN  [place]  August 3, 2021

## 2021-08-03 NOTE — TELEPHONE ENCOUNTER
Provider placed order. RN ordered medication. Will start new encounter after medication arrives.    TRACIE Gordon, RN  [place]  August 3, 2021

## 2021-08-05 ENCOUNTER — TELEPHONE (OUTPATIENT)
Dept: FAMILY MEDICINE | Facility: OTHER | Age: 81
End: 2021-08-05

## 2021-08-18 ENCOUNTER — ALLIED HEALTH/NURSE VISIT (OUTPATIENT)
Dept: FAMILY MEDICINE | Facility: OTHER | Age: 81
End: 2021-08-18
Payer: COMMERCIAL

## 2021-08-18 ENCOUNTER — TELEPHONE (OUTPATIENT)
Dept: ONCOLOGY | Facility: CLINIC | Age: 81
End: 2021-08-18

## 2021-08-18 ENCOUNTER — LAB (OUTPATIENT)
Dept: LAB | Facility: OTHER | Age: 81
End: 2021-08-18
Payer: COMMERCIAL

## 2021-08-18 ENCOUNTER — DOCUMENTATION ONLY (OUTPATIENT)
Dept: LAB | Facility: OTHER | Age: 81
End: 2021-08-18

## 2021-08-18 DIAGNOSIS — C61 MALIGNANT NEOPLASM OF PROSTATE (H): ICD-10-CM

## 2021-08-18 DIAGNOSIS — C61 PROSTATE CANCER (H): Primary | ICD-10-CM

## 2021-08-18 DIAGNOSIS — Z79.899 ENCOUNTER FOR LONG-TERM (CURRENT) USE OF MEDICATIONS: ICD-10-CM

## 2021-08-18 DIAGNOSIS — C61 PROSTATE CANCER (H): ICD-10-CM

## 2021-08-18 LAB
ALBUMIN SERPL-MCNC: 3.8 G/DL (ref 3.4–5)
ALP SERPL-CCNC: 77 U/L (ref 40–150)
ALT SERPL W P-5'-P-CCNC: 43 U/L (ref 0–70)
ANION GAP SERPL CALCULATED.3IONS-SCNC: 7 MMOL/L (ref 3–14)
AST SERPL W P-5'-P-CCNC: 53 U/L (ref 0–45)
BILIRUB SERPL-MCNC: 0.8 MG/DL (ref 0.2–1.3)
BUN SERPL-MCNC: 11 MG/DL (ref 7–30)
CALCIUM SERPL-MCNC: 9.1 MG/DL (ref 8.5–10.1)
CHLORIDE BLD-SCNC: 99 MMOL/L (ref 94–109)
CO2 SERPL-SCNC: 29 MMOL/L (ref 20–32)
CREAT SERPL-MCNC: 0.9 MG/DL (ref 0.66–1.25)
GFR SERPL CREATININE-BSD FRML MDRD: 81 ML/MIN/1.73M2
GLUCOSE BLD-MCNC: 134 MG/DL (ref 70–99)
POTASSIUM BLD-SCNC: 3.5 MMOL/L (ref 3.4–5.3)
PROT SERPL-MCNC: 7.2 G/DL (ref 6.8–8.8)
PSA SERPL-MCNC: 0.07 UG/L (ref 0–4)
SODIUM SERPL-SCNC: 135 MMOL/L (ref 133–144)

## 2021-08-18 PROCEDURE — 96372 THER/PROPH/DIAG INJ SC/IM: CPT | Performed by: INTERNAL MEDICINE

## 2021-08-18 PROCEDURE — 36415 COLL VENOUS BLD VENIPUNCTURE: CPT

## 2021-08-18 PROCEDURE — 99207 PR NO CHARGE NURSE ONLY: CPT

## 2021-08-18 PROCEDURE — 80053 COMPREHEN METABOLIC PANEL: CPT

## 2021-08-18 PROCEDURE — 84403 ASSAY OF TOTAL TESTOSTERONE: CPT

## 2021-08-18 PROCEDURE — 84153 ASSAY OF PSA TOTAL: CPT

## 2021-08-18 NOTE — PROGRESS NOTES
Patient was in today for labs.  Stated that he had a phone visit with you tomorrow.  Please place orders as needed   thank you  Britta CASH) [facility]

## 2021-08-18 NOTE — PROGRESS NOTES
Clinic Administered Medication Documentation    Administrations This Visit     leuprolide (LUPRON DEPOT) kit 22.5 mg     Admin Date  08/18/2021 Action  Given Dose  22.5 mg Route  Intramuscular Site  Left Ventrogluteal Administered By  Donna Morin    Ordering Provider: Medardo Ingram MD    NDC: 8436-0218-72    Lot#: [id number]    : ABBVIE    Patient Supplied?: No                  Injectable Medication Documentation    Patient was given Lupron Depot. Prior to medication administration, verified patients identity using patient s name and date of birth. Please see MAR and medication order for additional information. Patient instructed to remain in clinic for 15 minutes and report any adverse reaction to staff immediately .      Was entire vial of medication used? Yes  Vial/Syringe: Syringe  Expiration Date:  10/12/2023  NDC: 4087-9187-27  Was this medication supplied by the patient? No       Given in the left ventrogluteal    PETRA Boyer/[place] [facility]

## 2021-08-19 ENCOUNTER — VIRTUAL VISIT (OUTPATIENT)
Dept: ONCOLOGY | Facility: CLINIC | Age: 81
End: 2021-08-19
Attending: INTERNAL MEDICINE
Payer: COMMERCIAL

## 2021-08-19 DIAGNOSIS — C61 MALIGNANT NEOPLASM OF PROSTATE (H): Primary | ICD-10-CM

## 2021-08-19 DIAGNOSIS — R53.83 OTHER FATIGUE: ICD-10-CM

## 2021-08-19 PROCEDURE — 99214 OFFICE O/P EST MOD 30 MIN: CPT | Mod: 95 | Performed by: NURSE PRACTITIONER

## 2021-08-19 PROCEDURE — 999N001193 HC VIDEO/TELEPHONE VISIT; NO CHARGE

## 2021-08-19 NOTE — PROGRESS NOTES
Medardo is a 81 year old who is being evaluated via a billable video visit.      How would you like to obtain your AVS? Sound Clipshart  If the video visit is dropped, the invitation should be resent by: Text to cell phone: [phone]  Will anyone else be joining your video visit? No       PEGGY Mcconnell      Video-Visit Details    Type of service:  Video Visit    Video Start Time: 10:00 AM    Video End Time:10:15 AM    Originating Location (pt. Location): Home    Distant Location (provider location):  [facility]     Platform used for Video Visit: [facility] Oncology Followup  Visit Date   Aug 19, 2021       The patient is a 81-year-old gentleman with a history of high-grade prostate cancer as well as a grade 2 colon cancer here for evaluation and management of a rising PSA.  He was followed by Dr. Streeter in urology.     DISEASES UNDER MANAGEMENT:    8/6/2012             pT2c N0 Mx prostatic adenocarcinoma, [place] grade 4+5 = 9, PSA 5.2    pT1 N0 M0 G2 colonic adenocarcinoma     RADIATION HISTORY: Prostate fossa radiation (completed: 10/18/2013)  1. Phase 1: 4500 cGy (180 cGy per fraction) to the pelvis  2. Phase 2: 2520 cGy boost to the prostate bed      CHEMOTHERAPY HISTORY:     CALGB 92785  ? Leuprolide 22.5 mg IM (3/27/2012, 6/19/2012)  ? Docetaxel 75 mg/m  (3/27/2012)- discontinued on study given the development of treatment related hepatotoxicity  ? Salvage ADT  ? Leuprolide (6/25/2013-1/23/2019) - gets Q 6 months   ? 12/4/20 darolutamide 600 mg PO BID started    PSA:   1/9/18:             PSA   < 0.01  1/23/19:           PSA   = 0.04  7/10/19:           PSA   = 0.07  8/13/19            PSA   = 0.09      discontinue Bicalutamide/Maintain ADT  9/18/19            PSA   = 0.13  12/11/19          PSA  = 0.27  1/29/20 PSA  = 0.42  3/11/20 PSA = 0.63  6/16/20 PSA     = 0.92 (T=24) took 30 day supply of Bicalutamide ending ~8/19/20 8/31/2020 LUPRON 6 MONTHS  9/18/20 PSA      = 0.81  11/16/20 PSA = 1.66 -- START DAROLUTAMIDE  12/18/20 PSA  = 0.67  1/18/21 PSA  = 0.26  2/17/21 PSA  = 0.14    LUPRON 6 MONTHS DOSE  3/17/21 PSA  = 0.09  4/23/21 PSA  =0.09, mild fatigue.   6/21/21 PSA  = 0.07   8/18/21 PSA  =0.07 LUPRON 3 MONTH DOSE    Interim History:   Feeling good. Has ongoing fatigue. Takes one advil in the evening to help with arthritis in knee. has hot flashes at night, none during the day. They are tolerable as they come and go quickly.  Appetite is good. No new pain. Nocturia x1.     ROS: 10 point ROS neg other than the symptoms noted above in the HPI.    Physical Exam:    Video physical exam  General: Patient appears well in no acute distress.   Skin: No visualized rash or lesions on visualized skin  Eyes: EOMI, no erythema, sclera icterus or discharge noted  Resp: Appears to be breathing comfortably without accessory muscle usage, speaking in full sentences, no cough  MSK: Appears to have normal range of motion based on visualized movements  Neurologic: No apparent tremors, facial movements symmetric  Psych: affect good, alert and oriented    The rest of a comprehensive physical examination is deferred due to PHE (public health emergency) video restrictions    Assessment/Plan:  #Prostate Cancer, non metastatic   -PSA continued to rise so decided to pursue darolutamide in addition to ADT. Started on 12/4/20. He has been tolerating overall very well with some minor increase in fatigue, now has stabilized   -PSA responding nicely--0.07. reviewed that it continuing to stay the same is Ok as he was wondering about this.   -continue labs with CMP monthly; he would like a visit monthly as well. While I think q3 mo would suffice, given his preference I will schedule this. Will have him see Dr. Ingram in November    Continue Lupron q3 months, due in november    #Prediabetes and hyperlipidemia  -management per pcp    30 minutes spent on the date of the encounter doing chart review,  interpretation of tests, patient visit, documentation and discussion with other provider(s)       Nedra Vick CNP on 8/19/2021 at 10:20 AM

## 2021-08-19 NOTE — LETTER
8/19/2021         RE: Medardo Gautam  [id number] [address] [address]        Dear Colleague,    Thank you for referring your patient, Medardo Gautam, to the [facility]. Please see a copy of my visit note below.    Medardo is a 81 year old who is being evaluated via a billable video visit.      How would you like to obtain your AVS? MyChart  If the video visit is dropped, the invitation should be resent by: Text to cell phone: [phone]  Will anyone else be joining your video visit? No       PEGGY Mcconnell      Video-Visit Details    Type of service:  Video Visit    Video Start Time: 10:00 AM    Video End Time:10:15 AM    Originating Location (pt. Location): Home    Distant Location (provider location):  [facility]     Platform used for Video Visit: [facility] Oncology Followup  Visit Date   Aug 19, 2021       The patient is a 81-year-old gentleman with a history of high-grade prostate cancer as well as a grade 2 colon cancer here for evaluation and management of a rising PSA.  He was followed by Dr. Streeter in urology.     DISEASES UNDER MANAGEMENT:    8/6/2012             pT2c N0 Mx prostatic adenocarcinoma, Jeancarlos grade 4+5 = 9, PSA 5.2    pT1 N0 M0 G2 colonic adenocarcinoma     RADIATION HISTORY: Prostate fossa radiation (completed: 10/18/2013)  1. Phase 1: 4500 cGy (180 cGy per fraction) to the pelvis  2. Phase 2: 2520 cGy boost to the prostate bed      CHEMOTHERAPY HISTORY:     CALGB 82334  ? Leuprolide 22.5 mg IM (3/27/2012, 6/19/2012)  ? Docetaxel 75 mg/m  (3/27/2012)- discontinued on study given the development of treatment related hepatotoxicity  ? Salvage ADT  ? Leuprolide (6/25/2013-1/23/2019) - gets Q 6 months   ? 12/4/20 darolutamide 600 mg PO BID started    PSA:   1/9/18:             PSA   < 0.01  1/23/19:           PSA   = 0.04  7/10/19:           PSA   = 0.07  8/13/19            PSA   = 0.09       discontinue Bicalutamide/Maintain ADT  9/18/19            PSA   = 0.13  12/11/19          PSA  = 0.27  1/29/20 PSA  = 0.42  3/11/20 PSA = 0.63  6/16/20 PSA     = 0.92 (T=24) took 30 day supply of Bicalutamide ending ~8/19/20 8/31/2020 LUPRON 6 MONTHS  9/18/20 PSA     = 0.81  11/16/20 PSA = 1.66 -- START DAROLUTAMIDE  12/18/20 PSA  = 0.67  1/18/21 PSA  = 0.26  2/17/21 PSA  = 0.14    LUPRON 6 MONTHS DOSE  3/17/21 PSA  = 0.09  4/23/21 PSA  =0.09, mild fatigue.   6/21/21 PSA  = 0.07   8/18/21 PSA  =0.07 LUPRON 3 MONTH DOSE    Interim History:   Feeling good. Has ongoing fatigue. Takes one advil in the evening to help with arthritis in knee. has hot flashes at night, none during the day. They are tolerable as they come and go quickly.  Appetite is good. No new pain. Nocturia x1.     ROS: 10 point ROS neg other than the symptoms noted above in the HPI.    Physical Exam:    Video physical exam  General: Patient appears well in no acute distress.   Skin: No visualized rash or lesions on visualized skin  Eyes: EOMI, no erythema, sclera icterus or discharge noted  Resp: Appears to be breathing comfortably without accessory muscle usage, speaking in full sentences, no cough  MSK: Appears to have normal range of motion based on visualized movements  Neurologic: No apparent tremors, facial movements symmetric  Psych: affect good, alert and oriented    The rest of a comprehensive physical examination is deferred due to PHE (public health emergency) video restrictions    Assessment/Plan:  #Prostate Cancer, non metastatic   -PSA continued to rise so decided to pursue darolutamide in addition to ADT. Started on 12/4/20. He has been tolerating overall very well with some minor increase in fatigue, now has stabilized   -PSA responding nicely--0.07. reviewed that it continuing to stay the same is Ok as he was wondering about this.   -continue labs with CMP monthly; he would like a visit monthly as well. While I think q3 mo would  suffice, given his preference I will schedule this. Will have him see Dr. Ingram in November    Continue Lupron q3 months, due in november    #Prediabetes and hyperlipidemia  -management per pcp    30 minutes spent on the date of the encounter doing chart review, interpretation of tests, patient visit, documentation and discussion with other provider(s)       Nedra Vick CNP on 8/19/2021 at 10:20 AM        Again, thank you for allowing me to participate in the care of your patient.        Sincerely,        Nedra Vick CNP

## 2021-08-20 LAB — TESTOST SERPL-MCNC: 18 NG/DL (ref 240–950)

## 2021-08-22 ENCOUNTER — DOCUMENTATION ONLY (OUTPATIENT)
Dept: ONCOLOGY | Facility: CLINIC | Age: 81
End: 2021-08-22

## 2021-08-23 ENCOUNTER — DOCUMENTATION ONLY (OUTPATIENT)
Dept: ONCOLOGY | Facility: CLINIC | Age: 81
End: 2021-08-23

## 2021-08-25 ENCOUNTER — RX ONLY (RX ONLY)
Age: 81
End: 2021-08-25

## 2021-08-25 ENCOUNTER — APPOINTMENT (OUTPATIENT)
Dept: URBAN - METROPOLITAN AREA CLINIC 259 | Age: 81
Setting detail: DERMATOLOGY
End: 2021-08-25

## 2021-08-25 DIAGNOSIS — L57.8 OTHER SKIN CHANGES DUE TO CHRONIC EXPOSURE TO NONIONIZING RADIATION: ICD-10-CM

## 2021-08-25 DIAGNOSIS — Z85.828 PERSONAL HISTORY OF OTHER MALIGNANT NEOPLASM OF SKIN: ICD-10-CM

## 2021-08-25 DIAGNOSIS — L81.4 OTHER MELANIN HYPERPIGMENTATION: ICD-10-CM

## 2021-08-25 DIAGNOSIS — Z71.89 OTHER SPECIFIED COUNSELING: ICD-10-CM

## 2021-08-25 DIAGNOSIS — D22 MELANOCYTIC NEVI: ICD-10-CM

## 2021-08-25 DIAGNOSIS — L21.8 OTHER SEBORRHEIC DERMATITIS: ICD-10-CM

## 2021-08-25 DIAGNOSIS — L82.1 OTHER SEBORRHEIC KERATOSIS: ICD-10-CM

## 2021-08-25 DIAGNOSIS — D18.0 HEMANGIOMA: ICD-10-CM

## 2021-08-25 DIAGNOSIS — L57.0 ACTINIC KERATOSIS: ICD-10-CM

## 2021-08-25 PROBLEM — D22.5 MELANOCYTIC NEVI OF TRUNK: Status: ACTIVE | Noted: 2021-08-25

## 2021-08-25 PROBLEM — D18.01 HEMANGIOMA OF SKIN AND SUBCUTANEOUS TISSUE: Status: ACTIVE | Noted: 2021-08-25

## 2021-08-25 PROCEDURE — 99214 OFFICE O/P EST MOD 30 MIN: CPT | Mod: 25

## 2021-08-25 PROCEDURE — 17000 DESTRUCT PREMALG LESION: CPT

## 2021-08-25 PROCEDURE — OTHER PRESCRIPTION MEDICATION MANAGEMENT: OTHER

## 2021-08-25 PROCEDURE — OTHER COUNSELING: OTHER

## 2021-08-25 PROCEDURE — OTHER MIPS QUALITY: OTHER

## 2021-08-25 PROCEDURE — OTHER LIQUID NITROGEN: OTHER

## 2021-08-25 PROCEDURE — OTHER REASSURANCE: OTHER

## 2021-08-25 RX ORDER — KETOCONAZOLE 20 MG/G
CREAM TOPICAL
Qty: 1 | Refills: 2 | Status: ERX | COMMUNITY
Start: 2021-08-25

## 2021-08-25 ASSESSMENT — LOCATION DETAILED DESCRIPTION DERM
LOCATION DETAILED: LEFT MEDIAL SUPERIOR CHEST
LOCATION DETAILED: RIGHT MID TEMPLE
LOCATION DETAILED: LEFT MEDIAL INFERIOR CHEST
LOCATION DETAILED: INFERIOR THORACIC SPINE
LOCATION DETAILED: RIGHT SUPERIOR MEDIAL UPPER BACK
LOCATION DETAILED: RIGHT INFERIOR FOREHEAD

## 2021-08-25 ASSESSMENT — LOCATION SIMPLE DESCRIPTION DERM
LOCATION SIMPLE: CHEST
LOCATION SIMPLE: UPPER BACK
LOCATION SIMPLE: RIGHT TEMPLE
LOCATION SIMPLE: RIGHT UPPER BACK
LOCATION SIMPLE: RIGHT FOREHEAD

## 2021-08-25 ASSESSMENT — LOCATION ZONE DERM
LOCATION ZONE: TRUNK
LOCATION ZONE: FACE

## 2021-08-25 NOTE — PROCEDURE: PRESCRIPTION MEDICATION MANAGEMENT
Render In Strict Bullet Format?: No
Plan: Patient will mix 1:1 Ketoconazole with his Elidel at home.
Detail Level: Zone

## 2021-08-25 NOTE — PROCEDURE: LIQUID NITROGEN
Render Note In Bullet Format When Appropriate: No
Show Applicator Variable?: Yes
Duration Of Freeze Thaw-Cycle (Seconds): 1
Post-Care Instructions: I reviewed with the patient in detail post-care instructions. Patient is to wear sunprotection, and avoid picking at any of the treated lesions. Pt may apply Vaseline to crusted or scabbing areas.
Consent: The patient's consent was obtained including but not limited to risks of crusting, scabbing, blistering, scarring, darker or lighter pigmentary change, recurrence, incomplete removal and infection.
Detail Level: Detailed
Number Of Freeze-Thaw Cycles: 1 freeze-thaw cycle

## 2021-09-07 ENCOUNTER — LAB (OUTPATIENT)
Dept: LAB | Facility: OTHER | Age: 81
End: 2021-09-07
Payer: COMMERCIAL

## 2021-09-07 DIAGNOSIS — Z11.59 ENCOUNTER FOR SCREENING FOR OTHER VIRAL DISEASES: ICD-10-CM

## 2021-09-07 PROCEDURE — U0003 INFECTIOUS AGENT DETECTION BY NUCLEIC ACID (DNA OR RNA); SEVERE ACUTE RESPIRATORY SYNDROME CORONAVIRUS 2 (SARS-COV-2) (CORONAVIRUS DISEASE [COVID-19]), AMPLIFIED PROBE TECHNIQUE, MAKING USE OF HIGH THROUGHPUT TECHNOLOGIES AS DESCRIBED BY CMS-2020-01-R: HCPCS

## 2021-09-07 PROCEDURE — U0005 INFEC AGEN DETEC AMPLI PROBE: HCPCS

## 2021-09-08 LAB — SARS-COV-2 RNA RESP QL NAA+PROBE: NEGATIVE

## 2021-09-10 ENCOUNTER — HOSPITAL ENCOUNTER (OUTPATIENT)
Facility: AMBULATORY SURGERY CENTER | Age: 81
Discharge: HOME | End: 2021-09-10
Attending: FAMILY MEDICINE | Admitting: FAMILY MEDICINE
Payer: COMMERCIAL

## 2021-09-10 VITALS
OXYGEN SATURATION: 95 % | HEART RATE: 59 BPM | DIASTOLIC BLOOD PRESSURE: 71 MMHG | SYSTOLIC BLOOD PRESSURE: 130 MMHG | RESPIRATION RATE: 16 BRPM | TEMPERATURE: 96.6 F

## 2021-09-10 DIAGNOSIS — C18.2 MALIGNANT NEOPLASM OF ASCENDING COLON (H): Primary | ICD-10-CM

## 2021-09-10 LAB — COLONOSCOPY: NORMAL

## 2021-09-10 PROCEDURE — G8907 PT DOC NO EVENTS ON DISCHARG: HCPCS

## 2021-09-10 PROCEDURE — G0105 COLORECTAL SCRN; HI RISK IND: HCPCS

## 2021-09-10 PROCEDURE — G8918 PT W/O PREOP ORDER IV AB PRO: HCPCS

## 2021-09-10 PROCEDURE — 99152 MOD SED SAME PHYS/QHP 5/>YRS: CPT | Mod: 59 | Performed by: FAMILY MEDICINE

## 2021-09-10 PROCEDURE — G0105 COLORECTAL SCRN; HI RISK IND: HCPCS | Performed by: FAMILY MEDICINE

## 2021-09-10 RX ORDER — FENTANYL CITRATE 50 UG/ML
INJECTION, SOLUTION INTRAMUSCULAR; INTRAVENOUS PRN
Status: DISCONTINUED | OUTPATIENT
Start: 2021-09-10 | End: 2021-09-10 | Stop reason: HOSPADM

## 2021-09-10 RX ORDER — ONDANSETRON 2 MG/ML
4 INJECTION INTRAMUSCULAR; INTRAVENOUS
Status: DISCONTINUED | OUTPATIENT
Start: 2021-09-10 | End: 2021-09-11 | Stop reason: HOSPADM

## 2021-09-10 RX ORDER — LIDOCAINE 40 MG/G
CREAM TOPICAL
Status: DISCONTINUED | OUTPATIENT
Start: 2021-09-10 | End: 2021-09-11 | Stop reason: HOSPADM

## 2021-09-10 NOTE — RESULT ENCOUNTER NOTE
Mr. Gautam    Your recent test results are attached.  Colonoscopy did not show any concerning lesions.  Needs repeat colonoscopy in 3 years.    If you have any questions or concerns please contact me via My Chart or call the clinic at [phone]     Thank You  Verna Osborne MD.

## 2021-09-15 NOTE — PROGRESS NOTES
[facility] Oncology Followup  Visit Date   Sep 17, 2021       The patient is a 81-year-old gentleman with a history of high-grade prostate cancer as well as a grade 2 colon cancer here for evaluation and management of a rising PSA.  He was followed by Dr. Streeter in urology.     DISEASES UNDER MANAGEMENT:    8/6/2012             pT2c N0 Mx prostatic adenocarcinoma, [place] grade 4+5 = 9, PSA 5.2    pT1 N0 M0 G2 colonic adenocarcinoma     RADIATION HISTORY: Prostate fossa radiation (completed: 10/18/2013)  1. Phase 1: 4500 cGy (180 cGy per fraction) to the pelvis  2. Phase 2: 2520 cGy boost to the prostate bed      CHEMOTHERAPY HISTORY:     CALGB 44665  ? Leuprolide 22.5 mg IM (3/27/2012, 6/19/2012)  ? Docetaxel 75 mg/m  (3/27/2012)- discontinued on study given the development of treatment related hepatotoxicity  ? Salvage ADT  ? Leuprolide (6/25/2013-1/23/2019) - gets Q 6 months   ? 12/4/20 darolutamide 600 mg PO BID started    PSA:   1/9/18:             PSA   < 0.01  1/23/19:           PSA   = 0.04  7/10/19:           PSA   = 0.07  8/13/19            PSA   = 0.09      discontinue Bicalutamide/Maintain ADT  9/18/19            PSA   = 0.13  12/11/19          PSA  = 0.27  1/29/20 PSA  = 0.42  3/11/20 PSA = 0.63  6/16/20 PSA     = 0.92 (T=24) took 30 day supply of Bicalutamide ending ~8/19/20 8/31/2020 LUPRON 6 MONTHS  9/18/20 PSA     = 0.81  11/16/20 PSA = 1.66 -- START DAROLUTAMIDE  12/18/20 PSA  = 0.67  1/18/21 PSA  = 0.26  2/17/21 PSA  = 0.14    LUPRON 6 MONTHS DOSE  3/17/21 PSA  = 0.09  4/23/21 PSA  =0.09, mild fatigue.   6/21/21 PSA  = 0.07   8/18/21 PSA  =0.07 LUPRON 3 MONTH DOSE  9/16/21 PSA -0.09    Interim History:   Last week he had a colonoscopy and that went well, no concerning findings. He continues to feel overall well with unchanged, stable fatigue. No new symptoms. He got lupron last month. He is eating and drinking well. No active bleeding. He plans to see his grandkids this fall.       ROS:  10 point ROS neg other than the symptoms noted above in the HPI.      Video physical exam  General: Patient appears well in no acute distress.   Skin: No visualized rash or lesions on visualized skin  Eyes: EOMI, no erythema, sclera icterus or discharge noted  Resp: Appears to be breathing comfortably without accessory muscle usage, speaking in full sentences, no cough  MSK: Appears to have normal range of motion based on visualized movements  Neurologic: No apparent tremors, facial movements symmetric  Psych: affect good, alert and oriented    The rest of a comprehensive physical examination is deferred due to PHE (public health emergency) video restrictions    Assessment/Plan:  #Prostate Cancer, non metastatic   -PSA continued to rise so decided to pursue darolutamide in addition to ADT. Started on 12/4/20. He has been tolerating overall very well with some minor increase in fatigue, now has stabilized   -PSA responding nicely--0.09. reviewed that this is very stable and wound not change our course.  -continue labs with CMP monthly; he would like a visit monthly as well. While I think q3 mo would suffice, given his preference I will schedule this. Will have him see Dr. Ingram in November    Continue Lupron q3 months, due in november    #Prediabetes and hyperlipidemia  -management per pcp    #HM  -recently had colonoscopy without any concerning lesions. Repeat in 3 years: 2024    30 minutes spent on the date of the encounter doing chart review, review of test results, interpretation of tests, patient visit and documentation     Nedra Vick CNP on 9/17/2021 at 9:30 AM

## 2021-09-16 ENCOUNTER — LAB (OUTPATIENT)
Dept: LAB | Facility: OTHER | Age: 81
End: 2021-09-16
Payer: COMMERCIAL

## 2021-09-16 DIAGNOSIS — C61 MALIGNANT NEOPLASM OF PROSTATE (H): ICD-10-CM

## 2021-09-16 LAB
ALBUMIN SERPL-MCNC: 3.8 G/DL (ref 3.4–5)
ALP SERPL-CCNC: 76 U/L (ref 40–150)
ALT SERPL W P-5'-P-CCNC: 32 U/L (ref 0–70)
ANION GAP SERPL CALCULATED.3IONS-SCNC: 8 MMOL/L (ref 3–14)
AST SERPL W P-5'-P-CCNC: 39 U/L (ref 0–45)
BASOPHILS # BLD AUTO: 0 10E3/UL (ref 0–0.2)
BASOPHILS NFR BLD AUTO: 0 %
BILIRUB SERPL-MCNC: 1 MG/DL (ref 0.2–1.3)
BUN SERPL-MCNC: 13 MG/DL (ref 7–30)
CALCIUM SERPL-MCNC: 8.9 MG/DL (ref 8.5–10.1)
CHLORIDE BLD-SCNC: 106 MMOL/L (ref 94–109)
CO2 SERPL-SCNC: 27 MMOL/L (ref 20–32)
CREAT SERPL-MCNC: 0.98 MG/DL (ref 0.66–1.25)
EOSINOPHIL # BLD AUTO: 0.1 10E3/UL (ref 0–0.7)
EOSINOPHIL NFR BLD AUTO: 3 %
ERYTHROCYTE [DISTWIDTH] IN BLOOD BY AUTOMATED COUNT: 12.6 % (ref 10–15)
GFR SERPL CREATININE-BSD FRML MDRD: 73 ML/MIN/1.73M2
GLUCOSE BLD-MCNC: 136 MG/DL (ref 70–99)
HCT VFR BLD AUTO: 37 % (ref 40–53)
HGB BLD-MCNC: 12.7 G/DL (ref 13.3–17.7)
LYMPHOCYTES # BLD AUTO: 1.8 10E3/UL (ref 0.8–5.3)
LYMPHOCYTES NFR BLD AUTO: 37 %
MCH RBC QN AUTO: 33.2 PG (ref 26.5–33)
MCHC RBC AUTO-ENTMCNC: 34.3 G/DL (ref 31.5–36.5)
MCV RBC AUTO: 97 FL (ref 78–100)
MONOCYTES # BLD AUTO: 0.5 10E3/UL (ref 0–1.3)
MONOCYTES NFR BLD AUTO: 10 %
NEUTROPHILS # BLD AUTO: 2.4 10E3/UL (ref 1.6–8.3)
NEUTROPHILS NFR BLD AUTO: 49 %
PLATELET # BLD AUTO: 157 10E3/UL (ref 150–450)
POTASSIUM BLD-SCNC: 3.6 MMOL/L (ref 3.4–5.3)
PROT SERPL-MCNC: 7.4 G/DL (ref 6.8–8.8)
PSA SERPL-MCNC: 0.09 UG/L (ref 0–4)
RBC # BLD AUTO: 3.82 10E6/UL (ref 4.4–5.9)
SODIUM SERPL-SCNC: 141 MMOL/L (ref 133–144)
WBC # BLD AUTO: 4.9 10E3/UL (ref 4–11)

## 2021-09-16 PROCEDURE — 36415 COLL VENOUS BLD VENIPUNCTURE: CPT

## 2021-09-16 PROCEDURE — 84153 ASSAY OF PSA TOTAL: CPT

## 2021-09-16 PROCEDURE — 80053 COMPREHEN METABOLIC PANEL: CPT

## 2021-09-16 PROCEDURE — 84403 ASSAY OF TOTAL TESTOSTERONE: CPT

## 2021-09-16 PROCEDURE — 85025 COMPLETE CBC W/AUTO DIFF WBC: CPT

## 2021-09-17 ENCOUNTER — VIRTUAL VISIT (OUTPATIENT)
Dept: ONCOLOGY | Facility: CLINIC | Age: 81
End: 2021-09-17
Attending: NURSE PRACTITIONER
Payer: COMMERCIAL

## 2021-09-17 DIAGNOSIS — C18.2 MALIGNANT NEOPLASM OF ASCENDING COLON (H): ICD-10-CM

## 2021-09-17 DIAGNOSIS — C61 PROSTATE CANCER (H): Primary | ICD-10-CM

## 2021-09-17 DIAGNOSIS — R53.83 OTHER FATIGUE: ICD-10-CM

## 2021-09-17 DIAGNOSIS — I10 ESSENTIAL HYPERTENSION WITH GOAL BLOOD PRESSURE LESS THAN 140/90: ICD-10-CM

## 2021-09-17 PROCEDURE — 999N001193 HC VIDEO/TELEPHONE VISIT; NO CHARGE

## 2021-09-17 PROCEDURE — 99214 OFFICE O/P EST MOD 30 MIN: CPT | Mod: 95 | Performed by: NURSE PRACTITIONER

## 2021-09-17 NOTE — PROGRESS NOTES
Medardo is a 81 year old who is being evaluated via a billable video visit.      How would you like to obtain your AVS? [id number]  If the video visit is dropped, the invitation should be resent by: Text to cell phone: [phone]  Will anyone else be joining your video visit? No      Video-Visit Details    Type of service:  Video Visit    Video Start Time: 9:15 AM    Video End Time:9:27 AM    Originating Location (pt. Location): Home    Distant Location (provider location):  [facility] CANCER [facility]     Platform used for Video Visit: [employer]

## 2021-09-17 NOTE — LETTER
9/17/2021         RE: Medardo Gautam  [address] [address]        Dear Colleague,    Thank you for referring your patient, Medardo Gautam, to the [facility]CER [facility]IC. Please see a copy of my visit note below.          [facility] Oncology Followup  Visit Date   Sep 17, 2021       The patient is a 81-year-old gentleman with a history of high-grade prostate cancer as well as a grade 2 colon cancer here for evaluation and management of a rising PSA.  He was followed by Dr. Streeter in urology.     DISEASES UNDER MANAGEMENT:    8/6/2012             pT2c N0 Mx prostatic adenocarcinoma, [place] grade 4+5 = 9, PSA 5.2    pT1 N0 M0 G2 colonic adenocarcinoma     RADIATION HISTORY: Prostate fossa radiation (completed: 10/18/2013)  1. Phase 1: 4500 cGy (180 cGy per fraction) to the pelvis  2. Phase 2: 2520 cGy boost to the prostate bed      CHEMOTHERAPY HISTORY:     CALGB 58988  ? Leuprolide 22.5 mg IM (3/27/2012, 6/19/2012)  ? Docetaxel 75 mg/m  (3/27/2012)- discontinued on study given the development of treatment related hepatotoxicity  ? Salvage ADT  ? Leuprolide (6/25/2013-1/23/2019) - gets Q 6 months   ? 12/4/20 darolutamide 600 mg PO BID started    PSA:   1/9/18:             PSA   < 0.01  1/23/19:           PSA   = 0.04  7/10/19:           PSA   = 0.07  8/13/19            PSA   = 0.09      discontinue Bicalutamide/Maintain ADT  9/18/19            PSA   = 0.13  12/11/19          PSA  = 0.27  1/29/20 PSA  = 0.42  3/11/20 PSA = 0.63  6/16/20 PSA     = 0.92 (T=24) took 30 day supply of Bicalutamide ending ~8/19/20 8/31/2020 LUPRON 6 MONTHS  9/18/20 PSA     = 0.81  11/16/20 PSA = 1.66 -- START DAROLUTAMIDE  12/18/20 PSA  = 0.67  1/18/21 PSA  = 0.26  2/17/21 PSA  = 0.14    LUPRON 6 MONTHS DOSE  3/17/21 PSA  = 0.09  4/23/21 PSA  =0.09, mild fatigue.   6/21/21 PSA  = 0.07   8/18/21 PSA  =0.07 LUPRON 3 MONTH DOSE  9/16/21 PSA -0.09    Interim History:   Last week he had a  colonoscopy and that went well, no concerning findings. He continues to feel overall well with unchanged, stable fatigue. No new symptoms. He got lupron last month. He is eating and drinking well. No active bleeding. He plans to see his grandkids this fall.       ROS: 10 point ROS neg other than the symptoms noted above in the HPI.      Video physical exam  General: Patient appears well in no acute distress.   Skin: No visualized rash or lesions on visualized skin  Eyes: EOMI, no erythema, sclera icterus or discharge noted  Resp: Appears to be breathing comfortably without accessory muscle usage, speaking in full sentences, no cough  MSK: Appears to have normal range of motion based on visualized movements  Neurologic: No apparent tremors, facial movements symmetric  Psych: affect good, alert and oriented    The rest of a comprehensive physical examination is deferred due to PHE (public health emergency) video restrictions    Assessment/Plan:  #Prostate Cancer, non metastatic   -PSA continued to rise so decided to pursue darolutamide in addition to ADT. Started on 12/4/20. He has been tolerating overall very well with some minor increase in fatigue, now has stabilized   -PSA responding nicely--0.09. reviewed that this is very stable and wound not change our course.  -continue labs with CMP monthly; he would like a visit monthly as well. While I think q3 mo would suffice, given his preference I will schedule this. Will have him see Dr. Ingram in November    Continue Lupron q3 months, due in november    #Prediabetes and hyperlipidemia  -management per pcp    #HM  -recently had colonoscopy without any concerning lesions. Repeat in 3 years: 2024    30 minutes spent on the date of the encounter doing chart review, review of test results, interpretation of tests, patient visit and documentation     Nedra Vick CNP on 9/17/2021 at 9:30 AM        Medardo is a 81 year old who is being evaluated via a billable video visit.       How would you like to obtain your AVS? [employer]  If the video visit is dropped, the invitation should be resent by: Text to cell phone: [phone]  Will anyone else be joining your video visit? No      Video-Visit Details    Type of service:  Video Visit    Video Start Time: 9:15 AM    Video End Time:9:27 AM    Originating Location (pt. Location): Home    Distant Location (provider location):  [facility] CANCER [facility]     Platform used for Video Visit: [employer]      Again, thank you for allowing me to participate in the care of your patient.        Sincerely,        Nedra Vick, CNP

## 2021-09-18 LAB — TESTOST SERPL-MCNC: 16 NG/DL (ref 240–950)

## 2021-09-25 ENCOUNTER — HEALTH MAINTENANCE LETTER (OUTPATIENT)
Age: 81
End: 2021-09-25

## 2021-10-13 NOTE — PROGRESS NOTES
Medardo is a 81 year old who is being evaluated via a billable video visit.      How would you like to obtain your AVS? [employer]  If the video visit is dropped, the invitation should be resent by: Send to e-mail at: [email or web address]  Will anyone else be joining your video visit? No    s    Video-Visit Details    Type of service:  Video Visit    Video Start Time: 11:02 AM    Video End Time:11:17 AM    Originating Location (pt. Location): Home    Distant Location (provider location):  [facility] CANCER [facility]     Platform used for Video Visit: [facility] Oncology Followup  Visit Date   Oct 15, 2021       The patient is a 81-year-old gentleman with a history of high-grade prostate cancer as well as a grade 2 colon cancer here for evaluation and management of a rising PSA.  He was followed by Dr. Streeter in urology.     DISEASES UNDER MANAGEMENT:    8/6/2012             pT2c N0 Mx prostatic adenocarcinoma, Jeancarlos grade 4+5 = 9, PSA 5.2    pT1 N0 M0 G2 colonic adenocarcinoma     RADIATION HISTORY: Prostate fossa radiation (completed: 10/18/2013)  1. Phase 1: 4500 cGy (180 cGy per fraction) to the pelvis  2. Phase 2: 2520 cGy boost to the prostate bed      CHEMOTHERAPY HISTORY:     CALGB 78549  ? Leuprolide 22.5 mg IM (3/27/2012, 6/19/2012)  ? Docetaxel 75 mg/m  (3/27/2012)- discontinued on study given the development of treatment related hepatotoxicity  ? Salvage ADT  ? Leuprolide (6/25/2013-1/23/2019) - gets Q 6 months   ? 12/4/20 darolutamide 600 mg PO BID started    PSA:   1/9/18:             PSA   < 0.01  1/23/19:           PSA   = 0.04  7/10/19:           PSA   = 0.07  8/13/19            PSA   = 0.09      discontinue Bicalutamide/Maintain ADT  9/18/19            PSA   = 0.13  12/11/19          PSA  = 0.27  1/29/20 PSA  = 0.42  3/11/20 PSA = 0.63  6/16/20 PSA     = 0.92 (T=24) took 30 day supply of Bicalutamide ending ~8/19/20 8/31/2020 LUPRON 6 MONTHS  9/18/20 PSA     =  0.81  11/16/20 PSA = 1.66 -- START DAROLUTAMIDE  12/18/20 PSA  = 0.67  1/18/21 PSA  = 0.26  2/17/21 PSA  = 0.14    LUPRON 6 MONTHS DOSE  3/17/21 PSA  = 0.09  4/23/21 PSA  =0.09, mild fatigue.   6/21/21 PSA  = 0.07   8/18/21 PSA  =0.07 LUPRON 3 MONTH DOSE  9/16/21 PSA -0.09  10/14/21 PSA     =0.11    Interim History:   Feeling a little fatigued but is adapting and not impairing QOL  No new symptoms  Getting Lupron     ROS: 10 point ROS neg other than the symptoms noted above in the HPI.    Video physical exam  General: Patient appears well in no acute distress.   Skin: No visualized rash or lesions on visualized skin  Eyes: EOMI, no erythema, sclera icterus or discharge noted  Resp: Appears to be breathing comfortably without accessory muscle usage, speaking in full sentences, no cough  MSK: Appears to have normal range of motion based on visualized movements  Neurologic: No apparent tremors, facial movements symmetric  Psych: affect good, alert and oriented    The rest of a comprehensive physical examination is deferred due to PHE (public health emergency) video restrictions    Assessment/Plan:  #Prostate Cancer, non metastatic castration resistant  -PSA continued to rise so decided to pursue darolutamide in addition to ADT. Started on 12/4/20. He has been tolerating overall very well with some minor increase in fatigue, now has stabilized   -PSA remains stable. Very mild trend up to 0.11 today. reviewed that this wound not change our course.  -continue labs with CMP monthly; he would like a visit monthly as well. While I think q3 mo would suffice, given his preference I will schedule this. Seeing Dr. Ingram next month.    Continue Lupron q3 months, scheduled for next month.     #Prediabetes and hyperlipidemia  -management per pcp    #HM  -recently had colonoscopy without any concerning lesions. Repeat in 3 years: 2024    30 minutes spent on the date of the encounter doing chart review, review of test results,  interpretation of tests, patient visit, documentation and discussion with other provider(s)     Nedra Vick CNP on 10/15/2021 at 11:15 AM

## 2021-10-14 ENCOUNTER — LAB (OUTPATIENT)
Dept: LAB | Facility: OTHER | Age: 81
End: 2021-10-14
Payer: COMMERCIAL

## 2021-10-14 DIAGNOSIS — C61 MALIGNANT NEOPLASM OF PROSTATE (H): ICD-10-CM

## 2021-10-14 DIAGNOSIS — Z79.899 ENCOUNTER FOR LONG-TERM (CURRENT) USE OF MEDICATIONS: ICD-10-CM

## 2021-10-14 LAB
ALBUMIN SERPL-MCNC: 3.9 G/DL (ref 3.4–5)
ALP SERPL-CCNC: 80 U/L (ref 40–150)
ALT SERPL W P-5'-P-CCNC: 34 U/L (ref 0–70)
ANION GAP SERPL CALCULATED.3IONS-SCNC: 5 MMOL/L (ref 3–14)
AST SERPL W P-5'-P-CCNC: 48 U/L (ref 0–45)
BASOPHILS # BLD AUTO: 0 10E3/UL (ref 0–0.2)
BASOPHILS NFR BLD AUTO: 0 %
BILIRUB SERPL-MCNC: 1 MG/DL (ref 0.2–1.3)
BUN SERPL-MCNC: 16 MG/DL (ref 7–30)
CALCIUM SERPL-MCNC: 9.3 MG/DL (ref 8.5–10.1)
CHLORIDE BLD-SCNC: 106 MMOL/L (ref 94–109)
CO2 SERPL-SCNC: 28 MMOL/L (ref 20–32)
CREAT SERPL-MCNC: 0.99 MG/DL (ref 0.66–1.25)
EOSINOPHIL # BLD AUTO: 0.1 10E3/UL (ref 0–0.7)
EOSINOPHIL NFR BLD AUTO: 2 %
ERYTHROCYTE [DISTWIDTH] IN BLOOD BY AUTOMATED COUNT: 13 % (ref 10–15)
GFR SERPL CREATININE-BSD FRML MDRD: 72 ML/MIN/1.73M2
GLUCOSE BLD-MCNC: 128 MG/DL (ref 70–99)
HCT VFR BLD AUTO: 37.4 % (ref 40–53)
HGB BLD-MCNC: 12.8 G/DL (ref 13.3–17.7)
LYMPHOCYTES # BLD AUTO: 1.5 10E3/UL (ref 0.8–5.3)
LYMPHOCYTES NFR BLD AUTO: 35 %
MCH RBC QN AUTO: 33.5 PG (ref 26.5–33)
MCHC RBC AUTO-ENTMCNC: 34.2 G/DL (ref 31.5–36.5)
MCV RBC AUTO: 98 FL (ref 78–100)
MONOCYTES # BLD AUTO: 0.4 10E3/UL (ref 0–1.3)
MONOCYTES NFR BLD AUTO: 9 %
NEUTROPHILS # BLD AUTO: 2.2 10E3/UL (ref 1.6–8.3)
NEUTROPHILS NFR BLD AUTO: 53 %
PLATELET # BLD AUTO: 130 10E3/UL (ref 150–450)
POTASSIUM BLD-SCNC: 3.6 MMOL/L (ref 3.4–5.3)
PROT SERPL-MCNC: 7.6 G/DL (ref 6.8–8.8)
PSA SERPL-MCNC: 0.11 UG/L (ref 0–4)
RBC # BLD AUTO: 3.82 10E6/UL (ref 4.4–5.9)
SODIUM SERPL-SCNC: 139 MMOL/L (ref 133–144)
WBC # BLD AUTO: 4.2 10E3/UL (ref 4–11)

## 2021-10-14 PROCEDURE — 84403 ASSAY OF TOTAL TESTOSTERONE: CPT

## 2021-10-14 PROCEDURE — 80053 COMPREHEN METABOLIC PANEL: CPT

## 2021-10-14 PROCEDURE — 36415 COLL VENOUS BLD VENIPUNCTURE: CPT

## 2021-10-14 PROCEDURE — 85025 COMPLETE CBC W/AUTO DIFF WBC: CPT

## 2021-10-14 PROCEDURE — 84153 ASSAY OF PSA TOTAL: CPT

## 2021-10-15 ENCOUNTER — VIRTUAL VISIT (OUTPATIENT)
Dept: ONCOLOGY | Facility: CLINIC | Age: 81
End: 2021-10-15
Attending: NURSE PRACTITIONER
Payer: COMMERCIAL

## 2021-10-15 DIAGNOSIS — R53.83 OTHER FATIGUE: ICD-10-CM

## 2021-10-15 DIAGNOSIS — C61 MALIGNANT NEOPLASM OF PROSTATE (H): Primary | ICD-10-CM

## 2021-10-15 DIAGNOSIS — E78.5 HYPERLIPIDEMIA LDL GOAL <130: ICD-10-CM

## 2021-10-15 DIAGNOSIS — I10 ESSENTIAL HYPERTENSION WITH GOAL BLOOD PRESSURE LESS THAN 140/90: ICD-10-CM

## 2021-10-15 DIAGNOSIS — F41.9 ANXIETY: ICD-10-CM

## 2021-10-15 LAB — TESTOST SERPL-MCNC: 16 NG/DL (ref 240–950)

## 2021-10-15 PROCEDURE — 999N001193 HC VIDEO/TELEPHONE VISIT; NO CHARGE

## 2021-10-15 PROCEDURE — G0463 HOSPITAL OUTPT CLINIC VISIT: HCPCS | Mod: PN,RTG | Performed by: NURSE PRACTITIONER

## 2021-10-15 PROCEDURE — 99214 OFFICE O/P EST MOD 30 MIN: CPT | Mod: 95 | Performed by: NURSE PRACTITIONER

## 2021-10-15 NOTE — NURSING NOTE
Will need a refill on NUBEQA sent to pharmacy pended in chart. For all other medications they need to be sent to [facility] [place].

## 2021-10-15 NOTE — LETTER
10/15/2021         RE: Medardo Gautam  [address] [address] [address]        Dear Colleague,    Thank you for referring your patient, Medardo Gautam, to the [facility]. Please see a copy of my visit note below.    Medardo is a 81 year old who is being evaluated via a billable video visit.      How would you like to obtain your AVS? MyChart  If the video visit is dropped, the invitation should be resent by: Send to e-mail at: [email or web address]  Will anyone else be joining your video visit? No    s    Video-Visit Details    Type of service:  Video Visit    Video Start Time: 11:02 AM    Video End Time:11:17 AM    Originating Location (pt. Location): Home    Distant Location (provider location):  [facility]     Platform used for Video Visit: [facility] Oncology Followup  Visit Date   Oct 15, 2021       The patient is a 81-year-old gentleman with a history of high-grade prostate cancer as well as a grade 2 colon cancer here for evaluation and management of a rising PSA.  He was followed by Dr. Streeter in urology.     DISEASES UNDER MANAGEMENT:    8/6/2012             pT2c N0 Mx prostatic adenocarcinoma, [place] grade 4+5 = 9, PSA 5.2    pT1 N0 M0 G2 colonic adenocarcinoma     RADIATION HISTORY: Prostate fossa radiation (completed: 10/18/2013)  1. Phase 1: 4500 cGy (180 cGy per fraction) to the pelvis  2. Phase 2: 2520 cGy boost to the prostate bed      CHEMOTHERAPY HISTORY:     CALGB 73836  ? Leuprolide 22.5 mg IM (3/27/2012, 6/19/2012)  ? Docetaxel 75 mg/m  (3/27/2012)- discontinued on study given the development of treatment related hepatotoxicity  ? Salvage ADT  ? Leuprolide (6/25/2013-1/23/2019) - gets Q 6 months   ? 12/4/20 darolutamide 600 mg PO BID started    PSA:   1/9/18:             PSA   < 0.01  1/23/19:           PSA   = 0.04  7/10/19:           PSA   = 0.07  8/13/19            PSA   = 0.09      discontinue  Bicalutamide/Maintain ADT  9/18/19            PSA   = 0.13  12/11/19          PSA  = 0.27  1/29/20 PSA  = 0.42  3/11/20 PSA = 0.63  6/16/20 PSA     = 0.92 (T=24) took 30 day supply of Bicalutamide ending ~8/19/20 8/31/2020 LUPRON 6 MONTHS  9/18/20 PSA     = 0.81  11/16/20 PSA = 1.66 -- START DAROLUTAMIDE  12/18/20 PSA  = 0.67  1/18/21 PSA  = 0.26  2/17/21 PSA  = 0.14    LUPRON 6 MONTHS DOSE  3/17/21 PSA  = 0.09  4/23/21 PSA  =0.09, mild fatigue.   6/21/21 PSA  = 0.07   8/18/21 PSA  =0.07 LUPRON 3 MONTH DOSE  9/16/21 PSA -0.09  10/14/21 PSA     =0.11    Interim History:   Feeling a little fatigued but is adapting and not impairing QOL  No new symptoms  Getting Lupron     ROS: 10 point ROS neg other than the symptoms noted above in the HPI.    Video physical exam  General: Patient appears well in no acute distress.   Skin: No visualized rash or lesions on visualized skin  Eyes: EOMI, no erythema, sclera icterus or discharge noted  Resp: Appears to be breathing comfortably without accessory muscle usage, speaking in full sentences, no cough  MSK: Appears to have normal range of motion based on visualized movements  Neurologic: No apparent tremors, facial movements symmetric  Psych: affect good, alert and oriented    The rest of a comprehensive physical examination is deferred due to PHE (public health emergency) video restrictions    Assessment/Plan:  #Prostate Cancer, non metastatic castration resistant  -PSA continued to rise so decided to pursue darolutamide in addition to ADT. Started on 12/4/20. He has been tolerating overall very well with some minor increase in fatigue, now has stabilized   -PSA remains stable. Very mild trend up to 0.11 today. reviewed that this wound not change our course.  -continue labs with CMP monthly; he would like a visit monthly as well. While I think q3 mo would suffice, given his preference I will schedule this. Seeing Dr. Ingram next month.    Continue Lupron q3 months, scheduled for  next month.     #Prediabetes and hyperlipidemia  -management per pcp    #HM  -recently had colonoscopy without any concerning lesions. Repeat in 3 years: 2024    30 minutes spent on the date of the encounter doing chart review, review of test results, interpretation of tests, patient visit, documentation and discussion with other provider(s)     Nedra Vick CNP on 10/15/2021 at 11:15 AM        Again, thank you for allowing me to participate in the care of your patient.        Sincerely,        Nedra Vick CNP

## 2021-10-17 ENCOUNTER — TELEPHONE (OUTPATIENT)
Dept: ONCOLOGY | Facility: CLINIC | Age: 81
End: 2021-10-17

## 2021-10-21 ENCOUNTER — TELEPHONE (OUTPATIENT)
Dept: ONCOLOGY | Facility: CLINIC | Age: 81
End: 2021-10-21

## 2021-10-21 DIAGNOSIS — C61 MALIGNANT NEOPLASM OF PROSTATE (H): Primary | ICD-10-CM

## 2021-10-21 DIAGNOSIS — C61 PROSTATE CANCER (H): ICD-10-CM

## 2021-10-21 NOTE — TELEPHONE ENCOUNTER
Prior Authorization Approval    Authorization Effective Date: 10/21/2021  Authorization Expiration Date: 10/21/2024  Medication: Nubeqa - APPROVED  Approved Dose/Quantity:   Reference #:     Insurance Company: [employer] - Phone [phone] Fax [phone]  Expected CoPay:       CoPay Card Available:      Foundation Assistance Needed:    Which Pharmacy is filling the prescription (Not needed for infusion/clinic administered):    Pharmacy Notified:    Patient Notified:          Macie Perea CPhT  [facility]  Oncology Pharmacy Liaison  [email or web address]  Phone: [phone]  Fax: [phone]

## 2021-10-21 NOTE — TELEPHONE ENCOUNTER
PA Initiation    Medication: Nubeqa - Submitted  Insurance Company: [employer] - Phone [phone] Fax [phone]  Pharmacy Filling the Rx:    Filling Pharmacy Phone:    Filling Pharmacy Fax:    Start Date: 10/21/2021        Macie Perea CPhT  [facility]  Oncology Pharmacy Liaison  [email or web address]  Phone: [phone]  Fax: [phone]

## 2021-11-11 ENCOUNTER — LAB (OUTPATIENT)
Dept: LAB | Facility: OTHER | Age: 81
End: 2021-11-11
Payer: COMMERCIAL

## 2021-11-11 ENCOUNTER — ALLIED HEALTH/NURSE VISIT (OUTPATIENT)
Dept: FAMILY MEDICINE | Facility: OTHER | Age: 81
End: 2021-11-11
Payer: COMMERCIAL

## 2021-11-11 DIAGNOSIS — C61 MALIGNANT NEOPLASM OF PROSTATE (H): Primary | ICD-10-CM

## 2021-11-11 DIAGNOSIS — C61 MALIGNANT NEOPLASM OF PROSTATE (H): ICD-10-CM

## 2021-11-11 LAB
ALBUMIN SERPL-MCNC: 3.6 G/DL (ref 3.4–5)
ALP SERPL-CCNC: 74 U/L (ref 40–150)
ALT SERPL W P-5'-P-CCNC: 31 U/L (ref 0–70)
ANION GAP SERPL CALCULATED.3IONS-SCNC: 3 MMOL/L (ref 3–14)
AST SERPL W P-5'-P-CCNC: 32 U/L (ref 0–45)
BASOPHILS # BLD AUTO: 0 10E3/UL (ref 0–0.2)
BASOPHILS NFR BLD AUTO: 0 %
BILIRUB SERPL-MCNC: 0.9 MG/DL (ref 0.2–1.3)
BUN SERPL-MCNC: 13 MG/DL (ref 7–30)
CALCIUM SERPL-MCNC: 8.8 MG/DL (ref 8.5–10.1)
CHLORIDE BLD-SCNC: 103 MMOL/L (ref 94–109)
CO2 SERPL-SCNC: 29 MMOL/L (ref 20–32)
CREAT SERPL-MCNC: 0.91 MG/DL (ref 0.66–1.25)
EOSINOPHIL # BLD AUTO: 0.1 10E3/UL (ref 0–0.7)
EOSINOPHIL NFR BLD AUTO: 2 %
ERYTHROCYTE [DISTWIDTH] IN BLOOD BY AUTOMATED COUNT: 13.1 % (ref 10–15)
GFR SERPL CREATININE-BSD FRML MDRD: 80 ML/MIN/1.73M2
GLUCOSE BLD-MCNC: 122 MG/DL (ref 70–99)
HCT VFR BLD AUTO: 37.6 % (ref 40–53)
HGB BLD-MCNC: 12.8 G/DL (ref 13.3–17.7)
LYMPHOCYTES # BLD AUTO: 1.7 10E3/UL (ref 0.8–5.3)
LYMPHOCYTES NFR BLD AUTO: 34 %
MCH RBC QN AUTO: 33.5 PG (ref 26.5–33)
MCHC RBC AUTO-ENTMCNC: 34 G/DL (ref 31.5–36.5)
MCV RBC AUTO: 98 FL (ref 78–100)
MONOCYTES # BLD AUTO: 0.6 10E3/UL (ref 0–1.3)
MONOCYTES NFR BLD AUTO: 11 %
NEUTROPHILS # BLD AUTO: 2.7 10E3/UL (ref 1.6–8.3)
NEUTROPHILS NFR BLD AUTO: 53 %
PLATELET # BLD AUTO: 147 10E3/UL (ref 150–450)
POTASSIUM BLD-SCNC: 3.7 MMOL/L (ref 3.4–5.3)
PROT SERPL-MCNC: 7.2 G/DL (ref 6.8–8.8)
PSA SERPL-MCNC: 0.1 UG/L (ref 0–4)
RBC # BLD AUTO: 3.82 10E6/UL (ref 4.4–5.9)
SODIUM SERPL-SCNC: 135 MMOL/L (ref 133–144)
WBC # BLD AUTO: 5.1 10E3/UL (ref 4–11)

## 2021-11-11 PROCEDURE — 84403 ASSAY OF TOTAL TESTOSTERONE: CPT

## 2021-11-11 PROCEDURE — 84153 ASSAY OF PSA TOTAL: CPT

## 2021-11-11 PROCEDURE — 80053 COMPREHEN METABOLIC PANEL: CPT

## 2021-11-11 PROCEDURE — 96372 THER/PROPH/DIAG INJ SC/IM: CPT | Performed by: INTERNAL MEDICINE

## 2021-11-11 PROCEDURE — 85025 COMPLETE CBC W/AUTO DIFF WBC: CPT

## 2021-11-11 PROCEDURE — 36415 COLL VENOUS BLD VENIPUNCTURE: CPT

## 2021-11-11 NOTE — PROGRESS NOTES
Clinic Administered Medication Documentation          Injectable Medication Documentation    Patient was given Depot Lupron 22.5MG. Prior to medication administration, verified patients identity using patient s name and date of birth. Please see MAR and medication order for additional information. Patient instructed to remain in clinic for 15 minutes.      Was entire vial of medication used? Yes  Vial/Syringe: Syringe  Expiration Date:  03/09/2024  Was this medication supplied by the patient? No     TRACIE Gordon, RN, N  [place]/[facility]  November 11, 2021

## 2021-11-15 ENCOUNTER — VIRTUAL VISIT (OUTPATIENT)
Dept: ONCOLOGY | Facility: CLINIC | Age: 81
End: 2021-11-15
Attending: INTERNAL MEDICINE
Payer: COMMERCIAL

## 2021-11-15 DIAGNOSIS — C61 PROSTATE CANCER (H): Primary | ICD-10-CM

## 2021-11-15 LAB — TESTOST SERPL-MCNC: 18 NG/DL (ref 240–950)

## 2021-11-15 PROCEDURE — G0463 HOSPITAL OUTPT CLINIC VISIT: HCPCS | Mod: PN,RTG | Performed by: INTERNAL MEDICINE

## 2021-11-15 PROCEDURE — 99214 OFFICE O/P EST MOD 30 MIN: CPT | Mod: 95 | Performed by: INTERNAL MEDICINE

## 2021-11-15 NOTE — LETTER
11/15/2021         RE: Medardo Gautam  [address] [address]        Dear Colleague,    Thank you for referring your patient, Medardo Gautam, to the [facility] CANCER CLINIC. Please see a copy of my visit note below.    [facility] Oncology Followup  Visit Date   Nov 15, 2021       The patient is a 81-year-old gentleman with a history of high-grade prostate cancer as well as a grade 2 colon cancer here for evaluation and management of a rising PSA.  He was followed by Dr. Streeter in urology.     DISEASES UNDER MANAGEMENT:    8/6/2012             pT2c N0 Mx prostatic adenocarcinoma, Jeancarlos grade 4+5 = 9, PSA 5.2    pT1 N0 M0 G2 colonic adenocarcinoma     RADIATION HISTORY: Prostate fossa radiation (completed: 10/18/2013)  1. Phase 1: 4500 cGy (180 cGy per fraction) to the pelvis  2. Phase 2: 2520 cGy boost to the prostate bed      CHEMOTHERAPY HISTORY:     CALGB 84198  ? Leuprolide 22.5 mg IM (3/27/2012, 6/19/2012)  ? Docetaxel 75 mg/m  (3/27/2012)- discontinued on study given the development of treatment related hepatotoxicity  ? Salvage ADT  ? Leuprolide (6/25/2013-1/23/2019) - gets Q 6 months   ? 12/4/20 darolutamide 600 mg PO BID started    PSA:   1/9/18:             PSA   < 0.01  1/23/19:           PSA   = 0.04  7/10/19:           PSA   = 0.07  8/13/19            PSA   = 0.09      discontinue Bicalutamide/Maintain ADT  9/18/19            PSA   = 0.13  12/11/19          PSA  = 0.27  1/29/20 PSA  = 0.42  3/11/20 PSA = 0.63  6/16/20 PSA     = 0.92 (T=24) took 30 day supply of Bicalutamide ending ~8/19/20 8/31/2020 LUPRON 6 MONTHS  9/18/20 PSA     = 0.81  11/16/20 PSA = 1.66 -- START DAROLUTAMIDE  12/18/20 PSA  = 0.67  1/18/21 PSA  = 0.26  2/17/21 PSA  = 0.14    LUPRON 6 MONTHS DOSE  3/17/21 PSA  = 0.09  4/23/21 PSA  =0.09, mild fatigue.   6/21/21 PSA  = 0.07   8/18/21 PSA  =0.07 LUPRON 3 MONTH DOSE  9/16/21 PSA =0.09 Testosterone total=16  10/14/21 PSA     =0.11,  testosterone total=16  11/11/21 PSA =0.10, LUPRON dose this Thursday 11/18/21    Interim History:   Some fatigue  Leg pain at night which he thinks may be related to nubeqa  No new symptoms  Getting Lupron   Gets co pay assistance from [employer] for the Nubeqa    ROS: 10 point ROS neg other than the symptoms noted above in the HPI.    Video physical exam  General: Patient appears well in no acute distress.   Skin: No visualized rash or lesions on visualized skin  Eyes: EOMI, no erythema, sclera icterus or discharge noted  Resp: Appears to be breathing comfortably without accessory muscle usage, speaking in full sentences, no cough  MSK: Appears to have normal range of motion based on visualized movements  Neurologic: No apparent tremors, facial movements symmetric  Psych: affect good, alert and oriented    The rest of a comprehensive physical examination is deferred due to PHE (public health emergency) video restrictions    Assessment/Plan:  #Prostate Cancer, non metastatic castration resistant  -PSA continued to rise so decided to pursue darolutamide in addition to ADT. Started on 12/4/20. He has been tolerating overall very well with some minor increase in fatigue, now has stabilized   -PSA remains stable. Stable at 0.10  today. reviewed that this wound not change our course.  -continue labs with CMP monthly; he would like a visit monthly as well. While I think q3 mo would suffice, given his preference I will schedule this.     Continue Lupron q3 months, scheduled for this week.     #Prediabetes and hyperlipidemia  -management per pcp    #HM  -recently had colonoscopy without any concerning lesions. Repeat in 3 years: 2024    Medardo Ingram MD

## 2021-11-15 NOTE — PROGRESS NOTES
[facility] Oncology Followup  Visit Date   Nov 15, 2021       The patient is a 81-year-old gentleman with a history of high-grade prostate cancer as well as a grade 2 colon cancer here for evaluation and management of a rising PSA.  He was followed by Dr. Streeter in urology.     DISEASES UNDER MANAGEMENT:  8/6/2012             pT2c N0 Mx prostatic adenocarcinoma, [place] grade 4+5 = 9, PSA 5.2  pT1 N0 M0 G2 colonic adenocarcinoma     RADIATION HISTORY: Prostate fossa radiation (completed: 10/18/2013)  Phase 1: 4500 cGy (180 cGy per fraction) to the pelvis  Phase 2: 2520 cGy boost to the prostate bed      CHEMOTHERAPY HISTORY:   CAL 49608  Leuprolide 22.5 mg IM (3/27/2012, 6/19/2012)  Docetaxel 75 mg/m  (3/27/2012)- discontinued on study given the development of treatment related hepatotoxicity  Sal[place]age ADT  Leuprolide (6/25/2013-1/23/2019) - gets Q 6 months   12/4/20 darolutamide 600 mg PO BID started    PSA:   1/9/18:             PSA   < 0.01  1/23/19:           PSA   = 0.04  7/10/19:           PSA   = 0.07  8/13/19            PSA   = 0.09      discontinue Bicalutamide/Maintain ADT  9/18/19            PSA   = 0.13  12/11/19          PSA  = 0.27  1/29/20 PSA  = 0.42  3/11/20 PSA = 0.63  6/16/20 PSA     = 0.92 (T=24) took 30 day supply of Bicalutamide ending ~8/19/20 8/31/2020 LUPRON 6 MONTHS  9/18/20 PSA     = 0.81  11/16/20 PSA = 1.66 -- START DAROLUTAMIDE  12/18/20 PSA  = 0.67  1/18/21 PSA  = 0.26  2/17/21 PSA  = 0.14    LUPRON 6 MONTHS DOSE  3/17/21 PSA  = 0.09  4/23/21 PSA  =0.09, mild fatigue.   6/21/21 PSA  = 0.07   8/18/21 PSA  =0.07 LUPRON 3 MONTH DOSE  9/16/21 PSA =0.09 Testosterone total=16  10/14/21 PSA     =0.11, testosterone total=16  11/11/21 PSA =0.10, LUPRON dose this Thursday 11/18/21    Interim History:   Some fatigue  Leg pain at night which he thinks may be related to nubeqa  No new symptoms  Getting Lupron   Gets co pay assistance from [employer] for the Nubeqa    ROS: 10 point ROS neg  other than the symptoms noted above in the HPI.    Video physical exam  General: Patient appears well in no acute distress.   Skin: No visualized rash or lesions on visualized skin  Eyes: EOMI, no erythema, sclera icterus or discharge noted  Resp: Appears to be breathing comfortably without accessory muscle usage, speaking in full sentences, no cough  MSK: Appears to have normal range of motion based on visualized movements  Neurologic: No apparent tremors, facial movements symmetric  Psych: affect good, alert and oriented    The rest of a comprehensive physical examination is deferred due to PHE (public health emergency) video restrictions    Assessment/Plan:  #Prostate Cancer, non metastatic castration resistant  -PSA continued to rise so decided to pursue darolutamide in addition to ADT. Started on 12/4/20. He has been tolerating overall very well with some minor increase in fatigue, now has stabilized   -PSA remains stable. Stable at 0.10  today. reviewed that this wound not change our course.  -continue labs with CMP monthly; he would like a visit monthly as well. While I think q3 mo would suffice, given his preference I will schedule this.     Continue Lupron q3 months, scheduled for this week.     #Prediabetes and hyperlipidemia  -management per pcp    #HM  -recently had colonoscopy without any concerning lesions. Repeat in 3 years: 2024    Medardo Ingram MD

## 2021-11-15 NOTE — PROGRESS NOTES
Medardo is a 81 year old who is being evaluated via a billable video visit.      How would you like to obtain your AVS? Pharm[employer]hart  If the video visit is dropped, the invitation should be resent by: Text to cell phone: [phone]  Will anyone else be joining your video visit? No        Video-Visit Details  Duration 25 minutes  Type of service:  Video Visit    Distant Location (provider location):  [facility] CANCER [facility]     Platform used for Video Visit: [employer]

## 2021-11-18 ENCOUNTER — OFFICE VISIT (OUTPATIENT)
Dept: FAMILY MEDICINE | Facility: OTHER | Age: 81
End: 2021-11-18
Payer: COMMERCIAL

## 2021-11-18 VITALS
WEIGHT: 190.8 LBS | TEMPERATURE: 97.3 F | BODY MASS INDEX: 28.92 KG/M2 | HEIGHT: 68 IN | DIASTOLIC BLOOD PRESSURE: 74 MMHG | RESPIRATION RATE: 16 BRPM | HEART RATE: 62 BPM | SYSTOLIC BLOOD PRESSURE: 128 MMHG | OXYGEN SATURATION: 99 %

## 2021-11-18 DIAGNOSIS — Z23 NEED FOR PROPHYLACTIC VACCINATION AND INOCULATION AGAINST INFLUENZA: Primary | ICD-10-CM

## 2021-11-18 DIAGNOSIS — I10 ESSENTIAL HYPERTENSION WITH GOAL BLOOD PRESSURE LESS THAN 140/90: ICD-10-CM

## 2021-11-18 DIAGNOSIS — M65.30 TRIGGER FINGER, ACQUIRED: ICD-10-CM

## 2021-11-18 PROCEDURE — 90662 IIV NO PRSV INCREASED AG IM: CPT | Performed by: FAMILY MEDICINE

## 2021-11-18 PROCEDURE — G0008 ADMIN INFLUENZA VIRUS VAC: HCPCS | Performed by: FAMILY MEDICINE

## 2021-11-18 PROCEDURE — 99213 OFFICE O/P EST LOW 20 MIN: CPT | Mod: 25 | Performed by: FAMILY MEDICINE

## 2021-11-18 ASSESSMENT — ENCOUNTER SYMPTOMS
NAUSEA: 0
NERVOUS/ANXIOUS: 1
ABDOMINAL PAIN: 0
DYSURIA: 0
HEARTBURN: 0
DIARRHEA: 0
MYALGIAS: 0
EYE PAIN: 0
FEVER: 0
SORE THROAT: 0
ARTHRALGIAS: 1
PARESTHESIAS: 0
FREQUENCY: 1
CHILLS: 0
HEMATURIA: 0
PALPITATIONS: 0
HEADACHES: 0
DIZZINESS: 0
SHORTNESS OF BREATH: 0
HEMATOCHEZIA: 0
CONSTIPATION: 0
COUGH: 0
JOINT SWELLING: 0
WEAKNESS: 1

## 2021-11-18 ASSESSMENT — MIFFLIN-ST. JEOR: SCORE: 1547.34

## 2021-11-18 ASSESSMENT — ACTIVITIES OF DAILY LIVING (ADL): CURRENT_FUNCTION: NO ASSISTANCE NEEDED

## 2021-11-18 NOTE — ASSESSMENT & PLAN NOTE
Blood pressures well controlled on the current dose of losartan hydrochlorothiazide.  Continue current medications without changes.

## 2021-11-18 NOTE — PROGRESS NOTES
"Assessment & Plan   Problem List Items Addressed This Visit     Essential hypertension with goal blood pressure less than 140/90     Blood pressures well controlled on the current dose of losartan hydrochlorothiazide.  Continue current medications without changes.         Trigger finger, acquired     Minimal symptoms.  Advised conservative management for now.           Other Visit Diagnoses     Need for prophylactic vaccination and inoculation against influenza    -  Primary    Relevant Orders    INFLUENZA, QUAD, HIGH DOSE, PF, 65YR + (FLUZONE HD) (Completed)           Return in about 2 months (around 1/18/2022) for Routine preventive.    Verna Osborne MD  [facility] PARK Batres is a 81 year old who presents for the following health issues       Concern - Knuckle pain  Onset: 2 months   Description: Left hand, middle finger : It feels like it \"locks\" up. He doesn't remember if he did anything to this area. It doesn't cause him any pain. It happens more when he is using his hand.   Intensity: 0/10      Concern - Vomiting   Onset: 2 months ago   Description: He vomited 2 months ago. He got nauseated, a sharp pain in his back and he said he threw up white colored glue it seemed like. Wondering if this could be his gallbladder. It only happened once.       Review of Systems   Constitutional: Negative for chills and fever.   HENT: Negative for congestion, ear pain, hearing loss and sore throat.    Eyes: Negative for pain and visual disturbance.   Respiratory: Negative for cough and shortness of breath.    Cardiovascular: Negative for chest pain, palpitations and peripheral edema.   Gastrointestinal: Negative for abdominal pain, constipation, diarrhea, heartburn, hematochezia and nausea.   Genitourinary: Positive for frequency and impotence. Negative for dysuria, genital sores, hematuria, penile discharge and urgency.   Musculoskeletal: Positive for arthralgias. Negative for joint " "swelling and myalgias.   Skin: Negative for rash.   Neurological: Positive for weakness. Negative for dizziness, headaches and paresthesias.   Psychiatric/Behavioral: Negative for mood changes. The patient is nervous/anxious.           Objective    /74   Pulse 62   Temp 97.3  F (36.3  C) (Temporal)   Resp 16   Ht 1.715 m (5' 7.52\")   Wt 86.5 kg (190 lb 12.8 oz)   SpO2 99%   BMI 29.42 kg/m    Body mass index is 29.42 kg/m .  Physical Exam   GENERAL: healthy, alert and no distress  NECK: no adenopathy, no asymmetry, masses, or scars and thyroid normal to palpation  RESP: lungs clear to auscultation - no rales, rhonchi or wheezes  CV: regular rate and rhythm, normal S1 S2, no S3 or S4, no murmur, click or rub, no peripheral edema and peripheral pulses strong  ABDOMEN: soft, nontender, no hepatosplenomegaly, no masses and bowel sounds normal      "

## 2021-11-19 DIAGNOSIS — M1A.9XX0 CHRONIC GOUT WITHOUT TOPHUS, UNSPECIFIED CAUSE, UNSPECIFIED SITE: ICD-10-CM

## 2021-11-19 RX ORDER — ALLOPURINOL 100 MG/1
100 TABLET ORAL DAILY
Qty: 90 TABLET | Refills: 1 | Status: SHIPPED | OUTPATIENT
Start: 2021-11-19 | End: 2022-03-10

## 2021-11-19 NOTE — TELEPHONE ENCOUNTER
Pending Prescriptions:                       Disp   Refills    allopurinol (ZYLOPRIM) 100 MG tablet [Phar*180 ta*0        Sig: Take 2 tablets by mouth once daily    Routing refill request to provider for review/approval because:  Labs not current:  Uric Acid  A break in medication

## 2021-12-05 ENCOUNTER — DOCUMENTATION ONLY (OUTPATIENT)
Dept: ONCOLOGY | Facility: CLINIC | Age: 81
End: 2021-12-05
Payer: COMMERCIAL

## 2021-12-08 ENCOUNTER — TELEPHONE (OUTPATIENT)
Dept: ONCOLOGY | Facility: CLINIC | Age: 81
End: 2021-12-08
Payer: COMMERCIAL

## 2021-12-08 NOTE — TELEPHONE ENCOUNTER
Free Drug Application Initiated  Medication - Nubeqa  Sponsor - [facility] Check:  I have reviewed and agree to the information submitted for this Free Drug Application.   Madelyn Barbosa PharmD, BCACP  Oral Chemotherapy Monitoring Program  [facility]  [phone]           Macie Perea [facility]  Oncology Pharmacy Liaison  [email or web address]  Phone: [phone]  Fax: [phone]

## 2021-12-16 ENCOUNTER — LAB (OUTPATIENT)
Dept: LAB | Facility: OTHER | Age: 81
End: 2021-12-16
Payer: COMMERCIAL

## 2021-12-16 DIAGNOSIS — Z13.220 SCREENING FOR HYPERLIPIDEMIA: Primary | ICD-10-CM

## 2021-12-16 LAB
CHOLEST SERPL-MCNC: 211 MG/DL
FASTING STATUS PATIENT QL REPORTED: NO
HDLC SERPL-MCNC: 85 MG/DL
LDLC SERPL CALC-MCNC: 107 MG/DL
NONHDLC SERPL-MCNC: 126 MG/DL
TRIGL SERPL-MCNC: 93 MG/DL

## 2021-12-16 PROCEDURE — 36415 COLL VENOUS BLD VENIPUNCTURE: CPT

## 2021-12-16 PROCEDURE — 80061 LIPID PANEL: CPT

## 2021-12-17 NOTE — RESULT ENCOUNTER NOTE
Keeann Lotus    Your recent test results are attached.  Improved cholesterol levels    If you have any questions or concerns please contact me via My Chart or call the clinic at [phone]     Thank You  Verna Osborne MD.

## 2021-12-20 DIAGNOSIS — F41.9 ANXIETY: ICD-10-CM

## 2021-12-21 RX ORDER — CLONAZEPAM 1 MG/1
TABLET ORAL
Qty: 30 TABLET | Refills: 0 | Status: SHIPPED | OUTPATIENT
Start: 2021-12-21 | End: 2022-01-25

## 2021-12-21 NOTE — TELEPHONE ENCOUNTER
Patient called to check the status of his Clonazepam request. He is leaving town tomorrow and only has 1 tablet let.     Robyn Hickman RN on 12/21/2021 at 2:50 PM

## 2021-12-30 DIAGNOSIS — I10 BENIGN ESSENTIAL HYPERTENSION: ICD-10-CM

## 2021-12-30 RX ORDER — LOSARTAN POTASSIUM AND HYDROCHLOROTHIAZIDE 12.5; 1 MG/1; MG/1
TABLET ORAL
Qty: 90 TABLET | Refills: 0 | Status: SHIPPED | OUTPATIENT
Start: 2021-12-30 | End: 2022-03-10

## 2022-01-06 DIAGNOSIS — C61 PROSTATE CANCER (H): Primary | ICD-10-CM

## 2022-01-06 NOTE — PROGRESS NOTES
This patient has an appointment for lab work but does not have any orders. Please place some future orders as needed.    Thank You,  Nedra Pate MLT (ASCP)

## 2022-01-13 ENCOUNTER — LAB (OUTPATIENT)
Dept: LAB | Facility: OTHER | Age: 82
End: 2022-01-13
Payer: COMMERCIAL

## 2022-01-13 DIAGNOSIS — C61 PROSTATE CANCER (H): ICD-10-CM

## 2022-01-13 LAB
ALBUMIN SERPL-MCNC: 3.8 G/DL (ref 3.4–5)
ALP SERPL-CCNC: 77 U/L (ref 40–150)
ALT SERPL W P-5'-P-CCNC: 33 U/L (ref 0–70)
ANION GAP SERPL CALCULATED.3IONS-SCNC: 4 MMOL/L (ref 3–14)
AST SERPL W P-5'-P-CCNC: 36 U/L (ref 0–45)
BASOPHILS # BLD AUTO: 0 10E3/UL (ref 0–0.2)
BASOPHILS NFR BLD AUTO: 0 %
BILIRUB SERPL-MCNC: 0.9 MG/DL (ref 0.2–1.3)
BUN SERPL-MCNC: 16 MG/DL (ref 7–30)
CALCIUM SERPL-MCNC: 9.3 MG/DL (ref 8.5–10.1)
CHLORIDE BLD-SCNC: 106 MMOL/L (ref 94–109)
CO2 SERPL-SCNC: 30 MMOL/L (ref 20–32)
CREAT SERPL-MCNC: 0.87 MG/DL (ref 0.66–1.25)
EOSINOPHIL # BLD AUTO: 0.1 10E3/UL (ref 0–0.7)
EOSINOPHIL NFR BLD AUTO: 2 %
ERYTHROCYTE [DISTWIDTH] IN BLOOD BY AUTOMATED COUNT: 12.3 % (ref 10–15)
GFR SERPL CREATININE-BSD FRML MDRD: 87 ML/MIN/1.73M2
GLUCOSE BLD-MCNC: 157 MG/DL (ref 70–99)
HCT VFR BLD AUTO: 39.9 % (ref 40–53)
HGB BLD-MCNC: 13.2 G/DL (ref 13.3–17.7)
LYMPHOCYTES # BLD AUTO: 2 10E3/UL (ref 0.8–5.3)
LYMPHOCYTES NFR BLD AUTO: 37 %
MCH RBC QN AUTO: 32.8 PG (ref 26.5–33)
MCHC RBC AUTO-ENTMCNC: 33.1 G/DL (ref 31.5–36.5)
MCV RBC AUTO: 99 FL (ref 78–100)
MONOCYTES # BLD AUTO: 0.6 10E3/UL (ref 0–1.3)
MONOCYTES NFR BLD AUTO: 11 %
NEUTROPHILS # BLD AUTO: 2.7 10E3/UL (ref 1.6–8.3)
NEUTROPHILS NFR BLD AUTO: 50 %
PLATELET # BLD AUTO: 156 10E3/UL (ref 150–450)
POTASSIUM BLD-SCNC: 3.6 MMOL/L (ref 3.4–5.3)
PROT SERPL-MCNC: 7.5 G/DL (ref 6.8–8.8)
PSA SERPL-MCNC: 0.16 UG/L (ref 0–4)
RBC # BLD AUTO: 4.03 10E6/UL (ref 4.4–5.9)
SODIUM SERPL-SCNC: 140 MMOL/L (ref 133–144)
WBC # BLD AUTO: 5.4 10E3/UL (ref 4–11)

## 2022-01-13 PROCEDURE — 80053 COMPREHEN METABOLIC PANEL: CPT

## 2022-01-13 PROCEDURE — 84153 ASSAY OF PSA TOTAL: CPT

## 2022-01-13 PROCEDURE — 36415 COLL VENOUS BLD VENIPUNCTURE: CPT

## 2022-01-13 PROCEDURE — 85025 COMPLETE CBC W/AUTO DIFF WBC: CPT

## 2022-01-15 ENCOUNTER — HEALTH MAINTENANCE LETTER (OUTPATIENT)
Age: 82
End: 2022-01-15

## 2022-01-17 DIAGNOSIS — G47.00 INSOMNIA, UNSPECIFIED TYPE: ICD-10-CM

## 2022-01-17 NOTE — PROGRESS NOTES
Medardo is a 82 year old who is being evaluated via a billable video visit.      If the video visit is dropped, the invitation should be resent by: Send to e-mail at: [email or web address]  Will anyone else be joining your video visit? Yes: Wife might join Pt.. How would they like to receive their invitation? Text to cell phone: n/a        Video-Visit Details    Type of service:  Video Visit    Video Start Time: 10:00 AM    Video End Time:10:17 AM    Originating Location (pt. Location): Home    Distant Location (provider location):  [facility]     Platform used for Video Visit: [facility] Oncology Followup  Visit Date   Jan 18, 2022       The patient is a 82-year-old gentleman with a history of high-grade prostate cancer as well as a grade 2 colon cancer here for evaluation and management of a rising PSA.  He was followed by Dr. Streeter in urology.     DISEASES UNDER MANAGEMENT:    8/6/2012             pT2c N0 Mx prostatic adenocarcinoma, [place] grade 4+5 = 9, PSA 5.2    pT1 N0 M0 G2 colonic adenocarcinoma     RADIATION HISTORY: Prostate fossa radiation (completed: 10/18/2013)  1. Phase 1: 4500 cGy (180 cGy per fraction) to the pelvis  2. Phase 2: 2520 cGy boost to the prostate bed      CHEMOTHERAPY HISTORY:     CALGB 07795  ? Leuprolide 22.5 mg IM (3/27/2012, 6/19/2012)  ? Docetaxel 75 mg/m  (3/27/2012)- discontinued on study given the development of treatment related hepatotoxicity  ? Salvage ADT  ? Leuprolide (6/25/2013-1/23/2019) - gets Q 6 months   ? 12/4/20 darolutamide 600 mg PO BID started    PSA:   1/9/18:             PSA   < 0.01  1/23/19:           PSA   = 0.04  7/10/19:           PSA   = 0.07  8/13/19            PSA   = 0.09      discontinue Bicalutamide/Maintain ADT  9/18/19            PSA   = 0.13  12/11/19          PSA  = 0.27  1/29/20 PSA  = 0.42  3/11/20 PSA = 0.63  6/16/20 PSA     = 0.92 (T=24) took 30 day supply of Bicalutamide ending  ~8/19/20 8/31/2020 LUPRON 6 MONTHS  9/18/20 PSA     = 0.81  11/16/20 PSA = 1.66 -- START DAROLUTAMIDE  12/18/20 PSA  = 0.67  1/18/21 PSA  = 0.26  2/17/21 PSA  = 0.14    LUPRON 6 MONTHS DOSE  3/17/21 PSA  = 0.09  4/23/21 PSA  =0.09, mild fatigue.   6/21/21 PSA  = 0.07   8/18/21 PSA  =0.07 LUPRON 3 MONTH DOSE  9/16/21 PSA =0.09 Testosterone total=16  10/14/21 PSA     =0.11, testosterone total=16  11/11/21 PSA =0.10, LUPRON dose this Thursday 11/18/21 1/13/21 PSA     =0.16    Interim History:   Ongoing fatigue but is tolerable. Seeing PCP soon for wellness check. Due for lupron next month.     ROS: 10 point ROS neg other than the symptoms noted above in the HPI.    Video physical exam  General: Patient appears well in no acute distress.   Skin: No visualized rash or lesions on visualized skin  Eyes: EOMI, no erythema, sclera icterus or discharge noted  Resp: Appears to be breathing comfortably without accessory muscle usage, speaking in full sentences, no cough  MSK: Appears to have normal range of motion based on visualized movements  Neurologic: No apparent tremors, facial movements symmetric  Psych: affect good, alert and oriented    The rest of a comprehensive physical examination is deferred due to PHE (public health emergency) video restrictions    Assessment/Plan:  #Prostate Cancer, non metastatic castration resistant  -PSA continued to rise so decided to pursue darolutamide in addition to ADT. Started on 12/4/20. He has been tolerating overall very well with some minor increase in fatigue, now has stabilized   -PSA trending up slightly today but reviewed that this would not change our course currently. We will recheck next month to better define trajectory.     Continue Lupron q3 months, due next month.     #Prediabetes and hyperlipidemia  -management per pcp    #HM  -recently had colonoscopy without any concerning lesions. Repeat in 3 years: 2024      40 minutes spent on the date of the encounter doing chart  review, review of test results, interpretation of tests, patient visit, documentation and discussion with other provider(s)       Nedra Vick CNP on 1/18/2022 at 10:32 AM

## 2022-01-18 ENCOUNTER — VIRTUAL VISIT (OUTPATIENT)
Dept: ONCOLOGY | Facility: CLINIC | Age: 82
End: 2022-01-18
Attending: NURSE PRACTITIONER
Payer: COMMERCIAL

## 2022-01-18 DIAGNOSIS — C61 MALIGNANT NEOPLASM OF PROSTATE (H): Primary | ICD-10-CM

## 2022-01-18 PROCEDURE — G0463 HOSPITAL OUTPT CLINIC VISIT: HCPCS | Mod: PN,RTG | Performed by: NURSE PRACTITIONER

## 2022-01-18 PROCEDURE — 99215 OFFICE O/P EST HI 40 MIN: CPT | Mod: 95 | Performed by: NURSE PRACTITIONER

## 2022-01-18 RX ORDER — ZOLPIDEM TARTRATE 5 MG/1
TABLET ORAL
Qty: 30 TABLET | Refills: 5 | Status: SHIPPED | OUTPATIENT
Start: 2022-01-21 | End: 2022-07-15

## 2022-01-18 NOTE — TELEPHONE ENCOUNTER
Pending Prescriptions:                       Disp   Refills    zolpidem (AMBIEN) 5 MG tablet [Pharmacy Me*30 tab*0        Sig: TAKE 1 TABLET BY MOUTH NIGHTLY AS NEEDED    Routing refill request to provider for review/approval because:  Drug not on the [facility] refill protocol

## 2022-01-21 ENCOUNTER — PATIENT OUTREACH (OUTPATIENT)
Dept: ONCOLOGY | Facility: CLINIC | Age: 82
End: 2022-01-21
Payer: COMMERCIAL

## 2022-01-24 DIAGNOSIS — F41.9 ANXIETY: ICD-10-CM

## 2022-01-24 NOTE — TELEPHONE ENCOUNTER
Pending Prescriptions:                       Disp   Refills    clonazePAM (KLONOPIN) 1 MG tablet [Pharmac*30 tab*0        Sig: TAKE 1 TABLET BY MOUTH ONCE DAILY AS NEEDED FOR           ANXIETY (1  MONTH  SUPPLY)        Routing refill request to provider for review/approval because:  Drug not on the [facility] refill protocol   KATTY GordonN, RN, PHN  [place]  January 24, 2022

## 2022-01-25 RX ORDER — CLONAZEPAM 1 MG/1
TABLET ORAL
Qty: 30 TABLET | Refills: 3 | Status: SHIPPED | OUTPATIENT
Start: 2022-01-25 | End: 2022-05-26

## 2022-01-25 NOTE — TELEPHONE ENCOUNTER
Free Drug Application Approved  Effective Dates 01/25/2022-12/31/2022  Patient notified? Yes        Macie Perea CPhT  [facility]  Oncology Pharmacy Liaison  [email or web address]  Phone: [phone]  Fax: [phone]

## 2022-01-25 NOTE — TELEPHONE ENCOUNTER
Patient calling back to check on RX. Patient states that he has one tablet left.   Routing to covering provider in RKs absence.     KATTY GordonN, RN, PHN  [place]  January 25, 2022

## 2022-01-26 NOTE — PROGRESS NOTES
I have been in communication with Medardo as he is not able to choose Nedra Verdugo when he is wanting to communicate with her via [employer]. I let him know that I would work with our IT Department, but that in the meantime he can choose Dr. Ingram and note that the message is for Nedra. I explained that messages are first sent to a pool of nurses before they are sent to the clinician. He was satisfied with this plan.    Heather Pizano, MSN, RN, OCN  Clinical   [facility]

## 2022-02-02 ENCOUNTER — DOCUMENTATION ONLY (OUTPATIENT)
Dept: ONCOLOGY | Facility: CLINIC | Age: 82
End: 2022-02-02
Payer: COMMERCIAL

## 2022-02-02 DIAGNOSIS — C61 MALIGNANT NEOPLASM OF PROSTATE (H): ICD-10-CM

## 2022-02-02 DIAGNOSIS — C61 PROSTATE CANCER (H): ICD-10-CM

## 2022-02-07 DIAGNOSIS — M72.0 DUPUYTREN'S CONTRACTURE OF HAND: ICD-10-CM

## 2022-02-07 DIAGNOSIS — C61 MALIGNANT NEOPLASM OF PROSTATE (H): Primary | ICD-10-CM

## 2022-02-07 NOTE — PROGRESS NOTES
[facility] Oncology Followup  Visit Date   Feb 16, 2022       The patient is a 82-year-old gentleman with a history of high-grade prostate cancer as well as a grade 2 colon cancer here for evaluation and management of a rising PSA.  He was followed by Dr. Streeter in urology.     DISEASES UNDER MANAGEMENT:    8/6/2012             pT2c N0 Mx prostatic adenocarcinoma, [place] grade 4+5 = 9, PSA 5.2    pT1 N0 M0 G2 colonic adenocarcinoma     RADIATION HISTORY: Prostate fossa radiation (completed: 10/18/2013)  1. Phase 1: 4500 cGy (180 cGy per fraction) to the pelvis  2. Phase 2: 2520 cGy boost to the prostate bed      CHEMOTHERAPY HISTORY:     CALGB 64579  ? Leuprolide 22.5 mg IM (3/27/2012, 6/19/2012)  ? Docetaxel 75 mg/m  (3/27/2012)- discontinued on study given the development of treatment related hepatotoxicity  ? Salvage ADT  ? Leuprolide (6/25/2013-1/23/2019) - gets Q 6 months   ? 12/4/20 darolutamide 600 mg PO BID started    PSA:   1/9/18:             PSA   < 0.01  1/23/19:           PSA   = 0.04  7/10/19:           PSA   = 0.07  8/13/19            PSA   = 0.09      discontinue Bicalutamide/Maintain ADT  9/18/19            PSA   = 0.13  12/11/19          PSA  = 0.27  1/29/20 PSA  = 0.42  3/11/20 PSA = 0.63  6/16/20 PSA     = 0.92 (T=24) took 30 day supply of Bicalutamide ending ~8/19/20 8/31/2020 LUPRON 6 MONTHS  9/18/20 PSA     = 0.81  11/16/20 PSA = 1.66 -- START DAROLUTAMIDE  12/18/20 PSA  = 0.67  1/18/21 PSA  = 0.26  2/17/21 PSA  = 0.14    LUPRON 6 MONTHS DOSE  3/17/21 PSA  = 0.09  4/23/21 PSA  =0.09, mild fatigue.   6/21/21 PSA  = 0.07   8/18/21 PSA  =0.07 LUPRON 3 MONTH DOSE  9/16/21 PSA =0.09 Testosterone total=16  10/14/21 PSA     =0.11, testosterone total=16  11/11/21 PSA =0.10, LUPRON dose this Thursday 11/18/21 1/13/21 PSA     =0.16    Interim History:   Friday woke up with runny nose, claritin helpful  COVID test was negative Monday at home  Grandchildren are coming in to town this  bowel movement or darker colored urine. CT scan showed small gallstones. There is no biliary dilation. There was however some periportal edema. Past Medical History  History reviewed. No pertinent past medical history. Past Surgical History  History reviewed. No pertinent surgical history. Medications  Current Outpatient Medications   Medication Sig Dispense Refill    azithromycin (ZITHROMAX Z-AKIRA) 250 MG tablet Take 2 tablets (500 mg) on Day 1, and then take 1 tablet (250 mg) on days 2 through 5. 1 packet 0    dicyclomine (BENTYL) 10 MG capsule Take 1 capsule by mouth 3 times daily as needed (pain) 30 capsule 0    ondansetron (ZOFRAN ODT) 4 MG disintegrating tablet Take 1 tablet by mouth every 8 hours as needed for Nausea or Vomiting 20 tablet 0    polyethylene glycol (MIRALAX) packet Take 17 g by mouth daily as needed for Constipation 527 g 1     No current facility-administered medications for this visit.       Allergies   No Known Allergies    Family History  Family History   Problem Relation Age of Onset    No Known Problems Mother     No Known Problems Father        SocialHistory  Social History     Socioeconomic History    Marital status: Single     Spouse name: Not on file    Number of children: 3    Years of education: Not on file    Highest education level: Not on file   Occupational History    Not on file   Social Needs    Financial resource strain: Not on file    Food insecurity:     Worry: Not on file     Inability: Not on file    Transportation needs:     Medical: Not on file     Non-medical: Not on file   Tobacco Use    Smoking status: Never Smoker    Smokeless tobacco: Never Used   Substance and Sexual Activity    Alcohol use: No    Drug use: No    Sexual activity: Never   Lifestyle    Physical activity:     Days per week: Not on file     Minutes per session: Not on file    Stress: Not on file   Relationships    Social connections:     Talks on phone: Not on file weekend  Fatigue is main SE of [place] but this is mild     ROS: 10 point ROS neg other than the symptoms noted above in the HPI.    Video physical exam  General: Patient appears well in no acute distress.   Skin: No visualized rash or lesions on visualized skin  Eyes: EOMI, no erythema, sclera icterus or discharge noted  Resp: Appears to be breathing comfortably without accessory muscle usage, speaking in full sentences, no cough  MSK: Appears to have normal range of motion based on visualized movements  Neurologic: No apparent tremors, facial movements symmetric  Psych: affect good, alert and oriented    The rest of a comprehensive physical examination is deferred due to PHE (public health emergency) video restrictions    Labs:  Most Recent 3 CBC's:Recent Labs   Lab Test 01/13/22  1021 11/11/21  1050 10/14/21  1020   WBC 5.4 5.1 4.2   HGB 13.2* 12.8* 12.8*   MCV 99 98 98    147* 130*     Most Recent 3 BMP's:  Recent Labs   Lab Test 02/11/22  1008 01/13/22  1021 11/11/21  1050    140 135   POTASSIUM 3.5 3.6 3.7   CHLORIDE 104 106 103   CO2 29 30 29   BUN 17 16 13   CR 1.03 0.87 0.91   ANIONGAP 5 4 3   GAVIN 9.5 9.3 8.8   * 157* 122*    Most Recent 2 LFT's:  Recent Labs   Lab Test 02/11/22  1008 01/13/22  1021   AST 34 36   ALT 32 33   ALKPHOS 84 77   BILITOTAL 0.7 0.9    Most Recent TSH and T4:No lab results found.  I reviewed the above labs today.    Assessment/Plan:  #Prostate Cancer, non metastatic castration resistant  -PSA continued to rise so decided to pursue darolutamide in addition to ADT. Started on 12/4/20. He has been tolerating overall very well with some minor increase in fatigue, now has stabilized   -PSA stable. He wants monthly checks.   -RTC April with Dr. Ingram  Continue Lupron q3 months, received 2/11    #Prediabetes and hyperlipidemia  -management per pcp    #HM  -recently had colonoscopy without any concerning lesions. Repeat in 3 years: 2024    30 minutes spent on the date of  Gets together: Not on file     Attends [employer] service: Not on file     Active member of club or organization: Not on file     Attends meetings of clubs or organizations: Not on file     Relationship status: Not on file    Intimate partner violence:     Fear of current or ex partner: Not on file     Emotionally abused: Not on file     Physically abused: Not on file     Forced sexual activity: Not on file   Other Topics Concern    Not on file   Social History Narrative    Not on file           Review of Systems  Review of Systems   Constitutional: Negative for chills, fatigue, fever and unexpected weight change. HENT: Negative for sore throat, trouble swallowing and voice change. Eyes: Negative for visual disturbance. Respiratory: Negative for cough, shortness of breath and wheezing. Cardiovascular: Negative for chest pain and palpitations. Gastrointestinal: Positive for constipation, nausea and vomiting. Negative for abdominal pain and blood in stool. Endocrine: Negative for cold intolerance, heat intolerance and polydipsia. Genitourinary: Negative for dysuria, flank pain and hematuria. Musculoskeletal: Negative for gait problem, joint swelling and myalgias. Skin: Negative for color change and rash. Allergic/Immunologic: Negative for immunocompromised state. Neurological: Negative for dizziness, tremors, seizures and speech difficulty. Hematological: Does not bruise/bleed easily. Psychiatric/Behavioral: Negative for behavioral problems, confusion and suicidal ideas. OBJECTIVE     /66 (Site: Right Lower Arm, Position: Sitting, Cuff Size: Medium Adult)   Pulse 78   Temp 98 °F (36.7 °C) (Tympanic)   Resp 18   Ht 5' 4\" (1.626 m)   Wt 254 lb (115.2 kg)   LMP 04/22/2019   SpO2 98%   Breastfeeding? No   BMI 43.60 kg/m²      Physical Exam   Constitutional: She is oriented to person, place, and time. She appears well-developed and well-nourished. No distress.    HENT: the encounter doing chart review, review of test results, interpretation of tests, patient visit and documentation     Nedra Vick CNP on 2/16/2022 at 10:41 AM

## 2022-02-08 ENCOUNTER — TELEPHONE (OUTPATIENT)
Dept: FAMILY MEDICINE | Facility: OTHER | Age: 82
End: 2022-02-08
Payer: COMMERCIAL

## 2022-02-08 NOTE — TELEPHONE ENCOUNTER
Please place new refills for depot lupron. We need order in MAR. Patient is coming on Friday 2/11 for injection.    Thanks,  Jessica ALANIZN, RN

## 2022-02-11 ENCOUNTER — MYC MEDICAL ADVICE (OUTPATIENT)
Dept: FAMILY MEDICINE | Facility: OTHER | Age: 82
End: 2022-02-11

## 2022-02-11 ENCOUNTER — ALLIED HEALTH/NURSE VISIT (OUTPATIENT)
Dept: FAMILY MEDICINE | Facility: OTHER | Age: 82
End: 2022-02-11
Payer: COMMERCIAL

## 2022-02-11 ENCOUNTER — TELEPHONE (OUTPATIENT)
Dept: FAMILY MEDICINE | Facility: OTHER | Age: 82
End: 2022-02-11

## 2022-02-11 ENCOUNTER — LAB (OUTPATIENT)
Dept: LAB | Facility: OTHER | Age: 82
End: 2022-02-11
Payer: COMMERCIAL

## 2022-02-11 DIAGNOSIS — C61 MALIGNANT NEOPLASM OF PROSTATE (H): ICD-10-CM

## 2022-02-11 DIAGNOSIS — C61 PROSTATE CANCER (H): ICD-10-CM

## 2022-02-11 LAB
ALBUMIN SERPL-MCNC: 3.7 G/DL (ref 3.4–5)
ALP SERPL-CCNC: 84 U/L (ref 40–150)
ALT SERPL W P-5'-P-CCNC: 32 U/L (ref 0–70)
ANION GAP SERPL CALCULATED.3IONS-SCNC: 5 MMOL/L (ref 3–14)
AST SERPL W P-5'-P-CCNC: 34 U/L (ref 0–45)
BILIRUB SERPL-MCNC: 0.7 MG/DL (ref 0.2–1.3)
BUN SERPL-MCNC: 17 MG/DL (ref 7–30)
CALCIUM SERPL-MCNC: 9.5 MG/DL (ref 8.5–10.1)
CHLORIDE BLD-SCNC: 104 MMOL/L (ref 94–109)
CO2 SERPL-SCNC: 29 MMOL/L (ref 20–32)
CREAT SERPL-MCNC: 1.03 MG/DL (ref 0.66–1.25)
GFR SERPL CREATININE-BSD FRML MDRD: 73 ML/MIN/1.73M2
GLUCOSE BLD-MCNC: 180 MG/DL (ref 70–99)
POTASSIUM BLD-SCNC: 3.5 MMOL/L (ref 3.4–5.3)
PROT SERPL-MCNC: 7.4 G/DL (ref 6.8–8.8)
PSA SERPL-MCNC: 0.14 UG/L (ref 0–4)
SODIUM SERPL-SCNC: 138 MMOL/L (ref 133–144)

## 2022-02-11 PROCEDURE — 96372 THER/PROPH/DIAG INJ SC/IM: CPT | Performed by: FAMILY MEDICINE

## 2022-02-11 PROCEDURE — 84403 ASSAY OF TOTAL TESTOSTERONE: CPT

## 2022-02-11 PROCEDURE — 84153 ASSAY OF PSA TOTAL: CPT

## 2022-02-11 PROCEDURE — 80053 COMPREHEN METABOLIC PANEL: CPT

## 2022-02-11 PROCEDURE — 36415 COLL VENOUS BLD VENIPUNCTURE: CPT

## 2022-02-11 PROCEDURE — 99207 PR NO CHARGE NURSE ONLY: CPT

## 2022-02-11 NOTE — TELEPHONE ENCOUNTER
RN sent the patient information about the Depot Lupron savings program.       Navid Batres,     Here is the information I was telling you about for the Depot Lupron savings program. You would go to the following website and follow the directions. If you need help with this. Let us know and we can walk you through it.     [email or web address]        Take Care,         TRACIE Gordon, RN, PHN  [place]  February 11, 2022

## 2022-02-11 NOTE — PROGRESS NOTES
Clinic Administered Medication Documentation    Administrations This Visit     leuprolide (LUPRON DEPOT) kit 22.5 mg     Admin Date  02/11/2022 Action  Given Dose  22.5 mg Route  Intramuscular Site  Right Ventrogluteal Administered By  Donna Morin RN    Ordering Provider: Medardo Ingram MD    NDC: 0454-5848-62    Lot#: [id number]    : ABBVIE    Patient Supplied?: No                  Injectable Medication Documentation    Patient was given Lupron Depot. Prior to medication administration, verified patients identity using patient s name and date of birth. Please see MAR and medication order for additional information. Patient instructed to remain in clinic for 15 minutes and report any adverse reaction to staff immediately .      Was entire vial of medication used? Yes  Vial/Syringe: Syringe  Expiration Date:  May 19, 2024  Was this medication supplied by the patient? No     Administered in right ventrogluteal    PETRA Boyer/[place] [facility]  February 11, 2022

## 2022-02-15 LAB — TESTOST SERPL-MCNC: 10 NG/DL (ref 240–950)

## 2022-02-16 ENCOUNTER — VIRTUAL VISIT (OUTPATIENT)
Dept: ONCOLOGY | Facility: CLINIC | Age: 82
End: 2022-02-16
Attending: NURSE PRACTITIONER
Payer: COMMERCIAL

## 2022-02-16 DIAGNOSIS — C61 PROSTATE CANCER (H): Primary | ICD-10-CM

## 2022-02-16 PROCEDURE — G0463 HOSPITAL OUTPT CLINIC VISIT: HCPCS | Mod: PN,RTG | Performed by: NURSE PRACTITIONER

## 2022-02-16 PROCEDURE — 99214 OFFICE O/P EST MOD 30 MIN: CPT | Mod: 95 | Performed by: NURSE PRACTITIONER

## 2022-02-16 NOTE — NURSING NOTE
Chief Complaint   Patient presents with     Video Visit     regarding prostate cancer. Pt reviewed medications via [employer], all up to date.     Mike Go, Visit Facilitator/MA.

## 2022-02-16 NOTE — PROGRESS NOTES
Medardo is a 82 year old who is being evaluated via a billable video visit.      How would you like to obtain your AVS? MyChart  If the video visit is dropped, the invitation should be resent by: Send to e-mail at: [email or web address]  Will anyone else be joining your video visit? No       Mike Go, Visit Facilitator/MA.      Video Start Time: 1015  Video-Visit Details    Type of service:  Video Visit    Video End Time:1030    Originating Location (pt. Location): Home    Distant Location (provider location):  [facility]     Platform used for Video Visit: [employer]

## 2022-02-16 NOTE — Clinical Note
2/16/2022         RE: Medardo Gautam  [id number] [facility] [id number]        Dear Colleague,    Thank you for referring your patient, Medardo Gautam, to the [facility]. Please see a copy of my visit note below.      [facility] Oncology Followup  Visit Date   Feb 16, 2022       The patient is a 82-year-old gentleman with a history of high-grade prostate cancer as well as a grade 2 colon cancer here for evaluation and management of a rising PSA.  He was followed by Dr. Streeter in urology.     DISEASES UNDER MANAGEMENT:    8/6/2012             pT2c N0 Mx prostatic adenocarcinoma, Jeancarlos grade 4+5 = 9, PSA 5.2    pT1 N0 M0 G2 colonic adenocarcinoma     RADIATION HISTORY: Prostate fossa radiation (completed: 10/18/2013)  1. Phase 1: 4500 cGy (180 cGy per fraction) to the pelvis  2. Phase 2: 2520 cGy boost to the prostate bed      CHEMOTHERAPY HISTORY:     CALGB 20768  ? Leuprolide 22.5 mg IM (3/27/2012, 6/19/2012)  ? Docetaxel 75 mg/m  (3/27/2012)- discontinued on study given the development of treatment related hepatotoxicity  ? Salvage ADT  ? Leuprolide (6/25/2013-1/23/2019) - gets Q 6 months   ? 12/4/20 darolutamide 600 mg PO BID started    PSA:   1/9/18:             PSA   < 0.01  1/23/19:           PSA   = 0.04  7/10/19:           PSA   = 0.07  8/13/19            PSA   = 0.09      discontinue Bicalutamide/Maintain ADT  9/18/19            PSA   = 0.13  12/11/19          PSA  = 0.27  1/29/20 PSA  = 0.42  3/11/20 PSA = 0.63  6/16/20 PSA     = 0.92 (T=24) took 30 day supply of Bicalutamide ending ~8/19/20 8/31/2020 LUPRON 6 MONTHS  9/18/20 PSA     = 0.81  11/16/20 PSA = 1.66 -- START DAROLUTAMIDE  12/18/20 PSA  = 0.67  1/18/21 PSA  = 0.26  2/17/21 PSA  = 0.14    LUPRON 6 MONTHS DOSE  3/17/21 PSA  = 0.09  4/23/21 PSA  =0.09, mild fatigue.   6/21/21 PSA  = 0.07   8/18/21 PSA  =0.07 LUPRON 3 MONTH DOSE  9/16/21 PSA =0.09 Testosterone total=16  10/14/21 PSA     =0.11,  testosterone total=16  11/11/21 PSA =0.10, LUPRON dose this Thursday 11/18/21 1/13/21 PSA     =0.16    Interim History:   Ongoing fatigue but is tolerable. Seeing PCP soon for wellness check. Due for lupron next month.     ROS: 10 point ROS neg other than the symptoms noted above in the HPI.    Video physical exam  General: Patient appears well in no acute distress.   Skin: No visualized rash or lesions on visualized skin  Eyes: EOMI, no erythema, sclera icterus or discharge noted  Resp: Appears to be breathing comfortably without accessory muscle usage, speaking in full sentences, no cough  MSK: Appears to have normal range of motion based on visualized movements  Neurologic: No apparent tremors, facial movements symmetric  Psych: affect good, alert and oriented    The rest of a comprehensive physical examination is deferred due to PHE (public health emergency) video restrictions    Assessment/Plan:  #Prostate Cancer, non metastatic castration resistant  -PSA continued to rise so decided to pursue darolutamide in addition to ADT. Started on 12/4/20. He has been tolerating overall very well with some minor increase in fatigue, now has stabilized   -PSA trending up slightly today but reviewed that this would not change our course currently. We will recheck next month to better define trajectory.     Continue Lupron q3 months, due next month.     #Prediabetes and hyperlipidemia  -management per pcp    #HM  -recently had colonoscopy without any concerning lesions. Repeat in 3 years: 2024        Medardo is a 82 year old who is being evaluated via a billable video visit.      How would you like to obtain your AVS? MyChart  If the video visit is dropped, the invitation should be resent by: Send to e-mail at: [email or web address]  Will anyone else be joining your video visit? No  {If patient encounters technical issues they should call [phone] :[id number]}     Mike Go, Visit Facilitator/MA.      Video Start Time:  "{video visit start/end time for provider to select:942045}  Video-Visit Details    Type of service:  Video Visit    Video End Time:{video visit start/end time for provider to select:867573}    Originating Location (pt. Location): {video visit patient location:533476::\"Home\"}    Distant Location (provider location):  [facility]     Platform used for Video Visit: {Virtual Visit Platforms:[id number]::\"Lynk\"}      Again, thank you for allowing me to participate in the care of your patient.        Sincerely,        Nedra Vick, CNP  "

## 2022-03-03 ASSESSMENT — ENCOUNTER SYMPTOMS
COUGH: 0
PARESTHESIAS: 0
ABDOMINAL PAIN: 0
SHORTNESS OF BREATH: 0
DIARRHEA: 0
PALPITATIONS: 1
FREQUENCY: 0
DYSURIA: 0
NERVOUS/ANXIOUS: 1
HEARTBURN: 0
ARTHRALGIAS: 1
WEAKNESS: 0
NAUSEA: 0
HEMATOCHEZIA: 0
JOINT SWELLING: 0
HEMATURIA: 0
SORE THROAT: 0
CONSTIPATION: 0
EYE PAIN: 0
FEVER: 0
HEADACHES: 0
CHILLS: 0
MYALGIAS: 0
DIZZINESS: 0

## 2022-03-03 ASSESSMENT — ACTIVITIES OF DAILY LIVING (ADL): CURRENT_FUNCTION: NO ASSISTANCE NEEDED

## 2022-03-10 ENCOUNTER — LAB (OUTPATIENT)
Dept: LAB | Facility: OTHER | Age: 82
End: 2022-03-10
Payer: COMMERCIAL

## 2022-03-10 ENCOUNTER — OFFICE VISIT (OUTPATIENT)
Dept: FAMILY MEDICINE | Facility: OTHER | Age: 82
End: 2022-03-10
Payer: COMMERCIAL

## 2022-03-10 VITALS
BODY MASS INDEX: 30.53 KG/M2 | WEIGHT: 190 LBS | HEIGHT: 66 IN | SYSTOLIC BLOOD PRESSURE: 112 MMHG | OXYGEN SATURATION: 99 % | DIASTOLIC BLOOD PRESSURE: 60 MMHG | RESPIRATION RATE: 20 BRPM | TEMPERATURE: 96.3 F | HEART RATE: 70 BPM

## 2022-03-10 DIAGNOSIS — I10 BENIGN ESSENTIAL HYPERTENSION: ICD-10-CM

## 2022-03-10 DIAGNOSIS — Z00.00 ENCOUNTER FOR MEDICARE ANNUAL WELLNESS EXAM: ICD-10-CM

## 2022-03-10 DIAGNOSIS — C61 MALIGNANT NEOPLASM OF PROSTATE (H): ICD-10-CM

## 2022-03-10 DIAGNOSIS — C61 PROSTATE CANCER (H): ICD-10-CM

## 2022-03-10 DIAGNOSIS — M1A.9XX0 CHRONIC GOUT WITHOUT TOPHUS, UNSPECIFIED CAUSE, UNSPECIFIED SITE: ICD-10-CM

## 2022-03-10 DIAGNOSIS — C18.2 MALIGNANT NEOPLASM OF ASCENDING COLON (H): ICD-10-CM

## 2022-03-10 DIAGNOSIS — Z00.00 MEDICARE ANNUAL WELLNESS VISIT, SUBSEQUENT: Primary | ICD-10-CM

## 2022-03-10 LAB
PSA SERPL-MCNC: 0.17 UG/L (ref 0–4)
URATE SERPL-MCNC: 4.4 MG/DL (ref 3.5–7.2)

## 2022-03-10 PROCEDURE — 84550 ASSAY OF BLOOD/URIC ACID: CPT

## 2022-03-10 PROCEDURE — 84153 ASSAY OF PSA TOTAL: CPT

## 2022-03-10 PROCEDURE — 36415 COLL VENOUS BLD VENIPUNCTURE: CPT

## 2022-03-10 PROCEDURE — 84403 ASSAY OF TOTAL TESTOSTERONE: CPT

## 2022-03-10 PROCEDURE — 99397 PER PM REEVAL EST PAT 65+ YR: CPT | Performed by: FAMILY MEDICINE

## 2022-03-10 RX ORDER — ALLOPURINOL 100 MG/1
100 TABLET ORAL DAILY
Qty: 90 TABLET | Refills: 3 | Status: SHIPPED | OUTPATIENT
Start: 2022-03-10 | End: 2023-02-09

## 2022-03-10 RX ORDER — LOSARTAN POTASSIUM AND HYDROCHLOROTHIAZIDE 12.5; 1 MG/1; MG/1
1 TABLET ORAL DAILY
Qty: 90 TABLET | Refills: 3 | Status: SHIPPED | OUTPATIENT
Start: 2022-03-10 | End: 2023-02-09

## 2022-03-10 ASSESSMENT — ENCOUNTER SYMPTOMS
JOINT SWELLING: 0
CONSTIPATION: 0
PARESTHESIAS: 0
DYSURIA: 0
NERVOUS/ANXIOUS: 1
HEARTBURN: 0
HEADACHES: 0
EYE PAIN: 0
SHORTNESS OF BREATH: 0
WEAKNESS: 0
NAUSEA: 0
DIARRHEA: 0
HEMATOCHEZIA: 0
FREQUENCY: 0
PALPITATIONS: 1
ARTHRALGIAS: 1
DIZZINESS: 0
MYALGIAS: 0
SORE THROAT: 0
FEVER: 0
CHILLS: 0
ABDOMINAL PAIN: 0
HEMATURIA: 0
COUGH: 0

## 2022-03-10 ASSESSMENT — ACTIVITIES OF DAILY LIVING (ADL): CURRENT_FUNCTION: NO ASSISTANCE NEEDED

## 2022-03-10 NOTE — PROGRESS NOTES
"SUBJECTIVE:   Medardo Gautam is a 82 year old male who presents for Preventive Visit.      Patient has been advised of split billing requirements and indicates understanding: Yes  Are you in the first 12 months of your Medicare coverage?  No    Healthy Habits:     In general, how would you rate your overall health?  Fair    Frequency of exercise:  None    Do you usually eat at least 4 servings of fruit and vegetables a day, include whole grains    & fiber and avoid regularly eating high fat or \"junk\" foods?  Yes    Taking medications regularly:  Yes    Medication side effects:  None    Ability to successfully perform activities of daily living:  No assistance needed    Home Safety:  No safety concerns identified    Hearing Impairment:  No hearing concerns    In the past 6 months, have you been bothered by leaking of urine?  No    In general, how would you rate your overall mental or emotional health?  Good      PHQ-2 Total Score: 0    Additional concerns today:  No    Do you feel safe in your environment? Yes    Have you ever done Advance Care Planning? (For example, a Health Directive, POLST, or a discussion with a medical provider or your loved ones about your wishes): Yes, patient states has an Advance Care Planning document and will bring a copy to the clinic.       Fall risk  Fallen 2 or more times in the past year?: No  Any fall with injury in the past year?: No    Cognitive Screening   1) Repeat 3 items (Leader, Season, Table)    2) Clock draw: NORMAL  3) 3 item recall: Recalls 3 objects  Results: NORMAL clock, 1-2 items recalled: COGNITIVE IMPAIRMENT LESS LIKELY    Mini-CogTM Copyright ANNY Infante. Licensed by the author for use in [facility]; reprinted with permission (charlie@.[email or web address]). All rights reserved.      Do you have sleep apnea, excessive snoring or daytime drowsiness?: no    Reviewed and updated as needed this visit by clinical staff   Tobacco  Allergies  Meds   Med Hx  Surg Hx  Fam Hx "  Soc Hx        Reviewed and updated as needed this visit by Provider                 Social History     Tobacco Use     Smoking status: Former Smoker     Packs/day: 0.00     Years: 1.00     Pack years: 0.00     Types: Cigarettes, Cigars, Pipe     Start date: 10/1/1961     Quit date: 1962     Years since quittin.2     Smokeless tobacco: Never Used     Tobacco comment: No smokers in home   Substance Use Topics     Alcohol use: Yes     Alcohol/week: 0.0 standard drinks     Comment: daily glass of wine and whiskey     If you drink alcohol do you typically have >3 drinks per day or >7 drinks per week? No    Alcohol Use 3/10/2022   Prescreen: >3 drinks/day or >7 drinks/week? -   Prescreen: >3 drinks/day or >7 drinks/week? No   AUDIT SCORE  -           -------------------------------------    Current providers sharing in care for this patient include:   Patient Care Team:  Verna Osborne MD as PCP - General (Family Practice)  Verna Osborne MD as Assigned PCP  Medardo Ingram MD as MD (Medical Oncology)  Taylor Nelson RN as Specialty Care Coordinator (Hematology & Oncology)  Medardo Ingram MD as Assigned Cancer Care Provider  Nedra Vick CNP as Nurse Practitioner (Hematology & Oncology)  Nedra Vick CNP as Nurse Practitioner (Hematology & Oncology)    The following health maintenance items are reviewed in Epic and correct as of today:  There are no preventive care reminders to display for this patient.  Labs reviewed in EPIC  BP Readings from Last 3 Encounters:   03/10/22 112/60   21 128/74   09/10/21 130/71    Wt Readings from Last 3 Encounters:   03/10/22 86.2 kg (190 lb)   21 86.5 kg (190 lb 12.8 oz)   21 87.3 kg (192 lb 8 oz)                  Patient Active Problem List   Diagnosis     Gout     Dupuytren's contracture of hand- left 5th finger, right 5th finger     Bunions- both feet     Skin lesion- R side of face and behind L ear     Insomnia     Prostatic nodule-  posterior right edge with rectal exam     Prostate cancer- Jeancarlos 9 (5+4) dx Feb 2012     Essential hypertension with goal blood pressure less than 140/90     Hyperlipidemia LDL goal <130     Malignant neoplasm of prostate (H)     Anxiety     Malignant neoplasm of ascending colon (H)     Renal cyst     Lumbar radiculopathy     Macular degeneration     Dermatochalasis of eyelid     Calculus of common bile duct and gallbladder     Lateral knee pain, left     Other fatigue     Trigger finger, acquired     Past Surgical History:   Procedure Laterality Date     APPENDECTOMY       BIOPSY  01/16/15     CATARACT IOL, RT/LT Left 02/15/2018     COLON SURGERY  2/11/2015    Lap assisted R hemicolectomy     COLONOSCOPY  10/25/07    Snare polypectomy     COLONOSCOPY  6/25/2009    with snare polypectomy     COLONOSCOPY  9/30/2009     COLONOSCOPY  1/5/2011    COLONOSCOPY performed by JUD MARIEE at  GI     COLONOSCOPY N/A 1/16/2015    Procedure: COMBINED COLONOSCOPY, SINGLE OR MULTIPLE BIOPSY/POLYPECTOMY BY BIOPSY;  Surgeon: Sarah Beth Pisano MD;  Location: [facility] OR     COLONOSCOPY WITH CO2 INSUFFLATION N/A 1/16/2015    Procedure: COLONOSCOPY WITH CO2 INSUFFLATION;  Surgeon: Sarah Beth Pisano MD;  Location: [facility] OR     COLONOSCOPY WITH CO2 INSUFFLATION N/A 9/7/2018    Procedure: COLONOSCOPY WITH CO2 INSUFFLATION;  C18.9 (ICD-10-CM) - Malignant neoplasm of colon, unspecified part of colon (H)  [facility] [place] pharm fax# [phone]  BMI 26.29  [employer]  Referred by dr thomas;  Surgeon: Sarah Beth Pisano MD;  Location: [facility] OR     COLONOSCOPY WITH CO2 INSUFFLATION N/A 9/10/2021    Procedure: COLONOSCOPY, WITH CO2 INSUFFLATION;  Surgeon: Sarah Beth Pisano MD;  Location: [facility] OR     DAVINCI PROSTATECTOMY  8/6/2012    Procedure: DAVINCI PROSTATECTOMY;  Davinci Assisted Radical Prostatectomy with Bilateral Lymphadenectomy ;  Surgeon: Norma Goodwin MD;  Location: [facility] OR     GENITOURINARY SURGERY      prostate surgery      INJECT EPIDURAL LUMBAR / SACRAL SINGLE Left 10/30/2017    Procedure: INJECT EPIDURAL LUMBAR / SACRAL SINGLE;  Left Transforaminal Lumbar 4-Lumbar 5 Epidural Steroid Injection;  Surgeon: Nuvia Reina MD;  Location: [facility] OR     LAPAROSCOPIC ASSISTED COLECTOMY N/A 2015    Procedure: LAPAROSCOPIC ASSISTED COLECTOMY;  Surgeon: Oren Breen MD;  Location: [facility] OR     PHACOEMULSIFICATION CLEAR CORNEA WITH STANDARD INTRAOCULAR LENS IMPLANT Left 2/15/2018    Procedure: PHACOEMULSIFICATION CLEAR CORNEA WITH STANDARD INTRAOCULAR LENS IMPLANT;  LEFT EYE CATARACT EXTRACTION WITH STANDARD INTRAOCULAR LENS IMPLANT ;  Surgeon: Brandon Orellana MD;  Location:  [facility]     PHACOEMULSIFICATION CLEAR CORNEA WITH STANDARD INTRAOCULAR LENS IMPLANT Right 3/1/2018    Procedure: PHACOEMULSIFICATION CLEAR CORNEA WITH STANDARD INTRAOCULAR LENS IMPLANT;  RIGHT EYE CATARACT EXTRACTION WITH STANDARD INTRAOCULAR LENS IMPLANT ;  Surgeon: Brandon Orellana MD;  Location:  [facility]     ZZC HAND/FINGER SURGERY UNLISTED       ZZC LENGTHEN,TENDON,HAND/FINGER  ca     right fifth     ZZC STOMACH SURGERY PROCEDURE UNLISTED         Social History     Tobacco Use     Smoking status: Former Smoker     Packs/day: 0.00     Years: 1.00     Pack years: 0.00     Types: Cigarettes, Cigars, Pipe     Start date: 10/1/1961     Quit date: 1962     Years since quittin.2     Smokeless tobacco: Never Used     Tobacco comment: No smokers in home   Substance Use Topics     Alcohol use: Yes     Alcohol/week: 0.0 standard drinks     Comment: daily glass of wine and whiskey     Family History   Problem Relation Age of Onset     Cerebrovascular Disease Father      Cancer Mother 90        Patient believes vaginal cancer - hysterectomy,      Alzheimer Disease Mother      Cancer Sister      Breast Cancer Sister      C.A.D. No family hx of      Cancer - colorectal No family hx of      Prostate Cancer No family hx of      Blood Disease  No family hx of      Cardiovascular No family hx of      Circulatory No family hx of      Eye Disorder No family hx of      Gastrointestinal Disease No family hx of      Genitourinary Problems No family hx of      Lipids No family hx of      Neurologic Disorder No family hx of      Respiratory No family hx of      Asthma No family hx of      Heart Disease No family hx of      Diabetes No family hx of      Hypertension No family hx of      Arthritis No family hx of      Thyroid Disease No family hx of      Musculoskeletal Disorder No family hx of      Glaucoma No family hx of      Macular Degeneration No family hx of          Current Outpatient Medications   Medication Sig Dispense Refill     allopurinol (ZYLOPRIM) 100 MG tablet Take 1 tablet (100 mg) by mouth daily 90 tablet 3     aspirin 81 MG tablet Take 1 tablet (81 mg) by mouth daily 30 tablet      calcium-vitamin D (CALTRATE) 600-400 MG-UNIT per tablet Take 1 tablet by mouth daily       clonazePAM (KLONOPIN) 1 MG tablet TAKE 1 TABLET BY MOUTH ONCE DAILY AS NEEDED FOR ANXIETY (1  MONTH  SUPPLY) 30 tablet 3     darolutamide (NUBEQA) 300 MG tablet Take 2 tablets (600 mg) by mouth 2 times daily . Swallow tablets whole with food. 120 tablet 11     folic acid-vit B6-vit B12 (FOLGARD) 0.8-10-0.115 MG TABS Take 1 tablet by mouth daily       leuprolide (LUPRON DEPOT) 22.5 MG kit Inject 22.5 mg into the muscle every 3 months 1 each 0     losartan-hydrochlorothiazide (HYZAAR) 100-12.5 MG tablet Take 1 tablet by mouth daily 90 tablet 3     Omega-3 Fatty Acids (OMEGA-3 FISH OIL PO) Take 500 mg by mouth daily       Loratadine (CLARITIN PO) Take  by mouth. As needed        sildenafil (VIAGRA) 50 MG tablet 50 mg as needed up to 3x weekly for erectile dysfunction 12 tablet 3     triamcinolone (KENALOG) 0.1 % cream        zolpidem (AMBIEN) 5 MG tablet TAKE 1 TABLET BY MOUTH NIGHTLY AS NEEDED 30 tablet 5     No Known Allergies          Review of Systems   Constitutional: Negative  "for chills and fever.   HENT: Positive for congestion. Negative for ear pain, hearing loss and sore throat.    Eyes: Negative for pain and visual disturbance.   Respiratory: Negative for cough and shortness of breath.    Cardiovascular: Positive for palpitations. Negative for chest pain and peripheral edema.   Gastrointestinal: Negative for abdominal pain, constipation, diarrhea, heartburn, hematochezia and nausea.   Genitourinary: Positive for impotence. Negative for dysuria, frequency, genital sores, hematuria, penile discharge and urgency.   Musculoskeletal: Positive for arthralgias. Negative for joint swelling and myalgias.   Skin: Negative for rash.   Neurological: Negative for dizziness, weakness, headaches and paresthesias.   Psychiatric/Behavioral: Negative for mood changes. The patient is nervous/anxious.      Constitutional, HEENT, cardiovascular, pulmonary, GI, , musculoskeletal, neuro, skin, endocrine and psych systems are negative, except as otherwise noted.    OBJECTIVE:   /60   Pulse 70   Temp (!) 96.3  F (35.7  C) (Temporal)   Resp 20   Ht 1.686 m (5' 6.38\")   Wt 86.2 kg (190 lb)   SpO2 99%   BMI 30.32 kg/m   Estimated body mass index is 30.32 kg/m  as calculated from the following:    Height as of this encounter: 1.686 m (5' 6.38\").    Weight as of this encounter: 86.2 kg (190 lb).  Physical Exam  GENERAL: healthy, alert and no distress  EYES: Eyes grossly normal to inspection, PERRL and conjunctivae and sclerae normal  HENT: ear canals and TM's normal, nose and mouth without ulcers or lesions  NECK: no adenopathy, no asymmetry, masses, or scars and thyroid normal to palpation  RESP: lungs clear to auscultation - no rales, rhonchi or wheezes  CV: regular rate and rhythm, normal S1 S2, no S3 or S4, no murmur, click or rub, no peripheral edema and peripheral pulses strong  ABDOMEN: soft, nontender, no hepatosplenomegaly, no masses and bowel sounds normal  MS: no gross musculoskeletal " "defects noted, no edema  SKIN: no suspicious lesions or rashes  NEURO: Normal strength and tone, mentation intact and speech normal  PSYCH: mentation appears normal, affect normal/bright    Diagnostic Test Results:  Labs reviewed in Epic    ASSESSMENT / PLAN:     Problem List Items Addressed This Visit     Gout    Relevant Medications    allopurinol (ZYLOPRIM) 100 MG tablet    Other Relevant Orders    Uric acid    Malignant neoplasm of ascending colon (H)      Other Visit Diagnoses     Medicare annual wellness visit, subsequent    -  Primary    Benign essential hypertension        Relevant Medications    losartan-hydrochlorothiazide (HYZAAR) 100-12.5 MG tablet          Patient has been advised of split billing requirements and indicates understanding: Yes    COUNSELING:  Reviewed preventive health counseling, as reflected in patient instructions       Regular exercise       Healthy diet/nutrition    Estimated body mass index is 30.32 kg/m  as calculated from the following:    Height as of this encounter: 1.686 m (5' 6.38\").    Weight as of this encounter: 86.2 kg (190 lb).        He reports that he quit smoking about 60 years ago. His smoking use included cigarettes, cigars, and pipe. He started smoking about 60 years ago. He smoked 0.00 packs per day for 1.00 year. He has never used smokeless tobacco.      Appropriate preventive services were discussed with this patient, including applicable screening as appropriate for cardiovascular disease, diabetes, osteopenia/osteoporosis, and glaucoma.  As appropriate for age/gender, discussed screening for colorectal cancer, prostate cancer, breast cancer, and cervical cancer. Checklist reviewing preventive services available has been given to the patient.    Reviewed patients plan of care and provided an AVS. The Basic Care Plan (routine screening as documented in Health Maintenance) for Medardo meets the Care Plan requirement. This Care Plan has been established and " reviewed with the Patient.    Counseling Resources:  ATP IV Guidelines  Pooled Cohorts Equation Calculator  Breast Cancer Risk Calculator  Breast Cancer: Medication to Reduce Risk  FRAX Risk Assessment  ICSI Preventive Guidelines  Dietary Guidelines for Americans, 2010  USDA's MyPlate  ASA Prophylaxis  Lung CA Screening    Verna Osborne MD  [facility]    Identified Health Risks:    The patient was provided with suggestions to help him develop a healthy physical lifestyle.  He is at risk for lack of exercise and has been provided with information to increase physical activity for the benefit of his well-being.

## 2022-03-10 NOTE — PATIENT INSTRUCTIONS
Patient Education   Personalized Prevention Plan  You are due for the preventive services outlined below.  Your care team is available to assist you in scheduling these services.  If you have already completed any of these items, please share that information with your care team to update in your medical record.  There are no preventive care reminders to display for this patient.  Your Health Risk Assessment indicates you feel you are not in good health    A healthy lifestyle helps keep the body fit and the mind alert. It helps protect you from disease, helps you fight disease, and helps prevent chronic disease (disease that doesn't go away) from getting worse. This is important as you get older and begin to notice twinges in muscles and joints and a decline in the strength and stamina you once took for granted. A healthy lifestyle includes good healthcare, good nutrition, weight control, recreation, and regular exercise. Avoid harmful substances and do what you can to keep safe. Another part of a healthy lifestyle is stay mentally active and socially involved.    Good healthcare     Have a wellness visit every year.     If you have new symptoms, let us know right away. Don't wait until the next checkup.     Take medicines exactly as prescribed and keep your medicines in a safe place. Tell us if your medicine causes problems.   Healthy diet and weight control     Eat 3 or 4 small, nutritious, low-fat, high-fiber meals a day. Include a variety of fruits, vegetables, and whole-grain foods.     Make sure you get enough calcium in your diet. Calcium, vitamin D, and exercise help prevent osteoporosis (bone thinning).     If you live alone, try eating with others when you can. That way you get a good meal and have company while you eat it.     Try to keep a healthy weight. If you eat more calories than your body uses for energy, it will be stored as fat and you will gain weight.     Recreation   Recreation is not  limited to sports and team events. It includes any activity that provides relaxation, interest, enjoyment, and exercise. Recreation provides an outlet for physical, mental, and social energy. It can give a sense of worth and achievement. It can help you stay healthy.    Mental Exercise and Social Involvement  Mental and emotional health is as important as physical health. Keep in touch with friends and family. Stay as active as possible. Continue to learn and challenge yourself.   Things you can do to stay mentally active are:    Learn something new, like a foreign language or musical instrument.     Play SCRABBLE or do crossword puzzles. If you cannot find people to play these games with you at home, you can play them with others on your computer through the Internet.     Join a games club--anything from card games to chess or checkers or lawn bowling.     Start a new hobby.     Go back to school.     Volunteer.     Read.   Keep up with world events.    Exercise for a Healthier Heart  You may wonder how you can improve the health of your heart. If you re thinking about exercise, you re on the right track. You don t need to become an athlete. But you do need a certain amount of brisk exercise to help strengthen your heart. If you have been diagnosed with a heart condition, your healthcare provider may advise exercise to help stabilize your condition. To help make exercise a habit, choose safe, fun activities.      Exercise with a friend. When activity is fun, you're more likely to stick with it.   Before you start  Check with your healthcare provider before starting an exercise program. This is especially important if you have not been active for a while. It's also important if you have a long-term (chronic) health problem such as heart disease, diabetes, or obesity. Or if you are at high risk for having these problems.   Why exercise?  Exercising regularly offers many healthy rewards. It can help you do all of the  following:     Improve your blood cholesterol level to help prevent further heart trouble    Lower your blood pressure to help prevent a stroke or heart attack    Control diabetes, or reduce your risk of getting this disease    Improve your heart and lung function    Reach and stay at a healthy weight    Make your muscles stronger so you can stay active    Prevent falls and fractures by slowing the loss of bone mass (osteoporosis)    Manage stress better    Reduce your blood pressure    Improve your sense of self and your body image  Exercise tips      Ease into your routine. Set small goals. Then build on them. If you are not sure what your activity level should be, talk with your healthcare provider first before starting an exercise routine.    Exercise on most days. Aim for a total of 150 minutes (2 hours and 30 minutes) or more of moderate-intensity aerobic activity each week. Or 75 minutes (1 hour and 15 minutes) or more of vigorous-intensity aerobic activity each week. Or try for a combination of both. Moderate activity means that you breathe heavier and your heart rate increases but you can still talk. Think about doing 40 minutes of moderate exercise, 3 to 4 times a week. For best results, activity should last for about 40 minutes to lower blood pressure and cholesterol. It's OK to work up to the 40-minute period over time. Examples of moderate-intensity activity are walking 1 mile in 15 minutes. Or doing 30 to 45 minutes of yard work.    Step up your daily activity level.  Along with your exercise program, try being more active the whole day. Walk instead of drive. Or park further away so that you take more steps each day. Do more household tasks or yard work. You may not be able to meet the advised mount of physical activity. But doing some moderate- or vigorous-intensity aerobic activity can help reduce your risk for heart disease. Your healthcare provider can help you figure out what is best for  you.    Choose 1 or more activities you enjoy.  Walking is one of the easiest things you can do. You can also try swimming, riding a bike, dancing, or taking an exercise class.    When to call your healthcare provider  Call your healthcare provider if you have any of these:     Chest pain or feel dizzy or lightheaded    Burning, tightness, pressure, or heaviness in your chest, neck, shoulders, back, or arms    Abnormal shortness of breath    More joint or muscle pain    A very fast or irregular heartbeat (palpitations)  Addy last reviewed this educational content on 7/1/2019 2000-2021 [employer]. All rights reserved. This information is not intended as a substitute for professional medical care. Always follow your healthcare professional's instructions.

## 2022-03-11 NOTE — RESULT ENCOUNTER NOTE
Mr. Gautam    Your recent test results are attached.  Normal uric acid levels    If you have any questions or concerns please contact me via My Chart or call the clinic at [phone]     Thank You  Verna Osborne MD.

## 2022-03-14 LAB — TESTOST SERPL-MCNC: 18 NG/DL (ref 240–950)

## 2022-03-23 ENCOUNTER — ALLIED HEALTH/NURSE VISIT (OUTPATIENT)
Dept: FAMILY MEDICINE | Facility: OTHER | Age: 82
End: 2022-03-23
Payer: COMMERCIAL

## 2022-03-23 VITALS — SYSTOLIC BLOOD PRESSURE: 145 MMHG | OXYGEN SATURATION: 97 % | HEART RATE: 73 BPM | DIASTOLIC BLOOD PRESSURE: 83 MMHG

## 2022-03-23 DIAGNOSIS — M25.511 SHOULDER PAIN, RIGHT: Primary | ICD-10-CM

## 2022-03-23 DIAGNOSIS — Z78.9 TRIAGE ASSESSMENT CLASS 3, NONURGENT: ICD-10-CM

## 2022-03-23 PROCEDURE — 99207 PR NO CHARGE NURSE ONLY: CPT

## 2022-03-23 NOTE — PROGRESS NOTES
Patient walk in to clinic for right shoulder and neck pain.     Patient reports he was on the computer for 3 hours last night and afterward developed muscle soreness and pain in the top right shoulder/trapezius muscle area. Patient reports he took 2 tylenol this morning and relieved some of the soreness.   Patient came to clinic as he was concerned his symptoms could be related to a stroke. Discussed symptoms of a stroke with patient and he felt relieved his symptoms did not sound similar.   Advised patient to continue taking tylenol for pain relief. Also recommended using heat pack to the area. Patient states he also has icy-hot cream at home and wondered if he could use that, RN agreed he could try this as well.     Scheduled patient for a follow up appointment with PCP for Tuesday 3/29/22. Patient will be out of town this weekend. Advised to seek care out of state if patient's symptoms worsen. Patient will call and cancel appointment if symptoms approve.     Jessica ALANIZN, RN

## 2022-03-28 DIAGNOSIS — C61 MALIGNANT NEOPLASM OF PROSTATE (H): Primary | ICD-10-CM

## 2022-03-29 ENCOUNTER — OFFICE VISIT (OUTPATIENT)
Dept: FAMILY MEDICINE | Facility: OTHER | Age: 82
End: 2022-03-29
Payer: COMMERCIAL

## 2022-03-29 VITALS
HEART RATE: 59 BPM | SYSTOLIC BLOOD PRESSURE: 110 MMHG | TEMPERATURE: 96.1 F | WEIGHT: 192 LBS | OXYGEN SATURATION: 99 % | RESPIRATION RATE: 20 BRPM | HEIGHT: 68 IN | BODY MASS INDEX: 29.1 KG/M2 | DIASTOLIC BLOOD PRESSURE: 70 MMHG

## 2022-03-29 DIAGNOSIS — M25.511 ACUTE PAIN OF RIGHT SHOULDER: Primary | ICD-10-CM

## 2022-03-29 PROCEDURE — 99213 OFFICE O/P EST LOW 20 MIN: CPT | Performed by: FAMILY MEDICINE

## 2022-03-29 NOTE — PROGRESS NOTES
"  Assessment & Plan     Acute pain of right shoulder  Exam of the right shoulder was completely normal and there was no concern for rotator cuff pathology.  Symptoms are likely secondary to underlying possible AC joint arthritis.  Advised range of motion exercises.  Reportable signs and symptoms discussed        Verna Osbonre MD  [facility] Batres is a 82 year old who presents for the following health issues     Shoulder Pain  This is a new problem. The current episode started in the past 7 days. The problem has been resolved. Nothing aggravates the symptoms. He has tried nothing for the symptoms.   History of Present Illness       Reason for visit:  Source of the pain  Symptom onset:  1-3 days ago  Symptoms include:  Pain in the right side of neck and along the right shoulder  Symptom intensity:  Severe  Symptom progression:  Improving  Had these symptoms before:  No  What makes it worse:  No  What makes it better:  Ibuprofen    He eats 2-3 servings of fruits and vegetables daily.He consumes 0 sweetened beverage(s) daily.He exercises with enough effort to increase his heart rate 9 or less minutes per day.  He exercises with enough effort to increase his heart rate 3 or less days per week.   He is taking medications regularly.    Review of Systems   Constitutional, HEENT, cardiovascular, pulmonary, gi and gu systems are negative, except as otherwise noted.      Objective    /70   Pulse 59   Temp (!) 96.1  F (35.6  C) (Temporal)   Resp 20   Ht 1.727 m (5' 8\")   Wt 87.1 kg (192 lb)   SpO2 99%   BMI 29.19 kg/m    Body mass index is 29.19 kg/m .  Physical Exam   GENERAL: healthy, alert and no distress  NECK: no adenopathy, no asymmetry, masses, or scars and thyroid normal to palpation  RESP: lungs clear to auscultation - no rales, rhonchi or wheezes  CV: regular rate and rhythm, normal S1 S2, no S3 or S4, no murmur, click or rub, no peripheral edema and peripheral " pulses strong  ABDOMEN: soft, nontender, no hepatosplenomegaly, no masses and bowel sounds normal  MS: Negative Cole and neers test

## 2022-03-30 DIAGNOSIS — I10 BENIGN ESSENTIAL HYPERTENSION: ICD-10-CM

## 2022-03-30 RX ORDER — LOSARTAN POTASSIUM AND HYDROCHLOROTHIAZIDE 12.5; 1 MG/1; MG/1
TABLET ORAL
Qty: 90 TABLET | Refills: 0 | OUTPATIENT
Start: 2022-03-30

## 2022-03-30 NOTE — TELEPHONE ENCOUNTER
Duplicate, RX was recently sent with refills  losartan-hydrochlorothiazide (HYZAAR) 100-12.5 MG tablet 90 tablet 3 3/10/2022  No   Sig - Route: Take 1 tablet by mouth daily - Oral   Sent to pharmacy as: Losartan Potassium-HCTZ 100-12.5 MG Oral Tablet (HYZAAR)   Class: E-Prescribe   Order: [id number]   E-Prescribing Status: Receipt confirmed by pharmacy (3/10/2022 11:55 AM CST)

## 2022-04-20 ENCOUNTER — LAB (OUTPATIENT)
Dept: LAB | Facility: OTHER | Age: 82
End: 2022-04-20
Payer: COMMERCIAL

## 2022-04-20 DIAGNOSIS — C61 PROSTATE CANCER (H): ICD-10-CM

## 2022-04-20 DIAGNOSIS — C61 MALIGNANT NEOPLASM OF PROSTATE (H): ICD-10-CM

## 2022-04-20 LAB
ALBUMIN SERPL-MCNC: 3.9 G/DL (ref 3.4–5)
ALP SERPL-CCNC: 77 U/L (ref 40–150)
ALT SERPL W P-5'-P-CCNC: 31 U/L (ref 0–70)
ANION GAP SERPL CALCULATED.3IONS-SCNC: 5 MMOL/L (ref 3–14)
AST SERPL W P-5'-P-CCNC: 33 U/L (ref 0–45)
BASOPHILS # BLD AUTO: 0 10E3/UL (ref 0–0.2)
BASOPHILS NFR BLD AUTO: 0 %
BILIRUB SERPL-MCNC: 0.8 MG/DL (ref 0.2–1.3)
BUN SERPL-MCNC: 13 MG/DL (ref 7–30)
CALCIUM SERPL-MCNC: 9 MG/DL (ref 8.5–10.1)
CHLORIDE BLD-SCNC: 104 MMOL/L (ref 94–109)
CO2 SERPL-SCNC: 27 MMOL/L (ref 20–32)
CREAT SERPL-MCNC: 0.93 MG/DL (ref 0.66–1.25)
EOSINOPHIL # BLD AUTO: 0.1 10E3/UL (ref 0–0.7)
EOSINOPHIL NFR BLD AUTO: 3 %
ERYTHROCYTE [DISTWIDTH] IN BLOOD BY AUTOMATED COUNT: 12.4 % (ref 10–15)
GFR SERPL CREATININE-BSD FRML MDRD: 83 ML/MIN/1.73M2
GLUCOSE BLD-MCNC: 164 MG/DL (ref 70–99)
HCT VFR BLD AUTO: 37.5 % (ref 40–53)
HGB BLD-MCNC: 12.8 G/DL (ref 13.3–17.7)
LYMPHOCYTES # BLD AUTO: 1.8 10E3/UL (ref 0.8–5.3)
LYMPHOCYTES NFR BLD AUTO: 40 %
MCH RBC QN AUTO: 32.6 PG (ref 26.5–33)
MCHC RBC AUTO-ENTMCNC: 34.1 G/DL (ref 31.5–36.5)
MCV RBC AUTO: 95 FL (ref 78–100)
MONOCYTES # BLD AUTO: 0.4 10E3/UL (ref 0–1.3)
MONOCYTES NFR BLD AUTO: 10 %
NEUTROPHILS # BLD AUTO: 2.1 10E3/UL (ref 1.6–8.3)
NEUTROPHILS NFR BLD AUTO: 47 %
PLATELET # BLD AUTO: 139 10E3/UL (ref 150–450)
POTASSIUM BLD-SCNC: 3.7 MMOL/L (ref 3.4–5.3)
PROT SERPL-MCNC: 7.2 G/DL (ref 6.8–8.8)
PSA SERPL-MCNC: 0.24 UG/L (ref 0–4)
RBC # BLD AUTO: 3.93 10E6/UL (ref 4.4–5.9)
SODIUM SERPL-SCNC: 136 MMOL/L (ref 133–144)
WBC # BLD AUTO: 4.5 10E3/UL (ref 4–11)

## 2022-04-20 PROCEDURE — 84153 ASSAY OF PSA TOTAL: CPT

## 2022-04-20 PROCEDURE — 84403 ASSAY OF TOTAL TESTOSTERONE: CPT

## 2022-04-20 PROCEDURE — 80053 COMPREHEN METABOLIC PANEL: CPT

## 2022-04-20 PROCEDURE — 85025 COMPLETE CBC W/AUTO DIFF WBC: CPT

## 2022-04-20 PROCEDURE — 36415 COLL VENOUS BLD VENIPUNCTURE: CPT

## 2022-04-22 ENCOUNTER — VIRTUAL VISIT (OUTPATIENT)
Dept: ONCOLOGY | Facility: CLINIC | Age: 82
End: 2022-04-22
Attending: NURSE PRACTITIONER
Payer: COMMERCIAL

## 2022-04-22 DIAGNOSIS — C61 PROSTATE CANCER (H): Primary | ICD-10-CM

## 2022-04-22 LAB — TESTOST SERPL-MCNC: 14 NG/DL (ref 240–950)

## 2022-04-22 PROCEDURE — G0463 HOSPITAL OUTPT CLINIC VISIT: HCPCS | Mod: PN,RTG | Performed by: INTERNAL MEDICINE

## 2022-04-22 PROCEDURE — 99214 OFFICE O/P EST MOD 30 MIN: CPT | Mod: 95 | Performed by: INTERNAL MEDICINE

## 2022-04-22 NOTE — PROGRESS NOTES
Medardo is a 82 year old who is being evaluated via a billable video visit.      Medardo Gautam stated he is in the state [facility] for the visit today.    How would you like to obtain your AVS? MyChart  If the video visit is dropped, the invitation should be resent by: Text to cell phone: [phone]  Will anyone else be joining your video visit? No      Video Start Time: 9:41 AM  Video-Visit Details    Type of service:  Video Visit    Video End Time:9:51 AM    Originating Location (pt. Location): Home    Distant Location (provider location):  [facility]     Platform used for Video Visit: Ronnie Cam, Virtual Visit Facilitator        [facility] Oncology Followup  Visit Date   Apr 22, 2022       The patient is a 82-year-old gentleman with a history of high-grade prostate cancer as well as a grade 2 colon cancer here for evaluation and management of a rising PSA.  He was followed by Dr. Streeter in urology.     DISEASES UNDER MANAGEMENT:    8/6/2012             pT2c N0 Mx prostatic adenocarcinoma, Jeancarlos grade 4+5 = 9, PSA 5.2    pT1 N0 M0 G2 colonic adenocarcinoma     RADIATION HISTORY: Prostate fossa radiation (completed: 10/18/2013)  1. Phase 1: 4500 cGy (180 cGy per fraction) to the pelvis  2. Phase 2: 2520 cGy boost to the prostate bed      CHEMOTHERAPY HISTORY:     CALGB 80508  ? Leuprolide 22.5 mg IM (3/27/2012, 6/19/2012)  ? Docetaxel 75 mg/m  (3/27/2012)- discontinued on study given the development of treatment related hepatotoxicity  ? Salvage ADT  ? Leuprolide (6/25/2013-1/23/2019) - gets Q 6 months   ? 12/4/20 darolutamide 600 mg PO BID started    PSA:   1/9/18:             PSA   < 0.01  1/23/19:           PSA   = 0.04  7/10/19:           PSA   = 0.07  8/13/19            PSA   = 0.09      discontinue Bicalutamide/Maintain ADT  9/18/19            PSA   = 0.13  12/11/19          PSA  = 0.27  1/29/20 PSA  = 0.42  3/11/20 PSA = 0.63  6/16/20 PSA     = 0.92 (T=24) took  30 day supply of Bicalutamide ending ~8/19/20 8/31/2020 LUPRON 6 MONTHS  9/18/20 PSA     = 0.81  11/16/20 PSA = 1.66 -- START DAROLUTAMIDE  12/18/20 PSA  = 0.67  1/18/21 PSA  = 0.26  2/17/21 PSA  = 0.14    LUPRON 6 MONTHS DOSE  3/17/21 PSA  = 0.09  4/23/21 PSA  =0.09, mild fatigue.   6/21/21 PSA  = 0.07   8/18/21 PSA  =0.07 LUPRON 3 MONTH DOSE  9/16/21 PSA =0.09 Testosterone total=16  10/14/21 PSA     =0.11, testosterone total=16  11/11/21 PSA =0.10, LUPRON dose this Thursday 11/18/21 1/13/21 PSA     =0.16  3/10/22 PSA  =0.17  4/20/22 PSA  =0.24 tolerating nubeqa fine      Interim History:   Doing quite well. A little worried about the rise of the PSA  Fatigue is main SE of Daro but this is mild     ROS: 10 point ROS neg other than the symptoms noted above in the HPI.    Video physical exam  General: Patient appears well in no acute distress.   Skin: No visualized rash or lesions on visualized skin  Eyes: EOMI, no erythema, sclera icterus or discharge noted  Resp: Appears to be breathing comfortably without accessory muscle usage, speaking in full sentences, no cough  MSK: Appears to have normal range of motion based on visualized movements  Neurologic: No apparent tremors, facial movements symmetric  Psych: affect good, alert and oriented    The rest of a comprehensive physical examination is deferred due to PHE (public health emergency) video restrictions    Labs:  Most Recent 3 CBC's:  Recent Labs   Lab Test 04/20/22  1001 01/13/22  1021 11/11/21  1050   WBC 4.5 5.4 5.1   HGB 12.8* 13.2* 12.8*   MCV 95 99 98   * 156 147*     Most Recent 3 BMP's:  Recent Labs   Lab Test 04/20/22  1001 02/11/22  1008 01/13/22  1021    138 140   POTASSIUM 3.7 3.5 3.6   CHLORIDE 104 104 106   CO2 27 29 30   BUN 13 17 16   CR 0.93 1.03 0.87   ANIONGAP 5 5 4   GAVIN 9.0 9.5 9.3   * 180* 157*    Most Recent 2 LFT's:  Recent Labs   Lab Test 04/20/22  1001 02/11/22  1008   AST 33 34   ALT 31 32   ALKPHOS 77 84    BILITOTAL 0.8 0.7    Most Recent TSH and T4:No lab results found.  I reviewed the above labs today.    Assessment/Plan:  #Prostate Cancer, non metastatic castration resistant  -PSA continued to rise so decided to pursue darolutamide in addition to ADT. Started on 12/4/20. He has been tolerating overall very well with some minor increase in fatigue, now has stabilized   -PSA up slightly. He has not had scans in a while and feels fine  - will order SCANs for September and followup with Dr Holman at that time. If having progression/new lesions on scans, will consider change in Rx or clinical trial etc. Will defer to Dr PALMA on this point. He has had both Docetaxel and ARSI so he could be a [id number] candidate.   - he does not need to be seen in June as he is tolerating Rx well and only minor rise in PSA  -consider genomics. Will see what is available. CFDNA would likely be neg now, but can sequence primary    #Prediabetes and hyperlipidemia  -management per pcp    #HM  -recently had colonoscopy without any concerning lesions. Repeat in 3 years: 2024      Medardo Ingram MD

## 2022-04-22 NOTE — LETTER
4/22/2022         RE: Medardo Gautam  [address] [id number]        Dear Colleague,    Thank you for referring your patient, Medardo Gautam, to the [facility]. Please see a copy of my visit note below.    Medardo is a 82 year old who is being evaluated via a billable video visit.      Medardo Gautam stated he is in the state of [place] for the visit today.    How would you like to obtain your AVS? MyChart  If the video visit is dropped, the invitation should be resent by: Text to cell phone: [phone]  Will anyone else be joining your video visit? No      Video Start Time: 9:41 AM  Video-Visit Details    Type of service:  Video Visit    Video End Time:9:51 AM    Originating Location (pt. Location): Home    Distant Location (provider location):  [facility]     Platform used for Video Visit: Ronnie Cam, Virtual Visit Facilitator        [facility] Oncology Followup  Visit Date   Apr 22, 2022       The patient is a 82-year-old gentleman with a history of high-grade prostate cancer as well as a grade 2 colon cancer here for evaluation and management of a rising PSA.  He was followed by Dr. Streeter in urology.     DISEASES UNDER MANAGEMENT:    8/6/2012             pT2c N0 Mx prostatic adenocarcinoma, Jeancarlos grade 4+5 = 9, PSA 5.2    pT1 N0 M0 G2 colonic adenocarcinoma     RADIATION HISTORY: Prostate fossa radiation (completed: 10/18/2013)  1. Phase 1: 4500 cGy (180 cGy per fraction) to the pelvis  2. Phase 2: 2520 cGy boost to the prostate bed      CHEMOTHERAPY HISTORY:     CALGB 91008  ? Leuprolide 22.5 mg IM (3/27/2012, 6/19/2012)  ? Docetaxel 75 mg/m  (3/27/2012)- discontinued on study given the development of treatment related hepatotoxicity  ? Salvage ADT  ? Leuprolide (6/25/2013-1/23/2019) - gets Q 6 months   ? 12/4/20 darolutamide 600 mg PO BID started    PSA:   1/9/18:             PSA   < 0.01  1/23/19:            PSA   = 0.04  7/10/19:           PSA   = 0.07  8/13/19            PSA   = 0.09      discontinue Bicalutamide/Maintain ADT  9/18/19            PSA   = 0.13  12/11/19          PSA  = 0.27  1/29/20 PSA  = 0.42  3/11/20 PSA = 0.63  6/16/20 PSA     = 0.92 (T=24) took 30 day supply of Bicalutamide ending ~8/19/20 8/31/2020 LUPRON 6 MONTHS  9/18/20 PSA     = 0.81  11/16/20 PSA = 1.66 -- START DAROLUTAMIDE  12/18/20 PSA  = 0.67  1/18/21 PSA  = 0.26  2/17/21 PSA  = 0.14    LUPRON 6 MONTHS DOSE  3/17/21 PSA  = 0.09  4/23/21 PSA  =0.09, mild fatigue.   6/21/21 PSA  = 0.07   8/18/21 PSA  =0.07 LUPRON 3 MONTH DOSE  9/16/21 PSA =0.09 Testosterone total=16  10/14/21 PSA     =0.11, testosterone total=16  11/11/21 PSA =0.10, LUPRON dose this Thursday 11/18/21 1/13/21 PSA     =0.16  3/10/22 PSA  =0.17  4/20/22 PSA  =0.24 tolerating nubeqa fine      Interim History:   Doing quite well. A little worried about the rise of the PSA  Fatigue is main SE of Daro but this is mild     ROS: 10 point ROS neg other than the symptoms noted above in the HPI.    Video physical exam  General: Patient appears well in no acute distress.   Skin: No visualized rash or lesions on visualized skin  Eyes: EOMI, no erythema, sclera icterus or discharge noted  Resp: Appears to be breathing comfortably without accessory muscle usage, speaking in full sentences, no cough  MSK: Appears to have normal range of motion based on visualized movements  Neurologic: No apparent tremors, facial movements symmetric  Psych: affect good, alert and oriented    The rest of a comprehensive physical examination is deferred due to PHE (public health emergency) video restrictions    Labs:  Most Recent 3 CBC's:  Recent Labs   Lab Test 04/20/22  1001 01/13/22  1021 11/11/21  1050   WBC 4.5 5.4 5.1   HGB 12.8* 13.2* 12.8*   MCV 95 99 98   * 156 147*     Most Recent 3 BMP's:  Recent Labs   Lab Test 04/20/22  1001 02/11/22  1008 01/13/22  1021    138 140   POTASSIUM 3.7  3.5 3.6   CHLORIDE 104 104 106   CO2 27 29 30   BUN 13 17 16   CR 0.93 1.03 0.87   ANIONGAP 5 5 4   GAVIN 9.0 9.5 9.3   * 180* 157*    Most Recent 2 LFT's:  Recent Labs   Lab Test 04/20/22  1001 02/11/22  1008   AST 33 34   ALT 31 32   ALKPHOS 77 84   BILITOTAL 0.8 0.7    Most Recent TSH and T4:No lab results found.  I reviewed the above labs today.    Assessment/Plan:  #Prostate Cancer, non metastatic castration resistant  -PSA continued to rise so decided to pursue darolutamide in addition to ADT. Started on 12/4/20. He has been tolerating overall very well with some minor increase in fatigue, now has stabilized   -PSA up slightly. He has not had scans in a while and feels fine  - will order SCANs for September and followup with Dr Holman at that time. If having progression/new lesions on scans, will consider change in Rx or clinical trial etc. Will defer to Dr PALMA on this point. He has had both Docetaxel and ARSI so he could be a [id number] candidate.   - he does not need to be seen in June as he is tolerating Rx well and only minor rise in PSA  -consider genomics. Will see what is available. CFDNA would likely be neg now, but can sequence primary    #Prediabetes and hyperlipidemia  -management per pcp    #HM  -recently had colonoscopy without any concerning lesions. Repeat in 3 years: 2024            Again, thank you for allowing me to participate in the care of your patient.      Sincerely,    Medardo Ingram MD

## 2022-04-22 NOTE — NURSING NOTE
Medardo Gautam verified meds and allergies are correct via patients echeck in. No changes at this time.    Bridget Cam, Virtual Facilitator

## 2022-04-29 ENCOUNTER — LAB (OUTPATIENT)
Dept: LAB | Facility: CLINIC | Age: 82
End: 2022-04-29
Payer: COMMERCIAL

## 2022-04-29 DIAGNOSIS — C61 MALIGNANT NEOPLASM OF PROSTATE (H): Primary | ICD-10-CM

## 2022-05-10 ENCOUNTER — TELEPHONE (OUTPATIENT)
Dept: FAMILY MEDICINE | Facility: OTHER | Age: 82
End: 2022-05-10
Payer: COMMERCIAL

## 2022-05-10 NOTE — TELEPHONE ENCOUNTER
Called patient to schedule lupron injection.  Scheduled patient for Monday 5/16/22 at 10:30 for injection.   Active order in chart. Medication ordered on 5/10/22.    Jessica ALANIZN, RN

## 2022-05-11 ENCOUNTER — LAB (OUTPATIENT)
Dept: LAB | Facility: OTHER | Age: 82
End: 2022-05-11
Payer: COMMERCIAL

## 2022-05-11 DIAGNOSIS — C61 PROSTATE CANCER (H): ICD-10-CM

## 2022-05-11 DIAGNOSIS — C61 MALIGNANT NEOPLASM OF PROSTATE (H): ICD-10-CM

## 2022-05-11 LAB — PSA SERPL-MCNC: 0.24 UG/L (ref 0–4)

## 2022-05-11 PROCEDURE — 36415 COLL VENOUS BLD VENIPUNCTURE: CPT

## 2022-05-11 PROCEDURE — 84153 ASSAY OF PSA TOTAL: CPT

## 2022-05-11 PROCEDURE — 84403 ASSAY OF TOTAL TESTOSTERONE: CPT

## 2022-05-12 LAB — TESTOST SERPL-MCNC: 14 NG/DL (ref 240–950)

## 2022-05-16 ENCOUNTER — ALLIED HEALTH/NURSE VISIT (OUTPATIENT)
Dept: FAMILY MEDICINE | Facility: OTHER | Age: 82
End: 2022-05-16
Payer: COMMERCIAL

## 2022-05-16 DIAGNOSIS — C61 PROSTATE CANCER (H): Primary | ICD-10-CM

## 2022-05-16 DIAGNOSIS — M1A.9XX0 CHRONIC GOUT WITHOUT TOPHUS, UNSPECIFIED CAUSE, UNSPECIFIED SITE: ICD-10-CM

## 2022-05-16 PROCEDURE — 99207 PR NO CHARGE NURSE ONLY: CPT

## 2022-05-16 PROCEDURE — 96372 THER/PROPH/DIAG INJ SC/IM: CPT | Performed by: FAMILY MEDICINE

## 2022-05-16 NOTE — PROGRESS NOTES
Clinic Administered Medication Documentation    Administrations This Visit     leuprolide (LUPRON DEPOT) kit 22.5 mg     Admin Date  05/16/2022 Action  Given Dose  22.5 mg Route  Intramuscular Site  Left Ventrogluteal Administered By  Robyn Hickman RN    Ordering Provider: Medardo Ingram MD    Patient Supplied?: No    Comments: Last given 2/11/22                  Injectable Medication Documentation    Patient was given Lupron. Prior to medication administration, verified patients identity using patient s name and date of birth. Please see MAR and medication order for additional information. Patient instructed to report any adverse reaction to staff immediately  and stay in clinic after the injection but patient declined.      Was entire vial of medication used? Yes  Vial/Syringe: Syringe  Expiration Date:  08/18/2024  Was this medication supplied by the patient? No

## 2022-05-18 ENCOUNTER — ANCILLARY PROCEDURE (OUTPATIENT)
Dept: NUCLEAR MEDICINE | Facility: CLINIC | Age: 82
End: 2022-05-18
Attending: INTERNAL MEDICINE
Payer: COMMERCIAL

## 2022-05-18 DIAGNOSIS — C61 PROSTATE CANCER (H): ICD-10-CM

## 2022-05-18 PROCEDURE — 78306 BONE IMAGING WHOLE BODY: CPT | Mod: GC | Performed by: RADIOLOGY

## 2022-05-18 PROCEDURE — A9503 TC99M MEDRONATE: HCPCS | Performed by: RADIOLOGY

## 2022-05-18 RX ORDER — TC 99M MEDRONATE 20 MG/10ML
20-30 INJECTION, POWDER, LYOPHILIZED, FOR SOLUTION INTRAVENOUS ONCE
Status: COMPLETED | OUTPATIENT
Start: 2022-05-18 | End: 2022-05-18

## 2022-05-18 RX ORDER — ALLOPURINOL 100 MG/1
TABLET ORAL
Qty: 90 TABLET | Refills: 0 | OUTPATIENT
Start: 2022-05-18

## 2022-05-18 RX ADMIN — TC 99M MEDRONATE 29 MCI.: 20 INJECTION, POWDER, LYOPHILIZED, FOR SOLUTION INTRAVENOUS at 11:56

## 2022-05-20 LAB
BKR LAB AP ADD'L TEST STATUS: NORMAL
BKR PATH ADDL TEST FINAL COMMENTS: NORMAL

## 2022-06-13 ENCOUNTER — LAB (OUTPATIENT)
Dept: LAB | Facility: OTHER | Age: 82
End: 2022-06-13
Payer: COMMERCIAL

## 2022-06-13 DIAGNOSIS — C61 PROSTATE CANCER (H): ICD-10-CM

## 2022-06-13 DIAGNOSIS — C61 MALIGNANT NEOPLASM OF PROSTATE (H): ICD-10-CM

## 2022-06-13 LAB — PSA SERPL-MCNC: 0.34 UG/L (ref 0–4)

## 2022-06-13 PROCEDURE — 36415 COLL VENOUS BLD VENIPUNCTURE: CPT

## 2022-06-13 PROCEDURE — 84153 ASSAY OF PSA TOTAL: CPT

## 2022-06-13 PROCEDURE — 84403 ASSAY OF TOTAL TESTOSTERONE: CPT

## 2022-06-15 LAB — TESTOST SERPL-MCNC: 15 NG/DL (ref 240–950)

## 2022-06-15 NOTE — PROGRESS NOTES
Medardo is a 82 year old who is being evaluated via a billable video visit.      How would you like to obtain your AVS? MyChart  If the video visit is dropped, the invitation should be resent by: Send to e-mail at: [email or web address]  Will anyone else be joining your video visit? No       Mike Go, Visit Devaughn/MA.    Video-Visit Details    Video Start Time: 10:06 AM    Type of service:  Video Visit    Video End Time:10:20 AM    Originating Location (pt. Location): Home    Distant Location (provider location):  [facility] CANCER [facility]     Platform used for Video Visit: Critical access hospital Oncology Followup  Visit Date   Jun 16, 2022       The patient is a 82-year-old gentleman with a history of high-grade prostate cancer as well as a grade 2 colon cancer here for evaluation and management of a rising PSA.  He was followed by Dr. Streeter in urology.     DISEASES UNDER MANAGEMENT:    8/6/2012             pT2c N0 Mx prostatic adenocarcinoma, Jeancarlos grade 4+5 = 9, PSA 5.2    pT1 N0 M0 G2 colonic adenocarcinoma     RADIATION HISTORY: Prostate fossa radiation (completed: 10/18/2013)  1. Phase 1: 4500 cGy (180 cGy per fraction) to the pelvis  2. Phase 2: 2520 cGy boost to the prostate bed      CHEMOTHERAPY HISTORY:     CALGB 94992  ? Leuprolide 22.5 mg IM (3/27/2012, 6/19/2012)  ? Docetaxel 75 mg/m  (3/27/2012)- discontinued on study given the development of treatment related hepatotoxicity  ? Salvage ADT  ? Leuprolide (6/25/2013-1/23/2019) - gets Q 6 months   ? 12/4/20 darolutamide 600 mg PO BID started    PSA:   1/9/18:             PSA   < 0.01  1/23/19:           PSA   = 0.04  7/10/19:           PSA   = 0.07  8/13/19            PSA   = 0.09      discontinue Bicalutamide/Maintain ADT  9/18/19            PSA   = 0.13  12/11/19          PSA  = 0.27  1/29/20 PSA  = 0.42  3/11/20 PSA = 0.63  6/16/20 PSA     = 0.92 (T=24) took 30 day supply of Bicalutamide ending  ~8/19/20 8/31/2020 LUPRON 6 MONTHS  9/18/20 PSA     = 0.81  11/16/20 PSA = 1.66 -- START DAROLUTAMIDE  12/18/20 PSA  = 0.67  1/18/21 PSA  = 0.26  2/17/21 PSA  = 0.14    LUPRON 6 MONTHS DOSE  3/17/21 PSA  = 0.09  4/23/21 PSA  =0.09, mild fatigue.   6/21/21 PSA  = 0.07   8/18/21 PSA  =0.07 LUPRON 3 MONTH DOSE  9/16/21 PSA =0.09 Testosterone total=16  10/14/21 PSA     =0.11, testosterone total=16  11/11/21 PSA =0.10, LUPRON dose this Thursday 11/18/21 1/13/21 PSA     =0.16  3/10/22 PSA  =0.17  4/20/22 PSA  =0.24 tolerating nubeqa fine  6/13/22 PSA=0.34      Interim History:   Doing quite well. Has dry skin, using Aveeno and CeraVe  No pain   A little worried about the rise of the PSA  Fatigue is main SE of Daro but this is mild  Might move to [place] to be closer to his daughter.      ROS: 10 point ROS neg other than the symptoms noted above in the HPI.    Video physical exam  General: Patient appears well in no acute distress.   Skin: No visualized rash or lesions on visualized skin  Eyes: EOMI, no erythema, sclera icterus or discharge noted  Resp: Appears to be breathing comfortably without accessory muscle usage, speaking in full sentences, no cough  MSK: Appears to have normal range of motion based on visualized movements  Neurologic: No apparent tremors, facial movements symmetric  Psych: affect good, alert and oriented    The rest of a comprehensive physical examination is deferred due to PHE (public health emergency) video restrictions    Labs:  Most Recent 3 CBC's:  Recent Labs   Lab Test 04/20/22  1001 01/13/22  1021 11/11/21  1050   WBC 4.5 5.4 5.1   HGB 12.8* 13.2* 12.8*   MCV 95 99 98   * 156 147*     Most Recent 3 BMP's:  Recent Labs   Lab Test 04/20/22  1001 02/11/22  1008 01/13/22  1021    138 140   POTASSIUM 3.7 3.5 3.6   CHLORIDE 104 104 106   CO2 27 29 30   BUN 13 17 16   CR 0.93 1.03 0.87   ANIONGAP 5 5 4   GAVIN 9.0 9.5 9.3   * 180* 157*    Most Recent 2 LFT's:  Recent Labs    Lab Test 04/20/22  1001 02/11/22  1008   AST 33 34   ALT 31 32   ALKPHOS 77 84   BILITOTAL 0.8 0.7    Most Recent TSH and T4:No lab results found.  I reviewed the above labs today.    Assessment/Plan:  #Prostate Cancer, non metastatic castration resistant  -PSA continued to rise so decided to pursue darolutamide in addition to ADT. Started on 12/4/20. He has been tolerating overall very well with some minor increase in fatigue, now has stabilized   -PSA up slightly. Scans on 05/18/22 without radiographic dz.  -Continue nubeqa  - per Dr. Fitch last note, consider repeat scans in September pending PSA and followup with Dr Holman at that time. If having progression/new lesions on scans, will consider change in Rx or clinical trial etc. Will consult with Dr PALMA to see if there is anything else he would like prior to visit  --May be moving to [place] in coming months to be close to family, discussed we can help with transition when that time comes  --CARIS without mutation 05/2022  --Docetaxel and ARSI so he could be a [id number] candidate.     #Prediabetes and hyperlipidemia  -management per pcp    #HM  -recently had colonoscopy without any concerning lesions. Repeat in 3 years: 2024    40 minutes spent on the date of the encounter doing chart review, review of test results, interpretation of tests, patient visit, documentation, discussion with other provider(s) and discussion with family       Nedra Vick, CNP

## 2022-06-16 ENCOUNTER — VIRTUAL VISIT (OUTPATIENT)
Dept: ONCOLOGY | Facility: CLINIC | Age: 82
End: 2022-06-16
Attending: NURSE PRACTITIONER
Payer: COMMERCIAL

## 2022-06-16 DIAGNOSIS — C61 PROSTATE CANCER (H): Primary | ICD-10-CM

## 2022-06-16 PROCEDURE — 99215 OFFICE O/P EST HI 40 MIN: CPT | Mod: 95 | Performed by: NURSE PRACTITIONER

## 2022-06-16 PROCEDURE — G0463 HOSPITAL OUTPT CLINIC VISIT: HCPCS | Mod: PN,RTG | Performed by: NURSE PRACTITIONER

## 2022-06-22 DIAGNOSIS — F41.9 ANXIETY: ICD-10-CM

## 2022-06-22 RX ORDER — CLONAZEPAM 1 MG/1
TABLET ORAL
Qty: 30 TABLET | Refills: 0 | Status: SHIPPED | OUTPATIENT
Start: 2022-06-22 | End: 2022-07-22

## 2022-07-07 ENCOUNTER — MYC MEDICAL ADVICE (OUTPATIENT)
Dept: FAMILY MEDICINE | Facility: OTHER | Age: 82
End: 2022-07-07

## 2022-07-11 NOTE — TELEPHONE ENCOUNTER
Patient called back and I was able to get him into 07/12/2022 @ 9:30 am with Dr Osborne.    Erika Zurita     [facility]

## 2022-07-11 NOTE — TELEPHONE ENCOUNTER
Called patient left message. Dr. arboleda has a same day at 9:30am on 7/12. Please make sure appointment is still available and see if it works for patient.

## 2022-07-12 ENCOUNTER — ANCILLARY PROCEDURE (OUTPATIENT)
Dept: GENERAL RADIOLOGY | Facility: OTHER | Age: 82
End: 2022-07-12
Attending: FAMILY MEDICINE
Payer: COMMERCIAL

## 2022-07-12 ENCOUNTER — OFFICE VISIT (OUTPATIENT)
Dept: FAMILY MEDICINE | Facility: OTHER | Age: 82
End: 2022-07-12

## 2022-07-12 VITALS
HEART RATE: 68 BPM | TEMPERATURE: 96.6 F | DIASTOLIC BLOOD PRESSURE: 78 MMHG | BODY MASS INDEX: 29.4 KG/M2 | SYSTOLIC BLOOD PRESSURE: 124 MMHG | WEIGHT: 194 LBS | OXYGEN SATURATION: 98 % | HEIGHT: 68 IN | RESPIRATION RATE: 18 BRPM

## 2022-07-12 DIAGNOSIS — M25.562 CHRONIC PAIN OF LEFT KNEE: ICD-10-CM

## 2022-07-12 DIAGNOSIS — G89.29 CHRONIC PAIN OF LEFT KNEE: ICD-10-CM

## 2022-07-12 DIAGNOSIS — C61 PROSTATE CANCER (H): ICD-10-CM

## 2022-07-12 DIAGNOSIS — M25.562 CHRONIC PAIN OF LEFT KNEE: Primary | ICD-10-CM

## 2022-07-12 DIAGNOSIS — G89.29 CHRONIC PAIN OF LEFT KNEE: Primary | ICD-10-CM

## 2022-07-12 DIAGNOSIS — C61 MALIGNANT NEOPLASM OF PROSTATE (H): ICD-10-CM

## 2022-07-12 LAB
ALBUMIN SERPL-MCNC: 3.7 G/DL (ref 3.4–5)
ALP SERPL-CCNC: 76 U/L (ref 40–150)
ALT SERPL W P-5'-P-CCNC: 33 U/L (ref 0–70)
ANION GAP SERPL CALCULATED.3IONS-SCNC: 7 MMOL/L (ref 3–14)
AST SERPL W P-5'-P-CCNC: 36 U/L (ref 0–45)
BILIRUB SERPL-MCNC: 0.7 MG/DL (ref 0.2–1.3)
BUN SERPL-MCNC: 14 MG/DL (ref 7–30)
CALCIUM SERPL-MCNC: 9.1 MG/DL (ref 8.5–10.1)
CHLORIDE BLD-SCNC: 101 MMOL/L (ref 94–109)
CO2 SERPL-SCNC: 27 MMOL/L (ref 20–32)
CREAT SERPL-MCNC: 0.95 MG/DL (ref 0.66–1.25)
GFR SERPL CREATININE-BSD FRML MDRD: 81 ML/MIN/1.73M2
GLUCOSE BLD-MCNC: 124 MG/DL (ref 70–99)
POTASSIUM BLD-SCNC: 3.8 MMOL/L (ref 3.4–5.3)
PROT SERPL-MCNC: 7.2 G/DL (ref 6.8–8.8)
PSA SERPL-MCNC: 0.47 UG/L (ref 0–4)
SODIUM SERPL-SCNC: 135 MMOL/L (ref 133–144)

## 2022-07-12 PROCEDURE — 99214 OFFICE O/P EST MOD 30 MIN: CPT | Performed by: FAMILY MEDICINE

## 2022-07-12 PROCEDURE — 84403 ASSAY OF TOTAL TESTOSTERONE: CPT | Performed by: FAMILY MEDICINE

## 2022-07-12 PROCEDURE — 84153 ASSAY OF PSA TOTAL: CPT | Performed by: FAMILY MEDICINE

## 2022-07-12 PROCEDURE — 73562 X-RAY EXAM OF KNEE 3: CPT | Mod: TC | Performed by: RADIOLOGY

## 2022-07-12 PROCEDURE — 80053 COMPREHEN METABOLIC PANEL: CPT | Performed by: FAMILY MEDICINE

## 2022-07-12 PROCEDURE — 36415 COLL VENOUS BLD VENIPUNCTURE: CPT | Performed by: FAMILY MEDICINE

## 2022-07-12 RX ORDER — PREDNISONE 20 MG/1
40 TABLET ORAL DAILY
Qty: 10 TABLET | Refills: 0 | Status: SHIPPED | OUTPATIENT
Start: 2022-07-12 | End: 2022-07-17

## 2022-07-12 ASSESSMENT — PAIN SCALES - GENERAL: PAINLEVEL: MODERATE PAIN (4)

## 2022-07-12 NOTE — RESULT ENCOUNTER NOTE
Mr. Gautam    Your recent test results are attached. Xray showed signs of some fluid collection in the knee which could due to inflammation . I would recommend to try prednisone as prescribed and therapy. Will consider further imaging if no improvement from this    If you have any questions or concerns please contact me via My Chart or call the clinic at [phone]     Thank You  Verna Osborne MD.

## 2022-07-12 NOTE — PROGRESS NOTES
"  Assessment & Plan     Chronic pain of left knee  Reviewed his previous x-ray of his left knee done in 2016 showing enthesopathic changes of the distal quadriceps and questionable intra-articular free bodies.  Suspect arthritic flareup.  Discussed steroid burst dose and repeating an x-ray to look for progression and a trial of physical therapy.  Advised to follow-up in 4 to 6 weeks for recheck.  We will consider further imaging if symptoms do not resolve.  Patient agreeable to this plan.  - XR Knee Left 3 Views; Future  - predniSONE (DELTASONE) 20 MG tablet; Take 2 tablets (40 mg) by mouth daily for 5 days  - Physical Therapy Referral; Future    Return in about 4 weeks (around 8/9/2022).    Verna Osborne MD  [facility] Batres is a 82 year old, presenting for the following health issues:    Knee Pain    HPI     Pain History:  When did you first notice your pain? - Acute Pain   Have you seen anyone else for your pain? No  Where in your body do you have pain? Bilateral inner Knee pain. Left is worse and started first     He has tried Icey Gonzalez which is not helping, Ibuprofen is not helping either. He states waking up in the middle of the night with lots of pain around 7-8/10 for pain level.      Review of Systems   Constitutional, HEENT, cardiovascular, pulmonary, gi and gu systems are negative, except as otherwise noted.      Objective    /78 (Cuff Size: Adult Regular)   Pulse 68   Temp (!) 96.6  F (35.9  C) (Temporal)   Resp 18   Ht 1.727 m (5' 7.99\")   Wt 88 kg (194 lb)   SpO2 98%   BMI 29.50 kg/m    Body mass index is 29.5 kg/m .  Physical Exam   GENERAL: healthy, alert and no distress  NECK: no adenopathy, no asymmetry, masses, or scars and thyroid normal to palpation  RESP: lungs clear to auscultation - no rales, rhonchi or wheezes  CV: regular rate and rhythm, normal S1 S2, no S3 or S4, no murmur, click or rub, no peripheral edema and peripheral pulses " strong  ABDOMEN: soft, nontender, no hepatosplenomegaly, no masses and bowel sounds normal  MS: Normal range of motion of the left knee.  Tenderness to palpation along medial joint line.  Normal stability.             .  ..

## 2022-07-15 DIAGNOSIS — G47.00 INSOMNIA, UNSPECIFIED TYPE: ICD-10-CM

## 2022-07-15 LAB — TESTOST SERPL-MCNC: 18 NG/DL (ref 240–950)

## 2022-07-15 RX ORDER — ZOLPIDEM TARTRATE 5 MG/1
TABLET ORAL
Qty: 30 TABLET | Refills: 0 | Status: SHIPPED | OUTPATIENT
Start: 2022-07-15 | End: 2022-08-15

## 2022-07-15 NOTE — TELEPHONE ENCOUNTER
Patient states that he will be out of his Ambien on Monday and needs this filled ASAP.    Routing refill request to provider for review/approval because:  Drug not on the [facility] refill protocol

## 2022-07-19 ENCOUNTER — TELEPHONE (OUTPATIENT)
Dept: FAMILY MEDICINE | Facility: OTHER | Age: 82
End: 2022-07-19

## 2022-07-19 DIAGNOSIS — M17.10 ARTHRITIS OF KNEE: Primary | ICD-10-CM

## 2022-07-19 NOTE — TELEPHONE ENCOUNTER
Reason for Call:  Same Day Appointment, Requested Provider:  Dylon    PCP: Verna Osborne    Reason for visit: knee not getting better    Duration of symptoms: on going    Have you been treated for this in the past? Yes    Additional comments: patient is scheduled 8/8/22 but wondering if you can see him earlier since his knee is not getting better.  Please call    Can we leave a detailed message on this number? YES    Phone number patient can be reached at: Home number on file [phone] (home)    Best Time: any      Call taken on 7/19/2022 at 2:07 PM by Sydni Higgins

## 2022-07-19 NOTE — TELEPHONE ENCOUNTER
Notified patient of message below, he will give it a few more days if no better then he will call Orthopedics to schedule.

## 2022-07-21 DIAGNOSIS — F41.9 ANXIETY: ICD-10-CM

## 2022-07-22 RX ORDER — CLONAZEPAM 1 MG/1
TABLET ORAL
Qty: 30 TABLET | Refills: 0 | Status: SHIPPED | OUTPATIENT
Start: 2022-07-22 | End: 2022-08-17

## 2022-08-04 ENCOUNTER — OFFICE VISIT (OUTPATIENT)
Dept: ORTHOPEDICS | Facility: OTHER | Age: 82
End: 2022-08-04
Payer: COMMERCIAL

## 2022-08-04 VITALS — HEIGHT: 68 IN | WEIGHT: 192 LBS | BODY MASS INDEX: 29.1 KG/M2 | RESPIRATION RATE: 20 BRPM

## 2022-08-04 DIAGNOSIS — M17.12 PRIMARY OSTEOARTHRITIS OF LEFT KNEE: ICD-10-CM

## 2022-08-04 PROCEDURE — 99203 OFFICE O/P NEW LOW 30 MIN: CPT | Performed by: ORTHOPAEDIC SURGERY

## 2022-08-04 ASSESSMENT — PAIN SCALES - GENERAL: PAINLEVEL: NO PAIN (0)

## 2022-08-04 NOTE — LETTER
8/4/2022         RE: Medardo Gautam  [id number] [id number]        Dear Colleague,    Thank you for referring your patient, Medardo Gautam, to the [facility]. Please see a copy of my visit note below.    Medardo Gautam is a 82 year old male who is seen in consultation at the request of Dr. Verna Osborne  for left knee pain.  He has had primarily pain over the medial aspect of the knee for the past month or so.  He recalls no specific injury.  Hurts mostly on the inner sides of the knees.  It hurts more with crossing his legs or after sleeping at night with his knees touching.  It will occasionally wake him up at night.  He has occasional sharp pains rated between 0-4 out of 10.  He has been using prednisone and has taken up to 2 Advil per day.  He is also tried icy hot.    X-ray shows mild medial joint narrowing on the left knee from x-ray on 7/12/2022.  There is also possible chondrocalcinosis consistent with pseudogout.    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 Provider to review     HEMORRHOIDS NOS 6/4/2007     Hyperbilirubinemia 5/1/2012     Hypertension      Insomnia      Nonsenile cataract      Personal history of colonic polyps 7 years ago?     Prostate cancer (H) Feb 2012     Renal cyst 6/22/2017     Seborrheic dermatitis      Skin lesion- R side of face and behind L ear 12/15/2011     SKIN SENSATION DISTURB 12/29/2006    allergic reaction to strawberries     SKIN SENSATION DISTURB 12/29/2006       Past Surgical History:   Procedure Laterality Date     APPENDECTOMY       BIOPSY  01/16/15     CATARACT IOL, RT/LT Left 02/15/2018     COLON SURGERY  2/11/2015    Lap  assisted R hemicolectomy     COLONOSCOPY  10/25/07    Snare polypectomy     COLONOSCOPY  6/25/2009    with snare polypectomy     COLONOSCOPY  9/30/2009     COLONOSCOPY  1/5/2011    COLONOSCOPY performed by JUD MARIEE at  GI     COLONOSCOPY N/A 1/16/2015    Procedure: COMBINED COLONOSCOPY, SINGLE OR MULTIPLE BIOPSY/POLYPECTOMY BY BIOPSY;  Surgeon: Sarah Beth Pisano MD;  Location: [facility] OR     COLONOSCOPY WITH CO2 INSUFFLATION N/A 1/16/2015    Procedure: COLONOSCOPY WITH CO2 INSUFFLATION;  Surgeon: Sarah Beth Pisano MD;  Location: [facility] OR     COLONOSCOPY WITH CO2 INSUFFLATION N/A 9/7/2018    Procedure: COLONOSCOPY WITH CO2 INSUFFLATION;  C18.9 (ICD-10-CM) - Malignant neoplasm of colon, unspecified part of colon (H)  [facility] [place] [facility] fax# [phone]  BMI 26.29  [employer]  Referred by dr thomas;  Surgeon: Sarah Beth Pisano MD;  Location: [facility] OR     COLONOSCOPY WITH CO2 INSUFFLATION N/A 9/10/2021    Procedure: COLONOSCOPY, WITH CO2 INSUFFLATION;  Surgeon: Sarah Beth Pisano MD;  Location: [facility] OR     DAVINCI PROSTATECTOMY  8/6/2012    Procedure: DAVINCI PROSTATECTOMY;  Davinci Assisted Radical Prostatectomy with Bilateral Lymphadenectomy ;  Surgeon: Norma Goodwin MD;  Location: [facility] OR     GENITOURINARY SURGERY      prostate surgery     INJECT EPIDURAL LUMBAR / SACRAL SINGLE Left 10/30/2017    Procedure: INJECT EPIDURAL LUMBAR / SACRAL SINGLE;  Left Transforaminal Lumbar 4-Lumbar 5 Epidural Steroid Injection;  Surgeon: Nuvia Reina MD;  Location: [facility] OR     LAPAROSCOPIC ASSISTED COLECTOMY N/A 2/11/2015    Procedure: LAPAROSCOPIC ASSISTED COLECTOMY;  Surgeon: Oren Breen MD;  Location: [facility] OR     PHACOEMULSIFICATION CLEAR CORNEA WITH STANDARD INTRAOCULAR LENS IMPLANT Left 2/15/2018    Procedure: PHACOEMULSIFICATION CLEAR CORNEA WITH STANDARD INTRAOCULAR LENS IMPLANT;  LEFT EYE CATARACT EXTRACTION WITH STANDARD INTRAOCULAR LENS IMPLANT ;  Surgeon: Brandon Orellana MD;  Location:  [facility]     PHACOEMULSIFICATION CLEAR CORNEA WITH STANDARD INTRAOCULAR LENS IMPLANT Right 3/1/2018    Procedure: PHACOEMULSIFICATION CLEAR CORNEA WITH STANDARD INTRAOCULAR LENS IMPLANT;  RIGHT EYE CATARACT EXTRACTION WITH STANDARD INTRAOCULAR LENS IMPLANT ;  Surgeon: Brandon Orellana MD;  Location: [facility]     ZZC HAND/FINGER SURGERY UNLISTED       ZZC LENGTHEN,TENDON,HAND/FINGER  ca     right fifth     ZZC STOMACH SURGERY PROCEDURE UNLISTED         Family History   Problem Relation Age of Onset     Cerebrovascular Disease Father      Cancer Mother 90        Patient believes vaginal cancer - hysterectomy,      Alzheimer Disease Mother      Cancer Sister      Breast Cancer Sister      C.A.D. No family hx of      Cancer - colorectal No family hx of      Prostate Cancer No family hx of      Blood Disease No family hx of      Cardiovascular No family hx of      Circulatory No family hx of      Eye Disorder No family hx of      Gastrointestinal Disease No family hx of      Genitourinary Problems No family hx of      Lipids No family hx of      Neurologic Disorder No family hx of      Respiratory No family hx of      Asthma No family hx of      Heart Disease No family hx of      Diabetes No family hx of      Hypertension No family hx of      Arthritis No family hx of      Thyroid Disease No family hx of      Musculoskeletal Disorder No family hx of      Glaucoma No family hx of      Macular Degeneration No family hx of        Social History     Socioeconomic History     Marital status:      Spouse name: Joyce     Number of children: 2     Years of education: Not on file     Highest education level: Not on file   Occupational History     Occupation: retired     Employer: RETIRED   Tobacco Use     Smoking status: Former Smoker     Packs/day: 0.00     Years: 1.00     Pack years: 0.00     Types: Cigarettes, Cigars, Pipe     Start date: 10/1/1961     Quit date: 1962     Years since quittin.6      Smokeless tobacco: Never Used     Tobacco comment: No smokers in home   Vaping Use     Vaping Use: Never used   Substance and Sexual Activity     Alcohol use: Yes     Alcohol/week: 0.0 standard drinks     Comment: daily glass of wine and whiskey     Drug use: Yes     Types: Benzodiazepines     Sexual activity: Not Currently     Partners: Female     Birth control/protection: None     Comment: not currently active   Other Topics Concern      Service No     Blood Transfusions No     Caffeine Concern No     Occupational Exposure No     Hobby Hazards No     Sleep Concern Yes     Stress Concern No     Weight Concern Yes     Special Diet No     Back Care No     Exercise No     Bike Helmet No     Comment: N/A     Seat Belt Yes     Self-Exams Yes     Parent/sibling w/ CABG, MI or angioplasty before 65F 55M? Yes   Social History Narrative     Not on file     Social Determinants of Health     Financial Resource Strain: Not on file   Food Insecurity: Not on file   Transportation Needs: Not on file   Physical Activity: Not on file   Stress: Not on file   Social Connections: Not on file   Intimate Partner Violence: Not on file   Housing Stability: Not on file       Current Outpatient Medications   Medication Sig Dispense Refill     allopurinol (ZYLOPRIM) 100 MG tablet Take 1 tablet (100 mg) by mouth daily 90 tablet 3     aspirin 81 MG tablet Take 1 tablet (81 mg) by mouth daily 30 tablet      calcium-vitamin D (CALTRATE) 600-400 MG-UNIT per tablet Take 1 tablet by mouth daily       clonazePAM (KLONOPIN) 1 MG tablet TAKE 1 TABLET BY MOUTH ONCE DAILY AS NEEDED FOR ANXIETY 30 tablet 0     darolutamide (NUBEQA) 300 MG tablet Take 2 tablets (600 mg) by mouth 2 times daily . Swallow tablets whole with food. 120 tablet 11     folic acid-vit B6-vit B12 (FOLGARD) 0.8-10-0.115 MG TABS Take 1 tablet by mouth daily       leuprolide (LUPRON DEPOT) 22.5 MG kit Inject 22.5 mg into the muscle every 3 months 1 each 0     Loratadine  "(CLARITIN PO) Take  by mouth. As needed        losartan-hydrochlorothiazide (HYZAAR) 100-12.5 MG tablet Take 1 tablet by mouth daily 90 tablet 3     Omega-3 Fatty Acids (OMEGA-3 FISH OIL PO) Take 500 mg by mouth daily       sildenafil (VIAGRA) 50 MG tablet 50 mg as needed up to 3x weekly for erectile dysfunction 12 tablet 3     triamcinolone (KENALOG) 0.1 % cream        zolpidem (AMBIEN) 5 MG tablet TAKE 1 TABLET BY MOUTH NIGHTLY AS NEEDED 30 tablet 0       No Known Allergies    REVIEW OF SYSTEMS:  CONSTITUTIONAL:  NEGATIVE for fever, chills, change in weight, not feeling tired  SKIN:  NEGATIVE for worrisome rashes, no skin lumps, no skin ulcers and no non-healing wounds  EYES:  NEGATIVE for vision changes or irritation.  ENT/MOUTH:  NEGATIVE.  No hearing loss, no hoarseness, no difficulty swallowing.  RESP:  NEGATIVE. No cough or shortness of breath.  CV:  NEGATIVE for chest pain, palpitations or peripheral edema  GI:  NEGATIVE for nausea, abdominal pain, heartburn, or change in bowel habits  :  Negative. No dysuria, no hematuria  MUSCULOSKELETAL:  See HPI above  NEURO:  NEGATIVE . No headaches, no dizziness,  no numbness  ENDOCRINE:  NEGATIVE for temperature intolerance, skin/hair changes  HEME/ALLERGY/IMMUNE:  NEGATIVE for bleeding problems  PSYCHIATRIC:  NEGATIVE. no anxiety, no depression.      Exam:  Vitals: Resp 20   Ht 1.727 m (5' 8\")   Wt 87.1 kg (192 lb)   BMI 29.19 kg/m    BMI= Body mass index is 29.19 kg/m .  Constitutional:  healthy, alert and no distress  Neuro: Alert and Oriented x 3, Sensation grossly WNL.  HEENT:  Atraumatic, EOMI  Neck:  Neck supple with no tenderness.  Psych: Affect normal   Respiratory: Breathing not labored.  Cardiovascular: normal peripheral pulses  Lymph: no adenopathy  Skin: No rashes,worrisome lesions or skin problems  Spine: straight, no straight leg raising pain.  Hips show full range of motion.  There is no tenderness over the sacro-iliac joints, sciatic notch, or " greater trochanters.   He has good range of motion from 0 to 125 degrees.  He has medial joint tenderness mildly on both knees.    He has no ligamentous laxity of MCL, LCL, or cruciates.  He had no effusions.  He had negative medial and lateral Nette's.  No warmth or erythema.  Sensation, motor and circulation are intact.    Assessment:  Mild knee osteoarthritis.    Plan: He may increase his ibuprofen symptomatic treatment.  Since is waking up at night he may also benefit from just using a pillow between his knees when he sleeps so they do not push on each other.  Consider steroid injection in the future.        Again, thank you for allowing me to participate in the care of your patient.        Sincerely,        Neptali Contreras MD

## 2022-08-06 PROBLEM — M17.12 PRIMARY OSTEOARTHRITIS OF LEFT KNEE: Status: ACTIVE | Noted: 2022-08-06

## 2022-08-07 NOTE — PROGRESS NOTES
Medardo Gautam is a 82 year old male who is seen in consultation at the request of Dr. Verna Osborne  for left knee pain.  He has had primarily pain over the medial aspect of the knee for the past month or so.  He recalls no specific injury.  Hurts mostly on the inner sides of the knees.  It hurts more with crossing his legs or after sleeping at night with his knees touching.  It will occasionally wake him up at night.  He has occasional sharp pains rated between 0-4 out of 10.  He has been using prednisone and has taken up to 2 Advil per day.  He is also tried icy hot.    X-ray shows mild medial joint narrowing on the left knee from x-ray on 7/12/2022.  There is also possible chondrocalcinosis consistent with pseudogout.    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 Provider to review     HEMORRHOIDS NOS 6/4/2007     Hyperbilirubinemia 5/1/2012     Hypertension      Insomnia      Nonsenile cataract      Personal history of colonic polyps 7 years ago?     Prostate cancer (H) Feb 2012     Renal cyst 6/22/2017     Seborrheic dermatitis      Skin lesion- R side of face and behind L ear 12/15/2011     SKIN SENSATION DISTURB 12/29/2006    allergic reaction to strawberries     SKIN SENSATION DISTURB 12/29/2006       Past Surgical History:   Procedure Laterality Date     APPENDECTOMY       BIOPSY  01/16/15     CATARACT IOL, RT/LT Left 02/15/2018     COLON SURGERY  2/11/2015    Lap assisted R hemicolectomy     COLONOSCOPY  10/25/07    Snare polypectomy     COLONOSCOPY  6/25/2009    with snare polypectomy     COLONOSCOPY  9/30/2009     COLONOSCOPY  1/5/2011    COLONOSCOPY performed by JUD MARIEE at  [facility]     COLONOSCOPY N/A 1/16/2015     Procedure: COMBINED COLONOSCOPY, SINGLE OR MULTIPLE BIOPSY/POLYPECTOMY BY BIOPSY;  Surgeon: Sarah Beth Pisano MD;  Location: [facility]     COLONOSCOPY WITH CO2 INSUFFLATION N/A 1/16/2015    Procedure: COLONOSCOPY WITH CO2 INSUFFLATION;  Surgeon: Sarah Beth Pisano MD;  Location: [facility]     COLONOSCOPY WITH CO2 INSUFFLATION N/A 9/7/2018    Procedure: COLONOSCOPY WITH CO2 INSUFFLATION;  C18.9 (ICD-10-CM) - Malignant neoplasm of colon, unspecified part of colon (H)  [facility] fax# [phone]  BMI 26.29  [employer]  Referred by dr thoams;  Surgeon: Sarah Beth Pisano MD;  Location: [facility] OR     COLONOSCOPY WITH CO2 INSUFFLATION N/A 9/10/2021    Procedure: COLONOSCOPY, WITH CO2 INSUFFLATION;  Surgeon: Sarah Beth Pisano MD;  Location: [facility]     DAVINCI PROSTATECTOMY  8/6/2012    Procedure: DAVINCI PROSTATECTOMY;  Davinci Assisted Radical Prostatectomy with Bilateral Lymphadenectomy ;  Surgeon: Norma Goodwin MD;  Location: [facility] OR     GENITOURINARY SURGERY      prostate surgery     INJECT EPIDURAL LUMBAR / SACRAL SINGLE Left 10/30/2017    Procedure: INJECT EPIDURAL LUMBAR / SACRAL SINGLE;  Left Transforaminal Lumbar 4-Lumbar 5 Epidural Steroid Injection;  Surgeon: Nuvia Reina MD;  Location: [facility]     LAPAROSCOPIC ASSISTED COLECTOMY N/A 2/11/2015    Procedure: LAPAROSCOPIC ASSISTED COLECTOMY;  Surgeon: Oren Breen MD;  Location: [facility] OR     PHACOEMULSIFICATION CLEAR CORNEA WITH STANDARD INTRAOCULAR LENS IMPLANT Left 2/15/2018    Procedure: PHACOEMULSIFICATION CLEAR CORNEA WITH STANDARD INTRAOCULAR LENS IMPLANT;  LEFT EYE CATARACT EXTRACTION WITH STANDARD INTRAOCULAR LENS IMPLANT ;  Surgeon: Brandon Orellana MD;  Location:  [facility]     PHACOEMULSIFICATION CLEAR CORNEA WITH STANDARD INTRAOCULAR LENS IMPLANT Right 3/1/2018    Procedure: PHACOEMULSIFICATION CLEAR CORNEA WITH STANDARD INTRAOCULAR LENS IMPLANT;  RIGHT EYE CATARACT EXTRACTION WITH STANDARD INTRAOCULAR LENS IMPLANT ;  Surgeon:  Brandon Orellana MD;  Location: [facility] HAND/FINGER SURGERY UNLISTED       ZZC LENGTHEN,TENDON,HAND/FINGER  ca 2007    right fifth     ZC STOMACH SURGERY PROCEDURE UNLISTED         Family History   Problem Relation Age of Onset     Cerebrovascular Disease Father      Cancer Mother 90        Patient believes vaginal cancer - hysterectomy,      Alzheimer Disease Mother      Cancer Sister      Breast Cancer Sister      C.A.D. No family hx of      Cancer - colorectal No family hx of      Prostate Cancer No family hx of      Blood Disease No family hx of      Cardiovascular No family hx of      Circulatory No family hx of      Eye Disorder No family hx of      Gastrointestinal Disease No family hx of      Genitourinary Problems No family hx of      Lipids No family hx of      Neurologic Disorder No family hx of      Respiratory No family hx of      Asthma No family hx of      Heart Disease No family hx of      Diabetes No family hx of      Hypertension No family hx of      Arthritis No family hx of      Thyroid Disease No family hx of      Musculoskeletal Disorder No family hx of      Glaucoma No family hx of      Macular Degeneration No family hx of        Social History     Socioeconomic History     Marital status:      Spouse name: Joyce     Number of children: 2     Years of education: Not on file     Highest education level: Not on file   Occupational History     Occupation: retired     Employer: RETIRED   Tobacco Use     Smoking status: Former Smoker     Packs/day: 0.00     Years: 1.00     Pack years: 0.00     Types: Cigarettes, Cigars, Pipe     Start date: 10/1/1961     Quit date: 1962     Years since quittin.6     Smokeless tobacco: Never Used     Tobacco comment: No smokers in home   Vaping Use     Vaping Use: Never used   Substance and Sexual Activity     Alcohol use: Yes     Alcohol/week: 0.0 standard drinks     Comment: daily glass of wine and whiskey     Drug use: Yes      Types: Benzodiazepines     Sexual activity: Not Currently     Partners: Female     Birth control/protection: None     Comment: not currently active   Other Topics Concern      Service No     Blood Transfusions No     Caffeine Concern No     Occupational Exposure No     Hobby Hazards No     Sleep Concern Yes     Stress Concern No     Weight Concern Yes     Special Diet No     Back Care No     Exercise No     Bike Helmet No     Comment: N/A     Seat Belt Yes     Self-Exams Yes     Parent/sibling w/ CABG, MI or angioplasty before 65F 55M? Yes   Social History Narrative     Not on file     Social Determinants of Health     Financial Resource Strain: Not on file   Food Insecurity: Not on file   Transportation Needs: Not on file   Physical Activity: Not on file   Stress: Not on file   Social Connections: Not on file   Intimate Partner Violence: Not on file   Housing Stability: Not on file       Current Outpatient Medications   Medication Sig Dispense Refill     allopurinol (ZYLOPRIM) 100 MG tablet Take 1 tablet (100 mg) by mouth daily 90 tablet 3     aspirin 81 MG tablet Take 1 tablet (81 mg) by mouth daily 30 tablet      calcium-vitamin D (CALTRATE) 600-400 MG-UNIT per tablet Take 1 tablet by mouth daily       clonazePAM (KLONOPIN) 1 MG tablet TAKE 1 TABLET BY MOUTH ONCE DAILY AS NEEDED FOR ANXIETY 30 tablet 0     darolutamide (NUBEQA) 300 MG tablet Take 2 tablets (600 mg) by mouth 2 times daily . Swallow tablets whole with food. 120 tablet 11     folic acid-vit B6-vit B12 (FOLGARD) 0.8-10-0.115 MG TABS Take 1 tablet by mouth daily       leuprolide (LUPRON DEPOT) 22.5 MG kit Inject 22.5 mg into the muscle every 3 months 1 each 0     Loratadine (CLARITIN PO) Take  by mouth. As needed        losartan-hydrochlorothiazide (HYZAAR) 100-12.5 MG tablet Take 1 tablet by mouth daily 90 tablet 3     Omega-3 Fatty Acids (OMEGA-3 FISH OIL PO) Take 500 mg by mouth daily       sildenafil (VIAGRA) 50 MG tablet 50 mg as needed  "up to 3x weekly for erectile dysfunction 12 tablet 3     triamcinolone (KENALOG) 0.1 % cream        zolpidem (AMBIEN) 5 MG tablet TAKE 1 TABLET BY MOUTH NIGHTLY AS NEEDED 30 tablet 0       No Known Allergies    REVIEW OF SYSTEMS:  CONSTITUTIONAL:  NEGATIVE for fever, chills, change in weight, not feeling tired  SKIN:  NEGATIVE for worrisome rashes, no skin lumps, no skin ulcers and no non-healing wounds  EYES:  NEGATIVE for vision changes or irritation.  ENT/MOUTH:  NEGATIVE.  No hearing loss, no hoarseness, no difficulty swallowing.  RESP:  NEGATIVE. No cough or shortness of breath.  CV:  NEGATIVE for chest pain, palpitations or peripheral edema  GI:  NEGATIVE for nausea, abdominal pain, heartburn, or change in bowel habits  :  Negative. No dysuria, no hematuria  MUSCULOSKELETAL:  See HPI above  NEURO:  NEGATIVE . No headaches, no dizziness,  no numbness  ENDOCRINE:  NEGATIVE for temperature intolerance, skin/hair changes  HEME/ALLERGY/IMMUNE:  NEGATIVE for bleeding problems  PSYCHIATRIC:  NEGATIVE. no anxiety, no depression.      Exam:  Vitals: Resp 20   Ht 1.727 m (5' 8\")   Wt 87.1 kg (192 lb)   BMI 29.19 kg/m    BMI= Body mass index is 29.19 kg/m .  Constitutional:  healthy, alert and no distress  Neuro: Alert and Oriented x 3, Sensation grossly WNL.  HEENT:  Atraumatic, EOMI  Neck:  Neck supple with no tenderness.  Psych: Affect normal   Respiratory: Breathing not labored.  Cardiovascular: normal peripheral pulses  Lymph: no adenopathy  Skin: No rashes,worrisome lesions or skin problems  Spine: straight, no straight leg raising pain.  Hips show full range of motion.  There is no tenderness over the sacro-iliac joints, sciatic notch, or greater trochanters.   He has good range of motion from 0 to 125 degrees.  He has medial joint tenderness mildly on both knees.    He has no ligamentous laxity of MCL, LCL, or cruciates.  He had no effusions.  He had negative medial and lateral Nette's.  No warmth or " erythema.  Sensation, motor and circulation are intact.    Assessment:  Mild knee osteoarthritis.    Plan: He may increase his ibuprofen symptomatic treatment.  Since is waking up at night he may also benefit from just using a pillow between his knees when he sleeps so they do not push on each other.  Consider steroid injection in the future.

## 2022-08-08 ENCOUNTER — OFFICE VISIT (OUTPATIENT)
Dept: FAMILY MEDICINE | Facility: OTHER | Age: 82
End: 2022-08-08
Payer: COMMERCIAL

## 2022-08-08 VITALS
TEMPERATURE: 97.2 F | WEIGHT: 192 LBS | HEART RATE: 74 BPM | BODY MASS INDEX: 29.19 KG/M2 | SYSTOLIC BLOOD PRESSURE: 124 MMHG | DIASTOLIC BLOOD PRESSURE: 70 MMHG | RESPIRATION RATE: 20 BRPM | OXYGEN SATURATION: 98 %

## 2022-08-08 DIAGNOSIS — G89.29 CHRONIC PAIN OF LEFT KNEE: Primary | ICD-10-CM

## 2022-08-08 DIAGNOSIS — M25.562 CHRONIC PAIN OF LEFT KNEE: Primary | ICD-10-CM

## 2022-08-08 PROCEDURE — 99213 OFFICE O/P EST LOW 20 MIN: CPT | Performed by: FAMILY MEDICINE

## 2022-08-08 ASSESSMENT — PAIN SCALES - GENERAL: PAINLEVEL: NO PAIN (0)

## 2022-08-08 NOTE — PROGRESS NOTES
Assessment & Plan     Chronic pain of left knee  Likely mild arthritis and tendinosis  Discussed gentle range of motion exercises and trial topical diclofenac for symptomatic relief.    - diclofenac (VOLTAREN) 1 % topical gel; Apply 2 g topically daily as needed for moderate pain        Return in about 3 months (around 11/8/2022).    Verna Osborne MD  [facility] Batres is a 82 year old, presenting for the following health issues:  Knee Pain      Knee Pain    History of Present Illness       Reason for visit:  Knee pain    He eats 2-3 servings of fruits and vegetables daily.He consumes 0 sweetened beverage(s) daily.He exercises with enough effort to increase his heart rate 10 to 19 minutes per day.  He exercises with enough effort to increase his heart rate 3 or less days per week.   He is taking medications regularly.       Getting leg cramps also    Review of Systems   Constitutional, HEENT, cardiovascular, pulmonary, gi and gu systems are negative, except as otherwise noted.      Objective    /70   Pulse 74   Temp 97.2  F (36.2  C) (Temporal)   Resp 20   Wt 87.1 kg (192 lb)   SpO2 98%   BMI 29.19 kg/m    Body mass index is 29.19 kg/m .  Physical Exam   GENERAL: healthy, alert and no distress  MS: no gross musculoskeletal defects noted, no edema    .  ..

## 2022-08-11 ENCOUNTER — TELEPHONE (OUTPATIENT)
Dept: ONCOLOGY | Facility: CLINIC | Age: 82
End: 2022-08-11

## 2022-08-11 DIAGNOSIS — C61 PROSTATE CANCER (H): ICD-10-CM

## 2022-08-11 NOTE — TELEPHONE ENCOUNTER
Prior Authorization Retail Medication Request    Medication/Dose: Lupron dept 22.5mg   ICD code (if different than what is on RX):  n/a  Previously Tried and Failed:  N/a  Rationale:  Prior Auth Required     Insurance Name:  [facility] Part D  Insurance ID:  [id number]      Pharmacy Information (if different than what is on RX)  Name:  n/a  Phone:  N/a    On behalf of [facility]    Thank You~  Katelin Enriquez CPhT  [facility]

## 2022-08-15 DIAGNOSIS — G47.00 INSOMNIA, UNSPECIFIED TYPE: ICD-10-CM

## 2022-08-15 RX ORDER — ZOLPIDEM TARTRATE 5 MG/1
TABLET ORAL
Qty: 30 TABLET | Refills: 0 | Status: SHIPPED | OUTPATIENT
Start: 2022-08-15 | End: 2022-09-13

## 2022-08-15 NOTE — TELEPHONE ENCOUNTER
Pending Prescriptions:                       Disp   Refills    zolpidem (AMBIEN) 5 MG tablet [Pharmacy Me*30 tab*0        Sig: TAKE 1 TABLET BY MOUTH NIGHTLY AS NEEDED    Routing refill request to provider for review/approval because:  Drug not on the [facility] refill protocol     Requested Prescriptions   Pending Prescriptions Disp Refills    zolpidem (AMBIEN) 5 MG tablet [Pharmacy Med Name: Zolpidem Tartrate 5 MG Oral Tablet] 30 tablet 0     Sig: TAKE 1 TABLET BY MOUTH NIGHTLY AS NEEDED        There is no refill protocol information for this order

## 2022-08-19 ENCOUNTER — ALLIED HEALTH/NURSE VISIT (OUTPATIENT)
Dept: FAMILY MEDICINE | Facility: OTHER | Age: 82
End: 2022-08-19
Payer: COMMERCIAL

## 2022-08-19 DIAGNOSIS — C61 PROSTATE CANCER (H): Primary | ICD-10-CM

## 2022-08-19 PROCEDURE — 99207 PR NO CHARGE NURSE ONLY: CPT

## 2022-08-19 PROCEDURE — 96372 THER/PROPH/DIAG INJ SC/IM: CPT | Performed by: INTERNAL MEDICINE

## 2022-08-24 ENCOUNTER — APPOINTMENT (OUTPATIENT)
Dept: URBAN - METROPOLITAN AREA CLINIC 259 | Age: 82
Setting detail: DERMATOLOGY
End: 2022-08-24

## 2022-08-24 DIAGNOSIS — L82.1 OTHER SEBORRHEIC KERATOSIS: ICD-10-CM

## 2022-08-24 DIAGNOSIS — R23.3 SPONTANEOUS ECCHYMOSES: ICD-10-CM

## 2022-08-24 DIAGNOSIS — L81.4 OTHER MELANIN HYPERPIGMENTATION: ICD-10-CM

## 2022-08-24 DIAGNOSIS — D18.0 HEMANGIOMA: ICD-10-CM

## 2022-08-24 DIAGNOSIS — L57.8 OTHER SKIN CHANGES DUE TO CHRONIC EXPOSURE TO NONIONIZING RADIATION: ICD-10-CM

## 2022-08-24 DIAGNOSIS — L57.0 ACTINIC KERATOSIS: ICD-10-CM

## 2022-08-24 DIAGNOSIS — L82.0 INFLAMED SEBORRHEIC KERATOSIS: ICD-10-CM

## 2022-08-24 DIAGNOSIS — Z71.89 OTHER SPECIFIED COUNSELING: ICD-10-CM

## 2022-08-24 DIAGNOSIS — D22 MELANOCYTIC NEVI: ICD-10-CM

## 2022-08-24 PROBLEM — D22.5 MELANOCYTIC NEVI OF TRUNK: Status: ACTIVE | Noted: 2022-08-24

## 2022-08-24 PROBLEM — D18.01 HEMANGIOMA OF SKIN AND SUBCUTANEOUS TISSUE: Status: ACTIVE | Noted: 2022-08-24

## 2022-08-24 PROCEDURE — OTHER MIPS QUALITY: OTHER

## 2022-08-24 PROCEDURE — 17000 DESTRUCT PREMALG LESION: CPT | Mod: 59

## 2022-08-24 PROCEDURE — OTHER LIQUID NITROGEN: OTHER

## 2022-08-24 PROCEDURE — 17110 DESTRUCT B9 LESION 1-14: CPT

## 2022-08-24 PROCEDURE — OTHER COUNSELING: OTHER

## 2022-08-24 PROCEDURE — 99213 OFFICE O/P EST LOW 20 MIN: CPT | Mod: 25

## 2022-08-24 ASSESSMENT — LOCATION DETAILED DESCRIPTION DERM
LOCATION DETAILED: RIGHT SUPERIOR MEDIAL UPPER BACK
LOCATION DETAILED: LEFT PROXIMAL PRETIBIAL REGION
LOCATION DETAILED: RIGHT VENTRAL DISTAL FOREARM
LOCATION DETAILED: LEFT MEDIAL UPPER BACK
LOCATION DETAILED: LEFT INFERIOR MEDIAL UPPER BACK
LOCATION DETAILED: LEFT INFERIOR LATERAL MALAR CHEEK
LOCATION DETAILED: LEFT INFERIOR CENTRAL MALAR CHEEK
LOCATION DETAILED: INFERIOR THORACIC SPINE

## 2022-08-24 ASSESSMENT — LOCATION SIMPLE DESCRIPTION DERM
LOCATION SIMPLE: LEFT CHEEK
LOCATION SIMPLE: RIGHT UPPER BACK
LOCATION SIMPLE: UPPER BACK
LOCATION SIMPLE: RIGHT FOREARM
LOCATION SIMPLE: LEFT UPPER BACK
LOCATION SIMPLE: LEFT PRETIBIAL REGION

## 2022-08-24 ASSESSMENT — LOCATION ZONE DERM
LOCATION ZONE: FACE
LOCATION ZONE: ARM
LOCATION ZONE: LEG
LOCATION ZONE: TRUNK

## 2022-08-24 NOTE — PROCEDURE: LIQUID NITROGEN
Spray Paint Text: The liquid nitrogen was applied to the skin utilizing a spray paint frosting technique.
Number Of Freeze-Thaw Cycles: 1 freeze-thaw cycle
Add 52 Modifier (Optional): no
Show Spray Paint Technique Variable?: Yes
Medical Necessity Information: It is in your best interest to select a reason for this procedure from the list below. All of these items fulfill various CMS LCD requirements except the new and changing color options.
Consent: The patient's consent was obtained including but not limited to risks of crusting, scabbing, blistering, scarring, darker or lighter pigmentary change, recurrence, incomplete removal and infection.
Post-Care Instructions: I reviewed with the patient in detail post-care instructions. Patient is to wear sunprotection, and avoid picking at any of the treated lesions. Pt may apply Vaseline to crusted or scabbing areas.
Detail Level: Zone
Medical Necessity Clause: This procedure was medically necessary because the lesions that were treated were:
Duration Of Freeze Thaw-Cycle (Seconds): 1

## 2022-08-25 ENCOUNTER — HOSPITAL ENCOUNTER (OUTPATIENT)
Facility: CLINIC | Age: 82
Discharge: HOME | End: 2022-08-25
Admitting: NURSE PRACTITIONER
Payer: COMMERCIAL

## 2022-08-25 ENCOUNTER — LAB (OUTPATIENT)
Dept: LAB | Facility: OTHER | Age: 82
End: 2022-08-25

## 2022-08-25 ENCOUNTER — DOCUMENTATION ONLY (OUTPATIENT)
Dept: LAB | Facility: OTHER | Age: 82
End: 2022-08-25

## 2022-08-25 DIAGNOSIS — C61 MALIGNANT NEOPLASM OF PROSTATE (H): Primary | ICD-10-CM

## 2022-08-25 DIAGNOSIS — C61 PROSTATE CANCER (H): ICD-10-CM

## 2022-08-25 LAB
ALBUMIN SERPL-MCNC: 3.7 G/DL (ref 3.4–5)
ALP SERPL-CCNC: 71 U/L (ref 40–150)
ALT SERPL W P-5'-P-CCNC: 32 U/L (ref 0–70)
ANION GAP SERPL CALCULATED.3IONS-SCNC: 6 MMOL/L (ref 3–14)
AST SERPL W P-5'-P-CCNC: 32 U/L (ref 0–45)
BASOPHILS # BLD AUTO: 0 10E3/UL (ref 0–0.2)
BASOPHILS NFR BLD AUTO: 0 %
BILIRUB SERPL-MCNC: 0.7 MG/DL (ref 0.2–1.3)
BUN SERPL-MCNC: 13 MG/DL (ref 7–30)
CALCIUM SERPL-MCNC: 9.1 MG/DL (ref 8.5–10.1)
CHLORIDE BLD-SCNC: 102 MMOL/L (ref 94–109)
CO2 SERPL-SCNC: 29 MMOL/L (ref 20–32)
CREAT SERPL-MCNC: 0.87 MG/DL (ref 0.66–1.25)
EOSINOPHIL # BLD AUTO: 0.1 10E3/UL (ref 0–0.7)
EOSINOPHIL NFR BLD AUTO: 3 %
ERYTHROCYTE [DISTWIDTH] IN BLOOD BY AUTOMATED COUNT: 12.6 % (ref 10–15)
GFR SERPL CREATININE-BSD FRML MDRD: 87 ML/MIN/1.73M2
GLUCOSE BLD-MCNC: 137 MG/DL (ref 70–99)
HCT VFR BLD AUTO: 37 % (ref 40–53)
HGB BLD-MCNC: 12.5 G/DL (ref 13.3–17.7)
HOLD SPECIMEN: NORMAL
LYMPHOCYTES # BLD AUTO: 1.5 10E3/UL (ref 0.8–5.3)
LYMPHOCYTES NFR BLD AUTO: 35 %
MCH RBC QN AUTO: 31.6 PG (ref 26.5–33)
MCHC RBC AUTO-ENTMCNC: 33.8 G/DL (ref 31.5–36.5)
MCV RBC AUTO: 94 FL (ref 78–100)
MONOCYTES # BLD AUTO: 0.5 10E3/UL (ref 0–1.3)
MONOCYTES NFR BLD AUTO: 11 %
NEUTROPHILS # BLD AUTO: 2.2 10E3/UL (ref 1.6–8.3)
NEUTROPHILS NFR BLD AUTO: 51 %
PLATELET # BLD AUTO: 169 10E3/UL (ref 150–450)
POTASSIUM BLD-SCNC: 3.6 MMOL/L (ref 3.4–5.3)
PROT SERPL-MCNC: 7.1 G/DL (ref 6.8–8.8)
PSA SERPL-MCNC: 0.64 UG/L (ref 0–4)
RBC # BLD AUTO: 3.95 10E6/UL (ref 4.4–5.9)
SODIUM SERPL-SCNC: 137 MMOL/L (ref 133–144)
WBC # BLD AUTO: 4.3 10E3/UL (ref 4–11)

## 2022-08-25 PROCEDURE — 84153 ASSAY OF PSA TOTAL: CPT

## 2022-08-25 PROCEDURE — 80053 COMPREHEN METABOLIC PANEL: CPT | Performed by: NURSE PRACTITIONER

## 2022-08-25 PROCEDURE — 85025 COMPLETE CBC W/AUTO DIFF WBC: CPT | Performed by: NURSE PRACTITIONER

## 2022-08-25 NOTE — PROGRESS NOTES
Patient cane in today for labs and stated that the needed his testosterone done as well.  Please place orders if needed   Thank you  Britta CASH) [facility]

## 2022-08-26 ENCOUNTER — PATIENT OUTREACH (OUTPATIENT)
Dept: ONCOLOGY | Facility: CLINIC | Age: 82
End: 2022-08-26

## 2022-08-26 DIAGNOSIS — C61 MALIGNANT NEOPLASM OF PROSTATE (H): Primary | ICD-10-CM

## 2022-08-30 NOTE — PROGRESS NOTES
Medardo Gautam is a 82 year old man who is being evaluated via a billable video visit.      How would you like to obtain your AVS? [email or web address]  If the video visit is dropped, the invitation should be resent by: Text to cell phone: [phone]  Will anyone else be joining your video visit? No        Video-Visit Details    Type of service:  Video Visit    Originating Location (pt. 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    History of Present Illness:  Mr. Gautam is an 82-year-old man who was diagnosed with prostate cancer in 2012.  His PSA level was 5.2 ng/mL.  A digital rectal examination revealed clinical stage cT1c disease.  He underwent radical prostatectomy on 8/6/2012, which revealed Gifford 4+5=9 adenocarcinoma, stage pT2c N0 R0.  His next-generation DNA sequencing analysis ([employer]) revealed a pathogenic TP53 mutation, SHIRA genotype, TMB 5 muts/Mb, and absent PD-L1 expression.  He subsequently received salvage radiotherapy, which was completed in 10/2013, concurrently with six months of androgen deprivation therapy.    He subsequently developed a biochemical recurrence, and received ADT from 2014 until 2020.  In 11/2020, he developed non-metastatic CRPC.  Darolutamide was added to his regimen on 12/4/2020, and he has remained on this agent since that time.  As a result, his PSA level initially dropped from 1.66 ng/mL down to a speedy of 0.07 ng/mL (6/21/2021).  However, the patient has developed a rising PSA level over the past year.  His PSA on 10/14/2021 was 0.11, the PSA on 1/13/2022 was 0.16, the PSA on 4/20/2022 was 0.24, the PSA on 6/13/2022 was 0.34,  the PSA on 7/12/2022 was 0.47, and the PSA on 8/25/2022 was 0.64 ng/mL.  His PSA doubling time is calculated at 3.7 months.    The patient presents for a follow-up visit today, and to discuss management options moving forward.  His last CT scan and bone scan were performed on 5/18/2022, and both were negative for local recurrence or metastatic disease at that time.  He has not had a PSMA-PET scan previously.  He has been a patient of Dr. Medardo Ingram.    Review of Systems:  The patient reports feeling generally well.  He does report some fatigue due to the darolutamide, although this is relatively modest.  He also complains of dry skin.  He has not gained or lost any weight recently.  He does not report any urological symptoms at this time.  He does not have any cancer-related pain.  A comprehensive 14-system review of symptoms is otherwise generally within normal limits.  His ECOG score is 0.  His pain score is 0/10.    Medications:  Lupron 22.5 mg IM every 3 months  Darolutamide 600 mg BID  Losartan/HCTZ 100/12.5 mg  Allopurinol 100 mg  Zolpidem 5 mg  Clonazepam 1 mg  Sildenafil 50 mg  Loratadine 10 mg  Calcium + Vit D  Multivitamin  Folic acid    Physical Examination:  Mr. Gautam is an 82-year-old man who appears comfortable at rest.  His vital signs are unremarkable.  His HEENT examination is within normal limits.  There is no palpable cervical, axillary, supraclavicular or inguinal lymphadenopathy.  The cardiac, pulmonary and gastrointestinal examinations are generally within normal limits.  The musculoskeletal examination is grossly intact.  The extremities do not demonstrate pitting edema.  The skin evaluation is unremarkable.    Laboratories:  His PSA level on 8/25/2022 was 0.64 ng/mL.  His testosterone level is 18 ng/dL.       ASSESSMENT AND PLAN:  Mr. Gautam is an 82-year-old man with Jeancarlos 4+5=9 prostate cancer who underwent radical prostatectomy (8/2012) and salvage radiotherapy (ending in 10/2013) who  has non-metastatic CRPC with serological progression despite ongoing darolutamide treatment.  His ECOG score is 0.  His pain score is 0/10.    The patient is showing signs of biochemical progression despite darolutamide.  It has been more than 3 months since his last CT scan and bone scan, so I have recommended that he could repeat these imaging modalities at this time.  It would be important to distinguish non-metastatic CRPC from overt metastatic CRPC.  He should then return to the clinic to discuss additional systemic or local treatment options.  Such treatment choices may include the use of an alternative AR-directed agent such as abiraterone, or taxane chemotherapy using docetaxel, or possibly metastasis-directed radiotherapy (e.g. SBRT) if he is found to have oligometastatic disease.    We also reviewed our clinical trial portfolio.  The patient might be eligible for the ECLIPSE clinical trial.  This is a randomized study of [place]-PSMA-I&T versus abiraterone for patients with metastatic CRPC who have only received one line of prior AR-directed therapy.  This trial will cover a one-time PSMA-PET scan for eligibility confirmation during a pre-screening phase.  If he does not have PSMA-positive metastases, then he would not be eligible for the ECLIPSE trial.  We will provide the patient with the consent form for this study, and he will think about it over the next several weeks.    A total of 45 minutes were spent on this patient on the day of the consultation, of which more than 50% of this time was used for counseling and coordination of care.  He was given the opportunity to ask multiple questions today, all of which were answered to his satisfaction.    Tio Holman M.D.

## 2022-08-31 ENCOUNTER — VIRTUAL VISIT (OUTPATIENT)
Dept: ONCOLOGY | Facility: CLINIC | Age: 82
End: 2022-08-31
Attending: INTERNAL MEDICINE
Payer: COMMERCIAL

## 2022-08-31 DIAGNOSIS — C61 MALIGNANT NEOPLASM OF PROSTATE (H): Primary | ICD-10-CM

## 2022-08-31 PROCEDURE — G0463 HOSPITAL OUTPT CLINIC VISIT: HCPCS | Mod: PN,RTG | Performed by: INTERNAL MEDICINE

## 2022-08-31 PROCEDURE — 99215 OFFICE O/P EST HI 40 MIN: CPT | Mod: 95 | Performed by: INTERNAL MEDICINE

## 2022-08-31 NOTE — LETTER
8/31/2022         RE: Medardo Gautam  [address] [facility] [address]        Dear Colleague,    Thank you for referring your patient, Medardo Gautam, to the [facility]. Please see a copy of my visit note below.    Medardo Gautam is a 82 year old man who is being evaluated via a billable video visit.      How would you like to obtain your AVS? MyChart  If the video visit is dropped, the invitation should be resent by: Text to cell phone: [phone]  Will anyone else be joining your video visit? No        Video-Visit Details    Type of service:  Video Visit    Originating Location (pt. 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    History of Present Illness:  Mr. Gautam is an 82-year-old man who was diagnosed with prostate cancer in 2012.  His PSA level was 5.2 ng/mL.  A digital rectal examination revealed clinical stage cT1c disease.  He underwent radical prostatectomy on 8/6/2012, which revealed [place] 4+5=9 adenocarcinoma, stage pT2c N0 R0.  His next-generation DNA sequencing analysis (CARIS) revealed a pathogenic TP53 mutation, SHIRA genotype, TMB 5 muts/Mb, and absent PD-L1 expression.  He subsequently received salvage radiotherapy, which was completed in 10/2013, concurrently with six months of androgen deprivation therapy.    He subsequently developed a biochemical recurrence, and received ADT from 2014 until 2020.  In 11/2020, he developed non-metastatic CRPC.  Darolutamide was added to his regimen on 12/4/2020, and he has remained on this agent since that time.  As a result, his PSA level initially dropped  from 1.66 ng/mL down to a speedy of 0.07 ng/mL (6/21/2021).  However, the patient has developed a rising PSA level over the past year.  His PSA on 10/14/2021 was 0.11, the PSA on 1/13/2022 was 0.16, the PSA on 4/20/2022 was 0.24, the PSA on 6/13/2022 was 0.34, the PSA on 7/12/2022 was 0.47, and the PSA on 8/25/2022 was 0.64 ng/mL.  His PSA doubling time is calculated at 3.7 months.    The patient presents for a follow-up visit today, and to discuss management options moving forward.  His last CT scan and bone scan were performed on 5/18/2022, and both were negative for local recurrence or metastatic disease at that time.  He has not had a PSMA-PET scan previously.  He has been a patient of Dr. Medardo Ingram.    Review of Systems:  The patient reports feeling generally well.  He does report some fatigue due to the darolutamide, although this is relatively modest.  He also complains of dry skin.  He has not gained or lost any weight recently.  He does not report any urological symptoms at this time.  He does not have any cancer-related pain.  A comprehensive 14-system review of symptoms is otherwise generally within normal limits.  His ECOG score is 0.  His pain score is 0/10.    Medications:  Lupron 22.5 mg IM every 3 months  Darolutamide 600 mg BID  Losartan/HCTZ 100/12.5 mg  Allopurinol 100 mg  Zolpidem 5 mg  Clonazepam 1 mg  Sildenafil 50 mg  Loratadine 10 mg  Calcium + Vit D  Multivitamin  Folic acid    Physical Examination:  Mr. Gautam is an 82-year-old man who appears comfortable at rest.  His vital signs are unremarkable.  His HEENT examination is within normal limits.  There is no palpable cervical, axillary, supraclavicular or inguinal lymphadenopathy.  The cardiac, pulmonary and gastrointestinal examinations are generally within normal limits.  The musculoskeletal examination is grossly intact.  The extremities do not demonstrate pitting edema.  The skin evaluation is unremarkable.    Laboratories:  His PSA  level on 8/25/2022 was 0.64 ng/mL.  His testosterone level is 18 ng/dL.       ASSESSMENT AND PLAN:  Mr. Gautam is an 82-year-old man with Jeancarlos 4+5=9 prostate cancer who underwent radical prostatectomy (8/2012) and salvage radiotherapy (ending in 10/2013) who has non-metastatic CRPC with serological progression despite ongoing darolutamide treatment.  His ECOG score is 0.  His pain score is 0/10.    The patient is showing signs of biochemical progression despite darolutamide.  It has been more than 3 months since his last CT scan and bone scan, so I have recommended that he could repeat these imaging modalities at this time.  It would be important to distinguish non-metastatic CRPC from overt metastatic CRPC.  He should then return to the clinic to discuss additional systemic or local treatment options.  Such treatment choices may include the use of an alternative AR-directed agent such as abiraterone, or taxane chemotherapy using docetaxel, or possibly metastasis-directed radiotherapy (e.g. SBRT) if he is found to have oligometastatic disease.    We also reviewed our clinical trial portfolio.  The patient might be eligible for the ECLIPSE clinical trial.  This is a randomized study of Lee Ann-PSMA-I&T versus abiraterone for patients with metastatic CRPC who have only received one line of prior AR-directed therapy.  This trial will cover a one-time PSMA-PET scan for eligibility confirmation during a pre-screening phase.  If he does not have PSMA-positive metastases, then he would not be eligible for the ECLIPSE trial.  We will provide the patient with the consent form for this study, and he will think about it over the next several weeks.    A total of 45 minutes were spent on this patient on the day of the consultation, of which more than 50% of this time was used for counseling and coordination of care.  He was given the opportunity to ask multiple questions today, all of which were answered to his  satisfaction.        Again, thank you for allowing me to participate in the care of your patient.      Sincerely,    Tio Holman MD

## 2022-09-06 ENCOUNTER — OFFICE VISIT (OUTPATIENT)
Dept: FAMILY MEDICINE | Facility: OTHER | Age: 82
End: 2022-09-06
Payer: COMMERCIAL

## 2022-09-06 VITALS
OXYGEN SATURATION: 97 % | RESPIRATION RATE: 16 BRPM | TEMPERATURE: 97 F | DIASTOLIC BLOOD PRESSURE: 70 MMHG | HEART RATE: 80 BPM | BODY MASS INDEX: 29.19 KG/M2 | WEIGHT: 192 LBS | SYSTOLIC BLOOD PRESSURE: 128 MMHG

## 2022-09-06 DIAGNOSIS — M70.61 TROCHANTERIC BURSITIS OF RIGHT HIP: Primary | ICD-10-CM

## 2022-09-06 PROCEDURE — G0008 ADMIN INFLUENZA VIRUS VAC: HCPCS | Performed by: FAMILY MEDICINE

## 2022-09-06 PROCEDURE — 90662 IIV NO PRSV INCREASED AG IM: CPT | Performed by: FAMILY MEDICINE

## 2022-09-06 PROCEDURE — 99213 OFFICE O/P EST LOW 20 MIN: CPT | Mod: 25 | Performed by: FAMILY MEDICINE

## 2022-09-06 ASSESSMENT — PAIN SCALES - GENERAL: PAINLEVEL: NO PAIN (0)

## 2022-09-12 DIAGNOSIS — G47.00 INSOMNIA, UNSPECIFIED TYPE: ICD-10-CM

## 2022-09-13 RX ORDER — ZOLPIDEM TARTRATE 5 MG/1
5 TABLET ORAL
Qty: 30 TABLET | Refills: 5 | Status: SHIPPED | OUTPATIENT
Start: 2022-09-14 | End: 2023-02-09

## 2022-09-14 ENCOUNTER — APPOINTMENT (OUTPATIENT)
Dept: LAB | Facility: CLINIC | Age: 82
End: 2022-09-14
Attending: INTERNAL MEDICINE
Payer: COMMERCIAL

## 2022-09-14 ENCOUNTER — HOSPITAL ENCOUNTER (OUTPATIENT)
Dept: NUCLEAR MEDICINE | Facility: CLINIC | Age: 82
Setting detail: NUCLEAR MEDICINE
Discharge: HOME | End: 2022-09-14
Attending: INTERNAL MEDICINE
Payer: COMMERCIAL

## 2022-09-14 ENCOUNTER — HOSPITAL ENCOUNTER (OUTPATIENT)
Dept: CT IMAGING | Facility: CLINIC | Age: 82
Discharge: HOME | End: 2022-09-14
Attending: INTERNAL MEDICINE
Payer: COMMERCIAL

## 2022-09-14 DIAGNOSIS — C61 MALIGNANT NEOPLASM OF PROSTATE (H): ICD-10-CM

## 2022-09-14 LAB
ALBUMIN SERPL BCG-MCNC: 4 G/DL (ref 3.5–5.2)
ALP SERPL-CCNC: 68 U/L (ref 40–129)
ALT SERPL W P-5'-P-CCNC: 26 U/L (ref 10–50)
ANION GAP SERPL CALCULATED.3IONS-SCNC: 8 MMOL/L (ref 7–15)
AST SERPL W P-5'-P-CCNC: 40 U/L (ref 10–50)
BASOPHILS # BLD AUTO: 0 10E3/UL (ref 0–0.2)
BASOPHILS NFR BLD AUTO: 1 %
BILIRUB SERPL-MCNC: 0.6 MG/DL
BUN SERPL-MCNC: 11.1 MG/DL (ref 8–23)
CALCIUM SERPL-MCNC: 9.2 MG/DL (ref 8.8–10.2)
CHLORIDE SERPL-SCNC: 94 MMOL/L (ref 98–107)
CREAT SERPL-MCNC: 0.96 MG/DL (ref 0.67–1.17)
DEPRECATED HCO3 PLAS-SCNC: 28 MMOL/L (ref 22–29)
EOSINOPHIL # BLD AUTO: 0.1 10E3/UL (ref 0–0.7)
EOSINOPHIL NFR BLD AUTO: 2 %
ERYTHROCYTE [DISTWIDTH] IN BLOOD BY AUTOMATED COUNT: 12.7 % (ref 10–15)
GFR SERPL CREATININE-BSD FRML MDRD: 80 ML/MIN/1.73M2
GLUCOSE SERPL-MCNC: 125 MG/DL (ref 70–99)
HCT VFR BLD AUTO: 34 % (ref 40–53)
HGB BLD-MCNC: 11.8 G/DL (ref 13.3–17.7)
IMM GRANULOCYTES # BLD: 0 10E3/UL
IMM GRANULOCYTES NFR BLD: 0 %
LYMPHOCYTES # BLD AUTO: 0.8 10E3/UL (ref 0.8–5.3)
LYMPHOCYTES NFR BLD AUTO: 24 %
MCH RBC QN AUTO: 31.4 PG (ref 26.5–33)
MCHC RBC AUTO-ENTMCNC: 34.7 G/DL (ref 31.5–36.5)
MCV RBC AUTO: 90 FL (ref 78–100)
MONOCYTES # BLD AUTO: 0.4 10E3/UL (ref 0–1.3)
MONOCYTES NFR BLD AUTO: 13 %
NEUTROPHILS # BLD AUTO: 1.9 10E3/UL (ref 1.6–8.3)
NEUTROPHILS NFR BLD AUTO: 60 %
NRBC # BLD AUTO: 0 10E3/UL
NRBC BLD AUTO-RTO: 0 /100
PLATELET # BLD AUTO: 129 10E3/UL (ref 150–450)
POTASSIUM SERPL-SCNC: 3.4 MMOL/L (ref 3.4–5.3)
PROT SERPL-MCNC: 6.3 G/DL (ref 6.4–8.3)
PSA SERPL-MCNC: 0.67 NG/ML
RBC # BLD AUTO: 3.76 10E6/UL (ref 4.4–5.9)
SODIUM SERPL-SCNC: 130 MMOL/L (ref 136–145)
WBC # BLD AUTO: 3.2 10E3/UL (ref 4–11)

## 2022-09-14 PROCEDURE — 71260 CT THORAX DX C+: CPT | Mod: 26 | Performed by: RADIOLOGY

## 2022-09-14 PROCEDURE — 74177 CT ABD & PELVIS W/CONTRAST: CPT | Mod: 26 | Performed by: RADIOLOGY

## 2022-09-14 PROCEDURE — 250N000011 HC RX IP 250 OP 636: Performed by: INTERNAL MEDICINE

## 2022-09-14 PROCEDURE — 78306 BONE IMAGING WHOLE BODY: CPT

## 2022-09-14 PROCEDURE — 343N000001 HC RX 343: Performed by: INTERNAL MEDICINE

## 2022-09-14 PROCEDURE — 78306 BONE IMAGING WHOLE BODY: CPT | Mod: 26 | Performed by: RADIOLOGY

## 2022-09-14 PROCEDURE — 85025 COMPLETE CBC W/AUTO DIFF WBC: CPT | Performed by: INTERNAL MEDICINE

## 2022-09-14 PROCEDURE — 36415 COLL VENOUS BLD VENIPUNCTURE: CPT | Performed by: INTERNAL MEDICINE

## 2022-09-14 PROCEDURE — 250N000009 HC RX 250: Performed by: INTERNAL MEDICINE

## 2022-09-14 PROCEDURE — 74177 CT ABD & PELVIS W/CONTRAST: CPT

## 2022-09-14 PROCEDURE — 84520 ASSAY OF UREA NITROGEN: CPT | Performed by: INTERNAL MEDICINE

## 2022-09-14 PROCEDURE — A9503 TC99M MEDRONATE: HCPCS | Performed by: INTERNAL MEDICINE

## 2022-09-14 PROCEDURE — 82040 ASSAY OF SERUM ALBUMIN: CPT | Performed by: INTERNAL MEDICINE

## 2022-09-14 PROCEDURE — 84153 ASSAY OF PSA TOTAL: CPT | Performed by: INTERNAL MEDICINE

## 2022-09-14 RX ORDER — TC 99M MEDRONATE 20 MG/10ML
20-30 INJECTION, POWDER, LYOPHILIZED, FOR SOLUTION INTRAVENOUS ONCE
Status: COMPLETED | OUTPATIENT
Start: 2022-09-14 | End: 2022-09-14

## 2022-09-14 RX ORDER — IOPAMIDOL 755 MG/ML
119 INJECTION, SOLUTION INTRAVASCULAR ONCE
Status: COMPLETED | OUTPATIENT
Start: 2022-09-14 | End: 2022-09-14

## 2022-09-14 RX ADMIN — TC 99M MEDRONATE 27.3 MCI.: 20 INJECTION, POWDER, LYOPHILIZED, FOR SOLUTION INTRAVENOUS at 10:15

## 2022-09-14 RX ADMIN — SODIUM CHLORIDE, PRESERVATIVE FREE 80 ML: 5 INJECTION INTRAVENOUS at 10:32

## 2022-09-14 RX ADMIN — IOPAMIDOL 119 ML: 755 INJECTION, SOLUTION INTRAVENOUS at 10:32

## 2022-09-16 ENCOUNTER — LAB (OUTPATIENT)
Dept: LAB | Facility: OTHER | Age: 82
End: 2022-09-16
Payer: COMMERCIAL

## 2022-09-16 DIAGNOSIS — C61 MALIGNANT NEOPLASM OF PROSTATE (H): ICD-10-CM

## 2022-09-16 DIAGNOSIS — C61 MALIGNANT NEOPLASM OF PROSTATE (H): Primary | ICD-10-CM

## 2022-09-16 PROCEDURE — 84403 ASSAY OF TOTAL TESTOSTERONE: CPT

## 2022-09-16 PROCEDURE — 36415 COLL VENOUS BLD VENIPUNCTURE: CPT

## 2022-09-18 ENCOUNTER — MYC MEDICAL ADVICE (OUTPATIENT)
Dept: FAMILY MEDICINE | Facility: OTHER | Age: 82
End: 2022-09-18

## 2022-09-18 DIAGNOSIS — R06.09 DYSPNEA ON EXERTION: Primary | ICD-10-CM

## 2022-09-18 DIAGNOSIS — F41.9 ANXIETY: ICD-10-CM

## 2022-09-19 DIAGNOSIS — F41.9 ANXIETY: ICD-10-CM

## 2022-09-20 LAB — TESTOST SERPL-MCNC: 18 NG/DL (ref 240–950)

## 2022-09-20 RX ORDER — CLONAZEPAM 1 MG/1
1 TABLET ORAL DAILY PRN
Qty: 30 TABLET | Refills: 5 | Status: SHIPPED | OUTPATIENT
Start: 2022-09-20 | End: 2023-02-09

## 2022-09-20 NOTE — TELEPHONE ENCOUNTER
I called and discussed results with patient.  Reassured him about the uptake in the hip which is likely secondary degenerative changes however increased calcium deposit and plaque in the LAD is concerning for coronary artery disease especially with his symptoms of dyspnea on exertion.  I advised a stress test for further evaluation of this.  Referral placed.  All patient's questions were answered to his satisfaction.

## 2022-09-21 RX ORDER — CLONAZEPAM 1 MG/1
TABLET ORAL
Qty: 30 TABLET | Refills: 0 | OUTPATIENT
Start: 2022-09-21

## 2022-09-26 ENCOUNTER — MYC MEDICAL ADVICE (OUTPATIENT)
Dept: FAMILY MEDICINE | Facility: OTHER | Age: 82
End: 2022-09-26

## 2022-09-27 NOTE — PROGRESS NOTES
FOLLOW UP VISIT    Reason for visit:  Non-metastatic CRPC with biochemical progression despite darolutamide.    History of Present Illness:  Mr. Gautam is an 82-year-old man who was diagnosed with prostate cancer in 2012.  His PSA level was 5.2 ng/mL.  Clinical stage was cT1c disease.  He underwent radical prostatectomy on 8/6/2012, which revealed [place] 4+5=9 adenocarcinoma, stage pT2c N0 R0.  His next-generation DNA sequencing analysis (CARIS) revealed a pathogenic TP53 mutation, SHIRA genotype, TMB 5 muts/Mb, and absent PD-L1 expression.  He subsequently received salvage radiotherapy, which was completed in 10/2013, concurrently with six months of androgen deprivation therapy.    He subsequently developed a biochemical recurrence, and received ADT from 2014 until 2020.  In 11/2020, he developed non-metastatic CRPC.  Darolutamide was added to his regimen on 12/4/2020, and he has remained on this agent since that time.  As a result, his PSA level initially dropped from 1.66 ng/mL down to a speedy of 0.07 ng/mL (6/21/2021).  However, the patient has developed a rising PSA level over the past year.  His PSA on 1/13/2022 was 0.16, the PSA on 4/20/2022 was 0.24, the PSA on 6/13/2022 was 0.34, the PSA on 7/12/2022 was 0.47, the PSA on 8/25/2022 was 0.64 ng/mL, and the PSA on 9/14/2022 was 0.67 ng/mL (with a testosterone level of 18 ng/dL).  His PSA doubling time is calculated at 4 months.    The patient presents for a follow-up visit today, and to discuss management options moving forward.  He was previously a patient of Dr. Medardo Ingram.  His prior CT scan and NM bone scan were performed on 5/18/2022, and both were negative for local recurrence or metastatic disease at that time.  His recent CT scan and bone scan from 9/14/2022 continue to show no evidence of distant metastatic disease.    Review of Systems:  The patient reports feeling generally well.  He does report some fatigue due to the darolutamide, although this  is relatively modest.  He also complains of dry skin.  He has not gained or lost any weight recently.  He does not report any urological symptoms at this time.  He does not have any cancer-related pain.  A comprehensive 14-system review of symptoms is otherwise generally within normal limits.  His ECOG score is 0.  His pain score is 0/10.    Medications:  Lupron 22.5 mg IM every 3 months  Darolutamide 600 mg BID  Losartan/HCTZ 100/12.5 mg  Allopurinol 100 mg  Zolpidem 5 mg  Clonazepam 1 mg  Sildenafil 50 mg  Loratadine 10 mg  Calcium + Vit D  Multivitamin  Folic acid    Physical Examination:  Mr. Gautam is an 82-year-old man who appears comfortable at rest.  His vital signs are unremarkable.  His HEENT examination is within normal limits.  There is no palpable cervical, axillary, supraclavicular or inguinal lymphadenopathy.  The cardiac, pulmonary and gastrointestinal examinations are generally within normal limits.  The musculoskeletal examination is grossly intact.  The extremities do not demonstrate pitting edema.  The skin evaluation is unremarkable.    Laboratories (9/14/2022):  His PSA level is 0.67 ng/mL. His testosterone level is 18 ng/dL.    Imaging (9/14/2022):  He underwent a CT scan and NM bone scan on 9/14/2022, and both showed no evidence of distant metastatic disease.  He has not had a PSMA-PET scan.       ASSESSMENT AND PLAN:  Mr. Gautam is an 82-year-old man with Jeancarlos 4+5=9 prostate cancer who underwent radical prostatectomy (8/2012) and salvage radiotherapy (ending in 10/2013) who has non-metastatic CRPC with serological progression despite ongoing darolutamide treatment.  His ECOG score is 0.  His pain score is 0/10.    The patient is showing signs of biochemical progression despite darolutamide.  His recent imaging modalities continue to show non-metastatic CRPC.  He returns to the clinic today to discuss additional systemic or local treatment options.  Such treatment choices may include the use  of an alternative AR-directed agent such as abiraterone, or taxane chemotherapy using docetaxel, or possibly metastasis-directed radiotherapy (e.g. SBRT) if he is found to have oligometastatic disease in the future.    We also reviewed our clinical trial portfolio.  The patient might be eligible for the ECLIPSE clinical trial if his PSA level rises above 2.0 ng/mL.  This is a randomized study of Lee Ann-PSMA-I&T versus abiraterone for patients with metastatic CRPC who have only received one line of prior AR-directed therapy.  This trial will cover a one-time PSMA-PET scan for eligibility confirmation during a pre-screening phase.  If he does not have PSMA-positive metastases, then he would not be eligible for the ECLIPSE trial.  We will provide the patient with the consent form for this study, and he will think about it over the next several weeks.  For now, he is not yet eligible because his PSA level remains below 2.0 ng/mL.  He will continue to obtain PSA/testosterone testing every month, and we will conduct a video visit every two months.    A total of 45 minutes were spent on this patient on the day of the consultation, of which more than 50% of this time was used for counseling and coordination of care.  He was given the opportunity to ask multiple questions today, all of which were answered to his satisfaction.    Tio Holman M.D.

## 2022-09-28 ENCOUNTER — ONCOLOGY VISIT (OUTPATIENT)
Dept: ONCOLOGY | Facility: CLINIC | Age: 82
End: 2022-09-28
Attending: INTERNAL MEDICINE
Payer: COMMERCIAL

## 2022-09-28 VITALS
TEMPERATURE: 98.1 F | OXYGEN SATURATION: 96 % | WEIGHT: 194.7 LBS | BODY MASS INDEX: 29.6 KG/M2 | DIASTOLIC BLOOD PRESSURE: 83 MMHG | RESPIRATION RATE: 16 BRPM | HEART RATE: 73 BPM | SYSTOLIC BLOOD PRESSURE: 145 MMHG

## 2022-09-28 DIAGNOSIS — C61 MALIGNANT NEOPLASM OF PROSTATE (H): Primary | ICD-10-CM

## 2022-09-28 PROCEDURE — 99215 OFFICE O/P EST HI 40 MIN: CPT | Performed by: INTERNAL MEDICINE

## 2022-09-28 PROCEDURE — G0463 HOSPITAL OUTPT CLINIC VISIT: HCPCS

## 2022-09-28 ASSESSMENT — PAIN SCALES - GENERAL: PAINLEVEL: NO PAIN (0)

## 2022-09-28 NOTE — LETTER
9/28/2022         RE: Medardo Gautam  [address] [facility] [address]        Dear Colleague,    Thank you for referring your patient, Medardo Gautam, to the [facility]. Please see a copy of my visit note below.      FOLLOW UP VISIT    Reason for visit:  Non-metastatic CRPC with biochemical progression despite darolutamide.    History of Present Illness:  Mr. Gautam is an 82-year-old man who was diagnosed with prostate cancer in 2012.  His PSA level was 5.2 ng/mL.  Clinical stage was cT1c disease.  He underwent radical prostatectomy on 8/6/2012, which revealed [place] 4+5=9 adenocarcinoma, stage pT2c N0 R0.  His next-generation DNA sequencing analysis (CARIS) revealed a pathogenic TP53 mutation, SHIRA genotype, TMB 5 muts/Mb, and absent PD-L1 expression.  He subsequently received salvage radiotherapy, which was completed in 10/2013, concurrently with six months of androgen deprivation therapy.    He subsequently developed a biochemical recurrence, and received ADT from 2014 until 2020.  In 11/2020, he developed non-metastatic CRPC.  Darolutamide was added to his regimen on 12/4/2020, and he has remained on this agent since that time.  As a result, his PSA level initially dropped from 1.66 ng/mL down to a speedy of 0.07 ng/mL (6/21/2021).  However, the patient has developed a rising PSA level over the past year.  His PSA on 1/13/2022 was 0.16, the PSA on 4/20/2022 was 0.24, the PSA on 6/13/2022 was 0.34, the PSA on 7/12/2022 was 0.47, the PSA on 8/25/2022 was 0.64 ng/mL, and the PSA on 9/14/2022 was 0.67 ng/mL (with a testosterone level of 18 ng/dL).  His PSA doubling time is calculated at 4 months.    The patient presents for a follow-up visit today, and to discuss management options moving forward.  He was previously a patient of Dr. Medardo Ingram.  His prior CT scan and NM bone scan were performed on 5/18/2022, and both were negative for local recurrence or metastatic  disease at that time.  His recent CT scan and bone scan from 9/14/2022 continue to show no evidence of distant metastatic disease.    Review of Systems:  The patient reports feeling generally well.  He does report some fatigue due to the darolutamide, although this is relatively modest.  He also complains of dry skin.  He has not gained or lost any weight recently.  He does not report any urological symptoms at this time.  He does not have any cancer-related pain.  A comprehensive 14-system review of symptoms is otherwise generally within normal limits.  His ECOG score is 0.  His pain score is 0/10.    Medications:  Lupron 22.5 mg IM every 3 months  Darolutamide 600 mg BID  Losartan/HCTZ 100/12.5 mg  Allopurinol 100 mg  Zolpidem 5 mg  Clonazepam 1 mg  Sildenafil 50 mg  Loratadine 10 mg  Calcium + Vit D  Multivitamin  Folic acid    Physical Examination:  Mr. Gautam is an 82-year-old man who appears comfortable at rest.  His vital signs are unremarkable.  His HEENT examination is within normal limits.  There is no palpable cervical, axillary, supraclavicular or inguinal lymphadenopathy.  The cardiac, pulmonary and gastrointestinal examinations are generally within normal limits.  The musculoskeletal examination is grossly intact.  The extremities do not demonstrate pitting edema.  The skin evaluation is unremarkable.    Laboratories (9/14/2022):  His PSA level is 0.67 ng/mL. His testosterone level is 18 ng/dL.    Imaging (9/14/2022):  He underwent a CT scan and NM bone scan on 9/14/2022, and both showed no evidence of distant metastatic disease.  He has not had a PSMA-PET scan.       ASSESSMENT AND PLAN:  Mr. Gautam is an 82-year-old man with [place] 4+5=9 prostate cancer who underwent radical prostatectomy (8/2012) and salvage radiotherapy (ending in 10/2013) who has non-metastatic CRPC with serological progression despite ongoing darolutamide treatment.  His ECOG score is 0.  His pain score is 0/10.    The patient is  showing signs of biochemical progression despite darolutamide.  His recent imaging modalities continue to show non-metastatic CRPC.  He returns to the clinic today to discuss additional systemic or local treatment options.  Such treatment choices may include the use of an alternative AR-directed agent such as abiraterone, or taxane chemotherapy using docetaxel, or possibly metastasis-directed radiotherapy (e.g. SBRT) if he is found to have oligometastatic disease in the future.    We also reviewed our clinical trial portfolio.  The patient might be eligible for the ECLIPSE clinical trial if his PSA level rises above 2.0 ng/mL.  This is a randomized study of Lee Ann-PSMA-I&T versus abiraterone for patients with metastatic CRPC who have only received one line of prior AR-directed therapy.  This trial will cover a one-time PSMA-PET scan for eligibility confirmation during a pre-screening phase.  If he does not have PSMA-positive metastases, then he would not be eligible for the ECLIPSE trial.  We will provide the patient with the consent form for this study, and he will think about it over the next several weeks.  For now, he is not yet eligible because his PSA level remains below 2.0 ng/mL.  He will continue to obtain PSA/testosterone testing every month, and we will conduct a video visit every two months.    A total of 45 minutes were spent on this patient on the day of the consultation, of which more than 50% of this time was used for counseling and coordination of care.  He was given the opportunity to ask multiple questions today, all of which were answered to his satisfaction.        Again, thank you for allowing me to participate in the care of your patient.      Sincerely,    Tio Holman MD

## 2022-09-28 NOTE — NURSING NOTE
"Oncology Rooming Note    September 28, 2022 10:09 AM   Medardo Gautam is a 82 year old male who presents for:    Chief Complaint   Patient presents with     Oncology Clinic Visit     Prostate Cancer     Initial Vitals: BP (!) 145/83 (BP Location: Right arm, Patient Position: Sitting, Cuff Size: Adult Large)   Pulse 73   Temp 98.1  F (36.7  C) (Oral)   Resp 16   Wt 88.3 kg (194 lb 11.2 oz)   SpO2 96%   BMI 29.60 kg/m   Estimated body mass index is 29.6 kg/m  as calculated from the following:    Height as of 8/4/22: 1.727 m (5' 8\").    Weight as of this encounter: 88.3 kg (194 lb 11.2 oz). Body surface area is 2.06 meters squared.  No Pain (0) Comment: Data Unavailable   No LMP for male patient.  Allergies reviewed: Yes  Medications reviewed: Yes    Medications: Medication refills not needed today.  Pharmacy name entered into Igea:    [id number] - [facility] [address] [facility] - [place], [place] - [address] [facility]  [facility] [id number]    Clinical concerns: Pt presents today for f/u.       Lady Cisneros LPN  9/28/2022              "

## 2022-09-29 ENCOUNTER — HOSPITAL ENCOUNTER (OUTPATIENT)
Dept: NUCLEAR MEDICINE | Facility: CLINIC | Age: 82
Setting detail: OBSERVATION
Discharge: HOME | End: 2022-09-29
Attending: FAMILY MEDICINE
Payer: COMMERCIAL

## 2022-09-29 ENCOUNTER — HOSPITAL ENCOUNTER (OUTPATIENT)
Dept: NUCLEAR MEDICINE | Facility: CLINIC | Age: 82
Setting detail: NUCLEAR MEDICINE
Discharge: HOME | End: 2022-09-29
Attending: FAMILY MEDICINE
Payer: COMMERCIAL

## 2022-09-29 ENCOUNTER — HOSPITAL ENCOUNTER (OUTPATIENT)
Dept: CARDIOLOGY | Facility: CLINIC | Age: 82
Discharge: HOME | End: 2022-09-29
Attending: FAMILY MEDICINE
Payer: COMMERCIAL

## 2022-09-29 DIAGNOSIS — R06.09 DYSPNEA ON EXERTION: ICD-10-CM

## 2022-09-29 PROCEDURE — 93016 CV STRESS TEST SUPVJ ONLY: CPT | Performed by: INTERNAL MEDICINE

## 2022-09-29 PROCEDURE — 250N000011 HC RX IP 250 OP 636: Performed by: INTERNAL MEDICINE

## 2022-09-29 PROCEDURE — 93017 CV STRESS TEST TRACING ONLY: CPT

## 2022-09-29 PROCEDURE — A9502 TC99M TETROFOSMIN: HCPCS | Performed by: FAMILY MEDICINE

## 2022-09-29 PROCEDURE — 93018 CV STRESS TEST I&R ONLY: CPT | Performed by: INTERNAL MEDICINE

## 2022-09-29 PROCEDURE — 78452 HT MUSCLE IMAGE SPECT MULT: CPT | Mod: 26

## 2022-09-29 PROCEDURE — 343N000001 HC RX 343: Performed by: FAMILY MEDICINE

## 2022-09-29 PROCEDURE — 78452 HT MUSCLE IMAGE SPECT MULT: CPT

## 2022-09-29 RX ORDER — ACYCLOVIR 200 MG/1
0-1 CAPSULE ORAL
Status: DISCONTINUED | OUTPATIENT
Start: 2022-09-29 | End: 2022-09-30 | Stop reason: HOSPADM

## 2022-09-29 RX ORDER — CAFFEINE CITRATE 20 MG/ML
60 SOLUTION INTRAVENOUS
Status: DISCONTINUED | OUTPATIENT
Start: 2022-09-29 | End: 2022-09-30 | Stop reason: HOSPADM

## 2022-09-29 RX ORDER — ALBUTEROL SULFATE 90 UG/1
2 AEROSOL, METERED RESPIRATORY (INHALATION) EVERY 5 MIN PRN
Status: DISCONTINUED | OUTPATIENT
Start: 2022-09-29 | End: 2022-09-30 | Stop reason: HOSPADM

## 2022-09-29 RX ORDER — REGADENOSON 0.08 MG/ML
0.4 INJECTION, SOLUTION INTRAVENOUS ONCE
Status: COMPLETED | OUTPATIENT
Start: 2022-09-29 | End: 2022-09-29

## 2022-09-29 RX ORDER — AMINOPHYLLINE 25 MG/ML
50-100 INJECTION, SOLUTION INTRAVENOUS
Status: DISCONTINUED | OUTPATIENT
Start: 2022-09-29 | End: 2022-09-30 | Stop reason: HOSPADM

## 2022-09-29 RX ADMIN — TETROFOSMIN 34.5 MCI.: 1.38 INJECTION, POWDER, LYOPHILIZED, FOR SOLUTION INTRAVENOUS at 11:31

## 2022-09-29 RX ADMIN — TETROFOSMIN 10.3 MCI.: 1.38 INJECTION, POWDER, LYOPHILIZED, FOR SOLUTION INTRAVENOUS at 10:06

## 2022-09-29 RX ADMIN — REGADENOSON 0.4 MG: 0.08 INJECTION, SOLUTION INTRAVENOUS at 11:44

## 2022-09-29 NOTE — PROGRESS NOTES
Pt here for Lexiscan nuclear stress test.  Medication and side effects reviewed with patient. Lung sounds clear to auscultation bilaterally.  Denied caffeine use.  Patient tolerated Lexiscan dose without any adverse reactions.  VSS.  Monitored post injection and then taken to nuclear medicine for follow up imaging.

## 2022-10-02 NOTE — RESULT ENCOUNTER NOTE
Mr. Gautam    Your recent test results are attached. No chest abnormality noted on the stress test. The actual cardiology report is still pending. I will get back to you once I have the results    If you have any questions or concerns please contact me via My Chart or call the clinic at [phone]     Thank You  Verna Osborne MD.

## 2022-10-03 LAB
CV STRESS MAX HR HE: 89
RATE PRESSURE PRODUCT: NORMAL
STRESS ECHO BASELINE DIASTOLIC HE: 68
STRESS ECHO BASELINE HR: 78 BPM
STRESS ECHO BASELINE SYSTOLIC BP: 131
STRESS ECHO CALCULATED PERCENT HR: 64 %
STRESS ECHO LAST STRESS DIASTOLIC BP: 71
STRESS ECHO LAST STRESS SYSTOLIC BP: 121
STRESS ECHO TARGET HR: 140

## 2022-10-03 NOTE — RESULT ENCOUNTER NOTE
Mr. Gautam    Your recent test results are attached. Stress test did not show any concerns for heart disease. NO intervention necessary at this time    If you have any questions or concerns please contact me via My Chart or call the clinic at [phone]     Thank You  Verna Osborne MD.

## 2022-10-28 ENCOUNTER — LAB (OUTPATIENT)
Dept: LAB | Facility: OTHER | Age: 82
End: 2022-10-28
Payer: COMMERCIAL

## 2022-10-28 DIAGNOSIS — C61 MALIGNANT NEOPLASM OF PROSTATE (H): ICD-10-CM

## 2022-10-28 LAB
ALBUMIN SERPL-MCNC: 3.9 G/DL (ref 3.4–5)
ALP SERPL-CCNC: 68 U/L (ref 40–150)
ALT SERPL W P-5'-P-CCNC: 31 U/L (ref 0–70)
ANION GAP SERPL CALCULATED.3IONS-SCNC: 5 MMOL/L (ref 3–14)
AST SERPL W P-5'-P-CCNC: 34 U/L (ref 0–45)
BASOPHILS # BLD AUTO: 0 10E3/UL (ref 0–0.2)
BASOPHILS NFR BLD AUTO: 0 %
BILIRUB SERPL-MCNC: 1.1 MG/DL (ref 0.2–1.3)
BUN SERPL-MCNC: 13 MG/DL (ref 7–30)
CALCIUM SERPL-MCNC: 9.2 MG/DL (ref 8.5–10.1)
CHLORIDE BLD-SCNC: 103 MMOL/L (ref 94–109)
CO2 SERPL-SCNC: 30 MMOL/L (ref 20–32)
CREAT SERPL-MCNC: 0.95 MG/DL (ref 0.66–1.25)
EOSINOPHIL # BLD AUTO: 0.1 10E3/UL (ref 0–0.7)
EOSINOPHIL NFR BLD AUTO: 2 %
ERYTHROCYTE [DISTWIDTH] IN BLOOD BY AUTOMATED COUNT: 13.7 % (ref 10–15)
GFR SERPL CREATININE-BSD FRML MDRD: 81 ML/MIN/1.73M2
GLUCOSE BLD-MCNC: 169 MG/DL (ref 70–99)
HCT VFR BLD AUTO: 36.9 % (ref 40–53)
HGB BLD-MCNC: 12.2 G/DL (ref 13.3–17.7)
LYMPHOCYTES # BLD AUTO: 1.5 10E3/UL (ref 0.8–5.3)
LYMPHOCYTES NFR BLD AUTO: 34 %
MCH RBC QN AUTO: 31 PG (ref 26.5–33)
MCHC RBC AUTO-ENTMCNC: 33.1 G/DL (ref 31.5–36.5)
MCV RBC AUTO: 94 FL (ref 78–100)
MONOCYTES # BLD AUTO: 0.7 10E3/UL (ref 0–1.3)
MONOCYTES NFR BLD AUTO: 15 %
NEUTROPHILS # BLD AUTO: 2.2 10E3/UL (ref 1.6–8.3)
NEUTROPHILS NFR BLD AUTO: 49 %
PLATELET # BLD AUTO: 152 10E3/UL (ref 150–450)
POTASSIUM BLD-SCNC: 3.5 MMOL/L (ref 3.4–5.3)
PROT SERPL-MCNC: 7.2 G/DL (ref 6.8–8.8)
PSA SERPL-MCNC: 1.17 UG/L (ref 0–4)
RBC # BLD AUTO: 3.94 10E6/UL (ref 4.4–5.9)
SODIUM SERPL-SCNC: 138 MMOL/L (ref 133–144)
WBC # BLD AUTO: 4.5 10E3/UL (ref 4–11)

## 2022-10-28 PROCEDURE — 36415 COLL VENOUS BLD VENIPUNCTURE: CPT

## 2022-10-28 PROCEDURE — 85025 COMPLETE CBC W/AUTO DIFF WBC: CPT

## 2022-10-28 PROCEDURE — 84153 ASSAY OF PSA TOTAL: CPT

## 2022-10-28 PROCEDURE — 84403 ASSAY OF TOTAL TESTOSTERONE: CPT

## 2022-10-28 PROCEDURE — 80053 COMPREHEN METABOLIC PANEL: CPT

## 2022-10-31 ENCOUNTER — OFFICE VISIT (OUTPATIENT)
Dept: FAMILY MEDICINE | Facility: OTHER | Age: 82
End: 2022-10-31
Payer: COMMERCIAL

## 2022-10-31 VITALS
HEART RATE: 68 BPM | BODY MASS INDEX: 30.37 KG/M2 | WEIGHT: 189 LBS | SYSTOLIC BLOOD PRESSURE: 118 MMHG | RESPIRATION RATE: 20 BRPM | HEIGHT: 66 IN | OXYGEN SATURATION: 97 % | DIASTOLIC BLOOD PRESSURE: 70 MMHG | TEMPERATURE: 97.9 F

## 2022-10-31 DIAGNOSIS — E78.5 HYPERLIPIDEMIA LDL GOAL <100: ICD-10-CM

## 2022-10-31 DIAGNOSIS — Z23 IMMUNIZATION DUE: ICD-10-CM

## 2022-10-31 DIAGNOSIS — M16.10 ARTHRITIS OF HIP: Primary | ICD-10-CM

## 2022-10-31 PROCEDURE — 91312 COVID-19,PF,PFIZER BOOSTER BIVALENT: CPT | Performed by: FAMILY MEDICINE

## 2022-10-31 PROCEDURE — 99213 OFFICE O/P EST LOW 20 MIN: CPT | Performed by: FAMILY MEDICINE

## 2022-10-31 PROCEDURE — 0124A COVID-19,PF,PFIZER BOOSTER BIVALENT: CPT | Performed by: FAMILY MEDICINE

## 2022-10-31 RX ORDER — ATORVASTATIN CALCIUM 10 MG/1
10 TABLET, FILM COATED ORAL DAILY
Qty: 30 TABLET | Refills: 1 | Status: SHIPPED | OUTPATIENT
Start: 2022-10-31 | End: 2023-03-14

## 2022-10-31 ASSESSMENT — PAIN SCALES - GENERAL: PAINLEVEL: NO PAIN (0)

## 2022-10-31 NOTE — PROGRESS NOTES
"  Assessment & Plan     Immunization due  - COVID-19,PF,PFIZER BOOSTER BIVALENT (12+YRS)    Hyperlipidemia LDL goal <100  Continue current dose of lipid panel  - atorvastatin (LIPITOR) 10 MG tablet; Take 1 tablet (10 mg) by mouth daily    Arthritis of hip  Encouraged to continue therapy and home exercises      Verna Osborne MD  [facility] P[facility] Batres is a 82 year old, presenting for the following health issues:  Results      History of Present Illness       Reason for visit:  Hip pain and discuss results of scans  Symptom onset:  More than a month  Symptoms include:  Pain in the right hip  Symptom intensity:  Severe  Symptom progression:  Staying the same  Had these symptoms before:  Yes  Has tried/received treatment for these symptoms:  No  What makes it worse:  No  What makes it better:  No    He eats 2-3 servings of fruits and vegetables daily.He consumes 0 sweetened beverage(s) daily.He exercises with enough effort to increase his heart rate 9 or less minutes per day.    He is taking medications regularly.         Review of Systems   Constitutional, HEENT, cardiovascular, pulmonary, gi and gu systems are negative, except as otherwise noted.      Objective    /70   Pulse 68   Temp 97.9  F (36.6  C) (Temporal)   Resp 20   Ht 1.685 m (5' 6.34\")   Wt 85.7 kg (189 lb)   SpO2 97%   BMI 30.19 kg/m    Body mass index is 30.19 kg/m .  Physical Exam   GENERAL: healthy, alert and no distress  NECK: no adenopathy, no asymmetry, masses, or scars and thyroid normal to palpation  RESP: lungs clear to auscultation - no rales, rhonchi or wheezes  CV: regular rate and rhythm, normal S1 S2, no S3 or S4, no murmur, click or rub, no peripheral edema and peripheral pulses strong  ABDOMEN: soft, nontender, no hepatosplenomegaly, no masses and bowel sounds normal  MSK- normal ROM        "

## 2022-11-03 ENCOUNTER — TELEPHONE (OUTPATIENT)
Dept: ONCOLOGY | Facility: CLINIC | Age: 82
End: 2022-11-03

## 2022-11-04 DIAGNOSIS — C61 PROSTATE CANCER (H): Primary | ICD-10-CM

## 2022-11-04 LAB — TESTOST SERPL-MCNC: 19 NG/DL (ref 240–950)

## 2022-11-04 NOTE — TELEPHONE ENCOUNTER
Patient is scheduled to receive injection in clinic on 22 - current CAM order is .     Please place CAM order for leuprolide (lupron depot) 22.5 mg.    Jessica ALANIZN, RN   [facility]

## 2022-11-08 ENCOUNTER — PATIENT OUTREACH (OUTPATIENT)
Dept: ONCOLOGY | Facility: CLINIC | Age: 82
End: 2022-11-08

## 2022-11-08 DIAGNOSIS — C61 MALIGNANT NEOPLASM OF PROSTATE (H): Primary | ICD-10-CM

## 2022-11-08 NOTE — TELEPHONE ENCOUNTER
Provider ordered leuprolide 22.5 mg for 4 doses. RN placed CAM order for this to be given in clinic. Sent to provider to co-sign. See orders-only encounter on 11/4/22.     Jessica ALANIZN, RN

## 2022-11-18 ENCOUNTER — TELEPHONE (OUTPATIENT)
Dept: ONCOLOGY | Facility: CLINIC | Age: 82
End: 2022-11-18

## 2022-11-18 NOTE — TELEPHONE ENCOUNTER
[employer]/ Assistance Approved    Medication [employer]  Amount/ $8000  [facility] [place]  Phone #   Fax #   Effective Dates 10/19/2022-10/1/2023  Additional Information   Patient notified? yes          Thank you,    Maria Guadalupe Smith  Oncology Pharmacy Liaison II  [email or web address]  Phone: [phone]  Fax: [phone]

## 2022-11-21 ENCOUNTER — LAB (OUTPATIENT)
Dept: LAB | Facility: OTHER | Age: 82
End: 2022-11-21
Payer: COMMERCIAL

## 2022-11-21 ENCOUNTER — ALLIED HEALTH/NURSE VISIT (OUTPATIENT)
Dept: FAMILY MEDICINE | Facility: OTHER | Age: 82
End: 2022-11-21
Payer: COMMERCIAL

## 2022-11-21 DIAGNOSIS — Z13.220 SCREENING FOR HYPERLIPIDEMIA: ICD-10-CM

## 2022-11-21 DIAGNOSIS — C18.2 MALIGNANT NEOPLASM OF ASCENDING COLON (H): Primary | ICD-10-CM

## 2022-11-21 DIAGNOSIS — C61 MALIGNANT NEOPLASM OF PROSTATE (H): ICD-10-CM

## 2022-11-21 DIAGNOSIS — E78.5 HYPERLIPIDEMIA LDL GOAL <130: ICD-10-CM

## 2022-11-21 LAB
ALBUMIN SERPL-MCNC: 4 G/DL (ref 3.4–5)
ALP SERPL-CCNC: 69 U/L (ref 40–150)
ALT SERPL W P-5'-P-CCNC: 30 U/L (ref 0–70)
ANION GAP SERPL CALCULATED.3IONS-SCNC: 6 MMOL/L (ref 3–14)
AST SERPL W P-5'-P-CCNC: 27 U/L (ref 0–45)
BASOPHILS # BLD AUTO: 0 10E3/UL (ref 0–0.2)
BASOPHILS NFR BLD AUTO: 1 %
BILIRUB SERPL-MCNC: 0.8 MG/DL (ref 0.2–1.3)
BUN SERPL-MCNC: 14 MG/DL (ref 7–30)
CALCIUM SERPL-MCNC: 8.7 MG/DL (ref 8.5–10.1)
CHLORIDE BLD-SCNC: 100 MMOL/L (ref 94–109)
CHOLEST SERPL-MCNC: 219 MG/DL
CO2 SERPL-SCNC: 29 MMOL/L (ref 20–32)
CREAT SERPL-MCNC: 0.89 MG/DL (ref 0.66–1.25)
EOSINOPHIL # BLD AUTO: 0.1 10E3/UL (ref 0–0.7)
EOSINOPHIL NFR BLD AUTO: 2 %
ERYTHROCYTE [DISTWIDTH] IN BLOOD BY AUTOMATED COUNT: 13.2 % (ref 10–15)
FASTING STATUS PATIENT QL REPORTED: YES
GFR SERPL CREATININE-BSD FRML MDRD: 87 ML/MIN/1.73M2
GLUCOSE BLD-MCNC: 124 MG/DL (ref 70–99)
HCT VFR BLD AUTO: 36.8 % (ref 40–53)
HDLC SERPL-MCNC: 94 MG/DL
HGB BLD-MCNC: 12.4 G/DL (ref 13.3–17.7)
LDLC SERPL CALC-MCNC: 98 MG/DL
LYMPHOCYTES # BLD AUTO: 2 10E3/UL (ref 0.8–5.3)
LYMPHOCYTES NFR BLD AUTO: 40 %
MCH RBC QN AUTO: 31.2 PG (ref 26.5–33)
MCHC RBC AUTO-ENTMCNC: 33.7 G/DL (ref 31.5–36.5)
MCV RBC AUTO: 93 FL (ref 78–100)
MONOCYTES # BLD AUTO: 0.6 10E3/UL (ref 0–1.3)
MONOCYTES NFR BLD AUTO: 11 %
NEUTROPHILS # BLD AUTO: 2.3 10E3/UL (ref 1.6–8.3)
NEUTROPHILS NFR BLD AUTO: 46 %
NONHDLC SERPL-MCNC: 125 MG/DL
PLATELET # BLD AUTO: 150 10E3/UL (ref 150–450)
POTASSIUM BLD-SCNC: 3.9 MMOL/L (ref 3.4–5.3)
PROT SERPL-MCNC: 7.4 G/DL (ref 6.8–8.8)
PSA SERPL-MCNC: 1.55 UG/L (ref 0–4)
RBC # BLD AUTO: 3.97 10E6/UL (ref 4.4–5.9)
SODIUM SERPL-SCNC: 135 MMOL/L (ref 133–144)
TRIGL SERPL-MCNC: 136 MG/DL
WBC # BLD AUTO: 4.9 10E3/UL (ref 4–11)

## 2022-11-21 PROCEDURE — 96372 THER/PROPH/DIAG INJ SC/IM: CPT | Performed by: INTERNAL MEDICINE

## 2022-11-21 PROCEDURE — 99207 PR NO CHARGE NURSE ONLY: CPT

## 2022-11-21 PROCEDURE — 85025 COMPLETE CBC W/AUTO DIFF WBC: CPT

## 2022-11-21 PROCEDURE — 80061 LIPID PANEL: CPT

## 2022-11-21 PROCEDURE — 84153 ASSAY OF PSA TOTAL: CPT

## 2022-11-21 PROCEDURE — 80053 COMPREHEN METABOLIC PANEL: CPT

## 2022-11-21 PROCEDURE — 36415 COLL VENOUS BLD VENIPUNCTURE: CPT

## 2022-11-21 PROCEDURE — 84403 ASSAY OF TOTAL TESTOSTERONE: CPT

## 2022-11-21 NOTE — PROGRESS NOTES
Clinic Administered Medication Documentation          Injectable Medication Documentation    Patient was given Lupron. Prior to medication administration, verified patients identity using patient s name and date of birth. Please see MAR and medication order for additional information. Patient instructed to remain in clinic for 15 minutes and report any adverse reaction to staff immediately .      Was entire vial of medication used? Yes  Vial/Syringe: Single dose vial  Expiration Date: 11/15/24  Was this medication supplied by the patient? No    Lot: [id number]  NDC: 2157-7778-10    Shantal Marinelli, KATTYN, RN, PHN  Registered Nurse-Clinic Triage  [facility]  11/21/2022 at 10:10 AM

## 2022-11-22 NOTE — RESULT ENCOUNTER NOTE
Mr. Gautam    Your recent test results are attached. Marginally elevated total cholesterol but improved bad cholesterol. Continue current meds with out any changes.    If you have any questions or concerns please contact me via My Chart or call the clinic at [phone]     Thank You  Verna Osborne MD.

## 2022-11-23 LAB — TESTOST SERPL-MCNC: 14 NG/DL (ref 240–950)

## 2022-11-30 ENCOUNTER — VIRTUAL VISIT (OUTPATIENT)
Dept: ONCOLOGY | Facility: CLINIC | Age: 82
End: 2022-11-30
Attending: OPTOMETRIST
Payer: COMMERCIAL

## 2022-11-30 DIAGNOSIS — C61 MALIGNANT NEOPLASM OF PROSTATE (H): Primary | ICD-10-CM

## 2022-11-30 PROCEDURE — 99215 OFFICE O/P EST HI 40 MIN: CPT | Mod: 95 | Performed by: INTERNAL MEDICINE

## 2022-11-30 NOTE — LETTER
11/30/2022         RE: Medardo Gautam  [address] [facility] [address]        Dear Colleague,    Thank you for referring your patient, Medardo Gautam, to the [facility]. Please see a copy of my visit note below.    Medardo Gautam is a 82-year-old man who is being evaluated via a billable video visit.      Patient stated he is in the state of [place] for the visit today.    How would you like to obtain your AVS? MyChart  If the video visit is dropped, the invitation should be resent by: Text to cell phone: [phone]  Will anyone else be joining your video visit? Paradise Cam, Virtual Visit Facilitator    Video-Visit Details    Type of service:  Video Visit    Originating Location (pt. 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    History of Present Illness:  Mr. Gautam is an 82-year-old man who was diagnosed with prostate cancer in 2012.  His PSA level was 5.2 ng/mL.  Clinical stage was cT1c disease.  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  His next-generation DNA sequencing analysis ([employer]) revealed a pathogenic TP53 mutation, SHIRA genotype, TMB 5 muts/Mb, and absent PD-L1 expression.  He subsequently received salvage radiotherapy, which was completed in 10/2013, concurrently with six months of androgen deprivation therapy.    He subsequently developed a biochemical recurrence, and received ADT from 2014 until 2020.  In 11/2020, he developed non-metastatic CRPC.  Darolutamide was added to his regimen on 12/4/2020, and he has remained on this agent since  that time.  As a result, his PSA level initially dropped from 1.66 ng/mL down to a speedy of 0.07 ng/mL (6/21/2021).  However, the patient has developed a rising PSA level over the past year.  His PSA on 1/13/2022 was 0.16, the PSA on 4/20/2022 was 0.24, the PSA on 6/13/2022 was 0.34, the PSA on 7/12/2022 was 0.47, the PSA on 8/25/2022 was 0.64 ng/mL, the PSA on 9/14/2022 was 0.67 ng/mL, and the PSA on 11/21/2022 was 1.55 ng/mL (with a testosterone level of 14 ng/dL).  His PSA doubling time is calculated at 4 months.    The patient presents for a follow-up visit today, and to discuss management options moving forward.  He was previously a patient of Dr. Medardo Ingram.  His prior CT scan and NM bone scan were performed on 9/14/2022, and both were negative for local recurrence or metastatic disease at that time.  He remains on darolutamide at this time.    Review of Systems:  The patient reports feeling generally well.  He does report some fatigue due to the darolutamide, although this is relatively modest.  He also complains of dry skin.  He has not gained or lost any weight recently.  He does not report any urological symptoms at this time.  He does not have any cancer-related pain.  A comprehensive 14-system review of symptoms is otherwise generally within normal limits.  His ECOG score is 0.  His pain score is 0/10.    Medications:  Lupron 22.5 mg IM every 3 months  Darolutamide 600 mg BID  Losartan/HCTZ 100/12.5 mg  Allopurinol 100 mg  Zolpidem 5 mg  Clonazepam 1 mg  Sildenafil 50 mg  Loratadine 10 mg  Calcium + Vit D  Multivitamin  Folic acid    Physical Examination:  Mr. Gautam is an 82-year-old man who appears comfortable at rest.  His vital signs are generally unremarkable.  His HEENT examination is within normal limits.  There is no palpable cervical, axillary, supraclavicular or inguinal lymphadenopathy.  The cardiac, pulmonary and gastrointestinal examinations are generally within normal limits.  The  musculoskeletal examination is grossly intact.  The extremities do not demonstrate pitting edema.  The skin evaluation is unremarkable.    Laboratories (11/21/2022):  His PSA level was 1.55 ng/mL. His testosterone level was 14 ng/dL.    Imaging (9/14/2022):  He underwent a CT scan and NM bone scan on 9/14/2022, and both showed no evidence of distant metastatic disease.  He has not had a PSMA-PET scan.       ASSESSMENT AND PLAN:  Mr. Gautam is an 82-year-old man with Jeancarlos 4+5=9 prostate cancer who underwent radical prostatectomy (8/2012) and salvage radiotherapy (ending in 10/2013) who has non-metastatic CRPC with serological progression despite ongoing darolutamide treatment.  His ECOG score is 0.  His pain score is 0/10.    The patient is showing signs of biochemical progression despite darolutamide.  His recent imaging modalities continue to show non-metastatic CRPC.  He returns to the clinic today to discuss additional systemic or local treatment options.  Such treatment choices may include the use of an alternative AR-directed agent such as abiraterone, or taxane chemotherapy using docetaxel, or possibly metastasis-directed radiotherapy (e.g. SBRT) if he is found to have oligometastatic disease in the future.    We also reviewed our clinical trial portfolio.  The patient might be eligible for the ECLIPSE clinical trial if his PSA level rises above 2.0 ng/mL.  This is a randomized study of 177Lu-PSMA-I&T versus abiraterone for patients with metastatic CRPC who have only received one line of prior AR-directed therapy.  This trial will cover a one-time PSMA-PET scan for eligibility confirmation during a pre-screening phase.  If he does not have PSMA-positive metastases, then he would not be eligible for the ECLIPSE trial.  We will provide the patient with the consent form for this study, and he will think about it over the next several weeks.  For now, he is not yet eligible because his PSA level remains below  2.0 ng/mL.  He will continue to obtain PSA/testosterone testing every month, and we will conduct a video visit every two months.    A total of 40 minutes were spent on this patient on the day of the consultation, of which more than 50% of this time was used for counseling and coordination of care.  He was given the opportunity to ask multiple questions today, all of which were answered to his satisfaction.      Again, thank you for allowing me to participate in the care of your patient.      Sincerely,    Tio Holman MD

## 2022-11-30 NOTE — NURSING NOTE
Patient verified medications and allergies are correct via eCheck-in. Patient confirms no changes at this time and/or since last reviewed by clinic staff.    Bridget Cam, Virtual Facilitator

## 2022-12-02 DIAGNOSIS — C61 PROSTATE CANCER (H): ICD-10-CM

## 2022-12-02 DIAGNOSIS — C61 MALIGNANT NEOPLASM OF PROSTATE (H): Primary | ICD-10-CM

## 2022-12-20 ENCOUNTER — LAB (OUTPATIENT)
Dept: LAB | Facility: OTHER | Age: 82
End: 2022-12-20
Payer: COMMERCIAL

## 2022-12-20 ENCOUNTER — TELEPHONE (OUTPATIENT)
Dept: ONCOLOGY | Facility: CLINIC | Age: 82
End: 2022-12-20

## 2022-12-20 DIAGNOSIS — C61 MALIGNANT NEOPLASM OF PROSTATE (H): ICD-10-CM

## 2022-12-20 LAB
ALBUMIN SERPL-MCNC: 4 G/DL (ref 3.4–5)
ALP SERPL-CCNC: 72 U/L (ref 40–150)
ALT SERPL W P-5'-P-CCNC: 34 U/L (ref 0–70)
ANION GAP SERPL CALCULATED.3IONS-SCNC: 4 MMOL/L (ref 3–14)
AST SERPL W P-5'-P-CCNC: 41 U/L (ref 0–45)
BASOPHILS # BLD AUTO: 0 10E3/UL (ref 0–0.2)
BASOPHILS NFR BLD AUTO: 0 %
BILIRUB SERPL-MCNC: 0.8 MG/DL (ref 0.2–1.3)
BUN SERPL-MCNC: 17 MG/DL (ref 7–30)
CALCIUM SERPL-MCNC: 9 MG/DL (ref 8.5–10.1)
CHLORIDE BLD-SCNC: 101 MMOL/L (ref 94–109)
CO2 SERPL-SCNC: 32 MMOL/L (ref 20–32)
CREAT SERPL-MCNC: 0.94 MG/DL (ref 0.66–1.25)
EOSINOPHIL # BLD AUTO: 0.1 10E3/UL (ref 0–0.7)
EOSINOPHIL NFR BLD AUTO: 2 %
ERYTHROCYTE [DISTWIDTH] IN BLOOD BY AUTOMATED COUNT: 13.1 % (ref 10–15)
GFR SERPL CREATININE-BSD FRML MDRD: 82 ML/MIN/1.73M2
GLUCOSE BLD-MCNC: 187 MG/DL (ref 70–99)
HCT VFR BLD AUTO: 38 % (ref 40–53)
HGB BLD-MCNC: 12.9 G/DL (ref 13.3–17.7)
LYMPHOCYTES # BLD AUTO: 1.7 10E3/UL (ref 0.8–5.3)
LYMPHOCYTES NFR BLD AUTO: 38 %
MCH RBC QN AUTO: 32 PG (ref 26.5–33)
MCHC RBC AUTO-ENTMCNC: 33.9 G/DL (ref 31.5–36.5)
MCV RBC AUTO: 94 FL (ref 78–100)
MONOCYTES # BLD AUTO: 0.5 10E3/UL (ref 0–1.3)
MONOCYTES NFR BLD AUTO: 10 %
NEUTROPHILS # BLD AUTO: 2.3 10E3/UL (ref 1.6–8.3)
NEUTROPHILS NFR BLD AUTO: 50 %
PLATELET # BLD AUTO: 145 10E3/UL (ref 150–450)
POTASSIUM BLD-SCNC: 3.6 MMOL/L (ref 3.4–5.3)
PROT SERPL-MCNC: 7.5 G/DL (ref 6.8–8.8)
PSA SERPL-MCNC: 2.39 UG/L (ref 0–4)
RBC # BLD AUTO: 4.03 10E6/UL (ref 4.4–5.9)
SODIUM SERPL-SCNC: 137 MMOL/L (ref 133–144)
WBC # BLD AUTO: 4.6 10E3/UL (ref 4–11)

## 2022-12-20 PROCEDURE — 85025 COMPLETE CBC W/AUTO DIFF WBC: CPT

## 2022-12-20 PROCEDURE — 84403 ASSAY OF TOTAL TESTOSTERONE: CPT

## 2022-12-20 PROCEDURE — 80053 COMPREHEN METABOLIC PANEL: CPT

## 2022-12-20 PROCEDURE — 36415 COLL VENOUS BLD VENIPUNCTURE: CPT

## 2022-12-20 PROCEDURE — 84153 ASSAY OF PSA TOTAL: CPT

## 2022-12-20 NOTE — TELEPHONE ENCOUNTER
Free Drug Application Initiated  Medication: Nubeqa  Sponsor: [employer]  Phone #: [phone]  Fax #: [phone]  Additional Information: Faxed to [place] 12/27          Thank you,    Maria Guadalupe Smith  Oncology Pharmacy Liaison MEGHNA [email or web address]  Phone: [phone]  Fax: [phone]

## 2022-12-22 LAB — TESTOST SERPL-MCNC: 19 NG/DL (ref 240–950)

## 2022-12-22 NOTE — TELEPHONE ENCOUNTER
Oral Chemotherapy Monitoring Program    I have reviewed this Nubeqa prescription for accuracy and appropriateness.    Alayna Johnson, PharmD, BCPS, BCOP  Oncology Clinical Pharmacy Specialist  [facility]  [phone]

## 2022-12-23 DIAGNOSIS — C61 PROSTATE CANCER (H): ICD-10-CM

## 2022-12-23 DIAGNOSIS — C61 MALIGNANT NEOPLASM OF PROSTATE (H): Primary | ICD-10-CM

## 2022-12-28 NOTE — TELEPHONE ENCOUNTER
Application has been received.    Thank you,    Maria Guadalupe Smith  Oncology Pharmacy Liaison MEGHNA [email or web address]  Phone: [phone]  Fax: [phone]

## 2023-01-03 DIAGNOSIS — I10 BENIGN ESSENTIAL HYPERTENSION: ICD-10-CM

## 2023-01-04 RX ORDER — LOSARTAN POTASSIUM AND HYDROCHLOROTHIAZIDE 12.5; 1 MG/1; MG/1
TABLET ORAL
Qty: 90 TABLET | Refills: 0 | OUTPATIENT
Start: 2023-01-04

## 2023-01-04 NOTE — TELEPHONE ENCOUNTER
Was sent on 3/10/22 with refills, should not be out at this time, please check profile for any held scripts.

## 2023-01-17 ENCOUNTER — TELEPHONE (OUTPATIENT)
Dept: ONCOLOGY | Facility: CLINIC | Age: 83
End: 2023-01-17

## 2023-01-17 NOTE — TELEPHONE ENCOUNTER
Still being processed...    Thank you,    Maria Guadalupe Smith  Oncology Pharmacy Liaison MEGHNA [email or web address]  Phone: [phone]  Fax: [phone]

## 2023-01-17 NOTE — TELEPHONE ENCOUNTER
[employer]/ Assistance Approved    Medication : Nubeqa  Amount/ $ 3,500  Foundation : [employer]  Phone # [phone]  Fax #  [phone]  Effective Dates 1/17/2023-1/18/2024  Additional Information   Patient notified? Maria Guadalupe to notify pt

## 2023-01-24 ENCOUNTER — LAB (OUTPATIENT)
Dept: LAB | Facility: OTHER | Age: 83
End: 2023-01-24
Payer: COMMERCIAL

## 2023-01-24 DIAGNOSIS — C61 MALIGNANT NEOPLASM OF PROSTATE (H): ICD-10-CM

## 2023-01-24 LAB — PSA SERPL-MCNC: 3.47 NG/ML

## 2023-01-24 PROCEDURE — 36415 COLL VENOUS BLD VENIPUNCTURE: CPT

## 2023-01-24 PROCEDURE — 84403 ASSAY OF TOTAL TESTOSTERONE: CPT

## 2023-01-24 PROCEDURE — 84153 ASSAY OF PSA TOTAL: CPT

## 2023-01-26 LAB — TESTOST SERPL-MCNC: 16 NG/DL (ref 240–950)

## 2023-01-31 NOTE — PROGRESS NOTES
Medardo Gautam is a 83-year-old man who is being evaluated via a billable video visit.      How would you like to obtain your AVS? MyChart  If the video visit is dropped, the invitation should be resent by: Send to e-mail at: [email or web address]  Will anyone else be joining your video visit? Paradise      Daiana Torito    Video-Visit Details    Type of service:  Video Visit     Originating Location (pt. Location):  Home    Distant Location (provider location):  [facility]  Platform used for Video Visit:  [email or web address]      -------------------------------------------------------------------------------------------------------------------------------    FOLLOW UP VISIT    Reason for visit:  Metastatic CRPC with biochemical progression despite darolutamide; discussion of the ECLIPSE study.    History of Present Illness:  Mr. Gautam is an 83-year-old man who was diagnosed with prostate cancer in 2012.  His PSA level was 5.2 ng/mL.  Clinical stage was cT1c disease.  He underwent radical prostatectomy on 8/6/2012, which revealed [place] 4+5=9 adenocarcinoma, stage pT2c N0 R0.  His next-generation DNA sequencing analysis ([employer]) revealed a pathogenic TP53 mutation, SHIRA genotype, TMB 5 muts/Mb, and absent PD-L1 expression.  He subsequently received salvage radiotherapy, which was completed in 10/2013, concurrently with six months of androgen deprivation therapy.    He subsequently developed a biochemical recurrence, and received ADT from 2014 until 2020.  In 11/2020, he developed non-metastatic CRPC.  Darolutamide was added to his regimen on 12/4/2020, and he has remained on this agent since that time.  As a result, his PSA level initially dropped from 1.66 ng/mL down to a speedy of 0.07 ng/mL (6/21/2021).  However, the patient has developed a rising PSA level over the past year.  His PSA on 1/13/2022 was 0.16, the PSA on 4/20/2022 was 0.24, the PSA on 6/13/2022 was 0.34, the PSA on 7/12/2022 was 0.47, the PSA on  8/25/2022 was 0.64 ng/mL, the PSA on 9/14/2022 was 0.67 ng/mL, and the PSA on 11/21/2022 was 1.55 ng/mL (with a testosterone level of 14 ng/dL).  Most recently (1/24/2023), his PSA level has increased to 3.47 ng/mL.    The patient presents for a follow-up visit today, and to discuss management options moving forward.  He was previously a patient of Dr. Medardo Ingram.  He remains on darolutamide at this time.  He is potentially interested in the [employer] clinical trial.    Review of Systems:  The patient reports feeling generally well.  He does report some fatigue due to the darolutamide, although this is relatively modest.  He also complains of dry skin.  He has not gained or lost any weight recently.  He does not report any urological symptoms at this time.  He does not have any cancer-related pain.  A comprehensive 14-system review of symptoms is otherwise generally within normal limits.  His ECOG score is 0.  His pain score is 0/10.    Medications:  Lupron 22.5 mg IM every 3 months  Darolutamide 600 mg BID  Losartan/HCTZ 100/12.5 mg  Allopurinol 100 mg  Zolpidem 5 mg  Clonazepam 1 mg  Sildenafil 50 mg  Loratadine 10 mg  Calcium + Vit D  Multivitamin  Folic acid    Physical Examination:  Mr. Gautam is an 83-year-old man who appears comfortable at rest.  His vital signs are generally unremarkable.  His HEENT examination is within normal limits.  There is no palpable cervical, axillary, supraclavicular or inguinal lymphadenopathy.  The cardiac, pulmonary and gastrointestinal examinations are generally within normal limits.  The musculoskeletal examination is grossly intact.  The extremities do not demonstrate pitting edema.  The skin evaluation is unremarkable.    Laboratories:  His PSA level on 11/21/2022 was 1.55 ng/mL. The PSA on 12/20/2022 was 2.39 ng/mL. The PSA on 1/24/2023 was 3.47 ng/mL.    Imaging (9/14/2022):  He underwent a CT scan and NM bone scan on 9/14/2022, and both showed no evidence of distant  metastatic disease.  He has not had a PSMA-PET scan.       ASSESSMENT AND PLAN:  Mr. Gautam is an 83-year-old man with Jeancarlos 4+5=9 prostate cancer who underwent radical prostatectomy (8/2012) and salvage radiotherapy (ending in 10/2013) who has metastatic CRPC with serological progression despite ongoing darolutamide treatment.  His ECOG score is 0.  His pain score is 0/10.    The patient is showing signs of biochemical progression despite darolutamide.  He returns to the clinic today to discuss additional systemic or local treatment options.  Such treatment choices may include the use of an alternative AR-directed agent such as abiraterone, or taxane chemotherapy using docetaxel, or possibly metastasis-directed radiotherapy (e.g. SBRT) if he is found to have oligoprogressive disease in the future.    We also reviewed our clinical trial portfolio.  The patient might be eligible for the ECLIPSE clinical trial at this time.  This is a randomized study of 177Lu-PSMA-I&T versus abiraterone for patients with metastatic CRPC who have only received one line of prior AR-directed therapy.  This trial will cover a one-time PSMA-PET scan for eligibility confirmation during a pre-screening phase.  If he does not have PSMA-positive metastases, then he would not be eligible for the ECLIPSE trial.  We have previously provided the patient with the consent form for this study, and he remains interested.  We will do our best to set up a screening visit over the next 1-2 weeks.    A total of 40 minutes were spent on this patient on the day of the consultation, of which more than 50% of this time was used for counseling and coordination of care.  He was given the opportunity to ask multiple questions today, all of which were answered to his satisfaction.    Tio Holman M.D.

## 2023-02-01 ENCOUNTER — DOCUMENTATION ONLY (OUTPATIENT)
Dept: ONCOLOGY | Facility: CLINIC | Age: 83
End: 2023-02-01

## 2023-02-01 ENCOUNTER — VIRTUAL VISIT (OUTPATIENT)
Dept: ONCOLOGY | Facility: CLINIC | Age: 83
End: 2023-02-01
Attending: INTERNAL MEDICINE
Payer: COMMERCIAL

## 2023-02-01 VITALS
DIASTOLIC BLOOD PRESSURE: 83 MMHG | HEIGHT: 70 IN | SYSTOLIC BLOOD PRESSURE: 133 MMHG | WEIGHT: 191 LBS | BODY MASS INDEX: 27.35 KG/M2 | HEART RATE: 82 BPM

## 2023-02-01 DIAGNOSIS — C61 MALIGNANT NEOPLASM OF PROSTATE (H): Primary | ICD-10-CM

## 2023-02-01 PROCEDURE — 99215 OFFICE O/P EST HI 40 MIN: CPT | Mod: 95 | Performed by: INTERNAL MEDICINE

## 2023-02-01 ASSESSMENT — PAIN SCALES - GENERAL: PAINLEVEL: NO PAIN (0)

## 2023-02-01 NOTE — NURSING NOTE
Emailed patient the [employer] research study consent form from request by Dr. Bruce. Will plan to call the patient on Friday 2/3 to discuss the [employer] research study in further detail. Instructed the patient to simply look over the consent form and not to sign it.     Melissa Spencer RN   Clinical Research Coordinator Nurse-Solid Tumor   Phone: [phone] Pgr: [phone]

## 2023-02-01 NOTE — LETTER
2/1/2023         RE: Medardo Gautam  [address] [facility] [address]        Dear Colleague,    Thank you for referring your patient, Medardo Gautam, to the [facility]. Please see a copy of my visit note below.    Medardo Gautam is a 83-year-old man who is being evaluated via a billable video visit.      How would you like to obtain your AVS? MyChart  If the video visit is dropped, the invitation should be resent by: Send to e-mail at: [email or web address]  Will anyone else be joining your video visit? No      Daiana Ugarte    Video-Visit Details    Type of service:  Video Visit     Originating Location (pt. 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    History of Present Illness:  Mr. Gautam is an 83-year-old man who was diagnosed with prostate cancer in 2012.  His PSA level was 5.2 ng/mL.  Clinical stage was cT1c disease.  He underwent radical prostatectomy on 8/6/2012, which revealed [place] 4+5=9 adenocarcinoma, stage pT2c N0 R0.  His next-generation DNA sequencing analysis ([employer]) revealed a pathogenic TP53 mutation, SHIRA genotype, TMB 5 muts/Mb, and absent PD-L1 expression.  He subsequently received salvage radiotherapy, which was completed in 10/2013, concurrently with six months of androgen deprivation therapy.    He subsequently developed a biochemical recurrence, and received ADT from 2014 until 2020.  In 11/2020, he developed non-metastatic CRPC.  Darolutamide was added to his regimen on 12/4/2020, and he has remained on this agent since that time.  As a result, his PSA level initially dropped from 1.66  ng/mL down to a speedy of 0.07 ng/mL (6/21/2021).  However, the patient has developed a rising PSA level over the past year.  His PSA on 1/13/2022 was 0.16, the PSA on 4/20/2022 was 0.24, the PSA on 6/13/2022 was 0.34, the PSA on 7/12/2022 was 0.47, the PSA on 8/25/2022 was 0.64 ng/mL, the PSA on 9/14/2022 was 0.67 ng/mL, and the PSA on 11/21/2022 was 1.55 ng/mL (with a testosterone level of 14 ng/dL).  Most recently (1/24/2023), his PSA level has increased to 3.47 ng/mL.    The patient presents for a follow-up visit today, and to discuss management options moving forward.  He was previously a patient of Dr. Medardo Ingram.  He remains on darolutamide at this time.  He is potentially interested in the [employer] clinical trial.    Review of Systems:  The patient reports feeling generally well.  He does report some fatigue due to the darolutamide, although this is relatively modest.  He also complains of dry skin.  He has not gained or lost any weight recently.  He does not report any urological symptoms at this time.  He does not have any cancer-related pain.  A comprehensive 14-system review of symptoms is otherwise generally within normal limits.  His ECOG score is 0.  His pain score is 0/10.    Medications:  Lupron 22.5 mg IM every 3 months  Darolutamide 600 mg BID  Losartan/HCTZ 100/12.5 mg  Allopurinol 100 mg  Zolpidem 5 mg  Clonazepam 1 mg  Sildenafil 50 mg  Loratadine 10 mg  Calcium + Vit D  Multivitamin  Folic acid    Physical Examination:  Mr. Gautam is an 83-year-old man who appears comfortable at rest.  His vital signs are generally unremarkable.  His HEENT examination is within normal limits.  There is no palpable cervical, axillary, supraclavicular or inguinal lymphadenopathy.  The cardiac, pulmonary and gastrointestinal examinations are generally within normal limits.  The musculoskeletal examination is grossly intact.  The extremities do not demonstrate pitting edema.  The skin evaluation is  unremarkable.    Laboratories:  His PSA level on 11/21/2022 was 1.55 ng/mL. The PSA on 12/20/2022 was 2.39 ng/mL. The PSA on 1/24/2023 was 3.47 ng/mL.    Imaging (9/14/2022):  He underwent a CT scan and NM bone scan on 9/14/2022, and both showed no evidence of distant metastatic disease.  He has not had a PSMA-PET scan.       ASSESSMENT AND PLAN:  Mr. Gautam is an 83-year-old man with [place] 4+5=9 prostate cancer who underwent radical prostatectomy (8/2012) and salvage radiotherapy (ending in 10/2013) who has metastatic CRPC with serological progression despite ongoing darolutamide treatment.  His ECOG score is 0.  His pain score is 0/10.    The patient is showing signs of biochemical progression despite darolutamide.  He returns to the clinic today to discuss additional systemic or local treatment options.  Such treatment choices may include the use of an alternative AR-directed agent such as abiraterone, or taxane chemotherapy using docetaxel, or possibly metastasis-directed radiotherapy (e.g. SBRT) if he is found to have oligoprogressive disease in the future.    We also reviewed our clinical trial portfolio.  The patient might be eligible for the ECLIPSE clinical trial at this time.  This is a randomized study of 177Lu-PSMA-I&T versus abiraterone for patients with metastatic CRPC who have only received one line of prior AR-directed therapy.  This trial will cover a one-time PSMA-PET scan for eligibility confirmation during a pre-screening phase.  If he does not have PSMA-positive metastases, then he would not be eligible for the ECLIPSE trial.  We have previously provided the patient with the consent form for this study, and he remains interested.  We will do our best to set up a screening visit over the next 1-2 weeks.    A total of 40 minutes were spent on this patient on the day of the consultation, of which more than 50% of this time was used for counseling and coordination of care.  He was given the  opportunity to ask multiple questions today, all of which were answered to his satisfaction.    Tio Holman M.D.

## 2023-02-04 NOTE — PROGRESS NOTES
FOLLOW UP VISIT    Reason for visit:  Metastatic CRPC with progression on darolutamide; screening for the ECLIPSE study.    History of Present Illness:  Mr. Gautam is an 83-year-old man who was diagnosed with prostate cancer in 2012.  His PSA level was 5.2 ng/mL.  Clinical stage was cT1c disease.  He underwent radical prostatectomy on 8/6/2012, which revealed [place] 4+5=9 adenocarcinoma, stage pT2c N0 R0.  His next-generation DNA sequencing analysis ([facility]) revealed a pathogenic TP53 mutation, SHIRA genotype, TMB 5 muts/Mb, and absent PD-L1 expression.  He subsequently received salvage radiotherapy, which was completed in 10/2013, concurrently with six months of androgen deprivation therapy.    He subsequently developed a biochemical recurrence, and received ADT from 2014 until 2020.  In 11/2020, he developed non-metastatic CRPC.  Darolutamide was added to his regimen on 12/4/2020, and he has remained on this agent since that time.  As a result, his PSA level initially dropped from 1.66 ng/mL down to a speedy of 0.07 ng/mL (6/21/2021).  However, the patient has developed a rising PSA level over the past year.  His PSA on 1/13/2022 was 0.16, the PSA on 4/20/2022 was 0.24, the PSA on 6/13/2022 was 0.34, the PSA on 7/12/2022 was 0.47, the PSA on 8/25/2022 was 0.64 ng/mL, the PSA on 9/14/2022 was 0.67 ng/mL, and the PSA on 11/21/2022 was 1.55 ng/mL (with a testosterone level of 14 ng/dL).  Most recently (1/24/2023), his PSA level has increased to 3.47 ng/mL.    The patient presents for a follow-up visit today, and to discuss management options moving forward. He remains on darolutamide at this time. He is potentially interested in screening for the ECLIPSE clinical trial.    Review of Systems:  The patient reports feeling generally well.  He does report some fatigue due to the darolutamide, although this is relatively modest.  He also complains of dry skin.  He has not gained or lost any weight recently.  He does not  report any urological symptoms at this time.  He does not have any cancer-related pain.  A comprehensive 14-system review of symptoms is otherwise generally within normal limits.  His ECOG score is 0.  His pain score is 0/10.    Medications:  Lupron 22.5 mg IM q3mo (last, 11/22/2022)  Darolutamide 600 mg BID  Losartan/HCTZ 100/12.5 mg  Allopurinol 100 mg  Zolpidem 5 mg  Clonazepam 1 mg  Sildenafil 50 mg  Loratadine 10 mg  Calcium + Vit D  Multivitamin  Folic acid    Physical Examination:  Mr. Gautam is an 83-year-old man who appears comfortable at rest.  His vital signs are generally unremarkable.  His HEENT examination is within normal limits.  There is no palpable cervical, axillary, supraclavicular or inguinal lymphadenopathy.  The cardiac, pulmonary and gastrointestinal examinations are generally within normal limits.  The musculoskeletal examination is grossly intact.  The extremities do not demonstrate pitting edema.  The skin evaluation is unremarkable.    Laboratories (1/24/2023):  His PSA level on 11/21/2022 was 1.55 ng/mL. The PSA on 12/20/2022 was 2.39 ng/mL. The PSA on 1/24/2023 was 3.47 ng/mL.    Imaging (9/14/2022):  He underwent a CT scan and NM bone scan on 9/14/2022, and both showed no evidence of distant metastatic disease.  He has not had a PSMA-PET scan yet.       ASSESSMENT AND PLAN:  Mr. Gautam is an 83-year-old man with [place] 4+5=9 prostate cancer who underwent radical prostatectomy (8/2012) and salvage radiotherapy (ending in 10/2013) who has metastatic CRPC with serological progression despite ongoing darolutamide treatment.  His ECOG score is 0.  His pain score is 0/10.    The patient is showing signs of biochemical progression despite darolutamide.  He returns to the clinic today to discuss additional systemic or local treatment options.  Such treatment choices may include the use of an alternative AR-directed agent such as abiraterone, or taxane chemotherapy using docetaxel, or possibly  metastasis-directed radiotherapy (e.g. SBRT) if he is found to have oligoprogressive disease in the future.    We also reviewed our clinical trial portfolio.  The patient might be eligible for the ECLIPSE clinical trial at this time.  This is a randomized study of 177Lu-PSMA-I&T versus abiraterone for patients with metastatic CRPC who have only received one line of prior AR-directed therapy.  This trial will cover a one-time PSMA-PET scan for eligibility confirmation during a pre-screening phase.  If he does not have PSMA-positive metastases, then he would not be eligible for the ECLIPSE trial.  We have previously provided the patient with the consent form for this study, and he remains interested.  We will conduct initial screening assessments today.  We will do our best to set up his PSMA-PET scan over the next 1-2 weeks.  He was seen together with the research nurse, Melissa Spencer.    A total of 40 minutes were spent on this patient on the day of the consultation, of which more than 50% of this time was used for counseling and coordination of care.  He was given the opportunity to ask multiple questions today, all of which were answered to his satisfaction.    Tio Holman M.D.

## 2023-02-06 ENCOUNTER — ALLIED HEALTH/NURSE VISIT (OUTPATIENT)
Dept: ONCOLOGY | Facility: CLINIC | Age: 83
End: 2023-02-06
Payer: COMMERCIAL

## 2023-02-06 ENCOUNTER — LAB (OUTPATIENT)
Dept: LAB | Facility: CLINIC | Age: 83
End: 2023-02-06
Attending: INTERNAL MEDICINE
Payer: COMMERCIAL

## 2023-02-06 ENCOUNTER — ONCOLOGY VISIT (OUTPATIENT)
Dept: ONCOLOGY | Facility: CLINIC | Age: 83
End: 2023-02-06
Attending: INTERNAL MEDICINE
Payer: COMMERCIAL

## 2023-02-06 VITALS
OXYGEN SATURATION: 98 % | BODY MASS INDEX: 27.32 KG/M2 | SYSTOLIC BLOOD PRESSURE: 163 MMHG | WEIGHT: 190.4 LBS | HEART RATE: 99 BPM | RESPIRATION RATE: 16 BRPM | TEMPERATURE: 98.2 F | DIASTOLIC BLOOD PRESSURE: 96 MMHG

## 2023-02-06 DIAGNOSIS — Z00.6 EXAMINATION OF PARTICIPANT IN CLINICAL TRIAL: ICD-10-CM

## 2023-02-06 DIAGNOSIS — C61 MALIGNANT NEOPLASM OF PROSTATE (H): Primary | ICD-10-CM

## 2023-02-06 DIAGNOSIS — C61 MALIGNANT NEOPLASM OF PROSTATE (H): ICD-10-CM

## 2023-02-06 LAB
ALBUMIN SERPL BCG-MCNC: 4.5 G/DL (ref 3.5–5.2)
ALP SERPL-CCNC: 82 U/L (ref 40–129)
ALT SERPL W P-5'-P-CCNC: 27 U/L (ref 10–50)
ANION GAP SERPL CALCULATED.3IONS-SCNC: 12 MMOL/L (ref 7–15)
AST SERPL W P-5'-P-CCNC: 47 U/L (ref 10–50)
BASOPHILS # BLD AUTO: 0 10E3/UL (ref 0–0.2)
BASOPHILS NFR BLD AUTO: 1 %
BILIRUB SERPL-MCNC: 0.7 MG/DL
BUN SERPL-MCNC: 14.5 MG/DL (ref 8–23)
CALCIUM SERPL-MCNC: 10.1 MG/DL (ref 8.8–10.2)
CHLORIDE SERPL-SCNC: 98 MMOL/L (ref 98–107)
CHOLEST SERPL-MCNC: 236 MG/DL
CREAT SERPL-MCNC: 0.94 MG/DL (ref 0.67–1.17)
DEPRECATED HCO3 PLAS-SCNC: 26 MMOL/L (ref 22–29)
EOSINOPHIL # BLD AUTO: 0.1 10E3/UL (ref 0–0.7)
EOSINOPHIL NFR BLD AUTO: 1 %
ERYTHROCYTE [DISTWIDTH] IN BLOOD BY AUTOMATED COUNT: 12.9 % (ref 10–15)
GFR SERPL CREATININE-BSD FRML MDRD: 81 ML/MIN/1.73M2
GLUCOSE SERPL-MCNC: 145 MG/DL (ref 70–99)
HCT VFR BLD AUTO: 39.1 % (ref 40–53)
HDLC SERPL-MCNC: 98 MG/DL
HGB BLD-MCNC: 13.2 G/DL (ref 13.3–17.7)
IMM GRANULOCYTES # BLD: 0 10E3/UL
IMM GRANULOCYTES NFR BLD: 1 %
LDLC SERPL CALC-MCNC: 119 MG/DL
LYMPHOCYTES # BLD AUTO: 1 10E3/UL (ref 0.8–5.3)
LYMPHOCYTES NFR BLD AUTO: 24 %
MAGNESIUM SERPL-MCNC: 1.6 MG/DL (ref 1.7–2.3)
MCH RBC QN AUTO: 30.8 PG (ref 26.5–33)
MCHC RBC AUTO-ENTMCNC: 33.8 G/DL (ref 31.5–36.5)
MCV RBC AUTO: 91 FL (ref 78–100)
MONOCYTES # BLD AUTO: 0.4 10E3/UL (ref 0–1.3)
MONOCYTES NFR BLD AUTO: 10 %
NEUTROPHILS # BLD AUTO: 2.7 10E3/UL (ref 1.6–8.3)
NEUTROPHILS NFR BLD AUTO: 63 %
NONHDLC SERPL-MCNC: 138 MG/DL
NRBC # BLD AUTO: 0 10E3/UL
NRBC BLD AUTO-RTO: 0 /100
PLATELET # BLD AUTO: 131 10E3/UL (ref 150–450)
POTASSIUM SERPL-SCNC: 4.3 MMOL/L (ref 3.4–5.3)
PROT SERPL-MCNC: 7.4 G/DL (ref 6.4–8.3)
PSA SERPL-MCNC: 5.28 NG/ML
RBC # BLD AUTO: 4.29 10E6/UL (ref 4.4–5.9)
SODIUM SERPL-SCNC: 136 MMOL/L (ref 136–145)
TRIGL SERPL-MCNC: 97 MG/DL
TSH SERPL DL<=0.005 MIU/L-ACNC: 1.25 UIU/ML (ref 0.3–4.2)
WBC # BLD AUTO: 4.2 10E3/UL (ref 4–11)

## 2023-02-06 PROCEDURE — 82310 ASSAY OF CALCIUM: CPT

## 2023-02-06 PROCEDURE — G0463 HOSPITAL OUTPT CLINIC VISIT: HCPCS | Performed by: INTERNAL MEDICINE

## 2023-02-06 PROCEDURE — 85025 COMPLETE CBC W/AUTO DIFF WBC: CPT

## 2023-02-06 PROCEDURE — 36415 COLL VENOUS BLD VENIPUNCTURE: CPT

## 2023-02-06 PROCEDURE — 80061 LIPID PANEL: CPT

## 2023-02-06 PROCEDURE — 84403 ASSAY OF TOTAL TESTOSTERONE: CPT

## 2023-02-06 PROCEDURE — 93010 ELECTROCARDIOGRAM REPORT: CPT | Performed by: INTERNAL MEDICINE

## 2023-02-06 PROCEDURE — 99215 OFFICE O/P EST HI 40 MIN: CPT | Performed by: INTERNAL MEDICINE

## 2023-02-06 PROCEDURE — G0463 HOSPITAL OUTPT CLINIC VISIT: HCPCS

## 2023-02-06 PROCEDURE — 83735 ASSAY OF MAGNESIUM: CPT

## 2023-02-06 PROCEDURE — 84155 ASSAY OF PROTEIN SERUM: CPT

## 2023-02-06 PROCEDURE — 84153 ASSAY OF PSA TOTAL: CPT

## 2023-02-06 PROCEDURE — 84443 ASSAY THYROID STIM HORMONE: CPT

## 2023-02-06 ASSESSMENT — PAIN SCALES - GENERAL: PAINLEVEL: NO PAIN (0)

## 2023-02-06 NOTE — NURSING NOTE
"Oncology Rooming Note    February 6, 2023 10:44 AM   Medardo Gautam is a 83 year old male who presents for:    Chief Complaint   Patient presents with     Oncology Clinic Visit     RTN for Prostate Cancer     Initial Vitals: Blood Pressure (Abnormal) 163/96   Pulse 99   Temperature 98.2  F (36.8  C) (Oral)   Respiration 16   Weight 86.4 kg (190 lb 6.4 oz)   Oxygen Saturation 98%   Body Mass Index 27.32 kg/m   Estimated body mass index is 27.32 kg/m  as calculated from the following:    Height as of 2/1/23: 1.778 m (5' 10\").    Weight as of this encounter: 86.4 kg (190 lb 6.4 oz). Body surface area is 2.07 meters squared.  No Pain (0) Comment: Data Unavailable   No LMP for male patient.  Allergies reviewed: Yes  Medications reviewed: Yes    Medications: Medication refills not needed today.  Pharmacy name entered into [facility]:    [facility] [place] [facility] [place], [place] - [address] [facility] - [place], [place] - [address]   [facility] [place], [place] - [address]    Clinical concerns: none       Linette Randall MA            "

## 2023-02-06 NOTE — TELEPHONE ENCOUNTER
Free Drug Application Approved  Effective Dates: 2/6/2023-12/31/2023  Patient notified: yes  Additional Information: was put on hold until rex runs out           Thank you,    Lev Smith  Oncology Pharmacy Liaison II  [email or web address]  Phone: [phone]  Fax: [phone]

## 2023-02-06 NOTE — NURSING NOTE
[id number]: Informed Consent Note     The consent form, including purpose, risks and benefits, was reviewed with Medardo Gautam, and all questions were answered before he signed the consent form. The patient understands that the study involves an active treatment phase as well as a post-treatment follow up phase.     Present during the discussion were Melissa Spencer, Kelli Chapa, the patient, Dr. Cornelio Lewis and Dr. Holman. A copy of the signed form was provided to the patient. No procedures specific to this study were performed prior to the patient signing the consent form.    Consent Version Date: 03OCT2022  Consent obtained by: Tio Holman    Date: 06FEB2023  HIPAA authorization signed?: yes  HIPAA authorization version date: [email or web address]    Melissa Spencer RN    Form 503.03.01 (Version 2)     Effective date: 01AUG2018     Next Review Date: 01AUG2020    Reproductive Evaluation     Subject name: Medardo Gautam   I discussed with the patient that in order to participate in this study he must agree to use effective contraception during therapy and continuing effective barrier protection or abstinence. Examples of effective contraception were provided, including hormonal contraception, intrauterine devices, and double barrier contraception. He agreed to use effective contraception per the study's requirements.  Melissa Spencer RN  Form 503.03.03 (Version 1)     Effective date: 01JUL2018     Next Review Date: 01JUL2020      Race and ethnicity identification note     I discussed with Medardo Gautam that the National [employer] [place] (NCI) has asked that information on race and ethnicity be obtained from NCI-sponsored studies' participants whenever possible and that the purpose for this request is to assist the NCI in evaluating whether persons of all races and ethnicity are given the opportunity to participate in new cancer treatment options. It was explained that the information will be kept in a  confidential file in the [facility] Clinical Trials Office and will be sent to the NCI in a way in which he cannot be identified. It was also explained that providing this information is voluntary.    Medardo Gautam provided the following responses:    Ethnicity: non-  Race: White (May select more than one)   Country of birth: [place]  Country of residence: [place]        Melissa Spencer RN      Form 505.00.01 (Version 3)     Effective date: 19JUN2020     Next Review Date: 19JUN2022

## 2023-02-06 NOTE — NURSING NOTE
Chief Complaint   Patient presents with     Blood Draw     VPT blood draw only by lab RN     Sydni Pandey, RN

## 2023-02-07 LAB
ATRIAL RATE - MUSE: 101 BPM
DIASTOLIC BLOOD PRESSURE - MUSE: NORMAL MMHG
INTERPRETATION ECG - MUSE: NORMAL
P AXIS - MUSE: NORMAL DEGREES
PR INTERVAL - MUSE: NORMAL MS
QRS DURATION - MUSE: 86 MS
QT - MUSE: 394 MS
QTC - MUSE: 431 MS
R AXIS - MUSE: -55 DEGREES
SYSTOLIC BLOOD PRESSURE - MUSE: NORMAL MMHG
T AXIS - MUSE: 5 DEGREES
VENTRICULAR RATE- MUSE: 72 BPM

## 2023-02-08 ENCOUNTER — DOCUMENTATION ONLY (OUTPATIENT)
Dept: ONCOLOGY | Facility: CLINIC | Age: 83
End: 2023-02-08
Payer: COMMERCIAL

## 2023-02-08 ENCOUNTER — HOSPITAL ENCOUNTER (OUTPATIENT)
Dept: NUCLEAR MEDICINE | Facility: CLINIC | Age: 83
Setting detail: NUCLEAR MEDICINE
Discharge: HOME | End: 2023-02-08
Attending: INTERNAL MEDICINE
Payer: COMMERCIAL

## 2023-02-08 DIAGNOSIS — C61 MALIGNANT NEOPLASM OF PROSTATE (H): ICD-10-CM

## 2023-02-08 LAB — TESTOST SERPL-MCNC: 27 NG/DL (ref 240–950)

## 2023-02-08 PROCEDURE — 78306 BONE IMAGING WHOLE BODY: CPT

## 2023-02-08 PROCEDURE — 78306 BONE IMAGING WHOLE BODY: CPT | Mod: 26 | Performed by: RADIOLOGY

## 2023-02-08 PROCEDURE — 343N000001 HC RX 343: Performed by: INTERNAL MEDICINE

## 2023-02-08 PROCEDURE — A9503 TC99M MEDRONATE: HCPCS | Performed by: INTERNAL MEDICINE

## 2023-02-08 RX ORDER — TC 99M MEDRONATE 20 MG/10ML
25.5 INJECTION, POWDER, LYOPHILIZED, FOR SOLUTION INTRAVENOUS ONCE
Status: COMPLETED | OUTPATIENT
Start: 2023-02-08 | End: 2023-02-08

## 2023-02-08 RX ADMIN — TC 99M MEDRONATE 25.5 MILLICURIE: 20 INJECTION, POWDER, LYOPHILIZED, FOR SOLUTION INTRAVENOUS at 11:22

## 2023-02-08 NOTE — NURSING NOTE
Patient sent several questions that patient's daughter and patient's son had regarding the Eclipse research study. Answered all of the families questions based on Dr. Holman visit on 2/6.     Melissa Spencer RN   Clinical Research Coordinator Nurse-Solid Tumor   Phone: [phone] Pgr: [phone]

## 2023-02-09 ENCOUNTER — OFFICE VISIT (OUTPATIENT)
Dept: FAMILY MEDICINE | Facility: OTHER | Age: 83
End: 2023-02-09
Payer: COMMERCIAL

## 2023-02-09 VITALS
OXYGEN SATURATION: 98 % | TEMPERATURE: 95.9 F | HEART RATE: 93 BPM | SYSTOLIC BLOOD PRESSURE: 122 MMHG | RESPIRATION RATE: 16 BRPM | DIASTOLIC BLOOD PRESSURE: 80 MMHG | HEIGHT: 70 IN | BODY MASS INDEX: 27.06 KG/M2 | WEIGHT: 189 LBS

## 2023-02-09 DIAGNOSIS — F41.9 ANXIETY: ICD-10-CM

## 2023-02-09 DIAGNOSIS — R00.0 TACHYCARDIA: ICD-10-CM

## 2023-02-09 DIAGNOSIS — M1A.9XX0 CHRONIC GOUT WITHOUT TOPHUS, UNSPECIFIED CAUSE, UNSPECIFIED SITE: ICD-10-CM

## 2023-02-09 DIAGNOSIS — I10 BENIGN ESSENTIAL HYPERTENSION: Primary | ICD-10-CM

## 2023-02-09 DIAGNOSIS — E78.5 HYPERLIPIDEMIA LDL GOAL <100: ICD-10-CM

## 2023-02-09 DIAGNOSIS — G47.00 INSOMNIA, UNSPECIFIED TYPE: ICD-10-CM

## 2023-02-09 PROCEDURE — 99214 OFFICE O/P EST MOD 30 MIN: CPT | Performed by: FAMILY MEDICINE

## 2023-02-09 RX ORDER — ALLOPURINOL 100 MG/1
100 TABLET ORAL DAILY
Qty: 90 TABLET | Refills: 3 | Status: SHIPPED | OUTPATIENT
Start: 2023-02-09 | End: 2023-12-12

## 2023-02-09 RX ORDER — LOSARTAN POTASSIUM AND HYDROCHLOROTHIAZIDE 12.5; 1 MG/1; MG/1
1 TABLET ORAL DAILY
Qty: 90 TABLET | Refills: 3 | Status: SHIPPED | OUTPATIENT
Start: 2023-02-09 | End: 2023-12-12

## 2023-02-09 RX ORDER — ZOLPIDEM TARTRATE 5 MG/1
5 TABLET ORAL
Qty: 30 TABLET | Refills: 5 | Status: SHIPPED | OUTPATIENT
Start: 2023-02-09 | End: 2023-06-08

## 2023-02-09 RX ORDER — CLONAZEPAM 1 MG/1
1 TABLET ORAL DAILY PRN
Qty: 30 TABLET | Refills: 5 | Status: SHIPPED | OUTPATIENT
Start: 2023-02-09 | End: 2023-03-27

## 2023-02-09 ASSESSMENT — PAIN SCALES - GENERAL: PAINLEVEL: NO PAIN (0)

## 2023-02-09 NOTE — PROGRESS NOTES
Assessment & Plan     Tachycardia  Patient noted to have frequent PAC 's on the recent EKG completed for a research study. Will advise to complete Holter to evaluate rhythm abnormalities and r/o A.fb  He is asymptomatic currently  - Adult Leadless EKG Monitor 3 to 7 Days; Future    Insomnia, unspecified type  - sleep is manageable. Start ambien.  - zolpidem (AMBIEN) 5 MG tablet; Take 1 tablet (5 mg) by mouth nightly as needed for sleep (1 month supply)    Benign essential hypertension  Well controlled blood pressures on the current dose of hyzaar  Refill meds  - losartan-hydrochlorothiazide (HYZAAR) 100-12.5 MG tablet; Take 1 tablet by mouth daily    Anxiety  Uses clonazepam for anxiety daily. Chronic use for a longtime and has been tolerating this well. Has had increased anxiety due to wife's failing memory. Monitor closely  - clonazePAM (KLONOPIN) 1 MG tablet; Take 1 tablet (1 mg) by mouth daily as needed for anxiety 1 month supply    Hyperlipidemia LDL goal <100  Continue atorvatstain    Chronic gout without tophus, unspecified cause, unspecified site  stable  - allopurinol (ZYLOPRIM) 100 MG tablet; Take 1 tablet (100 mg) by mouth daily      Return in about 1 month (around 3/9/2023).    Verna Osborne MD  [facility] PARK Batres is a 83 year old, presenting for the following health issues:  Anxiety and Results (Dexa, ekg)      History of Present Illness       Reason for visit:  Follow up on previous visit unless doctor has other issues to discuss.    He eats 2-3 servings of fruits and vegetables daily.He consumes 0 sweetened beverage(s) daily.He exercises with enough effort to increase his heart rate 10 to 19 minutes per day.  He exercises with enough effort to increase his heart rate 3 or less days per week.   He is taking medications regularly.     Here to follow up on her EKG done at his oncology appt  Needs meds updated    Review of Systems   Constitutional, HEENT,  "cardiovascular, pulmonary, gi and gu systems are negative, except as otherwise noted.      Objective    /80 (BP Location: Right arm, Patient Position: Sitting, Cuff Size: Adult Regular)   Pulse 93   Temp (!) 95.9  F (35.5  C) (Temporal)   Resp 16   Ht 1.778 m (5' 10\")   Wt 85.7 kg (189 lb)   SpO2 98%   BMI 27.12 kg/m    Body mass index is 27.12 kg/m .  Physical Exam   GENERAL: healthy, alert and no distress  NECK: no adenopathy, no asymmetry, masses, or scars and thyroid normal to palpation  RESP: lungs clear to auscultation - no rales, rhonchi or wheezes  CV: regular rate and rhythm, normal S1 S2, no S3 or S4, no murmur, click or rub, no peripheral edema and peripheral pulses strong  ABDOMEN: soft, nontender, no hepatosplenomegaly, no masses and bowel sounds normal        "

## 2023-02-13 ENCOUNTER — HOSPITAL ENCOUNTER (OUTPATIENT)
Dept: PET IMAGING | Facility: CLINIC | Age: 83
Discharge: HOME | End: 2023-02-13
Attending: INTERNAL MEDICINE | Admitting: INTERNAL MEDICINE
Payer: COMMERCIAL

## 2023-02-13 ENCOUNTER — ANCILLARY ORDERS (OUTPATIENT)
Dept: ONCOLOGY | Facility: CLINIC | Age: 83
End: 2023-02-13

## 2023-02-13 DIAGNOSIS — C61 MALIGNANT NEOPLASM OF PROSTATE (H): ICD-10-CM

## 2023-02-13 LAB
RADIOLOGIST FLAGS: ABNORMAL
RADIOLOGIST FLAGS: ABNORMAL

## 2023-02-13 PROCEDURE — 78815 PET IMAGE W/CT SKULL-THIGH: CPT | Mod: PS

## 2023-02-13 PROCEDURE — 78815 PET IMAGE W/CT SKULL-THIGH: CPT | Mod: 26 | Performed by: RADIOLOGY

## 2023-02-13 PROCEDURE — 74177 CT ABD & PELVIS W/CONTRAST: CPT

## 2023-02-13 PROCEDURE — 250N000011 HC RX IP 250 OP 636: Performed by: INTERNAL MEDICINE

## 2023-02-13 RX ORDER — IOPAMIDOL 755 MG/ML
50-135 INJECTION, SOLUTION INTRAVASCULAR ONCE
Status: COMPLETED | OUTPATIENT
Start: 2023-02-13 | End: 2023-02-13

## 2023-02-13 RX ADMIN — IOPAMIDOL 104 ML: 755 INJECTION, SOLUTION INTRAVENOUS at 15:03

## 2023-02-14 ENCOUNTER — MYC MEDICAL ADVICE (OUTPATIENT)
Dept: FAMILY MEDICINE | Facility: OTHER | Age: 83
End: 2023-02-14
Payer: COMMERCIAL

## 2023-02-14 DIAGNOSIS — I26.93 SINGLE SUBSEGMENTAL PULMONARY EMBOLISM WITHOUT ACUTE COR PULMONALE (H): Primary | ICD-10-CM

## 2023-02-14 RX ORDER — APIXABAN 5 MG (74)
KIT ORAL
Qty: 74 EACH | Refills: 0 | Status: SHIPPED | OUTPATIENT
Start: 2023-02-14 | End: 2023-03-09

## 2023-02-14 NOTE — TELEPHONE ENCOUNTER
Called and spoke with patient.   He denies any chest pain, heaviness, or tightness. No increase in cough. He does report occasional and intermittent shortness of breath with activity.     Patient wondering what type of follow up he should do regarding these findings.     Routing high priority to PCP and oncology provider.     Jessica ALANIZN, RN  [facility]

## 2023-02-14 NOTE — TELEPHONE ENCOUNTER
Saddle embolus extending into the left subsegmental  Pulmonary arteries  on his most recent CT scan. I called and discussed results with pt. Will start on eliquis for anticoagulation. Stop aspirin while taking this. Will schedule appt for same day on Thursday. Please notify pt to arrive at 9:10 for his appt on Thursday.

## 2023-02-20 ENCOUNTER — OFFICE VISIT (OUTPATIENT)
Dept: FAMILY MEDICINE | Facility: OTHER | Age: 83
End: 2023-02-20
Payer: COMMERCIAL

## 2023-02-20 VITALS
WEIGHT: 186 LBS | OXYGEN SATURATION: 98 % | BODY MASS INDEX: 29.89 KG/M2 | DIASTOLIC BLOOD PRESSURE: 70 MMHG | SYSTOLIC BLOOD PRESSURE: 122 MMHG | RESPIRATION RATE: 16 BRPM | TEMPERATURE: 97.4 F | HEART RATE: 69 BPM | HEIGHT: 66 IN

## 2023-02-20 DIAGNOSIS — I26.92 ACUTE SADDLE PULMONARY EMBOLISM WITHOUT ACUTE COR PULMONALE (H): Primary | ICD-10-CM

## 2023-02-20 DIAGNOSIS — C18.2 MALIGNANT NEOPLASM OF ASCENDING COLON (H): ICD-10-CM

## 2023-02-20 DIAGNOSIS — C61 MALIGNANT NEOPLASM OF PROSTATE (H): ICD-10-CM

## 2023-02-20 DIAGNOSIS — C61 MALIGNANT NEOPLASM OF PROSTATE (H): Primary | ICD-10-CM

## 2023-02-20 PROCEDURE — 99214 OFFICE O/P EST MOD 30 MIN: CPT | Performed by: FAMILY MEDICINE

## 2023-02-20 ASSESSMENT — PAIN SCALES - GENERAL: PAINLEVEL: NO PAIN (0)

## 2023-02-20 NOTE — PROGRESS NOTES
Patient called to report that he saw in [facility] Dr. Perez sent him an email however, the patient never received it. Forwarded patient this email again. Patient confirmed he is not on eliquis for his PE.     Gave update on trial patient should be randomized to a study arm within the next week

## 2023-02-20 NOTE — PROGRESS NOTES
Assessment & Plan    Patient is a pleasant 84 y/o with history of colon cancer , prostate cancer cancer currently followed by oncology was incidentally noted to have saddle shaped PE extending to left subsegmental arteries on his most recent CT chest done to be enrolled in a trial , is here to discuss follow up  He has not had any sympotms. Vitals and exam are essentially stable  The cause of PE is likely secondary to his hypercoagulability from his cancer vs side effects of antineoplastic drugs  No signs of distal DVT. I do not see the need to have ultrasound duplex for DVT at this time as the probability of distal DVT as a possible etiology is less likely based on presentation and exam  He is toleraing eliquis well  Duration of therapy to be determined by oncology but advised to continue for at least 6 months    Problem List Items Addressed This Visit     Malignant neoplasm of prostate (H)    Malignant neoplasm of ascending colon (H)   Other Visit Diagnoses     Acute saddle pulmonary embolism without acute cor pulmonale (H)    -  Primary           Return in about 1 month (around 3/20/2023).    Verna Osborne MD  [facility] ALLYN Batres is a 83 year old, presenting for the following health issues:  CT Results      History of Present Illness       Reason for visit:  Follow up on previous visit unless doctor has other issues to discuss.    He eats 2-3 servings of fruits and vegetables daily.He consumes 0 sweetened beverage(s) daily.He exercises with enough effort to increase his heart rate 10 to 19 minutes per day.  He exercises with enough effort to increase his heart rate 3 or less days per week.   He is taking medications regularly.       Review of Systems   Constitutional, HEENT, cardiovascular, pulmonary, GI, , musculoskeletal, neuro, skin, endocrine and psych systems are negative, except as otherwise noted.      Objective    /70   Pulse 69   Temp 97.4  F (36.3  " C) (Temporal)   Resp 16   Ht 1.685 m (5' 6.34\")   Wt 84.4 kg (186 lb)   SpO2 98%   BMI 29.72 kg/m    Body mass index is 29.72 kg/m .  Physical Exam   GENERAL: healthy, alert and no distress  NECK: no adenopathy, no asymmetry, masses, or scars and thyroid normal to palpation  RESP: lungs clear to auscultation - no rales, rhonchi or wheezes  CV: regular rate and rhythm, normal S1 S2, no S3 or S4, no murmur, click or rub, no peripheral edema and peripheral pulses strong  ABDOMEN: soft, nontender, no hepatosplenomegaly, no masses and bowel sounds normal  MS: no gross musculoskeletal defects noted, no edema            "

## 2023-02-22 ENCOUNTER — ANCILLARY PROCEDURE (OUTPATIENT)
Dept: RADIOLOGY | Facility: CLINIC | Age: 83
End: 2023-02-22
Attending: INTERNAL MEDICINE
Payer: COMMERCIAL

## 2023-02-22 DIAGNOSIS — C61 MALIGNANT NEOPLASM OF PROSTATE (H): Primary | ICD-10-CM

## 2023-02-22 DIAGNOSIS — C61 PROSTATE CANCER METASTATIC TO BONE (H): ICD-10-CM

## 2023-02-22 DIAGNOSIS — C79.51 PROSTATE CANCER METASTATIC TO BONE (H): ICD-10-CM

## 2023-02-22 DIAGNOSIS — C61 MALIGNANT NEOPLASM OF PROSTATE (H): ICD-10-CM

## 2023-02-22 RX ORDER — PROCHLORPERAZINE MALEATE 5 MG
5-10 TABLET ORAL EVERY 6 HOURS PRN
Status: CANCELLED
Start: 2023-03-14

## 2023-02-22 RX ORDER — ALBUTEROL SULFATE 90 UG/1
1-2 AEROSOL, METERED RESPIRATORY (INHALATION)
Status: CANCELLED
Start: 2023-03-14

## 2023-02-22 RX ORDER — EPINEPHRINE 1 MG/ML
0.3 INJECTION, SOLUTION INTRAMUSCULAR; SUBCUTANEOUS EVERY 5 MIN PRN
Status: CANCELLED | OUTPATIENT
Start: 2023-04-26

## 2023-02-22 RX ORDER — METHYLPREDNISOLONE SODIUM SUCCINATE 125 MG/2ML
125 INJECTION, POWDER, LYOPHILIZED, FOR SOLUTION INTRAMUSCULAR; INTRAVENOUS
Status: CANCELLED
Start: 2023-04-26

## 2023-02-22 RX ORDER — PROCHLORPERAZINE MALEATE 5 MG
5-10 TABLET ORAL EVERY 6 HOURS PRN
Status: CANCELLED
Start: 2023-04-26

## 2023-02-22 RX ORDER — DIPHENHYDRAMINE HYDROCHLORIDE 50 MG/ML
50 INJECTION INTRAMUSCULAR; INTRAVENOUS
Status: CANCELLED
Start: 2023-04-26

## 2023-02-22 RX ORDER — LORAZEPAM 0.5 MG/1
.5-1 TABLET ORAL EVERY 6 HOURS PRN
Status: CANCELLED
Start: 2023-03-14

## 2023-02-22 RX ORDER — LORAZEPAM 2 MG/ML
.5-1 INJECTION INTRAMUSCULAR EVERY 6 HOURS PRN
Status: CANCELLED | OUTPATIENT
Start: 2023-04-26

## 2023-02-22 RX ORDER — LORAZEPAM 0.5 MG/1
.5-1 TABLET ORAL EVERY 6 HOURS PRN
Status: CANCELLED
Start: 2023-04-26

## 2023-02-22 RX ORDER — ONDANSETRON 8 MG/1
8 TABLET, FILM COATED ORAL
Status: CANCELLED | OUTPATIENT
Start: 2023-04-26

## 2023-02-22 RX ORDER — ALBUTEROL SULFATE 0.83 MG/ML
2.5 SOLUTION RESPIRATORY (INHALATION)
Status: CANCELLED | OUTPATIENT
Start: 2023-03-14

## 2023-02-22 RX ORDER — ALBUTEROL SULFATE 0.83 MG/ML
2.5 SOLUTION RESPIRATORY (INHALATION)
Status: CANCELLED | OUTPATIENT
Start: 2023-04-26

## 2023-02-22 RX ORDER — DIPHENHYDRAMINE HYDROCHLORIDE 50 MG/ML
50 INJECTION INTRAMUSCULAR; INTRAVENOUS
Status: CANCELLED
Start: 2023-03-14

## 2023-02-22 RX ORDER — LORAZEPAM 2 MG/ML
.5-1 INJECTION INTRAMUSCULAR EVERY 6 HOURS PRN
Status: CANCELLED | OUTPATIENT
Start: 2023-03-14

## 2023-02-22 RX ORDER — ONDANSETRON 8 MG/1
8 TABLET, FILM COATED ORAL
Status: CANCELLED | OUTPATIENT
Start: 2023-03-14

## 2023-02-22 RX ORDER — MEPERIDINE HYDROCHLORIDE 25 MG/ML
25 INJECTION INTRAMUSCULAR; INTRAVENOUS; SUBCUTANEOUS EVERY 30 MIN PRN
Status: CANCELLED | OUTPATIENT
Start: 2023-04-26

## 2023-02-22 RX ORDER — METHYLPREDNISOLONE SODIUM SUCCINATE 125 MG/2ML
125 INJECTION, POWDER, LYOPHILIZED, FOR SOLUTION INTRAMUSCULAR; INTRAVENOUS
Status: CANCELLED
Start: 2023-03-14

## 2023-02-22 RX ORDER — EPINEPHRINE 1 MG/ML
0.3 INJECTION, SOLUTION INTRAMUSCULAR; SUBCUTANEOUS EVERY 5 MIN PRN
Status: CANCELLED | OUTPATIENT
Start: 2023-03-14

## 2023-02-22 RX ORDER — MEPERIDINE HYDROCHLORIDE 25 MG/ML
25 INJECTION INTRAMUSCULAR; INTRAVENOUS; SUBCUTANEOUS EVERY 30 MIN PRN
Status: CANCELLED | OUTPATIENT
Start: 2023-03-14

## 2023-02-22 RX ORDER — ALBUTEROL SULFATE 90 UG/1
1-2 AEROSOL, METERED RESPIRATORY (INHALATION)
Status: CANCELLED
Start: 2023-04-26

## 2023-02-24 DIAGNOSIS — C61 PROSTATE CANCER METASTATIC TO BONE (H): Primary | ICD-10-CM

## 2023-02-24 DIAGNOSIS — C79.51 PROSTATE CANCER METASTATIC TO BONE (H): Primary | ICD-10-CM

## 2023-02-27 ENCOUNTER — ALLIED HEALTH/NURSE VISIT (OUTPATIENT)
Dept: FAMILY MEDICINE | Facility: OTHER | Age: 83
End: 2023-02-27
Payer: COMMERCIAL

## 2023-02-27 DIAGNOSIS — C61 MALIGNANT NEOPLASM OF PROSTATE (H): Primary | ICD-10-CM

## 2023-02-27 PROCEDURE — 99207 PR NO CHARGE NURSE ONLY: CPT

## 2023-02-27 PROCEDURE — 96372 THER/PROPH/DIAG INJ SC/IM: CPT | Performed by: NURSE PRACTITIONER

## 2023-02-27 NOTE — PROGRESS NOTES
Clinic Administered Medication Documentation    Administrations This Visit     leuprolide (LUPRON DEPOT) kit 22.5 mg     Admin Date  02/27/2023 Action  $Given Dose  22.5 mg Route  Intramuscular Site  Right Ventrogluteal Administered By  Shantal Marinelli, RN    Ordering Provider: Nedra Vick CNP    NDC: 9862-0983-66    Lot#: [id number]    : ABBVIE    Patient Supplied?: No                  Injectable Medication Documentation    Patient was given Lupron. Prior to medication administration, verified patients identity using patient s name and date of birth. Please see MAR and medication order for additional information. Patient instructed to remain in clinic for 15 minutes, report any adverse reaction to staff immediately  and stay in clinic after the injection but patient declined.      Was entire vial of medication used? Yes  Vial/Syringe: Single dose vial  Expiration Date:  03/11/2025  Was this medication supplied by the patient? No     TRACIE Botello, RN, PHN  Registered Nurse-Clinic Triage  [facility]  2/27/2023 at 10:35 AM

## 2023-03-07 ENCOUNTER — TELEPHONE (OUTPATIENT)
Dept: ONCOLOGY | Facility: CLINIC | Age: 83
End: 2023-03-07
Payer: COMMERCIAL

## 2023-03-07 NOTE — PROGRESS NOTES
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    History of Present Illness:  Mr. Gautam is an 83-year-old man who was diagnosed with prostate cancer in 2012.  His PSA level was 5.2 ng/mL.  Clinical stage was cT1c disease.  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  His next-generation DNA sequencing analysis ([facility]) revealed a pathogenic TP53 mutation, SHIRA genotype, TMB 5 muts/Mb, and absent PD-L1 expression.  He subsequently received salvage radiotherapy, which was completed in 10/2013, concurrently with six months of androgen deprivation therapy.    He subsequently developed a biochemical recurrence, and received ADT from 2014 until 2020.  In 11/2020, he developed non-metastatic CRPC.  Darolutamide was added to his regimen on 12/4/2020, and he has remained on this agent since that time.  As a result, his PSA level initially dropped from 1.66 ng/mL down to a speedy of 0.07 ng/mL (6/21/2021).  However, the patient has developed a rising PSA level over the past year.  His PSA on 1/13/2022 was 0.16, the PSA on 4/20/2022 was 0.24, the PSA on 6/13/2022 was 0.34, the PSA on 7/12/2022 was 0.47, the PSA on 8/25/2022 was 0.64 ng/mL, the PSA on 9/14/2022 was 0.67 ng/mL, and the PSA on 11/21/2022 was 1.55 ng/mL (with a testosterone level of 14 ng/dL).  Most recently (2/6/2023), his PSA level has increased to 5.3 ng/mL.    The patient previously screened for the ECLIPSE clinical trial, and he was randomized to the [place]-PSMA-I&T active therapy arm.  Unfortunately, during the screening procedures he was found to have an asymptomatic pulmonary embolus.  He was placed on oral anticoagulation, using apixaban.  He returns today for a study visit, in anticipation of his first cycle of [place]-PSMA-I&T radioligand therapy scheduled for next week.  He was seen today in conjunction with the  research nurse,Melissa Spencer.    Review of Systems:  The patient reports feeling generally well.  He does report some fatigue due to the darolutamide, although this is better since he came off that drug.  Remarkably, he does not report shortness of breath despite a known saddle pulmonary embolus.  He also complains of dry skin.  He has not gained or lost any weight recently.  He does not report any urological symptoms at this time.  He does not have any cancer-related pain.  A comprehensive 14-system review of symptoms is otherwise generally within normal limits.  His ECOG score is 0.  His pain score is 0/10.    Medications:  Lupron 22.5 mg IM q3mo (last, 2/11/2023)  Apixaban 5 mg BID  Losartan/HCTZ 100/12.5 mg  Allopurinol 100 mg  Zolpidem 5 mg  Clonazepam 1 mg  Sildenafil 50 mg  Loratadine 10 mg  Calcium + Vit D  Multivitamin  Folic acid    Physical Examination:  Mr. Gautam is an 83-year-old man who appears comfortable at rest.  His vital signs are generally unremarkable.  His HEENT examination is within normal limits.  There is no palpable cervical, axillary, supraclavicular or inguinal lymphadenopathy.  The cardiac, pulmonary and gastrointestinal examinations are generally within normal limits.  The musculoskeletal examination is grossly intact.  The extremities do not demonstrate pitting edema.  The skin evaluation is unremarkable.    Imaging (2/22/2023):  His CT scan unexpectedly showed an asymptomatic pulmonary embolism extending into the left subsegmental pulmonary arteries; no appreciable evidence of right heart strain.  Increased PSMA uptake in multiple enlarged retroperitoneal lymph nodes which have increased in size compared to 9/14/2022. Findings consistent with metastatic disease recurrence.  Cholelithiasis without evidence of acute cholecystitis.  Hepatic steatosis without focal masses or lesions.  His nuclear-medicine bone scan showed no evidence of osseous metastatic disease.    Laboratories  (3/8/2023):  His CBC reveals a WBC of 4.6, hemoglobin 12.2, hematocrit 35.6%, platelets 157K.  His comprehensive metabolic panel is generally within normal limits, including a normal creatinine level of 0.86 mg/dL.  His PSA is 8.7 ng/mL today.      ASSESSMENT AND PLAN:  Mr. Gautam is an 83-year-old man with [place] 4+5=9 [id number] prostate cancer who underwent radical prostatectomy (8/2012) and salvage radiotherapy (ending in 10/2013) who has metastatic CRPC and serological progression despite darolutamide treatment.  He has enrolled on the ECLIPSE study, and he is scheduled for the first cycle of [place]-PSMA-I&T next week on 3/14/23.  His ECOG score is 0.  His pain score is 0/10.    The patient is showing signs of biochemical progression despite darolutamide.  He returns to the clinic today to discuss additional systemic or local treatment options.  Such treatment choices may include the use of an alternative AR-directed agent such as abiraterone, or taxane chemotherapy using docetaxel, or possibly metastasis-directed radiotherapy (e.g. SBRT) if he is found to have oligoprogressive disease in the future.  We also reviewed our clinical trial portfolio.  The patient is eligible for the ECLIPSE clinical trial at this time.  This is a randomized study of 177Lu-PSMA-I&T versus abiraterone for patients with metastatic CRPC who have only received one line of prior AR-directed therapy.  Fortunately, he was randomized to the active radioligand therapy arm, with his first Lee Ann-PSMA-I&T dose scheduled next week.  Unexpectedly, he was found to have a pulmonary embolus on his screening CT evaluation; thus he is now being treated with apixaban.  Of note, the use of oral anticoagulation is not a contraindication to proceed with the ECLIPSE trial.  We will move forward with his first planned cycle of radioligand therapy on 3/14/2023.  He was seen together with the research nurse, Melissa Spencer.    A total of 40 minutes were spent on  this patient on the day of the consultation, of which more than 50% of this time was used for counseling and coordination of care.  He was given the opportunity to ask multiple questions today, all of which were answered to his satisfaction.    Tio Holman M.D.

## 2023-03-07 NOTE — TELEPHONE ENCOUNTER
[id number]: Study Follow-Up Note   Subject name: Medardo Gautam     Date: 3/7/2023    He was contacted by the clinical research coordinator in regards to fasting prior to lab on 3/8/23. Coordinator left message for patient and asked them to call back if they had any questions.     Kelli Chapa  Clinical Research Coordinator III  [facility]  T: [phone]

## 2023-03-08 ENCOUNTER — ALLIED HEALTH/NURSE VISIT (OUTPATIENT)
Dept: ONCOLOGY | Facility: CLINIC | Age: 83
End: 2023-03-08

## 2023-03-08 ENCOUNTER — APPOINTMENT (OUTPATIENT)
Dept: LAB | Facility: CLINIC | Age: 83
End: 2023-03-08
Attending: INTERNAL MEDICINE
Payer: COMMERCIAL

## 2023-03-08 ENCOUNTER — ALLIED HEALTH/NURSE VISIT (OUTPATIENT)
Dept: ONCOLOGY | Facility: CLINIC | Age: 83
End: 2023-03-08
Payer: COMMERCIAL

## 2023-03-08 ENCOUNTER — ONCOLOGY VISIT (OUTPATIENT)
Dept: ONCOLOGY | Facility: CLINIC | Age: 83
End: 2023-03-08
Attending: INTERNAL MEDICINE
Payer: COMMERCIAL

## 2023-03-08 VITALS
HEART RATE: 63 BPM | SYSTOLIC BLOOD PRESSURE: 145 MMHG | OXYGEN SATURATION: 100 % | RESPIRATION RATE: 16 BRPM | DIASTOLIC BLOOD PRESSURE: 79 MMHG | BODY MASS INDEX: 29.83 KG/M2 | TEMPERATURE: 98 F | WEIGHT: 186.7 LBS

## 2023-03-08 DIAGNOSIS — C61 PROSTATE CANCER METASTATIC TO BONE (H): Primary | ICD-10-CM

## 2023-03-08 DIAGNOSIS — C61 MALIGNANT NEOPLASM OF PROSTATE (H): Primary | ICD-10-CM

## 2023-03-08 DIAGNOSIS — C79.51 PROSTATE CANCER METASTATIC TO BONE (H): Primary | ICD-10-CM

## 2023-03-08 DIAGNOSIS — C61 MALIGNANT NEOPLASM OF PROSTATE (H): ICD-10-CM

## 2023-03-08 DIAGNOSIS — C61 PROSTATE CANCER (H): ICD-10-CM

## 2023-03-08 LAB
ALBUMIN SERPL BCG-MCNC: 4.2 G/DL (ref 3.5–5.2)
ALP SERPL-CCNC: 74 U/L (ref 40–129)
ALT SERPL W P-5'-P-CCNC: 17 U/L (ref 10–50)
ANION GAP SERPL CALCULATED.3IONS-SCNC: 13 MMOL/L (ref 7–15)
AST SERPL W P-5'-P-CCNC: 36 U/L (ref 10–50)
BASOPHILS # BLD AUTO: 0 10E3/UL (ref 0–0.2)
BASOPHILS NFR BLD AUTO: 0 %
BILIRUB SERPL-MCNC: 0.7 MG/DL
BUN SERPL-MCNC: 12.6 MG/DL (ref 8–23)
CALCIUM SERPL-MCNC: 9.5 MG/DL (ref 8.8–10.2)
CHLORIDE SERPL-SCNC: 98 MMOL/L (ref 98–107)
CHOLEST SERPL-MCNC: 146 MG/DL
CREAT SERPL-MCNC: 0.86 MG/DL (ref 0.67–1.17)
DEPRECATED HCO3 PLAS-SCNC: 23 MMOL/L (ref 22–29)
EOSINOPHIL # BLD AUTO: 0 10E3/UL (ref 0–0.7)
EOSINOPHIL NFR BLD AUTO: 1 %
ERYTHROCYTE [DISTWIDTH] IN BLOOD BY AUTOMATED COUNT: 12.8 % (ref 10–15)
GFR SERPL CREATININE-BSD FRML MDRD: 87 ML/MIN/1.73M2
GLUCOSE SERPL-MCNC: 111 MG/DL (ref 70–99)
HCT VFR BLD AUTO: 35.6 % (ref 40–53)
HDLC SERPL-MCNC: 90 MG/DL
HGB BLD-MCNC: 12.2 G/DL (ref 13.3–17.7)
IMM GRANULOCYTES # BLD: 0 10E3/UL
IMM GRANULOCYTES NFR BLD: 0 %
LDLC SERPL CALC-MCNC: 40 MG/DL
LYMPHOCYTES # BLD AUTO: 1.3 10E3/UL (ref 0.8–5.3)
LYMPHOCYTES NFR BLD AUTO: 28 %
MAGNESIUM SERPL-MCNC: 1.5 MG/DL (ref 1.7–2.3)
MCH RBC QN AUTO: 30.9 PG (ref 26.5–33)
MCHC RBC AUTO-ENTMCNC: 34.3 G/DL (ref 31.5–36.5)
MCV RBC AUTO: 90 FL (ref 78–100)
MONOCYTES # BLD AUTO: 0.4 10E3/UL (ref 0–1.3)
MONOCYTES NFR BLD AUTO: 9 %
NEUTROPHILS # BLD AUTO: 2.8 10E3/UL (ref 1.6–8.3)
NEUTROPHILS NFR BLD AUTO: 62 %
NONHDLC SERPL-MCNC: 56 MG/DL
NRBC # BLD AUTO: 0 10E3/UL
NRBC BLD AUTO-RTO: 0 /100
PLATELET # BLD AUTO: 157 10E3/UL (ref 150–450)
POTASSIUM SERPL-SCNC: 3.4 MMOL/L (ref 3.4–5.3)
PROT SERPL-MCNC: 7.1 G/DL (ref 6.4–8.3)
PSA SERPL-MCNC: 8.73 NG/ML
RBC # BLD AUTO: 3.95 10E6/UL (ref 4.4–5.9)
SODIUM SERPL-SCNC: 134 MMOL/L (ref 136–145)
TRIGL SERPL-MCNC: 79 MG/DL
TSH SERPL DL<=0.005 MIU/L-ACNC: 0.95 UIU/ML (ref 0.3–4.2)
WBC # BLD AUTO: 4.6 10E3/UL (ref 4–11)

## 2023-03-08 PROCEDURE — 84153 ASSAY OF PSA TOTAL: CPT | Performed by: INTERNAL MEDICINE

## 2023-03-08 PROCEDURE — 80061 LIPID PANEL: CPT | Performed by: INTERNAL MEDICINE

## 2023-03-08 PROCEDURE — G0463 HOSPITAL OUTPT CLINIC VISIT: HCPCS | Performed by: INTERNAL MEDICINE

## 2023-03-08 PROCEDURE — 36415 COLL VENOUS BLD VENIPUNCTURE: CPT | Performed by: INTERNAL MEDICINE

## 2023-03-08 PROCEDURE — 84443 ASSAY THYROID STIM HORMONE: CPT | Performed by: INTERNAL MEDICINE

## 2023-03-08 PROCEDURE — 83735 ASSAY OF MAGNESIUM: CPT | Performed by: INTERNAL MEDICINE

## 2023-03-08 PROCEDURE — 85025 COMPLETE CBC W/AUTO DIFF WBC: CPT | Performed by: INTERNAL MEDICINE

## 2023-03-08 PROCEDURE — 99215 OFFICE O/P EST HI 40 MIN: CPT | Performed by: INTERNAL MEDICINE

## 2023-03-08 PROCEDURE — 80053 COMPREHEN METABOLIC PANEL: CPT | Performed by: INTERNAL MEDICINE

## 2023-03-08 PROCEDURE — 84403 ASSAY OF TOTAL TESTOSTERONE: CPT | Performed by: INTERNAL MEDICINE

## 2023-03-08 ASSESSMENT — PAIN SCALES - GENERAL: PAINLEVEL: MILD PAIN (2)

## 2023-03-08 NOTE — PROGRESS NOTES
[id number]: Study Visit Note   Subject name: Medardo Gautam     Visit: C1W0    Did the study visit occur within the appropriate window allowed by the protocol? Yes    Since the last study visit, He has been doing fairly well. He reports some left knee pain that has now resolved. Otherwise, they patient has not had any new issues arise. He is looking forward to starting his lutetium treatment. Patient will receive first lutetium dose on 3/14/2023.     I have personally interviewed Medardo Gautam and reviewed his medical record for adverse events and concomitant medications and these have been recorded on the corresponding logs in Medardo Gautam's research file.     Medardo Gautam was given the opportunity to ask any trial related questions.  Please see provider progress note for physical exam and other clinical information. Labs were reviewed - any significant lab values were addressed and reviewed.    Kelli Chapa  Clinical Research Coordinator III  [facility]  T: [phone]

## 2023-03-08 NOTE — NURSING NOTE
"Oncology Rooming Note    March 8, 2023 2:40 PM   Medardo Gautam is a 83 year old male who presents for:    Chief Complaint   Patient presents with     Blood Draw     Labs drawn via  by RN. Vitals taken.     Oncology Clinic Visit     Prostate Cancer     Initial Vitals: BP (!) 145/79 (BP Location: Right arm, Patient Position: Sitting, Cuff Size: Adult Regular)   Pulse 63   Temp 98  F (36.7  C) (Oral)   Resp 16   Wt 84.7 kg (186 lb 11.2 oz)   SpO2 100%   BMI 29.83 kg/m   Estimated body mass index is 29.83 kg/m  as calculated from the following:    Height as of 2/20/23: 1.685 m (5' 6.34\").    Weight as of this encounter: 84.7 kg (186 lb 11.2 oz). Body surface area is 1.99 meters squared.  Mild Pain (2) Comment: Data Unavailable   No LMP for male patient.  Allergies reviewed: Yes  Medications reviewed: Yes    Medications: Medication refills not needed today.  Pharmacy name entered into [email or web address]:    [facility] [address] [facility] - [place], [place] - [address]   [facility] [place], [place] - [address]    Clinical concerns: Pt presents today for f/u.       Lady Cisneros LPN  3/8/2023              "

## 2023-03-08 NOTE — LETTER
3/8/2023         RE: Medardo Gautam  [address] [address] [place] [address]        Dear Colleague,    Thank you for referring your patient, Medardo Gautam, to the [facility]. Please see a copy of my visit note below.      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    History of Present Illness:  Mr. Gautam is an 83-year-old man who was diagnosed with prostate cancer in 2012.  His PSA level was 5.2 ng/mL.  Clinical stage was cT1c disease.  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  His next-generation DNA sequencing analysis ("[employer]") revealed a pathogenic TP53 mutation, SHIRA genotype, TMB 5 muts/Mb, and absent PD-L1 expression.  He subsequently received salvage radiotherapy, which was completed in 10/2013, concurrently with six months of androgen deprivation therapy.    He subsequently developed a biochemical recurrence, and received ADT from 2014 until 2020.  In 11/2020, he developed non-metastatic CRPC.  Darolutamide was added to his regimen on 12/4/2020, and he has remained on this agent since that time.  As a result, his PSA level initially dropped from 1.66 ng/mL down to a speedy of 0.07 ng/mL (6/21/2021).  However, the patient has developed a rising PSA level over the past year.  His PSA on 1/13/2022 was 0.16, the PSA on 4/20/2022 was 0.24, the PSA on 6/13/2022 was 0.34, the PSA on 7/12/2022 was 0.47, the PSA on 8/25/2022 was 0.64 ng/mL, the PSA on 9/14/2022 was 0.67 ng/mL, and the PSA on 11/21/2022 was 1.55 ng/mL (with a testosterone level of 14 ng/dL).  Most recently (2/6/2023), his PSA level has increased to 5.3 ng/mL.    The patient previously screened for the ECLIPSE clinical trial, and he was randomized to the Lee Ann-PSMA-I&T active therapy arm.  Unfortunately, during the screening procedures he was found to have an  asymptomatic pulmonary embolus.  He was placed on oral anticoagulation, using apixaban.  He returns today for a study visit, in anticipation of his first cycle of Lee Ann-PSMA-I&T radioligand therapy scheduled for next week.  He was seen today in conjunction with the research nurse,Melissa Spencer.    Review of Systems:  The patient reports feeling generally well.  He does report some fatigue due to the darolutamide, although this is better since he came off that drug.  Remarkably, he does not report shortness of breath despite a known saddle pulmonary embolus.  He also complains of dry skin.  He has not gained or lost any weight recently.  He does not report any urological symptoms at this time.  He does not have any cancer-related pain.  A comprehensive 14-system review of symptoms is otherwise generally within normal limits.  His ECOG score is 0.  His pain score is 0/10.    Medications:  Lupron 22.5 mg IM q3mo (last, 2/11/2023)  Apixaban 5 mg BID  Losartan/HCTZ 100/12.5 mg  Allopurinol 100 mg  Zolpidem 5 mg  Clonazepam 1 mg  Sildenafil 50 mg  Loratadine 10 mg  Calcium + Vit D  Multivitamin  Folic acid    Physical Examination:  Mr. Gautam is an 83-year-old man who appears comfortable at rest.  His vital signs are generally unremarkable.  His HEENT examination is within normal limits.  There is no palpable cervical, axillary, supraclavicular or inguinal lymphadenopathy.  The cardiac, pulmonary and gastrointestinal examinations are generally within normal limits.  The musculoskeletal examination is grossly intact.  The extremities do not demonstrate pitting edema.  The skin evaluation is unremarkable.    Imaging (2/22/2023):  His CT scan unexpectedly showed an asymptomatic pulmonary embolism extending into the left subsegmental pulmonary arteries; no appreciable evidence of right heart strain.  Increased PSMA uptake in multiple enlarged retroperitoneal lymph nodes which have increased in size compared to 9/14/2022. Findings  consistent with metastatic disease recurrence.  Cholelithiasis without evidence of acute cholecystitis.  Hepatic steatosis without focal masses or lesions.  His nuclear-medicine bone scan showed no evidence of osseous metastatic disease.    Laboratories (3/8/2023):  His CBC reveals a WBC of 4.6, hemoglobin 12.2, hematocrit 35.6%, platelets 157K.  His comprehensive metabolic panel is generally within normal limits, including a normal creatinine level of 0.86 mg/dL.  His PSA is 8.7 ng/mL today.      ASSESSMENT AND PLAN:  Mr. Gautam is an 83-year-old man with [place] 4+5=9 [id number] prostate cancer who underwent radical prostatectomy (8/2012) and salvage radiotherapy (ending in 10/2013) who has metastatic CRPC and serological progression despite darolutamide treatment.  He has enrolled on the ECLIPSE study, and he is scheduled for the first cycle of Lee Ann-PSMA-I&T next week on 3/14/23.  His ECOG score is 0.  His pain score is 0/10.    The patient is showing signs of biochemical progression despite darolutamide.  He returns to the clinic today to discuss additional systemic or local treatment options.  Such treatment choices may include the use of an alternative AR-directed agent such as abiraterone, or taxane chemotherapy using docetaxel, or possibly metastasis-directed radiotherapy (e.g. SBRT) if he is found to have oligoprogressive disease in the future.  We also reviewed our clinical trial portfolio.  The patient is eligible for the ECLIPSE clinical trial at this time.  This is a randomized study of 177Lu-PSMA-I&T versus abiraterone for patients with metastatic CRPC who have only received one line of prior AR-directed therapy.  Fortunately, he was randomized to the active radioligand therapy arm, with his first Lee Ann-PSMA-I&T dose scheduled next week.  Unexpectedly, he was found to have a pulmonary embolus on his screening CT evaluation; thus he is now being treated with apixaban.  Of note, the use of oral  anticoagulation is not a contraindication to proceed with the ECLIPSE trial.  We will move forward with his first planned cycle of radioligand therapy on 3/14/2023.  He was seen together with the research nurse, Melissa Spencer.    A total of 40 minutes were spent on this patient on the day of the consultation, of which more than 50% of this time was used for counseling and coordination of care.  He was given the opportunity to ask multiple questions today, all of which were answered to his satisfaction.    Tio Holman M.D.

## 2023-03-08 NOTE — NURSING NOTE
Chief Complaint   Patient presents with     Blood Draw     Labs drawn via  by RN. Vitals taken.     Labs drawn with  by RN. Vitals taken. Patient checked into next appointment.    Joide Bhatia RN

## 2023-03-09 ENCOUNTER — OFFICE VISIT (OUTPATIENT)
Dept: FAMILY MEDICINE | Facility: OTHER | Age: 83
End: 2023-03-09
Payer: COMMERCIAL

## 2023-03-09 VITALS
TEMPERATURE: 96.3 F | HEIGHT: 66 IN | OXYGEN SATURATION: 97 % | BODY MASS INDEX: 30.05 KG/M2 | DIASTOLIC BLOOD PRESSURE: 70 MMHG | HEART RATE: 60 BPM | SYSTOLIC BLOOD PRESSURE: 110 MMHG | RESPIRATION RATE: 20 BRPM | WEIGHT: 187 LBS

## 2023-03-09 DIAGNOSIS — C79.51 PROSTATE CANCER METASTATIC TO BONE (H): ICD-10-CM

## 2023-03-09 DIAGNOSIS — C61 PROSTATE CANCER METASTATIC TO BONE (H): ICD-10-CM

## 2023-03-09 DIAGNOSIS — C79.51 PROSTATE CANCER METASTATIC TO BONE (H): Primary | ICD-10-CM

## 2023-03-09 DIAGNOSIS — C61 PROSTATE CANCER METASTATIC TO BONE (H): Primary | ICD-10-CM

## 2023-03-09 DIAGNOSIS — I26.99 OTHER ACUTE PULMONARY EMBOLISM WITHOUT ACUTE COR PULMONALE (H): Primary | ICD-10-CM

## 2023-03-09 PROCEDURE — 99214 OFFICE O/P EST MOD 30 MIN: CPT | Performed by: FAMILY MEDICINE

## 2023-03-09 ASSESSMENT — PAIN SCALES - GENERAL: PAINLEVEL: NO PAIN (0)

## 2023-03-09 NOTE — PROGRESS NOTES
"  Assessment & Plan     Other acute pulmonary embolism without acute cor pulmonale (H)  Patient here for follow-up on his pulmonary embolism.  He is tolerating the current dose of Eliquis really well.  With his kidney functions being normal I think it is acceptable to continue Eliquis at 5 mg twice a day for the next 3 to 6 months.  Refills provided.  Given his ongoing concerns for metastatic prostate cancer which could be the trigger, I think he needs to stay on this longer as his risk continues to persist. Will defer to oncology on continuing it beyond 6 months  - apixaban ANTICOAGULANT (ELIQUIS) 5 MG tablet; Take 1 tablet (5 mg) by mouth 2 times daily    Prostate cancer metastatic to bone (H)  Following oncology    Irregular heart beat-  Was advised to complete holter to r/o rhythm abnormalities which he would like to wait until seen by oncology . He could schedule appt as the orders are still valid         BMI:   Estimated body mass index is 30.18 kg/m  as calculated from the following:    Height as of this encounter: 1.676 m (5' 6\").    Weight as of this encounter: 84.8 kg (187 lb).       See Patient Instructions    Return in about 3 months (around 6/9/2023).    Verna Osborne MD  [facility] PARK Batres is a 83 year old, presenting for the following health issues:  RECHECK (Saddle shape pulmonary embolism )      History of Present Illness       Reason for visit:  Pulmonary emboly  Symptom onset:  3-4 weeks ago  Symptoms include:  Clot  Symptom intensity:  Mild  Symptom progression:  Staying the same  Had these symptoms before:  No  What makes it worse:  No  What makes it better:  No    He eats 2-3 servings of fruits and vegetables daily.He consumes 0 sweetened beverage(s) daily.He exercises with enough effort to increase his heart rate 9 or less minutes per day.  He exercises with enough effort to increase his heart rate 3 or less days per week.   He is taking medications " "regularly.   while driving yesterday he felt a pain from his back all the way to his foot, bilateral. He states on his MRI it mentioned something about vertebrae 4-5    Review of Systems   Constitutional, HEENT, cardiovascular, pulmonary, gi and gu systems are negative, except as otherwise noted.      Objective    /70 (BP Location: Right arm, Patient Position: Sitting, Cuff Size: Adult Regular)   Pulse 60   Temp (!) 96.3  F (35.7  C) (Temporal)   Resp 20   Ht 1.676 m (5' 6\")   Wt 84.8 kg (187 lb)   SpO2 97%   BMI 30.18 kg/m    Body mass index is 30.18 kg/m .  Physical Exam   GENERAL: healthy, alert and no distress  NECK: no adenopathy, no asymmetry, masses, or scars and thyroid normal to palpation  RESP: lungs clear to auscultation - no rales, rhonchi or wheezes  CV: regular rate and rhythm, normal S1 S2, no S3 or S4, no murmur, click or rub, no peripheral edema and peripheral pulses strong  MS: no gross musculoskeletal defects noted, no edema. Negative straight leg rise          "

## 2023-03-10 LAB — TESTOST SERPL-MCNC: 17 NG/DL (ref 240–950)

## 2023-03-12 DIAGNOSIS — E78.5 HYPERLIPIDEMIA LDL GOAL <100: ICD-10-CM

## 2023-03-14 ENCOUNTER — HOSPITAL ENCOUNTER (OUTPATIENT)
Dept: NUCLEAR MEDICINE | Facility: CLINIC | Age: 83
Setting detail: NUCLEAR MEDICINE
Discharge: HOME | End: 2023-03-14
Attending: INTERNAL MEDICINE
Payer: COMMERCIAL

## 2023-03-14 ENCOUNTER — ALLIED HEALTH/NURSE VISIT (OUTPATIENT)
Dept: ONCOLOGY | Facility: CLINIC | Age: 83
End: 2023-03-14
Payer: COMMERCIAL

## 2023-03-14 ENCOUNTER — HOSPITAL ENCOUNTER (OUTPATIENT)
Dept: CARDIOLOGY | Facility: CLINIC | Age: 83
Discharge: HOME | End: 2023-03-14
Attending: INTERNAL MEDICINE
Payer: COMMERCIAL

## 2023-03-14 VITALS
DIASTOLIC BLOOD PRESSURE: 68 MMHG | HEART RATE: 74 BPM | RESPIRATION RATE: 16 BRPM | SYSTOLIC BLOOD PRESSURE: 130 MMHG | TEMPERATURE: 96.8 F

## 2023-03-14 DIAGNOSIS — C61 PROSTATE CANCER METASTATIC TO BONE (H): Primary | ICD-10-CM

## 2023-03-14 DIAGNOSIS — C79.51 PROSTATE CANCER METASTATIC TO BONE (H): ICD-10-CM

## 2023-03-14 DIAGNOSIS — C79.51 PROSTATE CANCER METASTATIC TO BONE (H): Primary | ICD-10-CM

## 2023-03-14 DIAGNOSIS — C61 MALIGNANT NEOPLASM OF PROSTATE (H): ICD-10-CM

## 2023-03-14 DIAGNOSIS — C61 PROSTATE CANCER METASTATIC TO BONE (H): ICD-10-CM

## 2023-03-14 DIAGNOSIS — C61 MALIGNANT NEOPLASM OF PROSTATE (H): Primary | ICD-10-CM

## 2023-03-14 DIAGNOSIS — C61 PROSTATE CANCER (H): ICD-10-CM

## 2023-03-14 PROCEDURE — 93005 ELECTROCARDIOGRAM TRACING: CPT

## 2023-03-14 PROCEDURE — 93010 ELECTROCARDIOGRAM REPORT: CPT | Performed by: INTERNAL MEDICINE

## 2023-03-14 RX ORDER — ALBUTEROL SULFATE 0.83 MG/ML
2.5 SOLUTION RESPIRATORY (INHALATION)
Status: DISCONTINUED | OUTPATIENT
Start: 2023-03-14 | End: 2023-03-15 | Stop reason: HOSPADM

## 2023-03-14 RX ORDER — PROCHLORPERAZINE MALEATE 5 MG
5-10 TABLET ORAL EVERY 6 HOURS PRN
Status: DISCONTINUED | OUTPATIENT
Start: 2023-03-14 | End: 2023-03-15 | Stop reason: HOSPADM

## 2023-03-14 RX ORDER — LORAZEPAM 0.5 MG/1
.5-1 TABLET ORAL EVERY 6 HOURS PRN
Status: DISCONTINUED | OUTPATIENT
Start: 2023-03-14 | End: 2023-03-15 | Stop reason: HOSPADM

## 2023-03-14 RX ORDER — ONDANSETRON 8 MG/1
8 TABLET, FILM COATED ORAL
Status: DISCONTINUED | OUTPATIENT
Start: 2023-03-14 | End: 2023-03-15 | Stop reason: HOSPADM

## 2023-03-14 RX ORDER — ATORVASTATIN CALCIUM 10 MG/1
TABLET, FILM COATED ORAL
Qty: 30 TABLET | Refills: 0 | Status: SHIPPED | OUTPATIENT
Start: 2023-03-14 | End: 2023-04-04

## 2023-03-14 RX ORDER — MEPERIDINE HYDROCHLORIDE 25 MG/ML
25 INJECTION INTRAMUSCULAR; INTRAVENOUS; SUBCUTANEOUS EVERY 30 MIN PRN
Status: DISCONTINUED | OUTPATIENT
Start: 2023-03-14 | End: 2023-03-15 | Stop reason: HOSPADM

## 2023-03-14 RX ORDER — DIPHENHYDRAMINE HYDROCHLORIDE 50 MG/ML
50 INJECTION INTRAMUSCULAR; INTRAVENOUS
Status: DISCONTINUED | OUTPATIENT
Start: 2023-03-14 | End: 2023-03-15 | Stop reason: HOSPADM

## 2023-03-14 RX ORDER — METHYLPREDNISOLONE SODIUM SUCCINATE 125 MG/2ML
125 INJECTION, POWDER, LYOPHILIZED, FOR SOLUTION INTRAMUSCULAR; INTRAVENOUS
Status: DISCONTINUED | OUTPATIENT
Start: 2023-03-14 | End: 2023-03-15 | Stop reason: HOSPADM

## 2023-03-14 RX ORDER — LORAZEPAM 2 MG/ML
.5-1 INJECTION INTRAMUSCULAR EVERY 6 HOURS PRN
Status: DISCONTINUED | OUTPATIENT
Start: 2023-03-14 | End: 2023-03-15 | Stop reason: HOSPADM

## 2023-03-14 RX ORDER — ALBUTEROL SULFATE 90 UG/1
1-2 AEROSOL, METERED RESPIRATORY (INHALATION)
Status: DISCONTINUED | OUTPATIENT
Start: 2023-03-14 | End: 2023-03-15 | Stop reason: HOSPADM

## 2023-03-14 RX ORDER — EPINEPHRINE 1 MG/ML
0.3 INJECTION, SOLUTION, CONCENTRATE INTRAVENOUS EVERY 5 MIN PRN
Status: DISCONTINUED | OUTPATIENT
Start: 2023-03-14 | End: 2023-03-15 | Stop reason: HOSPADM

## 2023-03-14 NOTE — TELEPHONE ENCOUNTER
Pending Prescriptions:                       Disp   Refills    atorvastatin (LIPITOR) 10 MG tablet [Pharm*30 tab*0        Sig: Take 1 tablet by mouth once daily    Routing refill request to provider for review/approval because:  A break in medication

## 2023-03-14 NOTE — PROGRESS NOTES
[facility] Nursing Note:    Patient presents today for Lutetium-177 PSMA I&T (Eclipse Study)  therapy, dose 1 of 4  Patient seen by provider today: Yes: Yes, Dr. Motley   present during visit today: NOT APPLICABLE      Intravenous Access:  Peripheral IV placed     Treatment Conditions:  Labs done on 3/8/23    Results reviewed, labs Met treatment parameters, ok to proceed with treatment.           Post Infusion Assessment:  Patient tolerated infusion without incident.    PIV - No evidence of extravasations, access discontinued per protocol.         Discharge Plan:   Patient will return as scheduled for next appointment.   Patient discharged at 9:55am in stable condition accompanied by: son and research RN to EKG prior to discharge per protocol.  Departure Mode: Ambulatory

## 2023-03-14 NOTE — PROGRESS NOTES
[id number]: Study Visit Note   Subject name: Medardo Gautam     Visit: C1W0    Did the study visit occur within the appropriate window allowed by the protocol? Yes    Since the last study visit, He has been doing well. The patient tolerated treatment well with no issues.     Verbal handover provided to nuclear medicine department. Instructed department to document start time, stop time of infusion as well as assigned activity, total activity in container pre-dose, total activity post-dose, total volume administered and infusion flow rate.     Reminded patient to drink plenty of water and fluids and void frequently.     I have personally interviewed Medardo Gautam and reviewed his medical record for adverse events and concomitant medications and these have been recorded on the corresponding logs in Medardo Gautam's research file.     Medardo Gautam was given the opportunity to ask any trial related questions.  Please see provider progress note for physical exam and other clinical information. Labs were reviewed - any significant lab values were addressed and reviewed.    Kelli Chapa  Clinical Research Coordinator III  [facility]  T: [phone]

## 2023-03-15 DIAGNOSIS — C79.51 PROSTATE CANCER METASTATIC TO BONE (H): Primary | ICD-10-CM

## 2023-03-15 DIAGNOSIS — C61 PROSTATE CANCER METASTATIC TO BONE (H): Primary | ICD-10-CM

## 2023-03-15 LAB
ATRIAL RATE - MUSE: 64 BPM
DIASTOLIC BLOOD PRESSURE - MUSE: NORMAL MMHG
INTERPRETATION ECG - MUSE: NORMAL
P AXIS - MUSE: NORMAL DEGREES
PR INTERVAL - MUSE: 168 MS
QRS DURATION - MUSE: 94 MS
QT - MUSE: 418 MS
QTC - MUSE: 431 MS
R AXIS - MUSE: -58 DEGREES
SYSTOLIC BLOOD PRESSURE - MUSE: NORMAL MMHG
T AXIS - MUSE: -4 DEGREES
VENTRICULAR RATE- MUSE: 64 BPM

## 2023-03-16 ENCOUNTER — MYC MEDICAL ADVICE (OUTPATIENT)
Dept: FAMILY MEDICINE | Facility: OTHER | Age: 83
End: 2023-03-16
Payer: COMMERCIAL

## 2023-03-23 DIAGNOSIS — F41.9 ANXIETY: ICD-10-CM

## 2023-03-27 RX ORDER — CLONAZEPAM 1 MG/1
1 TABLET ORAL DAILY PRN
Qty: 30 TABLET | Refills: 0 | Status: SHIPPED | OUTPATIENT
Start: 2023-03-27 | End: 2023-04-12

## 2023-03-29 ENCOUNTER — MYC MEDICAL ADVICE (OUTPATIENT)
Dept: FAMILY MEDICINE | Facility: OTHER | Age: 83
End: 2023-03-29
Payer: COMMERCIAL

## 2023-03-29 ENCOUNTER — TELEPHONE (OUTPATIENT)
Dept: FAMILY MEDICINE | Facility: OTHER | Age: 83
End: 2023-03-29
Payer: COMMERCIAL

## 2023-03-29 DIAGNOSIS — F41.9 ANXIETY: ICD-10-CM

## 2023-03-29 DIAGNOSIS — G47.00 INSOMNIA, UNSPECIFIED TYPE: Primary | ICD-10-CM

## 2023-03-29 RX ORDER — CLONAZEPAM 1 MG/1
1 TABLET ORAL DAILY PRN
Qty: 30 TABLET | Refills: 0 | Status: CANCELLED | OUTPATIENT
Start: 2023-03-29

## 2023-03-29 RX ORDER — ALPRAZOLAM 0.5 MG
0.5 TABLET ORAL DAILY PRN
Qty: 30 TABLET | Refills: 0 | Status: SHIPPED | OUTPATIENT
Start: 2023-03-29 | End: 2023-06-08

## 2023-03-29 NOTE — TELEPHONE ENCOUNTER
I can send clonazepam to a new pharmacy. Please update pharmacy of preference , so I can sign the script

## 2023-03-29 NOTE — TELEPHONE ENCOUNTER
Needs a PA or can send Klonopin to a different pharmacy. Will send to PCP as well.    Chip Marquis PA-C

## 2023-03-29 NOTE — TELEPHONE ENCOUNTER
LMTCB. Please see if patient would like to start a PA or send the new medication, Alprazolam, to a different pharmacy per provider note below.    Imelda Medved

## 2023-03-29 NOTE — TELEPHONE ENCOUNTER
Patient has not been able to  script of Clonazepam 1 mg sent to [facility] pharmacy yesterday 3/27/23 but they don't have this in stock.    Pended pharmacy and med.    Sandra Beckham RN  [facility] ~ Registered Nurse  Clinic Triage ~ [place] [facility] [place]  March 29, 2023

## 2023-03-29 NOTE — TELEPHONE ENCOUNTER
PA needed for: Alprazolam 0.5mg  Insurance: [employer] part d  Insur phone: [phone]  Patient ID: [id number]  Please let us know when PA is granted/denied. Thank you!  Myah Mcmullen, Pharmacy [facility] [place] [phone]

## 2023-03-29 NOTE — TELEPHONE ENCOUNTER
RK patient. Will send over alprazolam instead. He can also consider trying a different pharmacy. Thanks.    Chip Marquis PA-C

## 2023-03-30 RX ORDER — CLONAZEPAM 1 MG/1
1 TABLET ORAL
Qty: 30 TABLET | Refills: 0 | Status: SHIPPED | OUTPATIENT
Start: 2023-03-30 | End: 2023-06-16

## 2023-03-30 NOTE — TELEPHONE ENCOUNTER
Patient calling to follow up on clonazepam refill.   Informed patient this was sent to the pharmacy today. He is wondering if this is available to  now, transferred to pharmacy to further discuss  time.     Jessica ALANIZN, RN  [facility]

## 2023-04-05 NOTE — TELEPHONE ENCOUNTER
Spoke with Medardo and read back verbatim what Davy Marquis wrote.    Patient had no further questions or concerns.    Closing encounter.    Sandra Beckham RN  [facility] ~ Registered Nurse  Clinic Triage ~ [place] [facility] [place]  April 5, 2023

## 2023-04-11 NOTE — PROGRESS NOTES
ECLIPSE/[id number]: Study Visit Note   Subject name: Medardo Gautam     Visit: Week 4    Did the study visit occur within the appropriate window allowed by the protocol? Yes    Since the last study visit, He has been doing fairly well. He reports experiencing dry eyes, dry mouth, and blurry vision over the past 2 weeks. For the grade 1 blurred vision, Dr. Holman was made aware and recommended that patient visit his ophthalmologist for a check-up. The patient has also been a bit more fatigued. Otherwise, he has been doing fairly well and looking forward to his next infusion.     I have personally interviewed Medardo Gautam and reviewed his medical record for adverse events and concomitant medications and these have been recorded on the corresponding logs in Medardo Gautam's research file.     Medardo Gautam was given the opportunity to ask any trial related questions.  Please see provider progress note for physical exam and other clinical information. Labs were reviewed - any significant lab values were addressed and reviewed.    Kelli Chapa  Clinical Research Coordinator III  [facility]  T: [phone]

## 2023-04-11 NOTE — PROGRESS NOTES
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    History of Present Illness:  Mr. Gautam is an 83-year-old man who was diagnosed with prostate cancer in 2012.  His PSA level was 5.2 ng/mL.  Clinical stage was cT1c disease.  He underwent radical prostatectomy on 8/6/2012, which revealed [place] 4+5=9 carcinoma, stage pT2c N0 R0.  His next-generation DNA sequencing analysis ([employer]) revealed a pathogenic TP53 mutation, SHIRA genotype, TMB 5 muts/Mb, and absent PD-L1 expression.  He subsequently received salvage radiotherapy, which was completed in 10/2013, concurrently with six months of androgen deprivation therapy.    He subsequently developed a biochemical recurrence, and received ADT from 2014 until 2020.  In 11/2020, he developed non-metastatic CRPC.  Darolutamide was added to his regimen on 12/4/2020, and he has remained on this agent since that time.  As a result, his PSA level initially dropped from 1.66 ng/mL down to a speedy of 0.07 ng/mL (6/21/2021).  However, the patient has developed a rising PSA level over the past year.  His PSA on 1/13/2022 was 0.16, the PSA on 4/20/2022 was 0.24, the PSA on 6/13/2022 was 0.34, the PSA on 7/12/2022 was 0.47, the PSA on 8/25/2022 was 0.64 ng/mL, the PSA on 9/14/2022 was 0.67 ng/mL, and the PSA on 11/21/2022 was 1.55 ng/mL (with a testosterone level of 14 ng/dL).  Most recently (3/8/2023), his PSA level has increased to 8.7 ng/mL.    The patient previously screened for the ECLIPSE clinical trial, and he was randomized to the Lee Ann-PSMA-I&T active therapy arm.  During the screening procedures he was found to have an asymptomatic pulmonary embolus.  He was placed on oral anticoagulation, using apixaban.  He returns today for a study visit, in anticipation of his second cycle of Lee Ann-PSMA-I&T radioligand therapy in two weeks.  He was seen today in conjunction with the research  nurse,Melissa Spencer.    Review of Systems:  The patient states that he is feeling generally well.  He does not report significant side effects from the first dose of 177Lu-PSMA-I&T, except that he has notice dry mouth/throat as well as dry eyes with some blurred vision.  Remarkably, he does not report shortness of breath despite a known saddle pulmonary embolus.  He also complains of dry skin.  He has not gained or lost any weight recently.  He does not report any urological symptoms at this time.  He does not have any cancer-related pain.  A comprehensive 14-system review of symptoms is otherwise generally within normal limits.  His ECOG score is 0.  His pain score is 0/10.    Medications:  Lupron 22.5 mg IM q3mo (last, 2/11/2023)  177Lu-PSMA-I&T, dose #2 on 4/25/2023  Apixaban 5 mg BID  Losartan/HCTZ 100/12.5 mg  Allopurinol 100 mg  Zolpidem 5 mg  Clonazepam 1 mg  Sildenafil 50 mg  Loratadine 10 mg  Calcium + Vit D  Multivitamin  Folic acid    Physical Examination:  Mr. Gautam is an 83-year-old man who appears comfortable at rest.  His vital signs are generally unremarkable.  His HEENT examination is within normal limits.  There is no palpable cervical, axillary, supraclavicular or inguinal lymphadenopathy.  The cardiac, pulmonary and gastrointestinal examinations are generally within normal limits.  The musculoskeletal examination is grossly intact.  The extremities do not demonstrate pitting edema.  The skin evaluation is unremarkable.    Imaging (2/22/2023):  His PSMA-PET/CT scan showed an asymptomatic pulmonary embolism extending into the left subsegmental pulmonary arteries; no appreciable evidence of right heart strain.  Increased PSMA uptake in multiple enlarged retroperitoneal lymph nodes which have increased in size compared to 9/14/2022. Findings consistent with metastatic disease recurrence.  Cholelithiasis without evidence of acute cholecystitis.  Hepatic steatosis without focal masses or lesions.  His  nuclear-medicine bone scan showed no evidence of osseous metastatic disease.    Laboratories (4/12/2023):  His CBC reveals a WBC of 3.5, hemoglobin 12.2, hematocrit 35.6%, platelets 119K.  His comprehensive metabolic panel is generally within normal limits, including a normal creatinine level of 0.83 mg/dL.  His PSA is 4.94 ng/mL, which is declining.      ASSESSMENT AND PLAN:  Mr. Gautam is an 83-year-old man with Jeancarlos 4+5=9 [id number] prostate cancer who underwent radical prostatectomy (8/2012) and salvage radiotherapy (ending in 10/2013) who has metastatic CRPC and serological progression despite darolutamide treatment.  He has enrolled on the ECLIPSE study, and he is scheduled for the second cycle of Lee Ann-PSMA-I&T next week on 4/25/23.  His ECOG score is 0.  His pain score is 0/10.    The patient was previously showing signs of biochemical progression despite darolutamide.  He returns to the clinic today to discuss additional systemic or local treatment options.  Such treatment choices may include the use of an alternative AR-directed agent such as abiraterone, or taxane chemotherapy using docetaxel, or possibly metastasis-directed radiotherapy (e.g. SBRT) if he is found to have oligoprogressive disease in the future.  We also reviewed our clinical trial portfolio.  The patient is eligible for the ECLIPSE clinical trial at this time.  This is a randomized study of 177Lu-PSMA-I&T versus abiraterone for patients with metastatic CRPC who have only received one line of prior AR-directed therapy.      Fortunately, he was randomized to the active radioligand therapy arm, with his second Lee Ann-PSMA-I&T dose scheduled on 4/25/23.  Unexpectedly, he was found to have a pulmonary embolus on his screening CT evaluation; thus he is now being treated with apixaban.  Of note, the use of oral anticoagulation is not a contraindication to proceed with the ECLIPSE trial.  Thus far, he has not experienced any significant side effects  from the radioligand therapy, except for xerostomia and xerophthalmia.  His PSA is already declining, from 8.7 to 4.95 ng/mL.  We will move forward with his second planned cycle of Lee Ann-PSMA-I&T on 4/25/2023.  He was seen together with the research nurse, Melissa Spencer.    A total of 40 minutes were spent on this patient on the day of the consultation, of which more than 50% of this time was used for counseling and coordination of care.  He was given the opportunity to ask multiple questions today, all of which were answered to his satisfaction.    Tio Holman M.D.

## 2023-04-12 ENCOUNTER — ONCOLOGY VISIT (OUTPATIENT)
Dept: ONCOLOGY | Facility: CLINIC | Age: 83
End: 2023-04-12
Attending: INTERNAL MEDICINE
Payer: COMMERCIAL

## 2023-04-12 ENCOUNTER — APPOINTMENT (OUTPATIENT)
Dept: LAB | Facility: CLINIC | Age: 83
End: 2023-04-12
Attending: INTERNAL MEDICINE
Payer: COMMERCIAL

## 2023-04-12 ENCOUNTER — ALLIED HEALTH/NURSE VISIT (OUTPATIENT)
Dept: ONCOLOGY | Facility: CLINIC | Age: 83
End: 2023-04-12
Payer: COMMERCIAL

## 2023-04-12 VITALS
OXYGEN SATURATION: 98 % | RESPIRATION RATE: 18 BRPM | SYSTOLIC BLOOD PRESSURE: 121 MMHG | TEMPERATURE: 97.8 F | BODY MASS INDEX: 29.44 KG/M2 | HEART RATE: 64 BPM | WEIGHT: 182.4 LBS | DIASTOLIC BLOOD PRESSURE: 75 MMHG

## 2023-04-12 DIAGNOSIS — C61 PROSTATE CANCER METASTATIC TO BONE (H): Primary | ICD-10-CM

## 2023-04-12 DIAGNOSIS — C79.51 PROSTATE CANCER METASTATIC TO BONE (H): Primary | ICD-10-CM

## 2023-04-12 DIAGNOSIS — C61 MALIGNANT NEOPLASM OF PROSTATE (H): ICD-10-CM

## 2023-04-12 DIAGNOSIS — C61 PROSTATE CANCER (H): ICD-10-CM

## 2023-04-12 LAB
ALBUMIN SERPL BCG-MCNC: 4.2 G/DL (ref 3.5–5.2)
ALP SERPL-CCNC: 71 U/L (ref 40–129)
ALT SERPL W P-5'-P-CCNC: 23 U/L (ref 10–50)
ANION GAP SERPL CALCULATED.3IONS-SCNC: 12 MMOL/L (ref 7–15)
AST SERPL W P-5'-P-CCNC: 45 U/L (ref 10–50)
BASOPHILS # BLD AUTO: 0 10E3/UL (ref 0–0.2)
BASOPHILS NFR BLD AUTO: 1 %
BILIRUB SERPL-MCNC: 0.6 MG/DL
BUN SERPL-MCNC: 14.2 MG/DL (ref 8–23)
CALCIUM SERPL-MCNC: 9.6 MG/DL (ref 8.8–10.2)
CHLORIDE SERPL-SCNC: 107 MMOL/L (ref 98–107)
CREAT SERPL-MCNC: 0.83 MG/DL (ref 0.67–1.17)
DEPRECATED HCO3 PLAS-SCNC: 22 MMOL/L (ref 22–29)
EOSINOPHIL # BLD AUTO: 0 10E3/UL (ref 0–0.7)
EOSINOPHIL NFR BLD AUTO: 1 %
ERYTHROCYTE [DISTWIDTH] IN BLOOD BY AUTOMATED COUNT: 14 % (ref 10–15)
GFR SERPL CREATININE-BSD FRML MDRD: 88 ML/MIN/1.73M2
GLUCOSE SERPL-MCNC: 178 MG/DL (ref 70–99)
HCT VFR BLD AUTO: 35.6 % (ref 40–53)
HGB BLD-MCNC: 12.2 G/DL (ref 13.3–17.7)
IMM GRANULOCYTES # BLD: 0 10E3/UL
IMM GRANULOCYTES NFR BLD: 0 %
LYMPHOCYTES # BLD AUTO: 0.9 10E3/UL (ref 0.8–5.3)
LYMPHOCYTES NFR BLD AUTO: 27 %
MAGNESIUM SERPL-MCNC: 1.9 MG/DL (ref 1.7–2.3)
MCH RBC QN AUTO: 31 PG (ref 26.5–33)
MCHC RBC AUTO-ENTMCNC: 34.3 G/DL (ref 31.5–36.5)
MCV RBC AUTO: 90 FL (ref 78–100)
MONOCYTES # BLD AUTO: 0.3 10E3/UL (ref 0–1.3)
MONOCYTES NFR BLD AUTO: 8 %
NEUTROPHILS # BLD AUTO: 2.3 10E3/UL (ref 1.6–8.3)
NEUTROPHILS NFR BLD AUTO: 63 %
NRBC # BLD AUTO: 0 10E3/UL
NRBC BLD AUTO-RTO: 0 /100
PLATELET # BLD AUTO: 119 10E3/UL (ref 150–450)
POTASSIUM SERPL-SCNC: 3.5 MMOL/L (ref 3.4–5.3)
PROT SERPL-MCNC: 7.2 G/DL (ref 6.4–8.3)
PSA SERPL DL<=0.01 NG/ML-MCNC: 4.94 NG/ML
RBC # BLD AUTO: 3.94 10E6/UL (ref 4.4–5.9)
SODIUM SERPL-SCNC: 141 MMOL/L (ref 136–145)
WBC # BLD AUTO: 3.5 10E3/UL (ref 4–11)

## 2023-04-12 PROCEDURE — 83735 ASSAY OF MAGNESIUM: CPT | Performed by: INTERNAL MEDICINE

## 2023-04-12 PROCEDURE — 84403 ASSAY OF TOTAL TESTOSTERONE: CPT | Performed by: INTERNAL MEDICINE

## 2023-04-12 PROCEDURE — G0463 HOSPITAL OUTPT CLINIC VISIT: HCPCS | Performed by: INTERNAL MEDICINE

## 2023-04-12 PROCEDURE — 99215 OFFICE O/P EST HI 40 MIN: CPT | Performed by: INTERNAL MEDICINE

## 2023-04-12 PROCEDURE — 36415 COLL VENOUS BLD VENIPUNCTURE: CPT | Performed by: INTERNAL MEDICINE

## 2023-04-12 PROCEDURE — 85025 COMPLETE CBC W/AUTO DIFF WBC: CPT | Performed by: INTERNAL MEDICINE

## 2023-04-12 PROCEDURE — 84153 ASSAY OF PSA TOTAL: CPT | Performed by: INTERNAL MEDICINE

## 2023-04-12 PROCEDURE — 80053 COMPREHEN METABOLIC PANEL: CPT | Performed by: INTERNAL MEDICINE

## 2023-04-12 ASSESSMENT — PAIN SCALES - GENERAL: PAINLEVEL: NO PAIN (0)

## 2023-04-12 NOTE — NURSING NOTE
"Oncology Rooming Note    April 12, 2023 2:20 PM   Medardo Gautam is a 83 year old male who presents for:    Chief Complaint   Patient presents with     Blood Draw     Labs drawn via  by RN in lab. VS taken.      Oncology Clinic Visit     RETURN - PROSTATE CANCER     Initial Vitals: /75 (BP Location: Right arm, Patient Position: Sitting, Cuff Size: Adult Regular)   Pulse 64   Temp 97.8  F (36.6  C) (Oral)   Resp 18   Wt 82.7 kg (182 lb 6.4 oz)   SpO2 98%   BMI 29.44 kg/m   Estimated body mass index is 29.44 kg/m  as calculated from the following:    Height as of 3/9/23: 1.676 m (5' 6\").    Weight as of this encounter: 82.7 kg (182 lb 6.4 oz). Body surface area is 1.96 meters squared.  No Pain (0) Comment: Data Unavailable   No LMP for male patient.  Allergies reviewed: Yes  Medications reviewed: Yes    Medications: Medication refills not needed today.  Pharmacy name entered into [facility]:    [facility] - [place], [place] - [address] [facility] - [place], [place] - [address]   [facility] [place], [place] - [address]  [facility] [place] [address] [place] [address]    Clinical concerns: Worsening vision and fatigue, would like to know if it is related to treatment.        Laura Neumann, [facility]            "

## 2023-04-12 NOTE — LETTER
4/12/2023         RE: Medardo Gautam  [address] [facility] [address]        Dear Colleague,    Thank you for referring your patient, Medardo Gautam, to the [facility] CANCER [facility]INIC. Please see a copy of my visit note below.      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    History of Present Illness:  Mr. Gautam is an 83-year-old man who was diagnosed with prostate cancer in 2012.  His PSA level was 5.2 ng/mL.  Clinical stage was cT1c disease.  He underwent radical prostatectomy on 8/6/2012, which revealed [place] 4+5=9 carcinoma, stage pT2c N0 R0.  His next-generation DNA sequencing analysis ([facility]) revealed a pathogenic TP53 mutation, SHIRA genotype, TMB 5 muts/Mb, and absent PD-L1 expression.  He subsequently received salvage radiotherapy, which was completed in 10/2013, concurrently with six months of androgen deprivation therapy.    He subsequently developed a biochemical recurrence, and received ADT from 2014 until 2020.  In 11/2020, he developed non-metastatic CRPC.  Darolutamide was added to his regimen on 12/4/2020, and he has remained on this agent since that time.  As a result, his PSA level initially dropped from 1.66 ng/mL down to a speedy of 0.07 ng/mL (6/21/2021).  However, the patient has developed a rising PSA level over the past year.  His PSA on 1/13/2022 was 0.16, the PSA on 4/20/2022 was 0.24, the PSA on 6/13/2022 was 0.34, the PSA on 7/12/2022 was 0.47, the PSA on 8/25/2022 was 0.64 ng/mL, the PSA on 9/14/2022 was 0.67 ng/mL, and the PSA on 11/21/2022 was 1.55 ng/mL (with a testosterone level of 14 ng/dL).  Most recently (3/8/2023), his PSA level has increased to 8.7 ng/mL.    The patient previously screened for the ECLIPSE clinical trial, and he was randomized to the Lee Ann-PSMA-I&T active therapy arm.  During the screening procedures he was found to have an  asymptomatic pulmonary embolus.  He was placed on oral anticoagulation, using apixaban.  He returns today for a study visit, in anticipation of his second cycle of Lee Ann-PSMA-I&T radioligand therapy in two weeks.  He was seen today in conjunction with the research nurse,Melissa Spencer.    Review of Systems:  The patient states that he is feeling generally well.  He does not report significant side effects from the first dose of 177Lu-PSMA-I&T, except that he has notice dry mouth/throat as well as dry eyes with some blurred vision.  Remarkably, he does not report shortness of breath despite a known saddle pulmonary embolus.  He also complains of dry skin.  He has not gained or lost any weight recently.  He does not report any urological symptoms at this time.  He does not have any cancer-related pain.  A comprehensive 14-system review of symptoms is otherwise generally within normal limits.  His ECOG score is 0.  His pain score is 0/10.    Medications:  Lupron 22.5 mg IM q3mo (last, 2/11/2023)  177Lu-PSMA-I&T, dose #2 on 4/25/2023  Apixaban 5 mg BID  Losartan/HCTZ 100/12.5 mg  Allopurinol 100 mg  Zolpidem 5 mg  Clonazepam 1 mg  Sildenafil 50 mg  Loratadine 10 mg  Calcium + Vit D  Multivitamin  Folic acid    Physical Examination:  Mr. Gautam is an 83-year-old man who appears comfortable at rest.  His vital signs are generally unremarkable.  His HEENT examination is within normal limits.  There is no palpable cervical, axillary, supraclavicular or inguinal lymphadenopathy.  The cardiac, pulmonary and gastrointestinal examinations are generally within normal limits.  The musculoskeletal examination is grossly intact.  The extremities do not demonstrate pitting edema.  The skin evaluation is unremarkable.    Imaging (2/22/2023):  His PSMA-PET/CT scan showed an asymptomatic pulmonary embolism extending into the left subsegmental pulmonary arteries; no appreciable evidence of right heart strain.  Increased PSMA uptake in  multiple enlarged retroperitoneal lymph nodes which have increased in size compared to 9/14/2022. Findings consistent with metastatic disease recurrence.  Cholelithiasis without evidence of acute cholecystitis.  Hepatic steatosis without focal masses or lesions.  His nuclear-medicine bone scan showed no evidence of osseous metastatic disease.    Laboratories (4/12/2023):  His CBC reveals a WBC of 3.5, hemoglobin 12.2, hematocrit 35.6%, platelets 119K.  His comprehensive metabolic panel is generally within normal limits, including a normal creatinine level of 0.83 mg/dL.  His PSA is 4.94 ng/mL, which is declining.      ASSESSMENT AND PLAN:  Mr. Gautam is an 83-year-old man with [place] 4+5=9 [id number] prostate cancer who underwent radical prostatectomy (8/2012) and salvage radiotherapy (ending in 10/2013) who has metastatic CRPC and serological progression despite darolutamide treatment.  He has enrolled on the ECLIPSE study, and he is scheduled for the second cycle of Lee Ann-PSMA-I&T next week on 4/25/23.  His ECOG score is 0.  His pain score is 0/10.    The patient was previously showing signs of biochemical progression despite darolutamide.  He returns to the clinic today to discuss additional systemic or local treatment options.  Such treatment choices may include the use of an alternative AR-directed agent such as abiraterone, or taxane chemotherapy using docetaxel, or possibly metastasis-directed radiotherapy (e.g. SBRT) if he is found to have oligoprogressive disease in the future.  We also reviewed our clinical trial portfolio.  The patient is eligible for the ECLIPSE clinical trial at this time.  This is a randomized study of 177Lu-PSMA-I&T versus abiraterone for patients with metastatic CRPC who have only received one line of prior AR-directed therapy.      Fortunately, he was randomized to the active radioligand therapy arm, with his second Lee Ann-PSMA-I&T dose scheduled on 4/25/23.  Unexpectedly, he was found to  have a pulmonary embolus on his screening CT evaluation; thus he is now being treated with apixaban.  Of note, the use of oral anticoagulation is not a contraindication to proceed with the ECLIPSE trial.  Thus far, he has not experienced any significant side effects from the radioligand therapy, except for xerostomia and xerophthalmia.  His PSA is already declining, from 8.7 to 4.95 ng/mL.  We will move forward with his second planned cycle of Lee Ann-PSMA-I&T on 4/25/2023.  He was seen together with the research nurse, Melissa Spencer.    A total of 40 minutes were spent on this patient on the day of the consultation, of which more than 50% of this time was used for counseling and coordination of care.  He was given the opportunity to ask multiple questions today, all of which were answered to his satisfaction.    Tio Holman M.D.

## 2023-04-12 NOTE — NURSING NOTE
Chief Complaint   Patient presents with     Blood Draw     Labs drawn via  by RN in lab. VS taken.      Labs collected from venipuncture by RN. Vitals taken. Checked in for appointment(s).    Lisa Carrera RN

## 2023-04-14 ENCOUNTER — DOCUMENTATION ONLY (OUTPATIENT)
Dept: ONCOLOGY | Facility: CLINIC | Age: 83
End: 2023-04-14
Payer: COMMERCIAL

## 2023-04-14 NOTE — PROGRESS NOTES
[facility] Oral Chemotherapy Monitoring Program    Thank you for the opportunity to be a part in the care of this patient's oral chemotherapy. The oncology pharmacy will no longer be following this patient for oral chemotherapy. If there are any questions or the plan changes, feel free to contact us.        8/23/2021    10:00 AM 10/17/2021    10:00 AM 10/21/2021     8:00 AM 12/5/2021    11:00 AM 12/2/2022     1:00 PM 12/23/2022     1:00 PM 4/14/2023     7:00 AM   ORAL CHEMOTHERAPY   Assessment Type Chart Review Chart Review Refill Chart Review Refill Refill Discontinuation   Diagnosis Code Prostate Cancer Prostate Cancer Prostate Cancer Prostate Cancer Prostate Cancer Prostate Cancer Prostate Cancer   Providers Dr. Juan Jose Ingram    Clinic Name/Location [facility]   Drug Name Nubeqa (darolutamide) Nubeqa (darolutamide) Nubeqa (darolutamide) Nubeqa (darolutamide) Nubeqa (darolutamide) Nubeqa (darolutamide) Nubeqa (darolutamide)   Dose 300 mg 300 mg 600 mg 600 mg 600 mg 600 mg    Current Schedule BID BID BID BID BID BID    Cycle Details Continuous Continuous Continuous Continuous Continuous Continuous        Patient now on the ECLIPSE trial, darolutamide was discontinued in February.    Tamiko Marshall, PharmD, [facility]  Oral Chemotherapy Monitoring Program  [facility]  [phone]

## 2023-04-19 LAB — TESTOST SERPL-MCNC: 11 NG/DL (ref 240–950)

## 2023-04-22 ENCOUNTER — HEALTH MAINTENANCE LETTER (OUTPATIENT)
Age: 83
End: 2023-04-22

## 2023-04-24 ENCOUNTER — DOCUMENTATION ONLY (OUTPATIENT)
Dept: ONCOLOGY | Facility: CLINIC | Age: 83
End: 2023-04-24
Payer: COMMERCIAL

## 2023-04-25 ENCOUNTER — ALLIED HEALTH/NURSE VISIT (OUTPATIENT)
Dept: ONCOLOGY | Facility: CLINIC | Age: 83
End: 2023-04-25
Payer: COMMERCIAL

## 2023-04-25 ENCOUNTER — HOSPITAL ENCOUNTER (OUTPATIENT)
Dept: NUCLEAR MEDICINE | Facility: CLINIC | Age: 83
Setting detail: NUCLEAR MEDICINE
Discharge: HOME | End: 2023-04-25
Attending: INTERNAL MEDICINE
Payer: COMMERCIAL

## 2023-04-25 DIAGNOSIS — C79.51 PROSTATE CANCER METASTATIC TO BONE (H): Primary | ICD-10-CM

## 2023-04-25 DIAGNOSIS — C61 PROSTATE CANCER METASTATIC TO BONE (H): Primary | ICD-10-CM

## 2023-04-25 DIAGNOSIS — C61 MALIGNANT NEOPLASM OF PROSTATE (H): ICD-10-CM

## 2023-04-25 DIAGNOSIS — C61 PROSTATE CANCER (H): ICD-10-CM

## 2023-04-25 RX ORDER — ONDANSETRON 4 MG/1
8 TABLET, FILM COATED ORAL
Status: CANCELLED | OUTPATIENT
Start: 2023-06-07

## 2023-04-25 RX ORDER — PROCHLORPERAZINE MALEATE 5 MG
5-10 TABLET ORAL EVERY 6 HOURS PRN
Status: CANCELLED
Start: 2023-07-19

## 2023-04-25 RX ORDER — LORAZEPAM 0.5 MG/1
.5-1 TABLET ORAL EVERY 6 HOURS PRN
Status: CANCELLED
Start: 2023-06-07

## 2023-04-25 RX ORDER — DIPHENHYDRAMINE HYDROCHLORIDE 50 MG/ML
50 INJECTION INTRAMUSCULAR; INTRAVENOUS
Status: CANCELLED
Start: 2023-07-19

## 2023-04-25 RX ORDER — ALBUTEROL SULFATE 0.83 MG/ML
2.5 SOLUTION RESPIRATORY (INHALATION)
Status: CANCELLED | OUTPATIENT
Start: 2023-07-19

## 2023-04-25 RX ORDER — LORAZEPAM 0.5 MG/1
.5-1 TABLET ORAL EVERY 6 HOURS PRN
Status: DISCONTINUED | OUTPATIENT
Start: 2023-04-25 | End: 2023-04-26 | Stop reason: HOSPADM

## 2023-04-25 RX ORDER — LORAZEPAM 2 MG/ML
.5-1 INJECTION INTRAMUSCULAR EVERY 6 HOURS PRN
Status: CANCELLED | OUTPATIENT
Start: 2023-06-07

## 2023-04-25 RX ORDER — EPINEPHRINE 1 MG/ML
0.3 INJECTION, SOLUTION INTRAMUSCULAR; SUBCUTANEOUS EVERY 5 MIN PRN
Status: CANCELLED | OUTPATIENT
Start: 2023-07-19

## 2023-04-25 RX ORDER — ALBUTEROL SULFATE 0.83 MG/ML
2.5 SOLUTION RESPIRATORY (INHALATION)
Status: DISCONTINUED | OUTPATIENT
Start: 2023-04-25 | End: 2023-04-26 | Stop reason: HOSPADM

## 2023-04-25 RX ORDER — LORAZEPAM 2 MG/ML
.5-1 INJECTION INTRAMUSCULAR EVERY 6 HOURS PRN
Status: DISCONTINUED | OUTPATIENT
Start: 2023-04-25 | End: 2023-04-26 | Stop reason: HOSPADM

## 2023-04-25 RX ORDER — MEPERIDINE HYDROCHLORIDE 25 MG/ML
25 INJECTION INTRAMUSCULAR; INTRAVENOUS; SUBCUTANEOUS EVERY 30 MIN PRN
Status: CANCELLED | OUTPATIENT
Start: 2023-07-19

## 2023-04-25 RX ORDER — EPINEPHRINE 1 MG/ML
0.3 INJECTION, SOLUTION INTRAMUSCULAR; SUBCUTANEOUS EVERY 5 MIN PRN
Status: CANCELLED | OUTPATIENT
Start: 2023-06-07

## 2023-04-25 RX ORDER — MEPERIDINE HYDROCHLORIDE 25 MG/ML
25 INJECTION INTRAMUSCULAR; INTRAVENOUS; SUBCUTANEOUS EVERY 30 MIN PRN
Status: DISCONTINUED | OUTPATIENT
Start: 2023-04-25 | End: 2023-04-26 | Stop reason: HOSPADM

## 2023-04-25 RX ORDER — MEPERIDINE HYDROCHLORIDE 25 MG/ML
25 INJECTION INTRAMUSCULAR; INTRAVENOUS; SUBCUTANEOUS EVERY 30 MIN PRN
Status: CANCELLED | OUTPATIENT
Start: 2023-06-07

## 2023-04-25 RX ORDER — PROCHLORPERAZINE MALEATE 5 MG
5-10 TABLET ORAL EVERY 6 HOURS PRN
Status: CANCELLED
Start: 2023-06-07

## 2023-04-25 RX ORDER — LORAZEPAM 0.5 MG/1
.5-1 TABLET ORAL EVERY 6 HOURS PRN
Status: CANCELLED
Start: 2023-07-19

## 2023-04-25 RX ORDER — PROCHLORPERAZINE MALEATE 5 MG
5-10 TABLET ORAL EVERY 6 HOURS PRN
Status: DISCONTINUED | OUTPATIENT
Start: 2023-04-25 | End: 2023-04-26 | Stop reason: HOSPADM

## 2023-04-25 RX ORDER — METHYLPREDNISOLONE SODIUM SUCCINATE 125 MG/2ML
125 INJECTION, POWDER, LYOPHILIZED, FOR SOLUTION INTRAMUSCULAR; INTRAVENOUS
Status: DISCONTINUED | OUTPATIENT
Start: 2023-04-25 | End: 2023-04-26 | Stop reason: HOSPADM

## 2023-04-25 RX ORDER — DIPHENHYDRAMINE HYDROCHLORIDE 50 MG/ML
50 INJECTION INTRAMUSCULAR; INTRAVENOUS
Status: DISCONTINUED | OUTPATIENT
Start: 2023-04-25 | End: 2023-04-26 | Stop reason: HOSPADM

## 2023-04-25 RX ORDER — ALBUTEROL SULFATE 90 UG/1
1-2 AEROSOL, METERED RESPIRATORY (INHALATION)
Status: CANCELLED
Start: 2023-07-19

## 2023-04-25 RX ORDER — ALBUTEROL SULFATE 90 UG/1
1-2 AEROSOL, METERED RESPIRATORY (INHALATION)
Status: CANCELLED
Start: 2023-06-07

## 2023-04-25 RX ORDER — METHYLPREDNISOLONE SODIUM SUCCINATE 125 MG/2ML
125 INJECTION, POWDER, LYOPHILIZED, FOR SOLUTION INTRAMUSCULAR; INTRAVENOUS
Status: CANCELLED
Start: 2023-07-19

## 2023-04-25 RX ORDER — ONDANSETRON 4 MG/1
8 TABLET, FILM COATED ORAL
Status: CANCELLED | OUTPATIENT
Start: 2023-07-19

## 2023-04-25 RX ORDER — ONDANSETRON 8 MG/1
8 TABLET, FILM COATED ORAL
Status: DISCONTINUED | OUTPATIENT
Start: 2023-04-25 | End: 2023-04-26 | Stop reason: HOSPADM

## 2023-04-25 RX ORDER — METHYLPREDNISOLONE SODIUM SUCCINATE 125 MG/2ML
125 INJECTION, POWDER, LYOPHILIZED, FOR SOLUTION INTRAMUSCULAR; INTRAVENOUS
Status: CANCELLED
Start: 2023-06-07

## 2023-04-25 RX ORDER — LORAZEPAM 2 MG/ML
.5-1 INJECTION INTRAMUSCULAR EVERY 6 HOURS PRN
Status: CANCELLED | OUTPATIENT
Start: 2023-07-19

## 2023-04-25 RX ORDER — ALBUTEROL SULFATE 90 UG/1
1-2 AEROSOL, METERED RESPIRATORY (INHALATION)
Status: DISCONTINUED | OUTPATIENT
Start: 2023-04-25 | End: 2023-04-26 | Stop reason: HOSPADM

## 2023-04-25 RX ORDER — ALBUTEROL SULFATE 0.83 MG/ML
2.5 SOLUTION RESPIRATORY (INHALATION)
Status: CANCELLED | OUTPATIENT
Start: 2023-06-07

## 2023-04-25 RX ORDER — DIPHENHYDRAMINE HYDROCHLORIDE 50 MG/ML
50 INJECTION INTRAMUSCULAR; INTRAVENOUS
Status: CANCELLED
Start: 2023-06-07

## 2023-04-25 RX ORDER — EPINEPHRINE 1 MG/ML
0.3 INJECTION, SOLUTION, CONCENTRATE INTRAVENOUS EVERY 5 MIN PRN
Status: DISCONTINUED | OUTPATIENT
Start: 2023-04-25 | End: 2023-04-26 | Stop reason: HOSPADM

## 2023-04-25 NOTE — PROGRESS NOTES
2022IS014: Study Visit Note   Subject name: Medardo Gautam     Visit: Cycle 2 Week 6    Did the study visit occur within the appropriate window allowed by the protocol? Yes    If no, why? NA    Since the last study visit, He has been doing alright. He had been experiencing some diarrhea, nausea, and vomiting about 4 days ago, but this has resolved now. He also continues to experience dry eyes and some blurred vision. Otherwise, patient has been feeling well. He has not had any medication changes.     Verbal handover provided to nuclear medicine department. Instructed department to document start time, stop time of infusion as well as assigned activity, total activity in container pre-dose, total activity post-dose, total volume administered and infusion flow rate.     Reminded patient to drink plenty of water and fluids and void frequently.     I have personally interviewed Medardo Gautam and reviewed his medical record for adverse events and concomitant medications and these have been recorded on the corresponding logs in Medardo Gautam's research file.     Medardo Gautam was given the opportunity to ask any trial related questions.  Please see provider progress note for physical exam and other clinical information. Labs were reviewed - any significant lab values were addressed and reviewed.    Kelli Chapa  Clinical Research Coordinator III  [facility]  T: [phone]

## 2023-05-04 ENCOUNTER — HOSPITAL ENCOUNTER (OUTPATIENT)
Dept: NUCLEAR MEDICINE | Facility: CLINIC | Age: 83
Setting detail: NUCLEAR MEDICINE
Discharge: HOME | End: 2023-05-04
Attending: INTERNAL MEDICINE
Payer: COMMERCIAL

## 2023-05-04 ENCOUNTER — HOSPITAL ENCOUNTER (OUTPATIENT)
Dept: CT IMAGING | Facility: CLINIC | Age: 83
Discharge: HOME | End: 2023-05-04
Attending: INTERNAL MEDICINE | Admitting: INTERNAL MEDICINE
Payer: COMMERCIAL

## 2023-05-04 DIAGNOSIS — C61 PROSTATE CANCER METASTATIC TO BONE (H): ICD-10-CM

## 2023-05-04 DIAGNOSIS — C79.51 PROSTATE CANCER METASTATIC TO BONE (H): ICD-10-CM

## 2023-05-04 PROCEDURE — 78306 BONE IMAGING WHOLE BODY: CPT | Mod: 26 | Performed by: RADIOLOGY

## 2023-05-04 PROCEDURE — 74177 CT ABD & PELVIS W/CONTRAST: CPT | Mod: 26 | Performed by: RADIOLOGY

## 2023-05-04 PROCEDURE — 74177 CT ABD & PELVIS W/CONTRAST: CPT

## 2023-05-04 PROCEDURE — 343N000001 HC RX 343: Performed by: INTERNAL MEDICINE

## 2023-05-04 PROCEDURE — 71260 CT THORAX DX C+: CPT | Mod: 26 | Performed by: RADIOLOGY

## 2023-05-04 PROCEDURE — A9503 TC99M MEDRONATE: HCPCS | Performed by: INTERNAL MEDICINE

## 2023-05-04 PROCEDURE — 78306 BONE IMAGING WHOLE BODY: CPT

## 2023-05-04 PROCEDURE — 250N000011 HC RX IP 250 OP 636: Performed by: INTERNAL MEDICINE

## 2023-05-04 RX ORDER — IOPAMIDOL 755 MG/ML
112 INJECTION, SOLUTION INTRAVASCULAR ONCE
Status: COMPLETED | OUTPATIENT
Start: 2023-05-04 | End: 2023-05-04

## 2023-05-04 RX ORDER — TC 99M MEDRONATE 20 MG/10ML
20-30 INJECTION, POWDER, LYOPHILIZED, FOR SOLUTION INTRAVENOUS ONCE
Status: COMPLETED | OUTPATIENT
Start: 2023-05-04 | End: 2023-05-04

## 2023-05-04 RX ADMIN — IOPAMIDOL 112 ML: 755 INJECTION, SOLUTION INTRAVENOUS at 11:12

## 2023-05-04 RX ADMIN — TC 99M MEDRONATE 27 MILLICURIE: 20 INJECTION, POWDER, LYOPHILIZED, FOR SOLUTION INTRAVENOUS at 10:55

## 2023-05-05 ENCOUNTER — ANCILLARY PROCEDURE (OUTPATIENT)
Dept: RADIOLOGY | Facility: CLINIC | Age: 83
End: 2023-05-05
Attending: INTERNAL MEDICINE
Payer: COMMERCIAL

## 2023-05-05 DIAGNOSIS — C79.51 PROSTATE CANCER METASTATIC TO BONE (H): Primary | ICD-10-CM

## 2023-05-05 DIAGNOSIS — C79.51 PROSTATE CANCER METASTATIC TO BONE (H): ICD-10-CM

## 2023-05-05 DIAGNOSIS — C61 PROSTATE CANCER METASTATIC TO BONE (H): Primary | ICD-10-CM

## 2023-05-05 DIAGNOSIS — C61 PROSTATE CANCER METASTATIC TO BONE (H): ICD-10-CM

## 2023-05-07 NOTE — PROGRESS NOTES
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    History of Present Illness:  Mr. Gautam is an 83-year-old man who was diagnosed with prostate cancer in 2012.  His PSA level was 5.2 ng/mL.  Clinical stage was cT1c disease.  He underwent radical prostatectomy on 8/6/2012, which revealed [place] 4+5=9 carcinoma, stage pT2c N0 R0.  His next-generation DNA sequencing analysis ([employer]) revealed a pathogenic TP53 mutation, SHIRA genotype, TMB 5 muts/Mb, and absent PD-L1 expression.  He subsequently received salvage radiotherapy, which was completed in 10/2013, concurrently with six months of androgen deprivation therapy.    He subsequently developed a biochemical recurrence, and received ADT from 2014 until 2020.  In 11/2020, he developed non-metastatic CRPC.  Darolutamide was added to his regimen on 12/4/2020, and he has remained on this agent since that time.  As a result, his PSA level initially dropped from 1.66 ng/mL down to a speedy of 0.07 ng/mL (6/21/2021).  However, the patient has developed a rising PSA level over the past year.  His PSA on 1/13/2022 was 0.16, the PSA on 4/20/2022 was 0.24, the PSA on 6/13/2022 was 0.34, the PSA on 7/12/2022 was 0.47, the PSA on 8/25/2022 was 0.64 ng/mL, the PSA on 9/14/2022 was 0.67 ng/mL, and the PSA on 11/21/2022 was 1.55 ng/mL (with a testosterone level of 14 ng/dL).  Most recently (3/8/2023), his PSA level has increased to 8.7 ng/mL.    The patient previously screened for the ECLIPSE clinical trial, and he was randomized to the Lee Ann-PSMA-I&T active therapy arm.  During the screening procedures he was found to have an asymptomatic pulmonary embolus.  He was placed on oral anticoagulation, using apixaban.  He returns today for a study visit, in anticipation of his third cycle of Lee Ann-PSMA-I&T radioligand therapy in two weeks.  He has received two doses of Lee Ann-PSMA-I&T thus far.  He was  seen today in conjunction with the research nurse, Melissa Spencer.    Review of Systems:  The patient states that he is feeling generally well.  He does not report significant side effects from the first 2 doses of Lee Ann-PSMA-I&T, except that he has notice dry mouth/throat as well as dry eyes with some blurred vision.  Remarkably, he does not report shortness of breath despite a known saddle pulmonary embolus.  He also complains of dry skin.  He has not gained or lost any weight recently.  He does not report any urological symptoms at this time.  He does not have any cancer-related pain.  A comprehensive 14-system review of symptoms is otherwise generally within normal limits.  His ECOG score is 0.  His pain score is 0/10.    Medications:  Lupron 22.5 mg IM q3mo (next dose, 5/27/2023)  177Lu-PSMA-I&T, dose #2 on 4/25/2023, dose #3 on 6/7/2023  Apixaban 5 mg BID, started on 2/14/2023  Losartan/HCTZ 100/12.5 mg  Allopurinol 100 mg  Zolpidem 5 mg  Clonazepam 1 mg  Sildenafil 50 mg  Loratadine 10 mg  Calcium + Vit D  Multivitamin  Folic acid    Physical Examination:  Mr. Gautam is an 83-year-old man who appears comfortable at rest.  His vital signs are generally unremarkable.  His HEENT examination is within normal limits.  There is no palpable cervical, axillary, supraclavicular or inguinal lymphadenopathy.  The cardiac, pulmonary and gastrointestinal examinations are generally within normal limits.  The musculoskeletal examination is grossly intact.  The extremities do not demonstrate pitting edema.  The skin evaluation is unremarkable.    Imaging (2/22/2023):  His PSMA-PET/CT scan showed an asymptomatic pulmonary embolism extending into the left subsegmental pulmonary arteries; no appreciable evidence of right heart strain.  Increased PSMA uptake in multiple enlarged retroperitoneal lymph nodes which have increased in size compared to 9/14/2022. Findings consistent with metastatic disease recurrence.  Cholelithiasis  without evidence of acute cholecystitis.  Hepatic steatosis without focal masses or lesions.  His nuclear-medicine bone scan showed no evidence of osseous metastatic disease.    Imaging (5/4/2023):  His CT scan shows a few borderline and mildly enlarged retroperitoneal preaortic lymph nodes, the largest measuring 1.6 cm x 1.4 cm, overall mildly increased in size since 2/13/2023 but still not considered to be a measurable lesion.  No other findings suspicious for residual or recurrent neoplasm in the chest, abdomen, or pelvis.  Mild diffuse wall thickening and hyperenhancement of the distal sigmoid colon and rectum. This appearance is consistent with colitis that is nonspecific, but could relate to prior radiation therapy.  Mild diffuse wall thickening of a nondistended urinary bladder. This could relate to cystitis related to prior radiation therapy.  His bone scan shows no suspicious osseous uptake.  Overall, these radiographic findings are consistent with stable disease.    Laboratories (5/8/2023):  His CBC reveals a WBC of 2.6, hemoglobin 12.1, hematocrit 34.9%, platelets 119K.  His comprehensive metabolic panel is generally within normal limits, including a normal creatinine level of 0.81 mg/dL.  His PSA is 3.18 ng/mL, which continues to decline.      ASSESSMENT AND PLAN:  Mr. Gautam is an 83-year-old man with [place] 4+5=9 [id number] prostate cancer who underwent radical prostatectomy (8/2012) and salvage radiotherapy (10/2013) who has metastatic CRPC and serological progression despite darolutamide treatment.  He has enrolled on the ECLIPSE study, and he is scheduled for the third cycle of Lee Ann-PSMA-I&T next week on 6/6/2023.  His ECOG score is 0.  His pain score is 0/10.    The patient was previously showing signs of biochemical progression despite darolutamide.  He returns to the clinic today to discuss additional systemic or local treatment options.  Such treatment choices may include the use of an  alternative AR-directed agent such as abiraterone, or taxane chemotherapy using docetaxel, or possibly metastasis-directed radiotherapy (e.g. SBRT) if he is found to have oligoprogressive disease in the future.  We also reviewed our clinical trial portfolio.  The patient is eligible for the ECLIPSE clinical trial at this time.  This is a randomized study of 177Lu-PSMA-I&T versus abiraterone for patients with metastatic CRPC who have only received one line of prior AR-directed therapy.      Fortunately, the patient has been randomized to the active radioligand therapy arm, with his second Lee Ann-PSMA-I&T dose having been given on 4/25/2023.  Unexpectedly, he was found to have a pulmonary embolus on his screening CT evaluation; thus he is now being treated with apixaban.  Of note, the use of oral anticoagulation is not a contraindication to proceed with the ECLIPSE trial.  Thus far, he has not experienced any significant side effects from the radioligand therapy, except for xerostomia and xerophthalmia.  His PSA is already declining, from 8.7 to 3.18 ng/mL.  His imaging assessments continue to show stable disease, without any evidence of osseous or soft-tissue progressive disease.  We will move forward with his third planned cycle of Lee Ann-PSMA-I&T on 6/7/2023.  He was seen together with the research nurse, Melissa Spencer.    A total of 40 minutes were spent on this patient on the day of the consultation, of which more than 50% of this time was used for counseling and coordination of care.  He was given the opportunity to ask multiple questions today, all of which were answered to his satisfaction.    Tio Holman M.D.

## 2023-05-08 ENCOUNTER — ANCILLARY PROCEDURE (OUTPATIENT)
Dept: RADIOLOGY | Facility: CLINIC | Age: 83
End: 2023-05-08
Attending: INTERNAL MEDICINE
Payer: COMMERCIAL

## 2023-05-08 ENCOUNTER — ALLIED HEALTH/NURSE VISIT (OUTPATIENT)
Dept: ONCOLOGY | Facility: CLINIC | Age: 83
End: 2023-05-08

## 2023-05-08 ENCOUNTER — ONCOLOGY VISIT (OUTPATIENT)
Dept: ONCOLOGY | Facility: CLINIC | Age: 83
End: 2023-05-08
Attending: INTERNAL MEDICINE
Payer: COMMERCIAL

## 2023-05-08 ENCOUNTER — APPOINTMENT (OUTPATIENT)
Dept: LAB | Facility: CLINIC | Age: 83
End: 2023-05-08
Attending: INTERNAL MEDICINE
Payer: COMMERCIAL

## 2023-05-08 VITALS
HEART RATE: 54 BPM | WEIGHT: 178.1 LBS | BODY MASS INDEX: 28.75 KG/M2 | DIASTOLIC BLOOD PRESSURE: 67 MMHG | RESPIRATION RATE: 16 BRPM | SYSTOLIC BLOOD PRESSURE: 130 MMHG | OXYGEN SATURATION: 97 % | TEMPERATURE: 98 F

## 2023-05-08 DIAGNOSIS — C79.51 PROSTATE CANCER METASTATIC TO BONE (H): Primary | ICD-10-CM

## 2023-05-08 DIAGNOSIS — C61 PROSTATE CANCER METASTATIC TO BONE (H): Primary | ICD-10-CM

## 2023-05-08 DIAGNOSIS — C61 PROSTATE CANCER (H): ICD-10-CM

## 2023-05-08 DIAGNOSIS — C61 MALIGNANT NEOPLASM OF PROSTATE (H): Primary | ICD-10-CM

## 2023-05-08 DIAGNOSIS — C61 MALIGNANT NEOPLASM OF PROSTATE (H): ICD-10-CM

## 2023-05-08 LAB
ALBUMIN SERPL BCG-MCNC: 4 G/DL (ref 3.5–5.2)
ALP SERPL-CCNC: 72 U/L (ref 40–129)
ALT SERPL W P-5'-P-CCNC: 22 U/L (ref 10–50)
ANION GAP SERPL CALCULATED.3IONS-SCNC: 11 MMOL/L (ref 7–15)
AST SERPL W P-5'-P-CCNC: 41 U/L (ref 10–50)
BASOPHILS # BLD AUTO: 0 10E3/UL (ref 0–0.2)
BASOPHILS NFR BLD AUTO: 1 %
BILIRUB SERPL-MCNC: 0.5 MG/DL
BUN SERPL-MCNC: 8.7 MG/DL (ref 8–23)
CALCIUM SERPL-MCNC: 9.3 MG/DL (ref 8.8–10.2)
CHLORIDE SERPL-SCNC: 104 MMOL/L (ref 98–107)
CHOLEST SERPL-MCNC: 140 MG/DL
CREAT SERPL-MCNC: 0.81 MG/DL (ref 0.67–1.17)
DEPRECATED HCO3 PLAS-SCNC: 28 MMOL/L (ref 22–29)
EOSINOPHIL # BLD AUTO: 0 10E3/UL (ref 0–0.7)
EOSINOPHIL NFR BLD AUTO: 1 %
ERYTHROCYTE [DISTWIDTH] IN BLOOD BY AUTOMATED COUNT: 13.8 % (ref 10–15)
GFR SERPL CREATININE-BSD FRML MDRD: 89 ML/MIN/1.73M2
GLUCOSE SERPL-MCNC: 132 MG/DL (ref 70–99)
HCT VFR BLD AUTO: 34.9 % (ref 40–53)
HDLC SERPL-MCNC: 71 MG/DL
HGB BLD-MCNC: 12.1 G/DL (ref 13.3–17.7)
IMM GRANULOCYTES # BLD: 0 10E3/UL
IMM GRANULOCYTES NFR BLD: 0 %
LDLC SERPL CALC-MCNC: 54 MG/DL
LYMPHOCYTES # BLD AUTO: 0.7 10E3/UL (ref 0.8–5.3)
LYMPHOCYTES NFR BLD AUTO: 29 %
MAGNESIUM SERPL-MCNC: 1.5 MG/DL (ref 1.7–2.3)
MCH RBC QN AUTO: 31 PG (ref 26.5–33)
MCHC RBC AUTO-ENTMCNC: 34.7 G/DL (ref 31.5–36.5)
MCV RBC AUTO: 90 FL (ref 78–100)
MONOCYTES # BLD AUTO: 0.3 10E3/UL (ref 0–1.3)
MONOCYTES NFR BLD AUTO: 13 %
NEUTROPHILS # BLD AUTO: 1.5 10E3/UL (ref 1.6–8.3)
NEUTROPHILS NFR BLD AUTO: 56 %
NONHDLC SERPL-MCNC: 69 MG/DL
NRBC # BLD AUTO: 0 10E3/UL
NRBC BLD AUTO-RTO: 0 /100
PLATELET # BLD AUTO: 119 10E3/UL (ref 150–450)
POTASSIUM SERPL-SCNC: 3.1 MMOL/L (ref 3.4–5.3)
PROT SERPL-MCNC: 6.7 G/DL (ref 6.4–8.3)
PSA SERPL DL<=0.01 NG/ML-MCNC: 3.18 NG/ML
RBC # BLD AUTO: 3.9 10E6/UL (ref 4.4–5.9)
SODIUM SERPL-SCNC: 143 MMOL/L (ref 136–145)
TRIGL SERPL-MCNC: 74 MG/DL
TSH SERPL DL<=0.005 MIU/L-ACNC: 1.56 UIU/ML (ref 0.3–4.2)
WBC # BLD AUTO: 2.6 10E3/UL (ref 4–11)

## 2023-05-08 PROCEDURE — 80061 LIPID PANEL: CPT | Performed by: INTERNAL MEDICINE

## 2023-05-08 PROCEDURE — 85025 COMPLETE CBC W/AUTO DIFF WBC: CPT | Performed by: INTERNAL MEDICINE

## 2023-05-08 PROCEDURE — 84403 ASSAY OF TOTAL TESTOSTERONE: CPT | Performed by: INTERNAL MEDICINE

## 2023-05-08 PROCEDURE — G0463 HOSPITAL OUTPT CLINIC VISIT: HCPCS | Performed by: INTERNAL MEDICINE

## 2023-05-08 PROCEDURE — 99215 OFFICE O/P EST HI 40 MIN: CPT | Performed by: INTERNAL MEDICINE

## 2023-05-08 PROCEDURE — 83735 ASSAY OF MAGNESIUM: CPT | Performed by: INTERNAL MEDICINE

## 2023-05-08 PROCEDURE — 84153 ASSAY OF PSA TOTAL: CPT | Performed by: INTERNAL MEDICINE

## 2023-05-08 PROCEDURE — 80053 COMPREHEN METABOLIC PANEL: CPT | Performed by: INTERNAL MEDICINE

## 2023-05-08 PROCEDURE — 84443 ASSAY THYROID STIM HORMONE: CPT | Performed by: INTERNAL MEDICINE

## 2023-05-08 PROCEDURE — 36415 COLL VENOUS BLD VENIPUNCTURE: CPT | Performed by: INTERNAL MEDICINE

## 2023-05-08 ASSESSMENT — PAIN SCALES - GENERAL: PAINLEVEL: NO PAIN (0)

## 2023-05-08 NOTE — PROGRESS NOTES
2022IS014: Study Visit Note   Subject name: Medardo Gautam     Visit: Week 8    Did the study visit occur within the appropriate window allowed by the protocol? Yes    If no, why? NA    Since the last study visit, He has been doing fairly well. He reports that he continues to experience dry eyes and mouth, but they have not worsened. Otherwise, patient has not had any new issues or medication changes.     I have personally interviewed Medardo Gautam and reviewed his medical record for adverse events and concomitant medications and these have been recorded on the corresponding logs in Medardo Gautam's research file.     Medardo Gautam was given the opportunity to ask any trial related questions.  Please see provider progress note for physical exam and other clinical information. Labs were reviewed - any significant lab values were addressed and reviewed.    Kelli Chapa  Clinical Research Coordinator III  [facility]  T: [phone]      2022IS014: Informed Consent Note     The consent form, including purpose, risks and benefits, was reviewed with Medardo Gautam, and all questions were answered before he signed the consent form. The patient understands that the study involves an active treatment phase as well as a post-treatment follow up phase.     Present during the discussion were Dr. Holman, the patient, Dr. Lewis, another research coordinator, and myself. A copy of the signed form was provided to the patient. No procedures specific to this study were performed prior to the patient signing the consent form.    Consent Version Date: 4/24/2023  Consent obtained by: Dr. Holman   Date: 5/8/2023  HIPAA authorization signed?: NA  HIPAA authorization version date: NA    Kelli Sammi    Form 503.03.01 (Version 2)     Effective date: 01AUG2018     Next Review Date: 01AUG2020

## 2023-05-08 NOTE — NURSING NOTE
"Oncology Rooming Note    May 8, 2023 10:59 AM   Medardo Gautam is a 83 year old male who presents for:    Chief Complaint   Patient presents with     Blood Draw     Vitals taken, venipuncture labs drawn, checked into next appt     Oncology Clinic Visit     RETURN - PROSTATE CANCER     Initial Vitals: /67 (BP Location: Left arm, Patient Position: Sitting, Cuff Size: Adult Large)   Pulse 54   Temp 98  F (36.7  C) (Oral)   Resp 16   Wt 80.8 kg (178 lb 1.6 oz)   SpO2 97%   BMI 28.75 kg/m   Estimated body mass index is 28.75 kg/m  as calculated from the following:    Height as of 3/9/23: 1.676 m (5' 6\").    Weight as of this encounter: 80.8 kg (178 lb 1.6 oz). Body surface area is 1.94 meters squared.  No Pain (0) Comment: Data Unavailable   No LMP for male patient.  Allergies reviewed: Yes  Medications reviewed: Yes    Medications: Medication refills not needed today.  Pharmacy name entered into [facility]:    [facility] - [place], [place] - [address] [facility] - [place], [place] - [address]   [facility] [place], [place] - [address]  [facility] [place] [address] [facility]    Clinical concerns: Multiple concerns to discuss, including:  - Eliquis: PCP has been prescribing it, but wants to know if this should be taken over by Dr. Holman.  - Lupron: No orders in, due ~05/22/23.  - Infusion: No further infusion appts scheduled. Do more need to be scheduled?  - PE: Is PE gone?   - Lymph nodes.        Laura Neumann CMA            "

## 2023-05-08 NOTE — NURSING NOTE
Eligard 22.5 mg was ordered today by Dr Holman.  Insurance declined this as he is 2 weeks early for the injection.  Pt states he will return 5-27-23 for this.    Dr Holman was notified.    Lady Cisneros LPN   on 5/8/2023 at 12:36 PM

## 2023-05-08 NOTE — NURSING NOTE
Chief Complaint   Patient presents with     Blood Draw     Vitals taken, venipuncture labs drawn, checked into next appt     /67 (BP Location: Left arm, Patient Position: Sitting, Cuff Size: Adult Large)   Pulse 54   Temp 98  F (36.7  C) (Oral)   Resp 16   Wt 80.8 kg (178 lb 1.6 oz)   SpO2 97%   BMI 28.75 kg/m    Denver De Guzman RN on 5/8/2023 at 10:53 AM

## 2023-05-08 NOTE — LETTER
5/8/2023         RE: Medardo Gautam  [address] [facility] [address]        Dear Colleague,    Thank you for referring your patient, Medardo Gautam, to the [facility]. Please see a copy of my visit note below.      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    History of Present Illness:  Mr. Gautam is an 83-year-old man who was diagnosed with prostate cancer in 2012.  His PSA level was 5.2 ng/mL.  Clinical stage was cT1c disease.  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  His next-generation DNA sequencing analysis ([employer]) revealed a pathogenic TP53 mutation, SHIRA genotype, TMB 5 muts/Mb, and absent PD-L1 expression.  He subsequently received salvage radiotherapy, which was completed in 10/2013, concurrently with six months of androgen deprivation therapy.    He subsequently developed a biochemical recurrence, and received ADT from 2014 until 2020.  In 11/2020, he developed non-metastatic CRPC.  Darolutamide was added to his regimen on 12/4/2020, and he has remained on this agent since that time.  As a result, his PSA level initially dropped from 1.66 ng/mL down to a speedy of 0.07 ng/mL (6/21/2021).  However, the patient has developed a rising PSA level over the past year.  His PSA on 1/13/2022 was 0.16, the PSA on 4/20/2022 was 0.24, the PSA on 6/13/2022 was 0.34, the PSA on 7/12/2022 was 0.47, the PSA on 8/25/2022 was 0.64 ng/mL, the PSA on 9/14/2022 was 0.67 ng/mL, and the PSA on 11/21/2022 was 1.55 ng/mL (with a testosterone level of 14 ng/dL).  Most recently (3/8/2023), his PSA level has increased to 8.7 ng/mL.    The patient previously screened for the ECLIPSE clinical trial, and he was randomized to the Lee Ann-PSMA-I&T active therapy arm.  During the screening procedures he was found to have an  asymptomatic pulmonary embolus.  He was placed on oral anticoagulation, using apixaban.  He returns today for a study visit, in anticipation of his third cycle of Lee Ann-PSMA-I&T radioligand therapy in two weeks.  He has received two doses of Lee Ann-PSMA-I&T thus far.  He was seen today in conjunction with the research nurse, Melissa Spencer.    Review of Systems:  The patient states that he is feeling generally well.  He does not report significant side effects from the first 2 doses of Lee Ann-PSMA-I&T, except that he has notice dry mouth/throat as well as dry eyes with some blurred vision.  Remarkably, he does not report shortness of breath despite a known saddle pulmonary embolus.  He also complains of dry skin.  He has not gained or lost any weight recently.  He does not report any urological symptoms at this time.  He does not have any cancer-related pain.  A comprehensive 14-system review of symptoms is otherwise generally within normal limits.  His ECOG score is 0.  His pain score is 0/10.    Medications:  Lupron 22.5 mg IM q3mo (next dose, 5/27/2023)  177Lu-PSMA-I&T, dose #2 on 4/25/2023, dose #3 on 6/7/2023  Apixaban 5 mg BID, started on 2/14/2023  Losartan/HCTZ 100/12.5 mg  Allopurinol 100 mg  Zolpidem 5 mg  Clonazepam 1 mg  Sildenafil 50 mg  Loratadine 10 mg  Calcium + Vit D  Multivitamin  Folic acid    Physical Examination:  Mr. Gautam is an 83-year-old man who appears comfortable at rest.  His vital signs are generally unremarkable.  His HEENT examination is within normal limits.  There is no palpable cervical, axillary, supraclavicular or inguinal lymphadenopathy.  The cardiac, pulmonary and gastrointestinal examinations are generally within normal limits.  The musculoskeletal examination is grossly intact.  The extremities do not demonstrate pitting edema.  The skin evaluation is unremarkable.    Imaging (2/22/2023):  His PSMA-PET/CT scan showed an asymptomatic pulmonary embolism extending into the left subsegmental  pulmonary arteries; no appreciable evidence of right heart strain.  Increased PSMA uptake in multiple enlarged retroperitoneal lymph nodes which have increased in size compared to 9/14/2022. Findings consistent with metastatic disease recurrence.  Cholelithiasis without evidence of acute cholecystitis.  Hepatic steatosis without focal masses or lesions.  His nuclear-medicine bone scan showed no evidence of osseous metastatic disease.    Imaging (5/4/2023):  His CT scan shows a few borderline and mildly enlarged retroperitoneal preaortic lymph nodes, the largest measuring 1.6 cm x 1.4 cm, overall mildly increased in size since 2/13/2023 but still not considered to be a measurable lesion.  No other findings suspicious for residual or recurrent neoplasm in the chest, abdomen, or pelvis.  Mild diffuse wall thickening and hyperenhancement of the distal sigmoid colon and rectum. This appearance is consistent with colitis that is nonspecific, but could relate to prior radiation therapy.  Mild diffuse wall thickening of a nondistended urinary bladder. This could relate to cystitis related to prior radiation therapy.  His bone scan shows no suspicious osseous uptake.  Overall, these radiographic findings are consistent with stable disease.    Laboratories (5/8/2023):  His CBC reveals a WBC of 2.6, hemoglobin 12.1, hematocrit 34.9%, platelets 119K.  His comprehensive metabolic panel is generally within normal limits, including a normal creatinine level of 0.81 mg/dL.  His PSA is 3.18 ng/mL, which continues to decline.      ASSESSMENT AND PLAN:  Mr. Gautam is an 83-year-old man with Jeancarlos 4+5=9 [id number]-[id number] prostate cancer who underwent radical prostatectomy (8/2012) and salvage radiotherapy (10/2013) who has metastatic CRPC and serological progression despite darolutamide treatment.  He has enrolled on the ECLIPSE study, and he is scheduled for the third cycle of Lee Ann-PSMA-I&T next week on 6/6/2023.  His ECOG score is 0.  His  pain score is 0/10.    The patient was previously showing signs of biochemical progression despite darolutamide.  He returns to the clinic today to discuss additional systemic or local treatment options.  Such treatment choices may include the use of an alternative AR-directed agent such as abiraterone, or taxane chemotherapy using docetaxel, or possibly metastasis-directed radiotherapy (e.g. SBRT) if he is found to have oligoprogressive disease in the future.  We also reviewed our clinical trial portfolio.  The patient is eligible for the ECLIPSE clinical trial at this time.  This is a randomized study of 177Lu-PSMA-I&T versus abiraterone for patients with metastatic CRPC who have only received one line of prior AR-directed therapy.      Fortunately, the patient has been randomized to the active radioligand therapy arm, with his second Lee Ann-PSMA-I&T dose having been given on 4/25/2023.  Unexpectedly, he was found to have a pulmonary embolus on his screening CT evaluation; thus he is now being treated with apixaban.  Of note, the use of oral anticoagulation is not a contraindication to proceed with the ECLIPSE trial.  Thus far, he has not experienced any significant side effects from the radioligand therapy, except for xerostomia and xerophthalmia.  His PSA is already declining, from 8.7 to 3.18 ng/mL.  His imaging assessments continue to show stable disease, without any evidence of osseous or soft-tissue progressive disease.  We will move forward with his third planned cycle of Lee Ann-PSMA-I&T on 6/7/2023.  He was seen together with the research nurse, Melissa Spencer.    A total of 40 minutes were spent on this patient on the day of the consultation, of which more than 50% of this time was used for counseling and coordination of care.  He was given the opportunity to ask multiple questions today, all of which were answered to his satisfaction.    Tio Holman M.D.

## 2023-05-10 DIAGNOSIS — C61 PROSTATE CANCER METASTATIC TO BONE (H): Primary | ICD-10-CM

## 2023-05-10 DIAGNOSIS — C79.51 PROSTATE CANCER METASTATIC TO BONE (H): Primary | ICD-10-CM

## 2023-05-10 LAB — TESTOST SERPL-MCNC: 11 NG/DL (ref 240–950)

## 2023-05-11 ENCOUNTER — PATIENT OUTREACH (OUTPATIENT)
Dept: ONCOLOGY | Facility: CLINIC | Age: 83
End: 2023-05-11
Payer: COMMERCIAL

## 2023-05-11 NOTE — PROGRESS NOTES
[facility]: Cancer Care                                                                                      RNCC reached out to patient to confirm with him that he'll receive his next Lupron injection at the upcoming clinic appointment on 06/07 with Dr. Holman.  Explained that since insurance wouldn't cover it because it was too soon the last time, this would save him an additional visit to the clinic.  Patient verbalized understanding and was thankful for the call.    Iesha Owen, RN, BSN  Oncology RN Care Coordinator  [facility]

## 2023-05-25 ENCOUNTER — TRANSFERRED RECORDS (OUTPATIENT)
Dept: HEALTH INFORMATION MANAGEMENT | Facility: CLINIC | Age: 83
End: 2023-05-25

## 2023-06-04 ASSESSMENT — ANXIETY QUESTIONNAIRES
8. IF YOU CHECKED OFF ANY PROBLEMS, HOW DIFFICULT HAVE THESE MADE IT FOR YOU TO DO YOUR WORK, TAKE CARE OF THINGS AT HOME, OR GET ALONG WITH OTHER PEOPLE?: SOMEWHAT DIFFICULT
IF YOU CHECKED OFF ANY PROBLEMS ON THIS QUESTIONNAIRE, HOW DIFFICULT HAVE THESE PROBLEMS MADE IT FOR YOU TO DO YOUR WORK, TAKE CARE OF THINGS AT HOME, OR GET ALONG WITH OTHER PEOPLE: SOMEWHAT DIFFICULT
7. FEELING AFRAID AS IF SOMETHING AWFUL MIGHT HAPPEN: NOT AT ALL
3. WORRYING TOO MUCH ABOUT DIFFERENT THINGS: SEVERAL DAYS
GAD7 TOTAL SCORE: 10
5. BEING SO RESTLESS THAT IT IS HARD TO SIT STILL: NOT AT ALL
GAD7 TOTAL SCORE: 10
4. TROUBLE RELAXING: NEARLY EVERY DAY
1. FEELING NERVOUS, ANXIOUS, OR ON EDGE: NEARLY EVERY DAY
6. BECOMING EASILY ANNOYED OR IRRITABLE: NOT AT ALL
7. FEELING AFRAID AS IF SOMETHING AWFUL MIGHT HAPPEN: NOT AT ALL
2. NOT BEING ABLE TO STOP OR CONTROL WORRYING: NEARLY EVERY DAY

## 2023-06-06 NOTE — PROGRESS NOTES
[id number]: Study Visit Note   Subject name: Medardo Gautam     Visit: Cycle 3 Week 12    Did the study visit occur within the appropriate window allowed by the protocol? Yes    If no, why? NA    Since the last study visit, He has been fairly stable.His dry eyes and dry mouth have not worsened, and he continues to experience some blurred vision. His fatigue has not worsened. He has not had any medication changes since his last visit.      Verbal handover provided to nuclear medicine department. Instructed department to document start time, stop time of infusion as well as assigned activity, total activity in container pre-dose, total activity post-dose, total volume administered and infusion flow rate.     Reminded patient to drink plenty of water and fluids and void frequently.     I have personally interviewed Medardo Gautam and reviewed his medical record for adverse events and concomitant medications and these have been recorded on the corresponding logs in Medardo Gautam's research file.     Medardo Gautam was given the opportunity to ask any trial related questions.  Please see provider progress note for physical exam and other clinical information. Labs were reviewed - any significant lab values were addressed and reviewed.    Kelli Chapa  Clinical Research Coordinator III  [facility]  T: [phone]

## 2023-06-06 NOTE — PROGRESS NOTES
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    History of Present Illness:  Mr. Gautam is an 83-year-old man who was diagnosed with prostate cancer in 2012.  His PSA level was 5.2 ng/mL.  Clinical stage was cT1c disease.  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  His next-generation DNA sequencing analysis ([employer]) revealed a pathogenic TP53 mutation, SHIRA genotype, TMB 5 muts/Mb, and absent PD-L1 expression.  He subsequently received salvage radiotherapy, which was completed in 10/2013, concurrently with six months of androgen deprivation therapy.    He subsequently developed a biochemical recurrence, and received ADT from 2014 until 2020.  In 11/2020, he developed non-metastatic CRPC.  Darolutamide was added to his regimen on 12/4/2020, and he has remained on this agent since that time.  As a result, his PSA level initially dropped from 1.66 ng/mL down to a speedy of 0.07 ng/mL (6/21/2021).  However, the patient has developed a rising PSA level over the past year.  His PSA on 1/13/2022 was 0.16, the PSA on 4/20/2022 was 0.24, the PSA on 6/13/2022 was 0.34, the PSA on 7/12/2022 was 0.47, the PSA on 8/25/2022 was 0.64 ng/mL, the PSA on 9/14/2022 was 0.67 ng/mL, and the PSA on 11/21/2022 was 1.55 ng/mL (with a testosterone level of 14 ng/dL).  In March (3/8/2023), his PSA level increased to 8.7 ng/mL.    The patient previously screened for the ECLIPSE clinical trial, and he was randomized to the Lee Ann-PSMA-I&T active therapy arm.  During the screening procedures he was found to have an asymptomatic pulmonary embolus.  He was placed on oral anticoagulation, using apixaban.  He returns today for a study visit, in anticipation of his third cycle of Lee Ann-PSMA-I&T radioligand therapy.  He has received two doses of Lee Ann-PSMA-I&T thus far, and his PSA level has dropped  from 8.7  to 1.5 ng/mL.  He was seen today in conjunction with the research nurse, Melissa Spencer.    Review of Systems:  The patient states that he is feeling generally well.  He does not report significant side effects from the first 2 doses of Lee Ann-PSMA-I&T, except that he has notice dry mouth/throat as well as dry eyes with some blurred vision.  Remarkably, he does not report shortness of breath despite a known saddle pulmonary embolus.  He also complains of dry skin.  He has not gained or lost any weight recently.  He does not report any urological symptoms at this time.  He does not have any cancer-related pain.  A comprehensive 14-system review of symptoms is otherwise generally within normal limits.  His ECOG score is 0.  His pain score is 0/10.    Medications:  Lupron 22.5 mg IM q3mo (next dose, 6/7/2023)  177Lu-PSMA-I&T, dose #3 on 6/7/2023  Apixaban 5 mg BID, started on 2/14/2023  Losartan/HCTZ 100/12.5 mg  Allopurinol 100 mg  Zolpidem 5 mg  Clonazepam 1 mg  Sildenafil 50 mg  Loratadine 10 mg  Calcium + Vit D  Multivitamin  Folic acid    Physical Examination:  Mr. Gautam is an 83-year-old man who appears comfortable at rest.  His vital signs are generally unremarkable.  His HEENT examination is within normal limits.  There is no palpable cervical, axillary, supraclavicular or inguinal lymphadenopathy.  The cardiac, pulmonary and gastrointestinal examinations are generally within normal limits.  The musculoskeletal examination is grossly intact.  The extremities do not demonstrate pitting edema.  The skin evaluation is unremarkable.    Imaging (2/22/2023):  His PSMA-PET/CT scan showed an asymptomatic pulmonary embolism extending into the left subsegmental pulmonary arteries; no appreciable evidence of right heart strain.  Increased PSMA uptake in multiple enlarged retroperitoneal lymph nodes which have increased in size compared to 9/14/2022. Findings consistent with metastatic disease recurrence.  Cholelithiasis  without evidence of acute cholecystitis.  Hepatic steatosis without focal masses or lesions.  His nuclear-medicine bone scan showed no evidence of osseous metastatic disease.    Imaging (5/4/2023):  His CT scan shows a few borderline and mildly enlarged retroperitoneal preaortic lymph nodes, the largest measuring 1.6 cm x 1.4 cm, overall mildly increased in size since 2/13/2023 but still not considered to be a measurable lesion.  No other findings suspicious for residual or recurrent neoplasm in the chest, abdomen, or pelvis.  Mild diffuse wall thickening and hyperenhancement of the distal sigmoid colon and rectum. This appearance is consistent with colitis that is nonspecific, but could relate to prior radiation therapy.  Mild diffuse wall thickening of a nondistended urinary bladder. This could relate to cystitis related to prior radiation therapy.  His bone scan shows no suspicious osseous uptake.  Overall, these radiographic findings are consistent with stable disease.    Laboratories (6/7/2023):  His CBC reveals a WBC of 3.2, hemoglobin 12.2, hematocrit 34.5%, platelets 118K.  His comprehensive metabolic panel is generally within normal limits, including a normal creatinine level of 0.86 mg/dL.  His PSA is 1.51 ng/mL, which continues to decline.      ASSESSMENT AND PLAN:  Mr. Gautam is an 83-year-old man with [place] 4+5=9 KZ94-lyaeys prostate cancer who underwent radical prostatectomy (8/2012) and salvage radiotherapy (10/2013) who has metastatic CRPC and serological progression despite darolutamide treatment.  He has enrolled on the ECLIPSE study, and he is scheduled for the third cycle of Lee Ann-PSMA-I&T today.  His ECOG score is 0.  His pain score is 0/10.    The patient decided to participate in the ECLIPSE trial.  Fortunately, he was randomized to the active radioligand therapy arm, with his second Lee Ann-PSMA-I&T dose having been given on 4/25/2023.  Unexpectedly, he was found to have a pulmonary embolus on his  screening CT evaluation; thus he is now being treated with apixaban.  Of note, the use of oral anticoagulation is not a contraindication to proceed with the ECLIPSE trial.  Thus far, he has not experienced any significant side effects from the radioligand therapy, except for xerostomia and xerophthalmia.  His PSA is already declining, from 8.7 to 1.5 ng/mL.  His imaging assessments continue to show stable disease, without any evidence of osseous or soft-tissue progressive disease.  We will move forward with his third planned cycle of Lee Ann-PSMA-I&T today, and there are no contraindications.  He was seen together with the research nurse, Melissa Spencer.  He was given the opportunity to ask multiple questions today, all of which were answered to his satisfaction.    A total of 40 minutes were spent on this patient on the day of the consultation, of which more than 50% of this time was used for counseling and coordination of care.    Tio Holman M.D.

## 2023-06-07 ENCOUNTER — HOSPITAL ENCOUNTER (OUTPATIENT)
Dept: NUCLEAR MEDICINE | Facility: CLINIC | Age: 83
Setting detail: NUCLEAR MEDICINE
Discharge: HOME | End: 2023-06-07
Attending: INTERNAL MEDICINE | Admitting: INTERNAL MEDICINE
Payer: COMMERCIAL

## 2023-06-07 ENCOUNTER — ALLIED HEALTH/NURSE VISIT (OUTPATIENT)
Dept: ONCOLOGY | Facility: CLINIC | Age: 83
End: 2023-06-07
Payer: COMMERCIAL

## 2023-06-07 ENCOUNTER — ONCOLOGY VISIT (OUTPATIENT)
Dept: ONCOLOGY | Facility: CLINIC | Age: 83
End: 2023-06-07
Attending: INTERNAL MEDICINE
Payer: COMMERCIAL

## 2023-06-07 ENCOUNTER — APPOINTMENT (OUTPATIENT)
Dept: LAB | Facility: CLINIC | Age: 83
End: 2023-06-07
Attending: INTERNAL MEDICINE
Payer: COMMERCIAL

## 2023-06-07 VITALS
DIASTOLIC BLOOD PRESSURE: 77 MMHG | OXYGEN SATURATION: 99 % | SYSTOLIC BLOOD PRESSURE: 145 MMHG | RESPIRATION RATE: 16 BRPM | BODY MASS INDEX: 28.08 KG/M2 | TEMPERATURE: 98 F | WEIGHT: 174 LBS | HEART RATE: 74 BPM

## 2023-06-07 DIAGNOSIS — C79.51 PROSTATE CANCER METASTATIC TO BONE (H): Primary | ICD-10-CM

## 2023-06-07 DIAGNOSIS — C61 PROSTATE CANCER METASTATIC TO BONE (H): Primary | ICD-10-CM

## 2023-06-07 DIAGNOSIS — C61 PROSTATE CANCER (H): ICD-10-CM

## 2023-06-07 DIAGNOSIS — C61 MALIGNANT NEOPLASM OF PROSTATE (H): Primary | ICD-10-CM

## 2023-06-07 DIAGNOSIS — C61 MALIGNANT NEOPLASM OF PROSTATE (H): ICD-10-CM

## 2023-06-07 LAB
ALBUMIN SERPL BCG-MCNC: 4.2 G/DL (ref 3.5–5.2)
ALP SERPL-CCNC: 71 U/L (ref 40–129)
ALT SERPL W P-5'-P-CCNC: 24 U/L (ref 10–50)
ANION GAP SERPL CALCULATED.3IONS-SCNC: 14 MMOL/L (ref 7–15)
AST SERPL W P-5'-P-CCNC: 46 U/L (ref 10–50)
BASOPHILS # BLD AUTO: 0 10E3/UL (ref 0–0.2)
BASOPHILS NFR BLD AUTO: 1 %
BILIRUB SERPL-MCNC: 0.9 MG/DL
BUN SERPL-MCNC: 15.2 MG/DL (ref 8–23)
CALCIUM SERPL-MCNC: 9.8 MG/DL (ref 8.8–10.2)
CHLORIDE SERPL-SCNC: 106 MMOL/L (ref 98–107)
CREAT SERPL-MCNC: 0.86 MG/DL (ref 0.67–1.17)
DEPRECATED HCO3 PLAS-SCNC: 22 MMOL/L (ref 22–29)
EOSINOPHIL # BLD AUTO: 0 10E3/UL (ref 0–0.7)
EOSINOPHIL NFR BLD AUTO: 1 %
ERYTHROCYTE [DISTWIDTH] IN BLOOD BY AUTOMATED COUNT: 14.5 % (ref 10–15)
GFR SERPL CREATININE-BSD FRML MDRD: 87 ML/MIN/1.73M2
GLUCOSE SERPL-MCNC: 139 MG/DL (ref 70–99)
HCT VFR BLD AUTO: 34.5 % (ref 40–53)
HGB BLD-MCNC: 12.2 G/DL (ref 13.3–17.7)
IMM GRANULOCYTES # BLD: 0 10E3/UL
IMM GRANULOCYTES NFR BLD: 0 %
LYMPHOCYTES # BLD AUTO: 1 10E3/UL (ref 0.8–5.3)
LYMPHOCYTES NFR BLD AUTO: 30 %
MAGNESIUM SERPL-MCNC: 1.5 MG/DL (ref 1.7–2.3)
MCH RBC QN AUTO: 33.2 PG (ref 26.5–33)
MCHC RBC AUTO-ENTMCNC: 35.4 G/DL (ref 31.5–36.5)
MCV RBC AUTO: 94 FL (ref 78–100)
MONOCYTES # BLD AUTO: 0.3 10E3/UL (ref 0–1.3)
MONOCYTES NFR BLD AUTO: 10 %
NEUTROPHILS # BLD AUTO: 1.9 10E3/UL (ref 1.6–8.3)
NEUTROPHILS NFR BLD AUTO: 58 %
NRBC # BLD AUTO: 0 10E3/UL
NRBC BLD AUTO-RTO: 0 /100
PLATELET # BLD AUTO: 118 10E3/UL (ref 150–450)
POTASSIUM SERPL-SCNC: 3.4 MMOL/L (ref 3.4–5.3)
PROT SERPL-MCNC: 7.4 G/DL (ref 6.4–8.3)
PSA SERPL DL<=0.01 NG/ML-MCNC: 1.51 NG/ML
RBC # BLD AUTO: 3.68 10E6/UL (ref 4.4–5.9)
SODIUM SERPL-SCNC: 142 MMOL/L (ref 136–145)
WBC # BLD AUTO: 3.2 10E3/UL (ref 4–11)

## 2023-06-07 PROCEDURE — 83735 ASSAY OF MAGNESIUM: CPT | Performed by: INTERNAL MEDICINE

## 2023-06-07 PROCEDURE — 36415 COLL VENOUS BLD VENIPUNCTURE: CPT | Performed by: INTERNAL MEDICINE

## 2023-06-07 PROCEDURE — 84153 ASSAY OF PSA TOTAL: CPT | Performed by: INTERNAL MEDICINE

## 2023-06-07 PROCEDURE — 96401 CHEMO ANTI-NEOPL SQ/IM: CPT | Performed by: INTERNAL MEDICINE

## 2023-06-07 PROCEDURE — G0463 HOSPITAL OUTPT CLINIC VISIT: HCPCS | Mod: 25 | Performed by: INTERNAL MEDICINE

## 2023-06-07 PROCEDURE — 250N000011 HC RX IP 250 OP 636: Performed by: INTERNAL MEDICINE

## 2023-06-07 PROCEDURE — 99215 OFFICE O/P EST HI 40 MIN: CPT | Performed by: INTERNAL MEDICINE

## 2023-06-07 PROCEDURE — 85014 HEMATOCRIT: CPT | Performed by: INTERNAL MEDICINE

## 2023-06-07 PROCEDURE — 84403 ASSAY OF TOTAL TESTOSTERONE: CPT | Performed by: INTERNAL MEDICINE

## 2023-06-07 PROCEDURE — 80053 COMPREHEN METABOLIC PANEL: CPT | Performed by: INTERNAL MEDICINE

## 2023-06-07 RX ORDER — ALBUTEROL SULFATE 90 UG/1
1-2 AEROSOL, METERED RESPIRATORY (INHALATION)
Status: DISCONTINUED | OUTPATIENT
Start: 2023-06-07 | End: 2023-06-08 | Stop reason: HOSPADM

## 2023-06-07 RX ORDER — LORAZEPAM 0.5 MG/1
.5-1 TABLET ORAL EVERY 6 HOURS PRN
Status: DISCONTINUED | OUTPATIENT
Start: 2023-06-07 | End: 2023-06-08 | Stop reason: HOSPADM

## 2023-06-07 RX ORDER — DIPHENHYDRAMINE HYDROCHLORIDE 50 MG/ML
50 INJECTION INTRAMUSCULAR; INTRAVENOUS
Status: DISCONTINUED | OUTPATIENT
Start: 2023-06-07 | End: 2023-06-08 | Stop reason: HOSPADM

## 2023-06-07 RX ORDER — ONDANSETRON 8 MG/1
8 TABLET, FILM COATED ORAL
Status: DISCONTINUED | OUTPATIENT
Start: 2023-06-07 | End: 2023-06-08 | Stop reason: HOSPADM

## 2023-06-07 RX ORDER — LORAZEPAM 2 MG/ML
.5-1 INJECTION INTRAMUSCULAR EVERY 6 HOURS PRN
Status: DISCONTINUED | OUTPATIENT
Start: 2023-06-07 | End: 2023-06-08 | Stop reason: HOSPADM

## 2023-06-07 RX ORDER — EPINEPHRINE 0.3 MG/.3ML
0.3 INJECTION SUBCUTANEOUS EVERY 5 MIN PRN
Status: DISCONTINUED | OUTPATIENT
Start: 2023-06-07 | End: 2023-06-08 | Stop reason: HOSPADM

## 2023-06-07 RX ORDER — ALBUTEROL SULFATE 0.83 MG/ML
2.5 SOLUTION RESPIRATORY (INHALATION)
Status: DISCONTINUED | OUTPATIENT
Start: 2023-06-07 | End: 2023-06-08 | Stop reason: HOSPADM

## 2023-06-07 RX ORDER — MEPERIDINE HYDROCHLORIDE 25 MG/ML
25 INJECTION INTRAMUSCULAR; INTRAVENOUS; SUBCUTANEOUS EVERY 30 MIN PRN
Status: DISCONTINUED | OUTPATIENT
Start: 2023-06-07 | End: 2023-06-08 | Stop reason: HOSPADM

## 2023-06-07 RX ORDER — METHYLPREDNISOLONE SODIUM SUCCINATE 125 MG/2ML
125 INJECTION, POWDER, LYOPHILIZED, FOR SOLUTION INTRAMUSCULAR; INTRAVENOUS
Status: DISCONTINUED | OUTPATIENT
Start: 2023-06-07 | End: 2023-06-08 | Stop reason: HOSPADM

## 2023-06-07 RX ORDER — PROCHLORPERAZINE MALEATE 5 MG
5-10 TABLET ORAL EVERY 6 HOURS PRN
Status: DISCONTINUED | OUTPATIENT
Start: 2023-06-07 | End: 2023-06-08 | Stop reason: HOSPADM

## 2023-06-07 RX ADMIN — LEUPROLIDE ACETATE 22.5 MG: KIT SUBCUTANEOUS at 11:57

## 2023-06-07 ASSESSMENT — PAIN SCALES - GENERAL: PAINLEVEL: NO PAIN (0)

## 2023-06-07 NOTE — LETTER
6/7/2023         RE: Medardo Gautam  [address] [facility] [address]        Dear Colleague,    Thank you for referring your patient, Medardo Gautam, to the [facility]. Please see a copy of my visit note below.      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    History of Present Illness:  Mr. Gautam is an 83-year-old man who was diagnosed with prostate cancer in 2012.  His PSA level was 5.2 ng/mL.  Clinical stage was cT1c disease.  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  His next-generation DNA sequencing analysis ([employer]) revealed a pathogenic TP53 mutation, SHIRA genotype, TMB 5 muts/Mb, and absent PD-L1 expression.  He subsequently received salvage radiotherapy, which was completed in 10/2013, concurrently with six months of androgen deprivation therapy.    He subsequently developed a biochemical recurrence, and received ADT from 2014 until 2020.  In 11/2020, he developed non-metastatic CRPC.  Darolutamide was added to his regimen on 12/4/2020, and he has remained on this agent since that time.  As a result, his PSA level initially dropped from 1.66 ng/mL down to a speedy of 0.07 ng/mL (6/21/2021).  However, the patient has developed a rising PSA level over the past year.  His PSA on 1/13/2022 was 0.16, the PSA on 4/20/2022 was 0.24, the PSA on 6/13/2022 was 0.34, the PSA on 7/12/2022 was 0.47, the PSA on 8/25/2022 was 0.64 ng/mL, the PSA on 9/14/2022 was 0.67 ng/mL, and the PSA on 11/21/2022 was 1.55 ng/mL (with a testosterone level of 14 ng/dL).  In March (3/8/2023), his PSA level increased to 8.7 ng/mL.    The patient previously screened for the ECLIPSE clinical trial, and he was randomized to the Lee Ann-PSMA-I&T active therapy arm.  During the screening procedures he was found to have an asymptomatic pulmonary  embolus.  He was placed on oral anticoagulation, using apixaban.  He returns today for a study visit, in anticipation of his third cycle of Lee Ann-PSMA-I&T radioligand therapy.  He has received two doses of Lee Ann-PSMA-I&T thus far, and his PSA level has dropped  from 8.7 to 1.5 ng/mL.  He was seen today in conjunction with the research nurse, Melissa Spencer.    Review of Systems:  The patient states that he is feeling generally well.  He does not report significant side effects from the first 2 doses of Lee Ann-PSMA-I&T, except that he has notice dry mouth/throat as well as dry eyes with some blurred vision.  Remarkably, he does not report shortness of breath despite a known saddle pulmonary embolus.  He also complains of dry skin.  He has not gained or lost any weight recently.  He does not report any urological symptoms at this time.  He does not have any cancer-related pain.  A comprehensive 14-system review of symptoms is otherwise generally within normal limits.  His ECOG score is 0.  His pain score is 0/10.    Medications:  Lupron 22.5 mg IM q3mo (next dose, 6/7/2023)  177Lu-PSMA-I&T, dose #3 on 6/7/2023  Apixaban 5 mg BID, started on 2/14/2023  Losartan/HCTZ 100/12.5 mg  Allopurinol 100 mg  Zolpidem 5 mg  Clonazepam 1 mg  Sildenafil 50 mg  Loratadine 10 mg  Calcium + Vit D  Multivitamin  Folic acid    Physical Examination:  Mr. Gautam is an 83-year-old man who appears comfortable at rest.  His vital signs are generally unremarkable.  His HEENT examination is within normal limits.  There is no palpable cervical, axillary, supraclavicular or inguinal lymphadenopathy.  The cardiac, pulmonary and gastrointestinal examinations are generally within normal limits.  The musculoskeletal examination is grossly intact.  The extremities do not demonstrate pitting edema.  The skin evaluation is unremarkable.    Imaging (2/22/2023):  His PSMA-PET/CT scan showed an asymptomatic pulmonary embolism extending into the left subsegmental  pulmonary arteries; no appreciable evidence of right heart strain.  Increased PSMA uptake in multiple enlarged retroperitoneal lymph nodes which have increased in size compared to 9/14/2022. Findings consistent with metastatic disease recurrence.  Cholelithiasis without evidence of acute cholecystitis.  Hepatic steatosis without focal masses or lesions.  His nuclear-medicine bone scan showed no evidence of osseous metastatic disease.    Imaging (5/4/2023):  His CT scan shows a few borderline and mildly enlarged retroperitoneal preaortic lymph nodes, the largest measuring 1.6 cm x 1.4 cm, overall mildly increased in size since 2/13/2023 but still not considered to be a measurable lesion.  No other findings suspicious for residual or recurrent neoplasm in the chest, abdomen, or pelvis.  Mild diffuse wall thickening and hyperenhancement of the distal sigmoid colon and rectum. This appearance is consistent with colitis that is nonspecific, but could relate to prior radiation therapy.  Mild diffuse wall thickening of a nondistended urinary bladder. This could relate to cystitis related to prior radiation therapy.  His bone scan shows no suspicious osseous uptake.  Overall, these radiographic findings are consistent with stable disease.    Laboratories (6/7/2023):  His CBC reveals a WBC of 3.2, hemoglobin 12.2, hematocrit 34.5%, platelets 118K.  His comprehensive metabolic panel is generally within normal limits, including a normal creatinine level of 0.86 mg/dL.  His PSA is 1.51 ng/mL, which continues to decline.      ASSESSMENT AND PLAN:  Mr. Gautam is an 83-year-old man with Jeancarlos 4+5=9 SH24-egxhts prostate cancer who underwent radical prostatectomy (8/2012) and salvage radiotherapy (10/2013) who has metastatic CRPC and serological progression despite darolutamide treatment.  He has enrolled on the ECLIPSE study, and he is scheduled for the third cycle of Lee Ann-PSMA-I&T today.  His ECOG score is 0.  His pain score is  0/10.    The patient decided to participate in the ECLIPSE trial.  Fortunately, he was randomized to the active radioligand therapy arm, with his second Lee Ann-PSMA-I&T dose having been given on 4/25/2023.  Unexpectedly, he was found to have a pulmonary embolus on his screening CT evaluation; thus he is now being treated with apixaban.  Of note, the use of oral anticoagulation is not a contraindication to proceed with the ECLIPSE trial.  Thus far, he has not experienced any significant side effects from the radioligand therapy, except for xerostomia and xerophthalmia.  His PSA is already declining, from 8.7 to 1.5 ng/mL.  His imaging assessments continue to show stable disease, without any evidence of osseous or soft-tissue progressive disease.  We will move forward with his third planned cycle of Lee Ann-PSMA-I&T today, and there are no contraindications.  He was seen together with the research nurse, Melissa Spencer.  He was given the opportunity to ask multiple questions today, all of which were answered to his satisfaction.    A total of 40 minutes were spent on this patient on the day of the consultation, of which more than 50% of this time was used for counseling and coordination of care.    Tio Holman M.D.

## 2023-06-07 NOTE — PROGRESS NOTES
[facility] Nursing Note:    Patient presents today for Lutetium-177 PSMA I&T (Eclipse Study), therapy, dose 3 of 4  Patient seen by provider today:    present during visit today: NOT APPLICABLE      Intravenous Access:  Peripheral IV placed     Treatment Conditions:  Labs results reviewed by study RN, ok to proceed with treatment.         Post Infusion Assessment:  Patient tolerated infusion without incident.    PIV - No evidence of extravasations, access discontinued per protocol.         Discharge Plan:   Patient will return as scheduled for next appointment.   Patient discharged at 1404  in stable condition accompanied by: self  Departure Mode: Ambulating independently

## 2023-06-07 NOTE — NURSING NOTE
Chief Complaint   Patient presents with     Blood Draw     Labs drawn with  by rn.  VS taken.     Labs drawn with  by rn.  Pt tolerated well.  VS taken.  Pt checked in for next appt.    Mitali Gallego RN

## 2023-06-07 NOTE — NURSING NOTE
"Oncology Rooming Note    June 7, 2023 11:07 AM   Medardo Gautam is a 83 year old male who presents for:    Chief Complaint   Patient presents with     Blood Draw     Labs drawn with  by rn.  VS taken.     Oncology Clinic Visit     Malignant neoplasm of prostate (H)      Initial Vitals: BP (!) 145/77 (BP Location: Right arm, Patient Position: Sitting, Cuff Size: Adult Regular)   Pulse 74   Temp 98  F (36.7  C) (Oral)   Resp 16   Wt 78.9 kg (174 lb)   SpO2 99%   BMI 28.08 kg/m   Estimated body mass index is 28.08 kg/m  as calculated from the following:    Height as of 3/9/23: 1.676 m (5' 6\").    Weight as of this encounter: 78.9 kg (174 lb). Body surface area is 1.92 meters squared.  No Pain (0) Comment: Data Unavailable   No LMP for male patient.  Allergies reviewed: Yes  Medications reviewed: Yes    Medications: Medication refills not needed today.  Pharmacy name entered into [facility]:    [facility] - [place], [place] - [address] [facility] - [place], [place] - [address]   [facility] [place], [place] - [address]  [facility] [place] [address] [facility]    Clinical concerns:  None       Huy Sellers            "

## 2023-06-08 ENCOUNTER — OFFICE VISIT (OUTPATIENT)
Dept: FAMILY MEDICINE | Facility: OTHER | Age: 83
End: 2023-06-08
Payer: COMMERCIAL

## 2023-06-08 VITALS
HEIGHT: 66 IN | DIASTOLIC BLOOD PRESSURE: 70 MMHG | HEART RATE: 64 BPM | TEMPERATURE: 95.8 F | WEIGHT: 174 LBS | OXYGEN SATURATION: 98 % | BODY MASS INDEX: 27.97 KG/M2 | RESPIRATION RATE: 16 BRPM | SYSTOLIC BLOOD PRESSURE: 104 MMHG

## 2023-06-08 DIAGNOSIS — F51.02 ADJUSTMENT INSOMNIA: Primary | ICD-10-CM

## 2023-06-08 LAB — TESTOST SERPL-MCNC: 11 NG/DL (ref 240–950)

## 2023-06-08 PROCEDURE — 99213 OFFICE O/P EST LOW 20 MIN: CPT | Performed by: FAMILY MEDICINE

## 2023-06-08 RX ORDER — MIRTAZAPINE 7.5 MG/1
7.5-15 TABLET, FILM COATED ORAL AT BEDTIME
Qty: 60 TABLET | Refills: 2 | Status: SHIPPED | OUTPATIENT
Start: 2023-06-08 | End: 2023-06-16 | Stop reason: SINTOL

## 2023-06-08 ASSESSMENT — ANXIETY QUESTIONNAIRES: GAD7 TOTAL SCORE: 10

## 2023-06-08 ASSESSMENT — PAIN SCALES - GENERAL: PAINLEVEL: NO PAIN (0)

## 2023-06-08 NOTE — PROGRESS NOTES
"  Assessment & Plan     Adjustment insomnia  Patient is a very pleasant 83-year-old with history of hypertension, hyperlipidemia, prostate cancer currently being followed by oncology, insomnia presents to the clinic with symptoms of worsening sleep concerns.  He recently had to place his wife in a nursing home and has been very stressed and increasingly anxious about it understandably.  He is currently on Ambien 5 mg and clonazepam 1 mg nightly.  He reports waking up every 1-2 hours over the last week.  I do not see the need to continue Ambien if he has not been benefiting from it.  Advised him to stop the Ambien.  Continue clonazepam for anxiety at the current dose without any changes.  We will add mirtazapine for his insomnia.  He will be following Dr. Flor in 1-2 months for follow up  - mirtazapine (REMERON) 7.5 MG tablet; Take 1-2 tablets (7.5-15 mg) by mouth At Bedtime    Verna Osborne MD  [facility] ALLYN Batres is a 83 year old, presenting for the following health issues:  Insomnia and RECHECK        6/8/2023    10:04 AM   Additional Questions   Roomed by alex sapp     History of Present Illness       Reason for visit:  Check up and review of anxiety issues    He eats 2-3 servings of fruits and vegetables daily.He consumes 0 sweetened beverage(s) daily.He exercises with enough effort to increase his heart rate 10 to 19 minutes per day.  He exercises with enough effort to increase his heart rate 5 days per week.   He is taking medications regularly.  Today's SAEID-7 Score: 10         Review of Systems   Constitutional, HEENT, cardiovascular, pulmonary, GI, , musculoskeletal, neuro, skin, endocrine and psych systems are negative, except as otherwise noted.      Objective    /70 (BP Location: Right arm, Patient Position: Sitting, Cuff Size: Adult Regular)   Pulse 64   Temp (!) 95.8  F (35.4  C) (Temporal)   Resp 16   Ht 1.676 m (5' 6\")   Wt 78.9 kg (174 " lb)   SpO2 98%   BMI 28.08 kg/m    Body mass index is 28.08 kg/m .  Physical Exam   GENERAL: healthy, alert and no distress  NECK: no adenopathy, no asymmetry, masses, or scars and thyroid normal to palpation  RESP: lungs clear to auscultation - no rales, rhonchi or wheezes  CV: regular rate and rhythm, normal S1 S2, no S3 or S4, no murmur, click or rub, no peripheral edema and peripheral pulses strong

## 2023-06-11 DIAGNOSIS — G47.00 INSOMNIA, UNSPECIFIED TYPE: ICD-10-CM

## 2023-06-12 RX ORDER — ZOLPIDEM TARTRATE 5 MG/1
TABLET ORAL
Qty: 30 TABLET | Refills: 0 | Status: SHIPPED | OUTPATIENT
Start: 2023-06-12 | End: 2023-06-16

## 2023-06-16 ENCOUNTER — OFFICE VISIT (OUTPATIENT)
Dept: FAMILY MEDICINE | Facility: OTHER | Age: 83
End: 2023-06-16
Payer: COMMERCIAL

## 2023-06-16 VITALS
HEART RATE: 79 BPM | SYSTOLIC BLOOD PRESSURE: 110 MMHG | OXYGEN SATURATION: 96 % | BODY MASS INDEX: 28.45 KG/M2 | WEIGHT: 177 LBS | DIASTOLIC BLOOD PRESSURE: 60 MMHG | TEMPERATURE: 96.2 F | RESPIRATION RATE: 16 BRPM | HEIGHT: 66 IN

## 2023-06-16 DIAGNOSIS — Z00.00 ENCOUNTER FOR MEDICARE ANNUAL WELLNESS EXAM: Primary | ICD-10-CM

## 2023-06-16 DIAGNOSIS — G47.00 INSOMNIA, UNSPECIFIED TYPE: ICD-10-CM

## 2023-06-16 PROBLEM — M65.30 TRIGGER FINGER, ACQUIRED: Status: RESOLVED | Noted: 2021-11-18 | Resolved: 2023-06-16

## 2023-06-16 PROBLEM — M25.562 LATERAL KNEE PAIN, LEFT: Status: RESOLVED | Noted: 2019-07-29 | Resolved: 2023-06-16

## 2023-06-16 PROBLEM — I26.99 PULMONARY EMBOLISM (H): Status: ACTIVE | Noted: 2023-02-13

## 2023-06-16 PROCEDURE — G0439 PPPS, SUBSEQ VISIT: HCPCS | Performed by: FAMILY MEDICINE

## 2023-06-16 PROCEDURE — 99213 OFFICE O/P EST LOW 20 MIN: CPT | Mod: 25 | Performed by: FAMILY MEDICINE

## 2023-06-16 RX ORDER — ZOLPIDEM TARTRATE 5 MG/1
5 TABLET ORAL
Qty: 30 TABLET | Refills: 5 | Status: SHIPPED | OUTPATIENT
Start: 2023-06-16 | End: 2023-12-12

## 2023-06-16 RX ORDER — CLONAZEPAM 1 MG/1
1 TABLET ORAL
Qty: 30 TABLET | Refills: 5 | Status: SHIPPED | OUTPATIENT
Start: 2023-06-16 | End: 2023-12-12

## 2023-06-16 ASSESSMENT — PAIN SCALES - GENERAL: PAINLEVEL: MILD PAIN (3)

## 2023-06-16 ASSESSMENT — ANXIETY QUESTIONNAIRES: GAD7 TOTAL SCORE: 10

## 2023-06-16 NOTE — PROGRESS NOTES
Assessment & Plan     Encounter for Medicare annual wellness exam  He notices some muscle stretching in his hamstrings.  He relates this to when he was caring for his wife and lifting and carrying her.  Overall the pains are improving.    Insomnia, unspecified type  He tried mirtazapine but felt poorly on it and stopped it.  He feels that the clonazepam and zolpidem are helping him sleep.  He feels he is adjusting to having his wife in a nursing home.  He denies feeling anxious or depressed.  He does not want to explore other medications for insomnia.   was checked and there are no concerns.    - clonazePAM (KLONOPIN) 1 MG tablet; Take 1 tablet (1 mg) by mouth nightly as needed for anxiety  - zolpidem (AMBIEN) 5 MG tablet; Take 1 tablet (5 mg) by mouth nightly as needed      Vira Flor MD  [facility] PARK Batres is a 83 year old, presenting for the following health issues:  Establish Care        6/16/2023     2:00 PM   Additional Questions   Roomed by alex sapp     History of Present Illness       Reason for visit:  Check up and review of anxiety issues    He eats 2-3 servings of fruits and vegetables daily.He consumes 0 sweetened beverage(s) daily.He exercises with enough effort to increase his heart rate 10 to 19 minutes per day.  He exercises with enough effort to increase his heart rate 5 days per week.   He is taking medications regularly.  Today's SAEID-7 Score: 10       Pain History:  When did you first notice your pain? Ongoing for long time   Have you seen this provider for your pain in the past? No   Where in your body do your have pain? Low back, behind legs  Are you seeing anyone else for your pain? No  What makes your pain better? Has not tried anything  What makes your pain worse? lifting  How has pain affected your ability to work? Not applicable  Who lives in your household? self          12/15/2020    11:14 AM 6/22/2021    10:28 AM 6/4/2023     1:13  "PM   SAEID-7 SCORE   Total Score 2 (minimal anxiety) 2 (minimal anxiety) 10 (moderate anxiety)   Total Score 2 2 10         Annual Wellness Visit    Patient has been advised of split billing requirements and indicates understanding: Yes     Are you in the first 12 months of your Medicare Part B coverage?  No    Physical Health:    In general, how would you rate your overall physical health? good    Outside of work, how many days during the week do you exercise?none    Outside of work, approximately how many minutes a day do you exercise?not applicable    If you drink alcohol do you typically have >3 drinks per day or >7 drinks per week? No    Do you usually eat at least 4 servings of fruit and vegetables a day, include whole grains & fiber and avoid regularly eating high fat or \"junk\" foods? Yes    Do you have any problems taking medications regularly? No    Do you have any side effects from medications? none    Needs assistance for the following daily activities: no assistance needed    Which of the following safety concerns are present in your home?  none identified     Hearing impairment: No    In the past 6 months, have you been bothered by leaking of urine? no    Mental Health:    In general, how would you rate your overall mental or emotional health? good  PHQ-2 Score:      Do you feel safe in your environment? Yes    Have you ever done Advance Care Planning? (For example, a Health Directive, POLST, or a discussion with a medical provider or your loved ones about your wishes)? No, advance care planning information given to patient to review.  Patient plans to discuss their wishes with loved ones or provider.      Fall risk:  Fallen 2 or more times in the past year?: No  Any fall with injury in the past year?: No    Cognitive Screenin) Repeat 3 items (Leader, Season, Table)    2) Clock draw: NORMAL  3) 3 item recall: Recalls 2 objects   Results: NORMAL clock, 1-2 items recalled: COGNITIVE IMPAIRMENT LESS " LIKELY    Mini-CogTM Copyright ANNY Infante. Licensed by the author for use in [employer]; reprinted with permission (charlie@[facility]). All rights reserved.      Do you have sleep apnea, excessive snoring or daytime drowsiness?: no    Social History     Tobacco Use     Smoking status: Former     Packs/day: 0.00     Years: 1.00     Pack years: 0.00     Types: Cigarettes, Cigars, Pipe     Start date: 10/1/1961     Quit date: 1962     Years since quittin.4     Smokeless tobacco: Never     Tobacco comments:     No smokers in home   Vaping Use     Vaping status: Never Used   Substance Use Topics     Alcohol use: Yes     Alcohol/week: 0.0 standard drinks of alcohol     Comment: daily glass of wine and whiskey             3/3/2022     3:34 PM   Alcohol Use   Prescreen: >3 drinks/day or >7 drinks/week? Yes   AUDIT SCORE  12         3/3/2022     3:34 PM   AUDIT - Alcohol Use Disorders Identification Test - Reproduced from the World Health Organization Audit 2001 (Second Edition)   1.  How often do you have a drink containing alcohol? 4 or more times a week   2.  How many drinks containing alcohol do you have on a typical day when you are drinking? 5 or 6   3.  How often do you have five or more drinks on one occasion? Daily or almost daily   4.  How often during the last year have you found that you were not able to stop drinking once you had started? Never   5.  How often during the last year have you failed to do what was normally expected of you because of drinking? Never   6.  How often during the last year have you needed a first drink in the morning to get yourself going after a heavy drinking session? Never   7.  How often during the last year have you had a feeling of guilt or remorse after drinking? Monthly   8.  How often during the last year have you been unable to remember what happened the night before because of your drinking? Never   9.  Have you or someone else been injured because of your  "drinking? No   10. Has a relative, friend, doctor or other health care worker been concerned about your drinking or suggested you cut down? No   TOTAL SCORE 12     Do you have a current opioid prescription? No  Do you use any other controlled substances or medications that are not prescribed by a provider? None    Current providers sharing in care for this patient include:   Patient Care Team:  Vira Flor MD as PCP - General (Family Medicine)  Verna Osborne MD as Assigned PCP  Nedra Vick CNP as Nurse Practitioner (Hematology & Oncology)  Neptali Contreras MD as Assigned Musculoskeletal Provider  Tio Holman MD as Assigned Cancer Care Provider  Iesha Owen RN as Specialty Care Coordinator (Hematology & Oncology)        Objective    /60 (BP Location: Left arm, Patient Position: Sitting, Cuff Size: Adult Regular)   Pulse 79   Temp (!) 96.2  F (35.7  C) (Temporal)   Resp 16   Ht 1.676 m (5' 6\")   Wt 80.3 kg (177 lb)   SpO2 96%   BMI 28.57 kg/m    Body mass index is 28.57 kg/m .  Physical Exam  Vitals reviewed.   Constitutional:       General: He is not in acute distress.  HENT:      Right Ear: Tympanic membrane, ear canal and external ear normal.      Left Ear: Tympanic membrane, ear canal and external ear normal.      Nose: Nose normal.   Eyes:      General:         Right eye: No discharge.         Left eye: No discharge.      Conjunctiva/sclera: Conjunctivae normal.      Pupils: Pupils are equal, round, and reactive to light.   Cardiovascular:      Rate and Rhythm: Normal rate and regular rhythm.      Heart sounds: No murmur heard.  Pulmonary:      Effort: Pulmonary effort is normal.      Breath sounds: Normal breath sounds. No wheezing or rhonchi.   Abdominal:      General: Bowel sounds are normal. There is no distension.      Palpations: Abdomen is soft. There is no mass.      Tenderness: There is no abdominal tenderness.   Musculoskeletal:         General: Normal " range of motion.      Cervical back: Normal range of motion and neck supple. No tenderness.   Skin:     Findings: No rash.   Neurological:      Mental Status: He is alert and oriented to person, place, and time.   Psychiatric:         Mood and Affect: Mood normal.         Behavior: Behavior normal.         Thought Content: Thought content normal.         Judgment: Judgment normal.                      He is at risk for lack of exercise and has been provided with information to increase physical activity for the benefit of his well-being.

## 2023-06-16 NOTE — PATIENT INSTRUCTIONS
Patient Education   Personalized Prevention Plan  You are due for the preventive services outlined below.  Your care team is available to assist you in scheduling these services.  If you have already completed any of these items, please share that information with your care team to update in your medical record.  There are no preventive care reminders to display for this patient.    Exercise for a Healthier Heart  You may wonder how you can improve the health of your heart. If you re thinking about exercise, you re on the right track. You don t need to become an athlete. But you do need a certain amount of brisk exercise to help strengthen your heart. If you have been diagnosed with a heart condition, your healthcare provider may advise exercise to help your condition. To help make exercise a habit, choose safe, fun activities.      Exercise with a friend. When activity is fun, you're more likely to stick with it.     Before you start  Check with your healthcare provider before starting an exercise program. This is especially important if you haven't been active for a while. It's also important if you have a long-term (chronic) health problem such as heart disease, diabetes, or obesity. Also check with your provider if you're at high risk for having these problems.   Why exercise?  Exercising regularly offers many healthy rewards. It can help you do all of these:     Improve your blood cholesterol level to help prevent further heart trouble.    Lower your blood pressure to help prevent a stroke or heart attack.    Control diabetes or reduce your risk of getting this disease.    Improve your heart and lung function.    Reach and stay at a healthy weight.    Make your muscles stronger so you can stay active.    Prevent falls and fractures by slowing the loss of bone mass (osteoporosis).    Manage stress better.    Improve your sense of self and your body image.  Exercise tips      Ease into your routine. Set small  goals. Then build on them. Talk with your healthcare provider first before starting an exercise routine if you're not sure what your activity level should be.    Exercise on most days. Aim for a total of at least 150 minutes (2 hours and 30 minutes) or more of moderate-intensity aerobic activity each week. You could also do 75 minutes (1 hour and 15 minutes) or more of vigorous-intensity aerobic activity each week. Or try for a combination of both. Moderate activity means that you breathe heavier and your heart rate increases, but you can still talk. Think about doing at least 30 minutes of moderate exercise, 5 times a week. It's OK to work up to the 30-minute period over time. Examples of moderate-intensity activity are brisk walking, gardening, and water aerobics.    Step up your daily activity level.  Along with your exercise program, try being more active the whole day. Walk instead of drive. Or park further away so that you take more steps each day. Do more household tasks or yard work. You may not be able to meet the advised amount of physical activity. But doing some moderate- or vigorous-intensity aerobic activity can help reduce your risk for heart disease. Your healthcare provider can help you figure out what is best for you.    Choose 1 or more activities you enjoy.  Walking is one of the easiest things you can do. You can also try swimming, riding a bike, dancing, or taking an exercise class.    Call 911  Call 911 right away if any of these occur:     Chest pain that doesn't go away quickly with rest    New burning, tightness, pressure, or heaviness in your chest, neck, shoulders, back, or arms    Abnormal or severe shortness of breath    A very fast or irregular heartbeat (palpitations)    Fainting  When to call your healthcare provider  Call your healthcare provider if you have any of these:     Dizziness or lightheadedness    Mild shortness of breath or chest pain    Increased or new joint or muscle  pain    Addy last reviewed this educational content on 7/1/2022 2000-2022 [employer]. All rights reserved. This information is not intended as a substitute for professional medical care. Always follow your healthcare professional's instructions.

## 2023-06-27 ENCOUNTER — HOSPITAL ENCOUNTER (OUTPATIENT)
Dept: CT IMAGING | Facility: CLINIC | Age: 83
Discharge: HOME | End: 2023-06-27
Attending: INTERNAL MEDICINE | Admitting: INTERNAL MEDICINE
Payer: COMMERCIAL

## 2023-06-27 ENCOUNTER — HOSPITAL ENCOUNTER (OUTPATIENT)
Dept: NUCLEAR MEDICINE | Facility: CLINIC | Age: 83
Setting detail: NUCLEAR MEDICINE
Discharge: HOME | End: 2023-06-27
Attending: INTERNAL MEDICINE
Payer: COMMERCIAL

## 2023-06-27 ENCOUNTER — ANCILLARY PROCEDURE (OUTPATIENT)
Dept: RADIOLOGY | Facility: CLINIC | Age: 83
End: 2023-06-27
Attending: INTERNAL MEDICINE
Payer: COMMERCIAL

## 2023-06-27 DIAGNOSIS — C79.51 PROSTATE CANCER METASTATIC TO BONE (H): Primary | ICD-10-CM

## 2023-06-27 DIAGNOSIS — C61 PROSTATE CANCER METASTATIC TO BONE (H): ICD-10-CM

## 2023-06-27 DIAGNOSIS — C79.51 PROSTATE CANCER METASTATIC TO BONE (H): ICD-10-CM

## 2023-06-27 DIAGNOSIS — C61 PROSTATE CANCER METASTATIC TO BONE (H): Primary | ICD-10-CM

## 2023-06-27 PROCEDURE — 250N000011 HC RX IP 250 OP 636: Performed by: INTERNAL MEDICINE

## 2023-06-27 PROCEDURE — 74177 CT ABD & PELVIS W/CONTRAST: CPT

## 2023-06-27 PROCEDURE — 71260 CT THORAX DX C+: CPT | Mod: 26 | Performed by: RADIOLOGY

## 2023-06-27 PROCEDURE — 343N000001 HC RX 343: Performed by: INTERNAL MEDICINE

## 2023-06-27 PROCEDURE — A9503 TC99M MEDRONATE: HCPCS | Performed by: INTERNAL MEDICINE

## 2023-06-27 PROCEDURE — 78306 BONE IMAGING WHOLE BODY: CPT

## 2023-06-27 PROCEDURE — 78306 BONE IMAGING WHOLE BODY: CPT | Mod: 26

## 2023-06-27 PROCEDURE — 74177 CT ABD & PELVIS W/CONTRAST: CPT | Mod: 26 | Performed by: RADIOLOGY

## 2023-06-27 RX ORDER — IOPAMIDOL 755 MG/ML
108 INJECTION, SOLUTION INTRAVASCULAR ONCE
Status: COMPLETED | OUTPATIENT
Start: 2023-06-27 | End: 2023-06-27

## 2023-06-27 RX ORDER — TC 99M MEDRONATE 20 MG/10ML
20-30 INJECTION, POWDER, LYOPHILIZED, FOR SOLUTION INTRAVENOUS ONCE
Status: COMPLETED | OUTPATIENT
Start: 2023-06-27 | End: 2023-06-27

## 2023-06-27 RX ADMIN — TC 99M MEDRONATE 24.4 MILLICURIE: 20 INJECTION, POWDER, LYOPHILIZED, FOR SOLUTION INTRAVENOUS at 09:15

## 2023-06-27 RX ADMIN — IOPAMIDOL 108 ML: 755 INJECTION, SOLUTION INTRAVENOUS at 09:24

## 2023-06-27 NOTE — PROGRESS NOTES
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    History of Present Illness:  Mr. Gautam is an 83-year-old man who was diagnosed with prostate cancer in 2012.  His PSA level was 5.2 ng/mL.  Clinical stage was cT1c disease.  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  His next-generation DNA sequencing analysis ([facility]) revealed a pathogenic TP53 mutation, SHIRA genotype, TMB 5 muts/Mb, and absent PD-L1 expression.  He subsequently received salvage radiotherapy, which was completed in 10/2013, concurrently with six months of androgen deprivation therapy.    He subsequently developed a biochemical recurrence, and received ADT from 2014 until 2020.  In 11/2020, he developed non-metastatic CRPC.  Darolutamide was added on 12/4/2020, to which he achieved a subsequent PSA response.  His PSA level initially dropped from 1.66 ng/mL down to a speedy of 0.07 ng/mL (6/21/2021).  However, the patient then developed a rising PSA level over the past year.  His PSA on 1/13/2022 was 0.16, the PSA on 4/20/2022 was 0.24, the PSA on 6/13/2022 was 0.34, the PSA on 7/12/2022 was 0.47, the PSA on 8/25/2022 was 0.64 ng/mL, and the PSA on 11/21/2022 was 1.55 ng/mL (with a testosterone level of 14 ng/dL).  On 3/8/2023, his PSA level increased to 8.7 ng/mL.    The patient then screened for the ECLIPSE clinical trial, and he was randomized to the Lee Ann-PSMA-I&T radioligand arm.  During the screening procedures he was found to have an asymptomatic pulmonary embolus, and he began apixaban.  He returns today for a study visit, in anticipation of his fourth cycle of Lee Ann-PSMA-I&T radioligand therapy.  He has received three doses of Lee Ann-PSMA-I&T thus far, and his PSA level has dropped  from 8.7 to 1.1 ng/mL.  He was seen today in conjunction with the research nurse, Melissa Spencer.    Review of  Systems:  The patient states that he is feeling generally well.  He does not report significant side effects from the first 3 doses of Lee Ann-PSMA-I&T, except that he has notice dry mouth/throat as well as dry eyes with some blurred vision.  Remarkably, he does not report shortness of breath despite a known saddle pulmonary embolus.  He also complains of dry skin.  He has not gained or lost any weight recently.  He does not report any urological symptoms at this time.  He does not have any cancer-related pain.  A comprehensive 14-system review of symptoms is otherwise generally within normal limits.  His ECOG score is 0.  His pain score is 0/10.    Medications:  Lupron 22.5 mg IM q3mo (next dose, 8/30/2023)  177Lu-PSMA-I&T, dose #4 on 7/19/2023  Losartan/HCTZ 100/12.5 mg  Apixaban 5 mg BID  Allopurinol 100 mg  Zolpidem 5 mg  Clonazepam 1 mg  Sildenafil 50 mg  Loratadine 10 mg  Calcium + Vit D  Multivitamin  Folic acid    Physical Examination:  Mr. Gautma is an 83-year-old man who appears comfortable at rest.  His vital signs are generally unremarkable.  His HEENT examination is within normal limits.  There is no palpable cervical, axillary, supraclavicular or inguinal lymphadenopathy.  The cardiovascular, respiratory and gastrointestinal examinations are generally within normal limits.  The musculoskeletal examination is grossly intact.  The extremities do not demonstrate pitting edema.  The skin evaluation is unremarkable.    Imaging (2/22/2023):  His PSMA-PET/CT scan showed an asymptomatic pulmonary embolism extending into the left subsegmental pulmonary arteries; no appreciable evidence of right heart strain.  Increased PSMA uptake in multiple enlarged retroperitoneal lymph nodes which have increased in size compared to 9/14/2022. Findings consistent with metastatic disease recurrence.  Cholelithiasis without evidence of acute cholecystitis.  Hepatic steatosis without focal masses or lesions.  His nuclear-medicine  bone scan showed no evidence of osseous metastatic disease.    Imaging (6/27/2023):  His CT scan shows interval resolution of a para-aortic lymph node, previously measuring 14 mm and currently measuring 11 mm.  His bone scan shows no suspicious osseous uptake.  Overall, these radiographic findings are consistent with stable disease.    Laboratories (6/28/2023):  His CBC reveals a WBC of 3.2, hemoglobin 11.5, hematocrit 32.8%, platelets 115K.  His comprehensive metabolic panel is generally within normal limits, including a normal creatinine level of 0.82 mg/dL.  His PSA is 1.13 ng/mL, which continues to decline.      ASSESSMENT AND PLAN:  Mr. Gautam is an 83-year-old man with Jeancarlos 4+5=9 [id number] prostate cancer who underwent radical prostatectomy (8/2012) and salvage radiotherapy (10/2013) but then developed metastatic CRPC refractory to darolutamide treatment.  He has enrolled on the ECLIPSE study, and he is scheduled for the fourth cycle of Lee Ann-PSMA-I&T in three weeks.  His ECOG score is 0.  His pain score is 0/10.    The patient has decided to participate in the ECLIPSE trial.  Fortunately, he was randomized to the active radioligand therapy arm, with his third Lee Ann-PSMA-I&T dose having been given three weeks ago.  Unexpectedly, he was found during screening procedures to have a pulmonary embolus on his screening CT evaluation; thus he is now being treated with apixaban.  Of note, the use of oral anticoagulation is not a contraindication to proceed with the ECLIPSE trial.  Thus far, he has not experienced any significant side effects from the radioligand therapy, except for xerostomia and xerophthalmia.  His PSA is already declining, from 8.7 to 1.1 ng/mL.  His imaging assessments (6/27/2023) continue to show stable disease, without any evidence of osseous or soft-tissue progressive disease.  We will move forward with his fourth planned cycle of Lee Ann-PSMA-I&T in three weeks, and there are no contraindications for  treatment.  He was given the opportunity to ask multiple questions today, all of which were answered to his satisfaction.    A total of 40 minutes were spent on this patient on the day of the consultation, of which more than 50% of this time was used for counseling and coordination of care.  He was seen today together with the research nurse, Melissa Spencer.    Tio Holman M.D.

## 2023-06-28 ENCOUNTER — APPOINTMENT (OUTPATIENT)
Dept: LAB | Facility: CLINIC | Age: 83
End: 2023-06-28
Attending: INTERNAL MEDICINE
Payer: COMMERCIAL

## 2023-06-28 ENCOUNTER — ANCILLARY PROCEDURE (OUTPATIENT)
Dept: RADIOLOGY | Facility: CLINIC | Age: 83
End: 2023-06-28
Attending: INTERNAL MEDICINE
Payer: COMMERCIAL

## 2023-06-28 ENCOUNTER — ONCOLOGY VISIT (OUTPATIENT)
Dept: ONCOLOGY | Facility: CLINIC | Age: 83
End: 2023-06-28
Attending: INTERNAL MEDICINE
Payer: COMMERCIAL

## 2023-06-28 ENCOUNTER — ALLIED HEALTH/NURSE VISIT (OUTPATIENT)
Dept: ONCOLOGY | Facility: CLINIC | Age: 83
End: 2023-06-28

## 2023-06-28 VITALS
SYSTOLIC BLOOD PRESSURE: 137 MMHG | BODY MASS INDEX: 28.13 KG/M2 | TEMPERATURE: 98 F | HEART RATE: 68 BPM | RESPIRATION RATE: 16 BRPM | OXYGEN SATURATION: 98 % | DIASTOLIC BLOOD PRESSURE: 80 MMHG | WEIGHT: 174.3 LBS

## 2023-06-28 DIAGNOSIS — C61 PROSTATE CANCER (H): ICD-10-CM

## 2023-06-28 DIAGNOSIS — C61 MALIGNANT NEOPLASM OF PROSTATE (H): Primary | ICD-10-CM

## 2023-06-28 DIAGNOSIS — C61 PROSTATE CANCER METASTATIC TO BONE (H): ICD-10-CM

## 2023-06-28 DIAGNOSIS — C79.51 PROSTATE CANCER METASTATIC TO BONE (H): ICD-10-CM

## 2023-06-28 DIAGNOSIS — C61 PROSTATE CANCER METASTATIC TO BONE (H): Primary | ICD-10-CM

## 2023-06-28 DIAGNOSIS — C61 MALIGNANT NEOPLASM OF PROSTATE (H): ICD-10-CM

## 2023-06-28 DIAGNOSIS — Z00.6 EXAMINATION OF PARTICIPANT IN CLINICAL TRIAL: ICD-10-CM

## 2023-06-28 DIAGNOSIS — C79.51 PROSTATE CANCER METASTATIC TO BONE (H): Primary | ICD-10-CM

## 2023-06-28 LAB
ALBUMIN SERPL BCG-MCNC: 4.3 G/DL (ref 3.5–5.2)
ALP SERPL-CCNC: 71 U/L (ref 40–129)
ALT SERPL W P-5'-P-CCNC: 19 U/L (ref 0–70)
ANION GAP SERPL CALCULATED.3IONS-SCNC: 9 MMOL/L (ref 7–15)
AST SERPL W P-5'-P-CCNC: 37 U/L (ref 0–45)
BASOPHILS # BLD AUTO: 0 10E3/UL (ref 0–0.2)
BASOPHILS NFR BLD AUTO: 1 %
BILIRUB SERPL-MCNC: 0.8 MG/DL
BUN SERPL-MCNC: 11 MG/DL (ref 8–23)
CALCIUM SERPL-MCNC: 9.6 MG/DL (ref 8.8–10.2)
CHLORIDE SERPL-SCNC: 104 MMOL/L (ref 98–107)
CHOLEST SERPL-MCNC: 178 MG/DL
CREAT SERPL-MCNC: 0.82 MG/DL (ref 0.67–1.17)
DEPRECATED HCO3 PLAS-SCNC: 27 MMOL/L (ref 22–29)
EOSINOPHIL # BLD AUTO: 0 10E3/UL (ref 0–0.7)
EOSINOPHIL NFR BLD AUTO: 1 %
ERYTHROCYTE [DISTWIDTH] IN BLOOD BY AUTOMATED COUNT: 14.2 % (ref 10–15)
GFR SERPL CREATININE-BSD FRML MDRD: 88 ML/MIN/1.73M2
GLUCOSE SERPL-MCNC: 143 MG/DL (ref 70–99)
HCT VFR BLD AUTO: 32.8 % (ref 40–53)
HDLC SERPL-MCNC: 93 MG/DL
HGB BLD-MCNC: 11.5 G/DL (ref 13.3–17.7)
IMM GRANULOCYTES # BLD: 0 10E3/UL
IMM GRANULOCYTES NFR BLD: 1 %
LDLC SERPL CALC-MCNC: 74 MG/DL
LYMPHOCYTES # BLD AUTO: 0.7 10E3/UL (ref 0.8–5.3)
LYMPHOCYTES NFR BLD AUTO: 22 %
MAGNESIUM SERPL-MCNC: 1.6 MG/DL (ref 1.7–2.3)
MCH RBC QN AUTO: 33.4 PG (ref 26.5–33)
MCHC RBC AUTO-ENTMCNC: 35.1 G/DL (ref 31.5–36.5)
MCV RBC AUTO: 95 FL (ref 78–100)
MONOCYTES # BLD AUTO: 0.4 10E3/UL (ref 0–1.3)
MONOCYTES NFR BLD AUTO: 12 %
NEUTROPHILS # BLD AUTO: 2 10E3/UL (ref 1.6–8.3)
NEUTROPHILS NFR BLD AUTO: 63 %
NONHDLC SERPL-MCNC: 85 MG/DL
NRBC # BLD AUTO: 0 10E3/UL
NRBC BLD AUTO-RTO: 0 /100
PLATELET # BLD AUTO: 115 10E3/UL (ref 150–450)
POTASSIUM SERPL-SCNC: 3.6 MMOL/L (ref 3.4–5.3)
PROT SERPL-MCNC: 7.1 G/DL (ref 6.4–8.3)
PSA SERPL DL<=0.01 NG/ML-MCNC: 1.13 NG/ML
RBC # BLD AUTO: 3.44 10E6/UL (ref 4.4–5.9)
SODIUM SERPL-SCNC: 140 MMOL/L (ref 136–145)
TRIGL SERPL-MCNC: 54 MG/DL
TSH SERPL DL<=0.005 MIU/L-ACNC: 1.59 UIU/ML (ref 0.3–4.2)
WBC # BLD AUTO: 3.2 10E3/UL (ref 4–11)

## 2023-06-28 PROCEDURE — 99215 OFFICE O/P EST HI 40 MIN: CPT | Performed by: INTERNAL MEDICINE

## 2023-06-28 PROCEDURE — 85025 COMPLETE CBC W/AUTO DIFF WBC: CPT | Performed by: INTERNAL MEDICINE

## 2023-06-28 PROCEDURE — 80061 LIPID PANEL: CPT | Performed by: INTERNAL MEDICINE

## 2023-06-28 PROCEDURE — 82374 ASSAY BLOOD CARBON DIOXIDE: CPT | Performed by: INTERNAL MEDICINE

## 2023-06-28 PROCEDURE — 83735 ASSAY OF MAGNESIUM: CPT | Performed by: INTERNAL MEDICINE

## 2023-06-28 PROCEDURE — 36415 COLL VENOUS BLD VENIPUNCTURE: CPT | Performed by: INTERNAL MEDICINE

## 2023-06-28 PROCEDURE — 84443 ASSAY THYROID STIM HORMONE: CPT | Performed by: INTERNAL MEDICINE

## 2023-06-28 PROCEDURE — 84403 ASSAY OF TOTAL TESTOSTERONE: CPT | Performed by: INTERNAL MEDICINE

## 2023-06-28 PROCEDURE — G0463 HOSPITAL OUTPT CLINIC VISIT: HCPCS | Performed by: INTERNAL MEDICINE

## 2023-06-28 PROCEDURE — 84153 ASSAY OF PSA TOTAL: CPT | Performed by: INTERNAL MEDICINE

## 2023-06-28 ASSESSMENT — PAIN SCALES - GENERAL: PAINLEVEL: NO PAIN (0)

## 2023-06-28 NOTE — NURSING NOTE
"Oncology Rooming Note    June 28, 2023 10:50 AM   Medardo Gautam is a 83 year old male who presents for:    Chief Complaint   Patient presents with     Blood Draw     Labs drawn via  by RN in lab.  VS taken      Oncology Clinic Visit     Prostate cancer        Initial Vitals: /80   Pulse 68   Temp 98  F (36.7  C) (Oral)   Resp 16   Wt 79.1 kg (174 lb 4.8 oz)   SpO2 98%   BMI 28.13 kg/m   Estimated body mass index is 28.13 kg/m  as calculated from the following:    Height as of 6/16/23: 1.676 m (5' 6\").    Weight as of this encounter: 79.1 kg (174 lb 4.8 oz). Body surface area is 1.92 meters squared.  No Pain (0) Comment: Data Unavailable   No LMP for male patient.  Allergies reviewed: Yes  Medications reviewed: Yes    Medications: Medication refills not needed today.  Pharmacy name entered into [employer]:    [id number] - [place], [place] - [address] [facility] - [place], [place] - [address]   [facility] [place], [place] - [address]  [facility] [place] [address] [place] [address]    Clinical concerns: none.        Jasmina Florence CMA            "

## 2023-06-28 NOTE — NURSING NOTE
Chief Complaint   Patient presents with     Blood Draw     Labs drawn via  by RN in lab.  VS taken        Labs collected from venipuncture by RN. Vitals taken. Checked in for appointment(s).    Mayte Valdez RN

## 2023-06-28 NOTE — NURSING NOTE
2022IS014: Study Visit Note   Subject name: Medardo Gautam     Visit: C3W16    Did the study visit occur within the appropriate window allowed by the protocol? Yes    Since the last study visit, He has been doing well.  Dry mouth is slightly better as he gets farther out from infusion- still grade 1.   Vision is about the same or maybe a tiny bit worse-- he got a new prescription and saw a retina doctor (notes not available), remains grade 1 and can continue with all IADLs.  Denies pain, N/V/D.  Some easy bruising.     I have personally interviewed Medardo Gautam and reviewed his medical record for adverse events and concomitant medications and these have been recorded on the corresponding logs in Medardo Gautam's research file.     Medardo Gautam was given the opportunity to ask any trial related questions.  Please see provider progress note for physical exam and other clinical information. Labs were reviewed - any significant lab values were addressed and reviewed.    Evelyn Russell RN

## 2023-06-30 LAB — TESTOST SERPL-MCNC: 12 NG/DL (ref 240–950)

## 2023-07-17 ENCOUNTER — DOCUMENTATION ONLY (OUTPATIENT)
Dept: ONCOLOGY | Facility: CLINIC | Age: 83
End: 2023-07-17
Payer: COMMERCIAL

## 2023-07-17 DIAGNOSIS — C61 PROSTATE CANCER (H): Primary | ICD-10-CM

## 2023-07-19 ENCOUNTER — ALLIED HEALTH/NURSE VISIT (OUTPATIENT)
Dept: ONCOLOGY | Facility: CLINIC | Age: 83
End: 2023-07-19
Payer: COMMERCIAL

## 2023-07-19 DIAGNOSIS — C79.51 PROSTATE CANCER METASTATIC TO BONE (H): Primary | ICD-10-CM

## 2023-07-19 DIAGNOSIS — C61 PROSTATE CANCER METASTATIC TO BONE (H): Primary | ICD-10-CM

## 2023-07-19 NOTE — NURSING NOTE
[id number]: Study Visit Note   Subject name: Medardo Gautam     Visit: C4W18    MD / lab visit for today scheduled in error, cancelled, discussed with patient.      Since the last study visit, He has been doing well.  No change in any AEs or medications. Will be here for final lutetium infusion tomorrow.      I have personally interviewed Medardo Gautam and reviewed his medical record for adverse events and concomitant medications and these have been recorded on the corresponding logs in Medardo Gautam's research file.     Medardo Gautam was given the opportunity to ask any trial related questions.    Evelyn Russell RN

## 2023-07-20 ENCOUNTER — HOSPITAL ENCOUNTER (OUTPATIENT)
Dept: NUCLEAR MEDICINE | Facility: CLINIC | Age: 83
Setting detail: NUCLEAR MEDICINE
Discharge: HOME | End: 2023-07-20
Attending: INTERNAL MEDICINE
Payer: COMMERCIAL

## 2023-07-20 ENCOUNTER — ALLIED HEALTH/NURSE VISIT (OUTPATIENT)
Dept: ONCOLOGY | Facility: CLINIC | Age: 83
End: 2023-07-20
Payer: COMMERCIAL

## 2023-07-20 VITALS
OXYGEN SATURATION: 100 % | TEMPERATURE: 98.4 F | RESPIRATION RATE: 16 BRPM | DIASTOLIC BLOOD PRESSURE: 72 MMHG | SYSTOLIC BLOOD PRESSURE: 134 MMHG | HEART RATE: 62 BPM

## 2023-07-20 DIAGNOSIS — C61 PROSTATE CANCER METASTATIC TO BONE (H): Primary | ICD-10-CM

## 2023-07-20 DIAGNOSIS — C79.51 PROSTATE CANCER METASTATIC TO BONE (H): Primary | ICD-10-CM

## 2023-07-20 DIAGNOSIS — C61 PROSTATE CANCER (H): ICD-10-CM

## 2023-07-20 DIAGNOSIS — Z00.6 EXAMINATION OF PARTICIPANT IN CLINICAL TRIAL: Primary | ICD-10-CM

## 2023-07-20 DIAGNOSIS — C61 PROSTATE CANCER METASTATIC TO BONE (H): ICD-10-CM

## 2023-07-20 DIAGNOSIS — C79.51 PROSTATE CANCER METASTATIC TO BONE (H): ICD-10-CM

## 2023-07-20 DIAGNOSIS — C61 MALIGNANT NEOPLASM OF PROSTATE (H): ICD-10-CM

## 2023-07-20 RX ORDER — EPINEPHRINE 1 MG/ML
0.3 INJECTION, SOLUTION, CONCENTRATE INTRAVENOUS EVERY 5 MIN PRN
Status: DISCONTINUED | OUTPATIENT
Start: 2023-07-20 | End: 2023-07-21 | Stop reason: HOSPADM

## 2023-07-20 RX ORDER — ALBUTEROL SULFATE 90 UG/1
1-2 AEROSOL, METERED RESPIRATORY (INHALATION)
Status: DISCONTINUED | OUTPATIENT
Start: 2023-07-20 | End: 2023-07-21 | Stop reason: HOSPADM

## 2023-07-20 RX ORDER — METHYLPREDNISOLONE SODIUM SUCCINATE 125 MG/2ML
125 INJECTION, POWDER, LYOPHILIZED, FOR SOLUTION INTRAMUSCULAR; INTRAVENOUS
Status: DISCONTINUED | OUTPATIENT
Start: 2023-07-20 | End: 2023-07-21 | Stop reason: HOSPADM

## 2023-07-20 RX ORDER — LORAZEPAM 0.5 MG/1
.5-1 TABLET ORAL EVERY 6 HOURS PRN
Status: DISCONTINUED | OUTPATIENT
Start: 2023-07-20 | End: 2023-07-21 | Stop reason: HOSPADM

## 2023-07-20 RX ORDER — PROCHLORPERAZINE MALEATE 5 MG
5 TABLET ORAL EVERY 6 HOURS PRN
Status: DISCONTINUED | OUTPATIENT
Start: 2023-07-20 | End: 2023-07-21 | Stop reason: HOSPADM

## 2023-07-20 RX ORDER — MEPERIDINE HYDROCHLORIDE 25 MG/ML
25 INJECTION INTRAMUSCULAR; INTRAVENOUS; SUBCUTANEOUS EVERY 30 MIN PRN
Status: DISCONTINUED | OUTPATIENT
Start: 2023-07-20 | End: 2023-07-21 | Stop reason: HOSPADM

## 2023-07-20 RX ORDER — DIPHENHYDRAMINE HYDROCHLORIDE 50 MG/ML
50 INJECTION INTRAMUSCULAR; INTRAVENOUS
Status: DISCONTINUED | OUTPATIENT
Start: 2023-07-20 | End: 2023-07-21 | Stop reason: HOSPADM

## 2023-07-20 RX ORDER — LORAZEPAM 2 MG/ML
.5-1 INJECTION INTRAMUSCULAR EVERY 6 HOURS PRN
Status: DISCONTINUED | OUTPATIENT
Start: 2023-07-20 | End: 2023-07-21 | Stop reason: HOSPADM

## 2023-07-20 RX ORDER — ALBUTEROL SULFATE 0.83 MG/ML
2.5 SOLUTION RESPIRATORY (INHALATION)
Status: DISCONTINUED | OUTPATIENT
Start: 2023-07-20 | End: 2023-07-21 | Stop reason: HOSPADM

## 2023-07-20 RX ORDER — ONDANSETRON 8 MG/1
8 TABLET, FILM COATED ORAL
Status: DISCONTINUED | OUTPATIENT
Start: 2023-07-20 | End: 2023-07-21 | Stop reason: HOSPADM

## 2023-07-20 NOTE — NURSING NOTE
Performance Status     Subject name: Medardo Gautam   Eastern Cooperative Oncology Group (ECOG) Performance Status  GRADE ECOG PERFORMANCE STATUS   0 Fully active, able to carry on all pre-disease performance without restriction   1 Restricted in physically strenuous activity but ambulatory and able to carry out work of a light   2 Ambulatory and capable of all selfcare but unable to carry out any work activities; up and about more than 50% of waking hours   3 Capable of only limited selfcare; confined to bed or chair more than 50% of waking hours   4 Completely disabled; cannot carry on any selfcare; totally confined to bed or chair   5 Dead     The patient was assessed on 20JUL2023 using the ECOG scale. ECOG Score: 1    Form 503.00.01 (Version 1)     Effective date: 01JUL2018     Next Review Date: 01JUL2020 2022IS014: Study Visit Note   Subject name: Medardo Gautam     Visit: Cycle 4 W 18    Did the study visit occur within the appropriate window allowed by the protocol? yes    Since the last study visit, He has been doing great. No changes. Dry mouth and dry eyes persist. He has had no alterations in eating from dry mouth.      Verbal handover provided to nuclear medicine department. Instructed department to document start time, stop time of infusion as well as assigned activity, total activity in container pre-dose, total activity post-dose, total volume administered and infusion flow rate.     Reminded patient to drink plenty of water and fluids and void frequently.     I have personally interviewed Medardo Gautam and reviewed his medical record for adverse events and concomitant medications and these have been recorded on the corresponding logs in Medardo Gautam's research file.     Medardo Gautam was given the opportunity to ask any trial related questions.  Please see provider progress note for physical exam and other clinical information. Labs were reviewed - any significant lab values were  addressed and reviewed.    Melissa Spencer RN

## 2023-07-20 NOTE — PROGRESS NOTES
[facility] Nursing Note:    Patient presents today for Lutetium-177 PSMA I&T (Eclipse Study), therapy, dose 4 of 4  Patient seen by provider today: Dr Saleh   present during visit today: NOT APPLICABLE      Intravenous Access:  Peripheral IV placed     Treatment Conditions:  Labs results reviewed by study RN, ok to proceed with treatment.         Post Infusion Assessment:  Patient tolerated infusion without incident.    PIV - No evidence of extravasations, access discontinued per protocol.         Discharge Plan:   Patient has completed 4-dose study protocol  Patient discharged at 10:48 am  in stable condition accompanied by: self  Departure Mode: ambulating independently

## 2023-08-01 NOTE — PROGRESS NOTES
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    History of Present Illness:  Mr. Gautam is an 83-year-old man who was diagnosed with prostate cancer in 2012.  His PSA level was 5.2 ng/mL.  Clinical stage was cT1c disease.  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  His next-generation DNA sequencing analysis ([employer]) revealed a pathogenic TP53 mutation, SHIRA genotype, TMB 5 muts/Mb, and absent PD-L1 expression.  He subsequently received salvage radiotherapy, which was completed in 10/2013, concurrently with six months of androgen deprivation therapy.    He subsequently developed a biochemical recurrence, and received ADT from 2014 until 2020.  In 11/2020, he developed non-metastatic CRPC.  Darolutamide was added on 12/4/2020, to which he achieved a subsequent PSA response.  His PSA level initially dropped from 1.66 ng/mL down to a speedy of 0.07 ng/mL (6/21/2021).  However, the patient then developed a rising PSA level over the past year.  His PSA on 1/13/2022 was 0.16, the PSA on 4/20/2022 was 0.24, the PSA on 6/13/2022 was 0.34, the PSA on 7/12/2022 was 0.47, the PSA on 8/25/2022 was 0.64 ng/mL, and the PSA on 11/21/2022 was 1.55 ng/mL (with a testosterone level of 14 ng/dL).  On 3/8/2023, his PSA level increased to 8.7 ng/mL.    The patient then screened for the ECLIPSE clinical trial, and he was randomized to the Lee Ann-PSMA-I&T radioligand arm.  During the screening procedures he was found to have an asymptomatic pulmonary embolus, and he began apixaban.  He returns today for a study visit.  He has received all 4 doses of Lee Ann-PSMA-I&T thus far, and his PSA level has dropped  from 8.7 to 1.1 ng/mL.  He was seen today in conjunction with the research nurse, Melissa Spencer.    Review of Systems:  The patient states that he is feeling generally well.  He does  not report significant side effects from the first 4 doses of Lee Ann-PSMA-I&T, except that he has notice dry mouth/throat (grade-1) as well as dry eyes (grade-2) with some blurred vision.  He reports trouble reading the newspaper at times.  Remarkably, he does not report shortness of breath despite a known saddle pulmonary embolus.  He also complains of dry skin.  He has not gained or lost any weight recently.  He does not report any urological symptoms at this time.  He does not have any cancer-related pain.  A comprehensive 14-system review of symptoms is otherwise generally within normal limits.  His ECOG score is 0.  His pain score is 0/10.    Medications:  Lupron 22.5 mg IM q3mo (next dose, 8/30/2023)  177Lu-PSMA-I&T, dose #4 on 7/20/2023  Losartan/HCTZ 100/12.5 mg  Apixaban 5 mg BID  Allopurinol 100 mg  Zolpidem 5 mg  Clonazepam 1 mg  Sildenafil 50 mg  Loratadine 10 mg  Calcium + Vit D  Multivitamin  Folic acid    Physical Examination:  Mr. Gautam is an 83-year-old man who appears comfortable at rest.  His vital signs are generally unremarkable.  His HEENT examination is within normal limits.  There is no palpable cervical, axillary, supraclavicular or inguinal lymphadenopathy.  The cardiovascular, respiratory and gastrointestinal examinations are generally within normal limits.  The musculoskeletal examination is grossly intact.  The extremities do not demonstrate pitting edema.  The skin evaluation is unremarkable.    Imaging (6/27/2023):  His CT scan shows interval resolution of a para-aortic lymph node, previously measuring 14 mm and currently measuring 11 mm.  His bone scan shows no suspicious osseous uptake.  Overall, these radiographic findings are consistent with stable disease.    Laboratories (8/2/23):  His CBC reveals a WBC of 2.4 (POG=0785), hemoglobin 11.6, hematocrit 33%, platelets 103K.  His comprehensive metabolic panel is generally within normal limits, including a normal creatinine level of 0.83  mg/dL.  His PSA is 0.58 ng/mL, which continues to decline.      ASSESSMENT AND PLAN:  Mr. Gautam is an 83-year-old man with [place] 4+5=9 PX05-utljtff prostate cancer who underwent radical prostatectomy (8/2012) and salvage radiotherapy (10/2013) but then developed metastatic CRPC refractory to darolutamide treatment.  He has enrolled on the ECLIPSE study, and he has received all 4 doses of Lee Ann-PSMA-I&T radioligand therapy.  His ECOG score is 0.  His pain score is 0/10.    The patient has decided to participate in the ECLIPSE trial.  Fortunately, he was randomized to the radioligand therapy arm, with his fourth Lee Ann-PSMA-I&T dose having been given two weeks ago.  Unexpectedly, he was found during screening procedures to have a pulmonary embolus on his screening CT evaluation; thus he is now being treated with apixaban.  Of note, the use of oral anticoagulation is not a contraindication to proceed with the ECLIPSE trial.  Thus far, he has not experienced any significant side effects from the radioligand therapy, except for xerostomia and xerophthalmia.  His PSA is already declining, from 8.7 to 1.1 ng/mL.  His last imaging assessments continue to show stable disease, without any evidence of osseous or soft-tissue progressive disease.  He has now completed the investigational therapeutic portion of the ECLIPSE trial.  We will move forward with his next LHRH agonist treatment in four weeks.  He will also undergo re-staging assessments soon with a CT scan and Bone scan.  The patient was given the opportunity to ask multiple questions today, all of which were answered to his satisfaction.     A total of 40 minutes were spent on this patient on the day of the consultation, of which more than 50% of this time was used for counseling and coordination of care.  He was seen today together with the research nurse, Melissa Spencer.    Tio Holman M.D.

## 2023-08-02 ENCOUNTER — ONCOLOGY VISIT (OUTPATIENT)
Dept: ONCOLOGY | Facility: CLINIC | Age: 83
End: 2023-08-02
Attending: INTERNAL MEDICINE
Payer: COMMERCIAL

## 2023-08-02 ENCOUNTER — ALLIED HEALTH/NURSE VISIT (OUTPATIENT)
Dept: ONCOLOGY | Facility: CLINIC | Age: 83
End: 2023-08-02
Payer: COMMERCIAL

## 2023-08-02 ENCOUNTER — APPOINTMENT (OUTPATIENT)
Dept: LAB | Facility: CLINIC | Age: 83
End: 2023-08-02
Attending: NURSE PRACTITIONER
Payer: COMMERCIAL

## 2023-08-02 VITALS
DIASTOLIC BLOOD PRESSURE: 71 MMHG | SYSTOLIC BLOOD PRESSURE: 145 MMHG | OXYGEN SATURATION: 97 % | RESPIRATION RATE: 16 BRPM | HEART RATE: 65 BPM | BODY MASS INDEX: 28.86 KG/M2 | TEMPERATURE: 97.3 F | WEIGHT: 178.8 LBS

## 2023-08-02 DIAGNOSIS — C61 MALIGNANT NEOPLASM OF PROSTATE (H): Primary | ICD-10-CM

## 2023-08-02 DIAGNOSIS — R35.0 URINARY FREQUENCY: ICD-10-CM

## 2023-08-02 DIAGNOSIS — C61 MALIGNANT NEOPLASM OF PROSTATE (H): ICD-10-CM

## 2023-08-02 DIAGNOSIS — C61 PROSTATE CANCER METASTATIC TO BONE (H): ICD-10-CM

## 2023-08-02 DIAGNOSIS — C61 PROSTATE CANCER (H): ICD-10-CM

## 2023-08-02 DIAGNOSIS — C61 PROSTATE CANCER (H): Primary | ICD-10-CM

## 2023-08-02 DIAGNOSIS — C79.51 PROSTATE CANCER METASTATIC TO BONE (H): ICD-10-CM

## 2023-08-02 LAB
ALBUMIN SERPL BCG-MCNC: 4.3 G/DL (ref 3.5–5.2)
ALP SERPL-CCNC: 64 U/L (ref 40–129)
ALT SERPL W P-5'-P-CCNC: 15 U/L (ref 0–70)
ANION GAP SERPL CALCULATED.3IONS-SCNC: 11 MMOL/L (ref 7–15)
AST SERPL W P-5'-P-CCNC: 36 U/L (ref 0–45)
BASOPHILS # BLD AUTO: 0 10E3/UL (ref 0–0.2)
BASOPHILS NFR BLD AUTO: 1 %
BILIRUB SERPL-MCNC: 0.7 MG/DL
BUN SERPL-MCNC: 12.6 MG/DL (ref 8–23)
CALCIUM SERPL-MCNC: 9.8 MG/DL (ref 8.8–10.2)
CHLORIDE SERPL-SCNC: 102 MMOL/L (ref 98–107)
CREAT SERPL-MCNC: 0.83 MG/DL (ref 0.67–1.17)
DEPRECATED HCO3 PLAS-SCNC: 28 MMOL/L (ref 22–29)
EOSINOPHIL # BLD AUTO: 0 10E3/UL (ref 0–0.7)
EOSINOPHIL NFR BLD AUTO: 1 %
ERYTHROCYTE [DISTWIDTH] IN BLOOD BY AUTOMATED COUNT: 12.4 % (ref 10–15)
GFR SERPL CREATININE-BSD FRML MDRD: 88 ML/MIN/1.73M2
GLUCOSE SERPL-MCNC: 136 MG/DL (ref 70–99)
HCT VFR BLD AUTO: 33 % (ref 40–53)
HGB BLD-MCNC: 11.6 G/DL (ref 13.3–17.7)
IMM GRANULOCYTES # BLD: 0 10E3/UL
IMM GRANULOCYTES NFR BLD: 0 %
LYMPHOCYTES # BLD AUTO: 0.6 10E3/UL (ref 0.8–5.3)
LYMPHOCYTES NFR BLD AUTO: 26 %
MAGNESIUM SERPL-MCNC: 1.6 MG/DL (ref 1.7–2.3)
MCH RBC QN AUTO: 34.4 PG (ref 26.5–33)
MCHC RBC AUTO-ENTMCNC: 35.2 G/DL (ref 31.5–36.5)
MCV RBC AUTO: 98 FL (ref 78–100)
MONOCYTES # BLD AUTO: 0.4 10E3/UL (ref 0–1.3)
MONOCYTES NFR BLD AUTO: 15 %
NEUTROPHILS # BLD AUTO: 1.4 10E3/UL (ref 1.6–8.3)
NEUTROPHILS NFR BLD AUTO: 57 %
NRBC # BLD AUTO: 0 10E3/UL
NRBC BLD AUTO-RTO: 0 /100
PLATELET # BLD AUTO: 103 10E3/UL (ref 150–450)
POTASSIUM SERPL-SCNC: 3.7 MMOL/L (ref 3.4–5.3)
PROT SERPL-MCNC: 7 G/DL (ref 6.4–8.3)
PSA SERPL DL<=0.01 NG/ML-MCNC: 0.58 NG/ML
RBC # BLD AUTO: 3.37 10E6/UL (ref 4.4–5.9)
SODIUM SERPL-SCNC: 141 MMOL/L (ref 136–145)
WBC # BLD AUTO: 2.4 10E3/UL (ref 4–11)

## 2023-08-02 PROCEDURE — 99215 OFFICE O/P EST HI 40 MIN: CPT | Performed by: INTERNAL MEDICINE

## 2023-08-02 PROCEDURE — 36415 COLL VENOUS BLD VENIPUNCTURE: CPT | Performed by: INTERNAL MEDICINE

## 2023-08-02 PROCEDURE — G0463 HOSPITAL OUTPT CLINIC VISIT: HCPCS | Performed by: INTERNAL MEDICINE

## 2023-08-02 PROCEDURE — 83735 ASSAY OF MAGNESIUM: CPT | Performed by: INTERNAL MEDICINE

## 2023-08-02 PROCEDURE — 85025 COMPLETE CBC W/AUTO DIFF WBC: CPT | Performed by: INTERNAL MEDICINE

## 2023-08-02 PROCEDURE — 80053 COMPREHEN METABOLIC PANEL: CPT | Performed by: INTERNAL MEDICINE

## 2023-08-02 PROCEDURE — 84153 ASSAY OF PSA TOTAL: CPT | Performed by: INTERNAL MEDICINE

## 2023-08-02 PROCEDURE — 84403 ASSAY OF TOTAL TESTOSTERONE: CPT | Performed by: INTERNAL MEDICINE

## 2023-08-02 RX ORDER — TOLTERODINE 2 MG/1
2 CAPSULE, EXTENDED RELEASE ORAL DAILY
Qty: 30 CAPSULE | Refills: 3 | Status: SHIPPED | OUTPATIENT
Start: 2023-08-02 | End: 2023-08-22

## 2023-08-02 ASSESSMENT — PAIN SCALES - GENERAL: PAINLEVEL: NO PAIN (0)

## 2023-08-02 NOTE — NURSING NOTE
2022IS014: Study Visit Note   Subject name: Medardo Gautam     Visit: C4W20    Did the study visit occur within the appropriate window allowed by the protocol? yes    Since the last study visit, He has been doing well.  C/O urinary frequency, grade 2, MD added detrol LA.  WBC and ANC now grade 2, pt denies s/sx of infection and was advised to limit exposure to ill individuals. Clarified upcoming appointment schedule.       I have personally interviewed Medardo Gautam and reviewed his medical record for adverse events and concomitant medications and these have been recorded on the corresponding logs in Medardo Gautam's research file.     Medardo Gautam was given the opportunity to ask any trial related questions.  Please see provider progress note for physical exam and other clinical information. Labs were reviewed - any significant lab values were addressed and reviewed.    Evelyn Russell RN

## 2023-08-02 NOTE — NURSING NOTE
"Oncology Rooming Note    August 2, 2023 10:59 AM   Medardo Gautam is a 83 year old male who presents for:    Chief Complaint   Patient presents with    Blood Draw     Labs drawn via  by rn in lab. VS taken.    Oncology Clinic Visit     Malignant neoplasm of prostate (H) (Primary Dx); Prostate cancer- Jeancarlos 9 (5+4) dx Feb 2012     Initial Vitals: BP (!) 145/71   Pulse 65   Temp 97.3  F (36.3  C)   Resp 16   Wt 81.1 kg (178 lb 12.8 oz)   SpO2 97%   BMI 28.86 kg/m   Estimated body mass index is 28.86 kg/m  as calculated from the following:    Height as of 6/16/23: 1.676 m (5' 6\").    Weight as of this encounter: 81.1 kg (178 lb 12.8 oz). Body surface area is 1.94 meters squared.  No Pain (0) Comment: Data Unavailable   No LMP for male patient.  Allergies reviewed: Yes  Medications reviewed: Yes    Medications: Medication refills not needed today.  Pharmacy name entered into [facility]:    [facility] - [place], [place] - [address] [facility] - [place], [place] [address] [place] [address] AVE   [facility] [place], [place] - [address]  [facility] [place] [address] [place] [address]    Clinical concerns:    Would like to know if injections could continue to be done in [place]     Due for pneumonia had hepatitis vaccines, PT would like to know if this will interfere with treatments     Pt is noticing new co-pays with insurance with the study that were not there before. Would like to discuss this       Jerry Scott             "

## 2023-08-02 NOTE — LETTER
8/2/2023         RE: Medardo Gautam  [id number] [facility] [id number]        Dear Colleague,    Thank you for referring your patient, Medardo Gautam, to the [facility]. Please see a copy of my visit note below.      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    History of Present Illness:  Mr. Gautam is an 83-year-old man who was diagnosed with prostate cancer in 2012.  His PSA level was 5.2 ng/mL.  Clinical stage was cT1c disease.  He underwent radical prostatectomy on 8/6/2012, which revealed [place] 4+5=9 carcinoma, stage pT2c N0 R0.  His next-generation DNA sequencing analysis (CARIS) revealed a pathogenic TP53 mutation, SHIRA genotype, TMB 5 muts/Mb, and absent PD-L1 expression.  He subsequently received salvage radiotherapy, which was completed in 10/2013, concurrently with six months of androgen deprivation therapy.    He subsequently developed a biochemical recurrence, and received ADT from 2014 until 2020.  In 11/2020, he developed non-metastatic CRPC.  Darolutamide was added on 12/4/2020, to which he achieved a subsequent PSA response.  His PSA level initially dropped from 1.66 ng/mL down to a speedy of 0.07 ng/mL (6/21/2021).  However, the patient then developed a rising PSA level over the past year.  His PSA on 1/13/2022 was 0.16, the PSA on 4/20/2022 was 0.24, the PSA on 6/13/2022 was 0.34, the PSA on 7/12/2022 was 0.47, the PSA on 8/25/2022 was 0.64 ng/mL, and the PSA on 11/21/2022 was 1.55 ng/mL (with a testosterone level of 14 ng/dL).  On 3/8/2023, his PSA level increased to 8.7 ng/mL.    The patient then screened for the ECLIPSE clinical trial, and he was randomized to the Lee Ann-PSMA-I&T radioligand arm.  During the screening procedures he was found to have an asymptomatic pulmonary embolus, and he began apixaban.  He returns today for a study visit.  He has  received all 4 doses of Lee Ann-PSMA-I&T thus far, and his PSA level has dropped  from 8.7 to 1.1 ng/mL.  He was seen today in conjunction with the research nurse, Melissa Spencer.    Review of Systems:  The patient states that he is feeling generally well.  He does not report significant side effects from the first 4 doses of Lee Ann-PSMA-I&T, except that he has notice dry mouth/throat (grade-1) as well as dry eyes (grade-2) with some blurred vision.  He reports trouble reading the newspaper at times.  Remarkably, he does not report shortness of breath despite a known saddle pulmonary embolus.  He also complains of dry skin.  He has not gained or lost any weight recently.  He does not report any urological symptoms at this time.  He does not have any cancer-related pain.  A comprehensive 14-system review of symptoms is otherwise generally within normal limits.  His ECOG score is 0.  His pain score is 0/10.    Medications:  Lupron 22.5 mg IM q3mo (next dose, 8/30/2023)  177Lu-PSMA-I&T, dose #4 on 7/20/2023  Losartan/HCTZ 100/12.5 mg  Apixaban 5 mg BID  Allopurinol 100 mg  Zolpidem 5 mg  Clonazepam 1 mg  Sildenafil 50 mg  Loratadine 10 mg  Calcium + Vit D  Multivitamin  Folic acid    Physical Examination:  Mr. Gautam is an 83-year-old man who appears comfortable at rest.  His vital signs are generally unremarkable.  His HEENT examination is within normal limits.  There is no palpable cervical, axillary, supraclavicular or inguinal lymphadenopathy.  The cardiovascular, respiratory and gastrointestinal examinations are generally within normal limits.  The musculoskeletal examination is grossly intact.  The extremities do not demonstrate pitting edema.  The skin evaluation is unremarkable.    Imaging (6/27/2023):  His CT scan shows interval resolution of a para-aortic lymph node, previously measuring 14 mm and currently measuring 11 mm.  His bone scan shows no suspicious osseous uptake.  Overall, these radiographic findings are  consistent with stable disease.    Laboratories (8/2/23):  His CBC reveals a WBC of 2.4 (XNZ=6201), hemoglobin 11.6, hematocrit 33%, platelets 103K.  His comprehensive metabolic panel is generally within normal limits, including a normal creatinine level of 0.83 mg/dL.  His PSA is 0.58 ng/mL, which continues to decline.      ASSESSMENT AND PLAN:  Mr. Gautam is an 83-year-old man with [place] 4+5=9 OR85-puwuxzl prostate cancer who underwent radical prostatectomy (8/2012) and salvage radiotherapy (10/2013) but then developed metastatic CRPC refractory to darolutamide treatment.  He has enrolled on the ECLIPSE study, and he has received all 4 doses of Lee Ann-PSMA-I&T radioligand therapy.  His ECOG score is 0.  His pain score is 0/10.    The patient has decided to participate in the ECLIPSE trial.  Fortunately, he was randomized to the radioligand therapy arm, with his fourth Lee Ann-PSMA-I&T dose having been given two weeks ago.  Unexpectedly, he was found during screening procedures to have a pulmonary embolus on his screening CT evaluation; thus he is now being treated with apixaban.  Of note, the use of oral anticoagulation is not a contraindication to proceed with the ECLIPSE trial.  Thus far, he has not experienced any significant side effects from the radioligand therapy, except for xerostomia and xerophthalmia.  His PSA is already declining, from 8.7 to 1.1 ng/mL.  His last imaging assessments continue to show stable disease, without any evidence of osseous or soft-tissue progressive disease.  He has now completed the investigational therapeutic portion of the ECLIPSE trial.  We will move forward with his next LHRH agonist treatment in four weeks.  He will also undergo re-staging assessments soon with a CT scan and Bone scan.  The patient was given the opportunity to ask multiple questions today, all of which were answered to his satisfaction.     A total of 40 minutes were spent on this patient on the day of the  consultation, of which more than 50% of this time was used for counseling and coordination of care.  He was seen today together with the research nurse, Melissa Spencer.    Tio Holman M.D.

## 2023-08-02 NOTE — NURSING NOTE
Chief Complaint   Patient presents with    Blood Draw     Labs drawn via  by rn in lab. VS taken.     Labs collected from venipuncture by RN. Vitals taken. Checked in for appointment(s).    Jared Philip RN

## 2023-08-06 LAB — TESTOST SERPL-MCNC: 4 NG/DL (ref 240–950)

## 2023-08-07 ENCOUNTER — PATIENT OUTREACH (OUTPATIENT)
Dept: ONCOLOGY | Facility: CLINIC | Age: 83
End: 2023-08-07
Payer: COMMERCIAL

## 2023-08-07 NOTE — PROGRESS NOTES
[facility]: Cancer Care                                                                                      RNCC called patient per pharmacy instruction that he should have the Lupron done at an infusion center.  Patient states he has received the injection at [facility] for years and is comfortable to continue getting it there.  RNCC explained that this medication is usually closely followed by our ocologists and pharmacy team.  Dr. Holman is ok with patient receiving medication where it is convenient. Advised patient that this writer advised him what pharmacist advised writer.  It would be best to receive injection at infusion site.    Iesha Owen, RN, BSN  Oncology RN Care Coordinator  [facility]

## 2023-08-21 NOTE — PROGRESS NOTES
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    History of Present Illness:  Mr. Gautam is an 83-year-old man who was diagnosed with prostate cancer in 2012.  His PSA level was 5.2 ng/mL.  Clinical stage was cT1c disease.  He underwent radical prostatectomy on 8/6/2012, which revealed [place] 4+5=9 carcinoma, stage pT2c N0 R0.  His next-generation DNA sequencing analysis ([employer]) revealed a pathogenic TP53 mutation, SHIRA genotype, TMB 5 muts/Mb, and absent PD-L1 expression.  He subsequently received salvage radiotherapy, which was completed in 10/2013, concurrently with six months of androgen deprivation therapy.    He subsequently developed a biochemical recurrence, and received ADT from 2014 until 2020.  In 11/2020, he developed non-metastatic CRPC.  Darolutamide was added on 12/4/2020, to which he achieved a subsequent PSA response.  His PSA level initially dropped from 1.66 ng/mL down to a speedy of 0.07 ng/mL (6/21/2021).  However, the patient then developed a rising PSA level over the past year.  His PSA on 1/13/2022 was 0.16, the PSA on 4/20/2022 was 0.24, the PSA on 6/13/2022 was 0.34, the PSA on 7/12/2022 was 0.47, the PSA on 8/25/2022 was 0.64 ng/mL, and the PSA on 11/21/2022 was 1.55 ng/mL (with a testosterone level of 14 ng/dL).  On 3/8/2023, his PSA level increased to 8.7 ng/mL.    The patient then screened for the ECLIPSE clinical trial, and he was randomized to the Lee Ann-PSMA-I&T radioligand arm.  During the screening procedures he was found to have an asymptomatic pulmonary embolus, and he began apixaban.  He returns today for a study visit.  He has received all 4 doses of Lee Ann-PSMA-I&T thus far, and his PSA level has dropped  from 8.7 to 1.1 ng/mL.  He was seen today in conjunction with the research nurse, Melissa Spencer.    Review of Systems:    Medardo returns to clinic alone today. He is  doing well.  He has noticed continued dry eyes with blurred vision over the last ~2 months. He continues to use rewetting eye drops but feels his ability to read small print has declined. He has a visit scheduled this Thursday with an ophthalmologist at the [facility] in [place] on Thursday afternoon for an exam.  He also has dry mouth improved with biotene mouth rinse with adequate improvement overnight. During the day, pushes oral hydration.  He has stable nocturia, was given a medication for this at his last visit but has discontinued it due to worsening dry mouth concerns.   He has ongoing, stable right hip pain for the last 6 months. No new bone pains.   No chest pain, shortness of breath, or cough. No fevers or chills.     ROS: 14 point ROS neg other than the symptoms noted above in the HPI.      Medications:  Lupron 22.5 mg IM q3mo (next dose, 8/30/2023)  177Lu-PSMA-I&T, dose #4 on 7/20/2023  Losartan/HCTZ 100/12.5 mg  Apixaban 5 mg BID  Allopurinol 100 mg  Zolpidem 5 mg  Clonazepam 1 mg  Sildenafil 50 mg  Loratadine 10 mg  Calcium + Vit D  Multivitamin  Folic acid    Physical Examination:    BP (!) 140/73   Pulse 64   Temp 98.3  F (36.8  C) (Oral)   Resp 16   Wt 81 kg (178 lb 8 oz)   SpO2 99%   BMI 28.81 kg/m    General: No acute distress  HEENT: Sclera anicteric. Oral mucosa pink, dry.  No mucositis or thrush  Lymph: No lymphadenopathy in neck  Heart: Regular, rate, and rhythm  Lungs: Clear to ascultation bilaterally  Abdomen: Positive bowel sounds. Soft, non-distended, non-tender. No organomegaly or mass.   Extremities: no lower extremity edema  Neuro: Cranial nerves grossly intact  Rash: none      Imaging: reviewed recent imaging, has upcoming CT CAP and Bone scan on 08/25/23.     Laboratories (8/2/23):    Most Recent 3 CBC's:  Recent Labs   Lab Test 08/22/23  0952 08/02/23  1026 06/28/23  1030   WBC 3.0* 2.4* 3.2*   HGB 11.9* 11.6* 11.5*    98 95   * 103* 115*    ANEUTAUTO 1.7 1.4* 2.0     Most Recent 3 BMP's:  Recent Labs   Lab Test 08/22/23  0952 08/02/23  1026 06/28/23  1030    141 140   POTASSIUM 3.8 3.7 3.6   CHLORIDE 104 102 104   CO2 26 28 27   BUN 15.1 12.6 11.0   CR 0.89 0.83 0.82   ANIONGAP 10 11 9   GAVIN 9.6 9.8 9.6   * 136* 143*   PROTTOTAL 6.9 7.0 7.1   ALBUMIN 4.2 4.3 4.3    Most Recent 3 LFT's:  Recent Labs   Lab Test 08/22/23  0952 08/02/23  1026 06/28/23  1030   AST 35 36 37   ALT 15 15 19   ALKPHOS 66 64 71   BILITOTAL 0.8 0.7 0.8    Most Recent 2 TSH and T4:  Recent Labs   Lab Test 08/22/23  0952 06/28/23  1030   TSH 1.62 1.59     I reviewed the above labs today.    ASSESSMENT AND PLAN:  #metastatic CRPC   Mr. Gautam is an 83-year-old man with Jeancarlos 4+5=9 RI56-ofnwzmn prostate cancer who underwent radical prostatectomy (8/2012) and salvage radiotherapy (10/2013) but then developed metastatic CRPC refractory to darolutamide treatment.  He has enrolled on the ECLIPSE study, and he has received all 4 doses of Lee Ann-PSMA-I&T radioligand therapy.  His ECOG score is 0.  His pain score is 0/10.  - completed all four planned doses of lutetium via the ECLIPSE trial which he tolerated well with the exception of xerostomia and xerophthalmia. His PSA continues to decline from 8.7 to 0.40. He has upcoming scans on 08/25/23 with a CT CAP and bone scan.   - patient is due for his next lupron next week, 08/30/23. Will request scheduling.     #R hip pain  - ongoing. No radiographic explanation on last scan. Repeating next imaging on 08/25/23 as above. If no concerns for metastatic disease, recommend a trial of PT and light stretching. Not currently requiring NSAID's or tylenol.     #Pulmonary embolism  - continues on eliquis.    Patient was seen in conjunction with study nurse, Evelyn Russell    35 minutes spent on the date of the encounter doing chart review, review of test results, patient visit, and documentation     Almaz Blanca PA-C

## 2023-08-22 ENCOUNTER — ONCOLOGY VISIT (OUTPATIENT)
Dept: ONCOLOGY | Facility: CLINIC | Age: 83
End: 2023-08-22
Payer: COMMERCIAL

## 2023-08-22 ENCOUNTER — APPOINTMENT (OUTPATIENT)
Dept: LAB | Facility: CLINIC | Age: 83
End: 2023-08-22
Attending: INTERNAL MEDICINE
Payer: COMMERCIAL

## 2023-08-22 ENCOUNTER — ALLIED HEALTH/NURSE VISIT (OUTPATIENT)
Dept: ONCOLOGY | Facility: CLINIC | Age: 83
End: 2023-08-22
Payer: COMMERCIAL

## 2023-08-22 VITALS
TEMPERATURE: 98.3 F | WEIGHT: 178.5 LBS | OXYGEN SATURATION: 99 % | DIASTOLIC BLOOD PRESSURE: 73 MMHG | RESPIRATION RATE: 16 BRPM | BODY MASS INDEX: 28.81 KG/M2 | HEART RATE: 64 BPM | SYSTOLIC BLOOD PRESSURE: 140 MMHG

## 2023-08-22 DIAGNOSIS — C61 PROSTATE CANCER (H): Primary | ICD-10-CM

## 2023-08-22 DIAGNOSIS — Z00.6 EXAMINATION OF PARTICIPANT IN CLINICAL TRIAL: ICD-10-CM

## 2023-08-22 LAB
ALBUMIN SERPL BCG-MCNC: 4.2 G/DL (ref 3.5–5.2)
ALP SERPL-CCNC: 66 U/L (ref 40–129)
ALT SERPL W P-5'-P-CCNC: 15 U/L (ref 0–70)
ANION GAP SERPL CALCULATED.3IONS-SCNC: 10 MMOL/L (ref 7–15)
AST SERPL W P-5'-P-CCNC: 35 U/L (ref 0–45)
BASOPHILS # BLD AUTO: 0 10E3/UL (ref 0–0.2)
BASOPHILS NFR BLD AUTO: 1 %
BILIRUB SERPL-MCNC: 0.8 MG/DL
BUN SERPL-MCNC: 15.1 MG/DL (ref 8–23)
CALCIUM SERPL-MCNC: 9.6 MG/DL (ref 8.8–10.2)
CHLORIDE SERPL-SCNC: 104 MMOL/L (ref 98–107)
CHOLEST SERPL-MCNC: 184 MG/DL
CREAT SERPL-MCNC: 0.89 MG/DL (ref 0.67–1.17)
DEPRECATED HCO3 PLAS-SCNC: 26 MMOL/L (ref 22–29)
EOSINOPHIL # BLD AUTO: 0.1 10E3/UL (ref 0–0.7)
EOSINOPHIL NFR BLD AUTO: 2 %
ERYTHROCYTE [DISTWIDTH] IN BLOOD BY AUTOMATED COUNT: 12.1 % (ref 10–15)
GFR SERPL CREATININE-BSD FRML MDRD: 86 ML/MIN/1.73M2
GLUCOSE SERPL-MCNC: 133 MG/DL (ref 70–99)
HCT VFR BLD AUTO: 34 % (ref 40–53)
HDLC SERPL-MCNC: 94 MG/DL
HGB BLD-MCNC: 11.9 G/DL (ref 13.3–17.7)
IMM GRANULOCYTES # BLD: 0 10E3/UL
IMM GRANULOCYTES NFR BLD: 0 %
LDLC SERPL CALC-MCNC: 79 MG/DL
LYMPHOCYTES # BLD AUTO: 0.8 10E3/UL (ref 0.8–5.3)
LYMPHOCYTES NFR BLD AUTO: 26 %
MAGNESIUM SERPL-MCNC: 1.7 MG/DL (ref 1.7–2.3)
MCH RBC QN AUTO: 34.9 PG (ref 26.5–33)
MCHC RBC AUTO-ENTMCNC: 35 G/DL (ref 31.5–36.5)
MCV RBC AUTO: 100 FL (ref 78–100)
MONOCYTES # BLD AUTO: 0.4 10E3/UL (ref 0–1.3)
MONOCYTES NFR BLD AUTO: 13 %
NEUTROPHILS # BLD AUTO: 1.7 10E3/UL (ref 1.6–8.3)
NEUTROPHILS NFR BLD AUTO: 58 %
NONHDLC SERPL-MCNC: 90 MG/DL
NRBC # BLD AUTO: 0 10E3/UL
NRBC BLD AUTO-RTO: 0 /100
PLATELET # BLD AUTO: 100 10E3/UL (ref 150–450)
POTASSIUM SERPL-SCNC: 3.8 MMOL/L (ref 3.4–5.3)
PROT SERPL-MCNC: 6.9 G/DL (ref 6.4–8.3)
PSA SERPL DL<=0.01 NG/ML-MCNC: 0.4 NG/ML
RBC # BLD AUTO: 3.41 10E6/UL (ref 4.4–5.9)
SODIUM SERPL-SCNC: 140 MMOL/L (ref 136–145)
TRIGL SERPL-MCNC: 53 MG/DL
TSH SERPL DL<=0.005 MIU/L-ACNC: 1.62 UIU/ML (ref 0.3–4.2)
WBC # BLD AUTO: 3 10E3/UL (ref 4–11)

## 2023-08-22 PROCEDURE — 82374 ASSAY BLOOD CARBON DIOXIDE: CPT

## 2023-08-22 PROCEDURE — 99215 OFFICE O/P EST HI 40 MIN: CPT

## 2023-08-22 PROCEDURE — 83735 ASSAY OF MAGNESIUM: CPT

## 2023-08-22 PROCEDURE — 84403 ASSAY OF TOTAL TESTOSTERONE: CPT

## 2023-08-22 PROCEDURE — 36415 COLL VENOUS BLD VENIPUNCTURE: CPT

## 2023-08-22 PROCEDURE — G0463 HOSPITAL OUTPT CLINIC VISIT: HCPCS

## 2023-08-22 PROCEDURE — 85025 COMPLETE CBC W/AUTO DIFF WBC: CPT

## 2023-08-22 PROCEDURE — 80061 LIPID PANEL: CPT

## 2023-08-22 PROCEDURE — 82310 ASSAY OF CALCIUM: CPT

## 2023-08-22 PROCEDURE — 84153 ASSAY OF PSA TOTAL: CPT

## 2023-08-22 PROCEDURE — 84443 ASSAY THYROID STIM HORMONE: CPT

## 2023-08-22 ASSESSMENT — PAIN SCALES - GENERAL: PAINLEVEL: NO PAIN (0)

## 2023-08-22 NOTE — NURSING NOTE
[id number]: Study Visit Note   Subject name: Medardo Gautam     Visit: EOT    Did the study visit occur within the appropriate window allowed by the protocol? yes    Since the last study visit, He has been doing well.  Stopped detrol LA due to concern for dry mouth.  Fatigue has resolved.  Labs improving.   EOT scans this Friday.  Sees eye doctor again this week (grade 2 dry eye and grade 1 blurred vision remain), requested records be sent to [facility] scheduled 8/30 in [place].  Awaiting direction from Dr. Holman re: eliquis and follow-up appts.      I have personally interviewed Medardo Gautam and reviewed his medical record for adverse events and concomitant medications and these have been recorded on the corresponding logs in Medardo Gautam's research file.     Special considerations for today's visit were reviewed with staff administering the study intervention including: n/a    Medardo Gautam was given the opportunity to ask any trial related questions.  Please see provider progress note for physical exam and other clinical information. Labs were reviewed - any significant lab values were addressed and reviewed.    Evelyn Russell RN

## 2023-08-22 NOTE — NURSING NOTE
Chief Complaint   Patient presents with    Blood Draw     Vpt blood draw by lab RN. Vitals taken and appointment arrived     Sydni Pandey RN

## 2023-08-22 NOTE — NURSING NOTE
"Oncology Rooming Note    August 22, 2023 10:07 AM   Medardo Gautam is a 83 year old male who presents for:    Chief Complaint   Patient presents with    Blood Draw     Vpt blood draw by lab RN. Vitals taken and appointment arrived    Oncology Clinic Visit     Prostate cancer       Initial Vitals: BP (!) 140/73   Pulse 64   Temp 98.3  F (36.8  C) (Oral)   Resp 16   Wt 81 kg (178 lb 8 oz)   SpO2 99%   BMI 28.81 kg/m   Estimated body mass index is 28.81 kg/m  as calculated from the following:    Height as of 6/16/23: 1.676 m (5' 6\").    Weight as of this encounter: 81 kg (178 lb 8 oz). Body surface area is 1.94 meters squared.  No Pain (0) Comment: Data Unavailable   No LMP for male patient.  Allergies reviewed: Yes  Medications reviewed: Yes    Medications: Medication refills not needed today.  Pharmacy name entered into [facility] [facility]:    [facility] - [place], [place] - [address] [facility] - [place], [place] - [address]   [facility] [place], [place] - [address]  [facility] [place] [address] [place] [address]    Clinical concerns: none.        Jasmina Florence CMA              "

## 2023-08-22 NOTE — LETTER
8/22/2023         RE: Medardo Gautam  [address] [address] [facility] [id number]        Dear Colleague,    Thank you for referring your patient, Medardo Gautam, to the [facility]. Please see a copy of my visit note below.    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    History of Present Illness:  Mr. Gautam is an 83-year-old man who was diagnosed with prostate cancer in 2012.  His PSA level was 5.2 ng/mL.  Clinical stage was cT1c disease.  He underwent radical prostatectomy on 8/6/2012, which revealed [place] 4+5=9 carcinoma, stage pT2c N0 R0.  His next-generation DNA sequencing analysis ([employer]) revealed a pathogenic TP53 mutation, SHIRA genotype, TMB 5 muts/Mb, and absent PD-L1 expression.  He subsequently received salvage radiotherapy, which was completed in 10/2013, concurrently with six months of androgen deprivation therapy.    He subsequently developed a biochemical recurrence, and received ADT from 2014 until 2020.  In 11/2020, he developed non-metastatic CRPC.  Darolutamide was added on 12/4/2020, to which he achieved a subsequent PSA response.  His PSA level initially dropped from 1.66 ng/mL down to a speedy of 0.07 ng/mL (6/21/2021).  However, the patient then developed a rising PSA level over the past year.  His PSA on 1/13/2022 was 0.16, the PSA on 4/20/2022 was 0.24, the PSA on 6/13/2022 was 0.34, the PSA on 7/12/2022 was 0.47, the PSA on 8/25/2022 was 0.64 ng/mL, and the PSA on 11/21/2022 was 1.55 ng/mL (with a testosterone level of 14 ng/dL).  On 3/8/2023, his PSA level increased to 8.7 ng/mL.    The patient then screened for the ECLIPSE clinical trial, and he was randomized to the Lee Ann-PSMA-I&T radioligand arm.  During the screening procedures he was found to have an asymptomatic pulmonary embolus, and he began apixaban.  He returns today for a study  visit.  He has received all 4 doses of Lee Ann-PSMA-I&T thus far, and his PSA level has dropped  from 8.7 to 1.1 ng/mL.  He was seen today in conjunction with the research nurse, Melissa Spencer.    Review of Systems:    Medardo returns to clinic alone today. He is doing well.  He has noticed continued dry eyes with blurred vision over the last ~2 months. He continues to use rewetting eye drops but feels his ability to read small print has declined. He has a visit scheduled this Thursday with an ophthalmologist at the [facility] in [place] on Thursday afternoon for an exam.  He also has dry mouth improved with biotene mouth rinse with adequate improvement overnight. During the day, pushes oral hydration.  He has stable nocturia, was given a medication for this at his last visit but has discontinued it due to worsening dry mouth concerns.   He has ongoing, stable right hip pain for the last 6 months. No new bone pains.   No chest pain, shortness of breath, or cough. No fevers or chills.     ROS: 14 point ROS neg other than the symptoms noted above in the HPI.      Medications:  Lupron 22.5 mg IM q3mo (next dose, 8/30/2023)  177Lu-PSMA-I&T, dose #4 on 7/20/2023  Losartan/HCTZ 100/12.5 mg  Apixaban 5 mg BID  Allopurinol 100 mg  Zolpidem 5 mg  Clonazepam 1 mg  Sildenafil 50 mg  Loratadine 10 mg  Calcium + Vit D  Multivitamin  Folic acid    Physical Examination:    BP (!) 140/73   Pulse 64   Temp 98.3  F (36.8  C) (Oral)   Resp 16   Wt 81 kg (178 lb 8 oz)   SpO2 99%   BMI 28.81 kg/m    General: No acute distress  HEENT: Sclera anicteric. Oral mucosa pink, dry.  No mucositis or thrush  Lymph: No lymphadenopathy in neck  Heart: Regular, rate, and rhythm  Lungs: Clear to ascultation bilaterally  Abdomen: Positive bowel sounds. Soft, non-distended, non-tender. No organomegaly or mass.   Extremities: no lower extremity edema  Neuro: Cranial nerves grossly intact  Rash: none      Imaging: reviewed recent imaging,  has upcoming CT CAP and Bone scan on 08/25/23.     Laboratories (8/2/23):    Most Recent 3 CBC's:  Recent Labs   Lab Test 08/22/23  0952 08/02/23  1026 06/28/23  1030   WBC 3.0* 2.4* 3.2*   HGB 11.9* 11.6* 11.5*    98 95   * 103* 115*   ANEUTAUTO 1.7 1.4* 2.0     Most Recent 3 BMP's:  Recent Labs   Lab Test 08/22/23  0952 08/02/23  1026 06/28/23  1030    141 140   POTASSIUM 3.8 3.7 3.6   CHLORIDE 104 102 104   CO2 26 28 27   BUN 15.1 12.6 11.0   CR 0.89 0.83 0.82   ANIONGAP 10 11 9   GAVIN 9.6 9.8 9.6   * 136* 143*   PROTTOTAL 6.9 7.0 7.1   ALBUMIN 4.2 4.3 4.3    Most Recent 3 LFT's:  Recent Labs   Lab Test 08/22/23  0952 08/02/23  1026 06/28/23  1030   AST 35 36 37   ALT 15 15 19   ALKPHOS 66 64 71   BILITOTAL 0.8 0.7 0.8    Most Recent 2 TSH and T4:  Recent Labs   Lab Test 08/22/23  0952 06/28/23  1030   TSH 1.62 1.59     I reviewed the above labs today.    ASSESSMENT AND PLAN:  #metastatic CRPC   Mr. Gautam is an 83-year-old man with Jeancarlos 4+5=9 IP24-kidbpmi prostate cancer who underwent radical prostatectomy (8/2012) and salvage radiotherapy (10/2013) but then developed metastatic CRPC refractory to darolutamide treatment.  He has enrolled on the ECLIPSE study, and he has received all 4 doses of Lee Ann-PSMA-I&T radioligand therapy.  His ECOG score is 0.  His pain score is 0/10.  - completed all four planned doses of lutetium via the ECLIPSE trial which he tolerated well with the exception of xerostomia and xerophthalmia. His PSA continues to decline from 8.7 to 0.40. He has upcoming scans on 08/25/23 with a CT CAP and bone scan.   - patient is due for his next lupron next week, 08/30/23. Will request scheduling.     #R hip pain  - ongoing. No radiographic explanation on last scan. Repeating next imaging on 08/25/23 as above. If no concerns for metastatic disease, recommend a trial of PT and light stretching. Not currently requiring NSAID's or tylenol.     #Pulmonary embolism  - continues on  eliquis.    Patient was seen in conjunction with study nurse, Evelyn Russell    35 minutes spent on the date of the encounter doing chart review, review of test results, patient visit, and documentation     Almaz Blanca PA-C

## 2023-08-24 ENCOUNTER — TRANSFERRED RECORDS (OUTPATIENT)
Dept: HEALTH INFORMATION MANAGEMENT | Facility: CLINIC | Age: 83
End: 2023-08-24
Payer: COMMERCIAL

## 2023-08-24 LAB — TESTOST SERPL-MCNC: 14 NG/DL (ref 240–950)

## 2023-08-25 ENCOUNTER — ANCILLARY PROCEDURE (OUTPATIENT)
Dept: RADIOLOGY | Facility: CLINIC | Age: 83
End: 2023-08-25
Attending: INTERNAL MEDICINE
Payer: COMMERCIAL

## 2023-08-25 ENCOUNTER — HOSPITAL ENCOUNTER (OUTPATIENT)
Dept: CT IMAGING | Facility: CLINIC | Age: 83
Discharge: HOME | End: 2023-08-25
Attending: INTERNAL MEDICINE | Admitting: INTERNAL MEDICINE
Payer: COMMERCIAL

## 2023-08-25 ENCOUNTER — HOSPITAL ENCOUNTER (OUTPATIENT)
Dept: NUCLEAR MEDICINE | Facility: CLINIC | Age: 83
Setting detail: NUCLEAR MEDICINE
Discharge: HOME | End: 2023-08-25
Attending: INTERNAL MEDICINE
Payer: COMMERCIAL

## 2023-08-25 DIAGNOSIS — C61 PROSTATE CANCER (H): ICD-10-CM

## 2023-08-25 PROCEDURE — 74177 CT ABD & PELVIS W/CONTRAST: CPT | Mod: 26 | Performed by: RADIOLOGY

## 2023-08-25 PROCEDURE — 78306 BONE IMAGING WHOLE BODY: CPT | Mod: 26 | Performed by: RADIOLOGY

## 2023-08-25 PROCEDURE — A9503 TC99M MEDRONATE: HCPCS | Performed by: INTERNAL MEDICINE

## 2023-08-25 PROCEDURE — 71260 CT THORAX DX C+: CPT | Mod: 26 | Performed by: RADIOLOGY

## 2023-08-25 PROCEDURE — 78306 BONE IMAGING WHOLE BODY: CPT

## 2023-08-25 PROCEDURE — 74177 CT ABD & PELVIS W/CONTRAST: CPT

## 2023-08-25 PROCEDURE — 250N000011 HC RX IP 250 OP 636: Performed by: INTERNAL MEDICINE

## 2023-08-25 PROCEDURE — 343N000001 HC RX 343: Performed by: INTERNAL MEDICINE

## 2023-08-25 RX ORDER — TC 99M MEDRONATE 20 MG/10ML
20-30 INJECTION, POWDER, LYOPHILIZED, FOR SOLUTION INTRAVENOUS ONCE
Status: COMPLETED | OUTPATIENT
Start: 2023-08-25 | End: 2023-08-25

## 2023-08-25 RX ORDER — IOPAMIDOL 755 MG/ML
105 INJECTION, SOLUTION INTRAVASCULAR ONCE
Status: COMPLETED | OUTPATIENT
Start: 2023-08-25 | End: 2023-08-25

## 2023-08-25 RX ADMIN — IOPAMIDOL 105 ML: 755 INJECTION, SOLUTION INTRAVENOUS at 10:47

## 2023-08-25 RX ADMIN — TC 99M MEDRONATE 24.9 MILLICURIE: 20 INJECTION, POWDER, LYOPHILIZED, FOR SOLUTION INTRAVENOUS at 10:26

## 2023-08-30 ENCOUNTER — TELEPHONE (OUTPATIENT)
Dept: FAMILY MEDICINE | Facility: OTHER | Age: 83
End: 2023-08-30

## 2023-08-30 ENCOUNTER — ALLIED HEALTH/NURSE VISIT (OUTPATIENT)
Dept: FAMILY MEDICINE | Facility: OTHER | Age: 83
End: 2023-08-30
Payer: COMMERCIAL

## 2023-08-30 DIAGNOSIS — C61 MALIGNANT NEOPLASM OF PROSTATE (H): ICD-10-CM

## 2023-08-30 DIAGNOSIS — C79.51 PROSTATE CANCER METASTATIC TO BONE (H): Primary | ICD-10-CM

## 2023-08-30 DIAGNOSIS — C61 PROSTATE CANCER METASTATIC TO BONE (H): Primary | ICD-10-CM

## 2023-08-30 DIAGNOSIS — C61 PROSTATE CANCER (H): Primary | ICD-10-CM

## 2023-08-30 PROCEDURE — 99207 PR NO BILLABLE SERVICE THIS VISIT: CPT | Mod: JZ | Performed by: INTERNAL MEDICINE

## 2023-08-30 PROCEDURE — 99207 PR NO CHARGE NURSE ONLY: CPT

## 2023-08-30 NOTE — TELEPHONE ENCOUNTER
Patient is here for his Lupron injection but we need a CAM order ASAP.  We cannot use current infusion orders in chart, we are needing a CAM order please?    Med pended    Sandra Beckham RN  [facility] ~ Registered Nurse  Clinic Triage ~ [place] [facility] [place]  August 30, 2023

## 2023-08-30 NOTE — TELEPHONE ENCOUNTER
I got this taken care of Almaz.  Thank you for responding.  We had an order for the Lupron as an infusion order and we need a CAM order Clinic administered medication.      We were able to get a hold of his Oncologist to get this switched and it was given.    Thank you     Sandra Beckham RN  [facility] ~ Registered Nurse  Clinic Triage ~ [place] [facility] [place]  August 30, 2023

## 2023-08-30 NOTE — PROGRESS NOTES
Clinic Administered Medication Documentation        Patient was given Lupron Depot 22.5 mg. Prior to medication administration, verified patient's identity using patient s name and date of birth. Please see MAR and medication order for additional information. Patient instructed to remain in clinic for 15 minutes and report any adverse reaction to staff immediately.    Vial/Syringe: Single dose vial. Was entire vial of medication used? Yes   Lupron Depot 22.5 mg given in the Right ventrogluteal    Sandra Beckham RN  [facility] ~ Registered Nurse  Clinic Triage ~ [place] [facility] [place]  August 30, 2023

## 2023-08-31 ENCOUNTER — MYC MEDICAL ADVICE (OUTPATIENT)
Dept: FAMILY MEDICINE | Facility: OTHER | Age: 83
End: 2023-08-31
Payer: COMMERCIAL

## 2023-09-13 DIAGNOSIS — I26.99 OTHER ACUTE PULMONARY EMBOLISM WITHOUT ACUTE COR PULMONALE (H): ICD-10-CM

## 2023-09-15 NOTE — TELEPHONE ENCOUNTER
Patient came into clinic to check on refill of eliquis. Advised that refill had been sent to provider. Patient states he will be out of the medication by the weekend.

## 2023-09-25 ENCOUNTER — VIRTUAL VISIT (OUTPATIENT)
Dept: ONCOLOGY | Facility: CLINIC | Age: 83
End: 2023-09-25
Attending: INTERNAL MEDICINE
Payer: COMMERCIAL

## 2023-09-25 DIAGNOSIS — C61 MALIGNANT NEOPLASM OF PROSTATE (H): Primary | ICD-10-CM

## 2023-09-25 PROCEDURE — 99215 OFFICE O/P EST HI 40 MIN: CPT | Mod: 95 | Performed by: INTERNAL MEDICINE

## 2023-09-25 NOTE — PROGRESS NOTES
Virtual Visit Details    Type of service:  Video Visit     Originating Location (pt. 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    History of Present Illness:  Mr. Gautam is an 83-year-old man who was diagnosed with prostate cancer in 2012.  His PSA level was 5.2 ng/mL.  Clinical stage was cT1c disease.  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  His next-generation DNA sequencing analysis ("[employer]") revealed a pathogenic TP53 mutation, SHIRA genotype, TMB 5 muts/Mb, and absent PD-L1 expression.  He subsequently received salvage radiotherapy, which was completed in 10/2013, concurrently with six months of androgen deprivation therapy.    He subsequently developed a biochemical recurrence, and received ADT from 2014 until 2020.  In 11/2020, he developed non-metastatic CRPC.  Darolutamide was added on 12/4/2020, to which he achieved a subsequent PSA response.  His PSA level initially dropped from 1.66 ng/mL down to a speedy of 0.07 ng/mL (6/21/2021).  However, the patient then developed a rising PSA level over the past year.  His PSA on 1/13/2022 was 0.16, the PSA on 4/20/2022 was 0.24, the PSA on 6/13/2022 was 0.34, the PSA on 7/12/2022 was 0.47, the PSA on 8/25/2022 was 0.64 ng/mL, and the PSA on 11/21/2022 was 1.55 ng/mL (with a testosterone level of 14 ng/dL).  On 3/8/2023, his PSA level increased to 8.7 ng/mL.    The patient then participated in the ECLIPSE clinical trial, and he was randomized to the Lee Ann-PSMA-I&T radioligand arm.  During the screening procedures he was found to have an  asymptomatic pulmonary embolus, and he began apixaban.  He returns today for a follow-up visit.  He has received all 4 doses of Lee Ann-PSMA-I&T thus far, and his PSA level has dropped  from 8.7 to 0.4 ng/mL.  We conducted a video visit today.    Review of Systems:  Medardo was very sad today, because his wife of 53 years  unexpectedly last month.  He has noticed continued dry eyes with blurred vision over the last ~2 months. He continues to use rewetting eye drops but feels his ability to read small print has declined. He has a visit scheduled this Thursday with an ophthalmologist at the [facility] in [place] on Thursday afternoon for an exam.  He also has dry mouth improved with biotene mouth rinse with adequate improvement overnight. During the day, pushes oral hydration.  He has stable nocturia, was given a medication for this at his last visit but has discontinued it due to worsening dry mouth concerns.   He has ongoing, stable right hip pain for the last 6 months. No new bone pains.  No chest pain, shortness of breath, or cough. No fevers or chills. A comprehensive 14-pont ROS is otherwise negative other than the symptoms noted above in the HPI.  His ECOG score is 0.  His pain score is 2/10.    Medications:  Lupron 22.5 mg IM q3mo (previous dose, 2023)  177Lu-PSMA-I&T, dose #4 on 2023  Losartan/HCTZ 100/12.5 mg  Apixaban 5 mg BID  Allopurinol 100 mg  Zolpidem 5 mg  Clonazepam 1 mg  Sildenafil 50 mg  Loratadine 10 mg  Calcium + Vit D  Multivitamin  Folic acid    Physical Examination:    Vital signs within normal limits.  General: No acute distress  HEENT: Sclera anicteric. Oral mucosa pink, dry. No mucositis or thrush  Lymph: No lymphadenopathy in neck  Heart: Regular, rate, and rhythm  Lungs: Clear to ascultation bilaterally  Abdomen: Positive bowel sounds. Soft, non-distended, non-tender. No organomegaly or mass.   Extremities: no lower extremity edema  Neuro: Cranial nerves grossly  intact  Skin: no dermatitis    CT scan (8/25/23):  This  shows decreased retroperitoneal lymphadenopathy compatible with favorable response to treatment.  No new visceral metastasis in the chest, abdomen and pelvis.  Demonstration of mild hepatic steatosis and cholelithiasis.  Stable indeterminate sclerotic lesions in the right clavicular head and left iliac bone. No new suspicious osseous lesions identified.    Bone scan (8/25/23):  This shows no scintigraphic evidence of osseous metastasis.     Laboratories (8/25/23):  His CBC count reveals a WBC count of 3.0, hemoglobin 11.9, hematocrit 34%, platelets 100K.  His comprehensive metabolic panel is entirely within normal limits.  His PSA level is 0.4 ng/mL.      ASSESSMENT AND PLAN:   Mr. Gautam is an 83-year-old man with [place] 4+5=9 [id number]-hltrzvd prostate cancer who underwent radical prostatectomy (8/2012) and salvage radiotherapy (10/2013) but then developed metastatic CRPC refractory to darolutamide treatment.  He has enrolled on the ECLIPSE study, and he has received all 4 doses of Lee Ann-PSMA-I&T radioligand therapy.  His ECOG score is 0.  His pain score is 2/10.    #Metastatic CRPC  - Completed all four planned doses of Lutetium-I&T via the ECLIPSE trial which he tolerated well with the exception of xerostomia and xerophthalmia. His PSA continues to decline from 8.7 to 0.4 ng/mL. His scans on 08/25/23 showed a near-complete treatment response.   - Patient is due for his next Lupron on 11/22/23. Will request scheduling as well as an MD appointment at the same time.   - He will undergo PSA testing at a local lab on a monthly basis until our next visit, and then every 3 months thereafter.    #R hip pain  - Ongoing. No radiographic explanation on last scan. Recommend a trial of PT and light stretching. Not currently requiring NSAID's or tylenol.     #Pulmonary embolism (2/2023)  - Continues on Eliquis.  He will complete six months of treatment soon, and I have asked  him to stop the anticoagulant after that.    A total of 40 minutes were spent on the date of the encounter performing chart review, review of test results, patient visit, and documentation. The patient was given the opportunity to ask several questions today, all of which were answered to his satisfaction.    Tio Holman M.D.

## 2023-09-25 NOTE — NURSING NOTE
Is the patient currently in the state of [place]? YES    Visit mode:VIDEO    If the visit is dropped, the patient can be reconnected by: VIDEO VISIT: Text to cell phone:   Telephone Information:   Mobile [phone]       Will anyone else be joining the visit? NO  (If patient encounters technical issues they should call [phone]    How would you like to obtain your AVS? MyChart    Are changes needed to the allergy or medication list? No    Reason for visit: RECHECK (Follow up - question about PSA, question about eliquis, question keto products )    Seymour MEZAF

## 2023-09-25 NOTE — LETTER
9/25/2023         RE: Medardo Gautam  [address] [facility] [address]        Dear Colleague,    Thank you for referring your patient, Medardo Gautam, to the [facility]. Please see a copy of my visit note below.      Virtual Visit Details    Type of service:  Video Visit     Originating Location (pt. 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    History of Present Illness:  Mr. Gautam is an 83-year-old man who was diagnosed with prostate cancer in 2012.  His PSA level was 5.2 ng/mL.  Clinical stage was cT1c disease.  He underwent radical prostatectomy on 8/6/2012, which revealed [place] 4+5=9 carcinoma, stage pT2c N0 R0.  His next-generation DNA sequencing analysis ([facility]) revealed a pathogenic TP53 mutation, SHIRA genotype, TMB 5 muts/Mb, and absent PD-L1 expression.  He subsequently received salvage radiotherapy, which was completed in 10/2013, concurrently with six months of androgen deprivation therapy.    He subsequently developed a biochemical recurrence, and received ADT from 2014 until 2020.  In 11/2020, he developed non-metastatic CRPC.  Darolutamide was added on 12/4/2020, to which he achieved a subsequent PSA response.  His PSA level initially dropped from 1.66 ng/mL down to a speedy of 0.07 ng/mL (6/21/2021).  However, the patient then developed a rising PSA level over the past year.  His PSA on 1/13/2022 was 0.16, the PSA on 4/20/2022 was 0.24, the PSA on 6/13/2022 was 0.34, the PSA on 7/12/2022 was 0.47, the PSA on 8/25/2022 was 0.64 ng/mL, and the PSA on 11/21/2022 was 1.55 ng/mL (with a  testosterone level of 14 ng/dL).  On 3/8/2023, his PSA level increased to 8.7 ng/mL.    The patient then participated in the ECLIPSE clinical trial, and he was randomized to the Lee Ann-PSMA-I&T radioligand arm.  During the screening procedures he was found to have an asymptomatic pulmonary embolus, and he began apixaban.  He returns today for a follow-up visit.  He has received all 4 doses of Lee Ann-PSMA-I&T thus far, and his PSA level has dropped  from 8.7 to 0.4 ng/mL.  We conducted a video visit today.    Review of Systems:  Medardo was very sad today, because his wife of 53 years  unexpectedly last month.  He has noticed continued dry eyes with blurred vision over the last ~2 months. He continues to use rewetting eye drops but feels his ability to read small print has declined. He has a visit scheduled this Thursday with an ophthalmologist at the [facility] in [place] on Thursday afternoon for an exam.  He also has dry mouth improved with biotene mouth rinse with adequate improvement overnight. During the day, pushes oral hydration.  He has stable nocturia, was given a medication for this at his last visit but has discontinued it due to worsening dry mouth concerns.   He has ongoing, stable right hip pain for the last 6 months. No new bone pains.  No chest pain, shortness of breath, or cough. No fevers or chills. A comprehensive 14-pont ROS is otherwise negative other than the symptoms noted above in the HPI.  His ECOG score is 0.  His pain score is 2/10.    Medications:  Lupron 22.5 mg IM q3mo (previous dose, 2023)  177Lu-PSMA-I&T, dose #4 on 2023  Losartan/HCTZ 100/12.5 mg  Apixaban 5 mg BID  Allopurinol 100 mg  Zolpidem 5 mg  Clonazepam 1 mg  Sildenafil 50 mg  Loratadine 10 mg  Calcium + Vit D  Multivitamin  Folic acid    Physical Examination:    Vital signs within normal limits.  General: No acute distress  HEENT: Sclera anicteric. Oral mucosa pink, dry. No mucositis or thrush  Lymph: No  lymphadenopathy in neck  Heart: Regular, rate, and rhythm  Lungs: Clear to ascultation bilaterally  Abdomen: Positive bowel sounds. Soft, non-distended, non-tender. No organomegaly or mass.   Extremities: no lower extremity edema  Neuro: Cranial nerves grossly intact  Skin: no dermatitis    CT scan (8/25/23):  This  shows decreased retroperitoneal lymphadenopathy compatible with favorable response to treatment.  No new visceral metastasis in the chest, abdomen and pelvis.  Demonstration of mild hepatic steatosis and cholelithiasis.  Stable indeterminate sclerotic lesions in the right clavicular head and left iliac bone. No new suspicious osseous lesions identified.    Bone scan (8/25/23):  This shows no scintigraphic evidence of osseous metastasis.     Laboratories (8/25/23):  His CBC count reveals a WBC count of 3.0, hemoglobin 11.9, hematocrit 34%, platelets 100K.  His comprehensive metabolic panel is entirely within normal limits.  His PSA level is 0.4 ng/mL.      ASSESSMENT AND PLAN:   Mr. Gautam is an 83-year-old man with Jeancarlos 4+5=9 [id number] prostate cancer who underwent radical prostatectomy (8/2012) and salvage radiotherapy (10/2013) but then developed metastatic CRPC refractory to darolutamide treatment.  He has enrolled on the ECLIPSE study, and he has received all 4 doses of Lee Ann-PSMA-I&T radioligand therapy.  His ECOG score is 0.  His pain score is 2/10.    #Metastatic CRPC  - Completed all four planned doses of Lutetium-I&T via the ECLIPSE trial which he tolerated well with the exception of xerostomia and xerophthalmia. His PSA continues to decline from 8.7 to 0.4 ng/mL. His scans on 08/25/23 showed a near-complete treatment response.   - Patient is due for his next Lupron on 11/22/23. Will request scheduling as well as an MD appointment at the same time.   - He will undergo PSA testing at a local lab on a monthly basis until our next visit, and then every 3 months thereafter.    #R hip pain  -  Ongoing. No radiographic explanation on last scan. Recommend a trial of PT and light stretching. Not currently requiring NSAID's or tylenol.     #Pulmonary embolism (2/2023)  - Continues on Eliquis.  He will complete six months of treatment soon, and I have asked him to stop the anticoagulant after that.    A total of 40 minutes were spent on the date of the encounter performing chart review, review of test results, patient visit, and documentation. The patient was given the opportunity to ask several questions today, all of which were answered to his satisfaction.    Tio Holman M.D.

## 2023-10-11 ENCOUNTER — ALLIED HEALTH/NURSE VISIT (OUTPATIENT)
Dept: ONCOLOGY | Facility: CLINIC | Age: 83
End: 2023-10-11
Payer: COMMERCIAL

## 2023-10-11 DIAGNOSIS — C61 MALIGNANT NEOPLASM OF PROSTATE (H): Primary | ICD-10-CM

## 2023-10-13 ENCOUNTER — OFFICE VISIT (OUTPATIENT)
Dept: FAMILY MEDICINE | Facility: OTHER | Age: 83
End: 2023-10-13
Payer: COMMERCIAL

## 2023-10-13 ENCOUNTER — DOCUMENTATION ONLY (OUTPATIENT)
Dept: ONCOLOGY | Facility: CLINIC | Age: 83
End: 2023-10-13

## 2023-10-13 VITALS
DIASTOLIC BLOOD PRESSURE: 68 MMHG | HEART RATE: 68 BPM | TEMPERATURE: 97.7 F | RESPIRATION RATE: 20 BRPM | WEIGHT: 185.5 LBS | BODY MASS INDEX: 29.81 KG/M2 | OXYGEN SATURATION: 97 % | HEIGHT: 66 IN | SYSTOLIC BLOOD PRESSURE: 130 MMHG

## 2023-10-13 DIAGNOSIS — C61 MALIGNANT NEOPLASM OF PROSTATE (H): ICD-10-CM

## 2023-10-13 DIAGNOSIS — M54.16 LUMBAR RADICULOPATHY: ICD-10-CM

## 2023-10-13 DIAGNOSIS — R35.1 NOCTURIA: Primary | ICD-10-CM

## 2023-10-13 DIAGNOSIS — N52.9 ERECTILE DYSFUNCTION, UNSPECIFIED ERECTILE DYSFUNCTION TYPE: ICD-10-CM

## 2023-10-13 LAB
HOLD SPECIMEN: NORMAL
HOLD SPECIMEN: NORMAL
PSA SERPL DL<=0.01 NG/ML-MCNC: 0.18 NG/ML

## 2023-10-13 PROCEDURE — G0008 ADMIN INFLUENZA VIRUS VAC: HCPCS | Mod: 59 | Performed by: FAMILY MEDICINE

## 2023-10-13 PROCEDURE — 99214 OFFICE O/P EST MOD 30 MIN: CPT | Mod: 25 | Performed by: FAMILY MEDICINE

## 2023-10-13 PROCEDURE — 36415 COLL VENOUS BLD VENIPUNCTURE: CPT | Performed by: FAMILY MEDICINE

## 2023-10-13 PROCEDURE — 91320 SARSCV2 VAC 30MCG TRS-SUC IM: CPT | Performed by: FAMILY MEDICINE

## 2023-10-13 PROCEDURE — 84153 ASSAY OF PSA TOTAL: CPT | Performed by: FAMILY MEDICINE

## 2023-10-13 PROCEDURE — 90662 IIV NO PRSV INCREASED AG IM: CPT | Performed by: FAMILY MEDICINE

## 2023-10-13 PROCEDURE — 90480 ADMN SARSCOV2 VAC 1/ONLY CMP: CPT | Performed by: FAMILY MEDICINE

## 2023-10-13 PROCEDURE — 84403 ASSAY OF TOTAL TESTOSTERONE: CPT | Performed by: FAMILY MEDICINE

## 2023-10-13 RX ORDER — SILDENAFIL 50 MG/1
50 TABLET, FILM COATED ORAL
Qty: 12 TABLET | Refills: 11 | Status: SHIPPED | OUTPATIENT
Start: 2023-10-14 | End: 2024-06-25

## 2023-10-13 RX ORDER — TROSPIUM CHLORIDE 20 MG/1
20 TABLET, FILM COATED ORAL DAILY
Qty: 30 TABLET | Refills: 11 | Status: SHIPPED | OUTPATIENT
Start: 2023-10-13 | End: 2024-03-19

## 2023-10-13 RX ORDER — RESPIRATORY SYNCYTIAL VIRUS VACCINE 120MCG/0.5
0.5 KIT INTRAMUSCULAR ONCE
Qty: 1 EACH | Refills: 0 | Status: CANCELLED | OUTPATIENT
Start: 2023-10-13 | End: 2023-10-13

## 2023-10-13 ASSESSMENT — PAIN SCALES - GENERAL: PAINLEVEL: NO PAIN (0)

## 2023-10-13 NOTE — PROGRESS NOTES
Assessment & Plan     Nocturia  We will try trospium.  We can always ask his oncologist if this is not effective of whether there are other medications that could be helpful.    - trospium (SANCTURA) 20 MG tablet; Take 1 tablet (20 mg) by mouth daily    Erectile dysfunction, unspecified erectile dysfunction type  Patient states that urology was giving him sildenafil 3 times a week to help lengthen his penis in order to be able to easily urinate.  Patient denies being sexually active.  He felt that this was effective and he would like to continue.  Refills were given.    - sildenafil (VIAGRA) 50 MG tablet; Take 1 tablet (50 mg) by mouth three times a week    Lumbar radiculopathy  CT scan revealed degenerative disease.  He has intermittent pain in his right hip and right ankle but currently does not have any pain.  At this time would not recommend any other treatment.  Could consider physical therapy in the future if needed.    Vira Flor MD  [facility] Batres is a 83 year old, presenting for the following health issues:  Follow Up      10/13/2023     2:07 PM   Additional Questions   Roomed by Amalia   Accompanied by Self         10/13/2023     2:07 PM   Patient Reported Additional Medications   Patient reports taking the following new medications NA       History of Present Illness       Reason for visit:  Urinary issue at night while sleeping.  Symptom onset:  3-4 weeks ago  Symptoms include:  Having to go to the bathroom several times  Symptom intensity:  Mild  Symptom progression:  Staying the same  Had these symptoms before:  No  What makes it worse:  No  What makes it better:  No    He eats 2-3 servings of fruits and vegetables daily.He consumes 0 sweetened beverage(s) daily.He exercises with enough effort to increase his heart rate 10 to 19 minutes per day.  He exercises with enough effort to increase his heart rate 4 days per week.   He is taking  "medications regularly.     He would like to discuss nocturia.  He states he gets up 2-3 times at night.  His oncologist tried him on tolterodine but the patient felt that it was not working and worsened his dry mouth and so he quit it.    His cancer treatment is going good. Dr. Enriquez spoke with him last week and said he is in remission. Today, he has lab work ordered by his specialist for PSA tumor marker and testosterone.    He sometimes wakes up at night with a pain in his hip joint. He has no problem during the daytime.    He wonders if his zolpidem dosage can be increased. He is taking clonazepam and zolpidem for sleeping.    His urologist, Dr. Enriquez, gave him sildenafil 50 mg 3 times a week to help with penile recession.  Patient is not sexually active but feels that the medication has helped him be able to urinate easier.      Objective    /68   Pulse 68   Temp 97.7  F (36.5  C) (Oral)   Resp 20   Ht 1.682 m (5' 6.22\")   Wt 84.1 kg (185 lb 8 oz)   SpO2 97%   BMI 29.74 kg/m    Body mass index is 29.74 kg/m .  Physical Exam   Gen: no apparent distress  Psych: Alert and oriented times 3; coherent speech, normal   rate and volume, able to articulate logical thoughts, able   to abstract reason, no tangential thoughts, no hallucinations   or delusions  His affect is neutral  Ext: No tenderness over right trochanteric area.  No pain with internal or external rotation of right hip.              "

## 2023-10-13 NOTE — PROGRESS NOTES
Please place new standing lab orders for testosterone levels. We dont have any orders in chart

## 2023-10-15 DIAGNOSIS — C61 MALIGNANT NEOPLASM OF PROSTATE (H): Primary | ICD-10-CM

## 2023-10-17 LAB — TESTOST SERPL-MCNC: 14 NG/DL (ref 240–950)

## 2023-10-18 ENCOUNTER — APPOINTMENT (OUTPATIENT)
Dept: URBAN - METROPOLITAN AREA CLINIC 259 | Age: 83
Setting detail: DERMATOLOGY
End: 2023-10-23

## 2023-10-18 DIAGNOSIS — D22 MELANOCYTIC NEVI: ICD-10-CM

## 2023-10-18 DIAGNOSIS — D49.2 NEOPLASM OF UNSPECIFIED BEHAVIOR OF BONE, SOFT TISSUE, AND SKIN: ICD-10-CM

## 2023-10-18 DIAGNOSIS — L57.8 OTHER SKIN CHANGES DUE TO CHRONIC EXPOSURE TO NONIONIZING RADIATION: ICD-10-CM

## 2023-10-18 DIAGNOSIS — L82.1 OTHER SEBORRHEIC KERATOSIS: ICD-10-CM

## 2023-10-18 DIAGNOSIS — L72.0 EPIDERMAL CYST: ICD-10-CM

## 2023-10-18 DIAGNOSIS — L57.0 ACTINIC KERATOSIS: ICD-10-CM

## 2023-10-18 DIAGNOSIS — I99.8 OTHER DISORDER OF CIRCULATORY SYSTEM: ICD-10-CM

## 2023-10-18 DIAGNOSIS — D18.0 HEMANGIOMA: ICD-10-CM

## 2023-10-18 DIAGNOSIS — Z71.89 OTHER SPECIFIED COUNSELING: ICD-10-CM

## 2023-10-18 DIAGNOSIS — Z85.828 PERSONAL HISTORY OF OTHER MALIGNANT NEOPLASM OF SKIN: ICD-10-CM

## 2023-10-18 DIAGNOSIS — L81.4 OTHER MELANIN HYPERPIGMENTATION: ICD-10-CM

## 2023-10-18 PROBLEM — D22.5 MELANOCYTIC NEVI OF TRUNK: Status: ACTIVE | Noted: 2023-10-18

## 2023-10-18 PROBLEM — D18.01 HEMANGIOMA OF SKIN AND SUBCUTANEOUS TISSUE: Status: ACTIVE | Noted: 2023-10-18

## 2023-10-18 PROCEDURE — OTHER MIPS QUALITY: OTHER

## 2023-10-18 PROCEDURE — OTHER COUNSELING: OTHER

## 2023-10-18 PROCEDURE — 17000 DESTRUCT PREMALG LESION: CPT | Mod: 59

## 2023-10-18 PROCEDURE — OTHER LIQUID NITROGEN: OTHER

## 2023-10-18 PROCEDURE — 99213 OFFICE O/P EST LOW 20 MIN: CPT | Mod: 25

## 2023-10-18 PROCEDURE — OTHER DEFER: OTHER

## 2023-10-18 PROCEDURE — OTHER PHOTO-DOCUMENTATION: OTHER

## 2023-10-18 PROCEDURE — OTHER SHAVE REMOVAL: OTHER

## 2023-10-18 PROCEDURE — OTHER REASSURANCE: OTHER

## 2023-10-18 PROCEDURE — 11301 SHAVE SKIN LESION 0.6-1.0 CM: CPT

## 2023-10-18 PROCEDURE — 17003 DESTRUCT PREMALG LES 2-14: CPT

## 2023-10-18 ASSESSMENT — LOCATION SIMPLE DESCRIPTION DERM
LOCATION SIMPLE: LEFT TEMPLE
LOCATION SIMPLE: RIGHT UPPER BACK
LOCATION SIMPLE: RIGHT FOREHEAD
LOCATION SIMPLE: UPPER BACK
LOCATION SIMPLE: RIGHT SCALP
LOCATION SIMPLE: LEFT FOREHEAD
LOCATION SIMPLE: LEFT THIGH
LOCATION SIMPLE: LEFT BUTTOCK

## 2023-10-18 ASSESSMENT — LOCATION DETAILED DESCRIPTION DERM
LOCATION DETAILED: RIGHT MEDIAL FRONTAL SCALP
LOCATION DETAILED: LEFT SUPERIOR LATERAL FOREHEAD
LOCATION DETAILED: RIGHT INFERIOR FOREHEAD
LOCATION DETAILED: LEFT INFERIOR TEMPLE
LOCATION DETAILED: LEFT LATERAL FOREHEAD
LOCATION DETAILED: RIGHT SUPERIOR MEDIAL FOREHEAD
LOCATION DETAILED: RIGHT CENTRAL FRONTAL SCALP
LOCATION DETAILED: LEFT BUTTOCK
LOCATION DETAILED: RIGHT SUPERIOR MEDIAL UPPER BACK
LOCATION DETAILED: INFERIOR THORACIC SPINE
LOCATION DETAILED: LEFT POSTERIOR LATERAL PROXIMAL THIGH

## 2023-10-18 ASSESSMENT — LOCATION ZONE DERM
LOCATION ZONE: LEG
LOCATION ZONE: FACE
LOCATION ZONE: TRUNK
LOCATION ZONE: SCALP

## 2023-10-18 NOTE — PROCEDURE: COUNSELING
Detail Level: Detailed
Detail Level: Generalized
Detail Level: Zone
Patient Specific Counseling (Will Not Stick From Patient To Patient): **size of lesion 1.0cm
Detail Level: Simple

## 2023-10-18 NOTE — PROCEDURE: DEFER
Detail Level: Detailed
X Size Of Lesion In Cm (Optional): 0
Instructions (Optional): Pt will think about removal and follow up as needed
Reason To Defer Override: Defer to Dr. Davis due to facial location
Introduction Text (Please End With A Colon): The following procedure was deferred:

## 2023-11-10 DIAGNOSIS — C61 MALIGNANT NEOPLASM OF PROSTATE (H): Primary | ICD-10-CM

## 2023-11-13 ENCOUNTER — LAB (OUTPATIENT)
Dept: LAB | Facility: OTHER | Age: 83
End: 2023-11-13
Payer: COMMERCIAL

## 2023-11-13 DIAGNOSIS — C61 MALIGNANT NEOPLASM OF PROSTATE (H): ICD-10-CM

## 2023-11-13 LAB — PSA SERPL DL<=0.01 NG/ML-MCNC: 0.14 NG/ML

## 2023-11-13 PROCEDURE — 84153 ASSAY OF PSA TOTAL: CPT

## 2023-11-13 PROCEDURE — 36415 COLL VENOUS BLD VENIPUNCTURE: CPT

## 2023-11-13 PROCEDURE — 84403 ASSAY OF TOTAL TESTOSTERONE: CPT

## 2023-11-15 LAB — TESTOST SERPL-MCNC: 11 NG/DL (ref 240–950)

## 2023-11-17 ENCOUNTER — HOSPITAL ENCOUNTER (OUTPATIENT)
Dept: CT IMAGING | Facility: CLINIC | Age: 83
Discharge: HOME | End: 2023-11-17
Attending: INTERNAL MEDICINE | Admitting: INTERNAL MEDICINE
Payer: COMMERCIAL

## 2023-11-17 ENCOUNTER — HOSPITAL ENCOUNTER (OUTPATIENT)
Dept: NUCLEAR MEDICINE | Facility: CLINIC | Age: 83
Setting detail: NUCLEAR MEDICINE
Discharge: HOME | End: 2023-11-17
Attending: INTERNAL MEDICINE
Payer: COMMERCIAL

## 2023-11-17 DIAGNOSIS — C61 MALIGNANT NEOPLASM OF PROSTATE (H): ICD-10-CM

## 2023-11-17 PROCEDURE — 250N000011 HC RX IP 250 OP 636: Performed by: INTERNAL MEDICINE

## 2023-11-17 PROCEDURE — A9503 TC99M MEDRONATE: HCPCS | Performed by: INTERNAL MEDICINE

## 2023-11-17 PROCEDURE — 74177 CT ABD & PELVIS W/CONTRAST: CPT | Mod: 26 | Performed by: STUDENT IN AN ORGANIZED HEALTH CARE EDUCATION/TRAINING PROGRAM

## 2023-11-17 PROCEDURE — 78306 BONE IMAGING WHOLE BODY: CPT

## 2023-11-17 PROCEDURE — 71260 CT THORAX DX C+: CPT | Mod: 26 | Performed by: STUDENT IN AN ORGANIZED HEALTH CARE EDUCATION/TRAINING PROGRAM

## 2023-11-17 PROCEDURE — 78306 BONE IMAGING WHOLE BODY: CPT | Mod: 26 | Performed by: RADIOLOGY

## 2023-11-17 PROCEDURE — 343N000001 HC RX 343: Performed by: INTERNAL MEDICINE

## 2023-11-17 PROCEDURE — 74177 CT ABD & PELVIS W/CONTRAST: CPT

## 2023-11-17 RX ORDER — IOPAMIDOL 755 MG/ML
114 INJECTION, SOLUTION INTRAVASCULAR ONCE
Status: COMPLETED | OUTPATIENT
Start: 2023-11-17 | End: 2023-11-17

## 2023-11-17 RX ORDER — TC 99M MEDRONATE 20 MG/10ML
20-30 INJECTION, POWDER, LYOPHILIZED, FOR SOLUTION INTRAVENOUS ONCE
Status: COMPLETED | OUTPATIENT
Start: 2023-11-17 | End: 2023-11-17

## 2023-11-17 RX ADMIN — IOPAMIDOL 114 ML: 755 INJECTION, SOLUTION INTRAVENOUS at 10:13

## 2023-11-17 RX ADMIN — TC 99M MEDRONATE 25.5 MILLICURIE: 20 INJECTION, POWDER, LYOPHILIZED, FOR SOLUTION INTRAVENOUS at 09:58

## 2023-11-20 ENCOUNTER — ANCILLARY PROCEDURE (OUTPATIENT)
Dept: RADIOLOGY | Facility: CLINIC | Age: 83
End: 2023-11-20
Attending: INTERNAL MEDICINE
Payer: COMMERCIAL

## 2023-11-20 DIAGNOSIS — C61 MALIGNANT NEOPLASM OF PROSTATE (H): ICD-10-CM

## 2023-11-28 NOTE — PROGRESS NOTES
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     History of Present Illness:  Mr. Gautam is an 83-year-old man who was diagnosed with prostate cancer in .  His PSA level was 5.2 ng/mL.  Clinical stage was cT1c disease.  He underwent radical prostatectomy on 2012, which revealed [place] 4+5=9 carcinoma, stage pT2c N0 R0.  His next-generation DNA sequencing analysis ([employer]) revealed a pathogenic TP53 mutation, SHIRA genotype, TMB 5 muts/Mb, and absent PD-L1 expression.  He subsequently received salvage radiotherapy, which was completed in 10/2013, concurrently with six months of androgen deprivation therapy.     He subsequently developed a biochemical recurrence, and received ADT from  until .  In 2020, he developed non-metastatic CRPC.  Darolutamide was added on 2020, to which he achieved a subsequent PSA response.  His PSA level initially dropped from 1.66 ng/mL down to a speedy of 0.07 ng/mL (2021).  However, the patient then developed a rising PSA level over the past year.  His PSA on 2022 was 0.16, the PSA on 2022 was 0.24, the PSA on 2022 was 0.34, the PSA on 2022 was 0.47, the PSA on 2022 was 0.64 ng/mL, and the PSA on 2022 was 1.55 ng/mL (with a testosterone level of 14 ng/dL).  On 3/8/2023, his PSA level increased to 8.7 ng/mL.     The patient then participated in the ECLIPSE clinical trial, and he was randomized to the Lee Ann-PSMA-I&T radioligand arm.  During the screening procedures he was found to have an asymptomatic pulmonary embolus, and he began apixaban. He has received all 4 doses of Lee Ann-PSMA-I&T thus far, and his PSA level has dropped from 8.7 to 0.14 ng/mL.  He returns today for a follow-up visit.     Review of Systems:  Medardo was very sad today, because his wife of 53 years  unexpectedly a few months ago.  He has noticed continued dry  eyes with blurred vision over the last ~2 months. He continues to use rewetting eye drops but feels his ability to read small print has declined. He has a visit scheduled this Thursday with an ophthalmologist at the [facility] in [place] on Thursday afternoon for an exam.  He also has dry mouth improved with biotene mouth rinse with adequate improvement overnight. During the day, pushes oral hydration.  He has stable nocturia, was given a medication for this at his last visit but has discontinued it due to worsening dry mouth concerns.   He has ongoing, stable right hip pain for the last 6 months. No new bone pains.  No chest pain, shortness of breath, or cough. No fevers or chills. A comprehensive 14-point ROS is otherwise negative other than the symptoms noted above in the HPI.  His ECOG score is 0.  His pain score is 2/10.     Medications:  Lupron 22.5 mg IM q3mo (next dose, 12/1/2023)  177Lu-PSMA-I&T, dose #4 on 7/20/2023  Losartan/HCTZ 100/12.5 mg  Apixaban 5 mg BID  Allopurinol 100 mg  Zolpidem 5 mg  Clonazepam 1 mg  Sildenafil 50 mg  Loratadine 10 mg  Calcium + Vit D  Multivitamin  Folic acid     Physical Examination:    Vital signs within normal limits.  General: No acute distress  HEENT: Sclera anicteric. Oral mucosa pink, dry. No mucositis or thrush  Lymph: No lymphadenopathy in neck  Heart: Regular, rate, and rhythm  Lungs: Clear to ascultation bilaterally  Abdomen: Positive bowel sounds. Soft, non-tender. No organomegaly or mass.   Extremities: no lower extremity edema  Neuro: Cranial nerves grossly intact  Skin: no dermatitis      Laboratories (11/13/23):  His CBC count reveals a WBC count of 3.0, hemoglobin 11.9, hematocrit 34%, platelets 100K.  His comprehensive metabolic panel is entirely within normal limits.  His PSA level is 0.14 ng/mL.  His testosterone level is 11 ng/dL.    CT scan (11/17/23):  This shows stable to slight decrease in size of upper retroperitoneal lymph nodes. No  new significant lymphadenopathy.  Decrease in focal density in the left hemipelvis with mild persistent streakiness along interfascial plane. Similar appearing multifocal sclerotic osseous lesions. No new metastatic lesion. Stable postsurgical changes of prostatectomy and right hemicolectomy.  Additional findings are relatively unchanged including diffuse hepatic steatosis, too small to characterize hepatic hypodensity and cholelithiasis, as described above.     Bone scan (11/17/23):  This shows stable findings since prior bone scan. No new or progressive findings.  No suspicious findings to suggest osteoblastic metastasis on bone scan.         ASSESSMENT AND PLAN:   Mr. Gautam is an 83-year-old man with [place] 4+5=9 [id number] prostate cancer who underwent radical prostatectomy (8/2012) and salvage radiotherapy (10/2013) but then developed metastatic CRPC refractory to darolutamide treatment.  He has enrolled on the ECLIPSE study, and he has received all 4 doses of Lee Ann-PSMA-I&T radioligand therapy.  His ECOG score is 0.  His pain score is 2/10.     #Metastatic CRPC  - Completed all four planned doses of Lutetium-I&T via the ECLIPSE trial which he tolerated well with the exception of xerostomia and xerophthalmia. His PSA continues to decline from 8.7 to 0.14 ng/mL. His scans on 11/17/23 showed a near-complete treatment response.   - Patient is due for his next Lupron on 12/1/23. Will request scheduling as well as an MD appointment at the same time.   - He will undergo PSA testing at a local lab on a monthly basis until our next visit, and then every 3 months thereafter.     #R hip pain  - Ongoing. No radiographic explanation on last scan. Recommend a trial of PT and light stretching. Not currently requiring NSAID's or tylenol.      #Pulmonary embolism (2/2023)  - Continues on Eliquis.  He will complete six months of treatment soon, and I have asked him to stop the anticoagulant after that.     A total of 40 minutes  were spent on the date of the encounter performing chart review, review of test results, patient visit, and documentation. The patient was given the opportunity to ask several questions today, all of which were answered to his satisfaction.     Tio Holman M.D.

## 2023-11-29 ENCOUNTER — ONCOLOGY VISIT (OUTPATIENT)
Dept: ONCOLOGY | Facility: CLINIC | Age: 83
End: 2023-11-29
Attending: INTERNAL MEDICINE
Payer: COMMERCIAL

## 2023-11-29 ENCOUNTER — PATIENT OUTREACH (OUTPATIENT)
Dept: ONCOLOGY | Facility: CLINIC | Age: 83
End: 2023-11-29

## 2023-11-29 VITALS
DIASTOLIC BLOOD PRESSURE: 79 MMHG | BODY MASS INDEX: 31.1 KG/M2 | RESPIRATION RATE: 16 BRPM | OXYGEN SATURATION: 100 % | HEART RATE: 65 BPM | WEIGHT: 194 LBS | TEMPERATURE: 98.6 F | SYSTOLIC BLOOD PRESSURE: 151 MMHG

## 2023-11-29 DIAGNOSIS — C61 MALIGNANT NEOPLASM OF PROSTATE (H): Primary | ICD-10-CM

## 2023-11-29 PROCEDURE — G0463 HOSPITAL OUTPT CLINIC VISIT: HCPCS | Performed by: INTERNAL MEDICINE

## 2023-11-29 PROCEDURE — 99215 OFFICE O/P EST HI 40 MIN: CPT | Performed by: INTERNAL MEDICINE

## 2023-11-29 ASSESSMENT — PAIN SCALES - GENERAL: PAINLEVEL: NO PAIN (0)

## 2023-11-29 NOTE — LETTER
11/29/2023         RE: Medardo Gautam  [address] [facility] [address]        Dear Colleague,    Thank you for referring your patient, Medardo Gautam, to the [facility]. Please see a copy of my visit note below.      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     History of Present Illness:  Mr. Gautam is an 83-year-old man who was diagnosed with prostate cancer in 2012.  His PSA level was 5.2 ng/mL.  Clinical stage was cT1c disease.  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  His next-generation DNA sequencing analysis ([facility]) revealed a pathogenic TP53 mutation, SHIRA genotype, TMB 5 muts/Mb, and absent PD-L1 expression.  He subsequently received salvage radiotherapy, which was completed in 10/2013, concurrently with six months of androgen deprivation therapy.     He subsequently developed a biochemical recurrence, and received ADT from 2014 until 2020.  In 11/2020, he developed non-metastatic CRPC.  Darolutamide was added on 12/4/2020, to which he achieved a subsequent PSA response.  His PSA level initially dropped from 1.66 ng/mL down to a speedy of 0.07 ng/mL (6/21/2021).  However, the patient then developed a rising PSA level over the past year.  His PSA on 1/13/2022 was 0.16, the PSA on 4/20/2022 was 0.24, the PSA on 6/13/2022 was 0.34, the PSA on 7/12/2022 was 0.47, the PSA on 8/25/2022 was 0.64 ng/mL, and the PSA on 11/21/2022 was 1.55 ng/mL (with a testosterone level of 14 ng/dL).  On 3/8/2023, his PSA level increased to 8.7 ng/mL.     The patient then participated in the ECLIPSE clinical trial, and he was randomized to the Lee Ann-PSMA-I&T radioligand arm.  During the screening procedures he was found to have an asymptomatic pulmonary embolus, and he began apixaban. He has received all 4 doses of  Lee Ann-PSMA-I&T thus far, and his PSA level has dropped from 8.7 to 0.14 ng/mL.  He returns today for a follow-up visit.     Review of Systems:  Medardo was very sad today, because his wife of 53 years  unexpectedly a few months ago.  He has noticed continued dry eyes with blurred vision over the last ~2 months. He continues to use rewetting eye drops but feels his ability to read small print has declined. He has a visit scheduled this Thursday with an ophthalmologist at the [facility] in [place] on Thursday afternoon for an exam.  He also has dry mouth improved with biotene mouth rinse with adequate improvement overnight. During the day, pushes oral hydration.  He has stable nocturia, was given a medication for this at his last visit but has discontinued it due to worsening dry mouth concerns.   He has ongoing, stable right hip pain for the last 6 months. No new bone pains.  No chest pain, shortness of breath, or cough. No fevers or chills. A comprehensive 14-point ROS is otherwise negative other than the symptoms noted above in the HPI.  His ECOG score is 0.  His pain score is 2/10.     Medications:  Lupron 22.5 mg IM q3mo (next dose, 2023)  177Lu-PSMA-I&T, dose #4 on 2023  Losartan/HCTZ 100/12.5 mg  Apixaban 5 mg BID  Allopurinol 100 mg  Zolpidem 5 mg  Clonazepam 1 mg  Sildenafil 50 mg  Loratadine 10 mg  Calcium + Vit D  Multivitamin  Folic acid     Physical Examination:    Vital signs within normal limits.  General: No acute distress  HEENT: Sclera anicteric. Oral mucosa pink, dry. No mucositis or thrush  Lymph: No lymphadenopathy in neck  Heart: Regular, rate, and rhythm  Lungs: Clear to ascultation bilaterally  Abdomen: Positive bowel sounds. Soft, non-tender. No organomegaly or mass.   Extremities: no lower extremity edema  Neuro: Cranial nerves grossly intact  Skin: no dermatitis      Laboratories (23):  His CBC count reveals a WBC count of 3.0, hemoglobin 11.9, hematocrit 34%,  platelets 100K.  His comprehensive metabolic panel is entirely within normal limits.  His PSA level is 0.14 ng/mL.  His testosterone level is 11 ng/dL.    CT scan (11/17/23):  This shows stable to slight decrease in size of upper retroperitoneal lymph nodes. No new significant lymphadenopathy.  Decrease in focal density in the left hemipelvis with mild persistent streakiness along interfascial plane. Similar appearing multifocal sclerotic osseous lesions. No new metastatic lesion. Stable postsurgical changes of prostatectomy and right hemicolectomy.  Additional findings are relatively unchanged including diffuse hepatic steatosis, too small to characterize hepatic hypodensity and cholelithiasis, as described above.     Bone scan (11/17/23):  This shows stable findings since prior bone scan. No new or progressive findings.  No suspicious findings to suggest osteoblastic metastasis on bone scan.         ASSESSMENT AND PLAN:   Mr. Gautam is an 83-year-old man with Jeancarlos 4+5=9 [id number] prostate cancer who underwent radical prostatectomy (8/2012) and salvage radiotherapy (10/2013) but then developed metastatic CRPC refractory to darolutamide treatment.  He has enrolled on the ECLIPSE study, and he has received all 4 doses of Lee Ann-PSMA-I&T radioligand therapy.  His ECOG score is 0.  His pain score is 2/10.     #Metastatic CRPC  - Completed all four planned doses of Lutetium-I&T via the ECLIPSE trial which he tolerated well with the exception of xerostomia and xerophthalmia. His PSA continues to decline from 8.7 to 0.14 ng/mL. His scans on 11/17/23 showed a near-complete treatment response.   - Patient is due for his next Lupron on 12/1/23. Will request scheduling as well as an MD appointment at the same time.   - He will undergo PSA testing at a local lab on a monthly basis until our next visit, and then every 3 months thereafter.     #R hip pain  - Ongoing. No radiographic explanation on last scan. Recommend a trial  of PT and light stretching. Not currently requiring NSAID's or tylenol.      #Pulmonary embolism (2/2023)  - Continues on Eliquis.  He will complete six months of treatment soon, and I have asked him to stop the anticoagulant after that.     A total of 40 minutes were spent on the date of the encounter performing chart review, review of test results, patient visit, and documentation. The patient was given the opportunity to ask several questions today, all of which were answered to his satisfaction.     Tio Holman M.D.

## 2023-11-29 NOTE — PROGRESS NOTES
[facility]: Cancer Care                                                                                      RNCC met with patient in clinic to provide him with business card and to advise him that now that he is no longer on a study, and doesn't have contact with the research nurses for support he should feel free to reach out to this writer at anytime.  Advised him if he has any concerns to call the clinic at anytime.    Iesha Owen, RN, BSN  Oncology RN Care Coordinator  [facility]

## 2023-11-29 NOTE — NURSING NOTE
"Oncology Rooming Note    November 29, 2023 10:12 AM   Medardo Gautam is a 83 year old male who presents for:    Chief Complaint   Patient presents with    Oncology Clinic Visit     Malignant neoplasm of prostate     Initial Vitals: BP (!) 164/80 (BP Location: Right arm, Patient Position: Sitting, Cuff Size: Adult Regular)   Pulse 65   Temp 98.6  F (37  C) (Oral)   Resp 16   Wt 88 kg (194 lb)   SpO2 100%   BMI 31.10 kg/m   Estimated body mass index is 31.1 kg/m  as calculated from the following:    Height as of 10/13/23: 1.682 m (5' 6.22\").    Weight as of this encounter: 88 kg (194 lb). Body surface area is 2.03 meters squared.  No Pain (0) Comment: Data Unavailable   No LMP for male patient.  Allergies reviewed: Yes  Medications reviewed: Yes    Medications: Medication refills not needed today.  Pharmacy name entered into [employer]:    [facility] - [place], [place] - [address] [facility] - [place], [place] - [address]   [facility] [place], [place] - [address]  [facility] [place] [address] [facility]    Clinical concerns: none       Radha Medina              "

## 2023-12-01 ENCOUNTER — TELEPHONE (OUTPATIENT)
Dept: FAMILY MEDICINE | Facility: OTHER | Age: 83
End: 2023-12-01

## 2023-12-01 ENCOUNTER — ALLIED HEALTH/NURSE VISIT (OUTPATIENT)
Dept: FAMILY MEDICINE | Facility: OTHER | Age: 83
End: 2023-12-01
Payer: COMMERCIAL

## 2023-12-01 DIAGNOSIS — C61 MALIGNANT NEOPLASM OF PROSTATE (H): Primary | ICD-10-CM

## 2023-12-01 DIAGNOSIS — C61 PROSTATE CANCER (H): ICD-10-CM

## 2023-12-01 DIAGNOSIS — C61 MALIGNANT NEOPLASM OF PROSTATE (H): ICD-10-CM

## 2023-12-01 PROCEDURE — 96402 CHEMO HORMON ANTINEOPL SQ/IM: CPT | Performed by: INTERNAL MEDICINE

## 2023-12-01 PROCEDURE — 99207 PR NO CHARGE NURSE ONLY: CPT

## 2023-12-01 NOTE — TELEPHONE ENCOUNTER
Patient scheduled for lupron injection today in primary care clinic. Order provider has placed orders for this, but did not place as CAM to be given in clinic.   Signed CAM per Dr. Holman written order so this can be given in clinic today.     eJssica ALANIZN, RN  [facility]

## 2023-12-01 NOTE — PROGRESS NOTES
Clinic Administered Medication Documentation      Injectable Medication Documentation    Is there an active order (written within the past 365 days, with administrations remaining, not ) in the chart? Yes.     Patient was given  lupron depot 22.5 mg . Prior to medication administration, verified patient's identity using patient s name and date of birth. Please see MAR and medication order for additional information. Patient instructed to remain in clinic for 15 minutes and report any adverse reaction to staff immediately.    Vial/Syringe: Single dose vial. Was entire vial of medication used? Yes  Was this medication supplied by the patient? No  Is this a medication the patient will need to receive again? Yes. Verified that the patient has refills remaining in their prescription.    Jessica ALANIZN, RN  [facility]

## 2023-12-02 ENCOUNTER — HEALTH MAINTENANCE LETTER (OUTPATIENT)
Age: 83
End: 2023-12-02

## 2023-12-11 ENCOUNTER — LAB (OUTPATIENT)
Dept: LAB | Facility: OTHER | Age: 83
End: 2023-12-11
Payer: COMMERCIAL

## 2023-12-11 DIAGNOSIS — C61 MALIGNANT NEOPLASM OF PROSTATE (H): ICD-10-CM

## 2023-12-11 LAB — PSA SERPL DL<=0.01 NG/ML-MCNC: 0.12 NG/ML

## 2023-12-11 PROCEDURE — 84153 ASSAY OF PSA TOTAL: CPT

## 2023-12-11 PROCEDURE — 84403 ASSAY OF TOTAL TESTOSTERONE: CPT

## 2023-12-11 PROCEDURE — 36415 COLL VENOUS BLD VENIPUNCTURE: CPT

## 2023-12-12 ENCOUNTER — OFFICE VISIT (OUTPATIENT)
Dept: FAMILY MEDICINE | Facility: OTHER | Age: 83
End: 2023-12-12
Payer: COMMERCIAL

## 2023-12-12 VITALS
RESPIRATION RATE: 18 BRPM | HEART RATE: 66 BPM | TEMPERATURE: 97.8 F | SYSTOLIC BLOOD PRESSURE: 136 MMHG | HEIGHT: 66 IN | DIASTOLIC BLOOD PRESSURE: 86 MMHG | BODY MASS INDEX: 30.94 KG/M2 | WEIGHT: 192.5 LBS | OXYGEN SATURATION: 99 %

## 2023-12-12 DIAGNOSIS — E78.5 HYPERLIPIDEMIA LDL GOAL <100: ICD-10-CM

## 2023-12-12 DIAGNOSIS — I10 BENIGN ESSENTIAL HYPERTENSION: ICD-10-CM

## 2023-12-12 DIAGNOSIS — M51.369 DDD (DEGENERATIVE DISC DISEASE), LUMBAR: Primary | ICD-10-CM

## 2023-12-12 DIAGNOSIS — G47.00 INSOMNIA, UNSPECIFIED TYPE: ICD-10-CM

## 2023-12-12 DIAGNOSIS — M1A.9XX0 CHRONIC GOUT WITHOUT TOPHUS, UNSPECIFIED CAUSE, UNSPECIFIED SITE: ICD-10-CM

## 2023-12-12 PROBLEM — Z86.711 HISTORY OF PULMONARY EMBOLISM: Status: ACTIVE | Noted: 2023-02-13

## 2023-12-12 PROCEDURE — 99214 OFFICE O/P EST MOD 30 MIN: CPT | Performed by: FAMILY MEDICINE

## 2023-12-12 RX ORDER — ZOLPIDEM TARTRATE 5 MG/1
5 TABLET ORAL
Qty: 30 TABLET | Refills: 5 | Status: SHIPPED | OUTPATIENT
Start: 2023-12-12 | End: 2024-06-25

## 2023-12-12 RX ORDER — ALLOPURINOL 100 MG/1
100 TABLET ORAL DAILY
Qty: 90 TABLET | Refills: 3 | Status: SHIPPED | OUTPATIENT
Start: 2023-12-12

## 2023-12-12 RX ORDER — LOSARTAN POTASSIUM AND HYDROCHLOROTHIAZIDE 12.5; 1 MG/1; MG/1
1 TABLET ORAL DAILY
Qty: 90 TABLET | Refills: 3 | Status: SHIPPED | OUTPATIENT
Start: 2023-12-12

## 2023-12-12 RX ORDER — CLONAZEPAM 1 MG/1
1 TABLET ORAL
Qty: 30 TABLET | Refills: 5 | Status: SHIPPED | OUTPATIENT
Start: 2023-12-12 | End: 2024-06-11

## 2023-12-12 RX ORDER — ATORVASTATIN CALCIUM 10 MG/1
10 TABLET, FILM COATED ORAL DAILY
Qty: 90 TABLET | Refills: 3 | Status: SHIPPED | OUTPATIENT
Start: 2023-12-12

## 2023-12-12 RX ORDER — RESPIRATORY SYNCYTIAL VIRUS VACCINE 120MCG/0.5
0.5 KIT INTRAMUSCULAR ONCE
Qty: 1 EACH | Refills: 0 | Status: CANCELLED | OUTPATIENT
Start: 2023-12-12 | End: 2023-12-12

## 2023-12-12 ASSESSMENT — ENCOUNTER SYMPTOMS
NERVOUS/ANXIOUS: 1
DYSURIA: 0
NAUSEA: 0
SORE THROAT: 0
WEAKNESS: 0
FEVER: 0
DIZZINESS: 0
HEARTBURN: 0
COUGH: 0
ARTHRALGIAS: 1
DIARRHEA: 0
HEMATOCHEZIA: 0
HEADACHES: 0
FREQUENCY: 1
JOINT SWELLING: 0
EYE PAIN: 0
SHORTNESS OF BREATH: 1
ABDOMINAL PAIN: 0
CONSTIPATION: 0
CHILLS: 0
PALPITATIONS: 0
MYALGIAS: 0
PARESTHESIAS: 0
HEMATURIA: 0

## 2023-12-12 ASSESSMENT — PAIN SCALES - GENERAL: PAINLEVEL: NO PAIN (0)

## 2023-12-12 ASSESSMENT — ANXIETY QUESTIONNAIRES
1. FEELING NERVOUS, ANXIOUS, OR ON EDGE: NOT AT ALL
IF YOU CHECKED OFF ANY PROBLEMS ON THIS QUESTIONNAIRE, HOW DIFFICULT HAVE THESE PROBLEMS MADE IT FOR YOU TO DO YOUR WORK, TAKE CARE OF THINGS AT HOME, OR GET ALONG WITH OTHER PEOPLE: NOT DIFFICULT AT ALL
5. BEING SO RESTLESS THAT IT IS HARD TO SIT STILL: SEVERAL DAYS
7. FEELING AFRAID AS IF SOMETHING AWFUL MIGHT HAPPEN: NOT AT ALL
GAD7 TOTAL SCORE: 1
6. BECOMING EASILY ANNOYED OR IRRITABLE: NOT AT ALL
2. NOT BEING ABLE TO STOP OR CONTROL WORRYING: NOT AT ALL
GAD7 TOTAL SCORE: 1
3. WORRYING TOO MUCH ABOUT DIFFERENT THINGS: NOT AT ALL

## 2023-12-12 ASSESSMENT — ACTIVITIES OF DAILY LIVING (ADL): CURRENT_FUNCTION: NO ASSISTANCE NEEDED

## 2023-12-12 ASSESSMENT — PATIENT HEALTH QUESTIONNAIRE - PHQ9
SUM OF ALL RESPONSES TO PHQ QUESTIONS 1-9: 1
5. POOR APPETITE OR OVEREATING: NOT AT ALL

## 2023-12-12 NOTE — PROGRESS NOTES
"  Assessment & Plan     DDD (degenerative disc disease), lumbar  Patient has pain of his right lateral leg only while sleeping on his left side.  We discussed physical therapy.  Suspect possible iliotibial band dysfunction versus degenerative disc disease of his back.  He can use Tylenol if the discomfort warrants it.    - Physical Therapy Referral; Future    Insomnia, unspecified type  Patient wakes up a few times at night but overall has adequate sleep.  Will continue his Ambien and Klonopin without change.   was reviewed and without concerns.      - zolpidem (AMBIEN) 5 MG tablet; Take 1 tablet (5 mg) by mouth nightly as needed  - clonazePAM (KLONOPIN) 1 MG tablet; Take 1 tablet (1 mg) by mouth nightly as needed for anxiety    Benign essential hypertension  Well-controlled.  Refills given.    - losartan-hydrochlorothiazide (HYZAAR) 100-12.5 MG tablet; Take 1 tablet by mouth daily    Chronic gout without tophus, unspecified cause, unspecified site  No recent episodes.  Continue allopurinol.  Refills are given.    - allopurinol (ZYLOPRIM) 100 MG tablet; Take 1 tablet (100 mg) by mouth daily    Hyperlipidemia LDL goal <100  Well-controlled on statin.  Refills given.    - atorvastatin (LIPITOR) 10 MG tablet; Take 1 tablet (10 mg) by mouth daily    MD MARTHA Batista [facility] ALLYN Batres is a 83 year old, presenting for the following health issues:  Physical        12/12/2023    10:39 AM   Additional Questions   Roomed by Imelda THOMAS       Healthy Habits:     In general, how would you rate your overall health?  Fair    Frequency of exercise:  None    Do you usually eat at least 4 servings of fruit and vegetables a day, include whole grains    & fiber and avoid regularly eating high fat or \"junk\" foods?  No    Taking medications regularly:  Yes    Medication side effects:  Not applicable    Ability to successfully perform activities of daily living:  No assistance needed    " Home Safety:  No safety concerns identified    Hearing Impairment:  No hearing concerns    In the past 6 months, have you been bothered by leaking of urine?  No    In general, how would you rate your overall mental or emotional health?  Fair    Additional concerns today:  No       Patient recently saw oncology and was told his cancer is in remission.  He had recent PET scan and bone scan that we reviewed the oncology notes as well as radiology results.  No new lesions were found.    Patient has continued mild pain in his right leg.  This is mainly lateral goes from the hip down to the foot.  He denies numbness, tingling or weakness.  He denies severe pain.  He states this really only happens at night.  When he is laying down on his left side then his right leg hurts.  He denies that there is problems during the day.  He does not feel Tylenol has been that helpful.  Oncology had recommended physical therapy and we had discussed physical therapy in the past.    He has not had gout episode in years.  He continues on allopurinol.    Patient is using both Ambien and Klonopin at night for sleeping.  He states he still wakes up sometimes during the middle of the night.  He is still grieving the loss of his wife.    Pain History:  When did you first notice your pain? A while   Have you seen this provider for your pain in the past? Yes   Where in your body do you have pain? Right hip, and down right leg  Are you seeing anyone else for your pain? No          6/22/2021    10:28 AM 6/4/2023     1:13 PM 12/12/2023    10:47 AM   SAEID-7 SCORE   Total Score 2 (minimal anxiety) 10 (moderate anxiety)    Total Score 2 10 1               6/2/2020     9:19 AM 12/15/2020    11:13 AM 12/12/2023    10:47 AM   PHQ-9 SCORE   PHQ-9 Total Score MyChart  3 (Minimal depression)    PHQ-9 Total Score 0 3 1           6/22/2021    10:28 AM 6/4/2023     1:13 PM 12/12/2023    10:47 AM   SAEID-7 SCORE   Total Score 2 (minimal anxiety) 10 (moderate  "anxiety)    Total Score 2 10 1           12/12/2023    10:46 AM   PEG Score   PEG Total Score 2         Objective    /86 (Cuff Size: Adult Regular)   Pulse 66   Temp 97.8  F (36.6  C) (Oral)   Resp 18   Ht 1.681 m (5' 6.18\")   Wt 87.3 kg (192 lb 8 oz)   SpO2 99%   BMI 30.90 kg/m    Body mass index is 30.9 kg/m .  Physical Exam   Gen: no apparent distress  Chest: clear to auscultation without wheeze, rale or rhonchi  Cor: regular rate and rhythm without murmur  Ext: No tenderness to palpation of his trochanteric area, full range of motion of his right hip.  No edema.  Sensation grossly intact.  Strength grossly intact.  Psych: Alert and oriented times 3; coherent speech, normal   rate and volume, able to articulate logical thoughts, able   to abstract reason, no tangential thoughts, no hallucinations   or delusions  His affect is neutral              "

## 2023-12-12 NOTE — PROGRESS NOTES
He is at risk for falling and has been provided with information to reduce the risk of falling at home.

## 2023-12-13 LAB — TESTOST SERPL-MCNC: 10 NG/DL (ref 240–950)

## 2023-12-14 ENCOUNTER — THERAPY VISIT (OUTPATIENT)
Dept: PHYSICAL THERAPY | Facility: CLINIC | Age: 83
End: 2023-12-14
Attending: FAMILY MEDICINE
Payer: COMMERCIAL

## 2023-12-14 DIAGNOSIS — M51.369 DDD (DEGENERATIVE DISC DISEASE), LUMBAR: ICD-10-CM

## 2023-12-14 PROCEDURE — 97110 THERAPEUTIC EXERCISES: CPT | Mod: GP

## 2023-12-14 PROCEDURE — 97161 PT EVAL LOW COMPLEX 20 MIN: CPT | Mod: GP

## 2023-12-14 NOTE — PROGRESS NOTES
PHYSICAL THERAPY EVALUATION  Type of Visit: Evaluation    See electronic medical record for Abuse and Falls Screening details.    Subjective       Presenting condition or subjective complaint: Right hop pain that goes through my right leg only when I sleep at night  Date of onset: 09/14/23 (Pt reported pain started a few months ago)    Relevant medical history: Cancer   Dates & types of surgery: Prostate and colon surgery [id number]    Prior diagnostic imaging/testing results:       Prior therapy history for the same diagnosis, illness or injury: No      Pt is a 83 year old male presenting with complaints of right hip pain. Pt reports that during the day, there is no pain. When pt goes to bed, he sleeps on his left side. He is able to get to sleep but then wakes up 2-3 hours later with pain down his right leg down to his foot. He is able to get back to sleep if he lies on his back.    The symptoms started about a few months ago and is not getting any better; it has stayed about the same.    Pt describes the pain as sore and consistent down leg; rates it a 5/10.     Aggravating Factors: Sleeping on left side    Alleviating Factors: lying on back     Prior treatments: had PT for back previously for different episode.    Current level of function: able to perform all daily activities/usual activities during the day without pain    Red Flags: None    Pt goals:being able to sleep through the night.     Living Environment  Social support: Alone   Type of home: House   Stairs to enter the home: Yes 2 Is there a railing: Yes   Ramp: No   Stairs inside the home: Yes 4 Is there a railing: Yes   Help at home: Self Cares (home health aide/personal care attendant, family, etc); Home management tasks (cooking, cleaning)  Equipment owned:       Employment: No    Hobbies/Interests: Golf, TV, garage work    Patient goals for therapy: None    Pain assessment: See above     Objective   LUMBAR:    Posture: Slightly slumped posture with  forward shoulders and head    Gait: WNL - walks without AD     Functional Screen:   -Sit to stand: able to perform without UE use and without pain     ROM (* Denotes Pain):  -Flexion: Hands to shoes (likely mid-shin if able to keep legs straight)  -Extension: WNL  -L SB: hand to knee  -R SB: hand to knee     Repeated Motion Exam:     Neuro Screen:   Myotomes/Strength (* Denotes Pain):   Myotomes L R   L1-2 (hip flexion) 5/5 5/5   L3 (knee extension) 5/5 5/5   L4 (ankle DF) 5/5 5/5   L5 (g. toe ext) NT NT   S1 (ankle PF or knee flex) 5/5 (knee flexion) 5/5  (knee flexion)     Dermatomes: L2-S1 dermatomes intact to touch bilaterally    Special Tests:   L R   Slump     SLR - (hamstring stretch ~80 degrees) - (hamstring stretch ~80 degrees)   Long [place] Traction      FADIR     DANIEL - -   Scour - -   Hamstring 90/90     Piriformis Test - +   Posterior Capsule Compression     Vania test: R:+ ; L: -    LE Relevant Findings:   -Strength: See neuro screen above (WNL)  -ROM: hip flexion, ER and IR WNL     Palpation: mod TTP to greater trochanter, ischial tub, SIJ and piriformis     Assessment & Plan   CLINICAL IMPRESSIONS  Medical Diagnosis: DDD (degenerative disc disease), lumbar    Treatment Diagnosis: Right hip pain   Impression/Assessment: Patient is a 83 year old male with right hip pain complaints at night.  The following significant findings have been identified: Pain, Decreased ROM/flexibility, Decreased joint mobility, Impaired muscle performance, and Decreased activity tolerance. These impairments interfere with their ability to perform self care tasks, recreational activities, and household chores as compared to previous level of function.     Clinical Decision Making (Complexity):  Clinical Presentation: Stable/Uncomplicated  Clinical Presentation Rationale: based on medical and personal factors listed in PT evaluation  Clinical Decision Making (Complexity): Low complexity    PLAN OF CARE  Treatment  Interventions:  Modalities: Cryotherapy, E-stim, Hot Pack, Ultrasound  Interventions: Gait Training, Manual Therapy, Neuromuscular Re-education, Therapeutic Activity, Therapeutic Exercise, Self-Care/Home Management    Long Term Goals     PT Goal 1  Goal Identifier: Sleep  Goal Description: Pt will be able to sleep through the night for 5 consecutive nights, per pt report, without being awoken by hip pain  Rationale: to maximize safety and independence with self cares  Goal Progress: Usually wakes up 2-3 hours after falling asleep  Target Date: 01/25/24      Frequency of Treatment: 1x/week  Duration of Treatment: 6 weeks    Recommended Referrals to Other Professionals:  none  Education Assessment:   Learner/Method: Patient    Risks and benefits of evaluation/treatment have been explained.   Patient/Family/caregiver agrees with Plan of Care.     Evaluation Time:     PT Eval, Low Complexity Minutes (90478): 15     Signing Clinician: Diana Santos PT      [facility]                                                                                   OUTPATIENT PHYSICAL THERAPY      PLAN OF TREATMENT FOR OUTPATIENT REHABILITATION   Patient's Last Name, First Name, Medardo Murray YOB: 1940   Provider's Name   [facility]   Medical Record No.  [id number]     Onset Date: 09/14/23 (Pt reported pain started a few months ago)  Start of Care Date: 12/14/23     Medical Diagnosis:  DDD (degenerative disc disease), lumbar      PT Treatment Diagnosis:  Right hip pain Plan of Treatment  Frequency/Duration: 1x/week/ 6 weeks    Certification date from 12/14/23 to 01/25/24         See note for plan of treatment details and functional goals     Diana Santos, PT                         I CERTIFY THE NEED FOR THESE SERVICES FURNISHED UNDER        THIS PLAN OF TREATMENT AND WHILE UNDER MY CARE     (Physician attestation of this document indicates  review and certification of the therapy plan).              Referring Provider:  Vira Flor    Initial Assessment  See Epic Evaluation- Start of Care Date: 12/14/23

## 2023-12-21 ENCOUNTER — THERAPY VISIT (OUTPATIENT)
Dept: PHYSICAL THERAPY | Facility: CLINIC | Age: 83
End: 2023-12-21
Payer: COMMERCIAL

## 2023-12-21 DIAGNOSIS — M51.369 DDD (DEGENERATIVE DISC DISEASE), LUMBAR: Primary | ICD-10-CM

## 2023-12-21 DIAGNOSIS — C61 MALIGNANT NEOPLASM OF PROSTATE (H): Primary | ICD-10-CM

## 2023-12-21 PROCEDURE — 97110 THERAPEUTIC EXERCISES: CPT | Mod: GP

## 2023-12-21 NOTE — PROGRESS NOTES
12/21/23 0500   Appointment Info   Signing clinician's name / credentials Diana Santos, PT, DPT   Total/Authorized Visits 6 planned   Visits Used 2   Medical Diagnosis DDD (degenerative disc disease), lumbar   PT Tx Diagnosis Right hip pain   Quick Adds Certification   Progress Note/Certification   Start of Care Date 12/14/23   Onset of illness/injury or Date of Surgery 09/14/23  (Pt reported pain started a few months ago)   Therapy Frequency 1x/week   Predicted Duration 6 weeks   Certification date from 12/14/23   Certification date to 01/25/24   Progress Note Due Date 01/25/24   Progress Note Completed Date 12/14/23   PT Goal 1   Goal Identifier Sleep   Goal Description Pt will be able to sleep through the night for 5 consecutive nights, per pt report, without being awoken by hip pain   Rationale to maximize safety and independence with self cares   Goal Progress Has slept through the night since initial eval!   Target Date 01/25/24   Date Met 12/21/23   Subjective Report   Subjective Report Pt reports that things are going extremely well! He has been able to sleep through the night since the initial visit. Nothing else has seemed to bother him.   Treatment Interventions (PT)   Interventions Therapeutic Procedure/Exercise   Therapeutic Procedure/Exercise   Therapeutic Procedures: strength, endurance, ROM, flexibillity minutes (70708) 25   Therapeutic Procedures Ther Proc 2;Ther Proc 3;Ther Proc 4;Ther Proc 5   Ther Proc 1 Education   Ther Proc 1 - Details Education regarding POC - pt expressed confidence in IND HEP; will trial for next month   Ther Proc 2 Tennis ball self-manual STM   Ther Proc 2 - Details Verbal review   PTRx Ther Proc 1 Seated Piriformis Stretch   PTRx Ther Proc 1 - Details verbal review   PTRx Ther Proc 2  Gluteal Stretch   PTRx Ther Proc 2 - Details Verbal review   PTRx Ther Proc 3 Standing IT Band Stretch Using Wall   PTRx Ther Proc 3 - Details Verbal review   Skilled Intervention  cueing for correct form and feeling the stretch in the correct muscle; added stretches to improve overall QOL, especially since pt will be doing a lot of driving over the holidays   Patient Response/Progress Pt tolerated stretches well without an increase in pain   PTRx Ther Proc 4 Seated Hamstring Stretch   PTRx Ther Proc 4 - Details verbal cues for correct form and correct muscle stretch; 15 sec/side   PTRx Ther Proc 5 Seated Butterfly   PTRx Ther Proc 5 - Details 15 sec hold; verbal cues for correct muscle engagement; education on performing in sitting or in supine   Ther Proc 3 Standing hip ADD stretch   Ther Proc 3 - Details Trialed but felt more in back; switched to seated butterfly stretch   Ther Proc 4 Child's Pose   Ther Proc 4 - Details Trield for hip ADD stretch but felt more in back; switched to seated butterfly stretch   Education   Learner/Method Patient   Plan   Home program See PTRX - print and phone   Updates to plan of care Will trial stretches IND over next month; if pt does not return, will assume pt is doing well and will d/c at that time   Comments   Comments Pt doing really well and has not had pain with sleep since initial PT visit. Will trial IND HEP for next month, as he expressed confidence in it. Will d/c if pt does not return   Total Session Time   Timed Code Treatment Minutes 25   Total Treatment Time (sum of timed and untimed services) 25         PLAN  Pt only to return if symptoms do not continue to improve over next 4 weeks, especially with increase in driving over the next couple of weeks. Will discharge after that if pt does not return.   Pt met sleep goal faster than anticipated. Want to monitor with change in daily activities over holiday season.    Beginning/End Dates of Progress Note Reporting Period:  12/14/23 to 12/21/2023    Referring Provider:  Vira Flor

## 2024-01-01 NOTE — TELEPHONE ENCOUNTER
1.  Date/reason for appt: 10/10/17 - Low Back Pain    2.  Referring provider: Dr. Beba Osborne    3.  Call to patient (Yes / No - short description): no, internal referral    4.  Previous care at / records requested from:    - [facility]: Records in [facility] with Dr. Beba Osborne   - [facility]: Records in [facility], MRI L Spine 9/24/17 in [facility]  
- may have an elongated or misshapen head.  The head is shaped according to the birth canal for easier birth.  This is called molding of the head and will round out in a few days.

## 2024-01-08 ENCOUNTER — LAB (OUTPATIENT)
Dept: LAB | Facility: OTHER | Age: 84
End: 2024-01-08
Payer: COMMERCIAL

## 2024-01-08 DIAGNOSIS — C61 MALIGNANT NEOPLASM OF PROSTATE (H): ICD-10-CM

## 2024-01-08 LAB — PSA SERPL DL<=0.01 NG/ML-MCNC: 0.14 NG/ML

## 2024-01-08 PROCEDURE — 36415 COLL VENOUS BLD VENIPUNCTURE: CPT

## 2024-01-08 PROCEDURE — 84153 ASSAY OF PSA TOTAL: CPT

## 2024-01-08 PROCEDURE — 84403 ASSAY OF TOTAL TESTOSTERONE: CPT

## 2024-01-10 LAB — TESTOST SERPL-MCNC: 15 NG/DL (ref 240–950)

## 2024-01-11 ENCOUNTER — TRANSFERRED RECORDS (OUTPATIENT)
Dept: HEALTH INFORMATION MANAGEMENT | Facility: CLINIC | Age: 84
End: 2024-01-11
Payer: COMMERCIAL

## 2024-02-05 ENCOUNTER — LAB (OUTPATIENT)
Dept: LAB | Facility: OTHER | Age: 84
End: 2024-02-05
Payer: COMMERCIAL

## 2024-02-05 DIAGNOSIS — C61 MALIGNANT NEOPLASM OF PROSTATE (H): ICD-10-CM

## 2024-02-05 LAB — PSA SERPL DL<=0.01 NG/ML-MCNC: 0.17 NG/ML

## 2024-02-05 PROCEDURE — 36415 COLL VENOUS BLD VENIPUNCTURE: CPT

## 2024-02-05 PROCEDURE — 84403 ASSAY OF TOTAL TESTOSTERONE: CPT

## 2024-02-05 PROCEDURE — 84153 ASSAY OF PSA TOTAL: CPT

## 2024-02-07 PROBLEM — M51.369 DDD (DEGENERATIVE DISC DISEASE), LUMBAR: Status: RESOLVED | Noted: 2023-12-14 | Resolved: 2024-02-07

## 2024-02-07 LAB — TESTOST SERPL-MCNC: 14 NG/DL (ref 240–950)

## 2024-02-07 NOTE — PROGRESS NOTES
02/07/24 0500   Appointment Info   Signing clinician's name / credentials Diana Santos, PT, DPT   Total/Authorized Visits 6 planned   Visits Used 2   Medical Diagnosis DDD (degenerative disc disease), lumbar   PT Tx Diagnosis Right hip pain   Quick Adds Certification   Progress Note/Certification   Start of Care Date 12/14/23   Onset of illness/injury or Date of Surgery 09/14/23  (Pt reported pain started a few months ago)   Therapy Frequency 1x/week   Predicted Duration 6 weeks   Certification date from 12/14/23   Certification date to 01/25/24   Progress Note Due Date 01/25/24   Progress Note Completed Date 12/14/23   PT Goal 1   Goal Identifier Sleep   Goal Description Pt will be able to sleep through the night for 5 consecutive nights, per pt report, without being awoken by hip pain   Rationale to maximize safety and independence with self cares   Goal Progress Has slept through the night since initial eval!   Target Date 01/25/24   Date Met 12/21/23   Subjective Report   Subjective Report Pt reports that things are going extremely well! He has been able to sleep through the night since the initial visit. Nothing else has seemed to bother him.   Treatment Interventions (PT)   Interventions Therapeutic Procedure/Exercise   Therapeutic Procedure/Exercise   Therapeutic Procedures Ther Proc 2;Ther Proc 3;Ther Proc 4;Ther Proc 5   Ther Proc 1 Education   Ther Proc 1 - Details Education regarding POC - pt expressed confidence in IND HEP; will trial for next month   Ther Proc 2 Tennis ball self-manual STM   Ther Proc 2 - Details Verbal review   PTRx Ther Proc 1 Seated Piriformis Stretch   PTRx Ther Proc 1 - Details verbal review   PTRx Ther Proc 2  Gluteal Stretch   PTRx Ther Proc 2 - Details Verbal review   PTRx Ther Proc 3 Standing IT Band Stretch Using Wall   PTRx Ther Proc 3 - Details Verbal review   Skilled Intervention cueing for correct form and feeling the stretch in the correct muscle; added stretches  to improve overall QOL, especially since pt will be doing a lot of driving over the holidays   Patient Response/Progress Pt tolerated stretches well without an increase in pain   PTRx Ther Proc 4 Seated Hamstring Stretch   PTRx Ther Proc 4 - Details verbal cues for correct form and correct muscle stretch; 15 sec/side   PTRx Ther Proc 5 Seated Butterfly   PTRx Ther Proc 5 - Details 15 sec hold; verbal cues for correct muscle engagement; education on performing in sitting or in supine   Ther Proc 3 Standing hip ADD stretch   Ther Proc 3 - Details Trialed but felt more in back; switched to seated butterfly stretch   Ther Proc 4 Child's Pose   Ther Proc 4 - Details Trield for hip ADD stretch but felt more in back; switched to seated butterfly stretch   Education   Learner/Method Patient   Plan   Home program See PTRX - print and phone   Updates to plan of care Will trial stretches IND over next month; if pt does not return, will assume pt is doing well and will d/c at that time   Comments   Comments Pt doing really well and has not had pain with sleep since initial PT visit. Will trial IND HEP for next month, as he expressed confidence in it. Will d/c if pt does not return         DISCHARGE  Reason for Discharge: Patient has met all goals.    Equipment Issued: none    Discharge Plan: Patient to continue home program.    Referring Provider:  Vira Flor

## 2024-02-12 ENCOUNTER — HOSPITAL ENCOUNTER (OUTPATIENT)
Dept: NUCLEAR MEDICINE | Facility: CLINIC | Age: 84
Setting detail: NUCLEAR MEDICINE
Discharge: HOME | End: 2024-02-12
Attending: INTERNAL MEDICINE
Payer: COMMERCIAL

## 2024-02-12 ENCOUNTER — HOSPITAL ENCOUNTER (OUTPATIENT)
Dept: CT IMAGING | Facility: CLINIC | Age: 84
Discharge: HOME | End: 2024-02-12
Attending: INTERNAL MEDICINE | Admitting: INTERNAL MEDICINE
Payer: COMMERCIAL

## 2024-02-12 DIAGNOSIS — C61 MALIGNANT NEOPLASM OF PROSTATE (H): ICD-10-CM

## 2024-02-12 PROCEDURE — 71260 CT THORAX DX C+: CPT | Mod: 26 | Performed by: RADIOLOGY

## 2024-02-12 PROCEDURE — 74177 CT ABD & PELVIS W/CONTRAST: CPT | Mod: 26 | Performed by: RADIOLOGY

## 2024-02-12 PROCEDURE — 78306 BONE IMAGING WHOLE BODY: CPT

## 2024-02-12 PROCEDURE — 71260 CT THORAX DX C+: CPT

## 2024-02-12 PROCEDURE — A9503 TC99M MEDRONATE: HCPCS | Performed by: INTERNAL MEDICINE

## 2024-02-12 PROCEDURE — 343N000001 HC RX 343: Performed by: INTERNAL MEDICINE

## 2024-02-12 PROCEDURE — 78306 BONE IMAGING WHOLE BODY: CPT | Mod: 26 | Performed by: RADIOLOGY

## 2024-02-12 PROCEDURE — 250N000011 HC RX IP 250 OP 636: Performed by: INTERNAL MEDICINE

## 2024-02-12 RX ORDER — IOPAMIDOL 755 MG/ML
118 INJECTION, SOLUTION INTRAVASCULAR ONCE
Status: COMPLETED | OUTPATIENT
Start: 2024-02-12 | End: 2024-02-12

## 2024-02-12 RX ORDER — TC 99M MEDRONATE 20 MG/10ML
20-30 INJECTION, POWDER, LYOPHILIZED, FOR SOLUTION INTRAVENOUS ONCE
Status: COMPLETED | OUTPATIENT
Start: 2024-02-12 | End: 2024-02-12

## 2024-02-12 RX ADMIN — TC 99M MEDRONATE 27.2 MILLICURIE: 20 INJECTION, POWDER, LYOPHILIZED, FOR SOLUTION INTRAVENOUS at 10:03

## 2024-02-12 RX ADMIN — IOPAMIDOL 118 ML: 755 INJECTION, SOLUTION INTRAVENOUS at 10:39

## 2024-03-02 NOTE — PROGRESS NOTES
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     History of Present Illness:  Mr. Gautam is an 84-year-old man who was diagnosed with prostate cancer in 2012.  His PSA level was 5.2 ng/mL.  Clinical stage was cT1c disease.  He underwent radical prostatectomy on 8/6/2012, which revealed [place] 4+5=9 carcinoma, stage pT2c N0 R0.  His next-generation DNA sequencing analysis ([employer]) revealed a pathogenic TP53 mutation, SHIRA genotype, TMB 5 muts/Mb, and absent PD-L1 expression.  He subsequently received salvage radiotherapy, which was completed in 10/2013, concurrently with six months of androgen deprivation therapy.     He subsequently developed a biochemical recurrence, and received ADT from 2014 until 2020.  In 11/2020, he developed non-metastatic CRPC.  Darolutamide was added on 12/4/2020, to which he achieved a subsequent PSA response.  His PSA level initially dropped from 1.66 ng/mL down to a speedy of 0.07 ng/mL (6/21/2021).  However, the patient then developed a rising PSA level over the past year.  His PSA on 1/13/2022 was 0.16, the PSA on 4/20/2022 was 0.24, the PSA on 6/13/2022 was 0.34, the PSA on 7/12/2022 was 0.47, the PSA on 8/25/2022 was 0.64 ng/mL, and the PSA on 11/21/2022 was 1.55 ng/mL (with a testosterone level of 14 ng/dL).  On 3/8/2023, his PSA level increased to 8.7 ng/mL.     The patient then participated in the ECLIPSE clinical trial, and he was randomized to the Lee Ann-PSMA-I&T radioligand arm.  During the screening procedures he was found to have an asymptomatic pulmonary embolus, and he began apixaban. He has received all 4 doses of Lee Ann-PSMA-I&T thus far, and his PSA level has dropped from 8.7 down to 0.12 ng/mL.  His PSA level remains low (currently 0.24 ng/mL), and his imaging assessments continue to show stable disease.  He returns today for a follow-up visit, as well as his next Lupron  injection.     Review of Systems:  Medardo remains quite sad, because his wife of 53 years  unexpectedly a few months ago.  He has noticed continued dry eyes with blurred vision over the last ~2 months. He continues to use rewetting eye drops but feels his ability to read small print has declined. He has a visit scheduled this Thursday with an ophthalmologist at the [facility] in [place] on Thursday afternoon for an exam.  He also has dry mouth improved with biotene mouth rinse with adequate improvement overnight. During the day, pushes oral hydration.  He has stable nocturia, was given a medication for this at his last visit but has discontinued it due to worsening dry mouth concerns.   He has ongoing, stable right hip pain for the last 6 months. No new bone pains.  No chest pain, shortness of breath, or cough. No fevers or chills. A comprehensive 14-point ROS is otherwise negative other than the symptoms noted above in the HPI.  His ECOG score is 0.  His pain score is 2/10.     Medications:  Lupron 22.5 mg IM q3mo (next dose, 3/6/2024)  177Lu-PSMA-I&T, dose #4 on 2023  Losartan/HCTZ 100/12.5 mg  Allopurinol 100 mg  Zolpidem 5 mg  Clonazepam 1 mg  Sildenafil 50 mg  Loratadine 10 mg  Calcium + Vit D  Multivitamin  Folic acid     Physical Examination:    General: No acute distress  Vital signs within normal limits.  HEENT: Sclera anicteric. Oral mucosa pink, dry. No mucositis or thrush  Lymph: No lymphadenopathy in neck  Heart: Regular, rate, and rhythm  Lungs: Clear to ascultation bilaterally  Abdomen: Positive bowel sounds. Soft, non-tender. No organomegaly or mass.   Extremities: no lower extremity edema  Neuro: Cranial nerves grossly intact  Skin: no dermatitis    CT scan (24):  This shows no significant interval change in CT appearance from 2023.  No evidence of osteoblastic disease.  Ascending thoracic aorta is somewhat ectatic measuring approximately 4.3 cm.  Unchanged mild  osteopenic compression deformity of T3. Advanced degenerative disc disease L3-4 and L4-5.  Hepatic steatosis.  Cholelithiasis.  Suggestion of previous distal colitis with partial right colectomy and grossly intact ileocolic anastomosis..     Bone scan (2/12/24):  This shows no change since 11/17/2023 dated bone scan. No new suspicious focal area of uptake to suggest an osteoblastic metastasis. Scattered degenerative changes related uptake throughout the skeleton.     Laboratories (3/4/24):  His PSA level is 0.24 ng/mL.  His testosterone level is 12 ng/dL.       ASSESSMENT AND PLAN:   Mr. Gautam is an 84-year-old man with [place] 4+5=9 [id number] prostate cancer who underwent radical prostatectomy (8/2012) and salvage radiotherapy (10/2013) but then developed metastatic CRPC refractory to darolutamide treatment.  He has enrolled on the ECLIPSE study, and he has received all 4 doses of Lee Ann-PSMA-I&T radioligand therapy.  His ECOG score is 0.  His pain score is 2/10.     #Metastatic CRPC  - Completed all four planned doses of Lutetium-I&T via the ECLIPSE trial in 7/2023, which he tolerated well with the exception of xerostomia and xerophthalmia.   - His PSA continues declined from 8.7 to 0.12 ng/mL and continues to remain low. His scans on 2/12/24 showed a near-complete treatment response.   - Patient is due for his next Lupron on 3/6/24.  Will request scheduling as well as an MD appointment at the same time.   - He will undergo PSA testing at a local lab on a monthly basis until our next visit, and then every 3 months thereafter.  - Next imaging assessments will be on 5/6/24.     #R hip pain  - Ongoing. No radiographic explanation on last scan. Recommend a trial of PT and light stretching. Not currently requiring NSAID's or tylenol.      #Pulmonary embolism  - PE diagnosed in 2/2023. He will complete 12 months of Eliquis, and I have asked him to stop the anticoagulant after that.     A total of 40 minutes were spent on  the date of the encounter performing chart review, review of test results, patient visit, and documentation. The patient was given the opportunity to ask several questions today, all of which were answered to his satisfaction.     Tio Holman M.D.

## 2024-03-04 ENCOUNTER — LAB (OUTPATIENT)
Dept: LAB | Facility: OTHER | Age: 84
End: 2024-03-04
Payer: COMMERCIAL

## 2024-03-04 DIAGNOSIS — C61 MALIGNANT NEOPLASM OF PROSTATE (H): ICD-10-CM

## 2024-03-04 LAB — PSA SERPL DL<=0.01 NG/ML-MCNC: 0.24 NG/ML

## 2024-03-04 PROCEDURE — 84153 ASSAY OF PSA TOTAL: CPT

## 2024-03-04 PROCEDURE — 36415 COLL VENOUS BLD VENIPUNCTURE: CPT

## 2024-03-04 PROCEDURE — 84403 ASSAY OF TOTAL TESTOSTERONE: CPT

## 2024-03-06 ENCOUNTER — ONCOLOGY VISIT (OUTPATIENT)
Dept: ONCOLOGY | Facility: CLINIC | Age: 84
End: 2024-03-06
Attending: INTERNAL MEDICINE
Payer: COMMERCIAL

## 2024-03-06 VITALS
BODY MASS INDEX: 30.08 KG/M2 | RESPIRATION RATE: 16 BRPM | DIASTOLIC BLOOD PRESSURE: 82 MMHG | HEART RATE: 79 BPM | SYSTOLIC BLOOD PRESSURE: 152 MMHG | WEIGHT: 187.4 LBS | OXYGEN SATURATION: 98 % | TEMPERATURE: 97.8 F

## 2024-03-06 DIAGNOSIS — C61 PROSTATE CANCER (H): Primary | ICD-10-CM

## 2024-03-06 DIAGNOSIS — C61 MALIGNANT NEOPLASM OF PROSTATE (H): ICD-10-CM

## 2024-03-06 LAB — TESTOST SERPL-MCNC: 12 NG/DL (ref 240–950)

## 2024-03-06 PROCEDURE — 250N000011 HC RX IP 250 OP 636: Mod: JZ | Performed by: INTERNAL MEDICINE

## 2024-03-06 PROCEDURE — 99215 OFFICE O/P EST HI 40 MIN: CPT | Performed by: INTERNAL MEDICINE

## 2024-03-06 PROCEDURE — G0463 HOSPITAL OUTPT CLINIC VISIT: HCPCS | Mod: 25 | Performed by: INTERNAL MEDICINE

## 2024-03-06 PROCEDURE — 96402 CHEMO HORMON ANTINEOPL SQ/IM: CPT

## 2024-03-06 RX ADMIN — LEUPROLIDE ACETATE 22.5 MG: KIT at 10:45

## 2024-03-06 ASSESSMENT — PAIN SCALES - GENERAL: PAINLEVEL: NO PAIN (0)

## 2024-03-06 NOTE — LETTER
3/6/2024         RE: Medardo Gautam  [address] [address] [facility] [id number]        Dear Colleague,    Thank you for referring your patient, Medardo Gautam, to the [facility]. Please see a copy of my visit note below.      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     History of Present Illness:  Mr. Gautam is an 84-year-old man who was diagnosed with prostate cancer in 2012.  His PSA level was 5.2 ng/mL.  Clinical stage was cT1c disease.  He underwent radical prostatectomy on 8/6/2012, which revealed [place] 4+5=9 carcinoma, stage pT2c N0 R0.  His next-generation DNA sequencing analysis ([employer]) revealed a pathogenic TP53 mutation, SHIRA genotype, TMB 5 muts/Mb, and absent PD-L1 expression.  He subsequently received salvage radiotherapy, which was completed in 10/2013, concurrently with six months of androgen deprivation therapy.     He subsequently developed a biochemical recurrence, and received ADT from 2014 until 2020.  In 11/2020, he developed non-metastatic CRPC.  Darolutamide was added on 12/4/2020, to which he achieved a subsequent PSA response.  His PSA level initially dropped from 1.66 ng/mL down to a speedy of 0.07 ng/mL (6/21/2021).  However, the patient then developed a rising PSA level over the past year.  His PSA on 1/13/2022 was 0.16, the PSA on 4/20/2022 was 0.24, the PSA on 6/13/2022 was 0.34, the PSA on 7/12/2022 was 0.47, the PSA on 8/25/2022 was 0.64 ng/mL, and the PSA on 11/21/2022 was 1.55 ng/mL (with a testosterone level of 14 ng/dL).  On 3/8/2023, his PSA level increased to 8.7 ng/mL.     The patient then participated in the ECLIPSE clinical trial, and he was randomized to the Lee Ann-PSMA-I&T radioligand arm.  During the screening procedures he was found to have an asymptomatic pulmonary embolus, and he began apixaban. He has received all 4 doses of  Lee Ann-PSMA-I&T thus far, and his PSA level has dropped from 8.7 down to 0.12 ng/mL.  His PSA level remains low (currently 0.24 ng/mL), and his imaging assessments continue to show stable disease.  He returns today for a follow-up visit, as well as his next Lupron injection.     Review of Systems:  Medardo remains quite sad, because his wife of 53 years  unexpectedly a few months ago.  He has noticed continued dry eyes with blurred vision over the last ~2 months. He continues to use rewetting eye drops but feels his ability to read small print has declined. He has a visit scheduled this Thursday with an ophthalmologist at the [facility] in [place] on Thursday afternoon for an exam.  He also has dry mouth improved with biotene mouth rinse with adequate improvement overnight. During the day, pushes oral hydration.  He has stable nocturia, was given a medication for this at his last visit but has discontinued it due to worsening dry mouth concerns.   He has ongoing, stable right hip pain for the last 6 months. No new bone pains.  No chest pain, shortness of breath, or cough. No fevers or chills. A comprehensive 14-point ROS is otherwise negative other than the symptoms noted above in the HPI.  His ECOG score is 0.  His pain score is 2/10.     Medications:  Lupron 22.5 mg IM q3mo (next dose, 3/6/2024)  177Lu-PSMA-I&T, dose #4 on 2023  Losartan/HCTZ 100/12.5 mg  Allopurinol 100 mg  Zolpidem 5 mg  Clonazepam 1 mg  Sildenafil 50 mg  Loratadine 10 mg  Calcium + Vit D  Multivitamin  Folic acid     Physical Examination:    General: No acute distress  Vital signs within normal limits.  HEENT: Sclera anicteric. Oral mucosa pink, dry. No mucositis or thrush  Lymph: No lymphadenopathy in neck  Heart: Regular, rate, and rhythm  Lungs: Clear to ascultation bilaterally  Abdomen: Positive bowel sounds. Soft, non-tender. No organomegaly or mass.   Extremities: no lower extremity edema  Neuro: Cranial nerves grossly  intact  Skin: no dermatitis    CT scan (2/12/24):  This shows no significant interval change in CT appearance from November 2023.  No evidence of osteoblastic disease.  Ascending thoracic aorta is somewhat ectatic measuring approximately 4.3 cm.  Unchanged mild osteopenic compression deformity of T3. Advanced degenerative disc disease L3-4 and L4-5.  Hepatic steatosis.  Cholelithiasis.  Suggestion of previous distal colitis with partial right colectomy and grossly intact ileocolic anastomosis..     Bone scan (2/12/24):  This shows no change since 11/17/2023 dated bone scan. No new suspicious focal area of uptake to suggest an osteoblastic metastasis. Scattered degenerative changes related uptake throughout the skeleton.     Laboratories (3/4/24):  His PSA level is 0.24 ng/mL.  His testosterone level is 12 ng/dL.       ASSESSMENT AND PLAN:   Mr. Gautam is an 84-year-old man with [place] 4+5=9 [id number]-xrwyekr prostate cancer who underwent radical prostatectomy (8/2012) and salvage radiotherapy (10/2013) but then developed metastatic CRPC refractory to darolutamide treatment.  He has enrolled on the ECLIPSE study, and he has received all 4 doses of Lee Ann-PSMA-I&T radioligand therapy.  His ECOG score is 0.  His pain score is 2/10.     #Metastatic CRPC  - Completed all four planned doses of Lutetium-I&T via the ECLIPSE trial in 7/2023, which he tolerated well with the exception of xerostomia and xerophthalmia.   - His PSA continues declined from 8.7 to 0.12 ng/mL and continues to remain low. His scans on 2/12/24 showed a near-complete treatment response.   - Patient is due for his next Lupron on 3/6/24.  Will request scheduling as well as an MD appointment at the same time.   - He will undergo PSA testing at a local lab on a monthly basis until our next visit, and then every 3 months thereafter.  - Next imaging assessments will be on 5/6/24.     #R hip pain  - Ongoing. No radiographic explanation on last scan. Recommend a trial  of PT and light stretching. Not currently requiring NSAID's or tylenol.      #Pulmonary embolism  - PE diagnosed in 2/2023. He will complete 12 months of Eliquis, and I have asked him to stop the anticoagulant after that.     A total of 40 minutes were spent on the date of the encounter performing chart review, review of test results, patient visit, and documentation. The patient was given the opportunity to ask several questions today, all of which were answered to his satisfaction.     Tio Holman M.D.

## 2024-03-06 NOTE — NURSING NOTE
Lupron injection was administered in the left ventrogluteal without complication.  Patient tolerated well and was discharged to home.  See MAR for details.    Libia Nguyen CMA,

## 2024-03-06 NOTE — NURSING NOTE
"Oncology Rooming Note    March 6, 2024 10:15 AM   Medardo Gautam is a 84 year old male who presents for:    Chief Complaint   Patient presents with    Oncology Clinic Visit     Prostate Cancer     Initial Vitals: BP (!) 198/88 (BP Location: Right arm, Patient Position: Sitting, Cuff Size: Adult Regular)   Pulse 79   Temp 97.8  F (36.6  C) (Oral)   Resp 16   Wt 85 kg (187 lb 6.4 oz)   SpO2 98%   BMI 30.08 kg/m   Estimated body mass index is 30.08 kg/m  as calculated from the following:    Height as of 12/12/23: 1.681 m (5' 6.18\").    Weight as of this encounter: 85 kg (187 lb 6.4 oz). Body surface area is 1.99 meters squared.  No Pain (0) Comment: Data Unavailable   No LMP for male patient.  Allergies reviewed: Yes  Medications reviewed: Yes    Medications: Medication refills not needed today.  Pharmacy name entered into [facility]:    [facility] - [place], [place] - [address] [facility] [place], [place] - [address] [facility] - [place], [place] - [address]  [facility] [place], [place] - [address] [address]    Frailty Screening:   Is the patient here for a new oncology consult visit in cancer care? 2. No      Clinical concerns: BP high: 198/88; 2nd reading (manual) after some time to sit and rest was 152/82. Pt forgot to take BP medication yesterday but did take it today.  Pt said he needs lupron shot at [facility].      Iman Mansfield, EMT  3/6/2024              "

## 2024-03-18 ENCOUNTER — NURSE TRIAGE (OUTPATIENT)
Dept: FAMILY MEDICINE | Facility: OTHER | Age: 84
End: 2024-03-18
Payer: COMMERCIAL

## 2024-03-18 NOTE — TELEPHONE ENCOUNTER
Elevated BP     Started  Saturday evening 198  Dizziness   /85    Nurse Triage SBAR    Is this a 2nd Level Triage? NO    Situation: called patient regarding elevated BP and dizziness    Background: patient noted an elevated BP on Saturday  and feeling dizzy. He has not been sleeping well either the last few nights.   No fever, weakness, vomiting. No other symptoms to report  Assessment: /85 now. Patient reports feeling a little dizzy. He is able to walk normally, not caused by position changes, reports drinking enough water. No weakness noted     Protocol Recommended Disposition:   See in Office Within 3 Days    Recommendation: patient to bee seen in office in 3 days. Appt made for tomorrow  Reviewed high alert symptoms and when to be concerned and call. Patient expressed understanding.        Does the patient meet one of the following criteria for ADS visit consideration? No  Reason for Disposition   MILD dizziness (e.g., walking normally) and has NOT been evaluated by physician for this (Exception: Dizziness caused by heat exposure, sudden standing, or poor fluid intake.)    Additional Information   Negative: Sounds like a life-threatening emergency to the triager   Negative: Pregnant > 20 weeks or postpartum (< 6 weeks after delivery) and new hand or face swelling   Negative: Pregnant > 20 weeks and BP > 140/90   Negative: BP Systolic BP >= 140 OR Diastolic >= 90 and postpartum (from 0 to 6 weeks after delivery)   Negative: SEVERE difficulty breathing (e.g., struggling for each breath, speaks in single words)   Negative: Shock suspected (e.g., cold/pale/clammy skin, too weak to stand, low BP, rapid pulse)   Negative: Difficult to awaken or acting confused (e.g., disoriented, slurred speech)   Negative: Fainted, and still feels dizzy afterwards   Negative: Overdose (accidental or intentional) of medications   Negative: New neurologic deficit that is present now: * Weakness of the face, arm, or  leg on one side of the body * Numbness of the face, arm, or leg on one side of the body * Loss of speech or garbled speech   Negative: Heart beating < 50 beats per minute OR > 140 beats per minute   Negative: Sounds like a life-threatening emergency to the triager   Negative: Chest pain   Negative: Rectal bleeding, bloody stool, or tarry-black stool   Negative: Vomiting is main symptom   Negative: Diarrhea is main symptom   Negative: Headache is main symptom   Negative: Heat exhaustion suspected (i.e., dehydration from heat exposure)   Negative: Patient states that they are having an anxiety or panic attack   Negative: Dizziness from low blood sugar (i.e., < 60 mg/dl or 3.5 mmol/l)   Negative: SEVERE dizziness (e.g., unable to stand, requires support to walk, feels like passing out now)   Negative: SEVERE headache or neck pain   Negative: Spinning or tilting sensation (vertigo) present now and one or more stroke risk factors (i.e., hypertension, diabetes mellitus, prior stroke/TIA, heart attack, age over 60) (Exception: Prior physician evaluation for this AND no different/worse than usual.)   Negative: Neurologic deficit that was brief (now gone), ANY of the following:* Weakness of the face, arm, or leg on one side of the body* Numbness of the face, arm, or leg on one side of the body* Loss of speech or garbled speech   Negative: Loss of vision or double vision  (Exception: Similar to previous migraines.)   Negative: Extra heartbeats, irregular heart beating, or heart is beating very fast (i.e., 'palpitations')   Negative: Difficulty breathing   Negative: Drinking very little and dehydration suspected (e.g., no urine > 12 hours, very dry mouth, very lightheaded)   Negative: Follows bleeding (e.g., stomach, rectum, vagina)  (Exception: Became dizzy from sight of small amount blood.)   Negative: Patient sounds very sick or weak to the triager   Negative: Lightheadedness (dizziness) present now, after 2 hours of rest  and fluids   Negative: Spinning or tilting sensation (vertigo) present now   Negative: Fever > 103 F (39.4 C)   Negative: Fever > 100.0 F (37.8 C) and has diabetes mellitus or a weak immune system (e.g., HIV positive, cancer chemotherapy, organ transplant, splenectomy, chronic steroids)   Negative: Systolic BP >= 160 OR Diastolic >= 100, and any cardiac or neurologic symptoms (e.g., chest pain, difficulty breathing, unsteady gait, blurred vision)   Negative: Patient sounds very sick or weak to the triager   Negative: Systolic BP >= 180 OR Diastolic >= 110, and missed most recent dose of blood pressure medication   Negative: MODERATE dizziness (e.g., interferes with normal activities)  (Exception: Dizziness caused by heat exposure, sudden standing, or poor fluid intake.)   Negative: Vomiting occurs with dizziness   Negative: Patient wants to be seen   Negative: Taking a medicine that could cause dizziness (e.g., blood pressure medications, diuretics)    Protocols used: High Blood Pressure-A-OH, Dizziness-A-OH

## 2024-03-18 NOTE — TELEPHONE ENCOUNTER
Patient scheduled an appointment for first available 3/28 but his blood pressure has been running high. He has felt light headed and is hard for him to sleep at night. Wondering if he could get in sooner.

## 2024-03-19 ENCOUNTER — OFFICE VISIT (OUTPATIENT)
Dept: FAMILY MEDICINE | Facility: OTHER | Age: 84
End: 2024-03-19
Payer: COMMERCIAL

## 2024-03-19 VITALS
HEART RATE: 80 BPM | WEIGHT: 185.5 LBS | OXYGEN SATURATION: 95 % | DIASTOLIC BLOOD PRESSURE: 78 MMHG | RESPIRATION RATE: 18 BRPM | SYSTOLIC BLOOD PRESSURE: 138 MMHG | TEMPERATURE: 97.4 F | HEIGHT: 66 IN | BODY MASS INDEX: 29.81 KG/M2

## 2024-03-19 DIAGNOSIS — R42 DIZZINESS: ICD-10-CM

## 2024-03-19 DIAGNOSIS — G47.9 SLEEPING DIFFICULTY: ICD-10-CM

## 2024-03-19 DIAGNOSIS — I10 ESSENTIAL HYPERTENSION WITH GOAL BLOOD PRESSURE LESS THAN 140/90: Primary | ICD-10-CM

## 2024-03-19 PROCEDURE — 99214 OFFICE O/P EST MOD 30 MIN: CPT | Performed by: PHYSICIAN ASSISTANT

## 2024-03-19 NOTE — PROGRESS NOTES
Assessment & Plan     Essential hypertension with goal blood pressure less than 140/90  Dizziness  Patient reached out to triage on 03/18/24 with concerns about elevated BP up to 198 systolic with accompanying dizziness. Surprisingly the patient was not directed for emergent evaluation given age, history of HTN/HLD and prostate cancer. He has been tracking BP at home with OTC monitor. He says that since time of triage call his dizziness has resolved. BP on rooming today is 128/72. We discussed possible causes for his symptoms including lupron which had been administered on 03/06, inaccurate home monitor, dietary choices, poor sleep or stress. I will have him return for BP challenge with machine.     Patient was concerned about very brief dizziness which occurs from changes in position, eg standing from sitting or laying. We discussed importance of staying well hydrated as well as taking time, ~10 seconds or more, to allow pressure to stabilize when standing.     Reference material attached to AVS.     Sleeping difficulty  Patient informs me that he has been struggling with sleep for some time. He attributes this to passing of spouse and the grief, loss which continues to cause him distress. He has been prescribed both klonopin and zolpidem by PCP for these concerns. Currently using OTC melatonin, klonopin and zolpidem each night to help sleep. I discussed my concerns about CNS depression as well as risks for fall with multiple sedating medications. He also began to use mirtazepine, prescribed in June 2023, with these other medications. I discouraged use of past medications without consulting PCP or provider such as myself.     As he is not achieving full relief with his current regimen I suggested alternative approach. We discussed use of melatonin or zolpidem 30 minutes prior to bedtime with second dose at bedtime. I did caution about he increased sedation which can accompany use of two 5mg zolpidem tablets in a  night. He will ensure clear walkways, avoid use of machinery, etc.     This is not a long term solution and he understands this. Reviewed sleep hygiene and discussed use of ssri therapy. Patient not interested in starting different medication at this time. I see that he has follow up with PCP in April 2024, can revisit use of ssri medication to help reduce anxiety and address grief/depression.     Jaime Batres is a 84 year old, presenting for the following health issues:  Hypertension and Dizziness      3/19/2024     3:28 PM   Additional Questions   Roomed by Naty JUNG   Accompanied by self         3/19/2024     3:28 PM   Patient Reported Additional Medications   Patient reports taking the following new medications Mirtazapine 7.5 MG 1-2 at bedtime (took 1 the last 2 nights) says that was newer from      History of Present Illness       Reason for visit:  High blood pressure blurry vision, problem falling, diziness  Symptom onset:  3-7 days ago  Symptoms include:  Unable to fall asleep at night, traces of blood in nasal mucus  Symptom intensity:  Moderate  Symptom progression:  Staying the same  Had these symptoms before:  No  What makes it worse:  Not knowing what the problem is.  What makes it better:  How to resolve these symptoms.    He eats 0-1 servings of fruits and vegetables daily.He consumes 0 sweetened beverage(s) daily.He exercises with enough effort to increase his heart rate 9 or less minutes per day.  He exercises with enough effort to increase his heart rate 3 or less days per week.   He is taking medications regularly.     He reports from the questionnaire above he no longer has traces of blood in his nose.  Sometime last Saturday/Early Sunday he had a reading of 193/90's. There is a triage call from 03/18/24 as well.      3/18/24 11:58 AM  Note  Elevated BP      Started  Saturday evening 198  Dizziness   /85     Nurse Triage SBAR     Is this a 2nd Level Triage? NO      Situation: called patient regarding elevated BP and dizziness     Background: patient noted an elevated BP on Saturday  and feeling dizzy. He has not been sleeping well either the last few nights.   No fever, weakness, vomiting. No other symptoms to report  Assessment: /85 now. Patient reports feeling a little dizzy. He is able to walk normally, not caused by position changes, reports drinking enough water. No weakness noted     Does the patient meet one of the following criteria for ADS visit consideration? No  Reason for Disposition   MILD dizziness (e.g., walking normally) and has NOT been evaluated by physician for this (Exception: Dizziness caused by heat exposure, sudden standing, or poor fluid intake.)    Additional Information   Negative: Sounds like a life-threatening emergency to the triager   Negative: Pregnant > 20 weeks or postpartum (< 6 weeks after delivery) and new hand or face swelling   Negative: Pregnant > 20 weeks and BP > 140/90   Negative: BP Systolic BP >= 140 OR Diastolic >= 90 and postpartum (from 0 to 6 weeks after delivery)   Negative: SEVERE difficulty breathing (e.g., struggling for each breath, speaks in single words)   Negative: Shock suspected (e.g., cold/pale/clammy skin, too weak to stand, low BP, rapid pulse)   Negative: Difficult to awaken or acting confused (e.g., disoriented, slurred speech)   Negative: Fainted, and still feels dizzy afterwards   Negative: Overdose (accidental or intentional) of medications   Negative: New neurologic deficit that is present now: * Weakness of the face, arm, or leg on one side of the body * Numbness of the face, arm, or leg on one side of the body * Loss of speech or garbled speech   Negative: Heart beating < 50 beats per minute OR > 140 beats per minute   Negative: Sounds like a life-threatening emergency to the triager   Negative: Chest pain   Negative: Rectal bleeding, bloody stool, or tarry-black stool   Negative: Vomiting is  main symptom   Negative: Diarrhea is main symptom   Negative: Headache is main symptom   Negative: Heat exhaustion suspected (i.e., dehydration from heat exposure)   Negative: Patient states that they are having an anxiety or panic attack   Negative: Dizziness from low blood sugar (i.e., < 60 mg/dl or 3.5 mmol/l)   Negative: SEVERE dizziness (e.g., unable to stand, requires support to walk, feels like passing out now)   Negative: SEVERE headache or neck pain   Negative: Spinning or tilting sensation (vertigo) present now and one or more stroke risk factors (i.e., hypertension, diabetes mellitus, prior stroke/TIA, heart attack, age over 60) (Exception: Prior physician evaluation for this AND no different/worse than usual.)   Negative: Neurologic deficit that was brief (now gone), ANY of the following:* Weakness of the face, arm, or leg on one side of the body* Numbness of the face, arm, or leg on one side of the body* Loss of speech or garbled speech   Negative: Loss of vision or double vision  (Exception: Similar to previous migraines.)   Negative: Extra heartbeats, irregular heart beating, or heart is beating very fast (i.e., 'palpitations')   Negative: Difficulty breathing   Negative: Drinking very little and dehydration suspected (e.g., no urine > 12 hours, very dry mouth, very lightheaded)   Negative: Follows bleeding (e.g., stomach, rectum, vagina)  (Exception: Became dizzy from sight of small amount blood.)   Negative: Patient sounds very sick or weak to the triager   Negative: Lightheadedness (dizziness) present now, after 2 hours of rest and fluids   Negative: Spinning or tilting sensation (vertigo) present now   Negative: Fever > 103 F (39.4 C)   Negative: Fever > 100.0 F (37.8 C) and has diabetes mellitus or a weak immune system (e.g., HIV positive, cancer chemotherapy, organ transplant, splenectomy, chronic steroids)   Negative: Systolic BP >= 160 OR Diastolic >= 100, and any cardiac or neurologic  "symptoms (e.g., chest pain, difficulty breathing, unsteady gait, blurred vision)   Negative: Patient sounds very sick or weak to the triager   Negative: Systolic BP >= 180 OR Diastolic >= 110, and missed most recent dose of blood pressure medication   Negative: MODERATE dizziness (e.g., interferes with normal activities)  (Exception: Dizziness caused by heat exposure, sudden standing, or poor fluid intake.)   Negative: Vomiting occurs with dizziness   Negative: Patient wants to be seen   Negative: Taking a medicine that could cause dizziness (e.g., blood pressure medications, diuretics)          Review of Systems  Constitutional, HEENT, cardiovascular, pulmonary, gi and gu systems are negative, except as otherwise noted.      Objective    /78   Pulse 80   Temp 97.4  F (36.3  C) (Temporal)   Resp 18   Ht 1.676 m (5' 6\")   Wt 84.1 kg (185 lb 8 oz)   SpO2 95%   BMI 29.94 kg/m    Body mass index is 29.94 kg/m .  Physical Exam   GENERAL: alert and no distress  EYES: Eyes grossly normal to inspection, PERRL and conjunctivae and sclerae normal  HENT: ear canals and TM's normal, nose and mouth without ulcers or lesions  NECK: no adenopathy, no asymmetry, masses, or scars  RESP: lungs clear to auscultation - no rales, rhonchi or wheezes  CV: regular rate and rhythm, normal S1 S2, no S3 or S4, no murmur, click or rub, no peripheral edema  MS: no gross musculoskeletal defects noted, no edema  PSYCH: mentation appears normal, affect normal/bright, and tearful        Latest Reference Range & Units 08/22/23 09:52   Sodium 136 - 145 mmol/L 140   Potassium 3.4 - 5.3 mmol/L 3.8   Chloride 98 - 107 mmol/L 104   Carbon Dioxide (CO2) 22 - 29 mmol/L 26   Urea Nitrogen 8.0 - 23.0 mg/dL 15.1   Creatinine 0.67 - 1.17 mg/dL 0.89   GFR Estimate >60 mL/min/1.73m2 86   Calcium 8.8 - 10.2 mg/dL 9.6   Anion Gap 7 - 15 mmol/L 10   Magnesium 1.7 - 2.3 mg/dL 1.7   Albumin 3.5 - 5.2 g/dL 4.2   Protein Total 6.4 - 8.3 g/dL 6.9   Alkaline " Phosphatase 40 - 129 U/L 66   ALT 0 - 70 U/L 15   AST 0 - 45 U/L 35   Bilirubin Total <=1.2 mg/dL 0.8   Cholesterol <200 mg/dL 184   Glucose 70 - 99 mg/dL 133 (H)   HDL Cholesterol >=40 mg/dL 94   LDL Cholesterol Calculated <=100 mg/dL 79   Non HDL Cholesterol <130 mg/dL 90   PSA Tumor Marker ng/mL 0.40   Testosterone Total 240 - 950 ng/dL 14 (L)   Triglycerides <150 mg/dL 53   TSH 0.30 - 4.20 uIU/mL 1.62     Signed Electronically by: Loy Starr PA-C

## 2024-03-20 ENCOUNTER — ALLIED HEALTH/NURSE VISIT (OUTPATIENT)
Dept: FAMILY MEDICINE | Facility: OTHER | Age: 84
End: 2024-03-20
Payer: COMMERCIAL

## 2024-03-20 VITALS — SYSTOLIC BLOOD PRESSURE: 128 MMHG | DIASTOLIC BLOOD PRESSURE: 72 MMHG | HEART RATE: 65 BPM

## 2024-03-20 DIAGNOSIS — I10 ESSENTIAL HYPERTENSION WITH GOAL BLOOD PRESSURE LESS THAN 140/90: Primary | ICD-10-CM

## 2024-03-20 PROCEDURE — 99207 PR NO CHARGE NURSE ONLY: CPT

## 2024-03-20 NOTE — PROGRESS NOTES
Medardo Gautam is a 84 year old patient who comes in today for a Blood Pressure check with a Medical Assistant because of ongoing blood pressure monitoring.  Initial BP:  /72   Pulse 65      Blood pressure is not high.   Repeat Blood pressure: No  Patient is taking medication as prescribed.  Patient is tolerating medications well.  Is patient taking blood pressures at home? YES  Current Symptoms: none    Disposition:   No: CC this chart to requesting provider/PCP.

## 2024-03-20 NOTE — PROGRESS NOTES
Note says no bp taken. Reported bp looks good under vitals though. I would recommend updating note.  Lisa Zuniga MD

## 2024-03-27 ENCOUNTER — ALLIED HEALTH/NURSE VISIT (OUTPATIENT)
Dept: ONCOLOGY | Facility: CLINIC | Age: 84
End: 2024-03-27
Payer: COMMERCIAL

## 2024-03-27 ENCOUNTER — ANCILLARY PROCEDURE (OUTPATIENT)
Dept: RADIOLOGY | Facility: CLINIC | Age: 84
End: 2024-03-27
Attending: INTERNAL MEDICINE
Payer: COMMERCIAL

## 2024-03-27 DIAGNOSIS — Z00.6 EXAMINATION OF PARTICIPANT IN CLINICAL TRIAL: Primary | ICD-10-CM

## 2024-03-27 DIAGNOSIS — Z00.6 EXAMINATION OF PARTICIPANT IN CLINICAL TRIAL: ICD-10-CM

## 2024-04-25 ENCOUNTER — NURSE TRIAGE (OUTPATIENT)
Dept: FAMILY MEDICINE | Facility: OTHER | Age: 84
End: 2024-04-25

## 2024-04-25 ENCOUNTER — MYC MEDICAL ADVICE (OUTPATIENT)
Dept: FAMILY MEDICINE | Facility: OTHER | Age: 84
End: 2024-04-25
Payer: COMMERCIAL

## 2024-04-25 NOTE — CONFIDENTIAL NOTE
Oncology calling to assist patient in scheduling his Lupron injection. Wants this completed in [place]. Routing to [place] RN to manage medication ordering and scheduling with patient.

## 2024-05-06 ENCOUNTER — LAB (OUTPATIENT)
Dept: LAB | Facility: OTHER | Age: 84
End: 2024-05-06
Payer: COMMERCIAL

## 2024-05-06 DIAGNOSIS — C61 MALIGNANT NEOPLASM OF PROSTATE (H): ICD-10-CM

## 2024-05-06 LAB
ALBUMIN SERPL BCG-MCNC: 4.4 G/DL (ref 3.5–5.2)
ALP SERPL-CCNC: 77 U/L (ref 40–150)
ALT SERPL W P-5'-P-CCNC: 23 U/L (ref 0–70)
ANION GAP SERPL CALCULATED.3IONS-SCNC: 12 MMOL/L (ref 7–15)
AST SERPL W P-5'-P-CCNC: 44 U/L (ref 0–45)
BASOPHILS # BLD AUTO: 0 10E3/UL (ref 0–0.2)
BASOPHILS NFR BLD AUTO: 0 %
BILIRUB SERPL-MCNC: 0.8 MG/DL
BUN SERPL-MCNC: 23.4 MG/DL (ref 8–23)
CALCIUM SERPL-MCNC: 9.7 MG/DL (ref 8.8–10.2)
CHLORIDE SERPL-SCNC: 103 MMOL/L (ref 98–107)
CREAT SERPL-MCNC: 1.35 MG/DL (ref 0.67–1.17)
DEPRECATED HCO3 PLAS-SCNC: 26 MMOL/L (ref 22–29)
EGFRCR SERPLBLD CKD-EPI 2021: 52 ML/MIN/1.73M2
EOSINOPHIL # BLD AUTO: 0.1 10E3/UL (ref 0–0.7)
EOSINOPHIL NFR BLD AUTO: 2 %
ERYTHROCYTE [DISTWIDTH] IN BLOOD BY AUTOMATED COUNT: 12.2 % (ref 10–15)
GLUCOSE SERPL-MCNC: 124 MG/DL (ref 70–99)
HCT VFR BLD AUTO: 30.9 % (ref 40–53)
HGB BLD-MCNC: 10.6 G/DL (ref 13.3–17.7)
IMM GRANULOCYTES # BLD: 0 10E3/UL
IMM GRANULOCYTES NFR BLD: 0 %
LYMPHOCYTES # BLD AUTO: 1.1 10E3/UL (ref 0.8–5.3)
LYMPHOCYTES NFR BLD AUTO: 34 %
MCH RBC QN AUTO: 34.6 PG (ref 26.5–33)
MCHC RBC AUTO-ENTMCNC: 34.3 G/DL (ref 31.5–36.5)
MCV RBC AUTO: 101 FL (ref 78–100)
MONOCYTES # BLD AUTO: 0.3 10E3/UL (ref 0–1.3)
MONOCYTES NFR BLD AUTO: 8 %
NEUTROPHILS # BLD AUTO: 1.8 10E3/UL (ref 1.6–8.3)
NEUTROPHILS NFR BLD AUTO: 56 %
PLATELET # BLD AUTO: 103 10E3/UL (ref 150–450)
POTASSIUM SERPL-SCNC: 4.2 MMOL/L (ref 3.4–5.3)
PROT SERPL-MCNC: 7.7 G/DL (ref 6.4–8.3)
PSA SERPL DL<=0.01 NG/ML-MCNC: 0.54 NG/ML
RBC # BLD AUTO: 3.06 10E6/UL (ref 4.4–5.9)
SODIUM SERPL-SCNC: 141 MMOL/L (ref 135–145)
WBC # BLD AUTO: 3.2 10E3/UL (ref 4–11)

## 2024-05-06 PROCEDURE — 84403 ASSAY OF TOTAL TESTOSTERONE: CPT

## 2024-05-06 PROCEDURE — 36415 COLL VENOUS BLD VENIPUNCTURE: CPT

## 2024-05-06 PROCEDURE — 84153 ASSAY OF PSA TOTAL: CPT

## 2024-05-06 PROCEDURE — 85025 COMPLETE CBC W/AUTO DIFF WBC: CPT

## 2024-05-06 PROCEDURE — 80053 COMPREHEN METABOLIC PANEL: CPT

## 2024-05-07 ENCOUNTER — HOSPITAL ENCOUNTER (OUTPATIENT)
Dept: NUCLEAR MEDICINE | Facility: CLINIC | Age: 84
Setting detail: NUCLEAR MEDICINE
Discharge: HOME | End: 2024-05-07
Attending: INTERNAL MEDICINE
Payer: COMMERCIAL

## 2024-05-07 ENCOUNTER — HOSPITAL ENCOUNTER (OUTPATIENT)
Dept: CT IMAGING | Facility: CLINIC | Age: 84
Discharge: HOME | End: 2024-05-07
Attending: INTERNAL MEDICINE | Admitting: INTERNAL MEDICINE
Payer: COMMERCIAL

## 2024-05-07 DIAGNOSIS — C61 MALIGNANT NEOPLASM OF PROSTATE (H): ICD-10-CM

## 2024-05-07 PROCEDURE — 71260 CT THORAX DX C+: CPT | Mod: 26 | Performed by: RADIOLOGY

## 2024-05-07 PROCEDURE — 78306 BONE IMAGING WHOLE BODY: CPT | Mod: 26 | Performed by: RADIOLOGY

## 2024-05-07 PROCEDURE — 71260 CT THORAX DX C+: CPT

## 2024-05-07 PROCEDURE — 250N000011 HC RX IP 250 OP 636: Performed by: INTERNAL MEDICINE

## 2024-05-07 PROCEDURE — 78306 BONE IMAGING WHOLE BODY: CPT

## 2024-05-07 PROCEDURE — A9503 TC99M MEDRONATE: HCPCS | Performed by: INTERNAL MEDICINE

## 2024-05-07 PROCEDURE — 343N000001 HC RX 343: Performed by: INTERNAL MEDICINE

## 2024-05-07 PROCEDURE — 74177 CT ABD & PELVIS W/CONTRAST: CPT | Mod: 26 | Performed by: RADIOLOGY

## 2024-05-07 RX ORDER — TC 99M MEDRONATE 20 MG/10ML
20-30 INJECTION, POWDER, LYOPHILIZED, FOR SOLUTION INTRAVENOUS ONCE
Status: COMPLETED | OUTPATIENT
Start: 2024-05-07 | End: 2024-05-07

## 2024-05-07 RX ORDER — IOPAMIDOL 755 MG/ML
115 INJECTION, SOLUTION INTRAVASCULAR ONCE
Status: COMPLETED | OUTPATIENT
Start: 2024-05-07 | End: 2024-05-07

## 2024-05-07 RX ADMIN — TC 99M MEDRONATE 26.45 MILLICURIE: 20 INJECTION, POWDER, LYOPHILIZED, FOR SOLUTION INTRAVENOUS at 09:43

## 2024-05-07 RX ADMIN — IOPAMIDOL 115 ML: 755 INJECTION, SOLUTION INTRAVENOUS at 09:53

## 2024-05-07 NOTE — PROGRESS NOTES
Virtual Visit Details    Type of service:  Video Visit   Originating Location (pt. Location):  Home    Distant Location (provider location):  [facility]  Platform used for Video Visit:  Ronnie      ---------------------------------------------------------------------------------------------------------    FOLLOW UP VISIT  -  TELEMEDICINE     Reason for visit:  Metastatic [id number] CRPC s/p darolutamide and 177Lu-PSMA-I&T on the ECLIPSE study. Now with progressive disease.     History of Present Illness:  Mr. Gautam is an 84-year-old man who was diagnosed with prostate cancer in 2012.  His PSA level was 5.2 ng/mL.  Clinical stage was cT1c disease.  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  His next-generation DNA sequencing analysis ([facility]) revealed a pathogenic TP53 mutation, SHIRA genotype, TMB 5 muts/Mb, and absent PD-L1 expression.  He subsequently received salvage radiotherapy, which was completed in 10/2013, concurrently with six months of androgen deprivation therapy.     He subsequently developed a biochemical recurrence, and received ADT from 2014 until 2020.  In 11/2020, he developed non-metastatic CRPC.  Darolutamide was added on 12/4/2020, to which he achieved a subsequent PSA response.  His PSA level initially dropped from 1.66 ng/mL down to a speedy of 0.07 ng/mL (6/21/2021).  However, the patient then developed a rising PSA level over the past year.  His PSA on 1/13/2022 was 0.16, the PSA on 4/20/2022 was 0.24, the PSA on 6/13/2022 was 0.34, the PSA on 7/12/2022 was 0.47, the PSA on 8/25/2022 was 0.64 ng/mL, and the PSA on 11/21/2022 was 1.55 ng/mL (with a testosterone level of 14 ng/dL).  On 3/8/2023, his PSA level increased to 8.7 ng/mL.     The patient then participated in the ECLIPSE clinical trial, and he was randomized to the 177Lu-PSMA-I&T radioligand arm.  During the screening procedures he was found  to have an asymptomatic pulmonary embolus, and he began apixaban. He has received all 4 doses of [place]-PSMA-I&T thus far, and his PSA level has dropped from 8.7 down to 0.12 ng/mL.  However, his PSA level is now up to 0.54 ng/mL, and his imaging shows evidence of progressive disease.  We took the opportunity today to discuss additional systemic treatment options moving forward.     Review of Systems:  Medardo remains quite sad, because his wife of 53 years  unexpectedly a few months ago.  He has noticed continued dry eyes with blurred vision over the last ~2 months. He continues to use rewetting eye drops but feels his ability to read small print has declined. He has a visit scheduled this Thursday with an ophthalmologist at the [facility] in [place] on Thursday afternoon for an exam.  He also has dry mouth improved with biotene mouth rinse with adequate improvement overnight. During the day, pushes oral hydration.  He has stable nocturia, was given a medication for this at his last visit but has discontinued it due to worsening dry mouth concerns.   He has ongoing, stable right hip pain for the last 6 months. No new bone pains.  No chest pain, shortness of breath, or cough. No fevers or chills. A comprehensive 14-point ROS is otherwise negative other than the symptoms noted above in the HPI.  His ECOG score is 0.  His pain score is 2/10.     Medications:  Lupron 22.5 mg IM q3mo (next dose, 2024)  Losartan/HCTZ 100/12.5 mg  Allopurinol 100 mg  Zolpidem 5 mg  Clonazepam 1 mg  Sildenafil 50 mg  Loratadine 10 mg  Calcium + Vit D  Multivitamin  Folic acid     Physical Examination:    General: No acute distress  Vital signs within normal limits.  HEENT: Sclera anicteric. Oral mucosa pink, dry. No mucositis or thrush  Lymph: No lymphadenopathy in neck  Heart: Regular, rate, and rhythm  Lungs: Clear to ascultation bilaterally  Abdomen: Positive bowel sounds. Soft, non-tender. No organomegaly or mass.    Extremities: no lower extremity edema  Neuro: Cranial nerves grossly intact  Skin: no dermatitis    CT scan (5/7/24):  This shows enlarging retroperitoneal lymphadenopathy in the para-aortic space concerning for worsening metastatic disease. Multifocal sclerotic lesions throughout the axial and appendicular skeleton. These lesions appears largely unchanged since the prior study on 2/12/2024. An 8 mm cortical lesion along the right humerus was not definitively seen on the prior exam and may have been outside the field-of-view versus new area of metastatic disease.  Diffuse wall thickening within the bladder which may be post-treatment related versus cystitis. Recommend correlation with urinalysis.     Bone scan (5/7/24):  This shows new focal uptake in the right humeral head correlating with sclerotic lesion seen on same-day CT suggesting a new osteoblastic metastasis. I have personally reviewed the examination and initial interpretation and I agree with the findings.     Laboratories (5/6/24):  CBC shows a WBC of 3.2, hemoglobin 10.6, hematocrit 30.9%, platelets 103K.  His PSA level is 0.54 ng/mL.  His testosterone level is 12 ng/dL.  Creatinine  is 1.35 mg/dL, which represents a significant increase.       ASSESSMENT AND PLAN:   Mr. Gautam is an 84-year-old man with Jeancarlos 4+5=9 [id number] prostate cancer who underwent radical prostatectomy (8/2012) and salvage radiotherapy (10/2013) but then developed metastatic CRPC refractory to darolutamide treatment.  He had enrolled on the ECLIPSE study, and received all 4 doses of Lee Ann-PSMA-I&T radioligand therapy.  His disease is now progressing once again.  His ECOG score is 0.  His pain score is 2/10.     #Metastatic CRPC  - Completed all 4 doses of Lee Ann-I&T on the ECLIPSE trial in 7/2023, which he tolerated well with the exception of xerostomia/xerophthalmia.   - His PSA declined from 8.7 to 0.12 ng/mL, but is now back up to 0.54 ng/mL.  - His CT scan shows growing  retroperitoneal adenopathy and his creatinine is elevated suggesting potential compression of the ureters.   - Patient is due for his next Lupron on 6/5/24.  Will request scheduling as well as an MD appointment at the same time.   - He will need a new systemic therapy relatively soon.  His options include docetaxel chemotherapy, abiraterone, or the AMA-280 study.  - Alternatively, radium-223 could target his bone metastases but will not treat the growing lymphadenopathy.  - At the end of our detailed discussion, the patient has requested to go back on Darolutamide 600 mg for one month.  - I will see him again in June 2024, with a PSA test a few days before.     #R hip pain  - Ongoing. No radiographic explanation on last scan. Recommend a trial of PT and light stretching. Not currently requiring NSAID's or tylenol.      #Pulmonary embolism  - PE diagnosed in 2/2023. He will complete 12 months of Eliquis, and I have asked him to stop the anticoagulant after that.     A total of 40 minutes were spent on this patient on the date of the encounter conducting chart review, review of test results, interpretation of tests, patient visit, documentation and discussion with other provider(s). The patient was given the opportunity to ask multiple questions today, all of which were answered to his satisfaction.     Tio Holman M.D.

## 2024-05-08 ENCOUNTER — VIRTUAL VISIT (OUTPATIENT)
Dept: ONCOLOGY | Facility: CLINIC | Age: 84
End: 2024-05-08
Attending: INTERNAL MEDICINE
Payer: COMMERCIAL

## 2024-05-08 VITALS — BODY MASS INDEX: 29.89 KG/M2 | WEIGHT: 186 LBS | HEIGHT: 66 IN

## 2024-05-08 DIAGNOSIS — C61 PROSTATE CANCER (H): Primary | ICD-10-CM

## 2024-05-08 DIAGNOSIS — C61 MALIGNANT NEOPLASM OF PROSTATE (H): ICD-10-CM

## 2024-05-08 PROCEDURE — 99215 OFFICE O/P EST HI 40 MIN: CPT | Mod: 95 | Performed by: INTERNAL MEDICINE

## 2024-05-08 ASSESSMENT — PAIN SCALES - GENERAL: PAINLEVEL: NO PAIN (0)

## 2024-05-08 NOTE — NURSING NOTE
Is the patient currently in the state of [place]? YES    Visit mode:VIDEO    If the visit is dropped, the patient can be reconnected by: VIDEO VISIT: Text to cell phone:   Telephone Information:   Mobile [phone]       Will anyone else be joining the visit? NO  (If patient encounters technical issues they should call [phone])    How would you like to obtain your AVS? MyChart    Are changes needed to the allergy or medication list? Pt stated no med changes    Are refills needed on medications prescribed by this physician? NO    Reason for visit: RECHECK    No other vitals to report per pt    Ladan MEZAF

## 2024-05-08 NOTE — LETTER
5/8/2024         RE: Medardo Gautam  [address] [address] [address] [address]        Dear Colleague,    Thank you for referring your patient, Medardo Gautam, to the [facility]. Please see a copy of my visit note below.      Virtual Visit Details    Type of service:  Video Visit   Originating Location (pt. Location):  Home    Distant Location (provider location):  [facility]  Platform used for Video Visit:  Ronnie      ---------------------------------------------------------------------------------------------------------    FOLLOW UP VISIT  -  TELEMEDICINE     Reason for visit:  Metastatic [id number] CRPC s/p darolutamide and 177Lu-PSMA-I&T on the ECLIPSE study. Now with progressive disease.     History of Present Illness:  Mr. Gautam is an 84-year-old man who was diagnosed with prostate cancer in 2012.  His PSA level was 5.2 ng/mL.  Clinical stage was cT1c disease.  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  His next-generation DNA sequencing analysis ([employer]) revealed a pathogenic TP53 mutation, SHIRA genotype, TMB 5 muts/Mb, and absent PD-L1 expression.  He subsequently received salvage radiotherapy, which was completed in 10/2013, concurrently with six months of androgen deprivation therapy.     He subsequently developed a biochemical recurrence, and received ADT from 2014 until 2020.  In 11/2020, he developed non-metastatic CRPC.  Darolutamide was added on 12/4/2020, to which he achieved a subsequent PSA response.  His PSA level initially dropped from 1.66 ng/mL down to a speedy of 0.07 ng/mL (6/21/2021).  However, the patient then developed a rising PSA level over the past year.  His PSA on 1/13/2022 was 0.16, the PSA on 4/20/2022 was 0.24, the PSA on 6/13/2022 was 0.34, the PSA on 7/12/2022 was 0.47, the PSA on 8/25/2022 was 0.64 ng/mL, and the PSA on 11/21/2022 was 1.55 ng/mL (with  a testosterone level of 14 ng/dL).  On 3/8/2023, his PSA level increased to 8.7 ng/mL.     The patient then participated in the ECLIPSE clinical trial, and he was randomized to the 177Lu-PSMA-I&T radioligand arm.  During the screening procedures he was found to have an asymptomatic pulmonary embolus, and he began apixaban. He has received all 4 doses of [place]-PSMA-I&T thus far, and his PSA level has dropped from 8.7 down to 0.12 ng/mL.  However, his PSA level is now up to 0.54 ng/mL, and his imaging shows evidence of progressive disease.  We took the opportunity today to discuss additional systemic treatment options moving forward.     Review of Systems:  Medardo remains quite sad, because his wife of 53 years  unexpectedly a few months ago.  He has noticed continued dry eyes with blurred vision over the last ~2 months. He continues to use rewetting eye drops but feels his ability to read small print has declined. He has a visit scheduled this Thursday with an ophthalmologist at the [facility] in [place] on Thursday afternoon for an exam.  He also has dry mouth improved with biotene mouth rinse with adequate improvement overnight. During the day, pushes oral hydration.  He has stable nocturia, was given a medication for this at his last visit but has discontinued it due to worsening dry mouth concerns.   He has ongoing, stable right hip pain for the last 6 months. No new bone pains.  No chest pain, shortness of breath, or cough. No fevers or chills. A comprehensive 14-point ROS is otherwise negative other than the symptoms noted above in the HPI.  His ECOG score is 0.  His pain score is 2/10.     Medications:  Lupron 22.5 mg IM q3mo (next dose, 2024)  Losartan/HCTZ 100/12.5 mg  Allopurinol 100 mg  Zolpidem 5 mg  Clonazepam 1 mg  Sildenafil 50 mg  Loratadine 10 mg  Calcium + Vit D  Multivitamin  Folic acid     Physical Examination:    General: No acute distress  Vital signs within normal  limits.  HEENT: Sclera anicteric. Oral mucosa pink, dry. No mucositis or thrush  Lymph: No lymphadenopathy in neck  Heart: Regular, rate, and rhythm  Lungs: Clear to ascultation bilaterally  Abdomen: Positive bowel sounds. Soft, non-tender. No organomegaly or mass.   Extremities: no lower extremity edema  Neuro: Cranial nerves grossly intact  Skin: no dermatitis    CT scan (5/7/24):  This shows enlarging retroperitoneal lymphadenopathy in the para-aortic space concerning for worsening metastatic disease. Multifocal sclerotic lesions throughout the axial and appendicular skeleton. These lesions appears largely unchanged since the prior study on 2/12/2024. An 8 mm cortical lesion along the right humerus was not definitively seen on the prior exam and may have been outside the field-of-view versus new area of metastatic disease.  Diffuse wall thickening within the bladder which may be post-treatment related versus cystitis. Recommend correlation with urinalysis.     Bone scan (5/7/24):  This shows new focal uptake in the right humeral head correlating with sclerotic lesion seen on same-day CT suggesting a new osteoblastic metastasis. I have personally reviewed the examination and initial interpretation and I agree with the findings.     Laboratories (5/6/24):  CBC shows a WBC of 3.2, hemoglobin 10.6, hematocrit 30.9%, platelets 103K.  His PSA level is 0.54 ng/mL.  His testosterone level is 12 ng/dL.  Creatinine  is 1.35 mg/dL, which represents a significant increase.       ASSESSMENT AND PLAN:   Mr. Gautam is an 84-year-old man with Je[place]arlos 4+5=9 GW94-aggceuo prostate cancer who underwent radical prostatectomy (8/2012) and salvage radiotherapy (10/2013) but then developed metastatic CRPC refractory to darolutamide treatment.  He had enrolled on the ECLIPSE study, and received all 4 doses of Lee Ann-PSMA-I&T radioligand therapy.  His disease is now progressing once again.  His ECOG score is 0.  His pain score is 2/10.      #Metastatic CRPC  - Completed all 4 doses of Lee Ann-I&T on the ECLIPSE trial in 7/2023, which he tolerated well with the exception of xerostomia/xerophthalmia.   - His PSA declined from 8.7 to 0.12 ng/mL, but is now back up to 0.54 ng/mL.  - His CT scan shows growing retroperitoneal adenopathy and his creatinine is elevated suggesting potential compression of the ureters.   - Patient is due for his next Lupron on 6/5/24.  Will request scheduling as well as an MD appointment at the same time.   - He will need a new systemic therapy relatively soon.  His options include docetaxel chemotherapy, abiraterone, or the AMA-280 study.  - Alternatively, radium-223 could target his bone metastases but will not treat the growing lymphadenopathy.  - At the end of our detailed discussion, the patient has requested to go back on Darolutamide 600 mg for one month.  - I will see him again in June 2024, with a PSA test a few days before.     #R hip pain  - Ongoing. No radiographic explanation on last scan. Recommend a trial of PT and light stretching. Not currently requiring NSAID's or tylenol.      #Pulmonary embolism  - PE diagnosed in 2/2023. He will complete 12 months of Eliquis, and I have asked him to stop the anticoagulant after that.     A total of 40 minutes were spent on this patient on the date of the encounter conducting chart review, review of test results, interpretation of tests, patient visit, documentation and discussion with other provider(s). The patient was given the opportunity to ask multiple questions today, all of which were answered to his satisfaction.     Tio Holman M.D.

## 2024-05-09 LAB — TESTOST SERPL-MCNC: 10 NG/DL (ref 240–950)

## 2024-05-16 ENCOUNTER — ANCILLARY PROCEDURE (OUTPATIENT)
Dept: RADIOLOGY | Facility: CLINIC | Age: 84
End: 2024-05-16
Attending: INTERNAL MEDICINE
Payer: COMMERCIAL

## 2024-05-16 DIAGNOSIS — Z00.6 EXAMINATION OF PARTICIPANT IN CLINICAL TRIAL: ICD-10-CM

## 2024-06-03 ENCOUNTER — LAB (OUTPATIENT)
Dept: LAB | Facility: OTHER | Age: 84
End: 2024-06-03
Payer: COMMERCIAL

## 2024-06-03 DIAGNOSIS — C61 MALIGNANT NEOPLASM OF PROSTATE (H): ICD-10-CM

## 2024-06-03 LAB — PSA SERPL DL<=0.01 NG/ML-MCNC: 0.55 NG/ML

## 2024-06-03 PROCEDURE — 36415 COLL VENOUS BLD VENIPUNCTURE: CPT

## 2024-06-03 PROCEDURE — 84403 ASSAY OF TOTAL TESTOSTERONE: CPT

## 2024-06-03 PROCEDURE — 84153 ASSAY OF PSA TOTAL: CPT

## 2024-06-05 ENCOUNTER — ONCOLOGY VISIT (OUTPATIENT)
Dept: ONCOLOGY | Facility: CLINIC | Age: 84
End: 2024-06-05
Attending: INTERNAL MEDICINE
Payer: COMMERCIAL

## 2024-06-05 VITALS
BODY MASS INDEX: 30.21 KG/M2 | WEIGHT: 187.2 LBS | SYSTOLIC BLOOD PRESSURE: 143 MMHG | HEART RATE: 77 BPM | TEMPERATURE: 97.9 F | RESPIRATION RATE: 16 BRPM | DIASTOLIC BLOOD PRESSURE: 90 MMHG | OXYGEN SATURATION: 99 %

## 2024-06-05 DIAGNOSIS — C61 MALIGNANT NEOPLASM OF PROSTATE (H): ICD-10-CM

## 2024-06-05 DIAGNOSIS — C61 PROSTATE CANCER (H): ICD-10-CM

## 2024-06-05 DIAGNOSIS — C79.51 PROSTATE CANCER METASTATIC TO BONE (H): Primary | ICD-10-CM

## 2024-06-05 DIAGNOSIS — C61 PROSTATE CANCER METASTATIC TO BONE (H): Primary | ICD-10-CM

## 2024-06-05 LAB — TESTOST SERPL-MCNC: 16 NG/DL (ref 240–950)

## 2024-06-05 PROCEDURE — G0463 HOSPITAL OUTPT CLINIC VISIT: HCPCS | Performed by: INTERNAL MEDICINE

## 2024-06-05 PROCEDURE — 99215 OFFICE O/P EST HI 40 MIN: CPT | Performed by: INTERNAL MEDICINE

## 2024-06-05 ASSESSMENT — PAIN SCALES - GENERAL: PAINLEVEL: NO PAIN (0)

## 2024-06-05 NOTE — NURSING NOTE
"Oncology Rooming Note    June 5, 2024 3:32 PM   Medardo Gautam is a 84 year old male who presents for:    Chief Complaint   Patient presents with    Oncology Clinic Visit     Prostate cancer      Initial Vitals: BP (!) 143/90 (BP Location: Right arm, Patient Position: Sitting, Cuff Size: Adult Large)   Pulse 77   Temp 97.9  F (36.6  C) (Oral)   Resp 16   Wt 84.9 kg (187 lb 3.2 oz)   SpO2 99%   BMI 30.21 kg/m   Estimated body mass index is 30.21 kg/m  as calculated from the following:    Height as of 5/8/24: 1.676 m (5' 6\").    Weight as of this encounter: 84.9 kg (187 lb 3.2 oz). Body surface area is 1.99 meters squared.  No Pain (0) Comment: Data Unavailable   No LMP for male patient.  Allergies reviewed: Yes  Medications reviewed: Yes    Medications: MEDICATION REFILLS NEEDED TODAY. Provider was notified.  Pharmacy name entered into [facility]:    [id number] - [place], [place] - [address] [facility] [place] [address]    Frailty Screening:   Is the patient here for a new oncology consult visit in cancer care? 2. No      Clinical concerns: Would need a prescription for nubeqa as he's recontinuing      Eunice Diaz              "

## 2024-06-05 NOTE — LETTER
6/5/2024      Medardo Gautam  [address] [address] [address]      Dear Colleague,    Thank you for referring your patient, Medardo Gautam, to the [facility]. Please see a copy of my visit note below.      FOLLOW UP VISIT     Reason for visit:  Metastatic [id number]-[id number] CRPC s/p darolutamide and 177Lu-PSMA-I&T on the ECLIPSE study. Now with progressive disease, back on darolutamide.     History of Present Illness:  Mr. Gautam is an 84-year-old man who was diagnosed with prostate cancer in 2012.  His PSA level was 5.2 ng/mL.  Clinical stage was cT1c disease.  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  His next-generation DNA sequencing analysis ([facility]) revealed a pathogenic TP53 mutation, SHIRA genotype, TMB 5 muts/Mb, and absent PD-L1 expression.  He subsequently received salvage radiotherapy, which was completed in 10/2013, concurrently with six months of androgen deprivation therapy.     He subsequently developed a biochemical recurrence, and received ADT from 2014 until 2020.  In 11/2020, he developed non-metastatic CRPC.  Darolutamide was added on 12/4/2020, to which he achieved a subsequent PSA response.  His PSA level initially dropped from 1.66 ng/mL down to a pseedy of 0.07 ng/mL (6/21/2021).  However, the patient then developed a rising PSA level over the past year.  His PSA on 1/13/2022 was 0.16, the PSA on 4/20/2022 was 0.24, the PSA on 6/13/2022 was 0.34, the PSA on 7/12/2022 was 0.47, the PSA on 8/25/2022 was 0.64 ng/mL, and the PSA on 11/21/2022 was 1.55 ng/mL (with a testosterone level of 14 ng/dL).  On 3/8/2023, his PSA level increased to 8.7 ng/mL.     The patient then participated in the ECLIPSE clinical trial, and he was randomized to the 177Lu-PSMA-I&T radioligand arm.  During the screening procedures he was found to have an asymptomatic pulmonary embolus, and he began apixaban. He has received all  4 doses of Lee Ann-PSMA-I&T thus far, and his PSA level has dropped from 8.7 down to 0.12 ng/mL.  However, his PSA level is now up to 0.55 ng/mL, and his imaging shows evidence of progressive disease.  We took the opportunity today to discuss additional systemic treatment options moving forward.     Review of Systems:  Medardo remains quite melancholy, because his wife of 53 years  unexpectedly a few months ago.  He has noticed continued dry eyes with blurred vision over the last ~2 months. He continues to use rewetting eye drops but feels his ability to read small print has declined. He has a visit scheduled this Thursday with an ophthalmologist at the [facility] in [place] on Thursday afternoon for an exam.  He also has dry mouth improved with biotene mouth rinse with adequate improvement overnight. During the day, pushes oral hydration.  He has stable nocturia, was given a medication for this at his last visit but has discontinued it due to worsening dry mouth concerns.   He has ongoing, stable right hip pain for the last 6 months. No new bone pains.  No chest pain, shortness of breath, or cough. No fevers or chills. A comprehensive 14-point ROS is otherwise negative other than the symptoms noted above in the HPI.  His ECOG score is 0.  His pain score is 2/10.     Medications:  Lupron 22.5 mg IM q3mo (next dose, 2024)  Darolutamide 600 mg BID  Losartan/HCTZ 100/12.5 mg  Allopurinol 100 mg  Zolpidem 5 mg  Clonazepam 1 mg  Sildenafil 50 mg  Loratadine 10 mg  Calcium + Vit D  Multivitamin  Folic acid     Physical Examination:    General: No acute distress  Vital signs within normal limits.  HEENT: Sclera anicteric. Oral mucosa pink, dry. No mucositis or thrush  Lymph: No lymphadenopathy in neck  Heart: Regular, rate, and rhythm  Lungs: Clear to ascultation bilaterally  Abdomen: Positive bowel sounds. Soft, non-tender. No organomegaly or mass.   Extremities: no lower extremity edema  Neuro: Cranial  nerves grossly intact  Skin: no dermatitis    CT scan (5/7/24):  This shows enlarging retroperitoneal lymphadenopathy in the para-aortic space concerning for worsening metastatic disease. Multifocal sclerotic lesions throughout the axial and appendicular skeleton. These lesions appears largely unchanged since the prior study on 2/12/2024. An 8 mm cortical lesion along the right humerus was not definitively seen on the prior exam and may have been outside the field-of-view versus new area of metastatic disease.  Diffuse wall thickening within the bladder which may be post-treatment related versus cystitis. Recommend correlation with urinalysis.     Bone scan (5/7/24):  This shows new focal uptake in the right humeral head correlating with sclerotic lesion seen on same-day CT suggesting a new osteoblastic metastasis. I have personally reviewed the examination and initial interpretation and I agree with the findings.     Laboratories (6/3/24):  CBC shows a WBC of 3.2, hemoglobin 10.6, hematocrit 30.9%, platelets 103K.  His PSA level is 0.55 ng/mL.  His testosterone level is 12 ng/dL.  Creatinine  is 1.35 mg/dL, which represents a significant increase.       ASSESSMENT AND PLAN:   Mr. Gautam is an 84-year-old man with Jeancarlos 4+5=9 GW12-hqsejzr prostate cancer who underwent radical prostatectomy (8/2012) and salvage radiotherapy (10/2013) but then developed metastatic CRPC refractory to darolutamide treatment.  He had enrolled on the ECLIPSE study, and received all 4 doses of Lee Ann-PSMA-I&T radioligand therapy.  His disease is now slowly progressing once again.  His ECOG score is 0.  His pain score is 2/10.     #Metastatic CRPC  - Completed all 4 doses of Lee Ann-I&T on the ECLIPSE trial in 7/2023, which he tolerated well with the exception of xerostomia/xerophthalmia.   - His PSA declined from 8.7 to 0.12 ng/mL, but is now back up to 0.55 ng/mL.  - His CT scan shows growing retroperitoneal adenopathy and his creatinine is  elevated suggesting potential compression of the ureters.   - Patient is due for his next Lupron today (6/5/24).  Will request scheduling as well as an MD appointment at the same time.   - He will need a new systemic therapy relatively soon.  His options include docetaxel chemotherapy, abiraterone, or the AMA-280 study.  - Alternatively, radium-223 could target his bone metastases but will not treat the growing lymphadenopathy.  - He started Darolutamide 600 mg again one month ago. PSA has stabilized.  - I will see him again in September 2024, with a PSA test a few days before.     #R hip pain  - Ongoing. No radiographic explanation on last scan. Recommend a trial of PT and light stretching. Not currently requiring NSAID's or tylenol.      #Pulmonary embolism  - PE diagnosed in 2/2023. He will complete 12 months of Eliquis, and I have asked him to stop the anticoagulant after that.     A total of 40 minutes were spent on this patient on the date of the encounter conducting chart review, review of test results, interpretation of tests, patient visit, documentation and discussion with other provider(s). The patient was given the opportunity to ask multiple questions today, all of which were answered to his satisfaction.     Tio Holman M.D.

## 2024-06-05 NOTE — PROGRESS NOTES
FOLLOW UP VISIT     Reason for visit:  Metastatic WF86-xxfwyli CRPC s/p darolutamide and 177Lu-PSMA-I&T on the ECLIPSE study. Now with progressive disease, back on darolutamide.     History of Present Illness:  Mr. Gautam is an 84-year-old man who was diagnosed with prostate cancer in 2012.  His PSA level was 5.2 ng/mL.  Clinical stage was cT1c disease.  He underwent radical prostatectomy on 8/6/2012, which revealed [place] 4+5=9 carcinoma, stage pT2c N0 R0.  His next-generation DNA sequencing analysis ([facility]) revealed a pathogenic TP53 mutation, SHIRA genotype, TMB 5 muts/Mb, and absent PD-L1 expression.  He subsequently received salvage radiotherapy, which was completed in 10/2013, concurrently with six months of androgen deprivation therapy.     He subsequently developed a biochemical recurrence, and received ADT from 2014 until 2020.  In 11/2020, he developed non-metastatic CRPC.  Darolutamide was added on 12/4/2020, to which he achieved a subsequent PSA response.  His PSA level initially dropped from 1.66 ng/mL down to a speedy of 0.07 ng/mL (6/21/2021).  However, the patient then developed a rising PSA level over the past year.  His PSA on 1/13/2022 was 0.16, the PSA on 4/20/2022 was 0.24, the PSA on 6/13/2022 was 0.34, the PSA on 7/12/2022 was 0.47, the PSA on 8/25/2022 was 0.64 ng/mL, and the PSA on 11/21/2022 was 1.55 ng/mL (with a testosterone level of 14 ng/dL).  On 3/8/2023, his PSA level increased to 8.7 ng/mL.     The patient then participated in the ECLIPSE clinical trial, and he was randomized to the 177Lu-PSMA-I&T radioligand arm.  During the screening procedures he was found to have an asymptomatic pulmonary embolus, and he began apixaban. He has received all 4 doses of Lee Ann-PSMA-I&T thus far, and his PSA level has dropped from 8.7 down to 0.12 ng/mL.  However, his PSA level is now up to 0.55 ng/mL, and his imaging shows evidence of progressive disease.  We took the opportunity today to discuss  additional systemic treatment options moving forward.     Review of Systems:  Medardo remains quite melancholy, because his wife of 53 years  unexpectedly a few months ago.  He has noticed continued dry eyes with blurred vision over the last ~2 months. He continues to use rewetting eye drops but feels his ability to read small print has declined. He has a visit scheduled this Thursday with an ophthalmologist at the [facility] in [place] on Thursday afternoon for an exam.  He also has dry mouth improved with biotene mouth rinse with adequate improvement overnight. During the day, pushes oral hydration.  He has stable nocturia, was given a medication for this at his last visit but has discontinued it due to worsening dry mouth concerns.   He has ongoing, stable right hip pain for the last 6 months. No new bone pains.  No chest pain, shortness of breath, or cough. No fevers or chills. A comprehensive 14-point ROS is otherwise negative other than the symptoms noted above in the HPI.  His ECOG score is 0.  His pain score is 2/10.     Medications:  Lupron 22.5 mg IM q3mo (next dose, 2024)  Darolutamide 600 mg BID  Losartan/HCTZ 100/12.5 mg  Allopurinol 100 mg  Zolpidem 5 mg  Clonazepam 1 mg  Sildenafil 50 mg  Loratadine 10 mg  Calcium + Vit D  Multivitamin  Folic acid     Physical Examination:    General: No acute distress  Vital signs within normal limits.  HEENT: Sclera anicteric. Oral mucosa pink, dry. No mucositis or thrush  Lymph: No lymphadenopathy in neck  Heart: Regular, rate, and rhythm  Lungs: Clear to ascultation bilaterally  Abdomen: Positive bowel sounds. Soft, non-tender. No organomegaly or mass.   Extremities: no lower extremity edema  Neuro: Cranial nerves grossly intact  Skin: no dermatitis    CT scan (24):  This shows enlarging retroperitoneal lymphadenopathy in the para-aortic space concerning for worsening metastatic disease. Multifocal sclerotic lesions throughout the axial  and appendicular skeleton. These lesions appears largely unchanged since the prior study on 2/12/2024. An 8 mm cortical lesion along the right humerus was not definitively seen on the prior exam and may have been outside the field-of-view versus new area of metastatic disease.  Diffuse wall thickening within the bladder which may be post-treatment related versus cystitis. Recommend correlation with urinalysis.     Bone scan (5/7/24):  This shows new focal uptake in the right humeral head correlating with sclerotic lesion seen on same-day CT suggesting a new osteoblastic metastasis. I have personally reviewed the examination and initial interpretation and I agree with the findings.     Laboratories (6/3/24):  CBC shows a WBC of 3.2, hemoglobin 10.6, hematocrit 30.9%, platelets 103K.  His PSA level is 0.55 ng/mL.  His testosterone level is 12 ng/dL.  Creatinine  is 1.35 mg/dL, which represents a significant increase.       ASSESSMENT AND PLAN:   Mr. Gautam is an 84-year-old man with [place] 4+5=9 LC44-ufriroz prostate cancer who underwent radical prostatectomy (8/2012) and salvage radiotherapy (10/2013) but then developed metastatic CRPC refractory to darolutamide treatment.  He had enrolled on the ECLIPSE study, and received all 4 doses of Lee Ann-PSMA-I&T radioligand therapy.  His disease is now slowly progressing once again.  His ECOG score is 0.  His pain score is 2/10.     #Metastatic CRPC  - Completed all 4 doses of Lee Ann-I&T on the ECLIPSE trial in 7/2023, which he tolerated well with the exception of xerostomia/xerophthalmia.   - His PSA declined from 8.7 to 0.12 ng/mL, but is now back up to 0.55 ng/mL.  - His CT scan shows growing retroperitoneal adenopathy and his creatinine is elevated suggesting potential compression of the ureters.   - Patient is due for his next Lupron today (6/5/24).  Will request scheduling as well as an MD appointment at the same time.   - He will need a new systemic therapy relatively soon.   His options include docetaxel chemotherapy, abiraterone, or the AMA-280 study.  - Alternatively, radium-223 could target his bone metastases but will not treat the growing lymphadenopathy.  - He started Darolutamide 600 mg again one month ago. PSA has stabilized.  - I will see him again in September 2024, with a PSA test a few days before.     #R hip pain  - Ongoing. No radiographic explanation on last scan. Recommend a trial of PT and light stretching. Not currently requiring NSAID's or tylenol.      #Pulmonary embolism  - PE diagnosed in 2/2023. He will complete 12 months of Eliquis, and I have asked him to stop the anticoagulant after that.     A total of 40 minutes were spent on this patient on the date of the encounter conducting chart review, review of test results, interpretation of tests, patient visit, documentation and discussion with other provider(s). The patient was given the opportunity to ask multiple questions today, all of which were answered to his satisfaction.     Tio Holman M.D.

## 2024-06-06 ENCOUNTER — TELEPHONE (OUTPATIENT)
Dept: ONCOLOGY | Facility: CLINIC | Age: 84
End: 2024-06-06

## 2024-06-06 ENCOUNTER — ALLIED HEALTH/NURSE VISIT (OUTPATIENT)
Dept: FAMILY MEDICINE | Facility: OTHER | Age: 84
End: 2024-06-06
Payer: COMMERCIAL

## 2024-06-06 ENCOUNTER — DOCUMENTATION ONLY (OUTPATIENT)
Dept: ONCOLOGY | Facility: CLINIC | Age: 84
End: 2024-06-06

## 2024-06-06 DIAGNOSIS — Z79.899 ENCOUNTER FOR LONG-TERM (CURRENT) USE OF MEDICATIONS: Primary | ICD-10-CM

## 2024-06-06 DIAGNOSIS — C61 MALIGNANT NEOPLASM OF PROSTATE (H): Primary | ICD-10-CM

## 2024-06-06 DIAGNOSIS — C61 PROSTATE CANCER (H): ICD-10-CM

## 2024-06-06 PROCEDURE — 99207 PR NO CHARGE NURSE ONLY: CPT

## 2024-06-06 NOTE — TELEPHONE ENCOUNTER
Prior Authorization Approval    Medication: NUBEQA 300 MG PO TABS  Authorization Effective Date:    Authorization Expiration Date:    Approved Dose/Quantity: 120 per 30 DS  Reference #: [id number]   Insurance Company: [employer] - Phone [phone] Fax [phone]  Expected CoPay: $ 3,255.97  CoPay Card Available:      Financial Assistance Needed: yes  Which Pharmacy is filling the prescription: [facility] - [place] [address]   Pharmacy Notified:   Patient Notified: yes          Thank you,    Maria Guadalupe Smith  Oncology Pharmacy Liaison II  [email or web address]  Phone: [phone]  Fax: [phone],

## 2024-06-06 NOTE — PROGRESS NOTES
Clinic Administered Medication Documentation      Injectable Medication Documentation    Is there an active order (written within the past 365 days, with administrations remaining, not ) in the chart? Yes.     Patient was given  leuprolide 22.5 mg . Prior to medication administration, verified patient's identity using patient s name and date of birth. Please see MAR and medication order for additional information. Patient instructed to remain in clinic for 15 minutes and report any adverse reaction to staff immediately.    Vial/Syringe: Syringe  Was this medication supplied by the patient? No  Is this a medication the patient will need to receive again? Yes. Verified that the patient has refills remaining in their prescription.     Jessica ALANIZN, RN

## 2024-06-07 ENCOUNTER — TELEPHONE (OUTPATIENT)
Dept: ONCOLOGY | Facility: CLINIC | Age: 84
End: 2024-06-07
Payer: COMMERCIAL

## 2024-06-07 DIAGNOSIS — C61 MALIGNANT NEOPLASM OF PROSTATE (H): Primary | ICD-10-CM

## 2024-06-07 DIAGNOSIS — C79.51 PROSTATE CANCER METASTATIC TO BONE (H): ICD-10-CM

## 2024-06-07 DIAGNOSIS — C61 PROSTATE CANCER METASTATIC TO BONE (H): ICD-10-CM

## 2024-06-07 DIAGNOSIS — C61 PROSTATE CANCER (H): ICD-10-CM

## 2024-06-07 NOTE — ORAL ONC MGMT
Oral Chemotherapy Monitoring Program    Lab Monitoring Plan  CMP/CBC monthly  Subjective/Objective:  Medardo Gautam is a 84 year old male contacted by phone for an initial visit for oral chemotherapy education.  Per discussion with Medardo, he is restarting Nubeqa. He has already started about a month ago using supply that he had leftover from last time he was on therapy. Conducted mini-teach as well as routine follow-up with Medardo.     He confirms he is taking Nubeqa 600 mg po BID with food. He has not missed any doses. He denies any adverse effects. He did not realize he will need labs - will send message to scheduling team. Also discussed DDI with atorvastatin - he will watch for potential increase atorvastatin-related adverse effects. Sending refill to [facility] today (he has enough pills to last until Wed 6/12/24).     Assessment:  Patient is tolerating Nubeqa - continue as prescribed. Will need labs.     Plan:  Basic chemotherapy teaching was reviewed with the patient including indication, start date of therapy, dose, administration, adverse effects, missed doses, food and drug interactions, monitoring, side effect management, office contact information, and safe handling. Written materials were provided and all questions answered.    Follow-Up:  Labs next week if able - sending message to schedulers     Grecia Montano,[place], BCPS  Oral Chemotherapy Monitoring Program  [facility]  [phone]  June 7, 2024          10/21/2021     8:00 AM 12/5/2021    11:00 AM 12/2/2022     1:00 PM 12/23/2022     1:00 PM 4/14/2023     7:00 AM 6/6/2024     2:00 PM 6/7/2024     1:00 PM   ORAL CHEMOTHERAPY   Assessment Type Refill Chart Review Refill Refill Discontinuation Initial Work up New Teach;Initial Follow up;Refill   Diagnosis Code Prostate Cancer Prostate Cancer Prostate Cancer Prostate Cancer Prostate Cancer Prostate Cancer Prostate Cancer   Providers Dr. Juan Jose Ingrma   "Manda Holman   [facility]   Is this patient followed by the [facility] OC team?      No No   Drug Name Nubeqa (darolutamide) Nubeqa (darolutamide) Nubeqa (darolutamide) Nubeqa (darolutamide) Nubeqa (darolutamide) Nubeqa (darolutamide) Nubeqa (darolutamide)   Dose 600 mg 600 mg 600 mg 600 mg  600 mg 600 mg   Current Schedule BID BID BID BID  BID BID   Cycle Details Continuous Continuous Continuous Continuous  Continuous Continuous   Start Date of Last Cycle      5/8/2024    Planned next cycle start date      6/8/2024    Any new drug interactions?      Yes Yes   Pharmacist Intervention?      Yes Yes   Is the dose as ordered appropriate for the patient?      Yes Yes       Last PHQ-2 Score on record:       3/18/2024     1:53 PM 12/12/2023    10:39 AM   PHQ-2 ( 1999 Pfizer)   Q1: Little interest or pleasure in doing things 0 0   Q2: Feeling down, depressed or hopeless 1 0   PHQ-2 Score 1 0   Q1: Little interest or pleasure in doing things Not at all Not at all   Q2: Feeling down, depressed or hopeless Several days Not at all   PHQ-2 Score 1 0       Vitals:  BP:   BP Readings from Last 1 Encounters:   06/05/24 (!) 143/90     Wt Readings from Last 1 Encounters:   06/05/24 84.9 kg (187 lb 3.2 oz)     Estimated body surface area is 1.99 meters squared as calculated from the following:    Height as of 5/8/24: 1.676 m (5' 6\").    Weight as of 6/5/24: 84.9 kg (187 lb 3.2 oz).    Labs:  No results found for NA within last 30 days.     No results found for K within last 30 days.     No results found for CA within last 30 days.     No results found for Mag within last 30 days.     No results found for Phos within last 30 days.     No results found for ALBUMIN within last 30 days.     No results found for BUN within last 30 days.     No results found for CR within last 30 days.     No results found for AST within last 30 days.     No results found for ALT within " last 30 days.     No results found for BILITOTAL within last 30 days.     No results found for WBC within last 30 days.     No results found for HGB within last 30 days.     No results found for PLT within last 30 days.     No results found for ANC within last 30 days.     No results found for ANC within last 30 days.

## 2024-06-07 NOTE — TELEPHONE ENCOUNTER
JARETH APPROVED    Medication: NUBEQA 300 MG PO TABS  Amount: $ 120,977.29  Foundation Name: FPAP  Foundation Phone:    Foundation Effective Date: 6/7/2024  Foundation Expiration Date: 12/31/2024  Additional Information:   Patient Notified: yes    Application #: [id number]  Patient Name: Medardo Gautam ([facility] Cardholder ID): [id number]  Therapy: Nubeqa  Department:  ONCOLOGY ADULT  MTM Referral: No  Submitted By: Lev Smith  Approved By: Carla Lawrence  Approval Comments:       Thank you,    Lev Smith  Oncology Pharmacy Liaison II  [email or web address]  Phone: [phone]  Fax: [phone]

## 2024-06-11 DIAGNOSIS — G47.00 INSOMNIA, UNSPECIFIED TYPE: ICD-10-CM

## 2024-06-11 RX ORDER — CLONAZEPAM 1 MG/1
1 TABLET ORAL
Qty: 30 TABLET | Refills: 0 | Status: SHIPPED | OUTPATIENT
Start: 2024-06-11 | End: 2024-06-25

## 2024-06-13 ENCOUNTER — DOCUMENTATION ONLY (OUTPATIENT)
Dept: ONCOLOGY | Facility: CLINIC | Age: 84
End: 2024-06-13

## 2024-06-13 ENCOUNTER — LAB (OUTPATIENT)
Dept: LAB | Facility: OTHER | Age: 84
End: 2024-06-13
Attending: INTERNAL MEDICINE
Payer: COMMERCIAL

## 2024-06-13 DIAGNOSIS — C61 PROSTATE CANCER (H): ICD-10-CM

## 2024-06-13 DIAGNOSIS — C61 PROSTATE CANCER METASTATIC TO BONE (H): ICD-10-CM

## 2024-06-13 DIAGNOSIS — C61 MALIGNANT NEOPLASM OF PROSTATE (H): ICD-10-CM

## 2024-06-13 DIAGNOSIS — C79.51 PROSTATE CANCER METASTATIC TO BONE (H): ICD-10-CM

## 2024-06-13 LAB
ALBUMIN SERPL BCG-MCNC: 4.2 G/DL (ref 3.5–5.2)
ALP SERPL-CCNC: 69 U/L (ref 40–150)
ALT SERPL W P-5'-P-CCNC: 27 U/L (ref 0–70)
ANION GAP SERPL CALCULATED.3IONS-SCNC: 11 MMOL/L (ref 7–15)
AST SERPL W P-5'-P-CCNC: 49 U/L (ref 0–45)
BASOPHILS # BLD AUTO: 0 10E3/UL (ref 0–0.2)
BASOPHILS NFR BLD AUTO: 1 %
BILIRUB SERPL-MCNC: 0.7 MG/DL
BUN SERPL-MCNC: 30.1 MG/DL (ref 8–23)
CALCIUM SERPL-MCNC: 9.5 MG/DL (ref 8.8–10.2)
CHLORIDE SERPL-SCNC: 107 MMOL/L (ref 98–107)
CREAT SERPL-MCNC: 1.52 MG/DL (ref 0.67–1.17)
DEPRECATED HCO3 PLAS-SCNC: 23 MMOL/L (ref 22–29)
EGFRCR SERPLBLD CKD-EPI 2021: 45 ML/MIN/1.73M2
EOSINOPHIL # BLD AUTO: 0.1 10E3/UL (ref 0–0.7)
EOSINOPHIL NFR BLD AUTO: 2 %
ERYTHROCYTE [DISTWIDTH] IN BLOOD BY AUTOMATED COUNT: 12.3 % (ref 10–15)
GLUCOSE SERPL-MCNC: 123 MG/DL (ref 70–99)
HCT VFR BLD AUTO: 28.9 % (ref 40–53)
HGB BLD-MCNC: 10 G/DL (ref 13.3–17.7)
IMM GRANULOCYTES # BLD: 0 10E3/UL
IMM GRANULOCYTES NFR BLD: 0 %
LYMPHOCYTES # BLD AUTO: 1 10E3/UL (ref 0.8–5.3)
LYMPHOCYTES NFR BLD AUTO: 29 %
MCH RBC QN AUTO: 34.5 PG (ref 26.5–33)
MCHC RBC AUTO-ENTMCNC: 34.6 G/DL (ref 31.5–36.5)
MCV RBC AUTO: 100 FL (ref 78–100)
MONOCYTES # BLD AUTO: 0.3 10E3/UL (ref 0–1.3)
MONOCYTES NFR BLD AUTO: 10 %
NEUTROPHILS # BLD AUTO: 1.9 10E3/UL (ref 1.6–8.3)
NEUTROPHILS NFR BLD AUTO: 58 %
PLATELET # BLD AUTO: 103 10E3/UL (ref 150–450)
POTASSIUM SERPL-SCNC: 4.1 MMOL/L (ref 3.4–5.3)
PROT SERPL-MCNC: 7.2 G/DL (ref 6.4–8.3)
RBC # BLD AUTO: 2.9 10E6/UL (ref 4.4–5.9)
SODIUM SERPL-SCNC: 141 MMOL/L (ref 135–145)
WBC # BLD AUTO: 3.3 10E3/UL (ref 4–11)

## 2024-06-13 PROCEDURE — 80053 COMPREHEN METABOLIC PANEL: CPT

## 2024-06-13 PROCEDURE — 85025 COMPLETE CBC W/AUTO DIFF WBC: CPT

## 2024-06-13 PROCEDURE — 36415 COLL VENOUS BLD VENIPUNCTURE: CPT

## 2024-06-13 NOTE — PROGRESS NOTES
Oral Chemotherapy Monitoring Program  Lab Follow Up    Reviewed lab results from 6/13/24.        12/5/2021    11:00 AM 12/2/2022     1:00 PM 12/23/2022     1:00 PM 4/14/2023     7:00 AM 6/6/2024     2:00 PM 6/7/2024     1:00 PM 6/13/2024     3:00 PM   ORAL CHEMOTHERAPY   Assessment Type Chart Review Refill Refill Discontinuation Initial Work up New Teach;Initial Follow up;Refill Lab Monitoring   Diagnosis Code Prostate Cancer Prostate Cancer Prostate Cancer Prostate Cancer Prostate Cancer Prostate Cancer Prostate Cancer   Providers Dr. Juan Jose Holman   [facility]   Is this patient followed by the [facility] OC team?     No No No   Drug Name Nubeqa (darolutamide) Nubeqa (darolutamide) Nubeqa (darolutamide) Nubeqa (darolutamide) Nubeqa (darolutamide) Nubeqa (darolutamide) Nubeqa (darolutamide)   Dose 600 mg 600 mg 600 mg  600 mg 600 mg 600 mg   Current Schedule BID BID BID  BID BID BID   Cycle Details Continuous Continuous Continuous  Continuous Continuous Continuous   Start Date of Last Cycle     5/8/2024     Planned next cycle start date     6/8/2024 6/12/2024    Adverse Effects       Anemia;Thrombocytopenia   Anemia       Grade 1   Thrombocytopenia       Grade 1   Any new drug interactions?     Yes Yes    Pharmacist Intervention?     Yes Yes    Intervention(s)      Patient Education    Is the dose as ordered appropriate for the patient?     Yes Yes        Labs:  _  Result Component Current Result Ref Range   Sodium 141 (6/13/2024) 135 - 145 mmol/L     _  Result Component Current Result Ref Range   Potassium 4.1 (6/13/2024) 3.4 - 5.3 mmol/L     _  Result Component Current Result Ref Range   Calcium 9.5 (6/13/2024) 8.8 - 10.2 mg/dL     No results found for Mag within last 30 days.     No results found for Phos within last 30 days.     _  Result Component Current Result Ref Range   Albumin 4.2 (6/13/2024) 3.5 - 5.2  g/dL     _  Result Component Current Result Ref Range   Urea Nitrogen 30.1 (H) (6/13/2024) 8.0 - 23.0 mg/dL     _  Result Component Current Result Ref Range   Creatinine 1.52 (H) (6/13/2024) 0.67 - 1.17 mg/dL     _  Result Component Current Result Ref Range   AST 49 (H) (6/13/2024) 0 - 45 U/L     _  Result Component Current Result Ref Range   ALT 27 (6/13/2024) 0 - 70 U/L     _  Result Component Current Result Ref Range   Bilirubin Total 0.7 (6/13/2024) <=1.2 mg/dL     _  Result Component Current Result Ref Range   WBC Count 3.3 (L) (6/13/2024) 4.0 - 11.0 10e3/uL     _  Result Component Current Result Ref Range   Hemoglobin 10.0 (L) (6/13/2024) 13.3 - 17.7 g/dL     _  Result Component Current Result Ref Range   Platelet Count 103 (L) (6/13/2024) 150 - 450 10e3/uL     No results found for ANC within last 30 days.     _  Result Component Current Result Ref Range   Absolute Neutrophils 1.9 (6/13/2024) 1.6 - 8.3 10e3/uL        Assessment & Plan:    No new concerning abnormalities. Thrombocytopenia grade 1, anemia grade 1, no concerns for now. Continue monitoring.    Patient was contacted via [employer] message.    Follow-Up:    Lab draw 7/17.    Zuri Gipson  Pharmacy Intern  Oral Chemotherapy Monitoring Program  [facility]  [phone]

## 2024-06-20 SDOH — HEALTH STABILITY: PHYSICAL HEALTH: ON AVERAGE, HOW MANY DAYS PER WEEK DO YOU ENGAGE IN MODERATE TO STRENUOUS EXERCISE (LIKE A BRISK WALK)?: 0 DAYS

## 2024-06-25 ENCOUNTER — OFFICE VISIT (OUTPATIENT)
Dept: FAMILY MEDICINE | Facility: OTHER | Age: 84
End: 2024-06-25
Attending: FAMILY MEDICINE
Payer: COMMERCIAL

## 2024-06-25 ENCOUNTER — MYC MEDICAL ADVICE (OUTPATIENT)
Dept: FAMILY MEDICINE | Facility: OTHER | Age: 84
End: 2024-06-25

## 2024-06-25 VITALS
SYSTOLIC BLOOD PRESSURE: 124 MMHG | DIASTOLIC BLOOD PRESSURE: 86 MMHG | HEIGHT: 66 IN | OXYGEN SATURATION: 97 % | WEIGHT: 187 LBS | HEART RATE: 63 BPM | TEMPERATURE: 97.6 F | BODY MASS INDEX: 30.05 KG/M2 | RESPIRATION RATE: 16 BRPM

## 2024-06-25 DIAGNOSIS — E78.5 HYPERLIPIDEMIA LDL GOAL <130: ICD-10-CM

## 2024-06-25 DIAGNOSIS — F10.20 ALCOHOL DEPENDENCE, UNCOMPLICATED (H): ICD-10-CM

## 2024-06-25 DIAGNOSIS — N32.89 BLADDER WALL THICKENING: ICD-10-CM

## 2024-06-25 DIAGNOSIS — M1A.9XX0 CHRONIC GOUT WITHOUT TOPHUS, UNSPECIFIED CAUSE, UNSPECIFIED SITE: ICD-10-CM

## 2024-06-25 DIAGNOSIS — M72.0 DUPUYTREN CONTRACTURE: ICD-10-CM

## 2024-06-25 DIAGNOSIS — Z00.00 ENCOUNTER FOR MEDICARE ANNUAL WELLNESS EXAM: Primary | ICD-10-CM

## 2024-06-25 DIAGNOSIS — G47.00 INSOMNIA, UNSPECIFIED TYPE: ICD-10-CM

## 2024-06-25 LAB
ALBUMIN UR-MCNC: 30 MG/DL
APPEARANCE UR: CLEAR
BILIRUB UR QL STRIP: NEGATIVE
CHOLEST SERPL-MCNC: 153 MG/DL
COLOR UR AUTO: YELLOW
FASTING STATUS PATIENT QL REPORTED: NO
GLUCOSE UR STRIP-MCNC: NEGATIVE MG/DL
HDLC SERPL-MCNC: 70 MG/DL
HGB UR QL STRIP: NEGATIVE
HYALINE CASTS #/AREA URNS LPF: ABNORMAL /LPF
KETONES UR STRIP-MCNC: NEGATIVE MG/DL
LDLC SERPL CALC-MCNC: 50 MG/DL
LEUKOCYTE ESTERASE UR QL STRIP: NEGATIVE
NITRATE UR QL: NEGATIVE
NONHDLC SERPL-MCNC: 83 MG/DL
PH UR STRIP: 5 [PH] (ref 5–7)
RBC #/AREA URNS AUTO: ABNORMAL /HPF
SP GR UR STRIP: 1.02 (ref 1–1.03)
TRIGL SERPL-MCNC: 165 MG/DL
URATE SERPL-MCNC: 6.4 MG/DL (ref 3.4–7)
UROBILINOGEN UR STRIP-ACNC: 0.2 E.U./DL
WBC #/AREA URNS AUTO: ABNORMAL /HPF

## 2024-06-25 PROCEDURE — 84550 ASSAY OF BLOOD/URIC ACID: CPT | Performed by: FAMILY MEDICINE

## 2024-06-25 PROCEDURE — G0439 PPPS, SUBSEQ VISIT: HCPCS | Performed by: FAMILY MEDICINE

## 2024-06-25 PROCEDURE — 36415 COLL VENOUS BLD VENIPUNCTURE: CPT | Performed by: FAMILY MEDICINE

## 2024-06-25 PROCEDURE — 81001 URINALYSIS AUTO W/SCOPE: CPT | Performed by: FAMILY MEDICINE

## 2024-06-25 PROCEDURE — 80061 LIPID PANEL: CPT | Performed by: FAMILY MEDICINE

## 2024-06-25 PROCEDURE — 99214 OFFICE O/P EST MOD 30 MIN: CPT | Mod: 25 | Performed by: FAMILY MEDICINE

## 2024-06-25 RX ORDER — ZOLPIDEM TARTRATE 5 MG/1
5 TABLET ORAL
Qty: 30 TABLET | Refills: 5 | Status: SHIPPED | OUTPATIENT
Start: 2024-06-25

## 2024-06-25 RX ORDER — CLONAZEPAM 1 MG/1
1.5 TABLET ORAL
Qty: 45 TABLET | Refills: 5 | Status: SHIPPED | OUTPATIENT
Start: 2024-06-25

## 2024-06-25 ASSESSMENT — PAIN SCALES - GENERAL: PAINLEVEL: NO PAIN (0)

## 2024-06-25 NOTE — PROGRESS NOTES
Preventive Care Visit  [facility]  Vira Flor MD, Family Medicine  Jun 25, 2024      Assessment & Plan     Encounter for Medicare annual wellness exam      Insomnia, unspecified type  Patient is waking up a couple times at night.  He has been on Klonopin and Ambien for many years.  No escalating use.  In the past he states he did better on a higher dose of Ambien but he was very anxious and therefore his Ambien was decreased and clonazepam was added.  We discussed that his alcohol use can also be affecting his sleep and recommend cut being back on his alcohol.  He admits to anxiety but feels overall he is coping well with the loss of his wife and his health issues.  He declines any medication for his mood.  We discussed increasing his clonazepam to 1.5 mg at bedtime along with his Ambien.    - zolpidem (AMBIEN) 5 MG tablet; Take 1 tablet (5 mg) by mouth nightly as needed for sleep  - clonazePAM (KLONOPIN) 1 MG tablet; Take 1.5 tablets (1.5 mg) by mouth nightly as needed for anxiety    Alcohol dependence, uncomplicated (H)  We discussed his alcohol use.  We discussed that his alcohol use most likely is secondary to some of his anxiety.  Once again had talked about medication to help out with his mood but he declines.  We did discuss decreasing his alcohol use and he was given handouts.    Hyperlipidemia LDL goal <130  He continues on statin.  Will obtain lipid panel today.    - Lipid panel reflex to direct LDL Non-fasting    Bladder wall thickening  Recent CT scan revealed bladder wall thickening thought to be either secondary to posttreatment versus cystitis and urinalysis was recommended.  Patient denies dysuria.  Will check urine    - UA Macroscopic with reflex to Microscopic and Culture - Lab Collect    Chronic gout without tophus, unspecified cause, unspecified site  Patient currently is on allopurinol and denies any recent episodes of gout.  Will check uric acid.    - Uric  "acid    Dupuytren contracture  Will refer to orthopedics for his flexion contracture.    - Orthopedic  Referral; Future      BMI  Estimated body mass index is 30.18 kg/m  as calculated from the following:    Height as of this encounter: 1.676 m (5' 6\").    Weight as of this encounter: 84.8 kg (187 lb).   Weight management plan: Discussed healthy diet and exercise guidelines    Counseling  Appropriate preventive services were discussed with this patient, including applicable screening as appropriate for fall prevention, nutrition, physical activity, Tobacco-use cessation, weight loss and cognition.  Checklist reviewing preventive services available has been given to the patient.  Reviewed patient's diet, addressing concerns and/or questions.   The patient reports drinking more than 3 alcoholic drinks per day and/or more than 7 drhnks per week. The patient was counseled and given information about possible harmful effects of excessive alcohol intake.Updated plan of care.  Patient reported difficulty with activities of daily living were addressed today.      Jaime Batres is a 84 year old, presenting for the following:  Wellness Visit        6/25/2024    10:27 AM   Additional Questions   Roomed by salma   Accompanied by alone         6/25/2024    10:27 AM   Patient Reported Additional Medications   Patient reports taking the following new medications none         Health Care Directive  Patient does not have a Health Care Directive or Living Will: Discussed advance care planning with patient; information given to patient to review.    HPI  Wants to talk about increase in dose zolpidem and clonazepam         6/20/2024   General Health   How would you rate your overall physical health? (!) FAIR   Feel stress (tense, anxious, or unable to sleep) Very much      (!) STRESS CONCERN      6/20/2024   Nutrition   Diet: Regular (no restrictions)            6/20/2024   Exercise   Days per week of moderate/strenous " exercise 0 days      (!) EXERCISE CONCERN      6/20/2024   Social Factors   Worry food won't last until get money to buy more No   Food not last or not have enough money for food? No   Do you have housing? (Housing is defined as stable permanent housing and does not include staying ouside in a car, in a tent, in an abandoned building, in an overnight shelter, or couch-surfing.) Yes   Are you worried about losing your housing? No   Lack of transportation? No   Unable to get utilities (heat,electricity)? Patient declined            6/20/2024   Fall Risk   Fallen 2 or more times in the past year? No   Trouble with walking or balance? No             6/20/2024   Activities of Daily Living- Home Safety   Needs help with the following daily activites Housework   Safety concerns in the home None of the above            6/20/2024   Dental   Dentist two times every year? Yes            6/20/2024   Hearing Screening   Hearing concerns? None of the above            6/20/2024   Driving Risk Screening   Patient/family members have concerns about driving No            6/20/2024   General Alertness/Fatigue Screening   Have you been more tired than usual lately? No            6/20/2024   Urinary Incontinence Screening   Bothered by leaking urine in past 6 months No            6/20/2024   TB Screening   Were you born outside of the [place]? Yes            Today's PHQ-2 Score:       6/25/2024    10:07 AM   PHQ-2 ( 1999 Pfizer)   Q1: Little interest or pleasure in doing things 0   Q2: Feeling down, depressed or hopeless 1   PHQ-2 Score 1   Q1: Little interest or pleasure in doing things Not at all   Q2: Feeling down, depressed or hopeless Several days   PHQ-2 Score 1           6/20/2024   Substance Use   Alcohol more than 3/day or more than 7/wk Yes   How often do you have a drink containing alcohol 4 or more times a week   How many alcohol drinks on typical day 5 or 6   How often do you have 5+ drinks at one occasion Less than monthly    Audit 2/3 Score 3   How often not able to stop drinking once started Never   How often failed to do what normally expected Never   How often needed first drink in am after a heavy drinking session Never   How often feeling of guilt or remorse after drinking Less than monthly   How often unable to remember what happened the night before Never   Have you or someone else been injured because of your drinking No   Has anyone been concerned or suggested you cut down on drinking Yes, but not in the last year   TOTAL SCORE - AUDIT 10   Do you have a current opioid prescription? No   How severe/bad is pain from 1 to 10? 0/10 (No Pain)   Do you use any other substances recreationally? (!) ALCOHOL    (!) PRESCRIPTION DRUGS    (!) DECLINE       Multiple values from one day are sorted in reverse-chronological order     Social History     Tobacco Use    Smoking status: Former     Current packs/day: 0.00     Types: Cigarettes, Cigars, Pipe     Start date: 10/1/1961     Quit date: 1962     Years since quittin.5     Passive exposure: Never    Smokeless tobacco: Never    Tobacco comments:     No smokers in home   Vaping Use    Vaping status: Never Used   Substance Use Topics    Alcohol use: Yes     Comment: A whisky and a glass of wine    Drug use: Yes     Types: Benzodiazepines                 Reviewed and updated as needed this visit by Provider   Tobacco  Allergies  Meds  Problems  Med Hx  Surg Hx  Fam Hx              Current providers sharing in care for this patient include:  Patient Care Team:  Vira Flor MD as PCP - General (Family Medicine)  Nedra Vick CNP as Nurse Practitioner (Hematology & Oncology)  Tio Holman MD as Assigned Cancer Care Provider  Iesha Owen RN as Specialty Care Coordinator (Hematology & Oncology)  Vira Flor MD as Assigned PCP    The following health maintenance items are reviewed in Epic and correct as of today:  Health Maintenance   Topic  "Date Due    COVID-19 Vaccine (7 - 2023-24 season) 12/08/2023    MEDICARE ANNUAL WELLNESS VISIT  06/16/2024    LIPID  08/22/2024    COLORECTAL CANCER SCREENING  09/10/2024    BMP  06/13/2025    ANNUAL REVIEW OF HM ORDERS  06/25/2025    FALL RISK ASSESSMENT  06/25/2025    ADVANCE CARE PLANNING  06/19/2028    DTAP/TDAP/TD IMMUNIZATION (2 - Td or Tdap) 10/25/2029    PHQ-2 (once per calendar year)  Completed    INFLUENZA VACCINE  Completed    Pneumococcal Vaccine: 65+ Years  Completed    ZOSTER IMMUNIZATION  Completed    RSV VACCINE (Pregnancy & 60+)  Completed    IPV IMMUNIZATION  Aged Out    HPV IMMUNIZATION  Aged Out    MENINGITIS IMMUNIZATION  Aged Out    RSV MONOCLONAL ANTIBODY  Aged Out          Objective    Exam  /86   Pulse 63   Temp 97.6  F (36.4  C) (Temporal)   Resp 16   Ht 1.676 m (5' 6\")   Wt 84.8 kg (187 lb)   SpO2 97%   BMI 30.18 kg/m     Estimated body mass index is 30.18 kg/m  as calculated from the following:    Height as of this encounter: 1.676 m (5' 6\").    Weight as of this encounter: 84.8 kg (187 lb).    Physical Exam  Vitals reviewed.   Constitutional:       General: He is not in acute distress.  HENT:      Right Ear: Tympanic membrane, ear canal and external ear normal.      Left Ear: Tympanic membrane, ear canal and external ear normal.      Nose: Nose normal.   Eyes:      General:         Right eye: No discharge.         Left eye: No discharge.      Conjunctiva/sclera: Conjunctivae normal.   Cardiovascular:      Rate and Rhythm: Normal rate and regular rhythm.      Heart sounds: No murmur heard.  Pulmonary:      Effort: Pulmonary effort is normal.      Breath sounds: Normal breath sounds. No wheezing or rhonchi.   Abdominal:      General: Bowel sounds are normal. There is no distension.      Palpations: Abdomen is soft. There is no mass.      Tenderness: There is no abdominal tenderness.   Musculoskeletal:      Cervical back: Normal range of motion and neck supple. No tenderness. "      Comments: Fifth finger with flexion contracture   Skin:     Findings: No rash.   Neurological:      Mental Status: He is alert and oriented to person, place, and time.   Psychiatric:         Mood and Affect: Mood normal.         Behavior: Behavior normal.         Thought Content: Thought content normal.         Judgment: Judgment normal.               6/25/2024   Mini Cog   Clock Draw Score 2 Normal   3 Item Recall 3 objects recalled   Mini Cog Total Score 5                 Signed Electronically by: Vira Flor MD

## 2024-06-25 NOTE — PATIENT INSTRUCTIONS
"[facility]--[facility]  [id number] [facility] [address]  [place], [place], [address]  Tel: [phone]    Patient Education   Preventive Care Advice   This is general advice we often give to help people stay healthy. Your care team may have specific advice just for you. Please talk to your care team about your own preventive care needs.  Lifestyle  Exercise at least 150 minutes each week (30 minutes a day, 5 days a week).  Do muscle strengthening activities 2 days a week. These help control your weight and prevent disease.  No smoking.  Wear sunscreen to prevent skin cancer.  Have your home tested for radon every 2 to 5 years. Radon is a colorless, odorless gas that can harm your lungs. To learn more, go to www.health.[facility].[place].us and search for \"Radon in Homes.\"  Keep guns unloaded and locked up in a safe place like a safe or gun vault, or, use a gun lock and hide the keys. Always lock away bullets separately. To learn more, visit [place].gov and search for \"safe gun storage.\"  Nutrition  Eat 5 or more servings of fruits and vegetables each day.  Try wheat bread, brown rice and whole grain pasta (instead of white bread, rice, and pasta).  Get enough calcium and vitamin D. Check the label on foods and aim for 100% of the RDA (recommended daily allowance).  Regular exams  Have a dental exam and cleaning every 6 months.  See your health care team every year to talk about:  Any changes in your health.  Any medicines your care team has prescribed.  Preventive care, family planning, and ways to prevent chronic diseases.  Shots (vaccines)   HPV shots (up to age 26), if you've never had them before.  Hepatitis B shots (up to age 59), if you've never had them before.  COVID-19 shot: Get this shot when it's due.  Flu shot: Get a flu shot every year.  Tetanus shot: Get a tetanus shot every 10 years.  Pneumococcal, hepatitis A, and RSV shots: Ask your care team if you need these based on your risk.  Shingles shot (for age 50 and " up).  General health tests  Diabetes screening:  Starting at age 35, Get screened for diabetes at least every 3 years.  If you are younger than age 35, ask your care team if you should be screened for diabetes.  Cholesterol test: At age 39, start having a cholesterol test every 5 years, or more often if advised.  Bone density scan (DEXA): At age 50, ask your care team if you should have this scan for osteoporosis (brittle bones).  Hepatitis C: Get tested at least once in your life.  Abdominal aortic aneurysm screening: Talk to your doctor about having this screening if you:  Have ever smoked; and  Are biologically male; and  Are between the ages of 65 and 75.  STIs (sexually transmitted infections)  Before age 24: Ask your care team if you should be screened for STIs.  After age 24: Get screened for STIs if you're at risk. You are at risk for STIs (including HIV) if:  You are sexually active with more than one person.  You don't use condoms every time.  You or a partner was diagnosed with a sexually transmitted infection.  If you are at risk for HIV, ask about PrEP medicine to prevent HIV.  Get tested for HIV at least once in your life, whether you are at risk for HIV or not.  Cancer screening tests  Cervical cancer screening: If you have a cervix, begin getting regular cervical cancer screening tests at age 21. Most people who have regular screenings with normal results can stop after age 65. Talk about this with your provider.  Breast cancer scan (mammogram): If you've ever had breasts, begin having regular mammograms starting at age 40. This is a scan to check for breast cancer.  Colon cancer screening: It is important to start screening for colon cancer at age 45.  Have a colonoscopy test every 10 years (or more often if you're at risk) Or, ask your provider about stool tests like a FIT test every year or Cologuard test every 3 years.  To learn more about your testing options, visit:  [email or web address].  For help making a decision, visit: [email or web address].  Prostate cancer screening test: If you have a prostate and are age 55 to 69, ask your provider if you would benefit from a yearly prostate cancer screening test.  Lung cancer screening: If you are a current or former smoker age 50 to 80, ask your care team if ongoing lung cancer screenings are right for you.  For informational purposes only. Not to replace the advice of your health care provider. Copyright   2023 [facility] [employer]. All rights reserved. Clinically reviewed by the [facility]. [employer] 304347 - REV 04/24.  Learning About Activities of Daily Living  What are activities of daily living?     Activities of daily living (ADLs) are the basic self-care tasks you do every day. These include eating, bathing, dressing, and moving around.  As you age, and if you have health problems, you may find that it's harder to do some of these tasks. If so, your doctor can suggest ideas that may help.  To measure what kind of help you may need, your doctor will ask how well you are able to do ADLs. Let your doctor know if there are any tasks that you are having trouble doing. This is an important first step to getting help. And when you have the help you need, you can stay as independent as possible.  How will a doctor assess your ADLs?  Asking about ADLs is part of a routine health checkup your doctor will likely do as you age. Your health check might be done in a doctor's office, in your home, or at a hospital. The goal is to find out if you are having any problems that could make it hard to care for yourself or that make it unsafe for you to be on your own.  To measure your ADLs, your doctor will ask how hard it is for you to do routine tasks. Your doctor may also want to know if you have changed the way you do a task because of a health problem. Your doctor may watch how you:  Walk back and forth.  Keep  your balance while you stand or walk.  Move from sitting to standing or from a bed to a chair.  Button or unbutton a shirt or sweater.  Remove and put on your shoes.  It's common to feel a little worried or anxious if you find you can't do all the things you used to be able to do. Talking with your doctor about ADLs is a way to make sure you're as safe as possible and able to care for yourself as well as you can. You may want to bring a caregiver, friend, or family member to your checkup. They can help you talk to your doctor.  Follow-up care is a key part of your treatment and safety. Be sure to make and go to all appointments, and call your doctor if you are having problems. It's also a good idea to know your test results and keep a list of the medicines you take.  Current as of: October 24, 2023               Content Version: 14.0    7168-4680 [employer].   Care instructions adapted under license by your healthcare professional. If you have questions about a medical condition or this instruction, always ask your healthcare professional. [employer] disclaims any warranty or liability for your use of this information.      Learning About Stress  What is stress?     Stress is your body's response to a hard situation. Your body can have a physical, emotional, or mental response. Stress is a fact of life for most people, and it affects everyone differently. What causes stress for you may not be stressful for someone else.  A lot of things can cause stress. You may feel stress when you go on a job interview, take a test, or run a race. This kind of short-term stress is normal and even useful. It can help you if you need to work hard or react quickly. For example, stress can help you finish an important job on time.  Long-term stress is caused by ongoing stressful situations or events. Examples of long-term stress include long-term health problems, ongoing problems at work, or conflicts in  your family. Long-term stress can harm your health.  How does stress affect your health?  When you are stressed, your body responds as though you are in danger. It makes hormones that speed up your heart, make you breathe faster, and give you a burst of energy. This is called the fight-or-flight stress response. If the stress is over quickly, your body goes back to normal and no harm is done.  But if stress happens too often or lasts too long, it can have bad effects. Long-term stress can make you more likely to get sick, and it can make symptoms of some diseases worse. If you tense up when you are stressed, you may develop neck, shoulder, or low back pain. Stress is linked to high blood pressure and heart disease.  Stress also harms your emotional health. It can make you alexander, tense, or depressed. Your relationships may suffer, and you may not do well at work or school.  What can you do to manage stress?  You can try these things to help manage stress:   Do something active. Exercise or activity can help reduce stress. Walking is a great way to get started. Even everyday activities such as housecleaning or yard work can help.  Try yoga or pierce chi. These techniques combine exercise and meditation. You may need some training at first to learn them.  Do something you enjoy. For example, listen to music or go to a movie. Practice your hobby or do volunteer work.  Meditate. This can help you relax, because you are not worrying about what happened before or what may happen in the future.  Do guided imagery. Imagine yourself in any setting that helps you feel calm. You can use online videos, books, or a teacher to guide you.  Do breathing exercises. For example:  From a standing position, bend forward from the waist with your knees slightly bent. Let your arms dangle close to the floor.  Breathe in slowly and deeply as you return to a standing position. Roll up slowly and lift your head last.  Hold your breath for just a  "few seconds in the standing position.  Breathe out slowly and bend forward from the waist.  Let your feelings out. Talk, laugh, cry, and express anger when you need to. Talking with supportive friends or family, a counselor, or a delta leader about your feelings is a healthy way to relieve stress. Avoid discussing your feelings with people who make you feel worse.  Write. It may help to write about things that are bothering you. This helps you find out how much stress you feel and what is causing it. When you know this, you can find better ways to cope.  What can you do to prevent stress?  You might try some of these things to help prevent stress:  Manage your time. This helps you find time to do the things you want and need to do.  Get enough sleep. Your body recovers from the stresses of the day while you are sleeping.  Get support. Your family, friends, and community can make a difference in how you experience stress.  Limit your news feed. Avoid or limit time on social media or news that may make you feel stressed.  Do something active. Exercise or activity can help reduce stress. Walking is a great way to get started.  Where can you learn more?  Go to https://www.[employer]ark[employer].net/patiented  Enter N032 in the search box to learn more about \"Learning About Stress.\"  Current as of: October 24, 2023               Content Version: 14.0    2198-8734 [employer].   Care instructions adapted under license by your healthcare professional. If you have questions about a medical condition or this instruction, always ask your healthcare professional. [employer] disclaims any warranty or liability for your use of this information.      9 Ways to Cut Back on Drinking  Maybe you've found yourself drinking more alcohol than you'd prefer. If you want to cut back, here are some ideas to try.    Think before you drink.  Do you really want a drink, or is it just a habit? If you're used to having a drink at " "a certain time, try doing something else then.     Look for substitutes.  Find some no-alcohol drinks that you enjoy, like flavored seltzer water, tea with honey, or tonic with a slice of lime. Or try alcohol-free beer or \"virgin\" cocktails (without the alcohol).     Drink more water.  Use water to quench your thirst. Drink a glass of water before you have any alcohol. Have another glass along with every drink or between drinks.     Shrink your drink.  For example, have a bottle of beer instead of a pint. Use a smaller glass for wine. Choose drinks with lower alcohol content (ABV%). Or use less liquor and more mixer in cocktails.     Slow down.  It's easy to drink quickly and without thinking about it. Pay attention, and make each drink last longer.     Do the math.  Total up how much you spend on alcohol each month. How much is that a year? If you cut back, what could you do with the money you save?     Take a break.  Choose a day or two each week when you won't drink at all. Notice how you feel on those days, physically and emotionally. How did you sleep? Do you feel better? Over time, add more break days.     Count calories.  Would you like to lose some weight? For some people that's a good motivator for cutting back. Figure out how many calories are in each drink. How many does that add up to in a day? In a week? In a month?     Practice saying no.  Be ready when someone offers you a drink. Try: \"Thanks, I've had enough.\" Or \"Thanks, but I'm cutting back.\" Or \"No, thanks. I feel better when I drink less.\"   Current as of: November 15, 2023               Content Version: 14.0    5885-5656 [employer].   Care instructions adapted under license by your healthcare professional. If you have questions about a medical condition or this instruction, always ask your healthcare professional. [employer] disclaims any warranty or liability for your use of this information.       9 Ways to Cut Back " "on Drinking  Maybe you've found yourself drinking more alcohol than you'd prefer. If you want to cut back, here are some ideas to try.    Think before you drink.  Do you really want a drink, or is it just a habit? If you're used to having a drink at a certain time, try doing something else then.     Look for substitutes.  Find some no-alcohol drinks that you enjoy, like flavored seltzer water, tea with honey, or tonic with a slice of lime. Or try alcohol-free beer or \"virgin\" cocktails (without the alcohol).     Drink more water.  Use water to quench your thirst. Drink a glass of water before you have any alcohol. Have another glass along with every drink or between drinks.     Shrink your drink.  For example, have a bottle of beer instead of a pint. Use a smaller glass for wine. Choose drinks with lower alcohol content (ABV%). Or use less liquor and more mixer in cocktails.     Slow down.  It's easy to drink quickly and without thinking about it. Pay attention, and make each drink last longer.     Do the math.  Total up how much you spend on alcohol each month. How much is that a year? If you cut back, what could you do with the money you save?     Take a break.  Choose a day or two each week when you won't drink at all. Notice how you feel on those days, physically and emotionally. How did you sleep? Do you feel better? Over time, add more break days.     Count calories.  Would you like to lose some weight? For some people that's a good motivator for cutting back. Figure out how many calories are in each drink. How many does that add up to in a day? In a week? In a month?     Practice saying no.  Be ready when someone offers you a drink. Try: \"Thanks, I've had enough.\" Or \"Thanks, but I'm cutting back.\" Or \"No, thanks. I feel better when I drink less.\"   Current as of: November 15, 2023               Content Version: 14.0    8608-0155 Healthwise, [facility].   Care instructions adapted under license by your " healthcare professional. If you have questions about a medical condition or this instruction, always ask your healthcare professional. Healthwise, [facility] disclaims any warranty or liability for your use of this information.    Substance Use Disorder: Care Instructions  Overview     You can improve your life and health by stopping your use of alcohol or drugs. When you don't drink or use drugs, you may feel and sleep better. You may get along better with your family, friends, and coworkers. There are medicines and programs that can help with substance use disorder.  How can you care for yourself at home?  Here are some ways to help you stay sober and prevent relapse.  If you have been given medicine to help keep you sober or reduce your cravings, be sure to take it exactly as prescribed.  Talk to your doctor about programs that can help you stop using drugs or drinking alcohol.  Do not keep alcohol or drugs in your home.  Plan ahead. Think about what you'll say if other people ask you to drink or use drugs. Try not to spend time with people who drink or use drugs.  Use the time and money spent on drinking or drugs to do something that's important to you.  Preventing a relapse  Have a plan to deal with relapse. Learn to recognize changes in your thinking that lead you to drink or use drugs. Get help before you start to drink or use drugs again.  Try to stay away from situations, friends, or places that may lead you to drink or use drugs.  If you feel the need to drink alcohol or use drugs again, seek help right away. Call a trusted friend or family member. Some people get support from organizations such as Narcotics Anonymous or [employer] or from treatment facilities.  If you relapse, get help as soon as you can. Some people make a plan with another person that outlines what they want that person to do for them if they relapse. The plan usually includes how to handle the relapse and who to notify in case of  "relapse.  Don't give up. Remember that a relapse doesn't mean that you have failed. Use the experience to learn the triggers that lead you to drink or use drugs. Then quit again. Recovery is a lifelong process. Many people have several relapses before they are able to quit for good.  Follow-up care is a key part of your treatment and safety. Be sure to make and go to all appointments, and call your doctor if you are having problems. It's also a good idea to know your test results and keep a list of the medicines you take.  When should you call for help?   Call 911  anytime you think you may need emergency care. For example, call if you or someone else:    Has overdosed or has withdrawal signs. Be sure to tell the emergency workers that you are or someone else is using or trying to quit using drugs. Overdose or withdrawal signs may include:  Losing consciousness.  Seizure.  Seeing or hearing things that aren't there (hallucinations).     Is thinking or talking about suicide or harming others.   Where to get help 24 hours a day, 7 days a week   If you or someone you know talks about suicide, self-harm, a mental health crisis, a substance use crisis, or any other kind of emotional distress, get help right away. You can:    Call the Suicide and Crisis Lifeline at 988.     Call 4-327-016-ATNC (1-848.300.2571).     Text HOME to 660607 to access the Crisis Text Line.   Consider saving these numbers in your phone.  Go to [employer] for more information or to chat online.  Call your doctor now or seek immediate medical care if:    You are having withdrawal symptoms. These may include nausea or vomiting, sweating, shakiness, and anxiety.   Watch closely for changes in your health, and be sure to contact your doctor if:    You have a relapse.     You need more help or support to stop.   Where can you learn more?  Go to https://www.healthwise.net/patiented  Enter H573 in the search box to learn more about \"Substance Use " "Disorder: Care Instructions.\"  Current as of: November 15, 2023               Content Version: 14.0    6077-6205 [employer].   Care instructions adapted under license by your healthcare professional. If you have questions about a medical condition or this instruction, always ask your healthcare professional. [employer] disclaims any warranty or liability for your use of this information.      "

## 2024-06-26 ENCOUNTER — TELEPHONE (OUTPATIENT)
Dept: FAMILY MEDICINE | Facility: OTHER | Age: 84
End: 2024-06-26
Payer: COMMERCIAL

## 2024-06-26 NOTE — TELEPHONE ENCOUNTER
Patient presents to clinic .  He states Dr Flor gave him information to  [facility] in [place] to contact for toenail clipping.  He states he went to the address and they are no longer there and the phone number is disconnected.  I gave him the phone number to [facility] that Dr Raines uses for toenail clipping in [place].  He still wanted a message sent to Dr Flor if she knows of any other place who would do this in [place].  Please advise.  Thank you

## 2024-06-27 ENCOUNTER — TRANSFERRED RECORDS (OUTPATIENT)
Dept: HEALTH INFORMATION MANAGEMENT | Facility: CLINIC | Age: 84
End: 2024-06-27
Payer: COMMERCIAL

## 2024-07-02 ENCOUNTER — TELEPHONE (OUTPATIENT)
Dept: ONCOLOGY | Facility: CLINIC | Age: 84
End: 2024-07-02
Payer: COMMERCIAL

## 2024-07-02 NOTE — TELEPHONE ENCOUNTER
JARETH APPROVED    Medication: NUBEQA 300 MG PO TABS  Amount: $ 8,000  [facility] Name: [facility] Phone:    Foundation Effective Date: 6/2/2024  Foundation Expiration Date: 6/1/2025  Additional Information:   Patient Notified: yes          Thank you,    Maria Guadalupe Smith  Oncology Pharmacy Liaison MEHGNA [email or web address]  Phone: [phone]  Fax: [phone]

## 2024-07-02 NOTE — TELEPHONE ENCOUNTER
Oral Chemotherapy Monitoring Program     Placed call to patient in follow up of Nubeqa therapy.     Left message to please call back in follow up of therapy. No patient or drug names were mentioned.    Eva Hwang  Pharmacy Intern  Oral Chemotherapy Monitoring Program  [facility]   [phone]

## 2024-07-05 DIAGNOSIS — C61 MALIGNANT NEOPLASM OF PROSTATE (H): Primary | ICD-10-CM

## 2024-07-05 DIAGNOSIS — C61 PROSTATE CANCER METASTATIC TO BONE (H): ICD-10-CM

## 2024-07-05 DIAGNOSIS — C79.51 PROSTATE CANCER METASTATIC TO BONE (H): ICD-10-CM

## 2024-07-05 DIAGNOSIS — C61 PROSTATE CANCER (H): ICD-10-CM

## 2024-07-11 ENCOUNTER — OFFICE VISIT (OUTPATIENT)
Dept: ORTHOPEDICS | Facility: OTHER | Age: 84
End: 2024-07-11
Payer: COMMERCIAL

## 2024-07-11 VITALS
BODY MASS INDEX: 29.03 KG/M2 | WEIGHT: 185 LBS | SYSTOLIC BLOOD PRESSURE: 148 MMHG | RESPIRATION RATE: 18 BRPM | HEIGHT: 67 IN | DIASTOLIC BLOOD PRESSURE: 90 MMHG | HEART RATE: 60 BPM

## 2024-07-11 DIAGNOSIS — M72.0 DUPUYTREN CONTRACTURE: Primary | ICD-10-CM

## 2024-07-11 DIAGNOSIS — M65.331 TRIGGER MIDDLE FINGER OF RIGHT HAND: ICD-10-CM

## 2024-07-11 PROCEDURE — 99213 OFFICE O/P EST LOW 20 MIN: CPT | Performed by: ORTHOPAEDIC SURGERY

## 2024-07-11 NOTE — PROGRESS NOTES
Medardo Gautam is a 84 year old male who is seen in consultation at the request of Dr. Flor for contracture of his left small finger.  He has noted contracture come on over about 10 years.  He now has almost a 90 degree flexion contracture of the finger.  He is bothered a bit by loss of function.  He also has had catching of his right long finger when he dries his back with a towel.  He reports this catching can be loosened by pushing it out straight.  He does not notice it catch any other times.  He does not think he has pain in either hand much with these.  He feels he is getting by, but the small finger on the left is getting in the way of function.    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 Provider to review    HEMORRHOIDS NOS 06/04/2007    Hyperbilirubinemia 05/01/2012    Hypertension     Insomnia     Nonsenile cataract     Personal history of colonic polyps 7 years ago?    Primary osteoarthritis of left knee 08/06/2022    Prostate cancer (H) 02/2012    Renal cyst 06/22/2017    Seborrheic dermatitis     Skin lesion- R side of face and behind L ear 12/15/2011    SKIN SENSATION DISTURB 12/29/2006    allergic reaction to strawberries    SKIN SENSATION DISTURB 12/29/2006       Past Surgical History:   Procedure Laterality Date    APPENDECTOMY      BIOPSY  01/16/15    CATARACT IOL, RT/LT Left 02/15/2018    COLON SURGERY  2/11/2015    Lap assisted R hemicolectomy    COLONOSCOPY  10/25/07    Snare polypectomy    COLONOSCOPY  6/25/2009    with snare polypectomy    COLONOSCOPY  9/30/2009    COLONOSCOPY  1/5/2011    COL[facility] performed [facility] at  [facility]    COLONOSCOPY N/A 1/16/2015    Procedure: COMBINED COLONOSCOPY,  SINGLE OR MULTIPLE BIOPSY/POLYPECTOMY BY BIOPSY;  Surgeon: Sarah Beth Pisano MD;  Location: [facility] OR    COLONOSCOPY WITH CO2 INSUFFLATION N/A 1/16/2015    Procedure: COLONOSCOPY WITH CO2 INSUFFLATION;  Surgeon: Sarah Beth Pisano MD;  Location: [facility]    COLONOSCOPY WITH CO2 INSUFFLATION N/A 9/7/2018    Procedure: COLONOSCOPY WITH CO2 INSUFFLATION;  C18.9 (ICD-10-CM) - Malignant neoplasm of colon, unspecified part of colon (H)  [facility] [place] [facility] fax# [phone]  BMI 26.29  [employer]  Referred by dr thomas;  Surgeon: Sarah Beth Pisano MD;  Location: [facility] OR    COLONOSCOPY WITH CO2 INSUFFLATION N/A 9/10/2021    Procedure: COLONOSCOPY, WITH CO2 INSUFFLATION;  Surgeon: Sarah Beth Pisano MD;  Location: [facility]    DAVINCI PROSTATECTOMY  8/6/2012    Procedure: DAVINCI PROSTATECTOMY;  Davinci Assisted Radical Prostatectomy with Bilateral Lymphadenectomy ;  Surgeon: Norma Goodwin MD;  Location: [facility] OR    GENITOURINARY SURGERY      prostate surgery    INJECT EPIDURAL LUMBAR / SACRAL SINGLE Left 10/30/2017    Procedure: INJECT EPIDURAL LUMBAR / SACRAL SINGLE;  Left Transforaminal Lumbar 4-Lumbar 5 Epidural Steroid Injection;  Surgeon: Nuvia Reina MD;  Location: [facility]    LAPAROSCOPIC ASSISTED COLECTOMY N/A 2/11/2015    Procedure: LAPAROSCOPIC ASSISTED COLECTOMY;  Surgeon: Oren Breen MD;  Location: [facility] OR    PHACOEMULSIFICATION CLEAR CORNEA WITH STANDARD INTRAOCULAR LENS IMPLANT Left 2/15/2018    Procedure: PHACOEMULSIFICATION CLEAR CORNEA WITH STANDARD INTRAOCULAR LENS IMPLANT;  LEFT EYE CATARACT EXTRACTION WITH STANDARD INTRAOCULAR LENS IMPLANT ;  Surgeon: Brandon Orellana MD;  Location: [facility]    PHACOEMULSIFICATION CLEAR CORNEA WITH STANDARD INTRAOCULAR LENS IMPLANT Right 3/1/2018    Procedure: PHACOEMULSIFICATION CLEAR CORNEA WITH STANDARD INTRAOCULAR LENS IMPLANT;  RIGHT EYE CATARACT EXTRACTION WITH STANDARD INTRAOCULAR LENS IMPLANT ;  Surgeon: Brandon Orellana MD;  Location: [facility]     ZZC HAND/FINGER SURGERY UNLISTED      ZZC LENGTHEN,TENDON,HAND/FINGER  ca 2007    right fifth    ZZC STOMACH SURGERY PROCEDURE UNLISTED         Family History   Problem Relation Age of Onset    Cerebrovascular Disease Father     Cancer Mother 90        Patient believes vaginal cancer - hysterectomy,     Alzheimer Disease Mother     Cancer Sister     Breast Cancer Sister     C.A.D. No family hx of     Cancer - colorectal No family hx of     Prostate Cancer No family hx of     Blood Disease No family hx of     Cardiovascular No family hx of     Circulatory No family hx of     Eye Disorder No family hx of     Gastrointestinal Disease No family hx of     Genitourinary Problems No family hx of     Lipids No family hx of     Neurologic Disorder No family hx of     Respiratory No family hx of     Asthma No family hx of     Heart Disease No family hx of     Diabetes No family hx of     Hypertension No family hx of     Arthritis No family hx of     Thyroid Disease No family hx of     Musculoskeletal Disorder No family hx of     Glaucoma No family hx of     Macular Degeneration No family hx of        Social History     Socioeconomic History    Marital status:      Spouse name: Joyce    Number of children: 2    Years of education: Not on file    Highest education level: Not on file   Occupational History    Occupation: retired     Employer: RETIRED   Tobacco Use    Smoking status: Former     Current packs/day: 0.00     Types: Cigarettes, Cigars, Pipe     Start date: 10/1/1961     Quit date: 1962     Years since quittin.5     Passive exposure: Never    Smokeless tobacco: Never    Tobacco comments:     No smokers in home   Vaping Use    Vaping status: Never Used   Substance and Sexual Activity    Alcohol use: Yes     Comment: A whisky and a glass of wine    Drug use: Yes     Types: Benzodiazepines    Sexual activity: Not Currently     Partners: Female     Birth control/protection: None     Comment: not  currently active   Other Topics Concern     Service No    Blood Transfusions No    Caffeine Concern No    Occupational Exposure No    Hobby Hazards No    Sleep Concern Yes    Stress Concern No    Weight Concern Yes    Special Diet No    Back Care No    Exercise No    Bike Helmet No     Comment: N/A    Seat Belt Yes    Self-Exams Yes    Parent/sibling w/ CABG, MI or angioplasty before 65F 55M? No   Social History Narrative    Not on file     Social Determinants of Health     Financial Resource Strain: Unknown (6/20/2024)    Financial Resource Strain     Within the past 12 months, have you or your family members you live with been unable to get utilities (heat, electricity) when it was really needed?: Patient declined   Food Insecurity: Low Risk  (6/20/2024)    Food Insecurity     Within the past 12 months, did you worry that your food would run out before you got money to buy more?: No     Within the past 12 months, did the food you bought just not last and you didn t have money to get more?: No   Transportation Needs: Low Risk  (6/20/2024)    Transportation Needs     Within the past 12 months, has lack of transportation kept you from medical appointments, getting your medicines, non-medical meetings or appointments, work, or from getting things that you need?: No   Physical Activity: Unknown (6/20/2024)    Exercise Vital Sign     Days of Exercise per Week: 0 days     Minutes of Exercise per Session: Not on file   Stress: Stress Concern Present (6/20/2024)    [employer] - Occupational Stress Questionnaire     Feeling of Stress : Very much   Social Connections: Not on file   Interpersonal Safety: Low Risk  (6/25/2024)    Interpersonal Safety     Do you feel physically and emotionally safe where you currently live?: Yes     Within the past 12 months, have you been hit, slapped, kicked or otherwise physically hurt by someone?: No     Within the past 12 months, have you been  humiliated or emotionally abused in other ways by your partner or ex-partner?: No   Housing Stability: Low Risk  (6/20/2024)    Housing Stability     Do you have housing? : Yes     Are you worried about losing your housing?: No       Current Outpatient Medications   Medication Sig Dispense Refill    allopurinol (ZYLOPRIM) 100 MG tablet Take 1 tablet (100 mg) by mouth daily 90 tablet 3    atorvastatin (LIPITOR) 10 MG tablet Take 1 tablet (10 mg) by mouth daily 90 tablet 3    calcium-vitamin D (CALTRATE) 600-400 MG-UNIT per tablet Take 1 tablet by mouth daily      clonazePAM (KLONOPIN) 1 MG tablet Take 1.5 tablets (1.5 mg) by mouth nightly as needed for anxiety 45 tablet 5    darolutamide (NUBEQA) 300 MG tablet Take 2 tablets (600 mg) by mouth 2 times daily for 30 days . Swallow tablets whole. Take with food. Avoid grapefruit or grapefruit juice. 120 tablet 0    folic acid-vit B6-vit B12 (FOLGARD) 0.8-10-0.115 MG TABS Take 1 tablet by mouth daily      leuprolide (LUPRON DEPOT) 22.5 MG kit Inject 22.5 mg into the muscle every 3 months 1 each 3    losartan-hydrochlorothiazide (HYZAAR) 100-12.5 MG tablet Take 1 tablet by mouth daily 90 tablet 3    Omega-3 Fatty Acids (OMEGA-3 FISH OIL PO) Take 500 mg by mouth daily      zolpidem (AMBIEN) 5 MG tablet Take 1 tablet (5 mg) by mouth nightly as needed for sleep 30 tablet 5       No Known Allergies    REVIEW OF SYSTEMS:  CONSTITUTIONAL:  NEGATIVE for fever, chills, change in weight, not feeling tired  SKIN:  NEGATIVE for worrisome rashes, no skin lumps, no skin ulcers and no non-healing wounds  EYES:  NEGATIVE for vision changes or irritation.  ENT/MOUTH:  NEGATIVE.  No hearing loss, no hoarseness, no difficulty swallowing.  RESP:  NEGATIVE. No cough or shortness of breath.  CV:  NEGATIVE for chest pain, palpitations or peripheral edema  GI:  NEGATIVE for nausea, abdominal pain, heartburn, or change in bowel habits  :  Negative. No dysuria, no hematuria  MUSCULOSKELETAL:   "See HPI above  NEURO:  NEGATIVE . No headaches, no dizziness,  no numbness  ENDOCRINE:  NEGATIVE for temperature intolerance, skin/hair changes  HEME/ALLERGY/IMMUNE:  NEGATIVE for bleeding problems  PSYCHIATRIC:  NEGATIVE. no anxiety, no depression.     Exam:  Vitals: BP (!) 148/90   Pulse 60   Resp 18   Ht 1.702 m (5' 7\")   Wt 83.9 kg (185 lb)   BMI 28.98 kg/m    BMI= Body mass index is 28.98 kg/m .  Constitutional:  healthy, alert and no distress  Neuro: Alert and Oriented x 3, no focal defects.  Psych: Affect normal   Respiratory: Breathing not labored.  Cardiovascular: normal peripheral pulses  Lymph: no adenopathy  Skin: No rashes,worrisome lesions or skin problems  He has a significant Dupuytren's contracture of the left small finger.  The band goes across the palm onto the proximal phalanx.  He has significant contracture of the MCP and the mild contracture of the PIP which does not return to normal even with the MCP flexed.  He also has a smaller band from the palm toward the ring finger that does not go across the proximal phalanx.  He has very small bands to the long and index.  There are no contractures of these.  Right hand shows early Dupuytren's bands but no contractures yet.  He does have nodule moving along the flexor tendon of the right long finger and we can demonstrate some triggering here with capturing of the nodule.  Sensation, motor and circulation are intact.    Assessment:  left small finger Dupuytren's contracture which is quite significant.  Smaller Dupuytren's bands to the ring, long, and index finger.  2.  Right long trigger finger.    Plan: We discussed that the trigger finger can get better if he  less, tries anti-inflammatories, try steroid injection, or surgical release.  He does not feel it requires any treatment at this time.  We discussed treatments with surgery or Xiaflex injection of the left small finger Dupuytren's contracture.  He does not want to put up with " stitches for 2 to 3 weeks while the wound heals, so we will refer to Dr. Marlin Tatum  for possible Xiaflex treatment.

## 2024-07-11 NOTE — LETTER
7/11/2024      Medardo Gautam  [address] [facility] [address]      Dear Colleague,    Thank you for referring your patient, Medardo Gautam, to the [facility] [place]. Please see a copy of my visit note below.    Medardo Gautam is a 84 year old male who is seen in consultation at the request of Dr. Flor for contracture of his left small finger.  He has noted contracture come on over about 10 years.  He now has almost a 90 degree flexion contracture of the finger.  He is bothered a bit by loss of function.  He also has had catching of his right long finger when he dries his back with a towel.  He reports this catching can be loosened by pushing it out straight.  He does not notice it catch any other times.  He does not think he has pain in either hand much with these.  He feels he is getting by, but the small finger on the left is getting in the way of function.    Past Medical History:   Diagnosis Date     Anxiety      Colon cancer (H) 01/2015     Contracture of finger joint ca 2005    left and right fifth fingers     Contracture of finger joint 12/07/2012     Dupuytren's contracture of left hand      Gout      Hemorrhoids 06/04/2007     Problem list name updated by automated process. Provider to review     HEMORRHOIDS NOS 06/04/2007     Hyperbilirubinemia 05/01/2012     Hypertension      Insomnia      Nonsenile cataract      Personal history of colonic polyps 7 years ago?     Primary osteoarthritis of left knee 08/06/2022     Prostate cancer (H) 02/2012     Renal cyst 06/22/2017     Seborrheic dermatitis      Skin lesion- R side of face and behind L ear 12/15/2011     SKIN SENSATION DISTURB 12/29/2006    allergic reaction to strawberries     SKIN SENSATION DISTURB 12/29/2006       Past Surgical History:   Procedure Laterality Date     APPENDECTOMY       BIOPSY  01/16/15     CATARACT IOL, RT/LT Left 02/15/2018     COLON SURGERY  2/11/2015    Lap assisted R  hemicolectomy     COLONOSCOPY  10/25/07    Snare polypectomy     COLONOSCOPY  6/25/2009    with snare polypectomy     COLONOSCOPY  9/30/2009     COLONOSCOPY  1/5/2011    COLONOSCOPY performed by JUD MARIEE at  GI     COLONOSCOPY N/A 1/16/2015    Procedure: COMBINED COLONOSCOPY, SINGLE OR MULTIPLE BIOPSY/POLYPECTOMY BY BIOPSY;  Surgeon: Sarah Beth Pisano MD;  Location: [facility] OR     COLONOSCOPY WITH CO2 INSUFFLATION N/A 1/16/2015    Procedure: COLONOSCOPY WITH CO2 INSUFFLATION;  Surgeon: Sarah Beth Pisano MD;  Location: [facility] OR     COLONOSCOPY WITH CO2 INSUFFLATION N/A 9/7/2018    Procedure: COLONOSCOPY WITH CO2 INSUFFLATION;  C18.9 (ICD-10-CM) - Malignant neoplasm of colon, unspecified part of colon (H)  [facility] [phone]  BMI 26.29  [employer]  Referred by dr thomas;  Surgeon: Sarah Beth Pisano MD;  Location: [facility] OR     COLONOSCOPY WITH CO2 INSUFFLATION N/A 9/10/2021    Procedure: COLONOSCOPY, WITH CO2 INSUFFLATION;  Surgeon: Sarah Beth Pisano MD;  Location: [facility] OR     DAVINCI PROSTATECTOMY  8/6/2012    Procedure: DAVINCI PROSTATECTOMY;  Davinci Assisted Radical Prostatectomy with Bilateral Lymphadenectomy ;  Surgeon: Norma Goodwin MD;  Location: [facility] OR     GENITOURINARY SURGERY      prostate surgery     INJECT EPIDURAL LUMBAR / SACRAL SINGLE Left 10/30/2017    Procedure: INJECT EPIDURAL LUMBAR / SACRAL SINGLE;  Left Transforaminal Lumbar 4-Lumbar 5 Epidural Steroid Injection;  Surgeon: Nuvia Reina MD;  [facility]: [facility] OR     LAPAROSCOPIC ASSISTED COLECTOMY N/A 2/11/2015    Procedure: LAPAROSCOPIC ASSISTED COLECTOMY;  Surgeon: Oren Breen MD;  Location: [facility] OR     PHACOEMULSIFICATION CLEAR CORNEA WITH STANDARD INTRAOCULAR LENS IMPLANT Left 2/15/2018    Procedure: PHACOEMULSIFICATION CLEAR CORNEA WITH STANDARD INTRAOCULAR LENS IMPLANT;  LEFT EYE CATARACT EXTRACTION WITH STANDARD INTRAOCULAR LENS IMPLANT ;  Surgeon: Brandon Orellana MD;  Location: [facility]      PHACOEMULSIFICATION CLEAR CORNEA WITH STANDARD INTRAOCULAR LENS IMPLANT Right 3/1/2018    Procedure: PHACOEMULSIFICATION CLEAR CORNEA WITH STANDARD INTRAOCULAR LENS IMPLANT;  RIGHT EYE CATARACT EXTRACTION WITH STANDARD INTRAOCULAR LENS IMPLANT ;  Surgeon: Brandon Orellana MD;  Location: [facility] HAND/FINGER SURGERY UNLISTED       ZZC LENGTHEN,TENDON,HAND/FINGER  ca     right fifth     ZZC STOMACH SURGERY PROCEDURE UNLISTED         Family History   Problem Relation Age of Onset     Cerebrovascular Disease Father      Cancer Mother 90        Patient believes vaginal cancer - hysterectomy,      Alzheimer Disease Mother      Cancer Sister      Breast Cancer Sister      C.A.D. No family hx of      Cancer - colorectal No family hx of      Prostate Cancer No family hx of      Blood Disease No family hx of      Cardiovascular No family hx of      Circulatory No family hx of      Eye Disorder No family hx of      Gastrointestinal Disease No family hx of      Genitourinary Problems No family hx of      Lipids No family hx of      Neurologic Disorder No family hx of      Respiratory No family hx of      Asthma No family hx of      Heart Disease No family hx of      Diabetes No family hx of      Hypertension No family hx of      Arthritis No family hx of      Thyroid Disease No family hx of      Musculoskeletal Disorder No family hx of      Glaucoma No family hx of      Macular Degeneration No family hx of        Social History     Socioeconomic History     Marital status:      Spouse name: Joyce     Number of children: 2     Years of education: Not on file     Highest education level: Not on file   Occupational History     Occupation: retired     Employer: RETIRED   Tobacco Use     Smoking status: Former     Current packs/day: 0.00     Types: Cigarettes, Cigars, Pipe     Start date: 10/1/1961     Quit date: 1962     Years since quittin.5     Passive exposure: Never     Smokeless tobacco:  Never     Tobacco comments:     No smokers in home   Vaping Use     Vaping status: Never Used   Substance and Sexual Activity     Alcohol use: Yes     Comment: A whisky and a glass of wine     Drug use: Yes     Types: Benzodiazepines     Sexual activity: Not Currently     Partners: Female     Birth control/protection: None     Comment: not currently active   Other Topics Concern      Service No     Blood Transfusions No     Caffeine Concern No     Occupational Exposure No     Hobby Hazards No     Sleep Concern Yes     Stress Concern No     Weight Concern Yes     Special Diet No     Back Care No     Exercise No     Bike Helmet No     Comment: N/A     Seat Belt Yes     Self-Exams Yes     Parent/sibling w/ CABG, MI or angioplasty before 65F 55M? No   Social History Narrative     Not on file     Social Determinants of Health     Financial Resource Strain: Unknown (6/20/2024)    Financial Resource Strain      Within the past 12 months, have you or your family members you live with been unable to get utilities (heat, electricity) when it was really needed?: Patient declined   Food Insecurity: Low Risk  (6/20/2024)    Food Insecurity      Within the past 12 months, did you worry that your food would run out before you got money to buy more?: No      Within the past 12 months, did the food you bought just not last and you didn t have money to get more?: No   Transportation Needs: Low Risk  (6/20/2024)    Transportation Needs      Within the past 12 months, has lack of transportation kept you from medical appointments, getting your medicines, non-medical meetings or appointments, work, or from getting things that you need?: No   Physical Activity: Unknown (6/20/2024)    Exercise Vital Sign      Days of Exercise per Week: 0 days      Minutes of Exercise per Session: Not on file   Stress: Stress Concern Present (6/20/2024)    [employer] [place] [employer] - Occupational Stress Questionnaire      Feeling of  Stress : Very much   Social Connections: Not on file   Interpersonal Safety: Low Risk  (6/25/2024)    Interpersonal Safety      Do you feel physically and emotionally safe where you currently live?: Yes      Within the past 12 months, have you been hit, slapped, kicked or otherwise physically hurt by someone?: No      Within the past 12 months, have you been humiliated or emotionally abused in other ways by your partner or ex-partner?: No   Housing Stability: Low Risk  (6/20/2024)    Housing Stability      Do you have housing? : Yes      Are you worried about losing your housing?: No       Current Outpatient Medications   Medication Sig Dispense Refill     allopurinol (ZYLOPRIM) 100 MG tablet Take 1 tablet (100 mg) by mouth daily 90 tablet 3     atorvastatin (LIPITOR) 10 MG tablet Take 1 tablet (10 mg) by mouth daily 90 tablet 3     calcium-vitamin D (CALTRATE) 600-400 MG-UNIT per tablet Take 1 tablet by mouth daily       clonazePAM (KLONOPIN) 1 MG tablet Take 1.5 tablets (1.5 mg) by mouth nightly as needed for anxiety 45 tablet 5     darolutamide (NUBEQA) 300 MG tablet Take 2 tablets (600 mg) by mouth 2 times daily for 30 days . Swallow tablets whole. Take with food. Avoid grapefruit or grapefruit juice. 120 tablet 0     folic acid-vit B6-vit B12 (FOLGARD) 0.8-10-0.115 MG TABS Take 1 tablet by mouth daily       leuprolide (LUPRON DEPOT) 22.5 MG kit Inject 22.5 mg into the muscle every 3 months 1 each 3     losartan-hydrochlorothiazide (HYZAAR) 100-12.5 MG tablet Take 1 tablet by mouth daily 90 tablet 3     Omega-3 Fatty Acids (OMEGA-3 FISH OIL PO) Take 500 mg by mouth daily       zolpidem (AMBIEN) 5 MG tablet Take 1 tablet (5 mg) by mouth nightly as needed for sleep 30 tablet 5       No Known Allergies    REVIEW OF SYSTEMS:  CONSTITUTIONAL:  NEGATIVE for fever, chills, change in weight, not feeling tired  SKIN:  NEGATIVE for worrisome rashes, no skin lumps, no skin ulcers and no non-healing wounds  EYES:  NEGATIVE  "for vision changes or irritation.  ENT/MOUTH:  NEGATIVE.  No hearing loss, no hoarseness, no difficulty swallowing.  RESP:  NEGATIVE. No cough or shortness of breath.  CV:  NEGATIVE for chest pain, palpitations or peripheral edema  GI:  NEGATIVE for nausea, abdominal pain, heartburn, or change in bowel habits  :  Negative. No dysuria, no hematuria  MUSCULOSKELETAL:  See HPI above  NEURO:  NEGATIVE . No headaches, no dizziness,  no numbness  ENDOCRINE:  NEGATIVE for temperature intolerance, skin/hair changes  HEME/ALLERGY/IMMUNE:  NEGATIVE for bleeding problems  PSYCHIATRIC:  NEGATIVE. no anxiety, no depression.     Exam:  Vitals: BP (!) 148/90   Pulse 60   Resp 18   Ht 1.702 m (5' 7\")   Wt 83.9 kg (185 lb)   BMI 28.98 kg/m    BMI= Body mass index is 28.98 kg/m .  Constitutional:  healthy, alert and no distress  Neuro: Alert and Oriented x 3, no focal defects.  Psych: Affect normal   Respiratory: Breathing not labored.  Cardiovascular: normal peripheral pulses  Lymph: no adenopathy  Skin: No rashes,worrisome lesions or skin problems  He has a significant Dupuytren's contracture of the left small finger.  The band goes across the palm onto the proximal phalanx.  He has significant contracture of the MCP and the mild contracture of the PIP which does not return to normal even with the MCP flexed.  He also has a smaller band from the palm toward the ring finger that does not go across the proximal phalanx.  He has very small bands to the long and index.  There are no contractures of these.  Right hand shows early Dupuytren's bands but no contractures yet.  He does have nodule moving along the flexor tendon of the right long finger and we can demonstrate some triggering here with capturing of the nodule.  Sensation, motor and circulation are intact.    Assessment:  left small finger Dupuytren's contracture which is quite significant.  Smaller Dupuytren's bands to the ring, long, and index finger.  2.  Right long " trigger finger.    Plan: We discussed that the trigger finger can get better if he  less, tries anti-inflammatories, try steroid injection, or surgical release.  He does not feel it requires any treatment at this time.  We discussed treatments with surgery or Xiaflex injection of the left small finger Dupuytren's contracture.  He does not want to put up with stitches for 2 to 3 weeks while the wound heals, so we will refer to Dr. Marlin Tatum  for possible Xiaflex treatment.    Again, thank you for allowing me to participate in the care of your patient.        Sincerely,        Neptali Contreras MD

## 2024-07-18 ENCOUNTER — TELEPHONE (OUTPATIENT)
Dept: FAMILY MEDICINE | Facility: OTHER | Age: 84
End: 2024-07-18

## 2024-07-18 ENCOUNTER — LAB (OUTPATIENT)
Dept: LAB | Facility: OTHER | Age: 84
End: 2024-07-18
Payer: COMMERCIAL

## 2024-07-18 DIAGNOSIS — C61 PROSTATE CANCER (H): ICD-10-CM

## 2024-07-18 DIAGNOSIS — C61 MALIGNANT NEOPLASM OF PROSTATE (H): ICD-10-CM

## 2024-07-18 DIAGNOSIS — C79.51 PROSTATE CANCER METASTATIC TO BONE (H): ICD-10-CM

## 2024-07-18 DIAGNOSIS — C61 PROSTATE CANCER METASTATIC TO BONE (H): ICD-10-CM

## 2024-07-18 LAB
ALBUMIN SERPL BCG-MCNC: 4.3 G/DL (ref 3.5–5.2)
ALP SERPL-CCNC: 77 U/L (ref 40–150)
ALT SERPL W P-5'-P-CCNC: 31 U/L (ref 0–70)
ANION GAP SERPL CALCULATED.3IONS-SCNC: 12 MMOL/L (ref 7–15)
AST SERPL W P-5'-P-CCNC: 46 U/L (ref 0–45)
BASOPHILS # BLD AUTO: 0 10E3/UL (ref 0–0.2)
BASOPHILS NFR BLD AUTO: 1 %
BILIRUB SERPL-MCNC: 0.7 MG/DL
BUN SERPL-MCNC: 34.6 MG/DL (ref 8–23)
CALCIUM SERPL-MCNC: 9.7 MG/DL (ref 8.8–10.4)
CHLORIDE SERPL-SCNC: 103 MMOL/L (ref 98–107)
CREAT SERPL-MCNC: 1.64 MG/DL (ref 0.67–1.17)
EGFRCR SERPLBLD CKD-EPI 2021: 42 ML/MIN/1.73M2
EOSINOPHIL # BLD AUTO: 0.1 10E3/UL (ref 0–0.7)
EOSINOPHIL NFR BLD AUTO: 2 %
ERYTHROCYTE [DISTWIDTH] IN BLOOD BY AUTOMATED COUNT: 12.5 % (ref 10–15)
GLUCOSE SERPL-MCNC: 139 MG/DL (ref 70–99)
HCO3 SERPL-SCNC: 22 MMOL/L (ref 22–29)
HCT VFR BLD AUTO: 29.4 % (ref 40–53)
HGB BLD-MCNC: 9.9 G/DL (ref 13.3–17.7)
IMM GRANULOCYTES # BLD: 0 10E3/UL
IMM GRANULOCYTES NFR BLD: 1 %
LYMPHOCYTES # BLD AUTO: 1.2 10E3/UL (ref 0.8–5.3)
LYMPHOCYTES NFR BLD AUTO: 29 %
MCH RBC QN AUTO: 34.6 PG (ref 26.5–33)
MCHC RBC AUTO-ENTMCNC: 33.7 G/DL (ref 31.5–36.5)
MCV RBC AUTO: 103 FL (ref 78–100)
MONOCYTES # BLD AUTO: 0.5 10E3/UL (ref 0–1.3)
MONOCYTES NFR BLD AUTO: 11 %
NEUTROPHILS # BLD AUTO: 2.5 10E3/UL (ref 1.6–8.3)
NEUTROPHILS NFR BLD AUTO: 57 %
PLATELET # BLD AUTO: 113 10E3/UL (ref 150–450)
POTASSIUM SERPL-SCNC: 4 MMOL/L (ref 3.4–5.3)
PROT SERPL-MCNC: 7.3 G/DL (ref 6.4–8.3)
PSA SERPL DL<=0.01 NG/ML-MCNC: 0.65 NG/ML
RBC # BLD AUTO: 2.86 10E6/UL (ref 4.4–5.9)
SODIUM SERPL-SCNC: 137 MMOL/L (ref 135–145)
WBC # BLD AUTO: 4.3 10E3/UL (ref 4–11)

## 2024-07-18 PROCEDURE — 36415 COLL VENOUS BLD VENIPUNCTURE: CPT

## 2024-07-18 PROCEDURE — 85025 COMPLETE CBC W/AUTO DIFF WBC: CPT

## 2024-07-18 PROCEDURE — 84403 ASSAY OF TOTAL TESTOSTERONE: CPT

## 2024-07-18 PROCEDURE — 84153 ASSAY OF PSA TOTAL: CPT

## 2024-07-18 PROCEDURE — 80053 COMPREHEN METABOLIC PANEL: CPT

## 2024-07-18 NOTE — TELEPHONE ENCOUNTER
Attempted to call patient, no answer. LMTCB.     TC has no openings, is he ok seeing another provider? Also please get verbal consent for Care Everywhere for whichever UC he went to. No records.

## 2024-07-18 NOTE — TELEPHONE ENCOUNTER
Reason for Call:  Appointment Request    Patient requesting this type of appt:  office visit    Requested provider: Vira Flor    Reason patient unable to be scheduled: Not within requested timeframe    When does patient want to be seen/preferred time: 1-2 days Will be out of town on Monday.    Comments: Patient would like to be seen for infection on face. Was seen in Urgent care was told to follow up with primary.    Could we send this information to you in [facility] or would you prefer to receive a phone call?:   Patient would prefer a phone call   Okay to leave a detailed message?: Yes at Cell number on file:    Telephone Information:   Mobile [phone]       Call taken on 7/18/2024 at 9:57 AM by Magali Puckett

## 2024-07-19 ENCOUNTER — DOCUMENTATION ONLY (OUTPATIENT)
Dept: ONCOLOGY | Facility: CLINIC | Age: 84
End: 2024-07-19

## 2024-07-19 ENCOUNTER — OFFICE VISIT (OUTPATIENT)
Dept: FAMILY MEDICINE | Facility: OTHER | Age: 84
End: 2024-07-19
Payer: COMMERCIAL

## 2024-07-19 VITALS
RESPIRATION RATE: 16 BRPM | HEIGHT: 67 IN | HEART RATE: 65 BPM | TEMPERATURE: 97.3 F | WEIGHT: 189 LBS | DIASTOLIC BLOOD PRESSURE: 64 MMHG | OXYGEN SATURATION: 98 % | SYSTOLIC BLOOD PRESSURE: 136 MMHG | BODY MASS INDEX: 29.66 KG/M2

## 2024-07-19 DIAGNOSIS — L08.9 LOCAL INFECTION OF SKIN AND SUBCUTANEOUS TISSUE: Primary | ICD-10-CM

## 2024-07-19 PROCEDURE — 99213 OFFICE O/P EST LOW 20 MIN: CPT | Performed by: PHYSICIAN ASSISTANT

## 2024-07-19 NOTE — PROGRESS NOTES
Assessment & Plan     Local infection of skin and subcutaneous tissue  Patient was traveling in [place] visiting daughter and her family when the sore was first noticed. He is not sure how it started, but said that it gradually worsened from bump to open sore. Recalls that it was leaving stains on the daughter's bed sheet/pillow case. The patient was seen at local  upon returning to [place]. Review of this note shows that the wound was cultured and he was started on doxycycline. Culture returned with growth of proteus mirabilis showing resistance to tetracycline. He was subsequently switched to Augmentin and took his first dose this AM. On exam the sore is [id number], minimal erythema and no active discharge or weeping. Dressings were changed at appointment today. Patient instructed on how to change dressings and to begin to allow sore access to open air while at home, covering when in the community. Reference material attached to the AVS.     Jaime Batres is a 84 year old, presenting for the following health issues:  Urgent care follow up      7/19/2024     1:24 PM   Additional Questions   Roomed by Naty JUNG   Accompanied by self     History of Present Illness       Reason for visit:  Cheek infection  Symptom onset:  3-7 days ago  Symptoms include:  Leaky cheek with blood traces  Symptom intensity:  Mild  Symptom progression:  Staying the same  Had these symptoms before:  No  What makes it worse:  No  What makes it better:  Healing    He eats 2-3 servings of fruits and vegetables daily.He consumes 0 sweetened beverage(s) daily.He exercises with enough effort to increase his heart rate 9 or less minutes per day.  He exercises with enough effort to increase his heart rate 3 or less days per week.   He is taking medications regularly.     Started Doxycyline originally for skin infection from urgent care in [facility] but once results came back they stopped that one and he just started a new one this morning  "07/19/24 which is Amoxicillin and Clavulanate.    ED/UC Followup:    Facility:  [facility]  Date of visit: 07/15/2024  Reason for visit: Sore on left cheek  Current Status: Physically feeling ok, antibiotics maybe making him feel a little tired but he got a new antibiotic from the urgent care and started that today based on the test results. He said hard for him to tell physically if the wound looks better than it did prior because it was hard for him to see it himself, he wasn't sure what the bacteria was that they had found though when they switched his antibiotics.      Review of Systems  Constitutional, HEENT, cardiovascular, pulmonary, gi and gu systems are negative, except as otherwise noted.      Objective    /64   Pulse 65   Temp 97.3  F (36.3  C) (Temporal)   Resp 16   Ht 1.702 m (5' 7\")   Wt 85.7 kg (189 lb)   SpO2 98%   BMI 29.60 kg/m    Body mass index is 29.6 kg/m .  Physical Exam   GENERAL: alert and no distress  RESP: lungs clear to auscultation - no rales, rhonchi or wheezes  CV: regular rate and rhythm, normal S1 S2, no S3 or S4, no murmur, click or rub, no peripheral edema  MS: no gross musculoskeletal defects noted, no edema  SKIN: [id number] oval shaped sore with epithelialization along the edges. Minimal erythema and no active weeping.   PSYCH: mentation appears normal, affect normal/bright    AEROBIC CULTURE ([facility])  Order: [id number]  Component 4 d ago   Aerobic Culture 1 ([facility]) Proteus mirabilis Abnormal    Comment:  Light growth   Aerobic Susceptibility ([facility]) Comment   Comment:        ** S = Susceptible; I = Intermediate; R = Resistant **                     P = Positive; N = Negative              MICS are expressed in micrograms per mL     Antibiotic                 RSLT#1    RSLT#2    RSLT#3    RSLT#4  Amoxicillin/Clavulanic Acid    S  Ampicillin                     S  Cefepime                       S  Ceftriaxone                    S  Cefuroxime            "          S  Ciprofloxacin                  S  Ertapenem                      S  Gentamicin                     S  Levofloxacin                   S  Meropenem                      S  Piperacillin/Tazobactam        S  Tetracycline                   R  Tobramycin                     S  Trimethoprim/Sulfa             S   Resulting Agency [facility]   Narrative  Performed by [facility]  Performed at:  01 - [facility] [address], [place], [place]  [id number]  : Victor Manuel Coburn MD, Phone:  [phone]    Specimen Collected: 07/15/24  4:18 PM       Signed Electronically by: Loy Starr PA-C

## 2024-07-19 NOTE — PROGRESS NOTES
Oral Chemotherapy Monitoring Program  Lab Follow Up    Reviewed lab results from 7/18/24.        4/14/2023     7:00 AM 6/6/2024     2:00 PM 6/7/2024     1:00 PM 6/13/2024     3:00 PM 7/2/2024    10:00 AM 7/5/2024     1:00 PM 7/19/2024    11:00 AM   ORAL CHEMOTHERAPY   Assessment Type Discontinuation Initial Work up New Teach;Initial Follow up;Refill Lab Monitoring Left Voicemail Refill Lab Monitoring   Diagnosis Code Prostate Cancer Prostate Cancer Prostate Cancer Prostate Cancer Prostate Cancer Prostate Cancer Prostate Cancer   Providers  Manda Holman   [facility]   Is this patient followed by the [facility] OC team?  No No No No No No   Drug Name Nubeqa (darolutamide) Nubeqa (darolutamide) Nubeqa (darolutamide) Nubeqa (darolutamide) Nubeqa (darolutamide) Nubeqa (darolutamide) Nubeqa (darolutamide)   Dose  600 mg 600 mg 600 mg 600 mg 600 mg 600 mg   Current Schedule  BID BID BID BID BID BID   Cycle Details  Continuous Continuous Continuous Continuous Continuous Continuous   Start Date of Last Cycle  5/8/2024        Planned next cycle start date  6/8/2024 6/12/2024       Adverse Effects    Anemia;Thrombocytopenia   Increased Creatinine;Thrombocytopenia   Anemia    Grade 1      Thrombocytopenia    Grade 1   Grade 1   Any new drug interactions?  Yes Yes       Pharmacist Intervention?  Yes Yes       Intervention(s)   Patient Education       Is the dose as ordered appropriate for the patient?  Yes Yes           Labs:  _  Result Component Current Result Ref Range   Sodium 137 (7/18/2024) 135 - 145 mmol/L     _  Result Component Current Result Ref Range   Potassium 4.0 (7/18/2024) 3.4 - 5.3 mmol/L     _  Result Component Current Result Ref Range   Calcium 9.7 (7/18/2024) 8.8 - 10.4 mg/dL     No results found for Mag within last 30 days.     No results found for Phos within last 30 days.     _  Result  Component Current Result Ref Range   Albumin 4.3 (7/18/2024) 3.5 - 5.2 g/dL     _  Result Component Current Result Ref Range   Urea Nitrogen 34.6 (H) (7/18/2024) 8.0 - 23.0 mg/dL     _  Result Component Current Result Ref Range   Creatinine 1.64 (H) (7/18/2024) 0.67 - 1.17 mg/dL     _  Result Component Current Result Ref Range   AST 46 (H) (7/18/2024) 0 - 45 U/L     _  Result Component Current Result Ref Range   ALT 31 (7/18/2024) 0 - 70 U/L     _  Result Component Current Result Ref Range   Bilirubin Total 0.7 (7/18/2024) <=1.2 mg/dL     _  Result Component Current Result Ref Range   WBC Count 4.3 (7/18/2024) 4.0 - 11.0 10e3/uL     _  Result Component Current Result Ref Range   Hemoglobin 9.9 (L) (7/18/2024) 13.3 - 17.7 g/dL     _  Result Component Current Result Ref Range   Platelet Count 113 (L) (7/18/2024) 150 - 450 10e3/uL     No results found for ANC within last 30 days.     _  Result Component Current Result Ref Range   Absolute Neutrophils 2.5 (7/18/2024) 1.6 - 8.3 10e3/uL        Assessment & Plan:  Results are notable for elevated urea nitrogen and creatinine, which was seen previously as well. No interventions needed for now - continue monitoring.     Patient was contacted via [employer] message.    Follow-Up:  Lab draw 8/28    Zuri Gipson  Pharmacy Intern  Oral Chemotherapy Monitoring Program  [facility]  [phone]

## 2024-07-21 LAB — TESTOST SERPL-MCNC: 15 NG/DL (ref 240–950)

## 2024-07-24 NOTE — TELEPHONE ENCOUNTER
REASON FOR VISIT: Dupuytren contracture    DATE OF APPT: 7/30/2024   NOTES (FOR ALL VISITS) STATUS DETAILS   OFFICE NOTE from referring provider Internal [facility]  Neptali Contreras MD   OFFICE NOTE from other specialist Internal Referred from Vira Flor MD to Neptali Contreras MD   MEDICATION LIST Internal    IMAGING  (FOR ALL VISITS)     MRI (HEAD, NECK, SPINE) N/A    CT (HEAD, NECK, SPINE) N/A

## 2024-07-29 ENCOUNTER — TELEPHONE (OUTPATIENT)
Dept: ONCOLOGY | Facility: CLINIC | Age: 84
End: 2024-07-29
Payer: COMMERCIAL

## 2024-07-29 NOTE — TELEPHONE ENCOUNTER
Oral Chemotherapy Monitoring Program     Placed call to patient in follow up of Nubeqa therapy.     Left message to please call back in follow up of therapy. No patient or drug names were mentioned.    Eva Hwang  Pharmacy Intern  Oral Chemotherapy Monitoring Program  [facility]   [phone]

## 2024-07-30 ENCOUNTER — PRE VISIT (OUTPATIENT)
Dept: ORTHOPEDICS | Facility: CLINIC | Age: 84
End: 2024-07-30

## 2024-07-30 ENCOUNTER — OFFICE VISIT (OUTPATIENT)
Dept: ORTHOPEDICS | Facility: CLINIC | Age: 84
End: 2024-07-30
Attending: ORTHOPAEDIC SURGERY
Payer: COMMERCIAL

## 2024-07-30 DIAGNOSIS — M72.0 DUPUYTREN CONTRACTURE: ICD-10-CM

## 2024-07-30 PROCEDURE — 99203 OFFICE O/P NEW LOW 30 MIN: CPT | Performed by: PLASTIC SURGERY

## 2024-07-30 NOTE — LETTER
2024      Medardo Gautam  [address] [facility] [id number]      Dear Colleague,    Thank you for referring your patient, Medardo Gautam, to the [facility]. Please see a copy of my visit note below.    Referring Provider:  Neptali Contreras MD  [address] [place] [id number]     Primary Care Provider:  Vira Flor      RE: Medardo Gautam.  : 1940.  EDMUND: 2024.    Reason for visit: Left little finger Dupuytren's contracture    HPI: The patient is ambidextrous.  Writes with his right hand.  His left little finger is contracted with Dupuytren's cords.  It does not interfere with his day-to-day activities.  He has no pain.  No issues.  Has had right hand Dupuytren's release in the past.    Medical history:  Past Medical History:   Diagnosis Date     Anxiety      Colon cancer (H) 2015     Contracture of finger joint ca 2005    left and right fifth fingers     Contracture of finger joint 2012     Dupuytren's contracture of left hand      Gout      Hemorrhoids 2007     Problem list name updated by automated process. Provider to review     HEMORRHOIDS NOS 2007     Hyperbilirubinemia 2012     Hypertension      Insomnia      Nonsenile cataract      Personal history of colonic polyps 7 years ago?     Primary osteoarthritis of left knee 2022     Prostate cancer (H) 2012     Renal cyst 2017     Seborrheic dermatitis      Skin lesion- R side of face and behind L ear 12/15/2011     SKIN SENSATION DISTURB 2006    allergic reaction to strawberries     SKIN SENSATION DISTURB 2006       Surgical history:  Past Surgical History:   Procedure Laterality Date     APPENDECTOMY       BIOPSY  01/16/15     CATARACT IOL, RT/LT Left 02/15/2018     COLON SURGERY  2015    Lap assisted R hemicolectomy     COLONOSCOPY  10/25/07    Snare polypectomy     COLONOSCOPY  2009    with snare polypectomy      COLONOSCOPY  9/30/2009     COLONOSCOPY  1/5/2011    COL[facility]EE at  [facility]     COLONOSCOPY N/A 1/16/2015    Procedure: COMBINED COLONOSCOPY, SINGLE OR MULTIPLE BIOPSY/POLYPECTOMY BY BIOPSY;  Surgeon: Sarah Beth Pisano MD;  [facility]     COLONOSCOPY WITH CO2 INSUFFLATION N/A 1/16/2015    Procedure: COLONOSCOPY WITH CO2 INSUFFLATION;  Surgeon: Sarah Beth Pisano MD;  [facility]     COLONOSCOPY WITH CO2 INSUFFLATION N/A 9/7/2018    Procedure: COLONOSCOPY WITH CO2 INSUFFLATION;  C18.9 (ICD-10-CM) - Malignant neoplasm of colon, unspecified part of colon (H)  [facility] fax# [phone]  BMI 26.29  [employer]  Referred by dr thomas;  Surgeon: Sarah Beth Pisano MD;  Location: [facility]     COLONOSCOPY WITH CO2 INSUFFLATION N/A 9/10/2021    Procedure: COLONOSCOPY, WITH CO2 INSUFFLATION;  Surgeon: Sarah Beth Pisano MD;  [facility]     DAVINCI PROSTATECTOMY  8/6/2012    Procedure: DAVINCI PROSTATECTOMY;  Davinci Assisted Radical Prostatectomy with Bilateral Lymphadenectomy ;  Surgeon: Norma Goodwin MD;  [facility]     GENITOURINARY SURGERY      prostate surgery     INJECT EPIDURAL LUMBAR / SACRAL SINGLE Left 10/30/2017    Procedure: INJECT EPIDURAL LUMBAR / SACRAL SINGLE;  Left Transforaminal Lumbar 4-Lumbar 5 Epidural Steroid Injection;  Surgeon: Nuvia Reina MD;  [facility]     LAPAROSCOPIC ASSISTED COLECTOMY N/A 2/11/2015    Procedure: LAPAROSCOPIC ASSISTED COLECTOMY;  Surgeon: Oren Breen MD;  [facility]     PHACOEMULSIFICATION CLEAR CORNEA WITH STANDARD INTRAOCULAR LENS IMPLANT Left 2/15/2018    Procedure: PHACOEMULSIFICATION CLEAR CORNEA WITH STANDARD INTRAOCULAR LENS IMPLANT;  LEFT EYE CATARACT EXTRACTION WITH STANDARD INTRAOCULAR LENS IMPLANT ;  Surgeon: Brandon Orellana MD;  Location:  [facility]     PHACOEMULSIFICATION CLEAR CORNEA WITH STANDARD INTRAOCULAR LENS IMPLANT Right 3/1/2018    Procedure: PHACOEMULSIFICATION  CLEAR CORNEA WITH STANDARD INTRAOCULAR LENS IMPLANT;  RIGHT EYE CATARACT EXTRACTION WITH STANDARD INTRAOCULAR LENS IMPLANT ;  Surgeon: Brandon Orellana MD;  Location:  HAND/FINGER SURGERY UNLISTED       [facility] LENGTHEN,TENDON,HAND/FINGER  ca 2007    right fifth     [facility] STOMACH SURGERY PROCEDURE UNLISTED         Family history:  Family History   Problem Relation Age of Onset     Cerebrovascular Disease Father      Cancer Mother 90        Patient believes vaginal cancer - hysterectomy,      Alzheimer Disease Mother      Cancer Sister      Breast Cancer Sister      C.A.D. No family hx of      Cancer - colorectal No family hx of      Prostate Cancer No family hx of      Blood Disease No family hx of      Cardiovascular No family hx of      Circulatory No family hx of      Eye Disorder No family hx of      Gastrointestinal Disease No family hx of      Genitourinary Problems No family hx of      Lipids No family hx of      Neurologic Disorder No family hx of      Respiratory No family hx of      Asthma No family hx of      Heart Disease No family hx of      Diabetes No family hx of      Hypertension No family hx of      Arthritis No family hx of      Thyroid Disease No family hx of      Musculoskeletal Disorder No family hx of      Glaucoma No family hx of      Macular Degeneration No family hx of        Medications:  Current Outpatient Medications   Medication Sig Dispense Refill     allopurinol (ZYLOPRIM) 100 MG tablet Take 1 tablet (100 mg) by mouth daily 90 tablet 3     amoxicillin-clavulanate (AUGMENTIN) 875-125 MG tablet Take 1 tablet by mouth 2 times daily       atorvastatin (LIPITOR) 10 MG tablet Take 1 tablet (10 mg) by mouth daily 90 tablet 3     calcium-vitamin D (CALTRATE) 600-400 MG-UNIT per tablet Take 1 tablet by mouth daily       clonazePAM (KLONOPIN) 1 MG tablet Take 1.5 tablets (1.5 mg) by mouth nightly as needed for anxiety 45 tablet 5     darolutamide (NUBEQA) 300 MG tablet  Take 2 tablets (600 mg) by mouth 2 times daily for 30 days . Swallow tablets whole. Take with food. Avoid grapefruit or grapefruit juice. 120 tablet 0     folic acid-vit B6-vit B12 (FOLGARD) 0.8-10-0.115 MG TABS Take 1 tablet by mouth daily       leuprolide (LUPRON DEPOT) 22.5 MG kit Inject 22.5 mg into the muscle every 3 months 1 each 3     losartan-hydrochlorothiazide (HYZAAR) 100-12.5 MG tablet Take 1 tablet by mouth daily 90 tablet 3     Omega-3 Fatty Acids (OMEGA-3 FISH OIL PO) Take 500 mg by mouth daily       zolpidem (AMBIEN) 5 MG tablet Take 1 tablet (5 mg) by mouth nightly as needed for sleep 30 tablet 5       Allergies:  No Known Allergies    Social history:   Social History     Tobacco Use     Smoking status: Former     Current packs/day: 0.00     Types: Cigarettes, Cigars, Pipe     Start date: 10/1/1961     Quit date: 1962     Years since quittin.6     Passive exposure: Never     Smokeless tobacco: Never     Tobacco comments:     No smokers in home   Substance Use Topics     Alcohol use: Yes     Comment: A whisky and a glass of wine         Physical Examination:  There were no vitals taken for this visit.  There is no height or weight on file to calculate BMI.    General: No acute distress.    On exam: Vital signs stable afebrile.  Left little finger has a MP joint contracture of about 50 to 60 degrees.  Grossly neurovascularly intact.        ASSESMENT and PLAN:     Based upon the above findings, a diagnosis of Dupuytren's contracture left little finger was made.  I had a layla, detailed discussion with the patient, in the presence of my nurse, who was present from beginning to end.  I discussed with the patient the pathophysiology behind the problem, the concept behind the procedure/treatment proposed, expectations of the planned procedure(s), and all junior-operative steps.     Given the fact the patient has no issues with the contracture, has a significant medical comorbidity history along with  anticoagulation, and is not interested in any treatment, I think conservative approach is all that is needed.  He will let me know if he changes his mind.  We did go over options of surgery versus Xiaflex injections.  He was educated on all of this.  I will see him back as needed.    All questions were answered. The patient was happy with the visit. I look forward to helping the patient out in the near future as indicated.       Total time spent in the encounter today including chart review, visit itself, and post-visit paperwork was 30 minutes.       Marlin Tatum MD    Chief, Division of Plastic Surgery  Department of Surgery  [facility]      CC: Neptali Contreras MD  [address] [place] [address]  CC: Vira Flor      Again, thank you for allowing me to participate in the care of your patient.        Sincerely,        MARTHA Tatum MD

## 2024-07-31 ENCOUNTER — TELEPHONE (OUTPATIENT)
Dept: FAMILY MEDICINE | Facility: OTHER | Age: 84
End: 2024-07-31
Payer: COMMERCIAL

## 2024-07-31 NOTE — PROGRESS NOTES
Referring Provider:  Neptali Contreras MD  [address] [place] [id number]     Primary Care Provider:  Vira Flor      RE: Medardo Gautam.  : 1940.  EDMUND: 2024.    Reason for visit: Left little finger Dupuytren's contracture    HPI: The patient is ambidextrous.  Writes with his right hand.  His left little finger is contracted with Dupuytren's cords.  It does not interfere with his day-to-day activities.  He has no pain.  No issues.  Has had right hand Dupuytren's release in the past.    Medical history:  Past Medical History:   Diagnosis Date    Anxiety     Colon cancer (H) 2015    Contracture of finger joint ca     left and right fifth fingers    Contracture of finger joint 2012    Dupuytren's contracture of left hand     Gout     Hemorrhoids 2007     Problem list name updated by automated process. Provider to review    HEMORRHOIDS NOS 2007    Hyperbilirubinemia 2012    Hypertension     Insomnia     Nonsenile cataract     Personal history of colonic polyps 7 years ago?    Primary osteoarthritis of left knee 2022    Prostate cancer (H) 2012    Renal cyst 2017    Seborrheic dermatitis     Skin lesion- R side of face and behind L ear 12/15/2011    SKIN SENSATION DISTURB 2006    allergic reaction to strawberries    SKIN SENSATION DISTURB 2006       Surgical history:  Past Surgical History:   Procedure Laterality Date    APPENDECTOMY      BIOPSY  01/16/15    CATARACT IOL, RT/LT Left 02/15/2018    COLON SURGERY  2015    Lap assisted R hemicolectomy    COLONOSCOPY  10/25/07    Snare polypectomy    COLONOSCOPY  2009    with snare polypectomy    COLONOSCOPY  2009    COLONOSCOPY  2011    COLON[facility]EE at  [facility]    COLONOSCOPY N/A 2015    Procedure: COMBINED COLONOSCOPY, SINGLE OR MULTIPLE BIOPSY/POLYPECTOMY BY BIOPSY;  Surgeon: Sarah Beth Pisano MD;  Location:  OR     COLONOSCOPY WITH CO2 INSUFFLATION N/A 1/16/2015    Procedure: COLONOSCOPY WITH CO2 INSUFFLATION;  Surgeon: Sarah Beth Pisano MD;  [facility]    COLONOSCOPY WITH CO2 INSUFFLATION N/A 9/7/2018    Procedure: COLONOSCOPY WITH CO2 INSUFFLATION;  C18.9 (ICD-10-CM) - Malignant neoplasm of colon, unspecified part of colon (H)  [facility] fax# [phone]  BMI 26.29  [employer]  Referred by dr thomas;  Surgeon: Sarah Beth Pisano MD;  [facility]    COLONOSCOPY WITH CO2 INSUFFLATION N/A 9/10/2021    Procedure: COLONOSCOPY, WITH CO2 INSUFFLATION;  Surgeon: Sarah Beth Pisano MD;  [facility]    DAVINCI PROSTATECTOMY  8/6/2012    Procedure: DAVINCI PROSTATECTOMY;  Davinci Assisted Radical Prostatectomy with Bilateral Lymphadenectomy ;  Surgeon: Norma Goodwin MD;  [facility]    GENITOURINARY SURGERY      prostate surgery    INJECT EPIDURAL LUMBAR / SACRAL SINGLE Left 10/30/2017    Procedure: INJECT EPIDURAL LUMBAR / SACRAL SINGLE;  Left Transforaminal Lumbar 4-Lumbar 5 Epidural Steroid Injection;  Surgeon: Nuvia Reina MD;  [facility]    LAPAROSCOPIC ASSISTED COLECTOMY N/A 2/11/2015    Procedure: LAPAROSCOPIC ASSISTED COLECTOMY;  Surgeon: Oren Breen MD;  [facility]    PHACOEMULSIFICATION CLEAR CORNEA WITH STANDARD INTRAOCULAR LENS IMPLANT Left 2/15/2018    Procedure: PHACOEMULSIFICATION CLEAR CORNEA WITH STANDARD INTRAOCULAR LENS IMPLANT;  LEFT EYE CATARACT EXTRACTION WITH STANDARD INTRAOCULAR LENS IMPLANT ;  Surgeon: Brandon Orellana MD;  Location: [facility]    PHACOEMULSIFICATION CLEAR CORNEA WITH STANDARD INTRAOCULAR LENS IMPLANT Right 3/1/2018    Procedure: PHACOEMULSIFICATION CLEAR CORNEA WITH STANDARD INTRAOCULAR LENS IMPLANT;  RIGHT EYE CATARACT EXTRACTION WITH STANDARD INTRAOCULAR LENS IMPLANT ;  Surgeon: Brandon Orellana MD;  Location: [facility]    ZZC HAND/FINGER SURGERY UNLISTED      ZZC LENGTHEN,TENDON,HAND/FINGER  ca 2007    right fifth     ZC STOMACH SURGERY PROCEDURE UNLISTED         Family history:  Family History   Problem Relation Age of Onset    Cerebrovascular Disease Father     Cancer Mother 90        Patient believes vaginal cancer - hysterectomy,     Alzheimer Disease Mother     Cancer Sister     Breast Cancer Sister     C.A.D. No family hx of     Cancer - colorectal No family hx of     Prostate Cancer No family hx of     Blood Disease No family hx of     Cardiovascular No family hx of     Circulatory No family hx of     Eye Disorder No family hx of     Gastrointestinal Disease No family hx of     Genitourinary Problems No family hx of     Lipids No family hx of     Neurologic Disorder No family hx of     Respiratory No family hx of     Asthma No family hx of     Heart Disease No family hx of     Diabetes No family hx of     Hypertension No family hx of     Arthritis No family hx of     Thyroid Disease No family hx of     Musculoskeletal Disorder No family hx of     Glaucoma No family hx of     Macular Degeneration No family hx of        Medications:  Current Outpatient Medications   Medication Sig Dispense Refill    allopurinol (ZYLOPRIM) 100 MG tablet Take 1 tablet (100 mg) by mouth daily 90 tablet 3    amoxicillin-clavulanate (AUGMENTIN) 875-125 MG tablet Take 1 tablet by mouth 2 times daily      atorvastatin (LIPITOR) 10 MG tablet Take 1 tablet (10 mg) by mouth daily 90 tablet 3    calcium-vitamin D (CALTRATE) 600-400 MG-UNIT per tablet Take 1 tablet by mouth daily      clonazePAM (KLONOPIN) 1 MG tablet Take 1.5 tablets (1.5 mg) by mouth nightly as needed for anxiety 45 tablet 5    darolutamide (NUBEQA) 300 MG tablet Take 2 tablets (600 mg) by mouth 2 times daily for 30 days . Swallow tablets whole. Take with food. Avoid grapefruit or grapefruit juice. 120 tablet 0    folic acid-vit B6-vit B12 (FOLGARD) 0.8-10-0.115 MG TABS Take 1 tablet by mouth daily      leuprolide (LUPRON DEPOT) 22.5 MG kit Inject 22.5 mg into the muscle  every 3 months 1 each 3    losartan-hydrochlorothiazide (HYZAAR) 100-12.5 MG tablet Take 1 tablet by mouth daily 90 tablet 3    Omega-3 Fatty Acids (OMEGA-3 FISH OIL PO) Take 500 mg by mouth daily      zolpidem (AMBIEN) 5 MG tablet Take 1 tablet (5 mg) by mouth nightly as needed for sleep 30 tablet 5       Allergies:  No Known Allergies    Social history:   Social History     Tobacco Use    Smoking status: Former     Current packs/day: 0.00     Types: Cigarettes, Cigars, Pipe     Start date: 10/1/1961     Quit date: 1962     Years since quittin.6     Passive exposure: Never    Smokeless tobacco: Never    Tobacco comments:     No smokers in home   Substance Use Topics    Alcohol use: Yes     Comment: A whisky and a glass of wine         Physical Examination:  There were no vitals taken for this visit.  There is no height or weight on file to calculate BMI.    General: No acute distress.    On exam: Vital signs stable afebrile.  Left little finger has a MP joint contracture of about 50 to 60 degrees.  Grossly neurovascularly intact.        ASSESMENT and PLAN:     Based upon the above findings, a diagnosis of Dupuytren's contracture left little finger was made.  I had a layla, detailed discussion with the patient, in the presence of my nurse, who was present from beginning to end.  I discussed with the patient the pathophysiology behind the problem, the concept behind the procedure/treatment proposed, expectations of the planned procedure(s), and all junior-operative steps.     Given the fact the patient has no issues with the contracture, has a significant medical comorbidity history along with anticoagulation, and is not interested in any treatment, I think conservative approach is all that is needed.  He will let me know if he changes his mind.  We did go over options of surgery versus Xiaflex injections.  He was educated on all of this.  I will see him back as needed.    All questions were answered. The  patient was happy with the visit. I look forward to helping the patient out in the near future as indicated.       Total time spent in the encounter today including chart review, visit itself, and post-visit paperwork was 30 minutes.       Marlin Tatum MD    Chief, Division of Plastic Surgery  Department of Surgery  [facility]      CC: Neptali Contreras MD  [address] [place] [address]  CC: Vira Flor

## 2024-07-31 NOTE — TELEPHONE ENCOUNTER
[facility] calling to get patient scheduled for Lupron inj. Requesting appointment on 8/27. RN noted that last injection was 6/6 and is scheduled for every 3 months. RN advised that if we go out 12 weeks then appointment will need to be scheduled after 8/29. RN was able to schedule patient for following week on 9/3 at 10am. Medication is being ordered.     Future Appointments 7/31/2024 - 1/27/2025        Date Visit Type Length Department Provider     8/9/2024  2:30 PM OFFICE VISIT 30 min ER FAMILY PRACTICE Vira Flor MD    Location Instructions:     [facility] is located at [address], between [address] Free lot parking is available. Upon entering the building, the clinic is to the left.  DUE TO CONSTRUCTION NEAR THE CLINIC  AND THE SURROUNDING STREETS, PLEASE ALLOW FOR EXTRA TIME TO GET TO YOUR APPT ON TIME.              8/28/2024 10:15 AM LAB 15 min ER LABORATORY ER LAB [facility]    Location Instructions:     DUE TO CONSTRUCTION NEAR THE CLINIC  AND THE SURROUNDING STREETS, PLEASE ALLOW FOR EXTRA TIME TO GET TO YOUR APPT ON TIME.               9/3/2024 10:00 AM RN VISIT 30 min ER FAMILY PRACTICE NL RN ERC    Location Instructions:     [facility] is located at [address], between [address] Free lot parking is available. Upon entering the building, the clinic is to the left.  DUE TO CONSTRUCTION NEAR THE CLINIC  AND THE SURROUNDING STREETS, PLEASE ALLOW FOR EXTRA TIME TO GET TO YOUR APPT ON TIME.              9/4/2024  2:45 PM RETURN [facility]CSL 30 min  ONCOLOGY ADULT Tio Holman MD    Location Instructions:     The Clinics and Surgery Center ([facility]) is in a dense urban area with multiple transportation and parking options. You may wish to review options for  service and self-parking in more detail on the [facility] s website at [email or web address].   This appointment is in a  hospital-based location.&nbsp; Before your visit, you may want to check with your insurance company for coverage and referral options, including cost differences between services provided in different clinic settings.&nbsp; For more information visit this link on the [employer] Website:&nbsp; [email or web address]                   KATTY MillerN, RN

## 2024-08-05 ENCOUNTER — DOCUMENTATION ONLY (OUTPATIENT)
Dept: ONCOLOGY | Facility: CLINIC | Age: 84
End: 2024-08-05
Payer: COMMERCIAL

## 2024-08-05 DIAGNOSIS — C61 PROSTATE CANCER (H): ICD-10-CM

## 2024-08-05 DIAGNOSIS — C61 PROSTATE CANCER METASTATIC TO BONE (H): ICD-10-CM

## 2024-08-05 DIAGNOSIS — C61 MALIGNANT NEOPLASM OF PROSTATE (H): Primary | ICD-10-CM

## 2024-08-05 DIAGNOSIS — C79.51 PROSTATE CANCER METASTATIC TO BONE (H): ICD-10-CM

## 2024-08-09 ENCOUNTER — DOCUMENTATION ONLY (OUTPATIENT)
Dept: ONCOLOGY | Facility: CLINIC | Age: 84
End: 2024-08-09

## 2024-08-09 ENCOUNTER — OFFICE VISIT (OUTPATIENT)
Dept: FAMILY MEDICINE | Facility: OTHER | Age: 84
End: 2024-08-09
Payer: COMMERCIAL

## 2024-08-09 VITALS
RESPIRATION RATE: 16 BRPM | WEIGHT: 187 LBS | TEMPERATURE: 96.8 F | SYSTOLIC BLOOD PRESSURE: 118 MMHG | DIASTOLIC BLOOD PRESSURE: 80 MMHG | BODY MASS INDEX: 29.29 KG/M2 | OXYGEN SATURATION: 96 % | HEART RATE: 73 BPM

## 2024-08-09 DIAGNOSIS — M54.50 MIDLINE LOW BACK PAIN WITHOUT SCIATICA, UNSPECIFIED CHRONICITY: ICD-10-CM

## 2024-08-09 DIAGNOSIS — L82.0 INFLAMED SEBORRHEIC KERATOSIS: Primary | ICD-10-CM

## 2024-08-09 DIAGNOSIS — L08.9 LOCAL INFECTION OF SKIN AND SUBCUTANEOUS TISSUE: ICD-10-CM

## 2024-08-09 PROCEDURE — 17110 DESTRUCTION B9 LES UP TO 14: CPT | Performed by: FAMILY MEDICINE

## 2024-08-09 PROCEDURE — 99213 OFFICE O/P EST LOW 20 MIN: CPT | Mod: 25 | Performed by: FAMILY MEDICINE

## 2024-08-09 ASSESSMENT — PAIN SCALES - GENERAL: PAINLEVEL: NO PAIN (0)

## 2024-08-09 ASSESSMENT — ENCOUNTER SYMPTOMS: BACK PAIN: 1

## 2024-08-09 NOTE — PROGRESS NOTES
Assessment & Plan     Inflamed seborrheic keratosis  Patient desires treatment as it is irritating to him. Liquid nitrogen was applied for 10-12 seconds to the lesion(s) and the expected blistering or scabbing reaction explained.  Return if lesion(s) fail to fully resolve.     - DESTRUCT BENIGN LESION, UP TO 14    Local infection of skin and subcutaneous tissue  This appears to be healed and scarred.  I suspect he has a cyst which is causing the lump but at this time does not appear to be inflamed.  If he gets recurrent erythema, tenderness or fluctuance then would consider incision and drainage.    Midline low back pain without sciatica, unspecified chronicity  Normal exam.  He gets pain with certain movements that resolves with rest always consider using heating pad, Biofreeze, lidocaine patches.      Jaime Batres is a 84 year old, presenting for the following health issues:  Back Pain, Tremors, and Derm Problem (Spot on face, left leg)        8/9/2024     2:05 PM   Additional Questions   Roomed by poornima     Back Pain     History of Present Illness       Reason for visit:  Lower back pain    He eats 2-3 servings of fruits and vegetables daily.   He is taking medications regularly.       Concern - skin infection on face, still has a lump, small crusty mole like on his inner left thigh  Onset:   Description: crusty mole like  Intensity: mild  Progression of Symptoms:  same  Accompanying Signs & Symptoms: NA  Previous history of similar problem: NA  Precipitating factors:        Worsened by:   Alleviating factors:        Improved by:   Therapies tried and outcome:  none     He states that the infection on his left cheek he started noticing by leaving some spots on the pillow.  He was seen in urgent care and initially treated with an antibiotic but the wound culture came back that it was resistant to and he was changed to Augmentin.  He finished that antibiotic.  He states it caused a lot of gut issues.   "He denies any further drainage.  He denies fever or chills.  He denies redness.  However there is still a small lump where this occurred.      He also has a little crusty lesion on his left inside thigh.  He states he gets caught on his clothing and is very irritated.  He denies bleeding.        Pain History:  When did you first notice your pain? 1 month ago   Have you seen anyone else for your pain? No  How has your pain affected your ability to work? Not applicable  Where in your body do you have pain? Back Pain  Onset/Duration: couple times in the past month  Description:   Location of pain: low back right  Character of pain: \"sore\"  Pain radiation: none  New numbness or weakness in legs, not attributed to pain: no   Intensity: mild  Progression of Symptoms: waxing and waning  History:   Specific cause: turning/bending  Pain interferes with job: N/A  History of back problems: never had surgery  Any previous MRI or X-rays: None  Sees a specialist for back pain: No  Alleviating factors:   Improved by: rest and sitting    Precipitating factors:  Worsened by: Bending  Therapies tried and outcome: NA    He states for the last month he has noticed if he is bending over for a while when he straightens back up he has some low back discomfort.  He states is not really a pain.  He denies pain radiating to his legs.  He denies numbness, tingling or weakness in his legs.  He states he sits and the pain goes away in about 10 minutes.      Objective    /80 (BP Location: Right arm, Patient Position: Sitting, Cuff Size: Adult Regular)   Pulse 73   Temp 96.8  F (36  C) (Temporal)   Resp 16   Wt 84.8 kg (187 lb)   SpO2 96%   BMI 29.29 kg/m    Body mass index is 29.29 kg/m .  Physical Exam   Gen: no apparent distress  Skin: Left cheek there is a 1 cm subcutaneous smooth nontender lump.  There is no fluctuance.  There is no erythema over this lump.  Just inferior to it you can see scabbing from where the prior wound was " draining.  This is healing.  There is no drainage.  On his left medial thigh there is a 2 mm raised stuck on appearing lesion which is consistent with a seborrheic keratosis.  It is surrounded by a small ring of erythema.  Back: no spinous process tenderness, no paraspinous tenderness.  No tenderness over the sciatic notch he has full range of motion.  Strength is 5 out of 5 bilateral lower extremities.  Sensation is grossly intact bilaterally.          Signed Electronically by: Vira Flor MD

## 2024-08-09 NOTE — PROGRESS NOTES
Oral Chemotherapy Monitoring Program     Placed call to patient in follow up of Nubeqa therapy.     Left message to please call back in follow up of therapy. No patient or drug names were mentioned.    Eva Hwang  Pharmacy Intern  Oral Chemotherapy Monitoring Program  [facility]   [phone]

## 2024-08-12 ENCOUNTER — DOCUMENTATION ONLY (OUTPATIENT)
Dept: ONCOLOGY | Facility: CLINIC | Age: 84
End: 2024-08-12
Payer: COMMERCIAL

## 2024-08-12 NOTE — NURSING NOTE
[id number]- Eclipse LTFU Chart Review Note:     Dates of Chart Review: [id number] through [id number]. LTFU # 1    1. Has patient experienced any Symptomatic Skeletal Events (bone directed radiation therapy, new symptomatic pathological fracture, spinal cord compression, tumor related orthopedic surgery) since last visit? No   If yes, date of event: N/A     2. Has patient experienced any secondary malignancies since last visit? no   If yes, date of event: N/A     3. Has patient been evaluated for radiographic progression since last visit? yes   If yes, date of [id number]    Outcome of imaging: No progression    If yes, date of imagin2023   Outcome of imaging: No progression     4. Has patient experienced any clinical or pain progression since last visit? no   If yes, date of event: N/A     5. Has patient experienced any Grade 3 or Grade 4 acute kidney injuries since last visit? no   If yes, date of event: N/A     6. Was PSA collected during the above follow up period? Yes      [id number]- Eclipse LTFU Chart Review Note:     Dates of Chart Review: [id number] through 27-MAR-2024. LTFU # 2    1. Has patient experienced any Symptomatic Skeletal Events (bone directed radiation therapy, new symptomatic pathological fracture, spinal cord compression, tumor related orthopedic surgery) since last visit? No    If yes, date of event: N/A     2. Has patient experienced any secondary malignancies since last visit? no   If yes, date of event: N/A     3. Has patient been evaluated for radiographic progression since last visit? yes   If yes, date of [id number]   Outcome of imaging: No progression     4. Has patient experienced any clinical or pain progression since last visit? no   If yes, date of event: N/A     5. Has patient experienced any Grade 3 or Grade 4 acute kidney injuries since last visit? no   If yes, date of event: N/A     6. Was PSA collected during the above follow up period? yes       [id number]- Eclipse LTFU Chart Review Note:     Dates of Chart Review: 2024 through 2024 LTFU # 3    1. Has patient experienced any Symptomatic Skeletal Events (bone directed radiation therapy, new symptomatic pathological fracture, spinal cord compression, tumor related orthopedic surgery) since last visit? no   If yes, date of event: N/A      2. Has patient experienced any secondary malignancies since last visit? no   If yes, date of event: N/A     3. Has patient been evaluated for radiographic progression since last visit? yes   If yes, date of imagin2024    Outcome of imaging: Soft tissue progression    4. Has patient experienced any clinical or pain progression since last visit? no   If yes, date of event: N/A     5. Has patient experienced any Grade 3 or Grade 4 acute kidney injuries since last visit? no   If yes, date of event: N/A     6. Was PSA collected during the above follow up period? Yes

## 2024-08-19 NOTE — PROGRESS NOTES
Subjective     Medardo Gautam is a 79 year old male who presents to clinic today for the following health issues:    History of Present Illness        Mental Health Follow-up:  Patient presents to follow-up on Anxiety.    Patient's anxiety since last visit has been:  No change  The patient is not having other symptoms associated with anxiety.  Any significant life events: No  Patient is not feeling anxious or having panic attacks.  Patient has no concerns about alcohol or drug use.     Social History  Tobacco Use    Smoking status: Former Smoker      Years: 1.00      Types: Cigarettes, Cigars, Pipe      Start date: 10/1/1961      Quit date: 1962      Years since quittin.5    Smokeless tobacco: Never Used    Tobacco comment: No smokers in home  Alcohol use: Yes    Alcohol/week: 0.0 oz    Comment: daily glass of wine and whiskey  Drug use: Yes    Types: Benzodiazepines      Today's PHQ-9         PHQ-9 Total Score:         PHQ-9 Q9 Thoughts of better off dead/self-harm past 2 weeks :       Thoughts of suicide or self harm:      Self-harm Plan:        Self-harm Action:          Safety concerns for self or others:           Hypertension: He presents for follow up of hypertension.  He does check blood pressure  regularly outside of the clinic. Outside blood pressures have been over 140/90. (Rarely) He does not follow a low salt diet.     He eats 2-3 servings of fruits and vegetables daily.He consumes 1 sweetened beverage(s) daily.  He is taking medications regularly.       Joint Pain    Onset: x 9 months    Description:   Location: left knee and right knee  Character: Dull ache    Intensity: mild, 0/10    Progression of Symptoms: intermittent    Accompanying Signs & Symptoms:  Other symptoms: none    History:   Previous similar pain: YES      Precipitating factors:   Trauma or overuse: no     Alleviating factors:  Improved by: nothing  Therapies Tried and outcome:  None      -------------------------------------    Patient Active Problem List   Diagnosis     Disturbance of skin sensation     Hemorrhoids     Gout     Advanced directives, counseling/discussion     Dupuytren's contracture of hand- left 5th finger, right 5th finger     Bunions- both feet     Skin lesion- R side of face and behind L ear     Insomnia     Prostatic nodule- posterior right edge with rectal exam     Prostate cancer- Jeancarlos 9 (5+4) dx Feb 2012     Elevated liver enzymes     Hyperbilirubinemia     Essential hypertension with goal blood pressure less than 140/90     Contracture of finger joint     Hyperlipidemia LDL goal <130     Malignant neoplasm of prostate (H)     Anxiety     Colon cancer (H)     Abdominal pain, epigastric     Renal cyst     Lumbar radiculopathy     Meibomian gland dysfunction     Pseudophakia of right eye     Macular degeneration     Dermatochalasis of eyelid     Olecranon bursitis of right elbow     Right knee pain     Past Surgical History:   Procedure Laterality Date     APPENDECTOMY       BIOPSY  01/16/15     C HAND/FINGER SURGERY UNLISTED       C LENGTHEN,TENDON,HAND/FINGER  ca 2007    right fifth     C STOMACH SURGERY PROCEDURE UNLISTED       CATARACT IOL, RT/LT Left 02/15/2018     COLON SURGERY  2/11/2015    Lap assisted R hemicolectomy     COLONOSCOPY  10/25/07    Snare polypectomy     COLONOSCOPY  6/25/2009    with snare polypectomy     COLONOSCOPY  9/30/2009     COLONOSCOPY  1/5/2011    COLONOSCOPY performed by JUD MARIEE at  GI     COLONOSCOPY N/A 1/16/2015    Procedure: COMBINED COLONOSCOPY, SINGLE OR MULTIPLE BIOPSY/POLYPECTOMY BY BIOPSY;  Surgeon: Sarah Beth Pisano MD;  Location: [facility] OR     COLONOSCOPY WITH CO2 INSUFFLATION N/A 1/16/2015    Procedure: COLONOSCOPY WITH CO2 INSUFFLATION;  Surgeon: Sarah Beth Pisano MD;  Location: [facility] OR     COLONOSCOPY WITH CO2 INSUFFLATION N/A 9/7/2018    Procedure: COLONOSCOPY WITH CO2 INSUFFLATION;  C18.9 (ICD-10-CM) -  Malignant neoplasm of colon, unspecified part of colon (H)  [facility] fax# [phone]  BMI 26.29  [employer]  Referred by dr thomas;  Surgeon: Sarah Beth Pisano MD;  Location: [facility] OR     DAVINCI PROSTATECTOMY  2012    Procedure: DAVINCI PROSTATECTOMY;  Davinci Assisted Radical Prostatectomy with Bilateral Lymphadenectomy ;  Surgeon: Norma Goodwin MD;  Location: [facility] OR     GENITOURINARY SURGERY      prostate surgery     INJECT EPIDURAL LUMBAR / SACRAL SINGLE Left 10/30/2017    Procedure: INJECT EPIDURAL LUMBAR / SACRAL SINGLE;  Left Transforaminal Lumbar 4-Lumbar 5 Epidural Steroid Injection;  Surgeon: Nuvia Reina MD;  Location: [facility] OR     LAPAROSCOPIC ASSISTED COLECTOMY N/A 2015    Procedure: LAPAROSCOPIC ASSISTED COLECTOMY;  Surgeon: Oren Breen MD;  Location: [facility] OR     PHACOEMULSIFICATION CLEAR CORNEA WITH STANDARD INTRAOCULAR LENS IMPLANT Left 2/15/2018    Procedure: PHACOEMULSIFICATION CLEAR CORNEA WITH STANDARD INTRAOCULAR LENS IMPLANT;  LEFT EYE CATARACT EXTRACTION WITH STANDARD INTRAOCULAR LENS IMPLANT ;  Surgeon: Brandon Orellana MD;  Location: [facility]     PHACOEMULSIFICATION CLEAR CORNEA WITH STANDARD INTRAOCULAR LENS IMPLANT Right 3/1/2018    Procedure: PHACOEMULSIFICATION CLEAR CORNEA WITH STANDARD INTRAOCULAR LENS IMPLANT;  RIGHT EYE CATARACT EXTRACTION WITH STANDARD INTRAOCULAR LENS IMPLANT ;  Surgeon: Brandon Orellana MD;  Location: [facility]       Social History     Tobacco Use     Smoking status: Former Smoker     Years: 1.00     Types: Cigarettes, Cigars, Pipe     Start date: 10/1/1961     Last attempt to quit: 1962     Years since quittin.5     Smokeless tobacco: Never Used     Tobacco comment: No smokers in home   Substance Use Topics     Alcohol use: Yes     Alcohol/week: 0.0 oz     Comment: daily glass of wine and whiskey     Family History   Problem Relation Age of Onset     Cerebrovascular Disease Father      Cancer Mother 90         Patient believes vaginal cancer - hysterectomy,      Alzheimer Disease Mother      Cancer Sister      Breast Cancer Sister      C.A.D. No family hx of      Cancer - colorectal No family hx of      Prostate Cancer No family hx of      Blood Disease No family hx of      Cardiovascular No family hx of      Circulatory No family hx of      Eye Disorder No family hx of      Gastrointestinal Disease No family hx of      Genitourinary Problems No family hx of      Lipids No family hx of      Neurologic Disorder No family hx of      Respiratory No family hx of      Asthma No family hx of      Heart Disease No family hx of      Diabetes No family hx of      Hypertension No family hx of      Arthritis No family hx of      Thyroid Disease No family hx of      Musculoskeletal Disorder No family hx of      Glaucoma No family hx of      Macular Degeneration No family hx of          Current Outpatient Medications   Medication Sig Dispense Refill     allopurinol (ZYLOPRIM) 100 MG tablet Take 1 tablet (100 mg) by mouth daily 90 tablet 1     aspirin 81 MG tablet Take 1 tablet (81 mg) by mouth daily 30 tablet      azelastine (OPTIVAR) 0.05 % SOLN ophthalmic solution Apply 1 drop to eye 2 times daily 1 Bottle 6     bicalutamide (CASODEX) 50 MG tablet TAKE ONE TABLET BY MOUTH ONCE DAILY 90 tablet 3     calcium-vitamin D (CALTRATE) 600-400 MG-UNIT per tablet Take 1 tablet by mouth daily       clonazePAM (KLONOPIN) 1 MG tablet TAKE 1 TABLET BY MOUTH IN THE EVENING AS NEEDED FOR ANXIETY 30 tablet 5     folic acid-vit B6-vit B12 (FOLGARD) 0.8-10-0.115 MG TABS Take 1 tablet by mouth daily       IBUPROFEN PO Take 400 mg by mouth every 8 hours as needed for moderate pain       leuprolide (LUPRON DEPOT) 45 MG kit Inject 45 mg into the muscle every 6 months 1 each 0     lisinopril (PRINIVIL/ZESTRIL) 40 MG tablet Take 1 tablet (40 mg) by mouth daily 90 tablet 1     Loratadine (CLARITIN PO) Take  by mouth. As needed        naproxen  "(NAPROSYN) 500 MG tablet Take 1 tablet (500 mg) by mouth daily as needed for moderate pain 30 tablet 0     Omega-3 Fatty Acids (OMEGA-3 FISH OIL PO) Take 500 mg by mouth daily       sildenafil (VIAGRA) 100 MG tablet Take 1 tablet (100 mg) by mouth daily as needed (erectile dysfunction) 30 tablet 0     sildenafil (VIAGRA) 100 MG tablet 1/2 tab three times a week 10 tablet 12     triamcinolone (KENALOG) 0.1 % cream        zolpidem (AMBIEN) 5 MG tablet TAKE 1 TABLET BY MOUTH NIGHTLY AS NEEDED FOR SLEEP 30 tablet 0     No Known Allergies  BP Readings from Last 3 Encounters:   07/02/19 134/78   04/29/19 148/86   01/23/19 151/79    Wt Readings from Last 3 Encounters:   07/02/19 88.7 kg (195 lb 8 oz)   04/29/19 90.5 kg (199 lb 8.3 oz)   01/23/19 90.7 kg (200 lb)                    Reviewed and updated as needed this visit by Provider         Review of Systems   ROS COMP: Constitutional, HEENT, cardiovascular, pulmonary, gi and gu systems are negative, except as otherwise noted.      Objective    /78 (BP Location: Right arm, Patient Position: Chair, Cuff Size: Adult Regular)   Pulse 70   Temp 97.9  F (36.6  C) (Temporal)   Resp 16   Ht 1.778 m (5' 10\")   Wt 88.7 kg (195 lb 8 oz)   SpO2 100%   BMI 28.05 kg/m    Body mass index is 28.05 kg/m .  Physical Exam   Constitutional: He appears well-developed and well-nourished.   HENT:   Head: Normocephalic and atraumatic.   Eyes: EOM are normal.   Cardiovascular: Normal rate, regular rhythm, normal heart sounds and intact distal pulses. Exam reveals no gallop and no friction rub.   No murmur heard.  Pulmonary/Chest: Effort normal and breath sounds normal.   Musculoskeletal: He exhibits tenderness.   Psychiatric: He has a normal mood and affect. His behavior is normal. Judgment and thought content normal.          Diagnostic Test Results:  Labs reviewed in Epic        Assessment & Plan   Problem List Items Addressed This Visit     Essential hypertension with goal blood " "pressure less than 140/90     Blood pressures are moderately controlled on the current dose of lisinopril.  Will increase the dose to 40 mg daily.  Refill medications.  Recheck in 3 months for follow-up.         Relevant Medications    lisinopril (PRINIVIL/ZESTRIL) 40 MG tablet    Anxiety     Well-controlled on the current dose of Klonopin.  Refill medications.         Relevant Medications    clonazePAM (KLONOPIN) 1 MG tablet      Other Visit Diagnoses     Benign essential hypertension        Relevant Medications    lisinopril (PRINIVIL/ZESTRIL) 40 MG tablet    Other Relevant Orders    Basic metabolic panel (Completed)             BMI:   Estimated body mass index is 28.05 kg/m  as calculated from the following:    Height as of this encounter: 1.778 m (5' 10\").    Weight as of this encounter: 88.7 kg (195 lb 8 oz).           See Patient Instructions  Return in about 6 months (around 1/2/2020).    Verna Osborne MD  [facility]    " How Severe Is Your Skin Lesion?: mild Have Your Skin Lesions Been Treated?: not been treated Is This A New Presentation, Or A Follow-Up?: Growths

## 2024-08-28 ENCOUNTER — LAB (OUTPATIENT)
Dept: LAB | Facility: OTHER | Age: 84
End: 2024-08-28
Payer: COMMERCIAL

## 2024-08-28 ENCOUNTER — MYC MEDICAL ADVICE (OUTPATIENT)
Dept: ONCOLOGY | Facility: CLINIC | Age: 84
End: 2024-08-28

## 2024-08-28 DIAGNOSIS — C61 MALIGNANT NEOPLASM OF PROSTATE (H): ICD-10-CM

## 2024-08-28 DIAGNOSIS — C79.51 PROSTATE CANCER METASTATIC TO BONE (H): ICD-10-CM

## 2024-08-28 DIAGNOSIS — C61 PROSTATE CANCER METASTATIC TO BONE (H): ICD-10-CM

## 2024-08-28 DIAGNOSIS — C61 PROSTATE CANCER (H): ICD-10-CM

## 2024-08-28 LAB
ALBUMIN SERPL BCG-MCNC: 4.1 G/DL (ref 3.5–5.2)
ALP SERPL-CCNC: 93 U/L (ref 40–150)
ALT SERPL W P-5'-P-CCNC: 28 U/L (ref 0–70)
ANION GAP SERPL CALCULATED.3IONS-SCNC: 13 MMOL/L (ref 7–15)
AST SERPL W P-5'-P-CCNC: 47 U/L (ref 0–45)
BASOPHILS # BLD AUTO: 0 10E3/UL (ref 0–0.2)
BASOPHILS NFR BLD AUTO: 1 %
BILIRUB SERPL-MCNC: 0.7 MG/DL
BUN SERPL-MCNC: 24.5 MG/DL (ref 8–23)
CALCIUM SERPL-MCNC: 9.4 MG/DL (ref 8.8–10.4)
CHLORIDE SERPL-SCNC: 99 MMOL/L (ref 98–107)
CREAT SERPL-MCNC: 1.36 MG/DL (ref 0.67–1.17)
EGFRCR SERPLBLD CKD-EPI 2021: 52 ML/MIN/1.73M2
EOSINOPHIL # BLD AUTO: 0.1 10E3/UL (ref 0–0.7)
EOSINOPHIL NFR BLD AUTO: 2 %
ERYTHROCYTE [DISTWIDTH] IN BLOOD BY AUTOMATED COUNT: 11.8 % (ref 10–15)
GLUCOSE SERPL-MCNC: 115 MG/DL (ref 70–99)
HCO3 SERPL-SCNC: 25 MMOL/L (ref 22–29)
HCT VFR BLD AUTO: 29.5 % (ref 40–53)
HGB BLD-MCNC: 10.3 G/DL (ref 13.3–17.7)
IMM GRANULOCYTES # BLD: 0 10E3/UL
IMM GRANULOCYTES NFR BLD: 0 %
LYMPHOCYTES # BLD AUTO: 0.9 10E3/UL (ref 0.8–5.3)
LYMPHOCYTES NFR BLD AUTO: 29 %
MCH RBC QN AUTO: 35 PG (ref 26.5–33)
MCHC RBC AUTO-ENTMCNC: 34.9 G/DL (ref 31.5–36.5)
MCV RBC AUTO: 100 FL (ref 78–100)
MONOCYTES # BLD AUTO: 0.4 10E3/UL (ref 0–1.3)
MONOCYTES NFR BLD AUTO: 11 %
NEUTROPHILS # BLD AUTO: 1.9 10E3/UL (ref 1.6–8.3)
NEUTROPHILS NFR BLD AUTO: 59 %
PLATELET # BLD AUTO: 117 10E3/UL (ref 150–450)
POTASSIUM SERPL-SCNC: 4 MMOL/L (ref 3.4–5.3)
PROT SERPL-MCNC: 7.1 G/DL (ref 6.4–8.3)
PSA SERPL DL<=0.01 NG/ML-MCNC: 0.79 NG/ML
RBC # BLD AUTO: 2.94 10E6/UL (ref 4.4–5.9)
SODIUM SERPL-SCNC: 137 MMOL/L (ref 135–145)
WBC # BLD AUTO: 3.2 10E3/UL (ref 4–11)

## 2024-08-28 PROCEDURE — 84403 ASSAY OF TOTAL TESTOSTERONE: CPT

## 2024-08-28 PROCEDURE — 85025 COMPLETE CBC W/AUTO DIFF WBC: CPT

## 2024-08-28 PROCEDURE — 36415 COLL VENOUS BLD VENIPUNCTURE: CPT

## 2024-08-28 PROCEDURE — 84153 ASSAY OF PSA TOTAL: CPT

## 2024-08-28 PROCEDURE — 80053 COMPREHEN METABOLIC PANEL: CPT

## 2024-08-28 NOTE — ORAL ONC MGMT
Oral Chemotherapy Monitoring Program  Lab Follow Up    Reviewed lab results from 8/28.        7/5/2024     1:00 PM 7/19/2024    11:00 AM 7/29/2024     1:00 PM 8/5/2024     9:00 AM 8/5/2024     2:00 PM 8/9/2024    11:00 AM 8/28/2024     2:00 PM   ORAL CHEMOTHERAPY   Assessment Type Refill Lab Monitoring Left Voicemail Left Voicemail Refill Left Voicemail Lab Monitoring   Diagnosis Code Prostate Cancer Prostate Cancer Prostate Cancer Prostate Cancer Prostate Cancer Prostate Cancer Prostate Cancer   Providers Manda Holman   [facility]   Is this patient followed by the [facility] OC team? No No No No No No No   Drug Name Nubeqa (darolutamide) Nubeqa (darolutamide) Nubeqa (darolutamide) Nubeqa (darolutamide) Nubeqa (darolutamide) Nubeqa (darolutamide) Nubeqa (darolutamide)   Dose 600 mg 600 mg 600 mg 600 mg 600 mg 600 mg 600 mg   Current Schedule BID BID BID BID BID BID BID   Cycle Details Continuous Continuous Continuous Continuous Continuous Continuous Continuous   Adverse Effects  Increased Creatinine;Thrombocytopenia        Thrombocytopenia  Grade 1            Labs:  _  Result Component Current Result Ref Range   Sodium 137 (8/28/2024) 135 - 145 mmol/L     _  Result Component Current Result Ref Range   Potassium 4.0 (8/28/2024) 3.4 - 5.3 mmol/L     _  Result Component Current Result Ref Range   Calcium 9.4 (8/28/2024) 8.8 - 10.4 mg/dL     No results found for Mag within last 30 days.     No results found for Phos within last 30 days.     _  Result Component Current Result Ref Range   Albumin 4.1 (8/28/2024) 3.5 - 5.2 g/dL     _  Result Component Current Result Ref Range   Urea Nitrogen 24.5 (H) (8/28/2024) 8.0 - 23.0 mg/dL     _  Result Component Current Result Ref Range   Creatinine 1.36 (H) (8/28/2024) 0.67 - 1.17 mg/dL     _  Result Component Current Result Ref Range   AST 47 (H)  (8/28/2024) 0 - 45 U/L     _  Result Component Current Result Ref Range   ALT 28 (8/28/2024) 0 - 70 U/L     _  Result Component Current Result Ref Range   Bilirubin Total 0.7 (8/28/2024) <=1.2 mg/dL     _  Result Component Current Result Ref Range   WBC Count 3.2 (L) (8/28/2024) 4.0 - 11.0 10e3/uL     _  Result Component Current Result Ref Range   Hemoglobin 10.3 (L) (8/28/2024) 13.3 - 17.7 g/dL     _  Result Component Current Result Ref Range   Platelet Count 117 (L) (8/28/2024) 150 - 450 10e3/uL     No results found for ANC within last 30 days.     _  Result Component Current Result Ref Range   Absolute Neutrophils 1.9 (8/28/2024) 1.6 - 8.3 10e3/uL        Assessment & Plan:  No concerning abnormalities.  Continue darolutamide.    Mychart message sent to patient.    Follow-Up:  Appt 9/4    Thank you,  Ganesh Werner, PharmD  Oral Chemotherapy Monitoring Program - Skin/Sarcoma/  [phone]

## 2024-08-30 LAB — TESTOST SERPL-MCNC: 16 NG/DL (ref 240–950)

## 2024-09-03 DIAGNOSIS — C61 PROSTATE CANCER (H): ICD-10-CM

## 2024-09-03 DIAGNOSIS — C61 MALIGNANT NEOPLASM OF PROSTATE (H): Primary | ICD-10-CM

## 2024-09-03 DIAGNOSIS — C79.51 PROSTATE CANCER METASTATIC TO BONE (H): ICD-10-CM

## 2024-09-03 DIAGNOSIS — C61 PROSTATE CANCER METASTATIC TO BONE (H): ICD-10-CM

## 2024-09-03 NOTE — PROGRESS NOTES
Virtual Visit Details    Type of service:  Video Visit   Originating Location (pt. Location):  Home    Distant Location (provider location):  [facility]  Platform used for Video Visit:  Ronnie      -------------------------------------------------------------------------------------------------------------    FOLLOW UP VISIT  -  TELEMEDICINE       Reason for visit:  Metastatic HT78-iynqiyh CRPC s/p darolutamide and 177Lu-PSMA-I&T.  Now back on darolutamide, with rising PSA.     History of Present Illness:  Mr. Gautam is an 84-year-old man who was diagnosed with prostate cancer in 2012.  His PSA level was 5.2 ng/mL.  Clinical stage was cT1c disease.  He underwent radical prostatectomy on 8/6/2012, which revealed Union 4+5=9 carcinoma, stage pT2c N0 R0.  His next-generation DNA sequencing analysis ([employer]) revealed a pathogenic TP53 mutation, SHIRA genotype, TMB 5 muts/Mb, and absent PD-L1 expression.  He subsequently received salvage radiotherapy, which was completed in 10/2013, concurrently with six months of androgen deprivation therapy.     He subsequently developed a biochemical recurrence, and received ADT from 2014 until 2020.  In 11/2020, he developed non-metastatic CRPC.  Darolutamide was added on 12/4/2020, to which he achieved a subsequent PSA response.  His PSA level initially dropped from 1.66 ng/mL down to a speedy of 0.07 ng/mL (6/21/2021).  However, the patient then developed a rising PSA level over the past year.  His PSA on 1/13/2022 was 0.16, the PSA on 4/20/2022 was 0.24, the PSA on 6/13/2022 was 0.34, the PSA on 7/12/2022 was 0.47, the PSA on 8/25/2022 was 0.64 ng/mL, and the PSA on 11/21/2022 was 1.55 ng/mL (with a testosterone level of 14 ng/dL).  On 3/8/2023, his PSA level increased to 8.7 ng/mL.     The patient then participated in the ECLIPSE clinical trial, and he was randomized to the 177Lu-PSMA-I&T radioligand arm.  During the screening procedures he was found  to have an asymptomatic pulmonary embolus, and he began apixaban. He received all 4 doses of Lee Ann-PSMA-I&T thus far, and his PSA level dropped from 8.7 down to 0.12 ng/mL.  However, his PSA level is now up to 0.79 ng/mL, and his imaging (24) showed evidence of progressive disease.  He is currently on ADT plus darolutamide, but his PSA level continues to rise.  We conducted a telemedicine follow-up visit today.     Review of Systems:  Medardo remains quite melancholic because his wife of 53 years  unexpectedly last year.  He has noticed continued dry eyes with blurred vision over the last ~2 months. He continues to use rewetting eye drops but feels his ability to read small print has declined. He has a visit scheduled this Thursday with an ophthalmologist at the [facility] in [place] on Thursday afternoon for an exam.  He also has dry mouth improved with biotene mouth rinse with adequate improvement overnight. During the day, pushes oral hydration.  He has stable nocturia, was given a medication for this at his last visit but has discontinued it due to worsening dry mouth concerns.   He has ongoing, stable right hip pain for the last 6 months. No new bone pains.  No chest pain, shortness of breath, or cough. No fevers or chills. A comprehensive 14-point ROS is otherwise negative other than the symptoms noted above in the HPI.  His ECOG score is 0.  His pain score is 2/10.     Medications:  Lupron 22.5 mg IM q3mo (next dose, 2024)  Darolutamide 600 mg BID  Losartan/HCTZ 100/12.5 mg  Allopurinol 100 mg  Zolpidem 5 mg  Clonazepam 1 mg  Sildenafil 50 mg  Loratadine 10 mg  Calcium + Vit D  Multivitamin  Folic acid     Physical Examination:    General: No acute distress  Vital signs within normal limits.  HEENT: Sclera anicteric. Oral mucosa pink, dry. No mucositis or thrush  Lymph: No lymphadenopathy in neck  Heart: Regular, rate, and rhythm  Lungs: Clear to ascultation bilaterally  Abdomen:  Positive bowel sounds. Soft, non-tender. No organomegaly or mass.   Extremities: no lower extremity edema  Neuro: Cranial nerves grossly intact  Skin: no dermatitis    CT scan (5/7/24):  This shows enlarging retroperitoneal lymphadenopathy in the para-aortic space concerning for worsening metastatic disease. Multifocal sclerotic lesions throughout the axial and appendicular skeleton. These lesions appears largely unchanged since the prior study on 2/12/2024. An 8 mm cortical lesion along the right humerus was not definitively seen on the prior exam and may have been outside the field-of-view versus new area of metastatic disease.  Diffuse wall thickening within the bladder which may be post-treatment related versus cystitis. Recommend correlation with urinalysis.     Bone scan (5/7/24):  This shows new focal uptake in the right humeral head correlating with sclerotic lesion seen on same-day CT suggesting a new osteoblastic metastasis. I have personally reviewed the examination and initial interpretation and I agree with the findings.     Laboratories (8/28/24):  CBC shows a WBC of 3.2, hemoglobin 10.3, hematocrit 29.5%, platelets 117K.  His PSA level is 0.79 ng/mL.  His testosterone level is 16 ng/dL.  Creatinine  is 1.36 mg/dL, which is stable.       ASSESSMENT AND PLAN:   Mr. Gautam is an 84-year-old man with [place] 4+5=9 HE45-ruwpcon prostate cancer who underwent radical prostatectomy (8/2012) and salvage radiotherapy (10/2013) but then developed metastatic CRPC refractory to darolutamide treatment.  He completed the ECLIPSE study, and received all 4 doses of Lee Ann-PSMA-I&T radioligand therapy.  His disease is now slowly progressing once again despite ADT plus darolutamide.  His ECOG score is 0.  His pain score is 2/10.     #Metastatic CRPC  - Completed all 4 doses of Lee Ann-I&T on the ECLIPSE trial in 7/2023, which he tolerated well with the exception of xerostomia/xerophthalmia.   - His PSA declined from 8.7 to 0.12  ng/mL, but is now back up to 0.79 ng/mL.  - His CT scan (5/7/24) showed growing retroperitoneal adenopathy and his creatinine is elevated suggesting potential compression of the ureters.   - Patient is due for his next Lupron today (9/4/24).  He would like to obtain this locally.   - He will need a new systemic therapy relatively soon.  His options include docetaxel chemotherapy, abiraterone, or the AMA-280 study.  - Alternatively, radium-223 could target his bone metastases but will not treat the growing lymphadenopathy.  - We should probably repeat imaging prior to changing systemic therapies.  - I will see him again in November 2024, with a PSA test and a CT scan a few days before.  - He will continue ADT plus darolutamide in the interim.     #R hip pain  - Ongoing. No radiographic explanation on last scan. Recommend a trial of PT and light stretching. Not currently requiring NSAID's or tylenol.      #Pulmonary embolism  - PE diagnosed in 2/2023. He will complete 12 months of Eliquis, and I have asked him to stop the anticoagulant after that.     A total of 40 minutes were spent on this patient on the date of the encounter conducting chart review, review of test results, interpretation of tests, patient visit, documentation and discussion with other provider(s). The patient was given the opportunity to ask multiple questions today, all of which were answered to his satisfaction.     Tio Holman M.D.

## 2024-09-04 ENCOUNTER — VIRTUAL VISIT (OUTPATIENT)
Dept: ONCOLOGY | Facility: CLINIC | Age: 84
End: 2024-09-04
Attending: INTERNAL MEDICINE
Payer: COMMERCIAL

## 2024-09-04 VITALS — HEIGHT: 68 IN | WEIGHT: 185 LBS | BODY MASS INDEX: 28.04 KG/M2

## 2024-09-04 DIAGNOSIS — C61 MALIGNANT NEOPLASM OF PROSTATE (H): ICD-10-CM

## 2024-09-04 DIAGNOSIS — C61 PROSTATE CANCER (H): Primary | ICD-10-CM

## 2024-09-04 PROCEDURE — 99215 OFFICE O/P EST HI 40 MIN: CPT | Mod: 95 | Performed by: INTERNAL MEDICINE

## 2024-09-04 ASSESSMENT — PAIN SCALES - GENERAL: PAINLEVEL: NO PAIN (0)

## 2024-09-04 NOTE — NURSING NOTE
Current patient location: [address] [facility] [address]    Is the patient currently in the state of [place]? YES    Visit mode:VIDEO    If the visit is dropped, the patient can be reconnected by: VIDEO VISIT: Text to cell phone:   Telephone Information:   Mobile [phone]         Will anyone else be joining the visit? NO  (If patient encounters technical issues they should call [phone]    How would you like to obtain your AVS? MyChart    Are changes needed to the allergy or medication list? Pt stated no changes to allergies and Pt stated no med changes    Are refills needed on medications prescribed by this physician? NO    Rooming Documentation:  Unable to complete questionnaire(s) due to time      Reason for visit: ROSENDA MAYER

## 2024-09-04 NOTE — LETTER
9/4/2024      Medardo Gautam  [address] [facility] [address]      Dear Colleague,    Thank you for referring your patient, Medardo Gautam, to the [facility]. Please see a copy of my visit note below.      Virtual Visit Details    Type of service:  Video Visit   Originating Location (pt. Location):  Home    Distant Location (provider location):  [facility]  Platform used for Video Visit:  Ronnie      -------------------------------------------------------------------------------------------------------------    FOLLOW UP VISIT  -  TELEMEDICINE       Reason for visit:  Metastatic KP38-ddsdokd CRPC s/p darolutamide and 177Lu-PSMA-I&T.  Now back on darolutamide, with rising PSA.     History of Present Illness:  Mr. Gautam is an 84-year-old man who was diagnosed with prostate cancer in 2012.  His PSA level was 5.2 ng/mL.  Clinical stage was cT1c disease.  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  His next-generation DNA sequencing analysis ([employer]) revealed a pathogenic TP53 mutation, SHIRA genotype, TMB 5 muts/Mb, and absent PD-L1 expression.  He subsequently received salvage radiotherapy, which was completed in 10/2013, concurrently with six months of androgen deprivation therapy.     He subsequently developed a biochemical recurrence, and received ADT from 2014 until 2020.  In 11/2020, he developed non-metastatic CRPC.  Darolutamide was added on 12/4/2020, to which he achieved a subsequent PSA response.  His PSA level initially dropped from 1.66 ng/mL down to a speedy of 0.07 ng/mL (6/21/2021).  However, the patient then developed a rising PSA level over the past year.  His PSA on 1/13/2022 was 0.16, the PSA on 4/20/2022 was 0.24, the PSA on 6/13/2022 was 0.34, the PSA on 7/12/2022 was 0.47, the PSA on 8/25/2022 was 0.64 ng/mL, and the PSA on 11/21/2022 was 1.55 ng/mL (with a testosterone  level of 14 ng/dL).  On 3/8/2023, his PSA level increased to 8.7 ng/mL.     The patient then participated in the ECLIPSE clinical trial, and he was randomized to the 177Lu-PSMA-I&T radioligand arm.  During the screening procedures he was found to have an asymptomatic pulmonary embolus, and he began apixaban. He received all 4 doses of Lee Ann-PSMA-I&T thus far, and his PSA level dropped from 8.7 down to 0.12 ng/mL.  However, his PSA level is now up to 0.79 ng/mL, and his imaging (24) showed evidence of progressive disease.  He is currently on ADT plus darolutamide, but his PSA level continues to rise.  We conducted a telemedicine follow-up visit today.     Review of Systems:  Medardo remains quite melancholic because his wife of 53 years  unexpectedly last year.  He has noticed continued dry eyes with blurred vision over the last ~2 months. He continues to use rewetting eye drops but feels his ability to read small print has declined. He has a visit scheduled this Thursday with an ophthalmologist at the [facility] in [place] on Thursday afternoon for an exam.  He also has dry mouth improved with biotene mouth rinse with adequate improvement overnight. During the day, pushes oral hydration.  He has stable nocturia, was given a medication for this at his last visit but has discontinued it due to worsening dry mouth concerns.   He has ongoing, stable right hip pain for the last 6 months. No new bone pains.  No chest pain, shortness of breath, or cough. No fevers or chills. A comprehensive 14-point ROS is otherwise negative other than the symptoms noted above in the HPI.  His ECOG score is 0.  His pain score is 2/10.     Medications:  Lupron 22.5 mg IM q3mo (next dose, 2024)  Darolutamide 600 mg BID  Losartan/HCTZ 100/12.5 mg  Allopurinol 100 mg  Zolpidem 5 mg  Clonazepam 1 mg  Sildenafil 50 mg  Loratadine 10 mg  Calcium + Vit D  Multivitamin  Folic acid     Physical Examination:    General: No  acute distress  Vital signs within normal limits.  HEENT: Sclera anicteric. Oral mucosa pink, dry. No mucositis or thrush  Lymph: No lymphadenopathy in neck  Heart: Regular, rate, and rhythm  Lungs: Clear to ascultation bilaterally  Abdomen: Positive bowel sounds. Soft, non-tender. No organomegaly or mass.   Extremities: no lower extremity edema  Neuro: Cranial nerves grossly intact  Skin: no dermatitis    CT scan (5/7/24):  This shows enlarging retroperitoneal lymphadenopathy in the para-aortic space concerning for worsening metastatic disease. Multifocal sclerotic lesions throughout the axial and appendicular skeleton. These lesions appears largely unchanged since the prior study on 2/12/2024. An 8 mm cortical lesion along the right humerus was not definitively seen on the prior exam and may have been outside the field-of-view versus new area of metastatic disease.  Diffuse wall thickening within the bladder which may be post-treatment related versus cystitis. Recommend correlation with urinalysis.     Bone scan (5/7/24):  This shows new focal uptake in the right humeral head correlating with sclerotic lesion seen on same-day CT suggesting a new osteoblastic metastasis. I have personally reviewed the examination and initial interpretation and I agree with the findings.     Laboratories (8/28/24):  CBC shows a WBC of 3.2, hemoglobin 10.3, hematocrit 29.5%, platelets 117K.  His PSA level is 0.79 ng/mL.  His testosterone level is 16 ng/dL.  Creatinine  is 1.36 mg/dL, which is stable.       ASSESSMENT AND PLAN:   Mr. Gautam is an 84-year-old man with [place] 4+5=9 [id number] prostate cancer who underwent radical prostatectomy (8/2012) and salvage radiotherapy (10/2013) but then developed metastatic CRPC refractory to darolutamide treatment.  He completed the ECLIPSE study, and received all 4 doses of Lee Ann-PSMA-I&T radioligand therapy.  His disease is now slowly progressing once again despite ADT plus darolutamide.  His  ECOG score is 0.  His pain score is 2/10.     #Metastatic CRPC  - Completed all 4 doses of Lee Ann-I&T on the ECLIPSE trial in 7/2023, which he tolerated well with the exception of xerostomia/xerophthalmia.   - His PSA declined from 8.7 to 0.12 ng/mL, but is now back up to 0.79 ng/mL.  - His CT scan (5/7/24) showed growing retroperitoneal adenopathy and his creatinine is elevated suggesting potential compression of the ureters.   - Patient is due for his next Lupron today (9/4/24).  He would like to obtain this locally.   - He will need a new systemic therapy relatively soon.  His options include docetaxel chemotherapy, abiraterone, or the AMA-280 study.  - Alternatively, radium-223 could target his bone metastases but will not treat the growing lymphadenopathy.  - We should probably repeat imaging prior to changing systemic therapies.  - I will see him again in November 2024, with a PSA test and a CT scan a few days before.  - He will continue ADT plus darolutamide in the interim.     #R hip pain  - Ongoing. No radiographic explanation on last scan. Recommend a trial of PT and light stretching. Not currently requiring NSAID's or tylenol.      #Pulmonary embolism  - PE diagnosed in 2/2023. He will complete 12 months of Eliquis, and I have asked him to stop the anticoagulant after that.     A total of 40 minutes were spent on this patient on the date of the encounter conducting chart review, review of test results, interpretation of tests, patient visit, documentation and discussion with other provider(s). The patient was given the opportunity to ask multiple questions today, all of which were answered to his satisfaction.     Tio Holman M.D.      Again, thank you for allowing me to participate in the care of your patient.        Sincerely,        Tio Holman MD

## 2024-09-05 ENCOUNTER — ALLIED HEALTH/NURSE VISIT (OUTPATIENT)
Dept: FAMILY MEDICINE | Facility: OTHER | Age: 84
End: 2024-09-05
Payer: COMMERCIAL

## 2024-09-05 ENCOUNTER — TELEPHONE (OUTPATIENT)
Dept: FAMILY MEDICINE | Facility: OTHER | Age: 84
End: 2024-09-05

## 2024-09-05 DIAGNOSIS — C61 PROSTATE CANCER (H): ICD-10-CM

## 2024-09-05 DIAGNOSIS — C61 PROSTATE CANCER (H): Primary | ICD-10-CM

## 2024-09-05 DIAGNOSIS — C61 MALIGNANT NEOPLASM OF PROSTATE (H): ICD-10-CM

## 2024-09-05 PROCEDURE — 99207 PR NO CHARGE NURSE ONLY: CPT

## 2024-09-05 PROCEDURE — 96402 CHEMO HORMON ANTINEOPL SQ/IM: CPT | Performed by: INTERNAL MEDICINE

## 2024-09-05 NOTE — TELEPHONE ENCOUNTER
Patient received leuprolide 22.5 mg injection in clinic today.   Scheduled for next injection on 24.   Current CAM order will be .   Pended CAM order below for specialty provider to sign for patient to continue receiving injection at primary care site.     Jessica Farias BSN, RN

## 2024-09-05 NOTE — PROGRESS NOTES
Clinic Administered Medication Documentation      Injectable Medication Documentation    Is there an active order (written within the past 365 days, with administrations remaining, not ) in the chart? Yes.     Patient was given  leuprolide 22.5 mg . Prior to medication administration, verified patient's identity using patient s name and date of birth. Please see MAR and medication order for additional information. Patient instructed to remain in clinic for 15 minutes and report any adverse reaction to staff immediately.    Vial/Syringe: Syringe  Was this medication supplied by the patient? No  Is this a medication the patient will need to receive again? Yes. Patient has no refills remaining.  New CAM order created in separate telephone encounter and routed to ordering provider.     Jessica ALANIZN, RN

## 2024-09-30 ENCOUNTER — APPOINTMENT (OUTPATIENT)
Dept: URBAN - METROPOLITAN AREA CLINIC 259 | Age: 84
Setting detail: DERMATOLOGY
End: 2024-09-30

## 2024-09-30 DIAGNOSIS — L72.0 EPIDERMAL CYST: ICD-10-CM

## 2024-09-30 DIAGNOSIS — L57.0 ACTINIC KERATOSIS: ICD-10-CM

## 2024-09-30 DIAGNOSIS — Z85.828 PERSONAL HISTORY OF OTHER MALIGNANT NEOPLASM OF SKIN: ICD-10-CM

## 2024-09-30 DIAGNOSIS — L81.4 OTHER MELANIN HYPERPIGMENTATION: ICD-10-CM

## 2024-09-30 DIAGNOSIS — D22 MELANOCYTIC NEVI: ICD-10-CM

## 2024-09-30 DIAGNOSIS — L85.3 XEROSIS CUTIS: ICD-10-CM

## 2024-09-30 DIAGNOSIS — L57.8 OTHER SKIN CHANGES DUE TO CHRONIC EXPOSURE TO NONIONIZING RADIATION: ICD-10-CM

## 2024-09-30 DIAGNOSIS — D18.0 HEMANGIOMA: ICD-10-CM

## 2024-09-30 DIAGNOSIS — Z71.89 OTHER SPECIFIED COUNSELING: ICD-10-CM

## 2024-09-30 DIAGNOSIS — L82.1 OTHER SEBORRHEIC KERATOSIS: ICD-10-CM

## 2024-09-30 PROBLEM — D18.01 HEMANGIOMA OF SKIN AND SUBCUTANEOUS TISSUE: Status: ACTIVE | Noted: 2024-09-30

## 2024-09-30 PROBLEM — D22.5 MELANOCYTIC NEVI OF TRUNK: Status: ACTIVE | Noted: 2024-09-30

## 2024-09-30 PROCEDURE — OTHER COUNSELING: OTHER

## 2024-09-30 PROCEDURE — 17000 DESTRUCT PREMALG LESION: CPT

## 2024-09-30 PROCEDURE — OTHER INTRALESIONAL KENALOG: OTHER

## 2024-09-30 PROCEDURE — OTHER LIQUID NITROGEN: OTHER

## 2024-09-30 PROCEDURE — OTHER REASSURANCE: OTHER

## 2024-09-30 PROCEDURE — OTHER MIPS QUALITY: OTHER

## 2024-09-30 PROCEDURE — 17003 DESTRUCT PREMALG LES 2-14: CPT

## 2024-09-30 PROCEDURE — 11900 INJECT SKIN LESIONS </W 7: CPT | Mod: 59

## 2024-09-30 PROCEDURE — 99213 OFFICE O/P EST LOW 20 MIN: CPT | Mod: 25

## 2024-09-30 PROCEDURE — OTHER DEFER: OTHER

## 2024-09-30 ASSESSMENT — LOCATION DETAILED DESCRIPTION DERM
LOCATION DETAILED: LEFT INFERIOR CENTRAL MALAR CHEEK
LOCATION DETAILED: RIGHT INFERIOR FOREHEAD
LOCATION DETAILED: LEFT CENTRAL FRONTAL SCALP
LOCATION DETAILED: LEFT SUPERIOR FOREHEAD
LOCATION DETAILED: RIGHT CENTRAL FRONTAL SCALP
LOCATION DETAILED: LEFT INFERIOR TEMPLE
LOCATION DETAILED: RIGHT SUPERIOR FOREHEAD
LOCATION DETAILED: LEFT MEDIAL UPPER BACK
LOCATION DETAILED: LEFT SUPERIOR MEDIAL UPPER BACK
LOCATION DETAILED: RIGHT MEDIAL FRONTAL SCALP
LOCATION DETAILED: RIGHT SUPERIOR LATERAL NECK

## 2024-09-30 ASSESSMENT — LOCATION SIMPLE DESCRIPTION DERM
LOCATION SIMPLE: LEFT TEMPLE
LOCATION SIMPLE: LEFT UPPER BACK
LOCATION SIMPLE: LEFT SCALP
LOCATION SIMPLE: RIGHT SCALP
LOCATION SIMPLE: NECK
LOCATION SIMPLE: LEFT FOREHEAD
LOCATION SIMPLE: RIGHT FOREHEAD
LOCATION SIMPLE: LEFT CHEEK

## 2024-09-30 ASSESSMENT — LOCATION ZONE DERM
LOCATION ZONE: SCALP
LOCATION ZONE: TRUNK
LOCATION ZONE: FACE
LOCATION ZONE: NECK

## 2024-09-30 NOTE — PROCEDURE: DEFER
Detail Level: Detailed
X Size Of Lesion In Cm (Optional): 0.7
Instructions (Optional): Pt will think about removal and follow up as needed
Reason To Defer Override: Defer to Dr. Davis due to facial location
Size Of Lesion In Cm (Optional): 1
Introduction Text (Please End With A Colon): The following procedure was deferred:

## 2024-09-30 NOTE — PROCEDURE: INTRALESIONAL KENALOG
Bill For Wasted Drug (Kenalog)?: no
How Many Mls Were Removed From The 40 Mg/Ml (10ml) Vial When Preparing The Injectable Solution?: 0
Ndc# For Kenalog Only: 7990-5188-30
Show Inventory Tab: Hide
Concentration Of Kenalog Solution Injected (Mg/Ml): 2.5
Expiration Date For Kenalog (Optional): 01/2026
Administered By (Optional): Esperanza Macias
Kenalog Type Of Vial: Multiple Dose
Validate Note Data When Using Inventory: Yes
Lot # For Kenalog (Optional): [id number]
Medical Necessity Clause: This procedure was medically necessary because the lesions that were treated were:
Detail Level: Detailed
Total Volume (Ccs): .1
Consent: The risks of atrophy were reviewed with the patient.
Kenalog Preparation: Kenalog with normal saline

## 2024-09-30 NOTE — HPI: FULL BODY SKIN EXAMINATION
What Is The Reason For Today's Visit?: Full Body Skin Examination
What Is The Reason For Today's Visit? (Being Monitored For X): concerning skin lesions on a periodic basis
Additional History: Pt reports noticing a spot on their scalp that they would like checked today.

## 2024-10-03 DIAGNOSIS — C61 PROSTATE CANCER (H): ICD-10-CM

## 2024-10-03 DIAGNOSIS — C61 MALIGNANT NEOPLASM OF PROSTATE (H): Primary | ICD-10-CM

## 2024-10-03 DIAGNOSIS — C79.51 PROSTATE CANCER METASTATIC TO BONE (H): ICD-10-CM

## 2024-10-03 DIAGNOSIS — C61 PROSTATE CANCER METASTATIC TO BONE (H): ICD-10-CM

## 2024-10-07 DIAGNOSIS — C61 PROSTATE CANCER (H): Primary | ICD-10-CM

## 2024-10-07 DIAGNOSIS — C61 MALIGNANT NEOPLASM OF PROSTATE (H): ICD-10-CM

## 2024-10-09 ENCOUNTER — TELEPHONE (OUTPATIENT)
Dept: TRANSPLANT | Facility: CLINIC | Age: 84
End: 2024-10-09
Payer: COMMERCIAL

## 2024-10-09 NOTE — TELEPHONE ENCOUNTER
Faxed QS to [phone].        Thank you,    Maria Guadalupe Smith  Oncology Pharmacy Liaison MEGHNA [email or web address]  Phone: [phone]  Fax: [phone]

## 2024-10-09 NOTE — TELEPHONE ENCOUNTER
PA Initiation    Medication: NUBEQA 300 MG PO TABS  Insurance Company: [employer] - Phone [phone] Fax [phone]  Pharmacy Filling the Rx:    Filling Pharmacy Phone:    Filling Pharmacy Fax:    Start Date: 10/9/2024

## 2024-10-10 NOTE — TELEPHONE ENCOUNTER
Prior Authorization Approval    Medication: NUBEQA 300 MG PO TABS  Authorization Effective Date: 10/9/2024  Authorization Expiration Date: 10/10/2025  Approved Dose/Quantity: 120 for 30 days  Reference #: [id number]   Insurance Company: [employer] - Phone [phone] Fax [phone]  Expected CoPay: $    CoPay Card Available:      Financial Assistance Needed: n/a  Which Pharmacy is filling the prescription:    Pharmacy Notified: no, renewal  Patient Notified: no, renewal

## 2024-10-11 ENCOUNTER — TELEPHONE (OUTPATIENT)
Dept: FAMILY MEDICINE | Facility: OTHER | Age: 84
End: 2024-10-11
Payer: COMMERCIAL

## 2024-10-11 NOTE — TELEPHONE ENCOUNTER
Forms/Letter Request    Type of form/letter: OTHER: [employer] blood pressure check       Do we have the form/letter: Yes: placed in tc box    Who is the form from? Insurance comp    Where did/will the form come from? Patient or family brought in       When is form/letter needed by:     How would you like the form/letter returned:     Patient Notified form requests are processed in 5-7 business days:Yes    Could we send this information to you in [employer] or would you prefer to receive a phone call?:   Patient would prefer a phone call   Okay to leave a detailed message?: Yes at Cell number on file:    Telephone Information:   Mobile [phone]

## 2024-10-31 NOTE — PROGRESS NOTES
SUBJECTIVE:   Medardo Gautam is a 79 year old male who presents to clinic today for the following health issues:      History of Present Illness   Frequency of exercise:  None  Taking medications regularly:  Yes  Medication side effects:  Muscle aches  Additional concerns today:  No    Medication Followup of Ambien 5 MG    Taking Medication as prescribed: yes    Side Effects:  None    Medication Helping Symptoms:  yes     Joint Pain    Onset: x 2 months    Description:   Location: right knee  Character: Dull ache    Intensity: mild, 0/10    Progression of Symptoms: better    Accompanying Signs & Symptoms:  Other symptoms: none    History:   Previous similar pain: YES      Precipitating factors:   Trauma or overuse: no     Alleviating factors:  Improved by: nothing  Therapies Tried and outcome: None    Answers for HPI/ROS submitted by the patient on 1/9/2019   Chronic problems general questions HPI Form  If you checked off any problems, how difficult have these problems made it for you to do your work, take care of things at home, or get along with other people?: Not difficult at all  PHQ9 TOTAL SCORE: 0  SAEID 7 TOTAL SCORE: 4    Problem list and histories reviewed & adjusted, as indicated.  Additional history: as documented        Patient Active Problem List   Diagnosis     Disturbance of skin sensation     Hemorrhoids     Gout     Advanced directives, counseling/discussion     Dupuytren's contracture of hand- left 5th finger, right 5th finger     Bunions- both feet     Skin lesion- R side of face and behind L ear     Insomnia     Prostatic nodule- posterior right edge with rectal exam     Prostate cancer- [place] 9 (5+4) dx Feb 2012     Elevated liver enzymes     Hyperbilirubinemia     Essential hypertension with goal blood pressure less than 140/90     Contracture of finger joint     Hyperlipidemia LDL goal <130     Malignant neoplasm of prostate (H)     Anxiety     Colon cancer (H)     Abdominal pain,  epigastric     Renal cyst     Lumbar radiculopathy     Meibomian gland dysfunction     Pseudophakia of right eye     Macular degeneration     Dermatochalasis of eyelid     Olecranon bursitis of right elbow     Right knee pain     Past Surgical History:   Procedure Laterality Date     APPENDECTOMY       BIOPSY  01/16/15     C HAND/FINGER SURGERY UNLISTED       C LENGTHEN,TENDON,HAND/FINGER  ca 2007    right fifth     C STOMACH SURGERY PROCEDURE UNLISTED       CATARACT IOL, RT/LT Left 02/15/2018     COLON SURGERY  2/11/2015    Lap assisted R hemicolectomy     COLONOSCOPY  10/25/07    Snare polypectomy     COLONOSCOPY  6/25/2009    with snare polypectomy     COLONOSCOPY  9/30/2009     COLONOSCOPY  1/5/2011    COLONOSCOPY performed by JUD MARIEE at  GI     COLONOSCOPY N/A 1/16/2015    Procedure: COMBINED COLONOSCOPY, SINGLE OR MULTIPLE BIOPSY/POLYPECTOMY BY BIOPSY;  Surgeon: Sarah Beth Pisano MD;  Location: [facility] OR     COLONOSCOPY WITH CO2 INSUFFLATION N/A 1/16/2015    Procedure: COLONOSCOPY WITH CO2 INSUFFLATION;  Surgeon: Sarah Beth Pisano MD;  Location: [facility] OR     COLONOSCOPY WITH CO2 INSUFFLATION N/A 9/7/2018    Procedure: COLONOSCOPY WITH CO2 INSUFFLATION;  C18.9 (ICD-10-CM) - Malignant neoplasm of colon, unspecified part of colon (H)  [facility] fax# [phone]  BMI 26.29  [employer]  Referred by dr thomas;  Surgeon: Sarah Beth Pisano MD;  Location: [facility] OR     DAVINCI PROSTATECTOMY  8/6/2012    Procedure: DAVINCI PROSTATECTOMY;  Davinci Assisted Radical Prostatectomy with Bilateral Lymphadenectomy ;  Surgeon: Norma Goodwin MD;  Location: [facility] OR     GENITOURINARY SURGERY      prostate surgery     INJECT EPIDURAL LUMBAR / SACRAL SINGLE Left 10/30/2017    Procedure: INJECT EPIDURAL LUMBAR / SACRAL SINGLE;  Left Transforaminal Lumbar 4-Lumbar 5 Epidural Steroid Injection;  Surgeon: Nuvia Reina MD;  Location: [facility] OR     LAPAROSCOPIC ASSISTED COLECTOMY N/A 2/11/2015     Procedure: LAPAROSCOPIC ASSISTED COLECTOMY;  Surgeon: Orne Breen MD;  Location: [facility] OR     PHACOEMULSIFICATION CLEAR CORNEA WITH STANDARD INTRAOCULAR LENS IMPLANT Left 2/15/2018    Procedure: PHACOEMULSIFICATION CLEAR CORNEA WITH STANDARD INTRAOCULAR LENS IMPLANT;  LEFT EYE CATARACT EXTRACTION WITH STANDARD INTRAOCULAR LENS IMPLANT ;  Surgeon: Brandon Orellana MD;  Location: [facility]     PHACOEMULSIFICATION CLEAR CORNEA WITH STANDARD INTRAOCULAR LENS IMPLANT Right 3/1/2018    Procedure: PHACOEMULSIFICATION CLEAR CORNEA WITH STANDARD INTRAOCULAR LENS IMPLANT;  RIGHT EYE CATARACT EXTRACTION WITH STANDARD INTRAOCULAR LENS IMPLANT ;  Surgeon: Brandon Orellana MD;  Location: [facility]       Social History     Tobacco Use     Smoking status: Former Smoker     Years: 1.00     Types: Cigarettes, Cigars, Pipe     Start date: 10/1/1961     Last attempt to quit: 1962     Years since quittin.0     Smokeless tobacco: Never Used     Tobacco comment: No smokers in home   Substance Use Topics     Alcohol use: Yes     Alcohol/week: 0.0 oz     Comment: daily glass of wine and whiskey     Family History   Problem Relation Age of Onset     Cerebrovascular Disease Father      Cancer Mother 90        Patient believes vaginal cancer - hysterectomy,      Alzheimer Disease Mother      Cancer Sister      Breast Cancer Sister      C.A.D. No family hx of      Cancer - colorectal No family hx of      Prostate Cancer No family hx of      Blood Disease No family hx of      Cardiovascular No family hx of      Circulatory No family hx of      Eye Disorder No family hx of      Gastrointestinal Disease No family hx of      Genitourinary Problems No family hx of      Lipids No family hx of      Neurologic Disorder No family hx of      Respiratory No family hx of      Asthma No family hx of      Heart Disease No family hx of      Diabetes No family hx of      Hypertension No family hx of      Arthritis No family hx of       Thyroid Disease No family hx of      Musculoskeletal Disorder No family hx of      Glaucoma No family hx of      Macular Degeneration No family hx of          Current Outpatient Medications   Medication Sig Dispense Refill     allopurinol (ZYLOPRIM) 100 MG tablet Take 1 tablet (100 mg) by mouth daily 90 tablet 1     aspirin 81 MG tablet Take 1 tablet (81 mg) by mouth daily 30 tablet      azelastine (OPTIVAR) 0.05 % SOLN ophthalmic solution Apply 1 drop to eye 2 times daily 1 Bottle 6     bicalutamide (CASODEX) 50 MG tablet Take 1 tablet (50 mg) by mouth daily 90 tablet 3     calcium-vitamin D (CALTRATE) 600-400 MG-UNIT per tablet Take 1 tablet by mouth daily       clonazePAM (KLONOPIN) 1 MG tablet Take 1 tablet (1 mg) by mouth nightly as needed for anxiety 30 tablet 5     folic acid-vit B6-vit B12 (FOLGARD) 0.8-10-0.115 MG TABS Take 1 tablet by mouth daily       IBUPROFEN PO Take 400 mg by mouth every 8 hours as needed for moderate pain       leuprolide (LUPRON DEPOT) 45 MG kit Inject 45 mg into the muscle every 6 months 1 each 0     lisinopril (PRINIVIL,ZESTRIL) 30 MG tablet Take 1 tablet (30 mg) by mouth daily 90 tablet 0     Loratadine (CLARITIN PO) Take  by mouth. As needed        naproxen (NAPROSYN) 500 MG tablet Take 1 tablet (500 mg) by mouth daily as needed for moderate pain 30 tablet 0     Omega-3 Fatty Acids (OMEGA-3 FISH OIL PO) Take 500 mg by mouth daily       sildenafil (VIAGRA) 100 MG tablet 1/2 tab three times a week 10 tablet 12     triamcinolone (KENALOG) 0.1 % cream        zolpidem (AMBIEN) 5 MG tablet Take 1 tablet (5 mg) by mouth nightly as needed for sleep 30 tablet 5     No Known Allergies  BP Readings from Last 3 Encounters:   01/09/19 138/80   11/06/18 128/74   11/01/18 132/76    Wt Readings from Last 3 Encounters:   01/09/19 90.7 kg (200 lb)   11/06/18 89.8 kg (198 lb)   10/09/18 92.1 kg (203 lb)                  Labs reviewed in EPIC    ROS:  Constitutional, HEENT, cardiovascular,  "pulmonary, gi and gu systems are negative, except as otherwise noted.    OBJECTIVE:     /80 (BP Location: Right arm, Patient Position: Chair, Cuff Size: Adult Regular)   Pulse 72   Temp 97.6  F (36.4  C) (Temporal)   Resp 16   Ht 1.778 m (5' 10\")   Wt 90.7 kg (200 lb)   SpO2 100%   BMI 28.70 kg/m    Body mass index is 28.7 kg/m .   Physical Exam   Constitutional: He appears well-developed and well-nourished.   Cardiovascular: Normal rate and regular rhythm.   Pulmonary/Chest: Effort normal and breath sounds normal.   Musculoskeletal: Normal range of motion. He exhibits no tenderness.         Diagnostic Test Results:  none     ASSESSMENT/PLAN:     Problem List Items Addressed This Visit     Colon cancer (H) - Primary    Right knee pain     Offered x-rays but he reports he does not have symptoms right now and will try conservative management   Advised to return to clinic if symptoms persist         Relevant Medications    naproxen (NAPROSYN) 500 MG tablet      Other Visit Diagnoses     Hyperlipidemia LDL goal <100        Relevant Orders    Lipid panel reflex to direct LDL Fasting    Arthritis        Relevant Medications    naproxen (NAPROSYN) 500 MG tablet             Verna Osborne MD  [facility]" [FreeTextEntry2] : New Patient; RT Hip / Lower back

## 2024-11-01 ENCOUNTER — LAB (OUTPATIENT)
Dept: LAB | Facility: CLINIC | Age: 84
End: 2024-11-01
Payer: COMMERCIAL

## 2024-11-01 ENCOUNTER — ANCILLARY PROCEDURE (OUTPATIENT)
Dept: CT IMAGING | Facility: CLINIC | Age: 84
End: 2024-11-01
Attending: INTERNAL MEDICINE
Payer: COMMERCIAL

## 2024-11-01 DIAGNOSIS — C61 MALIGNANT NEOPLASM OF PROSTATE (H): ICD-10-CM

## 2024-11-01 LAB
ALBUMIN SERPL BCG-MCNC: 4.4 G/DL (ref 3.5–5.2)
ALP SERPL-CCNC: 83 U/L (ref 40–150)
ALT SERPL W P-5'-P-CCNC: 26 U/L (ref 0–70)
ANION GAP SERPL CALCULATED.3IONS-SCNC: 13 MMOL/L (ref 7–15)
AST SERPL W P-5'-P-CCNC: 49 U/L (ref 0–45)
BASOPHILS # BLD AUTO: 0 10E3/UL (ref 0–0.2)
BASOPHILS NFR BLD AUTO: 1 %
BILIRUB SERPL-MCNC: 0.8 MG/DL
BUN SERPL-MCNC: 25.6 MG/DL (ref 8–23)
CALCIUM SERPL-MCNC: 9.7 MG/DL (ref 8.8–10.4)
CHLORIDE SERPL-SCNC: 97 MMOL/L (ref 98–107)
CREAT BLD-MCNC: 1.6 MG/DL (ref 0.7–1.3)
CREAT SERPL-MCNC: 1.36 MG/DL (ref 0.67–1.17)
EGFRCR SERPLBLD CKD-EPI 2021: 43 ML/MIN/1.73M2
EGFRCR SERPLBLD CKD-EPI 2021: 52 ML/MIN/1.73M2
EOSINOPHIL # BLD AUTO: 0.1 10E3/UL (ref 0–0.7)
EOSINOPHIL NFR BLD AUTO: 2 %
ERYTHROCYTE [DISTWIDTH] IN BLOOD BY AUTOMATED COUNT: 12.2 % (ref 10–15)
GLUCOSE SERPL-MCNC: 133 MG/DL (ref 70–99)
HCO3 SERPL-SCNC: 23 MMOL/L (ref 22–29)
HCT VFR BLD AUTO: 30.5 % (ref 40–53)
HGB BLD-MCNC: 10.6 G/DL (ref 13.3–17.7)
IMM GRANULOCYTES # BLD: 0 10E3/UL
IMM GRANULOCYTES NFR BLD: 0 %
LYMPHOCYTES # BLD AUTO: 1.1 10E3/UL (ref 0.8–5.3)
LYMPHOCYTES NFR BLD AUTO: 27 %
MCH RBC QN AUTO: 34 PG (ref 26.5–33)
MCHC RBC AUTO-ENTMCNC: 34.8 G/DL (ref 31.5–36.5)
MCV RBC AUTO: 98 FL (ref 78–100)
MONOCYTES # BLD AUTO: 0.4 10E3/UL (ref 0–1.3)
MONOCYTES NFR BLD AUTO: 9 %
NEUTROPHILS # BLD AUTO: 2.4 10E3/UL (ref 1.6–8.3)
NEUTROPHILS NFR BLD AUTO: 61 %
NRBC # BLD AUTO: 0 10E3/UL
NRBC BLD AUTO-RTO: 0 /100
PLATELET # BLD AUTO: 111 10E3/UL (ref 150–450)
POTASSIUM SERPL-SCNC: 3.5 MMOL/L (ref 3.4–5.3)
PROT SERPL-MCNC: 7.4 G/DL (ref 6.4–8.3)
PSA SERPL DL<=0.01 NG/ML-MCNC: 2.18 NG/ML
RBC # BLD AUTO: 3.12 10E6/UL (ref 4.4–5.9)
SODIUM SERPL-SCNC: 133 MMOL/L (ref 135–145)
WBC # BLD AUTO: 4 10E3/UL (ref 4–11)

## 2024-11-01 PROCEDURE — 99000 SPECIMEN HANDLING OFFICE-LAB: CPT | Performed by: PATHOLOGY

## 2024-11-01 PROCEDURE — 84403 ASSAY OF TOTAL TESTOSTERONE: CPT | Performed by: INTERNAL MEDICINE

## 2024-11-01 PROCEDURE — 85025 COMPLETE CBC W/AUTO DIFF WBC: CPT | Performed by: PATHOLOGY

## 2024-11-01 PROCEDURE — 71260 CT THORAX DX C+: CPT | Performed by: RADIOLOGY

## 2024-11-01 PROCEDURE — 36415 COLL VENOUS BLD VENIPUNCTURE: CPT | Performed by: PATHOLOGY

## 2024-11-01 PROCEDURE — 80053 COMPREHEN METABOLIC PANEL: CPT | Mod: XE | Performed by: PATHOLOGY

## 2024-11-01 PROCEDURE — 84153 ASSAY OF PSA TOTAL: CPT | Performed by: PATHOLOGY

## 2024-11-01 PROCEDURE — 74177 CT ABD & PELVIS W/CONTRAST: CPT | Performed by: RADIOLOGY

## 2024-11-01 RX ORDER — IOPAMIDOL 755 MG/ML
91 INJECTION, SOLUTION INTRAVASCULAR ONCE
Status: COMPLETED | OUTPATIENT
Start: 2024-11-01 | End: 2024-11-01

## 2024-11-01 RX ADMIN — IOPAMIDOL 91 ML: 755 INJECTION, SOLUTION INTRAVASCULAR at 09:55

## 2024-11-01 NOTE — DISCHARGE INSTRUCTIONS

## 2024-11-03 NOTE — PROGRESS NOTES
FOLLOW UP VISIT       Reason for visit:  Metastatic [id number] CRPC s/p darolutamide and 177Lu-PSMA-I&T.  Now back on darolutamide, with rising PSA.     History of Present Illness:  Mr. Gautam is an 84-year-old man who was diagnosed with prostate cancer in 2012.  His PSA level was 5.2 ng/mL.  Clinical stage was cT1c disease.  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  His next-generation DNA sequencing analysis ([employer]) revealed a pathogenic TP53 mutation, SHIRA genotype, TMB 5 muts/Mb, and absent PD-L1 expression.  He subsequently received salvage radiotherapy, which was completed in 10/2013, concurrently with six months of androgen deprivation therapy.     He subsequently developed a biochemical recurrence, and received ADT from 2014 until 2020.  In 11/2020, he developed non-metastatic CRPC.  Darolutamide was added on 12/4/2020, to which he achieved a subsequent PSA response.  His PSA level initially dropped from 1.66 ng/mL down to a speedy of 0.07 ng/mL (6/21/2021).  However, the patient then developed a rising PSA level over the past year.  His PSA on 1/13/2022 was 0.16, the PSA on 4/20/2022 was 0.24, the PSA on 6/13/2022 was 0.34, the PSA on 7/12/2022 was 0.47, the PSA on 8/25/2022 was 0.64 ng/mL, and the PSA on 11/21/2022 was 1.55 ng/mL (with a testosterone level of 14 ng/dL).  On 3/8/2023, his PSA level increased to 8.7 ng/mL.     The patient then participated in the ECLIPSE clinical trial, and he was randomized to the 177Lu-PSMA-I&T radioligand arm.  During the screening procedures he was found to have an asymptomatic pulmonary embolus, and he began apixaban. He received all 4 doses of Lee Ann-PSMA-I&T thus far, and his PSA level dropped from 8.7 down to 0.12 ng/mL.  However, his PSA level increased to 0.79 ng/mL, and his imaging (5/7/24) showed evidence of progressive disease.  He is currently on ADT plus darolutamide, but his PSA level continues to  rise.  The most recent PSA (24) was 2.18 ng/mL.  He also had recent imaging assessments (24) which showed progressive osseous and yan disease.  He returns for a follow up visit.      Review of Systems:  Medardo remains quite melancholic because his wife of 53 years  unexpectedly last year.  He has noticed continued dry eyes with blurred vision over the last ~2 months. He continues to use rewetting eye drops but feels his ability to read small print has declined. He has a visit scheduled this Thursday with an ophthalmologist at the [facility] in [place] on Thursday afternoon for an exam.  He also has dry mouth improved with biotene mouth rinse with adequate improvement overnight. During the day, pushes oral hydration.  He has stable nocturia, was given a medication for this at his last visit but has discontinued it due to worsening dry mouth concerns.   He has ongoing, stable right hip pain for the last 6 months. No new bone pains.  No chest pain, shortness of breath, or cough. No fevers or chills. A comprehensive 14-point ROS is otherwise negative other than the symptoms noted above in the HPI.  His ECOG score is 0.  His pain score is 2/10.     Medications:  Lupron 22.5 mg IM q3mo (next dose, 2024)  Darolutamide 600 mg BID  Losartan/HCTZ 100/12.5 mg  Allopurinol 100 mg  Zolpidem 5 mg  Clonazepam 1 mg  Sildenafil 50 mg  Loratadine 10 mg  Calcium + Vit D  Multivitamin  Folic acid     Physical Examination:    General: No acute distress  Vital signs within normal limits.  HEENT: Sclera anicteric. Oral mucosa pink, dry. No mucositis or thrush  Lymph: No lymphadenopathy in neck  Heart: Regular, rate, and rhythm  Lungs: Clear to ascultation bilaterally  Abdomen: Positive bowel sounds. Soft, non-tender. No organomegaly or mass.   Extremities: no lower extremity edema  Neuro: Cranial nerves grossly intact  Skin: no dermatitis    Bone scan (24):  This shows new focal uptake in the right  humeral head correlating with sclerotic lesion seen on same-day CT suggesting a new osteoblastic metastasis. I have personally reviewed the examination and initial interpretation and I agree with the findings.    CT scan (5/7/24):  This shows enlarging retroperitoneal lymphadenopathy in the para-aortic space concerning for worsening metastatic disease. Multifocal sclerotic lesions throughout the axial and appendicular skeleton. These lesions appears largely unchanged since the prior study on 2/12/2024. An 8 mm cortical lesion along the right humerus was not definitively seen on the prior exam and may have been outside the field-of-view versus new area of metastatic disease.  Diffuse wall thickening within the bladder which may be post-treatment related versus cystitis. Recommend correlation with urinalysis.     CT scan (11/1/24):  This shows increased size of a sclerotic lesion in the proximal right humerus, although only partially included in the field-of-view.  Mixed findings in the retroperitoneal lymph nodes with some stable, some increased, and others decreased (Left para-aortic, 1.7 x 1.0 cm, previously 1.5 x 1.2 cm. Preaortic, 1.2 x 0.7 cm, previously 1.6 x 1.3 cm ).     Laboratories (11/1/24):  CBC shows a WBC of 4.0, hemoglobin 10.6, hematocrit 30.5%, platelets 111K.  His PSA level is 2.18 ng/mL.  His testosterone level is 16 ng/dL.  Creatinine  is 1.36 mg/dL (GFR 52 mL/min), which is stable.       ASSESSMENT AND PLAN:   Mr. Gautam is an 84-year-old man with Jeancarlos 4+5=9 [id number] prostate cancer who underwent radical prostatectomy (8/2012) and salvage radiotherapy (10/2013) but then developed metastatic CRPC refractory to darolutamide treatment.  He completed the ECLIPSE study, and received all 4 doses of Lee Ann-PSMA-I&T radioligand therapy.  His disease is now slowly progressing once again despite ADT plus darolutamide.  His ECOG score is 0.  His pain score is 2/10.     #Metastatic CRPC  - Completed all 4  doses of Lee Ann-I&T on the ECLIPSE trial in 7/2023, which he tolerated well with the exception of xerostomia/xerophthalmia.   - His PSA declined from 8.7 to 0.12 ng/mL, but is now back up to 2.18 ng/mL.  - His CT scan (5/7/24) showed growing retroperitoneal adenopathy and his creatinine is elevated suggesting potential compression of the ureters.   - His new CT scan (11/1/24) shows a new osseous metastasis as well as growing retroperitoneal lymph nodes.  - Patient is due for his next Lupron on 12/4/24.  He would like to obtain this locally.   - He will need a new systemic therapy relatively soon.  His options include docetaxel chemotherapy, abiraterone, or the AMA-280 study.  - Alternatively, radium-223 could target his bone metastases but will not treat the growing lymphadenopathy.  - We have decided to pursue docetaxel chemotherapy, starting on 11/13/2024.  - He will continue ADT plus darolutamide in the interim.     #R hip pain  - Ongoing. No radiographic explanation on last scan. Recommend a trial of PT and light stretching. Not currently requiring NSAID's or tylenol.      #Pulmonary embolism  - PE diagnosed in 2/2023. He will complete 12 months of Eliquis, and I have asked him to stop the anticoagulant after that.     A total of 40 minutes were spent on this patient on the date of the encounter conducting chart review, review of test results, interpretation of tests, patient visit, documentation and discussion with other provider(s). The patient was given the opportunity to ask multiple questions today, all of which were answered to his satisfaction.    The longitudinal plan of care for the diagnosis(es)/condition(s) as documented were addressed during this visit.  Due to the added complexity in care, I will continue to support Mr. Gautam in the subsequent management and with ongoing continuity of care.    Tio Holman M.D.

## 2024-11-04 ENCOUNTER — ONCOLOGY VISIT (OUTPATIENT)
Dept: ONCOLOGY | Facility: CLINIC | Age: 84
End: 2024-11-04
Attending: INTERNAL MEDICINE
Payer: COMMERCIAL

## 2024-11-04 ENCOUNTER — MYC MEDICAL ADVICE (OUTPATIENT)
Dept: ONCOLOGY | Facility: CLINIC | Age: 84
End: 2024-11-04

## 2024-11-04 ENCOUNTER — DOCUMENTATION ONLY (OUTPATIENT)
Dept: ONCOLOGY | Facility: CLINIC | Age: 84
End: 2024-11-04

## 2024-11-04 VITALS
HEART RATE: 83 BPM | TEMPERATURE: 97.5 F | HEIGHT: 67 IN | DIASTOLIC BLOOD PRESSURE: 80 MMHG | SYSTOLIC BLOOD PRESSURE: 178 MMHG | BODY MASS INDEX: 29.35 KG/M2 | OXYGEN SATURATION: 96 % | WEIGHT: 187 LBS

## 2024-11-04 DIAGNOSIS — C61 PROSTATE CANCER (H): ICD-10-CM

## 2024-11-04 DIAGNOSIS — C79.51 PROSTATE CANCER METASTATIC TO BONE (H): ICD-10-CM

## 2024-11-04 DIAGNOSIS — C61 PROSTATE CANCER METASTATIC TO BONE (H): ICD-10-CM

## 2024-11-04 DIAGNOSIS — C61 MALIGNANT NEOPLASM OF PROSTATE (H): Primary | ICD-10-CM

## 2024-11-04 PROCEDURE — G2211 COMPLEX E/M VISIT ADD ON: HCPCS | Performed by: INTERNAL MEDICINE

## 2024-11-04 PROCEDURE — G0463 HOSPITAL OUTPT CLINIC VISIT: HCPCS | Performed by: INTERNAL MEDICINE

## 2024-11-04 PROCEDURE — 99215 OFFICE O/P EST HI 40 MIN: CPT | Performed by: INTERNAL MEDICINE

## 2024-11-04 RX ORDER — DAROLUTAMIDE 300 MG/1
TABLET, FILM COATED ORAL
COMMUNITY
Start: 2024-05-15

## 2024-11-04 RX ORDER — EPINEPHRINE 1 MG/ML
0.3 INJECTION, SOLUTION INTRAMUSCULAR; SUBCUTANEOUS EVERY 5 MIN PRN
OUTPATIENT
Start: 2024-12-09

## 2024-11-04 RX ORDER — LORAZEPAM 2 MG/ML
0.5 INJECTION INTRAMUSCULAR EVERY 4 HOURS PRN
OUTPATIENT
Start: 2024-11-18

## 2024-11-04 RX ORDER — HEPARIN SODIUM (PORCINE) LOCK FLUSH IV SOLN 100 UNIT/ML 100 UNIT/ML
5 SOLUTION INTRAVENOUS
OUTPATIENT
Start: 2024-11-18

## 2024-11-04 RX ORDER — DIPHENHYDRAMINE HYDROCHLORIDE 50 MG/ML
25 INJECTION INTRAMUSCULAR; INTRAVENOUS
Start: 2024-12-09

## 2024-11-04 RX ORDER — METHYLPREDNISOLONE SODIUM SUCCINATE 40 MG/ML
40 INJECTION INTRAMUSCULAR; INTRAVENOUS
Start: 2024-11-18

## 2024-11-04 RX ORDER — LORAZEPAM 2 MG/ML
0.5 INJECTION INTRAMUSCULAR EVERY 4 HOURS PRN
OUTPATIENT
Start: 2024-12-09

## 2024-11-04 RX ORDER — EPINEPHRINE 1 MG/ML
0.3 INJECTION, SOLUTION INTRAMUSCULAR; SUBCUTANEOUS EVERY 5 MIN PRN
OUTPATIENT
Start: 2024-11-18

## 2024-11-04 RX ORDER — ALBUTEROL SULFATE 0.83 MG/ML
2.5 SOLUTION RESPIRATORY (INHALATION)
OUTPATIENT
Start: 2024-11-18

## 2024-11-04 RX ORDER — MEPERIDINE HYDROCHLORIDE 25 MG/ML
25 INJECTION INTRAMUSCULAR; INTRAVENOUS; SUBCUTANEOUS
OUTPATIENT
Start: 2024-11-18

## 2024-11-04 RX ORDER — DIPHENHYDRAMINE HYDROCHLORIDE 50 MG/ML
25 INJECTION INTRAMUSCULAR; INTRAVENOUS
Start: 2024-11-18

## 2024-11-04 RX ORDER — HEPARIN SODIUM,PORCINE 10 UNIT/ML
5-20 VIAL (ML) INTRAVENOUS DAILY PRN
OUTPATIENT
Start: 2024-12-09

## 2024-11-04 RX ORDER — ALBUTEROL SULFATE 90 UG/1
1-2 INHALANT RESPIRATORY (INHALATION)
Start: 2024-11-18

## 2024-11-04 RX ORDER — HEPARIN SODIUM (PORCINE) LOCK FLUSH IV SOLN 100 UNIT/ML 100 UNIT/ML
5 SOLUTION INTRAVENOUS
OUTPATIENT
Start: 2024-12-09

## 2024-11-04 RX ORDER — DIPHENHYDRAMINE HYDROCHLORIDE 50 MG/ML
50 INJECTION INTRAMUSCULAR; INTRAVENOUS
Start: 2024-11-18

## 2024-11-04 RX ORDER — ALBUTEROL SULFATE 90 UG/1
1-2 INHALANT RESPIRATORY (INHALATION)
Start: 2024-12-09

## 2024-11-04 RX ORDER — DIPHENHYDRAMINE HYDROCHLORIDE 50 MG/ML
50 INJECTION INTRAMUSCULAR; INTRAVENOUS
Start: 2024-12-09

## 2024-11-04 RX ORDER — ALBUTEROL SULFATE 0.83 MG/ML
2.5 SOLUTION RESPIRATORY (INHALATION)
OUTPATIENT
Start: 2024-12-09

## 2024-11-04 RX ORDER — METHYLPREDNISOLONE SODIUM SUCCINATE 40 MG/ML
40 INJECTION INTRAMUSCULAR; INTRAVENOUS
Start: 2024-12-09

## 2024-11-04 RX ORDER — MEPERIDINE HYDROCHLORIDE 25 MG/ML
25 INJECTION INTRAMUSCULAR; INTRAVENOUS; SUBCUTANEOUS
OUTPATIENT
Start: 2024-12-09

## 2024-11-04 RX ORDER — HEPARIN SODIUM,PORCINE 10 UNIT/ML
5-20 VIAL (ML) INTRAVENOUS DAILY PRN
OUTPATIENT
Start: 2024-11-18

## 2024-11-04 ASSESSMENT — PAIN SCALES - GENERAL: PAINLEVEL_OUTOF10: NO PAIN (0)

## 2024-11-04 NOTE — NURSING NOTE
"Oncology Rooming Note    November 4, 2024 10:46 AM   Medardo Gautam is a 84 year old male who presents for:    Chief Complaint   Patient presents with    Oncology Clinic Visit     Prostate Cancer     Initial Vitals: BP (!) 178/80   Pulse 83   Temp 97.5  F (36.4  C) (Tympanic)   Ht 1.702 m (5' 7\")   Wt 84.8 kg (187 lb)   SpO2 96%   BMI 29.29 kg/m   Estimated body mass index is 29.29 kg/m  as calculated from the following:    Height as of this encounter: 1.702 m (5' 7\").    Weight as of this encounter: 84.8 kg (187 lb). Body surface area is 2 meters squared.  No Pain (0) Comment: Data Unavailable   No LMP for male patient.  Allergies reviewed: Yes  Medications reviewed: Yes    Medications: Medication refills not needed today.  Pharmacy name entered into [facility]:    [id number] - [place], [place] - [address] [facility] [place] [address] [facility] - [place], [place] - [address]    Frailty Screening:   Is the patient here for a new oncology consult visit in cancer care? 2. No      Clinical concerns: none      Jamey Wilson LPN              "

## 2024-11-04 NOTE — LETTER
11/4/2024      Medardo Gautam  [address] [address] [id number]      Dear Colleague,    Thank you for referring your patient, Medardo Gautam, to the [facility]. Please see a copy of my visit note below.      FOLLOW UP VISIT       Reason for visit:  Metastatic [id number] CRPC s/p darolutamide and 177Lu-PSMA-I&T.  Now back on darolutamide, with rising PSA.     History of Present Illness:  Mr. Gautam is an 84-year-old man who was diagnosed with prostate cancer in 2012.  His PSA level was 5.2 ng/mL.  Clinical stage was cT1c disease.  He underwent radical prostatectomy on 8/6/2012, which revealed Warner 4+5=9 carcinoma, stage pT2c N0 R0.  His next-generation DNA sequencing analysis ([employer]) revealed a pathogenic TP53 mutation, SHIRA genotype, TMB 5 muts/Mb, and absent PD-L1 expression.  He subsequently received salvage radiotherapy, which was completed in 10/2013, concurrently with six months of androgen deprivation therapy.     He subsequently developed a biochemical recurrence, and received ADT from 2014 until 2020.  In 11/2020, he developed non-metastatic CRPC.  Darolutamide was added on 12/4/2020, to which he achieved a subsequent PSA response.  His PSA level initially dropped from 1.66 ng/mL down to a speedy of 0.07 ng/mL (6/21/2021).  However, the patient then developed a rising PSA level over the past year.  His PSA on 1/13/2022 was 0.16, the PSA on 4/20/2022 was 0.24, the PSA on 6/13/2022 was 0.34, the PSA on 7/12/2022 was 0.47, the PSA on 8/25/2022 was 0.64 ng/mL, and the PSA on 11/21/2022 was 1.55 ng/mL (with a testosterone level of 14 ng/dL).  On 3/8/2023, his PSA level increased to 8.7 ng/mL.     The patient then participated in the ECLIPSE clinical trial, and he was randomized to the 177Lu-PSMA-I&T radioligand arm.  During the screening procedures he was found to have an asymptomatic pulmonary embolus, and he began apixaban. He received all 4 doses of Lee Ann-PSMA-I&T thus  far, and his PSA level dropped from 8.7 down to 0.12 ng/mL.  However, his PSA level increased to 0.79 ng/mL, and his imaging (24) showed evidence of progressive disease.  He is currently on ADT plus darolutamide, but his PSA level continues to rise.  The most recent PSA (24) was 2.18 ng/mL.  He also had recent imaging assessments (24) which showed progressive osseous and yan disease.  He returns for a follow up visit.      Review of Systems:  Medardo remains quite melancholic because his wife of 53 years  unexpectedly last year.  He has noticed continued dry eyes with blurred vision over the last ~2 months. He continues to use rewetting eye drops but feels his ability to read small print has declined. He has a visit scheduled this Thursday with an ophthalmologist at the [facility] in [place] on Thursday afternoon for an exam.  He also has dry mouth improved with biotene mouth rinse with adequate improvement overnight. During the day, pushes oral hydration.  He has stable nocturia, was given a medication for this at his last visit but has discontinued it due to worsening dry mouth concerns.   He has ongoing, stable right hip pain for the last 6 months. No new bone pains.  No chest pain, shortness of breath, or cough. No fevers or chills. A comprehensive 14-point ROS is otherwise negative other than the symptoms noted above in the HPI.  His ECOG score is 0.  His pain score is 2/10.     Medications:  Lupron 22.5 mg IM q3mo (next dose, 2024)  Darolutamide 600 mg BID  Losartan/HCTZ 100/12.5 mg  Allopurinol 100 mg  Zolpidem 5 mg  Clonazepam 1 mg  Sildenafil 50 mg  Loratadine 10 mg  Calcium + Vit D  Multivitamin  Folic acid     Physical Examination:    General: No acute distress  Vital signs within normal limits.  HEENT: Sclera anicteric. Oral mucosa pink, dry. No mucositis or thrush  Lymph: No lymphadenopathy in neck  Heart: Regular, rate, and rhythm  Lungs: Clear to ascultation  bilaterally  Abdomen: Positive bowel sounds. Soft, non-tender. No organomegaly or mass.   Extremities: no lower extremity edema  Neuro: Cranial nerves grossly intact  Skin: no dermatitis    Bone scan (5/7/24):  This shows new focal uptake in the right humeral head correlating with sclerotic lesion seen on same-day CT suggesting a new osteoblastic metastasis. I have personally reviewed the examination and initial interpretation and I agree with the findings.    CT scan (5/7/24):  This shows enlarging retroperitoneal lymphadenopathy in the para-aortic space concerning for worsening metastatic disease. Multifocal sclerotic lesions throughout the axial and appendicular skeleton. These lesions appears largely unchanged since the prior study on 2/12/2024. An 8 mm cortical lesion along the right humerus was not definitively seen on the prior exam and may have been outside the field-of-view versus new area of metastatic disease.  Diffuse wall thickening within the bladder which may be post-treatment related versus cystitis. Recommend correlation with urinalysis.     CT scan (11/1/24):  This shows increased size of a sclerotic lesion in the proximal right humerus, although only partially included in the field-of-view.  Mixed findings in the retroperitoneal lymph nodes with some stable, some increased, and others decreased (Left para-aortic, 1.7 x 1.0 cm, previously 1.5 x 1.2 cm. Preaortic, 1.2 x 0.7 cm, previously 1.6 x 1.3 cm ).     Laboratories (11/1/24):  CBC shows a WBC of 4.0, hemoglobin 10.6, hematocrit 30.5%, platelets 111K.  His PSA level is 2.18 ng/mL.  His testosterone level is 16 ng/dL.  Creatinine  is 1.36 mg/dL (GFR 52 mL/min), which is stable.       ASSESSMENT AND PLAN:   Mr. Gautam is an 84-year-old man with Jeancarlos 4+5=9 [id number] prostate cancer who underwent radical prostatectomy (8/2012) and salvage radiotherapy (10/2013) but then developed metastatic CRPC refractory to darolutamide treatment.  He  completed the ECLIPSE study, and received all 4 doses of Lee Ann-PSMA-I&T radioligand therapy.  His disease is now slowly progressing once again despite ADT plus darolutamide.  His ECOG score is 0.  His pain score is 2/10.     #Metastatic CRPC  - Completed all 4 doses of Lee Ann-I&T on the ECLIPSE trial in 7/2023, which he tolerated well with the exception of xerostomia/xerophthalmia.   - His PSA declined from 8.7 to 0.12 ng/mL, but is now back up to 2.18 ng/mL.  - His CT scan (5/7/24) showed growing retroperitoneal adenopathy and his creatinine is elevated suggesting potential compression of the ureters.   - His new CT scan (11/1/24) shows a new osseous metastasis as well as growing retroperitoneal lymph nodes.  - Patient is due for his next Lupron on 12/4/24.  He would like to obtain this locally.   - He will need a new systemic therapy relatively soon.  His options include docetaxel chemotherapy, abiraterone, or the AMA-280 study.  - Alternatively, radium-223 could target his bone metastases but will not treat the growing lymphadenopathy.  - We have decided to pursue docetaxel chemotherapy, starting on 11/13/2024.  - He will continue ADT plus darolutamide in the interim.     #R hip pain  - Ongoing. No radiographic explanation on last scan. Recommend a trial of PT and light stretching. Not currently requiring NSAID's or tylenol.      #Pulmonary embolism  - PE diagnosed in 2/2023. He will complete 12 months of Eliquis, and I have asked him to stop the anticoagulant after that.     A total of 40 minutes were spent on this patient on the date of the encounter conducting chart review, review of test results, interpretation of tests, patient visit, documentation and discussion with other provider(s). The patient was given the opportunity to ask multiple questions today, all of which were answered to his satisfaction.    The longitudinal plan of care for the diagnosis(es)/condition(s) as documented were addressed during this  visit.  Due to the added complexity in care, I will continue to support Mr. Gautam in the subsequent management and with ongoing continuity of care.    Tio Holman M.D.      Again, thank you for allowing me to participate in the care of your patient.        Sincerely,        Tio Holman MD

## 2024-11-04 NOTE — PROGRESS NOTES
[facility] Oral Chemotherapy Monitoring Program    Thank you for the opportunity to be a part in the care of this patient's oral chemotherapy. The oncology pharmacy will no longer be following this patient for oral chemotherapy. If there are any questions or the plan changes, feel free to contact us.        8/5/2024     9:00 AM 8/5/2024     2:00 PM 8/9/2024    11:00 AM 8/28/2024     2:00 PM 9/3/2024     9:00 AM 10/3/2024     2:00 PM 11/4/2024    12:00 PM   ORAL CHEMOTHERAPY   Assessment Type Left Voicemail Refill Left Voicemail Lab Monitoring Refill Refill Discontinuation   Stop Date       11/4/2024   Reason for Discontinuation       Disease progression   Diagnosis Code Prostate Cancer Prostate Cancer Prostate Cancer Prostate Cancer Prostate Cancer Prostate Cancer Prostate Cancer   Providers Manda Holman   Clinic Name/Location [facility]   Is this patient followed by the [facility] OC team? No No No No No No No   Drug Name Nubeqa (darolutamide) Nubeqa (darolutamide) Nubeqa (darolutamide) Nubeqa (darolutamide) Nubeqa (darolutamide) Nubeqa (darolutamide) Nubeqa (darolutamide)   Dose 600 mg 600 mg 600 mg 600 mg 600 mg 600 mg    Current Schedule BID BID BID BID BID BID    Cycle Details Continuous Continuous Continuous Continuous Continuous Continuous        Shanice Soto PharmD  Oral Chemotherapy Monitoring Program  [facility]  [phone]

## 2024-11-05 ENCOUNTER — PATIENT OUTREACH (OUTPATIENT)
Dept: ONCOLOGY | Facility: CLINIC | Age: 84
End: 2024-11-05
Payer: COMMERCIAL

## 2024-11-05 LAB — TESTOST SERPL-MCNC: 16 NG/DL (ref 240–950)

## 2024-11-05 NOTE — PROGRESS NOTES
[facility]: Cancer Care                                                                                      LVM for patient to return call to review chemotherapy education and answer any questions.  Provided clinic number for him to return call.    Iesha Owen, RN, BSN, OCN  Oncology RN Care Coordinator  [facility]

## 2024-11-09 ASSESSMENT — ANXIETY QUESTIONNAIRES
5. BEING SO RESTLESS THAT IT IS HARD TO SIT STILL: MORE THAN HALF THE DAYS
IF YOU CHECKED OFF ANY PROBLEMS ON THIS QUESTIONNAIRE, HOW DIFFICULT HAVE THESE PROBLEMS MADE IT FOR YOU TO DO YOUR WORK, TAKE CARE OF THINGS AT HOME, OR GET ALONG WITH OTHER PEOPLE: NOT DIFFICULT AT ALL
GAD7 TOTAL SCORE: 11
4. TROUBLE RELAXING: NEARLY EVERY DAY
2. NOT BEING ABLE TO STOP OR CONTROL WORRYING: NEARLY EVERY DAY
GAD7 TOTAL SCORE: 11
7. FEELING AFRAID AS IF SOMETHING AWFUL MIGHT HAPPEN: NOT AT ALL
GAD7 TOTAL SCORE: 11
1. FEELING NERVOUS, ANXIOUS, OR ON EDGE: NEARLY EVERY DAY
6. BECOMING EASILY ANNOYED OR IRRITABLE: NOT AT ALL
3. WORRYING TOO MUCH ABOUT DIFFERENT THINGS: NOT AT ALL
7. FEELING AFRAID AS IF SOMETHING AWFUL MIGHT HAPPEN: NOT AT ALL
8. IF YOU CHECKED OFF ANY PROBLEMS, HOW DIFFICULT HAVE THESE MADE IT FOR YOU TO DO YOUR WORK, TAKE CARE OF THINGS AT HOME, OR GET ALONG WITH OTHER PEOPLE?: NOT DIFFICULT AT ALL

## 2024-11-12 ENCOUNTER — OFFICE VISIT (OUTPATIENT)
Dept: FAMILY MEDICINE | Facility: OTHER | Age: 84
End: 2024-11-12
Payer: COMMERCIAL

## 2024-11-12 VITALS
BODY MASS INDEX: 29.25 KG/M2 | HEART RATE: 75 BPM | SYSTOLIC BLOOD PRESSURE: 124 MMHG | OXYGEN SATURATION: 100 % | DIASTOLIC BLOOD PRESSURE: 74 MMHG | TEMPERATURE: 96.9 F | HEIGHT: 66 IN | RESPIRATION RATE: 16 BRPM | WEIGHT: 182 LBS

## 2024-11-12 DIAGNOSIS — N18.31 CHRONIC KIDNEY DISEASE, STAGE 3A (H): ICD-10-CM

## 2024-11-12 DIAGNOSIS — G47.00 INSOMNIA, UNSPECIFIED TYPE: ICD-10-CM

## 2024-11-12 DIAGNOSIS — E78.5 HYPERLIPIDEMIA, UNSPECIFIED HYPERLIPIDEMIA TYPE: ICD-10-CM

## 2024-11-12 DIAGNOSIS — C79.51 PROSTATE CANCER METASTATIC TO BONE (H): Primary | ICD-10-CM

## 2024-11-12 DIAGNOSIS — I10 BENIGN ESSENTIAL HYPERTENSION: ICD-10-CM

## 2024-11-12 DIAGNOSIS — C61 PROSTATE CANCER METASTATIC TO BONE (H): Primary | ICD-10-CM

## 2024-11-12 DIAGNOSIS — C61 MALIGNANT NEOPLASM OF PROSTATE (H): ICD-10-CM

## 2024-11-12 LAB
ALBUMIN UR-MCNC: 30 MG/DL
APPEARANCE UR: CLEAR
BILIRUB UR QL STRIP: NEGATIVE
CHOLEST SERPL-MCNC: 155 MG/DL
COLOR UR AUTO: YELLOW
CREAT UR-MCNC: 91.6 MG/DL
FASTING STATUS PATIENT QL REPORTED: NO
GLUCOSE UR STRIP-MCNC: NEGATIVE MG/DL
HDLC SERPL-MCNC: 93 MG/DL
HGB UR QL STRIP: NEGATIVE
KETONES UR STRIP-MCNC: NEGATIVE MG/DL
LDLC SERPL CALC-MCNC: 41 MG/DL
LEUKOCYTE ESTERASE UR QL STRIP: NEGATIVE
MICROALBUMIN UR-MCNC: 166.6 MG/L
MICROALBUMIN/CREAT UR: 181.88 MG/G CR (ref 0–17)
NITRATE UR QL: NEGATIVE
NONHDLC SERPL-MCNC: 62 MG/DL
PH UR STRIP: 5.5 [PH] (ref 5–7)
PSA SERPL DL<=0.01 NG/ML-MCNC: 2.48 NG/ML
RBC #/AREA URNS AUTO: ABNORMAL /HPF
SP GR UR STRIP: 1.01 (ref 1–1.03)
SQUAMOUS #/AREA URNS AUTO: ABNORMAL /LPF
TRIGL SERPL-MCNC: 105 MG/DL
UROBILINOGEN UR STRIP-ACNC: 0.2 E.U./DL
WBC #/AREA URNS AUTO: ABNORMAL /HPF

## 2024-11-12 PROCEDURE — 82043 UR ALBUMIN QUANTITATIVE: CPT | Performed by: FAMILY MEDICINE

## 2024-11-12 PROCEDURE — 84153 ASSAY OF PSA TOTAL: CPT | Performed by: FAMILY MEDICINE

## 2024-11-12 PROCEDURE — 80061 LIPID PANEL: CPT | Performed by: FAMILY MEDICINE

## 2024-11-12 PROCEDURE — 81001 URINALYSIS AUTO W/SCOPE: CPT | Performed by: FAMILY MEDICINE

## 2024-11-12 PROCEDURE — 36415 COLL VENOUS BLD VENIPUNCTURE: CPT | Performed by: FAMILY MEDICINE

## 2024-11-12 PROCEDURE — 82570 ASSAY OF URINE CREATININE: CPT | Performed by: FAMILY MEDICINE

## 2024-11-12 PROCEDURE — 99214 OFFICE O/P EST MOD 30 MIN: CPT | Performed by: FAMILY MEDICINE

## 2024-11-12 PROCEDURE — G2211 COMPLEX E/M VISIT ADD ON: HCPCS | Performed by: FAMILY MEDICINE

## 2024-11-12 RX ORDER — ZOLPIDEM TARTRATE 5 MG/1
5 TABLET ORAL
Qty: 30 TABLET | Refills: 5 | Status: SHIPPED | OUTPATIENT
Start: 2024-11-12

## 2024-11-12 RX ORDER — CLONAZEPAM 1 MG/1
1.5 TABLET ORAL
Qty: 45 TABLET | Refills: 5 | Status: SHIPPED | OUTPATIENT
Start: 2024-11-12

## 2024-11-12 RX ORDER — LOSARTAN POTASSIUM AND HYDROCHLOROTHIAZIDE 12.5; 1 MG/1; MG/1
1 TABLET ORAL DAILY
Qty: 90 TABLET | Refills: 3 | Status: SHIPPED | OUTPATIENT
Start: 2024-11-12

## 2024-11-12 ASSESSMENT — PAIN SCALES - GENERAL: PAINLEVEL_OUTOF10: NO PAIN (0)

## 2024-11-12 ASSESSMENT — ENCOUNTER SYMPTOMS: NERVOUS/ANXIOUS: 1

## 2024-11-12 NOTE — TELEPHONE ENCOUNTER
[facility]: Cancer Care                                                                                      Writer spoke with patient regarding Docetaxel chemo therapy.  Reviewed the routine of coming in for labs, seeing a provider, getting chemotherapy.  Provided him with the triage line for any symptoms or side effects after treatment.  Patient prefers to meet in person on Monday to further discuss. Will see him before infusion on Monday.    Iesha Owen, RN, BSN, OCN  Oncology RN Care Coordinator  [facility]

## 2024-11-12 NOTE — PROGRESS NOTES
Assessment & Plan     Prostate cancer metastatic to bone (H)  Following with oncology and will be starting chemotherapy next week    Insomnia, unspecified type  He will increase his clonazepam to 1 and half tablets a night and continue with his zolpidem.    - clonazePAM (KLONOPIN) 1 MG tablet; Take 1.5 tablets (1.5 mg) by mouth nightly as needed for anxiety.  - zolpidem (AMBIEN) 5 MG tablet; Take 1 tablet (5 mg) by mouth nightly as needed for sleep.    Chronic kidney disease, stage 3a (H)  Will check urine today.  Will avoid NSAIDs.    - UA Macroscopic with reflex to Microscopic and Culture - Lab Collect; Future  - Albumin Random Urine Quantitative with Creat Ratio; Future    Hyperlipidemia, unspecified hyperlipidemia type  He continues on atorvastatin.  Labs drawn.    - Lipid panel reflex to direct LDL Non-fasting; Future    Benign essential hypertension  Improved on recheck.  He will continue his Hyzaar.     - losartan-hydrochlorothiazide (HYZAAR) 100-12.5 MG tablet; Take 1 tablet by mouth daily.      Recommended that he follow-up with me in the spring before he moves to [place]      Jaime Batres is a 84 year old, presenting for the following health issues:  Anxiety        11/12/2024    10:18 AM   Additional Questions   Roomed by urban   Accompanied by self     History of Present Illness       Mental Health Follow-up:  Patient presents to follow-up on Anxiety.    Patient's anxiety since last visit has been:  Medium  The patient is not having other symptoms associated with anxiety.  Any significant life events: health concerns  Patient is feeling anxious or having panic attacks.  Patient has no concerns about alcohol or drug use.    He eats 0-1 servings of fruits and vegetables daily.He consumes 0 sweetened beverage(s) daily.He exercises with enough effort to increase his heart rate 10 to 19 minutes per day.  He exercises with enough effort to increase his heart rate 3 or less days per week.   He is taking  "medications regularly.     He has metastatic prostate cancer with a lesion in his right humerus.  Oncology is starting him on chemotherapy.  He states he gets intermittent pain in his right arm but it improves when he moves his arm.  He feels he is having difficulty sleeping.  He states he goes to bed at 10 PM and falls asleep around 11 PM.  He then wakes up at about 2 AM to urinate and most of the time can get back to sleep and then wakes up at 4 AM.  When he is able to get back to sleep at 2 AM he is fine during the day if he is unable to get back to sleep he is tired during the day and then sleeps better the following night.  He estimates this happens a couple of times a week.  He did not increase his clonazepam to 1 and half tablets as we had discussed before.    He is considering moving to [place] in the spring to be closer to his son      Objective    /74   Pulse 75   Temp 96.9  F (36.1  C) (Temporal)   Resp 16   Ht 1.675 m (5' 5.95\")   Wt 82.6 kg (182 lb)   SpO2 100%   BMI 29.42 kg/m    Body mass index is 29.42 kg/m .  Physical Exam   Gen: no apparent distress  Chest: clear to auscultation without wheeze, rale or rhonchi  Cor: regular rate and rhythm without murmur  Ext: warm and dry without edema  Psych: Alert and oriented times 3; coherent speech, normal   rate and volume, able to articulate logical thoughts, able   to abstract reason, no tangential thoughts, no hallucinations   or delusions  His affect is neutral        Signed Electronically by: Vira Flor MD    "

## 2024-11-12 NOTE — TELEPHONE ENCOUNTER
[facility]: Cancer Care                                                                                      LVM for patient regarding upcoming chemotherapy.  Requesting return call to further discuss and answer questions.    Iesha Owen, RN, BSN, OCN  Oncology RN Care Coordinator  [facility]

## 2024-11-15 RX ORDER — DEXAMETHASONE SODIUM PHOSPHATE 4 MG/ML
12 INJECTION, SOLUTION INTRA-ARTICULAR; INTRALESIONAL; INTRAMUSCULAR; INTRAVENOUS; SOFT TISSUE ONCE
Start: 2024-12-09 | End: 2024-12-09

## 2024-11-15 RX ORDER — ONDANSETRON 2 MG/ML
8 INJECTION INTRAMUSCULAR; INTRAVENOUS ONCE
Start: 2024-12-09 | End: 2024-12-09

## 2024-11-15 RX ORDER — PROCHLORPERAZINE MALEATE 10 MG
10 TABLET ORAL EVERY 6 HOURS PRN
Qty: 30 TABLET | Refills: 2 | Status: SHIPPED | OUTPATIENT
Start: 2024-11-15

## 2024-11-15 RX ORDER — DEXAMETHASONE 4 MG/1
8 TABLET ORAL 2 TIMES DAILY WITH MEALS
Qty: 6 TABLET | Refills: 0 | Status: SHIPPED | OUTPATIENT
Start: 2024-11-15 | End: 2024-11-17

## 2024-11-18 ENCOUNTER — ONCOLOGY VISIT (OUTPATIENT)
Dept: ONCOLOGY | Facility: CLINIC | Age: 84
End: 2024-11-18
Attending: INTERNAL MEDICINE
Payer: COMMERCIAL

## 2024-11-18 ENCOUNTER — PATIENT OUTREACH (OUTPATIENT)
Dept: ONCOLOGY | Facility: CLINIC | Age: 84
End: 2024-11-18

## 2024-11-18 ENCOUNTER — ALLIED HEALTH/NURSE VISIT (OUTPATIENT)
Dept: ONCOLOGY | Facility: CLINIC | Age: 84
End: 2024-11-18

## 2024-11-18 VITALS
HEIGHT: 67 IN | OXYGEN SATURATION: 100 % | DIASTOLIC BLOOD PRESSURE: 91 MMHG | HEART RATE: 76 BPM | BODY MASS INDEX: 29.03 KG/M2 | RESPIRATION RATE: 16 BRPM | SYSTOLIC BLOOD PRESSURE: 154 MMHG | WEIGHT: 185 LBS | TEMPERATURE: 97.9 F

## 2024-11-18 DIAGNOSIS — C61 PROSTATE CANCER (H): ICD-10-CM

## 2024-11-18 DIAGNOSIS — C61 MALIGNANT NEOPLASM OF PROSTATE (H): Primary | ICD-10-CM

## 2024-11-18 DIAGNOSIS — C61 PROSTATE CANCER METASTATIC TO BONE (H): ICD-10-CM

## 2024-11-18 DIAGNOSIS — C79.51 PROSTATE CANCER METASTATIC TO BONE (H): ICD-10-CM

## 2024-11-18 LAB
ALBUMIN SERPL BCG-MCNC: 4.1 G/DL (ref 3.5–5.2)
ALP SERPL-CCNC: 80 U/L (ref 40–150)
ALT SERPL W P-5'-P-CCNC: 28 U/L (ref 0–70)
ANION GAP SERPL CALCULATED.3IONS-SCNC: 12 MMOL/L (ref 7–15)
AST SERPL W P-5'-P-CCNC: 51 U/L (ref 0–45)
BASOPHILS # BLD AUTO: 0 10E3/UL (ref 0–0.2)
BASOPHILS NFR BLD AUTO: 1 %
BILIRUB DIRECT SERPL-MCNC: 0.24 MG/DL (ref 0–0.3)
BILIRUB SERPL-MCNC: 0.6 MG/DL
BUN SERPL-MCNC: 24.5 MG/DL (ref 8–23)
CALCIUM SERPL-MCNC: 9.6 MG/DL (ref 8.8–10.4)
CHLORIDE SERPL-SCNC: 99 MMOL/L (ref 98–107)
CREAT SERPL-MCNC: 1.41 MG/DL (ref 0.67–1.17)
EGFRCR SERPLBLD CKD-EPI 2021: 50 ML/MIN/1.73M2
EOSINOPHIL # BLD AUTO: 0.1 10E3/UL (ref 0–0.7)
EOSINOPHIL NFR BLD AUTO: 2 %
ERYTHROCYTE [DISTWIDTH] IN BLOOD BY AUTOMATED COUNT: 12.7 % (ref 10–15)
GLUCOSE SERPL-MCNC: 135 MG/DL (ref 70–99)
HCO3 SERPL-SCNC: 23 MMOL/L (ref 22–29)
HCT VFR BLD AUTO: 27.4 % (ref 40–53)
HGB BLD-MCNC: 9.8 G/DL (ref 13.3–17.7)
IMM GRANULOCYTES # BLD: 0 10E3/UL
IMM GRANULOCYTES NFR BLD: 0 %
LYMPHOCYTES # BLD AUTO: 0.8 10E3/UL (ref 0.8–5.3)
LYMPHOCYTES NFR BLD AUTO: 23 %
MCH RBC QN AUTO: 34.9 PG (ref 26.5–33)
MCHC RBC AUTO-ENTMCNC: 35.8 G/DL (ref 31.5–36.5)
MCV RBC AUTO: 98 FL (ref 78–100)
MONOCYTES # BLD AUTO: 0.4 10E3/UL (ref 0–1.3)
MONOCYTES NFR BLD AUTO: 11 %
NEUTROPHILS # BLD AUTO: 2.2 10E3/UL (ref 1.6–8.3)
NEUTROPHILS NFR BLD AUTO: 63 %
NRBC # BLD AUTO: 0 10E3/UL
NRBC BLD AUTO-RTO: 0 /100
PLATELET # BLD AUTO: 107 10E3/UL (ref 150–450)
POTASSIUM SERPL-SCNC: 3.4 MMOL/L (ref 3.4–5.3)
PROT SERPL-MCNC: 7 G/DL (ref 6.4–8.3)
PSA SERPL DL<=0.01 NG/ML-MCNC: 2.75 NG/ML
RBC # BLD AUTO: 2.81 10E6/UL (ref 4.4–5.9)
SODIUM SERPL-SCNC: 134 MMOL/L (ref 135–145)
WBC # BLD AUTO: 3.4 10E3/UL (ref 4–11)

## 2024-11-18 PROCEDURE — 96375 TX/PRO/DX INJ NEW DRUG ADDON: CPT

## 2024-11-18 PROCEDURE — 36415 COLL VENOUS BLD VENIPUNCTURE: CPT

## 2024-11-18 PROCEDURE — 85041 AUTOMATED RBC COUNT: CPT | Performed by: INTERNAL MEDICINE

## 2024-11-18 PROCEDURE — 99215 OFFICE O/P EST HI 40 MIN: CPT | Performed by: INTERNAL MEDICINE

## 2024-11-18 PROCEDURE — 82310 ASSAY OF CALCIUM: CPT

## 2024-11-18 PROCEDURE — 85025 COMPLETE CBC W/AUTO DIFF WBC: CPT | Performed by: INTERNAL MEDICINE

## 2024-11-18 PROCEDURE — G2211 COMPLEX E/M VISIT ADD ON: HCPCS | Performed by: INTERNAL MEDICINE

## 2024-11-18 PROCEDURE — 250N000011 HC RX IP 250 OP 636: Performed by: INTERNAL MEDICINE

## 2024-11-18 PROCEDURE — 82947 ASSAY GLUCOSE BLOOD QUANT: CPT

## 2024-11-18 PROCEDURE — 84403 ASSAY OF TOTAL TESTOSTERONE: CPT

## 2024-11-18 PROCEDURE — 82248 BILIRUBIN DIRECT: CPT | Performed by: INTERNAL MEDICINE

## 2024-11-18 PROCEDURE — 84153 ASSAY OF PSA TOTAL: CPT

## 2024-11-18 PROCEDURE — 96413 CHEMO IV INFUSION 1 HR: CPT

## 2024-11-18 PROCEDURE — 258N000003 HC RX IP 258 OP 636: Performed by: INTERNAL MEDICINE

## 2024-11-18 RX ORDER — DEXAMETHASONE SODIUM PHOSPHATE 4 MG/ML
12 INJECTION, SOLUTION INTRA-ARTICULAR; INTRALESIONAL; INTRAMUSCULAR; INTRAVENOUS; SOFT TISSUE ONCE
Status: COMPLETED | OUTPATIENT
Start: 2024-11-18 | End: 2024-11-18

## 2024-11-18 RX ORDER — ONDANSETRON 2 MG/ML
8 INJECTION INTRAMUSCULAR; INTRAVENOUS ONCE
Status: COMPLETED | OUTPATIENT
Start: 2024-11-18 | End: 2024-11-18

## 2024-11-18 RX ORDER — PREDNISONE 5 MG/1
5 TABLET ORAL 2 TIMES DAILY
Qty: 42 TABLET | Refills: 0 | Status: SHIPPED | OUTPATIENT
Start: 2024-11-18 | End: 2024-12-09

## 2024-11-18 RX ADMIN — SODIUM CHLORIDE 250 ML: 9 INJECTION, SOLUTION INTRAVENOUS at 11:37

## 2024-11-18 RX ADMIN — DEXAMETHASONE SODIUM PHOSPHATE 12 MG: 4 INJECTION, SOLUTION INTRA-ARTICULAR; INTRALESIONAL; INTRAMUSCULAR; INTRAVENOUS; SOFT TISSUE at 11:47

## 2024-11-18 RX ADMIN — ONDANSETRON 8 MG: 2 INJECTION INTRAMUSCULAR; INTRAVENOUS at 11:42

## 2024-11-18 RX ADMIN — DOCETAXEL 120 MG: 20 INJECTION, SOLUTION, CONCENTRATE INTRAVENOUS at 12:17

## 2024-11-18 ASSESSMENT — PAIN SCALES - GENERAL: PAINLEVEL_OUTOF10: NO PAIN (0)

## 2024-11-18 NOTE — LETTER
11/18/2024      Medardo Gautam  [id number] [facility] [id number]      Dear Colleague,    Thank you for referring your patient, Medardo Gautam, to the [facility]. Please see a copy of my visit note below.      FOLLOW UP VISIT      Reason for visit:  Metastatic [id number]-pclqdyd CRPC s/p darolutamide and 177Lu-PSMA-I&T.  Cycle #1 of docetaxel today     History of Present Illness:  Mr. Gautam is an 84-year-old man who was diagnosed with prostate cancer in 2012.  His PSA level was 5.2 ng/mL.  Clinical stage was cT1c disease.  He underwent radical prostatectomy on 8/6/2012, which revealed [place] 4+5=9 carcinoma, stage pT2c N0 R0.  His next-generation DNA sequencing analysis ([facility]) revealed a pathogenic TP53 mutation, SHIRA genotype, TMB 5 muts/Mb, and absent PD-L1 expression.  He subsequently received salvage radiotherapy, which was completed in 10/2013, concurrently with six months of androgen deprivation therapy.     He subsequently developed a biochemical recurrence, and received ADT from 2014 until 2020.  In 11/2020, he developed non-metastatic CRPC.  Darolutamide was added on 12/4/2020, to which he achieved a subsequent PSA response.  His PSA level initially dropped from 1.66 ng/mL down to a speedy of 0.07 ng/mL (6/21/2021).  However, the patient then developed a rising PSA level over the past year.  His PSA on 1/13/2022 was 0.16, the PSA on 4/20/2022 was 0.24, the PSA on 6/13/2022 was 0.34, the PSA on 7/12/2022 was 0.47, the PSA on 8/25/2022 was 0.64 ng/mL, and the PSA on 11/21/2022 was 1.55 ng/mL (with a testosterone level of 14 ng/dL).  On 3/8/2023, his PSA level increased to 8.7 ng/mL.     The patient then participated in the ECLIPSE clinical trial, and he was randomized to the 177Lu-PSMA-I&T radioligand arm.  During the screening procedures he was found to have an asymptomatic pulmonary embolus, and he began apixaban. He received all 4 doses of [place]-PSMA-I&T thus far, and his PSA  level dropped from 8.7 down to 0.12 ng/mL.  However, his PSA level increased to 0.79 ng/mL, and his imaging (5/7/24) showed evidence of progressive disease.  He is currently on ADT plus darolutamide, but his PSA level continues to rise.  The most recent PSA (11/1/24) was 2.75 ng/mL.  He also had recent imaging assessments (11/1/24) which showed progressive osseous and yan disease.       He returns for a follow-up visit today, and he will begin cycle #1 of docetaxel chemotherapy.      Review of Systems:  Medardo has noticed continued dry eyes with blurred vision over the last ~2 months. He continues to use rewetting eye drops but feels his ability to read small print has declined. He has a visit scheduled this Thursday with an ophthalmologist at the [facility] in [place] on Thursday afternoon for an exam.  He also has dry mouth improved with biotene mouth rinse with adequate improvement overnight. During the day, pushes oral hydration.  He has stable nocturia, was given a medication for this at his last visit but has discontinued it due to worsening dry mouth concerns.   He has ongoing, stable right hip pain for the last 6 months. No new bone pains.  No chest pain, shortness of breath, or cough. No fevers or chills. A comprehensive 14-point ROS is otherwise negative other than the symptoms noted above in the HPI.  His ECOG score is 0.  His pain score is 2/10.     Medications:  Lupron 22.5 mg IM q3mo (next dose, 11/27/2024)  Losartan/HCTZ 100/12.5 mg  Allopurinol 100 mg  Zolpidem 5 mg  Clonazepam 1 mg  Sildenafil 50 mg  Loratadine 10 mg  Calcium + Vit D  Multivitamin  Folic acid     Physical Examination:    General: No acute distress  Vital signs within normal limits.  HEENT: Sclera anicteric. Oral mucosa pink, dry. No mucositis or thrush  Lymph: No lymphadenopathy in neck  Heart: Regular, rate, and rhythm  Lungs: Clear to ascultation bilaterally  Abdomen: Positive bowel sounds. Soft, non-tender. No  organomegaly or mass.   Extremities: no lower extremity edema  Neuro: Cranial nerves grossly intact  Skin: no dermatitis     CT scan (11/1/24):  This shows increased size of a sclerotic lesion in the proximal right humerus, although only partially included in the field-of-view.  Mixed findings in the retroperitoneal lymph nodes with some stable, some increased, and others decreased (Left para-aortic, 1.7 x 1.0 cm, previously 1.5 x 1.2 cm. Preaortic, 1.2 x 0.7 cm, previously 1.6 x 1.3 cm ).     Laboratories (11/1/24):  CBC shows a WBC of 4.0, hemoglobin 10.6, hematocrit 30.5%, platelets 111K.  His PSA level is 2.18 ng/mL.  His testosterone level is 16 ng/dL.  Creatinine  is 1.36 mg/dL (GFR 52 mL/min), which is stable.        ASSESSMENT AND PLAN:   Mr. Gautam is an 84-year-old man with [place] 4+5=9 [id number] prostate cancer who underwent radical prostatectomy (8/2012) and salvage radiotherapy (10/2013) but then developed metastatic CRPC refractory to darolutamide treatment.  He completed the ECLIPSE study, and received all 4 doses of Lee Ann-PSMA-I&T radioligand therapy.  His disease is now slowly progressing once again despite ADT plus darolutamide.  He will begin cycle #1 of docetaxel today.  His ECOG score is 0.  His pain score is 2/10.     #Metastatic CRPC  - Completed all 4 doses of Lee Ann-I&T on the ECLIPSE trial in 7/2023, which he tolerated well with the exception of xerostomia/xerophthalmia.   - His PSA declined from 8.7 to 0.12 ng/mL, but is now back up to 2.75 ng/mL.  - His CT scan (5/7/24) showed growing retroperitoneal adenopathy and his creatinine is elevated suggesting potential compression of the ureters.   - His new CT scan (11/1/24) shows a new osseous metastasis as well as growing retroperitoneal lymph nodes.  - Patient is due for his next Lupron on 12/4/24.  He would like to obtain this locally.   - He will need a new systemic therapy relatively soon.  His options include docetaxel chemotherapy,  abiraterone, or the AMA-280 study.  - Alternatively, radium-223 could target his bone metastases but will not treat the growing lymphadenopathy.  - We have decided to pursue docetaxel chemotherapy, starting on 11/18/2024.  - He will continue ADT in the interim; next dose on 11/27/2024.     #R hip pain  - Ongoing. No radiographic explanation on last scan. Recommend a trial of PT and light stretching. Not currently requiring NSAID's or tylenol.      #Pulmonary embolism  - PE diagnosed in 2/2023. He will complete 12 months of Eliquis, and I have asked him to stop the anticoagulant after that.     A total of 45 minutes were spent on this patient on the date of the encounter conducting chart review, review of test results, interpretation of tests, patient visit, documentation and discussion with other provider(s). The patient was given the opportunity to ask multiple questions today, all of which were answered to his satisfaction.     The longitudinal plan of care for the diagnosis(es)/condition(s) as documented were addressed during this visit.  Due to the added complexity in care, I will continue to support Mr. Gautam in the subsequent management and with ongoing continuity of care.     Tio Holman M.D.      Again, thank you for allowing me to participate in the care of your patient.        Sincerely,        Tio Holman MD

## 2024-11-18 NOTE — PROGRESS NOTES
FOLLOW UP VISIT      Reason for visit:  Metastatic [id number]-[id number] CRPC s/p darolutamide and 177Lu-PSMA-I&T.  Cycle #1 of docetaxel today     History of Present Illness:  Mr. Gautam is an 84-year-old man who was diagnosed with prostate cancer in 2012.  His PSA level was 5.2 ng/mL.  Clinical stage was cT1c disease.  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  His next-generation DNA sequencing analysis ([employer]) revealed a pathogenic TP53 mutation, SHIRA genotype, TMB 5 muts/Mb, and absent PD-L1 expression.  He subsequently received salvage radiotherapy, which was completed in 10/2013, concurrently with six months of androgen deprivation therapy.     He subsequently developed a biochemical recurrence, and received ADT from 2014 until 2020.  In 11/2020, he developed non-metastatic CRPC.  Darolutamide was added on 12/4/2020, to which he achieved a subsequent PSA response.  His PSA level initially dropped from 1.66 ng/mL down to a speedy of 0.07 ng/mL (6/21/2021).  However, the patient then developed a rising PSA level over the past year.  His PSA on 1/13/2022 was 0.16, the PSA on 4/20/2022 was 0.24, the PSA on 6/13/2022 was 0.34, the PSA on 7/12/2022 was 0.47, the PSA on 8/25/2022 was 0.64 ng/mL, and the PSA on 11/21/2022 was 1.55 ng/mL (with a testosterone level of 14 ng/dL).  On 3/8/2023, his PSA level increased to 8.7 ng/mL.     The patient then participated in the ECLIPSE clinical trial, and he was randomized to the 177Lu-PSMA-I&T radioligand arm.  During the screening procedures he was found to have an asymptomatic pulmonary embolus, and he began apixaban. He received all 4 doses of Lee Ann-PSMA-I&T thus far, and his PSA level dropped from 8.7 down to 0.12 ng/mL.  However, his PSA level increased to 0.79 ng/mL, and his imaging (5/7/24) showed evidence of progressive disease.  He is currently on ADT plus darolutamide, but his PSA level continues to rise.  The most  recent PSA (11/1/24) was 2.75 ng/mL.  He also had recent imaging assessments (11/1/24) which showed progressive osseous and yan disease.       He returns for a follow-up visit today, and he will begin cycle #1 of docetaxel chemotherapy.      Review of Systems:  Medardo has noticed continued dry eyes with blurred vision over the last ~2 months. He continues to use rewetting eye drops but feels his ability to read small print has declined. He has a visit scheduled this Thursday with an ophthalmologist at the [facility] in [place] on Thursday afternoon for an exam.  He also has dry mouth improved with biotene mouth rinse with adequate improvement overnight. During the day, pushes oral hydration.  He has stable nocturia, was given a medication for this at his last visit but has discontinued it due to worsening dry mouth concerns.   He has ongoing, stable right hip pain for the last 6 months. No new bone pains.  No chest pain, shortness of breath, or cough. No fevers or chills. A comprehensive 14-point ROS is otherwise negative other than the symptoms noted above in the HPI.  His ECOG score is 0.  His pain score is 2/10.     Medications:  Lupron 22.5 mg IM q3mo (next dose, 11/27/2024)  Losartan/HCTZ 100/12.5 mg  Allopurinol 100 mg  Zolpidem 5 mg  Clonazepam 1 mg  Sildenafil 50 mg  Loratadine 10 mg  Calcium + Vit D  Multivitamin  Folic acid     Physical Examination:    General: No acute distress  Vital signs within normal limits.  HEENT: Sclera anicteric. Oral mucosa pink, dry. No mucositis or thrush  Lymph: No lymphadenopathy in neck  Heart: Regular, rate, and rhythm  Lungs: Clear to ascultation bilaterally  Abdomen: Positive bowel sounds. Soft, non-tender. No organomegaly or mass.   Extremities: no lower extremity edema  Neuro: Cranial nerves grossly intact  Skin: no dermatitis     CT scan (11/1/24):  This shows increased size of a sclerotic lesion in the proximal right humerus, although only partially  included in the field-of-view.  Mixed findings in the retroperitoneal lymph nodes with some stable, some increased, and others decreased (Left para-aortic, 1.7 x 1.0 cm, previously 1.5 x 1.2 cm. Preaortic, 1.2 x 0.7 cm, previously 1.6 x 1.3 cm ).     Laboratories (11/1/24):  CBC shows a WBC of 4.0, hemoglobin 10.6, hematocrit 30.5%, platelets 111K.  His PSA level is 2.18 ng/mL.  His testosterone level is 16 ng/dL.  Creatinine  is 1.36 mg/dL (GFR 52 mL/min), which is stable.        ASSESSMENT AND PLAN:   Mr. Gautam is an 84-year-old man with [place] 4+5=9 [id number]-[id number] prostate cancer who underwent radical prostatectomy (8/2012) and salvage radiotherapy (10/2013) but then developed metastatic CRPC refractory to darolutamide treatment.  He completed the ECLIPSE study, and received all 4 doses of Lee Ann-PSMA-I&T radioligand therapy.  His disease is now slowly progressing once again despite ADT plus darolutamide.  He will begin cycle #1 of docetaxel today.  His ECOG score is 0.  His pain score is 2/10.     #Metastatic CRPC  - Completed all 4 doses of Lee Ann-I&T on the ECLIPSE trial in 7/2023, which he tolerated well with the exception of xerostomia/xerophthalmia.   - His PSA declined from 8.7 to 0.12 ng/mL, but is now back up to 2.75 ng/mL.  - His CT scan (5/7/24) showed growing retroperitoneal adenopathy and his creatinine is elevated suggesting potential compression of the ureters.   - His new CT scan (11/1/24) shows a new osseous metastasis as well as growing retroperitoneal lymph nodes.  - Patient is due for his next Lupron on 12/4/24.  He would like to obtain this locally.   - He will need a new systemic therapy relatively soon.  His options include docetaxel chemotherapy, abiraterone, or the AMA-280 study.  - Alternatively, radium-223 could target his bone metastases but will not treat the growing lymphadenopathy.  - We have decided to pursue docetaxel chemotherapy, starting on 11/18/2024.  - He will continue ADT in the  interim; next dose on 11/27/2024.     #R hip pain  - Ongoing. No radiographic explanation on last scan. Recommend a trial of PT and light stretching. Not currently requiring NSAID's or tylenol.      #Pulmonary embolism  - PE diagnosed in 2/2023. He will complete 12 months of Eliquis, and I have asked him to stop the anticoagulant after that.     A total of 45 minutes were spent on this patient on the date of the encounter conducting chart review, review of test results, interpretation of tests, patient visit, documentation and discussion with other provider(s). The patient was given the opportunity to ask multiple questions today, all of which were answered to his satisfaction.     The longitudinal plan of care for the diagnosis(es)/condition(s) as documented were addressed during this visit.  Due to the added complexity in care, I will continue to support Mr. Gautam in the subsequent management and with ongoing continuity of care.     Tio Holman M.D.

## 2024-11-18 NOTE — PROGRESS NOTES
Infusion Nursing Note:  Medardo Gautam presents today for C1D1: Taxotere.    Patient seen by provider today: Yes: Dr. Holman to see patient in infusion center   present during visit today: Not Applicable.    Note: Today is the patient's first time in this infusion center.  He has previously received one dose of IV Taxotere with a study program in 2012 and has also has received oral chemotherapy in the past.  Patient reports to feeling well overall with no new concerns.  Oriented to infusion center. Chemotherapy teaching, side effects, and schedule reviewed with patient. General chemotherapy side effects reviewed with patient. 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 Pt instructed to call care coordinator, triage (or MD on call if after hours/weekends) with chills/temp >=100.5, questions/concerns. Pt stated understanding of plan.  Patient tolerated infusion well today without issue.    Intravenous Access:  Peripheral IV placed by vascular access    Treatment Conditions:  Lab Results   Component Value Date    HGB 9.8 (L) 11/18/2024    WBC 3.4 (L) 11/18/2024    ANEU 2.6 06/21/2021    ANEUTAUTO 2.2 11/18/2024     (L) 11/18/2024        Lab Results   Component Value Date     (L) 11/18/2024    POTASSIUM 3.4 11/18/2024    MAG 1.7 08/22/2023    CR 1.41 (H) 11/18/2024    GAVIN 9.6 11/18/2024    BILITOTAL 0.6 11/18/2024    ALBUMIN 4.1 11/18/2024    ALT 28 11/18/2024    AST 51 (H) 11/18/2024       Results reviewed, labs MET treatment parameters, ok to proceed with treatment.      Post Infusion Assessment:  Patient tolerated infusion without incident.  Blood return noted pre and post infusion.  Site patent and intact, free from redness, edema or discomfort.  No evidence of extravasations.  Access discontinued per protocol.       Discharge Plan:   Prescription refills given for Dexamethasone, Prednisone, Compazine.  Discharge instructions reviewed with:  Patient.  Patient and/or family verbalized understanding of discharge instructions and all questions answered.  Copy of AVS reviewed with patient and/or family.  Patient will return 12/9/24 for labs, C2D1: Taxotere, and to see Dr. Holman for next appointment.  Patient discharged in stable condition accompanied by: self.      Kelli Enriquez RN

## 2024-11-18 NOTE — PROGRESS NOTES
[facility]: Cancer Care Plan of Care Education Note                                    Discussion with Patient:                                                      Met with patient to discuss new treatment. Provided pt with [employer] printouts on Docetaxel. Reviewed administration, side effects and ongoing symptom support management. Discussed that treatment may be delayed due to side effects, infusion schedule or patient s need to modify. Reviewed most concerning symptoms that warrant an immediate call to the clinic nurse line. Refer to patient education tab for information delivered regarding Docetaxel side effects and symptom management.           Assessment:                                                      Assessment completed with:: Patient    Plan of Care Education   Yearly learning assessment completed?: Yes (see Education tab)  Diagnosis:: prostate cancer  Does patient understand diagnosis?: Yes  Tx plan/regimen:: Docetaxel  Does patient understand treatment plan/regimen?: Yes  Preparing for treatment:: Reviewed treatment preparation information with patient (vascular access, day of chemo, visitor policy, what to bring, etc.)  Vascular access education provided for:: Peripheral IV  Side effect education:: Diarrhea/Constipation;DVT/PE;Endocrine effects;Fatigue;Hair loss;Immune-mediated effects;Infection;Mylosuppression;Mouth sores;Lab value monitoring (anemia, neutropenia, thrombocytopenia);Infertility;Nausea/Vomiting;Neuropathy;Sexual health;Skin changes;Urinary  Safety/self care at home reviewed with patient:: Yes  Coping - concerns/fears reviewed with patient:: Yes  Plan of Care:: NAILA follow-up appointment;Lab appointment;Imaging;MD follow-up appointment;Treatment schedule  When to call provider:: Bleeding;Uncontrolled diarrhea/constipation;Increased shortness of breath;New/worsening pain;Uncontrolled nausea/vomiting;Shaking chills;Temperature >100.4F  Reasons for deferring treatment  "reviewed with patient:: Yes  Additional education provided for: : Steroid therapy;Bleeding precautions;Neutropenic precautions;Injectable therapies;Change in medication/medication education    Evaluation of Learning  Patient Education Provided: Yes  Readiness:: Acceptance  Method:: Booklet/Handout;Explanation    No assessment indicated    Intervention/Education provided during outreach:                                                       Patient has a copy of the educational materials and folder.  He will review the \"Honoring Choices\" handout since he states he doesn't have a Health Care Directive. Questions were answered to patient's satisfaction.    Patient requesting a change in the time of next appointments.  Advised him we will work on getting him set up for a later appointment.    Patient to follow up as scheduled at next appt  Confirmed patient has clinic and triage numbers    Signature:  Iesha Owen RN  "

## 2024-11-18 NOTE — NURSING NOTE
Chief Complaint   Patient presents with    Blood Draw     Labs drawn via PIV by RN in lab. VS taken.      Labs drawn via peripheral IV. Vital signs taken. Checked into next appointment.   Nedra Johnson RN

## 2024-11-19 ENCOUNTER — PATIENT OUTREACH (OUTPATIENT)
Dept: ONCOLOGY | Facility: CLINIC | Age: 84
End: 2024-11-19
Payer: COMMERCIAL

## 2024-11-19 NOTE — PROGRESS NOTES
[facility]: Cancer Care                                                                                      Patient called today reporting he had a bad night sleep.  He was up very late.  He has the dexamethasone to take for today and tomorrow.  He wonders if he has to take the 5 mg prednisone twice daily, or if it would be ok for him to take it once daily instead.  He understands to take as directed until further instructed.    Iesha Owen, RN, BSN, OCN  Oncology RN Care Coordinator  [facility]

## 2024-11-20 LAB — TESTOST SERPL-MCNC: 11 NG/DL (ref 240–950)

## 2024-11-20 NOTE — PROGRESS NOTES
[facility]: Cancer Care                                                                                      This morning patient reports he got a better night sleep and feels good today.  No complaints of any other side effects at this time.    Patient was advised he can hold prednisone during the time he takes Dexamethasone.  Advised him to continue with prednisone twice daily and to take both doses prior to 2 pm.  He verbalized understanding of these instructions.    Informed patient that his schedule has now been updated so he will no longer have to come in as early as 7 am on 12/09/24.    Iesha Owen, RN, BSN, OCN  Oncology RN Care Coordinator  [facility]

## 2024-11-25 ENCOUNTER — PATIENT OUTREACH (OUTPATIENT)
Dept: ONCOLOGY | Facility: CLINIC | Age: 84
End: 2024-11-25
Payer: COMMERCIAL

## 2024-11-25 NOTE — PROGRESS NOTES
[facility]: Cancer Care                                                                                      Patient called today with a couple of questions.  He asked if he should wait to have his annual eye exam.  Advised him that we would recommend getting his exam as planned.     Medardo also asked about his Lupron injection.  He would prefer to get it in [place] despite being seen for his infusion a few days later.  Patient was advised that is fine to do.  Explained that sometimes the orders are different between clinics, which has, in the past, been an issue.  Patient will keep his scheduled Lupron injection on 12/05.    He reports feeling dizzy with activity a few times.  Encouraged him to sit and rest when this happens.  Patient was encouraged to continue to get plenty of fluids.  He will call if symptoms persist or worsen.    Iesha Owen, RN, BSN, OCN  Oncology RN Care Coordinator  [facility]

## 2024-12-05 ENCOUNTER — NURSE TRIAGE (OUTPATIENT)
Dept: FAMILY MEDICINE | Facility: OTHER | Age: 84
End: 2024-12-05

## 2024-12-05 ENCOUNTER — ALLIED HEALTH/NURSE VISIT (OUTPATIENT)
Dept: FAMILY MEDICINE | Facility: OTHER | Age: 84
End: 2024-12-05
Payer: COMMERCIAL

## 2024-12-05 DIAGNOSIS — C61 PROSTATE CANCER (H): ICD-10-CM

## 2024-12-05 DIAGNOSIS — C61 MALIGNANT NEOPLASM OF PROSTATE (H): ICD-10-CM

## 2024-12-05 NOTE — PROGRESS NOTES
Clinic Administered Medication Documentation      Injectable Medication Documentation    Is there an active order (written within the past 365 days, with administrations remaining, not ) in the chart? Yes.     Patient was given  leuprolide (LUPRON DEPOT) . Prior to medication administration, verified patient's identity using patient s name and date of birth. Please see MAR and medication order for additional information. Patient instructed to remain in clinic for 15 minutes and report any adverse reaction to staff immediately.    Vial/Syringe: Syringe  Was this medication supplied by the patient? No  Is this a medication the patient will need to receive again? Yes. Verified that the patient has refills remaining in their prescription.    Jessica ALANIZN, RN

## 2024-12-05 NOTE — TELEPHONE ENCOUNTER
Nurse Triage SBAR    Is this a 2nd Level Triage? NO    Situation: RN triaging symptoms noted per upcoming visit with PCP.    Background: Patient reports nasal drainage that is slightly pink-tinged.    Assessment: Patient will notice some pink-tinged drainage in his nose with blowing. This does not occur every time. He reports it is very minor, scant amount. Does not occur every day. No blood clots present. He does report a scab to the inside of his left nare. He is using a nasal spray before bed each night.    Protocol Recommended Disposition:   Home Care    Recommendation: He has an appointment with oncology on Monday and will discuss with provider. Will cancel appointment with PCP if no longer needed.     Does the patient meet one of the following criteria for ADS visit consideration? 16+ years old, with an [facility] PCP     Jessica HODGES, RN     Reason for Disposition   Mild-moderate nosebleed and bleeding has stopped now    Additional Information   Negative: Fainted (passed out), or too weak to stand following large blood loss   Negative: Sounds like a life-threatening emergency to the triager   Negative: Nosebleed followed nose injury   Negative: Bleeding present > 30 minutes and using correct method of direct pressure   Negative: Bleeding now and second call after being instructed in correct technique of direct pressure   Negative: Feeling weak or lightheaded (e.g., woozy, feeling like they might faint)   Negative: Pale skin (pallor) of new-onset or getting worse   Negative: Has nasal packing (inserted by health care provider to control bleeding) and now has new rash   Negative: Has nasal packing and now has bleeding around the packing  (Exception: Few drops or ooze.)   Negative: Patient sounds very sick or weak to the triager   Negative: Large amount of blood has been lost (e.g., one cup)   Negative: Bleeding recurs 3 or more times in 24 hours despite direct pressure   Negative: Taking Coumadin (warfarin) or  other strong blood thinner, or known bleeding disorder (e.g., thrombocytopenia)   Negative: Has skin bruises or bleeding gums that are not caused by an injury   Negative: Has nasal packing and now has fever > 100.4 F (38.0 C)   Negative: Patient wants to be seen   Negative: Has nasal packing (inserted by health care provider to control bleeding)   Negative: Hard-to-stop nosebleeds are a chronic symptom (recurrent or ongoing AND lasting > 4 weeks)   Negative: Easy bleeding present in other family members    Protocols used: Nosebleed-A-OH

## 2024-12-08 NOTE — PROGRESS NOTES
FOLLOW UP VISIT      Reason for visit:  Metastatic [id number] CRPC s/p darolutamide and 177Lu-PSMA-I&T.  Cycle #2 of docetaxel today.     History of Present Illness:  Mr. Gautam is an 84-year-old man who was diagnosed with prostate cancer in 2012.  His PSA level was 5.2 ng/mL.  Clinical stage was cT1c disease.  He underwent radical prostatectomy on 8/6/2012, which revealed [place] 4+5=9 carcinoma, stage pT2c N0 R0.  His next-generation DNA sequencing analysis ([employer]) revealed a pathogenic TP53 mutation, SHIRA genotype, TMB 5 muts/Mb, and absent PD-L1 expression.  He subsequently received salvage radiotherapy, which was completed in 10/2013, concurrently with six months of androgen deprivation therapy.     He subsequently developed a biochemical recurrence, and received ADT from 2014 until 2020.  In 11/2020, he developed non-metastatic CRPC.  Darolutamide was added on 12/4/2020, to which he achieved a subsequent PSA response.  His PSA level initially dropped from 1.66 ng/mL down to a speedy of 0.07 ng/mL (6/21/2021).  However, the patient then developed a rising PSA level over the past year.  His PSA on 1/13/2022 was 0.16, the PSA on 4/20/2022 was 0.24, the PSA on 6/13/2022 was 0.34, the PSA on 7/12/2022 was 0.47, the PSA on 8/25/2022 was 0.64 ng/mL, and the PSA on 11/21/2022 was 1.55 ng/mL (with a testosterone level of 14 ng/dL).  On 3/8/2023, his PSA level increased to 8.7 ng/mL.     The patient then participated in the ECLIPSE clinical trial, and he was randomized to the 177Lu-PSMA-I&T radioligand arm.  During the screening procedures he was found to have an asymptomatic pulmonary embolus, and he began apixaban. He received all 4 doses of [place]-PSMA-I&T thus far, and his PSA level dropped from 8.7 down to 0.12 ng/mL.  However, his PSA level increased to 0.79 ng/mL, and his imaging (5/7/24) showed evidence of progressive disease.  He is currently on ADT plus darolutamide, but his PSA level continued to rise.  The most  recent PSA (11/1/24) was 2.48 ng/mL.  He also had recent imaging assessments (11/1/24) which showed progressive osseous and yan disease.      On 11/18/24, his PSA level reached 2.75 ng/mL.  At that time, he began docetaxel chemotherapy.  He returns for a follow-up visit today, and to receive cycle #2 of docetaxel.      Review of Systems:  Medardo is tolerating the docetaxel chemotherapy quite well thus far.  He reports some mild epistaxis, hair thinning, hiccups, and one episode of urinary incontinence.  He has noticed continued dry eyes with blurred vision over the last ~2 months. He continues to use rewetting eye drops but feels his ability to read small print has declined. He has a visit scheduled this Thursday with an ophthalmologist at the [facility] in [place] on Thursday afternoon for an exam.  He also has dry mouth improved with biotene mouth rinse with adequate improvement overnight. During the day, pushes oral hydration.  He has stable nocturia, was given a medication for this at his last visit but has discontinued it due to worsening dry mouth concerns.   He has ongoing, stable right hip pain for the last 6 months. No new bone pains.  No chest pain, shortness of breath, or cough. No fevers or chills. A comprehensive 14-point ROS is otherwise negative other than the symptoms noted above in the HPI.  His ECOG score is 0.  His pain score is 2/10.     Medications:  Lupron 22.5 mg IM q3mo (last dose, 12/4/2024)  Docetaxel 75 mg/m2, cycle #2 on 12/9/2024  Losartan/HCTZ 100/12.5 mg  Allopurinol 100 mg  Zolpidem 5 mg  Clonazepam 1 mg  Sildenafil 50 mg  Loratadine 10 mg  Calcium + Vit D  Multivitamin  Folic acid     Physical Examination:    General: No acute distress  Vital signs within normal limits.  HEENT: Sclera anicteric. Oral mucosa pink, dry. No mucositis or thrush  Lymph: No lymphadenopathy in neck  Heart: Regular, rate, and rhythm  Lungs: Clear to ascultation bilaterally  Abdomen: Positive  bowel sounds. Soft, non-tender. No organomegaly or mass.   Extremities: no lower extremity edema  Neuro: Cranial nerves grossly intact  Skin: no dermatitis     CT scan (11/1/24):  This shows increased size of a sclerotic lesion in the proximal right humerus, although only partially included in the field-of-view.  Mixed findings in the retroperitoneal lymph nodes with some stable, some increased, and others decreased (Left para-aortic, 1.7 x 1.0 cm, previously 1.5 x 1.2 cm. Preaortic, 1.2 x 0.7 cm, previously 1.6 x 1.3 cm ).     Laboratories (12/9/24):  CBC shows a WBC of 5.9, hemoglobin 9.7, hematocrit 27.8%, platelets 119K.  His PSA level is 1.74 ng/mL.  Creatinine  is 1.19 mg/dL (GFR 61 mL/min), which is stable.       ASSESSMENT AND PLAN:   Mr. Gautam is an 84-year-old man with [place] 4+5=9 [id number] prostate cancer who underwent radical prostatectomy (8/2012) and salvage radiotherapy (10/2013) but then developed metastatic CRPC refractory to darolutamide.  He enrolled on ECLIPSE, and received all 4 doses of Lee Ann-PSMA-I&T radioligand therapy.  His disease is now slowly progressing once again despite ADT plus darolutamide.  He will receive cycle #2 of docetaxel today.  His ECOG score is 0.  His pain score is 2/10.     #Metastatic [id number] CRPC s/p darolutamide  - Completed all 4 doses of Lee Ann-I&T on the ECLIPSE trial in 7/2023, which he tolerated well with the exception of xerostomia/xerophthalmia.   - His PSA declined from 8.7 to 0.12 ng/mL, but is now back up to 2.75 ng/mL.   - His new CT scan (11/1/24) shows a new osseous metastasis as well as growing retroperitoneal lymph nodes.  - Patient is due for his next Lupron on 12/4/24.  He would like to obtain this locally.   - His options for new systemic therapies included docetaxel chemotherapy, abiraterone, or the AMA-280 study.  - Alternatively, radium-223 could target his bone metastases but will not treat the growing lymphadenopathy.  - We previously decided  to pursue docetaxel chemotherapy, starting on 11/18/2024.  - Returns for cycle #2 of docetaxel today.  Tolerating chemotherapy relatively well thus far.  - He will continue ADT in the interim; next dose on 2/26/2025.  - He is considering moving to [place] to be closer to his son; that may happen in early 2025.      A total of 40 minutes were spent on this patient on the date of the encounter conducting chart review, review of test results, interpretation of tests, patient visit, documentation and discussion with other provider(s). The patient was given the opportunity to ask multiple questions today, all of which were answered to his satisfaction.    Tio Holman M.D.

## 2024-12-09 ENCOUNTER — ONCOLOGY VISIT (OUTPATIENT)
Dept: ONCOLOGY | Facility: CLINIC | Age: 84
End: 2024-12-09
Attending: INTERNAL MEDICINE
Payer: COMMERCIAL

## 2024-12-09 ENCOUNTER — APPOINTMENT (OUTPATIENT)
Dept: LAB | Facility: CLINIC | Age: 84
End: 2024-12-09
Payer: COMMERCIAL

## 2024-12-09 VITALS
OXYGEN SATURATION: 99 % | BODY MASS INDEX: 28.8 KG/M2 | TEMPERATURE: 98 F | HEART RATE: 59 BPM | RESPIRATION RATE: 16 BRPM | WEIGHT: 182.4 LBS | DIASTOLIC BLOOD PRESSURE: 95 MMHG | SYSTOLIC BLOOD PRESSURE: 159 MMHG

## 2024-12-09 DIAGNOSIS — C61 MALIGNANT NEOPLASM OF PROSTATE (H): Primary | ICD-10-CM

## 2024-12-09 DIAGNOSIS — C79.51 PROSTATE CANCER METASTATIC TO BONE (H): ICD-10-CM

## 2024-12-09 DIAGNOSIS — Z79.899 ENCOUNTER FOR LONG-TERM (CURRENT) USE OF MEDICATIONS: ICD-10-CM

## 2024-12-09 DIAGNOSIS — M1A.9XX0 CHRONIC GOUT WITHOUT TOPHUS, UNSPECIFIED CAUSE, UNSPECIFIED SITE: ICD-10-CM

## 2024-12-09 DIAGNOSIS — C61 PROSTATE CANCER METASTATIC TO BONE (H): ICD-10-CM

## 2024-12-09 DIAGNOSIS — C61 PROSTATE CANCER (H): ICD-10-CM

## 2024-12-09 LAB
ALBUMIN SERPL BCG-MCNC: 3.9 G/DL (ref 3.5–5.2)
ALP SERPL-CCNC: 64 U/L (ref 40–150)
ALT SERPL W P-5'-P-CCNC: 41 U/L (ref 0–70)
ANION GAP SERPL CALCULATED.3IONS-SCNC: 11 MMOL/L (ref 7–15)
AST SERPL W P-5'-P-CCNC: 38 U/L (ref 0–45)
BASOPHILS # BLD AUTO: 0 10E3/UL (ref 0–0.2)
BASOPHILS NFR BLD AUTO: 0 %
BILIRUB DIRECT SERPL-MCNC: <0.2 MG/DL (ref 0–0.3)
BILIRUB SERPL-MCNC: 0.4 MG/DL
BUN SERPL-MCNC: 18.2 MG/DL (ref 8–23)
CALCIUM SERPL-MCNC: 9.4 MG/DL (ref 8.8–10.4)
CHLORIDE SERPL-SCNC: 95 MMOL/L (ref 98–107)
CREAT SERPL-MCNC: 1.19 MG/DL (ref 0.67–1.17)
EGFRCR SERPLBLD CKD-EPI 2021: 61 ML/MIN/1.73M2
EOSINOPHIL # BLD AUTO: 0 10E3/UL (ref 0–0.7)
EOSINOPHIL NFR BLD AUTO: 0 %
ERYTHROCYTE [DISTWIDTH] IN BLOOD BY AUTOMATED COUNT: 12.5 % (ref 10–15)
GLUCOSE SERPL-MCNC: 114 MG/DL (ref 70–99)
HCO3 SERPL-SCNC: 26 MMOL/L (ref 22–29)
HCT VFR BLD AUTO: 27.8 % (ref 40–53)
HGB BLD-MCNC: 9.7 G/DL (ref 13.3–17.7)
IMM GRANULOCYTES # BLD: 0.1 10E3/UL
IMM GRANULOCYTES NFR BLD: 1 %
LYMPHOCYTES # BLD AUTO: 1.1 10E3/UL (ref 0.8–5.3)
LYMPHOCYTES NFR BLD AUTO: 18 %
MCH RBC QN AUTO: 33.8 PG (ref 26.5–33)
MCHC RBC AUTO-ENTMCNC: 34.9 G/DL (ref 31.5–36.5)
MCV RBC AUTO: 97 FL (ref 78–100)
MONOCYTES # BLD AUTO: 0.6 10E3/UL (ref 0–1.3)
MONOCYTES NFR BLD AUTO: 10 %
NEUTROPHILS # BLD AUTO: 4.2 10E3/UL (ref 1.6–8.3)
NEUTROPHILS NFR BLD AUTO: 71 %
NRBC # BLD AUTO: 0 10E3/UL
NRBC BLD AUTO-RTO: 0 /100
PLATELET # BLD AUTO: 119 10E3/UL (ref 150–450)
POTASSIUM SERPL-SCNC: 3 MMOL/L (ref 3.4–5.3)
PROT SERPL-MCNC: 6.7 G/DL (ref 6.4–8.3)
PSA SERPL DL<=0.01 NG/ML-MCNC: 1.74 NG/ML
RBC # BLD AUTO: 2.87 10E6/UL (ref 4.4–5.9)
SODIUM SERPL-SCNC: 132 MMOL/L (ref 135–145)
WBC # BLD AUTO: 5.9 10E3/UL (ref 4–11)

## 2024-12-09 PROCEDURE — 250N000011 HC RX IP 250 OP 636: Performed by: INTERNAL MEDICINE

## 2024-12-09 PROCEDURE — 80076 HEPATIC FUNCTION PANEL: CPT

## 2024-12-09 PROCEDURE — 82947 ASSAY GLUCOSE BLOOD QUANT: CPT

## 2024-12-09 PROCEDURE — G0463 HOSPITAL OUTPT CLINIC VISIT: HCPCS | Mod: 25 | Performed by: INTERNAL MEDICINE

## 2024-12-09 PROCEDURE — 82248 BILIRUBIN DIRECT: CPT | Performed by: INTERNAL MEDICINE

## 2024-12-09 PROCEDURE — 99215 OFFICE O/P EST HI 40 MIN: CPT | Performed by: INTERNAL MEDICINE

## 2024-12-09 PROCEDURE — 258N000003 HC RX IP 258 OP 636: Performed by: INTERNAL MEDICINE

## 2024-12-09 PROCEDURE — 999N000248 HC STATISTIC IV INSERT WITH US BY RN

## 2024-12-09 PROCEDURE — 84153 ASSAY OF PSA TOTAL: CPT

## 2024-12-09 PROCEDURE — 36415 COLL VENOUS BLD VENIPUNCTURE: CPT | Performed by: INTERNAL MEDICINE

## 2024-12-09 PROCEDURE — 96413 CHEMO IV INFUSION 1 HR: CPT

## 2024-12-09 PROCEDURE — 84550 ASSAY OF BLOOD/URIC ACID: CPT

## 2024-12-09 PROCEDURE — 85025 COMPLETE CBC W/AUTO DIFF WBC: CPT | Performed by: INTERNAL MEDICINE

## 2024-12-09 PROCEDURE — 96375 TX/PRO/DX INJ NEW DRUG ADDON: CPT

## 2024-12-09 PROCEDURE — 84460 ALANINE AMINO (ALT) (SGPT): CPT

## 2024-12-09 RX ORDER — DEXAMETHASONE SODIUM PHOSPHATE 4 MG/ML
12 INJECTION, SOLUTION INTRA-ARTICULAR; INTRALESIONAL; INTRAMUSCULAR; INTRAVENOUS; SOFT TISSUE ONCE
Status: COMPLETED | OUTPATIENT
Start: 2024-12-09 | End: 2024-12-09

## 2024-12-09 RX ORDER — PREDNISONE 5 MG/1
5 TABLET ORAL 2 TIMES DAILY
Qty: 42 TABLET | Refills: 0 | Status: SHIPPED | OUTPATIENT
Start: 2024-12-09

## 2024-12-09 RX ORDER — ONDANSETRON 2 MG/ML
8 INJECTION INTRAMUSCULAR; INTRAVENOUS ONCE
Status: COMPLETED | OUTPATIENT
Start: 2024-12-09 | End: 2024-12-09

## 2024-12-09 RX ORDER — PREDNISONE 5 MG/1
5 TABLET ORAL 2 TIMES DAILY
Qty: 42 TABLET | Refills: 0 | Status: SHIPPED | OUTPATIENT
Start: 2024-12-09 | End: 2024-12-30

## 2024-12-09 RX ADMIN — DEXAMETHASONE SODIUM PHOSPHATE 12 MG: 4 INJECTION, SOLUTION INTRA-ARTICULAR; INTRALESIONAL; INTRAMUSCULAR; INTRAVENOUS; SOFT TISSUE at 11:19

## 2024-12-09 RX ADMIN — SODIUM CHLORIDE 100 ML: 9 INJECTION, SOLUTION INTRAVENOUS at 11:18

## 2024-12-09 RX ADMIN — DOCETAXEL 120 MG: 20 INJECTION, SOLUTION, CONCENTRATE INTRAVENOUS at 11:58

## 2024-12-09 RX ADMIN — ONDANSETRON 8 MG: 2 INJECTION INTRAMUSCULAR; INTRAVENOUS at 11:18

## 2024-12-09 ASSESSMENT — PAIN SCALES - GENERAL: PAINLEVEL_OUTOF10: NO PAIN (0)

## 2024-12-09 NOTE — PATIENT INSTRUCTIONS
[facility] Triage and after hours / weekends / holidays:  [phone]    Please call the triage or after hours line if you experience a temperature greater than or equal to 100.4, shaking chills, have uncontrolled nausea, vomiting and/or diarrhea, dizziness, shortness of breath, chest pain, bleeding, unexplained bruising, or if you have any other new/concerning symptoms, questions or concerns.      If you are having any concerning symptoms or wish to speak to a provider before your next infusion visit, please call triage to notify them so we can adequately serve you.     If you need a refill on a narcotic prescription or other medication, please call before your infusion appointment.             December 2024 Sunday Monday Tuesday Wednesday Thursday Friday Saturday   1     2     3     4     5    RN VISIT  10:00 AM   (30 min.)   NL RN ERC   [facility] 6     7       8     9    LAB   9:00 AM   (15 min.)   [facility] LAB   [facility]    RETURN CCSL   9:30 AM   (30 min.)   Tio Holman MD   [facility]    ONC INFUSION 1.5 HR (90 MIN)  11:00 AM   (90 min.)   DANE ONC INFUSION NURSE   [facility] 10     11    OFFICE VISIT   8:40 AM   (30 min.)   Vira Flor MD   [facility] 12     13     14       15     16     17     18     19     20     21       22     23     24     25     26     27     28       29     30     31 January 2025 Sunday Monday Tuesday Wednesday Thursday Friday Saturday                  1  Happy Birthday!     2     3     4       5     6    LAB PERIPHERAL   9:45 AM   (15 min.)   [facility] DRAW   [facility]    RETURN CCSL  10:15 AM   (45 min.)   Almaz Blanca PA-C   [facility]    ONC INFUSION 1.5 HR (90 MIN)   1:30 PM   (90 min.)   [facility] ONC INFUSION NURSE   [facility] 7     8     9     10     11       12     13     14     15     16     17     18       19     20     21     22     23     24     25       26     27    LAB PERIPHERAL  10:00 AM   (15 min.)   [facility] DRAW   [facility]    RETURN CCSL  10:30 AM   (30 min.)   Tio Holman MD   [facility]    ONC INFUSION 1.5 HR (90 MIN)  11:30 AM   (90 min.)    ONC INFUSION NURSE   [facility] 28     29     30     31                         Recent Results (from the past 24 hours)   Comprehensive metabolic panel    Collection Time: 12/09/24  9:12 AM   Result Value Ref Range    Sodium 132 (L) 135 - 145 mmol/L    Potassium 3.0 (L) 3.4 - 5.3 mmol/L    Carbon Dioxide (CO2) 26 22 - 29 mmol/L    Anion Gap 11 7 - 15 mmol/L    Urea Nitrogen 18.2 8.0 - 23.0 mg/dL    Creatinine 1.19 (H) 0.67 - 1.17 mg/dL    GFR Estimate 61 >60 mL/min/1.73m2    Calcium 9.4 8.8 - 10.4 mg/dL    Chloride 95 (L) 98 - 107 mmol/L    Glucose 114 (H) 70 - 99 mg/dL    Alkaline Phosphatase 64 40 - 150 U/L    AST 38 0 - 45 U/L    ALT 41 0 - 70 U/L    Protein Total 6.7 6.4 - 8.3 g/dL    Albumin 3.9 3.5 - 5.2 g/dL    Bilirubin Total 0.4 <=1.2 mg/dL   PSA tumor marker    Collection Time: 12/09/24  9:12 AM   Result Value Ref Range    PSA Tumor Marker 1.74 ng/mL   CBC with platelets and differential    Collection Time: 12/09/24  9:12 AM   Result Value Ref Range    WBC Count 5.9 4.0 - 11.0 10e3/uL    RBC Count 2.87 (L) 4.40 - 5.90 10e6/uL    Hemoglobin 9.7 (L) 13.3 - 17.7 g/dL    Hematocrit 27.8 (L) 40.0 - 53.0 %    MCV 97 78 - 100 fL    MCH 33.8 (H) 26.5 - 33.0 pg    MCHC 34.9 31.5 - 36.5 g/dL    RDW 12.5 10.0 - 15.0 %    Platelet Count 119 (L) 150 - 450 10e3/uL    % Neutrophils 71 %    % Lymphocytes 18 %    % Monocytes 10 %    % Eosinophils 0 %    % Basophils 0 %    % Immature Granulocytes 1 %    NRBCs per 100 WBC 0 <1 /100    Absolute Neutrophils  4.2 1.6 - 8.3 10e3/uL    Absolute Lymphocytes 1.1 0.8 - 5.3 10e3/uL    Absolute Monocytes 0.6 0.0 - 1.3 10e3/uL    Absolute Eosinophils 0.0 0.0 - 0.7 10e3/uL    Absolute Basophils 0.0 0.0 - 0.2 10e3/uL    Absolute Immature Granulocytes 0.1 <=0.4 10e3/uL    Absolute NRBCs 0.0 10e3/uL   Bilirubin direct    Collection Time: 12/09/24  9:12 AM   Result Value Ref Range    Bilirubin Direct <0.20 0.00 - 0.30 mg/dL

## 2024-12-09 NOTE — PROGRESS NOTES
Infusion Nursing Note:  Medardo Gautam presents today for Cycle 2 Day 1 Docetaxel (TAXOTERE).    Patient seen by provider today: Yes: Dr. Holman   present during visit today: Not Applicable.    Note: Patient has potassium at home and was instructed by MD how much to take today and tomorrow to replace for his level of 3 today.    Intravenous Access:  Peripheral IV placed in lab today.    Treatment Conditions:  Lab Results   Component Value Date    HGB 9.7 (L) 12/09/2024    WBC 5.9 12/09/2024    ANEU 2.6 06/21/2021    ANEUTAUTO 4.2 12/09/2024     (L) 12/09/2024        Lab Results   Component Value Date     (L) 12/09/2024    POTASSIUM 3.0 (L) 12/09/2024    MAG 1.7 08/22/2023    CR 1.19 (H) 12/09/2024    GAVIN 9.4 12/09/2024    BILITOTAL 0.4 12/09/2024    ALBUMIN 3.9 12/09/2024    ALT 41 12/09/2024    AST 38 12/09/2024       Results reviewed, labs MET treatment parameters, ok to proceed with treatment.    Post Infusion Assessment:  Patient tolerated infusion without incident.  Blood return noted pre and post infusion.  Site patent and intact, free from redness, edema or discomfort.  No evidence of extravasations.  Access discontinued per protocol.     Discharge Plan:   Prescription refills given for Prednisone.  Discharge instructions reviewed with: Patient.  Patient and/or family verbalized understanding of discharge instructions and all questions answered.  AVS to patient via [employer].  Patient will return 1/6/24 for next appointment.   Patient discharged in stable condition accompanied by: self.  Departure Mode: Ambulatory.      EVI CURIEL RN

## 2024-12-09 NOTE — LETTER
12/9/2024      Medardo Gautam  [address] [address] [id number]      Dear Colleague,    Thank you for referring your patient, Medardo Gautam, to the [facility]. Please see a copy of my visit note below.      FOLLOW UP VISIT      Reason for visit:  Metastatic [id number] CRPC s/p darolutamide and 177Lu-PSMA-I&T.  Cycle #2 of docetaxel today.     History of Present Illness:  Mr. Gautam is an 84-year-old man who was diagnosed with prostate cancer in 2012.  His PSA level was 5.2 ng/mL.  Clinical stage was cT1c disease.  He underwent radical prostatectomy on 8/6/2012, which revealed [place] 4+5=9 carcinoma, stage pT2c N0 R0.  His next-generation DNA sequencing analysis ([employer]) revealed a pathogenic TP53 mutation, SHIRA genotype, TMB 5 muts/Mb, and absent PD-L1 expression.  He subsequently received salvage radiotherapy, which was completed in 10/2013, concurrently with six months of androgen deprivation therapy.     He subsequently developed a biochemical recurrence, and received ADT from 2014 until 2020.  In 11/2020, he developed non-metastatic CRPC.  Darolutamide was added on 12/4/2020, to which he achieved a subsequent PSA response.  His PSA level initially dropped from 1.66 ng/mL down to a speedy of 0.07 ng/mL (6/21/2021).  However, the patient then developed a rising PSA level over the past year.  His PSA on 1/13/2022 was 0.16, the PSA on 4/20/2022 was 0.24, the PSA on 6/13/2022 was 0.34, the PSA on 7/12/2022 was 0.47, the PSA on 8/25/2022 was 0.64 ng/mL, and the PSA on 11/21/2022 was 1.55 ng/mL (with a testosterone level of 14 ng/dL).  On 3/8/2023, his PSA level increased to 8.7 ng/mL.     The patient then participated in the ECLIPSE clinical trial, and he was randomized to the 177Lu-PSMA-I&T radioligand arm.  During the screening procedures he was found to have an asymptomatic pulmonary embolus, and he began apixaban. He received all 4 doses of Lee Ann-PSMA-I&T thus far, and his PSA  level dropped from 8.7 down to 0.12 ng/mL.  However, his PSA level increased to 0.79 ng/mL, and his imaging (5/7/24) showed evidence of progressive disease.  He is currently on ADT plus darolutamide, but his PSA level continued to rise.  The most recent PSA (11/1/24) was 2.48 ng/mL.  He also had recent imaging assessments (11/1/24) which showed progressive osseous and yan disease.      On 11/18/24, his PSA level reached 2.75 ng/mL.  At that time, he began docetaxel chemotherapy.  He returns for a follow-up visit today, and to receive cycle #2 of docetaxel.      Review of Systems:  Medardo is tolerating the docetaxel chemotherapy quite well thus far.  He reports some mild epistaxis, hair thinning, hiccups, and one episode of urinary incontinence.  He has noticed continued dry eyes with blurred vision over the last ~2 months. He continues to use rewetting eye drops but feels his ability to read small print has declined. He has a visit scheduled this Thursday with an ophthalmologist at the [facility] in [place] on Thursday afternoon for an exam.  He also has dry mouth improved with biotene mouth rinse with adequate improvement overnight. During the day, pushes oral hydration.  He has stable nocturia, was given a medication for this at his last visit but has discontinued it due to worsening dry mouth concerns.   He has ongoing, stable right hip pain for the last 6 months. No new bone pains.  No chest pain, shortness of breath, or cough. No fevers or chills. A comprehensive 14-point ROS is otherwise negative other than the symptoms noted above in the HPI.  His ECOG score is 0.  His pain score is 2/10.     Medications:  Lupron 22.5 mg IM q3mo (last dose, 12/4/2024)  Docetaxel 75 mg/m2, cycle #2 on 12/9/2024  Losartan/HCTZ 100/12.5 mg  Allopurinol 100 mg  Zolpidem 5 mg  Clonazepam 1 mg  Sildenafil 50 mg  Loratadine 10 mg  Calcium + Vit D  Multivitamin  Folic acid     Physical Examination:    General: No  acute distress  Vital signs within normal limits.  HEENT: Sclera anicteric. Oral mucosa pink, dry. No mucositis or thrush  Lymph: No lymphadenopathy in neck  Heart: Regular, rate, and rhythm  Lungs: Clear to ascultation bilaterally  Abdomen: Positive bowel sounds. Soft, non-tender. No organomegaly or mass.   Extremities: no lower extremity edema  Neuro: Cranial nerves grossly intact  Skin: no dermatitis     CT scan (11/1/24):  This shows increased size of a sclerotic lesion in the proximal right humerus, although only partially included in the field-of-view.  Mixed findings in the retroperitoneal lymph nodes with some stable, some increased, and others decreased (Left para-aortic, 1.7 x 1.0 cm, previously 1.5 x 1.2 cm. Preaortic, 1.2 x 0.7 cm, previously 1.6 x 1.3 cm ).     Laboratories (12/9/24):  CBC shows a WBC of 5.9, hemoglobin 9.7, hematocrit 27.8%, platelets 119K.  His PSA level is 1.74 ng/mL.  Creatinine  is 1.19 mg/dL (GFR 61 mL/min), which is stable.       ASSESSMENT AND PLAN:   Mr. Gautam is an 84-year-old man with [place] 4+5=9 [id number] prostate cancer who underwent radical prostatectomy (8/2012) and salvage radiotherapy (10/2013) but then developed metastatic CRPC refractory to darolutamide.  He enrolled on ECLIPSE, and received all 4 doses of Lee Ann-PSMA-I&T radioligand therapy.  His disease is now slowly progressing once again despite ADT plus darolutamide.  He will receive cycle #2 of docetaxel today.  His ECOG score is 0.  His pain score is 2/10.     #Metastatic [id number] CRPC s/p darolutamide  - Completed all 4 doses of Lee Ann-I&T on the ECLIPSE trial in 7/2023, which he tolerated well with the exception of xerostomia/xerophthalmia.   - His PSA declined from 8.7 to 0.12 ng/mL, but is now back up to 2.75 ng/mL.   - His new CT scan (11/1/24) shows a new osseous metastasis as well as growing retroperitoneal lymph nodes.  - Patient is due for his next Lupron on 12/4/24.  He would like to obtain this  locally.   - His options for new systemic therapies included docetaxel chemotherapy, abiraterone, or the AMA-280 study.  - Alternatively, radium-223 could target his bone metastases but will not treat the growing lymphadenopathy.  - We previously decided to pursue docetaxel chemotherapy, starting on 11/18/2024.  - Returns for cycle #2 of docetaxel today.  Tolerating chemotherapy relatively well thus far.  - He will continue ADT in the interim; next dose on 2/26/2025.  - He is considering moving to [place] to be closer to his son; that may happen in early 2025.      A total of 40 minutes were spent on this patient on the date of the encounter conducting chart review, review of test results, interpretation of tests, patient visit, documentation and discussion with other provider(s). The patient was given the opportunity to ask multiple questions today, all of which were answered to his satisfaction.    Tio Holman M.D.      Again, thank you for allowing me to participate in the care of your patient.        Sincerely,        Tio Holman MD

## 2024-12-09 NOTE — NURSING NOTE
"Oncology Rooming Note    December 9, 2024 9:41 AM   Medardo Gautam is a 84 year old male who presents for:    Chief Complaint   Patient presents with    Oncology Clinic Visit     Malignant neoplasm of prostate      Initial Vitals: BP (!) 159/95 (BP Location: Right arm, Patient Position: Sitting, Cuff Size: Adult Large)   Pulse 59   Temp 98  F (36.7  C) (Oral)   Resp 16   Wt 82.7 kg (182 lb 6.4 oz)   SpO2 99%   BMI 28.80 kg/m   Estimated body mass index is 28.8 kg/m  as calculated from the following:    Height as of 11/18/24: 1.695 m (5' 6.73\").    Weight as of this encounter: 82.7 kg (182 lb 6.4 oz). Body surface area is 1.97 meters squared.  No Pain (0) Comment: Data Unavailable   No LMP for male patient.  Allergies reviewed: Yes  Medications reviewed: Yes    Medications: Medication refills not needed today.  Pharmacy name entered into [facility]:    [facility] [place] - [facility] [address] [facility] [place] [address] [facility] - [place], [place] - [address]    Frailty Screening:   Is the patient here for a new oncology consult visit in cancer care? 2. No      Clinical concerns:  Pt reports potential side effect (not sure if it's from infusion or prednisone) - traces of blood off and on when blowing their nose, pinkish color.     Right hand - two fingers reports cramping. They after massaging them the cramps are alleviated.     Bladder incontinence during sleep when taking tylenol. They only took the medication two nights.     Pt would like to know if they still need to continue taking the prednisone due to an decrease in pain.       Jodi Ramirez              "

## 2024-12-10 ENCOUNTER — PATIENT OUTREACH (OUTPATIENT)
Dept: ONCOLOGY | Facility: CLINIC | Age: 84
End: 2024-12-10
Payer: COMMERCIAL

## 2024-12-10 NOTE — PROGRESS NOTES
[facility]: Cancer Care                                                                                      Patient called today reporting that he had some trouble sleeping last night.  Advised that it was probably a side effect of the steroids and should improve as time goes on.  He will call back if it continues.    Medardo would like to see if there is a sooner appointment available for his infusion on 01/06/25.  He would like less of a gap between seeing Almaz and getting his infusion.  Informed him that after his visit he can check in for infusion, as they can sometimes get people in sooner, but will see if there is another sooner time available.    Messaged scheduling for help with this.    Iesha Owen, RN, BSN, OCN  Oncology RN Care Coordinator  [facility]

## 2024-12-11 ENCOUNTER — OFFICE VISIT (OUTPATIENT)
Dept: FAMILY MEDICINE | Facility: OTHER | Age: 84
End: 2024-12-11
Payer: COMMERCIAL

## 2024-12-11 VITALS
HEIGHT: 66 IN | RESPIRATION RATE: 15 BRPM | WEIGHT: 178 LBS | DIASTOLIC BLOOD PRESSURE: 64 MMHG | OXYGEN SATURATION: 99 % | SYSTOLIC BLOOD PRESSURE: 120 MMHG | BODY MASS INDEX: 28.61 KG/M2 | HEART RATE: 62 BPM | TEMPERATURE: 96 F

## 2024-12-11 DIAGNOSIS — M1A.9XX0 CHRONIC GOUT WITHOUT TOPHUS, UNSPECIFIED CAUSE, UNSPECIFIED SITE: ICD-10-CM

## 2024-12-11 DIAGNOSIS — J31.0 RHINITIS, UNSPECIFIED TYPE: Primary | ICD-10-CM

## 2024-12-11 DIAGNOSIS — R32 URINARY INCONTINENCE, UNSPECIFIED TYPE: ICD-10-CM

## 2024-12-11 DIAGNOSIS — E78.5 HYPERLIPIDEMIA, UNSPECIFIED HYPERLIPIDEMIA TYPE: ICD-10-CM

## 2024-12-11 LAB
ALBUMIN UR-MCNC: 30 MG/DL
APPEARANCE UR: CLEAR
BILIRUB UR QL STRIP: NEGATIVE
COLOR UR AUTO: YELLOW
GLUCOSE UR STRIP-MCNC: NEGATIVE MG/DL
HGB UR QL STRIP: NEGATIVE
KETONES UR STRIP-MCNC: NEGATIVE MG/DL
LEUKOCYTE ESTERASE UR QL STRIP: NEGATIVE
NITRATE UR QL: NEGATIVE
PH UR STRIP: 6 [PH] (ref 5–7)
RBC #/AREA URNS AUTO: NORMAL /HPF
SP GR UR STRIP: 1.01 (ref 1–1.03)
URATE SERPL-MCNC: 4.7 MG/DL (ref 3.4–7)
UROBILINOGEN UR STRIP-ACNC: 0.2 E.U./DL
WBC #/AREA URNS AUTO: NORMAL /HPF

## 2024-12-11 PROCEDURE — 81001 URINALYSIS AUTO W/SCOPE: CPT | Performed by: FAMILY MEDICINE

## 2024-12-11 PROCEDURE — 99214 OFFICE O/P EST MOD 30 MIN: CPT | Performed by: FAMILY MEDICINE

## 2024-12-11 RX ORDER — ATORVASTATIN CALCIUM 10 MG/1
10 TABLET, FILM COATED ORAL DAILY
Qty: 90 TABLET | Refills: 3 | Status: SHIPPED | OUTPATIENT
Start: 2024-12-11

## 2024-12-11 RX ORDER — ALLOPURINOL 100 MG/1
100 TABLET ORAL DAILY
Qty: 90 TABLET | Refills: 3 | Status: SHIPPED | OUTPATIENT
Start: 2024-12-11

## 2024-12-11 NOTE — PROGRESS NOTES
Assessment & Plan     Rhinitis, unspecified type  Encouraged him to use nasal saline a few times a day.    Urinary incontinence, unspecified type  Will obtain urine to look for possible infection.  This may be related to his infusion.  If he continues to have issues could consider urology consult    - UA Macroscopic with reflex to Microscopic and Culture - Lab Collect    Hyperlipidemia, unspecified hyperlipidemia type  He continues on statin and is due for refills.  Recent lipid panel was within normal limits.    - atorvastatin (LIPITOR) 10 MG tablet; Take 1 tablet (10 mg) by mouth daily.    Chronic gout without tophus, unspecified cause, unspecified site  He is in need of refill for allopurinol.  Will add uric acid to labs done a couple of days ago.    - allopurinol (ZYLOPRIM) 100 MG tablet; Take 1 tablet (100 mg) by mouth daily.  - Uric acid; Future      Subjective   Medardo is a 84 year old, presenting for the following health issues:  Derm Problem (Freeze a spot on top of head), Hand/wrist Stiffness (Hand gets stiff when he reads the paper at night), and Bladder Problems (leak)      12/11/2024     8:48 AM   Additional Questions   Roomed by alex sapp     History of Present Illness       Reason for visit:  Bllod traces in nose nostrils, bladder leakage, right hand finger cramps  Symptom onset:  3-4 weeks ago  Symptoms include:  None  Symptom intensity:  Mild  Symptom progression:  Staying the same  Had these symptoms before:  No  What makes it worse:  No  What makes it better:  No   He is taking medications regularly.     He mentioned that he noticed some mild blood in his nasal discharge.  His oncologist decreased his prednisone dose and he has not noticed it for the last couple days.    He notices when he reads the paper at night after he puts it down his hands are cramping.  He denies any swelling of his hands.  Denies redness.  Denies numbness or tingling.  After he moves his hands and fingers it  "improves.    He has noticed 2 episodes of incontinence overnight.  He states he does not have any problems with this during the day.  He will get up a couple times at night to urinate but has woken up a couple days with incontinence.  This  seems to happen only since he has been getting his oncology infusions.  He denies fever.  He denies dysuria.      Objective    /64 (BP Location: Right arm, Patient Position: Sitting, Cuff Size: Adult Regular)   Pulse 62   Temp (!) 96  F (35.6  C) (Temporal)   Resp 15   Ht 1.676 m (5' 6\")   Wt 80.7 kg (178 lb)   SpO2 99%   BMI 28.73 kg/m    Body mass index is 28.73 kg/m .  Physical Exam   Gen: no apparent distress  Nose: Few traces of blood in his left nostril, no active bleeding    Abd: The abdomen is soft without tenderness, guarding, mass, rebound or organomegaly. Bowel sounds are normal. No CVA tenderness   MSK: no erythema or swelling of his hands.  Negative Finkelstein's.  Negative Tinel's.            Signed Electronically by: Vira Flor MD    "

## 2024-12-11 NOTE — PROGRESS NOTES
[id number] Eclipse LTFU Chart Review Note:     Dates of Chart Review: 24 through 24. LTFU # 4 (16 Month)    1. Has patient experienced any Symptomatic Skeletal Events (bone directed radiation therapy, new symptomatic pathological fracture, spinal cord compression, tumor related orthopedic surgery) since last visit? no   If yes, date of event: N/A     2. Has patient experienced any secondary malignancies since last visit? no   If yes, date of event: N/A     3. Has patient been evaluated for radiographic progression since last visit? yes   If yes, date of imagin24    Outcome of imaging: Bone progression    4. Has patient experienced any clinical or pain progression since last visit? no   If yes, date of event: N/A     5. Has patient experienced any Grade 3 or Grade 4 acute kidney injuries since last visit? no   If yes, date of event: N/A     6. Has patient initiated any new anti-cancer prostate cancer therapy since last visit? yes    7 .Was PSA collected during the above follow up period? yes

## 2024-12-18 ENCOUNTER — PATIENT OUTREACH (OUTPATIENT)
Dept: ONCOLOGY | Facility: CLINIC | Age: 84
End: 2024-12-18
Payer: COMMERCIAL

## 2024-12-18 NOTE — PROGRESS NOTES
[facility]: Cancer Care                                                                                      Patient called to report increased weakness in the morning yesterday. He reports getting out of bed and getting around was much more effort than usual.    He reports he has been sleeping well.    Appetite is good.    Bladder leakage on occasion when he sleeps all night without getting up to urinate.    He is reporting diarrhea or loose stools several times daily.  Encouraged pushing fluids as much as he can tolerate and to      Most days he has felt fatigued, but has had enough energy to get out of bed and do his daily tasks.  Discussed pacing activities.    Patient was urged to continue to monitor symptoms and to call our triage line with any new or changed symptoms.    Iesha Owen, RN, BSN, OCN  Oncology RN Care Coordinator  [facility]

## 2025-01-06 ENCOUNTER — INFUSION THERAPY VISIT (OUTPATIENT)
Dept: ONCOLOGY | Facility: CLINIC | Age: 85
End: 2025-01-06
Attending: INTERNAL MEDICINE
Payer: COMMERCIAL

## 2025-01-06 VITALS
DIASTOLIC BLOOD PRESSURE: 79 MMHG | TEMPERATURE: 98.1 F | RESPIRATION RATE: 18 BRPM | SYSTOLIC BLOOD PRESSURE: 144 MMHG | HEART RATE: 73 BPM | OXYGEN SATURATION: 96 % | BODY MASS INDEX: 28.47 KG/M2 | WEIGHT: 176.4 LBS

## 2025-01-06 DIAGNOSIS — C61 PROSTATE CANCER (H): ICD-10-CM

## 2025-01-06 DIAGNOSIS — Z79.899 ENCOUNTER FOR LONG-TERM (CURRENT) USE OF MEDICATIONS: ICD-10-CM

## 2025-01-06 DIAGNOSIS — C61 PROSTATE CANCER METASTATIC TO BONE (H): ICD-10-CM

## 2025-01-06 DIAGNOSIS — C61 PROSTATE CANCER METASTATIC TO BONE (H): Primary | ICD-10-CM

## 2025-01-06 DIAGNOSIS — C79.51 PROSTATE CANCER METASTATIC TO BONE (H): ICD-10-CM

## 2025-01-06 DIAGNOSIS — C61 MALIGNANT NEOPLASM OF PROSTATE (H): ICD-10-CM

## 2025-01-06 DIAGNOSIS — C61 MALIGNANT NEOPLASM OF PROSTATE (H): Primary | ICD-10-CM

## 2025-01-06 DIAGNOSIS — C79.51 PROSTATE CANCER METASTATIC TO BONE (H): Primary | ICD-10-CM

## 2025-01-06 LAB
ALBUMIN SERPL BCG-MCNC: 3.8 G/DL (ref 3.5–5.2)
ALP SERPL-CCNC: 80 U/L (ref 40–150)
ALT SERPL W P-5'-P-CCNC: 22 U/L (ref 0–70)
ANION GAP SERPL CALCULATED.3IONS-SCNC: 14 MMOL/L (ref 7–15)
AST SERPL W P-5'-P-CCNC: 33 U/L (ref 0–45)
BASOPHILS # BLD AUTO: 0 10E3/UL (ref 0–0.2)
BASOPHILS NFR BLD AUTO: 1 %
BILIRUB DIRECT SERPL-MCNC: <0.2 MG/DL (ref 0–0.3)
BILIRUB SERPL-MCNC: 0.5 MG/DL
BUN SERPL-MCNC: 20.4 MG/DL (ref 8–23)
CALCIUM SERPL-MCNC: 9.3 MG/DL (ref 8.8–10.4)
CHLORIDE SERPL-SCNC: 98 MMOL/L (ref 98–107)
CREAT SERPL-MCNC: 1.36 MG/DL (ref 0.67–1.17)
EGFRCR SERPLBLD CKD-EPI 2021: 52 ML/MIN/1.73M2
EOSINOPHIL # BLD AUTO: 0 10E3/UL (ref 0–0.7)
EOSINOPHIL NFR BLD AUTO: 1 %
ERYTHROCYTE [DISTWIDTH] IN BLOOD BY AUTOMATED COUNT: 13.1 % (ref 10–15)
GLUCOSE SERPL-MCNC: 143 MG/DL (ref 70–99)
HCO3 SERPL-SCNC: 24 MMOL/L (ref 22–29)
HCT VFR BLD AUTO: 26.3 % (ref 40–53)
HGB BLD-MCNC: 9.3 G/DL (ref 13.3–17.7)
IMM GRANULOCYTES # BLD: 0 10E3/UL
IMM GRANULOCYTES NFR BLD: 1 %
LYMPHOCYTES # BLD AUTO: 0.5 10E3/UL (ref 0.8–5.3)
LYMPHOCYTES NFR BLD AUTO: 13 %
MCH RBC QN AUTO: 33.2 PG (ref 26.5–33)
MCHC RBC AUTO-ENTMCNC: 35.4 G/DL (ref 31.5–36.5)
MCV RBC AUTO: 94 FL (ref 78–100)
MONOCYTES # BLD AUTO: 0.4 10E3/UL (ref 0–1.3)
MONOCYTES NFR BLD AUTO: 9 %
NEUTROPHILS # BLD AUTO: 3.1 10E3/UL (ref 1.6–8.3)
NEUTROPHILS NFR BLD AUTO: 76 %
NRBC # BLD AUTO: 0 10E3/UL
NRBC BLD AUTO-RTO: 0 /100
PLATELET # BLD AUTO: 154 10E3/UL (ref 150–450)
POTASSIUM SERPL-SCNC: 2.7 MMOL/L (ref 3.4–5.3)
PROT SERPL-MCNC: 6.7 G/DL (ref 6.4–8.3)
PSA SERPL DL<=0.01 NG/ML-MCNC: 1.34 NG/ML
RBC # BLD AUTO: 2.8 10E6/UL (ref 4.4–5.9)
SODIUM SERPL-SCNC: 136 MMOL/L (ref 135–145)
WBC # BLD AUTO: 4.1 10E3/UL (ref 4–11)

## 2025-01-06 PROCEDURE — 96413 CHEMO IV INFUSION 1 HR: CPT

## 2025-01-06 PROCEDURE — 36415 COLL VENOUS BLD VENIPUNCTURE: CPT | Performed by: INTERNAL MEDICINE

## 2025-01-06 PROCEDURE — 82040 ASSAY OF SERUM ALBUMIN: CPT

## 2025-01-06 PROCEDURE — 84153 ASSAY OF PSA TOTAL: CPT

## 2025-01-06 PROCEDURE — 99214 OFFICE O/P EST MOD 30 MIN: CPT

## 2025-01-06 PROCEDURE — 250N000011 HC RX IP 250 OP 636: Performed by: INTERNAL MEDICINE

## 2025-01-06 PROCEDURE — G0463 HOSPITAL OUTPT CLINIC VISIT: HCPCS

## 2025-01-06 PROCEDURE — 250N000013 HC RX MED GY IP 250 OP 250 PS 637

## 2025-01-06 PROCEDURE — 258N000003 HC RX IP 258 OP 636: Performed by: INTERNAL MEDICINE

## 2025-01-06 PROCEDURE — 82310 ASSAY OF CALCIUM: CPT

## 2025-01-06 PROCEDURE — 96375 TX/PRO/DX INJ NEW DRUG ADDON: CPT

## 2025-01-06 PROCEDURE — 82248 BILIRUBIN DIRECT: CPT | Performed by: INTERNAL MEDICINE

## 2025-01-06 PROCEDURE — 85004 AUTOMATED DIFF WBC COUNT: CPT | Performed by: INTERNAL MEDICINE

## 2025-01-06 PROCEDURE — 85018 HEMOGLOBIN: CPT | Performed by: INTERNAL MEDICINE

## 2025-01-06 RX ORDER — PREDNISONE 5 MG/1
5 TABLET ORAL DAILY
Qty: 21 TABLET | Refills: 0 | Status: SHIPPED | OUTPATIENT
Start: 2025-01-06 | End: 2025-01-27

## 2025-01-06 RX ORDER — DEXAMETHASONE 4 MG/1
8 TABLET ORAL 2 TIMES DAILY WITH MEALS
Qty: 6 TABLET | Refills: 0 | Status: SHIPPED | OUTPATIENT
Start: 2025-01-06

## 2025-01-06 RX ORDER — ONDANSETRON 2 MG/ML
8 INJECTION INTRAMUSCULAR; INTRAVENOUS ONCE
Status: COMPLETED | OUTPATIENT
Start: 2025-01-06 | End: 2025-01-06

## 2025-01-06 RX ORDER — DEXAMETHASONE SODIUM PHOSPHATE 4 MG/ML
12 INJECTION, SOLUTION INTRA-ARTICULAR; INTRALESIONAL; INTRAMUSCULAR; INTRAVENOUS; SOFT TISSUE ONCE
Status: COMPLETED | OUTPATIENT
Start: 2025-01-06 | End: 2025-01-06

## 2025-01-06 RX ORDER — POTASSIUM CHLORIDE 1500 MG/1
40 TABLET, EXTENDED RELEASE ORAL
Status: COMPLETED | OUTPATIENT
Start: 2025-01-06 | End: 2025-01-06

## 2025-01-06 RX ADMIN — DEXAMETHASONE SODIUM PHOSPHATE 12 MG: 4 INJECTION, SOLUTION INTRA-ARTICULAR; INTRALESIONAL; INTRAMUSCULAR; INTRAVENOUS; SOFT TISSUE at 12:00

## 2025-01-06 RX ADMIN — POTASSIUM CHLORIDE 40 MEQ: 1500 TABLET, EXTENDED RELEASE ORAL at 12:20

## 2025-01-06 RX ADMIN — POTASSIUM CHLORIDE 40 MEQ: 1500 TABLET, EXTENDED RELEASE ORAL at 13:07

## 2025-01-06 RX ADMIN — ONDANSETRON 8 MG: 2 INJECTION INTRAMUSCULAR; INTRAVENOUS at 11:58

## 2025-01-06 RX ADMIN — DOCETAXEL 120 MG: 20 INJECTION, SOLUTION, CONCENTRATE INTRAVENOUS at 12:23

## 2025-01-06 ASSESSMENT — PAIN SCALES - GENERAL: PAINLEVEL_OUTOF10: NO PAIN (0)

## 2025-01-06 NOTE — NURSING NOTE
Chief Complaint   Patient presents with    Blood Draw     Vitals, blood drawn and PIV placed by LPN. Pt checked into appt.      ISIDORO Solo LPN

## 2025-01-06 NOTE — Clinical Note
1/6/2025      Medardo Gautam  [id number] [address] [id number]      Dear Colleague,    Thank you for referring your patient, Medardo Gautam, to the [facility]. Please see a copy of my visit note below.    Jan 6, 2025    FOLLOW UP VISIT      Reason for visit:  Metastatic EZ63-vswgeim CRPC s/p darolutamide and 177Lu-PSMA-I&T.  Cycle #2 of docetaxel today.     History of Present Illness:  Mr. Gautam is an 84-year-old man who was diagnosed with prostate cancer in 2012.  His PSA level was 5.2 ng/mL.  Clinical stage was cT1c disease.  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  His next-generation DNA sequencing analysis ([employer]) revealed a pathogenic TP53 mutation, SHIRA genotype, TMB 5 muts/Mb, and absent PD-L1 expression.  He subsequently received salvage radiotherapy, which was completed in 10/2013, concurrently with six months of androgen deprivation therapy.     He subsequently developed a biochemical recurrence, and received ADT from 2014 until 2020.  In 11/2020, he developed non-metastatic CRPC.  Darolutamide was added on 12/4/2020, to which he achieved a subsequent PSA response.  His PSA level initially dropped from 1.66 ng/mL down to a speedy of 0.07 ng/mL (6/21/2021).  However, the patient then developed a rising PSA level over the past year.  His PSA on 1/13/2022 was 0.16, the PSA on 4/20/2022 was 0.24, the PSA on 6/13/2022 was 0.34, the PSA on 7/12/2022 was 0.47, the PSA on 8/25/2022 was 0.64 ng/mL, and the PSA on 11/21/2022 was 1.55 ng/mL (with a testosterone level of 14 ng/dL).  On 3/8/2023, his PSA level increased to 8.7 ng/mL.     The patient then participated in the ECLIPSE clinical trial, and he was randomized to the 177Lu-PSMA-I&T radioligand arm.  During the screening procedures he was found to have an asymptomatic pulmonary embolus, and he began apixaban. He received all 4 doses of [place]-PSMA-I&T thus far,  and his PSA level dropped from 8.7 down to 0.12 ng/mL.  However, his PSA level increased to 0.79 ng/mL, and his imaging (5/7/24) showed evidence of progressive disease.  He is currently on ADT plus darolutamide, but his PSA level continued to rise.  The most recent PSA (11/1/24) was 2.48 ng/mL.  He also had recent imaging assessments (11/1/24) which showed progressive osseous and yan disease.      On 11/18/24, his PSA level reached 2.75 ng/mL.  At that time, he began docetaxel chemotherapy.  He returns for a follow-up visit today, and to receive cycle #3 of docetaxel.      Review of Systems:         Medardo is tolerating the docetaxel chemotherapy quite well thus far.  He reports some mild epistaxis, hair thinning, hiccups, and one episode of urinary incontinence.  He has noticed continued dry eyes with blurred vision over the last ~2 months. He continues to use rewetting eye drops but feels his ability to read small print has declined. He has a visit scheduled this Thursday with an ophthalmologist at the [facility] in [place] on Thursday afternoon for an exam.  He also has dry mouth improved with biotene mouth rinse with adequate improvement overnight. During the day, pushes oral hydration.  He has stable nocturia, was given a medication for this at his last visit but has discontinued it due to worsening dry mouth concerns.   He has ongoing, stable right hip pain for the last 6 months. No new bone pains.  No chest pain, shortness of breath, or cough. No fevers or chills. A comprehensive 14-point ROS is otherwise negative other than the symptoms noted above in the HPI.  His ECOG score is 0.  His pain score is 2/10.     Medications:  Lupron 22.5 mg IM q3mo (last dose, 12/4/2024)  Docetaxel 75 mg/m2, cycle #2 on 12/9/2024  Losartan/HCTZ 100/12.5 mg  Allopurinol 100 mg  Zolpidem 5 mg  Clonazepam 1 mg  Sildenafil 50 mg  Loratadine 10 mg  Calcium + Vit D  Multivitamin  Folic acid     Physical Examination:     General: No acute distress  Vital signs within normal limits.  HEENT: Sclera anicteric. Oral mucosa pink, dry. No mucositis or thrush  Lymph: No lymphadenopathy in neck  Heart: Regular, rate, and rhythm  Lungs: Clear to ascultation bilaterally  Abdomen: Positive bowel sounds. Soft, non-tender. No organomegaly or mass.   Extremities: no lower extremity edema  Neuro: Cranial nerves grossly intact  Skin: no dermatitis     CT scan (11/1/24):  This shows increased size of a sclerotic lesion in the proximal right humerus, although only partially included in the field-of-view.  Mixed findings in the retroperitoneal lymph nodes with some stable, some increased, and others decreased (Left para-aortic, 1.7 x 1.0 cm, previously 1.5 x 1.2 cm. Preaortic, 1.2 x 0.7 cm, previously 1.6 x 1.3 cm ).     Laboratories (12/9/24):  CBC shows a WBC of 5.9, hemoglobin 9.7, hematocrit 27.8%, platelets 119K.  His PSA level is 1.74 ng/mL.  Creatinine  is 1.19 mg/dL (GFR 61 mL/min), which is stable.       ASSESSMENT AND PLAN:   Mr. Gautam is an 85 year old with [place] 4+5=9 FZ52-harqkos prostate cancer who underwent radical prostatectomy (8/2012) and salvage radiotherapy (10/2013) but then developed metastatic CRPC refractory to darolutamide.  He enrolled on ECLIPSE, and received all 4 doses of Lee Ann-PSMA-I&T radioligand therapy.  His disease is now slowly progressing once again despite ADT plus darolutamide.  He will receive cycle #3 of docetaxel today.  His ECOG score is 0.  His pain score is 2/10.     #Metastatic LV43-bdnsgzb CRPC s/p darolutamide  - Completed all 4 doses of Lee Ann-I&T on the ECLIPSE trial in 7/2023, which he tolerated well with the exception of xerostomia/xerophthalmia.   - His PSA declined from 8.7 to 0.12 ng/mL, but is now back up to 2.75 ng/mL. Since restarting docetaxel his PSA has declined to 1.74 on 12/9/24 and is pending today.   - His new CT scan (11/1/24) shows a new osseous metastasis as well as growing  retroperitoneal lymph nodes.  - Patient is due for his next Lupron on 12/4/24.  He would like to obtain this locally.   - His options for new systemic therapies included docetaxel chemotherapy, abiraterone, or the AMA-280 study.  - Alternatively, radium-223 could target his bone metastases but will not treat the growing lymphadenopathy.  - We previously decided to pursue docetaxel chemotherapy, starting on 11/18/2024.  - Returns for cycle #2 of docetaxel today.  Tolerating chemotherapy relatively well thus far.  - He will continue ADT in the interim; next dose on 2/26/2025.  - He is considering moving to [place] to be closer to his son; that may happen in early 2025.        Jan 6, 2025    FOLLOW UP VISIT      Reason for visit:  Metastatic CL90-mssqddu CRPC s/p darolutamide and 177Lu-PSMA-I&T.  Cycle #2 of docetaxel today.     History of Present Illness:  Mr. Gautam is an 84-year-old man who was diagnosed with prostate cancer in 2012.  His PSA level was 5.2 ng/mL.  Clinical stage was cT1c disease.  He underwent radical prostatectomy on 8/6/2012, which revealed [place] 4+5=9 carcinoma, stage pT2c N0 R0.  His next-generation DNA sequencing analysis ([employer]) revealed a pathogenic TP53 mutation, SHIRA genotype, TMB 5 muts/Mb, and absent PD-L1 expression.  He subsequently received salvage radiotherapy, which was completed in 10/2013, concurrently with six months of androgen deprivation therapy.     He subsequently developed a biochemical recurrence, and received ADT from 2014 until 2020.  In 11/2020, he developed non-metastatic CRPC.  Darolutamide was added on 12/4/2020, to which he achieved a subsequent PSA response.  His PSA level initially dropped from 1.66 ng/mL down to a speedy of 0.07 ng/mL (6/21/2021).  However, the patient then developed a rising PSA level over the past year.  His PSA on 1/13/2022 was 0.16, the PSA on 4/20/2022 was 0.24, the PSA on 6/13/2022 was 0.34, the PSA on 7/12/2022 was 0.47, the PSA on 8/25/2022  was 0.64 ng/mL, and the PSA on 11/21/2022 was 1.55 ng/mL (with a testosterone level of 14 ng/dL).  On 3/8/2023, his PSA level increased to 8.7 ng/mL.     The patient then participated in the ECLIPSE clinical trial, and he was randomized to the 177Lu-PSMA-I&T radioligand arm.  During the screening procedures he was found to have an asymptomatic pulmonary embolus, and he began apixaban. He received all 4 doses of Lee Ann-PSMA-I&T thus far, and his PSA level dropped from 8.7 down to 0.12 ng/mL.  However, his PSA level increased to 0.79 ng/mL, and his imaging (5/7/24) showed evidence of progressive disease.  He is currently on ADT plus darolutamide, but his PSA level continued to rise.  The most recent PSA (11/1/24) was 2.48 ng/mL.  He also had recent imaging assessments (11/1/24) which showed progressive osseous and yan disease.      On 11/18/24, his PSA level reached 2.75 ng/mL.  At that time, he began docetaxel chemotherapy.  He returns for a follow-up visit today, and to receive cycle #3 of docetaxel.      Review of Systems:   - no bone oaub       Medardo is tolerating the docetaxel chemotherapy quite well thus far.  He reports some mild epistaxis, hair thinning, hiccups, and one episode of urinary incontinence.  He has noticed continued dry eyes with blurred vision over the last ~2 months. He continues to use rewetting eye drops but feels his ability to read small print has declined. He has a visit scheduled this Thursday with an ophthalmologist at the [facility] in [place] on Thursday afternoon for an exam.  He also has dry mouth improved with biotene mouth rinse with adequate improvement overnight. During the day, pushes oral hydration.  He has stable nocturia, was given a medication for this at his last visit but has discontinued it due to worsening dry mouth concerns.   He has ongoing, stable right hip pain for the last 6 months. No new bone pains.  No chest pain, shortness of breath, or cough. No  fevers or chills. A comprehensive 14-point ROS is otherwise negative other than the symptoms noted above in the HPI.  His ECOG score is 0.  His pain score is 2/10.     Medications:  Lupron 22.5 mg IM q3mo (last dose, 12/4/2024)  Docetaxel 75 mg/m2, cycle #2 on 12/9/2024  Losartan/HCTZ 100/12.5 mg  Allopurinol 100 mg  Zolpidem 5 mg  Clonazepam 1 mg  Sildenafil 50 mg  Loratadine 10 mg  Calcium + Vit D  Multivitamin  Folic acid     Physical Examination:    General: No acute distress  Vital signs within normal limits.  HEENT: Sclera anicteric. Oral mucosa pink, dry. No mucositis or thrush  Lymph: No lymphadenopathy in neck  Heart: Regular, rate, and rhythm  Lungs: Clear to ascultation bilaterally  Abdomen: Positive bowel sounds. Soft, non-tender. No organomegaly or mass.   Extremities: no lower extremity edema  Neuro: Cranial nerves grossly intact  Skin: no dermatitis     CT scan (11/1/24):  This shows increased size of a sclerotic lesion in the proximal right humerus, although only partially included in the field-of-view.  Mixed findings in the retroperitoneal lymph nodes with some stable, some increased, and others decreased (Left para-aortic, 1.7 x 1.0 cm, previously 1.5 x 1.2 cm. Preaortic, 1.2 x 0.7 cm, previously 1.6 x 1.3 cm ).     Laboratories (12/9/24):  CBC shows a WBC of 5.9, hemoglobin 9.7, hematocrit 27.8%, platelets 119K.  His PSA level is 1.74 ng/mL.  Creatinine  is 1.19 mg/dL (GFR 61 mL/min), which is stable.       ASSESSMENT AND PLAN:   Mr. Gautam is an 85 year old with [place] 4+5=9 [id number] prostate cancer who underwent radical prostatectomy (8/2012) and salvage radiotherapy (10/2013) but then developed metastatic CRPC refractory to darolutamide.  He enrolled on ECLIPSE, and received all 4 doses of Lee Ann-PSMA-I&T radioligand therapy.  His disease is now slowly progressing once again despite ADT plus darolutamide.  He will receive cycle #3 of docetaxel today.  His ECOG score is 0.  His pain score is  2/10.     #Metastatic GZ85-gipnolj CRPC s/p darolutamide  - Completed all 4 doses of Lee Ann-I&T on the ECLIPSE trial in 7/2023, which he tolerated well with the exception of xerostomia/xerophthalmia.   - His PSA declined from 8.7 to 0.12 ng/mL, but is now back up to 2.75 ng/mL. Since restarting docetaxel his PSA has declined to 1.74 on 12/9/24 and is pending today.   - His new CT scan (11/1/24) shows a new osseous metastasis as well as growing retroperitoneal lymph nodes.  - Patient is due for his next Lupron on 12/4/24.  He would like to obtain this locally.   - His options for new systemic therapies included docetaxel chemotherapy, abiraterone, or the AMA-280 study.  - Alternatively, radium-223 could target his bone metastases but will not treat the growing lymphadenopathy.  - We previously decided to pursue docetaxel chemotherapy, starting on 11/18/2024.  - Returns for cycle #2 of docetaxel today.  Tolerating chemotherapy relatively well thus far.  - He will continue ADT in the interim; next dose on 2/26/2025.  - He is considering moving to [place] to be closer to his son; that may happen in early 2025.    - potassium 2 tablets BID.     -       Again, thank you for allowing me to participate in the care of your patient.        Sincerely,        Almaz Blanca PA-C    Electronically signed

## 2025-01-06 NOTE — NURSING NOTE
"Oncology Rooming Note    January 6, 2025 10:23 AM   Medardo Gautam is a 85 year old male who presents for:    Chief Complaint   Patient presents with    Blood Draw     Vitals, blood drawn and PIV placed by LPN. Pt checked into appt.     Oncology Clinic Visit    Prostate Cancer     Malignant neoplasm of prostate     Initial Vitals: BP (!) 144/79   Pulse 73   Temp 98.1  F (36.7  C) (Oral)   Resp 18   Wt 80 kg (176 lb 6.4 oz)   SpO2 96%   BMI 28.47 kg/m   Estimated body mass index is 28.47 kg/m  as calculated from the following:    Height as of 12/11/24: 1.676 m (5' 6\").    Weight as of this encounter: 80 kg (176 lb 6.4 oz). Body surface area is 1.93 meters squared.  No Pain (0) Comment: Data Unavailable   No LMP for male patient.  Allergies reviewed: Yes  Medications reviewed: Yes    Medications: Medication refills not needed today.  Pharmacy name entered into [employer]:    [facility] [place], [place] - [address] [facility] [place] [address] [facility] - [place], [place] - [address]    Frailty Screening:   Is the patient here for a new oncology consult visit in cancer care? 2. No      Clinical concerns: Pt would like to review medications with provider to see if he needs refills. Pt would also like to have infusion moved closer to appt time. Pt reports no new medical concerns today. Almaz was notified via message.       Miesha Gibson, EMT     "

## 2025-01-06 NOTE — PROGRESS NOTES
Jan 6, 2025    FOLLOW UP VISIT      Reason for visit:  Metastatic [id number] CRPC s/p darolutamide and 177Lu-PSMA-I&T.  Cycle #3 of docetaxel today.     History of Present Illness:  Mr. Gautam is an 84-year-old man who was diagnosed with prostate cancer in 2012.  His PSA level was 5.2 ng/mL.  Clinical stage was cT1c disease.  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  His next-generation DNA sequencing analysis ([email or web address]) revealed a pathogenic TP53 mutation, SHIRA genotype, TMB 5 muts/Mb, and absent PD-L1 expression.  He subsequently received salvage radiotherapy, which was completed in 10/2013, concurrently with six months of androgen deprivation therapy.     He subsequently developed a biochemical recurrence, and received ADT from 2014 until 2020.  In 11/2020, he developed non-metastatic CRPC.  Darolutamide was added on 12/4/2020, to which he achieved a subsequent PSA response.  His PSA level initially dropped from 1.66 ng/mL down to a speedy of 0.07 ng/mL (6/21/2021).  However, the patient then developed a rising PSA level over the past year.  His PSA on 1/13/2022 was 0.16, the PSA on 4/20/2022 was 0.24, the PSA on 6/13/2022 was 0.34, the PSA on 7/12/2022 was 0.47, the PSA on 8/25/2022 was 0.64 ng/mL, and the PSA on 11/21/2022 was 1.55 ng/mL (with a testosterone level of 14 ng/dL).  On 3/8/2023, his PSA level increased to 8.7 ng/mL.     The patient then participated in the ECLIPSE clinical trial, and he was randomized to the 177Lu-PSMA-I&T radioligand arm.  During the screening procedures he was found to have an asymptomatic pulmonary embolus, and he began apixaban. He received all 4 doses of Lee Ann-PSMA-I&T thus far, and his PSA level dropped from 8.7 down to 0.12 ng/mL.  However, his PSA level increased to 0.79 ng/mL, and his imaging (5/7/24) showed evidence of progressive disease.  He is currently on ADT plus darolutamide, but his PSA level continued to  rise.  The most recent PSA (11/1/24) was 2.48 ng/mL.  He also had recent imaging assessments (11/1/24) which showed progressive osseous and yan disease.      On 11/18/24, his PSA level reached 2.75 ng/mL.  At that time, he began docetaxel chemotherapy.  He returns for a follow-up visit today, and to receive cycle #3 of docetaxel.      Review of Systems:   Medardo returns clinic today prior to cycle 3 docetaxel.  He reports that he is doing okay today.  He does note that he experiences fatigue with chemotherapy.  He is using rewetting eyedrops for dry eye and reports his visual acuity has seemed to decrease since starting chemotherapy.  He follows closely with the [facility] who encouraged him to continue to use eyedrops at this time.  He does note an loss of taste with chemotherapy but is continuing to eat and drink well.  His previous right hip pain has now resolved.  He denies any bone pains today.  He does notice his legs overall feel weaker and some deconditioning since starting chemotherapy. He occasionally will feel dyspneic if he over exerts himself but otherwise declines shortness of breath. No chest pain or cough. No fevers or chills. No diarrhea but does have soft stools at baseline. No urinary changes or concerns today.      A comprehensive 14-point ROS is otherwise negative other than the symptoms noted above in the HPI.  His ECOG score is 0.  His pain score is 0/10.     Medications:  Lupron 22.5 mg IM q3mo (last dose, 12/4/2024)  Docetaxel 75 mg/m2, cycle #3 on 01/06/2025  Losartan/HCTZ 100/12.5 mg  Allopurinol 100 mg  Zolpidem 5 mg  Clonazepam 1 mg  Sildenafil 50 mg  Loratadine 10 mg  Calcium + Vit D  Multivitamin  Folic acid     Physical Examination:    BP (!) 144/79   Pulse 73   Temp 98.1  F (36.7  C) (Oral)   Resp 18   Wt 80 kg (176 lb 6.4 oz)   SpO2 96%   BMI 28.47 kg/m    General: No acute distress  Vital signs within normal limits.  HEENT: Sclera anicteric. Oral mucosa  pink, dry. No mucositis or thrush  Lymph: No lymphadenopathy in neck  Heart: Regular, rate, and rhythm  Lungs: Clear to ascultation bilaterally  Abdomen: Positive bowel sounds. Soft, non-tender. No organomegaly or mass.   Extremities: no lower extremity edema  Neuro: Cranial nerves grossly intact  Skin: no dermatitis     CT scan (11/1/24):  This shows increased size of a sclerotic lesion in the proximal right humerus, although only partially included in the field-of-view.  Mixed findings in the retroperitoneal lymph nodes with some stable, some increased, and others decreased (Left para-aortic, 1.7 x 1.0 cm, previously 1.5 x 1.2 cm. Preaortic, 1.2 x 0.7 cm, previously 1.6 x 1.3 cm ).     Labs:   Most Recent 3 CBC's:  Recent Labs   Lab Test 01/06/25  1008 12/09/24  0912 11/18/24  0942   WBC 4.1 5.9 3.4*   HGB 9.3* 9.7* 9.8*   MCV 94 97 98    119* 107*   ANEUTAUTO 3.1 4.2 2.2     Most Recent 3 BMP's:  Recent Labs   Lab Test 01/06/25  1008 12/09/24  0912 11/18/24  0942    132* 134*   POTASSIUM 2.7* 3.0* 3.4   CHLORIDE 98 95* 99   CO2 24 26 23   BUN 20.4 18.2 24.5*   CR 1.36* 1.19* 1.41*   ANIONGAP 14 11 12   GAVIN 9.3 9.4 9.6   * 114* 135*   PROTTOTAL 6.7 6.7 7.0   ALBUMIN 3.8 3.9 4.1    Most Recent 3 LFT's:  Recent Labs   Lab Test 01/06/25  1008 12/09/24  0912 11/18/24  0942   AST 33 38 51*   ALT 22 41 28   ALKPHOS 80 64 80   BILITOTAL 0.5 0.4 0.6    Most Recent 2 TSH and T4:  Recent Labs   Lab Test 08/22/23  0952 06/28/23  1030   TSH 1.62 1.59     Component      Latest Ref Rng 11/12/2024  11:00 AM 11/18/2024  9:42 AM 12/9/2024  9:12 AM 1/6/2025  10:08 AM   PSA Tumor Marker      ng/mL 2.48  2.75  1.74  1.34        I reviewed the above labs today.    ASSESSMENT AND PLAN:   Mr. Gautam is an 85 year old with Jeancarlos 4+5=9 [id number] prostate cancer who underwent radical prostatectomy (8/2012) and salvage radiotherapy (10/2013) but then developed metastatic CRPC refractory to darolutamide.  He enrolled  on ECLIPSE, and received all 4 doses of Lee Ann-PSMA-I&T radioligand therapy.  His disease is now slowly progressing once again despite ADT plus darolutamide.  He will receive cycle #3 of docetaxel today.  His ECOG score is 0.       #Metastatic [id number] CRPC s/p darolutamide  - Completed all 4 doses of Lee Ann-I&T on the ECLIPSE trial in 7/2023, which he tolerated well with the exception of xerostomia/xerophthalmia.   - His PSA declined from 8.7 to 0.12 ng/mL, but is now back up to 2.75 ng/mL. Since restarting docetaxel his PSA has declined to 1.74 on 12/9/24 and is pending today.   - His new CT scan (11/1/24) shows a new osseous metastasis as well as growing retroperitoneal lymph nodes.  - Patient is due for his next Lupron on 12/4/24.  He would like to obtain this locally.   - His options for new systemic therapies included docetaxel chemotherapy, abiraterone, or the AMA-280 study.  - Alternatively, radium-223 could target his bone metastases but will not treat the growing lymphadenopathy.  - We previously decided to pursue docetaxel chemotherapy, starting on 11/18/2024.  - Returns for cycle #3 of docetaxel today.  Tolerating chemotherapy adequately but he does have some fatigue.   - He will continue ADT in the interim; next dose on 2/26/2025.  - He is considering moving to [place] to be closer to his son; that may happen in early 2025. No current plans.     #Hypokalemia  - Restarted home potassium 2 tablets daily on 1/4/25. Will replace per protocol today in infusion. Will check labs on 1/10/2025 to assess for adequate replacement.     30 minutes spent on the date of the encounter doing chart review, review of test results, interpretation of tests, patient visit, and documentation     Almaz Blanca PA-C

## 2025-01-06 NOTE — PROGRESS NOTES
Infusion Nursing Note:  Medardo Gautam presents today for C3D1 Taxotere.    Patient seen by provider today: Yes: Haritha SINGLETON   present during visit today: Not Applicable.    Note: No complaints.       Intravenous Access:  Peripheral IV placed.    Treatment Conditions:  Lab Results   Component Value Date    HGB 9.3 (L) 01/06/2025    WBC 4.1 01/06/2025    ANEU 2.6 06/21/2021    ANEUTAUTO 3.1 01/06/2025     01/06/2025        Lab Results   Component Value Date     01/06/2025    POTASSIUM 2.7 (L) 01/06/2025    MAG 1.7 08/22/2023    CR 1.36 (H) 01/06/2025    GAVIN 9.3 01/06/2025    BILITOTAL 0.5 01/06/2025    ALBUMIN 3.8 01/06/2025    ALT 22 01/06/2025    AST 33 01/06/2025       Results reviewed, labs MET treatment parameters, ok to proceed with treatment.    TORB: 1/6/25/Haritha SINGLETON/Nusrat Lynn RN/ Creatinine 1.36, no intervention needed. Serum Potassium 2.7, replace as per protocol     Post Infusion Assessment:  Patient tolerated infusion without incident.  Blood return noted pre and post infusion.  Site patent and intact, free from redness, edema or discomfort.  No evidence of extravasations.  Access discontinued per protocol.       Discharge Plan:   Patient prescription refills. Dexamethasone. Has enough supply of Prednisone  Discharge instructions reviewed with: Patient.  Patient and/or family verbalized understanding of discharge instructions and all questions answered.  Copy of AVS reviewed with patient and/or family.  Patient will return 1/27 for next appointment.  Patient discharged in stable condition accompanied by: self.  Departure Mode: Ambulatory.      OLIVA OLIVER, PETRA

## 2025-01-09 ENCOUNTER — LAB (OUTPATIENT)
Dept: LAB | Facility: OTHER | Age: 85
End: 2025-01-09
Payer: COMMERCIAL

## 2025-01-09 DIAGNOSIS — C61 MALIGNANT NEOPLASM OF PROSTATE (H): ICD-10-CM

## 2025-01-09 DIAGNOSIS — C61 PROSTATE CANCER (H): ICD-10-CM

## 2025-01-09 DIAGNOSIS — Z79.899 ENCOUNTER FOR LONG-TERM (CURRENT) USE OF MEDICATIONS: ICD-10-CM

## 2025-01-09 LAB
ALBUMIN SERPL BCG-MCNC: 4.1 G/DL (ref 3.5–5.2)
ALP SERPL-CCNC: 76 U/L (ref 40–150)
ALT SERPL W P-5'-P-CCNC: 38 U/L (ref 0–70)
ANION GAP SERPL CALCULATED.3IONS-SCNC: 13 MMOL/L (ref 7–15)
AST SERPL W P-5'-P-CCNC: 54 U/L (ref 0–45)
BASOPHILS # BLD AUTO: 0 10E3/UL (ref 0–0.2)
BASOPHILS NFR BLD AUTO: 0 %
BILIRUB SERPL-MCNC: 0.9 MG/DL
BUN SERPL-MCNC: 45.9 MG/DL (ref 8–23)
CALCIUM SERPL-MCNC: 9.1 MG/DL (ref 8.8–10.4)
CHLORIDE SERPL-SCNC: 99 MMOL/L (ref 98–107)
CREAT SERPL-MCNC: 1.46 MG/DL (ref 0.67–1.17)
EGFRCR SERPLBLD CKD-EPI 2021: 47 ML/MIN/1.73M2
EOSINOPHIL # BLD AUTO: 0 10E3/UL (ref 0–0.7)
EOSINOPHIL NFR BLD AUTO: 0 %
ERYTHROCYTE [DISTWIDTH] IN BLOOD BY AUTOMATED COUNT: 13.1 % (ref 10–15)
GLUCOSE SERPL-MCNC: 90 MG/DL (ref 70–99)
HCO3 SERPL-SCNC: 25 MMOL/L (ref 22–29)
HCT VFR BLD AUTO: 27.6 % (ref 40–53)
HGB BLD-MCNC: 9.6 G/DL (ref 13.3–17.7)
IMM GRANULOCYTES # BLD: 0 10E3/UL
IMM GRANULOCYTES NFR BLD: 1 %
LYMPHOCYTES # BLD AUTO: 1 10E3/UL (ref 0.8–5.3)
LYMPHOCYTES NFR BLD AUTO: 23 %
MCH RBC QN AUTO: 33.7 PG (ref 26.5–33)
MCHC RBC AUTO-ENTMCNC: 34.8 G/DL (ref 31.5–36.5)
MCV RBC AUTO: 97 FL (ref 78–100)
MONOCYTES # BLD AUTO: 0 10E3/UL (ref 0–1.3)
MONOCYTES NFR BLD AUTO: 1 %
NEUTROPHILS # BLD AUTO: 3.3 10E3/UL (ref 1.6–8.3)
NEUTROPHILS NFR BLD AUTO: 76 %
PLATELET # BLD AUTO: 160 10E3/UL (ref 150–450)
POTASSIUM SERPL-SCNC: 3.5 MMOL/L (ref 3.4–5.3)
PROT SERPL-MCNC: 6.7 G/DL (ref 6.4–8.3)
PSA SERPL DL<=0.01 NG/ML-MCNC: 0.94 NG/ML
RBC # BLD AUTO: 2.85 10E6/UL (ref 4.4–5.9)
SODIUM SERPL-SCNC: 137 MMOL/L (ref 135–145)
WBC # BLD AUTO: 4.3 10E3/UL (ref 4–11)

## 2025-01-12 LAB — TESTOST SERPL-MCNC: 3 NG/DL (ref 240–950)

## 2025-01-15 ENCOUNTER — PATIENT OUTREACH (OUTPATIENT)
Dept: ONCOLOGY | Facility: CLINIC | Age: 85
End: 2025-01-15
Payer: COMMERCIAL

## 2025-01-15 NOTE — PROGRESS NOTES
[facility]: Cancer Care                                                                                      Patient called today reporting increase in bowel movements.  He reports 6-8 small soft-liquid stools daily.  Patient also complained of occasional dizziness.     Denies blood in stool.    Denies fever.  He reports feeling cold sometimes, but no chills.  Encouraged patient to check his temperature anytime he feels cold.  Reminded him that anything over 100.4 is considered a fever and to notify us.  Patient repeated these directions back and will comply.    He was urged to drink plenty of fluids.  As of now he reports consuming 8-10 glasses of fluids daily.  This includes green tea, lemonade, and water.    Poor appetite reported.  He does eat even though appetite is poor.  Food choices are turkey, cheese, ravioli, pizza.  Urged him to avoid anything high in fiber while experiencing diarrhea.      Patient has loperimide on hand.  Encouraged him to take as directed and we will follow up in a couple of days.    Iesha Owen, RN, BSN, OCN  Oncology RN Care Coordinator  [facility]

## 2025-01-20 ENCOUNTER — PATIENT OUTREACH (OUTPATIENT)
Dept: ONCOLOGY | Facility: CLINIC | Age: 85
End: 2025-01-20
Payer: COMMERCIAL

## 2025-01-20 RX ORDER — CEPHALEXIN 500 MG/1
500 CAPSULE ORAL
COMMUNITY
Start: 2025-01-18 | End: 2025-01-25

## 2025-01-20 NOTE — PROGRESS NOTES
[facility]: Cancer Care                                                                                      Medardo called today to report he injured his elbow, was seen in Urgent Care and prescribed cephalexin for a week.  Medication added to his medication list. He will follow up with his PCP on Thursday.    Denies any s/s of infection.    Denies any new concerns.    Iesha Owen, RN, BSN, OCN  Oncology RN Care Coordinator  [facility]

## 2025-01-23 ENCOUNTER — OFFICE VISIT (OUTPATIENT)
Dept: FAMILY MEDICINE | Facility: OTHER | Age: 85
End: 2025-01-23
Payer: COMMERCIAL

## 2025-01-23 ENCOUNTER — ALLIED HEALTH/NURSE VISIT (OUTPATIENT)
Dept: FAMILY MEDICINE | Facility: OTHER | Age: 85
End: 2025-01-23
Payer: COMMERCIAL

## 2025-01-23 VITALS
BODY MASS INDEX: 28.08 KG/M2 | DIASTOLIC BLOOD PRESSURE: 60 MMHG | TEMPERATURE: 96.6 F | RESPIRATION RATE: 15 BRPM | SYSTOLIC BLOOD PRESSURE: 90 MMHG | OXYGEN SATURATION: 100 % | WEIGHT: 174 LBS | HEART RATE: 78 BPM

## 2025-01-23 DIAGNOSIS — S41.101D WOUND OF RIGHT UPPER EXTREMITY, SUBSEQUENT ENCOUNTER: Primary | ICD-10-CM

## 2025-01-23 DIAGNOSIS — F10.21 HISTORY OF ALCOHOL DEPENDENCE (H): ICD-10-CM

## 2025-01-23 DIAGNOSIS — L57.0 AK (ACTINIC KERATOSIS): ICD-10-CM

## 2025-01-23 DIAGNOSIS — C79.51 PROSTATE CANCER METASTATIC TO BONE (H): ICD-10-CM

## 2025-01-23 DIAGNOSIS — N18.31 CHRONIC KIDNEY DISEASE, STAGE 3A (H): ICD-10-CM

## 2025-01-23 DIAGNOSIS — I10 ESSENTIAL HYPERTENSION WITH GOAL BLOOD PRESSURE LESS THAN 140/90: ICD-10-CM

## 2025-01-23 DIAGNOSIS — C61 PROSTATE CANCER METASTATIC TO BONE (H): ICD-10-CM

## 2025-01-23 RX ORDER — LOSARTAN POTASSIUM 100 MG/1
100 TABLET ORAL DAILY
Qty: 90 TABLET | Refills: 3 | Status: SHIPPED | OUTPATIENT
Start: 2025-01-23

## 2025-01-23 NOTE — PROGRESS NOTES
"  Assessment & Plan     Wound of right upper extremity, subsequent encounter  RN assisted in cleansing the wound.  As patient is unable to do any dressing changes one-handed to his upper arm and he cannot visualize the wound we decided to use Tegaderm.  This will also obviate the need for Kerlix around his upper arm and hopefully will help improve the swelling in his distal arm.  He will finish out the antibiotic that he was given.  He will then follow-up in a week for the RN to assess and redress his wound.    AK (actinic keratosis)  Lesions consistent with actinic keratosis.  Patient desires cryotherapy.  Liquid nitrogen was applied for 10-12 seconds to the lesion(s) and the expected blistering or scabbing reaction explained.  Return if lesion(s) fail to fully resolve.    3 lesions were treated    - DESTRUCT PREMALIGNANT LESION, FIRST    Prostate cancer metastatic to bone (H)  Following closely with oncology and is receiving chemotherapy and Lupron    Chronic kidney disease, stage 3a (H)/Hypertension.  Blood pressure is on the low side and he initially got this injury by feeling a little off balance.  He is also on chemotherapy.  I am concerned about orthostatic hypotension which could be affecting his balance and lead to other injuries.  He also has known chronic kidney disease which has been stable but slightly progressive.  Currently is on Hyzaar.  We discussed stopping the hydrochlorothiazide and just continuing on losartan.  The RN will then recheck his blood pressure at his wound follow-up.    History of alcohol dependence (H)  He states he completely stopped alcohol and chemotherapy started    MED REC REQUIRED  Post Medication Reconciliation Status:  Patient was not discharged from an inpatient facility or TCU  BMI  Estimated body mass index is 28.08 kg/m  as calculated from the following:    Height as of 12/11/24: 1.676 m (5' 6\").    Weight as of this encounter: 78.9 kg (174 lb).       Subjective   Medardo " is a 85 year old, presenting for the following health issues:  ER F/U        1/23/2025     2:00 PM   Additional Questions   Roomed by poornima     History of Present Illness       Reason for visit:  Heavy injury on my right elbow  Symptom onset:  1-2 weeks ago  Symptoms include:  Next step to  solve the damage  What makes it worse:  No  What makes it better:  No   He is taking medications regularly.         ED/UC Followup:    Facility:  [facility]  Date of visit: 1/18/25, 1/20/25  Reason for visit: right elbow scrap  Current Status: unsure as he is not able to see it.     About 2 weeks ago he lost his balance and scraped his upper right arm on a door frame.  He put a bandage on it but has difficulty addressing it and left it on for a week.  He then went to an urgent care.  They cleansed it and bandaged it.  He was unable to continue with the bandage and presented back to them a couple days later and they rebandaged it again.  He was placed on Keflex secondary to his history of metastatic prostate cancer on chemotherapy and Lupron.  Patient denies fevers.  He is unable to visualize the wound.  He does state his right arm is much more swollen.    He also has noticed some rough spots in the scalp.        Objective    BP 90/60 (BP Location: Left arm, Patient Position: Sitting, Cuff Size: Adult Regular)   Pulse 78   Temp (!) 96.6  F (35.9  C) (Temporal)   Resp 15   Wt 78.9 kg (174 lb)   SpO2 100%   BMI 28.08 kg/m    Body mass index is 28.08 kg/m .  Physical Exam   Gen: no apparent distress  Patient has KerlixRight arm: Wrapped fairly snugly L up loaned his upper right arm.  He then has edema distal to this.  He has full range of motion of his hand wrist and elbow.  No erythema of the arm.  The wound is clean with granulation tissue.  There is mild amount of bleeding.  No warmth.  No purulent discharge.  He does have some old skin tears around the edges.  Scalp: 3 raw fish and scaly lesions on his head  without erythema or telangiectasia.          Signed Electronically by: Vira Flor MD

## 2025-01-23 NOTE — PROGRESS NOTES
RN called to exam room for wound care and dressing placement on right upper extremity. Patient has skin tear on right upper extremity, no active bleeding, no signs of infection noted. Wound was cleansed with sterile water and sterile 4x4's. Tegaderm dressing placed over wound at this time. Patient will schedule a follow up visit with Nurse next week for wound check and dressing change.    Jodie Esqueda RN on 1/23/2025 at 3:22 PM

## 2025-01-25 RX ORDER — MEPERIDINE HYDROCHLORIDE 25 MG/ML
25 INJECTION INTRAMUSCULAR; INTRAVENOUS; SUBCUTANEOUS
OUTPATIENT
Start: 2025-03-10

## 2025-01-25 RX ORDER — LORAZEPAM 2 MG/ML
0.5 INJECTION INTRAMUSCULAR EVERY 4 HOURS PRN
OUTPATIENT
Start: 2025-02-17

## 2025-01-25 RX ORDER — ONDANSETRON 2 MG/ML
8 INJECTION INTRAMUSCULAR; INTRAVENOUS ONCE
Start: 2025-02-17 | End: 2025-02-17

## 2025-01-25 RX ORDER — ALBUTEROL SULFATE 0.83 MG/ML
2.5 SOLUTION RESPIRATORY (INHALATION)
OUTPATIENT
Start: 2025-02-17

## 2025-01-25 RX ORDER — METHYLPREDNISOLONE SODIUM SUCCINATE 40 MG/ML
40 INJECTION INTRAMUSCULAR; INTRAVENOUS
Start: 2025-03-10

## 2025-01-25 RX ORDER — METHYLPREDNISOLONE SODIUM SUCCINATE 40 MG/ML
40 INJECTION INTRAMUSCULAR; INTRAVENOUS
Start: 2025-02-17

## 2025-01-25 RX ORDER — HEPARIN SODIUM (PORCINE) LOCK FLUSH IV SOLN 100 UNIT/ML 100 UNIT/ML
5 SOLUTION INTRAVENOUS
OUTPATIENT
Start: 2025-03-10

## 2025-01-25 RX ORDER — DIPHENHYDRAMINE HYDROCHLORIDE 50 MG/ML
50 INJECTION INTRAMUSCULAR; INTRAVENOUS
Start: 2025-03-10

## 2025-01-25 RX ORDER — ALBUTEROL SULFATE 90 UG/1
1-2 INHALANT RESPIRATORY (INHALATION)
Start: 2025-03-10

## 2025-01-25 RX ORDER — DIPHENHYDRAMINE HYDROCHLORIDE 50 MG/ML
25 INJECTION INTRAMUSCULAR; INTRAVENOUS
Start: 2025-02-17

## 2025-01-25 RX ORDER — ONDANSETRON 2 MG/ML
8 INJECTION INTRAMUSCULAR; INTRAVENOUS ONCE
Start: 2025-03-10 | End: 2025-03-10

## 2025-01-25 RX ORDER — EPINEPHRINE 1 MG/ML
0.3 INJECTION, SOLUTION INTRAMUSCULAR; SUBCUTANEOUS EVERY 5 MIN PRN
OUTPATIENT
Start: 2025-03-10

## 2025-01-25 RX ORDER — LORAZEPAM 2 MG/ML
0.5 INJECTION INTRAMUSCULAR EVERY 4 HOURS PRN
OUTPATIENT
Start: 2025-03-10

## 2025-01-25 RX ORDER — HEPARIN SODIUM,PORCINE 10 UNIT/ML
5-20 VIAL (ML) INTRAVENOUS DAILY PRN
OUTPATIENT
Start: 2025-02-17

## 2025-01-25 RX ORDER — DEXAMETHASONE SODIUM PHOSPHATE 4 MG/ML
12 INJECTION, SOLUTION INTRA-ARTICULAR; INTRALESIONAL; INTRAMUSCULAR; INTRAVENOUS; SOFT TISSUE ONCE
Start: 2025-02-17 | End: 2025-02-17

## 2025-01-25 RX ORDER — EPINEPHRINE 1 MG/ML
0.3 INJECTION, SOLUTION INTRAMUSCULAR; SUBCUTANEOUS EVERY 5 MIN PRN
OUTPATIENT
Start: 2025-02-17

## 2025-01-25 RX ORDER — DIPHENHYDRAMINE HYDROCHLORIDE 50 MG/ML
25 INJECTION INTRAMUSCULAR; INTRAVENOUS
Start: 2025-03-10

## 2025-01-25 RX ORDER — HEPARIN SODIUM (PORCINE) LOCK FLUSH IV SOLN 100 UNIT/ML 100 UNIT/ML
5 SOLUTION INTRAVENOUS
OUTPATIENT
Start: 2025-02-17

## 2025-01-25 RX ORDER — HEPARIN SODIUM,PORCINE 10 UNIT/ML
5-20 VIAL (ML) INTRAVENOUS DAILY PRN
OUTPATIENT
Start: 2025-03-10

## 2025-01-25 RX ORDER — DEXAMETHASONE SODIUM PHOSPHATE 4 MG/ML
12 INJECTION, SOLUTION INTRA-ARTICULAR; INTRALESIONAL; INTRAMUSCULAR; INTRAVENOUS; SOFT TISSUE ONCE
Start: 2025-03-10 | End: 2025-03-10

## 2025-01-25 RX ORDER — MEPERIDINE HYDROCHLORIDE 25 MG/ML
25 INJECTION INTRAMUSCULAR; INTRAVENOUS; SUBCUTANEOUS
OUTPATIENT
Start: 2025-02-17

## 2025-01-25 RX ORDER — DIPHENHYDRAMINE HYDROCHLORIDE 50 MG/ML
50 INJECTION INTRAMUSCULAR; INTRAVENOUS
Start: 2025-02-17

## 2025-01-25 RX ORDER — ALBUTEROL SULFATE 0.83 MG/ML
2.5 SOLUTION RESPIRATORY (INHALATION)
OUTPATIENT
Start: 2025-03-10

## 2025-01-25 RX ORDER — ALBUTEROL SULFATE 90 UG/1
1-2 INHALANT RESPIRATORY (INHALATION)
Start: 2025-02-17

## 2025-01-25 NOTE — PROGRESS NOTES
FOLLOW UP VISIT      Reason for visit:  Metastatic [id number] CRPC s/p darolutamide and 177Lu-PSMA-I&T.  Cycle #4 of docetaxel today.     History of Present Illness:  Mr. Gautam is an 85-year-old man who was diagnosed with prostate cancer in 2012.  His PSA level was 5.2 ng/mL.  Clinical stage was cT1c disease.  He underwent radical prostatectomy on 8/6/2012, which revealed Avalon 4+5=9 carcinoma, stage pT2c N0 R0.  His next-generation DNA sequencing analysis ([employer]) revealed a pathogenic TP53 mutation, SHIRA genotype, TMB 5 muts/Mb, and absent PD-L1 expression.  He subsequently received salvage radiotherapy, which was completed in 10/2013, concurrently with six months of androgen deprivation therapy.     He subsequently developed a biochemical recurrence, and received ADT from 2014 until 2020.  In 11/2020, he developed non-metastatic CRPC.  Darolutamide was added on 12/4/2020, to which he achieved a subsequent PSA response.  His PSA level initially dropped from 1.66 ng/mL down to a speedy of 0.07 ng/mL (6/21/2021).  However, the patient then developed a rising PSA level over the past year.  His PSA on 1/13/2022 was 0.16, the PSA on 4/20/2022 was 0.24, the PSA on 6/13/2022 was 0.34, the PSA on 7/12/2022 was 0.47, the PSA on 8/25/2022 was 0.64 ng/mL, and the PSA on 11/21/2022 was 1.55 ng/mL (with a testosterone level of 14 ng/dL).  On 3/8/2023, his PSA level increased to 8.7 ng/mL.     The patient then participated in the ECLIPSE clinical trial, and he was randomized to the 177Lu-PSMA-I&T radioligand arm.  During the screening procedures he was found to have an asymptomatic pulmonary embolus, and he began apixaban. He received all 4 doses of Lee Ann-PSMA-I&T thus far, and his PSA level dropped from 8.7 down to 0.12 ng/mL.  However, his PSA level increased to 0.79 ng/mL, and his imaging (5/7/24) showed evidence of progressive disease.  He is currently on ADT plus darolutamide, but his PSA level continued to rise.  The most  recent PSA (11/1/24) was 2.48 ng/mL.  He also had recent imaging assessments (11/1/24) which showed progressive osseous and yan disease.      On 11/18/2024, his PSA level reached 2.75 ng/mL.  At that time, he began docetaxel chemotherapy, and has received three cycles.  His PSA level has declined to 0.71 ng/mL thus far.  He returns for a follow-up visit today, and to receive cycle #4 of docetaxel.      Review of Systems:  Medardo returns clinic today prior to cycle 4 of docetaxel.  He reports that he is doing relatively okay today.  He does note that he experiences fatigue with chemotherapy.  He is using rewetting eyedrops for dry eye and reports his visual acuity has seemed to decrease since starting chemotherapy.  He follows closely with the [facility] who encouraged him to continue to use eyedrops at this time.  He does note an loss of taste with chemotherapy but is continuing to eat and drink well.  His previous right hip pain has now resolved.  He denies any bone pains today.  He does notice his legs overall feel weaker and some deconditioning since starting chemotherapy. He occasionally will feel dyspneic if he over exerts himself but otherwise declines shortness of breath. No chest pain or cough. No fevers or chills. No diarrhea but does have soft stools at baseline. No urinary changes or concerns today. A comprehensive 14-point ROS is otherwise negative other than the symptoms noted above in the HPI.  His ECOG score is 0.  His pain score is 2/10.     Medications:  Lupron 22.5 mg IM q3mo (next dose, 3/3/2025)  Docetaxel 75 mg/m2, cycle #4 on 1/27/2025  Prednisone 5 mg BID  Prochlorperazine 10 mg PRN  Losartan/HCTZ 100/12.5 mg  Atorvastatin 10 mg  Cephalexin 500 mg  Allopurinol 100 mg  Zolpidem 5 mg  Clonazepam 1 mg  Sildenafil 50 mg  Loratadine 10 mg  Calcium + Vit D  Multivitamin  Folic acid     Physical Examination:    General: No acute distress  Vital signs within normal limits.  HEENT:  Sclera anicteric. Oral mucosa pink, dry. No mucositis or thrush  Lymph: No lymphadenopathy in neck  Heart: Regular, rate, and rhythm  Lungs: Clear to ascultation bilaterally  Abdomen: Positive bowel sounds. Soft, non-tender. No organomegaly or mass.   Extremities: no lower extremity edema  Neuro: Cranial nerves grossly intact  Skin: no dermatitis     CT scan (11/1/2024):  This shows increased size of a sclerotic lesion in the proximal right humerus, although only partially included in the field-of-view.  Mixed findings in the retroperitoneal lymph nodes with some stable, some increased, and others decreased (Left para-aortic, 1.7 x 1.0 cm, previously 1.5 x 1.2 cm. Preaortic, 1.2 x 0.7 cm, previously 1.6 x 1.3 cm).     Laboratories (1/27/25):  CBC shows a WBC of 5.0, hemoglobin 9.0, hematocrit 25.7%, platelets 152K.  His PSA level is 0.71 ng/mL.  Creatinine is 1.46 mg/dL (GFR 47 mL/min), which is stable.       ASSESSMENT AND PLAN:   Mr. Gautam is an 85-year-old man with [place] 4+5=9 [id number] prostate cancer who underwent radical prostatectomy (8/2012) and salvage radiotherapy (10/2013) but then developed metastatic CRPC refractory to darolutamide.  He enrolled on ECLIPSE, and received all 4 doses of Lee Ann-PSMA-I&T radioligand therapy.  His disease was slowly progressing once again despite ADT plus darolutamide.  He began docetaxel chemotherapy on 11/18/2024, and will receive cycle #4 of docetaxel today.  His ECOG score is 0.  His pain score is 2/10.     #Metastatic [id number]-[id number] CRPC s/p darolutamide  - Completed all 4 doses of Lee Ann-I&T on the ECLIPSE trial in 7/2023, which he tolerated well with the exception of xerostomia/xerophthalmia.   - His PSA declined from 8.7 to 0.12 ng/mL, but is now back up to 2.75 ng/mL.   - His new CT scan (11/1/24) showed a new osseous metastasis as well as growing retroperitoneal lymph nodes.  - Patient is due for his next Lupron on 3/3/2025.  He would like to obtain this locally.   -  His options for new systemic therapies included docetaxel chemotherapy, abiraterone, or the AMA-280 study.  - Alternatively, radium-223 could target his bone metastases but will not treat the growing lymphadenopathy.  - We previously decided to pursue docetaxel chemotherapy, starting on 11/18/2024.  - Returns for cycle #4 of docetaxel today.  Tolerating chemotherapy relatively well thus far.  PSA down to 0.71 ng/mL.  - Labs look fine, and he is OK clinically; so it is safe to proceed with the planned docetaxel infusion today (1/27/25).  - I will probably not treat him with more than 6 chemo doses.  We may even stop after 4 doses if PSA is <0.50 ng/mL.  - He will also continue androgen deprivation therapy in the interim; next Lupron dose on 3/3/2025.  - He is considering moving to [place] to be closer to his son; that may happen in the Spring of 2025.      A total of 40 minutes were spent on this patient on the date of the encounter conducting chart review, review of test results, interpretation of tests, patient visit, documentation and discussion with other provider(s). The patient was given the opportunity to ask multiple questions today, all of which were answered to his satisfaction.    Tio Holman M.D.

## 2025-01-27 ENCOUNTER — INFUSION THERAPY VISIT (OUTPATIENT)
Dept: ONCOLOGY | Facility: CLINIC | Age: 85
End: 2025-01-27
Attending: INTERNAL MEDICINE
Payer: COMMERCIAL

## 2025-01-27 VITALS
OXYGEN SATURATION: 98 % | HEART RATE: 81 BPM | TEMPERATURE: 97.7 F | RESPIRATION RATE: 16 BRPM | BODY MASS INDEX: 28.58 KG/M2 | SYSTOLIC BLOOD PRESSURE: 123 MMHG | DIASTOLIC BLOOD PRESSURE: 73 MMHG | WEIGHT: 177.1 LBS

## 2025-01-27 DIAGNOSIS — C61 PROSTATE CANCER METASTATIC TO BONE (H): ICD-10-CM

## 2025-01-27 DIAGNOSIS — C61 MALIGNANT NEOPLASM OF PROSTATE (H): ICD-10-CM

## 2025-01-27 DIAGNOSIS — C79.51 PROSTATE CANCER METASTATIC TO BONE (H): ICD-10-CM

## 2025-01-27 DIAGNOSIS — C61 MALIGNANT NEOPLASM OF PROSTATE (H): Primary | ICD-10-CM

## 2025-01-27 DIAGNOSIS — C61 PROSTATE CANCER (H): ICD-10-CM

## 2025-01-27 LAB
ALBUMIN SERPL BCG-MCNC: 3.7 G/DL (ref 3.5–5.2)
ALP SERPL-CCNC: 84 U/L (ref 40–150)
ALT SERPL W P-5'-P-CCNC: 32 U/L (ref 0–70)
AST SERPL W P-5'-P-CCNC: 45 U/L (ref 0–45)
BASOPHILS # BLD AUTO: 0 10E3/UL (ref 0–0.2)
BASOPHILS NFR BLD AUTO: 0 %
BILIRUB DIRECT SERPL-MCNC: <0.2 MG/DL (ref 0–0.3)
BILIRUB SERPL-MCNC: 0.4 MG/DL
EOSINOPHIL # BLD AUTO: 0 10E3/UL (ref 0–0.7)
EOSINOPHIL NFR BLD AUTO: 0 %
ERYTHROCYTE [DISTWIDTH] IN BLOOD BY AUTOMATED COUNT: 13.2 % (ref 10–15)
HCT VFR BLD AUTO: 25.7 % (ref 40–53)
HGB BLD-MCNC: 9 G/DL (ref 13.3–17.7)
IMM GRANULOCYTES # BLD: 0 10E3/UL
IMM GRANULOCYTES NFR BLD: 1 %
LYMPHOCYTES # BLD AUTO: 0.8 10E3/UL (ref 0.8–5.3)
LYMPHOCYTES NFR BLD AUTO: 16 %
MCH RBC QN AUTO: 33.1 PG (ref 26.5–33)
MCHC RBC AUTO-ENTMCNC: 35 G/DL (ref 31.5–36.5)
MCV RBC AUTO: 95 FL (ref 78–100)
MONOCYTES # BLD AUTO: 0.4 10E3/UL (ref 0–1.3)
MONOCYTES NFR BLD AUTO: 8 %
NEUTROPHILS # BLD AUTO: 3.7 10E3/UL (ref 1.6–8.3)
NEUTROPHILS NFR BLD AUTO: 75 %
NRBC # BLD AUTO: 0 10E3/UL
NRBC BLD AUTO-RTO: 0 /100
PLATELET # BLD AUTO: 152 10E3/UL (ref 150–450)
PROT SERPL-MCNC: 6.5 G/DL (ref 6.4–8.3)
PSA SERPL DL<=0.01 NG/ML-MCNC: 0.71 NG/ML
RBC # BLD AUTO: 2.72 10E6/UL (ref 4.4–5.9)
WBC # BLD AUTO: 5 10E3/UL (ref 4–11)

## 2025-01-27 PROCEDURE — 80076 HEPATIC FUNCTION PANEL: CPT | Performed by: INTERNAL MEDICINE

## 2025-01-27 PROCEDURE — 85014 HEMATOCRIT: CPT | Performed by: INTERNAL MEDICINE

## 2025-01-27 PROCEDURE — G0463 HOSPITAL OUTPT CLINIC VISIT: HCPCS | Performed by: INTERNAL MEDICINE

## 2025-01-27 PROCEDURE — 36415 COLL VENOUS BLD VENIPUNCTURE: CPT | Performed by: INTERNAL MEDICINE

## 2025-01-27 PROCEDURE — 96413 CHEMO IV INFUSION 1 HR: CPT

## 2025-01-27 PROCEDURE — 250N000011 HC RX IP 250 OP 636: Performed by: INTERNAL MEDICINE

## 2025-01-27 PROCEDURE — 99215 OFFICE O/P EST HI 40 MIN: CPT | Performed by: INTERNAL MEDICINE

## 2025-01-27 PROCEDURE — 85004 AUTOMATED DIFF WBC COUNT: CPT | Performed by: INTERNAL MEDICINE

## 2025-01-27 PROCEDURE — 96375 TX/PRO/DX INJ NEW DRUG ADDON: CPT

## 2025-01-27 PROCEDURE — 84153 ASSAY OF PSA TOTAL: CPT | Performed by: INTERNAL MEDICINE

## 2025-01-27 PROCEDURE — 258N000003 HC RX IP 258 OP 636: Performed by: INTERNAL MEDICINE

## 2025-01-27 RX ORDER — ONDANSETRON 2 MG/ML
8 INJECTION INTRAMUSCULAR; INTRAVENOUS ONCE
Status: COMPLETED | OUTPATIENT
Start: 2025-01-27 | End: 2025-01-27

## 2025-01-27 RX ORDER — DEXAMETHASONE SODIUM PHOSPHATE 4 MG/ML
12 INJECTION, SOLUTION INTRA-ARTICULAR; INTRALESIONAL; INTRAMUSCULAR; INTRAVENOUS; SOFT TISSUE ONCE
Status: COMPLETED | OUTPATIENT
Start: 2025-01-27 | End: 2025-01-27

## 2025-01-27 RX ORDER — DEXAMETHASONE 4 MG/1
8 TABLET ORAL 2 TIMES DAILY WITH MEALS
Qty: 6 TABLET | Refills: 1 | Status: SHIPPED | OUTPATIENT
Start: 2025-01-27

## 2025-01-27 RX ADMIN — DOCETAXEL 120 MG: 20 INJECTION, SOLUTION, CONCENTRATE INTRAVENOUS at 12:05

## 2025-01-27 RX ADMIN — ONDANSETRON 8 MG: 2 INJECTION INTRAMUSCULAR; INTRAVENOUS at 11:43

## 2025-01-27 RX ADMIN — DEXAMETHASONE SODIUM PHOSPHATE 12 MG: 4 INJECTION, SOLUTION INTRA-ARTICULAR; INTRALESIONAL; INTRAMUSCULAR; INTRAVENOUS; SOFT TISSUE at 11:46

## 2025-01-27 RX ADMIN — SODIUM CHLORIDE 100 ML: 9 INJECTION, SOLUTION INTRAVENOUS at 11:41

## 2025-01-27 ASSESSMENT — PAIN SCALES - GENERAL: PAINLEVEL_OUTOF10: NO PAIN (0)

## 2025-01-27 NOTE — LETTER
1/27/2025      Medardo Gautam  [address] [facility] [address]      Dear Colleague,    Thank you for referring your patient, Medardo Gautam, to the [facility]. Please see a copy of my visit note below.      FOLLOW UP VISIT      Reason for visit:  Metastatic [id number] CRPC s/p darolutamide and 177Lu-PSMA-I&T.  Cycle #4 of docetaxel today.     History of Present Illness:  Mr. Gautam is an 85-year-old man who was diagnosed with prostate cancer in 2012.  His PSA level was 5.2 ng/mL.  Clinical stage was cT1c disease.  He underwent radical prostatectomy on 8/6/2012, which revealed [place] 4+5=9 carcinoma, stage pT2c N0 R0.  His next-generation DNA sequencing analysis (CARIS) revealed a pathogenic TP53 mutation, SHIRA genotype, TMB 5 muts/Mb, and absent PD-L1 expression.  He subsequently received salvage radiotherapy, which was completed in 10/2013, concurrently with six months of androgen deprivation therapy.     He subsequently developed a biochemical recurrence, and received ADT from 2014 until 2020.  In 11/2020, he developed non-metastatic CRPC.  Darolutamide was added on 12/4/2020, to which he achieved a subsequent PSA response.  His PSA level initially dropped from 1.66 ng/mL down to a speedy of 0.07 ng/mL (6/21/2021).  However, the patient then developed a rising PSA level over the past year.  His PSA on 1/13/2022 was 0.16, the PSA on 4/20/2022 was 0.24, the PSA on 6/13/2022 was 0.34, the PSA on 7/12/2022 was 0.47, the PSA on 8/25/2022 was 0.64 ng/mL, and the PSA on 11/21/2022 was 1.55 ng/mL (with a testosterone level of 14 ng/dL).  On 3/8/2023, his PSA level increased to 8.7 ng/mL.     The patient then participated in the ECLIPSE clinical trial, and he was randomized to the 177Lu-PSMA-I&T radioligand arm.  During the screening procedures he was found to have an asymptomatic pulmonary embolus, and he began apixaban. He received all 4 doses of Lee Ann-PSMA-I&T thus far, and his PSA  level dropped from 8.7 down to 0.12 ng/mL.  However, his PSA level increased to 0.79 ng/mL, and his imaging (5/7/24) showed evidence of progressive disease.  He is currently on ADT plus darolutamide, but his PSA level continued to rise.  The most recent PSA (11/1/24) was 2.48 ng/mL.  He also had recent imaging assessments (11/1/24) which showed progressive osseous and yan disease.      On 11/18/2024, his PSA level reached 2.75 ng/mL.  At that time, he began docetaxel chemotherapy, and has received three cycles.  His PSA level has declined to 0.71 ng/mL thus far.  He returns for a follow-up visit today, and to receive cycle #4 of docetaxel.      Review of Systems:  Medardo returns clinic today prior to cycle 4 of docetaxel.  He reports that he is doing relatively okay today.  He does note that he experiences fatigue with chemotherapy.  He is using rewetting eyedrops for dry eye and reports his visual acuity has seemed to decrease since starting chemotherapy.  He follows closely with the [facility] who encouraged him to continue to use eyedrops at this time.  He does note an loss of taste with chemotherapy but is continuing to eat and drink well.  His previous right hip pain has now resolved.  He denies any bone pains today.  He does notice his legs overall feel weaker and some deconditioning since starting chemotherapy. He occasionally will feel dyspneic if he over exerts himself but otherwise declines shortness of breath. No chest pain or cough. No fevers or chills. No diarrhea but does have soft stools at baseline. No urinary changes or concerns today. A comprehensive 14-point ROS is otherwise negative other than the symptoms noted above in the HPI.  His ECOG score is 0.  His pain score is 2/10.     Medications:  Lupron 22.5 mg IM q3mo (next dose, 3/3/2025)  Docetaxel 75 mg/m2, cycle #4 on 1/27/2025  Prednisone 5 mg BID  Prochlorperazine 10 mg PRN  Losartan/HCTZ 100/12.5 mg  Atorvastatin 10  mg  Cephalexin 500 mg  Allopurinol 100 mg  Zolpidem 5 mg  Clonazepam 1 mg  Sildenafil 50 mg  Loratadine 10 mg  Calcium + Vit D  Multivitamin  Folic acid     Physical Examination:    General: No acute distress  Vital signs within normal limits.  HEENT: Sclera anicteric. Oral mucosa pink, dry. No mucositis or thrush  Lymph: No lymphadenopathy in neck  Heart: Regular, rate, and rhythm  Lungs: Clear to ascultation bilaterally  Abdomen: Positive bowel sounds. Soft, non-tender. No organomegaly or mass.   Extremities: no lower extremity edema  Neuro: Cranial nerves grossly intact  Skin: no dermatitis     CT scan (11/1/2024):  This shows increased size of a sclerotic lesion in the proximal right humerus, although only partially included in the field-of-view.  Mixed findings in the retroperitoneal lymph nodes with some stable, some increased, and others decreased (Left para-aortic, 1.7 x 1.0 cm, previously 1.5 x 1.2 cm. Preaortic, 1.2 x 0.7 cm, previously 1.6 x 1.3 cm).     Laboratories (1/27/25):  CBC shows a WBC of 5.0, hemoglobin 9.0, hematocrit 25.7%, platelets 152K.  His PSA level is 0.71 ng/mL.  Creatinine is 1.46 mg/dL (GFR 47 mL/min), which is stable.       ASSESSMENT AND PLAN:   Mr. Gautam is an 85-year-old man with [place] 4+5=9 [id number] prostate cancer who underwent radical prostatectomy (8/2012) and salvage radiotherapy (10/2013) but then developed metastatic CRPC refractory to darolutamide.  He enrolled on ECLIPSE, and received all 4 doses of Lee Ann-PSMA-I&T radioligand therapy.  His disease was slowly progressing once again despite ADT plus darolutamide.  He began docetaxel chemotherapy on 11/18/2024, and will receive cycle #4 of docetaxel today.  His ECOG score is 0.  His pain score is 2/10.     #Metastatic [id number] CRPC s/p darolutamide  - Completed all 4 doses of Lee Ann-I&T on the ECLIPSE trial in 7/2023, which he tolerated well with the exception of xerostomia/xerophthalmia.   - His PSA declined from 8.7 to  0.12 ng/mL, but is now back up to 2.75 ng/mL.   - His new CT scan (11/1/24) showed a new osseous metastasis as well as growing retroperitoneal lymph nodes.  - Patient is due for his next Lupron on 3/3/2025.  He would like to obtain this locally.   - His options for new systemic therapies included docetaxel chemotherapy, abiraterone, or the AMA-280 study.  - Alternatively, radium-223 could target his bone metastases but will not treat the growing lymphadenopathy.  - We previously decided to pursue docetaxel chemotherapy, starting on 11/18/2024.  - Returns for cycle #4 of docetaxel today.  Tolerating chemotherapy relatively well thus far.  PSA down to 0.71 ng/mL.  - Labs look fine, and he is OK clinically; so it is safe to proceed with the planned docetaxel infusion today (1/27/25).  - I will probably not treat him with more than 6 chemo doses.  We may even stop after 4 doses if PSA is <0.50 ng/mL.  - He will also continue androgen deprivation therapy in the interim; next Lupron dose on 3/3/2025.  - He is considering moving to [place] to be closer to his son; that may happen in the Spring of 2025.      A total of 40 minutes were spent on this patient on the date of the encounter conducting chart review, review of test results, interpretation of tests, patient visit, documentation and discussion with other provider(s). The patient was given the opportunity to ask multiple questions today, all of which were answered to his satisfaction.    Tio Holman M.D.      Again, thank you for allowing me to participate in the care of your patient.        Sincerely,        Tio Holman MD    Electronically signed

## 2025-01-27 NOTE — NURSING NOTE
Chief Complaint   Patient presents with    Blood Draw     IV placement with blood draw by lab RN       Labs drawn via IV placed by lab RN. Vitals taken and appointment arrived.    Sydni Pandey RN

## 2025-01-27 NOTE — PATIENT INSTRUCTIONS
[facility] Triage and after hours / weekends / holidays:  [phone]    Please call the triage or after hours line if you experience a temperature greater than or equal to 100.4, shaking chills, have uncontrolled nausea, vomiting and/or diarrhea, dizziness, shortness of breath, chest pain, bleeding, unexplained bruising, or if you have any other new/concerning symptoms, questions or concerns.      If you are having any concerning symptoms or wish to speak to a provider before your next infusion visit, please call triage to notify them so we can adequately serve you.     If you need a refill on a narcotic prescription or other medication, please call before your infusion appointment.           January 2025 Sunday Monday Tuesday Wednesday Thursday Friday Saturday                  1  Happy Birthday!     2     3     4       5     6    LAB PERIPHERAL   9:45 AM   (15 min.)   [facility] DRAW   [facility]    RETURN CCSL  10:15 AM   (45 min.)   Almaz Blanca PA-C   [facility]    ONC INFUSION 1.5 HR (90 MIN)   1:30 PM   (90 min.)    ONC INFUSION NURSE   [facility] 7     8     9    LAB  10:00 AM   (15 min.)   [facility]   [facility] 10     11       12     13     14     15     16     17     18       19     20     21     22     23    OFFICE VISIT   2:10 PM   (30 min.)   Vira Flor MD   [facility]    RN VISIT   2:30 PM   (30 min.)   NL RN ANA   [facility] 24     25       26     27    LAB PERIPHERAL  10:00 AM   (15 min.)   [facility] DRAW   [facility]    RETURN CCSL  10:30 AM   (30 min.)   Tio Holman MD   [facility]    ONC INFUSION 1.5 HR (90 MIN)  11:30 AM   (90 min.)    ONC INFUSION NURSE   [facility] 28    NURSE ONLY  10:00 AM   (30  min.)   NL RN ERC   [facility] 29 30 31 February 2025 Sunday Monday Tuesday Wednesday Thursday Friday Saturday                                 1       2     3     4     5     6     7     8       9     10     11     12     13    LAB  10:45 AM   (15 min.)   [facility]   [facility] 14     15       16     17    RETURN CCSL   1:45 PM   (45 min.)   Almaz Blanca PA-C   [facility]    ONC INFUSION 1.5 HR (90 MIN)   3:00 PM   (90 min.)   DANE ONC INFUSION NURSE   [facility] 18     19     20     21     22       23     24     25     26     27     28                         Recent Results (from the past 24 hours)   Hepatic panel    Collection Time: 01/27/25 10:28 AM   Result Value Ref Range    Protein Total 6.5 6.4 - 8.3 g/dL    Albumin 3.7 3.5 - 5.2 g/dL    Bilirubin Total 0.4 <=1.2 mg/dL    Alkaline Phosphatase 84 40 - 150 U/L    AST 45 0 - 45 U/L    ALT 32 0 - 70 U/L    Bilirubin Direct <0.20 0.00 - 0.30 mg/dL   PSA tumor marker    Collection Time: 01/27/25 10:28 AM   Result Value Ref Range    PSA Tumor Marker 0.71 ng/mL   CBC with platelets and differential    Collection Time: 01/27/25 10:28 AM   Result Value Ref Range    WBC Count 5.0 4.0 - 11.0 10e3/uL    RBC Count 2.72 (L) 4.40 - 5.90 10e6/uL    Hemoglobin 9.0 (L) 13.3 - 17.7 g/dL    Hematocrit 25.7 (L) 40.0 - 53.0 %    MCV 95 78 - 100 fL    MCH 33.1 (H) 26.5 - 33.0 pg    MCHC 35.0 31.5 - 36.5 g/dL    RDW 13.2 10.0 - 15.0 %    Platelet Count 152 150 - 450 10e3/uL    % Neutrophils 75 %    % Lymphocytes 16 %    % Monocytes 8 %    % Eosinophils 0 %    % Basophils 0 %    % Immature Granulocytes 1 %    NRBCs per 100 WBC 0 <1 /100    Absolute Neutrophils 3.7 1.6 - 8.3 10e3/uL    Absolute Lymphocytes 0.8 0.8 - 5.3 10e3/uL    Absolute Monocytes 0.4 0.0 - 1.3 10e3/uL    Absolute Eosinophils 0.0 0.0 - 0.7 10e3/uL    Absolute  Basophils 0.0 0.0 - 0.2 10e3/uL    Absolute Immature Granulocytes 0.0 <=0.4 10e3/uL    Absolute NRBCs 0.0 10e3/uL

## 2025-01-27 NOTE — PROGRESS NOTES
Infusion Nursing Note:  Medardo Gautam presents today for cycle 4, day 1 docetaxel.    Patient seen by provider today: Yes: Dr Holman   present during visit today: Not Applicable.    Note: N/A.      Intravenous Access:  Peripheral IV placed in lab    Treatment Conditions:  Lab Results   Component Value Date    HGB 9.0 (L) 01/27/2025    WBC 5.0 01/27/2025    ANEU 2.6 06/21/2021    ANEUTAUTO 3.7 01/27/2025     01/27/2025        Lab Results   Component Value Date     01/09/2025    POTASSIUM 3.5 01/09/2025    MAG 1.7 08/22/2023    CR 1.46 (H) 01/09/2025    GAVIN 9.1 01/09/2025    BILITOTAL 0.4 01/27/2025    ALBUMIN 3.7 01/27/2025    ALT 32 01/27/2025    AST 45 01/27/2025       Results reviewed, labs MET treatment parameters, ok to proceed with treatment.      Post Infusion Assessment:  Patient tolerated infusion without incident.  Blood return noted pre and post infusion.  Site patent and intact, free from redness, edema or discomfort.  No evidence of extravasations.  Access discontinued per protocol.       Discharge Plan:   Prescription refills given for dexamethasone.  Discharge instructions reviewed with: Patient.  Patient and/or family verbalized understanding of discharge instructions and all questions answered.  Copy of AVS reviewed with patient and/or family.  Patient will return 2/17/25 for next appointment.  Patient discharged in stable condition accompanied by: self.  Departure Mode: Ambulatory.      Lisa Robertson RN

## 2025-01-27 NOTE — NURSING NOTE
"Oncology Rooming Note    January 27, 2025 10:41 AM   Medardo Gautam is a 85 year old male who presents for:    Chief Complaint   Patient presents with    Blood Draw     IV placement with blood draw by lab RN    Oncology Clinic Visit     Malignant neoplasm of prostate      Initial Vitals: /73   Pulse 81   Temp 97.7  F (36.5  C) (Oral)   Resp 16   Wt 80.3 kg (177 lb 1.6 oz)   SpO2 98%   BMI 28.58 kg/m   Estimated body mass index is 28.58 kg/m  as calculated from the following:    Height as of 12/11/24: 1.676 m (5' 6\").    Weight as of this encounter: 80.3 kg (177 lb 1.6 oz). Body surface area is 1.93 meters squared.  No Pain (0) Comment: Data Unavailable   No LMP for male patient.  Allergies reviewed: Yes  Medications reviewed: Yes    Medications: Medication refills not needed today.  Pharmacy name entered into [employer]:    [facility] - [place], [place] - [address] [facility] [place] [address] [facility] - [place], [place] - [address]    Frailty Screening:   Is the patient here for a new oncology consult visit in cancer care? 2. No      Clinical concerns:  Pt reports fatigue, dizziness, difficulty with vision (up close) following infusions.       Jodi Ramirez              "

## 2025-01-28 ENCOUNTER — ALLIED HEALTH/NURSE VISIT (OUTPATIENT)
Dept: FAMILY MEDICINE | Facility: OTHER | Age: 85
End: 2025-01-28
Payer: COMMERCIAL

## 2025-01-28 VITALS — SYSTOLIC BLOOD PRESSURE: 127 MMHG | HEART RATE: 64 BPM | DIASTOLIC BLOOD PRESSURE: 69 MMHG

## 2025-01-28 DIAGNOSIS — S41.101D WOUND OF RIGHT UPPER EXTREMITY, SUBSEQUENT ENCOUNTER: Primary | ICD-10-CM

## 2025-01-28 DIAGNOSIS — I10 ESSENTIAL HYPERTENSION WITH GOAL BLOOD PRESSURE LESS THAN 140/90: ICD-10-CM

## 2025-01-28 PROCEDURE — 99207 PR NO CHARGE NURSE ONLY: CPT

## 2025-01-28 RX ORDER — DEXAMETHASONE 4 MG/1
TABLET ORAL
Qty: 6 TABLET | Refills: 0 | OUTPATIENT
Start: 2025-01-28

## 2025-01-28 NOTE — PROGRESS NOTES
Medardo Gautam is a 85 year old patient who comes in today for a Blood Pressure check with a RN because of new medication and medication change.    Initial BP:  127/69  Blood pressure is not high.     Patient is taking medication as prescribed.  Patient is tolerating medications well.  Patient is monitoring Blood Pressure at home.  If yes- average readings  120-125's/70's  Current Symptoms: none     Abnormal Blood Pressure NO    Swelling in Right hand/arm improved per second review from RN that saw patient on 1/23/25. Patient educated regarding signs and symptoms of fluid retention due to blood pressure medication change. Patient verbalizes understanding.     Patient also here for wound check. Reports that he removed the dressing last night overnight as it was severely itching. Wound appears clean and dry, appears to be healing. Per second RN check (saw patient and wound on 1/23/25), wound appears improved. Huddled with provider and okay to continue homecare. May apply aquaphor/vaseline as needing, but may remain open to air. Advised of signs and symptoms of infection and when to call provider, verbalizes understanding.     Anju Rosa, RN, BSN

## 2025-02-09 ENCOUNTER — MYC MEDICAL ADVICE (OUTPATIENT)
Dept: FAMILY MEDICINE | Facility: OTHER | Age: 85
End: 2025-02-09
Payer: COMMERCIAL

## 2025-02-10 NOTE — TELEPHONE ENCOUNTER
RN called to Triage patient for his [employer] message reporting back pain. Patient currently being seen in Urgent Care at this time for his back pain. No further triage needed at this time.    Patient was told to schedule a follow up appointment with PCP, RN assisted with scheduling patient in clinic this week for follow up    Will close this encounter.    Jodie Esqueda RN on 2/10/2025 at 11:45 AM

## 2025-02-12 ENCOUNTER — OFFICE VISIT (OUTPATIENT)
Dept: FAMILY MEDICINE | Facility: OTHER | Age: 85
End: 2025-02-12
Payer: COMMERCIAL

## 2025-02-12 VITALS
SYSTOLIC BLOOD PRESSURE: 137 MMHG | TEMPERATURE: 97 F | OXYGEN SATURATION: 100 % | BODY MASS INDEX: 28.61 KG/M2 | DIASTOLIC BLOOD PRESSURE: 82 MMHG | HEART RATE: 67 BPM | WEIGHT: 178 LBS | HEIGHT: 66 IN | RESPIRATION RATE: 19 BRPM

## 2025-02-12 DIAGNOSIS — M54.50 ACUTE LEFT-SIDED LOW BACK PAIN WITHOUT SCIATICA: Primary | ICD-10-CM

## 2025-02-12 PROCEDURE — 99213 OFFICE O/P EST LOW 20 MIN: CPT | Performed by: FAMILY MEDICINE

## 2025-02-12 RX ORDER — TRAMADOL HYDROCHLORIDE 50 MG/1
50 TABLET ORAL EVERY 6 HOURS PRN
Qty: 10 TABLET | Refills: 0 | Status: SHIPPED | OUTPATIENT
Start: 2025-02-12 | End: 2025-02-15

## 2025-02-12 RX ORDER — LIDOCAINE 50 MG/G
1 PATCH TOPICAL
COMMUNITY
Start: 2025-02-10

## 2025-02-12 RX ORDER — GABAPENTIN 100 MG/1
100 CAPSULE ORAL 3 TIMES DAILY
Qty: 90 CAPSULE | Refills: 0 | Status: SHIPPED | OUTPATIENT
Start: 2025-02-12

## 2025-02-12 ASSESSMENT — ENCOUNTER SYMPTOMS: BACK PAIN: 1

## 2025-02-12 ASSESSMENT — PAIN SCALES - GENERAL: PAINLEVEL_OUTOF10: SEVERE PAIN (7)

## 2025-02-12 NOTE — PROGRESS NOTES
Assessment & Plan     Acute left-sided low back pain without sciatica  He is on chronic steroids and therefore will not add a steroid burst.  Will refer to physical therapy.  He will continue on his lidocaine patches.  Will add gabapentin for pain control.  He asked for something stronger and we will give him a few pills of tramadol but cautioned him not to take this with his clonazepam.    - Physical Therapy  Referral; Future  - gabapentin (NEURONTIN) 100 MG capsule; Take 1 capsule (100 mg) by mouth 3 times daily.  - traMADol (ULTRAM) 50 MG tablet; Take 1 tablet (50 mg) by mouth every 6 hours as needed for severe pain.      Subjective   Medardo is a 85 year old, presenting for the following health issues:  Urgent Care (Follow up) and Back Pain        2/12/2025    10:13 AM   Additional Questions   Roomed by urban   Accompanied by self     Back Pain     History of Present Illness       Back Pain:  He presents for follow up of back pain. Patient's back pain is a new problem.    Original cause of back pain: not sure  First noticed back pain: in the last week  Patient feels back pain: constantlyLocation of back pain:  Right lower back, left lower back and left hip  Description of back pain: sharp  Back pain spreads: nowhere    Since patient first noticed back pain, pain is: unchanged  Does back pain interfere with his job:  Not applicable  On a scale of 1-10 (10 being the worst), patient describes pain as:  10  What makes back pain worse: other   Acupuncture: not tried  Acetaminophen: helpful  Activity or exercise: not tried  Chiropractor:  Not tried  Cold: not tried  Heat: not tried  Massage: not tried  Muscle relaxants: not tried  NSAIDS: helpful  Opioids: not tried  Rest: not tried  Steroid Injection: not tried  Stretching: not tried  Surgery: not tried  TENS unit: not tried  Topical pain relievers: not helpful  Other healthcare providers patient is seeing for back pain: None   He is taking medications  "regularly.       ED/UC Followup: Low back     Facility:  [facility]   Date of visit: 02/10/2025  Reason for visit: Low Back   Current Status: Back is better/Left Side worse     Patient has a longstanding history of back pain but had it flareup a few days ago.  He went to urgent care.  X-ray revealed chronic endplate compression fracture of L2 which was stable, 3 mm anterolisthesis at L4-L5 and end-stage disc degeneration at L3-4 and L4-5.  He was given lidocaine patches which she states has helped a little bit.  He also states that now he feels that the pain is more into his left side.  Pain is worse with movement and walking.  He describes the pain as a intense and burning sensation.  He denies pain radiating into his leg.  He denies any change in his urination status.  He denies blood in his urine.  He denies nausea or abdominal pain.          Objective    /82   Pulse 67   Temp 97  F (36.1  C) (Temporal)   Resp 19   Ht 1.675 m (5' 5.95\")   Wt 80.7 kg (178 lb)   SpO2 100%   BMI 28.78 kg/m    Body mass index is 28.78 kg/m .  Physical Exam   Gen: no apparent distress  Back: No spinous process tenderness, no paralumbar tenderness.  Strength is 5 out of 5 bilateral lower extremities.  Sensation grossly intact.  Getting out of the chair though appears to be painful for him.          Signed Electronically by: Vira Flor MD    "

## 2025-02-13 ENCOUNTER — LAB (OUTPATIENT)
Dept: LAB | Facility: OTHER | Age: 85
End: 2025-02-13
Payer: COMMERCIAL

## 2025-02-13 DIAGNOSIS — C61 MALIGNANT NEOPLASM OF PROSTATE (H): ICD-10-CM

## 2025-02-13 LAB
ALBUMIN SERPL BCG-MCNC: 3.8 G/DL (ref 3.5–5.2)
ALP SERPL-CCNC: 81 U/L (ref 40–150)
ALT SERPL W P-5'-P-CCNC: 21 U/L (ref 0–70)
ANION GAP SERPL CALCULATED.3IONS-SCNC: 12 MMOL/L (ref 7–15)
AST SERPL W P-5'-P-CCNC: 29 U/L (ref 0–45)
BASOPHILS # BLD AUTO: 0 10E3/UL (ref 0–0.2)
BASOPHILS NFR BLD AUTO: 0 %
BILIRUB SERPL-MCNC: 0.4 MG/DL
BUN SERPL-MCNC: 13.2 MG/DL (ref 8–23)
CALCIUM SERPL-MCNC: 9.3 MG/DL (ref 8.8–10.4)
CHLORIDE SERPL-SCNC: 104 MMOL/L (ref 98–107)
CREAT SERPL-MCNC: 1.21 MG/DL (ref 0.67–1.17)
EGFRCR SERPLBLD CKD-EPI 2021: 59 ML/MIN/1.73M2
EOSINOPHIL # BLD AUTO: 0 10E3/UL (ref 0–0.7)
EOSINOPHIL NFR BLD AUTO: 0 %
ERYTHROCYTE [DISTWIDTH] IN BLOOD BY AUTOMATED COUNT: 13.9 % (ref 10–15)
GLUCOSE SERPL-MCNC: 119 MG/DL (ref 70–99)
HCO3 SERPL-SCNC: 25 MMOL/L (ref 22–29)
HCT VFR BLD AUTO: 25.3 % (ref 40–53)
HGB BLD-MCNC: 8.6 G/DL (ref 13.3–17.7)
IMM GRANULOCYTES # BLD: 0.1 10E3/UL
IMM GRANULOCYTES NFR BLD: 1 %
LYMPHOCYTES # BLD AUTO: 1.2 10E3/UL (ref 0.8–5.3)
LYMPHOCYTES NFR BLD AUTO: 21 %
MCH RBC QN AUTO: 32.8 PG (ref 26.5–33)
MCHC RBC AUTO-ENTMCNC: 34 G/DL (ref 31.5–36.5)
MCV RBC AUTO: 97 FL (ref 78–100)
MONOCYTES # BLD AUTO: 0.5 10E3/UL (ref 0–1.3)
MONOCYTES NFR BLD AUTO: 8 %
NEUTROPHILS # BLD AUTO: 3.8 10E3/UL (ref 1.6–8.3)
NEUTROPHILS NFR BLD AUTO: 69 %
PLATELET # BLD AUTO: 131 10E3/UL (ref 150–450)
POTASSIUM SERPL-SCNC: 3.2 MMOL/L (ref 3.4–5.3)
PROT SERPL-MCNC: 6.2 G/DL (ref 6.4–8.3)
PSA SERPL DL<=0.01 NG/ML-MCNC: 0.41 NG/ML
RBC # BLD AUTO: 2.62 10E6/UL (ref 4.4–5.9)
SODIUM SERPL-SCNC: 141 MMOL/L (ref 135–145)
WBC # BLD AUTO: 5.5 10E3/UL (ref 4–11)

## 2025-02-14 NOTE — PROGRESS NOTES
Feb 17, 2025    FOLLOW UP VISIT      Reason for visit:  Metastatic FT42-fnmowmb CRPC s/p darolutamide and 177Lu-PSMA-I&T.  Cycle #5 of docetaxel today, 2/17/25.     History of Present Illness:  Mr. Gautam is an 85-year-old man who was diagnosed with prostate cancer in 2012.  His PSA level was 5.2 ng/mL.  Clinical stage was cT1c disease.  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  His next-generation DNA sequencing analysis (CARIS) revealed a pathogenic TP53 mutation, SHIRA genotype, TMB 5 muts/Mb, and absent PD-L1 expression.  He subsequently received salvage radiotherapy, which was completed in 10/2013, concurrently with six months of androgen deprivation therapy.     He subsequently developed a biochemical recurrence, and received ADT from 2014 until 2020.  In 11/2020, he developed non-metastatic CRPC.  Darolutamide was added on 12/4/2020, to which he achieved a subsequent PSA response.  His PSA level initially dropped from 1.66 ng/mL down to a speedy of 0.07 ng/mL (6/21/2021).  However, the patient then developed a rising PSA level over the past year.  His PSA on 1/13/2022 was 0.16, the PSA on 4/20/2022 was 0.24, the PSA on 6/13/2022 was 0.34, the PSA on 7/12/2022 was 0.47, the PSA on 8/25/2022 was 0.64 ng/mL, and the PSA on 11/21/2022 was 1.55 ng/mL (with a testosterone level of 14 ng/dL).  On 3/8/2023, his PSA level increased to 8.7 ng/mL.     The patient then participated in the ECLIPSE clinical trial, and he was randomized to the 177Lu-PSMA-I&T radioligand arm.  During the screening procedures he was found to have an asymptomatic pulmonary embolus, and he began apixaban. He received all 4 doses of [place] Ann-PSMA-I&T thus far, and his PSA level dropped from 8.7 down to 0.12 ng/mL.  However, his PSA level increased to 0.79 ng/mL, and his imaging (5/7/24) showed evidence of progressive disease.  He is currently on ADT plus darolutamide, but his PSA level  "continued to rise.  The most recent PSA (11/1/24) was 2.48 ng/mL.  He also had recent imaging assessments (11/1/24) which showed progressive osseous and yan disease.      On 11/18/2024, his PSA level reached 2.75 ng/mL.  At that time, he began docetaxel chemotherapy, and has received three cycles.  His PSA level has declined to 0.41 ng/mL thus far.  He returns for a follow-up visit today, and to receive cycle #5 of docetaxel.      Review of Systems:    Medardo returns clinic today prior to cycle 5 of docetaxel.  Patient is accompanied by his son.  He reports that he is doing okay today but is dealing with acute low back pain 2-5-2025.  He presented to urgent care on 2- and had x-rays performed.  He then followed up with his primary care provider in 2- for it.  He has been trying gabapentin but found that this made him too sedated and was not helpful who has now discontinued this.  He has also tried Tylenol with significant improvement in topical lidocaine patches.  He finds light ibuprofen has been the most helpful in reducing his pain but he has been trying to use this sparingly.  He is set up to begin physical therapy next week.  His pain does radiate towards the left hip and down the left leg.  He is not noticing new leg weakness.  No saddle paresthesias.  No bowel or bladder changes.  He does have baseline loose bowels after chemotherapy but does not require the use of Imodium.  No significant nausea or vomiting.  No chest pain, shortness of breath, or cough.  No fevers or chills.  He is noting some increased dyspnea on exertion going up the stairs but is able to climb the stairs without rest.  He does not have any shortness of breath at rest he denies any other acute symptoms.  His neuropathy is present with numbness in his fingertips feels like the bottom of his feet are \"tight\".  He denies any falls.    A comprehensive 14-point ROS is otherwise negative other than the symptoms noted above in " the HPI.  His ECOG score is 0.  His pain score is 2-3/10 (was 10/10 prior ibuprofen)     Medications:  Lupron 22.5 mg IM q3mo (next dose, 3/3/2025)  Docetaxel 75 mg/m2, cycle #4 on 1/27/2025  Prednisone 5 mg BID  Prochlorperazine 10 mg PRN  Losartan/HCTZ 100/12.5 mg  Atorvastatin 10 mg  Cephalexin 500 mg  Allopurinol 100 mg  Zolpidem 5 mg  Clonazepam 1 mg  Sildenafil 50 mg  Loratadine 10 mg  Calcium + Vit D  Multivitamin  Folic acid     Physical Examination:    BP (!) 182/75 (BP Location: Right arm, Patient Position: Sitting, Cuff Size: Adult Regular)   Pulse 60   Temp 97.8  F (36.6  C) (Oral)   Resp 16   Wt 83 kg (182 lb 14.4 oz)   SpO2 99%   BMI 29.57 kg/m    General: No acute distress  Vital signs within normal limits.  HEENT: Sclera anicteric. Oral mucosa pink, dry. No mucositis or thrush  Lymph: No lymphadenopathy in neck  Heart: Regular, rate, and rhythm  Lungs: Clear to ascultation bilaterally  Abdomen: Positive bowel sounds. Soft, non-tender. No organomegaly or mass.   Extremities: no lower extremity edema  MSK: No tenderness upon palpation to the spine and bilateral hips/thighs. Strength equal bilaterally.   Neuro: Cranial nerves grossly intact  Skin: no dermatitis     CT scan (11/1/2024):  This shows increased size of a sclerotic lesion in the proximal right humerus, although only partially included in the field-of-view.  Mixed findings in the retroperitoneal lymph nodes with some stable, some increased, and others decreased (Left para-aortic, 1.7 x 1.0 cm, previously 1.5 x 1.2 cm. Preaortic, 1.2 x 0.7 cm, previously 1.6 x 1.3 cm).     Laboratories:  Most Recent 3 CBC's:  Recent Labs   Lab Test 02/13/25  1008 01/27/25  1028 01/09/25  1014   WBC 5.5 5.0 4.3   HGB 8.6* 9.0* 9.6*   MCV 97 95 97   * 152 160   ANEUTAUTO 3.8 3.7 3.3     Most Recent 3 BMP's:  Recent Labs   Lab Test 02/13/25  1008 01/27/25  1028 01/09/25  1014 01/06/25  1008     --  137 136   POTASSIUM 3.2*  --  3.5 2.7*    CHLORIDE 104  --  99 98   CO2 25  --  25 24   BUN 13.2  --  45.9* 20.4   CR 1.21*  --  1.46* 1.36*   ANIONGAP 12  --  13 14   GAVIN 9.3  --  9.1 9.3   *  --  90 143*   PROTTOTAL 6.2* 6.5 6.7 6.7   ALBUMIN 3.8 3.7 4.1 3.8    Most Recent 3 LFT's:  Recent Labs   Lab Test 02/13/25  1008 01/27/25  1028 01/09/25  1014   AST 29 45 54*   ALT 21 32 38   ALKPHOS 81 84 76   BILITOTAL 0.4 0.4 0.9    Most Recent 2 TSH and T4:  Recent Labs   Lab Test 08/22/23  0952 06/28/23  1030   TSH 1.62 1.59     PSA   Date Value Ref Range Status   06/21/2021 0.07 0 - 4 ug/L Final     Comment:     Assay Method:  Chemiluminescence using Siemens Vista analyzer     PSA Tumor Marker   Date Value Ref Range Status   02/13/2025 0.41 ng/mL Final     Comment:     No reference ranges have been established for patients over 80 years.   12/20/2022 2.39 0.00 - 4.00 ug/L Final       I reviewed the above labs today.       ASSESSMENT AND PLAN:   Mr. Gautam is an 85 year old man with [place] 4+5=9 EB23-xfagrxm prostate cancer who underwent radical prostatectomy (8/2012) and salvage radiotherapy (10/2013) but then developed metastatic CRPC refractory to darolutamide.  He enrolled on ECLIPSE, and received all 4 doses of Lee Ann-PSMA-I&T radioligand therapy.  His disease was slowly progressing once again despite ADT plus darolutamide.  He began docetaxel chemotherapy on 11/18/2024, and will receive cycle #5 of docetaxel today, 2/17/25.  His ECOG score is 0.  His pain score is 2-3/10.     #Metastatic [id number] CRPC s/p darolutamide  - Completed all 4 doses of Lee Ann-I&T on the ECLIPSE trial in 7/2023, which he tolerated well with the exception of xerostomia/xerophthalmia.   - His PSA declined from 8.7 to 0.12 ng/mL, but is now back up to 2.75 ng/mL.   - His new CT scan (11/1/24) showed a new osseous metastasis as well as growing retroperitoneal lymph nodes.  - Patient is due for his next Lupron on 3/3/2025.  He is scheduled for this locally at [place].    - His  options for new systemic therapies included docetaxel chemotherapy, abiraterone, or the AMA-280 study.  - Alternatively, radium-223 could target his bone metastases but will not treat the growing lymphadenopathy.    - Opted to pursue docetaxel chemotherapy, starting on 11/18/2024.  - Returns for cycle #5 of docetaxel today.  Tolerating chemotherapy relatively well thus far- he does have grade 1 neuropathy with numbness in his fingertips/tightness in his feet.  PSA down to 0.41 ng/mL.  - Labs with ongoing, anemia slowly down trending overtime and mild thrombocytopenia. Will plan to proceed with cycle 5 docetaxel 2/17/25 but schedule for labs with a type and screen in ~1 week locally with 1 unit of pRBC at that time as his counts will likely continue to drop and he is already mildly symptomatic with a hemoglobin of 8.6. Blood consent reviewed and signed 2/17/25.  - Discussed with Medardo and his son that dose 5 will most likely be his last dose. We will likely not treat him to 6 doses as PSA is <0.50 ng/mL.    - He will also continue androgen deprivation therapy in the interim; next Lupron dose on 3/3/2025.  - He is considering moving to [place] to be closer to his son; that may happen in the Spring of 2025. Not specifically re discussed today.      #Acute low back pain   - suspect this is musculoskeletal in nature. Reviewed XR from 2/10 without acute concerns but does demonstrate age related changes. He is following with his PCP for management and did not find gabapentin or tramadol overly helpful. He finds ibuprofen to be the most beneficial suggesting there may be an inflammatory component. We will start prednisone 40 mg x 5 days, 30 mg x 5 days, 20 mg x 5, then resume 5 mg daily as ibuprofen not ideal with chemotherapy and bleeding risk.    - OK start PT as suggested by PCP.     #Hypokalemia  - intermittent, continue PO supplementation and increase dietary K+. Will recheck in ~1 week when Medardo returns for blood.       66 minutes spent on the date of the encounter doing chart review, review of test results, interpretation of tests, patient visit, and documentation     Almaz Blanca PA-C

## 2025-02-15 LAB — TESTOST SERPL-MCNC: 5 NG/DL (ref 240–950)

## 2025-02-17 ENCOUNTER — INFUSION THERAPY VISIT (OUTPATIENT)
Dept: ONCOLOGY | Facility: CLINIC | Age: 85
End: 2025-02-17
Payer: COMMERCIAL

## 2025-02-17 ENCOUNTER — ONCOLOGY VISIT (OUTPATIENT)
Dept: ONCOLOGY | Facility: CLINIC | Age: 85
End: 2025-02-17
Payer: COMMERCIAL

## 2025-02-17 ENCOUNTER — PATIENT OUTREACH (OUTPATIENT)
Dept: CARE COORDINATION | Facility: CLINIC | Age: 85
End: 2025-02-17
Payer: COMMERCIAL

## 2025-02-17 VITALS
DIASTOLIC BLOOD PRESSURE: 75 MMHG | BODY MASS INDEX: 29.57 KG/M2 | TEMPERATURE: 97.8 F | HEART RATE: 60 BPM | RESPIRATION RATE: 16 BRPM | OXYGEN SATURATION: 99 % | SYSTOLIC BLOOD PRESSURE: 182 MMHG | WEIGHT: 182.9 LBS

## 2025-02-17 DIAGNOSIS — E87.6 HYPOKALEMIA: ICD-10-CM

## 2025-02-17 DIAGNOSIS — C61 PROSTATE CANCER (H): ICD-10-CM

## 2025-02-17 DIAGNOSIS — C18.2 MALIGNANT NEOPLASM OF ASCENDING COLON (H): ICD-10-CM

## 2025-02-17 DIAGNOSIS — C79.51 PROSTATE CANCER METASTATIC TO BONE (H): ICD-10-CM

## 2025-02-17 DIAGNOSIS — C61 MALIGNANT NEOPLASM OF PROSTATE (H): Primary | ICD-10-CM

## 2025-02-17 DIAGNOSIS — M54.42 ACUTE BILATERAL LOW BACK PAIN WITH LEFT-SIDED SCIATICA: ICD-10-CM

## 2025-02-17 DIAGNOSIS — C61 PROSTATE CANCER METASTATIC TO BONE (H): ICD-10-CM

## 2025-02-17 PROCEDURE — 250N000011 HC RX IP 250 OP 636: Performed by: INTERNAL MEDICINE

## 2025-02-17 PROCEDURE — 99215 OFFICE O/P EST HI 40 MIN: CPT

## 2025-02-17 PROCEDURE — 96413 CHEMO IV INFUSION 1 HR: CPT

## 2025-02-17 PROCEDURE — 258N000003 HC RX IP 258 OP 636: Performed by: INTERNAL MEDICINE

## 2025-02-17 PROCEDURE — 96375 TX/PRO/DX INJ NEW DRUG ADDON: CPT

## 2025-02-17 PROCEDURE — G0463 HOSPITAL OUTPT CLINIC VISIT: HCPCS

## 2025-02-17 PROCEDURE — 99417 PROLNG OP E/M EACH 15 MIN: CPT

## 2025-02-17 RX ORDER — PREDNISONE 5 MG/1
5 TABLET ORAL DAILY
Qty: 21 TABLET | Refills: 0 | Status: SHIPPED | OUTPATIENT
Start: 2025-02-17 | End: 2025-03-10

## 2025-02-17 RX ORDER — HEPARIN SODIUM (PORCINE) LOCK FLUSH IV SOLN 100 UNIT/ML 100 UNIT/ML
5 SOLUTION INTRAVENOUS
OUTPATIENT
Start: 2025-02-17

## 2025-02-17 RX ORDER — PREDNISONE 10 MG/1
TABLET ORAL
Qty: 45 TABLET | Refills: 0 | Status: SHIPPED | OUTPATIENT
Start: 2025-02-17 | End: 2025-03-04

## 2025-02-17 RX ORDER — ONDANSETRON 2 MG/ML
8 INJECTION INTRAMUSCULAR; INTRAVENOUS ONCE
Status: COMPLETED | OUTPATIENT
Start: 2025-02-17 | End: 2025-02-17

## 2025-02-17 RX ORDER — HEPARIN SODIUM,PORCINE 10 UNIT/ML
5-20 VIAL (ML) INTRAVENOUS DAILY PRN
OUTPATIENT
Start: 2025-02-17

## 2025-02-17 RX ORDER — DIPHENHYDRAMINE HYDROCHLORIDE 50 MG/ML
50 INJECTION INTRAMUSCULAR; INTRAVENOUS
Start: 2025-02-17

## 2025-02-17 RX ORDER — DEXAMETHASONE SODIUM PHOSPHATE 4 MG/ML
12 INJECTION, SOLUTION INTRA-ARTICULAR; INTRALESIONAL; INTRAMUSCULAR; INTRAVENOUS; SOFT TISSUE ONCE
Status: COMPLETED | OUTPATIENT
Start: 2025-02-17 | End: 2025-02-17

## 2025-02-17 RX ORDER — EPINEPHRINE 1 MG/ML
0.3 INJECTION, SOLUTION INTRAMUSCULAR; SUBCUTANEOUS EVERY 5 MIN PRN
OUTPATIENT
Start: 2025-02-17

## 2025-02-17 RX ADMIN — ONDANSETRON 8 MG: 2 INJECTION INTRAMUSCULAR; INTRAVENOUS at 15:20

## 2025-02-17 RX ADMIN — DOCETAXEL 120 MG: 20 INJECTION, SOLUTION, CONCENTRATE INTRAVENOUS at 15:30

## 2025-02-17 RX ADMIN — DEXAMETHASONE SODIUM PHOSPHATE 12 MG: 4 INJECTION, SOLUTION INTRA-ARTICULAR; INTRALESIONAL; INTRAMUSCULAR; INTRAVENOUS; SOFT TISSUE at 15:20

## 2025-02-17 ASSESSMENT — PAIN SCALES - GENERAL: PAINLEVEL_OUTOF10: SEVERE PAIN (10)

## 2025-02-17 NOTE — PATIENT INSTRUCTIONS
[facility] Triage and after hours / weekends / holidays:  [phone] option 5, option 2    Please call the triage or after hours line if you experience a temperature greater than or equal to 100.4, shaking chills, have uncontrolled nausea, vomiting and/or diarrhea, dizziness, shortness of breath, chest pain, bleeding, unexplained bruising, or if you have any other new/concerning symptoms, questions or concerns.      If you are having any concerning symptoms or wish to speak to a provider before your next infusion visit, please call triage to notify your care team so we can adequately serve you.     If you need a refill on a narcotic prescription or other medication, please call before your infusion appointment.

## 2025-02-17 NOTE — NURSING NOTE
"Oncology Rooming Note    February 17, 2025 1:40 PM   Medardo Gautam is a 85 year old male who presents for:    Chief Complaint   Patient presents with    Oncology Clinic Visit     Prostate cancer     Initial Vitals: BP (!) 182/75 (BP Location: Right arm, Patient Position: Sitting, Cuff Size: Adult Regular)   Pulse 60   Temp 97.8  F (36.6  C) (Oral)   Resp 16   Wt 83 kg (182 lb 14.4 oz)   SpO2 99%   BMI 29.57 kg/m   Estimated body mass index is 29.57 kg/m  as calculated from the following:    Height as of 2/12/25: 1.675 m (5' 5.95\").    Weight as of this encounter: 83 kg (182 lb 14.4 oz). Body surface area is 1.97 meters squared.  Severe Pain (10) Comment: at times   No LMP for male patient.  Allergies reviewed: Yes  Medications reviewed: Yes    Medications: Medication refills not needed today.  Pharmacy name entered into [facility]:    [facility] [place] [place], [place] - [address] [facility] [place] [address] [facility] [place], [place] [address] [facility] [place], [place] - [address] [facility] [phone]    Frailty Screening:   Is the patient here for a new oncology consult visit in cancer care? 2. No    PHQ9:  Did this patient require a PHQ9?: No      Clinical concerns: none      Eunice Diaz              "

## 2025-02-17 NOTE — PROGRESS NOTES
Infusion Nursing Note:  Medardo Gautam presents today for C5D1 Taxotere.    Patient seen by provider today: Yes: Almaz Blanca PA-C   present during visit today: Not Applicable.    Note: No questions or concerns following provider visit.    Per written communication with Almaz Blanca PA-C/Sera Cartagena RN 02/17/25 @ 1459  - No labs today but we are going to plan for a blood transfusion next week in [place] as he will be in his speedy and likely need pRBC  - will also recheck his K+ at that time and he is going to increase dietary K+ in the mean time     Intravenous Access:  Peripheral IV placed.    Treatment Conditions:   Latest Reference Range & Units 02/13/25 10:08   Sodium 135 - 145 mmol/L 141   Potassium 3.4 - 5.3 mmol/L 3.2 (L)   Chloride 98 - 107 mmol/L 104   Carbon Dioxide (CO2) 22 - 29 mmol/L 25   Urea Nitrogen 8.0 - 23.0 mg/dL 13.2   Creatinine 0.67 - 1.17 mg/dL 1.21 (H)   GFR Estimate >60 mL/min/1.73m2 59 (L)   Calcium 8.8 - 10.4 mg/dL 9.3   Anion Gap 7 - 15 mmol/L 12   Albumin 3.5 - 5.2 g/dL 3.8   Protein Total 6.4 - 8.3 g/dL 6.2 (L)   Alkaline Phosphatase 40 - 150 U/L 81   ALT 0 - 70 U/L 21   AST 0 - 45 U/L 29   Bilirubin Total <=1.2 mg/dL 0.4   Glucose 70 - 99 mg/dL 119 (H)   Testosterone Total 240 - 950 ng/dL 5 (L)   WBC 4.0 - 11.0 10e3/uL 5.5   Hemoglobin 13.3 - 17.7 g/dL 8.6 (L)   Hematocrit 40.0 - 53.0 % 25.3 (L)   Platelet Count 150 - 450 10e3/uL 131 (L)   RBC Count 4.40 - 5.90 10e6/uL 2.62 (L)   MCV 78 - 100 fL 97   MCH 26.5 - 33.0 pg 32.8   MCHC 31.5 - 36.5 g/dL 34.0   RDW 10.0 - 15.0 % 13.9   % Neutrophils % 69   % Lymphocytes % 21   % Monocytes % 8   % Eosinophils % 0   % Basophils % 0   Absolute Basophils 0.0 - 0.2 10e3/uL 0.0   Absolute Eosinophils 0.0 - 0.7 10e3/uL 0.0   Absolute Immature Granulocytes <=0.4 10e3/uL 0.1   Absolute Lymphocytes 0.8 - 5.3 10e3/uL 1.2   Absolute Monocytes 0.0 - 1.3 10e3/uL 0.5   % Immature Granulocytes % 1   Absolute Neutrophils 1.6 - 8.3 10e3/uL 3.8      Results reviewed, labs MET treatment parameters, ok to proceed with treatment.      Post Infusion Assessment:  Patient tolerated infusion without incident.  Blood return noted pre and post infusion.  Site patent and intact, free from redness, edema or discomfort.  No evidence of extravasations.  Access discontinued per protocol.       Discharge Plan:     Discharge instructions reviewed with: Patient and Family.  Patient and/or family verbalized understanding of discharge instructions and all questions answered.  AVS to patient via [employer].  Patient will return 3/10 for next appointment.   Patient discharged in stable condition accompanied by: self and son.  Departure Mode: Ambulatory.      Adriana Cartagena RN

## 2025-02-17 NOTE — Clinical Note
2/17/2025      Medardo Gautam  [address] [facility] [id number]      Dear Colleague,    Thank you for referring your patient, Medardo Gautam, to the [facility]. Please see a copy of my visit note below.    Feb 17, 2025    FOLLOW UP VISIT      Reason for visit:  Metastatic AY04-vvevlaf CRPC s/p darolutamide and 177Lu-PSMA-I&T.  Cycle #5 of docetaxel today, 2/17/25.     History of Present Illness:  Mr. Gautam is an 85-year-old man who was diagnosed with prostate cancer in 2012.  His PSA level was 5.2 ng/mL.  Clinical stage was cT1c disease.  He underwent radical prostatectomy on 8/6/2012, which revealed [place] 4+5=9 carcinoma, stage pT2c N0 R0.  His next-generation DNA sequencing analysis (CARIS) revealed a pathogenic TP53 mutation, SHIRA genotype, TMB 5 muts/Mb, and absent PD-L1 expression.  He subsequently received salvage radiotherapy, which was completed in 10/2013, concurrently with six months of androgen deprivation therapy.     He subsequently developed a biochemical recurrence, and received ADT from 2014 until 2020.  In 11/2020, he developed non-metastatic CRPC.  Darolutamide was added on 12/4/2020, to which he achieved a subsequent PSA response.  His PSA level initially dropped from 1.66 ng/mL down to a speedy of 0.07 ng/mL (6/21/2021).  However, the patient then developed a rising PSA level over the past year.  His PSA on 1/13/2022 was 0.16, the PSA on 4/20/2022 was 0.24, the PSA on 6/13/2022 was 0.34, the PSA on 7/12/2022 was 0.47, the PSA on 8/25/2022 was 0.64 ng/mL, and the PSA on 11/21/2022 was 1.55 ng/mL (with a testosterone level of 14 ng/dL).  On 3/8/2023, his PSA level increased to 8.7 ng/mL.     The patient then participated in the ECLIPSE clinical trial, and he was randomized to the 177Lu-PSMA-I&T radioligand arm.  During the screening procedures he was found to have an asymptomatic pulmonary embolus, and he began apixaban. He received all 4 doses of  [place]-PSMA-I&T thus far, and his PSA level dropped from 8.7 down to 0.12 ng/mL.  However, his PSA level increased to 0.79 ng/mL, and his imaging (5/7/24) showed evidence of progressive disease.  He is currently on ADT plus darolutamide, but his PSA level continued to rise.  The most recent PSA (11/1/24) was 2.48 ng/mL.  He also had recent imaging assessments (11/1/24) which showed progressive osseous and yan disease.      On 11/18/2024, his PSA level reached 2.75 ng/mL.  At that time, he began docetaxel chemotherapy, and has received three cycles.  His PSA level has declined to 0.41 ng/mL thus far.  He returns for a follow-up visit today, and to receive cycle #5 of docetaxel.      Review of Systems:    Medardo returns clinic today prior to cycle 5 of docetaxel.  Patient is accompanied by his son.  He reports that he is doing okay today but is dealing with acute low back pain 2-5-2025.  He presented to urgent care on 2- and had x-rays performed.  He then followed up with his primary care provider in 2- for it.  He has been trying gabapentin but found that this made him too sedated and was not helpful who has now discontinued this.  He has also tried Tylenol with significant improvement in topical lidocaine patches.  He finds light ibuprofen has been the most helpful in reducing his pain but he has been trying to use this sparingly.  He is set up to begin physical therapy next week.  His pain does radiate towards the left hip and down the left leg.  He is not noticing new leg weakness.  No saddle paresthesias.  No bowel or bladder changes.  He does have baseline loose bowels after chemotherapy but does not require the use of Imodium.  No significant nausea or vomiting.  No chest pain, shortness of breath, or cough.  No fevers or chills.  He is noting some increased dyspnea on exertion going up the stairs but is able to climb the stairs without rest.  He does not have any shortness of breath at rest he  "denies any other acute symptoms.  His neuropathy is present with numbness in his fingertips feels like the bottom of his feet are \"tight\".  He denies any falls.    A comprehensive 14-point ROS is otherwise negative other than the symptoms noted above in the HPI.  His ECOG score is 0.  His pain score is 10/10.     Medications:  Lupron 22.5 mg IM q3mo (next dose, 3/3/2025)  Docetaxel 75 mg/m2, cycle #4 on 1/27/2025  Prednisone 5 mg BID  Prochlorperazine 10 mg PRN  Losartan/HCTZ 100/12.5 mg  Atorvastatin 10 mg  Cephalexin 500 mg  Allopurinol 100 mg  Zolpidem 5 mg  Clonazepam 1 mg  Sildenafil 50 mg  Loratadine 10 mg  Calcium + Vit D  Multivitamin  Folic acid     Physical Examination:    BP (!) 182/75 (BP Location: Right arm, Patient Position: Sitting, Cuff Size: Adult Regular)   Pulse 60   Temp 97.8  F (36.6  C) (Oral)   Resp 16   Wt 83 kg (182 lb 14.4 oz)   SpO2 99%   BMI 29.57 kg/m    General: No acute distress  Vital signs within normal limits.  HEENT: Sclera anicteric. Oral mucosa pink, dry. No mucositis or thrush  Lymph: No lymphadenopathy in neck  Heart: Regular, rate, and rhythm  Lungs: Clear to ascultation bilaterally  Abdomen: Positive bowel sounds. Soft, non-tender. No organomegaly or mass.   Extremities: no lower extremity edema  MSK: No tenderness upon palpation to the spine and bilateral hips/thighs. Strength equal bilaterally.   Neuro: Cranial nerves grossly intact  Skin: no dermatitis     CT scan (11/1/2024):  This shows increased size of a sclerotic lesion in the proximal right humerus, although only partially included in the field-of-view.  Mixed findings in the retroperitoneal lymph nodes with some stable, some increased, and others decreased (Left para-aortic, 1.7 x 1.0 cm, previously 1.5 x 1.2 cm. Preaortic, 1.2 x 0.7 cm, previously 1.6 x 1.3 cm).     Laboratories:  Most Recent 3 CBC's:  Recent Labs   Lab Test 02/13/25  1008 01/27/25  1028 01/09/25  1014   WBC 5.5 5.0 4.3   HGB 8.6* 9.0* 9.6* "   MCV 97 95 97   * 152 160   ANEUTAUTO 3.8 3.7 3.3     Most Recent 3 BMP's:  Recent Labs   Lab Test 02/13/25  1008 01/27/25  1028 01/09/25  1014 01/06/25  1008     --  137 136   POTASSIUM 3.2*  --  3.5 2.7*   CHLORIDE 104  --  99 98   CO2 25  --  25 24   BUN 13.2  --  45.9* 20.4   CR 1.21*  --  1.46* 1.36*   ANIONGAP 12  --  13 14   GAVIN 9.3  --  9.1 9.3   *  --  90 143*   PROTTOTAL 6.2* 6.5 6.7 6.7   ALBUMIN 3.8 3.7 4.1 3.8    Most Recent 3 LFT's:  Recent Labs   Lab Test 02/13/25  1008 01/27/25  1028 01/09/25  1014   AST 29 45 54*   ALT 21 32 38   ALKPHOS 81 84 76   BILITOTAL 0.4 0.4 0.9    Most Recent 2 TSH and T4:  Recent Labs   Lab Test 08/22/23  0952 06/28/23  1030   TSH 1.62 1.59     PSA   Date Value Ref Range Status   06/21/2021 0.07 0 - 4 ug/L Final     Comment:     Assay Method:  Chemiluminescence using Siemens Vista analyzer     PSA Tumor Marker   Date Value Ref Range Status   02/13/2025 0.41 ng/mL Final     Comment:     No reference ranges have been established for patients over 80 years.   12/20/2022 2.39 0.00 - 4.00 ug/L Final       I reviewed the above labs today.       ASSESSMENT AND PLAN:   Mr. Gautam is an 85 year old man with [place] 4+5=9 [id number] prostate cancer who underwent radical prostatectomy (8/2012) and salvage radiotherapy (10/2013) but then developed metastatic CRPC refractory to darolutamide.  He enrolled on ECLIPSE, and received all 4 doses of Lee Ann-PSMA-I&T radioligand therapy.  His disease was slowly progressing once again despite ADT plus darolutamide.  He began docetaxel chemotherapy on 11/18/2024, and will receive cycle #5 of docetaxel today, 2/17/25.  His ECOG score is 0.  His pain score is 10/10.     #Metastatic AQ77-hcsiysg CRPC s/p darolutamide  - Completed all 4 doses of Lee Ann-I&T on the ECLIPSE trial in 7/2023, which he tolerated well with the exception of xerostomia/xerophthalmia.   - His PSA declined from 8.7 to 0.12 ng/mL, but is now back up to 2.75 ng/mL.    - His new CT scan (11/1/24) showed a new osseous metastasis as well as growing retroperitoneal lymph nodes.  - Patient is due for his next Lupron on 3/3/2025.  He is scheduled for this locally at [facility].    - His options for new systemic therapies included docetaxel chemotherapy, abiraterone, or the AMA-280 study.  - Alternatively, radium-223 could target his bone metastases but will not treat the growing lymphadenopathy.    - Opted to pursue docetaxel chemotherapy, starting on 11/18/2024.  - Returns for cycle #5 of docetaxel today.  Tolerating chemotherapy relatively well thus far- he does have grade 1 neuropathy with numbness in his fingertips/tightness in his feet.  PSA down to 0.41 ng/mL.  - Labs with ongoing, anemia slowly down trending overtime and mild thrombocytopenia. ***.  Labs look fine, and he is OK clinically; so it is safe to proceed with the planned docetaxel infusion today (1/27/25).  - I will probably not treat him with more than 6 chemo doses.  We may even stop after 4 doses if PSA is <0.50 ng/mL.  - He will also continue androgen deprivation therapy in the interim; next Lupron dose on 3/3/2025.  - He is considering moving to [place] to be closer to his son; that may happen in the Spring of 2025.     #Acute low back pain   - suspect this is musculoskeletal in nature. Reviewed XR from 2/10 without acute concerns but does demonstrate age related changes. He is following with his PCP for management and did not find gabapentin or tramadol overly helpful. He finds ibuprofen to be the most beneficial. ***        *** minutes spent on the date of the encounter doing chart review, review of test results, interpretation of tests, patient visit, and documentation     Almaz Blanca PA-C          Again, thank you for allowing me to participate in the care of your patient.        Sincerely,        Almaz Blanca PA-C    Electronically signed

## 2025-02-24 ENCOUNTER — PATIENT OUTREACH (OUTPATIENT)
Dept: ONCOLOGY | Facility: CLINIC | Age: 85
End: 2025-02-24
Payer: COMMERCIAL

## 2025-02-24 ENCOUNTER — INFUSION THERAPY VISIT (OUTPATIENT)
Dept: INFUSION THERAPY | Facility: CLINIC | Age: 85
End: 2025-02-24
Attending: INTERNAL MEDICINE
Payer: COMMERCIAL

## 2025-02-24 VITALS
OXYGEN SATURATION: 99 % | TEMPERATURE: 97.9 F | DIASTOLIC BLOOD PRESSURE: 76 MMHG | HEART RATE: 66 BPM | RESPIRATION RATE: 16 BRPM | SYSTOLIC BLOOD PRESSURE: 169 MMHG

## 2025-02-24 DIAGNOSIS — C61 MALIGNANT NEOPLASM OF PROSTATE (H): ICD-10-CM

## 2025-02-24 DIAGNOSIS — C79.51 PROSTATE CANCER METASTATIC TO BONE (H): ICD-10-CM

## 2025-02-24 DIAGNOSIS — C61 PROSTATE CANCER METASTATIC TO BONE (H): ICD-10-CM

## 2025-02-24 DIAGNOSIS — C18.2 MALIGNANT NEOPLASM OF ASCENDING COLON (H): Primary | ICD-10-CM

## 2025-02-24 LAB
BLD PROD TYP BPU: NORMAL
BLOOD COMPONENT TYPE: NORMAL
CODING SYSTEM: NORMAL
CROSSMATCH: NORMAL
ISSUE DATE AND TIME: NORMAL
UNIT ABO/RH: NORMAL
UNIT NUMBER: NORMAL
UNIT STATUS: NORMAL
UNIT TYPE ISBT: 600

## 2025-02-24 PROCEDURE — P9016 RBC LEUKOCYTES REDUCED: HCPCS

## 2025-02-24 PROCEDURE — 86923 COMPATIBILITY TEST ELECTRIC: CPT

## 2025-02-24 PROCEDURE — 99207 PR NO CHARGE LOS: CPT

## 2025-02-24 PROCEDURE — 36430 TRANSFUSION BLD/BLD COMPNT: CPT

## 2025-02-24 PROCEDURE — 258N000003 HC RX IP 258 OP 636

## 2025-02-24 RX ORDER — HEPARIN SODIUM,PORCINE 10 UNIT/ML
5-20 VIAL (ML) INTRAVENOUS DAILY PRN
OUTPATIENT
Start: 2025-02-24

## 2025-02-24 RX ORDER — EPINEPHRINE 1 MG/ML
0.3 INJECTION, SOLUTION INTRAMUSCULAR; SUBCUTANEOUS EVERY 5 MIN PRN
OUTPATIENT
Start: 2025-02-24

## 2025-02-24 RX ORDER — HEPARIN SODIUM (PORCINE) LOCK FLUSH IV SOLN 100 UNIT/ML 100 UNIT/ML
5 SOLUTION INTRAVENOUS
OUTPATIENT
Start: 2025-02-24

## 2025-02-24 RX ORDER — DIPHENHYDRAMINE HYDROCHLORIDE 50 MG/ML
50 INJECTION INTRAMUSCULAR; INTRAVENOUS
Start: 2025-02-24

## 2025-02-24 RX ADMIN — SODIUM CHLORIDE 250 ML: 0.9 INJECTION, SOLUTION INTRAVENOUS at 14:50

## 2025-02-24 NOTE — PROGRESS NOTES
Infusion Nursing Note:  Medardo Gautam presents today for 1u PRBC.    Patient seen by provider today: No   present during visit today: Not Applicable.    Note: Patient new to our infusion center. Oriented to surroundings, call light, and infusion process. He has been to our building for radiation before and received Lupron injections in [place] where he lives. He states that he has never had a blood transfusion. Blood consent form was signed and gone over with patient on 2/17/25 and was on the waitlist for transfusion today. Additional questions answered by writer and educated on signs/symptoms of reaction to report as well as potential side effects. He verbalized understanding. Regarding symptoms, the patient reports some shortness of breath and heart racing with walking up the stairs as well as some fatigue.     Of note, patient moderately hypertensive during infusion. Denies any symptoms of this. States he takes his blood pressures sometimes at home and will find them in the 120's to 130's systolic, but that he does not take them regularly. His doctor has adjusted his blood pressure medication before and it has gone too low he stated. He will talk with his doctor and will try to take his blood pressures at home more often and monitor symptoms.       Intravenous Access:  Peripheral IV placed.    Treatment Conditions:  Lab Results   Component Value Date    HGB 8.3 (L) 02/21/2025    WBC 3.1 (L) 02/21/2025    ANEU 2.6 06/21/2021    ANEUTAUTO 2.7 02/21/2025     (L) 02/21/2025     Hemoglobin <=8.6 and symptomatic   Results reviewed, labs MET treatment parameters, ok to proceed with treatment.  Blood transfusion consent signed 2/17/25.      Report given to Mary geller RN as patient on tubing flush post infusion.      Brittany Packer RN

## 2025-02-24 NOTE — PROGRESS NOTES
"[facility]: [facility] Care                                                                                      Per results message from Almaz Blanca: \"Hemoglobin down a bit. He should get blood as planned early next week at [facility]. It looks like he is on the wait list still. Do you have any updates on this?\"    RNCC sent in basket message to follow up regarding patient on waitlist.      Contacted patient to assess.  He has been feeling tired with little energy.  Reports SOB, but only when going up the stairs.  SOB resolves after about 20 seconds.  Patient denies generalized weakness.     Requesting patient get a transfusion based on Almaz's recommendation and symptoms.    Iesha Owen, RN, BSN, OCN  Oncology RN Care Coordinator  [facility]  "

## 2025-02-24 NOTE — PROGRESS NOTES
Post Infusion Assessment:  Patient tolerated infusion without incident.  Blood return noted post infusion.  Site patent and intact, free from redness, edema or discomfort.  No evidence of extravasations.  Access discontinued per protocol.       Discharge Plan:   AVS to patient via MYCHART.  AVS includes After Your Blood Transfusion: Discharge Instructions.  Patient discharged in stable condition accompanied by: self.  Departure Mode: Ambulatory.      Mary Abdullahi RN

## 2025-03-04 ENCOUNTER — THERAPY VISIT (OUTPATIENT)
Dept: PHYSICAL THERAPY | Facility: CLINIC | Age: 85
End: 2025-03-04
Attending: FAMILY MEDICINE
Payer: COMMERCIAL

## 2025-03-04 DIAGNOSIS — M54.50 ACUTE LEFT-SIDED LOW BACK PAIN WITHOUT SCIATICA: ICD-10-CM

## 2025-03-04 PROCEDURE — 97110 THERAPEUTIC EXERCISES: CPT | Mod: GP | Performed by: PHYSICAL THERAPIST

## 2025-03-04 PROCEDURE — 97161 PT EVAL LOW COMPLEX 20 MIN: CPT | Mod: GP | Performed by: PHYSICAL THERAPIST

## 2025-03-04 NOTE — PROGRESS NOTES
PHYSICAL THERAPY EVALUATION  Type of Visit: Evaluation       Fall Risk Screen:  Fall screen completed by: PT  Have you fallen 2 or more times in the past year?: No (1 fall at home)  Have you fallen and had an injury in the past year?: No  Is patient a fall risk?: No    Subjective         Presenting condition or subjective complaint: Lower Back pain  Date of onset: 02/10/25    Relevant medical history: Cancer   Dates & types of surgery: Year 3015    Prior diagnostic imaging/testing results: X-ray     Prior therapy history for the same diagnosis, illness or injury: No      Prior Level of Function  Transfers: Independent  Ambulation: Independent  ADL: Independent    Living Environment  Social support: Alone   Type of home: House   Stairs to enter the home: No       Ramp:     Stairs inside the home:         Help at home: None  Equipment owned:       Employment: No    Hobbies/Interests: Golf and TV    Patient goals for therapy: Bend without pain    Pain assessment: See objective evaluation for additional pain details     Objective   LUMBAR SPINE EVALUATION  Work mechanical stresses:  retired  Leisure mechanical stresses: golf (but hasn't played recently), watching TV  Functional disability score (ELIUD/STarT Back):  20%/Medium (5)  VAS score (0-10): 0/10      ADDITIONAL HISTORY:  Present symptoms: no pain at rest; Intermittent symptoms across the lower back, worse on the Rt.   Pain quality: Aching and Nagging  Paresthesia (yes/no):  no  Symptoms (improving/unchanging/worsening):  improving   Symptoms commenced as a result of: 2/10/25 - Unknown  Condition occurred in the following environment:   at home     Symptoms at onset (back/thigh/leg): back  Constant symptoms (back/thigh/leg): n/a  Intermittent symptoms (back/thigh/leg): back, R hip/thigh    Symptoms are made worse with the following: bending forward, lifting, rolling in bed, rising from sitting , morning  Symptoms are made better with the following: gets better  throughout the day    Disturbed sleep (yes/no):  no Sleeping postures (prone/sup/side R/L): mostly on side, but sometimes on back    Previous episodes (0/1-5/6-10/11+): first time Year of first episode: 2025    Previous history: n/a  Previous treatments: n/a    Specific Questions:  Cough/Sneeze/Strain (pos/neg): neg  Bowel/Bladder (normal/abnormal): no  Gait (normal/abnormal): takes smaller steps to reduce stress on back  Medications (nil/NSAIDS/analg/steroids/anticoag/other):  NSAIDS  Medical allergies:  none  General health (excellent/good/fair/poor):  good  Pertinent medical history:  L knee OA, renal cyst, colon cancer, prostate cancer, currently undergoing treatment for cancer that has metastases to the bone, HTN, gout, insomnia  Imaging (None/Xray/MRI/Other):  Xray 2/10/25: chronic endplate compression fracture of L2 which was stable, 3 mm anterolisthesis at L4-L5 and end-stage disc degeneration at L3-4 and L4-5.   Recent or major surgery (yes/no):  no  Night pain (yes/no): no  Accidents (yes/no): no  Unexplained weight loss (yes/no): no  Barriers at home: lives alone  Other red flags: no  Postural Observation:   Sitting:  flat back   Change of posture:   Standing:  flat back, forward head, narrow JESUS, R shoulder rotated posterior and raised   Lateral Shift: Nil.  Other Observations:   LROM: Flex=touches toes w/o pain, Ext=mild limited w/ pain, SB/SGIS= normal/symmetrical without pain     Neurological:  Motor Deficit:  bilat hip flex weakness 3+/5, L dorsiflexion 4/5  Reflexes:  patellar 1 bilat, achilles 1 bilat  Sensory Deficit:  L4-L5 decreased light touch L LE (at big toe) Neurodynamic tests:  Slump: neg w/ tightness L > R    Symptomatic response Mechanical response    During testing After testing Effect - increased ROM, decreased ROM, or key functional test No Effect   Pretest symptoms standing: No pain at rest     Rep FISit No Effect    No Effect     x   Rep EIS Produces    No Worse     x     Pretest  symptoms lying:     During testing After testing Effect - increased ROM, decreased ROM, or key functional test No Effect   Rep RUIZ       Rep EIL         If required, pretest symptoms:    During testing After testing Effect - increased ROM, decreased ROM, or key functional test No Effect   Rep SGIS - R       Rep SGIS - L         Static Tests:  Lying prone in extension: prone lying 2 minutes can feel symptoms central lower back during, improved a little with time, no worse after.  Did not feel his motion was altered with positioning,      Provisional Classification: Derangement - Extension - unilateral or asymmetrical symptoms, above the knee    Potential Drivers of Pain and/or Disability:  Comorbidity    Principle of Management:  Education:  peripheralization/centralization of back pain, use of lumbar roll, HEP     Equipment provided:  n/a  Mechanical therapy (Y/N):  Y   Extension principle:  prone lying 2 minutes, 2-4 times a day.      Lateral Principle: Flexion principle:  Other:  hip flexion exercises for strengthening    Assessment & Plan   CLINICAL IMPRESSIONS  Medical Diagnosis: Acute left-sided low back pain without sciatica    Treatment Diagnosis: LBP w/o Radiating Pain   Impression/Assessment: Patient is a 85 year old male with LBP complaints.  The following significant findings have been identified: Pain, Decreased joint mobility, and Decreased activity tolerance. These impairments interfere with their ability to perform self care tasks as compared to previous level of function.     Clinical Decision Making (Complexity):  Clinical Presentation: Stable/Uncomplicated  Clinical Presentation Rationale: based on medical and personal factors listed in PT evaluation  Clinical Decision Making (Complexity): Low complexity    PLAN OF CARE  Treatment Interventions:  Interventions: Manual Therapy, Neuromuscular Re-education, Therapeutic Activity, Therapeutic Exercise, home exercise program    Long Term Goals     PT  Goal 1  Goal Identifier: ROM  Goal Description: Pt will be able to perform full lumbar extension w/o pain at end range  Rationale: to maximize safety and independence within the home;to maximize safety and independence with performance of ADLs and functional tasks  Target Date: 04/29/25      Frequency of Treatment: 1 time a week  Duration of Treatment: 12 weeks    Recommended Referrals to Other Professionals:   Education Assessment:   Learner/Method: Patient    Risks and benefits of evaluation/treatment have been explained.   Patient/Family/caregiver agrees with Plan of Care.     Evaluation Time:     PT Eval, Low Complexity Minutes (88743): 24  Evaluation Only     Signing Clinician: Carla Gonzalez, PT, ADELSO Nj        [facility]                                                                                   OUTPATIENT PHYSICAL THERAPY      PLAN OF TREATMENT FOR OUTPATIENT REHABILITATION   Patient's Last Name, First Name, Medardo Murray YOB: 1940   Provider's Name   [facility]   Medical Record No.  [id number]     Onset Date: 02/10/25  Start of Care Date: 03/04/25     Medical Diagnosis:  Acute left-sided low back pain without sciatica      PT Treatment Diagnosis:  LBP w/o Radiating Pain Plan of Treatment  Frequency/Duration: 1 time a week/ 12 weeks    Certification date from 03/04/25 to 05/27/25         See note for plan of treatment details and functional goals     Carla Gonzalez, PT                         I CERTIFY THE NEED FOR THESE SERVICES FURNISHED UNDER        THIS PLAN OF TREATMENT AND WHILE UNDER MY CARE     (Physician attestation of this document indicates review and certification of the therapy plan).              Referring Provider:  Vira Flor    Initial Assessment  See Epic Evaluation- Start of Care Date: 03/04/25

## 2025-03-05 ENCOUNTER — TELEPHONE (OUTPATIENT)
Dept: FAMILY MEDICINE | Facility: OTHER | Age: 85
End: 2025-03-05

## 2025-03-05 NOTE — TELEPHONE ENCOUNTER
Medication has not been delivered to clinic for injection today. RN called patient and rescheduled.     Appointments in Next Year      Mar 07, 2025 10:00 AM  Nurse Visit with NL RN ERC  [facility] ([facility]) [phone]     Jessica Farias BSN, RN

## 2025-03-09 NOTE — PROGRESS NOTES
FOLLOW UP VISIT       Reason for visit:  Metastatic QX07-qfaoeoh CRPC s/p darolutamide and 177Lu-PSMA-I&T.  Cycle #6 of docetaxel today.     History of Present Illness:  Mr. Gautam is an 85-year-old man who was diagnosed with prostate cancer in 2012.  His PSA level was 5.2 ng/mL.  Clinical stage was cT1c disease.  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  His next-generation DNA sequencing analysis ([email or web address]) revealed a pathogenic TP53 mutation, SHIRA genotype, TMB 5 muts/Mb, and absent PD-L1 expression.  He subsequently received salvage radiotherapy, which was completed in 10/2013, concurrently with six months of androgen deprivation therapy.     He subsequently developed a biochemical recurrence, and received ADT from 2014 until 2020.  In 11/2020, he developed non-metastatic CRPC.  Darolutamide was added on 12/4/2020, to which he achieved a subsequent PSA response.  His PSA level initially dropped from 1.66 ng/mL down to a speedy of 0.07 ng/mL (6/21/2021).  However, the patient then developed a rising PSA level over the past year.  His PSA on 1/13/2022 was 0.16, the PSA on 4/20/2022 was 0.24, the PSA on 6/13/2022 was 0.34, the PSA on 7/12/2022 was 0.47, the PSA on 8/25/2022 was 0.64 ng/mL, and the PSA on 11/21/2022 was 1.55 ng/mL (with a testosterone level of 14 ng/dL).  On 3/8/2023, his PSA level increased to 8.7 ng/mL.     The patient then participated in the ECLIPSE clinical trial, and he was randomized to the 177Lu-PSMA-I&T radioligand arm.  During the screening procedures he was found to have an asymptomatic pulmonary embolus, and he began apixaban. He received all 4 doses of [place]-PSMA-I&T, and his PSA level dropped from 8.7 down to 0.12 ng/mL.  However, his PSA level increased to 0.79 ng/mL, and his imaging (5/7/24) showed evidence of progressive disease.  He is currently on ADT plus darolutamide, but his PSA level continued to rise.  The most recent  "PSA (11/1/24) was 2.48 ng/mL.  He also had recent imaging assessments (11/1/24) which showed progressive osseous and yan disease.      On 11/18/2024, his PSA level reached 2.75 ng/mL.  At that time, he began docetaxel chemotherapy, and has received five cycles thus far.  His PSA level has declined by 90% down to 0.27 ng/mL.  He returns for a follow-up visit today, and to receive cycle #6 of docetaxel.  This will be his final cycle.     Review of Systems:  Medardo returns clinic today prior to cycle 6 of docetaxel.  The patient is accompanied by his son.  He reports that he is doing okay today but is dealing with acute low back pain 2/5/2025.  He presented to urgent care on 2/10/2025 and had X-rays performed.  He then followed up with his primary care provider in 2/12/2025 for it.  He has been trying gabapentin but found that this made him too sedated and was not helpful who has now discontinued this.  He has also tried Tylenol with significant improvement in topical lidocaine patches.  He finds light ibuprofen has been the most helpful in reducing his pain but he has been trying to use this sparingly.  He is set up to begin physical therapy next week.  His pain does radiate towards the left hip and down the left leg.  He is not noticing new leg weakness.  No saddle paresthesias.  No bowel or bladder changes.  He does have baseline loose bowels after chemotherapy but does not require the use of Imodium.  No significant nausea or vomiting.  No chest pain, shortness of breath, or cough.  No fevers or chills.  He is noting some increased dyspnea on exertion going up the stairs but is able to climb the stairs without rest.  He does not have any shortness of breath at rest he denies any other acute symptoms.  His neuropathy is present with numbness in his fingertips feels like the bottom of his feet are \"tight\".  He denies any falls.  A comprehensive 14-point ROS is negative other than the symptoms noted above in the " HPI.  His ECOG score is 0.  His pain score is 3/10.     Medications:  Lupron 22.5 mg IM q3mo (last dose, 3/6/2025)  Docetaxel 75 mg/m2, cycle #6 on 3/12/2025  Prednisone 5 mg BID  Prochlorperazine 10 mg PRN  Losartan/HCTZ 100/12.5 mg  Atorvastatin 10 mg  Cephalexin 500 mg  Allopurinol 100 mg  Zolpidem 5 mg  Clonazepam 1 mg  Sildenafil 50 mg  Loratadine 10 mg  Calcium + Vit D  Multivitamin  Folic acid     Physical Examination:    General: No acute distress  Vital signs within normal limits.  HEENT: Sclera anicteric. Oral mucosa pink, dry. No mucositis or thrush  Lymph: No lymphadenopathy in neck  Heart: Regular, rate, and rhythm  Lungs: Clear to ascultation bilaterally  Abdomen: Positive bowel sounds. Soft, non-tender. No organomegaly or mass.   Extremities: no lower extremity edema  Neuro: Cranial nerves grossly intact  Skin: no dermatitis     CT scan (11/1/2024):  This shows increased size of a sclerotic lesion in the proximal right humerus, although only partially included in the field-of-view.  Mixed findings in the retroperitoneal lymph nodes with some stable, some increased, and others decreased (Left para-aortic, 1.7 x 1.0 cm, previously 1.5 x 1.2 cm. Preaortic, 1.2 x 0.7 cm, previously 1.6 x 1.3 cm).     Laboratories (3/12/25):  CBC shows a WBC of 4.6, hemoglobin 9.3, hematocrit 27.9%, platelets 138K.  His PSA level is 0.27 ng/mL.  Creatinine is 1.18 mg/dL (GFR 61 mL/min), which is stable.       ASSESSMENT AND PLAN:   Mr. Gautam is an 85-year-old man with [place] 4+5=9 NM86-twstsrb prostate cancer who underwent radical prostatectomy (8/2012) and salvage radiotherapy (10/2013) but then developed metastatic CRPC refractory to darolutamide.  He enrolled on ECLIPSE, and received all 4 doses of Lee Ann-PSMA-I&T radioligand therapy.  His disease was slowly progressing once again despite ADT plus darolutamide.  He began docetaxel chemotherapy on 11/18/2024, and will receive cycle #6 of docetaxel today; this will likely be  his final dose.  His ECOG score is 0.  His pain score is 2/10.     #Metastatic [id number] CRPC s/p darolutamide  - Completed all 4 doses of Lee Ann-I&T on the ECLIPSE trial in 7/2023, which he tolerated well with the exception of xerostomia/xerophthalmia.   - His PSA declined from 8.7 to 0.12 ng/mL, but is now back up to 2.75 ng/mL.   - His new CT scan (11/1/24) showed a new osseous metastasis as well as growing retroperitoneal lymph nodes.  - Patient is due for his next Lupron on 3/6/2025.  He would like to obtain this locally.   - His options for new systemic therapies included docetaxel chemotherapy, abiraterone, or the AMA-280 study.  - Alternatively, radium-223 could target his bone metastases but will not treat the growing lymphadenopathy.  - We previously decided to pursue docetaxel chemotherapy, starting on 11/18/2024.  - Returns for cycle #6 of docetaxel today. Tolerating chemotherapy relatively well thus far. PSA down to 0.27 ng/mL.  - Labs look fine, and he is OK clinically; so it is safe to proceed with the planned docetaxel infusion today (3/12/25).  - I will probably not treat him with more chemotherapy at this time.  We will stop docetaxel after today.  - He is considering moving to [place] to be closer to his son; that may happen in the summer of 2025.  - If needed, the next systemic therapy could be standard Pluvicto, now the he has received taxane chemotherapy.      A total of 40 minutes were spent on this patient on the date of the encounter conducting chart review, review of test results, interpretation of tests, patient visit, documentation and discussion with other provider(s).  The patient was given the opportunity to ask multiple questions today, all of which were answered to his satisfaction.    Tio Holman M.D.

## 2025-03-11 ENCOUNTER — THERAPY VISIT (OUTPATIENT)
Dept: PHYSICAL THERAPY | Facility: CLINIC | Age: 85
End: 2025-03-11
Payer: COMMERCIAL

## 2025-03-11 DIAGNOSIS — M54.50 ACUTE BILATERAL LOW BACK PAIN WITHOUT SCIATICA: Primary | ICD-10-CM

## 2025-03-11 PROCEDURE — 97110 THERAPEUTIC EXERCISES: CPT | Mod: GP | Performed by: PHYSICAL THERAPIST

## 2025-03-12 ENCOUNTER — PATIENT OUTREACH (OUTPATIENT)
Dept: ONCOLOGY | Facility: CLINIC | Age: 85
End: 2025-03-12

## 2025-03-12 ENCOUNTER — INFUSION THERAPY VISIT (OUTPATIENT)
Dept: ONCOLOGY | Facility: CLINIC | Age: 85
End: 2025-03-12
Attending: INTERNAL MEDICINE
Payer: COMMERCIAL

## 2025-03-12 ENCOUNTER — TELEPHONE (OUTPATIENT)
Dept: ONCOLOGY | Facility: CLINIC | Age: 85
End: 2025-03-12

## 2025-03-12 ENCOUNTER — APPOINTMENT (OUTPATIENT)
Dept: LAB | Facility: CLINIC | Age: 85
End: 2025-03-12
Attending: INTERNAL MEDICINE
Payer: COMMERCIAL

## 2025-03-12 VITALS
RESPIRATION RATE: 16 BRPM | BODY MASS INDEX: 28.41 KG/M2 | TEMPERATURE: 97.5 F | HEART RATE: 63 BPM | DIASTOLIC BLOOD PRESSURE: 82 MMHG | WEIGHT: 175.7 LBS | SYSTOLIC BLOOD PRESSURE: 193 MMHG | OXYGEN SATURATION: 95 %

## 2025-03-12 DIAGNOSIS — C61 MALIGNANT NEOPLASM OF PROSTATE (H): Primary | ICD-10-CM

## 2025-03-12 DIAGNOSIS — Z79.899 ENCOUNTER FOR LONG-TERM (CURRENT) USE OF MEDICATIONS: ICD-10-CM

## 2025-03-12 DIAGNOSIS — C79.51 PROSTATE CANCER METASTATIC TO BONE (H): ICD-10-CM

## 2025-03-12 DIAGNOSIS — C61 MALIGNANT NEOPLASM OF PROSTATE (H): ICD-10-CM

## 2025-03-12 DIAGNOSIS — C61 PROSTATE CANCER (H): ICD-10-CM

## 2025-03-12 DIAGNOSIS — C61 PROSTATE CANCER METASTATIC TO BONE (H): ICD-10-CM

## 2025-03-12 LAB
ALBUMIN SERPL BCG-MCNC: 3.7 G/DL (ref 3.5–5.2)
ALP SERPL-CCNC: 113 U/L (ref 40–150)
ALT SERPL W P-5'-P-CCNC: 20 U/L (ref 0–70)
ANION GAP SERPL CALCULATED.3IONS-SCNC: 11 MMOL/L (ref 7–15)
AST SERPL W P-5'-P-CCNC: 38 U/L (ref 0–45)
BASOPHILS # BLD AUTO: 0 10E3/UL (ref 0–0.2)
BASOPHILS NFR BLD AUTO: 0 %
BILIRUB DIRECT SERPL-MCNC: 0.27 MG/DL (ref 0–0.3)
BILIRUB SERPL-MCNC: 0.5 MG/DL
BUN SERPL-MCNC: 13.1 MG/DL (ref 8–23)
CALCIUM SERPL-MCNC: 9.4 MG/DL (ref 8.8–10.4)
CHLORIDE SERPL-SCNC: 106 MMOL/L (ref 98–107)
CREAT SERPL-MCNC: 1.18 MG/DL (ref 0.67–1.17)
EGFRCR SERPLBLD CKD-EPI 2021: 61 ML/MIN/1.73M2
EOSINOPHIL # BLD AUTO: 0 10E3/UL (ref 0–0.7)
EOSINOPHIL NFR BLD AUTO: 0 %
ERYTHROCYTE [DISTWIDTH] IN BLOOD BY AUTOMATED COUNT: 15.5 % (ref 10–15)
GLUCOSE SERPL-MCNC: 116 MG/DL (ref 70–99)
HCO3 SERPL-SCNC: 24 MMOL/L (ref 22–29)
HCT VFR BLD AUTO: 27.9 % (ref 40–53)
HGB BLD-MCNC: 9.3 G/DL (ref 13.3–17.7)
IMM GRANULOCYTES # BLD: 0 10E3/UL
IMM GRANULOCYTES NFR BLD: 1 %
LYMPHOCYTES # BLD AUTO: 1.3 10E3/UL (ref 0.8–5.3)
LYMPHOCYTES NFR BLD AUTO: 29 %
MCH RBC QN AUTO: 30.5 PG (ref 26.5–33)
MCHC RBC AUTO-ENTMCNC: 33.3 G/DL (ref 31.5–36.5)
MCV RBC AUTO: 92 FL (ref 78–100)
MONOCYTES # BLD AUTO: 0.3 10E3/UL (ref 0–1.3)
MONOCYTES NFR BLD AUTO: 7 %
NEUTROPHILS # BLD AUTO: 3 10E3/UL (ref 1.6–8.3)
NEUTROPHILS NFR BLD AUTO: 64 %
NRBC # BLD AUTO: 0 10E3/UL
NRBC BLD AUTO-RTO: 0 /100
PLATELET # BLD AUTO: 138 10E3/UL (ref 150–450)
POTASSIUM SERPL-SCNC: 3.6 MMOL/L (ref 3.4–5.3)
PROT SERPL-MCNC: 6.3 G/DL (ref 6.4–8.3)
PSA SERPL DL<=0.01 NG/ML-MCNC: 0.27 NG/ML
RBC # BLD AUTO: 3.05 10E6/UL (ref 4.4–5.9)
SODIUM SERPL-SCNC: 141 MMOL/L (ref 135–145)
WBC # BLD AUTO: 4.6 10E3/UL (ref 4–11)

## 2025-03-12 PROCEDURE — G0463 HOSPITAL OUTPT CLINIC VISIT: HCPCS | Performed by: INTERNAL MEDICINE

## 2025-03-12 PROCEDURE — 85004 AUTOMATED DIFF WBC COUNT: CPT | Performed by: INTERNAL MEDICINE

## 2025-03-12 PROCEDURE — 250N000011 HC RX IP 250 OP 636

## 2025-03-12 PROCEDURE — 85014 HEMATOCRIT: CPT | Performed by: INTERNAL MEDICINE

## 2025-03-12 PROCEDURE — 82248 BILIRUBIN DIRECT: CPT | Performed by: INTERNAL MEDICINE

## 2025-03-12 PROCEDURE — 82435 ASSAY OF BLOOD CHLORIDE: CPT | Performed by: INTERNAL MEDICINE

## 2025-03-12 PROCEDURE — 250N000011 HC RX IP 250 OP 636: Performed by: INTERNAL MEDICINE

## 2025-03-12 PROCEDURE — 84075 ASSAY ALKALINE PHOSPHATASE: CPT | Performed by: INTERNAL MEDICINE

## 2025-03-12 PROCEDURE — 84153 ASSAY OF PSA TOTAL: CPT | Performed by: INTERNAL MEDICINE

## 2025-03-12 PROCEDURE — 36415 COLL VENOUS BLD VENIPUNCTURE: CPT | Performed by: INTERNAL MEDICINE

## 2025-03-12 PROCEDURE — 84520 ASSAY OF UREA NITROGEN: CPT | Performed by: INTERNAL MEDICINE

## 2025-03-12 PROCEDURE — 258N000003 HC RX IP 258 OP 636: Performed by: INTERNAL MEDICINE

## 2025-03-12 PROCEDURE — 258N000003 HC RX IP 258 OP 636

## 2025-03-12 RX ORDER — PREDNISONE 5 MG/1
5 TABLET ORAL DAILY
Qty: 21 TABLET | Refills: 0 | Status: SHIPPED | OUTPATIENT
Start: 2025-03-12 | End: 2025-04-02

## 2025-03-12 RX ORDER — DEXAMETHASONE 4 MG/1
8 TABLET ORAL 2 TIMES DAILY WITH MEALS
Qty: 12 TABLET | Refills: 1 | Status: SHIPPED | OUTPATIENT
Start: 2025-03-12

## 2025-03-12 RX ADMIN — DOCETAXEL 120 MG: 20 INJECTION, SOLUTION, CONCENTRATE INTRAVENOUS at 13:25

## 2025-03-12 RX ADMIN — DEXAMETHASONE SODIUM PHOSPHATE: 10 INJECTION, SOLUTION INTRAMUSCULAR; INTRAVENOUS at 12:50

## 2025-03-12 RX ADMIN — SODIUM CHLORIDE 250 ML: 9 INJECTION, SOLUTION INTRAVENOUS at 12:50

## 2025-03-12 ASSESSMENT — PAIN SCALES - GENERAL: PAINLEVEL_OUTOF10: NO PAIN (0)

## 2025-03-12 NOTE — PATIENT INSTRUCTIONS
[facility] Triage and after hours / weekends / holidays:  [phone]    Please call the triage or after hours line if you experience a temperature greater than or equal to 100.4, shaking chills, have uncontrolled nausea, vomiting and/or diarrhea, dizziness, shortness of breath, chest pain, bleeding, unexplained bruising, or if you have any other new/concerning symptoms, questions or concerns.      If you are having any concerning symptoms or wish to speak to a provider before your next infusion visit, please call triage to notify them so we can adequately serve you.     If you need a refill on a narcotic prescription or other medication, please call before your infusion appointment.

## 2025-03-12 NOTE — NURSING NOTE
"Oncology Rooming Note    March 12, 2025 11:49 AM   Medardo Gautam is a 85 year old male who presents for:    Chief Complaint   Patient presents with    Oncology Clinic Visit     Malignant neoplasm of prostate    Blood Draw     Vitals, blood draw, piv placed by MA. Pt checked into appt.     Initial Vitals: BP (!) 193/82 (BP Location: Right arm, Patient Position: Sitting, Cuff Size: Adult Regular)   Pulse 63   Temp 97.5  F (36.4  C) (Oral)   Resp 16   Wt 79.7 kg (175 lb 11.2 oz)   SpO2 95%   BMI 28.41 kg/m   Estimated body mass index is 28.41 kg/m  as calculated from the following:    Height as of 2/12/25: 1.675 m (5' 5.95\").    Weight as of this encounter: 79.7 kg (175 lb 11.2 oz). Body surface area is 1.93 meters squared.  No Pain (0) Comment: Data Unavailable   No LMP for male patient.  Allergies reviewed: Yes  Medications reviewed: Yes    Medications: Did not have time to ask.  Pharmacy name entered into [employer] [facility]:    [facility] - [place], [place] - [address] [facility] [place] [address] [facility] - [place], [place] - [address] [place] [address] PHARMACY [place], [place] - [address] [facility] [phone]    Frailty Screening:   Is the patient here for a new oncology consult visit in cancer care? 2. No    PHQ9:  Did this patient require a PHQ9?: No      Clinical concerns: Did not have time to ask. Provider entered during the rooming process.       Cherelle Ortega, EMT            "

## 2025-03-12 NOTE — TELEPHONE ENCOUNTER
FMLA family forms filled out and put in providers folder for review and signature.      Afua Jeffries

## 2025-03-12 NOTE — LETTER
3/12/2025    Medardo Gautam   1940        To Whom it May Concern;    Please excuse Mr Leonard Gautam while accompanying his father, Medardo Gautam, from work/school for a healthcare visit on Mar 12, 2025.    Sincerely,        Tio Holman M.D.

## 2025-03-12 NOTE — PROGRESS NOTES
[facility]: Cancer Care                                                                                      Letter to excuse patient's son Bam from work was provided to him today.  Advised Bam that the FMLA paperwork will be filled out within 2 weeks.  He prefers it be sent directly to him via email.      Iesha Owen, RN, BSN, OCN  Oncology RN Care Coordinator  [facility]

## 2025-03-12 NOTE — PROGRESS NOTES
Infusion Nursing Note:  Medardo Gautam presents today for Cycle 6 Day 1 Docetaxel.    Patient seen by provider today: Yes: Dr. Holman   present during visit today: No.     Note: Patient was seen and assessed by Dr. Holman in clinic prior to infusion. Patient offers no additional complaints or concerns. Patient wishes to proceed with planned treatment today.    TORB 03/12/25 @ 12:20pm: Dr. Holman/Kelli Allison RN: /82; any intervention needed?  - Patient asymptomatic; no intervention needed today.    Intravenous Access:  Peripheral IV placed.    Treatment Conditions:  Lab Results   Component Value Date    HGB 9.3 (L) 03/12/2025    WBC 4.6 03/12/2025    ANEU 2.6 06/21/2021    ANEUTAUTO 3.0 03/12/2025     (L) 03/12/2025        Lab Results   Component Value Date     03/12/2025    POTASSIUM 3.6 03/12/2025    MAG 1.7 08/22/2023    CR 1.18 (H) 03/12/2025    GAVIN 9.4 03/12/2025    BILITOTAL 0.5 03/12/2025    ALBUMIN 3.7 03/12/2025    ALT 20 03/12/2025    AST 38 03/12/2025     Results reviewed, labs MET treatment parameters, ok to proceed with treatment.    Post Infusion Assessment:  Patient tolerated infusion without incident.  Blood return noted pre and post infusion.  Site patent and intact, free from redness, edema or discomfort.  No evidence of extravasations.  Access discontinued per protocol.     Discharge Plan:   Prescription refills given for Dexamethasone. Prednisone prescription sent to local pharmacy per patient request.  Discharge instructions reviewed with: Patient and Family.  Patient and/or family verbalized understanding of discharge instructions and all questions answered.  Copy of AVS reviewed with patient and/or family. Patient will return on 04/21/25 for next appointment with Dr. Holman.   Patient discharged in stable condition accompanied by: self and son.  Departure Mode: Ambulatory.      Kelli Allison RN

## 2025-03-12 NOTE — TELEPHONE ENCOUNTER
FMLA family forms received via clinic from patient.      Forms to be completed and put in folder for provider to approve.    Return to patient's son at: [email or web address]    Afua Jeffries

## 2025-03-12 NOTE — LETTER
3/12/2025      Medardo Gautam  [address] [address] [address]      Dear Colleague,    Thank you for referring your patient, Medardo Gautam, to the [facility]. Please see a copy of my visit note below.      FOLLOW UP VISIT       Reason for visit:  Metastatic [id number] CRPC s/p darolutamide and 177Lu-PSMA-I&T.  Cycle #6 of docetaxel today.     History of Present Illness:  Mr. Gautam is an 85-year-old man who was diagnosed with prostate cancer in 2012.  His PSA level was 5.2 ng/mL.  Clinical stage was cT1c disease.  He underwent radical prostatectomy on 8/6/2012, which revealed [place] 4+5=9 carcinoma, stage pT2c N0 R0.  His next-generation DNA sequencing analysis ([employer]) revealed a pathogenic TP53 mutation, SHIRA genotype, TMB 5 muts/Mb, and absent PD-L1 expression.  He subsequently received salvage radiotherapy, which was completed in 10/2013, concurrently with six months of androgen deprivation therapy.     He subsequently developed a biochemical recurrence, and received ADT from 2014 until 2020.  In 11/2020, he developed non-metastatic CRPC.  Darolutamide was added on 12/4/2020, to which he achieved a subsequent PSA response.  His PSA level initially dropped from 1.66 ng/mL down to a speedy of 0.07 ng/mL (6/21/2021).  However, the patient then developed a rising PSA level over the past year.  His PSA on 1/13/2022 was 0.16, the PSA on 4/20/2022 was 0.24, the PSA on 6/13/2022 was 0.34, the PSA on 7/12/2022 was 0.47, the PSA on 8/25/2022 was 0.64 ng/mL, and the PSA on 11/21/2022 was 1.55 ng/mL (with a testosterone level of 14 ng/dL).  On 3/8/2023, his PSA level increased to 8.7 ng/mL.     The patient then participated in the ECLIPSE clinical trial, and he was randomized to the 177Lu-PSMA-I&T radioligand arm.  During the screening procedures he was found to have an asymptomatic pulmonary embolus, and he began apixaban. He received all 4 doses of Lee Ann-PSMA-I&T, and his PSA level  dropped from 8.7 down to 0.12 ng/mL.  However, his PSA level increased to 0.79 ng/mL, and his imaging (5/7/24) showed evidence of progressive disease.  He is currently on ADT plus darolutamide, but his PSA level continued to rise.  The most recent PSA (11/1/24) was 2.48 ng/mL.  He also had recent imaging assessments (11/1/24) which showed progressive osseous and yan disease.      On 11/18/2024, his PSA level reached 2.75 ng/mL.  At that time, he began docetaxel chemotherapy, and has received five cycles thus far.  His PSA level has declined by 90% down to 0.27 ng/mL.  He returns for a follow-up visit today, and to receive cycle #6 of docetaxel.  This will be his final cycle.     Review of Systems:  Medardo returns clinic today prior to cycle 6 of docetaxel.  The patient is accompanied by his son.  He reports that he is doing okay today but is dealing with acute low back pain 2/5/2025.  He presented to urgent care on 2/10/2025 and had X-rays performed.  He then followed up with his primary care provider in 2/12/2025 for it.  He has been trying gabapentin but found that this made him too sedated and was not helpful who has now discontinued this.  He has also tried Tylenol with significant improvement in topical lidocaine patches.  He finds light ibuprofen has been the most helpful in reducing his pain but he has been trying to use this sparingly.  He is set up to begin physical therapy next week.  His pain does radiate towards the left hip and down the left leg.  He is not noticing new leg weakness.  No saddle paresthesias.  No bowel or bladder changes.  He does have baseline loose bowels after chemotherapy but does not require the use of Imodium.  No significant nausea or vomiting.  No chest pain, shortness of breath, or cough.  No fevers or chills.  He is noting some increased dyspnea on exertion going up the stairs but is able to climb the stairs without rest.  He does not have any shortness of breath at rest he  "denies any other acute symptoms.  His neuropathy is present with numbness in his fingertips feels like the bottom of his feet are \"tight\".  He denies any falls.  A comprehensive 14-point ROS is negative other than the symptoms noted above in the HPI.  His ECOG score is 0.  His pain score is 3/10.     Medications:  Lupron 22.5 mg IM q3mo (last dose, 3/6/2025)  Docetaxel 75 mg/m2, cycle #6 on 3/12/2025  Prednisone 5 mg BID  Prochlorperazine 10 mg PRN  Losartan/HCTZ 100/12.5 mg  Atorvastatin 10 mg  Cephalexin 500 mg  Allopurinol 100 mg  Zolpidem 5 mg  Clonazepam 1 mg  Sildenafil 50 mg  Loratadine 10 mg  Calcium + Vit D  Multivitamin  Folic acid     Physical Examination:    General: No acute distress  Vital signs within normal limits.  HEENT: Sclera anicteric. Oral mucosa pink, dry. No mucositis or thrush  Lymph: No lymphadenopathy in neck  Heart: Regular, rate, and rhythm  Lungs: Clear to ascultation bilaterally  Abdomen: Positive bowel sounds. Soft, non-tender. No organomegaly or mass.   Extremities: no lower extremity edema  Neuro: Cranial nerves grossly intact  Skin: no dermatitis     CT scan (11/1/2024):  This shows increased size of a sclerotic lesion in the proximal right humerus, although only partially included in the field-of-view.  Mixed findings in the retroperitoneal lymph nodes with some stable, some increased, and others decreased (Left para-aortic, 1.7 x 1.0 cm, previously 1.5 x 1.2 cm. Preaortic, 1.2 x 0.7 cm, previously 1.6 x 1.3 cm).     Laboratories (3/12/25):  CBC shows a WBC of 4.6, hemoglobin 9.3, hematocrit 27.9%, platelets 138K.  His PSA level is 0.27 ng/mL.  Creatinine is 1.18 mg/dL (GFR 61 mL/min), which is stable.       ASSESSMENT AND PLAN:   Mr. Gautam is an 85-year-old man with [place] 4+5=9 BM26-avktaax prostate cancer who underwent radical prostatectomy (8/2012) and salvage radiotherapy (10/2013) but then developed metastatic CRPC refractory to darolutamide.  He enrolled on ECLIPSE, and " received all 4 doses of Lee Ann-PSMA-I&T radioligand therapy.  His disease was slowly progressing once again despite ADT plus darolutamide.  He began docetaxel chemotherapy on 11/18/2024, and will receive cycle #6 of docetaxel today; this will likely be his final dose.  His ECOG score is 0.  His pain score is 2/10.     #Metastatic XX08-lgrysdt CRPC s/p darolutamide  - Completed all 4 doses of Lee Ann-I&T on the ECLIPSE trial in 7/2023, which he tolerated well with the exception of xerostomia/xerophthalmia.   - His PSA declined from 8.7 to 0.12 ng/mL, but is now back up to 2.75 ng/mL.   - His new CT scan (11/1/24) showed a new osseous metastasis as well as growing retroperitoneal lymph nodes.  - Patient is due for his next Lupron on 3/6/2025.  He would like to obtain this locally.   - His options for new systemic therapies included docetaxel chemotherapy, abiraterone, or the AMA-280 study.  - Alternatively, radium-223 could target his bone metastases but will not treat the growing lymphadenopathy.  - We previously decided to pursue docetaxel chemotherapy, starting on 11/18/2024.  - Returns for cycle #6 of docetaxel today. Tolerating chemotherapy relatively well thus far. PSA down to 0.27 ng/mL.  - Labs look fine, and he is OK clinically; so it is safe to proceed with the planned docetaxel infusion today (3/12/25).  - I will probably not treat him with more chemotherapy at this time.  We will stop docetaxel after today.  - He is considering moving to [place] to be closer to his son; that may happen in the summer of 2025.  - If needed, the next systemic therapy could be standard Pluvicto, now the he has received taxane chemotherapy.      A total of 40 minutes were spent on this patient on the date of the encounter conducting chart review, review of test results, interpretation of tests, patient visit, documentation and discussion with other provider(s).  The patient was given the opportunity to ask multiple questions today, all of  which were answered to his satisfaction.    Tio Holman M.D.      Again, thank you for allowing me to participate in the care of your patient.        Sincerely,        Tio Holman MD    Electronically signed

## 2025-03-13 ENCOUNTER — OFFICE VISIT (OUTPATIENT)
Dept: FAMILY MEDICINE | Facility: OTHER | Age: 85
End: 2025-03-13
Payer: COMMERCIAL

## 2025-03-13 VITALS
TEMPERATURE: 96.4 F | WEIGHT: 178 LBS | SYSTOLIC BLOOD PRESSURE: 156 MMHG | DIASTOLIC BLOOD PRESSURE: 76 MMHG | RESPIRATION RATE: 16 BRPM | OXYGEN SATURATION: 98 % | HEIGHT: 65 IN | BODY MASS INDEX: 29.66 KG/M2 | HEART RATE: 73 BPM

## 2025-03-13 DIAGNOSIS — G47.00 INSOMNIA, UNSPECIFIED TYPE: ICD-10-CM

## 2025-03-13 DIAGNOSIS — I10 ESSENTIAL HYPERTENSION WITH GOAL BLOOD PRESSURE LESS THAN 140/90: Primary | ICD-10-CM

## 2025-03-13 RX ORDER — CLONAZEPAM 2 MG/1
2 TABLET ORAL
Qty: 30 TABLET | Refills: 2 | Status: SHIPPED | OUTPATIENT
Start: 2025-03-13

## 2025-03-13 RX ORDER — HYDROCHLOROTHIAZIDE 25 MG/1
25 TABLET ORAL DAILY
Qty: 30 TABLET | Refills: 1 | Status: SHIPPED | OUTPATIENT
Start: 2025-03-13

## 2025-03-13 ASSESSMENT — ENCOUNTER SYMPTOMS: BACK PAIN: 1

## 2025-03-13 NOTE — PROGRESS NOTES
Assessment & Plan     Essential hypertension with goal blood pressure less than 140/90  Blood pressure is not controlled.  He will continue on his losartan we will add hydrochlorothiazide.  Hopefully the hydrochlorothiazide will help with both blood pressure and edema.  I also suspect his edema is a side effect of the gabapentin.  As he is not having back pain I asked him to stop his gabapentin.  He will follow-up in 1 month.    - hydroCHLOROthiazide (HYDRODIURIL) 25 MG tablet; Take 1 tablet (25 mg) by mouth daily.    Insomnia, unspecified type  He is having more trouble falling asleep.  He currently is on clonazepam and zolpidem.  Will increase his clonazepam and will have him follow-up in 1 month.    - clonazePAM (KLONOPIN) 2 MG tablet; Take 1 tablet (2 mg) by mouth nightly as needed for anxiety.    The longitudinal plan of care for the diagnosis(es)/condition(s) as documented were addressed during this visit. Due to the added complexity in care, I will continue to support Medardo in the subsequent management and with ongoing continuity of care.  Subjective   Medardo is a 85 year old, presenting for the following health issues:  Back Pain        3/13/2025    10:39 AM   Additional Questions   Roomed by poornima     History of Present Illness       Reason for visit:  Lower back pain    He eats 2-3 servings of fruits and vegetables daily.He consumes 4 sweetened beverage(s) daily.He exercises with enough effort to increase his heart rate 9 or less minutes per day.  He exercises with enough effort to increase his heart rate 7 days per week.   He is taking medications regularly.      Acute left-sided low back pain without sciatica  He is on chronic steroids and therefore will not add a steroid burst.  Will refer to physical therapy.  He will continue on his lidocaine patches.  Will add gabapentin for pain control.  He asked for something stronger and we will give him a few pills of tramadol but cautioned him not to  "take this with his clonazepam.     - Physical Therapy  Referral; Future  - gabapentin (NEURONTIN) 100 MG capsule; Take 1 capsule (100 mg) by mouth 3 times daily.  - traMADol (ULTRAM) 50 MG tablet; Take 1 tablet (50 mg) by mouth every 6 hours as needed for severe pain.     TODAY:  Did therapy and helped, he has been using the gabapentin.  He states that last night  he had a hard time sleeping at night as he had a pain in lower leg/side of right foot, sharp pain that comes and goes.  Since his last visit he feels that the back pain is almost completely resolved.  He continues to do his home exercises.  He has noticed increased swelling in both of his lower legs.      He tells me he just had his last chemotherapy infusion yesterday.  He will be following up with oncology in a month.    He is blood pressure has been elevated the last several times in infusion.  He denies chest pain, shortness of breath or headache.  In the past he had been on hydrochlorothiazide along with his losartan and then this was removed as his blood pressure was getting low.        Objective    BP (!) 156/76   Pulse 73   Temp (!) 96.4  F (35.8  C) (Temporal)   Resp 16   Ht 1.651 m (5' 5\")   Wt 80.7 kg (178 lb)   SpO2 98%   BMI 29.62 kg/m    Body mass index is 29.62 kg/m .  Physical Exam   Gen: no apparent distress  Chest: clear to auscultation without wheeze, rale or rhonchi  Cor: regular rate and rhythm without murmur  Ext: warm and dry with 3+ bilateral edema  Psych: Alert and oriented times 3; coherent speech, normal   rate and volume, able to articulate logical thoughts, able   to abstract reason, no tangential thoughts, no hallucinations   or delusions  His affect is neutral        Signed Electronically by: Vira Flor MD    "

## 2025-03-17 ENCOUNTER — PATIENT OUTREACH (OUTPATIENT)
Dept: CARE COORDINATION | Facility: CLINIC | Age: 85
End: 2025-03-17
Payer: COMMERCIAL

## 2025-03-18 ENCOUNTER — THERAPY VISIT (OUTPATIENT)
Dept: PHYSICAL THERAPY | Facility: CLINIC | Age: 85
End: 2025-03-18
Payer: COMMERCIAL

## 2025-03-18 ENCOUNTER — TELEPHONE (OUTPATIENT)
Dept: FAMILY MEDICINE | Facility: OTHER | Age: 85
End: 2025-03-18

## 2025-03-18 ENCOUNTER — LAB (OUTPATIENT)
Dept: LAB | Facility: OTHER | Age: 85
End: 2025-03-18
Payer: COMMERCIAL

## 2025-03-18 ENCOUNTER — ALLIED HEALTH/NURSE VISIT (OUTPATIENT)
Dept: FAMILY MEDICINE | Facility: OTHER | Age: 85
End: 2025-03-18
Payer: COMMERCIAL

## 2025-03-18 VITALS — OXYGEN SATURATION: 98 % | HEART RATE: 80 BPM | SYSTOLIC BLOOD PRESSURE: 152 MMHG | DIASTOLIC BLOOD PRESSURE: 72 MMHG

## 2025-03-18 DIAGNOSIS — I10 ESSENTIAL HYPERTENSION WITH GOAL BLOOD PRESSURE LESS THAN 140/90: ICD-10-CM

## 2025-03-18 DIAGNOSIS — Z01.30 BP CHECK: Primary | ICD-10-CM

## 2025-03-18 DIAGNOSIS — M54.50 ACUTE BILATERAL LOW BACK PAIN WITHOUT SCIATICA: Primary | ICD-10-CM

## 2025-03-18 LAB
ANION GAP SERPL CALCULATED.3IONS-SCNC: 7 MMOL/L (ref 7–15)
BUN SERPL-MCNC: 25.7 MG/DL (ref 8–23)
CALCIUM SERPL-MCNC: 9.5 MG/DL (ref 8.8–10.4)
CHLORIDE SERPL-SCNC: 100 MMOL/L (ref 98–107)
CREAT SERPL-MCNC: 1.11 MG/DL (ref 0.67–1.17)
EGFRCR SERPLBLD CKD-EPI 2021: 66 ML/MIN/1.73M2
GLUCOSE SERPL-MCNC: 136 MG/DL (ref 70–99)
HCO3 SERPL-SCNC: 29 MMOL/L (ref 22–29)
POTASSIUM SERPL-SCNC: 4.2 MMOL/L (ref 3.4–5.3)
SODIUM SERPL-SCNC: 136 MMOL/L (ref 135–145)

## 2025-03-18 PROCEDURE — 36415 COLL VENOUS BLD VENIPUNCTURE: CPT

## 2025-03-18 PROCEDURE — 99207 PR NO CHARGE NURSE ONLY: CPT

## 2025-03-18 PROCEDURE — 80048 BASIC METABOLIC PNL TOTAL CA: CPT

## 2025-03-18 PROCEDURE — 97110 THERAPEUTIC EXERCISES: CPT | Mod: GP | Performed by: PHYSICAL THERAPY ASSISTANT

## 2025-03-18 PROCEDURE — 3077F SYST BP >= 140 MM HG: CPT

## 2025-03-18 PROCEDURE — 3078F DIAST BP <80 MM HG: CPT

## 2025-03-18 NOTE — TELEPHONE ENCOUNTER
Looks like patient is scheduled tomorrow with Dr. Kaiser and can discuss adjustments in med options at that time.     Ike Tanner PA-C

## 2025-03-18 NOTE — TELEPHONE ENCOUNTER
[facility] family paperwork completed, checked for accuracy, signed and emailed to patient's son @ [email or web address]. A copy was made, put in the family file and original emailed to patient's son.    Amy Jeffries

## 2025-03-18 NOTE — PROGRESS NOTES
Medardo Gautam is a 85 year old patient who comes in today for a Blood Pressure check with a Medical Assistant because of new medication, medication change, and ongoing blood pressure monitoring.  Initial BP:  BP (!) 152/72   Pulse 80   SpO2 98%      Blood pressure is high.   Repeat Blood pressure: YES- 128/70  Patient is taking medication as prescribed.  Patient is not tolerating medications well.  Is patient taking blood pressures at home? YES- but machine is out of batteries so he has not been able to since adjustment of medcation.   Current Symptoms: light-headedness, swelling or edema, and weakness    Disposition:   Abnormal Blood Pressure yes  Yes: Huddled with: Nedra Ferrer due to abnormal result. Create a telephone encounter copy this note and route to requesting provider/PCP.        Medication Hydrochlorothiazide 25 mg was added on 3/13/25. Patient states that he is having to get up several times at night to go to the bathroom even though he is taking medication in the morning, and other side effects listed above.     Nedra has suggested patient have some labs drawn. Schedule visit for BP follow up this week with any provider available, and most likely change medication due to side effects. I will forward to PCP.     Scheduled with Dr. Kaiser on 3/19/25.

## 2025-03-19 ENCOUNTER — OFFICE VISIT (OUTPATIENT)
Dept: FAMILY MEDICINE | Facility: OTHER | Age: 85
End: 2025-03-19
Payer: COMMERCIAL

## 2025-03-19 VITALS
WEIGHT: 167 LBS | BODY MASS INDEX: 27.82 KG/M2 | SYSTOLIC BLOOD PRESSURE: 154 MMHG | HEIGHT: 65 IN | HEART RATE: 85 BPM | RESPIRATION RATE: 22 BRPM | TEMPERATURE: 97.1 F | OXYGEN SATURATION: 100 % | DIASTOLIC BLOOD PRESSURE: 80 MMHG

## 2025-03-19 DIAGNOSIS — I10 ESSENTIAL HYPERTENSION WITH GOAL BLOOD PRESSURE LESS THAN 140/90: ICD-10-CM

## 2025-03-19 PROCEDURE — 3079F DIAST BP 80-89 MM HG: CPT | Performed by: STUDENT IN AN ORGANIZED HEALTH CARE EDUCATION/TRAINING PROGRAM

## 2025-03-19 PROCEDURE — 3077F SYST BP >= 140 MM HG: CPT | Performed by: STUDENT IN AN ORGANIZED HEALTH CARE EDUCATION/TRAINING PROGRAM

## 2025-03-19 PROCEDURE — 99213 OFFICE O/P EST LOW 20 MIN: CPT | Performed by: STUDENT IN AN ORGANIZED HEALTH CARE EDUCATION/TRAINING PROGRAM

## 2025-03-19 PROCEDURE — 1126F AMNT PAIN NOTED NONE PRSNT: CPT | Performed by: STUDENT IN AN ORGANIZED HEALTH CARE EDUCATION/TRAINING PROGRAM

## 2025-03-19 RX ORDER — HYDROCHLOROTHIAZIDE 12.5 MG/1
12.5 TABLET ORAL DAILY
Qty: 30 TABLET | Refills: 0 | Status: SHIPPED | OUTPATIENT
Start: 2025-03-19 | End: 2025-04-18

## 2025-03-19 ASSESSMENT — PAIN SCALES - GENERAL: PAINLEVEL_OUTOF10: NO PAIN (0)

## 2025-03-19 NOTE — PROGRESS NOTES
"  Assessment & Plan     Essential hypertension with goal blood pressure less than 140/90  Unsure if previous symptoms were related to chemo or restarting hydrochlorothiazide or both, tolerated hydrochlorothiazide well up until January of this year. Blood pressure still elevated today. However, will decrease the dose of hydrochlorothiazide to 12.5mg as the 25mg might have caused his pressure to drop too low but he still needs pressure coverage. Continue losartan 100mg. Will follow up in one month for blood pressure check.   - hydrochlorothiazide 12.5 MG tablet; Take 1 tablet (12.5 mg) by mouth daily.          Subjective   Medardo is a 85 year old, presenting for the following health issues:  Hypertension and Recheck Medication        3/19/2025     2:11 PM   Additional Questions   Roomed by NIXON   Accompanied by self         Hydrochlorothiazide side effects after Chemotherapy infusion. Weak, dizzy, pains in legs and hips.     HTN: started on hydrochlorothiazide 6 days ago. After taking a couple of doses he noticed some dizziness and feeling weak at night. He also had chemo a couple days prior so he was not sure if it was from the new blood pressure med or from the chemo or both. He stopped taking the medicine and seemed to feel better in the days following. He is still on prednisone but is done with chemo and will be on steroids for another 3 weeks. Is noticing some slight edema of his lower legs. Denies chest pain, shortness of breath, HA, vision changes.           Review of Systems  Constitutional, HEENT, cardiovascular, pulmonary, gi and gu systems are negative, except as otherwise noted.      Objective    BP (!) 154/80   Pulse 85   Temp 97.1  F (36.2  C) (Temporal)   Resp 22   Ht 1.651 m (5' 5\")   Wt 75.8 kg (167 lb)   SpO2 100%   BMI 27.79 kg/m    Body mass index is 27.79 kg/m .  Physical Exam  Vitals and nursing note reviewed.   Constitutional:       General: He is not in acute distress.     Appearance: " Normal appearance. He is not ill-appearing, toxic-appearing or diaphoretic.   HENT:      Head: Normocephalic and atraumatic.      Right Ear: Tympanic membrane, ear canal and external ear normal. There is no impacted cerumen.      Left Ear: Tympanic membrane, ear canal and external ear normal. There is no impacted cerumen.      Nose: Nose normal. No congestion or rhinorrhea.      Mouth/Throat:      Mouth: Mucous membranes are moist.      Pharynx: Oropharynx is clear. No oropharyngeal exudate or posterior oropharyngeal erythema.   Eyes:      General:         Right eye: No discharge.         Left eye: No discharge.      Extraocular Movements: Extraocular movements intact.      Conjunctiva/sclera: Conjunctivae normal.      Pupils: Pupils are equal, round, and reactive to light.   Cardiovascular:      Rate and Rhythm: Normal rate and regular rhythm.      Heart sounds: No murmur heard.  Pulmonary:      Effort: Pulmonary effort is normal. No respiratory distress.      Breath sounds: Normal breath sounds.   Musculoskeletal:         General: Normal range of motion.      Cervical back: Normal range of motion.   Lymphadenopathy:      Cervical: No cervical adenopathy.   Neurological:      Mental Status: He is alert.   Psychiatric:         Mood and Affect: Mood normal.         Behavior: Behavior normal.         Thought Content: Thought content normal.          Signed Electronically by: NAFISA MAC MD

## 2025-03-20 PROBLEM — M54.50 ACUTE BILATERAL LOW BACK PAIN WITHOUT SCIATICA: Status: RESOLVED | Noted: 2025-03-11 | Resolved: 2025-03-20

## 2025-03-20 NOTE — PROGRESS NOTES
03/18/25 0500   Appointment Info   Signing clinician's name / credentials Gillian Stone PTA   Total/Authorized Visits eval and treat 12   Visits Used 3   Medical Diagnosis Acute left-sided low back pain without sciatica   PT Tx Diagnosis LBP w/o Radiating Pain   Precautions/Limitations Active Chemo Treatment   Quick Adds PTA Supervision   Progress Note/Certification   Start of Care Date 03/04/25   Onset of illness/injury or Date of Surgery 02/10/25   Therapy Frequency 1 time a week   Predicted Duration 12 weeks   Certification date from 03/04/25   Certification date to 05/27/25   Progress Note Due Date 04/29/25   Progress Note Completed Date 03/04/25   Supervision   PT Assistant Visit Number 1   PT Goal 1   Goal Identifier ROM   Goal Description Pt will be able to perform full lumbar extension w/o pain at end range   Rationale to maximize safety and independence within the home;to maximize safety and independence with performance of ADLs and functional tasks   Goal Progress patient has increased lower back pain on the right with extension in standing. - minimal loss   Target Date 04/29/25   Subjective Report   Subjective Report Pt has been seen in PT for 3 sessions. Pt no longer with low back pain. Medically had infusion last week along with starting new BP medication with side effects present (dizziness, fatigue, pain, weakness), will meet with MD tomorrow to fully discuss medical aspect. Pt feels he is ready to continue HEP independently and will contact clinic if symptoms return in low back.   Objective Measures   Objective Measures Objective Measure 1;Objective Measure 2;Objective Measure 3   Objective Measure 1   Objective Measure LROM   Details Flex=touches toes w/o pain, Ext=mild limit with pressure vs pain, R SGIS= WNL, L SGIS =WNL   Objective Measure 2   Objective Measure Hip MMT   Details bilat 4+/5 hip flexion   Objective Measure 3   Objective Measure Functional Tool   Details ELIUD 15% improved from  20% Antonio 3 improved from Medium 5   Treatment Interventions (PT)   Interventions Therapeutic Activity;Therapeutic Procedure/Exercise   Therapeutic Procedure/Exercise   Therapeutic Procedures: strength, endurance, ROM, flexibility minutes (87595) 20   Therapeutic Procedures Ther Proc 2;Ther Proc 3   Ther Proc 1 RUIZ   Ther Proc 1 - Details 10 reps NE during, NB after   Ther Proc 2 FISit   Ther Proc 2 - Details 10 reps feels good stretch, NB after   Ther Proc 3 B LE standing exercises: hip flex, hip abd, hip SLR, toe raise x 10 B   Skilled Intervention exercise selection based on sx response   Patient Response/Progress LROM has improved, no pain reported today, progressed exercise for strength with good tolerance   Education   Learner/Method Patient;No Barriers to Learning;Pictures/Video;Demonstration;Reading;Listening   Education Comments Print HEP   Plan   Home program PTRx   Updates to plan of care DC   Comments   Comments PN/DC written in collaboration with Carla Gonzalez, PT   Total Session Time   Timed Code Treatment Minutes 20   Total Treatment Time (sum of timed and untimed services) 20       DISCHARGE  Reason for Discharge: Patient chooses to discontinue therapy.  He notes independent with HEP and exercises are helpful.  Does feel that he has many other medical appointments and can continue with his PT on his own.       Equipment Issued: none    Discharge Plan: Patient to continue home program.  Patient will call should questions arise.    Referring Provider:  Vira Flor

## 2025-04-02 ENCOUNTER — NURSE TRIAGE (OUTPATIENT)
Dept: FAMILY MEDICINE | Facility: OTHER | Age: 85
End: 2025-04-02

## 2025-04-02 ENCOUNTER — ALLIED HEALTH/NURSE VISIT (OUTPATIENT)
Dept: FAMILY MEDICINE | Facility: OTHER | Age: 85
End: 2025-04-02
Payer: COMMERCIAL

## 2025-04-02 ENCOUNTER — OFFICE VISIT (OUTPATIENT)
Dept: FAMILY MEDICINE | Facility: OTHER | Age: 85
End: 2025-04-02
Payer: COMMERCIAL

## 2025-04-02 VITALS
TEMPERATURE: 95.4 F | HEIGHT: 65 IN | SYSTOLIC BLOOD PRESSURE: 112 MMHG | HEART RATE: 72 BPM | DIASTOLIC BLOOD PRESSURE: 70 MMHG | OXYGEN SATURATION: 98 % | BODY MASS INDEX: 27.49 KG/M2 | RESPIRATION RATE: 22 BRPM | WEIGHT: 165 LBS

## 2025-04-02 VITALS — OXYGEN SATURATION: 98 % | HEART RATE: 72 BPM | DIASTOLIC BLOOD PRESSURE: 61 MMHG | SYSTOLIC BLOOD PRESSURE: 90 MMHG

## 2025-04-02 DIAGNOSIS — R60.9 EDEMA, UNSPECIFIED TYPE: ICD-10-CM

## 2025-04-02 DIAGNOSIS — M79.2: Primary | ICD-10-CM

## 2025-04-02 DIAGNOSIS — Z71.9 COUNSELED BY NURSE: Primary | ICD-10-CM

## 2025-04-02 PROCEDURE — 3078F DIAST BP <80 MM HG: CPT

## 2025-04-02 PROCEDURE — 99213 OFFICE O/P EST LOW 20 MIN: CPT | Performed by: FAMILY MEDICINE

## 2025-04-02 PROCEDURE — 3078F DIAST BP <80 MM HG: CPT | Performed by: FAMILY MEDICINE

## 2025-04-02 PROCEDURE — 99207 PR NO CHARGE NURSE ONLY: CPT

## 2025-04-02 PROCEDURE — 3074F SYST BP LT 130 MM HG: CPT | Performed by: FAMILY MEDICINE

## 2025-04-02 PROCEDURE — 3074F SYST BP LT 130 MM HG: CPT

## 2025-04-02 RX ORDER — GABAPENTIN 100 MG/1
100 CAPSULE ORAL 3 TIMES DAILY PRN
Qty: 90 CAPSULE | Refills: 1 | Status: SHIPPED | OUTPATIENT
Start: 2025-04-02

## 2025-04-02 NOTE — PROGRESS NOTES
Assessment & Plan     Neuropathic pain of ankle  I suspect he has a peripheral neuropathy in this area and which could be exacerbated by the edema.  Onset was just a few hours ago.  Will use gabapentin as needed.    - gabapentin (NEURONTIN) 100 MG capsule; Take 1 capsule (100 mg) by mouth 3 times daily as needed for neuropathic pain.    Edema, unspecified type  Hydrochlorothiazide needed to be decreased secondary to lower blood pressures and dizziness.  His blood pressures and edema.  Encouraged him to use compression socks.  He will follow-up with me as scheduled in a couple weeks.    Jaime Batres is a 85 year old, presenting for the following health issues:  Leg Pain (Started this morning)        4/2/2025    11:08 AM   Additional Questions   Roomed by poornima TOBIAS    Situation: Patient walked in to clinic with complaints of inner, lower RIGHT leg pain that woke him up this morning at 0500. Took 2 tylenol, but hasn't helped. Pain is intermittent.      Background: Has mild edema at baseline, is taking hydrochlorothiazide, started 3/19/2025     Assessment: Skin assessment is unremarkable except for mild/pitting edema that patient reports is at baseline for him per his report. Denies any other symptoms or injury to the area; denies any redness. Denies any red flag symptoms.      RIGHT inner lower leg/calf pain  Intermittent, sometimes sharp/electrical pain  Woke him up at 5:00am this morning  Took Tylenol at [id number] this morning and hasn't helped.      Protocol Recommended Disposition:   Go To Office Now, See in Office Within 3 Days     Recommendation: Will huddle with provider to see if okay to take same day appointment with arrival time in 25 minutes from time of RN visit.      Patient states he woke up at 5 AM this morning with sharp intermittent pain in his right distal medial leg.  He denies any injury.  He states the pain comes and goes.  He states Tylenol is not helpful.  He does have some edema  "of his ankles.  His hydrochlorothiazide was recently decreased secondary to lower blood pressures and dizziness.    He has finished his chemotherapy and is no longer on steroids.      Objective    /70 (BP Location: Right arm, Patient Position: Sitting, Cuff Size: Adult Regular)   Pulse 72   Temp (!) 95.4  F (35.2  C) (Temporal)   Resp 22   Ht 1.651 m (5' 5\")   Wt 74.8 kg (165 lb)   SpO2 98%   BMI 27.46 kg/m    Body mass index is 27.46 kg/m .  Physical Exam   Gen: no apparent distress  Right lower le+ edema, no skin changes.  When palpating along his inner distal calf he feels that this aggravates his pain that he gets.  He has full range of motion of his ankle.  Full range of motion of his knee.  He also finds that this area where the pain worsened with palpation also has some hypersensitivity.          Signed Electronically by: Vira Flor MD    "

## 2025-04-02 NOTE — TELEPHONE ENCOUNTER
PCP had appointment available, assisted with scheduling.    Appointments in Next Year      Apr 02, 2025 11:30 AM  (Arrive by 11:10 AM)  Provider Visit with Vira Flor MD  [facility] ([facility]) [phone]     Anju Rosa RN, BSN

## 2025-04-02 NOTE — TELEPHONE ENCOUNTER
Nurse Triage SBAR    Is this a 2nd Level Triage? NO    Situation: Patient walked in to clinic with complaints of inner, lower RIGHT leg pain that woke him up this morning at 0500. Took 2 tylenol, but hasn't helped. Pain is intermittent.     Background: Has mild edema at baseline, is taking hydrochlorothiazide, started 3/19/2025    Assessment: Skin assessment is unremarkable except for mild/pitting edema that patient reports is at baseline for him per his report. Denies any other symptoms or injury to the area; denies any redness. Denies any red flag symptoms.     RIGHT inner lower leg/calf pain  Intermittent, sometimes sharp/electrical pain  Woke him up at 5:00am this morning  Took Tylenol at [id number] this morning and hasn't helped.     Protocol Recommended Disposition:   Go To Office Now, See in Office Within 3 Days    Recommendation: Will huddle with provider to see if okay to take same day appointment with arrival time in 25 minutes from time of RN visit.      Routed to provider    DReason for Disposition   Patient wants to be seen   SEVERE pain (e.g., excruciating, unable to walk) and not improved after 2 hours of pain medicine    Additional Information   Negative: Followed an ankle injury   Negative: Foot pain is main symptom   Negative: Thigh or calf pain is main symptom   Negative: Ankle swelling is main symptom   Negative: Thigh or calf swelling is main symptom   Negative: Entire foot is cool or blue in comparison to other side   Negative: Fever and red area (or area very tender to touch)   Negative: Swollen joint and fever   Negative: Patient sounds very sick or weak to the triager   Negative: Thigh or calf pain and only 1 side and present > 1 hour   Negative: Thigh or calf swelling and only 1 side   Negative: Looks infected (spreading redness, pus) and large red area (> 2 in. or 5 cm)   Negative: Looks like a boil, infected sore, deep ulcer, or other infected rash (spreading redness, pus)   Negative:  "Redness of the skin and no fever    Answer Assessment - Initial Assessment Questions  1. ONSET: \"When did the pain start?\"       5am this morning; pain woke him up    2. LOCATION: \"Where is the pain located?\"       RIGHT inner, lower calf/leg  Sharp/electrical pain that comes and goes  Does not hurt to walk on the leg    3. PAIN: \"How bad is the pain?\"  (Scale 1-10; or mild, moderate, severe)      8-9/10; intermittent    4. WORK OR EXERCISE: \"Has there been any recent work or exercise that involved this part of the body?\"       Unknown    5. CAUSE: \"What do you think is causing the ankle pain?\"      Unknown    6. OTHER SYMPTOMS: \"Do you have any other symptoms?\" (e.g., calf pain, rash, fever, swelling)      No    7. PREGNANCY: \"Is there any chance you are pregnant?\" \"When was your last menstrual period?\"      no    Protocols used: Ankle Pain-A-[place]    "

## 2025-04-02 NOTE — PROGRESS NOTES
"Patient here for evaluation by RN for inner RIGHT lower leg pain that woke him up from sleeping this morning around 5:00am. Reports as \"sharp\" \"electrical\" pain. No known cause or source of injury.     See separate triage encounter.     BP 90/61  Pulse 72  O2 98%    Anju Rosa, RN, BSN    "

## 2025-04-15 ENCOUNTER — OFFICE VISIT (OUTPATIENT)
Dept: FAMILY MEDICINE | Facility: OTHER | Age: 85
End: 2025-04-15
Payer: COMMERCIAL

## 2025-04-15 VITALS
TEMPERATURE: 96.1 F | DIASTOLIC BLOOD PRESSURE: 54 MMHG | HEART RATE: 78 BPM | SYSTOLIC BLOOD PRESSURE: 96 MMHG | RESPIRATION RATE: 20 BRPM | HEIGHT: 65 IN | WEIGHT: 170 LBS | OXYGEN SATURATION: 98 % | BODY MASS INDEX: 28.32 KG/M2

## 2025-04-15 DIAGNOSIS — M54.50 CHRONIC BILATERAL LOW BACK PAIN WITHOUT SCIATICA: Primary | ICD-10-CM

## 2025-04-15 DIAGNOSIS — I10 ESSENTIAL HYPERTENSION WITH GOAL BLOOD PRESSURE LESS THAN 140/90: ICD-10-CM

## 2025-04-15 DIAGNOSIS — G89.29 CHRONIC BILATERAL LOW BACK PAIN WITHOUT SCIATICA: Primary | ICD-10-CM

## 2025-04-15 PROCEDURE — 99214 OFFICE O/P EST MOD 30 MIN: CPT | Performed by: FAMILY MEDICINE

## 2025-04-15 PROCEDURE — 3078F DIAST BP <80 MM HG: CPT | Performed by: FAMILY MEDICINE

## 2025-04-15 PROCEDURE — 3074F SYST BP LT 130 MM HG: CPT | Performed by: FAMILY MEDICINE

## 2025-04-15 RX ORDER — LOSARTAN POTASSIUM 50 MG/1
50 TABLET ORAL DAILY
Qty: 90 TABLET | Refills: 3 | Status: SHIPPED | OUTPATIENT
Start: 2025-04-15

## 2025-04-15 RX ORDER — HYDROCHLOROTHIAZIDE 12.5 MG/1
12.5 TABLET ORAL DAILY
Qty: 90 TABLET | Refills: 3 | Status: SHIPPED | OUTPATIENT
Start: 2025-04-15

## 2025-04-15 ASSESSMENT — ENCOUNTER SYMPTOMS: BACK PAIN: 1

## 2025-04-15 NOTE — PROGRESS NOTES
Assessment & Plan     Chronic bilateral low back pain without sciatica  Patient with several month history of back pain.  X-ray reveals degenerative changes.  He has undergone physical therapy and actually stopped doing any home exercises secondary to weakness.  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  Has not had a bone scan since last year.  He now has weakness which is affecting his gait.  At this point I feel MRI is warranted for further evaluation for possible metastatic disease versus spinal canal stenosis.  I did encourage him to continue doinghis home exercise program.    - MR Lumbar Spine w/o Contrast; Future    Essential hypertension with goal blood pressure less than 140/90  He continues to have edema.  I recommend that he uses compression stockings on a daily basis and take them off in the evening.  He had only done this for 2 days.  He is on low-dose hydrochlorothiazide.  His blood pressure is also noted to be low and this could be causing some of his weakness as well.  Will decrease his losartan from 100 mg to 50 mg daily.    - hydroCHLOROthiazide 12.5 MG tablet; Take 1 tablet (12.5 mg) by mouth daily.  - losartan (COZAAR) 50 MG tablet; Take 1 tablet (50 mg) by mouth daily.      Jaime Batres is a 85 year old, presenting for the following health issues:  Extremity Weakness and Back Pain (Low back)        4/15/2025    10:06 AM   Additional Questions   Roomed by poornima     Back Pain     History of Present Illness       Reason for visit:  +6+Follow up and new symptoms  Symptom onset:  More than a month  Symptoms include:  Weak legs and spine pain  Symptom intensity:  Moderate  Symptom progression:  Staying the same  Had these symptoms before:  No  What makes it worse:  No  What makes it better:  No   He is taking medications regularly.        He is reporting chronic back pain and progressive weakness.  He states he fell a couple of months ago.   "He was seen in the emergency department and had a back x-ray which revealed a chronic superior endplate compression fracture of L2 which was stable without retropulsion.  He also had end-stage disc degeneration with intervertebral disc height loss at L3-4 and L4-5 which were stable.  He did go to physical therapy.  He no longer is doing his home exercises as he states he is too weak.  He states after going to the store and walking around he has difficulty continuing walking and feels that he has difficulty getting to his car..  The 1 day of leg pain that he saw me for a couple of weeks ago he states only lasted that day and he only took 2 gabapentin and then has not needed to take anymore.  He states when he gets into the car he needs to sit and then lift up his legs because he is too weak to get them into the car.  He denies any pain in his groin.  He feels that his balance is off.      Objective    BP 96/54 (BP Location: Right arm, Patient Position: Sitting, Cuff Size: Adult Regular)   Pulse 78   Temp (!) 96.1  F (35.6  C) (Temporal)   Resp 20   Ht 1.651 m (5' 5\")   Wt 77.1 kg (170 lb)   SpO2 98%   BMI 28.29 kg/m    Body mass index is 28.29 kg/m .  Physical Exam   Gen: no apparent distress  Hips:  full range of motion without pain  Neuro:  strength 5/5 bilateral lower extremities, sensation grossly intact.  Gait--hunched over and slow  Ext:  2+ edema bilaterally to the knees, no skin disruption          Signed Electronically by: Vira Flor MD    "

## 2025-04-23 ENCOUNTER — HOSPITAL ENCOUNTER (OUTPATIENT)
Dept: MRI IMAGING | Facility: CLINIC | Age: 85
Discharge: HOME | End: 2025-04-23
Attending: FAMILY MEDICINE
Payer: COMMERCIAL

## 2025-04-23 DIAGNOSIS — G89.29 CHRONIC BILATERAL LOW BACK PAIN WITHOUT SCIATICA: ICD-10-CM

## 2025-04-23 DIAGNOSIS — M54.50 CHRONIC BILATERAL LOW BACK PAIN WITHOUT SCIATICA: ICD-10-CM

## 2025-04-23 PROCEDURE — 72148 MRI LUMBAR SPINE W/O DYE: CPT

## 2025-04-24 ENCOUNTER — PATIENT OUTREACH (OUTPATIENT)
Dept: ONCOLOGY | Facility: CLINIC | Age: 85
End: 2025-04-24
Payer: COMMERCIAL

## 2025-04-28 ENCOUNTER — PATIENT OUTREACH (OUTPATIENT)
Dept: ONCOLOGY | Facility: CLINIC | Age: 85
End: 2025-04-28
Payer: COMMERCIAL

## 2025-04-28 NOTE — PROGRESS NOTES
[facility]: [facility]                                                                                      Patient called reporting that he fell overnight around 2:00 am when he woke up to use the bathroom.  He reports hitting the backside of his head on the wall and getting a minor scratch that bled.  Denies losing consciousness. Denies dizziness or nausea.  Denies headache. He reports he doesn't feel well and thinks it's because he's been up since 2:00 am.  Patient requesting to reschedule today's appointment.    He was advised that it's fine to change today's appointment, but due to hitting his head writer recommends he be seen in the ED.  Patient refused.  He doesn't think he got too hurt.  Advised him that he could have an injury such as a hematoma that can not be seen without imaging.  Even with this information patient refusing to go to the ED. He understands this is against medical advice.    Iesha Owen, RN, BSN, OCN  Oncology RN Care Coordinator  [facility]

## 2025-04-29 NOTE — PROGRESS NOTES
Virtual Visit Details    Type of service:  Video Visit   Originating Location (pt. Location):  Home    Distant Location (provider location):  [facility]  Platform used for Video Visit:  Ronnie    ---------------------------------------------------------------------------------------------------------------------    FOLLOW UP VISIT  -  TELEMEDICINE      Reason for visit:  Metastatic [id number]-[id number] CRPC s/p darolutamide and 177Lu-PSMA-I&T.  Completed 6 cycles of docetaxel in 3/2025.     History of Present Illness:  Mr. Gautam is an 85-year-old man who was diagnosed with prostate cancer in 2012.  His PSA level was 5.2 ng/mL.  Clinical stage was cT1c disease.  He underwent radical prostatectomy on 8/6/2012, which revealed Dalton 4+5=9 carcinoma, stage pT2c N0 R0.  His next-generation DNA sequencing analysis ([place]) revealed a pathogenic TP53 mutation, SHIRA genotype, TMB 5 muts/Mb, and absent PD-L1 expression.  He subsequently received salvage radiotherapy, which was completed in 10/2013, concurrently with six months of androgen deprivation therapy.     He subsequently developed a biochemical recurrence, and received ADT from 2014 until 2020.  In 11/2020, he developed non-metastatic CRPC.  Darolutamide was added on 12/4/2020, to which he achieved a subsequent PSA response.  His PSA level initially dropped from 1.66 ng/mL down to a speedy of 0.07 ng/mL (6/21/2021).  However, the patient then developed a rising PSA level over the past year.  His PSA on 1/13/2022 was 0.16, the PSA on 4/20/2022 was 0.24, the PSA on 6/13/2022 was 0.34, the PSA on 7/12/2022 was 0.47, the PSA on 8/25/2022 was 0.64 ng/mL, and the PSA on 11/21/2022 was 1.55 ng/mL (with a testosterone level of 14 ng/dL).  On 3/8/2023, his PSA level increased to 8.7 ng/mL.     The patient then participated in the ECLIPSE clinical trial, and he was randomized to the 177Lu-PSMA-I&T radioligand arm.  During the screening procedures he was  found to have an asymptomatic pulmonary embolus, and he began apixaban. He received all 4 doses of [place]-PSMA-I&T, and his PSA level dropped from 8.7 down to 0.12 ng/mL.  However, his PSA level increased to 0.79 ng/mL, and his imaging (5/7/24) showed evidence of progressive disease.  He is currently on ADT plus darolutamide, but his PSA level continued to rise.  The most recent PSA (11/1/24) was 2.48 ng/mL.  He also had imaging assessments (11/1/24) which showed progressive osseous and yan disease.      On 11/18/2024, his PSA level reached 2.75 ng/mL.  At that time, he began docetaxel chemotherapy, and received six cycles.  He completed chemotherapy in 3/2025.  His PSA level has declined by 95% down to 0.15 ng/mL.  He returns for a virtual follow-up visit today.     Review of Systems:  Medardo reports that he is doing okay today but is dealing with acute low back pain again.  He presented to urgent care on 2/10/2025 and had X-rays performed.  He then followed up with his primary care provider in 2/12/2025 for it.  His MRI scan on 4/23/25 shows disc herniation and a burst fracture at the L2 vertebral level.  He has been trying gabapentin but found that this made him too sedated and was not helpful who has now discontinued this.  He has also tried Tylenol with significant improvement in topical lidocaine patches.  He finds light ibuprofen has been the most helpful in reducing his pain but he has been trying to use this sparingly.  He is set up to begin physical therapy next week.  His pain does radiate towards the left hip and down the left leg.  He is not noticing new leg weakness.  No saddle paresthesias.  No bowel or bladder changes.  He does have baseline loose bowels after chemotherapy but does not require the use of Imodium.  No significant nausea or vomiting.  No chest pain, shortness of breath, or cough.  No fevers or chills.  He is noting some increased dyspnea on exertion going up the stairs but is able to  "climb the stairs without rest.  He does not have any shortness of breath at rest he denies any other acute symptoms.  His neuropathy is present with numbness in his fingertips feels like the bottom of his feet are \"tight\".  He denies any falls.  A comprehensive 14-point ROS is negative other than the symptoms noted above in the HPI.  His ECOG score is 1.  His pain score is 4/10.     Medications:  Lupron 22.5 mg IM q3mo (next dose, 6/5/2025)  Docetaxel 75 mg/m2, stopped on 3/12/2025  Losartan/HCTZ 100/12.5 mg  Atorvastatin 10 mg  Cephalexin 500 mg  Allopurinol 100 mg  Zolpidem 5 mg  Clonazepam 1 mg  Sildenafil 50 mg  Loratadine 10 mg  Calcium + Vit D  Multivitamin  Folic acid     Physical Examination:    General: No acute distress  Vital signs within normal limits.  HEENT: Sclera anicteric. Oral mucosa pink, dry. No mucositis or thrush  Lymph: No lymphadenopathy in neck  Heart: Regular, rate, and rhythm  Lungs: Clear to ascultation bilaterally  Abdomen: Positive bowel sounds. Soft, non-tender. No organomegaly or mass.   Extremities: no lower extremity edema  Neuro: Cranial nerves grossly intact  Skin: no dermatitis     CT scan (11/1/2024):  This shows increased size of a sclerotic lesion in the proximal right humerus, although only partially included in the field-of-view.  Mixed findings in the retroperitoneal lymph nodes with some stable, some increased, and others decreased (Left para-aortic, 1.7 x 1.0 cm, previously 1.5 x 1.2 cm. Preaortic, 1.2 x 0.7 cm, previously 1.6 x 1.3 cm).    MRI Spine (4/23/25):  Acute L2 burst fracture with 25% vertebral body height loss. At the L1/L2 level, there is a symmetric disc bulge and facet arthropathy with a superimposed disc extrusion within the left lateral recess extending cephalad along the posterior margin of the L1 vertebral body measuring 1.5 cm contributing to moderate to severe spinal canal and left lateral recess narrowing with impingement of the descending left L2 " nerve root. Correlation with left L2 radiculopathy is suggested. Additional degenerative lumbar spondylosis.     Laboratories (4/25/25):  CBC shows a WBC of 3.3, hemoglobin 9.4, hematocrit 27.7%, platelets 146K.  Creatinine is 1.21 mg/dL (GFR 59 mL/min), which is stable.  His PSA level is 0.15 ng/mL.       ASSESSMENT AND PLAN:   Mr. Gautam is an 85-year-old man with Jeancarlos 4+5=9 [id number] prostate cancer who underwent radical prostatectomy (8/2012) and salvage radiotherapy (10/2013) but then developed metastatic CRPC refractory to darolutamide.  He enrolled on ECLIPSE, and received all 4 doses of 177Lu-PSMA-I&T radioligand therapy.  His disease was slowly progressing once again despite ADT plus darolutamide.  He began docetaxel chemotherapy on 11/18/2024, and received 6 cycles ending in 3/2025.  His ECOG score is 1.  His pain score is 4/10.     #Metastatic [id number]-[id number] CRPC s/p darolutamide and docetaxel  - Completed all 4 doses of 177Lu-PSMA-I&T on the ECLIPSE trial in 7/2023, which he tolerated well except for xerostomia.   - His PSA declined from 8.7 to 0.12 ng/mL, but then went back up to 2.75 ng/mL.   - His CT scan (11/1/2024) showed a new osseous metastasis as well as growing retroperitoneal lymph nodes.  - Patient is due for his next Lupron on 6/5/2025.  He would like to obtain this locally.   - We previously decided to pursue docetaxel chemotherapy, starting on 11/18/2024.  - Completed cycle #6 of docetaxel on 3/12/2025. Tolerated chemotherapy relatively well. PSA down to 0.15 ng/mL.  - He is considering moving to [place] to be closer to his son; that may happen in the summer of 2025.  - If needed, the next systemic therapy could be standard Pluvicto, now the he has received taxane chemotherapy.  - For now, I have recommended simply checking the PSA level every 2 months moving forward.      A total of 40 minutes were spent on this patient on the date of the encounter conducting chart review, review of test  results, interpretation of tests, patient visit, documentation and discussion with other provider(s).  The patient was given the opportunity to ask multiple questions today, all of which were answered to his satisfaction.    Tio Holman M.D.

## 2025-04-30 ENCOUNTER — VIRTUAL VISIT (OUTPATIENT)
Dept: ONCOLOGY | Facility: CLINIC | Age: 85
End: 2025-04-30
Attending: INTERNAL MEDICINE
Payer: COMMERCIAL

## 2025-04-30 VITALS — WEIGHT: 170 LBS | HEIGHT: 65 IN | BODY MASS INDEX: 28.32 KG/M2

## 2025-04-30 DIAGNOSIS — C61 MALIGNANT NEOPLASM OF PROSTATE (H): Primary | ICD-10-CM

## 2025-04-30 PROCEDURE — 98007 SYNCH AUDIO-VIDEO EST HI 40: CPT | Performed by: INTERNAL MEDICINE

## 2025-04-30 PROCEDURE — 1125F AMNT PAIN NOTED PAIN PRSNT: CPT | Mod: 95 | Performed by: INTERNAL MEDICINE

## 2025-04-30 ASSESSMENT — PAIN SCALES - GENERAL: PAINLEVEL_OUTOF10: MODERATE PAIN (4)

## 2025-04-30 NOTE — NURSING NOTE
Is the patient currently in the state of [place]? YES    Visit mode: VIDEO    If the visit is dropped, the patient can be reconnected by:VIDEO VISIT: Send to e-mail at: [email or web address]    Will anyone else be joining the visit? NO  (If patient encounters technical issues they should call [phone]    Are changes needed to the allergy or medication list? No    Are refills needed on medications prescribed by this physician? NO    Rooming Documentation:  Questionnaire(s) completed    Reason for visit: Video Visit (Follow up )    Daiana MAYER

## 2025-04-30 NOTE — LETTER
4/30/2025      Medardo Gautam  [address] [address] [address]      Dear Colleague,    Thank you for referring your patient, Medardo Gautam, to the [facility]. Please see a copy of my visit note below.      Virtual Visit Details    Type of service:  Video Visit   Originating Location (pt. Location):  Home    Distant Location (provider location):  [facility]  Platform used for Video Visit:  Ronnie    ---------------------------------------------------------------------------------------------------------------------    FOLLOW UP VISIT  -  TELEMEDICINE      Reason for visit:  Metastatic UP55-tnyfapa CRPC s/p darolutamide and 177Lu-PSMA-I&T.  Completed 6 cycles of docetaxel in 3/2025.     History of Present Illness:  Mr. Gautam is an 85-year-old man who was diagnosed with prostate cancer in 2012.  His PSA level was 5.2 ng/mL.  Clinical stage was cT1c disease.  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  His next-generation DNA sequencing analysis ([place]) revealed a pathogenic TP53 mutation, SHIRA genotype, TMB 5 muts/Mb, and absent PD-L1 expression.  He subsequently received salvage radiotherapy, which was completed in 10/2013, concurrently with six months of androgen deprivation therapy.     He subsequently developed a biochemical recurrence, and received ADT from 2014 until 2020.  In 11/2020, he developed non-metastatic CRPC.  Darolutamide was added on 12/4/2020, to which he achieved a subsequent PSA response.  His PSA level initially dropped from 1.66 ng/mL down to a speedy of 0.07 ng/mL (6/21/2021).  However, the patient then developed a rising PSA level over the past year.  His PSA on 1/13/2022 was 0.16, the PSA on 4/20/2022 was 0.24, the PSA on 6/13/2022 was 0.34, the PSA on 7/12/2022 was 0.47, the PSA on 8/25/2022 was 0.64 ng/mL, and the PSA on 11/21/2022 was 1.55 ng/mL (with a  testosterone level of 14 ng/dL).  On 3/8/2023, his PSA level increased to 8.7 ng/mL.     The patient then participated in the ECLIPSE clinical trial, and he was randomized to the 177Lu-PSMA-I&T radioligand arm.  During the screening procedures he was found to have an asymptomatic pulmonary embolus, and he began apixaban. He received all 4 doses of [place]-PSMA-I&T, and his PSA level dropped from 8.7 down to 0.12 ng/mL.  However, his PSA level increased to 0.79 ng/mL, and his imaging (5/7/24) showed evidence of progressive disease.  He is currently on ADT plus darolutamide, but his PSA level continued to rise.  The most recent PSA (11/1/24) was 2.48 ng/mL.  He also had imaging assessments (11/1/24) which showed progressive osseous and yan disease.      On 11/18/2024, his PSA level reached 2.75 ng/mL.  At that time, he began docetaxel chemotherapy, and received six cycles.  He completed chemotherapy in 3/2025.  His PSA level has declined by 95% down to 0.15 ng/mL.  He returns for a virtual follow-up visit today.     Review of Systems:  Medardo reports that he is doing okay today but is dealing with acute low back pain again.  He presented to urgent care on 2/10/2025 and had X-rays performed.  He then followed up with his primary care provider in 2/12/2025 for it.  His MRI scan on 4/23/25 shows disc herniation and a burst fracture at the L2 vertebral level.  He has been trying gabapentin but found that this made him too sedated and was not helpful who has now discontinued this.  He has also tried Tylenol with significant improvement in topical lidocaine patches.  He finds light ibuprofen has been the most helpful in reducing his pain but he has been trying to use this sparingly.  He is set up to begin physical therapy next week.  His pain does radiate towards the left hip and down the left leg.  He is not noticing new leg weakness.  No saddle paresthesias.  No bowel or bladder changes.  He does have baseline loose bowels  "after chemotherapy but does not require the use of Imodium.  No significant nausea or vomiting.  No chest pain, shortness of breath, or cough.  No fevers or chills.  He is noting some increased dyspnea on exertion going up the stairs but is able to climb the stairs without rest.  He does not have any shortness of breath at rest he denies any other acute symptoms.  His neuropathy is present with numbness in his fingertips feels like the bottom of his feet are \"tight\".  He denies any falls.  A comprehensive 14-point ROS is negative other than the symptoms noted above in the HPI.  His ECOG score is 1.  His pain score is 4/10.     Medications:  Lupron 22.5 mg IM q3mo (next dose, 6/5/2025)  Docetaxel 75 mg/m2, stopped on 3/12/2025  Losartan/HCTZ 100/12.5 mg  Atorvastatin 10 mg  Cephalexin 500 mg  Allopurinol 100 mg  Zolpidem 5 mg  Clonazepam 1 mg  Sildenafil 50 mg  Loratadine 10 mg  Calcium + Vit D  Multivitamin  Folic acid     Physical Examination:    General: No acute distress  Vital signs within normal limits.  HEENT: Sclera anicteric. Oral mucosa pink, dry. No mucositis or thrush  Lymph: No lymphadenopathy in neck  Heart: Regular, rate, and rhythm  Lungs: Clear to ascultation bilaterally  Abdomen: Positive bowel sounds. Soft, non-tender. No organomegaly or mass.   Extremities: no lower extremity edema  Neuro: Cranial nerves grossly intact  Skin: no dermatitis     CT scan (11/1/2024):  This shows increased size of a sclerotic lesion in the proximal right humerus, although only partially included in the field-of-view.  Mixed findings in the retroperitoneal lymph nodes with some stable, some increased, and others decreased (Left para-aortic, 1.7 x 1.0 cm, previously 1.5 x 1.2 cm. Preaortic, 1.2 x 0.7 cm, previously 1.6 x 1.3 cm).    MRI Spine (4/23/25):  Acute L2 burst fracture with 25% vertebral body height loss. At the L1/L2 level, there is a symmetric disc bulge and facet arthropathy with a superimposed disc " extrusion within the left lateral recess extending cephalad along the posterior margin of the L1 vertebral body measuring 1.5 cm contributing to moderate to severe spinal canal and left lateral recess narrowing with impingement of the descending left L2 nerve root. Correlation with left L2 radiculopathy is suggested. Additional degenerative lumbar spondylosis.     Laboratories (4/25/25):  CBC shows a WBC of 3.3, hemoglobin 9.4, hematocrit 27.7%, platelets 146K.  Creatinine is 1.21 mg/dL (GFR 59 mL/min), which is stable.  His PSA level is 0.15 ng/mL.       ASSESSMENT AND PLAN:   Mr. Gautam is an 85-year-old man with Jeancarlos 4+5=9 [id number] prostate cancer who underwent radical prostatectomy (8/2012) and salvage radiotherapy (10/2013) but then developed metastatic CRPC refractory to darolutamide.  He enrolled on ECLIPSE, and received all 4 doses of 177Lu-PSMA-I&T radioligand therapy.  His disease was slowly progressing once again despite ADT plus darolutamide.  He began docetaxel chemotherapy on 11/18/2024, and received 6 cycles ending in 3/2025.  His ECOG score is 1.  His pain score is 4/10.     #Metastatic JT23-pcqaine CRPC s/p darolutamide and docetaxel  - Completed all 4 doses of 177Lu-PSMA-I&T on the ECLIPSE trial in 7/2023, which he tolerated well except for xerostomia.   - His PSA declined from 8.7 to 0.12 ng/mL, but then went back up to 2.75 ng/mL.   - His CT scan (11/1/2024) showed a new osseous metastasis as well as growing retroperitoneal lymph nodes.  - Patient is due for his next Lupron on 6/5/2025.  He would like to obtain this locally.   - We previously decided to pursue docetaxel chemotherapy, starting on 11/18/2024.  - Completed cycle #6 of docetaxel on 3/12/2025. Tolerated chemotherapy relatively well. PSA down to 0.15 ng/mL.  - He is considering moving to [place] to be closer to his son; that may happen in the summer of 2025.  - If needed, the next systemic therapy could be standard Pluvicto, now the  he has received taxane chemotherapy.  - For now, I have recommended simply checking the PSA level every 2 months moving forward.      A total of 40 minutes were spent on this patient on the date of the encounter conducting chart review, review of test results, interpretation of tests, patient visit, documentation and discussion with other provider(s).  The patient was given the opportunity to ask multiple questions today, all of which were answered to his satisfaction.    Tio Holman M.D.      Again, thank you for allowing me to participate in the care of your patient.        Sincerely,        Tio Holman MD    Electronically signed

## 2025-04-30 NOTE — CONFIDENTIAL NOTE
NEUROSURGERY - NEW PREVISIT PLANNING    Referring Provider: Vira Flor MD    OVN 4/15/2025   Reason For Visit: M48.061 (ICD-10-CM) - Spinal stenosis of lumbar region, unspecified whether neurogenic claudication present   S32.029S (ICD-10-CM) - Closed fracture of second lumbar vertebra, unspecified fracture morphology, sequela          IMAGING STATUS/LOCATION DATE/TYPE   MRI PACS 04/23/2025  Lumbar  [facility]   CT N/A    XRAY PACS 02/10/2025  Lumbar  [facility]   NOTES STATUS/LOCATION DATE/TYPE   Other specialist OVN: N/A    EMG N/A    INJECTION Encounters 10/30/2017, lumbar MAYKEL   PHYSICAL THERAPY Encounters 2025   SURGERY N/A      Does patient have C2C?  Year last updated Action     YES   [x]   2019   Please update at appointment if outdated more than 5 years       NO     []   N/A   Please complete C2C at appointment

## 2025-05-02 ENCOUNTER — PRE VISIT (OUTPATIENT)
Dept: NEUROSURGERY | Facility: CLINIC | Age: 85
End: 2025-05-02

## 2025-05-02 ENCOUNTER — ANCILLARY PROCEDURE (OUTPATIENT)
Dept: GENERAL RADIOLOGY | Facility: CLINIC | Age: 85
End: 2025-05-02
Attending: NURSE PRACTITIONER
Payer: COMMERCIAL

## 2025-05-02 DIAGNOSIS — S32.029A L2 VERTEBRAL FRACTURE (H): ICD-10-CM

## 2025-05-02 DIAGNOSIS — S32.029A L2 VERTEBRAL FRACTURE (H): Primary | ICD-10-CM

## 2025-05-02 PROCEDURE — 72100 X-RAY EXAM L-S SPINE 2/3 VWS: CPT | Mod: TC | Performed by: RADIOLOGY

## 2025-05-08 ENCOUNTER — OFFICE VISIT (OUTPATIENT)
Dept: NEUROSURGERY | Facility: CLINIC | Age: 85
End: 2025-05-08
Payer: COMMERCIAL

## 2025-05-08 VITALS
HEART RATE: 80 BPM | OXYGEN SATURATION: 97 % | WEIGHT: 171 LBS | DIASTOLIC BLOOD PRESSURE: 74 MMHG | BODY MASS INDEX: 28.46 KG/M2 | RESPIRATION RATE: 16 BRPM | TEMPERATURE: 96.3 F | SYSTOLIC BLOOD PRESSURE: 122 MMHG

## 2025-05-08 DIAGNOSIS — M54.16 LUMBAR RADICULOPATHY: Primary | ICD-10-CM

## 2025-05-08 ASSESSMENT — PAIN SCALES - GENERAL: PAINLEVEL_OUTOF10: MILD PAIN (2)

## 2025-05-08 NOTE — PROGRESS NOTES
[facility] Neurosurgery  Neurosurgery Clinic Visit      CC: back and right thigh pain with left leg weakness    Primary care Provider: Vira Flor    Reason For Visit:   I was asked by Dr. Vira Flor to consult on the patient for spinal stenosis of lumbar spine, lumbar 2 closed compression fracture.    HPI: Medardo Gautam is a 85 year old male with a history of prostate cancer who presents with back pain. Underwent XR imaging at [facility] urgent care 2/10/2025 revealing L2 fracture, suspected to be a chronic fracture due to lack of midline tenderness at that time. Follow up with PCP and a referral to PT was recommended. Due to ongoing pain with radicular symptoms and weakness, a lumbar MRI was obtained and referral to neurosurgery was recommended. Today he reports a 1 month history of worsened back pain to the right hip and anterior thigh. He reports weakness in the left leg as well with difficulty walking up stairs due to weakness. Has also had difficulty with multiple loose stools, however this has occurred with chemotherapy as well. No saddle numbness.     May 8, 2025:   Returns for follow up.  Reports experiencing weakness in both legs, with the left leg being more affected. He describes the worst pain occurring in the morning upon getting out of bed, which diminishes after a couple of hours of walking. The weakness contributes to stability problems, particularly when climbing stairs, necessitating frequent pauses. He recalls a fall in late January, landing on his back, but did not experience immediate issues. However, in February, after bending and twisting to address an itching foot, he began experiencing significant pain. An MRI revealed a fracture in the L2 vertebra, bulging of the disc, and a herniated disc on the left at L1-2, as well as L3-4 and 4-5 severe disc degeneration.     Past Medical History:   Diagnosis Date    Anxiety     Colon cancer (H) 01/2015    Contracture of finger  joint ca 2005    left and right fifth fingers    Contracture of finger joint 12/07/2012    Dupuytren's contracture of left hand     Gout     Hemorrhoids 06/04/2007     Problem list name updated by automated process. Provider to review    HEMORRHOIDS NOS 06/04/2007    Hyperbilirubinemia 05/01/2012    Hypertension     Insomnia     Nonsenile cataract     Personal history of colonic polyps 7 years ago?    Primary osteoarthritis of left knee 08/06/2022    Prostate cancer (H) 02/2012    Renal cyst 06/22/2017    Seborrheic dermatitis     Skin lesion- R side of face and behind L ear 12/15/2011    SKIN SENSATION DISTURB 12/29/2006    allergic reaction to strawberries    SKIN SENSATION DISTURB 12/29/2006       Past Medical History reviewed with patient during visit.    Past Surgical History:   Procedure Laterality Date    APPENDECTOMY      BIOPSY  01/16/2015    CATARACT IOL, RT/LT Left 02/15/2018    COLON SURGERY  02/11/2015    Lap assisted R hemicolectomy    COLONOSCOPY  10/25/2007    Snare polypectomy    COLONOSCOPY  06/25/2009    with snare polypectomy    COLONOSCOPY  09/30/2009    COLONOSCOPY  01/05/2011    COLONOSCOPY performed by JUD MARIEE at  GI    COLONOSCOPY N/A 01/16/2015    Procedure: COMBINED COLONOSCOPY, SINGLE OR MULTIPLE BIOPSY/POLYPECTOMY BY BIOPSY;  Surgeon: Sarah Beth Pisano MD;  Location: [facility] OR    COLONOSCOPY WITH CO2 INSUFFLATION N/A 01/16/2015    Procedure: COLONOSCOPY WITH CO2 INSUFFLATION;  Surgeon: Sarah Beth Pisano MD;  Location: [facility] OR    COLONOSCOPY WITH CO2 INSUFFLATION N/A 09/07/2018    Procedure: COLONOSCOPY WITH CO2 INSUFFLATION;  C18.9 (ICD-10-CM) - Malignant neoplasm of colon, unspecified part of colon (H)  [facility] [place] [facility] fax# [phone]  BMI 26.29  [employer]  Referred by dr thomas;  Surgeon: Sarah Beth Pisano MD;  Location: [facility] OR    COLONOSCOPY WITH CO2 INSUFFLATION N/A 09/10/2021    Procedure: COLONOSCOPY, WITH CO2 INSUFFLATION;  Surgeon: Sarah Beth Pisano MD;   Location: [facility] OR    DAVINCI PROSTATECTOMY  08/06/2012    Procedure: DAVINCI PROSTATECTOMY;  Davinci Assisted Radical Prostatectomy with Bilateral Lymphadenectomy ;  Surgeon: Norma Goodwin MD;  Location: [facility] OR    GENITOURINARY SURGERY      prostate surgery    INJECT EPIDURAL LUMBAR / SACRAL SINGLE Left 10/30/2017    Procedure: INJECT EPIDURAL LUMBAR / SACRAL SINGLE;  Left Transforaminal Lumbar 4-Lumbar 5 Epidural Steroid Injection;  Surgeon: Nuvia Reina MD;  Location: [facility] OR    LAPAROSCOPIC ASSISTED COLECTOMY N/A 02/11/2015    Procedure: LAPAROSCOPIC ASSISTED COLECTOMY;  Surgeon: Oren Breen MD;  Location: [facility] OR    PHACOEMULSIFICATION CLEAR CORNEA WITH STANDARD INTRAOCULAR LENS IMPLANT Left 02/15/2018    Procedure: PHACOEMULSIFICATION CLEAR CORNEA WITH STANDARD INTRAOCULAR LENS IMPLANT;  LEFT EYE CATARACT EXTRACTION WITH STANDARD INTRAOCULAR LENS IMPLANT ;  Surgeon: Brandon Orellana MD;  Location: [facility]    PHACOEMULSIFICATION CLEAR CORNEA WITH STANDARD INTRAOCULAR LENS IMPLANT Right 03/01/2018    Procedure: PHACOEMULSIFICATION CLEAR CORNEA WITH STANDARD INTRAOCULAR LENS IMPLANT;  RIGHT EYE CATARACT EXTRACTION WITH STANDARD INTRAOCULAR LENS IMPLANT ;  Surgeon: Brandon Orellana MD;  Location: [facility]    ZZC HAND/FINGER SURGERY UNLISTED      ZZC LENGTHEN,TENDON,HAND/FINGER  ca 2007    right fifth    [facility] STOMACH SURGERY PROCEDURE UNLISTED       Past Surgical History reviewed with patient during visit.    Current Outpatient Medications   Medication Sig Dispense Refill    allopurinol (ZYLOPRIM) 100 MG tablet Take 1 tablet (100 mg) by mouth daily. 90 tablet 3    atorvastatin (LIPITOR) 10 MG tablet Take 1 tablet (10 mg) by mouth daily. 90 tablet 3    calcium-vitamin D (CALTRATE) 600-400 MG-UNIT per tablet Take 1 tablet by mouth daily      clonazePAM (KLONOPIN) 2 MG tablet Take 1 tablet (2 mg) by mouth nightly as needed for anxiety. 30 tablet 2    folic acid-vit B6-vit B12 (FOLGARD)  0.8-10-0.115 MG TABS Take 1 tablet by mouth daily      gabapentin (NEURONTIN) 100 MG capsule Take 1 capsule (100 mg) by mouth 3 times daily as needed for neuropathic pain. 90 capsule 1    hydroCHLOROthiazide 12.5 MG tablet Take 1 tablet (12.5 mg) by mouth daily. 90 tablet 3    leuprolide (LUPRON DEPOT) 22.5 MG kit Inject 22.5 mg into the muscle every 3 months 1 each 3    losartan (COZAAR) 50 MG tablet Take 1 tablet (50 mg) by mouth daily. 90 tablet 3    Omega-3 Fatty Acids (OMEGA-3 FISH OIL PO) Take 500 mg by mouth daily.      sildenafil (VIAGRA) 50 MG tablet TAKE 1 TABLET BY MOUTH THREE TIMES A WEEK 12 tablet 0    sodium chloride (OCEAN) 0.65 % nasal spray Spray 1-2 sprays in nostril 2 times daily as needed for congestion. 15 mL 11    zolpidem (AMBIEN) 5 MG tablet Take 1 tablet (5 mg) by mouth nightly as needed for sleep. 30 tablet 5     Current Facility-Administered Medications   Medication Dose Route Frequency Provider Last Rate Last Admin    [START ON 2025] leuprolide (LUPRON DEPOT) kit 22.5 mg  22.5 mg Intramuscular Q90 Days Tio Holman MD        leuprolide (LUPRON DEPOT) kit 22.5 mg  22.5 mg Intramuscular Q90 Days Tio Holman MD   22.5 mg at 25 1010       No Known Allergies    Social History     Socioeconomic History    Marital status:      Spouse name: Joyce    Number of children: 2    Years of education: None    Highest education level: None   Occupational History    Occupation: retired     Employer: RETIRED   Tobacco Use    Smoking status: Former     Current packs/day: 0.00     Types: Cigarettes, Cigars, Pipe     Start date: 10/1/1961     Quit date: 1962     Years since quittin.3     Passive exposure: Never    Smokeless tobacco: Never    Tobacco comments:     No smokers in home   Vaping Use    Vaping status: Never Used   Substance and Sexual Activity    Alcohol use: Yes     Comment: A whisky and a glass of wine    Drug use: Yes     Types: Benzodiazepines     Sexual activity: Not Currently     Partners: Female     Birth control/protection: None     Comment: not currently active   Other Topics Concern     Service No    Blood Transfusions No    Caffeine Concern No    Occupational Exposure No    Hobby Hazards No    Sleep Concern Yes    Stress Concern No    Weight Concern Yes    Special Diet No    Back Care No    Exercise No    Bike Helmet No     Comment: N/A    Seat Belt Yes    Self-Exams Yes    Parent/sibling w/ CABG, MI or angioplasty before 65F 55M? No     Social Drivers of Health     Financial Resource Strain: Unknown (6/20/2024)    Financial Resource Strain     Within the past 12 months, have you or your family members you live with been unable to get utilities (heat, electricity) when it was really needed?: Patient declined   Food Insecurity: Low Risk  (6/20/2024)    Food Insecurity     Within the past 12 months, did you worry that your food would run out before you got money to buy more?: No     Within the past 12 months, did the food you bought just not last and you didn t have money to get more?: No   Transportation Needs: Low Risk  (6/20/2024)    Transportation Needs     Within the past 12 months, has lack of transportation kept you from medical appointments, getting your medicines, non-medical meetings or appointments, work, or from getting things that you need?: No   Physical Activity: Unknown (6/20/2024)    Exercise Vital Sign     Days of Exercise per Week: 0 days   Stress: Stress Concern Present (6/20/2024)    [employer] [employer] - Occupational Stress Questionnaire     Feeling of Stress : Very much   Interpersonal Safety: Low Risk  (3/4/2025)    Interpersonal Safety     Do you feel physically and emotionally safe where you currently live?: Yes     Within the past 12 months, have you been hit, slapped, kicked or otherwise physically hurt by someone?: No     Within the past 12 months, have you been humiliated or emotionally abused in  other ways by your partner or ex-partner?: No   Housing Stability: Low Risk  (2024)    Housing Stability     Do you have housing? : Yes     Are you worried about losing your housing?: No       Family History   Problem Relation Age of Onset    Cerebrovascular Disease Father     Cancer Mother 90        Patient believes vaginal cancer - hysterectomy,     Alzheimer Disease Mother     Cancer Sister     Breast Cancer Sister     C.A.D. No family hx of     Cancer - colorectal No family hx of     Prostate Cancer No family hx of     Blood Disease No family hx of     Cardiovascular No family hx of     Circulatory No family hx of     Eye Disorder No family hx of     Gastrointestinal Disease No family hx of     Genitourinary Problems No family hx of     Lipids No family hx of     Neurologic Disorder No family hx of     Respiratory No family hx of     Asthma No family hx of     Heart Disease No family hx of     Diabetes No family hx of     Hypertension No family hx of     Arthritis No family hx of     Thyroid Disease No family hx of     Musculoskeletal Disorder No family hx of     Glaucoma No family hx of     Macular Degeneration No family hx of          ROS: 10 point ROS neg other than the symptoms noted above in the HPI.    Vital Signs:   /74   Pulse 80   Temp (!) 96.3  F (35.7  C) (Temporal)   Resp 16   Wt 77.6 kg (171 lb)   SpO2 97%   BMI 28.46 kg/m        Examination:  Constitutional:  Alert, well nourished, NAD.  Memory: recent and remote memory   HEENT: Normocephalic, atraumatic.   Pulm:  Without shortness of breath   CV:  No pitting edema of BLE.      Neurological:  Awake  Alert  Oriented x 3  Speech clear    Motor exam:   Hip Flexion:                 Right: 5/5  Left:  4+/5  Hip Abduction:             Right:  5/5  Left:  5/5  Hip Adduction:             Right:  5/5  Left:  5/5  Plantar Flexion:           Right:  5/5  Left:  5/5  Dorsal Flexion:            Right:  5/5  Left:  4-/5  EHL:                             Right:  5/5  Left:  5/5     Sensation normal to bilateral upper and lower extremities  Muscle tone to bilateral upper and lower extremities   Gait: Able to stand from a seated position. Normal non-antalgic, non-myelopathic gait.  Able to heel/toe walk without loss of balance    Lumbar examination reveals tenderness of the spine.  Hip height is symmetrical. Negative SI joint, sciatic notch or greater trochanteric tenderness to palpation bilaterally.  Straight leg raise is negative bilaterally.      Imaging:   Narrative & Impression   EXAM: XR LUMBAR SPINE 2/3 VIEWS  LOCATION: [facility]  DATE: 5/2/2025     INDICATION: L2 vertebral fracture (H).  COMPARISON: Lumbar spine MRI dated 4/23/2025. Lumbar spine radiographs 2/10/2025.                                                                      IMPRESSION:   Nomenclature is based on 5 lumbar vertebral bodies. Minimal dextroconvex curvature lower thoracic spine. Mild broad levoconvex curvature of the lumbar spine. Mild grade 1 anterolisthesis of L4 on L5. No significant change in previously demonstrated L2   superior endplate burst fracture with some retropulsion of the posterior superior endplate into the ventral epidural space, better assessed on the prior MRI. No other gross vertebral body height loss is identified. Shallow multilevel Schmorl's nodes are   better characterized on the prior MRI. Diffuse osseous demineralization.     Severe disc space narrowing L3-L4 and L4-L5 and lesser degrees of mild/moderate disc space narrowing elsewhere. Scattered marginal endplate osteophytes. Multilevel degenerative facet arthropathy.     Narrative & Impression   EXAM: MR LUMBAR SPINE W/O CONTRAST  LOCATION: [facility]  DATE: 4/23/2025     INDICATION:  Chronic bilateral low back pain without sciatica, Chronic bilateral low back pain without sciatica  COMPARISON: None.  TECHNIQUE: Routine Lumbar  Spine MRI without IV contrast.     FINDINGS:   Nomenclature is based on 5 lumbar type vertebral bodies. Acute L2 burst fracture with 25% vertebral body height loss. Anterolisthesis of L4 and L5 measuring 3 mm. Anterior marginal osteophytes at L2/L3-L4/L5. Multilevel degenerative disc disease of the   lumbar spine with disc height loss, most pronounced at L3/L4 and L4/L5. Normal distal spinal cord and cauda equina with conus medullaris at the inferior plate of L1. The partially imaged intra-abdominal contents are unremarkable. Unremarkable visualized   bony pelvis.     T12-L1: Normal disc signal and disc height. No posterior disc bulge or spinal canal narrowing. No neural foraminal narrowing.      L1-L2: Mild loss of disc signal and disc height. Symmetric disc bulge and facet arthropathy with a superimposed disc extrusion within the left lateral recess extending cephalad along the posterior margin of the L1 vertebral body measuring 1.5 cm   contributing to moderate to severe spinal canal and left lateral recess narrowing with impingement of the descending left L2 nerve root. Correlation with left L2 radiculopathy is suggested. Severe bilateral neural foraminal narrowing.     L2-L3: Mild loss of disc signal and disc height. Symmetric disc bulge and moderate facet arthropathy without significant spinal canal narrowing. Moderate bilateral neural foraminal narrowing.      L3-L4: Symmetric disc bulge and moderate facet arthropathy without spinal spinal canal narrowing. Moderate bilateral neural foraminal narrowing.     L4-L5: Moderate loss of disc signal and disc height. Symmetric disc bulge and moderate facet arthropathy with mild spinal canal narrowing. Severe bilateral neural foraminal narrowing.     L5-S1: Normal disc signal and disc height. No posterior disc bulge or spinal canal narrowing. Mild bilateral facet arthropathy. No neural foraminal narrowing.                                                                       IMPRESSION:  1.  Acute L2 burst fracture with 25% vertebral body height loss.   2.  At the L1/L2 level, there is a symmetric disc bulge and facet arthropathy with a superimposed disc extrusion within the left lateral recess extending cephalad along the posterior margin of the L1 vertebral body measuring 1.5 cm contributing to   moderate to severe spinal canal and left lateral recess narrowing with impingement of the descending left L2 nerve root. Correlation with left L2 radiculopathy is suggested.  3.  Additional degenerative lumbar spondylosis with level by level analysis as described above.     Assessment/Plan:   Lumbar 2 burst fracture  Left leg weakness  Lumbar radiculopathy    Reviewed options of starting with PT and MAYKEL, consideration of L1-2 laminectomy and discectomy, or less likely to pursue a more significant constructive operation such as a fusion  Patient and his daughter plan to discuss between a conservative approach versus surgical decompression and will notify us

## 2025-05-08 NOTE — LETTER
5/8/2025      Medardo Gautam  [address] [facility] [id number]      Dear Colleague,    Thank you for referring your patient, Medardo Gautam, to the [facility] NEUROSURGERY [facility]INIC [place]. Please see a copy of my visit note below.    [facility] Neurosurgery  Neurosurgery Clinic Visit      CC: back and right thigh pain with left leg weakness    Primary care Provider: Vira Flor    Reason For Visit:   I was asked by Dr. Vira Flor to consult on the patient for spinal stenosis of lumbar spine, lumbar 2 closed compression fracture.    HPI: Medardo Gautam is a 85 year old male with a history of prostate cancer who presents with back pain. Underwent XR imaging at [facility] urgent care 2/10/2025 revealing L2 fracture, suspected to be a chronic fracture due to lack of midline tenderness at that time. Follow up with PCP and a referral to PT was recommended. Due to ongoing pain with radicular symptoms and weakness, a lumbar MRI was obtained and referral to neurosurgery was recommended. Today he reports a 1 month history of worsened back pain to the right hip and anterior thigh. He reports weakness in the left leg as well with difficulty walking up stairs due to weakness. Has also had difficulty with multiple loose stools, however this has occurred with chemotherapy as well. No saddle numbness.     May 8, 2025:   Returns for follow up.  Reports experiencing weakness in both legs, with the left leg being more affected. He describes the worst pain occurring in the morning upon getting out of bed, which diminishes after a couple of hours of walking. The weakness contributes to stability problems, particularly when climbing stairs, necessitating frequent pauses. He recalls a fall in late January, landing on his back, but did not experience immediate issues. However, in February, after bending and twisting to address an itching foot, he began experiencing significant pain. An MRI revealed a  fracture in the L2 vertebra, bulging of the disc, and a herniated disc on the left at L1-2, as well as L3-4 and 4-5 severe disc degeneration.     Past Medical History:   Diagnosis Date     Anxiety      Colon cancer (H) 01/2015     Contracture of finger joint ca 2005    left and right fifth fingers     Contracture of finger joint 12/07/2012     Dupuytren's contracture of left hand      Gout      Hemorrhoids 06/04/2007     Problem list name updated by automated process. Provider to review     HEMORRHOIDS NOS 06/04/2007     Hyperbilirubinemia 05/01/2012     Hypertension      Insomnia      Nonsenile cataract      Personal history of colonic polyps 7 years ago?     Primary osteoarthritis of left knee 08/06/2022     Prostate cancer (H) 02/2012     Renal cyst 06/22/2017     Seborrheic dermatitis      Skin lesion- R side of face and behind L ear 12/15/2011     SKIN SENSATION DISTURB 12/29/2006    allergic reaction to strawberries     SKIN SENSATION DISTURB 12/29/2006       Past Medical History reviewed with patient during visit.    Past Surgical History:   Procedure Laterality Date     APPENDECTOMY       BIOPSY  01/16/2015     CATARACT IOL, RT/LT Left 02/15/2018     COLON SURGERY  02/11/2015    Lap assisted R hemicolectomy     COLONOSCOPY  10/25/2007    Snare polypectomy     COLONOSCOPY  06/25/2009    with snare polypectomy     COLONOSCOPY  09/30/2009     COLONOSCOPY  01/05/2011    COLONOSCOPY performed by JUD MARIEE at  GI     COLONOSCOPY N/A 01/16/2015    Procedure: COMBINED COLONOSCOPY, SINGLE OR MULTIPLE BIOPSY/POLYPECTOMY BY BIOPSY;  Surgeon: Sarah Beth Pisano MD;  Location: [facility] OR     COLONOSCOPY WITH CO2 INSUFFLATION N/A 01/16/2015    Procedure: COLONOSCOPY WITH CO2 INSUFFLATION;  Surgeon: Sarah Beth Pisano MD;  Location: [facility] OR     COLONOSCOPY WITH CO2 INSUFFLATION N/A 09/07/2018    Procedure: COLONOSCOPY WITH CO2 INSUFFLATION;  C18.9 (ICD-10-CM) - Malignant neoplasm of colon, unspecified part of colon  (H)  [facility] [place] [facility] fax# [phone]  BMI 26.29  [employer]  Referred by dr thomas;  Surgeon: Sarah Beth Pisano MD;  Location: [facility] OR     COLONOSCOPY WITH CO2 INSUFFLATION N/A 09/10/2021    Procedure: COLONOSCOPY, WITH CO2 INSUFFLATION;  Surgeon: Sarah Beth Pisano MD;  Location: [facility] OR     DAVINCI PROSTATECTOMY  08/06/2012    Procedure: DAVINCI PROSTATECTOMY;  Davinci Assisted Radical Prostatectomy with Bilateral Lymphadenectomy ;  Surgeon: Norma Goodwin MD;  Location: [facility] OR     GENITOURINARY SURGERY      prostate surgery     INJECT EPIDURAL LUMBAR / SACRAL SINGLE Left 10/30/2017    Procedure: INJECT EPIDURAL LUMBAR / SACRAL SINGLE;  Left Transforaminal Lumbar 4-Lumbar 5 Epidural Steroid Injection;  Surgeon: Nuvia Reina MD;  Location: [facility] OR     LAPAROSCOPIC ASSISTED COLECTOMY N/A 02/11/2015    Procedure: LAPAROSCOPIC ASSISTED COLECTOMY;  Surgeon: Oren Breen MD;  Location: [facility] OR     PHACOEMULSIFICATION CLEAR CORNEA WITH STANDARD INTRAOCULAR LENS IMPLANT Left 02/15/2018    Procedure: PHACOEMULSIFICATION CLEAR CORNEA WITH STANDARD INTRAOCULAR LENS IMPLANT;  LEFT EYE CATARACT EXTRACTION WITH STANDARD INTRAOCULAR LENS IMPLANT ;  Surgeon: Brandon Orellana MD;  Location: [facility]     PHACOEMULSIFICATION CLEAR CORNEA WITH STANDARD INTRAOCULAR LENS IMPLANT Right 03/01/2018    Procedure: PHACOEMULSIFICATION CLEAR CORNEA WITH STANDARD INTRAOCULAR LENS IMPLANT;  RIGHT EYE CATARACT EXTRACTION WITH STANDARD INTRAOCULAR LENS IMPLANT ;  Surgeon: Brandon Orellana MD;  Location:  [facility]     ZZC HAND/FINGER SURGERY UNLISTED       ZZC LENGTHEN,TENDON,HAND/FINGER  ca 2007    right fifth     [facility] STOMACH SURGERY PROCEDURE UNLISTED       Past Surgical History reviewed with patient during visit.    Current Outpatient Medications   Medication Sig Dispense Refill     allopurinol (ZYLOPRIM) 100 MG tablet Take 1 tablet (100 mg) by mouth daily. 90 tablet 3     atorvastatin (LIPITOR) 10 MG tablet Take  1 tablet (10 mg) by mouth daily. 90 tablet 3     calcium-vitamin D (CALTRATE) 600-400 MG-UNIT per tablet Take 1 tablet by mouth daily       clonazePAM (KLONOPIN) 2 MG tablet Take 1 tablet (2 mg) by mouth nightly as needed for anxiety. 30 tablet 2     folic acid-vit B6-vit B12 (FOLGARD) 0.8-10-0.115 MG TABS Take 1 tablet by mouth daily       gabapentin (NEURONTIN) 100 MG capsule Take 1 capsule (100 mg) by mouth 3 times daily as needed for neuropathic pain. 90 capsule 1     hydroCHLOROthiazide 12.5 MG tablet Take 1 tablet (12.5 mg) by mouth daily. 90 tablet 3     leuprolide (LUPRON DEPOT) 22.5 MG kit Inject 22.5 mg into the muscle every 3 months 1 each 3     losartan (COZAAR) 50 MG tablet Take 1 tablet (50 mg) by mouth daily. 90 tablet 3     Omega-3 Fatty Acids (OMEGA-3 FISH OIL PO) Take 500 mg by mouth daily.       sildenafil (VIAGRA) 50 MG tablet TAKE 1 TABLET BY MOUTH THREE TIMES A WEEK 12 tablet 0     sodium chloride (OCEAN) 0.65 % nasal spray Spray 1-2 sprays in nostril 2 times daily as needed for congestion. 15 mL 11     zolpidem (AMBIEN) 5 MG tablet Take 1 tablet (5 mg) by mouth nightly as needed for sleep. 30 tablet 5     Current Facility-Administered Medications   Medication Dose Route Frequency Provider Last Rate Last Admin     [START ON 6/12/2025] leuprolide (LUPRON DEPOT) kit 22.5 mg  22.5 mg Intramuscular Q90 Days Tio Holman MD         leuprolide (LUPRON DEPOT) kit 22.5 mg  22.5 mg Intramuscular Q90 Days Tio Holman MD   22.5 mg at 03/07/25 1010       No Known Allergies    Social History     Socioeconomic History     Marital status:      Spouse name: Joyce     Number of children: 2     Years of education: None     Highest education level: None   Occupational History     Occupation: retired     Employer: RETIRED   Tobacco Use     Smoking status: Former     Current packs/day: 0.00     Types: Cigarettes, Cigars, Pipe     Start date: 10/1/1961     Quit date: 1/1/1962     Years  since quittin.3     Passive exposure: Never     Smokeless tobacco: Never     Tobacco comments:     No smokers in home   Vaping Use     Vaping status: Never Used   Substance and Sexual Activity     Alcohol use: Yes     Comment: A whisky and a glass of wine     Drug use: Yes     Types: Benzodiazepines     Sexual activity: Not Currently     Partners: Female     Birth control/protection: None     Comment: not currently active   Other Topics Concern      Service No     Blood Transfusions No     Caffeine Concern No     Occupational Exposure No     Hobby Hazards No     Sleep Concern Yes     Stress Concern No     Weight Concern Yes     Special Diet No     Back Care No     Exercise No     Bike Helmet No     Comment: N/A     Seat Belt Yes     Self-Exams Yes     Parent/sibling w/ CABG, MI or angioplasty before 65F 55M? No     Social Drivers of Health     Financial Resource Strain: Unknown (2024)    Financial Resource Strain      Within the past 12 months, have you or your family members you live with been unable to get utilities (heat, electricity) when it was really needed?: Patient declined   Food Insecurity: Low Risk  (2024)    Food Insecurity      Within the past 12 months, did you worry that your food would run out before you got money to buy more?: No      Within the past 12 months, did the food you bought just not last and you didn t have money to get more?: No   Transportation Needs: Low Risk  (2024)    Transportation Needs      Within the past 12 months, has lack of transportation kept you from medical appointments, getting your medicines, non-medical meetings or appointments, work, or from getting things that you need?: No   Physical Activity: Unknown (2024)    Exercise Vital Sign      Days of Exercise per Week: 0 days   Stress: Stress Concern Present (2024)    [employer] - Occupational Stress Questionnaire      Feeling of Stress : Very much    Interpersonal Safety: Low Risk  (3/4/2025)    Interpersonal Safety      Do you feel physically and emotionally safe where you currently live?: Yes      Within the past 12 months, have you been hit, slapped, kicked or otherwise physically hurt by someone?: No      Within the past 12 months, have you been humiliated or emotionally abused in other ways by your partner or ex-partner?: No   Housing Stability: Low Risk  (2024)    Housing Stability      Do you have housing? : Yes      Are you worried about losing your housing?: No       Family History   Problem Relation Age of Onset     Cerebrovascular Disease Father      Cancer Mother 90        Patient believes vaginal cancer - hysterectomy,      Alzheimer Disease Mother      Cancer Sister      Breast Cancer Sister      C.A.D. No family hx of      Cancer - colorectal No family hx of      Prostate Cancer No family hx of      Blood Disease No family hx of      Cardiovascular No family hx of      Circulatory No family hx of      Eye Disorder No family hx of      Gastrointestinal Disease No family hx of      Genitourinary Problems No family hx of      Lipids No family hx of      Neurologic Disorder No family hx of      Respiratory No family hx of      Asthma No family hx of      Heart Disease No family hx of      Diabetes No family hx of      Hypertension No family hx of      Arthritis No family hx of      Thyroid Disease No family hx of      Musculoskeletal Disorder No family hx of      Glaucoma No family hx of      Macular Degeneration No family hx of          ROS: 10 point ROS neg other than the symptoms noted above in the HPI.    Vital Signs:   /74   Pulse 80   Temp (!) 96.3  F (35.7  C) (Temporal)   Resp 16   Wt 77.6 kg (171 lb)   SpO2 97%   BMI 28.46 kg/m        Examination:  Constitutional:  Alert, well nourished, NAD.  Memory: recent and remote memory   HEENT: Normocephalic, atraumatic.   Pulm:  Without shortness of breath   CV:  No pitting  edema of BLE.      Neurological:  Awake  Alert  Oriented x 3  Speech clear    Motor exam:   Hip Flexion:                 Right: 5/5  Left:  4+/5  Hip Abduction:             Right:  5/5  Left:  5/5  Hip Adduction:             Right:  5/5  Left:  5/5  Plantar Flexion:           Right:  5/5  Left:  5/5  Dorsal Flexion:            Right:  5/5  Left:  4-/5  EHL:                            Right:  5/5  Left:  5/5     Sensation normal to bilateral upper and lower extremities  Muscle tone to bilateral upper and lower extremities   Gait: Able to stand from a seated position. Normal non-antalgic, non-myelopathic gait.  Able to heel/toe walk without loss of balance    Lumbar examination reveals tenderness of the spine.  Hip height is symmetrical. Negative SI joint, sciatic notch or greater trochanteric tenderness to palpation bilaterally.  Straight leg raise is negative bilaterally.      Imaging:   Narrative & Impression   EXAM: XR LUMBAR SPINE 2/3 VIEWS  LOCATION: [facility]  DATE: 5/2/2025     INDICATION: L2 vertebral fracture (H).  COMPARISON: Lumbar spine MRI dated 4/23/2025. Lumbar spine radiographs 2/10/2025.                                                                      IMPRESSION:   Nomenclature is based on 5 lumbar vertebral bodies. Minimal dextroconvex curvature lower thoracic spine. Mild broad levoconvex curvature of the lumbar spine. Mild grade 1 anterolisthesis of L4 on L5. No significant change in previously demonstrated L2   superior endplate burst fracture with some retropulsion of the posterior superior endplate into the ventral epidural space, better assessed on the prior MRI. No other gross vertebral body height loss is identified. Shallow multilevel Schmorl's nodes are   better characterized on the prior MRI. Diffuse osseous demineralization.     Severe disc space narrowing L3-L4 and L4-L5 and lesser degrees of mild/moderate disc space narrowing elsewhere. Scattered  marginal endplate osteophytes. Multilevel degenerative facet arthropathy.     Narrative & Impression   EXAM: MR LUMBAR SPINE W/O CONTRAST  LOC[facility]: [facility]  DATE: 4/23/2025     INDICATION:  Chronic bilateral low back pain without sciatica, Chronic bilateral low back pain without sciatica  COMPARISON: None.  TECHNIQUE: Routine Lumbar Spine MRI without IV contrast.     FINDINGS:   Nomenclature is based on 5 lumbar type vertebral bodies. Acute L2 burst fracture with 25% vertebral body height loss. Anterolisthesis of L4 and L5 measuring 3 mm. Anterior marginal osteophytes at L2/L3-L4/L5. Multilevel degenerative disc disease of the   lumbar spine with disc height loss, most pronounced at L3/L4 and L4/L5. Normal distal spinal cord and cauda equina with conus medullaris at the inferior plate of L1. The partially imaged intra-abdominal contents are unremarkable. Unremarkable visualized   bony pelvis.     T12-L1: Normal disc signal and disc height. No posterior disc bulge or spinal canal narrowing. No neural foraminal narrowing.      L1-L2: Mild loss of disc signal and disc height. Symmetric disc bulge and facet arthropathy with a superimposed disc extrusion within the left lateral recess extending cephalad along the posterior margin of the L1 vertebral body measuring 1.5 cm   contributing to moderate to severe spinal canal and left lateral recess narrowing with impingement of the descending left L2 nerve root. Correlation with left L2 radiculopathy is suggested. Severe bilateral neural foraminal narrowing.     L2-L3: Mild loss of disc signal and disc height. Symmetric disc bulge and moderate facet arthropathy without significant spinal canal narrowing. Moderate bilateral neural foraminal narrowing.      L3-L4: Symmetric disc bulge and moderate facet arthropathy without spinal spinal canal narrowing. Moderate bilateral neural foraminal narrowing.     L4-L5: Moderate loss of disc signal and  disc height. Symmetric disc bulge and moderate facet arthropathy with mild spinal canal narrowing. Severe bilateral neural foraminal narrowing.     L5-S1: Normal disc signal and disc height. No posterior disc bulge or spinal canal narrowing. Mild bilateral facet arthropathy. No neural foraminal narrowing.                                                                      IMPRESSION:  1.  Acute L2 burst fracture with 25% vertebral body height loss.   2.  At the L1/L2 level, there is a symmetric disc bulge and facet arthropathy with a superimposed disc extrusion within the left lateral recess extending cephalad along the posterior margin of the L1 vertebral body measuring 1.5 cm contributing to   moderate to severe spinal canal and left lateral recess narrowing with impingement of the descending left L2 nerve root. Correlation with left L2 radiculopathy is suggested.  3.  Additional degenerative lumbar spondylosis with level by level analysis as described above.     Assessment/Plan:   Lumbar 2 burst fracture  Left leg weakness  Lumbar radiculopathy    Reviewed options of starting with PT and MAYKEL, consideration of L1-2 laminectomy and discectomy, or less likely to pursue a more significant constructive operation such as a fusion  Patient and his daughter plan to discuss between a conservative approach versus surgical decompression and will notify us        Again, thank you for allowing me to participate in the care of your patient.        Sincerely,        Candelario Schwartz MD    Electronically signed

## 2025-05-08 NOTE — PATIENT INSTRUCTIONS
Options:    L1-2 epidural steroid injection and Physical Therapy  L1-2 laminectomy/discectomy    [facility]  [facility]  [address] [place] [id number]  Telephone:  [phone]        Fax:  [phone]

## 2025-05-08 NOTE — NURSING NOTE
"Medardo Gautam is a 85 year old male who presents for:  Chief Complaint   Patient presents with    Neurologic Problem     Spinal stenosis of lumbar region, unspecified whether neurogenic claudication present    Closed fracture of second lumbar vertebra, unspecified fracture morphology, sequela          Initial Vitals:  /74   Pulse 80   Temp (!) 96.3  F (35.7  C) (Temporal)   Resp 16   Wt 171 lb (77.6 kg)   SpO2 97%   BMI 28.46 kg/m   Estimated body mass index is 28.46 kg/m  as calculated from the following:    Height as of 4/30/25: 5' 5\" (1.651 m).    Weight as of this encounter: 171 lb (77.6 kg).. Body surface area is 1.89 meters squared. BP completed using cuff size: regular  Mild Pain (2)    Nursing Comments:     Carlita Coe    "

## 2025-05-22 DIAGNOSIS — G47.00 INSOMNIA, UNSPECIFIED TYPE: ICD-10-CM

## 2025-05-22 RX ORDER — ZOLPIDEM TARTRATE 5 MG/1
5 TABLET ORAL
Qty: 30 TABLET | Refills: 1 | Status: SHIPPED | OUTPATIENT
Start: 2025-05-22

## 2025-05-30 RX ORDER — MULTIVITAMIN WITH IRON
1 TABLET ORAL DAILY
COMMUNITY

## 2025-06-05 ENCOUNTER — LAB (OUTPATIENT)
Dept: LAB | Facility: OTHER | Age: 85
End: 2025-06-05
Payer: COMMERCIAL

## 2025-06-05 DIAGNOSIS — C61 MALIGNANT NEOPLASM OF PROSTATE (H): ICD-10-CM

## 2025-06-05 LAB
ALBUMIN SERPL BCG-MCNC: 3.7 G/DL (ref 3.5–5.2)
ALP SERPL-CCNC: 84 U/L (ref 40–150)
ALT SERPL W P-5'-P-CCNC: 12 U/L (ref 0–70)
ANION GAP SERPL CALCULATED.3IONS-SCNC: 11 MMOL/L (ref 7–15)
AST SERPL W P-5'-P-CCNC: 30 U/L (ref 0–45)
BASOPHILS # BLD AUTO: 0 10E3/UL (ref 0–0.2)
BASOPHILS NFR BLD AUTO: 1 %
BILIRUB SERPL-MCNC: 0.5 MG/DL
BUN SERPL-MCNC: 14.2 MG/DL (ref 8–23)
CALCIUM SERPL-MCNC: 9.6 MG/DL (ref 8.8–10.4)
CHLORIDE SERPL-SCNC: 107 MMOL/L (ref 98–107)
CREAT SERPL-MCNC: 1.35 MG/DL (ref 0.67–1.17)
EGFRCR SERPLBLD CKD-EPI 2021: 52 ML/MIN/1.73M2
EOSINOPHIL # BLD AUTO: 0.1 10E3/UL (ref 0–0.7)
EOSINOPHIL NFR BLD AUTO: 2 %
ERYTHROCYTE [DISTWIDTH] IN BLOOD BY AUTOMATED COUNT: 14.5 % (ref 10–15)
GLUCOSE SERPL-MCNC: 140 MG/DL (ref 70–99)
HCO3 SERPL-SCNC: 25 MMOL/L (ref 22–29)
HCT VFR BLD AUTO: 29.6 % (ref 40–53)
HGB BLD-MCNC: 9.9 G/DL (ref 13.3–17.7)
IMM GRANULOCYTES # BLD: 0 10E3/UL
IMM GRANULOCYTES NFR BLD: 0 %
LYMPHOCYTES # BLD AUTO: 1.5 10E3/UL (ref 0.8–5.3)
LYMPHOCYTES NFR BLD AUTO: 38 %
MCH RBC QN AUTO: 31.5 PG (ref 26.5–33)
MCHC RBC AUTO-ENTMCNC: 33.4 G/DL (ref 31.5–36.5)
MCV RBC AUTO: 94 FL (ref 78–100)
MONOCYTES # BLD AUTO: 0.3 10E3/UL (ref 0–1.3)
MONOCYTES NFR BLD AUTO: 8 %
NEUTROPHILS # BLD AUTO: 1.9 10E3/UL (ref 1.6–8.3)
NEUTROPHILS NFR BLD AUTO: 51 %
PLATELET # BLD AUTO: 117 10E3/UL (ref 150–450)
POTASSIUM SERPL-SCNC: 3 MMOL/L (ref 3.4–5.3)
PROT SERPL-MCNC: 6.4 G/DL (ref 6.4–8.3)
PSA SERPL DL<=0.01 NG/ML-MCNC: 0.26 NG/ML
RBC # BLD AUTO: 3.14 10E6/UL (ref 4.4–5.9)
SODIUM SERPL-SCNC: 143 MMOL/L (ref 135–145)
WBC # BLD AUTO: 3.8 10E3/UL (ref 4–11)

## 2025-06-08 LAB — TESTOST SERPL-MCNC: 11 NG/DL (ref 240–950)

## 2025-06-11 ENCOUNTER — ALLIED HEALTH/NURSE VISIT (OUTPATIENT)
Dept: FAMILY MEDICINE | Facility: OTHER | Age: 85
End: 2025-06-11
Payer: COMMERCIAL

## 2025-06-11 DIAGNOSIS — C61 MALIGNANT NEOPLASM OF PROSTATE (H): Primary | ICD-10-CM

## 2025-06-11 PROCEDURE — 99207 PR NO CHARGE NURSE ONLY: CPT

## 2025-06-11 NOTE — PROGRESS NOTES
Clinic Administered Medication Documentation      Injectable Medication Documentation    Is there an active order (written within the past 365 days, with administrations remaining, not ) in the chart? Yes.     Patient was given Lupron. Prior to medication administration, verified patient's identity using patient s name and date of birth. Please see MAR and medication order for additional information. Patient instructed to remain in clinic for 15 minutes and report any adverse reaction to staff immediately.    Vial/Syringe: Single dose vial. Was entire vial of medication used? Yes  Was this medication supplied by the patient? No  Is this a medication the patient will need to receive again? Yes. Verified that the patient has refills remaining in their prescription.    Administrations This Visit       leuprolide (LUPRON DEPOT) kit 22.5 mg       Admin Date  2025 Action  $Given Dose  22.5 mg Route  Intramuscular Documented By  Audra Nino, RN                   Audra Nino RN on 2025 at 11:11 AM

## 2025-06-12 ENCOUNTER — HOSPITAL ENCOUNTER (OUTPATIENT)
Facility: CLINIC | Age: 85
Discharge: HOME | End: 2025-06-12
Attending: ANESTHESIOLOGY | Admitting: ANESTHESIOLOGY
Payer: COMMERCIAL

## 2025-06-12 ENCOUNTER — APPOINTMENT (OUTPATIENT)
Dept: GENERAL RADIOLOGY | Facility: CLINIC | Age: 85
End: 2025-06-12
Attending: ANESTHESIOLOGY
Payer: COMMERCIAL

## 2025-06-12 VITALS
SYSTOLIC BLOOD PRESSURE: 161 MMHG | OXYGEN SATURATION: 99 % | RESPIRATION RATE: 18 BRPM | DIASTOLIC BLOOD PRESSURE: 88 MMHG | TEMPERATURE: 96.7 F | HEART RATE: 59 BPM

## 2025-06-12 DIAGNOSIS — M54.16 LUMBAR RADICULOPATHY: ICD-10-CM

## 2025-06-12 PROCEDURE — 64483 NJX AA&/STRD TFRM EPI L/S 1: CPT | Mod: 50 | Performed by: ANESTHESIOLOGY

## 2025-06-12 PROCEDURE — 250N000011 HC RX IP 250 OP 636: Performed by: ANESTHESIOLOGY

## 2025-06-12 PROCEDURE — 64483 NJX AA&/STRD TFRM EPI L/S 1: CPT | Performed by: ANESTHESIOLOGY

## 2025-06-12 PROCEDURE — 999N000179 XR SURGERY CARM FLUORO LESS THAN 5 MIN W STILLS

## 2025-06-12 RX ORDER — TRIAMCINOLONE ACETONIDE 40 MG/ML
INJECTION, SUSPENSION INTRA-ARTICULAR; INTRAMUSCULAR PRN
Status: DISCONTINUED | OUTPATIENT
Start: 2025-06-12 | End: 2025-06-12 | Stop reason: HOSPADM

## 2025-06-12 RX ORDER — IOPAMIDOL 612 MG/ML
INJECTION, SOLUTION INTRATHECAL PRN
Status: DISCONTINUED | OUTPATIENT
Start: 2025-06-12 | End: 2025-06-12 | Stop reason: HOSPADM

## 2025-06-12 ASSESSMENT — ACTIVITIES OF DAILY LIVING (ADL)
ADLS_ACUITY_SCORE: 41
ADLS_ACUITY_SCORE: 41

## 2025-06-12 NOTE — OP NOTE
PRIMARY PROBLEM: Low back pain and lower extremity radicular pains    INDICATIONS FOR PROCEDURE:   1.This patient suffers from moderate to severe low back pain and lower extremity radicular pains.    2.This pain has persisted for more than 4 weeks and is causing significant functional disability when they are trying to perform ADL's.   3. They failed conservative care which consisted of giving this pain time to karen and medications  4. Preoperative NRS pain score was verbally reported to me today as a  7/10.   5. The patients radicular pains correlates to their MRI which shows L2 nerve root compression secondary to an extrusion from L1-L2  6.  An order was sent to me to perform the technical component of a lumbar epidural steroid injection.    PROCEDURE: Bilateral L2-3  Transforaminal Epidural Steroid Injection with fluoroscopic guidance and contrast.     PROCEDURE DETAILS: After written informed consent was obtained from the patient, the patient was escorted to the procedure room.  The patient was placed in the prone position.  A  time out  was conducted to verify patient identity, procedure to be performed, side, site, allergies and any special requirements.  The skin over the thoracolumbar region was prepped and draped in normal sterile fashion with ChloraPrep. Fluoroscopy was used to identify the neural foramen in AP view and the skin was anesthetized with 2 mL of 1% lidocaine with bicarbonate buffer. A 25-gauge Quincke spinal needle was advanced through this location and advanced under fluoroscopic guidance towards the neural foramen.  The target zone was the 6 o clock position of the pedicle.   Prior to entering the foramen, the depth of the needle was gauged with a lateral view on fluoroscopy. While still in a lateral view, the needle was slowly advanced to avoid injury to the spinal nerve.  Then, in the oblique view (approximately 28 degrees), after negative aspiration, 1.5 mL of Omnipaque contrast dye was  injected revealing epidural spread without evidence of intravascular or intrathecal spread.  Then a 3cc solution of 40 mg of Triamcinolone in 2 mL of  Preservative-Free saline was slowly injected into the epidural space at each segment.  After injection of the medication, as the needle tip was withdrawn, it was flushed with local anesthetic.   The patient was monitored with blood pressure and pulse oximetry machines with the assistance of an RN throughout the procedure.  The patient was alert and responsive to questions throughout the procedure.   The patient tolerated the procedure well and was observed in the post-procedural area.  The patient was dismissed without apparent complications.     BLOOD LOSS: < 5 cc    DIAGNOSIS:  1.  Bilateral L2 radiculopathies secondary to lateral recess stenosis from an L1-L2 disc extrusion    PLAN:  1. Performed a technically successful bilateral L2-3  transforaminal epidural steroid injections.  This was done at the level below the L1-L2 disc extrusion in order to treat the transversing L2 nerve root.  2. The patient was instructed to follow-up with the referring provider and discharge instructions were given for post-operative care.      Kyrie Uribe MD  Diplomate of the American Board of Anesthesiology, Pain Medicine

## 2025-06-12 NOTE — DISCHARGE INSTRUCTIONS

## 2025-06-17 NOTE — PROGRESS NOTES
FOLLOW UP VISIT       Reason for visit:  Metastatic [id number] CRPC s/p darolutamide, 177Lu-PSMA-I&T, and six cycles of docetaxel (completed 3/2025).     History of Present Illness:  Mr. Gautam is an 85-year-old man who was diagnosed with prostate cancer in 2012.  His PSA level was 5.2 ng/mL.  Clinical stage was cT1c disease.  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  His next-generation DNA sequencing analysis (OPPRTUNITY) revealed a pathogenic TP53 mutation, SHIRA genotype, TMB 5 muts/Mb, and absent PD-L1 expression.  He subsequently received salvage radiotherapy, which was completed in 10/2013, concurrently with six months of androgen deprivation therapy.     He subsequently developed a biochemical recurrence, and received ADT from 2014 until 2020.  In 11/2020, he developed non-metastatic CRPC.  Darolutamide was added on 12/4/2020, to which he achieved a subsequent PSA response.  His PSA level initially dropped from 1.66 ng/mL down to a speedy of 0.07 ng/mL (6/21/2021).  However, the patient then developed a rising PSA level over the past year.  His PSA on 1/13/2022 was 0.16, the PSA on 4/20/2022 was 0.24, the PSA on 6/13/2022 was 0.34, the PSA on 7/12/2022 was 0.47, the PSA on 8/25/2022 was 0.64 ng/mL, and the PSA on 11/21/2022 was 1.55 ng/mL.  On 3/8/2023, his PSA level increased to 8.7 ng/mL.     The patient then participated in the ECLIPSE clinical trial, and he was randomized to the 177Lu-PSMA-I&T radioligand arm.  During the screening procedures he was found to have an asymptomatic pulmonary embolus, and he began apixaban. He received all 4 doses of [place]-PSMA-I&T, and his PSA level dropped from 8.7 down to 0.12 ng/mL.  However, his PSA level increased to 0.79 ng/mL, and his imaging (5/7/24) showed evidence of progressive disease.  He is currently on ADT plus darolutamide, but his PSA level continued to rise.  The most recent PSA (11/1/24) was 2.48  "ng/mL.  He also had imaging assessments (11/1/24) which showed progressive osseous and yan disease.      On 11/18/2024, his PSA level reached 2.75 ng/mL.  At that time, he began docetaxel chemotherapy, and received six cycles.  He completed chemotherapy in 3/2025.  His PSA level has declined by 95% down to 0.15 ng/mL.  Currently, the PSA level is 0.26 ng/mL.  He returns for a virtual follow-up visit today.     Review of Systems:  Medardo reports that he is doing okay today but is dealing with significant low back pain again.  He presented to urgent care on 2/10/2025 and had X-rays performed.  He then followed up with his primary care provider in 2/12/2025 for it.  His MRI scan on 4/23/25 shows disc herniation and a burst fracture at the L2 vertebral level.  He has been trying gabapentin but found that this made him too sedated and was not helpful who has now discontinued this.  He has also tried Tylenol with significant improvement in topical lidocaine patches.  He finds light ibuprofen has been the most helpful in reducing his pain but he has been trying to use this sparingly.  He is set up to begin physical therapy next week.  His pain does radiate towards the left hip and down the left leg.  He is not noticing new leg weakness.  No saddle paresthesias.  No bowel or bladder changes.  He does have baseline loose bowels after chemotherapy but does not require the use of Imodium.  No significant nausea or vomiting.  No chest pain, shortness of breath, or cough.  No fevers or chills.  He is noting some increased dyspnea on exertion going up the stairs but is able to climb the stairs without rest.  He does not have any shortness of breath at rest he denies any other acute symptoms.  His neuropathy is present with numbness in his fingertips feels like the bottom of his feet are \"tight\".  He denies any falls.  A comprehensive 14-point ROS is negative other than the symptoms noted above in the HPI.  His ECOG score is 1. "  His pain score is 4/10.     Medications:  Lupron 22.5 mg IM q3mo (last dose, 6/5/2025)  Docetaxel 75 mg/m2, stopped on 3/12/2025  Losartan/HCTZ 100/12.5 mg  Atorvastatin 10 mg  Cephalexin 500 mg  Allopurinol 100 mg  Zolpidem 5 mg  Clonazepam 1 mg  Sildenafil 50 mg  Loratadine 10 mg  Calcium + Vit D  Multivitamin  Folic acid     Physical Examination:    General: No acute distress  Vital signs within normal limits.  HEENT: Sclera anicteric. Oral mucosa pink, dry. No mucositis or thrush  Lymph: No lymphadenopathy in neck  Heart: Regular, rate, and rhythm  Lungs: Clear to ascultation bilaterally  Abdomen: Positive bowel sounds. Soft, non-tender. No organomegaly or mass.   Extremities: no lower extremity edema  Neuro: Cranial nerves grossly intact  Skin: no dermatitis     CT scan (11/1/2024):  This shows increased size of a sclerotic lesion in the proximal right humerus, although only partially included in the field-of-view.  Mixed findings in the retroperitoneal lymph nodes with some stable, some increased, and others decreased (Left para-aortic, 1.7 x 1.0 cm, previously 1.5 x 1.2 cm. Preaortic, 1.2 x 0.7 cm, previously 1.6 x 1.3 cm).    MRI Spine (4/23/2025):  Acute L2 burst fracture with 25% vertebral body height loss. At the L1/L2 level, there is a symmetric disc bulge and facet arthropathy with a superimposed disc extrusion within the left lateral recess extending cephalad along the posterior margin of the L1 vertebral body measuring 1.5 cm contributing to moderate to severe spinal canal and left lateral recess narrowing with impingement of the descending left L2 nerve root. Correlation with left L2 radiculopathy is suggested. Additional degenerative lumbar spondylosis.     Laboratories (6/5/2025):  CBC shows a WBC of 3.8, hemoglobin 9.9, hematocrit 29.6%, platelets 117K.  Creatinine is 1.35 mg/dL (GFR 52 mL/min), which is slightly declining.  Potassium is low at 3.0 mEq/L.  His PSA level is 0.26 ng/mL.        ASSESSMENT AND PLAN:   Mr. Gautam is an 85-year-old man with Jeancarlos 4+5=9 [id number] prostate cancer who underwent radical prostatectomy (8/2012) and salvage radiotherapy (10/2013) but then developed metastatic CRPC refractory to darolutamide.  He enrolled on ECLIPSE, and received all 4 doses of 177Lu-PSMA-I&T radioligand therapy.  His disease was slowly progressing once again despite ADT plus darolutamide.  He began docetaxel chemotherapy on 11/18/2024, and received 6 cycles ending in 3/2025.  His ECOG score is 1.  His pain score is 4/10.     #Metastatic [id number]-[id number] CRPC s/p darolutamide and docetaxel  - Completed all 4 doses of 177Lu-PSMA-I&T on the ECLIPSE trial in 7/2023, which he tolerated well except for xerostomia.   - His PSA declined from 8.7 to 0.12 ng/mL, but then went back up to 2.75 ng/mL.   - His CT scan (11/1/2024) showed a new osseous metastasis as well as growing retroperitoneal lymph nodes.  - Patient is due for his next Lupron on 6/5/2025.  He would like to obtain this locally.   - We previously decided to pursue docetaxel chemotherapy, starting on 11/18/2024.  PSA was 2.75 ng/mL.  - Completed cycle #6 of docetaxel on 3/12/2025. Tolerated chemotherapy relatively well.   - PSA speedy down to 0.15 ng/mL, but it is now slightly higher at 0.26 ng/mL.  - He is considering moving to [place] to be closer to his son; that may happen in the autumn of 2025.  - If needed, the next systemic therapy could be standard Pluvicto, now the he has received taxane chemotherapy.  - He might possibly be a candidate for the [place]-003 clinical trial.  - For now, I have recommended simply checking the PSA level every 2 months moving forward.  - He should continue Lupron every three months.      A total of 40 minutes were spent on this patient on the date of the encounter conducting chart review, review of test results, interpretation of tests, patient visit, documentation and discussion with other provider(s).  The  patient was given the opportunity to ask multiple questions today, all of which were answered to his satisfaction.    Tio Holman M.D.

## 2025-06-18 ENCOUNTER — ONCOLOGY VISIT (OUTPATIENT)
Dept: ONCOLOGY | Facility: CLINIC | Age: 85
End: 2025-06-18
Attending: INTERNAL MEDICINE
Payer: COMMERCIAL

## 2025-06-18 VITALS
HEART RATE: 65 BPM | BODY MASS INDEX: 28.84 KG/M2 | OXYGEN SATURATION: 97 % | RESPIRATION RATE: 14 BRPM | SYSTOLIC BLOOD PRESSURE: 168 MMHG | TEMPERATURE: 98.1 F | DIASTOLIC BLOOD PRESSURE: 92 MMHG | WEIGHT: 173.3 LBS

## 2025-06-18 DIAGNOSIS — C61 MALIGNANT NEOPLASM OF PROSTATE (H): Primary | ICD-10-CM

## 2025-06-18 PROCEDURE — G0463 HOSPITAL OUTPT CLINIC VISIT: HCPCS | Performed by: INTERNAL MEDICINE

## 2025-06-18 ASSESSMENT — PAIN SCALES - GENERAL: PAINLEVEL_OUTOF10: NO PAIN (0)

## 2025-06-18 NOTE — NURSING NOTE
"Oncology Rooming Note    June 18, 2025 11:45 AM   Medardo Gautam is a 85 year old male who presents for:    Chief Complaint   Patient presents with    Oncology Clinic Visit     Malignant neoplasm of prostate     Initial Vitals: BP (!) 186/85 (BP Location: Right arm, Patient Position: Sitting, Cuff Size: Adult Regular)   Pulse 65   Temp 98.1  F (36.7  C) (Oral)   Resp 14   Wt 78.6 kg (173 lb 4.8 oz)   SpO2 97%   BMI 28.84 kg/m   Estimated body mass index is 28.84 kg/m  as calculated from the following:    Height as of 4/30/25: 1.651 m (5' 5\").    Weight as of this encounter: 78.6 kg (173 lb 4.8 oz). Body surface area is 1.9 meters squared.  No Pain (0) Comment: Data Unavailable   No LMP for male patient.  Allergies reviewed: Yes  Medications reviewed: Yes    Medications: Medication refills not needed today.  Pharmacy name entered into [facility]:    [facility] [place], [place] - [address] [facility] [place] - [address] [facility] - [place], [place] - [address] [facility] [place], [place] - [address] [facility] [phone]    Frailty Screening:   Is the patient here for a new oncology consult visit in cancer care? 2. No    PHQ9:  Did this patient require a PHQ9?: No      Clinical concerns: Pt still having some numbness in the fingertips.       Bienvenido Rivero              "

## 2025-06-18 NOTE — LETTER
6/18/2025      Medardo Gautam  [address] [address] [id number]      Dear Colleague,    Thank you for referring your patient, Medardo Gautam, to the [facility]. Please see a copy of my visit note below.      FOLLOW UP VISIT       Reason for visit:  Metastatic HO90-binueme CRPC s/p darolutamide, 177Lu-PSMA-I&T, and six cycles of docetaxel (completed 3/2025).     History of Present Illness:  Mr. Gautam is an 85-year-old man who was diagnosed with prostate cancer in 2012.  His PSA level was 5.2 ng/mL.  Clinical stage was cT1c disease.  He underwent radical prostatectomy on 8/6/2012, which revealed [place] 4+5=9 carcinoma, stage pT2c N0 R0.  His next-generation DNA sequencing analysis (CARIS) revealed a pathogenic TP53 mutation, SHIRA genotype, TMB 5 muts/Mb, and absent PD-L1 expression.  He subsequently received salvage radiotherapy, which was completed in 10/2013, concurrently with six months of androgen deprivation therapy.     He subsequently developed a biochemical recurrence, and received ADT from 2014 until 2020.  In 11/2020, he developed non-metastatic CRPC.  Darolutamide was added on 12/4/2020, to which he achieved a subsequent PSA response.  His PSA level initially dropped from 1.66 ng/mL down to a speedy of 0.07 ng/mL (6/21/2021).  However, the patient then developed a rising PSA level over the past year.  His PSA on 1/13/2022 was 0.16, the PSA on 4/20/2022 was 0.24, the PSA on 6/13/2022 was 0.34, the PSA on 7/12/2022 was 0.47, the PSA on 8/25/2022 was 0.64 ng/mL, and the PSA on 11/21/2022 was 1.55 ng/mL.  On 3/8/2023, his PSA level increased to 8.7 ng/mL.     The patient then participated in the ECLIPSE clinical trial, and he was randomized to the 177Lu-PSMA-I&T radioligand arm.  During the screening procedures he was found to have an asymptomatic pulmonary embolus, and he began apixaban. He received all 4 doses of [place]-PSMA-I&T, and his PSA level dropped from 8.7 down to  0.12 ng/mL.  However, his PSA level increased to 0.79 ng/mL, and his imaging (5/7/24) showed evidence of progressive disease.  He is currently on ADT plus darolutamide, but his PSA level continued to rise.  The most recent PSA (11/1/24) was 2.48 ng/mL.  He also had imaging assessments (11/1/24) which showed progressive osseous and yan disease.      On 11/18/2024, his PSA level reached 2.75 ng/mL.  At that time, he began docetaxel chemotherapy, and received six cycles.  He completed chemotherapy in 3/2025.  His PSA level has declined by 95% down to 0.15 ng/mL.  Currently, the PSA level is 0.26 ng/mL.  He returns for a virtual follow-up visit today.     Review of Systems:  Medardo reports that he is doing okay today but is dealing with significant low back pain again.  He presented to urgent care on 2/10/2025 and had X-rays performed.  He then followed up with his primary care provider in 2/12/2025 for it.  His MRI scan on 4/23/25 shows disc herniation and a burst fracture at the L2 vertebral level.  He has been trying gabapentin but found that this made him too sedated and was not helpful who has now discontinued this.  He has also tried Tylenol with significant improvement in topical lidocaine patches.  He finds light ibuprofen has been the most helpful in reducing his pain but he has been trying to use this sparingly.  He is set up to begin physical therapy next week.  His pain does radiate towards the left hip and down the left leg.  He is not noticing new leg weakness.  No saddle paresthesias.  No bowel or bladder changes.  He does have baseline loose bowels after chemotherapy but does not require the use of Imodium.  No significant nausea or vomiting.  No chest pain, shortness of breath, or cough.  No fevers or chills.  He is noting some increased dyspnea on exertion going up the stairs but is able to climb the stairs without rest.  He does not have any shortness of breath at rest he denies any other acute  "symptoms.  His neuropathy is present with numbness in his fingertips feels like the bottom of his feet are \"tight\".  He denies any falls.  A comprehensive 14-point ROS is negative other than the symptoms noted above in the HPI.  His ECOG score is 1.  His pain score is 4/10.     Medications:  Lupron 22.5 mg IM q3mo (last dose, 6/5/2025)  Docetaxel 75 mg/m2, stopped on 3/12/2025  Losartan/HCTZ 100/12.5 mg  Atorvastatin 10 mg  Cephalexin 500 mg  Allopurinol 100 mg  Zolpidem 5 mg  Clonazepam 1 mg  Sildenafil 50 mg  Loratadine 10 mg  Calcium + Vit D  Multivitamin  Folic acid     Physical Examination:    General: No acute distress  Vital signs within normal limits.  HEENT: Sclera anicteric. Oral mucosa pink, dry. No mucositis or thrush  Lymph: No lymphadenopathy in neck  Heart: Regular, rate, and rhythm  Lungs: Clear to ascultation bilaterally  Abdomen: Positive bowel sounds. Soft, non-tender. No organomegaly or mass.   Extremities: no lower extremity edema  Neuro: Cranial nerves grossly intact  Skin: no dermatitis     CT scan (11/1/2024):  This shows increased size of a sclerotic lesion in the proximal right humerus, although only partially included in the field-of-view.  Mixed findings in the retroperitoneal lymph nodes with some stable, some increased, and others decreased (Left para-aortic, 1.7 x 1.0 cm, previously 1.5 x 1.2 cm. Preaortic, 1.2 x 0.7 cm, previously 1.6 x 1.3 cm).    MRI Spine (4/23/2025):  Acute L2 burst fracture with 25% vertebral body height loss. At the L1/L2 level, there is a symmetric disc bulge and facet arthropathy with a superimposed disc extrusion within the left lateral recess extending cephalad along the posterior margin of the L1 vertebral body measuring 1.5 cm contributing to moderate to severe spinal canal and left lateral recess narrowing with impingement of the descending left L2 nerve root. Correlation with left L2 radiculopathy is suggested. Additional degenerative lumbar " spondylosis.     Laboratories (6/5/2025):  CBC shows a WBC of 3.8, hemoglobin 9.9, hematocrit 29.6%, platelets 117K.  Creatinine is 1.35 mg/dL (GFR 52 mL/min), which is slightly declining.  Potassium is low at 3.0 mEq/L.  His PSA level is 0.26 ng/mL.       ASSESSMENT AND PLAN:   Mr. Gautam is an 85-year-old man with [place] 4+5=9 [id number]-[id number] prostate cancer who underwent radical prostatectomy (8/2012) and salvage radiotherapy (10/2013) but then developed metastatic CRPC refractory to darolutamide.  He enrolled on ECLIPSE, and received all 4 doses of 177Lu-PSMA-I&T radioligand therapy.  His disease was slowly progressing once again despite ADT plus darolutamide.  He began docetaxel chemotherapy on 11/18/2024, and received 6 cycles ending in 3/2025.  His ECOG score is 1.  His pain score is 4/10.     #Metastatic [id number]-nehtxck CRPC s/p darolutamide and docetaxel  - Completed all 4 doses of 177Lu-PSMA-I&T on the ECLIPSE trial in 7/2023, which he tolerated well except for xerostomia.   - His PSA declined from 8.7 to 0.12 ng/mL, but then went back up to 2.75 ng/mL.   - His CT scan (11/1/2024) showed a new osseous metastasis as well as growing retroperitoneal lymph nodes.  - Patient is due for his next Lupron on 6/5/2025.  He would like to obtain this locally.   - We previously decided to pursue docetaxel chemotherapy, starting on 11/18/2024.  PSA was 2.75 ng/mL.  - Completed cycle #6 of docetaxel on 3/12/2025. Tolerated chemotherapy relatively well.   - PSA speedy down to 0.15 ng/mL, but it is now slightly higher at 0.26 ng/mL.  - He is considering moving to [place] to be closer to his son; that may happen in the autumn of 2025.  - If needed, the next systemic therapy could be standard Pluvicto, now the he has received taxane chemotherapy.  - He might possibly be a candidate for the [place]-003 clinical trial.  - For now, I have recommended simply checking the PSA level every 2 months moving forward.  - He should continue Lupron  every three months.      A total of 40 minutes were spent on this patient on the date of the encounter conducting chart review, review of test results, interpretation of tests, patient visit, documentation and discussion with other provider(s).  The patient was given the opportunity to ask multiple questions today, all of which were answered to his satisfaction.    Tio Holman M.D.      Again, thank you for allowing me to participate in the care of your patient.        Sincerely,        Tio Holman MD    Electronically signed

## 2025-06-27 SDOH — HEALTH STABILITY: PHYSICAL HEALTH: ON AVERAGE, HOW MANY DAYS PER WEEK DO YOU ENGAGE IN MODERATE TO STRENUOUS EXERCISE (LIKE A BRISK WALK)?: 0 DAYS

## 2025-06-27 ASSESSMENT — SOCIAL DETERMINANTS OF HEALTH (SDOH): HOW OFTEN DO YOU GET TOGETHER WITH FRIENDS OR RELATIVES?: PATIENT DECLINED

## 2025-07-02 ENCOUNTER — OFFICE VISIT (OUTPATIENT)
Dept: FAMILY MEDICINE | Facility: OTHER | Age: 85
End: 2025-07-02
Attending: FAMILY MEDICINE
Payer: COMMERCIAL

## 2025-07-02 ENCOUNTER — TELEPHONE (OUTPATIENT)
Dept: FAMILY MEDICINE | Facility: OTHER | Age: 85
End: 2025-07-02

## 2025-07-02 ENCOUNTER — RESULTS FOLLOW-UP (OUTPATIENT)
Dept: FAMILY MEDICINE | Facility: OTHER | Age: 85
End: 2025-07-02

## 2025-07-02 VITALS
HEART RATE: 67 BPM | HEIGHT: 65 IN | DIASTOLIC BLOOD PRESSURE: 78 MMHG | OXYGEN SATURATION: 98 % | WEIGHT: 169.5 LBS | TEMPERATURE: 97.9 F | SYSTOLIC BLOOD PRESSURE: 162 MMHG | BODY MASS INDEX: 28.24 KG/M2 | RESPIRATION RATE: 19 BRPM

## 2025-07-02 DIAGNOSIS — M48.061 SPINAL STENOSIS OF LUMBAR REGION, UNSPECIFIED WHETHER NEUROGENIC CLAUDICATION PRESENT: ICD-10-CM

## 2025-07-02 DIAGNOSIS — Z00.00 ENCOUNTER FOR MEDICARE ANNUAL WELLNESS EXAM: Primary | ICD-10-CM

## 2025-07-02 DIAGNOSIS — H91.90 HEARING DIFFICULTY, UNSPECIFIED LATERALITY: ICD-10-CM

## 2025-07-02 DIAGNOSIS — R73.01 IMPAIRED FASTING GLUCOSE: ICD-10-CM

## 2025-07-02 DIAGNOSIS — I10 ESSENTIAL HYPERTENSION WITH GOAL BLOOD PRESSURE LESS THAN 140/90: ICD-10-CM

## 2025-07-02 DIAGNOSIS — G47.00 INSOMNIA, UNSPECIFIED TYPE: ICD-10-CM

## 2025-07-02 DIAGNOSIS — E87.6 HYPOKALEMIA: ICD-10-CM

## 2025-07-02 DIAGNOSIS — C61 MALIGNANT NEOPLASM OF PROSTATE (H): ICD-10-CM

## 2025-07-02 DIAGNOSIS — R26.9 GAIT DIFFICULTY: ICD-10-CM

## 2025-07-02 LAB
EST. AVERAGE GLUCOSE BLD GHB EST-MCNC: 105 MG/DL
HBA1C MFR BLD: 5.3 % (ref 0–5.6)
POTASSIUM SERPL-SCNC: 3.3 MMOL/L (ref 3.4–5.3)
PSA SERPL DL<=0.01 NG/ML-MCNC: 0.6 NG/ML

## 2025-07-02 PROCEDURE — 3044F HG A1C LEVEL LT 7.0%: CPT | Performed by: FAMILY MEDICINE

## 2025-07-02 PROCEDURE — 99214 OFFICE O/P EST MOD 30 MIN: CPT | Mod: 25 | Performed by: FAMILY MEDICINE

## 2025-07-02 PROCEDURE — 1126F AMNT PAIN NOTED NONE PRSNT: CPT | Performed by: FAMILY MEDICINE

## 2025-07-02 PROCEDURE — 84132 ASSAY OF SERUM POTASSIUM: CPT | Performed by: FAMILY MEDICINE

## 2025-07-02 PROCEDURE — 3077F SYST BP >= 140 MM HG: CPT | Performed by: FAMILY MEDICINE

## 2025-07-02 PROCEDURE — 83036 HEMOGLOBIN GLYCOSYLATED A1C: CPT | Performed by: FAMILY MEDICINE

## 2025-07-02 PROCEDURE — 3078F DIAST BP <80 MM HG: CPT | Performed by: FAMILY MEDICINE

## 2025-07-02 PROCEDURE — 36415 COLL VENOUS BLD VENIPUNCTURE: CPT | Performed by: FAMILY MEDICINE

## 2025-07-02 PROCEDURE — 84153 ASSAY OF PSA TOTAL: CPT | Performed by: FAMILY MEDICINE

## 2025-07-02 PROCEDURE — G0439 PPPS, SUBSEQ VISIT: HCPCS | Performed by: FAMILY MEDICINE

## 2025-07-02 RX ORDER — LOSARTAN POTASSIUM 100 MG/1
100 TABLET ORAL DAILY
Qty: 90 TABLET | Refills: 3 | Status: SHIPPED | OUTPATIENT
Start: 2025-07-02

## 2025-07-02 RX ORDER — ZOLPIDEM TARTRATE 5 MG/1
5 TABLET ORAL
Qty: 30 TABLET | Refills: 5 | Status: SHIPPED | OUTPATIENT
Start: 2025-07-02

## 2025-07-02 RX ORDER — CLONAZEPAM 2 MG/1
2 TABLET ORAL
Qty: 30 TABLET | Refills: 5 | Status: SHIPPED | OUTPATIENT
Start: 2025-07-02

## 2025-07-02 ASSESSMENT — ANXIETY QUESTIONNAIRES
IF YOU CHECKED OFF ANY PROBLEMS ON THIS QUESTIONNAIRE, HOW DIFFICULT HAVE THESE PROBLEMS MADE IT FOR YOU TO DO YOUR WORK, TAKE CARE OF THINGS AT HOME, OR GET ALONG WITH OTHER PEOPLE: NOT DIFFICULT AT ALL
2. NOT BEING ABLE TO STOP OR CONTROL WORRYING: NOT AT ALL
GAD7 TOTAL SCORE: 1
7. FEELING AFRAID AS IF SOMETHING AWFUL MIGHT HAPPEN: NOT AT ALL
5. BEING SO RESTLESS THAT IT IS HARD TO SIT STILL: NOT AT ALL
3. WORRYING TOO MUCH ABOUT DIFFERENT THINGS: NOT AT ALL
1. FEELING NERVOUS, ANXIOUS, OR ON EDGE: NOT AT ALL
6. BECOMING EASILY ANNOYED OR IRRITABLE: NOT AT ALL
GAD7 TOTAL SCORE: 1

## 2025-07-02 ASSESSMENT — PATIENT HEALTH QUESTIONNAIRE - PHQ9: 5. POOR APPETITE OR OVEREATING: SEVERAL DAYS

## 2025-07-02 ASSESSMENT — PAIN SCALES - GENERAL: PAINLEVEL_OUTOF10: NO PAIN (0)

## 2025-07-02 NOTE — TELEPHONE ENCOUNTER
Patient wearing lumbar brace, he is wondering how long he should wear it, couldn't find any recommendation in your notes.  What do you suggest?  Or does he need a follow up appointment?

## 2025-07-02 NOTE — PROGRESS NOTES
Preventive Care Visit  [facility]  Vira Flor MD, Family Medicine  Jul 2, 2025      Assessment & Plan     Encounter for Medicare annual wellness exam    Insomnia, unspecified type  Patient uses both clonazepam and zolpidem for sleeping.  He has been on this for quite a while and this definitely helped him while he was going through his cancer treatments.   was reviewed and without concern.  Refills were given.      - clonazePAM (KLONOPIN) 2 MG tablet; Take 1 tablet (2 mg) by mouth nightly as needed for anxiety.  - zolpidem (AMBIEN) 5 MG tablet; Take 1 tablet (5 mg) by mouth nightly as needed for sleep.    Hearing difficulty, unspecified laterality  He notes increasing hearing difficulty and would like to see audiology.    - Adult Audiology Referral; Future    Spinal stenosis of lumbar region, unspecified whether neurogenic claudication present/Gait difficulty  patient has known spinal stenosis of his lumbar region.  He has seen neurosurgery and recently had an epidural steroid injection.  He felt that has helped out with his pain.  He is no longer using gabapentin.  He does note that he feels that his balance is off and his gait is slow.  He is willing to see physical therapy.  He also had a burst fracture of one of his lumbar vertebrae and is wearing a lumbar brace.  This was recommended to him by neurosurgery.  He does not know when he supposed to see neurosurgery again and has questions regarding the brace on whether he needs to continue wearing this or not.  He currently is wearing it today.  Did send a message to neurosurgery to help recommend regarding the brace or whether he needs a follow-up appointment.    - Physical Therapy  Referral; Future    Essential hypertension with goal blood pressure less than 140/90  Blood pressure could be improved.  He is no longer taking the hydrochlorothiazide and states he has been off of it about a month.  Will increase his  "losartan from 50 mg to 100 mg.  He will follow-up with me in 1 month.    - losartan (COZAAR) 100 MG tablet; Take 1 tablet (100 mg) by mouth daily.    Hypokalemia  Suspect this was related to the hydrochlorothiazide for which she is no longer on.  Will recheck potassium today.    - Potassium    Impaired fasting glucose  Patient is worried about diabetes because his glucose readings have been elevated on prior lab evaluations.  Will check A1c today.    - Hemoglobin A1c    Malignant neoplasm of prostate (H)  Follows with a specialist    - PSA tumor marker    BMI  Estimated body mass index is 28.21 kg/m  as calculated from the following:    Height as of this encounter: 1.651 m (5' 5\").    Weight as of this encounter: 76.9 kg (169 lb 8 oz).       Counseling  Appropriate preventive services were addressed with this patient via screening, questionnaire, or discussion as appropriate for fall prevention, nutrition, physical activity, Tobacco-use cessation, social engagement, weight loss and cognition.  Checklist reviewing preventive services available has been given to the patient.  Reviewed patient's diet, addressing concerns and/or questions.   The patient was instructed to see the dentist every 6 months.   He is at risk for psychosocial distress and has been provided with information to reduce risk.   Discussed possible causes of fatigue. The patient was provided with written information regarding signs of hearing loss.     Jaime Batres is a 85 year old, presenting for the following:  Wellness Visit        7/2/2025    10:37 AM   Additional Questions   Roomed by kim   Accompanied by self         7/2/2025   Forms   Any forms needing to be completed Yes          HPI       Advance Care Planning    Discussed advance care planning with patient; informed AVS has link to [employer].        6/27/2025   General Health   How would you rate your overall physical health? (!) FAIR   Feel stress (tense, anxious, or " unable to sleep) To some extent   (!) STRESS CONCERN      6/27/2025   Nutrition   Diet: Regular (no restrictions)         6/27/2025   Exercise   Days per week of moderate/strenous exercise 0 days   (!) EXERCISE CONCERN      6/27/2025   Social Factors   Frequency of gathering with friends or relatives Patient declined   Worry food won't last until get money to buy more No   Food not last or not have enough money for food? No   Do you have housing? (Housing is defined as stable permanent housing and does not include staying outside in a car, in a tent, in an abandoned building, in an overnight shelter, or couch-surfing.) Yes   Are you worried about losing your housing? No   Lack of transportation? No   Unable to get utilities (heat,electricity)? Patient declined         7/2/2025   Fall Risk   Gait Speed Test (Document in seconds) 6.69   Gait Speed Test Interpretation Greater than 5.01 seconds - ABNORMAL          6/27/2025   Activities of Daily Living- Home Safety   Needs help with the following daily activites None of the above   Safety concerns in the home None of the above         6/27/2025   Dental   Dentist two times every year? (!) NO         6/27/2025   Hearing Screening   Hearing concerns? (!) TROUBLE UNDERSTANDING SOFT OR WHISPERED SPEECH.   Would you like a referral for hearing testing? (!) YES         6/27/2025   Driving Risk Screening   Patient/family members have concerns about driving No         6/27/2025   General Alertness/Fatigue Screening   Have you been more tired than usual lately? (!) YES         6/27/2025   Urinary Incontinence Screening   Bothered by leaking urine in past 6 months No         Today's PHQ-2 Score:       7/2/2025    10:24 AM   PHQ-2 ( 1999 Pf[employer])   Q1: Little interest or pleasure in doing things 0   Q2: Feeling down, depressed or hopeless 0   PHQ-2 Score 0    Q1: Little interest or pleasure in doing things Not at all   Q2: Feeling down, depressed or hopeless Not at all   PHQ-2  Score 0       Patient-reported           2025   Substance Use   Alcohol more than 3/day or more than 7/wk Not Applicable   Do you have a current opioid prescription? No   How severe/bad is pain from 1 to 10? 5/10   Do you use any other substances recreationally? No     Social History     Tobacco Use    Smoking status: Former     Current packs/day: 0.00     Types: Cigarettes, Cigars, Pipe     Start date: 10/1/1961     Quit date: 1962     Years since quittin.5     Passive exposure: Never    Smokeless tobacco: Never    Tobacco comments:     No smokers in home   Vaping Use    Vaping status: Never Used   Substance Use Topics    Alcohol use: Yes     Comment: A whisky and a glass of wine    Drug use: Yes     Types: Benzodiazepines                 Reviewed and updated as needed this visit by Provider   Tobacco  Allergies  Meds  Problems  Med Hx  Surg Hx  Fam Hx            Current providers sharing in care for this patient include:  Patient Care Team:  Vira Flor MD as PCP - General (Family Medicine)  Nedra Vick CNP as Nurse Practitioner (Hematology & Oncology)  Tio Holman MD as Assigned Cancer Care Provider  Iesha Owen RN as Specialty Care Coordinator (Hematology & Oncology)  Vira Flor MD as Assigned PCP  Neptali Contreras MD as Assigned Musculoskeletal Provider  MARTHA Tatum MD as Assigned Surgical Provider  Candelario Schwartz MD as Assigned Neuroscience Provider    The following health maintenance items are reviewed in Epic and correct as of today:  Health Maintenance   Topic Date Due    COVID-19 VACCINE (8 - Pfizer risk - season) 04/10/2025    INFLUENZA VACCINE (1) 2025    LIPID  2025    MICROALBUMIN  2025    URIC ACID  2025    ANNUAL REVIEW OF HM ORDERS  2026    BMP  2026    HEMOGLOBIN  2026    MEDICARE ANNUAL WELLNESS VISIT  2026    FALL RISK ASSESSMENT  2026    ADVANCE CARE  "PLANNING  06/25/2029    DTAP/TDAP/TD VACCINE (2 - Td or Tdap) 10/25/2029    PHQ-2 (once per calendar year)  Completed    PNEUMOCOCCAL VACCINE 50+ YEARS  Completed    URINALYSIS  Completed    ZOSTER VACCINE  Completed    RSV VACCINE  Completed    HPV VACCINE  Aged Out    MENINGITIS VACCINE  Aged Out    COLORECTAL CANCER SCREENING  Discontinued            Objective    Exam  BP (!) 162/78   Pulse 67   Temp 97.9  F (36.6  C) (Temporal)   Resp 19   Ht 1.651 m (5' 5\")   Wt 76.9 kg (169 lb 8 oz)   SpO2 98%   BMI 28.21 kg/m     Estimated body mass index is 28.21 kg/m  as calculated from the following:    Height as of this encounter: 1.651 m (5' 5\").    Weight as of this encounter: 76.9 kg (169 lb 8 oz).    Physical Exam  Vitals reviewed.   Constitutional:       General: He is not in acute distress.  HENT:      Right Ear: Tympanic membrane, ear canal and external ear normal.      Left Ear: Tympanic membrane, ear canal and external ear normal.      Nose: Nose normal.   Eyes:      General:         Right eye: No discharge.         Left eye: No discharge.      Conjunctiva/sclera: Conjunctivae normal.   Cardiovascular:      Rate and Rhythm: Normal rate and regular rhythm.      Heart sounds: No murmur heard.  Pulmonary:      Effort: Pulmonary effort is normal.      Breath sounds: Normal breath sounds. No wheezing or rhonchi.   Abdominal:      General: Bowel sounds are normal. There is no distension.      Palpations: Abdomen is soft. There is no mass.      Tenderness: There is no abdominal tenderness.   Musculoskeletal:         General: Normal range of motion.      Cervical back: Normal range of motion and neck supple. No tenderness.   Skin:     Findings: No rash.   Neurological:      Mental Status: He is alert and oriented to person, place, and time.   Psychiatric:         Mood and Affect: Mood normal.         Behavior: Behavior normal.         Gait and balance assessed per Gait Speed Test.  Result as above.        7/2/2025 "   Mini Cog   Clock Draw Score 2 Normal   3 Item Recall 3 objects recalled   Mini Cog Total Score 5              Signed Electronically by: Vira Flor MD

## 2025-07-02 NOTE — PATIENT INSTRUCTIONS
Patient Education   Preventive Care Advice   This is general advice given by our system to help you stay healthy. However, your care team may have specific advice just for you. Please talk to your care team about your preventive care needs.  Nutrition  Eat 5 or more servings of fruits and vegetables each day.  Try wheat bread, brown rice and whole grain pasta (instead of white bread, rice, and pasta).  Get enough calcium and vitamin D. Check the label on foods and aim for 100% of the RDA (recommended daily allowance).  Lifestyle  Exercise at least 150 minutes each week  (30 minutes a day, 5 days a week).  Do muscle strengthening activities 2 days a week. These help control your weight and prevent disease.  No smoking.  Wear sunscreen to prevent skin cancer.  Have a dental exam and cleaning every 6 months.  Yearly exams  See your health care team every year to talk about:  Any changes in your health.  Any medicines your care team has prescribed.  Preventive care, family planning, and ways to prevent chronic diseases.  Shots (vaccines)   HPV shots (up to age 26), if you've never had them before.  Hepatitis B shots (up to age 59), if you've never had them before.  COVID-19 shot: Get this shot when it's due.  Flu shot: Get a flu shot every year.  Tetanus shot: Get a tetanus shot every 10 years.  Pneumococcal, hepatitis A, and RSV shots: Ask your care team if you need these based on your risk.  Shingles shot (for age 50 and up)  General health tests  Diabetes screening:  Starting at age 35, Get screened for diabetes at least every 3 years.  If you are younger than age 35, ask your care team if you should be screened for diabetes.  Cholesterol test: At age 39, start having a cholesterol test every 5 years, or more often if advised.  Bone density scan (DEXA): At age 50, ask your care team if you should have this scan for osteoporosis (brittle bones).  Hepatitis C: Get tested at least once in your life.  STIs (sexually  transmitted infections)  Before age 24: Ask your care team if you should be screened for STIs.  After age 24: Get screened for STIs if you're at risk. You are at risk for STIs (including HIV) if:  You are sexually active with more than one person.  You don't use condoms every time.  You or a partner was diagnosed with a sexually transmitted infection.  If you are at risk for HIV, ask about PrEP medicine to prevent HIV.  Get tested for HIV at least once in your life, whether you are at risk for HIV or not.  Cancer screening tests  Cervical cancer screening: If you have a cervix, begin getting regular cervical cancer screening tests starting at age 21.  Breast cancer scan (mammogram): If you've ever had breasts, begin having regular mammograms starting at age 40. This is a scan to check for breast cancer.  Colon cancer screening: It is important to start screening for colon cancer at age 45.  Have a colonoscopy test every 10 years (or more often if you're at risk) Or, ask your provider about stool tests like a FIT test every year or Cologuard test every 3 years.  To learn more about your testing options, visit:   .  For help making a decision, visit:   https://bit.ly/vs40476.  Prostate cancer screening test: If you have a prostate, ask your care team if a prostate cancer screening test (PSA) at age 55 is right for you.  Lung cancer screening: If you are a current or former smoker ages 50 to 80, ask your care team if ongoing lung cancer screenings are right for you.  For informational purposes only. Not to replace the advice of your health care provider. Copyright   2023 [facility]. All rights reserved. Clinically reviewed by the [facility]. PearlChain.net 145499 - REV 01/24.  Preventing Falls: Care Instructions  Injuries and health problems such as trouble walking or poor eyesight can increase your risk of falling. So can some medicines. But there are things you can do to help  "prevent falls. You can exercise to get stronger. You can also arrange your home to make it safer.    Talk to your doctor about the medicines you take. Ask if any of them increase the risk of falls and whether they can be changed or stopped.   Try to exercise regularly. It can help improve your strength and balance. This can help lower your risk of falling.         Practice fall safety and prevention.   Wear low-heeled shoes that fit well and give your feet good support. Talk to your doctor if you have foot problems that make this hard.  Carry a cellphone or wear a medical alert device that you can use to call for help.  Use stepladders instead of chairs to reach high objects. Don't climb if you're at risk for falls. Ask for help, if needed.  Wear the correct eyeglasses, if you need them.        Make your home safer.   Remove rugs, cords, clutter, and furniture from walkways.  Keep your house well lit. Use night-lights in hallways and bathrooms.  Install and use sturdy handrails on stairways.  Wear nonskid footwear, even inside. Don't walk barefoot or in socks without shoes.        Be safe outside.   Use handrails, curb cuts, and ramps whenever possible.  Keep your hands free by using a shoulder bag or backpack.  Try to walk in well-lit areas. Watch out for uneven ground, changes in pavement, and debris.  Be careful in the winter. Walk on the grass or gravel when sidewalks are slippery. Use de-icer on steps and walkways. Add non-slip devices to shoes.    Put grab bars and nonskid mats in your shower or tub and near the toilet. Try to use a shower chair or bath bench when bathing.   Get into a tub or shower by putting in your weaker leg first. Get out with your strong side first. Have a phone or medical alert device in the bathroom with you.   Where can you learn more?  Go to https://www.Peakwise.net/patiented  Enter G117 in the search box to learn more about \"Preventing Falls: Care Instructions.\"  Current as of: " July 31, 2024  Content Version: 14.5    1766-9759 [employer].   Care instructions adapted under license by your healthcare professional. If you have questions about a medical condition or this instruction, always ask your healthcare professional. [employer] disclaims any warranty or liability for your use of this information.    Hearing Loss: Care Instructions  Overview     Hearing loss is a sudden or slow decrease in how well you hear. It can range from slight to profound. Permanent hearing loss can occur with aging. It also can happen when you are exposed long-term to loud noise. Examples include listening to loud music, riding motorcycles, or being around other loud machines.  Hearing loss can affect your work and home life. It can make you feel lonely or depressed. You may feel that you have lost your independence. But hearing aids and other devices can help you hear better and feel connected to others.  Follow-up care is a key part of your treatment and safety. Be sure to make and go to all appointments, and call your doctor if you are having problems. It's also a good idea to know your test results and keep a list of the medicines you take.  How can you care for yourself at home?  Avoid loud noises whenever possible. This helps keep your hearing from getting worse.  Always wear hearing protection around loud noises.  Wear a hearing aid as directed.  A professional can help you pick a hearing aid that will work best for you.  You can also get hearing aids over the counter for mild to moderate hearing loss.  Have hearing tests as your doctor suggests. They can show whether your hearing has changed. Your hearing aid may need to be adjusted.  Use other devices as needed. These may include:  Telephone amplifiers and hearing aids that can connect to a television, stereo, radio, or microphone.  Devices that use lights or vibrations. These alert you to the doorbell, a ringing telephone, or a  "baby monitor.  Television closed-captioning. This shows the words at the bottom of the screen. Most new TVs can do this.  TTY (text telephone). This lets you type messages back and forth on the telephone instead of talking or listening. These devices are also called TDD. When messages are typed on the keyboard, they are sent over the phone line to a receiving TTY. The message is shown on a monitor.  Use text messaging, social media, and email if it is hard for you to communicate by telephone.  Try to learn a listening technique called speechreading. It is not lipreading. You pay attention to people's gestures, expressions, posture, and tone of voice. These clues can help you understand what a person is saying. Face the person you are talking to, and have them face you. Make sure the lighting is good. You need to see the other person's face clearly.  Think about counseling if you need help to adjust to your hearing loss.  When should you call for help?  Watch closely for changes in your health, and be sure to contact your doctor if:    You think your hearing is getting worse.     You have new symptoms, such as dizziness or nausea.   Where can you learn more?  Go to https://www.POI.net/patiented  Enter R798 in the search box to learn more about \"Hearing Loss: Care Instructions.\"  Current as of: October 27, 2024  Content Version: 14.5 2024-2025 [employer].   Care instructions adapted under license by your healthcare professional. If you have questions about a medical condition or this instruction, always ask your healthcare professional. [employer] disclaims any warranty or liability for your use of this information.    Learning About Stress  What is stress?     Stress is your body's response to a hard situation. Your body can have a physical, emotional, or mental response. Stress is a fact of life for most people, and it affects everyone differently. What causes stress for you may not " be stressful for someone else.  A lot of things can cause stress. You may feel stress when you go on a job interview, take a test, or run a race. This kind of short-term stress is normal and even useful. It can help you if you need to work hard or react quickly. For example, stress can help you finish an important job on time.  Long-term stress is caused by ongoing stressful situations or events. Examples of long-term stress include long-term health problems, ongoing problems at work, or conflicts in your family. Long-term stress can harm your health.  How does stress affect your health?  When you are stressed, your body responds as though you are in danger. It makes hormones that speed up your heart, make you breathe faster, and give you a burst of energy. This is called the fight-or-flight stress response. If the stress is over quickly, your body goes back to normal and no harm is done.  But if stress happens too often or lasts too long, it can have bad effects. Long-term stress can make you more likely to get sick, and it can make symptoms of some diseases worse. If you tense up when you are stressed, you may develop neck, shoulder, or low back pain. Stress is linked to high blood pressure and heart disease.  Stress also harms your emotional health. It can make you alexander, tense, or depressed. Your relationships may suffer, and you may not do well at work or school.  What can you do to manage stress?  You can try these things to help manage stress:   Do something active. Exercise or activity can help reduce stress. Walking is a great way to get started. Even everyday activities such as housecleaning or yard work can help.  Try yoga or pierce chi. These techniques combine exercise and meditation. You may need some training at first to learn them.  Do something you enjoy. For example, listen to music or go to a movie. Practice your hobby or do volunteer work.  Meditate. This can help you relax, because you are not  "worrying about what happened before or what may happen in the future.  Do guided imagery. Imagine yourself in any setting that helps you feel calm. You can use online videos, books, or a teacher to guide you.  Do breathing exercises. For example:  From a standing position, bend forward from the waist with your knees slightly bent. Let your arms dangle close to the floor.  Breathe in slowly and deeply as you return to a standing position. Roll up slowly and lift your head last.  Hold your breath for just a few seconds in the standing position.  Breathe out slowly and bend forward from the waist.  Let your feelings out. Talk, laugh, cry, and express anger when you need to. Talking with supportive friends or family, a counselor, or a delta leader about your feelings is a healthy way to relieve stress. Avoid discussing your feelings with people who make you feel worse.  Write. It may help to write about things that are bothering you. This helps you find out how much stress you feel and what is causing it. When you know this, you can find better ways to cope.  What can you do to prevent stress?  You might try some of these things to help prevent stress:  Manage your time. This helps you find time to do the things you want and need to do.  Get enough sleep. Your body recovers from the stresses of the day while you are sleeping.  Get support. Your family, friends, and community can make a difference in how you experience stress.  Limit your news feed. Avoid or limit time on social media or news that may make you feel stressed.  Do something active. Exercise or activity can help reduce stress. Walking is a great way to get started.  Where can you learn more?  Go to https://www.Storefront.net/patiented  Enter N032 in the search box to learn more about \"Learning About Stress.\"  Current as of: October 24, 2024  Content Version: 14.5 2024-2025 [facility] [employer].   Care instructions adapted under license by your " healthcare professional. If you have questions about a medical condition or this instruction, always ask your healthcare professional. [employer] disclaims any warranty or liability for your use of this information.    Learning About Sleeping Well  What does sleeping well mean?     Sleeping well means getting enough sleep to feel good and stay healthy. How much sleep is enough varies among people.  The number of hours you sleep and how you feel when you wake up are both important. If you do not feel refreshed, you probably need more sleep. Another sign of not getting enough sleep is feeling tired during the day.  Experts recommend that adults get at least 7 or more hours of sleep per day. Children and older adults need more sleep.  Why is getting enough sleep important?  Getting enough quality sleep is a basic part of good health. When your sleep suffers, your physical health, mood, and your thoughts can suffer too. You may find yourself feeling more grumpy or stressed. Not getting enough sleep also can lead to serious problems, including injury, accidents, anxiety, and depression.  What might cause poor sleeping?  Many things can cause sleep problems, including:  Changes to your sleep schedule.  Stress. Stress can be caused by fear about a single event, such as giving a speech. Or you may have ongoing stress, such as worry about work or school.  Depression, anxiety, and other mental or emotional conditions.  Changes in your sleep habits or surroundings. This includes changes that happen where you sleep, such as noise, light, or sleeping in a different bed. It also includes changes in your sleep pattern, such as having jet lag or working a late shift.  Health problems, such as pain, breathing problems, and restless legs syndrome.  Lack of regular exercise.  Using alcohol, nicotine, or caffeine before bed.  How can you help yourself?  Here are some tips that may help you sleep more soundly and wake up  "feeling more refreshed.  Your sleeping area   Use your bedroom only for sleeping and sex. A bit of light reading may help you fall asleep. But if it doesn't, do your reading elsewhere in the house. Try not to use your TV, computer, smartphone, or tablet while you are in bed.  Be sure your bed is big enough to stretch out comfortably, especially if you have a sleep partner.  Keep your bedroom quiet, dark, and cool. Use curtains, blinds, or a sleep mask to block out light. To block out noise, use earplugs, soothing music, or a \"white noise\" machine.  Your evening and bedtime routine   Create a relaxing bedtime routine. You might want to take a warm shower or bath, or listen to soothing music.  Go to bed at the same time every night. And get up at the same time every morning, even if you feel tired.  What to avoid   Limit caffeine (coffee, tea, caffeinated sodas) during the day, and don't have any for at least 6 hours before bedtime.  Avoid drinking alcohol before bedtime. Alcohol can cause you to wake up more often during the night.  Try not to smoke or use tobacco, especially in the evening. Nicotine can keep you awake.  Limit naps during the day, especially close to bedtime.  Avoid lying in bed awake for too long. If you can't fall asleep or if you wake up in the middle of the night and can't get back to sleep within about 20 minutes, get out of bed and go to another room until you feel sleepy.  Avoid taking medicine right before bed that may keep you awake or make you feel hyper or energized. Your doctor can tell you if your medicine may do this and if you can take it earlier in the day.  If you can't sleep   Imagine yourself in a peaceful, pleasant scene. Focus on the details and feelings of being in a place that is relaxing.  Get up and do a quiet or boring activity until you feel sleepy.  Avoid drinking any liquids before going to bed to help prevent waking up often to use the bathroom.  Where can you learn " "more?  Go to https://www.Teez.mobi.net/patiented  Enter J942 in the search box to learn more about \"Learning About Sleeping Well.\"  Current as of: July 31, 2024  Content Version: 14.5 2024-2025 [employer].   Care instructions adapted under license by your healthcare professional. If you have questions about a medical condition or this instruction, always ask your healthcare professional. [employer] disclaims any warranty or liability for your use of this information.       "

## 2025-07-03 ENCOUNTER — PATIENT OUTREACH (OUTPATIENT)
Dept: CARE COORDINATION | Facility: CLINIC | Age: 85
End: 2025-07-03
Payer: COMMERCIAL

## 2025-07-07 ENCOUNTER — PATIENT OUTREACH (OUTPATIENT)
Dept: CARE COORDINATION | Facility: CLINIC | Age: 85
End: 2025-07-07
Payer: COMMERCIAL

## 2025-07-07 NOTE — TELEPHONE ENCOUNTER
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 Emily requesting nursing call for an update on patient's symptoms.     A message was sent to scheduling to assist with scheduling follow-up.     Attempted to reach out to patient, no answer. VM full, unable to leave message. MyChart message sent to patient in separate encounter. Update sent to Emily.

## 2025-07-09 NOTE — TELEPHONE ENCOUNTER
"Patient sent MyC stating \"Hi. The injection is wearing out and there is pain in my back and upper legs. However the pain less severe than before. It still needs to be examined again. I am in [place] this week and we can further discuss next week on or after Tuesday.      I have presently removed the brace today, is there any concern?\".     Recommended he continue to wear brace until follow-up, routed to scheduling team again to coordinate follow-up.   "

## 2025-07-16 ENCOUNTER — LAB (OUTPATIENT)
Dept: LAB | Facility: OTHER | Age: 85
End: 2025-07-16
Payer: COMMERCIAL

## 2025-07-16 ENCOUNTER — DOCUMENTATION ONLY (OUTPATIENT)
Dept: ONCOLOGY | Facility: CLINIC | Age: 85
End: 2025-07-16

## 2025-07-16 DIAGNOSIS — C61 MALIGNANT NEOPLASM OF PROSTATE (H): Primary | ICD-10-CM

## 2025-07-16 DIAGNOSIS — C61 MALIGNANT NEOPLASM OF PROSTATE (H): ICD-10-CM

## 2025-07-16 LAB
ALBUMIN SERPL BCG-MCNC: 3.9 G/DL (ref 3.5–5.2)
ALP SERPL-CCNC: 91 U/L (ref 40–150)
ALT SERPL W P-5'-P-CCNC: 12 U/L (ref 0–70)
ANION GAP SERPL CALCULATED.3IONS-SCNC: 10 MMOL/L (ref 7–15)
AST SERPL W P-5'-P-CCNC: 25 U/L (ref 0–45)
BASOPHILS # BLD AUTO: 0 10E3/UL (ref 0–0.2)
BASOPHILS NFR BLD AUTO: 1 %
BILIRUB SERPL-MCNC: 0.5 MG/DL
BUN SERPL-MCNC: 17.6 MG/DL (ref 8–23)
CALCIUM SERPL-MCNC: 9.6 MG/DL (ref 8.8–10.4)
CHLORIDE SERPL-SCNC: 109 MMOL/L (ref 98–107)
CREAT SERPL-MCNC: 1.47 MG/DL (ref 0.67–1.17)
EGFRCR SERPLBLD CKD-EPI 2021: 47 ML/MIN/1.73M2
EOSINOPHIL # BLD AUTO: 0 10E3/UL (ref 0–0.7)
EOSINOPHIL NFR BLD AUTO: 1 %
ERYTHROCYTE [DISTWIDTH] IN BLOOD BY AUTOMATED COUNT: 13.4 % (ref 10–15)
GLUCOSE SERPL-MCNC: 159 MG/DL (ref 70–99)
HCO3 SERPL-SCNC: 21 MMOL/L (ref 22–29)
HCT VFR BLD AUTO: 30.6 % (ref 40–53)
HGB BLD-MCNC: 10.5 G/DL (ref 13.3–17.7)
HOLD SPECIMEN: NORMAL
IMM GRANULOCYTES # BLD: 0 10E3/UL
IMM GRANULOCYTES NFR BLD: 0 %
LYMPHOCYTES # BLD AUTO: 1.5 10E3/UL (ref 0.8–5.3)
LYMPHOCYTES NFR BLD AUTO: 42 %
MCH RBC QN AUTO: 32.4 PG (ref 26.5–33)
MCHC RBC AUTO-ENTMCNC: 34.3 G/DL (ref 31.5–36.5)
MCV RBC AUTO: 94 FL (ref 78–100)
MONOCYTES # BLD AUTO: 0.3 10E3/UL (ref 0–1.3)
MONOCYTES NFR BLD AUTO: 10 %
NEUTROPHILS # BLD AUTO: 1.7 10E3/UL (ref 1.6–8.3)
NEUTROPHILS NFR BLD AUTO: 47 %
PLATELET # BLD AUTO: 159 10E3/UL (ref 150–450)
POTASSIUM SERPL-SCNC: 3.1 MMOL/L (ref 3.4–5.3)
PROT SERPL-MCNC: 6.6 G/DL (ref 6.4–8.3)
PSA SERPL DL<=0.01 NG/ML-MCNC: 0.98 NG/ML
RBC # BLD AUTO: 3.24 10E6/UL (ref 4.4–5.9)
SODIUM SERPL-SCNC: 140 MMOL/L (ref 135–145)
WBC # BLD AUTO: 3.5 10E3/UL (ref 4–11)

## 2025-07-16 NOTE — PROGRESS NOTES
Please place add on lab orders for any labs you would like on this patient. The orders in the chart are  or not expected yet. Patients blood has been drawn    Thank You,  Nedra Pate MLT([facility])

## 2025-08-08 ENCOUNTER — ANCILLARY PROCEDURE (OUTPATIENT)
Dept: GENERAL RADIOLOGY | Facility: CLINIC | Age: 85
End: 2025-08-08
Attending: NURSE PRACTITIONER
Payer: COMMERCIAL

## 2025-08-08 DIAGNOSIS — S32.029A L2 VERTEBRAL FRACTURE (H): ICD-10-CM

## 2025-08-08 PROCEDURE — 72100 X-RAY EXAM L-S SPINE 2/3 VWS: CPT | Performed by: STUDENT IN AN ORGANIZED HEALTH CARE EDUCATION/TRAINING PROGRAM

## 2025-08-12 ENCOUNTER — THERAPY VISIT (OUTPATIENT)
Dept: PHYSICAL THERAPY | Facility: CLINIC | Age: 85
End: 2025-08-12
Attending: PHYSICIAN ASSISTANT
Payer: COMMERCIAL

## 2025-08-12 DIAGNOSIS — M54.42 ACUTE LEFT-SIDED LOW BACK PAIN WITH LEFT-SIDED SCIATICA: ICD-10-CM

## 2025-08-12 PROCEDURE — 97110 THERAPEUTIC EXERCISES: CPT | Mod: GP | Performed by: PHYSICAL THERAPIST

## 2025-08-12 PROCEDURE — 97530 THERAPEUTIC ACTIVITIES: CPT | Mod: GP | Performed by: PHYSICAL THERAPIST

## 2025-08-12 PROCEDURE — 97161 PT EVAL LOW COMPLEX 20 MIN: CPT | Mod: GP | Performed by: PHYSICAL THERAPIST

## 2025-08-20 ENCOUNTER — THERAPY VISIT (OUTPATIENT)
Dept: PHYSICAL THERAPY | Facility: CLINIC | Age: 85
End: 2025-08-20
Payer: COMMERCIAL

## 2025-08-20 DIAGNOSIS — M54.42 ACUTE LEFT-SIDED LOW BACK PAIN WITH LEFT-SIDED SCIATICA: Primary | ICD-10-CM

## 2025-08-20 PROCEDURE — 97530 THERAPEUTIC ACTIVITIES: CPT | Mod: GP | Performed by: PHYSICAL THERAPIST

## 2025-08-20 PROCEDURE — 97110 THERAPEUTIC EXERCISES: CPT | Mod: GP | Performed by: PHYSICAL THERAPIST

## 2025-08-27 ENCOUNTER — LAB (OUTPATIENT)
Dept: LAB | Facility: OTHER | Age: 85
End: 2025-08-27
Payer: COMMERCIAL

## 2025-08-27 DIAGNOSIS — C61 MALIGNANT NEOPLASM OF PROSTATE (H): ICD-10-CM

## 2025-08-27 LAB — PSA SERPL DL<=0.01 NG/ML-MCNC: 3.19 NG/ML

## 2025-08-27 PROCEDURE — 36415 COLL VENOUS BLD VENIPUNCTURE: CPT

## 2025-08-27 PROCEDURE — 84153 ASSAY OF PSA TOTAL: CPT

## 2025-09-04 ENCOUNTER — THERAPY VISIT (OUTPATIENT)
Dept: PHYSICAL THERAPY | Facility: CLINIC | Age: 85
End: 2025-09-04
Payer: COMMERCIAL
